# Patient Record
Sex: FEMALE | Race: BLACK OR AFRICAN AMERICAN | NOT HISPANIC OR LATINO | URBAN - METROPOLITAN AREA
[De-identification: names, ages, dates, MRNs, and addresses within clinical notes are randomized per-mention and may not be internally consistent; named-entity substitution may affect disease eponyms.]

---

## 2017-01-03 ENCOUNTER — INPATIENT (INPATIENT)
Facility: HOSPITAL | Age: 62
LOS: 1 days | Discharge: HOME | DRG: 872 | End: 2017-01-05
Attending: INTERNAL MEDICINE | Admitting: INTERNAL MEDICINE
Payer: MEDICARE

## 2017-01-03 VITALS
DIASTOLIC BLOOD PRESSURE: 74 MMHG | RESPIRATION RATE: 20 BRPM | OXYGEN SATURATION: 98 % | TEMPERATURE: 99 F | SYSTOLIC BLOOD PRESSURE: 174 MMHG | HEART RATE: 78 BPM

## 2017-01-03 LAB
ALBUMIN SERPL ELPH-MCNC: 3.8 G/DL — SIGNIFICANT CHANGE UP (ref 3.4–5)
ALP SERPL-CCNC: 126 U/L — HIGH (ref 40–120)
ALT FLD-CCNC: 13 U/L — SIGNIFICANT CHANGE UP (ref 12–42)
ANION GAP SERPL CALC-SCNC: 12 MMOL/L — SIGNIFICANT CHANGE UP (ref 9–16)
APPEARANCE UR: CLEAR — SIGNIFICANT CHANGE UP
AST SERPL-CCNC: 16 U/L — SIGNIFICANT CHANGE UP (ref 15–37)
BILIRUB SERPL-MCNC: 0.2 MG/DL — SIGNIFICANT CHANGE UP (ref 0.2–1.2)
BILIRUB UR-MCNC: NEGATIVE — SIGNIFICANT CHANGE UP
BUN SERPL-MCNC: 21 MG/DL — SIGNIFICANT CHANGE UP (ref 7–23)
CALCIUM SERPL-MCNC: 9.7 MG/DL — SIGNIFICANT CHANGE UP (ref 8.5–10.5)
CHLORIDE SERPL-SCNC: 104 MMOL/L — SIGNIFICANT CHANGE UP (ref 96–108)
CO2 SERPL-SCNC: 22 MMOL/L — SIGNIFICANT CHANGE UP (ref 22–31)
COLOR SPEC: YELLOW — SIGNIFICANT CHANGE UP
CREAT SERPL-MCNC: 1.5 MG/DL — HIGH (ref 0.5–1.3)
DIFF PNL FLD: (no result)
GLUCOSE SERPL-MCNC: 159 MG/DL — HIGH (ref 70–99)
GLUCOSE UR QL: 100
HCT VFR BLD CALC: 28 % — LOW (ref 34.5–45)
HGB BLD-MCNC: 8.7 G/DL — LOW (ref 11.5–15.5)
KETONES UR-MCNC: NEGATIVE — SIGNIFICANT CHANGE UP
LACTATE SERPL-SCNC: 1.6 MMOL/L — SIGNIFICANT CHANGE UP (ref 0.4–2)
LACTATE SERPL-SCNC: 2.2 MMOL/L — HIGH (ref 0.4–2)
LEUKOCYTE ESTERASE UR-ACNC: NEGATIVE — SIGNIFICANT CHANGE UP
MCHC RBC-ENTMCNC: 27.4 PG — SIGNIFICANT CHANGE UP (ref 27–34)
MCHC RBC-ENTMCNC: 31.1 G/DL — LOW (ref 32–36)
MCV RBC AUTO: 88.3 FL — SIGNIFICANT CHANGE UP (ref 80–100)
NITRITE UR-MCNC: NEGATIVE — SIGNIFICANT CHANGE UP
PH UR: 5.5 — SIGNIFICANT CHANGE UP (ref 4–8.1)
PLATELET # BLD AUTO: 269 K/UL — SIGNIFICANT CHANGE UP (ref 150–400)
POTASSIUM SERPL-MCNC: 4.4 MMOL/L — SIGNIFICANT CHANGE UP (ref 3.5–5.3)
POTASSIUM SERPL-SCNC: 4.4 MMOL/L — SIGNIFICANT CHANGE UP (ref 3.5–5.3)
PROT SERPL-MCNC: 8.7 G/DL — HIGH (ref 6.4–8.2)
PROT UR-MCNC: 30 MG/DL
RBC # BLD: 3.17 M/UL — LOW (ref 3.8–5.2)
RBC # FLD: 16.4 % — SIGNIFICANT CHANGE UP (ref 10.3–16.9)
SODIUM SERPL-SCNC: 138 MMOL/L — SIGNIFICANT CHANGE UP (ref 132–145)
SP GR SPEC: >=1.03 — SIGNIFICANT CHANGE UP (ref 1–1.03)
TROPONIN I SERPL-MCNC: 0.1 NG/ML — HIGH (ref 0.02–0.06)
UROBILINOGEN FLD QL: 0.2 E.U./DL — SIGNIFICANT CHANGE UP
WBC # BLD: 13 K/UL — HIGH (ref 3.8–10.5)
WBC # FLD AUTO: 13 K/UL — HIGH (ref 3.8–10.5)

## 2017-01-03 PROCEDURE — 70450 CT HEAD/BRAIN W/O DYE: CPT | Mod: 26

## 2017-01-03 PROCEDURE — 71010: CPT | Mod: 26

## 2017-01-03 PROCEDURE — 74177 CT ABD & PELVIS W/CONTRAST: CPT | Mod: 26

## 2017-01-03 PROCEDURE — 99285 EMERGENCY DEPT VISIT HI MDM: CPT | Mod: 25

## 2017-01-03 PROCEDURE — 93010 ELECTROCARDIOGRAM REPORT: CPT

## 2017-01-03 RX ORDER — SODIUM CHLORIDE 9 MG/ML
1000 INJECTION INTRAMUSCULAR; INTRAVENOUS; SUBCUTANEOUS ONCE
Qty: 0 | Refills: 0 | Status: COMPLETED | OUTPATIENT
Start: 2017-01-03 | End: 2017-01-03

## 2017-01-03 RX ORDER — ONDANSETRON 8 MG/1
4 TABLET, FILM COATED ORAL ONCE
Qty: 0 | Refills: 0 | Status: COMPLETED | OUTPATIENT
Start: 2017-01-03 | End: 2017-01-03

## 2017-01-03 RX ORDER — PIPERACILLIN AND TAZOBACTAM 4; .5 G/20ML; G/20ML
3.38 INJECTION, POWDER, LYOPHILIZED, FOR SOLUTION INTRAVENOUS ONCE
Qty: 0 | Refills: 0 | Status: COMPLETED | OUTPATIENT
Start: 2017-01-03 | End: 2017-01-03

## 2017-01-03 RX ORDER — HEPARIN SODIUM 5000 [USP'U]/ML
5000 INJECTION INTRAVENOUS; SUBCUTANEOUS EVERY 8 HOURS
Qty: 0 | Refills: 0 | Status: DISCONTINUED | OUTPATIENT
Start: 2017-01-03 | End: 2017-01-05

## 2017-01-03 RX ADMIN — PIPERACILLIN AND TAZOBACTAM 200 GRAM(S): 4; .5 INJECTION, POWDER, LYOPHILIZED, FOR SOLUTION INTRAVENOUS at 18:48

## 2017-01-03 RX ADMIN — ONDANSETRON 4 MILLIGRAM(S): 8 TABLET, FILM COATED ORAL at 16:37

## 2017-01-03 RX ADMIN — SODIUM CHLORIDE 1000 MILLILITER(S): 9 INJECTION INTRAMUSCULAR; INTRAVENOUS; SUBCUTANEOUS at 18:06

## 2017-01-03 RX ADMIN — SODIUM CHLORIDE 1000 MILLILITER(S): 9 INJECTION INTRAMUSCULAR; INTRAVENOUS; SUBCUTANEOUS at 16:38

## 2017-01-03 NOTE — ED ADULT TRIAGE NOTE - CHIEF COMPLAINT QUOTE
As per EMS patient complaining of abdominal pain starting this morning with nausea. patient denies diarrhea, fever, travel.

## 2017-01-03 NOTE — ED PROVIDER NOTE - MEDICAL DECISION MAKING DETAILS
Will admit patient for sepsis, possible seizure, fever.  Patient appears well.  Case discussed with admitting doctor who has accepted patient to Dr. Sanchez's team.

## 2017-01-03 NOTE — ED PROVIDER NOTE - CARE PLAN
Principal Discharge DX:	Seizure  Secondary Diagnosis:	Non-intractable vomiting with nausea, unspecified vomiting type  Secondary Diagnosis:	Sepsis, due to unspecified organism

## 2017-01-03 NOTE — ED PROVIDER NOTE - NS ED MD SCRIBE ATTENDING SCRIBE SECTIONS
HISTORY OF PRESENT ILLNESS/REVIEW OF SYSTEMS/PHYSICAL EXAM/VITAL SIGNS( Pullset)/DISPOSITION/HIV/PAST MEDICAL/SURGICAL/SOCIAL HISTORY

## 2017-01-03 NOTE — ED PROVIDER NOTE - PSH
Status post aorto-coronary artery bypass graft  2012  Status post percutaneous transluminal coronary angioplasty  X 2

## 2017-01-03 NOTE — ED PROVIDER NOTE - CONSTITUTIONAL, MLM
normal... Well appearing, well nourished, awake, alert, oriented to person, place, time/situation and in no apparent distress. patient actively vomiting.

## 2017-01-03 NOTE — ED ADULT NURSE NOTE - OBJECTIVE STATEMENT
Pt received c/o abd pain w/ n/v since this AM. At this time axox3, states has abd pain, cp when coughing, sob, but speaking in full complete sentences. Will cont to monitor.

## 2017-01-03 NOTE — ED PROVIDER NOTE - PMH
Anemia  Anemia  Atherosclerosis of coronary artery  CAD (coronary artery disease)  Essential hypertension  HTN (hypertension)  Gastroesophageal reflux disease  GERD (gastroesophageal reflux disease)  Hyperlipidemia  Hyperlipidemia  Hypothyroidism  Hypothyroidism  Peripheral vascular disease  PVD (peripheral vascular disease)

## 2017-01-04 DIAGNOSIS — R41.3 OTHER AMNESIA: ICD-10-CM

## 2017-01-04 DIAGNOSIS — I71.9 AORTIC ANEURYSM OF UNSPECIFIED SITE, WITHOUT RUPTURE: ICD-10-CM

## 2017-01-04 DIAGNOSIS — I25.10 ATHEROSCLEROTIC HEART DISEASE OF NATIVE CORONARY ARTERY WITHOUT ANGINA PECTORIS: ICD-10-CM

## 2017-01-04 DIAGNOSIS — R63.8 OTHER SYMPTOMS AND SIGNS CONCERNING FOOD AND FLUID INTAKE: ICD-10-CM

## 2017-01-04 DIAGNOSIS — Z29.9 ENCOUNTER FOR PROPHYLACTIC MEASURES, UNSPECIFIED: ICD-10-CM

## 2017-01-04 DIAGNOSIS — D64.9 ANEMIA, UNSPECIFIED: ICD-10-CM
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
## 2017-01-04 DIAGNOSIS — E03.9 HYPOTHYROIDISM, UNSPECIFIED: ICD-10-CM

## 2017-01-04 DIAGNOSIS — I10 ESSENTIAL (PRIMARY) HYPERTENSION: ICD-10-CM

## 2017-01-04 DIAGNOSIS — A41.9 SEPSIS, UNSPECIFIED ORGANISM: ICD-10-CM

## 2017-01-04 LAB
ALBUMIN SERPL ELPH-MCNC: 3.3 G/DL — LOW (ref 3.4–5)
ALP SERPL-CCNC: 125 U/L — HIGH (ref 40–120)
ALT FLD-CCNC: 13 U/L — SIGNIFICANT CHANGE UP (ref 12–42)
ANION GAP SERPL CALC-SCNC: 11 MMOL/L — SIGNIFICANT CHANGE UP (ref 9–16)
ANION GAP SERPL CALC-SCNC: 12 MMOL/L — SIGNIFICANT CHANGE UP (ref 9–16)
ANISOCYTOSIS BLD QL: SLIGHT — SIGNIFICANT CHANGE UP
APTT BLD: 32.4 SEC — SIGNIFICANT CHANGE UP (ref 27.5–37.4)
APTT BLD: 33.6 SEC — SIGNIFICANT CHANGE UP (ref 27.5–37.4)
AST SERPL-CCNC: 15 U/L — SIGNIFICANT CHANGE UP (ref 15–37)
BASOPHILS NFR BLD AUTO: 0.1 % — SIGNIFICANT CHANGE UP (ref 0–2)
BASOPHILS NFR BLD AUTO: 0.1 % — SIGNIFICANT CHANGE UP (ref 0–2)
BILIRUB SERPL-MCNC: 0.2 MG/DL — SIGNIFICANT CHANGE UP (ref 0.2–1.2)
BLD GP AB SCN SERPL QL: NEGATIVE — SIGNIFICANT CHANGE UP
BUN SERPL-MCNC: 18 MG/DL — SIGNIFICANT CHANGE UP (ref 7–23)
BUN SERPL-MCNC: 19 MG/DL — SIGNIFICANT CHANGE UP (ref 7–23)
CALCIUM SERPL-MCNC: 8.8 MG/DL — SIGNIFICANT CHANGE UP (ref 8.5–10.5)
CALCIUM SERPL-MCNC: 9 MG/DL — SIGNIFICANT CHANGE UP (ref 8.5–10.5)
CHLORIDE SERPL-SCNC: 107 MMOL/L — SIGNIFICANT CHANGE UP (ref 96–108)
CHLORIDE SERPL-SCNC: 108 MMOL/L — SIGNIFICANT CHANGE UP (ref 96–108)
CK MB CFR SERPL CALC: 1.2 NG/ML — SIGNIFICANT CHANGE UP (ref 0.5–3.6)
CK SERPL-CCNC: 99 U/L — SIGNIFICANT CHANGE UP (ref 26–192)
CO2 SERPL-SCNC: 19 MMOL/L — LOW (ref 22–31)
CO2 SERPL-SCNC: 20 MMOL/L — LOW (ref 22–31)
CREAT SERPL-MCNC: 1.42 MG/DL — HIGH (ref 0.5–1.3)
CREAT SERPL-MCNC: 1.44 MG/DL — HIGH (ref 0.5–1.3)
ELLIPTOCYTES BLD QL SMEAR: SLIGHT — SIGNIFICANT CHANGE UP
EOSINOPHIL NFR BLD AUTO: 0 % — SIGNIFICANT CHANGE UP (ref 0–6)
FERRITIN SERPL-MCNC: 48.4 NG/ML — SIGNIFICANT CHANGE UP (ref 8–252)
FOLATE SERPL-MCNC: >20 NG/ML — SIGNIFICANT CHANGE UP (ref 4.8–24.2)
GLUCOSE SERPL-MCNC: 108 MG/DL — HIGH (ref 70–99)
GLUCOSE SERPL-MCNC: 97 MG/DL — SIGNIFICANT CHANGE UP (ref 70–99)
HCT VFR BLD CALC: 23.4 % — LOW (ref 34.5–45)
HCT VFR BLD CALC: 23.8 % — LOW (ref 34.5–45)
HCT VFR BLD CALC: 25.7 % — LOW (ref 34.5–45)
HCV AB S/CO SERPL IA: 0.09 S/CO — SIGNIFICANT CHANGE UP
HCV AB SERPL-IMP: SIGNIFICANT CHANGE UP
HGB BLD-MCNC: 7.3 G/DL — LOW (ref 11.5–15.5)
HGB BLD-MCNC: 7.4 G/DL — LOW (ref 11.5–15.5)
HGB BLD-MCNC: 7.8 G/DL — LOW (ref 11.5–15.5)
HIV 1+2 AB+HIV1 P24 AG SERPL QL IA: SIGNIFICANT CHANGE UP
INR BLD: 1.05 — SIGNIFICANT CHANGE UP (ref 0.88–1.16)
INR BLD: 1.1 — SIGNIFICANT CHANGE UP (ref 0.88–1.16)
IRON SATN MFR SERPL: 10 % — LOW (ref 20–38)
IRON SATN MFR SERPL: 32 UG/DL — LOW (ref 50–170)
LYMPHOCYTES # BLD AUTO: 11.4 % — LOW (ref 13–44)
LYMPHOCYTES # BLD AUTO: 15.4 % — SIGNIFICANT CHANGE UP (ref 13–44)
LYMPHOCYTES # BLD AUTO: 5 % — LOW (ref 13–44)
MACROCYTES BLD QL: SLIGHT — SIGNIFICANT CHANGE UP
MAGNESIUM SERPL-MCNC: 2 MG/DL — SIGNIFICANT CHANGE UP (ref 1.6–2.4)
MAGNESIUM SERPL-MCNC: 2 MG/DL — SIGNIFICANT CHANGE UP (ref 1.6–2.4)
MCHC RBC-ENTMCNC: 26.5 PG — LOW (ref 27–34)
MCHC RBC-ENTMCNC: 27 PG — SIGNIFICANT CHANGE UP (ref 27–34)
MCHC RBC-ENTMCNC: 27.2 PG — SIGNIFICANT CHANGE UP (ref 27–34)
MCHC RBC-ENTMCNC: 30.4 G/DL — LOW (ref 32–36)
MCHC RBC-ENTMCNC: 31.1 G/DL — LOW (ref 32–36)
MCHC RBC-ENTMCNC: 31.2 G/DL — LOW (ref 32–36)
MCV RBC AUTO: 86.7 FL — SIGNIFICANT CHANGE UP (ref 80–100)
MCV RBC AUTO: 87.4 FL — SIGNIFICANT CHANGE UP (ref 80–100)
MCV RBC AUTO: 87.5 FL — SIGNIFICANT CHANGE UP (ref 80–100)
MONOCYTES NFR BLD AUTO: 5.1 % — SIGNIFICANT CHANGE UP (ref 2–14)
MONOCYTES NFR BLD AUTO: 7 % — SIGNIFICANT CHANGE UP (ref 2–14)
MONOCYTES NFR BLD AUTO: 9 % — SIGNIFICANT CHANGE UP (ref 2–14)
NEUTROPHILS NFR BLD AUTO: 75.5 % — SIGNIFICANT CHANGE UP (ref 43–77)
NEUTROPHILS NFR BLD AUTO: 83.4 % — HIGH (ref 43–77)
NEUTROPHILS NFR BLD AUTO: 87 % — HIGH (ref 43–77)
NEUTS BAND # BLD: 1 % — SIGNIFICANT CHANGE UP
NRBC # BLD: 1 /100 WBCS — HIGH
OVALOCYTES BLD QL SMEAR: SLIGHT — SIGNIFICANT CHANGE UP
PHOSPHATE SERPL-MCNC: 3.4 MG/DL — SIGNIFICANT CHANGE UP (ref 2.5–4.5)
PLAT MORPH BLD: (no result)
PLATELET # BLD AUTO: 235 K/UL — SIGNIFICANT CHANGE UP (ref 150–400)
PLATELET # BLD AUTO: 241 K/UL — SIGNIFICANT CHANGE UP (ref 150–400)
PLATELET # BLD AUTO: 251 K/UL — SIGNIFICANT CHANGE UP (ref 150–400)
POIKILOCYTOSIS BLD QL AUTO: SLIGHT — SIGNIFICANT CHANGE UP
POLYCHROMASIA BLD QL SMEAR: SLIGHT — SIGNIFICANT CHANGE UP
POTASSIUM SERPL-MCNC: 4 MMOL/L — SIGNIFICANT CHANGE UP (ref 3.5–5.3)
POTASSIUM SERPL-MCNC: 4.1 MMOL/L — SIGNIFICANT CHANGE UP (ref 3.5–5.3)
POTASSIUM SERPL-SCNC: 4 MMOL/L — SIGNIFICANT CHANGE UP (ref 3.5–5.3)
POTASSIUM SERPL-SCNC: 4.1 MMOL/L — SIGNIFICANT CHANGE UP (ref 3.5–5.3)
PROT SERPL-MCNC: 7.6 G/DL — SIGNIFICANT CHANGE UP (ref 6.4–8.2)
PROTHROM AB SERPL-ACNC: 11.7 SEC — SIGNIFICANT CHANGE UP (ref 10–13.1)
PROTHROM AB SERPL-ACNC: 12.2 SEC — SIGNIFICANT CHANGE UP (ref 10–13.1)
RAPID RVP RESULT: SIGNIFICANT CHANGE UP
RBC # BLD: 2.7 M/UL — LOW (ref 3.8–5.2)
RBC # BLD: 2.72 M/UL — LOW (ref 3.8–5.2)
RBC # BLD: 2.94 M/UL — LOW (ref 3.8–5.2)
RBC # FLD: 16.1 % — SIGNIFICANT CHANGE UP (ref 10.3–16.9)
RBC # FLD: 16.8 % — SIGNIFICANT CHANGE UP (ref 10.3–16.9)
RBC # FLD: 17.1 % — HIGH (ref 10.3–16.9)
RBC BLD AUTO: (no result)
RH IG SCN BLD-IMP: POSITIVE — SIGNIFICANT CHANGE UP
SODIUM SERPL-SCNC: 138 MMOL/L — SIGNIFICANT CHANGE UP (ref 135–145)
SODIUM SERPL-SCNC: 139 MMOL/L — SIGNIFICANT CHANGE UP (ref 135–145)
T PALLIDUM AB TITR SER: NEGATIVE — SIGNIFICANT CHANGE UP
TIBC SERPL-MCNC: 324 UG/DL — SIGNIFICANT CHANGE UP (ref 250–450)
TROPONIN I SERPL-MCNC: 0.12 NG/ML — HIGH (ref 0.01–0.04)
TROPONIN I SERPL-MCNC: 0.18 NG/ML — HIGH (ref 0.01–0.04)
TSH SERPL-MCNC: 1.39 UIU/ML — SIGNIFICANT CHANGE UP (ref 0.35–4.94)
TSH SERPL-MCNC: 1.45 UIU/ML — SIGNIFICANT CHANGE UP (ref 0.35–4.94)
VIT B12 SERPL-MCNC: >2000 PG/ML — HIGH (ref 243–894)
WBC # BLD: 10.1 K/UL — SIGNIFICANT CHANGE UP (ref 3.8–10.5)
WBC # BLD: 10.3 K/UL — SIGNIFICANT CHANGE UP (ref 3.8–10.5)
WBC # BLD: 11.2 K/UL — HIGH (ref 3.8–10.5)
WBC # FLD AUTO: 10.1 K/UL — SIGNIFICANT CHANGE UP (ref 3.8–10.5)
WBC # FLD AUTO: 10.3 K/UL — SIGNIFICANT CHANGE UP (ref 3.8–10.5)
WBC # FLD AUTO: 11.2 K/UL — HIGH (ref 3.8–10.5)

## 2017-01-04 RX ORDER — PIPERACILLIN AND TAZOBACTAM 4; .5 G/20ML; G/20ML
2.25 INJECTION, POWDER, LYOPHILIZED, FOR SOLUTION INTRAVENOUS EVERY 6 HOURS
Qty: 0 | Refills: 0 | Status: DISCONTINUED | OUTPATIENT
Start: 2017-01-04 | End: 2017-01-05

## 2017-01-04 RX ORDER — SODIUM CHLORIDE 9 MG/ML
1000 INJECTION INTRAMUSCULAR; INTRAVENOUS; SUBCUTANEOUS
Qty: 0 | Refills: 0 | Status: DISCONTINUED | OUTPATIENT
Start: 2017-01-04 | End: 2017-01-04

## 2017-01-04 RX ORDER — SODIUM FERRIC GLUCONAT/SUCROSE 62.5MG/5ML
125 AMPUL (ML) INTRAVENOUS DAILY
Qty: 0 | Refills: 0 | Status: DISCONTINUED | OUTPATIENT
Start: 2017-01-04 | End: 2017-01-05

## 2017-01-04 RX ORDER — FOLIC ACID 0.8 MG
1 TABLET ORAL DAILY
Qty: 0 | Refills: 0 | Status: DISCONTINUED | OUTPATIENT
Start: 2017-01-04 | End: 2017-01-05

## 2017-01-04 RX ORDER — PREGABALIN 225 MG/1
1000 CAPSULE ORAL DAILY
Qty: 0 | Refills: 0 | Status: DISCONTINUED | OUTPATIENT
Start: 2017-01-04 | End: 2017-01-05

## 2017-01-04 RX ORDER — SERTRALINE 25 MG/1
50 TABLET, FILM COATED ORAL DAILY
Qty: 0 | Refills: 0 | Status: DISCONTINUED | OUTPATIENT
Start: 2017-01-04 | End: 2017-01-05

## 2017-01-04 RX ORDER — VANCOMYCIN HCL 1 G
1000 VIAL (EA) INTRAVENOUS EVERY 24 HOURS
Qty: 0 | Refills: 0 | Status: DISCONTINUED | OUTPATIENT
Start: 2017-01-04 | End: 2017-01-05

## 2017-01-04 RX ORDER — SIMVASTATIN 20 MG/1
40 TABLET, FILM COATED ORAL AT BEDTIME
Qty: 0 | Refills: 0 | Status: DISCONTINUED | OUTPATIENT
Start: 2017-01-04 | End: 2017-01-05

## 2017-01-04 RX ORDER — PANTOPRAZOLE SODIUM 20 MG/1
40 TABLET, DELAYED RELEASE ORAL
Qty: 0 | Refills: 0 | Status: DISCONTINUED | OUTPATIENT
Start: 2017-01-04 | End: 2017-01-05

## 2017-01-04 RX ORDER — LEVETIRACETAM 250 MG/1
500 TABLET, FILM COATED ORAL
Qty: 0 | Refills: 0 | Status: DISCONTINUED | OUTPATIENT
Start: 2017-01-04 | End: 2017-01-05

## 2017-01-04 RX ORDER — LEVOTHYROXINE SODIUM 125 MCG
75 TABLET ORAL DAILY
Qty: 0 | Refills: 0 | Status: DISCONTINUED | OUTPATIENT
Start: 2017-01-04 | End: 2017-01-05

## 2017-01-04 RX ORDER — CILOSTAZOL 100 MG/1
50 TABLET ORAL
Qty: 0 | Refills: 0 | Status: DISCONTINUED | OUTPATIENT
Start: 2017-01-04 | End: 2017-01-05

## 2017-01-04 RX ADMIN — Medication 55 MILLIGRAM(S): at 22:37

## 2017-01-04 RX ADMIN — PREGABALIN 1000 MICROGRAM(S): 225 CAPSULE ORAL at 15:00

## 2017-01-04 RX ADMIN — SODIUM CHLORIDE 75 MILLILITER(S): 9 INJECTION INTRAMUSCULAR; INTRAVENOUS; SUBCUTANEOUS at 05:12

## 2017-01-04 RX ADMIN — Medication 1 MILLIGRAM(S): at 15:01

## 2017-01-04 RX ADMIN — SIMVASTATIN 40 MILLIGRAM(S): 20 TABLET, FILM COATED ORAL at 22:37

## 2017-01-04 RX ADMIN — PANTOPRAZOLE SODIUM 40 MILLIGRAM(S): 20 TABLET, DELAYED RELEASE ORAL at 07:12

## 2017-01-04 RX ADMIN — LEVETIRACETAM 500 MILLIGRAM(S): 250 TABLET, FILM COATED ORAL at 07:17

## 2017-01-04 RX ADMIN — CILOSTAZOL 50 MILLIGRAM(S): 100 TABLET ORAL at 18:47

## 2017-01-04 RX ADMIN — HEPARIN SODIUM 5000 UNIT(S): 5000 INJECTION INTRAVENOUS; SUBCUTANEOUS at 22:37

## 2017-01-04 RX ADMIN — HEPARIN SODIUM 5000 UNIT(S): 5000 INJECTION INTRAVENOUS; SUBCUTANEOUS at 05:11

## 2017-01-04 RX ADMIN — SERTRALINE 50 MILLIGRAM(S): 25 TABLET, FILM COATED ORAL at 15:03

## 2017-01-04 RX ADMIN — PIPERACILLIN AND TAZOBACTAM 200 GRAM(S): 4; .5 INJECTION, POWDER, LYOPHILIZED, FOR SOLUTION INTRAVENOUS at 15:00

## 2017-01-04 RX ADMIN — Medication 250 MILLIGRAM(S): at 07:40

## 2017-01-04 RX ADMIN — LEVETIRACETAM 500 MILLIGRAM(S): 250 TABLET, FILM COATED ORAL at 18:47

## 2017-01-04 RX ADMIN — Medication 30 MILLIGRAM(S): at 15:03

## 2017-01-04 RX ADMIN — PIPERACILLIN AND TAZOBACTAM 200 GRAM(S): 4; .5 INJECTION, POWDER, LYOPHILIZED, FOR SOLUTION INTRAVENOUS at 07:12

## 2017-01-04 RX ADMIN — Medication 75 MICROGRAM(S): at 07:12

## 2017-01-04 RX ADMIN — HEPARIN SODIUM 5000 UNIT(S): 5000 INJECTION INTRAVENOUS; SUBCUTANEOUS at 15:00

## 2017-01-04 RX ADMIN — CILOSTAZOL 50 MILLIGRAM(S): 100 TABLET ORAL at 07:16

## 2017-01-04 NOTE — PROGRESS NOTE ADULT - SUBJECTIVE AND OBJECTIVE BOX
HPI:  Patient is a 61 year old female with past medical history of Coronary Artery Disease (S/P CABG), Aortic Valve Replacement (Bovine Valve) Hypertension, Hypothyroidism, Hyperlipidemia, PVD (S/P Stent of LSFA), Anemia, Seizures who presented to [facility] after being sent in by Dr. Izquierdo from her Office complaining of "Abdominal Pain" and "Seizure" as per ED Provider Note, as well as "Nausea" "Vomiting". Unable to Reach Attending Dr. Izquierdo for Collateral Information, Called Office and Cell Phone. Left Messages, with No Response. Patient not able to give Good History.     Patient reports on 1/2, she was getting Tea, and reports she Passed Out, and Woke Up on her Kitchen Floor. Patient denies any preceding Dizziness/Lightheadedness, Chest Pain, Palpitations, Diaphoresis, Convulsions, Tongue Biting, or Urinary/Fecal Incontinence. Patient reports she Remembers Waking Up on Kitchen Floor. Patient reports she went to see Dr. Izquierdo Today, who sent her into the ED.   Patient denies Fevers, however, reports Chills, Cough, Productive of Green Sputum, Sore Throat, Congestion, Myalgias x 4 Days. Patient also does note Headache, without Photophobia, or Neck Stiffness. Patient denies Chest Pain, Palpitations, SOB. Patient does report mild Abdominal Pain, but denies Nausea, Vomiting, Diarrhea, Constipation, Black/Bloody Bowel Movements, Dysuria, Hematuria, Pain/Swelling of Lower Extremities.     Patient reports since she Fell her Memory has been "Off", having a difficult time remember Past Medical History Information.     Patient denies Sick Contacts, did not get Influenza Vaccine this year.     In the ED, TMax 100.7 HR 78-94 -174/74-86, RR 18-20, SpO2 97-98% on RA. Labs Notable for WBC Count 13, Hemoglobin 8.7, Lactic Acid 2.2-->1.6, Creatinine 1.5, Troponin 0.101. UA Not Concerning for UTI, CT Head without Acute Findings. CT Abdomen Pelvis without Acute Abnormality seen. Patient does "suprarenal abdominal aortic aneurysm measuring 3.4 cm, slightly increased from prior study", "status post infrarenal abdominal aortic graft repair with persistent perigraft streakiness which may represent fibrosis". Patient given Zosyn, 2L NS, and Zofran. (04 Jan 2017 03:54)    FAMILY HISTORY:  No pertinent family history in first degree relatives    MEDICATIONS  (STANDING):  heparin  Injectable 5000Unit(s) SubCutaneous every 8 hours  levETIRAcetam 500milliGRAM(s) Oral two times a day  sertraline 50milliGRAM(s) Oral daily  simvastatin 40milliGRAM(s) Oral at bedtime  cilostazol 50milliGRAM(s) Oral two times a day  pantoprazole    Tablet 40milliGRAM(s) Oral before breakfast  levothyroxine 75MICROGram(s) Oral daily  folic acid 1milliGRAM(s) Oral daily  vancomycin  IVPB 1000milliGRAM(s) IV Intermittent every 24 hours  piperacillin/tazobactam IVPB. 2.25Gram(s) IV Intermittent every 6 hours  sodium ferric gluconate complex IVPB 125milliGRAM(s) IV Intermittent daily  cyanocobalamin Injectable 1000MICROGram(s) SubCutaneous daily  oseltamivir 30milliGRAM(s) Oral every 24 hours    MEDICATIONS  (PRN):    Vital Signs Last 24 Hrs  T(C): 37.4, Max: 38 (01-04 @ 05:43)  T(F): 99.3, Max: 100.4 (01-04 @ 05:43)  HR: 77 (77 - 90)  BP: 161/82 (138/79 - 161/82)  BP(mean): 89 (89 - 89)  RR: 17 (16 - 18)  SpO2: 100% (93% - 100%)    Physical exam:    Overall impression  Lymphadenopathy  Liver  spleen    Labs:  CBC Full  -  ( 04 Jan 2017 12:27 )  WBC Count : 10.1 K/uL  Hemoglobin : 7.4 g/dL  Hematocrit : 23.8 %  Platelet Count - Automated : 235 K/uL  Mean Cell Volume : 87.5 fL  Mean Cell Hemoglobin : 27.2 pg  Mean Cell Hemoglobin Concentration : 31.1 g/dL  Auto Neutrophil # : x  Auto Lymphocyte # : x  Auto Monocyte # : x  Auto Eosinophil # : x  Auto Basophil # : x  Auto Neutrophil % : x  Auto Lymphocyte % : x  Auto Monocyte % : x  Auto Eosinophil % : x  Auto Basophil % : x    04 Jan 2017 06:23    139    |  108    |  19     ----------------------------<  97     4.0     |  19     |  1.42     Ca    8.8        04 Jan 2017 06:23  Phos  3.4       04 Jan 2017 02:58  Mg     2.0       04 Jan 2017 06:23    TPro  7.6    /  Alb  3.3    /  TBili  0.2    /  DBili  x      /  AST  15     /  ALT  13     /  AlkPhos  125    04 Jan 2017 02:58      Radiology:  HEALTH ISSUES - R/O PROBLEM Dx:      Assessmant / Problems  1)iron deficiency anemia  2)Seizure disorder on Kepra brake through seizure  3)post  thyroidectomy hypothyroidism    Plan:  1)transfuse 2 U PRBC  2)iv iron  3)Vit b12  4) will discuss with Dr Huynh Neurology consult    Thank you  Farheen Izquierdo MD

## 2017-01-04 NOTE — H&P ADULT. - NECK DETAILS
No Cervical or Supraclavicular Lymphadenopathy. No Nuchal Rigidity./supple/no JVD/normal thyroid gland

## 2017-01-04 NOTE — CONSULT NOTE ADULT - SUBJECTIVE AND OBJECTIVE BOX
HPI: 74 year old male was admitted here on 12/07/16 for unhealed left foot ulcer,abscess left foot & gangrene of left foot & he had Left B/K/Amputation on 12/13/ 16.He denies having any diabetes.However his HgbA1C was 6.6% 0n admission     Age at Dx:  How dx:  Hx and duration of insulin:  Current Therapy:  Hx of hypoglycemia  Hx of DKA/HHS?none    Home FSG:  Fasting  Lunch  Dinner  Bed    Hx of other regimens  Complications:  Outpatient Endo:    PMH & Surgical Hx:SEIZURE  No h/o HF  Unknown h/o HF  No pertinent family history in first degree relatives  Handoff  MEWS Score  Seizure  Essential hypertension  Hyperlipidemia  Gastroesophageal reflux disease  Hypothyroidism  Anemia  Atherosclerosis of coronary artery  Peripheral vascular disease  Seizure  Preventive measure  Nutrition, metabolism, and development symptoms  Aortic aneurysm  Hypothyroidism  Essential hypertension  CAD (coronary artery disease)  Anemia  Memory impairment  Fall  Severe sepsis  Status post percutaneous transluminal coronary angioplasty  Status post aorto-coronary artery bypass graft  SEIZURE  90+  Sepsis, due to unspecified organism  Non-intractable vomiting with nausea, unspecified vomiting type   H.A.S.H.D,paroxysmal atrial fibrillation,S/P endocarditis bacterialx2 & aortic valve replacement  & systolic C.H.F.with ejection fraction of 20-30%     FH:  DM:  Thyroid:  Autoimmune:  Other:    SH:  Smoking used to smoke 2packs /day [id number],Discontinued 5years ago   Etoh: h/o ETOH Abuse,  Recreational Drugs:  Social Life:    Home Meds:coumadin as per INR,aspirin 81 mgPOQD,Lasix 40 mg POBID,Coreg 6.25mg.POBID,Lanoxin 0.25mgPOQD,Lipitor 40mgPOQD,Cymbalta 20mg.POQD, & zstril 10mg.POQD    Current Meds:  heparin  Injectable 5000Unit(s) SubCutaneous every 8 hours  levETIRAcetam 500milliGRAM(s) Oral two times a day  sertraline 50milliGRAM(s) Oral daily  simvastatin 40milliGRAM(s) Oral at bedtime  cilostazol 50milliGRAM(s) Oral two times a day  pantoprazole    Tablet 40milliGRAM(s) Oral before breakfast  levothyroxine 75MICROGram(s) Oral daily  folic acid 1milliGRAM(s) Oral daily  vancomycin  IVPB 1000milliGRAM(s) IV Intermittent every 24 hours  piperacillin/tazobactam IVPB. 2.25Gram(s) IV Intermittent every 6 hours  sodium ferric gluconate complex IVPB 125milliGRAM(s) IV Intermittent daily  cyanocobalamin Injectable 1000MICROGram(s) SubCutaneous daily  oseltamivir 30milliGRAM(s) Oral every 24 hours      Allergies:  No Known Allergies      ROS:  Denies the following except as indicated.    General: weight loss/weight gain, decreased appetite, fatigue  Eyes: Blurry vision, double vision, visual changes  ENT: Throat pain, changes in voice,   CV: palpitations, SOB, CP, cough  GI: NVD, difficulty swallowing, abdominal pain  : polyuria, dysuria  Endo: abnormal menses, temperature intolerance, decreased libido  MSK: weakness, joint pain  Skin: rash, dryness, diaphoresis  Heme: Easy bruising,bleeding  Neuro: HA, dizziness, lightheadedness, numbness tingling  Psych: Anxiety, Depression    CAPILLARY BLOOD GLUCOSE:  198 (03 Jan 2017 15:20)        Height (cm): 170.2 (01-03 @ 22:04)  Weight (kg): 64 (01-03 @ 22:04)  BMI (kg/m2): 22.1 (01-03 @ 22:04)    Vital Signs Last 24 Hrs  T(C): 37.4, Max: 38 (01-04 @ 05:43)  T(F): 99.3, Max: 100.4 (01-04 @ 05:43)  HR: 77 (77 - 90)  BP: 161/82 (138/79 - 161/82)  BP(mean): 89 (89 - 89)  RR: 17 (16 - 18)  SpO2: 100% (93% - 100%)  Constitutional: wn/wd in NAD.   HEENT: NCAT, MMM, OP clear, EOMI, , no proptosis or lid retraction  Neck: no thyromegaly or palpable thyroid nodules   Respiratory: lungs CTAB.  Cardiovascular: regular rhythm, normal S1 and S2, no audible murmurs, no peripheral edema  GI: soft, NT/ND, no masses/HSM appreciated.  Neurology: no tremors, DTR 2+  Skin: no visible rashes/lesions  Psychiatric: AAO x 3, normal affect/mood.  Ext: radial pulses intact, DP pulses intact, extremities warm, no cyanosis, clubbing or edema.       LABS:                        7.4    10.1  )-----------( 235      ( 04 Jan 2017 12:27 )             23.8     04 Jan 2017 06:23    139    |  108    |  19     ----------------------------<  97     4.0     |  19     |  1.42     Ca    8.8        04 Jan 2017 06:23  Phos  3.4       04 Jan 2017 02:58  Mg     2.0       04 Jan 2017 06:23    TPro  7.6    /  Alb  3.3    /  TBili  0.2    /  DBili  x      /  AST  15     /  ALT  13     /  AlkPhos  125    04 Jan 2017 02:58    PT/INR - ( 04 Jan 2017 06:25 )   PT: 12.2 sec;   INR: 1.10          PTT - ( 04 Jan 2017 06:25 )  PTT:33.6 sec  Urinalysis Basic - ( 03 Jan 2017 18:10 )    Color: Yellow / Appearance: Clear / SG: >=1.030 / pH: x  Gluc: x / Ketone: NEGATIVE  / Bili: NEGATIVE / Urobili: 0.2 E.U./dL   Blood: x / Protein: 30 mg/dL / Nitrite: NEGATIVE   Leuk Esterase: NEGATIVE / RBC: < 5 /HPF / WBC < 5 /HPF   Sq Epi: x / Non Sq Epi: Moderate /HPF / Bacteria: Present /HPF        Thyroid Stimulating Hormone, Serum: 1.393 (01-04 @ 10:47)  Thyroid Stimulating Hormone, Serum: 1.452 (01-04 @ 06:23)      RADIOLOGY & ADDITIONAL STUDIES:    A/P:61yFemale    1.  DM  Please continue lantus       units at night / morning.  Please continue lispro      units before each meal.  Please continue lispro moderate / low dose sliding scale four times daily with meals and at bedtime    Pt's fasting glucose and pre-meal goal is     Will continue to monitor     For discharge, pt can continue    Pt can follow up at discharge with [facility] by calling  to make an appointment.   Will discuss case with     and update primary team

## 2017-01-04 NOTE — H&P ADULT. - ASSESSMENT
Patient is a 61 year old female with past medical history of Coronary Artery Disease (S/P CABG), Aortic Valve Replacement (Bovine Valve) Hypertension, Hypothyroidism, Hyperlipidemia, PVD (S/P Stent of LSFA), Anemia, Seizures who presented to [facility] after being sent in by Dr. Izquierdo from her Office complaining of "Abdominal Pain" and "Seizure" as per ED Provider Note, as well as "Nausea" "Vomiting". Unable to Reach Attending Dr. Izquierdo for Collateral Information, Called Office and Cell Phone. Left Messages, with No Response. Patient not able to give Good History.

## 2017-01-04 NOTE — H&P ADULT. - PROBLEM SELECTOR PLAN 4
Hemoglobin 8.7-->7.8. After 2L NS. Patient with History of Anemia, followed by Dr. Izquierdo. Etiology Unclear. Denies Black/Bloody Stools, however, patient with Guaiac Positive Brown Stool.   -Check Iron Studies, B12, Folic Acid.   -Protonix 40mg PO Q daily.   -Consider GI Consult in the AM.   -Follow-Up AM CBC, if Hemoglobin consistently Lower Than 8. Will likely need Transfusion if <8 given CAD.

## 2017-01-04 NOTE — H&P ADULT. - PROBLEM SELECTOR PLAN 5
Patient without Signs or Symptoms of ACS. Patient with Elevated Troponin 0.101-->0.184. No Ischemic Changes on EKG.   -Patient without Aspirin on Home Medication List. Will need to contact patient's Cardiologist (Dr. Preston).   -Toprol XL 25mg PO Q daily HELD in Setting of Sepsis.   -Continue Atorvastatin 40mg PO Q daily.   -Losartan HELD in the Setting of Sepsis.

## 2017-01-04 NOTE — H&P ADULT. - HISTORY OF PRESENT ILLNESS
Patient is a 61 year old female with past medical history of Coronary Artery Disease (S/P CABG), Hypertension, Hyperlipidemia, PVD (S/P Stents), Anemia Patient is a 61 year old female with past medical history of Coronary Artery Disease (S/P CABG), Aortic Valve Replacement (Bovine Valve) Hypertension, Hyperlipidemia, PVD (S/P Stent of LSFA), Anemia, Seizures Patient is a 61 year old female with past medical history of Coronary Artery Disease (S/P CABG), Aortic Valve Replacement (Bovine Valve) Hypertension, Hyperlipidemia, PVD (S/P Stent of LSFA), Anemia, Seizures who presented to [facility] after being sent in by Dr. Izquierdo from her Office complaining of "Abdominal Pain" and "Seizure" as per ED Provider Note, as well as "Nausea" "Vomiting". Unable to Reach Attending Dr. Izquierdo for Collateral Information, Called Office and Cell Phone. Left Messages, with No Response. Patient not able to give Good History.     Patient reports on 1/2, she was getting Tea, and reports she Passed Out, and Woke Up on her Kitchen Floor. Patient denies any preceding Dizziness/Lightheadedness, Chest Pain, Palpitations, Diaphoresis, Convulsions, Tongue Biting, or Urinary/Fecal Incontinence. Patient reports she Remembers Waking Up on Kitchen Floor. Patient reports she went to see Dr. Izquierdo Today, who sent her into the ED.   Patient denies Fevers, however, reports Chills, Cough, Productive of Green Sputum, Sore Throat, Congestion x 4 Days. Patient also does note Headache, without Photophobia, or Neck Stiffness. Patient denies Chest Pain, Palpitations, SOB, Abdominal Pain, Nausea, Vomiting, Diarrhea, Constipation, Black/Bloody Bowel Movements, Dysuria, Hematuria, Pain/Swelling of Lower Extremities.     Patient denies Sick Contacts.     In the ED, TMax 100.7 HR 78-94 -174/74-86, RR 18-20, SpO2 97-98% on RA. Labs Notable for WBC Count 13, Hemoglobin 8.7, Lactic Acid 2.2-->1.6, Creatinine 1.5, Troponin 0.101. UA Not Concerning for UTI, CT Head without Acute Findings. CT Abdomen Pelvis without Acute Abnormality seen. Patient does "suprarenal abdominal aortic aneurysm measuring 3.4 cm, slightly increased from prior study", "status post infrarenal abdominal aortic graft repair with persistent perigraft streakiness which may represent fibrosis". Patient given Zosyn, 2L NS, and Zofran. Patient is a 61 year old female with past medical history of Coronary Artery Disease (S/P CABG), Aortic Valve Replacement (Bovine Valve) Hypertension, Hypothyroidism, Hyperlipidemia, PVD (S/P Stent of LSFA), Anemia, Seizures who presented to [facility] after being sent in by Dr. Izquierdo from her Office complaining of "Abdominal Pain" and "Seizure" as per ED Provider Note, as well as "Nausea" "Vomiting". Unable to Reach Attending Dr. Izquierdo for Collateral Information, Called Office and Cell Phone. Left Messages, with No Response. Patient not able to give Good History.     Patient reports on 1/2, she was getting Tea, and reports she Passed Out, and Woke Up on her Kitchen Floor. Patient denies any preceding Dizziness/Lightheadedness, Chest Pain, Palpitations, Diaphoresis, Convulsions, Tongue Biting, or Urinary/Fecal Incontinence. Patient reports she Remembers Waking Up on Kitchen Floor. Patient reports she went to see Dr. Izquierdo Today, who sent her into the ED.   Patient denies Fevers, however, reports Chills, Cough, Productive of Green Sputum, Sore Throat, Congestion, Myalgias x 4 Days. Patient also does note Headache, without Photophobia, or Neck Stiffness. Patient denies Chest Pain, Palpitations, SOB. Patient does report mild Abdominal Pain, but denies Nausea, Vomiting, Diarrhea, Constipation, Black/Bloody Bowel Movements, Dysuria, Hematuria, Pain/Swelling of Lower Extremities.     Patient reports since she Fell her Memory has been "Off", having a difficult time remember Past Medical History Information.     Patient denies Sick Contacts, did not get Influenza Vaccine this year.     In the ED, TMax 100.7 HR 78-94 -174/74-86, RR 18-20, SpO2 97-98% on RA. Labs Notable for WBC Count 13, Hemoglobin 8.7, Lactic Acid 2.2-->1.6, Creatinine 1.5, Troponin 0.101. UA Not Concerning for UTI, CT Head without Acute Findings. CT Abdomen Pelvis without Acute Abnormality seen. Patient does "suprarenal abdominal aortic aneurysm measuring 3.4 cm, slightly increased from prior study", "status post infrarenal abdominal aortic graft repair with persistent perigraft streakiness which may represent fibrosis". Patient given Zosyn, 2L NS, and Zofran.

## 2017-01-04 NOTE — H&P ADULT. - PROBLEM SELECTOR PLAN 2
Patient reports Fall at Home. Etiology Unclear. Possibly due to Infection, however, patient with History of Seizures, and Cardiac Disease.   -CT Head without Acute Findings.   -Continue Keppra 500mg PO Q12 hours as per Outpatient Medications List. Attempt to get Collateral Information from PCP regarding Neurology as Outpatient. Consider EEG.   -Patient with AS Murmur on Examination. Repeat Echocardiogram. Possible that in the setting of Sepsis, patient with Decrease CO in setting of AS and caused Syncope.

## 2017-01-04 NOTE — H&P ADULT. - PROBLEM SELECTOR PLAN 9
Cardiac Diet, NS at 75mL/hour. Reassess need for Fluids as patient with AS and can become Fluid Overloaded.

## 2017-01-04 NOTE — H&P ADULT. - PROBLEM SELECTOR PLAN 3
Patient with Memory Difficulty x 1 Day. Unclear if patient had Seizure and is Post-Ictal.   -Check Vitamin B12, Folic Acid, TSH, RPR.

## 2017-01-04 NOTE — PROGRESS NOTE ADULT - SUBJECTIVE AND OBJECTIVE BOX
OVERNIGHT EVENTS: see H&P, patient was admitted o/n.    SUBJECTIVE / INTERVAL HPI: Patient seen and examined at bedside. Feeling better now than she did earlier in AM hours/at time of admission. She endorses SOB on exertion (when she ambulates to the bathroom feels like she ran a marathon) but not at rest. She had some dizziness at that time of ambulation but did not feel like she was unsteady or will fall. She denies CP or palpitations, she denies dysuria or frequency, or abdominal pain.    VITAL SIGNS:  T(F): 98.6  HR: 81  BP: 138/79  RR: 18  SpO2: 98%  Wt(kg): --    PHYSICAL EXAM:    General: WDWN female resting comfortably in bed and in NAD  HEENT: NC/AT; PERRL, EOMI; clear conjunctiva; MMM, oropharynx clear  Neck: supple, no JVD or LAD  Cardiovascular: +S1/S2; RRR with 3/6 holosystolic murmur heard throughout, and best at apex  Respiratory: +cough but otherwise CTA b/l; no W/R/R  Gastrointestinal: soft, NT/ND; +BSx4  : no CVAT or SPT  Extremities: WWP; 2+ peripheral pulses; no edema; NROMx4  Neurological: AAOx3; no focal deficits, CNII-XII grossly intact; no pronator drift or other cerebellar or meningeal signs    MEDICATIONS:  MEDICATIONS  (STANDING):  heparin  Injectable 5000Unit(s) SubCutaneous every 8 hours  sodium chloride 0.9%. 1000milliLiter(s) IV Continuous <Continuous>  levETIRAcetam 500milliGRAM(s) Oral two times a day  sertraline 50milliGRAM(s) Oral daily  simvastatin 40milliGRAM(s) Oral at bedtime  cilostazol 50milliGRAM(s) Oral two times a day  pantoprazole    Tablet 40milliGRAM(s) Oral before breakfast  levothyroxine 75MICROGram(s) Oral daily  folic acid 1milliGRAM(s) Oral daily  vancomycin  IVPB 1000milliGRAM(s) IV Intermittent every 24 hours  piperacillin/tazobactam IVPB. 2.25Gram(s) IV Intermittent every 6 hours  sodium ferric gluconate complex IVPB 125milliGRAM(s) IV Intermittent daily  cyanocobalamin Injectable 1000MICROGram(s) SubCutaneous daily  oseltamivir 30milliGRAM(s) Oral every 24 hours    MEDICATIONS  (PRN):      ALLERGIES:  Allergies    No Known Allergies    Intolerances        LABS:                        7.3    10.3  )-----------( 241      ( 04 Jan 2017 06:24 )             23.4     04 Jan 2017 06:23    139    |  108    |  19     ----------------------------<  97     4.0     |  19     |  1.42     Ca    8.8        04 Jan 2017 06:23  Phos  3.4       04 Jan 2017 02:58  Mg     2.0       04 Jan 2017 06:23    TPro  7.6    /  Alb  3.3    /  TBili  0.2    /  DBili  x      /  AST  15     /  ALT  13     /  AlkPhos  125    04 Jan 2017 02:58    PT/INR - ( 04 Jan 2017 06:25 )   PT: 12.2 sec;   INR: 1.10          PTT - ( 04 Jan 2017 06:25 )  PTT:33.6 sec  Urinalysis Basic - ( 03 Jan 2017 18:10 )    Color: Yellow / Appearance: Clear / SG: >=1.030 / pH: x  Gluc: x / Ketone: NEGATIVE  / Bili: NEGATIVE / Urobili: 0.2 E.U./dL   Blood: x / Protein: 30 mg/dL / Nitrite: NEGATIVE   Leuk Esterase: NEGATIVE / RBC: < 5 /HPF / WBC < 5 /HPF   Sq Epi: x / Non Sq Epi: Moderate /HPF / Bacteria: Present /HPF      CAPILLARY BLOOD GLUCOSE  198 (03 Jan 2017 15:20)      RADIOLOGY & ADDITIONAL TESTS: Reviewed.    ASSESSMENT: 62yo F w/ PMH CAD (s/p CABG), AVR, HTN, hypothyroidism, hyperlipidemia, PVD (s/p stent of LSFA), anemia of chronic disease with element of angiodysplasia, seizures presenting with witnessed seizure and broken fall in the setting of non-specific URI sx and N/V.    PLAN:     #Severe sepsis - initially with leukocytosis, fever, lactic acidemia suspicious for viral etiologies given pt hx and presentation; UA not c/w sepsis, CXR clear, CT C/A/P no acute findings outside of perigraft fibrotic changes; low suspicion for meningitis given exam and improvement thus far; VSS  - continue vanc/zosyn empirically pending cultures  - f/u Bcx, Ucx, RVP  - start tamiflu 30mg daily renally dosed for influenza prophylaxis while awaiting RVP results  - f/u HIV assay (pt consented on admission)  - monitor vitals    #Fall - reports fall at home and again at Dr. Izquierdo's office (witnessed, pt caught and did not impact floor); etiology unclear but more likely related to underlying infection in setting of severe sepsis vs. underlying h/o seizure disorder; less likely but cannot rule out worsening cardiac dz; CT head negative on admission  - per collateral from PCP has known hx and has been worked up in past, no need to re-perform w/u or VEEG  - keppra 500mg BID home med  - f/u echo results to r/o cardiac etiology given pt's cardiac hx     #Anemia - per hematologist pt has known hx of anemia and receives IV iron as outpatient, likely component of angiodysplasia as well as underlying anemia of chronic dz; no suspicion for acute bleed and has undergone multiple GI workups in past that were unrevealing; H/H drop since admission likely dilutional after receiving 2L as all blood counts dropped  - Will transfuse 2 units PRBC now per Dr. Izquierdo as pt is symptomatic and has significant cardiac HX to goal Hgb 9.0  - start IV ferrlecit 125mg in 100ml NS over 2hrs to be given daily per Dr. Izquierdo recs  - b12 1000mcg SubQ daily per Felecia  - folate 1mg daily    #Memory impairment - duration ~1 day, likely related to recent seizure/post-ictal state  - f/u B12, folate, RPR, TSH to r/o organic etiologies    #CAD - s/p CABG; no suspicion for acute ACS, admission EKG ok, initial troponins likely demand ischemia in setting of sepsis and have peaked  - f/u echo today  - not on ASA, will clarify with pt or cardiologist  - continue lipitor    #HTN - holding antihypertensives toprol XL, losartan in setting of sepsis, will consider resuming tomorrow    #HLD - lipitor 40mg qHS    #Hypothyroidism - f/u TSH; continue home synthroid    FEN - no IVF; replete lytes prn to keep K>4, Mg>2; DASH-TLC diet    PPx - HSQ, PPI    CODE - FULL    DISPO - admitted overnight with sepsis and s/p seizure continue inpatient level of care    Case discussed with attending Dr. Izquierdo OVERNIGHT EVENTS: see H&P, patient was admitted o/n.    SUBJECTIVE / INTERVAL HPI: Patient seen and examined at bedside. Feeling better now than she did earlier in AM hours/at time of admission. She endorses SOB on exertion (when she ambulates to the bathroom feels like she ran a marathon) but not at rest. She had some dizziness at that time of ambulation but did not feel like she was unsteady or will fall. She denies CP or palpitations, she denies dysuria or frequency, or abdominal pain.    VITAL SIGNS:  T(F): 98.6  HR: 81  BP: 138/79  RR: 18  SpO2: 98%  Wt(kg): --    PHYSICAL EXAM:    General: WDWN female resting comfortably in bed and in NAD  HEENT: NC/AT; PERRL, EOMI; clear conjunctiva; MMM, oropharynx clear  Neck: supple, no JVD or LAD  Cardiovascular: +S1/S2; RRR with 3/6 holosystolic murmur heard throughout, and best at apex  Respiratory: +cough but otherwise CTA b/l; no W/R/R  Gastrointestinal: soft, NT/ND; +BSx4  : no CVAT or SPT  Extremities: WWP; 2+ peripheral pulses; no edema; NROMx4  Neurological: AAOx3; no focal deficits, CNII-XII grossly intact; no pronator drift or other cerebellar or meningeal signs    MEDICATIONS:  MEDICATIONS  (STANDING):  heparin  Injectable 5000Unit(s) SubCutaneous every 8 hours  sodium chloride 0.9%. 1000milliLiter(s) IV Continuous <Continuous>  levETIRAcetam 500milliGRAM(s) Oral two times a day  sertraline 50milliGRAM(s) Oral daily  simvastatin 40milliGRAM(s) Oral at bedtime  cilostazol 50milliGRAM(s) Oral two times a day  pantoprazole    Tablet 40milliGRAM(s) Oral before breakfast  levothyroxine 75MICROGram(s) Oral daily  folic acid 1milliGRAM(s) Oral daily  vancomycin  IVPB 1000milliGRAM(s) IV Intermittent every 24 hours  piperacillin/tazobactam IVPB. 2.25Gram(s) IV Intermittent every 6 hours  sodium ferric gluconate complex IVPB 125milliGRAM(s) IV Intermittent daily  cyanocobalamin Injectable 1000MICROGram(s) SubCutaneous daily  oseltamivir 30milliGRAM(s) Oral every 24 hours    MEDICATIONS  (PRN):      ALLERGIES:  Allergies    No Known Allergies    Intolerances        LABS:                        7.3    10.3  )-----------( 241      ( 04 Jan 2017 06:24 )             23.4     04 Jan 2017 06:23    139    |  108    |  19     ----------------------------<  97     4.0     |  19     |  1.42     Ca    8.8        04 Jan 2017 06:23  Phos  3.4       04 Jan 2017 02:58  Mg     2.0       04 Jan 2017 06:23    TPro  7.6    /  Alb  3.3    /  TBili  0.2    /  DBili  x      /  AST  15     /  ALT  13     /  AlkPhos  125    04 Jan 2017 02:58    PT/INR - ( 04 Jan 2017 06:25 )   PT: 12.2 sec;   INR: 1.10          PTT - ( 04 Jan 2017 06:25 )  PTT:33.6 sec  Urinalysis Basic - ( 03 Jan 2017 18:10 )    Color: Yellow / Appearance: Clear / SG: >=1.030 / pH: x  Gluc: x / Ketone: NEGATIVE  / Bili: NEGATIVE / Urobili: 0.2 E.U./dL   Blood: x / Protein: 30 mg/dL / Nitrite: NEGATIVE   Leuk Esterase: NEGATIVE / RBC: < 5 /HPF / WBC < 5 /HPF   Sq Epi: x / Non Sq Epi: Moderate /HPF / Bacteria: Present /HPF      CAPILLARY BLOOD GLUCOSE  198 (03 Jan 2017 15:20)      RADIOLOGY & ADDITIONAL TESTS: Reviewed.    ASSESSMENT: 62yo F w/ PMH CAD (s/p CABG), AVR, HTN, hypothyroidism, hyperlipidemia, PVD (s/p stent of LSFA), anemia of chronic disease with element of angiodysplasia, seizures presenting with witnessed seizure and broken fall in the setting of non-specific URI sx and N/V.    PLAN:     #Severe sepsis - initially with leukocytosis, fever, lactic acidemia suspicious for viral etiologies given pt hx and presentation; UA not c/w sepsis, CXR clear, CT C/A/P no acute findings outside of perigraft fibrotic changes; low suspicion for meningitis given exam and improvement thus far; VSS  - continue vanc/zosyn empirically pending cultures  - f/u Bcx, Ucx, RVP  - start tamiflu 30mg daily renally dosed for influenza prophylaxis while awaiting RVP results  - f/u HIV assay (pt consented on admission)  - monitor vitals    #Fall - reports fall at home and again at Dr. Izquierdo's office (witnessed, pt caught and did not impact floor); etiology unclear but more likely related to underlying infection in setting of severe sepsis vs. underlying h/o seizure disorder; less likely but cannot rule out worsening cardiac dz; CT head negative on admission  - per collateral from PCP has known hx and has been worked up in past, no need to re-perform w/u or VEEG  - keppra 500mg BID home med  - f/u echo results to r/o cardiac etiology given pt's cardiac hx     #Anemia - per hematologist pt has known hx of anemia and receives IV iron as outpatient, likely component of angiodysplasia as well as underlying anemia of chronic dz; no suspicion for acute bleed and has undergone multiple GI workups in past that were unrevealing; H/H drop since admission likely dilutional after receiving 2L as all blood counts dropped  - Will transfuse 2 units PRBC now per Dr. Izquierdo as pt is symptomatic and has significant cardiac HX to goal Hgb 9.0  - start IV ferrlecit 125mg in 100ml NS over 2hrs to be given daily per Dr. Izquierdo recs  - b12 1000mcg SubQ daily per Felecia  - folate 1mg daily    #Memory impairment - duration ~1 day, likely related to recent seizure/post-ictal state  - f/u B12, folate, RPR, TSH to r/o organic etiologies    #CAD - s/p CABG; no suspicion for acute ACS, admission EKG ok, initial troponins likely demand ischemia in setting of sepsis and have peaked  - f/u echo today  - not on ASA, will clarify with pt or cardiologist  - continue lipitor    #HTN - holding antihypertensives toprol XL, losartan in setting of sepsis, will consider resuming tomorrow    #HLD - lipitor 40mg qHS    #PVD - s/p LSFA stent; fibrotic changes seen near graft on CT A/P  - continue cilostazol  - monitor    #Hypothyroidism - f/u TSH; continue home synthroid    FEN - no IVF; replete lytes prn to keep K>4, Mg>2; DASH-TLC diet    PPx - HSQ, PPI    CODE - FULL    DISPO - admitted overnight with sepsis and s/p seizure continue inpatient level of care    Case discussed with attending Dr. Izquierdo

## 2017-01-04 NOTE — H&P ADULT. - NEGATIVE OPHTHALMOLOGIC SYMPTOMS
no loss of vision R/no blurred vision L/no blurred vision R/no diplopia/no photophobia/no loss of vision L

## 2017-01-04 NOTE — H&P ADULT. - RS GEN PE MLT RESP DETAILS PC
airway patent/no wheezes/breath sounds equal/no rhonchi/clear to auscultation bilaterally/no rales/normal/good air movement

## 2017-01-04 NOTE — CONSULT NOTE ADULT - ATTENDING COMMENTS
She has multiple comorbid conditions ,including post-ablative hypothyroidism,severe Anemia,H.A.S.H.D.,C.H.F.,S/P open Repair of Abdominal aortic Aneurysm,S/P AVR& S/P stents inher legs for P.V.D.,S/P Prosthetic ring placement for MVR.Chronic gastritis,seizure Disorder on Keppra Therapy,H/o multiple red blood cell transfusions as out patient as well as in patient.

## 2017-01-04 NOTE — H&P ADULT. - PROBLEM SELECTOR PLAN 8
Patient with Slight Enlargement in Aortic Aneurysm. Graft Site with some "streaking" on CT Abdomen/Pelvis, consistent with possibly fibrosis. Patient followed by Dr. Sandra Outpatient.   -Consider Vascular Surgery Consult.

## 2017-01-04 NOTE — H&P ADULT. - PROBLEM SELECTOR PLAN 1
Patient with Severe Sepsis (WBC 13, Fever 100.7, Lactic Acid 2.2). Etiology Unclear. Electronic Medical Record reporting Nausea, Vomiting, Abdominal Pain, while patient does not complain of those symptoms on Arrival, but does reports Sinus Congestion, Cough Productive of Sputum, Chills, Myalgias. Suspected Viral Etiology.   -Urinalysis Does Not Suggest UTI.   -CXR without Acute Infiltrate.   -CT Abdomen/Pelvis without Acute Findings, did show some persistent perigraft streakiness at abdominal aortic graft repair, however, indicating may represent fibrosis.  -Patient does complain of Bitemporal Headache, which has been Intermittent since Arrival. Denies Photophobia, Neck Stiffness, lower suspicion for Meningitis.   -Blood Cultures, Urine Culture, RVP Collected, and Pending Result.   -As History is Unclear, as unable to get Collateral from Outpatient Physician who saw in Office, will start on Broad Spectrum Antibiotics, pending Blood Cultures, Urine Culture, and RVP returning, and will deescalate as such.   -Vancomycin and Zosyn. Patient with Severe Sepsis (WBC 13, Fever 100.7, Lactic Acid 2.2). Etiology Unclear. Electronic Medical Record reporting Nausea, Vomiting, Abdominal Pain, while patient does not complain of those symptoms on Arrival, but does reports Sinus Congestion, Cough Productive of Sputum, Chills, Myalgias. Suspected Viral Etiology.   -Urinalysis Does Not Suggest UTI.   -CXR without Acute Infiltrate.   -CT Abdomen/Pelvis without Acute Findings, did show some persistent perigraft streakiness at abdominal aortic graft repair, however, indicating may represent fibrosis.  -Patient does complain of Bitemporal Headache, which has been Intermittent since Arrival. Denies Photophobia, Neck Stiffness, lower suspicion for Meningitis.   -Blood Cultures, Urine Culture, RVP Collected, and Pending Result.   -As History is Unclear, as unable to get Collateral from Outpatient Physician who saw in Office, will start on Broad Spectrum Antibiotics, pending Blood Cultures, Urine Culture, and RVP returning, and will deescalate as such.   -Consented to HIV Testing.   -Vancomycin and Zosyn.

## 2017-01-05 ENCOUNTER — TRANSCRIPTION ENCOUNTER (OUTPATIENT)
Age: 62
End: 2017-01-05

## 2017-01-05 VITALS — SYSTOLIC BLOOD PRESSURE: 175 MMHG | DIASTOLIC BLOOD PRESSURE: 94 MMHG

## 2017-01-05 LAB
ANION GAP SERPL CALC-SCNC: 13 MMOL/L — SIGNIFICANT CHANGE UP (ref 9–16)
BASOPHILS NFR BLD AUTO: 0.2 % — SIGNIFICANT CHANGE UP (ref 0–2)
BUN SERPL-MCNC: 17 MG/DL — SIGNIFICANT CHANGE UP (ref 7–23)
CALCIUM SERPL-MCNC: 8.6 MG/DL — SIGNIFICANT CHANGE UP (ref 8.5–10.5)
CHLORIDE SERPL-SCNC: 106 MMOL/L — SIGNIFICANT CHANGE UP (ref 96–108)
CO2 SERPL-SCNC: 19 MMOL/L — LOW (ref 22–31)
CREAT SERPL-MCNC: 1.56 MG/DL — HIGH (ref 0.5–1.3)
CULTURE RESULTS: SIGNIFICANT CHANGE UP
EOSINOPHIL NFR BLD AUTO: 0.1 % — SIGNIFICANT CHANGE UP (ref 0–6)
GLUCOSE SERPL-MCNC: 119 MG/DL — HIGH (ref 70–99)
HCT VFR BLD CALC: 31.3 % — LOW (ref 34.5–45)
HGB BLD-MCNC: 10.1 G/DL — LOW (ref 11.5–15.5)
LYMPHOCYTES # BLD AUTO: 11.8 % — LOW (ref 13–44)
MAGNESIUM SERPL-MCNC: 2 MG/DL — SIGNIFICANT CHANGE UP (ref 1.6–2.4)
MCHC RBC-ENTMCNC: 27.7 PG — SIGNIFICANT CHANGE UP (ref 27–34)
MCHC RBC-ENTMCNC: 32.3 G/DL — SIGNIFICANT CHANGE UP (ref 32–36)
MCV RBC AUTO: 85.8 FL — SIGNIFICANT CHANGE UP (ref 80–100)
MONOCYTES NFR BLD AUTO: 6.9 % — SIGNIFICANT CHANGE UP (ref 2–14)
NEUTROPHILS NFR BLD AUTO: 81 % — HIGH (ref 43–77)
PHOSPHATE SERPL-MCNC: 2.6 MG/DL — SIGNIFICANT CHANGE UP (ref 2.5–4.5)
PLATELET # BLD AUTO: 221 K/UL — SIGNIFICANT CHANGE UP (ref 150–400)
POTASSIUM SERPL-MCNC: 3.5 MMOL/L — SIGNIFICANT CHANGE UP (ref 3.5–5.3)
POTASSIUM SERPL-SCNC: 3.5 MMOL/L — SIGNIFICANT CHANGE UP (ref 3.5–5.3)
RBC # BLD: 3.65 M/UL — LOW (ref 3.8–5.2)
RBC # FLD: 16.9 % — SIGNIFICANT CHANGE UP (ref 10.3–16.9)
SODIUM SERPL-SCNC: 138 MMOL/L — SIGNIFICANT CHANGE UP (ref 135–145)
SPECIMEN SOURCE: SIGNIFICANT CHANGE UP
WBC # BLD: 12.4 K/UL — HIGH (ref 3.8–10.5)
WBC # FLD AUTO: 12.4 K/UL — HIGH (ref 3.8–10.5)

## 2017-01-05 PROCEDURE — 87389 HIV-1 AG W/HIV-1&-2 AB AG IA: CPT

## 2017-01-05 PROCEDURE — 81001 URINALYSIS AUTO W/SCOPE: CPT

## 2017-01-05 PROCEDURE — 82550 ASSAY OF CK (CPK): CPT

## 2017-01-05 PROCEDURE — 84484 ASSAY OF TROPONIN QUANT: CPT

## 2017-01-05 PROCEDURE — 80053 COMPREHEN METABOLIC PANEL: CPT

## 2017-01-05 PROCEDURE — 70450 CT HEAD/BRAIN W/O DYE: CPT

## 2017-01-05 PROCEDURE — 85730 THROMBOPLASTIN TIME PARTIAL: CPT

## 2017-01-05 PROCEDURE — 85027 COMPLETE CBC AUTOMATED: CPT

## 2017-01-05 PROCEDURE — 83605 ASSAY OF LACTIC ACID: CPT

## 2017-01-05 PROCEDURE — 83735 ASSAY OF MAGNESIUM: CPT

## 2017-01-05 PROCEDURE — 36430 TRANSFUSION BLD/BLD COMPNT: CPT

## 2017-01-05 PROCEDURE — 74177 CT ABD & PELVIS W/CONTRAST: CPT

## 2017-01-05 PROCEDURE — 83550 IRON BINDING TEST: CPT

## 2017-01-05 PROCEDURE — 81003 URINALYSIS AUTO W/O SCOPE: CPT

## 2017-01-05 PROCEDURE — 84443 ASSAY THYROID STIM HORMONE: CPT

## 2017-01-05 PROCEDURE — 87798 DETECT AGENT NOS DNA AMP: CPT

## 2017-01-05 PROCEDURE — 87086 URINE CULTURE/COLONY COUNT: CPT

## 2017-01-05 PROCEDURE — 87040 BLOOD CULTURE FOR BACTERIA: CPT

## 2017-01-05 PROCEDURE — 87633 RESP VIRUS 12-25 TARGETS: CPT

## 2017-01-05 PROCEDURE — 86900 BLOOD TYPING SEROLOGIC ABO: CPT

## 2017-01-05 PROCEDURE — 86803 HEPATITIS C AB TEST: CPT

## 2017-01-05 PROCEDURE — 87486 CHLMYD PNEUM DNA AMP PROBE: CPT

## 2017-01-05 PROCEDURE — 85025 COMPLETE CBC W/AUTO DIFF WBC: CPT

## 2017-01-05 PROCEDURE — 99285 EMERGENCY DEPT VISIT HI MDM: CPT | Mod: 25

## 2017-01-05 PROCEDURE — 82746 ASSAY OF FOLIC ACID SERUM: CPT

## 2017-01-05 PROCEDURE — 36415 COLL VENOUS BLD VENIPUNCTURE: CPT

## 2017-01-05 PROCEDURE — 85610 PROTHROMBIN TIME: CPT

## 2017-01-05 PROCEDURE — 86850 RBC ANTIBODY SCREEN: CPT

## 2017-01-05 PROCEDURE — 84100 ASSAY OF PHOSPHORUS: CPT

## 2017-01-05 PROCEDURE — 96374 THER/PROPH/DIAG INJ IV PUSH: CPT | Mod: XU

## 2017-01-05 PROCEDURE — 86780 TREPONEMA PALLIDUM: CPT

## 2017-01-05 PROCEDURE — 82728 ASSAY OF FERRITIN: CPT

## 2017-01-05 PROCEDURE — 87581 M.PNEUMON DNA AMP PROBE: CPT

## 2017-01-05 PROCEDURE — 86923 COMPATIBILITY TEST ELECTRIC: CPT

## 2017-01-05 PROCEDURE — 80048 BASIC METABOLIC PNL TOTAL CA: CPT

## 2017-01-05 PROCEDURE — 86901 BLOOD TYPING SEROLOGIC RH(D): CPT

## 2017-01-05 PROCEDURE — 93005 ELECTROCARDIOGRAM TRACING: CPT

## 2017-01-05 PROCEDURE — 82553 CREATINE MB FRACTION: CPT

## 2017-01-05 PROCEDURE — 93306 TTE W/DOPPLER COMPLETE: CPT

## 2017-01-05 PROCEDURE — 96375 TX/PRO/DX INJ NEW DRUG ADDON: CPT | Mod: XU

## 2017-01-05 PROCEDURE — 82607 VITAMIN B-12: CPT

## 2017-01-05 PROCEDURE — 71045 X-RAY EXAM CHEST 1 VIEW: CPT

## 2017-01-05 PROCEDURE — P9016: CPT

## 2017-01-05 RX ORDER — LOSARTAN POTASSIUM 100 MG/1
1 TABLET, FILM COATED ORAL
Qty: 0 | Refills: 0 | COMMUNITY

## 2017-01-05 RX ORDER — LOSARTAN POTASSIUM 100 MG/1
1 TABLET, FILM COATED ORAL
Qty: 30 | Refills: 0
Start: 2017-01-05 | End: 2017-02-04

## 2017-01-05 RX ORDER — METOPROLOL TARTRATE 50 MG
1 TABLET ORAL
Qty: 30 | Refills: 0 | OUTPATIENT
Start: 2017-01-05 | End: 2017-02-04

## 2017-01-05 RX ORDER — LEVETIRACETAM 250 MG/1
1 TABLET, FILM COATED ORAL
Qty: 0 | Refills: 0 | COMMUNITY

## 2017-01-05 RX ORDER — METOPROLOL TARTRATE 50 MG
1 TABLET ORAL
Qty: 0 | Refills: 0 | COMMUNITY

## 2017-01-05 RX ORDER — METOPROLOL TARTRATE 50 MG
25 TABLET ORAL ONCE
Qty: 0 | Refills: 0 | Status: COMPLETED | OUTPATIENT
Start: 2017-01-05 | End: 2017-01-05

## 2017-01-05 RX ORDER — LOSARTAN POTASSIUM 100 MG/1
50 TABLET, FILM COATED ORAL ONCE
Qty: 0 | Refills: 0 | Status: COMPLETED | OUTPATIENT
Start: 2017-01-05 | End: 2017-01-05

## 2017-01-05 RX ORDER — LEVETIRACETAM 250 MG/1
1 TABLET, FILM COATED ORAL
Qty: 60 | Refills: 0
Start: 2017-01-05 | End: 2017-02-04

## 2017-01-05 RX ADMIN — Medication 55 MILLIGRAM(S): at 13:54

## 2017-01-05 RX ADMIN — CILOSTAZOL 50 MILLIGRAM(S): 100 TABLET ORAL at 06:08

## 2017-01-05 RX ADMIN — Medication 250 MILLIGRAM(S): at 06:45

## 2017-01-05 RX ADMIN — LEVETIRACETAM 500 MILLIGRAM(S): 250 TABLET, FILM COATED ORAL at 06:08

## 2017-01-05 RX ADMIN — HEPARIN SODIUM 5000 UNIT(S): 5000 INJECTION INTRAVENOUS; SUBCUTANEOUS at 06:09

## 2017-01-05 RX ADMIN — Medication 1 MILLIGRAM(S): at 12:53

## 2017-01-05 RX ADMIN — PIPERACILLIN AND TAZOBACTAM 200 GRAM(S): 4; .5 INJECTION, POWDER, LYOPHILIZED, FOR SOLUTION INTRAVENOUS at 12:54

## 2017-01-05 RX ADMIN — Medication 25 MILLIGRAM(S): at 15:30

## 2017-01-05 RX ADMIN — LEVETIRACETAM 500 MILLIGRAM(S): 250 TABLET, FILM COATED ORAL at 17:10

## 2017-01-05 RX ADMIN — PANTOPRAZOLE SODIUM 40 MILLIGRAM(S): 20 TABLET, DELAYED RELEASE ORAL at 06:08

## 2017-01-05 RX ADMIN — PREGABALIN 1000 MICROGRAM(S): 225 CAPSULE ORAL at 12:53

## 2017-01-05 RX ADMIN — PIPERACILLIN AND TAZOBACTAM 200 GRAM(S): 4; .5 INJECTION, POWDER, LYOPHILIZED, FOR SOLUTION INTRAVENOUS at 00:41

## 2017-01-05 RX ADMIN — Medication 75 MICROGRAM(S): at 06:08

## 2017-01-05 RX ADMIN — HEPARIN SODIUM 5000 UNIT(S): 5000 INJECTION INTRAVENOUS; SUBCUTANEOUS at 14:01

## 2017-01-05 RX ADMIN — SERTRALINE 50 MILLIGRAM(S): 25 TABLET, FILM COATED ORAL at 12:54

## 2017-01-05 RX ADMIN — Medication 30 MILLIGRAM(S): at 17:10

## 2017-01-05 RX ADMIN — LOSARTAN POTASSIUM 50 MILLIGRAM(S): 100 TABLET, FILM COATED ORAL at 15:30

## 2017-01-05 RX ADMIN — CILOSTAZOL 50 MILLIGRAM(S): 100 TABLET ORAL at 17:10

## 2017-01-05 RX ADMIN — PIPERACILLIN AND TAZOBACTAM 200 GRAM(S): 4; .5 INJECTION, POWDER, LYOPHILIZED, FOR SOLUTION INTRAVENOUS at 06:08

## 2017-01-05 NOTE — DISCHARGE NOTE ADULT - PATIENT PORTAL LINK FT
“You can access the Follow[facility] Patient Portal, offered by [facility], by registering with the following website: [email or web address]”

## 2017-01-05 NOTE — DISCHARGE NOTE ADULT - OTHER SIGNIFICANT FINDINGS
CT Head (1/3/17): IMPRESSION:    1. No CT evidence of acute intracranial hemorrhage and no apparent acute   abnormality. If symptoms persist, consideration could be given to brain   MR imaging on an outpatient basis as this is a more sensitive study for   the detection of seizure foci.  2. Chronic microangiopathic disease and age-appropriate volume loss.

## 2017-01-05 NOTE — DISCHARGE NOTE ADULT - HOSPITAL COURSE
60yo F w/ PMH CAD (s/p CABG), AVR, HTN, hypothyroidism, hyperlipidemia, PVD (s/p stent of LSFA), anemia of chronic disease with element of angiodysplasia, seizures presented with witnessed seizure and broken fall in the setting of non-specific URI sx and N/V. She was admitted to regional medical floors and started empirically on vanc/zosyn and tamiflu given her symptoms and laboratory tests. She was found to have anemia with Hgb 7.3 and was symptomatic particularly on exertion. She was transfused 2 units of pRBC and her symptoms markedly improved following transfusion. She had no episodes of seizure while in the hospital. She also received IV iron as well as subcutaneous B12 while in the hospital. Her blood cultures and rapid viral panel were negative at time of writing and UCx <10k CFU gm- rods not warranting ABx therapy as she denied  symptoms. HIV and other infectious studies were also negative. Dr. Izquierdo wished for pt to be seen by neurologist Dr. Castellanos for evaluation of breakthrough seizure but instead patient will follow up with Dr. Castellanos tomorrow 1/6/17 in the afternoon as well as following up with Dr. Izquierdo tomorrow at 1pm. She will be discharged with an increased dose of keppra (from her home 500mg BID to 750mg BID) and be evaluated by Dr. Castellanos for further management. She is hemodynamically stable and feels well enough to go home so she is stable to be discharged home at this time with follow up tomorrow.

## 2017-01-05 NOTE — PROGRESS NOTE ADULT - SUBJECTIVE AND OBJECTIVE BOX
HPI:  Patient is a 61 year old female with past medical history of Coronary Artery Disease (S/P CABG), Aortic Valve Replacement (Bovine Valve) Hypertension, Hypothyroidism, Hyperlipidemia, PVD (S/P Stent of LSFA), Anemia, Seizures who presented to [facility] after being sent in by Dr. Izquierdo from her Office complaining of "Abdominal Pain" and "Seizure" as per ED Provider Note, as well as "Nausea" "Vomiting". Unable to Reach Attending Dr. Izquierdo for Collateral Information, Called Office and Cell Phone. Left Messages, with No Response. Patient not able to give Good History.     Patient reports on 1/2, she was getting Tea, and reports she Passed Out, and Woke Up on her Kitchen Floor. Patient denies any preceding Dizziness/Lightheadedness, Chest Pain, Palpitations, Diaphoresis, Convulsions, Tongue Biting, or Urinary/Fecal Incontinence. Patient reports she Remembers Waking Up on Kitchen Floor. Patient reports she went to see Dr. Izquierdo Today, who sent her into the ED.   Patient denies Fevers, however, reports Chills, Cough, Productive of Green Sputum, Sore Throat, Congestion, Myalgias x 4 Days. Patient also does note Headache, without Photophobia, or Neck Stiffness. Patient denies Chest Pain, Palpitations, SOB. Patient does report mild Abdominal Pain, but denies Nausea, Vomiting, Diarrhea, Constipation, Black/Bloody Bowel Movements, Dysuria, Hematuria, Pain/Swelling of Lower Extremities.     Patient reports since she Fell her Memory has been "Off", having a difficult time remember Past Medical History Information.     Patient denies Sick Contacts, did not get Influenza Vaccine this year.     In the ED, TMax 100.7 HR 78-94 -174/74-86, RR 18-20, SpO2 97-98% on RA. Labs Notable for WBC Count 13, Hemoglobin 8.7, Lactic Acid 2.2-->1.6, Creatinine 1.5, Troponin 0.101. UA Not Concerning for UTI, CT Head without Acute Findings. CT Abdomen Pelvis without Acute Abnormality seen. Patient does "suprarenal abdominal aortic aneurysm measuring 3.4 cm, slightly increased from prior study", "status post infrarenal abdominal aortic graft repair with persistent perigraft streakiness which may represent fibrosis". Patient given Zosyn, 2L NS, and Zofran. (04 Jan 2017 03:54)    FAMILY HISTORY:  No pertinent family history in first degree relatives    MEDICATIONS  (STANDING):  heparin  Injectable 5000Unit(s) SubCutaneous every 8 hours  levETIRAcetam 500milliGRAM(s) Oral two times a day  sertraline 50milliGRAM(s) Oral daily  simvastatin 40milliGRAM(s) Oral at bedtime  cilostazol 50milliGRAM(s) Oral two times a day  pantoprazole    Tablet 40milliGRAM(s) Oral before breakfast  levothyroxine 75MICROGram(s) Oral daily  folic acid 1milliGRAM(s) Oral daily  vancomycin  IVPB 1000milliGRAM(s) IV Intermittent every 24 hours  piperacillin/tazobactam IVPB. 2.25Gram(s) IV Intermittent every 6 hours  sodium ferric gluconate complex IVPB 125milliGRAM(s) IV Intermittent daily  cyanocobalamin Injectable 1000MICROGram(s) SubCutaneous daily  oseltamivir 30milliGRAM(s) Oral every 24 hours    MEDICATIONS  (PRN):    Vital Signs Last 24 Hrs  T(C): 36.6, Max: 37.8 (01-05 @ 05:28)  T(F): 97.9, Max: 100 (01-05 @ 05:28)  HR: 88 (77 - 88)  BP: 173/97 (145/82 - 173/97)  BP(mean): --  RR: 15 (15 - 17)  SpO2: 96% (96% - 100%)    Physical exam:    Overall impression stable  Lymphadenopathy nonpalpable  Liver normal sise  spleen nonpalpable    Labs:  CBC Full  -  ( 05 Jan 2017 07:02 )  WBC Count : 12.4 K/uL  Hemoglobin : 10.1 g/dL  Hematocrit : 31.3 %  Platelet Count - Automated : 221 K/uL  Mean Cell Volume : 85.8 fL  Mean Cell Hemoglobin : 27.7 pg  Mean Cell Hemoglobin Concentration : 32.3 g/dL  Auto Neutrophil # : x  Auto Lymphocyte # : x  Auto Monocyte # : x  Auto Eosinophil # : x  Auto Basophil # : x  Auto Neutrophil % : 81.0 %  Auto Lymphocyte % : 11.8 %  Auto Monocyte % : 6.9 %  Auto Eosinophil % : 0.1 %  Auto Basophil % : 0.2 %    05 Jan 2017 07:02    138    |  106    |  17     ----------------------------<  119    3.5     |  19     |  1.56     Ca    8.6        05 Jan 2017 07:02  Phos  2.6       05 Jan 2017 07:02  Mg     2.0       05 Jan 2017 07:02    TPro  7.6    /  Alb  3.3    /  TBili  0.2    /  DBili  x      /  AST  15     /  ALT  13     /  AlkPhos  125    04 Jan 2017 02:58      Radiology:  HEALTH ISSUES - R/O PROBLEM Dx:      Assessmant / Problems  1)Iron deficiency anemia on iv iron, posttransfusion Hg 10.1  2) Break through seizure through Keppra      Plan:  continue iv iron  Neurology consult    Thank you  Farheen Izquierdo MD

## 2017-01-05 NOTE — DISCHARGE NOTE ADULT - PLAN OF CARE
Follow up with Dr. Izquierdo for continued IV iron transfusions and management You came into the hospital with symptomatic anemia and were tranfused two units of blood while you were here. Your blood counts responded very well to the transfusions and your symptoms improved. Please follow up with Dr. Izquierdo tomorrow at her office at 1pm. Please also continue your home folic acid on discharge. You had a witnessed seizure at Dr. Izquierdo's office while you were on your antiseizure meds. You will be discharged on a higher dose of your home keppra (leviteracetam). Please take keppra 750mg twice per day upon discharge. Please call and follow up with Dr. Castellanos (Neurology) tomorrow afternoon. His contact information is included below. Please resume your home blood pressure medications, losartan 50mg and metoprolol succinate 25mg daily as directed. Please continue your home synthroid medication as directed upon discharge from the hospital. You were admitted to the hospital with concern for severe sepsis and received a day of antibiotics and tamiflu. Bacterial causes and viral causes for infection were ruled out and most of your sx improved with blood transfusion. You do not need to take antibiotics on discharge from the hospital. Please follow up with Dr. Izquierdo's office tomorrow. Please resume taking your home simvastatin as directed. Please resume taking your home pantoprazole as directed.

## 2017-01-05 NOTE — DISCHARGE NOTE ADULT - MEDICATION SUMMARY - MEDICATIONS TO CHANGE
I will SWITCH the dose or number of times a day I take the medications listed below when I get home from the hospital:    levETIRAcetam 500 mg oral tablet  -- 1 tab(s) by mouth 2 times a day

## 2017-01-05 NOTE — DISCHARGE NOTE ADULT - SECONDARY DIAGNOSIS.
Seizure Essential hypertension Hypothyroidism Severe sepsis Hyperlipidemia Gastroesophageal reflux disease

## 2017-01-05 NOTE — DISCHARGE NOTE ADULT - MEDICATION SUMMARY - MEDICATIONS TO TAKE
I will START or STAY ON the medications listed below when I get home from the hospital:    losartan 50 mg oral tablet  -- 1 tab(s) by mouth once a day  -- Indication: For Hypertension    levETIRAcetam 750 mg oral tablet  -- 1 tab(s) by mouth 2 times a day  -- Indication: For Seizure    sertraline 50 mg oral tablet  -- 1 tab(s) by mouth once a day  -- Indication: For Depression    simvastatin 40 mg oral tablet  -- 1 tab(s) by mouth once a day (at bedtime)  -- Indication: For Hyperlipidemia    metoprolol succinate 25 mg oral tablet, extended release  -- 1 tab(s) by mouth once a day  -- Indication: For Hypertension    cilostazol 50 mg oral tablet  -- 1 tab(s) by mouth 2 times a day  -- Indication: For Peripheral vascular disease    pantoprazole 40 mg oral delayed release tablet  -- 1 tab(s) by mouth once a day  -- Indication: For GERD    Synthroid 75 mcg (0.075 mg) oral tablet  -- 1 tab(s) by mouth once a day  -- Indication: For Hypothyroidism    folic acid 1 mg oral tablet  -- 1 tab(s) by mouth once a day  -- Indication: For Anemia    Vitamin D2 50,000 intl units (1.25 mg) oral capsule  -- 1 cap(s) by mouth once a week  -- Indication: For Vitamin D deficiency

## 2017-01-05 NOTE — DISCHARGE NOTE ADULT - ADDITIONAL INSTRUCTIONS
Follow up with Dr. Izquierdo (Hematology/Oncology) tomorrow 1/6/17 at 1pm.  Please call to make an appointment for tomorrow afternoo with Dr. Castellanos's office (neurology) for evaluation of your seizure.

## 2017-01-05 NOTE — DISCHARGE NOTE ADULT - CARE PLAN
Principal Discharge DX:	Anemia  Goal:	Follow up with Dr. Izquierdo for continued IV iron transfusions and management  Instructions for follow-up, activity and diet:	You came into the hospital with symptomatic anemia and were tranfused two units of blood while you were here. Your blood counts responded very well to the transfusions and your symptoms improved. Please follow up with Dr. Izquierdo tomorrow at her office at 1pm. Please also continue your home folic acid on discharge.  Secondary Diagnosis:	Seizure  Instructions for follow-up, activity and diet:	You had a witnessed seizure at Dr. Izquierdo's office while you were on your antiseizure meds. You will be discharged on a higher dose of your home keppra (leviteracetam). Please take keppra 750mg twice per day upon discharge. Please call and follow up with Dr. Castellanos (Neurology) tomorrow afternoon. His contact information is included below.  Secondary Diagnosis:	Essential hypertension  Instructions for follow-up, activity and diet:	Please resume your home blood pressure medications, losartan 50mg and metoprolol succinate 25mg daily as directed.  Secondary Diagnosis:	Hypothyroidism  Instructions for follow-up, activity and diet:	Please continue your home synthroid medication as directed upon discharge from the hospital.  Secondary Diagnosis:	Severe sepsis  Instructions for follow-up, activity and diet:	You were admitted to the hospital with concern for severe sepsis and received a day of antibiotics and tamiflu. Bacterial causes and viral causes for infection were ruled out and most of your sx improved with blood transfusion. You do not need to take antibiotics on discharge from the hospital. Please follow up with Dr. Izquierdo's office tomorrow.  Secondary Diagnosis:	Hyperlipidemia  Instructions for follow-up, activity and diet:	Please resume taking your home simvastatin as directed.  Secondary Diagnosis:	Gastroesophageal reflux disease  Instructions for follow-up, activity and diet:	Please resume taking your home pantoprazole as directed. Principal Discharge DX:	Anemia  Goal:	Follow up with Dr. Izquierdo for continued IV iron transfusions and management  Instructions for follow-up, activity and diet:	You came into the hospital with symptomatic anemia and were tranfused two units of blood while you were here. Your blood counts responded very well to the transfusions and your symptoms improved. Please follow up with Dr. Izquierdo tomorrow at her office at 1pm. Please also continue your home folic acid on discharge.  Secondary Diagnosis:	Seizure  Instructions for follow-up, activity and diet:	You had a witnessed seizure at Dr. Izquierdo's office while you were on your antiseizure meds. You will be discharged on a higher dose of your home keppra (leviteracetam). Please take keppra 750mg twice per day upon discharge. Please call and follow up with Dr. Catsellanos (Neurology) tomorrow afternoon. His contact information is included below.  Secondary Diagnosis:	Essential hypertension  Instructions for follow-up, activity and diet:	Please resume your home blood pressure medications, losartan 50mg and metoprolol succinate 25mg daily as directed.  Secondary Diagnosis:	Hypothyroidism  Instructions for follow-up, activity and diet:	Please continue your home synthroid medication as directed upon discharge from the hospital.  Secondary Diagnosis:	Severe sepsis  Instructions for follow-up, activity and diet:	You were admitted to the hospital with concern for severe sepsis and received a day of antibiotics and tamiflu. Bacterial causes and viral causes for infection were ruled out and most of your sx improved with blood transfusion. You do not need to take antibiotics on discharge from the hospital. Please follow up with Dr. Izquierdo's office tomorrow.  Secondary Diagnosis:	Hyperlipidemia  Instructions for follow-up, activity and diet:	Please resume taking your home simvastatin as directed.  Secondary Diagnosis:	Gastroesophageal reflux disease  Instructions for follow-up, activity and diet:	Please resume taking your home pantoprazole as directed.

## 2017-01-09 DIAGNOSIS — E03.9 HYPOTHYROIDISM, UNSPECIFIED: ICD-10-CM

## 2017-01-09 DIAGNOSIS — I10 ESSENTIAL (PRIMARY) HYPERTENSION: ICD-10-CM

## 2017-01-09 DIAGNOSIS — R56.9 UNSPECIFIED CONVULSIONS: ICD-10-CM

## 2017-01-09 DIAGNOSIS — E61.1 IRON DEFICIENCY: ICD-10-CM

## 2017-01-09 DIAGNOSIS — Z91.81 HISTORY OF FALLING: ICD-10-CM

## 2017-01-09 DIAGNOSIS — F32.9 MAJOR DEPRESSIVE DISORDER, SINGLE EPISODE, UNSPECIFIED: ICD-10-CM

## 2017-01-09 DIAGNOSIS — I24.8 OTHER FORMS OF ACUTE ISCHEMIC HEART DISEASE: ICD-10-CM

## 2017-01-09 DIAGNOSIS — E78.5 HYPERLIPIDEMIA, UNSPECIFIED: ICD-10-CM

## 2017-01-09 DIAGNOSIS — R65.20 SEVERE SEPSIS WITHOUT SEPTIC SHOCK: ICD-10-CM

## 2017-01-09 DIAGNOSIS — I73.9 PERIPHERAL VASCULAR DISEASE, UNSPECIFIED: ICD-10-CM

## 2017-01-09 DIAGNOSIS — G40.909 EPILEPSY, UNSPECIFIED, NOT INTRACTABLE, WITHOUT STATUS EPILEPTICUS: ICD-10-CM

## 2017-01-09 DIAGNOSIS — Z95.1 PRESENCE OF AORTOCORONARY BYPASS GRAFT: ICD-10-CM

## 2017-01-09 DIAGNOSIS — K29.50 UNSPECIFIED CHRONIC GASTRITIS WITHOUT BLEEDING: ICD-10-CM

## 2017-01-09 DIAGNOSIS — R11.2 NAUSEA WITH VOMITING, UNSPECIFIED: ICD-10-CM

## 2017-01-09 DIAGNOSIS — F41.9 ANXIETY DISORDER, UNSPECIFIED: ICD-10-CM

## 2017-01-09 DIAGNOSIS — Z95.2 PRESENCE OF PROSTHETIC HEART VALVE: ICD-10-CM

## 2017-01-09 DIAGNOSIS — K55.20 ANGIODYSPLASIA OF COLON WITHOUT HEMORRHAGE: ICD-10-CM

## 2017-01-09 DIAGNOSIS — L97.529 NON-PRESSURE CHRONIC ULCER OF OTHER PART OF LEFT FOOT WITH UNSPECIFIED SEVERITY: ICD-10-CM

## 2017-01-09 DIAGNOSIS — Z87.891 PERSONAL HISTORY OF NICOTINE DEPENDENCE: ICD-10-CM

## 2017-01-09 DIAGNOSIS — K21.9 GASTRO-ESOPHAGEAL REFLUX DISEASE WITHOUT ESOPHAGITIS: ICD-10-CM

## 2017-01-09 DIAGNOSIS — I48.0 PAROXYSMAL ATRIAL FIBRILLATION: ICD-10-CM

## 2017-01-09 DIAGNOSIS — I50.20 UNSPECIFIED SYSTOLIC (CONGESTIVE) HEART FAILURE: ICD-10-CM

## 2017-01-09 DIAGNOSIS — Z89.512 ACQUIRED ABSENCE OF LEFT LEG BELOW KNEE: ICD-10-CM

## 2017-01-09 DIAGNOSIS — I71.9 AORTIC ANEURYSM OF UNSPECIFIED SITE, WITHOUT RUPTURE: ICD-10-CM

## 2017-01-09 DIAGNOSIS — A41.9 SEPSIS, UNSPECIFIED ORGANISM: ICD-10-CM

## 2017-01-09 DIAGNOSIS — D63.8 ANEMIA IN OTHER CHRONIC DISEASES CLASSIFIED ELSEWHERE: ICD-10-CM

## 2017-01-09 DIAGNOSIS — D64.9 ANEMIA, UNSPECIFIED: ICD-10-CM

## 2017-01-09 DIAGNOSIS — I25.10 ATHEROSCLEROTIC HEART DISEASE OF NATIVE CORONARY ARTERY WITHOUT ANGINA PECTORIS: ICD-10-CM

## 2017-01-09 LAB
CULTURE RESULTS: SIGNIFICANT CHANGE UP
CULTURE RESULTS: SIGNIFICANT CHANGE UP
SPECIMEN SOURCE: SIGNIFICANT CHANGE UP
SPECIMEN SOURCE: SIGNIFICANT CHANGE UP

## 2017-02-01 ENCOUNTER — OTHER (OUTPATIENT)
Age: 62
End: 2017-02-01

## 2017-02-09 ENCOUNTER — OUTPATIENT (OUTPATIENT)
Dept: OUTPATIENT SERVICES | Facility: HOSPITAL | Age: 62
LOS: 1 days | End: 2017-02-09
Payer: COMMERCIAL

## 2017-02-09 DIAGNOSIS — I25.9 CHRONIC ISCHEMIC HEART DISEASE, UNSPECIFIED: ICD-10-CM

## 2017-02-09 PROBLEM — R56.9 UNSPECIFIED CONVULSIONS: Chronic | Status: ACTIVE | Noted: 2017-01-04

## 2017-02-09 PROCEDURE — 93306 TTE W/DOPPLER COMPLETE: CPT | Mod: 26

## 2017-02-09 PROCEDURE — 93306 TTE W/DOPPLER COMPLETE: CPT

## 2017-02-09 PROCEDURE — 93005 ELECTROCARDIOGRAM TRACING: CPT

## 2017-02-09 PROCEDURE — 93010 ELECTROCARDIOGRAM REPORT: CPT

## 2017-03-27 ENCOUNTER — OTHER (OUTPATIENT)
Age: 62
End: 2017-03-27

## 2017-03-28 ENCOUNTER — OUTPATIENT (OUTPATIENT)
Dept: OUTPATIENT SERVICES | Facility: HOSPITAL | Age: 62
LOS: 1 days | End: 2017-03-28

## 2018-02-07 ENCOUNTER — OUTPATIENT (OUTPATIENT)
Dept: OUTPATIENT SERVICES | Facility: HOSPITAL | Age: 63
LOS: 1 days | End: 2018-02-07
Payer: COMMERCIAL

## 2018-02-07 DIAGNOSIS — I25.9 CHRONIC ISCHEMIC HEART DISEASE, UNSPECIFIED: ICD-10-CM

## 2018-02-07 PROCEDURE — 93306 TTE W/DOPPLER COMPLETE: CPT | Mod: 26

## 2018-02-07 PROCEDURE — 93010 ELECTROCARDIOGRAM REPORT: CPT

## 2018-02-07 PROCEDURE — 93005 ELECTROCARDIOGRAM TRACING: CPT

## 2018-02-07 PROCEDURE — 93306 TTE W/DOPPLER COMPLETE: CPT

## 2018-03-13 ENCOUNTER — APPOINTMENT (OUTPATIENT)
Dept: VASCULAR SURGERY | Facility: CLINIC | Age: 63
End: 2018-03-13
Payer: COMMERCIAL

## 2018-03-13 PROCEDURE — 93978 VASCULAR STUDY: CPT

## 2018-03-13 PROCEDURE — 93923 UPR/LXTR ART STDY 3+ LVLS: CPT

## 2018-03-13 PROCEDURE — 93925 LOWER EXTREMITY STUDY: CPT

## 2018-04-24 ENCOUNTER — APPOINTMENT (OUTPATIENT)
Dept: HEART AND VASCULAR | Facility: CLINIC | Age: 63
End: 2018-04-24
Payer: COMMERCIAL

## 2018-04-24 VITALS
DIASTOLIC BLOOD PRESSURE: 60 MMHG | HEART RATE: 63 BPM | SYSTOLIC BLOOD PRESSURE: 132 MMHG | HEIGHT: 67 IN | WEIGHT: 148 LBS | BODY MASS INDEX: 23.23 KG/M2

## 2018-04-24 PROCEDURE — 93285 PRGRMG DEV EVAL SCRMS IP: CPT

## 2018-04-27 ENCOUNTER — APPOINTMENT (OUTPATIENT)
Dept: VASCULAR SURGERY | Facility: CLINIC | Age: 63
End: 2018-04-27
Payer: COMMERCIAL

## 2018-04-27 PROCEDURE — 99213 OFFICE O/P EST LOW 20 MIN: CPT | Mod: 25

## 2018-04-27 PROCEDURE — 93880 EXTRACRANIAL BILAT STUDY: CPT

## 2018-07-21 ENCOUNTER — INPATIENT (INPATIENT)
Facility: HOSPITAL | Age: 63
LOS: 0 days | Discharge: HOME | DRG: 812 | End: 2018-07-21
Attending: INTERNAL MEDICINE | Admitting: INTERNAL MEDICINE
Payer: COMMERCIAL

## 2018-07-21 ENCOUNTER — TRANSCRIPTION ENCOUNTER (OUTPATIENT)
Age: 63
End: 2018-07-21

## 2018-07-21 VITALS
DIASTOLIC BLOOD PRESSURE: 79 MMHG | OXYGEN SATURATION: 100 % | TEMPERATURE: 98 F | RESPIRATION RATE: 18 BRPM | HEART RATE: 76 BPM | SYSTOLIC BLOOD PRESSURE: 174 MMHG

## 2018-07-21 VITALS
TEMPERATURE: 99 F | RESPIRATION RATE: 18 BRPM | WEIGHT: 139.99 LBS | SYSTOLIC BLOOD PRESSURE: 101 MMHG | DIASTOLIC BLOOD PRESSURE: 56 MMHG | OXYGEN SATURATION: 98 % | HEART RATE: 66 BPM

## 2018-07-21 DIAGNOSIS — K21.9 GASTRO-ESOPHAGEAL REFLUX DISEASE WITHOUT ESOPHAGITIS: ICD-10-CM

## 2018-07-21 DIAGNOSIS — R56.9 UNSPECIFIED CONVULSIONS: ICD-10-CM

## 2018-07-21 DIAGNOSIS — D64.9 ANEMIA, UNSPECIFIED: ICD-10-CM

## 2018-07-21 DIAGNOSIS — R63.8 OTHER SYMPTOMS AND SIGNS CONCERNING FOOD AND FLUID INTAKE: ICD-10-CM

## 2018-07-21 DIAGNOSIS — N17.9 ACUTE KIDNEY FAILURE, UNSPECIFIED: ICD-10-CM

## 2018-07-21 DIAGNOSIS — Z29.9 ENCOUNTER FOR PROPHYLACTIC MEASURES, UNSPECIFIED: ICD-10-CM

## 2018-07-21 DIAGNOSIS — I25.10 ATHEROSCLEROTIC HEART DISEASE OF NATIVE CORONARY ARTERY WITHOUT ANGINA PECTORIS: ICD-10-CM

## 2018-07-21 DIAGNOSIS — I10 ESSENTIAL (PRIMARY) HYPERTENSION: ICD-10-CM

## 2018-07-21 DIAGNOSIS — I73.9 PERIPHERAL VASCULAR DISEASE, UNSPECIFIED: ICD-10-CM

## 2018-07-21 DIAGNOSIS — E03.9 HYPOTHYROIDISM, UNSPECIFIED: ICD-10-CM

## 2018-07-21 DIAGNOSIS — E78.5 HYPERLIPIDEMIA, UNSPECIFIED: ICD-10-CM

## 2018-07-21 LAB
ALBUMIN SERPL ELPH-MCNC: 4.4 G/DL — SIGNIFICANT CHANGE UP (ref 3.3–5)
ALP SERPL-CCNC: 104 U/L — SIGNIFICANT CHANGE UP (ref 40–120)
ALT FLD-CCNC: 5 U/L — LOW (ref 10–45)
ANION GAP SERPL CALC-SCNC: 15 MMOL/L — SIGNIFICANT CHANGE UP (ref 5–17)
APTT BLD: 36.2 SEC — SIGNIFICANT CHANGE UP (ref 27.5–37.4)
AST SERPL-CCNC: 13 U/L — SIGNIFICANT CHANGE UP (ref 10–40)
BASOPHILS NFR BLD AUTO: 0.5 % — SIGNIFICANT CHANGE UP (ref 0–2)
BILIRUB SERPL-MCNC: <0.2 MG/DL — SIGNIFICANT CHANGE UP (ref 0.2–1.2)
BLD GP AB SCN SERPL QL: NEGATIVE — SIGNIFICANT CHANGE UP
BUN SERPL-MCNC: 30 MG/DL — HIGH (ref 7–23)
CALCIUM SERPL-MCNC: 10.2 MG/DL — SIGNIFICANT CHANGE UP (ref 8.4–10.5)
CHLORIDE SERPL-SCNC: 100 MMOL/L — SIGNIFICANT CHANGE UP (ref 96–108)
CO2 SERPL-SCNC: 25 MMOL/L — SIGNIFICANT CHANGE UP (ref 22–31)
CREAT SERPL-MCNC: 2.82 MG/DL — HIGH (ref 0.5–1.3)
EOSINOPHIL NFR BLD AUTO: 2.2 % — SIGNIFICANT CHANGE UP (ref 0–6)
FERRITIN SERPL-MCNC: 71 NG/ML — SIGNIFICANT CHANGE UP (ref 15–150)
GLUCOSE SERPL-MCNC: 97 MG/DL — SIGNIFICANT CHANGE UP (ref 70–99)
HCT VFR BLD CALC: 23.3 % — LOW (ref 34.5–45)
HGB BLD-MCNC: 7.3 G/DL — LOW (ref 11.5–15.5)
INR BLD: 1.06 — SIGNIFICANT CHANGE UP (ref 0.88–1.16)
IRON SATN MFR SERPL: 15 % — SIGNIFICANT CHANGE UP (ref 14–50)
IRON SATN MFR SERPL: 42 UG/DL — SIGNIFICANT CHANGE UP (ref 30–160)
LYMPHOCYTES # BLD AUTO: 33.2 % — SIGNIFICANT CHANGE UP (ref 13–44)
MAGNESIUM SERPL-MCNC: 2.3 MG/DL — SIGNIFICANT CHANGE UP (ref 1.6–2.6)
MCHC RBC-ENTMCNC: 29.1 PG — SIGNIFICANT CHANGE UP (ref 27–34)
MCHC RBC-ENTMCNC: 31.3 G/DL — LOW (ref 32–36)
MCV RBC AUTO: 92.8 FL — SIGNIFICANT CHANGE UP (ref 80–100)
MONOCYTES NFR BLD AUTO: 6.4 % — SIGNIFICANT CHANGE UP (ref 2–14)
NEUTROPHILS NFR BLD AUTO: 57.7 % — SIGNIFICANT CHANGE UP (ref 43–77)
PLATELET # BLD AUTO: 312 K/UL — SIGNIFICANT CHANGE UP (ref 150–400)
POTASSIUM SERPL-MCNC: 3.2 MMOL/L — LOW (ref 3.5–5.3)
POTASSIUM SERPL-SCNC: 3.2 MMOL/L — LOW (ref 3.5–5.3)
PROT SERPL-MCNC: 8.2 G/DL — SIGNIFICANT CHANGE UP (ref 6–8.3)
PROTHROM AB SERPL-ACNC: 11.8 SEC — SIGNIFICANT CHANGE UP (ref 9.8–12.7)
RBC # BLD: 2.51 M/UL — LOW (ref 3.8–5.2)
RBC # FLD: 14.5 % — SIGNIFICANT CHANGE UP (ref 10.3–16.9)
RH IG SCN BLD-IMP: POSITIVE — SIGNIFICANT CHANGE UP
SODIUM SERPL-SCNC: 140 MMOL/L — SIGNIFICANT CHANGE UP (ref 135–145)
TIBC SERPL-MCNC: 274 UG/DL — SIGNIFICANT CHANGE UP (ref 220–430)
UIBC SERPL-MCNC: 232 UG/DL — SIGNIFICANT CHANGE UP (ref 110–370)
WBC # BLD: 8.5 K/UL — SIGNIFICANT CHANGE UP (ref 3.8–10.5)
WBC # FLD AUTO: 8.5 K/UL — SIGNIFICANT CHANGE UP (ref 3.8–10.5)

## 2018-07-21 PROCEDURE — 86901 BLOOD TYPING SEROLOGIC RH(D): CPT

## 2018-07-21 PROCEDURE — 93005 ELECTROCARDIOGRAM TRACING: CPT

## 2018-07-21 PROCEDURE — 99285 EMERGENCY DEPT VISIT HI MDM: CPT | Mod: 25

## 2018-07-21 PROCEDURE — 85730 THROMBOPLASTIN TIME PARTIAL: CPT

## 2018-07-21 PROCEDURE — 85025 COMPLETE CBC W/AUTO DIFF WBC: CPT

## 2018-07-21 PROCEDURE — 86923 COMPATIBILITY TEST ELECTRIC: CPT

## 2018-07-21 PROCEDURE — 83550 IRON BINDING TEST: CPT

## 2018-07-21 PROCEDURE — 86900 BLOOD TYPING SEROLOGIC ABO: CPT

## 2018-07-21 PROCEDURE — 36415 COLL VENOUS BLD VENIPUNCTURE: CPT

## 2018-07-21 PROCEDURE — 71045 X-RAY EXAM CHEST 1 VIEW: CPT

## 2018-07-21 PROCEDURE — 80053 COMPREHEN METABOLIC PANEL: CPT

## 2018-07-21 PROCEDURE — 36430 TRANSFUSION BLD/BLD COMPNT: CPT

## 2018-07-21 PROCEDURE — 85610 PROTHROMBIN TIME: CPT

## 2018-07-21 PROCEDURE — P9016: CPT

## 2018-07-21 PROCEDURE — 83735 ASSAY OF MAGNESIUM: CPT

## 2018-07-21 PROCEDURE — 93010 ELECTROCARDIOGRAM REPORT: CPT

## 2018-07-21 PROCEDURE — 86850 RBC ANTIBODY SCREEN: CPT

## 2018-07-21 PROCEDURE — 71045 X-RAY EXAM CHEST 1 VIEW: CPT | Mod: 26

## 2018-07-21 PROCEDURE — 82728 ASSAY OF FERRITIN: CPT

## 2018-07-21 RX ORDER — SIMVASTATIN 20 MG/1
40 TABLET, FILM COATED ORAL AT BEDTIME
Qty: 0 | Refills: 0 | Status: DISCONTINUED | OUTPATIENT
Start: 2018-07-21 | End: 2018-07-21

## 2018-07-21 RX ORDER — LEVETIRACETAM 250 MG/1
750 TABLET, FILM COATED ORAL
Qty: 0 | Refills: 0 | Status: DISCONTINUED | OUTPATIENT
Start: 2018-07-21 | End: 2018-07-21

## 2018-07-21 RX ORDER — PANTOPRAZOLE SODIUM 20 MG/1
40 TABLET, DELAYED RELEASE ORAL
Qty: 0 | Refills: 0 | Status: DISCONTINUED | OUTPATIENT
Start: 2018-07-21 | End: 2018-07-21

## 2018-07-21 RX ORDER — FOLIC ACID 0.8 MG
1 TABLET ORAL DAILY
Qty: 0 | Refills: 0 | Status: DISCONTINUED | OUTPATIENT
Start: 2018-07-21 | End: 2018-07-21

## 2018-07-21 RX ORDER — ACETAMINOPHEN 500 MG
650 TABLET ORAL ONCE
Qty: 0 | Refills: 0 | Status: COMPLETED | OUTPATIENT
Start: 2018-07-21 | End: 2018-07-21

## 2018-07-21 RX ORDER — LEVOTHYROXINE SODIUM 125 MCG
75 TABLET ORAL DAILY
Qty: 0 | Refills: 0 | Status: DISCONTINUED | OUTPATIENT
Start: 2018-07-21 | End: 2018-07-21

## 2018-07-21 RX ORDER — POTASSIUM CHLORIDE 20 MEQ
40 PACKET (EA) ORAL ONCE
Qty: 0 | Refills: 0 | Status: COMPLETED | OUTPATIENT
Start: 2018-07-21 | End: 2018-07-21

## 2018-07-21 RX ORDER — CILOSTAZOL 100 MG/1
50 TABLET ORAL
Qty: 0 | Refills: 0 | Status: DISCONTINUED | OUTPATIENT
Start: 2018-07-21 | End: 2018-07-21

## 2018-07-21 RX ORDER — HEPARIN SODIUM 5000 [USP'U]/ML
5000 INJECTION INTRAVENOUS; SUBCUTANEOUS EVERY 12 HOURS
Qty: 0 | Refills: 0 | Status: DISCONTINUED | OUTPATIENT
Start: 2018-07-21 | End: 2018-07-21

## 2018-07-21 RX ORDER — SERTRALINE 25 MG/1
50 TABLET, FILM COATED ORAL DAILY
Qty: 0 | Refills: 0 | Status: DISCONTINUED | OUTPATIENT
Start: 2018-07-21 | End: 2018-07-21

## 2018-07-21 RX ADMIN — Medication 1 MILLIGRAM(S): at 12:53

## 2018-07-21 RX ADMIN — Medication 20 MILLIEQUIVALENT(S): at 11:48

## 2018-07-21 RX ADMIN — Medication 75 MICROGRAM(S): at 12:53

## 2018-07-21 RX ADMIN — SERTRALINE 50 MILLIGRAM(S): 25 TABLET, FILM COATED ORAL at 11:47

## 2018-07-21 RX ADMIN — Medication 40 MILLIEQUIVALENT(S): at 11:48

## 2018-07-21 RX ADMIN — Medication 650 MILLIGRAM(S): at 17:21

## 2018-07-21 NOTE — DISCHARGE NOTE ADULT - PATIENT PORTAL LINK FT
You can access the [facility] Patient Portal, offered by [facility], by registering with the following website: [email or web address]

## 2018-07-21 NOTE — DISCHARGE NOTE ADULT - PLAN OF CARE
to continue follow up with Dr. Izquierdo and weekly iron infusions You were admitted to the hospital for low hemoglobin requiring blood transfusion. You were transfused 2 units of blood with improvement in your hemoglobin. Your vital signs remained stable and you did not develop new or worsening symptoms. You remained hemodynamically stable and deemed stable for discharge.    If you experience sudden severe shortness of breath, chest pain, abdominal pain, or bleeding, please go to the hospital. continue home medications as directed to drink adequate amounts of fluids and continue following with Dr. Izquierdo for anemia management

## 2018-07-21 NOTE — H&P ADULT - PROBLEM SELECTOR PLAN 10
DVT ppx: no SCDs given PAD s/p PCI, HSQ given no acute S&S bleeding and normal platelets    Code: FULL  Dispo: [facility] to outpatient f/u with Dr. Izquierdo

## 2018-07-21 NOTE — H&P ADULT - PROBLEM SELECTOR PLAN 3
On home losartan, MTP, lasix.  -holding anti-HTN given symptomatic anemia and currently normotensive  -resume as tolerated On CKD. Cr 1.4-1.5 as of 01/2018. Likely prerenal given symptomatic anemia and decreased perfusion. MM somewhat dry on exam. BPs low-normotensive.  -c/w current blood transfusion  -encourage PO intake  -trend BMP  -renally dose medications

## 2018-07-21 NOTE — H&P ADULT - PROBLEM SELECTOR PLAN 4
s/p LLE PCI.   -c/w statin  -c/w Cilostazol On home losartan, MTP, lasix.  -holding anti-HTN given symptomatic anemia and currently normotensive  -resume as tolerated

## 2018-07-21 NOTE — H&P ADULT - NSHPLABSRESULTS_GEN_ALL_CORE
.  LABS:                         7.3    8.5   )-----------( 312      ( 21 Jul 2018 05:24 )             23.3     07-21    140  |  100  |  30<H>  ----------------------------<  97  3.2<L>   |  25  |  2.82<H>    Ca    10.2      21 Jul 2018 05:24  Mg     2.3     07-21    TPro  8.2  /  Alb  4.4  /  TBili  <0.2  /  DBili  x   /  AST  13  /  ALT  5<L>  /  AlkPhos  104  07-21    PT/INR - ( 21 Jul 2018 05:24 )   PT: 11.8 sec;   INR: 1.06          PTT - ( 21 Jul 2018 05:24 )  PTT:36.2 sec              RADIOLOGY, EKG & ADDITIONAL TESTS: Reviewed.

## 2018-07-21 NOTE — H&P ADULT - ATTENDING COMMENTS
Patient was seen and examined by me at bedside. I agree with resident's note, subjective, objective physical exam, assessment and plan with following modifications/additions.     Spoke with Dr. Izquierdo. Plan is to transfuse 2u PRBC and discharge home.   Patient will follow up with Dr. Izquierdo.

## 2018-07-21 NOTE — ED PROVIDER NOTE - PROGRESS NOTE DETAILS
pt consented for blood transfusion. message left for Dr. Izquierdo and Dr. Huynh will admit to hospitalist while waiting for call back from Dr. Izquierdo

## 2018-07-21 NOTE — H&P ADULT - ASSESSMENT
62F with PMHx TASNEEM (requiring once weekly iron transfusions), CAD s/p CABG PCI (9 yrs ago), PAD s/p PCI on LLE (9 years ago), HTN, HLD, hypothyroidism, seizure, AVR (bioprosthetic), AAA (s/p repair in 2012) presents from outpatient office for symptomatic anemia.

## 2018-07-21 NOTE — DISCHARGE NOTE ADULT - HOSPITAL COURSE
62F with PMHx TASNEEM (requiring once weekly iron transfusions), CAD s/p CABG PCI (9 yrs ago), PAD s/p PCI on LLE (9 years ago), HTN, HLD, hypothyroidism, seizure, AVR (bioprosthetic), AAA (s/p repair in 2012) presents from outpatient office for symptomatic anemia. Dr. Izquierdo contacted for futher collateral. Pt transfused 2 units PRBC with improvement in symptoms. Vitals remained stable. Pt not developing additional or worsening symptoms. During her course, pt provided with supportive care and monitored closely during transfusion. No acute S&S of active bleeding. Pt tolerated transfusion well. Deemed hemodynamically stable and medically optimized for d/c after transfusion to follow up with Dr. Izquierdo outpatient.

## 2018-07-21 NOTE — DISCHARGE NOTE ADULT - MEDICATION SUMMARY - MEDICATIONS TO TAKE
I will START or STAY ON the medications listed below when I get home from the hospital:    losartan 50 mg oral tablet  -- 1 tab(s) by mouth once a day  -- Indication: For Essential hypertension    levETIRAcetam 750 mg oral tablet  -- 1 tab(s) by mouth 2 times a day  -- Indication: For Seizure    sertraline 50 mg oral tablet  -- 1 tab(s) by mouth once a day  -- Indication: For Depression    simvastatin 40 mg oral tablet  -- 1 tab(s) by mouth once a day (at bedtime)  -- Indication: For Hyperlipidemia    metoprolol succinate 25 mg oral tablet, extended release  -- 1 tab(s) by mouth once a day  -- Indication: For Essential hypertension    cilostazol 50 mg oral tablet  -- 1 tab(s) by mouth 2 times a day  -- Indication: For Peripheral vascular disease    pantoprazole 40 mg oral delayed release tablet  -- 1 tab(s) by mouth once a day  -- Indication: For Gastroesophageal reflux disease    Synthroid 75 mcg (0.075 mg) oral tablet  -- 1 tab(s) by mouth once a day  -- Indication: For Hypothyroidism    folic acid 1 mg oral tablet  -- 1 tab(s) by mouth once a day  -- Indication: For Nutrition, metabolism, and development symptoms    Vitamin D2 50,000 intl units (1.25 mg) oral capsule  -- 1 cap(s) by mouth once a week  -- Indication: For Nutrition, metabolism, and development symptoms

## 2018-07-21 NOTE — DISCHARGE NOTE ADULT - SECONDARY DIAGNOSIS.
Essential hypertension CHERRI (acute kidney injury) Hypothyroidism Peripheral vascular disease Hyperlipidemia Seizure

## 2018-07-21 NOTE — H&P ADULT - PROBLEM SELECTOR PLAN 1
Pt w/ h/o TASNEEM requiring weekly iron transfusions. Presenting from outpatient office of Dr. Izquierdo for Hb 6.7 with symptoms of KRAFT, fatigue, malaise. POA Hb 7.3. Pt w/o acute S&S acute blood loss. BPs stable   -transfuse PRBC Hb>8   -currently running 1 unit PRBC Likely 2/2 h/o TASNEEM requiring weekly iron transfusions. Presenting from outpatient office of Dr. Izquierdo for Hb 6.7 with symptoms of KRAFT, fatigue, malaise. POA Hb 7.3. Pt w/o acute S&S acute blood loss. BPs stable   -transfuse PRBC Hb>8   -currently running 1 unit PRBC  -monitor vitals  -maintain active T&S  -f/u Dr. Izquierdo for collateral   -monitor for signs acute bleeding Likely 2/2 h/o TASNEEM requiring weekly iron transfusions. Presenting from outpatient office of Dr. Izquierdo for Hb 6.7 with symptoms of KRAFT, fatigue, malaise. POA Hb 7.3. Pt w/o acute S&S acute blood loss. BPs stable.   -transfuse PRBC Hb>8   -currently running 1 unit PRBC  -monitor vitals  -maintain active T&S  -f/u Dr. Izquierdo for collateral   -monitor for signs acute bleeding Likely 2/2 h/o TASNEEM requiring weekly iron transfusions. Presenting from outpatient office of Dr. Izquierdo for Hb 6.7 with symptoms of KRAFT, fatigue, malaise. POA Hb 7.3. Pt w/o acute S&S acute blood loss. BPs stable.   -transfuse PRBC Hb>9 (per Dr. Izquierdo as pt prone to seizures with lower Hb)  -currently running 1 unit PRBC  -monitor vitals  -maintain active T&S  -f/u Dr. Izquierdo for collateral   -monitor for signs acute bleeding

## 2018-07-21 NOTE — ED ADULT NURSE REASSESSMENT NOTE - NS ED NURSE REASSESS COMMENT FT1
PRBC's infusing without difficulty, no s/s of Rxn noted, utd on poc, with understanding verbalized, assessment on-going, pending further orders
assessment and intervention as noted, sharyn. well, updated on current poc, with understanding verbalized, assessment on-going
connection lost following extended hold
preliminary admit orders have been received, awaiting sign out
pt. still aware that urine specimen is needed, with understanding verbalized
pt. without change, denies complaint, states she feels fine, assessment on-going
report was called as noted, t/p has been entered, awaiting arrival of t/p for t/p to floor
sign out has been received, called [facility] to give report to Floor, placed on hold
PRBC's were initiated as noted, consent on chart, vss as noted, explanation to pt., with understanding verbalized, no s/s of RXN noted, assessment on-going

## 2018-07-21 NOTE — ED ADULT NURSE NOTE - OBJECTIVE STATEMENT
pt. reports that she has Iron Deficiency Anemia and was sent in per provider for transfusion, after they called her Friday, labs were drawn earlier in the week

## 2018-07-21 NOTE — H&P ADULT - NSHPSOCIALHISTORY_GEN_ALL_CORE
Tobacco use: former smoker  EtOH use: former drinker  Illicit drug use: denies    Living situation: lives home alone

## 2018-07-21 NOTE — H&P ADULT - NSHPPHYSICALEXAM_GEN_ALL_CORE
VITAL SIGNS:  T(C): 36.6 (07-21-18 @ 09:01), Max: 37.1 (07-21-18 @ 05:05)  T(F): 97.9 (07-21-18 @ 09:01), Max: 98.8 (07-21-18 @ 05:05)  HR: 58 (07-21-18 @ 09:01) (58 - 74)  BP: 123/67 (07-21-18 @ 09:01) (101/56 - 123/67)  BP(mean): --  RR: 18 (07-21-18 @ 09:01) (14 - 18)  SpO2: 99% (07-21-18 @ 09:01) (98% - 99%)  Wt(kg): --    PHYSICAL EXAM:    Constitutional: WDWN resting comfortably in bed; NAD, pleasant, no respiratory distress  Head: NC/AT  Eyes: PERRL, EOMI, anicteric sclera, pale conjunctivae   ENT: no nasal discharge; uvula midline, no oropharyngeal erythema or exudates; MM somewhat dry  Neck: supple; no JVD or thyromegaly  Respiratory: CTA B/L; no W/R/R, no retractions  Cardiac: +S1/S2; RRR; systolic ejection murmur loudest at RUSB  Gastrointestinal: soft, NT/ND; no rebound or guarding; +BSx4  Back: spine midline, no bony tenderness or step-offs; no CVAT B/L  Extremities: WWP, no clubbing or cyanosis; no peripheral edema  Musculoskeletal: NROM x4; no joint swelling, tenderness or erythema  Vascular: 2+ radial, L DP with faint pulse;  R DP 1+  Dermatologic: skin warm, dry and intact; no rashes, wounds, or scars  Lymphatic: no submandibular or cervical LAD  Neurologic: AAOx3; CNII-XII grossly intact; no focal deficits  Psychiatric: affect and characteristics of appearance, verbalizations, behaviors are appropriate

## 2018-07-21 NOTE — ED ADULT NURSE NOTE - CHIEF COMPLAINT QUOTE
c/o on and off SOB, dizziness, here for BT, 6.7 hg level drawn Tuesday, result received Fri, and was advised by Dr ANJUM Izquierdo to come here for BT

## 2018-07-21 NOTE — ED PROVIDER NOTE - MEDICAL DECISION MAKING DETAILS
anemia, called back for worsening hemoglobin, fatigue, low energy. no overt bleeding  -check labs, ekg, cxr

## 2018-07-21 NOTE — DISCHARGE NOTE ADULT - CARE PLAN
Principal Discharge DX:	Symptomatic anemia  Goal:	to continue follow up with Dr. Izquierdo and weekly iron infusions  Assessment and plan of treatment:	You were admitted to the hospital for low hemoglobin requiring blood transfusion. You were transfused 2 units of blood with improvement in your hemoglobin. Your vital signs remained stable and you did not develop new or worsening symptoms. You remained hemodynamically stable and deemed stable for discharge.    If you experience sudden severe shortness of breath, chest pain, abdominal pain, or bleeding, please go to the hospital.  Secondary Diagnosis:	Essential hypertension  Goal:	continue home medications as directed  Secondary Diagnosis:	CHERRI (acute kidney injury)  Goal:	to drink adequate amounts of fluids and continue following with Dr. Izquierdo for anemia management  Secondary Diagnosis:	Hypothyroidism  Goal:	continue home medications as directed  Secondary Diagnosis:	Peripheral vascular disease  Goal:	continue home medications as directed  Secondary Diagnosis:	Hyperlipidemia  Goal:	continue home medications as directed  Secondary Diagnosis:	Seizure  Goal:	continue home medications as directed

## 2018-07-21 NOTE — H&P ADULT - HISTORY OF PRESENT ILLNESS
62F with PMHx TASNEEM (requiring once weekly iron transfusions), CAD s/p CABG PCI (9 yrs ago), PAD s/p PCI on LLE (9 years ago), HTN, HLD, hypothyroidism, seizure, AVR (bioprosthetic), AAA (s/p repair in 2012) presents from outpatient office for symptomatic anemia. Pt Reports this past Tuesday, she received iron transfusion at which point bloodwork was also performed. Throughout the week, she continued to experience fatigue and generalized malaise. She also endorses KRAFT but no SOB at rest. Pt was contacted by outpatient office of Dr. Izquierdo to notify that her Hb on Tuesday was 6.7 and advised to go to [facility] for blood transfusion. Pt with adequate UOP. Denies acute S&S of bleeding. No hematuria, melena, BRBPR, hematemesis. Oherwise mentating well and denies current HA, fevers/chills, SOB, cough, CP, palpitations, abdominal pain, n/v/d/c.

## 2018-07-21 NOTE — DISCHARGE NOTE ADULT - CARE PROVIDER_API CALL
Farheen Izquierdo), Medicine  [address] [place] [address]  [address] [place] [address]  Phone: [phone]  Fax: [phone]

## 2018-07-21 NOTE — ED ADULT TRIAGE NOTE - CHIEF COMPLAINT QUOTE
c/o on and off SOB, dizziness, here for BT, 6.7 hg level drawn Tuesday, and received result Fri, and was advised by Dr ANJUM Izquierdo to come here for BT c/o on and off SOB, dizziness, here for BT, 6.7 hg level drawn Tuesday, result received Fri, and was advised by Dr ANJUM Izquierdo to come here for BT

## 2018-07-21 NOTE — ED ADULT NURSE NOTE - PMH
Anemia  Anemia  Atherosclerosis of coronary artery  CAD (coronary artery disease)  Essential hypertension  HTN (hypertension)  Gastroesophageal reflux disease  GERD (gastroesophageal reflux disease)  Hyperlipidemia  Hyperlipidemia  Hypothyroidism  Hypothyroidism  Peripheral vascular disease  PVD (peripheral vascular disease)  Seizure

## 2018-07-21 NOTE — ED ADULT NURSE NOTE - ASSOCIATED SYMPTOMS
here for transfusion after her  told her to come in, states she usually gets transfused a couple times a year, in addition to receiving supplemental iron infusions

## 2018-07-21 NOTE — ED PROVIDER NOTE - OBJECTIVE STATEMENT
62F hx anemia, htn, high chol, hypothyroid, cad, pvd, seizure, advised to come in to ED for blood transfusion.  pt states had iron infusion, procrit and B12 this week.  was called with results of labs and was told her hemoglobin was 6.7.  pt has received multiple blood transfusions in past, states last was 6-7 months ago.  +fatigue, low energy, some SOB with ambulation.  no vomiting. no chest pain. pt denies any bleeding noted.  states has had scopes and multiple tests, however unable to find any bleeding source in past.

## 2018-08-01 ENCOUNTER — EMERGENCY (EMERGENCY)
Facility: HOSPITAL | Age: 63
LOS: 1 days | Discharge: HOME | End: 2018-08-01
Attending: EMERGENCY MEDICINE | Admitting: INTERNAL MEDICINE
Payer: COMMERCIAL

## 2018-08-01 VITALS
OXYGEN SATURATION: 98 % | HEART RATE: 64 BPM | WEIGHT: 138.89 LBS | RESPIRATION RATE: 18 BRPM | SYSTOLIC BLOOD PRESSURE: 107 MMHG | DIASTOLIC BLOOD PRESSURE: 67 MMHG | HEIGHT: 67 IN | TEMPERATURE: 98 F

## 2018-08-01 DIAGNOSIS — I12.9 HYPERTENSIVE CHRONIC KIDNEY DISEASE WITH STAGE 1 THROUGH STAGE 4 CHRONIC KIDNEY DISEASE, OR UNSPECIFIED CHRONIC KIDNEY DISEASE: ICD-10-CM

## 2018-08-01 DIAGNOSIS — R53.1 WEAKNESS: ICD-10-CM

## 2018-08-01 DIAGNOSIS — N17.9 ACUTE KIDNEY FAILURE, UNSPECIFIED: ICD-10-CM

## 2018-08-01 DIAGNOSIS — R06.02 SHORTNESS OF BREATH: ICD-10-CM

## 2018-08-01 DIAGNOSIS — E03.9 HYPOTHYROIDISM, UNSPECIFIED: ICD-10-CM

## 2018-08-01 DIAGNOSIS — D50.9 IRON DEFICIENCY ANEMIA, UNSPECIFIED: ICD-10-CM

## 2018-08-01 DIAGNOSIS — D64.9 ANEMIA, UNSPECIFIED: ICD-10-CM

## 2018-08-01 DIAGNOSIS — Z87.891 PERSONAL HISTORY OF NICOTINE DEPENDENCE: ICD-10-CM

## 2018-08-01 DIAGNOSIS — K21.9 GASTRO-ESOPHAGEAL REFLUX DISEASE WITHOUT ESOPHAGITIS: ICD-10-CM

## 2018-08-01 DIAGNOSIS — G40.909 EPILEPSY, UNSPECIFIED, NOT INTRACTABLE, WITHOUT STATUS EPILEPTICUS: ICD-10-CM

## 2018-08-01 DIAGNOSIS — N18.9 CHRONIC KIDNEY DISEASE, UNSPECIFIED: ICD-10-CM

## 2018-08-01 DIAGNOSIS — Z79.899 OTHER LONG TERM (CURRENT) DRUG THERAPY: ICD-10-CM

## 2018-08-01 DIAGNOSIS — I73.9 PERIPHERAL VASCULAR DISEASE, UNSPECIFIED: ICD-10-CM

## 2018-08-01 DIAGNOSIS — Z95.5 PRESENCE OF CORONARY ANGIOPLASTY IMPLANT AND GRAFT: ICD-10-CM

## 2018-08-01 DIAGNOSIS — I25.10 ATHEROSCLEROTIC HEART DISEASE OF NATIVE CORONARY ARTERY WITHOUT ANGINA PECTORIS: ICD-10-CM

## 2018-08-01 DIAGNOSIS — E78.5 HYPERLIPIDEMIA, UNSPECIFIED: ICD-10-CM

## 2018-08-01 LAB
ALBUMIN SERPL ELPH-MCNC: 4.4 G/DL — SIGNIFICANT CHANGE UP (ref 3.3–5)
ALP SERPL-CCNC: 127 U/L — HIGH (ref 40–120)
ALT FLD-CCNC: 6 U/L — LOW (ref 10–45)
ANION GAP SERPL CALC-SCNC: 16 MMOL/L — SIGNIFICANT CHANGE UP (ref 5–17)
APTT BLD: 36 SEC — SIGNIFICANT CHANGE UP (ref 27.5–37.4)
AST SERPL-CCNC: 13 U/L — SIGNIFICANT CHANGE UP (ref 10–40)
BASOPHILS NFR BLD AUTO: 0.7 % — SIGNIFICANT CHANGE UP (ref 0–2)
BILIRUB DIRECT SERPL-MCNC: <0.2 MG/DL — SIGNIFICANT CHANGE UP (ref 0–0.2)
BILIRUB INDIRECT FLD-MCNC: >0 MG/DL — LOW (ref 0.2–1)
BILIRUB SERPL-MCNC: 0.2 MG/DL — SIGNIFICANT CHANGE UP (ref 0.2–1.2)
BILIRUB SERPL-MCNC: 0.2 MG/DL — SIGNIFICANT CHANGE UP (ref 0.2–1.2)
BUN SERPL-MCNC: 20 MG/DL — SIGNIFICANT CHANGE UP (ref 7–23)
CALCIUM SERPL-MCNC: 10.3 MG/DL — SIGNIFICANT CHANGE UP (ref 8.4–10.5)
CHLORIDE SERPL-SCNC: 101 MMOL/L — SIGNIFICANT CHANGE UP (ref 96–108)
CK MB CFR SERPL CALC: 1.2 NG/ML — SIGNIFICANT CHANGE UP (ref 0–6.7)
CK SERPL-CCNC: 69 U/L — SIGNIFICANT CHANGE UP (ref 25–170)
CO2 SERPL-SCNC: 26 MMOL/L — SIGNIFICANT CHANGE UP (ref 22–31)
CREAT SERPL-MCNC: 2.12 MG/DL — HIGH (ref 0.5–1.3)
D DIMER BLD IA.RAPID-MCNC: 2150 NG/ML DDU — HIGH
EOSINOPHIL NFR BLD AUTO: 3.3 % — SIGNIFICANT CHANGE UP (ref 0–6)
GLUCOSE SERPL-MCNC: 90 MG/DL — SIGNIFICANT CHANGE UP (ref 70–99)
HCT VFR BLD CALC: 36.5 % — SIGNIFICANT CHANGE UP (ref 34.5–45)
HGB BLD-MCNC: 11.6 G/DL — SIGNIFICANT CHANGE UP (ref 11.5–15.5)
INR BLD: 0.99 — SIGNIFICANT CHANGE UP (ref 0.88–1.16)
LYMPHOCYTES # BLD AUTO: 34.5 % — SIGNIFICANT CHANGE UP (ref 13–44)
MAGNESIUM SERPL-MCNC: 2.2 MG/DL — SIGNIFICANT CHANGE UP (ref 1.6–2.6)
MCHC RBC-ENTMCNC: 29.8 PG — SIGNIFICANT CHANGE UP (ref 27–34)
MCHC RBC-ENTMCNC: 31.8 G/DL — LOW (ref 32–36)
MCV RBC AUTO: 93.8 FL — SIGNIFICANT CHANGE UP (ref 80–100)
MONOCYTES NFR BLD AUTO: 9.3 % — SIGNIFICANT CHANGE UP (ref 2–14)
NEUTROPHILS NFR BLD AUTO: 52.2 % — SIGNIFICANT CHANGE UP (ref 43–77)
NT-PROBNP SERPL-SCNC: 662 PG/ML — HIGH (ref 0–300)
PLATELET # BLD AUTO: 226 K/UL — SIGNIFICANT CHANGE UP (ref 150–400)
POTASSIUM SERPL-MCNC: 3.4 MMOL/L — LOW (ref 3.5–5.3)
POTASSIUM SERPL-SCNC: 3.4 MMOL/L — LOW (ref 3.5–5.3)
PROT SERPL-MCNC: 8.6 G/DL — HIGH (ref 6–8.3)
PROTHROM AB SERPL-ACNC: 11 SEC — SIGNIFICANT CHANGE UP (ref 9.8–12.7)
RBC # BLD: 3.89 M/UL — SIGNIFICANT CHANGE UP (ref 3.8–5.2)
RBC # FLD: 15.5 % — SIGNIFICANT CHANGE UP (ref 10.3–16.9)
SODIUM SERPL-SCNC: 143 MMOL/L — SIGNIFICANT CHANGE UP (ref 135–145)
TROPONIN T SERPL-MCNC: <0.01 NG/ML — SIGNIFICANT CHANGE UP (ref 0–0.01)
WBC # BLD: 5.8 K/UL — SIGNIFICANT CHANGE UP (ref 3.8–10.5)
WBC # FLD AUTO: 5.8 K/UL — SIGNIFICANT CHANGE UP (ref 3.8–10.5)

## 2018-08-01 PROCEDURE — 99285 EMERGENCY DEPT VISIT HI MDM: CPT | Mod: 25

## 2018-08-01 PROCEDURE — 71046 X-RAY EXAM CHEST 2 VIEWS: CPT | Mod: 26

## 2018-08-01 PROCEDURE — 93010 ELECTROCARDIOGRAM REPORT: CPT

## 2018-08-01 RX ORDER — FOLIC ACID 0.8 MG
1 TABLET ORAL DAILY
Qty: 0 | Refills: 0 | Status: DISCONTINUED | OUTPATIENT
Start: 2018-08-01 | End: 2018-08-02

## 2018-08-01 RX ORDER — SIMVASTATIN 20 MG/1
40 TABLET, FILM COATED ORAL AT BEDTIME
Qty: 0 | Refills: 0 | Status: DISCONTINUED | OUTPATIENT
Start: 2018-08-01 | End: 2018-08-01

## 2018-08-01 RX ORDER — ATORVASTATIN CALCIUM 80 MG/1
80 TABLET, FILM COATED ORAL AT BEDTIME
Qty: 0 | Refills: 0 | Status: DISCONTINUED | OUTPATIENT
Start: 2018-08-01 | End: 2018-08-02

## 2018-08-01 RX ORDER — LEVOTHYROXINE SODIUM 125 MCG
75 TABLET ORAL DAILY
Qty: 0 | Refills: 0 | Status: DISCONTINUED | OUTPATIENT
Start: 2018-08-01 | End: 2018-08-02

## 2018-08-01 RX ORDER — SERTRALINE 25 MG/1
50 TABLET, FILM COATED ORAL DAILY
Qty: 0 | Refills: 0 | Status: DISCONTINUED | OUTPATIENT
Start: 2018-08-01 | End: 2018-08-02

## 2018-08-01 RX ORDER — LEVETIRACETAM 250 MG/1
750 TABLET, FILM COATED ORAL EVERY 12 HOURS
Qty: 0 | Refills: 0 | Status: DISCONTINUED | OUTPATIENT
Start: 2018-08-01 | End: 2018-08-02

## 2018-08-01 RX ORDER — CILOSTAZOL 100 MG/1
50 TABLET ORAL ONCE
Qty: 0 | Refills: 0 | Status: COMPLETED | OUTPATIENT
Start: 2018-08-01 | End: 2018-08-02

## 2018-08-01 NOTE — ED ADULT TRIAGE NOTE - CHIEF COMPLAINT QUOTE
Patient c/o low heart rate , low blood pressure , fatigue , sob , chest pain and dizziness for 4 days . Was sent by PMD for evaluation .

## 2018-08-01 NOTE — ED ADULT NURSE REASSESSMENT NOTE - NS ED NURSE REASSESS COMMENT FT1
pt instructed to provide urine sample for lab testing. given cup. verbalized understanding. will reassess

## 2018-08-01 NOTE — ED ADULT NURSE NOTE - NSIMPLEMENTINTERV_GEN_ALL_ED
Implemented All Universal Safety Interventions:  [place] to call system. Call bell, personal items and telephone within reach. Instruct patient to call for assistance. Room bathroom lighting operational. Non-slip footwear when patient is off stretcher. Physically safe environment: no spills, clutter or unnecessary equipment. Stretcher in lowest position, wheels locked, appropriate side rails in place.

## 2018-08-01 NOTE — ED PROVIDER NOTE - MEDICAL DECISION MAKING DETAILS
labs, ekg , completed in ED and plan per collaborative effort with referring MD labs, ekg , completed in ED and plan per collaborative effort with referring MD who seeks further care upon admission including VQ scan tomorrow ( D Dimer > 2000 with CKD ) dopplers ordered labs, ekg , completed in ED and plan per collaborative effort with referring MD who seeks admission for her patient and further care upon admission including VQ scan tomorrow ( D Dimer > 2000 with CKD ) dopplers ordered

## 2018-08-01 NOTE — ED PROVIDER NOTE - OBJECTIVE STATEMENT
referred from hematologist as patient with change in values with increased bradycardia and decrease in BP values from baseline with ongoing albeit not acutely changed weakness sob in context of unknown source recurrent anemia ( 2 units this very week ) followed by GI ,, cards + known stents -> Denies CP no fever

## 2018-08-01 NOTE — ED ADULT NURSE NOTE - OBJECTIVE STATEMENT
Patient presents to the ED after she was sent while having an iron infusion for hypotension.  reports she has been feeling dizzy for a few days.  also reports blood transfusion Saturday.  AA&OX3, respirations unlabored, non-diaphoretic, no pallor.

## 2018-08-01 NOTE — ED PROVIDER NOTE - ATTENDING CONTRIBUTION TO CARE
62 yof sent in by pmd for weakness, bradycardia, hypotension.  also ongoing anemia.  denies pain.    agree w/ PA, bradycardia, o/w well appearing, ao x 4, ?polypharmacy vs other etiology, d/w pmd, admit for further workup

## 2018-08-02 ENCOUNTER — TRANSCRIPTION ENCOUNTER (OUTPATIENT)
Age: 63
End: 2018-08-02

## 2018-08-02 VITALS
HEART RATE: 60 BPM | SYSTOLIC BLOOD PRESSURE: 135 MMHG | DIASTOLIC BLOOD PRESSURE: 70 MMHG | TEMPERATURE: 98 F | OXYGEN SATURATION: 97 % | RESPIRATION RATE: 18 BRPM

## 2018-08-02 DIAGNOSIS — R53.1 WEAKNESS: ICD-10-CM

## 2018-08-02 DIAGNOSIS — I10 ESSENTIAL (PRIMARY) HYPERTENSION: ICD-10-CM

## 2018-08-02 DIAGNOSIS — D64.9 ANEMIA, UNSPECIFIED: ICD-10-CM

## 2018-08-02 DIAGNOSIS — Z91.89 OTHER SPECIFIED PERSONAL RISK FACTORS, NOT ELSEWHERE CLASSIFIED: ICD-10-CM

## 2018-08-02 DIAGNOSIS — I73.9 PERIPHERAL VASCULAR DISEASE, UNSPECIFIED: ICD-10-CM

## 2018-08-02 DIAGNOSIS — I25.701 ATHEROSCLEROSIS OF CORONARY ARTERY BYPASS GRAFT(S), UNSPECIFIED, WITH ANGINA PECTORIS WITH DOCUMENTED SPASM: Chronic | ICD-10-CM

## 2018-08-02 DIAGNOSIS — I25.10 ATHEROSCLEROTIC HEART DISEASE OF NATIVE CORONARY ARTERY WITHOUT ANGINA PECTORIS: ICD-10-CM

## 2018-08-02 DIAGNOSIS — R56.9 UNSPECIFIED CONVULSIONS: ICD-10-CM

## 2018-08-02 DIAGNOSIS — Z95.2 PRESENCE OF PROSTHETIC HEART VALVE: Chronic | ICD-10-CM

## 2018-08-02 LAB
ALBUMIN SERPL ELPH-MCNC: 3.9 G/DL — SIGNIFICANT CHANGE UP (ref 3.3–5)
ALP SERPL-CCNC: 118 U/L — SIGNIFICANT CHANGE UP (ref 40–120)
ALT FLD-CCNC: 6 U/L — LOW (ref 10–45)
ANION GAP SERPL CALC-SCNC: 16 MMOL/L — SIGNIFICANT CHANGE UP (ref 5–17)
AST SERPL-CCNC: 12 U/L — SIGNIFICANT CHANGE UP (ref 10–40)
BASOPHILS NFR BLD AUTO: 0.7 % — SIGNIFICANT CHANGE UP (ref 0–2)
BILIRUB SERPL-MCNC: <0.2 MG/DL — SIGNIFICANT CHANGE UP (ref 0.2–1.2)
BUN SERPL-MCNC: 23 MG/DL — SIGNIFICANT CHANGE UP (ref 7–23)
CALCIUM SERPL-MCNC: 9.8 MG/DL — SIGNIFICANT CHANGE UP (ref 8.4–10.5)
CHLORIDE SERPL-SCNC: 104 MMOL/L — SIGNIFICANT CHANGE UP (ref 96–108)
CK MB CFR SERPL CALC: 1.2 NG/ML — SIGNIFICANT CHANGE UP (ref 0–6.7)
CK SERPL-CCNC: 61 U/L — SIGNIFICANT CHANGE UP (ref 25–170)
CO2 SERPL-SCNC: 22 MMOL/L — SIGNIFICANT CHANGE UP (ref 22–31)
CREAT SERPL-MCNC: 1.98 MG/DL — HIGH (ref 0.5–1.3)
EOSINOPHIL NFR BLD AUTO: 2.8 % — SIGNIFICANT CHANGE UP (ref 0–6)
GLUCOSE SERPL-MCNC: 106 MG/DL — HIGH (ref 70–99)
HCT VFR BLD CALC: 35.6 % — SIGNIFICANT CHANGE UP (ref 34.5–45)
HGB BLD-MCNC: 11.1 G/DL — LOW (ref 11.5–15.5)
LYMPHOCYTES # BLD AUTO: 28.1 % — SIGNIFICANT CHANGE UP (ref 13–44)
MAGNESIUM SERPL-MCNC: 2.2 MG/DL — SIGNIFICANT CHANGE UP (ref 1.6–2.6)
MCHC RBC-ENTMCNC: 29.9 PG — SIGNIFICANT CHANGE UP (ref 27–34)
MCHC RBC-ENTMCNC: 31.2 G/DL — LOW (ref 32–36)
MCV RBC AUTO: 96 FL — SIGNIFICANT CHANGE UP (ref 80–100)
MONOCYTES NFR BLD AUTO: 9.9 % — SIGNIFICANT CHANGE UP (ref 2–14)
NEUTROPHILS NFR BLD AUTO: 58.5 % — SIGNIFICANT CHANGE UP (ref 43–77)
PLATELET # BLD AUTO: 203 K/UL — SIGNIFICANT CHANGE UP (ref 150–400)
POTASSIUM SERPL-MCNC: 3.8 MMOL/L — SIGNIFICANT CHANGE UP (ref 3.5–5.3)
POTASSIUM SERPL-SCNC: 3.8 MMOL/L — SIGNIFICANT CHANGE UP (ref 3.5–5.3)
PROT SERPL-MCNC: 7.5 G/DL — SIGNIFICANT CHANGE UP (ref 6–8.3)
RBC # BLD: 3.71 M/UL — LOW (ref 3.8–5.2)
RBC # FLD: 15.6 % — SIGNIFICANT CHANGE UP (ref 10.3–16.9)
SODIUM SERPL-SCNC: 142 MMOL/L — SIGNIFICANT CHANGE UP (ref 135–145)
TROPONIN T SERPL-MCNC: <0.01 NG/ML — SIGNIFICANT CHANGE UP (ref 0–0.01)
WBC # BLD: 5.7 K/UL — SIGNIFICANT CHANGE UP (ref 3.8–10.5)
WBC # FLD AUTO: 5.7 K/UL — SIGNIFICANT CHANGE UP (ref 3.8–10.5)

## 2018-08-02 PROCEDURE — 76536 US EXAM OF HEAD AND NECK: CPT

## 2018-08-02 PROCEDURE — A9567: CPT

## 2018-08-02 PROCEDURE — 82550 ASSAY OF CK (CPK): CPT

## 2018-08-02 PROCEDURE — A9540: CPT

## 2018-08-02 PROCEDURE — 85025 COMPLETE CBC W/AUTO DIFF WBC: CPT

## 2018-08-02 PROCEDURE — 71046 X-RAY EXAM CHEST 2 VIEWS: CPT

## 2018-08-02 PROCEDURE — 93005 ELECTROCARDIOGRAM TRACING: CPT

## 2018-08-02 PROCEDURE — 78582 LUNG VENTILAT&PERFUS IMAGING: CPT

## 2018-08-02 PROCEDURE — 83880 ASSAY OF NATRIURETIC PEPTIDE: CPT

## 2018-08-02 PROCEDURE — 93970 EXTREMITY STUDY: CPT

## 2018-08-02 PROCEDURE — 78582 LUNG VENTILAT&PERFUS IMAGING: CPT | Mod: 26

## 2018-08-02 PROCEDURE — 84484 ASSAY OF TROPONIN QUANT: CPT

## 2018-08-02 PROCEDURE — 80177 DRUG SCRN QUAN LEVETIRACETAM: CPT

## 2018-08-02 PROCEDURE — 82248 BILIRUBIN DIRECT: CPT

## 2018-08-02 PROCEDURE — 85379 FIBRIN DEGRADATION QUANT: CPT

## 2018-08-02 PROCEDURE — 99285 EMERGENCY DEPT VISIT HI MDM: CPT | Mod: 25

## 2018-08-02 PROCEDURE — 82247 BILIRUBIN TOTAL: CPT

## 2018-08-02 PROCEDURE — 85610 PROTHROMBIN TIME: CPT

## 2018-08-02 PROCEDURE — 93970 EXTREMITY STUDY: CPT | Mod: 26

## 2018-08-02 PROCEDURE — 76536 US EXAM OF HEAD AND NECK: CPT | Mod: 26

## 2018-08-02 PROCEDURE — 82553 CREATINE MB FRACTION: CPT

## 2018-08-02 PROCEDURE — 83735 ASSAY OF MAGNESIUM: CPT

## 2018-08-02 PROCEDURE — 36415 COLL VENOUS BLD VENIPUNCTURE: CPT

## 2018-08-02 PROCEDURE — 80053 COMPREHEN METABOLIC PANEL: CPT

## 2018-08-02 PROCEDURE — 85730 THROMBOPLASTIN TIME PARTIAL: CPT

## 2018-08-02 PROCEDURE — 96374 THER/PROPH/DIAG INJ IV PUSH: CPT

## 2018-08-02 RX ORDER — CILOSTAZOL 100 MG/1
50 TABLET ORAL
Qty: 0 | Refills: 0 | Status: DISCONTINUED | OUTPATIENT
Start: 2018-08-02 | End: 2018-08-02

## 2018-08-02 RX ORDER — POTASSIUM CHLORIDE 20 MEQ
40 PACKET (EA) ORAL ONCE
Qty: 0 | Refills: 0 | Status: COMPLETED | OUTPATIENT
Start: 2018-08-02 | End: 2018-08-02

## 2018-08-02 RX ORDER — METOPROLOL TARTRATE 50 MG
25 TABLET ORAL DAILY
Qty: 0 | Refills: 0 | Status: DISCONTINUED | OUTPATIENT
Start: 2018-08-02 | End: 2018-08-02

## 2018-08-02 RX ORDER — PANTOPRAZOLE SODIUM 20 MG/1
40 TABLET, DELAYED RELEASE ORAL
Qty: 0 | Refills: 0 | Status: DISCONTINUED | OUTPATIENT
Start: 2018-08-02 | End: 2018-08-02

## 2018-08-02 RX ORDER — ERGOCALCIFEROL 1.25 MG/1
50000 CAPSULE ORAL
Qty: 0 | Refills: 0 | Status: DISCONTINUED | OUTPATIENT
Start: 2018-08-02 | End: 2018-08-02

## 2018-08-02 RX ORDER — LOSARTAN POTASSIUM 100 MG/1
50 TABLET, FILM COATED ORAL DAILY
Qty: 0 | Refills: 0 | Status: DISCONTINUED | OUTPATIENT
Start: 2018-08-02 | End: 2018-08-02

## 2018-08-02 RX ADMIN — SERTRALINE 50 MILLIGRAM(S): 25 TABLET, FILM COATED ORAL at 11:14

## 2018-08-02 RX ADMIN — LEVETIRACETAM 400 MILLIGRAM(S): 250 TABLET, FILM COATED ORAL at 06:35

## 2018-08-02 RX ADMIN — Medication 1 MILLIGRAM(S): at 11:14

## 2018-08-02 RX ADMIN — Medication 75 MICROGRAM(S): at 06:34

## 2018-08-02 RX ADMIN — Medication 40 MILLIEQUIVALENT(S): at 03:01

## 2018-08-02 RX ADMIN — CILOSTAZOL 50 MILLIGRAM(S): 100 TABLET ORAL at 00:21

## 2018-08-02 NOTE — H&P ADULT - PROBLEM SELECTOR PLAN 8
1) PCP Contacted on Admission: (Y ) --> Name & Phone #:  Dr. Izquierdo [phone]  2) Date of Contact with PCP:  3) PCP Contacted at Discharge: (Y/N)  4) Summary of Handoff Given to PCP:   5) Post-Discharge Appointment Date and Location:

## 2018-08-02 NOTE — DISCHARGE NOTE ADULT - CARE PLAN
Principal Discharge DX:	Weakness  Goal:	rule out pulmonary embolism  Assessment and plan of treatment:	you presented to the hospital after experiencing generalized weakness and low blood pressure at your doctors. You underwent a test to rule out a pulmonary embolism called a V/Q scan, which was not suspicious for a pulmonary embolism. Weakness can also be due to anemia, which you have a history of. Weakness can also be due to low blood pressure. Please follow up with your cardiologist to make sure the dose of your medications are appropriate.  Secondary Diagnosis:	Hyperlipidemia, unspecified hyperlipidemia type  Goal:	healthy cholesterol levels  Assessment and plan of treatment:	please follow a heart healthy diet like DASH and continue to take your anticholesterol medications. Please follow up with your cardiologist  Secondary Diagnosis:	Peripheral vascular disease  Goal:	reduce progression of disease  Assessment and plan of treatment:	continue taking your medications and please follow up with your cardiologist.  Secondary Diagnosis:	H/O aortic valve replacement  Goal:	reduce progression of disease  Assessment and plan of treatment:	continue taking your medications and please follow up with your cardiologist.  Secondary Diagnosis:	Atherosclerosis of coronary artery with angina pectoris, unspecified vessel or lesion type, unspecified whether native or transplanted heart  Goal:	reduce progression of disease  Assessment and plan of treatment:	continue taking your medications and please follow up with your cardiologist.  Secondary Diagnosis:	Anemia  Goal:	maintain healthy blood level of hemoglobin  Assessment and plan of treatment:	continue taking your medications and please follow up with your doctor

## 2018-08-02 NOTE — DISCHARGE NOTE ADULT - PATIENT PORTAL LINK FT
You can access the [facility] Patient Portal, offered by [facility], by registering with the following website: [email or web address]

## 2018-08-02 NOTE — DISCHARGE NOTE ADULT - CARE PROVIDER_API CALL
Farheen Izquierdo), Medicine  [address] [place] [address]  3rd [place] [address]  Phone: [phone]  Fax: [phone]

## 2018-08-02 NOTE — PATIENT PROFILE ADULT. - PROVIDER NOTIFICATION
HPI:  48 yo M with HTN, HLD, Crohn’s disease admitted 3/1 for excision of right mandibular ameloblastoma.  ID consult for antibiotic selection.  Found to have right mandibular body mass on XRay after discomfort for several months.  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  He was admitted for right segmental mandibulectomy, free fibula flap reconstruction and neck exploration, which he underwent on 3/1.  He was given clinda and is on steroids.  On 3/5, mild neck discomfort was noted.  NGT was removed.  Today, MARCO A neck was noted to have 220 cc dark cloudy fluid right neck mod swelling.  High output was attributed to intraoral communication.  Replaced the feeding tube to see how neck drain output changes.  No fevers, WBC max 17.8 on 3/2, now 12.3 (lowest since surg).  Output has decreased since NGT replaced and made npo.      PAST MEDICAL & SURGICAL HISTORY:  Kidney stones  JORGITO on CPAP  Crohns disease: mild  Arthritis  Hypertension  Surgery, elective: Mole removed from back 2/2017  H/O breast surgery: left  H/O arthroscopy of right knee: x2  H/O lithotripsy  History of appendectomy      MEDICATIONS  (STANDING):  losartan 50milliGRAM(s) Oral daily  enoxaparin Injectable 40milliGRAM(s) SubCutaneous daily  clindamycin IVPB 600milliGRAM(s) IV Intermittent every 8 hours  sodium bicarbonate Mouth Rinse 20milliLiter(s) Swish and Spit every 4 hours  magnesium hydroxide Suspension 30milliLiter(s) Oral daily  senna 2Tablet(s) Oral at bedtime  dexamethasone  Injectable 2milliGRAM(s) IV Push two times a day  vancomycin  IVPB 1000milliGRAM(s) IV Intermittent every 12 hours  cefTRIAXone   IVPB  IV Intermittent     MEDICATIONS  (PRN):  ondansetron Injectable 4milliGRAM(s) IV Push every 6 hours PRN Nausea  labetalol Injectable 10milliGRAM(s) IV Push every 6 hours PRN Systolic blood pressure > 170  ALPRAZolam 0.25milliGRAM(s) Oral at bedtime PRN sleep  acetaminophen   Tablet. 650milliGRAM(s) Oral every 6 hours PRN Mild Pain (1 - 3)  oxyCODONE  5 mG/acetaminophen 325 mG 1Tablet(s) Oral every 4 hours PRN Moderate Pain (4 - 6)  oxyCODONE  5 mG/acetaminophen 325 mG 2Tablet(s) Oral every 6 hours PRN Severe Pain (7 - 10)      Allergies    penicillins (Hives)    Intolerances        SOCIAL HISTORY:      FAMILY HISTORY:  no pertinent family history      Vital Signs Last 24 Hrs  T(C): 37.2, Max: 37.2 (03-06 @ 16:50)  T(F): 98.9, Max: 98.9 (03-06 @ 16:50)  HR: 61 (49 - 61)  BP: 131/82 (116/66 - 164/85)  BP(mean): 103 (90 - 118)  RR: 16 (16 - 17)  SpO2: 99% (97% - 100%)    PE:  WDWN in no distress  HEENT:  NC, PERRL, sclerae anicteric, conjunctivae clear.  Sinuses nontender, no nasal exudate. Feeding tube left nares.  Right posterior mandibular drain with red/brown cloudy liquid, incision intact.    Neck:  Supple, flap incision intact  Lungs:  Clear to auscultation  Cor:  RRR, S1, S2, no murmur appreciated  Abd:  Symmetric, normoactive BS.  Soft, nontender, no masses, guarding or rebound.  Liver and spleen not enlarged  Extrem:  LLE wrapped in boot, fibula drain with serosanguinous liquid in bulb  Skin:  No rashes.    LABS:                        12.4   12.3  )-----------( 307      ( 06 Mar 2017 07:14 )             36.4     06 Mar 2017 07:14    137    |  100    |  20     ----------------------------<  93     4.4     |  27     |  0.64     Ca    9.0        06 Mar 2017 07:14  Phos  3.6       06 Mar 2017 07:14  Mg     2.4       06 Mar 2017 07:14      RADIOLOGY & ADDITIONAL STUDIES:  CXR 3/6:  Nasogastric tube tip in region of stomach. Repositioned compared to prior   exam 3/2/17. Lung bases clear. Declines

## 2018-08-02 NOTE — H&P ADULT - PROBLEM SELECTOR PLAN 1
pt with multiple medical issues and comorbidities including chronic TASNEEM and CAD/ PAD/ CKD presenting with worsening of weakness also found to have new hypotension and bradycardia.  Impression: hypotension and bradycardia most likely in the setting of new uptitration of bp meds  repeat HD in the ED with resolution of hypotension and symptoms.  - holding of bp meds for now, pt needs close monitoring of bp meds dose adjustment also should avoid HTN as pt s/p AAA repair.

## 2018-08-02 NOTE — DISCHARGE NOTE ADULT - SECONDARY DIAGNOSIS.
Hyperlipidemia, unspecified hyperlipidemia type Peripheral vascular disease H/O aortic valve replacement Atherosclerosis of coronary artery with angina pectoris, unspecified vessel or lesion type, unspecified whether native or transplanted heart Anemia

## 2018-08-02 NOTE — DISCHARGE NOTE ADULT - MEDICATION SUMMARY - MEDICATIONS TO TAKE
I will START or STAY ON the medications listed below when I get home from the hospital:    losartan 50 mg oral tablet  -- 1 tab(s) by mouth once a day  -- Indication: For Transition of care performed with sharing of clinical summary    levETIRAcetam 750 mg oral tablet  -- 1 tab(s) by mouth 2 times a day  -- Indication: For Seizure    sertraline 50 mg oral tablet  -- 1 tab(s) by mouth once a day  -- Indication: For Transition of care performed with sharing of clinical summary    simvastatin 40 mg oral tablet  -- 1 tab(s) by mouth once a day (at bedtime)  -- Indication: For Transition of care performed with sharing of clinical summary    cilostazol 50 mg oral tablet  -- 1 tab(s) by mouth 2 times a day  -- Indication: For Atherosclerosis of coronary artery    pantoprazole 40 mg oral delayed release tablet  -- 1 tab(s) by mouth once a day  -- Indication: For Transition of care performed with sharing of clinical summary    Synthroid 75 mcg (0.075 mg) oral tablet  -- 1 tab(s) by mouth once a day  -- Indication: For Transition of care performed with sharing of clinical summary    folic acid 1 mg oral tablet  -- 1 tab(s) by mouth once a day  -- Indication: For SOB (shortness of breath)    Vitamin D2 50,000 intl units (1.25 mg) oral capsule  -- 1 cap(s) by mouth once a week  -- Indication: For Transition of care performed with sharing of clinical summary

## 2018-08-02 NOTE — H&P ADULT - PSH
Atherosclerosis of coronary artery bypass graft(s), unspecified, with angina pectoris with documented spasm    H/O aortic valve replacement  Bioprosthetic  Status post aorto-coronary artery bypass graft  2012

## 2018-08-02 NOTE — H&P ADULT - PROBLEM SELECTOR PLAN 3
TASNEEM, per pt had complete cancer work up last year with unremarkable results also had a capsule study which was negative for bleeding.  currently follow roberts[p with Dr. Izquierdo and is receiving IV iron infusion weekly.  good response post Iv iron, hg currently at 11.6  - follow up as previously scheduled

## 2018-08-02 NOTE — H&P ADULT - HISTORY OF PRESENT ILLNESS
HPI:  62F with presenting from Dr. Izquierdo's office for weakness associated with hypotension and bradycardia.  her PMHx is notable for  TASNEEM (requiring once weekly iron transfusions), CAD s/p CABG x 2v & PCI and Bio AVR, PAD s/p PCI on LLE, HTN, HLD, hypothyroidism, seizure and AAA s/p repair in 2012.  she had a recent admission in July 21 for symptomatic anemia when she received blood transfusion and discharged with plan to initiate IV iron infusion weekly, she is presenting to ED today from Dr. Izquierdo's office as she was complaining of generalized weakness, also found to be hypotensive at 100 and bradycardic at mid 50s, her Hr is usually higher and bp runs at 120-130s, also was complaining of SOB, so she was asked to come to ED for further evaluation.  in the Ed she was found to have elevated D-dimer, otherwise Hg of 11.6/ cr of 2.1 downtrending from July 21st/ pt denies any new/ acute complaints, SOB resolved upon interview.  no s&s of acute infection, denies decreased UOP.  seen and examined in the ED, repeat bp was at 135-140 mmhg with Hr of 61 bpm, no orthostatic changes noticed.  reports recent up titration of her bp meds in late March, losartan increased to 100 from 50 and toprol from 25 to 100mg.

## 2018-08-02 NOTE — H&P ADULT - PROBLEM SELECTOR PLAN 4
extensive Hx of CAD, s/p CABG and PCI, recent episode of hypotension and bradycardia could be concerning for cardiac etiologies, however no new ECG changes or trop abnormalities, will consider further cardiac work up if pt remains hypotensive and bradycardic off bp meds.  - continue cilostazol   - continue rosuvastatin 40 mg  - repeat ecg in am  - trend trops x 2 sets

## 2018-08-02 NOTE — DISCHARGE NOTE ADULT - PLAN OF CARE
rule out pulmonary embolism you presented to the hospital after experiencing generalized weakness and low blood pressure at your doctors. You underwent a test to rule out a pulmonary embolism called a V/Q scan, which was not suspicious for a pulmonary embolism. Weakness can also be due to anemia, which you have a history of. Weakness can also be due to low blood pressure. Please follow up with your cardiologist to make sure the dose of your medications are appropriate. healthy cholesterol levels please follow a heart healthy diet like DASH and continue to take your anticholesterol medications. Please follow up with your cardiologist reduce progression of disease continue taking your medications and please follow up with your cardiologist. maintain healthy blood level of hemoglobin continue taking your medications and please follow up with your doctor

## 2018-08-02 NOTE — H&P ADULT - PROBLEM SELECTOR PLAN 2
pt with elevated d-dimer in the setting of SOB concerning for PE, however PE well's score is low risk.  elevated d-dimer could be in the setting of recent blood transfusion and IV iron infusion.  currently asymptomatic, with good saturation on RA  - DVT US lower extremity  - V/Q scan in am

## 2018-08-02 NOTE — DISCHARGE NOTE ADULT - HOSPITAL COURSE
62 female with presented from Dr. Izquierdo's office for weakness associated with hypotension and bradycardia. Past medical history notable for Iron deficiency anemia (requiring once weekly iron transfusions), coronary artery disease, aortic valve replacement with biovalve, peripheral vascular disease, hypertension, hyperlipidemia, hypothyroidism, seizure and abdominal aortic aneurysm. In the Ed she was found to have elevated D-dimer, otherwise Hg of 11.6/ cr of 2.1 downtrending from July 21st/ pt denies any new/ acute complaints, SOB resolved upon interview. No signs and symptoms of acute infection, denies decreased urine output. V/W scan performed, which showed no evidence of pulmonary embolism. Patient is medically cleared for discharge.

## 2018-08-02 NOTE — H&P ADULT - NSHPPHYSICALEXAM_GEN_ALL_CORE
VITAL SIGNS:  T(C): 36.2 (08-02-18 @ 00:33), Max: 36.7 (08-01-18 @ 19:40)  T(F): 97.2 (08-02-18 @ 00:33), Max: 98 (08-01-18 @ 19:40)  HR: 56 (08-02-18 @ 00:33) (55 - 64)  BP: 117/69 (08-02-18 @ 00:33) (107/56 - 117/77)  RR: 18 (08-02-18 @ 00:33) (16 - 18)  SpO2: 97% (08-02-18 @ 00:33) (97% - 99%)    PHYSICAL EXAM:    Constitutional: WDWN resting comfortably in bed; NAD  Head: NC/AT  Eyes: PERRL, EOMI, slightly jaundiced conjunctiva  ENT: DMM, no thrush or oral lesions   Neck: supple; no JVD or thyromegaly, a small 2x1 cm LN? appreciated along upper end of right SCM, non mobile and non tender on exam, no changes on overlying skin   Respiratory: CTA B/L  Cardiac: +S1/S2; RRR, systolic murmur present across the pericardium   Gastrointestinal: soft, NT/ND; no rebound or guarding; +BSx4  Back: no CVAT B/L  Extremities: WWP, no peripheral edema, palpable right Dp/PT, doppler able Left DP/ radial pulses intact and symmetric   Neurologic: AAOx3; CNII-XII grossly intact; no focal deficits/ SILT throughout   Psychiatric: pleasant and appropriate    T/L/D: PIV c/d/i

## 2018-08-02 NOTE — H&P ADULT - NSHPLABSRESULTS_GEN_ALL_CORE
.  LABS:                         11.6   5.8   )-----------( 226      ( 01 Aug 2018 20:33 )             36.5     08-01    143  |  101  |  20  ----------------------------<  90  3.4<L>   |  26  |  2.12<H>    Ca    10.3      01 Aug 2018 20:33  Mg     2.2     08-01    TPro  8.6<H>  /  Alb  4.4  /  TBili  0.2  /  DBili  <0.2  /  AST  13  /  ALT  6<L>  /  AlkPhos  127<H>  08-01    PT/INR - ( 01 Aug 2018 20:33 )   PT: 11.0 sec;   INR: 0.99          PTT - ( 01 Aug 2018 20:33 )  PTT:36.0 sec    CARDIAC MARKERS ( 01 Aug 2018 20:33 )  x     / <0.01 ng/mL / 69 U/L / x     / 1.2 ng/mL      Serum Pro-Brain Natriuretic Peptide: 662 pg/mL (08-01 @ 20:33)    RADIOLOGY, EKG & ADDITIONAL TESTS: Reviewed.   CXR 8/1: unremarkable for acute parenchymal pathologies  ECG 8/1: Sinus bradycardia, Nonspecific ST and T wave abnormality

## 2018-08-03 LAB — LEVETIRACETAM SERPL-MCNC: 57.9 MCG/ML — HIGH (ref 12–46)

## 2018-08-09 ENCOUNTER — OUTPATIENT (OUTPATIENT)
Dept: OUTPATIENT SERVICES | Facility: HOSPITAL | Age: 63
LOS: 1 days | End: 2018-08-09
Payer: COMMERCIAL

## 2018-08-09 DIAGNOSIS — Z95.2 PRESENCE OF PROSTHETIC HEART VALVE: Chronic | ICD-10-CM

## 2018-08-09 DIAGNOSIS — I25.9 CHRONIC ISCHEMIC HEART DISEASE, UNSPECIFIED: ICD-10-CM

## 2018-08-09 DIAGNOSIS — I25.701 ATHEROSCLEROSIS OF CORONARY ARTERY BYPASS GRAFT(S), UNSPECIFIED, WITH ANGINA PECTORIS WITH DOCUMENTED SPASM: Chronic | ICD-10-CM

## 2018-08-09 PROCEDURE — 93306 TTE W/DOPPLER COMPLETE: CPT

## 2018-08-09 PROCEDURE — 93306 TTE W/DOPPLER COMPLETE: CPT | Mod: 26

## 2018-11-20 ENCOUNTER — APPOINTMENT (OUTPATIENT)
Dept: HEART AND VASCULAR | Facility: CLINIC | Age: 63
End: 2018-11-20
Payer: COMMERCIAL

## 2018-11-20 VITALS
HEART RATE: 60 BPM | DIASTOLIC BLOOD PRESSURE: 55 MMHG | WEIGHT: 139 LBS | SYSTOLIC BLOOD PRESSURE: 109 MMHG | HEIGHT: 67 IN | BODY MASS INDEX: 21.82 KG/M2

## 2018-11-20 PROCEDURE — 99213 OFFICE O/P EST LOW 20 MIN: CPT | Mod: 25

## 2018-11-20 PROCEDURE — 93285 PRGRMG DEV EVAL SCRMS IP: CPT

## 2018-11-20 RX ORDER — FUROSEMIDE 40 MG/1
40 TABLET ORAL
Refills: 0 | Status: ACTIVE | COMMUNITY

## 2018-11-20 RX ORDER — ROSUVASTATIN CALCIUM 40 MG/1
40 TABLET, FILM COATED ORAL
Refills: 0 | Status: ACTIVE | COMMUNITY

## 2018-11-20 RX ORDER — ATORVASTATIN CALCIUM 40 MG/1
40 TABLET, FILM COATED ORAL
Refills: 0 | Status: DISCONTINUED | COMMUNITY
End: 2018-11-20

## 2018-11-20 RX ORDER — FUROSEMIDE 40 MG/1
40 TABLET ORAL
Refills: 0 | Status: DISCONTINUED | COMMUNITY
End: 2018-11-20

## 2018-11-20 NOTE — REASON FOR VISIT
[Follow-up Device Check] : follow-up device check visit [Initial Evaluation] : an initial evaluation of [Palpitations] : palpitations

## 2018-11-24 NOTE — HISTORY OF PRESENT ILLNESS
[FreeTextEntry1] : 61 yo female with HTN, CAD S/P CABG 2010, abdominal aortic aneurysm S/P repair, seizure 7/2016.  Sees neurology; EEG normal.  During recent EEG (10/2/16) noted heart beat abnormality.  Cardiology care per Dr. Preston.  S/P ILR implant for long term heart rhythm monitoring. \par  \par Notes long standing ("years") heart beat irregularity with no diagnosis given.  Brief episodes of palpitations continue; lasts seconds and then resolves.  No syncope, near syncope, chest discomfort, or difficulty lying flat.  No recent diminution in her exertional tolerance. \par  \par Echo 8/2014 EF 55-60%, severely dilated LA, moderate TR, mild-moderate pulmonary HTN\par \par She is upset in regards to bills she has received for remote monitoring; resolved with .

## 2018-11-24 NOTE — PROCEDURE
[No] : not [None] : none [de-identified] : MDT REVEAL LINQ\par Implanted 11/2016\par Battery status Good\par Adequate sensing today\par 1 Pause event c/w sinus arrest- pause ~ 4.4 seconds 10/9/18\par No AT/AF/Tachy/Symptom events

## 2018-11-24 NOTE — PHYSICAL EXAM
[General Appearance - Well Developed] : well developed [Normal Appearance] : normal appearance [Well Groomed] : well groomed [General Appearance - Well Nourished] : well nourished [No Deformities] : no deformities [General Appearance - In No Acute Distress] : no acute distress [] : no respiratory distress [Respiration, Rhythm And Depth] : normal respiratory rhythm and effort [Exaggerated Use Of Accessory Muscles For Inspiration] : no accessory muscle use [Auscultation Breath Sounds / Voice Sounds] : lungs were clear to auscultation bilaterally [Bowel Sounds] : normal bowel sounds [Abdomen Soft] : soft [Abdomen Tenderness] : non-tender [Cyanosis, Localized] : no localized cyanosis [Normal Conjunctiva] : the conjunctiva exhibited no abnormalities [No Oral Pallor] : no oral pallor [5th Left ICS - MCL] : palpated at the 5th LICS in the midclavicular line [Normal] : normal [No Precordial Heave] : no precordial heave was noted [Normal Rate] : normal [Rhythm Regular] : regular [Normal S1] : normal S1 [Normal S2] : normal S2 [No Gallop] : no gallop heard [No Murmur] : no murmurs heard [No Pitting Edema] : no pitting edema present [Skin Color & Pigmentation] : normal skin color and pigmentation [Skin Turgor] : normal skin turgor [Oriented To Time, Place, And Person] : oriented to person, place, and time [Impaired Insight] : insight and judgment were intact [Affect] : the affect was normal [Mood] : the mood was normal [No Anxiety] : not feeling anxious [S3] : no S3 [S4] : no S4 [Click] : no click [Pericardial Rub] : no pericardial rub

## 2019-01-22 ENCOUNTER — APPOINTMENT (OUTPATIENT)
Dept: HEART AND VASCULAR | Facility: CLINIC | Age: 64
End: 2019-01-22
Payer: COMMERCIAL

## 2019-01-22 VITALS
HEART RATE: 63 BPM | DIASTOLIC BLOOD PRESSURE: 58 MMHG | WEIGHT: 139 LBS | BODY MASS INDEX: 21.82 KG/M2 | HEIGHT: 67 IN | SYSTOLIC BLOOD PRESSURE: 133 MMHG

## 2019-01-22 DIAGNOSIS — Z95.818 PRESENCE OF OTHER CARDIAC IMPLANTS AND GRAFTS: ICD-10-CM

## 2019-01-22 PROCEDURE — 93285 PRGRMG DEV EVAL SCRMS IP: CPT

## 2019-01-22 NOTE — PROCEDURE
[No] : not [None] : none [de-identified] : MDT REVEAL LINQ\par Implanted 11/2016\par Battery status Good\par Adequate sensing today\par 1 pause event, 1 selvin event. EGMs c/w SR with undersensing\par No AT/AF/Tachy/Symptom events

## 2019-01-22 NOTE — HISTORY OF PRESENT ILLNESS
[FreeTextEntry1] : 62 yo female with HTN, CAD S/P CABG 2010, abdominal aortic aneurysm S/P repair, seizure 7/2016.  Sees neurology; EEG normal.  During EEG (10/2/16) noted heart beat abnormality.  Cardiology care per Dr. Preston.  S/P ILR implant for long term heart rhythm monitoring. \par  \par Notes long standing ("years") heart beat irregularity with no diagnosis given.   Her main complaint today is that she has noted an intermittent slow, pinching sensation in her left breast that started four days ago.  She has this feeling while sitting in the office today.  No associated SOB, dizziness, presyncope/syncope.  \par \par Of note, prior ILR interrogation significant for one pause event c/w sinus arrest- pause ~ 4.4 seconds 10/9/18.  She was unaware.\par \par Echo 8/2014 EF 55-60%, severely dilated LA, moderate TR, mild-moderate pulmonary HTN\par

## 2019-03-18 ENCOUNTER — APPOINTMENT (OUTPATIENT)
Dept: INFUSION THERAPY | Facility: HOSPITAL | Age: 64
End: 2019-03-18

## 2019-03-18 ENCOUNTER — OUTPATIENT (OUTPATIENT)
Dept: OUTPATIENT SERVICES | Facility: HOSPITAL | Age: 64
LOS: 1 days | End: 2019-03-18
Payer: COMMERCIAL

## 2019-03-18 VITALS
TEMPERATURE: 98 F | OXYGEN SATURATION: 98 % | SYSTOLIC BLOOD PRESSURE: 120 MMHG | RESPIRATION RATE: 18 BRPM | DIASTOLIC BLOOD PRESSURE: 53 MMHG | HEART RATE: 81 BPM

## 2019-03-18 DIAGNOSIS — Z51.89 ENCOUNTER FOR OTHER SPECIFIED AFTERCARE: ICD-10-CM

## 2019-03-18 DIAGNOSIS — D64.9 ANEMIA, UNSPECIFIED: ICD-10-CM

## 2019-03-18 DIAGNOSIS — Z95.2 PRESENCE OF PROSTHETIC HEART VALVE: Chronic | ICD-10-CM

## 2019-03-18 DIAGNOSIS — I25.701 ATHEROSCLEROSIS OF CORONARY ARTERY BYPASS GRAFT(S), UNSPECIFIED, WITH ANGINA PECTORIS WITH DOCUMENTED SPASM: Chronic | ICD-10-CM

## 2019-03-18 PROCEDURE — 36415 COLL VENOUS BLD VENIPUNCTURE: CPT

## 2019-03-18 PROCEDURE — 96374 THER/PROPH/DIAG INJ IV PUSH: CPT

## 2019-03-18 PROCEDURE — 86850 RBC ANTIBODY SCREEN: CPT

## 2019-03-18 PROCEDURE — 86923 COMPATIBILITY TEST ELECTRIC: CPT

## 2019-03-18 PROCEDURE — P9016: CPT

## 2019-03-18 PROCEDURE — 36430 TRANSFUSION BLD/BLD COMPNT: CPT

## 2019-03-18 PROCEDURE — 86900 BLOOD TYPING SEROLOGIC ABO: CPT

## 2019-03-18 PROCEDURE — 86901 BLOOD TYPING SEROLOGIC RH(D): CPT

## 2019-03-18 RX ORDER — FUROSEMIDE 40 MG
40 TABLET ORAL ONCE
Qty: 0 | Refills: 0 | Status: COMPLETED | OUTPATIENT
Start: 2019-03-18 | End: 2019-03-18

## 2019-03-18 RX ORDER — ACETAMINOPHEN 500 MG
650 TABLET ORAL ONCE
Qty: 0 | Refills: 0 | Status: COMPLETED | OUTPATIENT
Start: 2019-03-18 | End: 2019-03-18

## 2019-03-18 RX ORDER — DIPHENHYDRAMINE HCL 50 MG
25 CAPSULE ORAL ONCE
Qty: 0 | Refills: 0 | Status: COMPLETED | OUTPATIENT
Start: 2019-03-18 | End: 2019-03-18

## 2019-03-18 RX ORDER — FUROSEMIDE 40 MG
20 TABLET ORAL ONCE
Qty: 0 | Refills: 0 | Status: COMPLETED | OUTPATIENT
Start: 2019-03-18 | End: 2019-03-18

## 2019-03-18 RX ADMIN — Medication 40 MILLIGRAM(S): at 10:18

## 2019-03-18 RX ADMIN — Medication 20 MILLIGRAM(S): at 13:11

## 2019-03-18 RX ADMIN — Medication 650 MILLIGRAM(S): at 10:18

## 2019-03-18 RX ADMIN — Medication 25 MILLIGRAM(S): at 10:18

## 2019-07-23 ENCOUNTER — APPOINTMENT (OUTPATIENT)
Dept: HEART AND VASCULAR | Facility: CLINIC | Age: 64
End: 2019-07-23

## 2019-08-05 ENCOUNTER — OUTPATIENT (OUTPATIENT)
Dept: OUTPATIENT SERVICES | Facility: HOSPITAL | Age: 64
LOS: 1 days | End: 2019-08-05
Payer: COMMERCIAL

## 2019-08-05 ENCOUNTER — APPOINTMENT (OUTPATIENT)
Dept: VASCULAR SURGERY | Facility: CLINIC | Age: 64
End: 2019-08-05
Payer: COMMERCIAL

## 2019-08-05 DIAGNOSIS — I25.701 ATHEROSCLEROSIS OF CORONARY ARTERY BYPASS GRAFT(S), UNSPECIFIED, WITH ANGINA PECTORIS WITH DOCUMENTED SPASM: Chronic | ICD-10-CM

## 2019-08-05 DIAGNOSIS — I25.9 CHRONIC ISCHEMIC HEART DISEASE, UNSPECIFIED: ICD-10-CM

## 2019-08-05 DIAGNOSIS — Z95.2 PRESENCE OF PROSTHETIC HEART VALVE: Chronic | ICD-10-CM

## 2019-08-05 PROCEDURE — 93978 VASCULAR STUDY: CPT

## 2019-08-05 PROCEDURE — 93010 ELECTROCARDIOGRAM REPORT: CPT

## 2019-08-05 PROCEDURE — 93923 UPR/LXTR ART STDY 3+ LVLS: CPT

## 2019-08-05 PROCEDURE — 93880 EXTRACRANIAL BILAT STUDY: CPT

## 2019-08-05 PROCEDURE — 93925 LOWER EXTREMITY STUDY: CPT

## 2019-08-05 PROCEDURE — 93306 TTE W/DOPPLER COMPLETE: CPT

## 2019-08-05 PROCEDURE — 93005 ELECTROCARDIOGRAM TRACING: CPT

## 2019-08-05 PROCEDURE — 93306 TTE W/DOPPLER COMPLETE: CPT | Mod: 26

## 2019-11-21 ENCOUNTER — OUTPATIENT (OUTPATIENT)
Dept: OUTPATIENT SERVICES | Facility: HOSPITAL | Age: 64
LOS: 1 days | End: 2019-11-21
Payer: MEDICARE

## 2019-11-21 ENCOUNTER — APPOINTMENT (OUTPATIENT)
Dept: ULTRASOUND IMAGING | Facility: HOSPITAL | Age: 64
End: 2019-11-21
Payer: COMMERCIAL

## 2019-11-21 DIAGNOSIS — I25.701 ATHEROSCLEROSIS OF CORONARY ARTERY BYPASS GRAFT(S), UNSPECIFIED, WITH ANGINA PECTORIS WITH DOCUMENTED SPASM: Chronic | ICD-10-CM

## 2019-11-21 DIAGNOSIS — Z95.2 PRESENCE OF PROSTHETIC HEART VALVE: Chronic | ICD-10-CM

## 2019-11-21 PROCEDURE — 76770 US EXAM ABDO BACK WALL COMP: CPT

## 2019-11-21 PROCEDURE — 72100 X-RAY EXAM L-S SPINE 2/3 VWS: CPT

## 2019-11-21 PROCEDURE — 72100 X-RAY EXAM L-S SPINE 2/3 VWS: CPT | Mod: 26

## 2019-11-21 PROCEDURE — 76770 US EXAM ABDO BACK WALL COMP: CPT | Mod: 26

## 2020-08-20 ENCOUNTER — INPATIENT (INPATIENT)
Facility: HOSPITAL | Age: 65
LOS: 14 days | Discharge: HOME | DRG: 219 | End: 2020-09-04
Attending: SURGERY | Admitting: SURGERY
Payer: MEDICARE

## 2020-08-20 VITALS
TEMPERATURE: 99 F | OXYGEN SATURATION: 100 % | SYSTOLIC BLOOD PRESSURE: 146 MMHG | RESPIRATION RATE: 18 BRPM | HEART RATE: 92 BPM | DIASTOLIC BLOOD PRESSURE: 87 MMHG

## 2020-08-20 DIAGNOSIS — I71.4 ABDOMINAL AORTIC ANEURYSM, WITHOUT RUPTURE: ICD-10-CM

## 2020-08-20 DIAGNOSIS — Z95.2 PRESENCE OF PROSTHETIC HEART VALVE: Chronic | ICD-10-CM

## 2020-08-20 DIAGNOSIS — I16.0 HYPERTENSIVE URGENCY: ICD-10-CM

## 2020-08-20 DIAGNOSIS — R00.2 PALPITATIONS: ICD-10-CM

## 2020-08-20 DIAGNOSIS — I25.701 ATHEROSCLEROSIS OF CORONARY ARTERY BYPASS GRAFT(S), UNSPECIFIED, WITH ANGINA PECTORIS WITH DOCUMENTED SPASM: Chronic | ICD-10-CM

## 2020-08-20 DIAGNOSIS — N18.3 CHRONIC KIDNEY DISEASE, STAGE 3 (MODERATE): ICD-10-CM

## 2020-08-20 DIAGNOSIS — R79.89 OTHER SPECIFIED ABNORMAL FINDINGS OF BLOOD CHEMISTRY: ICD-10-CM

## 2020-08-20 DIAGNOSIS — D64.9 ANEMIA, UNSPECIFIED: ICD-10-CM

## 2020-08-20 LAB
A1C WITH ESTIMATED AVERAGE GLUCOSE RESULT: 5.6 % — SIGNIFICANT CHANGE UP (ref 4–5.6)
ALBUMIN SERPL ELPH-MCNC: 3.7 G/DL — SIGNIFICANT CHANGE UP (ref 3.3–5)
ALP SERPL-CCNC: 111 U/L — SIGNIFICANT CHANGE UP (ref 40–120)
ALT FLD-CCNC: 8 U/L — LOW (ref 10–45)
ANION GAP SERPL CALC-SCNC: 15 MMOL/L — SIGNIFICANT CHANGE UP (ref 5–17)
APTT BLD: 29.4 SEC — SIGNIFICANT CHANGE UP (ref 27.5–35.5)
AST SERPL-CCNC: 15 U/L — SIGNIFICANT CHANGE UP (ref 10–40)
BASOPHILS # BLD AUTO: 0.03 K/UL — SIGNIFICANT CHANGE UP (ref 0–0.2)
BASOPHILS NFR BLD AUTO: 0.4 % — SIGNIFICANT CHANGE UP (ref 0–2)
BILIRUB SERPL-MCNC: 0.6 MG/DL — SIGNIFICANT CHANGE UP (ref 0.2–1.2)
BUN SERPL-MCNC: 12 MG/DL — SIGNIFICANT CHANGE UP (ref 7–23)
CALCIUM SERPL-MCNC: 9.4 MG/DL — SIGNIFICANT CHANGE UP (ref 8.4–10.5)
CHLORIDE SERPL-SCNC: 104 MMOL/L — SIGNIFICANT CHANGE UP (ref 96–108)
CHOLEST SERPL-MCNC: 226 MG/DL — HIGH (ref 10–199)
CK MB CFR SERPL CALC: 1.7 NG/ML — SIGNIFICANT CHANGE UP (ref 0–6.7)
CK SERPL-CCNC: 42 U/L — SIGNIFICANT CHANGE UP (ref 25–170)
CO2 SERPL-SCNC: 19 MMOL/L — LOW (ref 22–31)
CREAT SERPL-MCNC: 1.19 MG/DL — SIGNIFICANT CHANGE UP (ref 0.5–1.3)
EOSINOPHIL # BLD AUTO: 0.05 K/UL — SIGNIFICANT CHANGE UP (ref 0–0.5)
EOSINOPHIL NFR BLD AUTO: 0.6 % — SIGNIFICANT CHANGE UP (ref 0–6)
ESTIMATED AVERAGE GLUCOSE: 114 MG/DL — SIGNIFICANT CHANGE UP (ref 68–114)
GLUCOSE SERPL-MCNC: 130 MG/DL — HIGH (ref 70–99)
HCT VFR BLD CALC: 26.4 % — LOW (ref 34.5–45)
HDLC SERPL-MCNC: 53 MG/DL — SIGNIFICANT CHANGE UP
HGB BLD-MCNC: 8 G/DL — LOW (ref 11.5–15.5)
IMM GRANULOCYTES NFR BLD AUTO: 0.6 % — SIGNIFICANT CHANGE UP (ref 0–1.5)
INR BLD: 1.17 — HIGH (ref 0.88–1.16)
LIPID PNL WITH DIRECT LDL SERPL: 149 MG/DL — HIGH
LYMPHOCYTES # BLD AUTO: 0.55 K/UL — LOW (ref 1–3.3)
LYMPHOCYTES # BLD AUTO: 6.6 % — LOW (ref 13–44)
MAGNESIUM SERPL-MCNC: 1.9 MG/DL — SIGNIFICANT CHANGE UP (ref 1.6–2.6)
MCHC RBC-ENTMCNC: 25.2 PG — LOW (ref 27–34)
MCHC RBC-ENTMCNC: 30.3 GM/DL — LOW (ref 32–36)
MCV RBC AUTO: 83.3 FL — SIGNIFICANT CHANGE UP (ref 80–100)
MONOCYTES # BLD AUTO: 0.56 K/UL — SIGNIFICANT CHANGE UP (ref 0–0.9)
MONOCYTES NFR BLD AUTO: 6.7 % — SIGNIFICANT CHANGE UP (ref 2–14)
NEUTROPHILS # BLD AUTO: 7.13 K/UL — SIGNIFICANT CHANGE UP (ref 1.8–7.4)
NEUTROPHILS NFR BLD AUTO: 85.1 % — HIGH (ref 43–77)
NRBC # BLD: 0 /100 WBCS — SIGNIFICANT CHANGE UP (ref 0–0)
PLATELET # BLD AUTO: 220 K/UL — SIGNIFICANT CHANGE UP (ref 150–400)
POTASSIUM SERPL-MCNC: 3.4 MMOL/L — LOW (ref 3.5–5.3)
POTASSIUM SERPL-SCNC: 3.4 MMOL/L — LOW (ref 3.5–5.3)
PROT SERPL-MCNC: 7.3 G/DL — SIGNIFICANT CHANGE UP (ref 6–8.3)
PROTHROM AB SERPL-ACNC: 13.9 SEC — HIGH (ref 10.6–13.6)
RBC # BLD: 3.17 M/UL — LOW (ref 3.8–5.2)
RBC # FLD: 18.7 % — HIGH (ref 10.3–14.5)
SARS-COV-2 RNA SPEC QL NAA+PROBE: SIGNIFICANT CHANGE UP
SODIUM SERPL-SCNC: 138 MMOL/L — SIGNIFICANT CHANGE UP (ref 135–145)
TOTAL CHOLESTEROL/HDL RATIO MEASUREMENT: 4.3 RATIO — SIGNIFICANT CHANGE UP (ref 3.3–7.1)
TRIGL SERPL-MCNC: 122 MG/DL — SIGNIFICANT CHANGE UP (ref 10–149)
TROPONIN T SERPL-MCNC: 0.01 NG/ML — SIGNIFICANT CHANGE UP (ref 0–0.01)
TROPONIN T SERPL-MCNC: 0.02 NG/ML — HIGH (ref 0–0.01)
TROPONIN T SERPL-MCNC: 0.02 NG/ML — HIGH (ref 0–0.01)
WBC # BLD: 8.37 K/UL — SIGNIFICANT CHANGE UP (ref 3.8–10.5)
WBC # FLD AUTO: 8.37 K/UL — SIGNIFICANT CHANGE UP (ref 3.8–10.5)

## 2020-08-20 PROCEDURE — 99285 EMERGENCY DEPT VISIT HI MDM: CPT | Mod: CS,25

## 2020-08-20 PROCEDURE — 93010 ELECTROCARDIOGRAM REPORT: CPT

## 2020-08-20 PROCEDURE — 71046 X-RAY EXAM CHEST 2 VIEWS: CPT | Mod: 26

## 2020-08-20 PROCEDURE — 71275 CT ANGIOGRAPHY CHEST: CPT | Mod: 26

## 2020-08-20 PROCEDURE — 99235 HOSP IP/OBS SAME DATE MOD 70: CPT | Mod: GC

## 2020-08-20 PROCEDURE — 75635 CT ANGIO ABDOMINAL ARTERIES: CPT | Mod: 26

## 2020-08-20 PROCEDURE — 71250 CT THORAX DX C-: CPT | Mod: 26,59

## 2020-08-20 RX ORDER — AMLODIPINE BESYLATE 2.5 MG/1
2.5 TABLET ORAL ONCE
Refills: 0 | Status: COMPLETED | OUTPATIENT
Start: 2020-08-20 | End: 2020-08-20

## 2020-08-20 RX ORDER — ERGOCALCIFEROL 1.25 MG/1
1 CAPSULE ORAL
Qty: 0 | Refills: 0 | DISCHARGE

## 2020-08-20 RX ORDER — PANTOPRAZOLE SODIUM 20 MG/1
1 TABLET, DELAYED RELEASE ORAL
Qty: 0 | Refills: 0 | DISCHARGE

## 2020-08-20 RX ORDER — AMLODIPINE BESYLATE 2.5 MG/1
5 TABLET ORAL ONCE
Refills: 0 | Status: COMPLETED | OUTPATIENT
Start: 2020-08-20 | End: 2020-08-20

## 2020-08-20 RX ORDER — SODIUM CHLORIDE 9 MG/ML
1000 INJECTION INTRAMUSCULAR; INTRAVENOUS; SUBCUTANEOUS ONCE
Refills: 0 | Status: COMPLETED | OUTPATIENT
Start: 2020-08-20 | End: 2020-08-20

## 2020-08-20 RX ORDER — AMLODIPINE BESYLATE 2.5 MG/1
2.5 TABLET ORAL DAILY
Refills: 0 | Status: DISCONTINUED | OUTPATIENT
Start: 2020-08-20 | End: 2020-08-20

## 2020-08-20 RX ORDER — POTASSIUM CHLORIDE 20 MEQ
40 PACKET (EA) ORAL EVERY 4 HOURS
Refills: 0 | Status: COMPLETED | OUTPATIENT
Start: 2020-08-20 | End: 2020-08-20

## 2020-08-20 RX ORDER — LABETALOL HCL 100 MG
10 TABLET ORAL ONCE
Refills: 0 | Status: COMPLETED | OUTPATIENT
Start: 2020-08-20 | End: 2020-08-20

## 2020-08-20 RX ORDER — ATENOLOL 25 MG/1
25 TABLET ORAL DAILY
Refills: 0 | Status: DISCONTINUED | OUTPATIENT
Start: 2020-08-20 | End: 2020-08-21

## 2020-08-20 RX ORDER — MAGNESIUM OXIDE 400 MG ORAL TABLET 241.3 MG
800 TABLET ORAL ONCE
Refills: 0 | Status: COMPLETED | OUTPATIENT
Start: 2020-08-20 | End: 2020-08-20

## 2020-08-20 RX ORDER — FUROSEMIDE 40 MG
1 TABLET ORAL
Qty: 0 | Refills: 0 | DISCHARGE

## 2020-08-20 RX ORDER — AMLODIPINE BESYLATE 2.5 MG/1
5 TABLET ORAL DAILY
Refills: 0 | Status: DISCONTINUED | OUTPATIENT
Start: 2020-08-21 | End: 2020-08-21

## 2020-08-20 RX ORDER — SERTRALINE 25 MG/1
1 TABLET, FILM COATED ORAL
Qty: 0 | Refills: 0 | DISCHARGE

## 2020-08-20 RX ORDER — SIMVASTATIN 20 MG/1
1 TABLET, FILM COATED ORAL
Qty: 0 | Refills: 0 | DISCHARGE

## 2020-08-20 RX ORDER — LEVOTHYROXINE SODIUM 125 MCG
1 TABLET ORAL
Qty: 0 | Refills: 0 | DISCHARGE

## 2020-08-20 RX ADMIN — Medication 10 MILLIGRAM(S): at 19:40

## 2020-08-20 RX ADMIN — AMLODIPINE BESYLATE 2.5 MILLIGRAM(S): 2.5 TABLET ORAL at 15:53

## 2020-08-20 RX ADMIN — Medication 40 MILLIEQUIVALENT(S): at 17:04

## 2020-08-20 RX ADMIN — MAGNESIUM OXIDE 400 MG ORAL TABLET 800 MILLIGRAM(S): 241.3 TABLET ORAL at 15:53

## 2020-08-20 RX ADMIN — AMLODIPINE BESYLATE 2.5 MILLIGRAM(S): 2.5 TABLET ORAL at 13:38

## 2020-08-20 RX ADMIN — AMLODIPINE BESYLATE 5 MILLIGRAM(S): 2.5 TABLET ORAL at 22:07

## 2020-08-20 RX ADMIN — Medication 40 MILLIEQUIVALENT(S): at 21:55

## 2020-08-20 RX ADMIN — ATENOLOL 25 MILLIGRAM(S): 25 TABLET ORAL at 13:38

## 2020-08-20 NOTE — H&P ADULT - PROBLEM SELECTOR PLAN 6
Baseline Cr 1.5- Cr 1.99. Cr 1.19 on admission.   -Avoid Nephrotoxic Agents  -Monitor urine output, BP, volume status, electrolytes.  -Daily Phosphorous

## 2020-08-20 NOTE — H&P ADULT - NSICDXPASTSURGICALHX_GEN_ALL_CORE_FT
PAST SURGICAL HISTORY:  Atherosclerosis of coronary artery bypass graft(s), unspecified, with angina pectoris with documented spasm     H/O aortic valve replacement Bioprosthetic    Status post aorto-coronary artery bypass graft 2012

## 2020-08-20 NOTE — H&P ADULT - NSICDXPASTMEDICALHX_GEN_ALL_CORE_FT
PAST MEDICAL HISTORY:  Anemia Anemia    Atherosclerosis of coronary artery CAD (coronary artery disease)    Essential hypertension HTN (hypertension)    Gastroesophageal reflux disease GERD (gastroesophageal reflux disease)    Hyperlipidemia Hyperlipidemia    Hypothyroidism Hypothyroidism    Peripheral vascular disease PVD (peripheral vascular disease)    Seizure

## 2020-08-20 NOTE — H&P ADULT - PROBLEM SELECTOR PLAN 3
Vascular following-Dr. Sandra   CT Chest Non-contrast-New crescentic soft tissue density surrounding the aorta at the level of the renal arteries and suprarenal region, 4.0 x 2.0 x 4.0 cm, possibly increased saccular aortic aneurysm, 5.6 cm maximally, previously up to 5.0 cm on June 27, 2014.   -CTA C/A/P ordered for further evaluation. Patient received 1L IVF in ER. Monitor renal function  -Tight BP control

## 2020-08-20 NOTE — H&P ADULT - ASSESSMENT
64 F former smoker with Crestor intolerance (Myalgias) and PMHx HTN, HLD , CAD s/p PCI dLCX and CABG LIMA-Ramus, ANTONIO-PDA (patent stent and patent grafts x 2 by CTA 8/2014), Aortic Valve Disease s/p Bioprosthetic AVR 2002, Mitral Valve Disease s/p Mitral valve annuloplasty 2002, Paroxysmal ATach and Wide Complex Tachycardia (seen on Holter 4/2016 –had ILR placed now disconnected-previously seen by Dr. Jasso), PAD s/p pLSFA stent LCIA stent, with occluded Bilateral SFA (proximal to mid portion per U/S 2019), Chronic Anemia (baseline Hgb 8-8.5 -history of blood transfusions none recently-currently receiving IV Iron, Procrit, Vitamin B12 injections, takes Folic Acid PO-prior colonoscopy no evidence of bleeding ~ 5 years ago), AAA s/p Open repair by Dr. 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 ED 8/20/2020 c/o worsening palpitations, C/P and SOB found to have AAA increasing in size- Vascular consulted awaiting CTA C/A/P for further evaluation now admitted for further management as well as R/O ACS with serial enzymes, telemetry, echocardiogram.

## 2020-08-20 NOTE — H&P ADULT - PROBLEM SELECTOR PLAN 2
Patient C/P free and HD stable.  -Troponin 0.02 to 0.01. EKG non ischemic.   -Continue telemetry.  -Echocardiogram ordered. Follow-up results  -Worsening C/P may be secondary to fluctuations in BP. Off Losartan and Furosemide and -180s.

## 2020-08-20 NOTE — ED ADULT NURSE NOTE - NSIMPLEMENTINTERV_GEN_ALL_ED
Implemented All Universal Safety Interventions:  [place] to call system. Call bell, personal items and telephone within reach. Instruct patient to call for assistance. Room bathroom lighting operational. Non-slip footwear when patient is off stretcher. Physically safe environment: no spills, clutter or unnecessary equipment. Stretcher in lowest position, wheels locked, appropriate side rails in place.

## 2020-08-20 NOTE — H&P ADULT - NSHPOUTPATIENTPROVIDERS_GEN_ALL_CORE
Cardiology-Dr. Preston  Vascular- Dr. Sandra  Renal-Dr. Davis  Heme-Dr. Izquierdo  Neuro-Dr. Gillette  GI-Dr. Haq

## 2020-08-20 NOTE — ED PROVIDER NOTE - PROGRESS NOTE DETAILS
slightly elevated troponin. message left for Dr. Preston.  will get CT chest Ree: pt received from Dr. Caldwell at s/o; pt with h/o htn, high chol, anemia, hypothyroid, cabg, AVR, PVD, who p/w palpitations and fever. EKG neg for ischemia. CXR neg for focal consolidation. + anemia, slight trop at 0.02, elev bnp. Echo from '18 showed ef 65%. CT chest pending to r/o acute pathology. Will contact Dr. Preston to discuss case. Will continue to monitor. CT showed enlarging aneurysm. Vascular consulted and no acute intervention. troponin and bnp elevated. case d/w Dr Preston [phone] and recommends admission to cardiology. case d/w Dr Ramirez and plan for admission

## 2020-08-20 NOTE — ED PROVIDER NOTE - CLINICAL SUMMARY MEDICAL DECISION MAKING FREE TEXT BOX
palpitations, fever yesterday, +cough, no hypoxia, speaking in full sentences, no chest pain currently, no tachycardia, no tachypnea  -check labs, ekg  -ivf  -CXR

## 2020-08-20 NOTE — H&P ADULT - PROBLEM SELECTOR PLAN 7
Hgb currently at baseline -8.0  -Continue to monitor for signs of bleeding  -Continue Folic Acid    FULL CODE  DVT PROPHYLAXIS: SCDS  Dispo: Pending clinical progression

## 2020-08-20 NOTE — ED PROVIDER NOTE - CARE PLAN
Principal Discharge DX:	Palpitations Principal Discharge DX:	Palpitations  Secondary Diagnosis:	Fever, unspecified fever cause

## 2020-08-20 NOTE — H&P ADULT - ATTENDING COMMENTS
See PA note written above, for details. I reviewed the PA documentation.  I have personally seen and examined this patient. I reviewed vitals, labs, medications, cardiac studies and additional imaging.  I agree with the PA's findings and plans as written above with the following additions/amendments:  64AAF with Essential HTN, CAD s/p CABG & PCI, s/p AVR, PAD, hypothyroidism, seizure, chronic anemia, AAA s/p repair 2015 (Dr. Sandra) who presented with hypertensive emergency, chest pain, palpitations, low level troponin leak, and CT chest w/o contrast with a new crescentic soft tissue density surrounding the aorta at the level of the renal arteries and suprarenal region. CTA confirmed the presence of a new aortic aneurysm above the graft. Patient to transfer to Vascular Surgery service in anticipation for AAA repair with Dr. Sandra next week.   Plan for:  [id number] daily  Amlodipine 10 daily for BP control  Transition from Atenolol to Coreg 12.5 BID for BP control  Imdur 30 daily for BP control and antianginal  fenofibrate 145 daily (pt with myalgias on atorva and rosuvastatin)  -->will add Zetia 10 daily upon discharge as is not on inpatient formulary  Obtain TTE for structural assessment  NPO for CCTA as part of pre op eval  EP consult given report of palpitations with ILR in place   ENID Heredia.  Cardiology Attending Initial attending contact date 8/21/20. See PA note written above, for details. I reviewed the PA documentation.  I have personally seen and examined this patient 8/21/20 on morning rounds. I reviewed vitals, labs, medications, cardiac studies and additional imaging.  I agree with the PA's findings and plans as written above with the following additions/amendments:  64AAF with Essential HTN, CAD s/p CABG & PCI, s/p AVR, PAD, hypothyroidism, seizure, chronic anemia, AAA s/p repair 2015 (Dr. Sandra) who presented with hypertensive emergency, chest pain, palpitations, low level troponin leak, and CT chest w/o contrast with a new crescentic soft tissue density surrounding the aorta at the level of the renal arteries and suprarenal region. CTA confirmed the presence of a new aortic aneurysm above the graft. Patient to transfer to Vascular Surgery service in anticipation for AAA repair with Dr. Sandra next week.   Plan for:  [id number] daily  Amlodipine 10 daily for BP control  Transition from Atenolol to Coreg 12.5 BID for BP control  Imdur 30 daily for BP control and antianginal  fenofibrate 145 daily (pt with myalgias on atorva and rosuvastatin)  -->will add Zetia 10 daily upon discharge as is not on inpatient formulary  Obtain TTE for structural assessment  NPO for CCTA as part of pre op eval  EP consult given report of palpitations with ILR in place   ENID Heredia.  Cardiology Attending

## 2020-08-20 NOTE — CONSULT NOTE ADULT - SUBJECTIVE AND OBJECTIVE BOX
Attending: Dr. Sandra    HPI:  Patient is a 65 y/o F with a PMH of HTN, CAD s/p CABG & PCI, AVR, PAD, hypothyroidism, seizure, chronic anemia, and AAA repair in 2015 (Dr. Sandra) that presented to the ED with chest pain and palpitations. She also reports shortness of breath and phlegm, low back pain, pain on ambulation, some mild dizziness that she attributes to medications, constipation (she is on iron supplements), and mild upper abdominal pain, but denies nausea, diarrhea, headaches, joint pain. On presentation to the ED she had: T 37.2, /92, HR 92, Sat 100%. Patient had a CT chest w/o contrast that showed a possible saccular aneurysm at the level of the renal arteries and suprarenal region, above the previous graft. Vascular Surgery was consulted for evaluation.      PAST MEDICAL & SURGICAL HISTORY:  Seizure  Essential hypertension: HTN (hypertension)  Gastroesophageal reflux disease: GERD (gastroesophageal reflux disease)  Hyperlipidemia: Hyperlipidemia  Anemia: Anemia  Hypothyroidism: Hypothyroidism  Atherosclerosis of coronary artery: CAD (coronary artery disease)  Peripheral vascular disease: PVD (peripheral vascular disease)  H/O aortic valve replacement: Bioprosthetic  Atherosclerosis of coronary artery bypass graft(s), unspecified, with angina pectoris with documented spasm  Status post aorto-coronary artery bypass graft: 2012      MEDICATIONS  (STANDING):    MEDICATIONS  (PRN):      Allergies    No Known Allergies    Intolerances        SOCIAL HISTORY:  Former tobacco use  Former EtOH use    FAMILY HISTORY:  No pertinent family history in first degree relatives      REVIEW OF SYSTEMS  Negative except as noted above      Vital Signs Last 24 Hrs  T(C): 37.1 (20 Aug 2020 08:21), Max: 37.2 (20 Aug 2020 05:21)  T(F): 98.8 (20 Aug 2020 08:21), Max: 99 (20 Aug 2020 05:21)  HR: 77 (20 Aug 2020 09:02) (76 - 92)  BP: 172/91 (20 Aug 2020 09:02) (146/87 - 172/91)  BP(mean): --  RR: 18 (20 Aug 2020 09:02) (17 - 18)  SpO2: 99% (20 Aug 2020 09:02) (96% - 100%)    I&O's Summary      Physical Exam:  General: NAD, resting comfortably  HEENT: NC/AT, EOMI, normal hearing  Pulmonary: non-labored breathing on room air  Cardiovascular: arrhythmic  Abdominal: soft, non-distended, mildly tender on deep palpation of epigastric area, no organomegaly, well healed incision  Extremities: warm, normal strength, no clubbing/cyanosis/edema, no ulcers seen  Neuro: A/O x 3, CNs II-XII grossly intact, normal sensation, no focal deficits, no motor deficits  Pulses:   Right:  FEM [+]2+ [ ]1+ [ ]doppler    POP [ ]2+ [ ]1+ [+]doppler monophasic  DP [ ]2+ [+]1+ [+]doppler monophasic  PT[ ]2+ [ ]1+ [+]doppler monophasic    Left:  FEM [+]2+ [ ]1+ [ ]doppler  POP [ ]2+ [ ]1+ [+]doppler monophasic  DP [ ]2+ [ ]1+ [+]doppler monophasic  PT [ ]2+ [ ]1+ [+]doppler monophasic    Lines/drains/tubes: None    LABS:                        8.0    8.37  )-----------( 220      ( 20 Aug 2020 05:45 )             26.4     08-20    138  |  104  |  12  ----------------------------<  130<H>  3.4<L>   |  19<L>  |  1.19    Ca    9.4      20 Aug 2020 05:45  Mg     1.9     08-20    TPro  7.3  /  Alb  3.7  /  TBili  0.6  /  DBili  x   /  AST  15  /  ALT  8<L>  /  AlkPhos  111  08-20    PT/INR - ( 20 Aug 2020 05:45 )   PT: 13.9 sec;   INR: 1.17          PTT - ( 20 Aug 2020 05:45 )  PTT:29.4 sec    CAPILLARY BLOOD GLUCOSE        LIVER FUNCTIONS - ( 20 Aug 2020 05:45 )  Alb: 3.7 g/dL / Pro: 7.3 g/dL / ALK PHOS: 111 U/L / ALT: 8 U/L / AST: 15 U/L / GGT: x                 RADIOLOGY & ADDITIONAL STUDIES:  EXAM:  CT CHEST                          PROCEDURE DATE:  08/20/2020          INTERPRETATION:  CT Chest without IV contrast    History: Cough. Fever.    Technique: CT scan of chest performed from lung apices through lung bases.  1 mm thick axial images, axial MIPS, and sagittal and coronal reformatted images were produced. Intravenous contrast was not administered, as ordered.    Comparison: CT the abdomen and pelvis dated 1/30/2017 and CT of the chest, abdomen, and pelvis dated 12/26/2009.    Findings:    Lungs and large airways: Mild centrilobular emphysema. No focal consolidation. Mild mild linear atelectasis right middle lobe and bilateral lower lobes.    Pleura:  Trace pleural effusions.    Adenopathy: No thoracic lymphadenopathy.    Heart and pericardium:  Cardiomegaly. Status post mitral and aortic valve replacements. Status post CABG. No pericardial effusion.    Vessels:  Severe calcified plaque aorta. Main pulmonary artery dilation measuring 3.4 cm. Evaluation is limited without intravenous contrast. Patient is again status post infrarenal abdominal aorta repair. New crescentic soft tissue density surrounding the aorta at the level of the renal arteries and suprarenal region, 4.0 x 2.0 x 4.0 cm, possibly increased saccular aortic aneurysm, 5.6 cm maximally, previously up to 5.0 cm on June 27, 2014.    Chest wall and lower neck:  Median sternotomy. Implanted device in the left anterior chest wall.    Upper abdomen: Abdominal aortic aneurysm as above.    Bones:  Mild degenerative changes.    Impression:  1.  Since June 27, 2014, new operative changes of abdominal aortic aneurysm. Earliest available prior for comparison is June 27, 2014 for which aneurysm is increased measuring 5.6 cm, previously 5.0 cm. Unclear chronicity and limited assessment without IV contrast. Recommend consultation with vascular surgery. CTA might be considered for further assessment.  2.  Marked calcific atherosclerosis aorta and coronary arteries.  3.  Emphysema.  4.  Prominent main pulmonary artery, possibly pulmonary hypertension.    Dr. Breaux [facility] August 20, 2020 8:48 AM.      Assessment: 64y Female with a PMH of HTN, CAD s/p CABG & PCI, AVR, PAD, hypothyroidism, seizure, chronic anemia, and AAA repair in 2015 (Dr. Sandra) that presented to the ED with chest pain and palpitations. She has elevated troponins, Pro-BNP, but no ST elevation or depression on EKG. A CT chest w/o contrast showed a new crescentic soft tissue density surrounding the aorta at the level of the renal arteries and suprarenal region. However, no contrast was used, and the abdominal aorta can't be properly assessed.      Recommendations:  - Order CTA of chest, abdomen, pelvis with BLE runoff  - We will review CTA results and provide further recommendations  - Rest of care per primary team  - discussed with Chief on call and attending (Dr. Sandra)  - call x [phone] with questions

## 2020-08-20 NOTE — H&P ADULT - PROBLEM SELECTOR PLAN 4
BNP 00248.  -Patient euvolemic on exam-No JVD, Lungs CTA, no LE edema. Patient not hypoxic on exam.  -CT Chest revealed trace pleural effusions

## 2020-08-20 NOTE — H&P ADULT - NSHPLABSRESULTS_GEN_ALL_CORE
8.0    8.37  )-----------( 220      ( 20 Aug 2020 05:45 )             26.4       08-20    138  |  104  |  12  ----------------------------<  130<H>  3.4<L>   |  19<L>  |  1.19    Ca    9.4      20 Aug 2020 05:45  Mg     1.9     08-20    TPro  7.3  /  Alb  3.7  /  TBili  0.6  /  DBili  x   /  AST  15  /  ALT  8<L>  /  AlkPhos  111  08-20      PT/INR - ( 20 Aug 2020 05:45 )   PT: 13.9 sec;   INR: 1.17          PTT - ( 20 Aug 2020 05:45 )  PTT:29.4 sec    CARDIAC MARKERS ( 20 Aug 2020 13:46 )  x     / 0.01 ng/mL / 42 U/L / x     / 1.7 ng/mL  CARDIAC MARKERS ( 20 Aug 2020 09:37 )  x     / 0.02 ng/mL / x     / x     / x      CARDIAC MARKERS ( 20 Aug 2020 05:45 )  x     / 0.02 ng/mL / x     / x     / x                EKG:

## 2020-08-20 NOTE — ED PROVIDER NOTE - OBJECTIVE STATEMENT
64F hx htn, high chol, anemia, hypothyroid, cad (cabg), AVR, PVD, c/o palpitations. pt states feeling unwell yesterday.  states felt like heart was beating fast. had cough with some phlegm.  pt states took her temperature and it was 102. pt took tylenol with some relief.  pt states today again felt like her heat was beating faster.  states her cardiologist advised her to come to ED for evaluation.  states feels sob with coughing. was negative for covid earlier in the month. no recent travel. no sick contacts.

## 2020-08-20 NOTE — H&P ADULT - HISTORY OF PRESENT ILLNESS
History obtained from patient (reliable) and Dr. Preston’s office note  64 F former smoker with Crestor intolerance (Myalgias) and PMHx HTN, HLD , CAD s/p PCI dLCX and CABG LIMA-Ramus, ANTONIO-PDA (patent stent and patent grafts x 2 by CTA 8/2014), Aortic Valve Disease s/p Bioprosthetic AVR 2002, Mitral Valve Disease s/p Mitral valve annuloplasty 2002, Paroxysmal ATach, Wide Complex Tachycardia (seen on Holter 4/2016 –had ILR placed now disconnected-previously seen by Dr. Jasso), PAD s/p pLSFA stent LCIA stent, with occluded Bilateral SFA (proximal to mid portion per U/S 2019), Chronic Anemia (baseline Hgb 8-8.5 -history of blood transfusions none recently-currently receiving IV Iron, Procrit, Vitamin B12 injections, takes Folic Acid PO-prior colonoscopy no evidence of bleeding ~ 5 years ago), AAA s/p Open repair by Dr. 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 ED 8/20/2020 c/o worsening palpitations x 2 days. She reports long standing history of palpitations however over the past few days symptoms have worsened and palpitations occur intermittently with rest or activity. Patient also admits to 5-8/10 left sided non-radiating chest pain lateral to left breast described as tightness and pressure occurring both at rest and with exertion as well as KRAFT worsening x 2 days. Exercise tolerance is limited by claudication as well as C/P and SOB and is typically <1/2 block. Upon further questioning she reports dizziness (at rest and with changing positions) as well fever of 102.2 F (on 8/19/2020), generalized malaise, and productive cough (clear thick sputum) as well as chronic 4 pillow orthopnea. She denies diaphoresis, N/V, syncope, melena, hematuria, BRBPR, hematemesis, LE edema, PND, loss of taste or smell, abdominal pain, contact with known Covid + individuals or sick contacts, recent travel. Patient reports she self discontinued Furosemide ~ 1 month ago due to normal BP (SBP 120s) and was taken off Losartan (?due to kidney function).  In the ER VSS /87 HR 92 RR 18 Temp 99 F O2 sat 100% RA. 1st set CE minimally elevated Troponin 0.2 and EKG showed NSR at 82 bpm with PACs, no ST or T changes. Labs significant for Hgb/HCT 8.0/26.4, Neutrophils 85% K +3.4, Bicarb 19, Glucose 130, and BNP 10,541. CT Chest Non Contrast revealed no focal consolidation, Mild mild linear atelectasis right middle lobe and bilateral lower lobes, trace pleural effusions, cardiomegaly, no pericardial effusions, New crescentic soft tissue density surrounding the aorta at the level of the renal arteries and suprarenal region, 4.0 x 2.0 x 4.0 cm, possibly increased saccular aortic aneurysm, 5.6 cm maximally, previously up to 5.0 cm on June 27, 2014. Covid PCR negative. Patient now admitted for R/O ACS including serial enzymes, telemetry, as well as further management of AAA (Vascular following).

## 2020-08-20 NOTE — H&P ADULT - PROBLEM SELECTOR PLAN 5
-180s.  Patient did not take medications this AM. Atenolol 25 mg PO x 1 and Amlodipine 2.5 mg PO x 1 ordered STAT. Amlodipine increased to 5 mg daily. Continue Atenolol 25 mg PO Daily.

## 2020-08-20 NOTE — H&P ADULT - NSHPSOCIALHISTORY_GEN_ALL_CORE
Denies ETOH. Used to drink on weekends -quit 2005.  TOB: Former smoker up to 2 ppd Quit 1998.   Denies Illicit Drug Use

## 2020-08-20 NOTE — H&P ADULT - PROBLEM SELECTOR PLAN 1
-Continue Telemetry.   Holter Monitor 4/2016 revealed Paroxysmal ATach and Wide Complex Tachycardia. No Afib. ? Underlying Afib as patient with severe dilatation of left atrium on echo-ILR in place however disconnected.   -History of Hxjmnfucpoqoln-Fjrrvm-md TFTs.

## 2020-08-20 NOTE — ED ADULT NURSE NOTE - OBJECTIVE STATEMENT
pt. presents with c/o palpitations, intermittent chest pain, shortness of breath for 2 days. pt. reports fever 2 days ago. pt. is A&Ox3, communicates w/o difficulty, skin warm, dry, pt. denies radiation, diaphoresis, dizziness, n/v/d, weakness, syncope. ekg nsr.

## 2020-08-21 DIAGNOSIS — I71.4 ABDOMINAL AORTIC ANEURYSM, WITHOUT RUPTURE: ICD-10-CM

## 2020-08-21 DIAGNOSIS — I73.9 PERIPHERAL VASCULAR DISEASE, UNSPECIFIED: ICD-10-CM

## 2020-08-21 DIAGNOSIS — I25.10 ATHEROSCLEROTIC HEART DISEASE OF NATIVE CORONARY ARTERY WITHOUT ANGINA PECTORIS: ICD-10-CM

## 2020-08-21 LAB
ANION GAP SERPL CALC-SCNC: 11 MMOL/L — SIGNIFICANT CHANGE UP (ref 5–17)
BASOPHILS # BLD AUTO: 0.02 K/UL — SIGNIFICANT CHANGE UP (ref 0–0.2)
BASOPHILS NFR BLD AUTO: 0.3 % — SIGNIFICANT CHANGE UP (ref 0–2)
BLD GP AB SCN SERPL QL: NEGATIVE — SIGNIFICANT CHANGE UP
BUN SERPL-MCNC: 10 MG/DL — SIGNIFICANT CHANGE UP (ref 7–23)
CALCIUM SERPL-MCNC: 9.8 MG/DL — SIGNIFICANT CHANGE UP (ref 8.4–10.5)
CHLORIDE SERPL-SCNC: 109 MMOL/L — HIGH (ref 96–108)
CO2 SERPL-SCNC: 20 MMOL/L — LOW (ref 22–31)
CREAT SERPL-MCNC: 1.16 MG/DL — SIGNIFICANT CHANGE UP (ref 0.5–1.3)
EOSINOPHIL # BLD AUTO: 0.09 K/UL — SIGNIFICANT CHANGE UP (ref 0–0.5)
EOSINOPHIL NFR BLD AUTO: 1.3 % — SIGNIFICANT CHANGE UP (ref 0–6)
GLUCOSE SERPL-MCNC: 97 MG/DL — SIGNIFICANT CHANGE UP (ref 70–99)
HCT VFR BLD CALC: 27.8 % — LOW (ref 34.5–45)
HGB BLD-MCNC: 8.1 G/DL — LOW (ref 11.5–15.5)
IMM GRANULOCYTES NFR BLD AUTO: 0.4 % — SIGNIFICANT CHANGE UP (ref 0–1.5)
LYMPHOCYTES # BLD AUTO: 0.79 K/UL — LOW (ref 1–3.3)
LYMPHOCYTES # BLD AUTO: 11.5 % — LOW (ref 13–44)
MAGNESIUM SERPL-MCNC: 1.9 MG/DL — SIGNIFICANT CHANGE UP (ref 1.6–2.6)
MCHC RBC-ENTMCNC: 25.2 PG — LOW (ref 27–34)
MCHC RBC-ENTMCNC: 29.1 GM/DL — LOW (ref 32–36)
MCV RBC AUTO: 86.3 FL — SIGNIFICANT CHANGE UP (ref 80–100)
MONOCYTES # BLD AUTO: 0.61 K/UL — SIGNIFICANT CHANGE UP (ref 0–0.9)
MONOCYTES NFR BLD AUTO: 8.9 % — SIGNIFICANT CHANGE UP (ref 2–14)
NEUTROPHILS # BLD AUTO: 5.31 K/UL — SIGNIFICANT CHANGE UP (ref 1.8–7.4)
NEUTROPHILS NFR BLD AUTO: 77.6 % — HIGH (ref 43–77)
NRBC # BLD: 0 /100 WBCS — SIGNIFICANT CHANGE UP (ref 0–0)
PHOSPHATE SERPL-MCNC: 1.4 MG/DL — LOW (ref 2.5–4.5)
PLATELET # BLD AUTO: 237 K/UL — SIGNIFICANT CHANGE UP (ref 150–400)
POTASSIUM SERPL-MCNC: 5.3 MMOL/L — SIGNIFICANT CHANGE UP (ref 3.5–5.3)
POTASSIUM SERPL-SCNC: 5.3 MMOL/L — SIGNIFICANT CHANGE UP (ref 3.5–5.3)
RBC # BLD: 3.22 M/UL — LOW (ref 3.8–5.2)
RBC # FLD: 18.7 % — HIGH (ref 10.3–14.5)
RH IG SCN BLD-IMP: POSITIVE — SIGNIFICANT CHANGE UP
SODIUM SERPL-SCNC: 140 MMOL/L — SIGNIFICANT CHANGE UP (ref 135–145)
T4 FREE SERPL-MCNC: 1 NG/DL — SIGNIFICANT CHANGE UP (ref 0.7–1.48)
TSH SERPL-MCNC: 0.43 UIU/ML — SIGNIFICANT CHANGE UP (ref 0.35–4.94)
WBC # BLD: 6.85 K/UL — SIGNIFICANT CHANGE UP (ref 3.8–10.5)
WBC # FLD AUTO: 6.85 K/UL — SIGNIFICANT CHANGE UP (ref 3.8–10.5)

## 2020-08-21 PROCEDURE — 99235 HOSP IP/OBS SAME DATE MOD 70: CPT | Mod: GC

## 2020-08-21 PROCEDURE — 75574 CT ANGIO HRT W/3D IMAGE: CPT | Mod: 26

## 2020-08-21 PROCEDURE — 93285 PRGRMG DEV EVAL SCRMS IP: CPT | Mod: 26

## 2020-08-21 PROCEDURE — 99223 1ST HOSP IP/OBS HIGH 75: CPT

## 2020-08-21 PROCEDURE — 99233 SBSQ HOSP IP/OBS HIGH 50: CPT

## 2020-08-21 PROCEDURE — 93306 TTE W/DOPPLER COMPLETE: CPT | Mod: 26

## 2020-08-21 PROCEDURE — 93880 EXTRACRANIAL BILAT STUDY: CPT | Mod: 26

## 2020-08-21 RX ORDER — LEVETIRACETAM 250 MG/1
500 TABLET, FILM COATED ORAL EVERY 12 HOURS
Refills: 0 | Status: DISCONTINUED | OUTPATIENT
Start: 2020-08-21 | End: 2020-08-25

## 2020-08-21 RX ORDER — FOLIC ACID 0.8 MG
1 TABLET ORAL DAILY
Refills: 0 | Status: DISCONTINUED | OUTPATIENT
Start: 2020-08-21 | End: 2020-08-25

## 2020-08-21 RX ORDER — ASPIRIN/CALCIUM CARB/MAGNESIUM 324 MG
81 TABLET ORAL DAILY
Refills: 0 | Status: DISCONTINUED | OUTPATIENT
Start: 2020-08-21 | End: 2020-08-25

## 2020-08-21 RX ORDER — HEPARIN SODIUM 5000 [USP'U]/ML
5000 INJECTION INTRAVENOUS; SUBCUTANEOUS EVERY 8 HOURS
Refills: 0 | Status: DISCONTINUED | OUTPATIENT
Start: 2020-08-21 | End: 2020-08-25

## 2020-08-21 RX ORDER — CARVEDILOL PHOSPHATE 80 MG/1
12.5 CAPSULE, EXTENDED RELEASE ORAL EVERY 12 HOURS
Refills: 0 | Status: DISCONTINUED | OUTPATIENT
Start: 2020-08-21 | End: 2020-08-25

## 2020-08-21 RX ORDER — CILOSTAZOL 100 MG/1
50 TABLET ORAL
Refills: 0 | Status: DISCONTINUED | OUTPATIENT
Start: 2020-08-21 | End: 2020-08-21

## 2020-08-21 RX ORDER — AMLODIPINE BESYLATE 2.5 MG/1
5 TABLET ORAL ONCE
Refills: 0 | Status: COMPLETED | OUTPATIENT
Start: 2020-08-21 | End: 2020-08-21

## 2020-08-21 RX ORDER — FENOFIBRATE,MICRONIZED 130 MG
145 CAPSULE ORAL DAILY
Refills: 0 | Status: DISCONTINUED | OUTPATIENT
Start: 2020-08-21 | End: 2020-08-25

## 2020-08-21 RX ORDER — ISOSORBIDE MONONITRATE 60 MG/1
30 TABLET, EXTENDED RELEASE ORAL EVERY 24 HOURS
Refills: 0 | Status: DISCONTINUED | OUTPATIENT
Start: 2020-08-21 | End: 2020-08-25

## 2020-08-21 RX ORDER — SODIUM CHLORIDE 9 MG/ML
500 INJECTION, SOLUTION INTRAVENOUS
Refills: 0 | Status: DISCONTINUED | OUTPATIENT
Start: 2020-08-21 | End: 2020-08-21

## 2020-08-21 RX ORDER — LEVOTHYROXINE SODIUM 125 MCG
88 TABLET ORAL DAILY
Refills: 0 | Status: DISCONTINUED | OUTPATIENT
Start: 2020-08-21 | End: 2020-08-25

## 2020-08-21 RX ORDER — SERTRALINE 25 MG/1
50 TABLET, FILM COATED ORAL AT BEDTIME
Refills: 0 | Status: DISCONTINUED | OUTPATIENT
Start: 2020-08-21 | End: 2020-08-25

## 2020-08-21 RX ORDER — ACETAMINOPHEN 500 MG
650 TABLET ORAL EVERY 6 HOURS
Refills: 0 | Status: DISCONTINUED | OUTPATIENT
Start: 2020-08-21 | End: 2020-08-25

## 2020-08-21 RX ORDER — ATORVASTATIN CALCIUM 80 MG/1
80 TABLET, FILM COATED ORAL AT BEDTIME
Refills: 0 | Status: DISCONTINUED | OUTPATIENT
Start: 2020-08-21 | End: 2020-08-21

## 2020-08-21 RX ORDER — AMLODIPINE BESYLATE 2.5 MG/1
10 TABLET ORAL EVERY 24 HOURS
Refills: 0 | Status: DISCONTINUED | OUTPATIENT
Start: 2020-08-22 | End: 2020-08-25

## 2020-08-21 RX ADMIN — HEPARIN SODIUM 5000 UNIT(S): 5000 INJECTION INTRAVENOUS; SUBCUTANEOUS at 21:52

## 2020-08-21 RX ADMIN — LEVETIRACETAM 500 MILLIGRAM(S): 250 TABLET, FILM COATED ORAL at 10:51

## 2020-08-21 RX ADMIN — SERTRALINE 50 MILLIGRAM(S): 25 TABLET, FILM COATED ORAL at 21:52

## 2020-08-21 RX ADMIN — ATENOLOL 25 MILLIGRAM(S): 25 TABLET ORAL at 05:18

## 2020-08-21 RX ADMIN — CARVEDILOL PHOSPHATE 12.5 MILLIGRAM(S): 80 CAPSULE, EXTENDED RELEASE ORAL at 18:23

## 2020-08-21 RX ADMIN — Medication 62.5 MILLIMOLE(S): at 14:13

## 2020-08-21 RX ADMIN — AMLODIPINE BESYLATE 5 MILLIGRAM(S): 2.5 TABLET ORAL at 10:26

## 2020-08-21 RX ADMIN — Medication 81 MILLIGRAM(S): at 14:16

## 2020-08-21 RX ADMIN — Medication 650 MILLIGRAM(S): at 15:40

## 2020-08-21 RX ADMIN — Medication 88 MICROGRAM(S): at 10:26

## 2020-08-21 RX ADMIN — ISOSORBIDE MONONITRATE 30 MILLIGRAM(S): 60 TABLET, EXTENDED RELEASE ORAL at 10:27

## 2020-08-21 RX ADMIN — SODIUM CHLORIDE 50 MILLILITER(S): 9 INJECTION, SOLUTION INTRAVENOUS at 10:50

## 2020-08-21 RX ADMIN — Medication 1 MILLIGRAM(S): at 14:15

## 2020-08-21 RX ADMIN — LEVETIRACETAM 500 MILLIGRAM(S): 250 TABLET, FILM COATED ORAL at 21:52

## 2020-08-21 RX ADMIN — Medication 650 MILLIGRAM(S): at 16:20

## 2020-08-21 RX ADMIN — AMLODIPINE BESYLATE 5 MILLIGRAM(S): 2.5 TABLET ORAL at 05:19

## 2020-08-21 RX ADMIN — Medication 145 MILLIGRAM(S): at 14:14

## 2020-08-21 NOTE — CONSULT NOTE ADULT - SUBJECTIVE AND OBJECTIVE BOX
CC: palpitations    HPI:  64YOF with history of essential HTN, hyperlipidemia, CAD s/p PCI and CABG (LIMA-ramus, ANTONIO-PDA), s/p AVR, anemia, PAD, hypothyroidism, AAA s/p repair, CKD Stage 3, seizure disorder admitted initially to cardiology service for palpitations and chest pain.    Per admission H&P:  "...presented to [facility] ED 8/20/2020 c/o worsening palpitations x 2 days. She reports long standing history of palpitations however over the past few days symptoms have worsened and palpitations occur intermittently with rest or activity. Patient also admits to 5-8/10 left sided non-radiating chest pain lateral to left breast described as tightness and pressure occurring both at rest and with exertion as well as KRAFT worsening x 2 days. Exercise tolerance is limited by claudication as well as C/P and SOB and is typically <1/2 block. Upon further questioning she reports dizziness (at rest and with changing positions) as well fever of 102.2 F (on 8/19/2020), generalized malaise, and productive cough (clear thick sputum) as well as chronic 4 pillow orthopnea. She denies diaphoresis, N/V, syncope, melena, hematuria, BRBPR, hematemesis, LE edema, PND, loss of taste or smell, abdominal pain, contact with known Covid + individuals or sick contacts, recent travel. Patient reports she self discontinued Furosemide ~ 1 month ago due to normal BP (SBP 120s) and was taken off Losartan (?due to kidney function).  In the ER VSS /87 HR 92 RR 18 Temp 99 F O2 sat 100% RA. 1st set CE minimally elevated Troponin 0.2 and EKG showed NSR at 82 bpm with PACs, no ST or T changes. Labs significant for Hgb/HCT 8.0/26.4, Neutrophils 85% K +3.4, Bicarb 19, Glucose 130, and BNP 10,541. CT Chest Non Contrast revealed no focal consolidation, Mild mild linear atelectasis right middle lobe and bilateral lower lobes, trace pleural effusions, cardiomegaly, no pericardial effusions, New crescentic soft tissue density surrounding the aorta at the level of the renal arteries and suprarenal region, 4.0 x 2.0 x 4.0 cm, possibly increased saccular aortic aneurysm, 5.6 cm maximally, previously up to 5.0 cm on June 27, 2014. Covid PCR negative. Patient now admitted for R/O ACS including serial enzymes, telemetry, as well as further management of AAA (Vascular following). (20 Aug 2020 14:46)"    Patient admitted to cardiology service, troponins negative x3, though found on imaging with concern for subacute contained rupture, and transferred to vascular service with plan for repair. Seen by me today, confirms above history. Occasional palpitations and sharp L chest pain occasionally as well. No shortness of breath. No dizziness/lightheadedness.    12 point ROS reviewed and negative except as otherwise stated in HPI and below    PAST MEDICAL & SURGICAL HISTORY:  Seizure  Essential hypertension: HTN (hypertension)  Gastroesophageal reflux disease: GERD (gastroesophageal reflux disease)  Hyperlipidemia: Hyperlipidemia  Anemia: Anemia  Hypothyroidism: Hypothyroidism  Atherosclerosis of coronary artery: CAD (coronary artery disease)  Peripheral vascular disease: PVD (peripheral vascular disease)  H/O aortic valve replacement: Bioprosthetic  Atherosclerosis of coronary artery bypass graft(s), unspecified, with angina pectoris with documented spasm  Status post aorto-coronary artery bypass graft: 2012    SOCIAL HISTORY:  Tobacco: quit 1998  Alcohol: quit 2005  Illicit Drugs: denies  Living situation: alone  ADLs: independent, no walker or cane  Retired [employer]     FAMILY HISTORY:  Mother with HTN, MI  Father with HTN, MI  2 sisters with DM    ALLERGIES:  Crestor (Other (Mod to Severe))  No Known Allergies      HOME MEDICATIONS:  amLODIPine 2.5 mg oral tablet: 1 tab(s) orally once a day  atenolol 25 mg oral tablet: 1 tab(s) orally once a day  cilostazol 50 mg oral tablet: 1 tab(s) orally 2 times a day  folic acid 1 mg oral tablet: 1 tab(s) orally once a day  Keppra 500 mg oral tablet: 1 tab(s) orally 2 times a day  levothyroxine 88 mcg (0.088 mg) oral tablet: 1 tab(s) orally once a day  sertraline 50 mg oral tablet: 1 tab(s) orally once a day (at bedtime)      Vital Signs Last 24 Hrs  T(F): 98 (21 Aug 2020 09:09), Max: 99 (21 Aug 2020 05:02)  HR: 64 (21 Aug 2020 14:02) (64 - 83)  BP: 132/65 (21 Aug 2020 14:02) (132/65 - 198/87)  RR: 18 (21 Aug 2020 14:02) (18 - 20)  SpO2: 98% (21 Aug 2020 14:02) (94% - 98%)    PHYSICAL EXAM:  GENERAL: pleasant, appropriate, no acute distress, well-groomed, thin appearing  HEAD:  Atraumatic, Normocephalic  EYES: EOMI, PERRLA, conjunctiva and sclera clear  ENMT: No tonsillar erythema, exudates, or enlargement; Moist mucous membranes, Good dentition  NECK: Supple, No JVD  CHEST/LUNG: Clear to auscultation bilaterally; No rales, rhonchi, wheezing, or rubs  HEART: Regular rate and rhythm; S1/S2, No murmurs, rubs, or gallops  ABDOMEN: Soft, Nontender, Nondistended; Bowel sounds present  VASCULAR: Normal pulses, Normal capillary refill  EXTREMITIES:  2+ Peripheral Pulses, No cyanosis, No edema  LYMPH: No lymphadenopathy noted  SKIN: Warm, Intact. median sternotomy scar well healed  PSYCH: Normal mood and affect  NERVOUS SYSTEM:  A/O x3, CN 2-12 intact, No focal deficits    LABS:                        8.1    6.85  )-----------( 237      ( 21 Aug 2020 05:39 )             27.8     08-21    140  |  109  |  10  ----------------------------<  97  5.3   |  20  |  1.16    Ca    9.8      21 Aug 2020 05:39  Phos  1.4     08-21  Mg     1.9     08-21    TPro  7.3  /  Alb  3.7  /  TBili  0.6  /  DBili  x   /  AST  15  /  ALT  8   /  AlkPhos  111  08-20    PT/INR - ( 20 Aug 2020 05:45 )   PT: 13.9 sec;   INR: 1.17          PTT - ( 20 Aug 2020 05:45 )  PTT:29.4 sec    Troponin T, Serum (08.20.20 @ 13:46)    Troponin T, Serum: 0.01: Reference interval for troponin T is </= 0.01 ng/mL which includes the  99th percentile of a healthy population. Troponin T results are not  interchangeable with troponin I results. ng/mL    RADIOLOGY & ADDITIONAL TESTS:  < from: CT Angio Abd Aorta w/run-off w/ IV Cont (08.20.20 @ 19:29) >  IMPRESSION:  1. Prior repair of infrarenal abdominal aortic aneurysm measuring 3.3 x 3.4 cm. Aneurysm terminates  at the bifurcation. No aortic dissection. Crescentic collection measuring 3.0 x 2.0 cm adjacent to the  proximal graft with no evidence of active extravasation. Finding is concerning for subacute contained  rupture. Recommend direct comparison to prior study for available.  2. Focal occlusion of the right mid to distal femoral artery with distal reconstitution. Moderate stenosis  of the right common femoral artery.  3. Non visualization of the distal right PTA. No occlusion or significant stenosis of the remaining  arteries.  4. Focal high-grade stenosis of the left femoral artery. No occlusion. Moderate stenosis of the left  common femoral artery.  5. Non visualization of the distal left PTA . No occlusion or significant stenosis of the remaining  arteries.  6. Tiny gallstones.  7. Hepatic steatosis.  8. Uterine fibroids.  9. Colonic diverticulosis without evidence of diverticulitis. Normal appendix.    < end of copied text >    < from: TTE Echo Complete w/o Contrast w/ Doppler (08.21.20 @ 11:48) >   1. There is mild concentric left ventricular hypertrophy. The left ventricle is normal in size, wall thickness, and systolic function with a calculated ejection fraction of 65%. Abnormal septal motion noted.   2. Severely dilated left atrium.   3. Bioprosthetic valve is seen in the aortic position. Peak transvalvular velocity is 3.85 m/s, the mean transvalvular gradient is 31.00 mmHg, and the LVOT/AV velocity ratio is 0.36.   4. There is mild transvalvular aortic regurgitation. There is trace paravalvular aortic regurgitation.   5. An annuloplasty ring is noted in the mitral position. The mean transvalvular gradient is 13.00 mmHg at a heart rate of 81 bpm. Mild mitral regurgitation.   6. There is moderate-to-severe tricuspid regurgitation.   7. Pulmonary hypertension present, pulmonary artery systolic pressure is 48 mmHg.   8. There is trace pulmonic regurgitation.   9. No pericardial effusion.  10. The aortic root is normal in size.  11. Compared to the previous TTE performed on 8/5/2019, higher mean gradient across mitral valve at a higher heart rate and gradients across bioprosthetic aortic valve is unchanged.    < end of copied text >  < from: CT Angio Cardiac w/ IV Cont (08.21.20 @ 13:11) >  Impression:    1. Patent LIMA to First Diagonal branch.  2. Patent ANTONIO to RPDA.  2. Severe stenosis of the large first diagonal branch. Distal vessel fills via LIMA graft.  3. Severe stenosis of proximal RCA. Distal vessel fills via ANTONIO graft.  4. Nonobstructive disease throughout the LAD.  5. Patent stent in mid LCX extending to the OM2  6. MV annuloplasty ring and Bioprosthetic AVR noted.  7. Calcification throughout the aorta noted.    < end of copied text >

## 2020-08-21 NOTE — PROGRESS NOTE ADULT - PROBLEM SELECTOR PLAN 4
BNP 02686.  -Patient euvolemic on exam-No JVD, Lungs CTA, no LE edema. Patient not hypoxic on exam.  -CT Chest revealed trace pleural effusions Baseline Cr 1.5-1.99; 1.19 on admission  -monitor I/Os, renally dose meds  -will give LR 50cc/hr due to multiple contrast studies -PAD s/p pLSFA stent LCIA stent, with occluded Bilateral SFA (proximal to mid portion per U/S 2019)  -no claudication symptoms, WWP  -continue aspirin 81mg daily  -restart cilostazol pending TTE (contraindicated with reduced EF)

## 2020-08-21 NOTE — PROGRESS NOTE ADULT - PROBLEM SELECTOR PLAN 2
Patient C/P free and HD stable.  -Troponin 0.02 to 0.01. EKG non ischemic.   -Continue telemetry.  -Echocardiogram ordered. Follow-up results  -Worsening C/P may be secondary to fluctuations in BP. Off Losartan and Furosemide and -180s. -SBP 150s-190s  -home regimen: amlodipine 2.5mg daily, atenolol 25mg daily  -increased amlodipine to 10mg daily, switch atenolol to coreg 12.5mg BID, and added imdur 30mg daily- uptitrate as needed

## 2020-08-21 NOTE — PROGRESS NOTE ADULT - PROBLEM SELECTOR PLAN 5
-180s.  Patient did not take medications this AM. Atenolol 25 mg PO x 1 and Amlodipine 2.5 mg PO x 1 ordered STAT. Amlodipine increased to 5 mg daily. Continue Atenolol 25 mg PO Daily. Hgb currently at baseline, around 8  -Continue to monitor for signs of bleeding  -Continue Folic Acid    FULL CODE  DVT PROPHYLAXIS: SCDs  Dispo: transfer to vascular surgery service    Case d/w Dr. Ramirez -Hx of Paroxysmal ATach and Wide Complex Tachycardia (seen on Holter 4/2016 –had ILR placed 2016, followed by Dr. Jasso)  -last interrogation 1/22/19- no evidence of tachyarrhythmia  -EP consulted- device battery dead  -plan for ILR explant in the future

## 2020-08-21 NOTE — PROGRESS NOTE ADULT - ASSESSMENT
64 F former smoker with Crestor intolerance (Myalgias) and PMHx HTN, HLD , CAD s/p PCI dLCX and CABG LIMA-Ramus, ANTONIO-PDA (patent stent and patent grafts x 2 by CTA 8/2014), Aortic Valve Disease s/p Bioprosthetic AVR 2002, Mitral Valve Disease s/p Mitral valve annuloplasty 2002, Paroxysmal ATach and Wide Complex Tachycardia (seen on Holter 4/2016 –had ILR placed now disconnected-previously seen by Dr. Jasso), PAD s/p pLSFA stent LCIA stent, with occluded Bilateral SFA (proximal to mid portion per U/S 2019), Chronic Anemia (baseline Hgb 8-8.5 -history of blood transfusions none recently-currently receiving IV Iron, Procrit, Vitamin B12 injections, takes Folic Acid PO-prior colonoscopy no evidence of bleeding ~ 5 years ago), AAA s/p Open repair by Dr. 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 64 F former smoker with Statin intolerance (Myalgias) and PMHx HTN, HLD , CAD s/p PCI dLCX and CABG LIMA-Ramus, ANTONIO-PDA (patent stent and patent grafts x 2 by CTA 8/2014), Aortic Valve Disease s/p Bioprosthetic AVR 2002, Mitral Valve Disease s/p Mitral valve annuloplasty 2002, AAA s/p Open repair by Dr. Sandra 4/2/2015, Paroxysmal ATach and Wide Complex Tachycardia (seen on Holter 4/2016 –had ILR placed now disconnected-previously seen by Dr. Jasso), PAD s/p pLSFA stent LCIA stent, with occluded Bilateral SFA (proximal to mid portion per U/S 2019), Chronic Anemia (baseline Hgb 8-8.5 -history of blood transfusions none recently-currently receiving IV Iron, Procrit, Vitamin B12 injections, takes Folic Acid PO-prior colonoscopy no evidence of bleeding ~ 5 years ago), Hypothyroidism, CKD stage III-IV (Baseline Cr 1.58-1.99 per labs 6/2019-11/2019), Severe Renal Artery Stenosis (scarring and atrophy of left Kidney 11/21/19), Grand Mal Seizures (on Keppra), Depression/Anxiety who presented to [facility] ED 8/20/2020 c/o worsening palpitations, CP and KRAFT, initially admitted to [facility] for r/o ACS but was found to have new aneurysm proximal to prior AAA graft, subsequently being transferred to vascular surgery service under Dr. Sandra with plan for OR repair on Tuesday 8/25. 64 F former smoker with Statin intolerance (Myalgias) and PMHx HTN, HLD , CAD s/p PCI dLCX and CABG LIMA-Ramus, ANTONIO-PDA (patent stent and patent grafts x 2 by CTA 8/2014), Aortic Valve Disease s/p Bioprosthetic AVR 2002, Mitral Valve Disease s/p Mitral valve annuloplasty 2002, AAA s/p Open repair by Dr. Sandra 4/2/2015, Paroxysmal ATach and Wide Complex Tachycardia (seen on Holter 4/2016 –had ILR placed 2016, followed by Dr. Jasso), PAD s/p pLSFA stent LCIA stent, with occluded Bilateral SFA (proximal to mid portion per U/S 2019), Chronic Anemia (baseline Hgb 8-8.5 -history of blood transfusions none recently-currently receiving IV Iron, Procrit, Vitamin B12 injections, takes Folic Acid PO-prior colonoscopy no evidence of bleeding ~ 5 years ago), Hypothyroidism, CKD stage III-IV (Baseline Cr 1.58-1.99 per labs 6/2019-11/2019), Severe Renal Artery Stenosis (scarring and atrophy of left Kidney 11/21/19), Grand Mal Seizures (on Keppra), Depression/Anxiety who presented to [facility] ED 8/20/2020 c/o worsening palpitations, CP and KRAFT, initially admitted to [facility] for r/o ACS but was found to have new aneurysm proximal to prior AAA graft, subsequently being transferred to vascular surgery service under Dr. Sandra with plan for OR repair on Tuesday 8/25.

## 2020-08-21 NOTE — PROGRESS NOTE ADULT - PROBLEM SELECTOR PROBLEM 4
Elevated brain natriuretic peptide (BNP) level CKD (chronic kidney disease) stage 3, GFR 30-59 ml/min Peripheral artery disease

## 2020-08-21 NOTE — CONSULT NOTE ADULT - ASSESSMENT
64YOF with history of essential HTN, AAA s/p repair in 2015, CAD s/p PCI and CABG, PAD, hypothyroidism, seizure disorder admitted initially to cardiology for ACS rule out, now on vascular service due to finding of aneurysm adjacent to proximal graft.    AAA – s/p open repair 2015, now with concern for aneurysm (with subacute contained rupture) adjacent to proximal graft  Palpitations - Has ILR but battery dead, needs to be removed post discharge  Peripheral arterial disease - on cilostazol  Elevated BNP - TTE with preserved EF and clinically not volume overloaded  Coronary artery disease - s/p multiple PCIs and CABG. On ASA, on fenofibrate here and will take Zetia on discharge per cardiology  Aortic stenosis s/p AVR 2002 - stable  CKD Stage 3 - baseline Cr felt to be ~1.8-1.9, though on admission creatinine 1.1  Anemia – stable  Essential hypertension - previously on atenolol 25mg once daily (now on carvedilol 12.5mg bid), amlodipine 2.5mg once daily (now on 10mg). Also started on imdur 30mg once daily this admit  Hypothyroidism - on 88mcg levothyroxine  Seizure disorder - on keppra 500mg bid  Anxiety/depression - on sertraline 50mg once daily    -cardiology following for preop eval  -monitor blood pressure, uptitrate meds as needed  -surgical planning per vascular  -continue home meds

## 2020-08-21 NOTE — PROGRESS NOTE ADULT - SUBJECTIVE AND OBJECTIVE BOX
SUBJECTIVE: Patient seen and examined bedside. Her CTA yesterday showed crescentic collection noted adjacent to the proximal graft with no evidence of active extravasation.      amLODIPine   Tablet 5 milliGRAM(s) Oral daily  ATENolol  Tablet 25 milliGRAM(s) Oral daily      Vital Signs Last 24 Hrs  T(C): 36.7 (21 Aug 2020 09:09), Max: 37.2 (21 Aug 2020 05:02)  T(F): 98 (21 Aug 2020 09:09), Max: 99 (21 Aug 2020 05:02)  HR: 64 (21 Aug 2020 05:11) (59 - 83)  BP: 153/72 (21 Aug 2020 05:11) (146/84 - 198/87)  BP(mean): 103 (21 Aug 2020 05:11) (97 - 117)  RR: 18 (21 Aug 2020 05:11) (17 - 18)  SpO2: 96% (21 Aug 2020 05:11) (94% - 98%)  I&O's Detail      Physical Exam:  General: NAD, resting comfortably  HEENT: NC/AT, EOMI, normal hearing  Pulmonary: non-labored breathing on room air  Cardiovascular: arrhythmic  Abdominal: soft, non-distended, mildly tender on deep palpation of epigastric area, no organomegaly, well healed incision  Extremities: warm, normal strength, no clubbing/cyanosis/edema, no ulcers seen  Neuro: A/O x 3, CNs II-XII grossly intact, normal sensation, no focal deficits, no motor deficits  Pulses:   Right:  FEM [+]2+ [ ]1+ [ ]doppler    POP [ ]2+ [ ]1+ [+]doppler monophasic  DP [ ]2+ [+]1+ [+]doppler monophasic  PT[ ]2+ [ ]1+ [+]doppler monophasic    Left:  FEM [+]2+ [ ]1+ [ ]doppler  POP [ ]2+ [ ]1+ [+]doppler monophasic  DP [ ]2+ [ ]1+ [+]doppler monophasic  PT [ ]2+ [ ]1+ [+]doppler monophasic      LABS:                        8.1    6.85  )-----------( 237      ( 21 Aug 2020 05:39 )             27.8     08-21    140  |  109<H>  |  10  ----------------------------<  97  5.3   |  20<L>  |  1.16    Ca    9.8      21 Aug 2020 05:39  Phos  1.4     08-21  Mg     1.9     08-21    TPro  7.3  /  Alb  3.7  /  TBili  0.6  /  DBili  x   /  AST  15  /  ALT  8<L>  /  AlkPhos  111  08-20    PT/INR - ( 20 Aug 2020 05:45 )   PT: 13.9 sec;   INR: 1.17          PTT - ( 20 Aug 2020 05:45 )  PTT:29.4 sec      RADIOLOGY & ADDITIONAL STUDIES:  EXAM: CT ANGIO CHEST (W)AW IC     EXAM: CT ANGIO ABD AOR W RUN(W)AW IC     PROCEDURE DATE: 08/20/2020         INTERPRETATION: PROCEDURE INFORMATION:   Exam: CT Angiography Chest With Contrast   Exam date and time: 8/20/2020 6:23 PM   Age: 64 years old   Clinical indication: Other: Aaa; Prior surgery   TECHNIQUE:   Imaging protocol: Computed tomographic angiography of the chest with intravenous contrast.   COMPARISON:   No relevant prior studies available.     FINDINGS:   Pulmonary arteries: Normal. No pulmonary emboli.   Aorta: Aortic atherosclerotic disease. No evidence of aortic aneurysm or dissection. AVR.   Lungs: Bibasilar atelectasis. No acute consolidation.   Pleural space: Trace bilateral pleural effusions. No pneumothorax.   Heart: Cardiomegaly.   Lymph nodes: Multiple enlarged mediastinal lymph nodes.   Bones/joints: Median sternotomy.   Soft tissues: Unremarkable.     IMPRESSION:   1. No evidence of aortic aneurysm or dissection.   2. No evidence of acute pulmonary embolism.   3. Trace bilateral pleural effusions.   4. Bibasilar atelectasis. No acute consolidation.     =========================     PROCEDURE INFORMATION:   Exam: CTA Angiogram of the Abdominal Aorta and Bilateral Lower Extremities (Run-off) Without and   With IV Contrast   Exam date and time: 8/20/2020 6:23 PM   Age: 64 years old   Clinical indication: Other: Aaa; Prior surgery   TECHNIQUE:   Imaging protocol: CT angiogram of the abdominal aorta, pelvis and bilateral lower extremities   without and with IV iodinated contrast.   3D rendering (Not supervised by radiologist): MIP and/or 3D reconstructed images were created   by the technologist.   COMPARISON:   No relevant prior studies available.     FINDINGS:   Aorta: Prior repair of infrarenal abdominal aortic aneurysm measuring 3.3 x 3.4 cm. Aneurysm   terminates at the bifurcation. No aortic dissection. Crescentic collection noted adjacent to the   proximal graft with no evidence of active extravasation.   Celiac trunk and mesenteric arteries: No occlusion or significant stenosis.   Renal arteries: Mild right renal artery ostial stenosis. Severe left renal artery ostial stenosis No   occlusion.   Right iliac arteries: No occlusion. Significant atherosclerotic plaque involving the right common and   external iliac arteries.   Right femoral/popliteal arteries: Focal occlusion of the right mid to distal femoral artery with distal   reconstitution. Moderate stenosis of the right common femoral artery.   Right infrapopliteal arteries: Non visualization of the distal right PTA. No occlusion or significant   stenosis of the remaining arteries. .   Left iliac arteries: Moderate stenosis of the left common iliac artery secondary to atherosclerotic   plaque. No occlusion. Significant atherosclerotic calcification involving the left external iliac artery. No   occlusion.   Left femoral/popliteal arteries: Focal high-grade stenosis of the left femoral artery. No occlusion   Moderate stenosis of the left common femoral artery.   Left infrapopliteal arteries: Non visualization of the distal left PTA . No occlusion or significant   stenosis of the remaining arteries.   Liver: Hepatic steatosis.   Gallbladder and bile ducts: Tiny gallstones . No ductal dilation.   Pancreas: Unremarkable. No mass. No ductal dilation.   Spleen: Normal. No splenomegaly.   Adrenals: Indeterminate left adrenal lesion. Normal right adrenal gland.   Kidneys and ureters: Severe left renal atrophy. No hydronephrosis. Right renal cortical scarring.   Stomach and bowel: Colonic diverticulosis without evidence of diverticulitis. No evidence of bowel   obstruction or mucosal thickening.   Appendix: No evidence of appendicitis.   Bladder: The bladder is grossly normal.   Reproductive: Uterine fibroids.   Intraperitoneal space: Unremarkable. No free air. No significant fluid collection.   Lymph nodes: No lymphadenopathy.   Bones/joints: No acute fracture. No dislocation.   Soft tissues: Unremarkable.     IMPRESSION:   1. Prior repair of infrarenal abdominal aortic aneurysm measuring 3.3 x 3.4 cm. Aneurysm terminates   at the bifurcation. No aortic dissection. Crescentic collection measuring 3.0 x 2.0 cm adjacent to the   proximal graft with no evidence of active extravasation. Finding is concerning for subacute contained   rupture. Recommend direct comparison to prior study for available.   2. Focal occlusion of the right mid to distal femoral artery with distal reconstitution. Moderate stenosis   of the right common femoral artery.   3. Non visualization of the distal right PTA. No occlusion or significant stenosis of the remaining   arteries.   4. Focal high-grade stenosis of the left femoral artery. No occlusion. Moderate stenosis of the left   common femoral artery.   5. Non visualization of the distal left PTA . No occlusion or significant stenosis of the remaining   arteries.   6. Tiny gallstones.   7. Hepatic steatosis.   8. Uterine fibroids.   9. Colonic diverticulosis without evidence of diverticulitis. Normal appendix.

## 2020-08-21 NOTE — PROGRESS NOTE ADULT - PROBLEM SELECTOR PLAN 1
-Continue Telemetry.   Holter Monitor 4/2016 revealed Paroxysmal ATach and Wide Complex Tachycardia. No Afib. ? Underlying Afib as patient with severe dilatation of left atrium on echo-ILR in place however disconnected.   -History of Blijqrxzpqvqsr-Vpezkj-ge TFTs. -Hx of AAA s/p Open repair by Dr. Sandra 4/2/2015  -CTA abd aorta w/runoff confirming crescentic collection measuring 3.0 x 2.0 cm adjacent to the proximal graft with no evidence of active extravasation, concerning for subacute contained rupture  -plan for OR repair on 8/25  -pre-op b/l carotid duplex ordered  -ischemic evaluation for cardiac risk assessment with CTA coronaries, as well as TTE  -BP control (see below)  -transfer to vascular service on 8/22

## 2020-08-21 NOTE — PROGRESS NOTE ADULT - SUBJECTIVE AND OBJECTIVE BOX
EPS Device interrogation    Indication: palpitations     Device model: 	ARASH Reveal Linq 			Implanting Physician:      Functioning Mode: 		n/a	    Underlying Rhythm: VS    Pacemaker dependency:  No    Battery status: EOS   Interrogating parameters:   				RA			RV			LV    Sense:                                                                            0.87   mV    Threshold:                                                                 n/a     Pacing Impedance:                                                       n/a     Shock Impedance:                                                     n/a     Events/Alert:  none, At/AF burden 0.1 %     Parameter change: 	none    ILR is at EOS since 8/19/20, patient should schedule ILR removal after discharge.     [ ]EPS attending: Interrogation reviewed. Agree with above.

## 2020-08-21 NOTE — PROGRESS NOTE ADULT - PROBLEM SELECTOR PLAN 6
Baseline Cr 1.5- Cr 1.99. Cr 1.19 on admission.   -Avoid Nephrotoxic Agents  -Monitor urine output, BP, volume status, electrolytes.  -Daily Phosphorous Baseline Cr 1.5-1.99; 1.19 on admission  -monitor I/Os, renally dose meds  -will give LR 50cc/hr due to multiple contrast studies

## 2020-08-21 NOTE — PROGRESS NOTE ADULT - SUBJECTIVE AND OBJECTIVE BOX
INCOMPLETE    CARDIAC TELE TO VASCULAR SURGERY TRANSFER NOTE    OVERNIGHT EVENTS:  No acute events overnight.    SUBJECTIVE / INTERVAL HPI: Patient seen and examined at bedside.    Denies CP, SOB, dizziness/lightheadedness, palpitations, orthopnea/PND, leg swelling  VITAL SIGNS:  Vital Signs Last 24 Hrs  T(C): 37.2 (21 Aug 2020 05:02), Max: 37.2 (21 Aug 2020 05:02)  T(F): 99 (21 Aug 2020 05:02), Max: 99 (21 Aug 2020 05:02)  HR: 64 (21 Aug 2020 05:11) (59 - 83)  BP: 153/72 (21 Aug 2020 05:11) (146/84 - 198/87)  BP(mean): 103 (21 Aug 2020 05:11) (97 - 117)  RR: 18 (21 Aug 2020 05:11) (17 - 18)  SpO2: 96% (21 Aug 2020 05:11) (94% - 99%)  Denies fever/chills, fatigue, myalgias, cough, headache, sore throat, nasal congestion, nausea/vomiting/diarrhea, abd pain, poor appetite      I&O's Summary    Height (cm): 170.2 (08-20 @ 12:34)  Weight (kg): 56 (08-20 @ 12:34)  BMI (kg/m2): 19.3 (08-20 @ 12:34)  BSA (m2): 1.65 (08-20 @ 12:34)    PHYSICAL EXAM:    General: Lying in bed, NAD  HEENT:  PERRL, conjunctiva clear; MMM  Neck: supple  Cardiovascular: RRR, no murmurs  Respiratory: CTA B/L  Gastrointestinal: soft, NT/ND, +BS  Extremities: WWP, no edema or cyanosis  Vascular: Peripheral pulses palpable 2+ bilaterally/ carotid 2+ b/l, no bruits; radial 2+ b/l; femoral 2+ b/l no bruits; DP/PT 2+ b/l  Neurological: AAOx3, no focal deficits    MEDICATIONS:  MEDICATIONS  (STANDING):  amLODIPine   Tablet 5 milliGRAM(s) Oral daily  ATENolol  Tablet 25 milliGRAM(s) Oral daily  folic acid 1 milliGRAM(s) Oral daily  levETIRAcetam 500 milliGRAM(s) Oral every 12 hours  levothyroxine 88 MICROGram(s) Oral daily  sertraline 50 milliGRAM(s) Oral at bedtime  sodium phosphate IVPB 15 milliMole(s) IV Intermittent once    MEDICATIONS  (PRN):      LABS:                        8.1    6.85  )-----------( 237      ( 21 Aug 2020 05:39 )             27.8       08-21    140  |  109<H>  |  10  ----------------------------<  97  5.3   |  20<L>  |  1.16    Ca    9.8      21 Aug 2020 05:39  Phos  1.4     08-21  Mg     1.9     08-21    TPro  7.3  /  Alb  3.7  /  TBili  0.6  /  DBili  x   /  AST  15  /  ALT  8<L>  /  AlkPhos  111  08-20      PT/INR - ( 20 Aug 2020 05:45 )   PT: 13.9 sec;   INR: 1.17          PTT - ( 20 Aug 2020 05:45 )  PTT:29.4 sec    CARDIAC MARKERS ( 20 Aug 2020 13:46 )  x     / 0.01 ng/mL / 42 U/L / x     / 1.7 ng/mL  CARDIAC MARKERS ( 20 Aug 2020 09:37 )  x     / 0.02 ng/mL / x     / x     / x      CARDIAC MARKERS ( 20 Aug 2020 05:45 )  x     / 0.02 ng/mL / x     / x     / x            TELEMETRY:    RADIOLOGY & ADDITIONAL TESTS: Reviewed. INCOMPLETE    CARDIAC TELE TO VASCULAR SURGERY TRANSFER NOTE    64 F former smoker with Statin intolerance (Myalgias) and PMHx HTN, HLD , CAD s/p PCI dLCX and CABG LIMA-Ramus, ANTONIO-PDA (patent stent and patent grafts x 2 by CTA 8/2014), Aortic Valve Disease s/p Bioprosthetic AVR 2002, Mitral Valve Disease s/p Mitral valve annuloplasty 2002, AAA s/p Open repair by Dr. Sandra 4/2/2015, Paroxysmal ATach and Wide Complex Tachycardia (seen on Holter 4/2016 –had ILR placed now disconnected-previously seen by Dr. 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  She underwent non-con CT chest negative for consolidation or pulmonary edema but revealed new crescentic soft tissue density surrounding the aorta at the level of the renal arteries and suprarenal region, 4.0 x 2.0 x 4.0 cm, possibly increased saccular aortic aneurysm, 5.6 cm maximally, previously up to 5.0 cm on June 27, 2014, followed by CTA abd aorta w/runoff confirming crescentic collection measuring 3.0 x 2.0 cm adjacent to the proximal graft with no evidence of active extravasation, concerning for subacute contained rupture. Patient is undergoing cardiac evaluation prior to OR, including coronary CTA and TTE (pending). BP not well-controlled, systolic up to 180-190, and increased amlodipine to 10mg daily, added imdur 30mg daily, transitioned home atenolol to coreg 12.5mg BID. Patient transferred to vascular surgery service, with plan for cardiology consult team to continue following.     OVERNIGHT EVENTS:  No acute events overnight.    SUBJECTIVE / INTERVAL HPI: Patient seen and examined at bedside.    Denies CP, SOB, dizziness/lightheadedness, palpitations, orthopnea/PND, leg swelling  VITAL SIGNS:  Vital Signs Last 24 Hrs  T(C): 37.2 (21 Aug 2020 05:02), Max: 37.2 (21 Aug 2020 05:02)  T(F): 99 (21 Aug 2020 05:02), Max: 99 (21 Aug 2020 05:02)  HR: 64 (21 Aug 2020 05:11) (59 - 83)  BP: 153/72 (21 Aug 2020 05:11) (146/84 - 198/87)  BP(mean): 103 (21 Aug 2020 05:11) (97 - 117)  RR: 18 (21 Aug 2020 05:11) (17 - 18)  SpO2: 96% (21 Aug 2020 05:11) (94% - 99%)  Denies fever/chills, fatigue, myalgias, cough, headache, sore throat, nasal congestion, nausea/vomiting/diarrhea, abd pain, poor appetite      I&O's Summary    Height (cm): 170.2 (08-20 @ 12:34)  Weight (kg): 56 (08-20 @ 12:34)  BMI (kg/m2): 19.3 (08-20 @ 12:34)  BSA (m2): 1.65 (08-20 @ 12:34)    PHYSICAL EXAM:    General: Lying in bed, NAD  HEENT:  PERRL, conjunctiva clear; MMM  Neck: supple  Cardiovascular: RRR, no murmurs  Respiratory: CTA B/L  Gastrointestinal: soft, NT/ND, +BS  Extremities: WWP, no edema or cyanosis  Vascular: Peripheral pulses palpable 2+ bilaterally/ carotid 2+ b/l, no bruits; radial 2+ b/l; femoral 2+ b/l no bruits; DP/PT 2+ b/l  Neurological: AAOx3, no focal deficits    MEDICATIONS:  MEDICATIONS  (STANDING):  amLODIPine   Tablet 5 milliGRAM(s) Oral daily  ATENolol  Tablet 25 milliGRAM(s) Oral daily  folic acid 1 milliGRAM(s) Oral daily  levETIRAcetam 500 milliGRAM(s) Oral every 12 hours  levothyroxine 88 MICROGram(s) Oral daily  sertraline 50 milliGRAM(s) Oral at bedtime  sodium phosphate IVPB 15 milliMole(s) IV Intermittent once    MEDICATIONS  (PRN):      LABS:                        8.1    6.85  )-----------( 237      ( 21 Aug 2020 05:39 )             27.8       08-21    140  |  109<H>  |  10  ----------------------------<  97  5.3   |  20<L>  |  1.16    Ca    9.8      21 Aug 2020 05:39  Phos  1.4     08-21  Mg     1.9     08-21    TPro  7.3  /  Alb  3.7  /  TBili  0.6  /  DBili  x   /  AST  15  /  ALT  8<L>  /  AlkPhos  111  08-20      PT/INR - ( 20 Aug 2020 05:45 )   PT: 13.9 sec;   INR: 1.17          PTT - ( 20 Aug 2020 05:45 )  PTT:29.4 sec    CARDIAC MARKERS ( 20 Aug 2020 13:46 )  x     / 0.01 ng/mL / 42 U/L / x     / 1.7 ng/mL  CARDIAC MARKERS ( 20 Aug 2020 09:37 )  x     / 0.02 ng/mL / x     / x     / x      CARDIAC MARKERS ( 20 Aug 2020 05:45 )  x     / 0.02 ng/mL / x     / x     / x            TELEMETRY:    RADIOLOGY & ADDITIONAL TESTS: Reviewed. INCOMPLETE    CARDIAC TELE TO VASCULAR SURGERY TRANSFER NOTE    64 F former smoker with Statin intolerance (Myalgias) and PMHx HTN, HLD , CAD s/p PCI dLCX and CABG LIMA-Ramus, ANTONIO-PDA (patent stent and patent grafts x 2 by CTA 8/2014), Aortic Valve Disease s/p Bioprosthetic AVR 2002, Mitral Valve Disease s/p Mitral valve annuloplasty 2002, AAA s/p Open repair by Dr. Sandra 4/2/2015, Paroxysmal ATach and Wide Complex Tachycardia (seen on Holter 4/2016 –had ILR placed now disconnected-previously seen by Dr. 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  She underwent non-con CT chest negative for consolidation or pulmonary edema but revealed new crescentic soft tissue density surrounding the aorta at the level of the renal arteries and suprarenal region, 4.0 x 2.0 x 4.0 cm, possibly increased saccular aortic aneurysm, 5.6 cm maximally, previously up to 5.0 cm on June 27, 2014, followed by CTA abd aorta w/runoff confirming crescentic collection measuring 3.0 x 2.0 cm adjacent to the proximal graft with no evidence of active extravasation, concerning for subacute contained rupture. Patient is undergoing cardiac evaluation prior to OR, including coronary CTA and TTE (pending). BP not well-controlled, systolic up to 180-190, and increased amlodipine to 10mg daily, added imdur 30mg daily, transitioned home atenolol to coreg 12.5mg BID. Patient transferred to vascular surgery service, with plan for cardiology consult team to continue following.     OVERNIGHT EVENTS:  No acute events overnight.    SUBJECTIVE / INTERVAL HPI: Patient seen and examined at bedside. Denies CP, SOB, dizziness/lightheadedness, palpitations, orthopnea/PND, leg swelling    VITAL SIGNS:  Vital Signs Last 24 Hrs  T(C): 37.2 (21 Aug 2020 05:02), Max: 37.2 (21 Aug 2020 05:02)  T(F): 99 (21 Aug 2020 05:02), Max: 99 (21 Aug 2020 05:02)  HR: 64 (21 Aug 2020 05:11) (59 - 83)  BP: 153/72 (21 Aug 2020 05:11) (146/84 - 198/87)  BP(mean): 103 (21 Aug 2020 05:11) (97 - 117)  RR: 18 (21 Aug 2020 05:11) (17 - 18)  SpO2: 96% (21 Aug 2020 05:11) (94% - 99%)    I&O's Summary    Height (cm): 170.2 (08-20 @ 12:34)  Weight (kg): 56 (08-20 @ 12:34)  BMI (kg/m2): 19.3 (08-20 @ 12:34)  BSA (m2): 1.65 (08-20 @ 12:34)    PHYSICAL EXAM:    General: sitting up in bed, NAD  HEENT: PERRL, conjunctiva clear; MMM  Neck: supple, no JVD  Cardiovascular: RRR, no murmurs  Respiratory: CTA B/L  Gastrointestinal: soft, NT/ND, +BS  Extremities: WWP, no edema or cyanosis  Neurological: AAOx3, no focal deficits    MEDICATIONS:  MEDICATIONS  (STANDING):  amLODIPine   Tablet 5 milliGRAM(s) Oral daily  ATENolol  Tablet 25 milliGRAM(s) Oral daily  folic acid 1 milliGRAM(s) Oral daily  levETIRAcetam 500 milliGRAM(s) Oral every 12 hours  levothyroxine 88 MICROGram(s) Oral daily  sertraline 50 milliGRAM(s) Oral at bedtime  sodium phosphate IVPB 15 milliMole(s) IV Intermittent once    MEDICATIONS  (PRN):      LABS:                        8.1    6.85  )-----------( 237      ( 21 Aug 2020 05:39 )             27.8       08-21    140  |  109<H>  |  10  ----------------------------<  97  5.3   |  20<L>  |  1.16    Ca    9.8      21 Aug 2020 05:39  Phos  1.4     08-21  Mg     1.9     08-21    TPro  7.3  /  Alb  3.7  /  TBili  0.6  /  DBili  x   /  AST  15  /  ALT  8<L>  /  AlkPhos  111  08-20      PT/INR - ( 20 Aug 2020 05:45 )   PT: 13.9 sec;   INR: 1.17          PTT - ( 20 Aug 2020 05:45 )  PTT:29.4 sec    CARDIAC MARKERS ( 20 Aug 2020 13:46 )  x     / 0.01 ng/mL / 42 U/L / x     / 1.7 ng/mL  CARDIAC MARKERS ( 20 Aug 2020 09:37 )  x     / 0.02 ng/mL / x     / x     / x      CARDIAC MARKERS ( 20 Aug 2020 05:45 )  x     / 0.02 ng/mL / x     / x     / x          RADIOLOGY & ADDITIONAL TESTS: Reviewed. CARDIAC TELE TO VASCULAR SURGERY TRANSFER NOTE    Hospital Course:  64 F former smoker with Statin intolerance (Myalgias) and PMHx HTN, HLD , CAD s/p PCI dLCX and CABG LIMA-Ramus, ANTONIO-PDA (patent stent and patent grafts x 2 by CTA 8/2014), Aortic Valve Disease s/p Bioprosthetic AVR 2002, Mitral Valve Disease s/p Mitral valve annuloplasty 2002, AAA s/p Open repair by Dr. Sandra 4/2/2015, Paroxysmal ATach and Wide Complex Tachycardia (seen on Holter 4/2016 –had ILR placed now disconnected-previously seen by Dr. 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  She underwent non-con CT chest negative for consolidation or pulmonary edema but revealed new crescentic soft tissue density surrounding the aorta at the level of the renal arteries and suprarenal region, 4.0 x 2.0 x 4.0 cm, possibly increased saccular aortic aneurysm, 5.6 cm maximally, previously up to 5.0 cm on June 27, 2014, followed by CTA abd aorta w/runoff confirming crescentic collection measuring 3.0 x 2.0 cm adjacent to the proximal graft with no evidence of active extravasation, concerning for subacute contained rupture. Patient is undergoing cardiac evaluation prior to OR, including coronary CTA and TTE (pending). BP not well-controlled, systolic up to 180-190, and increased amlodipine to 10mg daily, added imdur 30mg daily, transitioned home atenolol to coreg 12.5mg BID. Patient transferred to vascular surgery service, with plan for cardiology consult team to continue following.     OVERNIGHT EVENTS:  No acute events overnight.    SUBJECTIVE / INTERVAL HPI: Patient seen and examined at bedside. Denies CP, SOB, dizziness/lightheadedness, palpitations, orthopnea/PND, leg swelling    VITAL SIGNS:  Vital Signs Last 24 Hrs  T(C): 37.2 (21 Aug 2020 05:02), Max: 37.2 (21 Aug 2020 05:02)  T(F): 99 (21 Aug 2020 05:02), Max: 99 (21 Aug 2020 05:02)  HR: 64 (21 Aug 2020 05:11) (59 - 83)  BP: 153/72 (21 Aug 2020 05:11) (146/84 - 198/87)  BP(mean): 103 (21 Aug 2020 05:11) (97 - 117)  RR: 18 (21 Aug 2020 05:11) (17 - 18)  SpO2: 96% (21 Aug 2020 05:11) (94% - 99%)    I&O's Summary    Height (cm): 170.2 (08-20 @ 12:34)  Weight (kg): 56 (08-20 @ 12:34)  BMI (kg/m2): 19.3 (08-20 @ 12:34)  BSA (m2): 1.65 (08-20 @ 12:34)    PHYSICAL EXAM:    General: sitting up in bed, NAD  HEENT: PERRL, conjunctiva clear; MMM  Neck: supple, no JVD  Cardiovascular: RRR, no murmurs  Respiratory: CTA B/L  Gastrointestinal: soft, NT/ND, +BS  Extremities: WWP, no edema or cyanosis  Neurological: AAOx3, no focal deficits    MEDICATIONS:  MEDICATIONS  (STANDING):  amLODIPine   Tablet 5 milliGRAM(s) Oral daily  ATENolol  Tablet 25 milliGRAM(s) Oral daily  folic acid 1 milliGRAM(s) Oral daily  levETIRAcetam 500 milliGRAM(s) Oral every 12 hours  levothyroxine 88 MICROGram(s) Oral daily  sertraline 50 milliGRAM(s) Oral at bedtime  sodium phosphate IVPB 15 milliMole(s) IV Intermittent once    MEDICATIONS  (PRN):      LABS:                        8.1    6.85  )-----------( 237      ( 21 Aug 2020 05:39 )             27.8       08-21    140  |  109<H>  |  10  ----------------------------<  97  5.3   |  20<L>  |  1.16    Ca    9.8      21 Aug 2020 05:39  Phos  1.4     08-21  Mg     1.9     08-21    TPro  7.3  /  Alb  3.7  /  TBili  0.6  /  DBili  x   /  AST  15  /  ALT  8<L>  /  AlkPhos  111  08-20      PT/INR - ( 20 Aug 2020 05:45 )   PT: 13.9 sec;   INR: 1.17          PTT - ( 20 Aug 2020 05:45 )  PTT:29.4 sec    CARDIAC MARKERS ( 20 Aug 2020 13:46 )  x     / 0.01 ng/mL / 42 U/L / x     / 1.7 ng/mL  CARDIAC MARKERS ( 20 Aug 2020 09:37 )  x     / 0.02 ng/mL / x     / x     / x      CARDIAC MARKERS ( 20 Aug 2020 05:45 )  x     / 0.02 ng/mL / x     / x     / x          RADIOLOGY & ADDITIONAL TESTS: Reviewed.

## 2020-08-21 NOTE — PROGRESS NOTE ADULT - ASSESSMENT
Assessment: 64y Female with a PMH of HTN, CAD s/p CABG & PCI, AVR, PAD, hypothyroidism, seizure, chronic anemia, and AAA repair in 2015 (Dr. Sandra) that presented to the ED with chest pain and palpitations. She had elevated troponins, Pro-BNP, but no ST elevation or depression on EKG. A CT chest w/o contrast showed a new crescentic soft tissue density surrounding the aorta at the level of the renal arteries and suprarenal region. She was admitted under Cardiology. CTA was done yesterday that confirmed the presence of a new aneurysm above the graft.     Recommendations:  - We will transfer patient to our service (Cardiology aware)  - Cardiac clearance  - Obtain Carotid Duplex  - Plan for OR on Tuesday (with Dr. Sandra)

## 2020-08-21 NOTE — PROGRESS NOTE ADULT - PROBLEM SELECTOR PLAN 3
Vascular following-Dr. Sandra   CT Chest Non-contrast-New crescentic soft tissue density surrounding the aorta at the level of the renal arteries and suprarenal region, 4.0 x 2.0 x 4.0 cm, possibly increased saccular aortic aneurysm, 5.6 cm maximally, previously up to 5.0 cm on June 27, 2014.   -CTA C/A/P ordered for further evaluation. Patient received 1L IVF in ER. Monitor renal function  -Tight BP control -CAD s/p PCI dLCX and CABG LIMA-Ramus, ANTONIO-PDA (patent stent and patent grafts x 2 by CTA 8/2014)  -patient currently CP-free, trops negative, EKG non-ischemic  -Coronary CTA for ischemic eval prior to upcoming high risk surgery  -f/u TTE  -start aspirin 81mg daily  -patient reports hx of myalgias with atorva/rosuva- will start fenofibrate inpatient and would add zetia 10mg upon discharge  -started coreg 12.5mg BID -CAD s/p PCI dLCX and CABG LIMA-Ramus, ANTONIO-PDA (patent stent and patent grafts x 2 by CTA 8/2014). Outpatient Cardiologist Dr. Preston  -patient currently CP-free, trops negative, EKG non-ischemic  -Coronary CTA for ischemic eval prior to upcoming high risk surgery  -f/u TTE  -start aspirin 81mg daily  -patient reports hx of myalgias with atorva/rosuva- will start fenofibrate inpatient and would add zetia 10mg upon discharge  -started coreg 12.5mg BID  -Cardiology Consult service will continue to follow

## 2020-08-21 NOTE — PROGRESS NOTE ADULT - PROBLEM SELECTOR PLAN 7
Hgb currently at baseline -8.0  -Continue to monitor for signs of bleeding  -Continue Folic Acid    FULL CODE  DVT PROPHYLAXIS: SCDS  Dispo: Pending clinical progression Hgb currently at baseline, around 8  -Continue to monitor for signs of bleeding  -Continue Folic Acid    FULL CODE  DVT PROPHYLAXIS: SCDs  Dispo: transfer to vascular surgery service    Case d/w Dr. Ramirez

## 2020-08-22 LAB
ANION GAP SERPL CALC-SCNC: 11 MMOL/L — SIGNIFICANT CHANGE UP (ref 5–17)
BUN SERPL-MCNC: 10 MG/DL — SIGNIFICANT CHANGE UP (ref 7–23)
CALCIUM SERPL-MCNC: 9.5 MG/DL — SIGNIFICANT CHANGE UP (ref 8.4–10.5)
CHLORIDE SERPL-SCNC: 109 MMOL/L — HIGH (ref 96–108)
CO2 SERPL-SCNC: 19 MMOL/L — LOW (ref 22–31)
CREAT SERPL-MCNC: 1.18 MG/DL — SIGNIFICANT CHANGE UP (ref 0.5–1.3)
GLUCOSE SERPL-MCNC: 84 MG/DL — SIGNIFICANT CHANGE UP (ref 70–99)
HCT VFR BLD CALC: 24.6 % — LOW (ref 34.5–45)
HCT VFR BLD CALC: 28.3 % — LOW (ref 34.5–45)
HGB BLD-MCNC: 7.3 G/DL — LOW (ref 11.5–15.5)
HGB BLD-MCNC: 8.5 G/DL — LOW (ref 11.5–15.5)
MAGNESIUM SERPL-MCNC: 2 MG/DL — SIGNIFICANT CHANGE UP (ref 1.6–2.6)
MCHC RBC-ENTMCNC: 25.2 PG — LOW (ref 27–34)
MCHC RBC-ENTMCNC: 25.5 PG — LOW (ref 27–34)
MCHC RBC-ENTMCNC: 29.7 GM/DL — LOW (ref 32–36)
MCHC RBC-ENTMCNC: 30 GM/DL — LOW (ref 32–36)
MCV RBC AUTO: 84 FL — SIGNIFICANT CHANGE UP (ref 80–100)
MCV RBC AUTO: 86 FL — SIGNIFICANT CHANGE UP (ref 80–100)
NRBC # BLD: 0 /100 WBCS — SIGNIFICANT CHANGE UP (ref 0–0)
NRBC # BLD: 0 /100 WBCS — SIGNIFICANT CHANGE UP (ref 0–0)
PHOSPHATE SERPL-MCNC: 2.2 MG/DL — LOW (ref 2.5–4.5)
PLATELET # BLD AUTO: 222 K/UL — SIGNIFICANT CHANGE UP (ref 150–400)
PLATELET # BLD AUTO: 239 K/UL — SIGNIFICANT CHANGE UP (ref 150–400)
POTASSIUM SERPL-MCNC: 4.3 MMOL/L — SIGNIFICANT CHANGE UP (ref 3.5–5.3)
POTASSIUM SERPL-SCNC: 4.3 MMOL/L — SIGNIFICANT CHANGE UP (ref 3.5–5.3)
RBC # BLD: 2.86 M/UL — LOW (ref 3.8–5.2)
RBC # BLD: 3.37 M/UL — LOW (ref 3.8–5.2)
RBC # FLD: 18.7 % — HIGH (ref 10.3–14.5)
RBC # FLD: 19 % — HIGH (ref 10.3–14.5)
SARS-COV-2 IGG SERPL QL IA: NEGATIVE — SIGNIFICANT CHANGE UP
SARS-COV-2 IGM SERPL IA-ACNC: 0.08 INDEX — SIGNIFICANT CHANGE UP
SODIUM SERPL-SCNC: 139 MMOL/L — SIGNIFICANT CHANGE UP (ref 135–145)
WBC # BLD: 4.34 K/UL — SIGNIFICANT CHANGE UP (ref 3.8–10.5)
WBC # BLD: 4.86 K/UL — SIGNIFICANT CHANGE UP (ref 3.8–10.5)
WBC # FLD AUTO: 4.34 K/UL — SIGNIFICANT CHANGE UP (ref 3.8–10.5)
WBC # FLD AUTO: 4.86 K/UL — SIGNIFICANT CHANGE UP (ref 3.8–10.5)

## 2020-08-22 PROCEDURE — 99233 SBSQ HOSP IP/OBS HIGH 50: CPT

## 2020-08-22 PROCEDURE — 93010 ELECTROCARDIOGRAM REPORT: CPT

## 2020-08-22 RX ADMIN — LEVETIRACETAM 500 MILLIGRAM(S): 250 TABLET, FILM COATED ORAL at 10:32

## 2020-08-22 RX ADMIN — AMLODIPINE BESYLATE 10 MILLIGRAM(S): 2.5 TABLET ORAL at 05:59

## 2020-08-22 RX ADMIN — Medication 88 MICROGRAM(S): at 05:59

## 2020-08-22 RX ADMIN — Medication 81 MILLIGRAM(S): at 10:32

## 2020-08-22 RX ADMIN — SERTRALINE 50 MILLIGRAM(S): 25 TABLET, FILM COATED ORAL at 21:18

## 2020-08-22 RX ADMIN — HEPARIN SODIUM 5000 UNIT(S): 5000 INJECTION INTRAVENOUS; SUBCUTANEOUS at 13:23

## 2020-08-22 RX ADMIN — LEVETIRACETAM 500 MILLIGRAM(S): 250 TABLET, FILM COATED ORAL at 21:18

## 2020-08-22 RX ADMIN — Medication 1 MILLIGRAM(S): at 10:32

## 2020-08-22 RX ADMIN — CARVEDILOL PHOSPHATE 12.5 MILLIGRAM(S): 80 CAPSULE, EXTENDED RELEASE ORAL at 17:20

## 2020-08-22 RX ADMIN — CARVEDILOL PHOSPHATE 12.5 MILLIGRAM(S): 80 CAPSULE, EXTENDED RELEASE ORAL at 05:59

## 2020-08-22 RX ADMIN — HEPARIN SODIUM 5000 UNIT(S): 5000 INJECTION INTRAVENOUS; SUBCUTANEOUS at 05:59

## 2020-08-22 RX ADMIN — Medication 145 MILLIGRAM(S): at 10:32

## 2020-08-22 RX ADMIN — HEPARIN SODIUM 5000 UNIT(S): 5000 INJECTION INTRAVENOUS; SUBCUTANEOUS at 21:18

## 2020-08-22 RX ADMIN — ISOSORBIDE MONONITRATE 30 MILLIGRAM(S): 60 TABLET, EXTENDED RELEASE ORAL at 10:32

## 2020-08-22 NOTE — CONSULT NOTE ADULT - ASSESSMENT
Assessment/Plan: 64 F former smoker with PMHx HTN, HLD (crestor intolerance) , CAD s/p PCI dLCX and CABG LIMA-Ramus, ANTONIO-PDA (patent stent and patent grafts x 2 by CTA 8/2014), Aortic Valve Disease s/p Bioprosthetic AVR 2002 (mPG 31), Mitral Valve Disease s/p Mitral valve annuloplasty 2002 (now severe MS, mild MR), Paroxysmal ATach,), PAD s/p pLSFA stent LCIA stent, with occluded Bilateral SFA (proximal to mid portion per U/S 2019), Chronic Anemia (baseline Hgb 8-8.5), AAA s/p Open repair by Dr. 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 ED 8/20/2020 c/o worsening palpitations, initially r/o ACS and now on vascular service with plan to perform AAA repair.     #aron-operative cardiac optimization: she is currently asymptomatic and euvolemic on exam. her CCTA shows extensive coronary and vascular disease. her TTE shows moderately stenotic bio-AV, severely stenotic MV.  - c/w Coreg 12.5mg BID, titrate up as tolerated  - avoid excessive afterload reduction   - c/w IMDUR and amlodipine for BP control  - c/w fibrate for now, statin would be ideal but has not tolerated in the past  - consider structural heart disease contult  - cardiology will continue to follow     Yariel Villafuerte MD   Cardiology Fellow    d/w Dr. Altamirano

## 2020-08-22 NOTE — CONSULT NOTE ADULT - SUBJECTIVE AND OBJECTIVE BOX
HPI:  History obtained from patient (reliable) and Dr. Preston’s office note  64 F former smoker with Crestor intolerance (Myalgias) and PMHx HTN, HLD , CAD s/p PCI dLCX and CABG LIMA-Ramus, ANTONIO-PDA (patent stent and patent grafts x 2 by CTA 8/2014), Aortic Valve Disease s/p Bioprosthetic AVR 2002, Mitral Valve Disease s/p Mitral valve annuloplasty 2002, Paroxysmal ATach, Wide Complex Tachycardia (seen on Holter 4/2016 –had ILR placed now disconnected-previously seen by Dr. Jasso), PAD s/p pLSFA stent LCIA stent, with occluded Bilateral SFA (proximal to mid portion per U/S 2019), Chronic Anemia (baseline Hgb 8-8.5 -history of blood transfusions none recently-currently receiving IV Iron, Procrit, Vitamin B12 injections, takes Folic Acid PO-prior colonoscopy no evidence of bleeding ~ 5 years ago), AAA s/p Open repair by Dr. 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 ED 8/20/2020 c/o worsening palpitations x 2 days. She reports long standing history of palpitations however over the past few days symptoms have worsened and palpitations occur intermittently with rest or activity. Patient also admits to 5-8/10 left sided non-radiating chest pain lateral to left breast described as tightness and pressure occurring both at rest and with exertion as well as KRAFT worsening x 2 days. Exercise tolerance is limited by claudication as well as C/P and SOB and is typically <1/2 block. Upon further questioning she reports dizziness (at rest and with changing positions) as well fever of 102.2 F (on 8/19/2020), generalized malaise, and productive cough (clear thick sputum) as well as chronic 4 pillow orthopnea. She denies diaphoresis, N/V, syncope, melena, hematuria, BRBPR, hematemesis, LE edema, PND, loss of taste or smell, abdominal pain, contact with known Covid + individuals or sick contacts, recent travel. Patient reports she self discontinued Furosemide ~ 1 month ago due to normal BP (SBP 120s) and was taken off Losartan (?due to kidney function).  In the ER VSS /87 HR 92 RR 18 Temp 99 F O2 sat 100% RA. 1st set CE minimally elevated Troponin 0.2 and EKG showed NSR at 82 bpm with PACs, no ST or T changes. CT Chest Non Contrast revealed no focal consolidation, Mild linear atelectasis right middle lobe and bilateral lower lobes, trace pleural effusions, cardiomegaly, no pericardial effusions, New crescentic soft tissue density surrounding the aorta at the level of the renal arteries and suprarenal region, 4.0 x 2.0 x 4.0 cm, possibly increased saccular aortic aneurysm, 5.6 cm maximally, previously up to 5.0 cm on June 27, 2014. Covid PCR negative.     she was intially admitted to cardiac telemetry and transferred to vascular service yesterday with plan to repair her AAA. Today she reports feeling well, no dyspnea, chest pain, no orthopnea. She underwent a CCTA and TTE (see results below).       ROS: A 10-point review of systems was otherwise negative.    PAST MEDICAL & SURGICAL HISTORY:  Seizure  Essential hypertension: HTN (hypertension)  Gastroesophageal reflux disease: GERD (gastroesophageal reflux disease)  Hyperlipidemia: Hyperlipidemia  Anemia: Anemia  Hypothyroidism: Hypothyroidism  Atherosclerosis of coronary artery: CAD (coronary artery disease)  Peripheral vascular disease: PVD (peripheral vascular disease)  H/O aortic valve replacement: Bioprosthetic  Atherosclerosis of coronary artery bypass graft(s), unspecified, with angina pectoris with documented spasm  Status post aorto-coronary artery bypass graft: 2012    SOCIAL HISTORY:  FAMILY HISTORY:  No pertinent family history in first degree relatives    ALLERGIES: 	  Crestor (Other (Mod to Severe))  No Known Allergies          MEDICATIONS:  acetaminophen   Tablet .. 650 milliGRAM(s) Oral every 6 hours PRN  amLODIPine   Tablet 10 milliGRAM(s) Oral every 24 hours  aspirin enteric coated 81 milliGRAM(s) Oral daily  carvedilol 12.5 milliGRAM(s) Oral every 12 hours  fenofibrate Tablet 145 milliGRAM(s) Oral daily  folic acid 1 milliGRAM(s) Oral daily  heparin   Injectable 5000 Unit(s) SubCutaneous every 8 hours  isosorbide   mononitrate ER Tablet (IMDUR) 30 milliGRAM(s) Oral every 24 hours  levETIRAcetam 500 milliGRAM(s) Oral every 12 hours  levothyroxine 88 MICROGram(s) Oral daily  sertraline 50 milliGRAM(s) Oral at bedtime      PHYSICAL EXAM:  T(C): 36.2 (08-22-20 @ 14:02), Max: 37.1 (08-21-20 @ 22:11)  HR: 67 (08-22-20 @ 13:24) (61 - 74)  BP: 133/70 (08-22-20 @ 13:24) (116/56 - 138/64)  RR: 17 (08-22-20 @ 13:24) (16 - 18)  SpO2: 97% (08-22-20 @ 13:24) (95% - 98%)  Wt(kg): --    GEN: Awake, comfortable. NAD.   HEENT: NCAT, PERRL, EOMI. Mucosa moist.   RESP: CTA b/l  CV: RRR, 3/6 harsh sysolic murmur radiating to carotids, S2 reduced, no JVD  ABD: Soft, NTND. BS+  EXT: Warm. No edema, clubbing, or cyanosis.   NEURO: AAOx3. No focal deficits.    I&O's Summary    21 Aug 2020 07:01  -  22 Aug 2020 07:00  --------------------------------------------------------  IN: 180 mL / OUT: 0 mL / NET: 180 mL    22 Aug 2020 07:01  -  22 Aug 2020 15:10  --------------------------------------------------------  IN: 360 mL / OUT: 0 mL / NET: 360 mL      LABS:	 	                        8.5    4.86  )-----------( 239      ( 22 Aug 2020 12:06 )             28.3     08-22    139  |  109<H>  |  10  ----------------------------<  84  4.3   |  19<L>  |  1.18    Ca    9.5      22 Aug 2020 06:33  Phos  2.2     08-22  Mg     2.0     08-22      TELEMETRY: no events	    ECG:  	  RADIOLOGY:   ECHO:  STRESS:  CATH: HPI:  History obtained from patient (reliable) and Dr. Preston’s office note  64 F former smoker with Crestor intolerance (Myalgias) and PMHx HTN, HLD , CAD s/p PCI dLCX and CABG LIMA-Ramus, ANTONIO-PDA (patent stent and patent grafts x 2 by CTA 8/2014), Aortic Valve Disease s/p Bioprosthetic AVR 2002, Mitral Valve Disease s/p Mitral valve annuloplasty 2002, Paroxysmal ATach, Wide Complex Tachycardia (seen on Holter 4/2016 –had ILR placed now disconnected-previously seen by Dr. Jasso), PAD s/p pLSFA stent LCIA stent, with occluded Bilateral SFA (proximal to mid portion per U/S 2019), Chronic Anemia (baseline Hgb 8-8.5 -history of blood transfusions none recently-currently receiving IV Iron, Procrit, Vitamin B12 injections, takes Folic Acid PO-prior colonoscopy no evidence of bleeding ~ 5 years ago), AAA s/p Open repair by Dr. 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 ED 8/20/2020 c/o worsening palpitations x 2 days. She reports long standing history of palpitations however over the past few days symptoms have worsened and palpitations occur intermittently with rest or activity. Patient also admits to 5-8/10 left sided non-radiating chest pain lateral to left breast described as tightness and pressure occurring both at rest and with exertion as well as KRAFT worsening x 2 days. Exercise tolerance is limited by claudication as well as C/P and SOB and is typically <1/2 block. Upon further questioning she reports dizziness (at rest and with changing positions) as well fever of 102.2 F (on 8/19/2020), generalized malaise, and productive cough (clear thick sputum) as well as chronic 4 pillow orthopnea. She denies diaphoresis, N/V, syncope, melena, hematuria, BRBPR, hematemesis, LE edema, PND, loss of taste or smell, abdominal pain, contact with known Covid + individuals or sick contacts, recent travel. Patient reports she self discontinued Furosemide ~ 1 month ago due to normal BP (SBP 120s) and was taken off Losartan (?due to kidney function).  In the ER VSS /87 HR 92 RR 18 Temp 99 F O2 sat 100% RA. 1st set CE minimally elevated Troponin 0.2 and EKG showed NSR at 82 bpm with PACs, no ST or T changes. CT Chest Non Contrast revealed no focal consolidation, Mild linear atelectasis right middle lobe and bilateral lower lobes, trace pleural effusions, cardiomegaly, no pericardial effusions, New crescentic soft tissue density surrounding the aorta at the level of the renal arteries and suprarenal region, 4.0 x 2.0 x 4.0 cm, possibly increased saccular aortic aneurysm, 5.6 cm maximally, previously up to 5.0 cm on June 27, 2014. Covid PCR negative.     she was intially admitted to cardiac telemetry for r/o ACS and transferred to vascular service yesterday with plan to repair her AAA. Today she reports feeling well, no dyspnea, chest pain, no orthopnea. she still gets the occasional palpitations, but not terrible bothersome to her. She underwent a CCTA and TTE pre op (see results below) .     ROS: A 10-point review of systems was otherwise negative.    PAST MEDICAL & SURGICAL HISTORY:  Seizure  Essential hypertension: HTN (hypertension)  Gastroesophageal reflux disease: GERD (gastroesophageal reflux disease)  Hyperlipidemia: Hyperlipidemia  Anemia: Anemia  Hypothyroidism: Hypothyroidism  Atherosclerosis of coronary artery: CAD (coronary artery disease)  Peripheral vascular disease: PVD (peripheral vascular disease)  H/O aortic valve replacement: Bioprosthetic  Atherosclerosis of coronary artery bypass graft(s), unspecified, with angina pectoris with documented spasm  Status post aorto-coronary artery bypass graft: 2012    SOCIAL HISTORY:  FAMILY HISTORY:  No pertinent family history in first degree relatives    ALLERGIES: 	  Crestor (Other (Mod to Severe))  No Known Allergies      MEDICATIONS:  acetaminophen   Tablet .. 650 milliGRAM(s) Oral every 6 hours PRN  amLODIPine   Tablet 10 milliGRAM(s) Oral every 24 hours  aspirin enteric coated 81 milliGRAM(s) Oral daily  carvedilol 12.5 milliGRAM(s) Oral every 12 hours  fenofibrate Tablet 145 milliGRAM(s) Oral daily  folic acid 1 milliGRAM(s) Oral daily  heparin   Injectable 5000 Unit(s) SubCutaneous every 8 hours  isosorbide   mononitrate ER Tablet (IMDUR) 30 milliGRAM(s) Oral every 24 hours  levETIRAcetam 500 milliGRAM(s) Oral every 12 hours  levothyroxine 88 MICROGram(s) Oral daily  sertraline 50 milliGRAM(s) Oral at bedtime      PHYSICAL EXAM:  T(C): 36.2 (08-22-20 @ 14:02), Max: 37.1 (08-21-20 @ 22:11)  HR: 67 (08-22-20 @ 13:24) (61 - 74)  BP: 133/70 (08-22-20 @ 13:24) (116/56 - 138/64)  RR: 17 (08-22-20 @ 13:24) (16 - 18)  SpO2: 97% (08-22-20 @ 13:24) (95% - 98%)  Wt(kg): --    GEN: Awake, comfortable. NAD.   HEENT: NCAT, PERRL, EOMI. Mucosa moist.   RESP: CTA b/l  CV: RRR, 3/6 harsh sysolic murmur radiating to carotids, S2 reduced, no JVD  ABD: Soft, NTND. BS+  EXT: Warm. No edema, clubbing, or cyanosis.   NEURO: AAOx3. No focal deficits.    I&O's Summary    21 Aug 2020 07:01  -  22 Aug 2020 07:00  --------------------------------------------------------  IN: 180 mL / OUT: 0 mL / NET: 180 mL    22 Aug 2020 07:01  -  22 Aug 2020 15:10  --------------------------------------------------------  IN: 360 mL / OUT: 0 mL / NET: 360 mL      LABS:	 	                        8.5    4.86  )-----------( 239      ( 22 Aug 2020 12:06 )             28.3     08-22    139  |  109<H>  |  10  ----------------------------<  84  4.3   |  19<L>  |  1.18    Ca    9.5      22 Aug 2020 06:33  Phos  2.2     08-22  Mg     2.0     08-22      TELEMETRY: no events	    ECG:  	NSR, APC, qW aVL, non specific STTW changes  < from: CT Angio Cardiac w/ IV Cont (08.21.20 @ 13:11) >  1. Patent LIMA to First Diagonal branch.  2. Patent ANTONIO to RPDA.  2. Severe stenosis of the large first diagonal branch. Distal vessel fills via LIMA graft.  3. Severe stenosis of proximal RCA. Distal vessel fills via ANTONIO graft.  4. Nonobstructive disease throughout the LAD.  5. Patent stent in mid LCX extending to the OM2  6. MV annuloplasty ring and Bioprosthetic AVR noted.  7. Calcification throughout the aorta noted.    < from: CT Angio Cardiac w/ IV Cont (08.21.20 @ 13:11) >  1. Severe atherosclerotic disease, with large amount of ulcerated soft plaque in the descending aorta and probable penetrating atherosclerotic ulcers as well.  2. Saccular aneurysms of the descending aorta, largest measuring 3.7 cm distally.  3. Small bilateral pleural effusions.  4. Mild emphysema.  5. Mild thoracic lymphadenopathy, which could be seen in patients with heart failure.    < from: TTE Echo Complete w/o Contrast w/ Doppler (08.21.20 @ 11:48) >   1. There is mild concentric left ventricular hypertrophy. The left ventricle is normal in size, wall thickness, and systolic function with a calculated ejection fraction of 65%. Abnormal septal motion noted.   2. Severely dilated left atrium.   3. Bioprosthetic valve is seen in the aortic position. Peak transvalvular velocity is 3.85 m/s, the mean transvalvular gradient is 31.00 mmHg, and the LVOT/AV velocity ratio is 0.36.   4. There is mild transvalvular aortic regurgitation. There is trace paravalvular aortic regurgitation.   5. An annuloplasty ring is noted in the mitral position. The mean transvalvular gradient is 13.00 mmHg at a heart rate of 81 bpm. Mild mitral regurgitation.   6. There is moderate-to-severe tricuspid regurgitation.   7. Pulmonary hypertension present, pulmonary artery systolic pressure is 48 mmHg.   8. There is trace pulmonic regurgitation.   9. No pericardial effusion.  10. The aortic root is normal in size.  11. Compared to the previous TTE performed on 8/5/2019, higher mean gradient across mitral valve at a higher heart rate and gradients across bioprosthetic aortic valve is unchanged.

## 2020-08-22 NOTE — PROGRESS NOTE ADULT - SUBJECTIVE AND OBJECTIVE BOX
INTERVAL EVENTS:    Pt seen/examined at bedside, no BLACKWELL o/n. Reports pain much improved.    MEDICATIONS  (STANDING):  amLODIPine   Tablet 10 milliGRAM(s) Oral every 24 hours  aspirin enteric coated 81 milliGRAM(s) Oral daily  carvedilol 12.5 milliGRAM(s) Oral every 12 hours  fenofibrate Tablet 145 milliGRAM(s) Oral daily  folic acid 1 milliGRAM(s) Oral daily  heparin   Injectable 5000 Unit(s) SubCutaneous every 8 hours  isosorbide   mononitrate ER Tablet (IMDUR) 30 milliGRAM(s) Oral every 24 hours  levETIRAcetam 500 milliGRAM(s) Oral every 12 hours  levothyroxine 88 MICROGram(s) Oral daily  sertraline 50 milliGRAM(s) Oral at bedtime    MEDICATIONS  (PRN):  acetaminophen   Tablet .. 650 milliGRAM(s) Oral every 6 hours PRN Moderate Pain (4 - 6), Severe Pain (7 - 10)      Vital Signs Last 24 Hrs  T(C): 36.2 (22 Aug 2020 14:02), Max: 37.1 (21 Aug 2020 22:11)  T(F): 97.2 (22 Aug 2020 14:02), Max: 98.7 (21 Aug 2020 22:11)  HR: 67 (22 Aug 2020 13:24) (61 - 74)  BP: 133/70 (22 Aug 2020 13:24) (116/56 - 138/64)  BP(mean): --  RR: 17 (22 Aug 2020 13:24) (16 - 18)  SpO2: 97% (22 Aug 2020 13:24) (95% - 98%)     PHYSICAL EXAM:  GEN: Awake, alert. NAD. Thin  HEENT: NCAT, PERRL, EOMI. Mucosa moist. No JVD.  RESP: CTA b/l  CV: RRR. Normal S1/S2. No m/r/g.  ABD: Soft. NT/ND. BS+  EXT: Warm. No edema, clubbing, or cyanosis.   NEURO: AAOx3. No focal deficits.     LABS:                        8.5    4.86  )-----------( 239      ( 22 Aug 2020 12:06 )             28.3     08-22    139  |  109<H>  |  10  ----------------------------<  84  4.3   |  19<L>  |  1.18    Ca    9.5      22 Aug 2020 06:33  Phos  2.2     08-22  Mg     2.0     08-22              I&O's Summary    21 Aug 2020 07:01  -  22 Aug 2020 07:00  --------------------------------------------------------  IN: 180 mL / OUT: 0 mL / NET: 180 mL    22 Aug 2020 07:01  -  22 Aug 2020 14:48  --------------------------------------------------------  IN: 360 mL / OUT: 0 mL / NET: 360 mL      BNP  RADIOLOGY & ADDITIONAL STUDIES:    TELEMETRY: NSR 50s w/occ PACs. Alexis to high 40s occ (pt denies sx)    EKG:

## 2020-08-22 NOTE — PROGRESS NOTE ADULT - ASSESSMENT
64YOF with history of essential HTN, AAA s/p repair in 2015, CAD s/p PCI and CABG, s/p AVR and MV repair, PAD s/p L common iliac PTA/stent, emphysema (seen on CT, asx), hypothyroidism, seizure disorder admitted initially to cardiology for HTN urgency and ACS rule out, now on vascular service due to finding of aneurysm proximal to prior graft. Plan for OR Tuesday for open repair.    AAA – s/p open repair 2015, now with concern for aneurysm (with subacute contained rupture) proximal to prior graft  Coronary artery disease - stable. s/p multiple PCIs and CABG. On ASA and fenofibrate currently (had myalgias w/statins in past) and will take Zetia on discharge per cardiology  Aortic stenosis s/p AVR 2002 - normal gradients on TTE  Peripheral arterial disease - prior L common iliac stent patent on CTA. Mod stenosis of R CFA w/focal occlusion of R mid-distal CFA w/distal reconstitution and mod stenosis of L CFA w/area of focal high-grade occlusion. On ASA and cilostazol  Elevated BNP - TTE with preserved EF and clinically not volume overloaded  Essential hypertension - previously on atenolol 25mg once daily (now on carvedilol 12.5mg bid), amlodipine 2.5mg once daily (now on 10mg). Also started on imdur 30mg once daily this admit. BP much better controlled  Palpitations - Sinus selvin on tele but asx. If pt becomes sx will need to consider dec BB dosage. No other events. Has ILR but battery dead, needs to be removed post discharge  CKD Stage 3 - baseline Cr felt to be ~1.8-1.9, though on admission creatinine 1.1  Centrilobular Emphysema - seen on CT, consistent w/prior smoking hx. Currently asx, satting well on RA.  Anemia – stable  Hypothyroidism - on 88mcg levothyroxine  Seizure disorder - on keppra 500mg bid  Anxiety/depression - on sertraline 50mg once daily    -cardiology following for preop eval. Given significant cardiac history pt will be high-risk for high-risk procedure.  -monitor blood pressure, uptitrate meds as needed  -surgical planning per vascular

## 2020-08-22 NOTE — PROGRESS NOTE ADULT - SUBJECTIVE AND OBJECTIVE BOX
O/N: YESENIA, VSS                                        Assessment: 64y Female with a PMH of HTN, CAD s/p CABG & PCI, AVR, PAD, hypothyroidism, seizure, chronic anemia, and AAA repair in 2015 (Dr. Sandra) that presented to the ED with chest pain and palpitations. She had elevated troponins, Pro-BNP, but no ST elevation or depression on EKG. A CT chest w/o contrast showed a new crescentic soft tissue density surrounding the aorta at the level of the renal arteries and suprarenal region. She was admitted under Cardiology. CTA was done yesterday that confirmed the presence of a new aneurysm above the graft.     Recommendations:  - We will transfer patient to our service (Cardiology aware)  - Cardiac clearance  - Obtain Carotid Duplex  - Plan for OR on Tuesday (with Dr. Sandra) O/N: YESENIA, YANDY  This AM: Patient doing well without complaints. No chest pain or shortness of breath.      MEDICATIONS  (STANDING):  amLODIPine   Tablet 10 milliGRAM(s) Oral every 24 hours  aspirin enteric coated 81 milliGRAM(s) Oral daily  carvedilol 12.5 milliGRAM(s) Oral every 12 hours  fenofibrate Tablet 145 milliGRAM(s) Oral daily  folic acid 1 milliGRAM(s) Oral daily  heparin   Injectable 5000 Unit(s) SubCutaneous every 8 hours  isosorbide   mononitrate ER Tablet (IMDUR) 30 milliGRAM(s) Oral every 24 hours  levETIRAcetam 500 milliGRAM(s) Oral every 12 hours  levothyroxine 88 MICROGram(s) Oral daily  sertraline 50 milliGRAM(s) Oral at bedtime    MEDICATIONS  (PRN):  acetaminophen   Tablet .. 650 milliGRAM(s) Oral every 6 hours PRN Moderate Pain (4 - 6), Severe Pain (7 - 10)      Allergies    No Known Allergies    Intolerances    Crestor (Other (Mod to Severe))        Vital Signs Last 24 Hrs  T(C): 36.8 (22 Aug 2020 09:19), Max: 37.1 (21 Aug 2020 22:11)  T(F): 98.2 (22 Aug 2020 09:19), Max: 98.7 (21 Aug 2020 22:11)  HR: 62 (22 Aug 2020 08:47) (61 - 74)  BP: 127/62 (22 Aug 2020 08:47) (121/59 - 138/64)  BP(mean): --  RR: 16 (22 Aug 2020 08:47) (16 - 18)  SpO2: 96% (22 Aug 2020 08:47) (95% - 98%)  CAPILLARY BLOOD GLUCOSE          08-21 @ 07:01  -  08-22 @ 07:00  --------------------------------------------------------  IN: 180 mL / OUT: 0 mL / NET: 180 mL    08-22 @ 07:01  -  08-22 @ 09:57  --------------------------------------------------------  IN: 180 mL / OUT: 0 mL / NET: 180 mL        Physical Exam:    General:  Well appearing, NAD  CV:  RRR  Lungs:  nonlabored breathing  Abdomen:  Soft, non-tender, no distended  Extremities:  warm, normal strength, no clubbing/cyanosis/edema, no ulcers seen  Vascular:  Right:  FEM [+]2+ [ ]1+ [ ]doppler    POP [ ]2+ [ ]1+ [+]doppler monophasic  DP [ ]2+ [+]1+ [+]doppler monophasic  PT[ ]2+ [ ]1+ [+]doppler monophasic  Left:  FEM [+]2+ [ ]1+ [ ]doppler  POP [ ]2+ [ ]1+ [+]doppler monophasic  DP [ ]2+ [ ]1+ [+]doppler monophasic  PT [ ]2+ [ ]1+ [+]doppler monophasic      Neuro:  AAOx3    LABS:                        7.3    4.34  )-----------( 222      ( 22 Aug 2020 06:33 )             24.6     08-22    139  |  109<H>  |  10  ----------------------------<  84  4.3   |  19<L>  |  1.18    Ca    9.5      22 Aug 2020 06:33  Phos  2.2     08-22  Mg     2.0     08-22              ---------------------------------------------------------------------------  PLEASE CHECK WHEN PRESENT:     [  ] Heart Failure     [  ] Acute     [  ] Acute on Chronic     [  ] Chronic  -------------------------------------------------------------------     [  ]Diastolic [HFpEF]     [  ]Systolic [HFrEF]     [  ]Combined [HFpEF & HFrEF]     [  ] afib     [  ] hypertensive heart disease     [  ]Other:  -------------------------------------------------------------------  [ ] Respiratory failure  [ ] Acute cor pulmonale  [ ] Asthma/COPD Exacerbation  [ ] Pleural effusion  [ ] Aspiration pneumonia  -------------------------------------------------------------------  [  ]CHERRI     [  ]ATN     [  ]Reneal Medullary Necrosis     [  ]Renal Cortical Necrosis     [  ]Other Pathological Lesions:    [  ]CKD 1  [  ]CKD 2  [  ]CKD 3  [  ]CKD 4  [  ]CKD 5  [  ]Other  -------------------------------------------------------------------  [  ]Diabetes  [  ] Diabetic PVD Ulcer  [  ] Neuropathic ulcer to DM  [  ] Diabetes with Nephropathy  [  ] Osteomyelitis due to diabetes  --------------------------------------------------------------------  [  ]Malnutrition: See Nutrition Note  [  ]Cachexia  [  ]Other:   [  ]Supplement Ordered:  [  ]Morbid Obesity (BMI >=40]  ---------------------------------------------------------------------  [ ] Sepsis/severe sepsis/septic shock  [ ] UTI  [ ] Pneumonia  -----------------------------------------------------------------------  [ ] Acidosis/alkalosis  [ ] Fluid overload  [ ] Hypokalemia  [ ] Hyperkalemia  [ ] Hypomagnesemia  [ ] Hypophosphatemia  [ ] Hyperphosphatemia  ------------------------------------------------------------------------  [ ] Acute blood loss anemia  [ ] Post op blood loss anemia  [ ] Iron deficiency anemia  [ ] Anemia due to chronic disease  [ ] Hypercoagulable state  ----------------------------------------------------------------------  [ ] Cerebral infarction  [ ] Transient ischemia attack  [ ] Encephalopathy      Assessment/Plan: 64y Female with a PMH of HTN, CAD s/p CABG & PCI, AVR, PAD, hypothyroidism, seizure, chronic anemia, and AAA repair in 2015 (Dr. Sandra) that presented to the ED with chest pain and palpitations. She had elevated troponins, Pro-BNP, but no ST elevation or depression on EKG. A CT chest w/o contrast showed a new crescentic soft tissue density surrounding the aorta at the level of the renal arteries and suprarenal region. She was admitted under Cardiology. CTA was done yesterday that confirmed the presence of a new aneurysm above the graft.     -Open thoracoabdominal aneurysm repair planned for Tuesday with Dr. Snadra  -Must have active T&S at all times  -Keep HgB >8 - 7.3 this AM, will transfuse 1U and measure post-transfusion CBC  -Monitor BP - keep <150  -F/u Cardio recs  -F/u Renal recs  -F/u GI recs

## 2020-08-23 LAB
ANION GAP SERPL CALC-SCNC: 8 MMOL/L — SIGNIFICANT CHANGE UP (ref 5–17)
BUN SERPL-MCNC: 9 MG/DL — SIGNIFICANT CHANGE UP (ref 7–23)
CALCIUM SERPL-MCNC: 10 MG/DL — SIGNIFICANT CHANGE UP (ref 8.4–10.5)
CHLORIDE SERPL-SCNC: 111 MMOL/L — HIGH (ref 96–108)
CO2 SERPL-SCNC: 23 MMOL/L — SIGNIFICANT CHANGE UP (ref 22–31)
CREAT SERPL-MCNC: 1.29 MG/DL — SIGNIFICANT CHANGE UP (ref 0.5–1.3)
GLUCOSE SERPL-MCNC: 97 MG/DL — SIGNIFICANT CHANGE UP (ref 70–99)
HCT VFR BLD CALC: 28.7 % — LOW (ref 34.5–45)
HGB BLD-MCNC: 8.6 G/DL — LOW (ref 11.5–15.5)
MAGNESIUM SERPL-MCNC: 2.2 MG/DL — SIGNIFICANT CHANGE UP (ref 1.6–2.6)
MCHC RBC-ENTMCNC: 25 PG — LOW (ref 27–34)
MCHC RBC-ENTMCNC: 30 GM/DL — LOW (ref 32–36)
MCV RBC AUTO: 83.4 FL — SIGNIFICANT CHANGE UP (ref 80–100)
NRBC # BLD: 0 /100 WBCS — SIGNIFICANT CHANGE UP (ref 0–0)
PHOSPHATE SERPL-MCNC: 2.4 MG/DL — LOW (ref 2.5–4.5)
PLATELET # BLD AUTO: 267 K/UL — SIGNIFICANT CHANGE UP (ref 150–400)
POTASSIUM SERPL-MCNC: 4.5 MMOL/L — SIGNIFICANT CHANGE UP (ref 3.5–5.3)
POTASSIUM SERPL-SCNC: 4.5 MMOL/L — SIGNIFICANT CHANGE UP (ref 3.5–5.3)
RBC # BLD: 3.44 M/UL — LOW (ref 3.8–5.2)
RBC # FLD: 19.5 % — HIGH (ref 10.3–14.5)
SODIUM SERPL-SCNC: 142 MMOL/L — SIGNIFICANT CHANGE UP (ref 135–145)
WBC # BLD: 4.31 K/UL — SIGNIFICANT CHANGE UP (ref 3.8–10.5)
WBC # FLD AUTO: 4.31 K/UL — SIGNIFICANT CHANGE UP (ref 3.8–10.5)

## 2020-08-23 PROCEDURE — 99233 SBSQ HOSP IP/OBS HIGH 50: CPT

## 2020-08-23 RX ORDER — POTASSIUM PHOSPHATE, MONOBASIC POTASSIUM PHOSPHATE, DIBASIC 236; 224 MG/ML; MG/ML
15 INJECTION, SOLUTION INTRAVENOUS ONCE
Refills: 0 | Status: COMPLETED | OUTPATIENT
Start: 2020-08-23 | End: 2020-08-23

## 2020-08-23 RX ADMIN — Medication 1 MILLIGRAM(S): at 09:48

## 2020-08-23 RX ADMIN — CARVEDILOL PHOSPHATE 12.5 MILLIGRAM(S): 80 CAPSULE, EXTENDED RELEASE ORAL at 06:00

## 2020-08-23 RX ADMIN — ISOSORBIDE MONONITRATE 30 MILLIGRAM(S): 60 TABLET, EXTENDED RELEASE ORAL at 09:48

## 2020-08-23 RX ADMIN — LEVETIRACETAM 500 MILLIGRAM(S): 250 TABLET, FILM COATED ORAL at 21:15

## 2020-08-23 RX ADMIN — Medication 81 MILLIGRAM(S): at 09:48

## 2020-08-23 RX ADMIN — SERTRALINE 50 MILLIGRAM(S): 25 TABLET, FILM COATED ORAL at 21:15

## 2020-08-23 RX ADMIN — Medication 145 MILLIGRAM(S): at 09:48

## 2020-08-23 RX ADMIN — POTASSIUM PHOSPHATE, MONOBASIC POTASSIUM PHOSPHATE, DIBASIC 63.75 MILLIMOLE(S): 236; 224 INJECTION, SOLUTION INTRAVENOUS at 09:30

## 2020-08-23 RX ADMIN — HEPARIN SODIUM 5000 UNIT(S): 5000 INJECTION INTRAVENOUS; SUBCUTANEOUS at 21:15

## 2020-08-23 RX ADMIN — LEVETIRACETAM 500 MILLIGRAM(S): 250 TABLET, FILM COATED ORAL at 09:48

## 2020-08-23 RX ADMIN — CARVEDILOL PHOSPHATE 12.5 MILLIGRAM(S): 80 CAPSULE, EXTENDED RELEASE ORAL at 17:44

## 2020-08-23 RX ADMIN — Medication 88 MICROGRAM(S): at 06:01

## 2020-08-23 RX ADMIN — HEPARIN SODIUM 5000 UNIT(S): 5000 INJECTION INTRAVENOUS; SUBCUTANEOUS at 14:28

## 2020-08-23 RX ADMIN — AMLODIPINE BESYLATE 10 MILLIGRAM(S): 2.5 TABLET ORAL at 06:01

## 2020-08-23 RX ADMIN — HEPARIN SODIUM 5000 UNIT(S): 5000 INJECTION INTRAVENOUS; SUBCUTANEOUS at 06:01

## 2020-08-23 NOTE — PROGRESS NOTE ADULT - SUBJECTIVE AND OBJECTIVE BOX
INTERVAL EVENTS:    Pt seen/examined at bedside, no BLACKWELL o/n. Endorses occasional episodes of CP, overall improved, otherwise asx.    MEDICATIONS  (STANDING):  amLODIPine   Tablet 10 milliGRAM(s) Oral every 24 hours  aspirin enteric coated 81 milliGRAM(s) Oral daily  carvedilol 12.5 milliGRAM(s) Oral every 12 hours  fenofibrate Tablet 145 milliGRAM(s) Oral daily  folic acid 1 milliGRAM(s) Oral daily  heparin   Injectable 5000 Unit(s) SubCutaneous every 8 hours  isosorbide   mononitrate ER Tablet (IMDUR) 30 milliGRAM(s) Oral every 24 hours  levETIRAcetam 500 milliGRAM(s) Oral every 12 hours  levothyroxine 88 MICROGram(s) Oral daily  sertraline 50 milliGRAM(s) Oral at bedtime    MEDICATIONS  (PRN):  acetaminophen   Tablet .. 650 milliGRAM(s) Oral every 6 hours PRN Moderate Pain (4 - 6), Severe Pain (7 - 10)      Vital Signs Last 24 Hrs  T(C): 36.8 (23 Aug 2020 13:35), Max: 36.8 (23 Aug 2020 09:31)  T(F): 98.3 (23 Aug 2020 13:35), Max: 98.3 (23 Aug 2020 09:31)  HR: 63 (23 Aug 2020 13:11) (57 - 64)  BP: 132/63 (23 Aug 2020 13:11) (125/58 - 173/75)  BP(mean): --  RR: 16 (23 Aug 2020 13:11) (16 - 17)  SpO2: 95% (23 Aug 2020 13:11) (95% - 97%)     PHYSICAL EXAM:  GEN: Awake, alert. NAD. Thin  HEENT: NCAT, PERRL, EOMI. Mucosa moist. No JVD.  RESP: CTA b/l  CV: RRR. Normal S1/S2. 3/6 lj-decres murmur at LUSB.  ABD: Soft. NT/ND. BS+  EXT: Warm. No edema, clubbing, or cyanosis.   NEURO: AAOx3. No focal deficits.     LABS:                        8.6    4.31  )-----------( 267      ( 23 Aug 2020 07:26 )             28.7     08-23    142  |  111<H>  |  9   ----------------------------<  97  4.5   |  23  |  1.29    Ca    10.0      23 Aug 2020 07:26  Phos  2.4     08-23  Mg     2.2     08-23        I&O's Summary    22 Aug 2020 07:01  -  23 Aug 2020 07:00  --------------------------------------------------------  IN: 360 mL / OUT: 0 mL / NET: 360 mL    23 Aug 2020 07:01  -  23 Aug 2020 15:15  --------------------------------------------------------  IN: 240 mL / OUT: 0 mL / NET: 240 mL      BNP  RADIOLOGY & ADDITIONAL STUDIES:    TELEMETRY: NSR in 60s, no events    EKG:

## 2020-08-23 NOTE — PROGRESS NOTE ADULT - ASSESSMENT
A/P:  64 F former smoker with PMHx HTN, HLD (crestor intolerance) , CAD s/p PCI dLCX and CABG LIMA-Ramus, ANTONIO-PDA (patent stent and patent grafts x 2 by CTA 8/2014), Aortic Valve Disease s/p Bioprosthetic AVR 2002 (mPG 31), Mitral Valve Disease s/p Mitral valve annuloplasty 2002 (now severe MS, mild MR), Paroxysmal ATach,), PAD s/p pLSFA stent LCIA stent, with occluded Bilateral SFA (proximal to mid portion per U/S 2019), Chronic Anemia (baseline Hgb 8-8.5), AAA s/p Open repair by Dr. Sandra 4/2/2015 , Hypothyroidism, CKD stage III-IV (Baseline Cr 1.58-1.99 per labs 6/2019-11/2019),  Severe Renal Artery Stenosis (scarring and atrophy of left Kidney 11/21/19), Grand Mal Seizures (on Keppra), Depression/Anxiety who presented to [facility] ED 8/20/2020 c/o worsening palpitations, initially r/o ACS and now on vascular service with plan to perform AAA repair.     #aron-operative cardiac optimization: she is currently asymptomatic and euvolemic on exam. her CCTA shows extensive coronary and vascular disease. her TTE shows moderately stenotic bio-AV, severely stenotic MV.  - c/w Coreg 12.5mg BID, titrate up as tolerated  - avoid excessive afterload reduction   - c/w IMDUR and amlodipine for BP control  - c/w fibrate for now, statin would be ideal but has not tolerated in the past  - consider structural heart disease contult  - cardiology will continue to follow     Yariel Villafuerte MD   Cardiology Fellow    d/w Dr. Altamirano A/P: 64F w/CAD s/p CABG (LIMA-Ramus/free ANTONIO-RPDA) w/bioAVR and MV repair (2002) and PCI (FORD x1 mLCx-OM2, 2007), PAD s/p PTA/FORD L common iliac and pSFA, infrarenal AAA s/p repair (2015), HTN, CKD3, emphysema (seen on CT, asx), hypothyroidism, seizure disorder, anxiety/depression, who presented to [facility] ED 8/20/2020 c/o worsening palpitations, initially r/o ACS and now on vascular service with plan to perform AAA repair.     #aron-operative cardiac optimization: she is currently asymptomatic and euvolemic on exam. her CCTA shows extensive coronary and vascular disease. her TTE shows moderately stenotic bio-AV, severely stenotic MV.  - c/w Coreg 12.5mg BID, titrate up as tolerated  - avoid excessive afterload reduction   - c/w IMDUR and amlodipine for BP control  - c/w fibrate for now, statin would be ideal but has not tolerated in the past  - consider structural heart disease consult given severe MS seen on echo  - cardiology will continue to follow     Will d/w attending  --  Bar Crisostomo MD  Cardiology PGY5

## 2020-08-23 NOTE — PROGRESS NOTE ADULT - SUBJECTIVE AND OBJECTIVE BOX
INTERVAL HPI/OVERNIGHT EVENTS: Patient seen and examined at bedside. No acute events overnight.    REVIEW OF SYSTEMS      General:	    Skin/Breast:  	  Ophthalmologic:  	  ENMT:	    Respiratory and Thorax:  	  Cardiovascular:	    Gastrointestinal:	    Genitourinary:	    Musculoskeletal:	    Neurological:	    Psychiatric:	    Hematology/Lymphatics:	    Endocrine:	    Allergic/Immunologic:	    Marital Status:  (   )    (   ) Single    (   )    (  )   Lives with: (  ) alone  (  ) children   (  ) spouse   (  ) parents  (  ) other  Recent Travel: No recent travel  Occupation:  Mobility:   Substance Use (street drugs): ( x ) never used  (  ) other:  Tobacco Usage:  ( x  ) never smoked   (   ) former smoker   (   ) current smoker  (     ) pack year  Alcohol Usage: None       VITAL SIGNS:  T(F): 98.3 (08-23-20 @ 09:31)  HR: 57 (08-23-20 @ 08:35)  BP: 132/59 (08-23-20 @ 08:35)  RR: 16 (08-23-20 @ 08:35)  SpO2: 95% (08-23-20 @ 08:35)  Wt(kg): --    08-22-20 @ 07:01  -  08-23-20 @ 07:00  --------------------------------------------------------  IN: 360 mL / OUT: 0 mL / NET: 360 mL    08-23-20 @ 07:01  -  08-23-20 @ 12:08  --------------------------------------------------------  IN: 240 mL / OUT: 0 mL / NET: 240 mL        PHYSICAL EXAM:    Constitutional: WDWN resting comfortably in bed; NAD  Head: NC/AT  Eyes: PERRL, EOMI, anicteric sclera  ENT: no oropharyngeal erythema or exudates; MMM  Neck: supple; no JVD  Respiratory: CTA B/L; no W/R/R, no retractions  Cardiac: +S1/S2; RRR; no M/R/G; PMI non-displaced  Gastrointestinal: soft, NT/ND; no rebound or guarding; +BSx4  Genitourinary: normal external genitalia  Back: spine midline, no bony tenderness or step-offs; no CVAT B/L  Extremities: WWP, no clubbing or cyanosis; no peripheral edema  Musculoskeletal: NROM x4; no joint swelling, tenderness or erythema  Vascular: 2+ radial, DP/PT pulses B/L  Lymphatic: no submandibular or cervical LAD  Neurologic: AAOx3; CNII-XII grossly intact; no focal deficits  Psychiatric: affect and characteristics of appearance, verbalizations, behaviors are appropriate    MEDICATIONS  (STANDING):  amLODIPine   Tablet 10 milliGRAM(s) Oral every 24 hours  aspirin enteric coated 81 milliGRAM(s) Oral daily  carvedilol 12.5 milliGRAM(s) Oral every 12 hours  fenofibrate Tablet 145 milliGRAM(s) Oral daily  folic acid 1 milliGRAM(s) Oral daily  heparin   Injectable 5000 Unit(s) SubCutaneous every 8 hours  isosorbide   mononitrate ER Tablet (IMDUR) 30 milliGRAM(s) Oral every 24 hours  levETIRAcetam 500 milliGRAM(s) Oral every 12 hours  levothyroxine 88 MICROGram(s) Oral daily  sertraline 50 milliGRAM(s) Oral at bedtime    MEDICATIONS  (PRN):  acetaminophen   Tablet .. 650 milliGRAM(s) Oral every 6 hours PRN Moderate Pain (4 - 6), Severe Pain (7 - 10)      Allergies    No Known Allergies    Intolerances    Crestor (Other (Mod to Severe))      LABS:                        8.6    4.31  )-----------( 267      ( 23 Aug 2020 07:26 )             28.7     08-23    142  |  111<H>  |  9   ----------------------------<  97  4.5   |  23  |  1.29    Ca    10.0      23 Aug 2020 07:26  Phos  2.4     08-23  Mg     2.2     08-23                EKG:    Echo:    Cath:    NST:    RADIOLOGY & ADDITIONAL TESTS: INTERVAL HPI/OVERNIGHT EVENTS: Patient seen and examined at bedside. No acute events overnight. States she has occasional CP and SOB, but denies any current symptoms.  Denies headaches, chills, nausea, vomiting, palpitations, chest tightness, abdominal pain.    VITAL SIGNS:  T(F): 98.3 (08-23-20 @ 09:31)  HR: 57 (08-23-20 @ 08:35)  BP: 132/59 (08-23-20 @ 08:35)  RR: 16 (08-23-20 @ 08:35)  SpO2: 95% (08-23-20 @ 08:35)  Wt(kg): --    08-22-20 @ 07:01  -  08-23-20 @ 07:00  --------------------------------------------------------  IN: 360 mL / OUT: 0 mL / NET: 360 mL    08-23-20 @ 07:01  -  08-23-20 @ 12:08  --------------------------------------------------------  IN: 240 mL / OUT: 0 mL / NET: 240 mL        PHYSICAL EXAM:    Constitutional: WDWN resting comfortably in bed; NAD  Head: NC/AT  Eyes: PERRL, EOMI, anicteric sclera  ENT: no oropharyngeal erythema or exudates; MMM  Neck: supple; no JVD  Respiratory: CTA B/L; no W/R/R, no retractions  Cardiac: +S1/S2; RRR; no M/R/G; PMI non-displaced  Gastrointestinal: soft, NT/ND; no rebound or guarding; +BSx4  Genitourinary: normal external genitalia  Back: spine midline, no bony tenderness or step-offs; no CVAT B/L  Extremities: WWP, no clubbing or cyanosis; no peripheral edema  Vascular: 2+ radial, DP/PT pulses B/L  Lymphatic: no submandibular or cervical LAD  Neurologic: AAOx3; CNII-XII grossly intact; no focal deficits  Psychiatric: affect and characteristics of appearance, verbalizations, behaviors are appropriate    MEDICATIONS  (STANDING):  amLODIPine   Tablet 10 milliGRAM(s) Oral every 24 hours  aspirin enteric coated 81 milliGRAM(s) Oral daily  carvedilol 12.5 milliGRAM(s) Oral every 12 hours  fenofibrate Tablet 145 milliGRAM(s) Oral daily  folic acid 1 milliGRAM(s) Oral daily  heparin   Injectable 5000 Unit(s) SubCutaneous every 8 hours  isosorbide   mononitrate ER Tablet (IMDUR) 30 milliGRAM(s) Oral every 24 hours  levETIRAcetam 500 milliGRAM(s) Oral every 12 hours  levothyroxine 88 MICROGram(s) Oral daily  sertraline 50 milliGRAM(s) Oral at bedtime    MEDICATIONS  (PRN):  acetaminophen   Tablet .. 650 milliGRAM(s) Oral every 6 hours PRN Moderate Pain (4 - 6), Severe Pain (7 - 10)      Allergies    No Known Allergies    Intolerances    Crestor (Other (Mod to Severe))      LABS:                        8.6    4.31  )-----------( 267      ( 23 Aug 2020 07:26 )             28.7     08-23    142  |  111<H>  |  9   ----------------------------<  97  4.5   |  23  |  1.29    Ca    10.0      23 Aug 2020 07:26  Phos  2.4     08-23  Mg     2.2     08-23                EKG:    Echo:  < from: TTE Echo Complete w/o Contrast w/ Doppler (08.21.20 @ 11:48) >  CONCLUSIONS:     1. There is mild concentric left ventricular hypertrophy. The left ventricle is normal in size, wall thickness, and systolic function with a calculated ejection fraction of 65%. Abnormal septal motion noted.   2. Severely dilated left atrium.   3. Bioprosthetic valve is seen in the aortic position. Peak transvalvular velocity is 3.85 m/s, the mean transvalvular gradient is 31.00 mmHg, and the LVOT/AV velocity ratio is 0.36.   4. There is mild transvalvular aortic regurgitation. There is trace paravalvular aortic regurgitation.   5. An annuloplasty ring is noted in the mitral position. The mean transvalvular gradient is 13.00 mmHg at a heart rate of 81 bpm. Mild mitral regurgitation.   6. There is moderate-to-severe tricuspid regurgitation.   7. Pulmonary hypertension present, pulmonary artery systolic pressure is 48 mmHg.   8. There is trace pulmonic regurgitation.   9. No pericardial effusion.  10. The aortic root is normal in size.  11. Compared to the previous TTE performed on 8/5/2019, higher mean gradient across mitral valve at a higher heart rate and gradients across bioprosthetic aortic valve is unchanged.    < end of copied text >      Cath:    NST:    RADIOLOGY & ADDITIONAL TESTS:

## 2020-08-23 NOTE — PROGRESS NOTE ADULT - ASSESSMENT
64F former smoker w/PMHx CAD s/p CABG (LIMA-Ramus/free ANTONIO-RPDA) w/bioAVR and MV repair (2002) and PCI (FORD x1 mLCx-OM2, 2007), PAD s/p PTA/FORD L common iliac and pSFA, infrarenal AAA s/p repair (2015), HTN, CKD3, emphysema (seen on CT, asx), hypothyroidism, seizure disorder, anx/dep admitted initially to cardiology for HTN urgency and ACS rule out, now on vascular service due to finding of aneurysm proximal to prior graft. Plan for OR Tuesday for open repair.    AAA – s/p open repair 2015, now with concern for aneurysm (with subacute contained rupture) proximal to prior graft  - Plan for open repair 8/25  - Pt currently w/o anginal sx, euvolemic on exam. Endorses limitation w/ambulation primarily due to LE claudication. CCTA shows patent grafts and prior stent, no other concerning obstructive disease. Given significant cardiac hx (CAD s/p CABG/PCI, AVR, MV repair) and other comorbidities (mild emphysema, CKD 3) pt is high risk for high risk procedure. Barrios 3.5% risk of MACE. TTE shows normal EF but bioAVR w/possible mod stenosis (unchanged from prior). Would benefit from cardiac anesthesia for procedure. Cardiology following for continued pre-op assessment, recommend structural heart eval    CAD - h/o CABG w/LIMA-Ramus/free ANTONIO-RPDA in 2002, PCI w/FORD to LCx  - stable, no active ischemic sx, EKG nonischemic. CCTA 8/21 shows patent grafts and prev stent  - C/w ASA81 and fibrate (should ideally be on statin, but reportedly had myalgias in past). C/w Coreg 12.5 BID    Valvular Heart Disease - h/o porcine bioAVR and mitral ring annuloplasty in 2002  - TTE 8/21: Mild LVH, septal WMA (likely 2/2 prior surgery), LVEF 65%. S/p bio AVR (Vpeak 3.85, Gmean 31, DVI 0.36, COY via CE 1.1) w/mild transvalvular AI and trace PVL. S/p MV annuloplasty (Gmean 13 @HR 81, .11) w/mild MR and severe LAE (CAMMY 49.2). PAP 48/12. Norm RV fxn. Mod-sev TR.  - Compared to prior TTE, LVEF remains normal and bioAVR gradients unchanged (but suggestive of mod AS). MV gradients elev compared to prior (7->13), however may partially be related to HR (54->71). MVA via PHT 2.11  - Pt clinically euvolemic on exam, no e/o decompensated CHF  - Cards recommending structural heart eval    Peripheral arterial disease - prior PTA/FORD to L common iliac and pSFA  - CTA shows patent L iliac stent, mod stenosis of R CFA w/focal occlusion of R mid-distal CFA w/distal reconstitution and mod stenosis of L CFA w/area of focal high-grade occlusion  - C/w ASA81. Pletal on hold prior to OR    Essential hypertension   - BP well controlled on current regimen  - C/w Coreg 12.5 BID (goal HR 50s-60s), Imdur 30, Norvasc 10    Palpitations -   - NSR w/some sinus selvin on tele but asx. No other events.  - Has ILR but battery dead, needs to be removed post discharge    CKD Stage 3 - baseline Cr felt to be ~1.8-1.9, though on admission creatinine 1.1. Stable  Centrilobular Emphysema - seen on CT, consistent w/prior smoking hx. Currently asx, satting well on RA.  Anemia – stable  Hypothyroidism - c/w home 88mcg levothyroxine  Seizure disorder - c/w home keppra 500mg bid  Anxiety/depression - c/w home sertraline 50mg once daily

## 2020-08-23 NOTE — PROGRESS NOTE ADULT - SUBJECTIVE AND OBJECTIVE BOX
O/N: YESENIA, VSS  8/22: Received 1u pRBC; Hgb 7.3->8.5. Had 3 beats of Vtach, VSS and otherwise asymptomatic.    ---------------------------------------------------------------------------  PLEASE CHECK WHEN PRESENT:     [  ] Heart Failure     [  ] Acute     [  ] Acute on Chronic     [  ] Chronic  -------------------------------------------------------------------     [  ]Diastolic [HFpEF]     [  ]Systolic [HFrEF]     [  ]Combined [HFpEF & HFrEF]     [  ] afib     [  ] hypertensive heart disease     [  ]Other:  -------------------------------------------------------------------  [ ] Respiratory failure  [ ] Acute cor pulmonale  [ ] Asthma/COPD Exacerbation  [ ] Pleural effusion  [ ] Aspiration pneumonia  -------------------------------------------------------------------  [  ]CHERRI     [  ]ATN     [  ]Reneal Medullary Necrosis     [  ]Renal Cortical Necrosis     [  ]Other Pathological Lesions:    [  ]CKD 1  [  ]CKD 2  [  ]CKD 3  [  ]CKD 4  [  ]CKD 5  [  ]Other  -------------------------------------------------------------------  [  ]Diabetes  [  ] Diabetic PVD Ulcer  [  ] Neuropathic ulcer to DM  [  ] Diabetes with Nephropathy  [  ] Osteomyelitis due to diabetes  --------------------------------------------------------------------  [  ]Malnutrition: See Nutrition Note  [  ]Cachexia  [  ]Other:   [  ]Supplement Ordered:  [  ]Morbid Obesity (BMI >=40]  ---------------------------------------------------------------------  [ ] Sepsis/severe sepsis/septic shock  [ ] UTI  [ ] Pneumonia  -----------------------------------------------------------------------  [ ] Acidosis/alkalosis  [ ] Fluid overload  [ ] Hypokalemia  [ ] Hyperkalemia  [ ] Hypomagnesemia  [ ] Hypophosphatemia  [ ] Hyperphosphatemia  ------------------------------------------------------------------------  [ ] Acute blood loss anemia  [ ] Post op blood loss anemia  [ ] Iron deficiency anemia  [ ] Anemia due to chronic disease  [ ] Hypercoagulable state  ----------------------------------------------------------------------  [ ] Cerebral infarction  [ ] Transient ischemia attack  [ ] Encephalopathy      Assessment/Plan: 64y Female with a PMH of HTN, CAD s/p CABG & PCI, AVR, PAD, hypothyroidism, seizure, chronic anemia, and AAA repair in 2015 (Dr. Sandra) that presented to the ED with chest pain and palpitations. She had elevated troponins, Pro-BNP, but no ST elevation or depression on EKG. A CT chest w/o contrast showed a new crescentic soft tissue density surrounding the aorta at the level of the renal arteries and suprarenal region. She was admitted under Cardiology. CTA was done yesterday that confirmed the presence of a new aneurysm above the graft.     -Open thoracoabdominal aneurysm repair planned for Tuesday with Dr. Sandra  -Must have active T&S at all times  -Keep HgB >8   -Monitor BP - keep <150  -F/u Cardio recs - c/w Coreg 12.5mg BID, titrate up as tolerated, c/w fibrate  -F/u Renal recs  -F/u GI recs O/N: YESENIA, VSS  8/22: Received 1u pRBC; Hgb 7.3->8.5. Had 3 beats of Vtach, VSS and otherwise asymptomatic.    Patient was seen and examined at bedside. No acute event overnight. No complaints.      Vital Signs Last 24 Hrs  T(C): 36.8 (23 Aug 2020 09:31), Max: 36.8 (23 Aug 2020 09:31)  T(F): 98.3 (23 Aug 2020 09:31), Max: 98.3 (23 Aug 2020 09:31)  HR: 57 (23 Aug 2020 08:35) (57 - 67)  BP: 132/59 (23 Aug 2020 08:35) (125/58 - 173/75)  BP(mean): --  RR: 16 (23 Aug 2020 08:35) (16 - 17)  SpO2: 95% (23 Aug 2020 08:35) (95% - 97%)    Physical Exam:  General: NAD  Pulmonary: Nonlabored breathing, no respiratory distress  Cardiovascular: NSR  Abdominal: soft, NT/ND  Extremities: WWP, normal strength, no clubbing/cyanosis/edema  Neuro: A/O x3, no focal deficits, normal sensation  Pulses: palpable distal pulses    Lines/drains/tubes:    I&O's Summary    22 Aug 2020 07:01  -  23 Aug 2020 07:00  --------------------------------------------------------  IN: 360 mL / OUT: 0 mL / NET: 360 mL    23 Aug 2020 07:01  -  23 Aug 2020 11:07  --------------------------------------------------------  IN: 240 mL / OUT: 0 mL / NET: 240 mL        LABS:                        8.6    4.31  )-----------( 267      ( 23 Aug 2020 07:26 )             28.7     08-23    142  |  111<H>  |  9   ----------------------------<  97  4.5   |  23  |  1.29    Ca    10.0      23 Aug 2020 07:26  Phos  2.4     08-23  Mg     2.2     08-23          CAPILLARY BLOOD GLUCOSE            RADIOLOGY & ADDITIONAL STUDIES:        ---------------------------------------------------------------------------  PLEASE CHECK WHEN PRESENT:     [  ] Heart Failure     [  ] Acute     [  ] Acute on Chronic     [  ] Chronic  -------------------------------------------------------------------     [  ]Diastolic [HFpEF]     [  ]Systolic [HFrEF]     [  ]Combined [HFpEF & HFrEF]     [  ] afib     [  ] hypertensive heart disease     [  ]Other:  -------------------------------------------------------------------  [ ] Respiratory failure  [ ] Acute cor pulmonale  [ ] Asthma/COPD Exacerbation  [ ] Pleural effusion  [ ] Aspiration pneumonia  -------------------------------------------------------------------  [  ]CHERRI     [  ]ATN     [  ]Reneal Medullary Necrosis     [  ]Renal Cortical Necrosis     [  ]Other Pathological Lesions:    [  ]CKD 1  [  ]CKD 2  [  ]CKD 3  [  ]CKD 4  [  ]CKD 5  [  ]Other  -------------------------------------------------------------------  [  ]Diabetes  [  ] Diabetic PVD Ulcer  [  ] Neuropathic ulcer to DM  [  ] Diabetes with Nephropathy  [  ] Osteomyelitis due to diabetes  --------------------------------------------------------------------  [  ]Malnutrition: See Nutrition Note  [  ]Cachexia  [  ]Other:   [  ]Supplement Ordered:  [  ]Morbid Obesity (BMI >=40]  ---------------------------------------------------------------------  [ ] Sepsis/severe sepsis/septic shock  [ ] UTI  [ ] Pneumonia  -----------------------------------------------------------------------  [ ] Acidosis/alkalosis  [ ] Fluid overload  [ ] Hypokalemia  [ ] Hyperkalemia  [ ] Hypomagnesemia  [ ] Hypophosphatemia  [ ] Hyperphosphatemia  ------------------------------------------------------------------------  [ ] Acute blood loss anemia  [ ] Post op blood loss anemia  [ ] Iron deficiency anemia  [ ] Anemia due to chronic disease  [ ] Hypercoagulable state  ----------------------------------------------------------------------  [ ] Cerebral infarction  [ ] Transient ischemia attack  [ ] Encephalopathy      Assessment/Plan: 64y Female with a PMH of HTN, CAD s/p CABG & PCI, AVR, PAD, hypothyroidism, seizure, chronic anemia, and AAA repair in 2015 (Dr. Sandra) that presented to the ED with chest pain and palpitations. She had elevated troponins, Pro-BNP, but no ST elevation or depression on EKG. A CT chest w/o contrast showed a new crescentic soft tissue density surrounding the aorta at the level of the renal arteries and suprarenal region. She was admitted under Cardiology. CTA was done yesterday that confirmed the presence of a new aneurysm above the graft.     -Open thoracoabdominal aneurysm repair planned for Tuesday with Dr. Sandra  -Must have active T&S at all times  -Keep HgB >8   -Monitor BP - keep <150  -F/u Cardio recs - c/w Coreg 12.5mg BID, titrate up as tolerated, c/w fibrate  -F/u Renal recs  -F/u GI recs

## 2020-08-24 DIAGNOSIS — J41.0 SIMPLE CHRONIC BRONCHITIS: ICD-10-CM

## 2020-08-24 DIAGNOSIS — N18.9 CHRONIC KIDNEY DISEASE, UNSPECIFIED: ICD-10-CM

## 2020-08-24 DIAGNOSIS — Z01.811 ENCOUNTER FOR PREPROCEDURAL RESPIRATORY EXAMINATION: ICD-10-CM

## 2020-08-24 DIAGNOSIS — I27.20 PULMONARY HYPERTENSION, UNSPECIFIED: ICD-10-CM

## 2020-08-24 LAB
ANION GAP SERPL CALC-SCNC: 12 MMOL/L — SIGNIFICANT CHANGE UP (ref 5–17)
BUN SERPL-MCNC: 9 MG/DL — SIGNIFICANT CHANGE UP (ref 7–23)
CALCIUM SERPL-MCNC: 9.8 MG/DL — SIGNIFICANT CHANGE UP (ref 8.4–10.5)
CHLORIDE SERPL-SCNC: 109 MMOL/L — HIGH (ref 96–108)
CO2 SERPL-SCNC: 22 MMOL/L — SIGNIFICANT CHANGE UP (ref 22–31)
CREAT SERPL-MCNC: 1.31 MG/DL — HIGH (ref 0.5–1.3)
GLUCOSE SERPL-MCNC: 88 MG/DL — SIGNIFICANT CHANGE UP (ref 70–99)
HCT VFR BLD CALC: 29.2 % — LOW (ref 34.5–45)
HGB BLD-MCNC: 8.6 G/DL — LOW (ref 11.5–15.5)
MAGNESIUM SERPL-MCNC: 2 MG/DL — SIGNIFICANT CHANGE UP (ref 1.6–2.6)
MCHC RBC-ENTMCNC: 25.1 PG — LOW (ref 27–34)
MCHC RBC-ENTMCNC: 29.5 GM/DL — LOW (ref 32–36)
MCV RBC AUTO: 85.1 FL — SIGNIFICANT CHANGE UP (ref 80–100)
NRBC # BLD: 0 /100 WBCS — SIGNIFICANT CHANGE UP (ref 0–0)
PHOSPHATE SERPL-MCNC: 3.1 MG/DL — SIGNIFICANT CHANGE UP (ref 2.5–4.5)
PLATELET # BLD AUTO: 276 K/UL — SIGNIFICANT CHANGE UP (ref 150–400)
POTASSIUM SERPL-MCNC: 5 MMOL/L — SIGNIFICANT CHANGE UP (ref 3.5–5.3)
POTASSIUM SERPL-SCNC: 5 MMOL/L — SIGNIFICANT CHANGE UP (ref 3.5–5.3)
PREALB SERPL-MCNC: 14 MG/DL — LOW (ref 20–40)
RBC # BLD: 3.43 M/UL — LOW (ref 3.8–5.2)
RBC # FLD: 19.3 % — HIGH (ref 10.3–14.5)
SODIUM SERPL-SCNC: 143 MMOL/L — SIGNIFICANT CHANGE UP (ref 135–145)
WBC # BLD: 5.14 K/UL — SIGNIFICANT CHANGE UP (ref 3.8–10.5)
WBC # FLD AUTO: 5.14 K/UL — SIGNIFICANT CHANGE UP (ref 3.8–10.5)

## 2020-08-24 PROCEDURE — 99222 1ST HOSP IP/OBS MODERATE 55: CPT

## 2020-08-24 PROCEDURE — 99233 SBSQ HOSP IP/OBS HIGH 50: CPT

## 2020-08-24 PROCEDURE — 99223 1ST HOSP IP/OBS HIGH 75: CPT | Mod: GC

## 2020-08-24 RX ORDER — SODIUM CHLORIDE 9 MG/ML
1000 INJECTION, SOLUTION INTRAVENOUS
Refills: 0 | Status: DISCONTINUED | OUTPATIENT
Start: 2020-08-24 | End: 2020-08-25

## 2020-08-24 RX ADMIN — Medication 88 MICROGRAM(S): at 06:13

## 2020-08-24 RX ADMIN — CARVEDILOL PHOSPHATE 12.5 MILLIGRAM(S): 80 CAPSULE, EXTENDED RELEASE ORAL at 06:13

## 2020-08-24 RX ADMIN — Medication 1 MILLIGRAM(S): at 12:26

## 2020-08-24 RX ADMIN — HEPARIN SODIUM 5000 UNIT(S): 5000 INJECTION INTRAVENOUS; SUBCUTANEOUS at 21:28

## 2020-08-24 RX ADMIN — Medication 145 MILLIGRAM(S): at 12:26

## 2020-08-24 RX ADMIN — AMLODIPINE BESYLATE 10 MILLIGRAM(S): 2.5 TABLET ORAL at 06:13

## 2020-08-24 RX ADMIN — LEVETIRACETAM 500 MILLIGRAM(S): 250 TABLET, FILM COATED ORAL at 08:51

## 2020-08-24 RX ADMIN — Medication 650 MILLIGRAM(S): at 09:51

## 2020-08-24 RX ADMIN — ISOSORBIDE MONONITRATE 30 MILLIGRAM(S): 60 TABLET, EXTENDED RELEASE ORAL at 08:51

## 2020-08-24 RX ADMIN — SERTRALINE 50 MILLIGRAM(S): 25 TABLET, FILM COATED ORAL at 21:28

## 2020-08-24 RX ADMIN — Medication 1 TABLET(S): at 17:56

## 2020-08-24 RX ADMIN — HEPARIN SODIUM 5000 UNIT(S): 5000 INJECTION INTRAVENOUS; SUBCUTANEOUS at 15:10

## 2020-08-24 RX ADMIN — LEVETIRACETAM 500 MILLIGRAM(S): 250 TABLET, FILM COATED ORAL at 21:28

## 2020-08-24 RX ADMIN — CARVEDILOL PHOSPHATE 12.5 MILLIGRAM(S): 80 CAPSULE, EXTENDED RELEASE ORAL at 17:56

## 2020-08-24 RX ADMIN — Medication 650 MILLIGRAM(S): at 08:51

## 2020-08-24 RX ADMIN — Medication 81 MILLIGRAM(S): at 12:26

## 2020-08-24 RX ADMIN — HEPARIN SODIUM 5000 UNIT(S): 5000 INJECTION INTRAVENOUS; SUBCUTANEOUS at 06:13

## 2020-08-24 NOTE — CONSULT NOTE ADULT - PROBLEM SELECTOR RECOMMENDATION 2
In my opinion is most likely related to the type II DUE to her valvular disease. I cannot rule out underlying pulmonic component of pulmonary hypertension. There is no clinical evidence old obstructive lung disease. Patient is compensated and no evidence of cor pulmonale. Patient was seen by cardiology and was cleared

## 2020-08-24 NOTE — CONSULT NOTE ADULT - SUBJECTIVE AND OBJECTIVE BOX
Patient is a 64y old  Female who presents with a chief complaint of palpitations (21 Aug 2020 15:22)      HPI:  History obtained from patient (reliable) and Dr. Preston’s office note  64 F former smoker with Crestor intolerance (Myalgias) and PMHx HTN, HLD , CAD s/p PCI dLCX and CABG LIMA-Ramus, ANTONIO-PDA (patent stent and patent grafts x 2 by CTA 8/2014), Aortic Valve Disease s/p Bioprosthetic AVR 2002, Mitral Valve Disease s/p Mitral valve annuloplasty 2002, Paroxysmal ATach, Wide Complex Tachycardia (seen on Holter 4/2016 –had ILR placed now disconnected-previously seen by Dr. Jasso), PAD s/p pLSFA stent LCIA stent, with occluded Bilateral SFA (proximal to mid portion per U/S 2019), Chronic Anemia (baseline Hgb 8-8.5 -history of blood transfusions none recently-currently receiving IV Iron, Procrit, Vitamin B12 injections, takes Folic Acid PO-prior colonoscopy no evidence of bleeding ~ 5 years ago), AAA s/p Open repair by Dr. Sandra 4/2/2015 , Hypothyroidism, CKD stage III-IV (Baseline Cr 1.58-1.99 per labs 6/2019-11/2019),  Severe Renal Artery Stenosis (scarring and atrophy of left Kidney 11/21/19), Grand Mal Seizures (on Keppra), Depression/Anxiety who presented to [facility] ED 8/20/2020 c/o worsening palpitations x 2 days. She reports long standing history of palpitations however over the past few days symptoms have worsened and palpitations occur intermittently with rest or activity. Patient also admits to 5-8/10 left sided non-radiating chest pain lateral to left breast described as tightness and pressure occurring both at rest and with exertion as well as KRAFT worsening x 2 days. Exercise tolerance is limited by claudication as well as C/P and SOB and is typically <1/2 block. Upon further questioning she reports dizziness (at rest and with changing positions) as well fever of 102.2 F (on 8/19/2020), generalized malaise, and productive cough (clear thick sputum) as well as chronic 4 pillow orthopnea. She denies diaphoresis, N/V, syncope, melena, hematuria, BRBPR, hematemesis, LE edema, PND, loss of taste or smell, abdominal pain, contact with known Covid + individuals or sick contacts, recent travel. Patient reports she self discontinued Furosemide ~ 1 month ago due to normal BP (SBP 120s) and was taken off Losartan (?due to kidney function).  In the ER VSS /87 HR 92 RR 18 Temp 99 F O2 sat 100% RA. 1st set CE minimally elevated Troponin 0.2 and EKG showed NSR at 82 bpm with PACs, no ST or T changes. Labs significant for Hgb/HCT 8.0/26.4, Neutrophils 85% K +3.4, Bicarb 19, Glucose 130, and BNP 10,541. CT Chest Non Contrast revealed no focal consolidation, Mild mild linear atelectasis right middle lobe and bilateral lower lobes, trace pleural effusions, cardiomegaly, no pericardial effusions, New crescentic soft tissue density surrounding the aorta at the level of the renal arteries and suprarenal region, 4.0 x 2.0 x 4.0 cm, possibly increased saccular aortic aneurysm, 5.6 cm maximally, previously up to 5.0 cm on June 27, 2014. Covid PCR negative. Patient now admitted for R/O ACS including serial enzymes, telemetry, as well as further management of AAA (Vascular following). (20 Aug 2020 14:46)      PAST MEDICAL & SURGICAL HISTORY:  Seizure  Essential hypertension: HTN (hypertension)  Gastroesophageal reflux disease: GERD (gastroesophageal reflux disease)  Hyperlipidemia: Hyperlipidemia  Anemia: Anemia  Hypothyroidism: Hypothyroidism  Atherosclerosis of coronary artery: CAD (coronary artery disease)  Peripheral vascular disease: PVD (peripheral vascular disease)  H/O aortic valve replacement: Bioprosthetic  Atherosclerosis of coronary artery bypass graft(s), unspecified, with angina pectoris with documented spasm  Status post aorto-coronary artery bypass graft: 2012      FAMILY HISTORY:  No pertinent family history in first degree relatives      SOCIAL HISTORY:  Smoking Status: [ ] Current, [x ] Former, [ ] Never  Pack Years:    MEDICATIONS:  Pulmonary:    Antimicrobials:    Anticoagulants:  aspirin enteric coated 81 milliGRAM(s) Oral daily  heparin   Injectable 5000 Unit(s) SubCutaneous every 8 hours    Onc:    GI/:    Endocrine:  fenofibrate Tablet 145 milliGRAM(s) Oral daily  levothyroxine 88 MICROGram(s) Oral daily    Cardiac:  amLODIPine   Tablet 10 milliGRAM(s) Oral every 24 hours  carvedilol 12.5 milliGRAM(s) Oral every 12 hours  isosorbide   mononitrate ER Tablet (IMDUR) 30 milliGRAM(s) Oral every 24 hours    Other Medications:  acetaminophen   Tablet .. 650 milliGRAM(s) Oral every 6 hours PRN  folic acid 1 milliGRAM(s) Oral daily  levETIRAcetam 500 milliGRAM(s) Oral every 12 hours  multivitamin 1 Tablet(s) Oral daily  sertraline 50 milliGRAM(s) Oral at bedtime      Allergies    No Known Allergies    Intolerances    Crestor (Other (Mod to Severe))      Review of Systems:   •	General: negative  •	Skin/Breast: negative  •	Ophthalmologic: negative  •	ENMT: negative  •	Respiratory and Thorax: negative  •	Cardiovascular: negative  •	Gastrointestinal: negative  •	Genitourinary: negative  •	Musculoskeletal: negative  •	Neurological: negative  •	Psychiatric: negative  •	Hematology/Lymphatics: negative  •	Endocrine: negative  •	Allergic/Immunologic: negative    Physical Exam:   • Constitutional:	Well-developed, well nourished  • Eyes:	EOMI; PERRL; no drainage or redness  • ENMT:	No oral lesions; no gross abnormalities  • Neck	No bruits; no thyromegaly or nodules  • Breasts:	not examined  • Back:	No deformity or limitation of movement  • Respiratory:	Breath Sounds equal & clear to percussion & auscultation, no accessory muscle use  • Cardiovascular:	Regular rate & rhythm, normal S1, S2; no murmurs, gallops or rubs; no S3, S4  • Gastrointestinal:	Soft, non-tender, no hepatosplenomegaly, normal bowel sounds  • Genitourinary:	not examined  • Rectal: not examined  • Extremities:	No cyanosis, clubbing or edema  • Vascular:	Equal and normal pulses (carotid, femoral, dorsalis pedis)  • Neurologica:l	not examined  • Skin:	No lesions; no rash  • Lymph Nodes:	No lymphadedenopathy  • Musculoskeletal:	No joint pain, swelling or deformity; no limitation of movement      Vital Signs Last 24 Hrs  T(C): 36.8 (24 Aug 2020 13:40), Max: 36.8 (24 Aug 2020 09:32)  T(F): 98.3 (24 Aug 2020 13:40), Max: 98.3 (24 Aug 2020 09:32)  HR: 64 (24 Aug 2020 12:03) (61 - 65)  BP: 117/67 (24 Aug 2020 12:03) (117/67 - 167/72)  BP(mean): --  RR: 16 (24 Aug 2020 12:03) (15 - 18)  SpO2: 98% (24 Aug 2020 12:03) (95% - 100%)    08-23 @ 07:01  -  08-24 @ 07:00  --------------------------------------------------------  IN: 240 mL / OUT: 0 mL / NET: 240 mL    08-24 @ 07:01  -  08-24 @ 15:28  --------------------------------------------------------  IN: 360 mL / OUT: 0 mL / NET: 360 mL          LABS:      CBC Full  -  ( 24 Aug 2020 07:10 )  WBC Count : 5.14 K/uL  RBC Count : 3.43 M/uL  Hemoglobin : 8.6 g/dL  Hematocrit : 29.2 %  Platelet Count - Automated : 276 K/uL  Mean Cell Volume : 85.1 fl  Mean Cell Hemoglobin : 25.1 pg  Mean Cell Hemoglobin Concentration : 29.5 gm/dL  Auto Neutrophil # : x  Auto Lymphocyte # : x  Auto Monocyte # : x  Auto Eosinophil # : x  Auto Basophil # : x  Auto Neutrophil % : x  Auto Lymphocyte % : x  Auto Monocyte % : x  Auto Eosinophil % : x  Auto Basophil % : x    08-24    143  |  109<H>  |  9   ----------------------------<  88  5.0   |  22  |  1.31<H>    Ca    9.8      24 Aug 2020 07:10  Phos  3.1     08-24  Mg     2.0     08-24      < from: CT Angio Chest w/ IV Cont (08.20.20 @ 19:27) >  EXAM:  CT ANGIO CHEST (W)AW IC                          EXAM:  CT ANGIO ABD AOR W RUN(W)AW IC                          PROCEDURE DATE:  08/20/2020          INTERPRETATION:  PROCEDURE INFORMATION:  Exam: CT Angiography Chest With Contrast  Exam dateand time: 8/20/2020 6:23 PM  Age: 64 years old  Clinical indication: Other: Aaa; Prior surgery  TECHNIQUE:  Imaging protocol: Computed tomographic angiography of the chest with intravenous contrast.  COMPARISON:  No relevant prior studies available.    FINDINGS:  Pulmonary arteries: Normal. No pulmonary emboli.  Aorta: Aortic atherosclerotic disease. No evidence of aortic aneurysm or dissection. AVR.  Lungs: Bibasilar atelectasis. No acute consolidation.  Pleural space: Trace bilateral pleural effusions. No pneumothorax.  Heart: Cardiomegaly.  Lymph nodes: Multiple enlarged mediastinal lymph nodes.  Bones/joints: Median sternotomy.  Soft tissues: Unremarkable.    IMPRESSION:  1. No evidence of aortic aneurysm or dissection.  2. No evidence ofacute pulmonary embolism.  3. Trace bilateral pleural effusions.  4. Bibasilar atelectasis. No acute consolidation.    =========================    PROCEDURE INFORMATION:  Exam: CTA Angiogram of the Abdominal Aorta and Bilateral Lower Extremities (Run-off) Without and  With IV Contrast  Exam date and time: 8/20/2020 6:23 PM  Age: 64 years old  Clinical indication: Other: Aaa; Prior surgery  TECHNIQUE:  Imaging protocol: CT angiogram of the abdominal aorta, pelvis and bilateral lower extremities  without and with IV iodinated contrast.  3D rendering (Not supervised by radiologist): MIP and/or 3D reconstructed images were created  by the technologist.  COMPARISON:  No relevant prior studies available.    FINDINGS:  Aorta: Prior repair of infrarenal abdominal aortic aneurysm measuring 3.3 x 3.4 cm. Aneurysm  terminates at the bifurcation. No aortic dissection. Crescentic collection noted adjacent to the  proximal graft with no evidence of active extravasation.  Celiac trunk and mesenteric arteries: No occlusion or significant stenosis.  Renal arteries: Mild right renal artery ostial stenosis. Severe left renal artery ostial stenosis No  occlusion.  Right iliac arteries: No occlusion. Significant atherosclerotic plaque involving the rightcommon and  external iliac arteries.  Right femoral/popliteal arteries: Focal occlusion of the right mid to distal femoral artery with distal  reconstitution. Moderate stenosis of the right common femoral artery.  Right infrapopliteal arteries: Non visualization of the distal right PTA. No occlusion or significant  stenosis of the remaining arteries. .  Left iliac arteries: Moderate stenosis of the left common iliac artery secondary to atherosclerotic  plaque. No occlusion. Significant atherosclerotic calcification involving the left external iliac artery. No  occlusion.  Left femoral/popliteal arteries: Focal high-grade stenosis of the left femoral artery. No occlusion  Moderate stenosis of the left common femoral artery.  Left infrapopliteal arteries: Non visualization of the distal left PTA . No occlusion or significant  stenosis of the remaining arteries.  Liver: Hepatic steatosis.  Gallbladder and bile ducts: Tiny gallstones . No ductal dilation.  Pancreas: Unremarkable. No mass. No ductal dilation.  Spleen: Normal. No splenomegaly.  Adrenals: Indeterminate left adrenal lesion. Normal right adrenal gland.  Kidneys and ureters: Severe left renal atrophy. No hydronephrosis. Right renal cortical scarring.  Stomach and bowel: Colonicdiverticulosis without evidence of diverticulitis. No evidence of bowel  obstruction or mucosal thickening.  Appendix: No evidence of appendicitis.  Bladder: The bladder is grossly normal.  Reproductive: Uterine fibroids.  Intraperitoneal space: Unremarkable. No free air. No significant fluid collection.  Lymph nodes: No lymphadenopathy.  Bones/joints: No acute fracture. No dislocation.  Soft tissues: Unremarkable.    IMPRESSION:  1. Prior repair of infrarenal abdominal aortic aneurysm measuring 3.3 x 3.4 cm. Aneurysm terminates  at the bifurcation. No aortic dissection. Crescentic collection measuring 3.0 x 2.0 cm adjacent to the  proximal graft with no evidence of active extravasation. Finding is concerning for subacute contained  rupture. Recommend direct comparison to prior study for available.  2. Focal occlusion of the right mid to distal femoral artery with distal reconstitution. Moderate stenosis  of the right common femoral artery.  3. Non visualization of the distal right PTA. No occlusion or significant stenosis of the remaining  arteries.  4. Focal high-grade stenosis of the left femoral artery. No occlusion. Moderate stenosis of the left  common femoral artery.  5. Non visualization of the distal left PTA . No occlusion or significant stenosis of the remaining  arteries.  6. Tiny gallstones.  7. Hepatic steatosis.  8. Uterine fibroids.  9. Colonic diverticulosis without evidence of diverticulitis. Normal appendix.    < end of copied text >  < from: TTE Echo Complete w/o Contrast w/ Doppler (08.21.20 @ 11:48) >  ------------------------------------------------------------------------------  CONCLUSIONS:     1. There is mild concentric left ventricular hypertrophy. The left ventricle is normal in size, wall thickness, and systolic function with a calculated ejection fraction of 65%. Abnormal septal motion noted.   2. Severely dilated left atrium.   3. Bioprosthetic valve is seen in the aortic position. Peak transvalvular velocity is 3.85 m/s, the mean transvalvular gradient is 31.00 mmHg, and the LVOT/AV velocity ratio is 0.36.   4. There is mild transvalvular aortic regurgitation. There is trace paravalvular aortic regurgitation.   5. An annuloplasty ring is noted in the mitral position. The mean transvalvular gradient is 13.00 mmHg at a heart rate of 81 bpm. Mild mitral regurgitation.   6. There is moderate-to-severe tricuspid regurgitation.   7. Pulmonary hypertension present, pulmonary artery systolic pressure is 48 mmHg.   8. There is trace pulmonic regurgitation.   9. No pericardial effusion.  10. The aortic root is normal in size.  11. Compared to the previous TTE performed on 8/5/2019, higher mean gradient across mitral valve at a higher heart rate and gradients across bioprosthetic aortic valve is unchanged.    < end of copied text >                  RADIOLOGY & ADDITIONAL STUDIES (The following images were personally reviewed):

## 2020-08-24 NOTE — CONSULT NOTE ADULT - ATTENDING COMMENTS
65yo female with pmhx of prior smoking, HTN, HLD, CAD with s/p PCI to dL Cx and CABG of LIMA to ramus, ANTONIO to RDA (patent stent and grafts identified by VTA on 8/2014), valvular disease with s/p bioAVR and mitral valve annuloplasty 2002, paroxysmal a-tach, wide complex tachycardiac which is seen in holter monitor in 4/2016, PAD with s/p pLSFA stent LCIA stent, with occluded bilateral SFA (proximal to mid portion per u/s 2019), chronic anemia with baseline Hgb 8-8.5 (history of blood transfuion none recently currently receiving IV Iron, Procrit, Vitamin B12 injection, oral folic acid, and benign colonoscopy with no evidence of bleeding), AAA with s/p open repair by Dr. Sandra (4/2/2015), hypothyroidism, CKD stage III-IV (baseline Cr 1.59-1/99), severe renal artery stenosis (scarring and atrophy of the left kidney 11/21/19), grand mal seizure (on Keppra), and depression/anxiety who presents with complaints of abd pain, fever 102.2, generalized malaise, and productive cough (clear thick sputum). at baseline pt reports SOB with exertion to 1/2 block and chest tightness, chronic orthopnea.  CT Chest Non Contrast revealed no focal consolidation, Mild mild linear atelectasis right middle lobe and bilateral lower lobes, trace pleural effusions, cardiomegaly, no pericardial effusions, new crescentic soft tissue density surrounding the aorta at the level of the renal arteries and suprarenal region, 4.0 x 2.0 x 4.0 cm, possibly increased saccular aortic aneurysm, 5.6 cm maximally, previously up to 5.0 cm on June 27, 2014. Covid PCR negative. TTE showed ef 65%, bioAVR with mean gradient 31mmHg, mitral annuloplasty ring with thickened calcified/thickened leaflets, mean graident 13mmHg, mod-severe TR. CTA showed patent grafts/PCI. exam remarkable for 3/6 CRAO, 2/6 HDM LSB, dim s2, no rg, cta bl no wrr, no cce. imp: fever unclear etiology; contained AAA rupture; mod-severe bioAVR dysfunction; MS in setting of annuloplasty ring, anemia; chest pain with patient LIMA/ANTONIO, PCI by CTA; PAD; chronic diastolic CHF NYHA III. pt with extensive cardiac history now with contained rupture of AAA. given euvolemic state and not in acute CHF with emergent need for repair, conservative management at this time for valuvlar heart disease.  -maintain hgb >9, euvolemic status periop  -cardiac anesthesia, consider SG intraop for fluid management  -will need ALBER in the future to better assess valvular heart disease  -fever w/u, consider ID consultation 65yo female with pmhx of prior smoking, HTN, HLD, CAD with s/p PCI to dL Cx and CABG of LIMA to ramus, ANTONIO to RDA (patent stent and grafts identified by VTA on 8/2014), valvular disease with s/p bioAVR and mitral valve annuloplasty 2002, paroxysmal a-tach, wide complex tachycardiac which is seen in holter monitor in 4/2016, PAD with s/p pLSFA stent LCIA stent, with occluded bilateral SFA (proximal to mid portion per u/s 2019), chronic anemia with baseline Hgb 8-8.5 (history of blood transfuion none recently currently receiving IV Iron, Procrit, Vitamin B12 injection, oral folic acid, and benign colonoscopy with no evidence of bleeding), AAA with s/p open repair by Dr. Sandra (4/2/2015), hypothyroidism, CKD stage III-IV (baseline Cr 1.59-1/99), severe renal artery stenosis (scarring and atrophy of the left kidney 11/21/19), grand mal seizure (on Keppra), and depression/anxiety who presents with complaints of abd pain, fever 102.2, generalized malaise, and productive cough (clear thick sputum). at baseline pt reports SOB with exertion to 1/2 block and chest tightness, chronic orthopnea.  CT Chest Non Contrast revealed no focal consolidation, Mild mild linear atelectasis right middle lobe and bilateral lower lobes, trace pleural effusions, cardiomegaly, no pericardial effusions, new crescentic soft tissue density surrounding the aorta at the level of the renal arteries and suprarenal region, 4.0 x 2.0 x 4.0 cm, possibly increased saccular aortic aneurysm, 5.6 cm maximally, previously up to 5.0 cm on June 27, 2014. Covid PCR negative. TTE showed ef 65%, bioAVR with mean gradient 31mmHg, mitral annuloplasty ring with thickened calcified/thickened leaflets, mean graident 13mmHg, mod-severe TR. CTA showed patent grafts/PCI. exam remarkable for 3/6 CARO, 2/6 HDM LSB, dim s2, no rg, cta bl no wrr, no cce. imp: fever unclear etiology; contained AAA rupture; mod-severe bioAVR dysfunction; MS in setting of annuloplasty ring, anemia; chest pain with patient LIMA/ANTONIO, PCI by CTA; PAD; chronic diastolic CHF NYHA III. pt with extensive cardiac history now with contained rupture of AAA. given euvolemic state and not in acute CHF with emergent need for repair, conservative management at this time for valuvlar heart disease.  -maintain hgb >9, euvolemic status periop  -cardiac anesthesia, consider SG intraop for fluid management  -d/c nitrates, maintain HR 60s-70s  -will need ALBER in the future to better assess valvular heart disease  -fever w/u, consider ID consultation

## 2020-08-24 NOTE — PROGRESS NOTE ADULT - ASSESSMENT
64F former smoker w/PMHx CAD s/p CABG (LIMA-Ramus/free ANTONIO-RPDA) w/bioAVR and MV repair (2002) and PCI (FORD x1 mLCx-OM2, 2007), PAD s/p PTA/FORD L common iliac and pSFA, infrarenal AAA s/p repair (2015), HTN, CKD3, emphysema (seen on CT, asx), hypothyroidism, seizure disorder, anx/dep admitted initially to cardiology for HTN urgency and ACS rule out, now on vascular service due to finding of aneurysm proximal to prior graft. Plan for OR Tuesday for open repair.    AAA   s/p open repair 2015, now with concern for aneurysm (with subacute contained rupture) proximal to prior graft  -Plan for open repair 8/25  -preop eval per cardiology - high risk for high risk procedure (significant cardiac history with CAD s/p CABG, AVR, MV repair), Barrios 3.5% risk of MACE. Would benefit from cardiac anesthesia for procedure.   -Cardiology following for continued pre-op assessment, recommend structural heart eval    CAD   h/o CABG w/LIMA-Ramus/free ANTONIO-RPDA in 2002, PCI w/FORD to LCx  - stable, no active ischemic sx, EKG nonischemic. CCTA 8/21 shows patent grafts and prev stent  - C/w ASA81 and fibrate (should ideally be on statin, but reportedly had myalgias in past). C/w Coreg 12.5 BID    Valvular Heart Disease  History of porcine bioAVR and mitral ring annuloplasty in 2002  - TTE 8/21: Mild LVH, septal WMA (likely 2/2 prior surgery), LVEF 65%. S/p bio AVR with mild transvalvular AI and trace PVL. S/p MV annuloplasty with mild MR and severe LAE. Normal RV fxn. Mod-sev TR.  - Pt clinically euvolemic on exam, no e/o decompensated CHF  - Cards recommending structural heart eval    Peripheral arterial disease  prior PTA/FORD to L common iliac and pSFA  - CTA shows patent L iliac stent, mod stenosis of R CFA w/focal occlusion of R mid-distal CFA w/distal reconstitution and mod stenosis of L CFA w/area of focal high-grade occlusion  - C/w ASA81.     Essential hypertension   - BP well controlled on current regimen  - C/w Coreg 12.5 BID (goal HR 50s-60s), Imdur 30, Norvasc 10    Palpitations  - NSR w/some sinus selvin on tele but asx. No other events.  - Has ILR but battery dead, needs to be removed post discharge    CKD Stage 3  Baseline Cr felt to be ~1.8-1.9, though on admission creatinine 1.1. Stable    Centrilobular Emphysema -  seen on CT, consistent w/prior smoking hx. Currently asx, satting well on RA.  Anemia – stable  Hypothyroidism - c/w home 88mcg levothyroxine  Seizure disorder - c/w home keppra 500mg bid  Anxiety/depression - c/w home sertraline 50mg once daily

## 2020-08-24 NOTE — CHART NOTE - NSCHARTNOTEFT_GEN_A_CORE
Upon Nutritional Assessment by the Registered Dietitian your patient was determined to meet criteria / has evidence of the following diagnosis/diagnoses:          [ ]  Mild Protein Calorie Malnutrition        [x ]  Moderate Protein Calorie Malnutrition        [ ] Severe Protein Calorie Malnutrition        [ ] Unspecified Protein Calorie Malnutrition        [ ] Underweight / BMI <19        [ ] Morbid Obesity / BMI > 40      Findings as based on:  •  Comprehensive nutrition assessment and consultation    >> PO intake/Weight Loss:  Pt reports  pounds, reports now weighing 123 pounds. Wt loss of 24 pounds / 16% body wt loss based on UBW/Admit wt. Pt reports wt loss began in March (x4 Months) during Covid pandemic. Pt reports not wanting to leave house to go food shopping - decreased PO intake as result. Pt denies eating canned food during this time, however does report consuming ensure which she would buy in bulk. Would consume ~2/day. Aside from ensure also consuming things such as seafood and burgers. Of note prior to pandemic pt also with c/o  PO intake / appetite 2/2 AAA and not wanting to eat.  Assume meeting <75% EER.  >> NFPE: Mild wasting to temple region noted.     1. DASH TLC diet + Ensure Enlive x1/day (350kcal/20gm prot).  >> Consider adding soft/low fiber PRN Pending thoracoabdominal aneurysm repair.  2. Monitor %PO intake, Diet tolerance,.  3. MVI daily.  4. Monitor Skin, Wts, GOC.  5. Pain/GI management per team.  6. Labs: monitor BMP, CBC, glucose, lytes, trend renal indices, LFts, POCT.  7. RD to remain available for additional nutrition interventions as needed.     Education: Discussed soft protein options, calorically dense Heart Healthy foods. Discussed oral nutrition supplements. Understanding stated, provide additional motivation as needed.      PROVIDER Section:     By signing this assessment you are acknowledging and agree with the diagnosis/diagnoses assigned by the Registered Dietitian    Comments:

## 2020-08-24 NOTE — PROGRESS NOTE ADULT - SUBJECTIVE AND OBJECTIVE BOX
ID: [id number] former smoker w/PMHx CAD s/p CABG (LIMA-Ramus/free ANTONIO-RPDA) w/bioAVR and MV repair (2002) and PCI (FORD x1 mLCx-OM2, 2007), PAD s/p PTA/FORD L common iliac and pSFA, infrarenal AAA s/p repair (2015), HTN, CKD3, emphysema (seen on CT, asx), hypothyroidism, seizure disorder, anx/dep admitted initially to cardiology for HTN urgency and ACS rule out, now on vascular service due to finding of aneurysm proximal to prior graft. Plan for OR Tuesday for open repair.    Subjective/Interval events:  No acute overnight events. Chest discomfort/palpitations from earlier are improved. No shortness of breath, no leg edema. Tolerating diet.    MEDICATIONS  (STANDING):  amLODIPine   Tablet 10 milliGRAM(s) Oral every 24 hours  aspirin enteric coated 81 milliGRAM(s) Oral daily  carvedilol 12.5 milliGRAM(s) Oral every 12 hours  fenofibrate Tablet 145 milliGRAM(s) Oral daily  folic acid 1 milliGRAM(s) Oral daily  heparin   Injectable 5000 Unit(s) SubCutaneous every 8 hours  isosorbide   mononitrate ER Tablet (IMDUR) 30 milliGRAM(s) Oral every 24 hours  levETIRAcetam 500 milliGRAM(s) Oral every 12 hours  levothyroxine 88 MICROGram(s) Oral daily  sertraline 50 milliGRAM(s) Oral at bedtime    MEDICATIONS  (PRN):  acetaminophen   Tablet .. 650 milliGRAM(s) Oral every 6 hours PRN Moderate Pain (4 - 6), Severe Pain (7 - 10)    Vital Signs Last 24 Hrs  T(C): 36.8 (24 Aug 2020 09:32), Max: 36.8 (23 Aug 2020 13:35)  T(F): 98.3 (24 Aug 2020 09:32), Max: 98.3 (23 Aug 2020 13:35)  HR: 62 (24 Aug 2020 08:43) (61 - 65)  BP: 132/63 (24 Aug 2020 08:43) (132/63 - 167/72)  BP(mean): --  RR: 16 (24 Aug 2020 08:43) (15 - 18)  SpO2: 98% (24 Aug 2020 08:43) (95% - 100%)    PHYSICAL EXAM:  GENERAL: NAD, well-groomed, well-developed  HEENT - NC/AT, pupils equal and reactive to light,  ; Moist mucous membranes, Good dentition, No lesions  NECK: Supple, No JVD  CHEST/LUNG: Clear to auscultation bilaterally; No rales, rhonchi, wheezing  HEART: Regular rate and rhythm; 2/6 early systolic murmur, No rubs or gallops  ABDOMEN: Soft, Nontender, Nondistended; Bowel sounds present  EXTREMITIES:  2+ Peripheral Pulses, No clubbing, cyanosis, or edema  NEURO:  No Focal deficits, sensory and motor intact  SKIN: No rashes or lesions    LABS:  08-24    143  |  109<H>  |  9   ----------------------------<  88  5.0   |  22  |  1.31<H>    Ca    9.8      24 Aug 2020 07:10  Phos  3.1     08-24  Mg     2.0     08-24               8.6    5.14  )-----------( 276      ( 24 Aug 2020 07:10 )             29.2         RADIOLOGY & ADDITIONAL TESTS:           reviewed, none new

## 2020-08-24 NOTE — CHART NOTE - NSCHARTNOTEFT_GEN_A_CORE
Admitting Diagnosis:   Patient is a 64y old  Female who presents with a chief complaint of palpitations (21 Aug 2020 15:22)      Consult: Yes [   ]  No [   ]    Reason for Initial Nutrition Assessment:      PAST MEDICAL & SURGICAL HISTORY:  Seizure  Essential hypertension: HTN (hypertension)  Gastroesophageal reflux disease: GERD (gastroesophageal reflux disease)  Hyperlipidemia: Hyperlipidemia  Anemia: Anemia  Hypothyroidism: Hypothyroidism  Atherosclerosis of coronary artery: CAD (coronary artery disease)  Peripheral vascular disease: PVD (peripheral vascular disease)  H/O aortic valve replacement: Bioprosthetic  Atherosclerosis of coronary artery bypass graft(s), unspecified, with angina pectoris with documented spasm  Status post aorto-coronary artery bypass graft: 2012      Current Nutrition Order:    PO Intake: Good (%) [   ]  Fair (50-75%) [   ] Poor (<25%) [   ]    GI Issues:     Pain:    Skin Integrity:    Labs:   08-24    143  |  109<H>  |  9   ----------------------------<  88  5.0   |  22  |  1.31<H>    Ca    9.8      24 Aug 2020 07:10  Phos  3.1     08-24  Mg     2.0     08-24      CAPILLARY BLOOD GLUCOSE        Nutritionally Pertinent Lab Values:    Medications:  MEDICATIONS  (STANDING):  amLODIPine   Tablet 10 milliGRAM(s) Oral every 24 hours  aspirin enteric coated 81 milliGRAM(s) Oral daily  carvedilol 12.5 milliGRAM(s) Oral every 12 hours  fenofibrate Tablet 145 milliGRAM(s) Oral daily  folic acid 1 milliGRAM(s) Oral daily  heparin   Injectable 5000 Unit(s) SubCutaneous every 8 hours  isosorbide   mononitrate ER Tablet (IMDUR) 30 milliGRAM(s) Oral every 24 hours  levETIRAcetam 500 milliGRAM(s) Oral every 12 hours  levothyroxine 88 MICROGram(s) Oral daily  sertraline 50 milliGRAM(s) Oral at bedtime    MEDICATIONS  (PRN):  acetaminophen   Tablet .. 650 milliGRAM(s) Oral every 6 hours PRN Moderate Pain (4 - 6), Severe Pain (7 - 10)      Admitted Anthropometrics:    Weight:  Daily     Daily     Weight Change:     Nutrition Focused Physical Exam: Completed [   ]  Unable to complete [   ]  Muscle Wasting:  Subcutaneous Fat Wasting:    Estimated energy needs:     Subjective:   64 yoF former smoker PMHx HTN, HLD, CAD s/p PCI dLCX and CABG LIMA-Ramus, ANTONIO-PDA (patent stent and patent grafts x 2 by CTA 8/2014), Aortic Valve Disease s/p Bioprosthetic AVR 2002, Mitral Valve Disease s/p Mitral valve annuloplasty 2002, Paroxysmal ATach, Wide Complex Tachycardia (seen on Holter 4/2016 –had ILR placed now disconnected), PAD s/p pLSFA stent LCIA stent, with occluded Bilateral SFA (proximal to mid portion per U/S 2019), Chronic Anemia (baseline Hgb 8-8.5 -history of blood transfusions none recently-currently receiving IV Iron, Procrit, Vitamin B12 injections, takes Folic Acid PO), AAA s/p Open [id number], Hypothyroidism, CKD stage III-IV (Baseline Cr 1.58-1.99 6/2019-11/2019),  Severe Renal Artery Stenosis (scarring and atrophy of left Kidney 11/21/19), Grand Mal Seizures, Depression/Anxiety who presented to [facility] ED 8/20 c/o worsening palpitations x 2 days. Pt admitted for R/O ACS + HTN urgency, as well as further management of AAA. TTE shows normal EF but bioAVR + possible mod stenosis (unchanged from prior). CTA confirmed the presence of a new aortic aneurysm above the graft - Open thoracoabdominal aneurysm repair planned for 8/25.    Nutrition Diagnosis:    [  ] No active nutrition diagnosis at this time  [  ] Current medical condition precludes nutrition intervention    Goal:    Recommendations:    Education:     Risk Level: High [   ] Moderate [   ] Low [   ] Admitting Diagnosis:   Patient is a 64y old  Female who presents with a chief complaint of palpitations (21 Aug 2020 15:22)      Consult: Yes [   ]  No [  x ]    Reason for Initial Nutrition Assessment: LOS       PAST MEDICAL & SURGICAL HISTORY:  Seizure  Essential hypertension: HTN (hypertension)  Gastroesophageal reflux disease: GERD (gastroesophageal reflux disease)  Hyperlipidemia: Hyperlipidemia  Anemia: Anemia  Hypothyroidism: Hypothyroidism  Atherosclerosis of coronary artery: CAD (coronary artery disease)  Peripheral vascular disease: PVD (peripheral vascular disease)  H/O aortic valve replacement: Bioprosthetic  Atherosclerosis of coronary artery bypass graft(s), unspecified, with angina pectoris with documented spasm  Status post aorto-coronary artery bypass graft:       Current Nutrition Order:  [place] TLC diet    PO Intake: Good (%) [   ]  Fair (50-75%) [   ] Poor (<25%) [   ]  Please see Below     GI Issues:        Pain:  none per flow sheets    Skin Integrity:  No edema  No pressure ulcers     Labs:   08-    143  |  109<H>  |  9   ----------------------------<  88  5.0   |  22  |  1.31<H>    Ca    9.8      24 Aug 2020 07:10  Phos  3.1     08-24  Mg     2.0     08-24      CAPILLARY BLOOD GLUCOSE        Nutritionally Pertinent Lab Values:  BUN WDL, Cr 1.31   / CHOL 226  A1c 5.6%  Phos WDL - low Prior     Medications:  MEDICATIONS  (STANDING):  amLODIPine   Tablet 10 milliGRAM(s) Oral every 24 hours  aspirin enteric coated 81 milliGRAM(s) Oral daily  carvedilol 12.5 milliGRAM(s) Oral every 12 hours  fenofibrate Tablet 145 milliGRAM(s) Oral daily  folic acid 1 milliGRAM(s) Oral daily  heparin   Injectable 5000 Unit(s) SubCutaneous every 8 hours  isosorbide   mononitrate ER Tablet (IMDUR) 30 milliGRAM(s) Oral every 24 hours  levETIRAcetam 500 milliGRAM(s) Oral every 12 hours  levothyroxine 88 MICROGram(s) Oral daily  sertraline 50 milliGRAM(s) Oral at bedtime    MEDICATIONS  (PRN):  acetaminophen   Tablet .. 650 milliGRAM(s) Oral every 6 hours PRN Moderate Pain (4 - 6), Severe Pain (7 - 10)      Admitted Anthropometrics:  5'7''  pounds +-10%    123 pounds  BMI 19.3 %IBW=91    Weight Change:  pounds, reports now weighing 123 pounds. Wt loss of 24 pounds / 16% body wt loss based on UBW/Admit wt.     Nutrition Focused Physical Exam: Mild wasting to temple region noted.     Estimated energy needs:   current body wt used for energy calculations as pt falls within % IBW  adjusted for age based on suspected malnutrition   1400-1680kcal/day 25-30kcal/kg  62-73gm/day 1.1-1.3gm/kg     Subjective:   64 yoF former smoker PMHx HTN, HLD, CAD s/p PCI dLCX and CABG LIMA-Ramus, ANTONIO-PDA (patent stent and patent grafts x 2 by CTA 2014), Aortic Valve Disease s/p Bioprosthetic AVR , Mitral Valve Disease s/p Mitral valve annuloplasty , Paroxysmal ATach, Wide Complex Tachycardia (seen on Holter 2016 –had ILR placed now disconnected), PAD s/p pLSFA stent LCIA stent, with occluded Bilateral SFA (proximal to mid portion per U/S ), Chronic Anemia (baseline Hgb 8-8.5 -history of blood transfusions none recently-currently receiving IV Iron, Procrit, Vitamin B12 injections, takes Folic Acid PO), AAA s/p Open [id number], Hypothyroidism, CKD stage III-IV (Baseline Cr 1.58-1.99 2019-2019),  Severe Renal Artery Stenosis (scarring and atrophy of left Kidney 19), Grand Mal Seizures, Depression/Anxiety who presented to [facility] ED  c/o worsening palpitations x 2 days. Pt admitted for R/O ACS + HTN urgency, as well as further management of AAA. TTE shows normal EF but bioAVR + possible mod stenosis (unchanged from prior). CTA confirmed the presence of a new aortic aneurysm above the graft - Open thoracoabdominal aneurysm repair planned for .  Pt visited, alert in room. Pt reports  pounds, reports now weighing 123 pounds. Wt loss of 24 pounds / 16% body wt loss based on UBW/Admit wt. Pt reports wt loss began in March (x4 Months) during Covid pandemic. Pt reports not wanting to leave house to go food shopping - decreased PO intake as result. Assume meeting <75% EER. Pt denies eating canned food during this time, however does report consuming ensure which she would buy in bulk. Would consume ~2/day. Aside from ensure also consuming things such as seafood and burgers. Of note prior to pandemic pt also with c/o  PO intake / appetite 2/2 AAA and not wanting to eat. Pt now on DASH TLC diet, reports PO intake is better however has been "picking" on the food. NKFA. No issues chewing/swallowing at this time.   Please see below for nutritions recommendations.  Recs made with team.     Nutrition Diagnosis: Suspected Moderate malnutrition RT presumed intake<EER AEB wt loss PO intake NFPE   Goal: Pt will meet at least 75% of nutrient needs     Recommendations:  1. DASH TLC diet + Ensure Enlive x1/day (350kcal/20gm prot).  >> Consider adding soft/low fiber PRN Pending thoracoabdominal aneurysm repair.  2. Monitor %PO intake, Diet tolerance,.  3. MVI daily.  4. Monitor Skin, Wts, GOC.  5. Pain/GI management per team.  6. Labs: monitor BMP, CBC, glucose, lytes, trend renal indices, LFts, POCT.  7. RD to remain available for additional nutrition interventions as needed.     Education: Discussed soft protein options, calorically dense Heart Healthy foods. Discussed oral nutrition supplements. Understanding stated, provide additional motivation as needed.     Risk Level: High [  X ] Moderate [   ] Low [   ]

## 2020-08-24 NOTE — PROGRESS NOTE ADULT - SUBJECTIVE AND OBJECTIVE BOX
24hr Events:  O/N: YANDY PATTERSON  8/23: Stable. Afebrile. Guilherme Dozier was consulted for COPD preop evaluation and recs O/N: YANDY PATTERSON            ---------------------------------------------------------------------------  PLEASE CHECK WHEN PRESENT:     [  ] Heart Failure     [  ] Acute     [  ] Acute on Chronic     [  ] Chronic  -------------------------------------------------------------------     [  ]Diastolic [HFpEF]     [  ]Systolic [HFrEF]     [  ]Combined [HFpEF & HFrEF]     [  ] afib     [  ] hypertensive heart disease     [  ]Other:  -------------------------------------------------------------------  [ ] Respiratory failure  [ ] Acute cor pulmonale  [ ] Asthma/COPD Exacerbation  [ ] Pleural effusion  [ ] Aspiration pneumonia  -------------------------------------------------------------------  [  ]CHERRI     [  ]ATN     [  ]Reneal Medullary Necrosis     [  ]Renal Cortical Necrosis     [  ]Other Pathological Lesions:    [  ]CKD 1  [  ]CKD 2  [  ]CKD 3  [  ]CKD 4  [  ]CKD 5  [  ]Other  -------------------------------------------------------------------  [  ]Diabetes  [  ] Diabetic PVD Ulcer  [  ] Neuropathic ulcer to DM  [  ] Diabetes with Nephropathy  [  ] Osteomyelitis due to diabetes  --------------------------------------------------------------------  [  ]Malnutrition: See Nutrition Note  [  ]Cachexia  [  ]Other:   [  ]Supplement Ordered:  [  ]Morbid Obesity (BMI >=40]  ---------------------------------------------------------------------  [ ] Sepsis/severe sepsis/septic shock  [ ] UTI  [ ] Pneumonia  -----------------------------------------------------------------------  [ ] Acidosis/alkalosis  [ ] Fluid overload  [ ] Hypokalemia  [ ] Hyperkalemia  [ ] Hypomagnesemia  [ ] Hypophosphatemia  [ ] Hyperphosphatemia  ------------------------------------------------------------------------  [ ] Acute blood loss anemia  [ ] Post op blood loss anemia  [ ] Iron deficiency anemia  [ ] Anemia due to chronic disease  [ ] Hypercoagulable state  ----------------------------------------------------------------------  [ ] Cerebral infarction  [ ] Transient ischemia attack  [ ] Encephalopathy      Assessment/Plan: 64y Female with a PMH of HTN, CAD s/p CABG & PCI, AVR, PAD, hypothyroidism, seizure, chronic anemia, and AAA repair in 2015 (Dr. Sandra) that presented to the ED with chest pain and palpitations. She had elevated troponins, Pro-BNP, but no ST elevation or depression on EKG. A CT chest w/o contrast showed a new crescentic soft tissue density surrounding the aorta at the level of the renal arteries and suprarenal region. She was admitted under Cardiology. CTA was done yesterday that confirmed the presence of a new aneurysm above the graft.     -Open thoracoabdominal aneurysm repair planned for Tuesday with Dr. Sandra  -Must have active T&S at all times  -Keep HgB >8   -Monitor BP - keep <150  -F/u Cardio recs - c/w Coreg 12.5mg BID, titrate up as tolerated, c/w fibrate  -F/u Renal recs  -F/u GI recs  -F/u AM labs 24hr Events:  O/N: YANDY PATTERSON  8/23: Stable. Afebrile. / Jacoby was consulted for COPD preop evaluation and recs O/N: YESENIA, VSS      amLODIPine   Tablet 10  aspirin enteric coated 81  carvedilol 12.5  heparin   Injectable 5000  isosorbide   mononitrate ER Tablet (IMDUR) 30        Vital Signs Last 24 Hrs  T(C): 36.3 (24 Aug 2020 04:57), Max: 36.8 (23 Aug 2020 09:31)  T(F): 97.4 (24 Aug 2020 04:57), Max: 98.3 (23 Aug 2020 09:31)  HR: 65 (24 Aug 2020 06:00) (57 - 65)  BP: 167/72 (24 Aug 2020 06:00) (132/59 - 167/72)  BP(mean): --  RR: 18 (24 Aug 2020 06:00) (15 - 18)  SpO2: 97% (24 Aug 2020 06:00) (95% - 100%)  I&O's Summary    23 Aug 2020 07:01  -  24 Aug 2020 07:00  --------------------------------------------------------  IN: 240 mL / OUT: 0 mL / NET: 240 mL        Physical Exam:  General: NAD, resting comfortably in bed  Pulmonary: Nonlabored breathing, no respiratory distress  Abdominal: soft, NT/ND  Extremities: WWP, normal strength, no clubbing/cyanosis/edema  Pulses: palpable distal pulses    LABS:                        8.6    5.14  )-----------( 276      ( 24 Aug 2020 07:10 )             29.2     08-24    143  |  109<H>  |  9   ----------------------------<  88  5.0   |  22  |  1.31<H>    Ca    9.8      24 Aug 2020 07:10  Phos  3.1     08-24  Mg     2.0     08-24      ---------------------------------------------------------------------------  PLEASE CHECK WHEN PRESENT:     [  ] Heart Failure     [  ] Acute     [  ] Acute on Chronic     [  ] Chronic  -------------------------------------------------------------------     [  ]Diastolic [HFpEF]     [  ]Systolic [HFrEF]     [  ]Combined [HFpEF & HFrEF]     [  ] afib     [ X ] hypertensive heart disease     [  ]Other:  -------------------------------------------------------------------  [ ] Respiratory failure  [ ] Acute cor pulmonale  [ ] Asthma/COPD Exacerbation  [ ] Pleural effusion  [ ] Aspiration pneumonia  -------------------------------------------------------------------  [  ]CHERRI     [  ]ATN     [  ]Reneal Medullary Necrosis     [  ]Renal Cortical Necrosis     [  ]Other Pathological Lesions:    [  ]CKD 1  [  ]CKD 2  [ X ]CKD 3  [  ]CKD 4  [  ]CKD 5  [  ]Other  -------------------------------------------------------------------  [  ]Diabetes  [  ] Diabetic PVD Ulcer  [  ] Neuropathic ulcer to DM  [  ] Diabetes with Nephropathy  [  ] Osteomyelitis due to diabetes  --------------------------------------------------------------------  [  ]Malnutrition: See Nutrition Note  [  ]Cachexia  [  ]Other:   [  ]Supplement Ordered:  [  ]Morbid Obesity (BMI >=40]  ---------------------------------------------------------------------  [ ] Sepsis/severe sepsis/septic shock  [ ] UTI  [ ] Pneumonia  -----------------------------------------------------------------------  [ ] Acidosis/alkalosis  [ ] Fluid overload  [ ] Hypokalemia  [ ] Hyperkalemia  [ ] Hypomagnesemia  [ ] Hypophosphatemia  [ ] Hyperphosphatemia  ------------------------------------------------------------------------  [ ] Acute blood loss anemia  [ ] Post op blood loss anemia  [ ] Iron deficiency anemia  [X ] Anemia due to chronic disease  [ ] Hypercoagulable state  ----------------------------------------------------------------------  [ ] Cerebral infarction  [ ] Transient ischemia attack  [ ] Encephalopathy      Assessment/Plan: 64y Female with a PMH of HTN, CAD s/p CABG & PCI, AVR, PAD, hypothyroidism, seizure, chronic anemia, and AAA repair in 2015 (Dr. Sandra) that presented to the ED with chest pain and palpitations. She had elevated troponins, Pro-BNP, but no ST elevation or depression on EKG. A CT chest w/o contrast showed a new crescentic soft tissue density surrounding the aorta at the level of the renal arteries and suprarenal region. She was admitted under Cardiology. CTA was done yesterday that confirmed the presence of a new aneurysm above the graft.     -Open thoracoabdominal aneurysm repair planned for Tuesday with Dr. Sandra  -Must have active T&S at all times  -Keep HgB >8   -Monitor BP - keep <150  -F/u Cardio recs - c/w Coreg 12.5mg BID, titrate up as tolerated, c/w fibrate  -F/u Renal recs  -F/u GI recs  -F/u AM labs  -DVT ppx 24hr Events:  O/N: YANDY PATTERSON  8/23: Stable. Afebrile. / Jacoby was consulted for COPD preop evaluation and recs O/N: YESENIA, VSS      amLODIPine   Tablet 10  aspirin enteric coated 81  carvedilol 12.5  heparin   Injectable 5000  isosorbide   mononitrate ER Tablet (IMDUR) 30        Vital Signs Last 24 Hrs  T(C): 36.3 (24 Aug 2020 04:57), Max: 36.8 (23 Aug 2020 09:31)  T(F): 97.4 (24 Aug 2020 04:57), Max: 98.3 (23 Aug 2020 09:31)  HR: 65 (24 Aug 2020 06:00) (57 - 65)  BP: 167/72 (24 Aug 2020 06:00) (132/59 - 167/72)  BP(mean): --  RR: 18 (24 Aug 2020 06:00) (15 - 18)  SpO2: 97% (24 Aug 2020 06:00) (95% - 100%)  I&O's Summary    23 Aug 2020 07:01  -  24 Aug 2020 07:00  --------------------------------------------------------  IN: 240 mL / OUT: 0 mL / NET: 240 mL        Physical Exam:  General: NAD, resting comfortably in bed  Pulmonary: Nonlabored breathing, no respiratory distress  Abdominal: soft, NT/ND  Extremities: WWP, normal strength, no clubbing/cyanosis/edema  Pulses: palpable distal pulses    LABS:                        8.6    5.14  )-----------( 276      ( 24 Aug 2020 07:10 )             29.2     08-24    143  |  109<H>  |  9   ----------------------------<  88  5.0   |  22  |  1.31<H>    Ca    9.8      24 Aug 2020 07:10  Phos  3.1     08-24  Mg     2.0     08-24      ---------------------------------------------------------------------------  PLEASE CHECK WHEN PRESENT:     [  ] Heart Failure     [  ] Acute     [  ] Acute on Chronic     [  ] Chronic  -------------------------------------------------------------------     [  ]Diastolic [HFpEF]     [  ]Systolic [HFrEF]     [  ]Combined [HFpEF & HFrEF]     [  ] afib     [ X ] hypertensive heart disease     [  ]Other:  -------------------------------------------------------------------  [ ] Respiratory failure  [ ] Acute cor pulmonale  [ ] Asthma/COPD Exacerbation  [ ] Pleural effusion  [ ] Aspiration pneumonia  -------------------------------------------------------------------  [  ]CHERRI     [  ]ATN     [  ]Reneal Medullary Necrosis     [  ]Renal Cortical Necrosis     [  ]Other Pathological Lesions:    [  ]CKD 1  [  ]CKD 2  [ X ]CKD 3  [  ]CKD 4  [  ]CKD 5  [  ]Other  -------------------------------------------------------------------  [  ]Diabetes  [  ] Diabetic PVD Ulcer  [  ] Neuropathic ulcer to DM  [  ] Diabetes with Nephropathy  [  ] Osteomyelitis due to diabetes  --------------------------------------------------------------------  [  ]Malnutrition: See Nutrition Note  [  ]Cachexia  [ X ]Other: [x ]  Moderate Protein Calorie Malnutrition  [  ]Supplement Ordered:  [  ]Morbid Obesity (BMI >=40]  ---------------------------------------------------------------------  [ ] Sepsis/severe sepsis/septic shock  [ ] UTI  [ ] Pneumonia  -----------------------------------------------------------------------  [ ] Acidosis/alkalosis  [ ] Fluid overload  [ ] Hypokalemia  [ ] Hyperkalemia  [ ] Hypomagnesemia  [ ] Hypophosphatemia  [ ] Hyperphosphatemia  ------------------------------------------------------------------------  [ ] Acute blood loss anemia  [ ] Post op blood loss anemia  [ ] Iron deficiency anemia  [X ] Anemia due to chronic disease  [ ] Hypercoagulable state  ----------------------------------------------------------------------  [ ] Cerebral infarction  [ ] Transient ischemia attack  [ ] Encephalopathy      Assessment/Plan: 64y Female with a PMH of HTN, CAD s/p CABG & PCI, AVR, PAD, hypothyroidism, seizure, chronic anemia, and AAA repair in 2015 (Dr. Sandra) that presented to the ED with chest pain and palpitations. She had elevated troponins, Pro-BNP, but no ST elevation or depression on EKG. A CT chest w/o contrast showed a new crescentic soft tissue density surrounding the aorta at the level of the renal arteries and suprarenal region. She was admitted under Cardiology. CTA was done yesterday that confirmed the presence of a new aneurysm above the graft.     -Open thoracoabdominal aneurysm repair planned for Tuesday with Dr. Sandra  -Must have active T&S at all times  -Keep HgB >8   -Monitor BP - keep <150  -F/u Cardio recs - c/w Coreg 12.5mg BID, titrate up as tolerated, c/w fibrate  -F/u Renal recs  -F/u GI recs  -F/u AM labs  -DVT ppx

## 2020-08-24 NOTE — PROGRESS NOTE ADULT - SUBJECTIVE AND OBJECTIVE BOX
Cardiology Consult    O/N: no events  Interval History: Ms. low reports feeling well, this morning she was receiving pRBC. she is planned for her AAA repair (second look) tomorrow.   Telemetry: sinus, VPCs    OBJECTIVE  Vitals:  T(C): 37.1 (08-24-20 @ 16:45), Max: 37.1 (08-24-20 @ 16:45)  HR: 64 (08-24-20 @ 12:03) (61 - 65)  BP: 117/67 (08-24-20 @ 12:03) (117/67 - 167/72)  RR: 16 (08-24-20 @ 12:03) (15 - 18)  SpO2: 98% (08-24-20 @ 12:03) (95% - 100%)  Wt(kg): --    I/O:  I&O's Summary    23 Aug 2020 07:01  -  24 Aug 2020 07:00  --------------------------------------------------------  IN: 240 mL / OUT: 0 mL / NET: 240 mL    24 Aug 2020 07:01  -  24 Aug 2020 17:05  --------------------------------------------------------  IN: 360 mL / OUT: 0 mL / NET: 360 mL        PHYSICAL EXAM:  Appearance: NAD. Speaking in full sentences.   HEENT: No pallor noted.  Conjunctiva clear b/l. Moist oral mucosa.  Cardiovascular: RRR with no murmurs.  Respiratory: Lungs CTAB.   Gastrointestinal:  Soft, nontender. Non-distended. Non-rigid.	  Extremities: No edema b/l. No erythema b/l. LE WWP b/l.  Vascular: DP intact  Neurologic:  Alert and awake. Moving all extremities. Following commands.   	  LABS:                        8.6    5.14  )-----------( 276      ( 24 Aug 2020 07:10 )             29.2     08-24    143  |  109<H>  |  9   ----------------------------<  88  5.0   |  22  |  1.31<H>    Ca    9.8      24 Aug 2020 07:10  Phos  3.1     08-24  Mg     2.0     08-24            RADIOLOGY & ADDITIONAL TESTS:  < from: TTE Echo Complete w/o Contrast w/ Doppler (08.21.20 @ 11:48) >   1. There is mild concentric left ventricular hypertrophy. The left ventricle is normal in size, wall thickness, and systolic function with a calculated ejection fraction of 65%. Abnormal septal motion noted.   2. Severely dilated left atrium.   3. Bioprosthetic valve is seen in the aortic position. Peak transvalvular velocity is 3.85 m/s, the mean transvalvular gradient is 31.00 mmHg, and the LVOT/AV velocity ratio is 0.36.   4. There is mild transvalvular aortic regurgitation. There is trace paravalvular aortic regurgitation.   5. An annuloplasty ring is noted in the mitral position. The mean transvalvular gradient is 13.00 mmHg at a heart rate of 81 bpm. Mild mitral regurgitation.   6. There is moderate-to-severe tricuspid regurgitation.   7. Pulmonary hypertension present, pulmonary artery systolic pressure is 48 mmHg.   8. There is trace pulmonic regurgitation.   9. No pericardial effusion.  10. The aortic root is normal in size.  11. Compared to the previous TTE performed on 8/5/2019, higher mean gradient across mitral valve at a higher heart rate and gradients across bioprosthetic aortic valve is unchanged.    < end of copied text >  Reviewed .    MEDICATIONS  (STANDING):  amLODIPine   Tablet 10 milliGRAM(s) Oral every 24 hours  aspirin enteric coated 81 milliGRAM(s) Oral daily  carvedilol 12.5 milliGRAM(s) Oral every 12 hours  fenofibrate Tablet 145 milliGRAM(s) Oral daily  folic acid 1 milliGRAM(s) Oral daily  heparin   Injectable 5000 Unit(s) SubCutaneous every 8 hours  isosorbide   mononitrate ER Tablet (IMDUR) 30 milliGRAM(s) Oral every 24 hours  levETIRAcetam 500 milliGRAM(s) Oral every 12 hours  levothyroxine 88 MICROGram(s) Oral daily  multivitamin 1 Tablet(s) Oral daily  sertraline 50 milliGRAM(s) Oral at bedtime    MEDICATIONS  (PRN):  acetaminophen   Tablet .. 650 milliGRAM(s) Oral every 6 hours PRN Moderate Pain (4 - 6), Severe Pain (7 - 10)

## 2020-08-24 NOTE — PROGRESS NOTE ADULT - ASSESSMENT
A/P: 64F w/CAD s/p CABG (LIMA-Ramus/free ANTONIO-RPDA) w/bioAVR and MV repair (2002) and PCI (FORD x1 mLCx-OM2, 2007), PAD s/p PTA/FORD L common iliac and pSFA, infrarenal AAA s/p repair (2015), HTN, CKD3, emphysema (seen on CT, asx), hypothyroidism, seizure disorder, anxiety/depression, who presented to [facility] ED 8/20/2020 c/o worsening palpitations, initially r/o ACS and now on vascular service with plan to perform AAA repair.     #aron-operative cardiac optimization: she is currently asymptomatic and euvolemic on exam. her CCTA shows extensive coronary and vascular disease. her TTE shows moderately stenotic bio-AV, severely stenotic MV.  - c/w Coreg 12.5mg BID, titrate up as tolerated  - avoid excessive afterload reduction   - c/w IMDUR and amlodipine for BP control  - c/w fibrate for now, statin would be ideal but has not tolerated in the past  - please make structural heart disease team aware, no need to delay surgery  - ideally procedure should be done with cardiac anaethesia on board given recent echo findings.   - cardiology will continue to follow post operatively    Yariel Villafuerte MD  Cardiology Fellow    d/w Dr. Ramirez

## 2020-08-24 NOTE — CONSULT NOTE ADULT - SUBJECTIVE AND OBJECTIVE BOX
HPI:  64 F w/PMHx HTN, CAD s/p PCI/CABG/s/p Bio AVR 2002 and mitral valve annuloplasty, PAD s/p peripheral stenting with occluded Bilateral SFA/ Chronic Anemia, AAA s/p Open repair by Dr. Sandra 4/2/2015, Seizure disorders (on Keppra) and CKD III, initially presented for palpitation and hypertensive emergency, found to have aneurysm proximal to prior graft with concern for subacute contained rupture, nephrology on board for CKD monitoring as pt high risk for ischemic renal injury given OR plan  for thoracoabdominal aneurysm repair and prolonged clamp time. HPI:   64 F w/PMHx HTN, CAD s/p PCI/CABG/s/p Bio AVR 2002 and mitral valve annuloplasty, PAD s/p peripheral stenting with occluded Bilateral SFA/ Chronic Anemia, AAA s/p Open repair by Dr. Sandra 4/2/2015, Seizure disorders (on Keppra) and CKD III, initially presented for palpitation and hypertensive emergency, found to have aneurysm proximal to prior graft with concern for subacute contained rupture, nephrology on board for CKD monitoring as pt high risk for ischemic renal injury given OR plan  for thoracoabdominal aneurysm repair and prolonged clamp time    ROS: as per HPI      Allergies and Intolerances:        Allergies:  	No Known Allergies:        Intolerances:  	Crestor: Drug, Other (Mod to Severe), Myalgias    Home Medications:   * Patient Currently Takes Medications as of 20-Aug-2020 14:53 documented in Structured Notes  · 	levothyroxine 88 mcg (0.088 mg) oral tablet: Last Dose Taken:  , 1 tab(s) orally once a day  · 	atenolol 25 mg oral tablet: Last Dose Taken:  , 1 tab(s) orally once a day  · 	Keppra 500 mg oral tablet: Last Dose Taken:  , 1 tab(s) orally 2 times a day  · 	sertraline 50 mg oral tablet: Last Dose Taken:  , 1 tab(s) orally once a day (at bedtime)  · 	amLODIPine 2.5 mg oral tablet: Last Dose Taken:  , 1 tab(s) orally once a day  · 	folic acid 1 mg oral tablet: Last Dose Taken:  , 1 tab(s) orally once a day  · 	cilostazol 50 mg oral tablet: Last Dose Taken:  , 1 tab(s) orally 2 times a day    Patient History:    Past Medical, Past Surgical, and Family History:  PAST MEDICAL HISTORY:  Anemia   Atherosclerosis of coronary artery CAD (coronary artery disease)  Essential hypertension HTN (hypertension)  Gastroesophageal reflux disease GERD (gastroesophageal reflux disease)  Hyperlipidemia Hyperlipidemia  Hypothyroidism Hypothyroidism  Peripheral vascular disease PVD (peripheral vascular disease)  Seizure    PAST SURGICAL HISTORY:  Atherosclerosis of coronary artery bypass graft(s), unspecified, with angina pectoris with documented spasm   H/O aortic valve replacement Bioprosthetic  Status post aorto-coronary artery bypass graft 2012.     FAMILY HISTORY:  No pertinent family history in first degree relatives.      Social History:  Social History (marital status, living situation, occupation, tobacco use, alcohol and drug use, and sexual history): Denies ETOH, Former smoker up to 2 ppd Quit in the 90s, no illicit drug use 	    VITAL SIGNS:  T(C): 36.8 (08-24-20 @ 13:40), Max: 36.8 (08-24-20 @ 09:32)  T(F): 98.3 (08-24-20 @ 13:40), Max: 98.3 (08-24-20 @ 09:32)  HR: 64 (08-24-20 @ 12:03) (61 - 65)  BP: 117/67 (08-24-20 @ 12:03) (117/67 - 167/72)  RR: 16 (08-24-20 @ 12:03) (15 - 18)  SpO2: 98% (08-24-20 @ 12:03) (95% - 100%)    PHYSICAL EXAM:  Constitutional: resting comfortably in bed; NAD  Neck: supple; JVD present   Respiratory: CTA B/L  Cardiac: +S1/S2; RRR; systolic murmur present   Gastrointestinal: soft, NT/ND  Extremities: Warm, trace peripheral edema present   Neurologic: AAOx3; CNII-XII grossly intact; no focal deficits    LABS:                         8.6    5.14  )-----------( 276      ( 24 Aug 2020 07:10 )             29.2     08-24    143  |  109<H>  |  9   ----------------------------<  88  5.0   |  22  |  1.31<H>    Ca    9.8      24 Aug 2020 07:10  Phos  3.1     08-24  Mg     2.0     08-24  RADIOLOGY, EKG & ADDITIONAL TESTS: Reviewed. HPI:   64 F w/PMHx HTN, CAD s/p PCI/CABG/s/p Bio AVR 2002 and mitral valve annuloplasty, PAD s/p peripheral stenting with occluded Bilateral SFA/ Chronic Anemia, AAA s/p Open repair by Dr. Sandra 4/2/2015, Seizure disorders (on Keppra) and CKD III, initially presented for palpitation and hypertensive emergency, found to have aneurysm proximal to prior graft with concern for subacute contained rupture, nephrology on board for CKD monitoring as pt high risk for ischemic renal injury given OR plan for thoracoabdominal aneurysm repair and prolonged clamp time  seen and examined, feels well, no acute complaints, receiving blood transfusion NAD on RA and euvolemic, normotensive       ROS: as per HPI      Allergies and Intolerances:        Allergies:  	No Known Allergies:        Intolerances:  	Crestor: Drug, Other (Mod to Severe), Myalgias    Home Medications:   * Patient Currently Takes Medications as of 20-Aug-2020 14:53 documented in Structured Notes  · 	levothyroxine 88 mcg (0.088 mg) oral tablet: Last Dose Taken:  , 1 tab(s) orally once a day  · 	atenolol 25 mg oral tablet: Last Dose Taken:  , 1 tab(s) orally once a day  · 	Keppra 500 mg oral tablet: Last Dose Taken:  , 1 tab(s) orally 2 times a day  · 	sertraline 50 mg oral tablet: Last Dose Taken:  , 1 tab(s) orally once a day (at bedtime)  · 	amLODIPine 2.5 mg oral tablet: Last Dose Taken:  , 1 tab(s) orally once a day  · 	folic acid 1 mg oral tablet: Last Dose Taken:  , 1 tab(s) orally once a day  · 	cilostazol 50 mg oral tablet: Last Dose Taken:  , 1 tab(s) orally 2 times a day    Patient History:    Past Medical, Past Surgical, and Family History:  PAST MEDICAL HISTORY:  Anemia   Atherosclerosis of coronary artery CAD (coronary artery disease)  Essential hypertension HTN (hypertension)  Gastroesophageal reflux disease GERD (gastroesophageal reflux disease)  Hyperlipidemia Hyperlipidemia  Hypothyroidism Hypothyroidism  Peripheral vascular disease PVD (peripheral vascular disease)  Seizure    PAST SURGICAL HISTORY:  Atherosclerosis of coronary artery bypass graft(s), unspecified, with angina pectoris with documented spasm   H/O aortic valve replacement Bioprosthetic  Status post aorto-coronary artery bypass graft 2012.     FAMILY HISTORY:  No pertinent family history in first degree relatives.      Social History:  Social History (marital status, living situation, occupation, tobacco use, alcohol and drug use, and sexual history): Denies ETOH, Former smoker up to 2 ppd Quit in the 90s, no illicit drug use 	    VITAL SIGNS:  T(C): 36.8 (08-24-20 @ 13:40), Max: 36.8 (08-24-20 @ 09:32)  T(F): 98.3 (08-24-20 @ 13:40), Max: 98.3 (08-24-20 @ 09:32)  HR: 64 (08-24-20 @ 12:03) (61 - 65)  BP: 117/67 (08-24-20 @ 12:03) (117/67 - 167/72)  RR: 16 (08-24-20 @ 12:03) (15 - 18)  SpO2: 98% (08-24-20 @ 12:03) (95% - 100%)    PHYSICAL EXAM:  Constitutional: resting comfortably in bed; NAD  Neck: supple; no JVD   Respiratory: CTA B/L  Cardiac: +S1/S2; RRR; holosystolic murmur present   Gastrointestinal: soft, NT/ND  Extremities: Warm, no edema   Neurologic: AAOx3; CNII-XII grossly intact; no focal deficits    LABS:                         8.6    5.14  )-----------( 276      ( 24 Aug 2020 07:10 )             29.2     08-24    143  |  109<H>  |  9   ----------------------------<  88  5.0   |  22  |  1.31<H>    Ca    9.8      24 Aug 2020 07:10  Phos  3.1     08-24  Mg     2.0     08-24  RADIOLOGY, EKG & ADDITIONAL TESTS: Reviewed. HPI:   64 F w/PMHx HTN, CAD s/p PCI/CABG/s/p Bio AVR 2002 and mitral valve annuloplasty, PAD s/p peripheral stenting with occluded Bilateral SFA/ Chronic Anemia, AAA s/p Open repair by Dr. Sandra 4/2/2015, Seizure disorders (on Keppra) and CKD III, initially presented for palpitation and hypertensive emergency, found to have aneurysm proximal to prior graft with concern for subacute contained rupture, nephrology on board for CKD monitoring as pt high risk for ischemic renal injury given OR plan for thoracoabdominal aneurysm repair and prolonged clamp time  seen and examined, feels well, no acute complaints, receiving blood transfusion NAD on RA and euvolemic, normotensive.   The patient has been followed by Dr. Davis for over 5 years and reatinine has been table at 1.4-1.5 mg/dl.  BP has been normal.       ROS: as per HPI      Allergies and Intolerances:        Allergies:  	No Known Allergies:        Intolerances:  	Crestor: Drug, Other (Mod to Severe), Myalgias    Home Medications:   * Patient Currently Takes Medications as of 20-Aug-2020 14:53 documented in Structured Notes  · 	levothyroxine 88 mcg (0.088 mg) oral tablet: Last Dose Taken:  , 1 tab(s) orally once a day  · 	atenolol 25 mg oral tablet: Last Dose Taken:  , 1 tab(s) orally once a day  · 	Keppra 500 mg oral tablet: Last Dose Taken:  , 1 tab(s) orally 2 times a day  · 	sertraline 50 mg oral tablet: Last Dose Taken:  , 1 tab(s) orally once a day (at bedtime)  · 	amLODIPine 2.5 mg oral tablet: Last Dose Taken:  , 1 tab(s) orally once a day  · 	folic acid 1 mg oral tablet: Last Dose Taken:  , 1 tab(s) orally once a day  · 	cilostazol 50 mg oral tablet: Last Dose Taken:  , 1 tab(s) orally 2 times a day    Patient History:    Past Medical, Past Surgical, and Family History:  PAST MEDICAL HISTORY:  Anemia   Atherosclerosis of coronary artery CAD (coronary artery disease)  Essential hypertension HTN (hypertension)  Gastroesophageal reflux disease GERD (gastroesophageal reflux disease)  Hyperlipidemia Hyperlipidemia  Hypothyroidism Hypothyroidism  Peripheral vascular disease PVD (peripheral vascular disease)  Seizure    PAST SURGICAL HISTORY:  Atherosclerosis of coronary artery bypass graft(s), unspecified, with angina pectoris with documented spasm   H/O aortic valve replacement Bioprosthetic  Status post aorto-coronary artery bypass graft 2012.     FAMILY HISTORY:  No pertinent family history in first degree relatives.      Social History:  Social History (marital status, living situation, occupation, tobacco use, alcohol and drug use, and sexual history): Denies ETOH, Former smoker up to 2 ppd Quit in the 90s, no illicit drug use 	    VITAL SIGNS:  T(C): 36.8 (08-24-20 @ 13:40), Max: 36.8 (08-24-20 @ 09:32)  T(F): 98.3 (08-24-20 @ 13:40), Max: 98.3 (08-24-20 @ 09:32)  HR: 64 (08-24-20 @ 12:03) (61 - 65)  BP: 117/67 (08-24-20 @ 12:03) (117/67 - 167/72)  RR: 16 (08-24-20 @ 12:03) (15 - 18)  SpO2: 98% (08-24-20 @ 12:03) (95% - 100%)    PHYSICAL EXAM:  Constitutional: resting comfortably in bed; NAD  Neck: supple; no JVD   Respiratory: CTA B/L  Cardiac: +S1/S2; RRR; holosystolic murmur present   Gastrointestinal: soft, NT/ND  Extremities: Warm, no edema   Neurologic: AAOx3; CNII-XII grossly intact; no focal deficits    LABS:                         8.6    5.14  )-----------( 276      ( 24 Aug 2020 07:10 )             29.2     08-24    143  |  109<H>  |  9   ----------------------------<  88  5.0   |  22  |  1.31<H>    Ca    9.8      24 Aug 2020 07:10  Phos  3.1     08-24  Mg     2.0     08-24  RADIOLOGY, EKG & ADDITIONAL TESTS: Reviewed.

## 2020-08-24 NOTE — CONSULT NOTE ADULT - ASSESSMENT
This 64 year-olf female, a former smoker, with PMH significant for HTN, HLD, CAD with s/p PCI to dL Cx and CABG of LIMA to ramus, ANTONIO to RDA (patent stent and grafts identified by VTA on 8/2014), valvular disease with s/p AVR (bioprosthetic valve) and mitral valve annuloplasty (2002), paroxysmal a-tach, wide complex tachycardiac which is seen in holter monitor in 4/2016.  Dr. Jasso), PAD with s/p pLSFA stent LCIA stent, with occluded bilateral SFA (proximal to mid portion per u/s 2019), chronic anemia with baseline Hgb 8-8.5 (history of blood transfuion none recently currently receiving IV Iron, Procrit, Vitamin B12 injection, oral folic acid, and benign colonoscopy with no evidence of bleeding), AAA with s/p open repair by Dr. Sandra (4/2/2015), hypothyroidism, CKD stage III-IV (baseline Cr 1.59-1/99), severe renal artery stenosis (scarring and atrophy of the left kidney 11/21/19), grand mal seizure (on Keppra), and depression/anxiety presented to the structural heart program with worsening aortic stenosis and mitral stenosis with associated symptoms of 2-day history of worsening intermittent palpitation.  Patient reports long standing history of palpitation.  She also admitted 5-8/10 left sided non-radiating chest pain lateral to left breast describe as tightness and pressure occurring both at reat and with exertion as well as worsening KRAFT.  She reports exercise tolerance is limited to less 1/2 block by calcification as well as chest pain, shortness of breath.  In addition, she reports dizziness (at rest and with changing positions) as well fever at 102.2 (8//9/20), generalized malaise, and productive cough (clear thick sputum) as well as chronic 4 pillow orthopnea.  She denies diaphoresis, N/V, syncope, melena, hematuria, BRBPR, hematemesis, LE edema, PND, loss of taste or smell, abdominal pain, contact with known Covid + individuals or sick contacts, recent travel.  In ED, patient's vital signs was /87 HR 92 RR 18 Temp 99 F O2 sat 100% RA. His initial lab has revealed elevated Troponin 0.2 and EKG showed NSR at 82 bpm with PACs, no ST or T changes. Labs significant for Hgb/HCT 8.0/26.4, Neutrophils 85% K +3.4, Bicarb 19, Glucose 130, and BNP 10,541. CT Chest Non Contrast revealed no focal consolidation, Mild mild linear atelectasis right middle lobe and bilateral lower lobes, trace pleural effusions, cardiomegaly, no pericardial effusions, New crescentic soft tissue density surrounding the aorta at the level of the renal arteries and suprarenal region, 4.0 x 2.0 x 4.0 cm, possibly increased saccular aortic aneurysm, 5.6 cm maximally, previously up to 5.0 cm on June 27, 2014. Covid PCR negative. Patient now admitted for R/O ACS including serial enzymes, telemetry, as well as further management of AAA (Vascular following).   At visit, patient is not in acute distress.  She report significant decrease of walking along with worsening shortness of breath and chest pain.  Her work up hae revealed severe valvular disease and along with baseline CHF symptoms.  Patient also presented with contained rupture AAA which is required for urgent repair.  Patient currently is hemodynamically stable and prepped for surgical intervention.     Problem #1 severe valvular disease with active symptoms of chest pain and shortness of breath  - the case was discussed with dr. Gregory; patient has severe valvular disease, baseline CHF and CAD with patent anastomosis.  - recommended for possible intervention in the future  - prior to the intervention, patient will need fever work up to rule out underlying infection    Problem #2 contained rupture AAA  - management per vascular team and prep for urgent surgical intervention tomorrow   - with patient current severe valvular disease and CHF symptoms of chest pain, shortness of breath and decrease daily activities, cardiac anesthesiologist is recommended to monitor closely throughout the case due to potential risk of complications from her existing cardiac disease.         Problem #3 CHF with elevated BNP  - gentle diuresis   - repeat lab   - consider medical management with heart failure team  - repeat echo in near future     Problem #4 fever with unknown etiology  - recommended for fever work up and consider ID consult     Problem #5 CKD  - management and plan per nephrology service. This 64 year-olf female, a former smoker, with PMH significant for HTN, HLD, CAD with s/p PCI to dL Cx and CABG of LIMA to ramus, ANTONIO to RDA (patent stent and grafts identified by VTA on 8/2014), valvular disease with s/p AVR (bioprosthetic valve) and mitral valve annuloplasty (2002), paroxysmal a-tach, wide complex tachycardiac which is seen in holter monitor in 4/2016.  Dr. Jasso), PAD with s/p pLSFA stent LCIA stent, with occluded bilateral SFA (proximal to mid portion per u/s 2019), chronic anemia with baseline Hgb 8-8.5 (history of blood transfuion none recently currently receiving IV Iron, Procrit, Vitamin B12 injection, oral folic acid, and benign colonoscopy with no evidence of bleeding), AAA with s/p open repair by Dr. Sandra (4/2/2015), hypothyroidism, CKD stage III-IV (baseline Cr 1.59-1/99), severe renal artery stenosis (scarring and atrophy of the left kidney 11/21/19), grand mal seizure (on Keppra), and depression/anxiety presented to the structural heart program with worsening aortic stenosis and mitral stenosis with associated symptoms of 2-day history of worsening intermittent palpitation.  Patient reports long standing history of palpitation.  She also admitted 5-8/10 left sided non-radiating chest pain lateral to left breast describe as tightness and pressure occurring both at reat and with exertion as well as worsening KRAFT.  She reports exercise tolerance is limited to less 1/2 block by calcification as well as chest pain, shortness of breath.  In addition, she reports dizziness (at rest and with changing positions) as well fever at 102.2 (8//9/20), generalized malaise, and productive cough (clear thick sputum) as well as chronic 4 pillow orthopnea.  She denies diaphoresis, N/V, syncope, melena, hematuria, BRBPR, hematemesis, LE edema, PND, loss of taste or smell, abdominal pain, contact with known Covid + individuals or sick contacts, recent travel.  In ED, patient's vital signs was /87 HR 92 RR 18 Temp 99 F O2 sat 100% RA. His initial lab has revealed elevated Troponin 0.2 and EKG showed NSR at 82 bpm with PACs, no ST or T changes. Labs significant for Hgb/HCT 8.0/26.4, Neutrophils 85% K +3.4, Bicarb 19, Glucose 130, and BNP 10,541. CT Chest Non Contrast revealed no focal consolidation, Mild mild linear atelectasis right middle lobe and bilateral lower lobes, trace pleural effusions, cardiomegaly, no pericardial effusions, New crescentic soft tissue density surrounding the aorta at the level of the renal arteries and suprarenal region, 4.0 x 2.0 x 4.0 cm, possibly increased saccular aortic aneurysm, 5.6 cm maximally, previously up to 5.0 cm on June 27, 2014. Covid PCR negative. Patient now admitted for R/O ACS including serial enzymes, telemetry, as well as further management of AAA (Vascular following).   At visit, patient is not in acute distress.  She reports significant decrease of walking along with worsening shortness of breath and chest pain.  Her work up have revealed severe valvular disease and along with baseline CHF symptoms.  Patient also presented with contained rupture AAA which is required for urgent repair.  Patient currently is hemodynamically stable and prepped for surgical intervention.     Problem #1 severe valvular disease with active symptoms of chest pain and shortness of breath  - the case was discussed with dr. Gregory; patient has severe valvular disease, baseline CHF and CAD with patent anastomosis.  - recommended for possible intervention in the future  - prior to the intervention, patient will need fever work up to rule out underlying infection    Problem #2 contained rupture AAA  - management per vascular team and prep for urgent surgical intervention tomorrow   - with patient current severe valvular disease and CHF symptoms of chest pain, shortness of breath and decrease daily activities, cardiac anesthesiologist is recommended to monitor closely throughout the case due to potential risk of complications from her existing cardiac disease.         Problem #3 CHF with elevated BNP  - gentle diuresis   - repeat lab   - consider medical management with heart failure team  - repeat echo in near future     Problem #4 fever with unknown etiology  - recommended for fever work up and consider ID consult     Problem #5 CKD  - management and plan per nephrology service. This 64 year-olf female, a former smoker, with PMH significant for HTN, HLD, CAD with s/p PCI to dL Cx and CABG of LIMA to ramus, ANTONIO to RDA (patent stent and grafts identified by VTA on 8/2014), valvular disease with s/p AVR (bioprosthetic valve) and mitral valve annuloplasty (2002), paroxysmal a-tach, wide complex tachycardiac which is seen in holter monitor in 4/2016.  Dr. Jasso), PAD with s/p pLSFA stent LCIA stent, with occluded bilateral SFA (proximal to mid portion per u/s 2019), chronic anemia with baseline Hgb 8-8.5 (history of blood transfuion none recently currently receiving IV Iron, Procrit, Vitamin B12 injection, oral folic acid, and benign colonoscopy with no evidence of bleeding), AAA with s/p open repair by Dr. Sandra (4/2/2015), hypothyroidism, CKD stage III-IV (baseline Cr 1.59-1/99), severe renal artery stenosis (scarring and atrophy of the left kidney 11/21/19), grand mal seizure (on Keppra), and depression/anxiety presented to the structural heart program with worsening aortic stenosis and mitral stenosis with associated symptoms of 2-day history of worsening intermittent palpitation.  Patient reports long standing history of palpitation.  She also admitted 5-8/10 left sided non-radiating chest pain lateral to left breast describe as tightness and pressure occurring both at reat and with exertion as well as worsening KRAFT.  She reports exercise tolerance is limited to less 1/2 block by calcification as well as chest pain, shortness of breath.  In addition, she reports dizziness (at rest and with changing positions) as well fever at 102.2 (8//9/20), generalized malaise, and productive cough (clear thick sputum) as well as chronic 4 pillow orthopnea.  She denies diaphoresis, N/V, syncope, melena, hematuria, BRBPR, hematemesis, LE edema, PND, loss of taste or smell, abdominal pain, contact with known Covid + individuals or sick contacts, recent travel.  In ED, patient's vital signs was /87 HR 92 RR 18 Temp 99 F O2 sat 100% RA. His initial lab has revealed elevated Troponin 0.2 and EKG showed NSR at 82 bpm with PACs, no ST or T changes. Labs significant for Hgb/HCT 8.0/26.4, Neutrophils 85% K +3.4, Bicarb 19, Glucose 130, and BNP 10,541. CT Chest Non Contrast revealed no focal consolidation, Mild mild linear atelectasis right middle lobe and bilateral lower lobes, trace pleural effusions, cardiomegaly, no pericardial effusions, New crescentic soft tissue density surrounding the aorta at the level of the renal arteries and suprarenal region, 4.0 x 2.0 x 4.0 cm, possibly increased saccular aortic aneurysm, 5.6 cm maximally, previously up to 5.0 cm on June 27, 2014. Covid PCR negative. Patient now admitted for R/O ACS including serial enzymes, telemetry, as well as further management of AAA (Vascular following).   At visit, patient is not in acute distress.  She reports significant decrease of walking along with worsening shortness of breath and chest pain.  Her work up have revealed severe valvular disease and along with baseline CHF symptoms.  Patient also presented with contained rupture AAA which is required for urgent repair.  Patient currently is hemodynamically stable and prepped for surgical intervention.     Problem #1 severe valvular disease with active symptoms of chest pain and shortness of breath  - the case was discussed with dr. Gregory; patient has severe valvular disease, baseline CHF and CAD with patent anastomosis.  - recommended for possible intervention in the future  - prior to the intervention, patient will need fever work up to rule out underlying infection    Problem #2 contained rupture AAA  - management per vascular team and prep for urgent surgical intervention tomorrow   - with patient's current severe valvular disease and CHF symptoms of chest pain, shortness of breath and decrease daily activities, benefit for urgent intervention for her rupture AAA is outweighing potential risk of her cardiac disease.  Cardiac anesthesiologist is recommended to monitor closely throughout the case due to potential risk of complications from her existing cardiac disease.         Problem #3 CHF with elevated BNP  - gentle diuresis   - repeat lab   - consider medical management with heart failure team  - repeat echo in near future     Problem #4 fever with unknown etiology  - recommended for fever work up and consider ID consult     Problem #5 CKD  - management and plan per nephrology service.

## 2020-08-24 NOTE — CONSULT NOTE ADULT - SUBJECTIVE AND OBJECTIVE BOX
64 F former smoker with Crestor intolerance (Myalgias) and PMHx HTN, HLD , CAD s/p PCI dLCX and CABG LIMA-Ramus, ANTONIO-PDA (patent stent and patent grafts x 2 by CTA 8/2014), Aortic Valve Disease s/p Bioprosthetic AVR 2002, Mitral Valve Disease s/p Mitral valve annuloplasty 2002, Paroxysmal ATach, Wide Complex Tachycardia (seen on Holter 4/2016 –had ILR placed now disconnected-previously seen by Dr. Jasso), PAD s/p pLSFA stent LCIA stent, with occluded Bilateral SFA (proximal to mid portion per U/S 2019), Chronic Anemia (baseline Hgb 8-8.5 -history of blood transfusions none recently-currently receiving IV Iron, Procrit, Vitamin B12 injections, takes Folic Acid PO-prior colonoscopy no evidence of bleeding ~ 5 years ago), AAA s/p Open repair by Dr. Sandra 4/2/2015 , Hypothyroidism, CKD stage III-IV (Baseline Cr 1.58-1.99 per labs 6/2019-11/2019),  Severe Renal Artery Stenosis (scarring and atrophy of left Kidney 11/21/19), Grand Mal Seizures (on Keppra), Depression/Anxiety who presented to [facility] ED 8/20/2020 c/o worsening palpitations x 2 days. She reports long standing history of palpitations however over the past few days symptoms have worsened and palpitations occur intermittently with rest or activity. Patient also admits to 5-8/10 left sided non-radiating chest pain lateral to left breast described as tightness and pressure occurring both at rest and with exertion as well as KRAFT worsening x 2 days. Exercise tolerance is limited by claudication as well as C/P and SOB and is typically <1/2 block. Upon further questioning she reports dizziness (at rest and with changing positions) as well fever of 102.2 F (on 8/19/2020), generalized malaise, and productive cough (clear thick sputum) as well as chronic 4 pillow orthopnea. She denies diaphoresis, N/V, syncope, melena, hematuria, BRBPR, hematemesis, LE edema, PND, loss of taste or smell, abdominal pain, contact with known Covid + individuals or sick contacts, recent travel. Patient reports she self discontinued Furosemide ~ 1 month ago due to normal BP (SBP 120s) and was taken off Losartan (?due to kidney function).  In the ER VSS /87 HR 92 RR 18 Temp 99 F O2 sat 100% RA. 1st set CE minimally elevated Troponin 0.2 and EKG showed NSR at 82 bpm with PACs, no ST or T changes. Labs significant for Hgb/HCT 8.0/26.4, Neutrophils 85% K +3.4, Bicarb 19, Glucose 130, and BNP 10,541. CT Chest Non Contrast revealed no focal consolidation, Mild mild linear atelectasis right middle lobe and bilateral lower lobes, trace pleural effusions, cardiomegaly, no pericardial effusions, New crescentic soft tissue density surrounding the aorta at the level of the renal arteries and suprarenal region, 4.0 x 2.0 x 4.0 cm, possibly increased saccular aortic aneurysm, 5.6 cm maximally, previously up to 5.0 cm on June 27, 2014. Covid PCR negative. Patient now admitted for R/O ACS including serial enzymes, telemetry, as well as further management of AAA (Vascular following).   Surgeon:    Requesting Physician:    HISTORY OF PRESENT ILLNESS:  64y Female    PAST MEDICAL & SURGICAL HISTORY:  Seizure  Essential hypertension: HTN (hypertension)  Gastroesophageal reflux disease: GERD (gastroesophageal reflux disease)  Hyperlipidemia: Hyperlipidemia  Anemia: Anemia  Hypothyroidism: Hypothyroidism  Atherosclerosis of coronary artery: CAD (coronary artery disease)  Peripheral vascular disease: PVD (peripheral vascular disease)  H/O aortic valve replacement: Bioprosthetic  Atherosclerosis of coronary artery bypass graft(s), unspecified, with angina pectoris with documented spasm  Status post aorto-coronary artery bypass graft: 2012      MEDICATIONS  (STANDING):  amLODIPine   Tablet 10 milliGRAM(s) Oral every 24 hours  aspirin enteric coated 81 milliGRAM(s) Oral daily  carvedilol 12.5 milliGRAM(s) Oral every 12 hours  fenofibrate Tablet 145 milliGRAM(s) Oral daily  folic acid 1 milliGRAM(s) Oral daily  heparin   Injectable 5000 Unit(s) SubCutaneous every 8 hours  isosorbide   mononitrate ER Tablet (IMDUR) 30 milliGRAM(s) Oral every 24 hours  levETIRAcetam 500 milliGRAM(s) Oral every 12 hours  levothyroxine 88 MICROGram(s) Oral daily  multivitamin 1 Tablet(s) Oral daily  sertraline 50 milliGRAM(s) Oral at bedtime    MEDICATIONS  (PRN):  acetaminophen   Tablet .. 650 milliGRAM(s) Oral every 6 hours PRN Moderate Pain (4 - 6), Severe Pain (7 - 10)      Allergies    No Known Allergies    Intolerances    Crestor (Other (Mod to Severe))      SOCIAL HISTORY:  Smoker:  YES / NO        PACK YEARS:                         WHEN QUIT?  ETOH use:  YES / NO               FREQUENCY / QUANTITY:  Ilicit Drug use:  YES / NO  Occupation:  Assisted device use (Cane / Walker):  Live with:    FAMILY HISTORY:  No pertinent family history in first degree relatives      Review of Systems  CONSTITUTIONAL:  Fevers / chills, sweats, fatigue, weight loss, weight gain                                    NEGATIVE  NEURO:  parathesias, seizures, syncope, confusion                                                                               NEGATIVE  EYES:  Blurry vision, discharge, pain, loss of vision                                                                                  NEGATIVE  ENMT:  Difficulty hearing, vertigo, dysphagia, epistaxis, recent dental work                                     NEGATIVE  CV:  Chest pain, palpitations, KRAFT, orthopnea                                                                                         NEGATIVE  RESPIRATORY:  Wheezing, SOB, cough / sputum, hemoptysis                                                              NEGATIVE  GI:  Nausea, vommiting, diarrhea, constipation, melena                                                                        NEGATIVE  : Hematuria, dysuria, urgency, incontinence                                                                                       NEGATIVE  MUSKULOSKELETAL:  arthritis, joint swelling, muscle weakness                                                           NEGATIVE  SKIN/BREAST:  rash, itching, karl loss, masses                                                                                            NEGATIVE  PSYCH:  depresion, anxiety, suicidal ideation                                                                                             NEGATIVE  HEME/LYMPH:  bruises easily, enlarged lymph nodes, tender lymph nodes                                        NEGATIVE  ENDOCRINE:  cold intolerance, heat intolerance, polydipsia                                                                   NEGATIVE    PHYSICAL EXAM  Vital Signs Last 24 Hrs  T(C): 36.8 (24 Aug 2020 13:40), Max: 36.8 (24 Aug 2020 09:32)  T(F): 98.3 (24 Aug 2020 13:40), Max: 98.3 (24 Aug 2020 09:32)  HR: 64 (24 Aug 2020 12:03) (61 - 65)  BP: 117/67 (24 Aug 2020 12:03) (117/67 - 167/72)  BP(mean): --  RR: 16 (24 Aug 2020 12:03) (15 - 18)  SpO2: 98% (24 Aug 2020 12:03) (95% - 100%)    CONSTITUTIONAL:                                                                          WNL  NEURO:                                                                                             WNL                      EYES:                                                                                                  WNL  ENMT:                                                                                                WNL  CV:                                                                                                      WNL  RESPIRATORY:                                                                                  WNL  GI:                                                                                                       WNL  : FRENCH + / -                                                                                 WNL  MUSKULOSKELETAL:                                                                       WNL  SKIN / BREAST:                                                                                 WNL                                                          LABS:                        8.6    5.14  )-----------( 276      ( 24 Aug 2020 07:10 )             29.2     08-24    143  |  109<H>  |  9   ----------------------------<  88  5.0   |  22  |  1.31<H>    Ca    9.8      24 Aug 2020 07:10  Phos  3.1     08-24  Mg     2.0     08-24                  RADIOLOGY & ADDITIONAL STUDIES:  CAROTID U/S:    CXR:    CT Scan:    EKG:    TTE / ALBER:    Cardiac Cath: Surgeon: MD Colt    Requesting Physician: MD Jocy  This 64 year-olf female, a former smoker, with PMH significant for HTN, HLD, CAD with s/p PCI to dL Cx and CABG of LIMA to ramus, ANTONIO to RDA (patent stent and grafts identified by VTA on 8/2014), valvular disease with s/p AVR (bioprosthetic valve) and mitral valve annuloplasty (2002), paroxysmal a-tach, wide complex tachycardiac which is seen in holter monitor in 4/2016.  Dr. Jasso), PAD with s/p pLSFA stent LCIA stent, with occluded bilateral SFA (proximal to mid portion per u/s 2019), chronic anemia with baseline Hgb 8-8.5 (history of blood transfuion none recently currently receiving IV Iron, Procrit, Vitamin B12 injection, oral folic acid, and benign colonoscopy with no evidence of bleeding), AAA with s/p open repair by Dr. Sandra (4/2/2015), hypothyroidism, CKD stage III-IV (baseline Cr 1.59-1/99), severe renal artery stenosis (scarring and atrophy of the left kidney 11/21/19), grand mal seizure (on Keppra), and depression/anxiety presented to the structural heart program with worsening aortic stenosis and mitral stenosis with associated symptoms of 2-day history of worsening intermittent palpitation.  Patient reports long standing history of palpitation.  She also admitted 5-8/10 left sided non-radiating chest pain lateral to left breast describe as tightness and pressure occurring both at reat and with exertion as well as worsening KRAFT.  She reports exercise tolerance is limited to less 1/2 block by calcification as well as chest pain, shortness of breath.  In addition, she reports dizziness (at rest and with changing positions) as well fever at 102.2 (8//9/20), generalized malaise, and productive cough (clear thick sputum) as well as chronic 4 pillow orthopnea.  She denies diaphoresis, N/V, syncope, melena, hematuria, BRBPR, hematemesis, LE edema, PND, loss of taste or smell, abdominal pain, contact with known Covid + individuals or sick contacts, recent travel.  In ED, patient's vital signs was /87 HR 92 RR 18 Temp 99 F O2 sat 100% RA. His initial lab has revealed elevated Troponin 0.2 and EKG showed NSR at 82 bpm with PACs, no ST or T changes. Labs significant for Hgb/HCT 8.0/26.4, Neutrophils 85% K +3.4, Bicarb 19, Glucose 130, and BNP 10,541. CT Chest Non Contrast revealed no focal consolidation, Mild mild linear atelectasis right middle lobe and bilateral lower lobes, trace pleural effusions, cardiomegaly, no pericardial effusions, New crescentic soft tissue density surrounding the aorta at the level of the renal arteries and suprarenal region, 4.0 x 2.0 x 4.0 cm, possibly increased saccular aortic aneurysm, 5.6 cm maximally, previously up to 5.0 cm on June 27, 2014. Covid PCR negative. Patient now admitted for R/O ACS including serial enzymes, telemetry, as well as further management of AAA (Vascular following).   At visit, patient is not in acute distress.      PAST MEDICAL & SURGICAL HISTORY:  Seizure  Essential hypertension: HTN (hypertension)  Gastroesophageal reflux disease: GERD (gastroesophageal reflux disease)  Hyperlipidemia: Hyperlipidemia  Anemia: Anemia  Hypothyroidism: Hypothyroidism  Atherosclerosis of coronary artery: CAD (coronary artery disease)  Peripheral vascular disease: PVD (peripheral vascular disease)  H/O aortic valve replacement: Bioprosthetic  Atherosclerosis of coronary artery bypass graft(s), unspecified, with angina pectoris with documented spasm  Status post aorto-coronary artery bypass graft: 2012      MEDICATIONS  (STANDING):  amLODIPine   Tablet 10 milliGRAM(s) Oral every 24 hours  aspirin enteric coated 81 milliGRAM(s) Oral daily  carvedilol 12.5 milliGRAM(s) Oral every 12 hours  fenofibrate Tablet 145 milliGRAM(s) Oral daily  folic acid 1 milliGRAM(s) Oral daily  heparin   Injectable 5000 Unit(s) SubCutaneous every 8 hours  isosorbide   mononitrate ER Tablet (IMDUR) 30 milliGRAM(s) Oral every 24 hours  levETIRAcetam 500 milliGRAM(s) Oral every 12 hours  levothyroxine 88 MICROGram(s) Oral daily  multivitamin 1 Tablet(s) Oral daily  sertraline 50 milliGRAM(s) Oral at bedtime    MEDICATIONS  (PRN):  acetaminophen   Tablet .. 650 milliGRAM(s) Oral every 6 hours PRN Moderate Pain (4 - 6), Severe Pain (7 - 10)      Allergies    No Known Allergies    Intolerances    Crestor (Other (Mod to Severe))      SOCIAL HISTORY:  Smoker:  former smoker, 2PPD and quit 1998  ETOH use: no   Ilicit Drug use: no   Occupation: non contributory   Assisted device use (Cane / Walker): n/a  Live with: family     FAMILY HISTORY:  No pertinent family history in first degree relatives      Review of Systems  CONSTITUTIONAL:  Fevers / chills, sweats, fatigue, weight loss, weight gain                                     NEURO:  parathesias, seizures, syncope, confusion                                                                                 EYES:  Blurry vision, discharge, pain, loss of vision                                                                              ENMT:  Difficulty hearing, vertigo, dysphagia, epistaxis, recent dental work                                CV:  Chest pain, palpitations, KRAFT, orthopnea                                                                                          RESPIRATORY:  Wheezing, SOB, cough / sputum, hemoptysis                                                                GI:  Nausea, vommiting, diarrhea, constipation, melena                                                                         : Hematuria, dysuria, urgency, incontinence                                                                                       MUSKULOSKELETAL:  arthritis, joint swelling, muscle weakness                                                           SKIN/BREAST:  rash, itching, karl loss, masses                                                                                            PSYCH:  depresion, anxiety, suicidal ideation                                                                                              HEME/LYMPH:  bruises easily, enlarged lymph nodes, tender lymph nodes                                          ENDOCRINE:  cold intolerance, heat intolerance, polydipsia                                                                      PHYSICAL EXAM  Vital Signs Last 24 Hrs  T(C): 36.8 (24 Aug 2020 13:40), Max: 36.8 (24 Aug 2020 09:32)  T(F): 98.3 (24 Aug 2020 13:40), Max: 98.3 (24 Aug 2020 09:32)  HR: 64 (24 Aug 2020 12:03) (61 - 65)  BP: 117/67 (24 Aug 2020 12:03) (117/67 - 167/72)  BP(mean): --  RR: 16 (24 Aug 2020 12:03) (15 - 18)  SpO2: 98% (24 Aug 2020 12:03) (95% - 100%)    CONSTITUTIONAL:                                                                            NEURO:                                                                                                                 EYES:                                                                                                    ENMT:                                                                                                 CV:                                                                                                        RESPIRATORY:                                                                                    GI:                                                                                                        : FRENCH + / -                                                                                   MUSKULOSKELETAL:                                                                       SKIN / BREAST:                                                                                                                                         LABS:                        8.6    5.14  )-----------( 276      ( 24 Aug 2020 07:10 )             29.2     08-24    143  |  109<H>  |  9   ----------------------------<  88  5.0   |  22  |  1.31<H>    Ca    9.8      24 Aug 2020 07:10  Phos  3.1     08-24  Mg     2.0     08-24      CAROTID U/S:  < from: US Duplex Carotid Arteries Complete, Bilateral (08.21.20 @ 13:44) >  Intimal thickening and mild to moderate heterogeneous plaque at the proximal ICA and bulb bilaterally.    Antegrade flow vertebral arteries bilaterally.    Blood flow velocities are normal.    IMPRESSION:    Intimalthickening and mild to moderate heterogeneous plaque at the proximal ICA and bulb bilaterally.    No elevated velocities to suggest hemodynamically significant stenosis.    Measurement of carotid stenosis is based on velocity parameters that correlate the residual internal carotid diameter with that of the more distal vessel in accordance with a method such as the [employer] American Symptomatic Carotid Endarterectomy Trial (NASCET).    CXR:  < from: Xray Chest 2 Views PA/Lat (08.20.20 @ 06:31) >  Frontal and lateral examination of the chest demonstrates mild cardiomegaly. Patient status post sternotomy and valvular replacement. Hardware noted overlying left chest. Prominent bronchovascular markings. Mild chronic interstitial changes. Mild degenerative changes thoracic spine. Calcification aortic knob. No interval change noted in comparison to prior examination of the chest 8/1/2018.    IMPRESSION: No acute infiltrates    CT Scan:  < from: CT Angio Cardiac w/ IV Cont (08.21.20 @ 13:11) >  IMPRESSION: 1. Severe atherosclerotic disease, with large amount of ulcerated soft plaque in the descending aorta and probable penetrating atherosclerotic ulcers as well.    2. Saccular aneurysms of the descending aorta, largest measuring 3.7 cm distally.    3. Small bilateral pleural effusions.    4. Mild emphysema.    5. Mild thoracic lymphadenopathy, which could be seen in patients with heart failure.    EKG:  < from: 12 Lead ECG (08.22.20 @ 15:53) >  Ventricular Rate 62 BPM    Atrial Rate 62 BPM    P-R Interval 144 ms    QRS Duration 88 ms    Q-T Interval 434 ms    QTC Calculation(Bezet) 440 ms    P Axis 77 degrees    R Axis 85 degrees    T Axis 41 degrees    Diagnosis Line Normal sinus rhythm  Nonspecific ST - T abnormalities    TTE / ALBER:  < from: TTE Echo Complete w/o Contrast w/ Doppler (08.21.20 @ 11:48) >   1. There is mild concentric left ventricular hypertrophy. The left ventricle is normal in size, wall thickness, and systolic function with a calculated ejection fraction of 65%. Abnormal septal motion noted.   2. Severely dilated left atrium.   3. Bioprosthetic valve is seen in the aortic position. Peak transvalvular velocity is 3.85 m/s, the mean transvalvular gradient is 31.00 mmHg, and the LVOT/AV velocity ratio is 0.36.   4. There is mild transvalvular aortic regurgitation. There is trace paravalvular aortic regurgitation.   5. An annuloplasty ring is noted in the mitral position. The mean transvalvular gradient is 13.00 mmHg at a heart rate of 81 bpm. Mild mitral regurgitation.   6. There is moderate-to-severe tricuspid regurgitation.   7. Pulmonary hypertension present, pulmonary artery systolic pressure is 48 mmHg.   8. There is trace pulmonic regurgitation.   9. No pericardial effusion.  10. The aortic root is normal in size.  11. Compared to the previous TTE performed on 8/5/2019, higher mean gradient across mitral valve at a higher heart rate and gradients across bioprosthetic aortic valve is unchanged.    Cardiac Cath:  stable CAD Surgeon: MD Colt    Requesting Physician: MD Jocy  This 64 year-olf female, a former smoker, with PMH significant for HTN, HLD, CAD with s/p PCI to dL Cx and CABG of LIMA to ramus, ANTONIO to RDA (patent stent and grafts identified by VTA on 8/2014), valvular disease with s/p AVR (bioprosthetic valve) and mitral valve annuloplasty (2002), paroxysmal a-tach, wide complex tachycardiac which is seen in holter monitor in 4/2016.  Dr. Jasso), PAD with s/p pLSFA stent LCIA stent, with occluded bilateral SFA (proximal to mid portion per u/s 2019), chronic anemia with baseline Hgb 8-8.5 (history of blood transfuion none recently currently receiving IV Iron, Procrit, Vitamin B12 injection, oral folic acid, and benign colonoscopy with no evidence of bleeding), AAA with s/p open repair by Dr. Sandra (4/2/2015), hypothyroidism, CKD stage III-IV (baseline Cr 1.59-1/99), severe renal artery stenosis (scarring and atrophy of the left kidney 11/21/19), grand mal seizure (on Keppra), and depression/anxiety presented to the structural heart program with worsening aortic stenosis and mitral stenosis with associated symptoms of 2-day history of worsening intermittent palpitation.  Patient reports long standing history of palpitation.  She also admitted 5-8/10 left sided non-radiating chest pain lateral to left breast describe as tightness and pressure occurring both at reat and with exertion as well as worsening KRAFT.  She reports exercise tolerance is limited to less 1/2 block by calcification as well as chest pain, shortness of breath.  In addition, she reports dizziness (at rest and with changing positions) as well fever at 102.2 (8//9/20), generalized malaise, and productive cough (clear thick sputum) as well as chronic 4 pillow orthopnea.  She denies diaphoresis, N/V, syncope, melena, hematuria, BRBPR, hematemesis, LE edema, PND, loss of taste or smell, abdominal pain, contact with known Covid + individuals or sick contacts, recent travel.  In ED, patient's vital signs was /87 HR 92 RR 18 Temp 99 F O2 sat 100% RA. His initial lab has revealed elevated Troponin 0.2 and EKG showed NSR at 82 bpm with PACs, no ST or T changes. Labs significant for Hgb/HCT 8.0/26.4, Neutrophils 85% K +3.4, Bicarb 19, Glucose 130, and BNP 10,541. CT Chest Non Contrast revealed no focal consolidation, Mild mild linear atelectasis right middle lobe and bilateral lower lobes, trace pleural effusions, cardiomegaly, no pericardial effusions, New crescentic soft tissue density surrounding the aorta at the level of the renal arteries and suprarenal region, 4.0 x 2.0 x 4.0 cm, possibly increased saccular aortic aneurysm, 5.6 cm maximally, previously up to 5.0 cm on June 27, 2014. Covid PCR negative. Patient now admitted for R/O ACS including serial enzymes, telemetry, as well as further management of AAA (Vascular following).   At visit, patient is not in acute distress.  She report significant decrease of walking along with worsening shortness of breath and chest pain.  Her work up hae revealed severe valvular disease and along with baseline CHF symptoms.  Patient also presented with contained rupture AAA which is required for urgent repair.  Patient currently is hemodynamically stable and prepped for surgical intervention.      PAST MEDICAL & SURGICAL HISTORY:  Seizure  Essential hypertension: HTN (hypertension)  Gastroesophageal reflux disease: GERD (gastroesophageal reflux disease)  Hyperlipidemia: Hyperlipidemia  Anemia: Anemia  Hypothyroidism: Hypothyroidism  Atherosclerosis of coronary artery: CAD (coronary artery disease)  Peripheral vascular disease: PVD (peripheral vascular disease)  H/O aortic valve replacement: Bioprosthetic  Atherosclerosis of coronary artery bypass graft(s), unspecified, with angina pectoris with documented spasm  Status post aorto-coronary artery bypass graft: 2012      MEDICATIONS  (STANDING):  amLODIPine   Tablet 10 milliGRAM(s) Oral every 24 hours  aspirin enteric coated 81 milliGRAM(s) Oral daily  carvedilol 12.5 milliGRAM(s) Oral every 12 hours  fenofibrate Tablet 145 milliGRAM(s) Oral daily  folic acid 1 milliGRAM(s) Oral daily  heparin   Injectable 5000 Unit(s) SubCutaneous every 8 hours  isosorbide   mononitrate ER Tablet (IMDUR) 30 milliGRAM(s) Oral every 24 hours  levETIRAcetam 500 milliGRAM(s) Oral every 12 hours  levothyroxine 88 MICROGram(s) Oral daily  multivitamin 1 Tablet(s) Oral daily  sertraline 50 milliGRAM(s) Oral at bedtime    MEDICATIONS  (PRN):  acetaminophen   Tablet .. 650 milliGRAM(s) Oral every 6 hours PRN Moderate Pain (4 - 6), Severe Pain (7 - 10)      Allergies    No Known Allergies    Intolerances    Crestor (Other (Mod to Severe))      SOCIAL HISTORY:  Smoker:  former smoker, 2PPD and quit 1998  ETOH use: no   Ilicit Drug use: no   Occupation: non contributory   Assisted device use (Cane / Walker): n/a  Live with: family     FAMILY HISTORY:  No pertinent family history in first degree relatives      Review of Systems  CONSTITUTIONAL: denies Fevers / chills, sweats, fatigue, weight loss, weight gain                                     NEURO: denies parathesias, seizures, syncope, confusion                                                                                 EYES: denies Blurry vision, discharge, pain, loss of vision                                                                              ENMT: denies Difficulty hearing, vertigo, dysphagia, epistaxis, recent dental work                                CV: positive for chest pain, palpitations, KRAFT, orthopnea                                                                                          RESPIRATORY: positive for SOB, denies wheezing cough / sputum, hemoptysis                                                                GI:  denies Nausea, vomiting, diarrhea, constipation, melena                                                                         : denies Hematuria, dysuria, urgency, incontinence                                                                                       MUSKULOSKELETAL: denies arthritis, joint swelling, muscle weakness                                                           SKIN/BREAST: denies rash, itching, karl loss, masses                                                                                            PSYCH: denies depression, anxiety, suicidal ideation                                                                                              HEME/LYMPH: denies bruises easily, enlarged lymph nodes, tender lymph nodes                                          ENDOCRINE: denies cold intolerance, heat intolerance, polydipsia                                                                      PHYSICAL EXAM  Vital Signs Last 24 Hrs  T(C): 36.8 (24 Aug 2020 13:40), Max: 36.8 (24 Aug 2020 09:32)  T(F): 98.3 (24 Aug 2020 13:40), Max: 98.3 (24 Aug 2020 09:32)  HR: 64 (24 Aug 2020 12:03) (61 - 65)  BP: 117/67 (24 Aug 2020 12:03) (117/67 - 167/72)  BP(mean): --  RR: 16 (24 Aug 2020 12:03) (15 - 18)  SpO2: 98% (24 Aug 2020 12:03) (95% - 100%)    CONSTITUTIONAL:  thin and malnourished                                                                           NEURO:  grossly intact                                                                                                                EYES:    PERRL                                                                                                 ENMT:   supple and no lymphadenopathy                                                                                               CV:  positive for murmur                                                                                                       RESPIRATORY:     no wheezing and no rhonchi                                                                                GI:      BM and BS are intact.  there is scar from previous surgery                                                                                                   : deferred                                                                               MUSKULOSKELETAL:  no deformity                                                                       SKIN / BREAST:  warm                                                                                                                                        LABS:                        8.6    5.14  )-----------( 276      ( 24 Aug 2020 07:10 )             29.2     08-24    143  |  109<H>  |  9   ----------------------------<  88  5.0   |  22  |  1.31<H>    Ca    9.8      24 Aug 2020 07:10  Phos  3.1     08-24  Mg     2.0     08-24      CAROTID U/S:  < from: US Duplex Carotid Arteries Complete, Bilateral (08.21.20 @ 13:44) >  Intimal thickening and mild to moderate heterogeneous plaque at the proximal ICA and bulb bilaterally.    Antegrade flow vertebral arteries bilaterally.    Blood flow velocities are normal.    IMPRESSION:    Intimal  thickening and mild to moderate heterogeneous plaque at the proximal ICA and bulb bilaterally.    No elevated velocities to suggest hemodynamically significant stenosis.    Measurement of carotid stenosis is based on velocity parameters that correlate the residual internal carotid diameter with that of the more distal vessel in accordance with a method such as the [place] American Symptomatic Carotid Endarterectomy Trial (NASCET).    CXR:  < from: Xray Chest 2 Views PA/Lat (08.20.20 @ 06:31) >  Frontal and lateral examination of the chest demonstrates mild cardiomegaly. Patient status post sternotomy and valvular replacement. Hardware noted overlying left chest. Prominent bronchovascular markings. Mild chronic interstitial changes. Mild degenerative changes thoracic spine. Calcification aortic knob. No interval change noted in comparison to prior examination of the chest 8/1/2018.    IMPRESSION: No acute infiltrates    CT Scan:  < from: CT Angio Cardiac w/ IV Cont (08.21.20 @ 13:11) >  IMPRESSION: 1. Severe atherosclerotic disease, with large amount of ulcerated soft plaque in the descending aorta and probable penetrating atherosclerotic ulcers as well.    2. Saccular aneurysms of the descending aorta, largest measuring 3.7 cm distally.    3. Small bilateral pleural effusions.    4. Mild emphysema.    5. Mild thoracic lymphadenopathy, which could be seen in patients with heart failure.    EKG:  < from: 12 Lead ECG (08.22.20 @ 15:53) >  Ventricular Rate 62 BPM    Atrial Rate 62 BPM    P-R Interval 144 ms    QRS Duration 88 ms    Q-T Interval 434 ms    QTC Calculation(Bezet) 440 ms    P Axis 77 degrees    R Axis 85 degrees    T Axis 41 degrees    Diagnosis Line Normal sinus rhythm  Nonspecific ST - T abnormalities    TTE / ALBER:  < from: TTE Echo Complete w/o Contrast w/ Doppler (08.21.20 @ 11:48) >   1. There is mild concentric left ventricular hypertrophy. The left ventricle is normal in size, wall thickness, and systolic function with a calculated ejection fraction of 65%. Abnormal septal motion noted.   2. Severely dilated left atrium.   3. Bioprosthetic valve is seen in the aortic position. Peak transvalvular velocity is 3.85 m/s, the mean transvalvular gradient is 31.00 mmHg, and the LVOT/AV velocity ratio is 0.36.   4. There is mild transvalvular aortic regurgitation. There is trace paravalvular aortic regurgitation.   5. An annuloplasty ring is noted in the mitral position. The mean transvalvular gradient is 13.00 mmHg at a heart rate of 81 bpm. Mild mitral regurgitation.   6. There is moderate-to-severe tricuspid regurgitation.   7. Pulmonary hypertension present, pulmonary artery systolic pressure is 48 mmHg.   8. There is trace pulmonic regurgitation.   9. No pericardial effusion.  10. The aortic root is normal in size.  11. Compared to the previous TTE performed on 8/5/2019, higher mean gradient across mitral valve at a higher heart rate and gradients across bioprosthetic aortic valve is unchanged.    Cardiac Cath:  stable CAD Surgeon: MD Colt    Requesting Physician: MD Jocy  This 64 year-olf female, a former smoker, with PMH significant for HTN, HLD, CAD with s/p PCI to dL Cx and CABG of LIMA to ramus, ANTONIO to RDA (patent stent and grafts identified by VTA on 8/2014), valvular disease with s/p AVR (bioprosthetic valve) and mitral valve annuloplasty (2002), paroxysmal a-tach, wide complex tachycardiac which is seen in holter monitor in 4/2016.  Dr. Jasso), PAD with s/p pLSFA stent LCIA stent, with occluded bilateral SFA (proximal to mid portion per u/s 2019), chronic anemia with baseline Hgb 8-8.5 (history of blood transfuion none recently currently receiving IV Iron, Procrit, Vitamin B12 injection, oral folic acid, and benign colonoscopy with no evidence of bleeding), AAA with s/p open repair by Dr. Sandra (4/2/2015), hypothyroidism, CKD stage III-IV (baseline Cr 1.59-1/99), severe renal artery stenosis (scarring and atrophy of the left kidney 11/21/19), grand mal seizure (on Keppra), and depression/anxiety presented to the structural heart program with worsening aortic stenosis and mitral stenosis with associated symptoms of 2-day history of worsening intermittent palpitation.  Patient reports long standing history of palpitation.  She also admitted 5-8/10 left sided non-radiating chest pain lateral to left breast describe as tightness and pressure occurring both at reat and with exertion as well as worsening KRAFT.  She reports exercise tolerance is limited to less 1/2 block by calcification as well as chest pain, shortness of breath.  In addition, she reports dizziness (at rest and with changing positions) as well fever at 102.2 (8//9/20), generalized malaise, and productive cough (clear thick sputum) as well as chronic 4 pillow orthopnea.  She denies diaphoresis, N/V, syncope, melena, hematuria, BRBPR, hematemesis, LE edema, PND, loss of taste or smell, abdominal pain, contact with known Covid + individuals or sick contacts, recent travel.  In ED, patient's vital signs was /87 HR 92 RR 18 Temp 99 F O2 sat 100% RA. His initial lab has revealed elevated Troponin 0.2 and EKG showed NSR at 82 bpm with PACs, no ST or T changes. Labs significant for Hgb/HCT 8.0/26.4, Neutrophils 85% K +3.4, Bicarb 19, Glucose 130, and BNP 10,541. CT Chest Non Contrast revealed no focal consolidation, Mild mild linear atelectasis right middle lobe and bilateral lower lobes, trace pleural effusions, cardiomegaly, no pericardial effusions, New crescentic soft tissue density surrounding the aorta at the level of the renal arteries and suprarenal region, 4.0 x 2.0 x 4.0 cm, possibly increased saccular aortic aneurysm, 5.6 cm maximally, previously up to 5.0 cm on June 27, 2014. Covid PCR negative. Patient now admitted for R/O ACS including serial enzymes, telemetry, as well as further management of AAA (Vascular following).   At visit, patient is not in acute distress.  She reports significant decrease of walking along with worsening shortness of breath and chest pain.  Her work up have revealed severe valvular disease and along with baseline CHF symptoms.  Patient also presented with contained rupture AAA which is required for urgent repair.  Patient currently is hemodynamically stable and prepped for surgical intervention.      PAST MEDICAL & SURGICAL HISTORY:  Seizure  Essential hypertension: HTN (hypertension)  Gastroesophageal reflux disease: GERD (gastroesophageal reflux disease)  Hyperlipidemia: Hyperlipidemia  Anemia: Anemia  Hypothyroidism: Hypothyroidism  Atherosclerosis of coronary artery: CAD (coronary artery disease)  Peripheral vascular disease: PVD (peripheral vascular disease)  H/O aortic valve replacement: Bioprosthetic  Atherosclerosis of coronary artery bypass graft(s), unspecified, with angina pectoris with documented spasm  Status post aorto-coronary artery bypass graft: 2012      MEDICATIONS  (STANDING):  amLODIPine   Tablet 10 milliGRAM(s) Oral every 24 hours  aspirin enteric coated 81 milliGRAM(s) Oral daily  carvedilol 12.5 milliGRAM(s) Oral every 12 hours  fenofibrate Tablet 145 milliGRAM(s) Oral daily  folic acid 1 milliGRAM(s) Oral daily  heparin   Injectable 5000 Unit(s) SubCutaneous every 8 hours  isosorbide   mononitrate ER Tablet (IMDUR) 30 milliGRAM(s) Oral every 24 hours  levETIRAcetam 500 milliGRAM(s) Oral every 12 hours  levothyroxine 88 MICROGram(s) Oral daily  multivitamin 1 Tablet(s) Oral daily  sertraline 50 milliGRAM(s) Oral at bedtime    MEDICATIONS  (PRN):  acetaminophen   Tablet .. 650 milliGRAM(s) Oral every 6 hours PRN Moderate Pain (4 - 6), Severe Pain (7 - 10)      Allergies    No Known Allergies    Intolerances    Crestor (Other (Mod to Severe))      SOCIAL HISTORY:  Smoker:  former smoker, 2PPD and quit 1998  ETOH use: no   Ilicit Drug use: no   Occupation: non contributory   Assisted device use (Cane / Walker): n/a  Live with: family     FAMILY HISTORY:  No pertinent family history in first degree relatives      Review of Systems  CONSTITUTIONAL: denies Fevers / chills, sweats, fatigue, weight loss, weight gain                                     NEURO: denies parathesias, seizures, syncope, confusion                                                                                 EYES: denies Blurry vision, discharge, pain, loss of vision                                                                              ENMT: denies Difficulty hearing, vertigo, dysphagia, epistaxis, recent dental work                                CV: positive for chest pain, palpitations, KRAFT, orthopnea                                                                                          RESPIRATORY: positive for SOB, denies wheezing cough / sputum, hemoptysis                                                                GI:  denies Nausea, vomiting, diarrhea, constipation, melena                                                                         : denies Hematuria, dysuria, urgency, incontinence                                                                                       MUSKULOSKELETAL: denies arthritis, joint swelling, muscle weakness                                                           SKIN/BREAST: denies rash, itching, karl loss, masses                                                                                            PSYCH: denies depression, anxiety, suicidal ideation                                                                                              HEME/LYMPH: denies bruises easily, enlarged lymph nodes, tender lymph nodes                                          ENDOCRINE: denies cold intolerance, heat intolerance, polydipsia                                                                      PHYSICAL EXAM  Vital Signs Last 24 Hrs  T(C): 36.8 (24 Aug 2020 13:40), Max: 36.8 (24 Aug 2020 09:32)  T(F): 98.3 (24 Aug 2020 13:40), Max: 98.3 (24 Aug 2020 09:32)  HR: 64 (24 Aug 2020 12:03) (61 - 65)  BP: 117/67 (24 Aug 2020 12:03) (117/67 - 167/72)  BP(mean): --  RR: 16 (24 Aug 2020 12:03) (15 - 18)  SpO2: 98% (24 Aug 2020 12:03) (95% - 100%)    CONSTITUTIONAL:  thin and malnourished                                                                           NEURO:  grossly intact                                                                                                                EYES:    PERRL                                                                                                 ENMT:   supple and no lymphadenopathy                                                                                               CV:  positive for murmur                                                                                                       RESPIRATORY:     no wheezing and no rhonchi                                                                                GI:      BM and BS are intact.  there is scar from previous surgery                                                                                                   : deferred                                                                               MUSKULOSKELETAL:  no deformity                                                                       SKIN / BREAST:  warm                                                                                                                                        LABS:                        8.6    5.14  )-----------( 276      ( 24 Aug 2020 07:10 )             29.2     08-24    143  |  109<H>  |  9   ----------------------------<  88  5.0   |  22  |  1.31<H>    Ca    9.8      24 Aug 2020 07:10  Phos  3.1     08-24  Mg     2.0     08-24      CAROTID U/S:  < from: US Duplex Carotid Arteries Complete, Bilateral (08.21.20 @ 13:44) >  Intimal thickening and mild to moderate heterogeneous plaque at the proximal ICA and bulb bilaterally.    Antegrade flow vertebral arteries bilaterally.    Blood flow velocities are normal.    IMPRESSION:    Intimal  thickening and mild to moderate heterogeneous plaque at the proximal ICA and bulb bilaterally.    No elevated velocities to suggest hemodynamically significant stenosis.    Measurement of carotid stenosis is based on velocity parameters that correlate the residual internal carotid diameter with that of the more distal vessel in accordance with a method such as the North American Symptomatic Carotid Endarterectomy Trial (NASCET).    CXR:  < from: Xray Chest 2 Views PA/Lat (08.20.20 @ 06:31) >  Frontal and lateral examination of the chest demonstrates mild cardiomegaly. Patient status post sternotomy and valvular replacement. Hardware noted overlying left chest. Prominent bronchovascular markings. Mild chronic interstitial changes. Mild degenerative changes thoracic spine. Calcification aortic knob. No interval change noted in comparison to prior examination of the chest 8/1/2018.    IMPRESSION: No acute infiltrates    CT Scan:  < from: CT Angio Cardiac w/ IV Cont (08.21.20 @ 13:11) >  IMPRESSION: 1. Severe atherosclerotic disease, with large amount of ulcerated soft plaque in the descending aorta and probable penetrating atherosclerotic ulcers as well.    2. Saccular aneurysms of the descending aorta, largest measuring 3.7 cm distally.    3. Small bilateral pleural effusions.    4. Mild emphysema.    5. Mild thoracic lymphadenopathy, which could be seen in patients with heart failure.    EKG:  < from: 12 Lead ECG (08.22.20 @ 15:53) >  Ventricular Rate 62 BPM    Atrial Rate 62 BPM    P-R Interval 144 ms    QRS Duration 88 ms    Q-T Interval 434 ms    QTC Calculation(Bezet) 440 ms    P Axis 77 degrees    R Axis 85 degrees    T Axis 41 degrees    Diagnosis Line Normal sinus rhythm  Nonspecific ST - T abnormalities    TTE / ALBER:  < from: TTE Echo Complete w/o Contrast w/ Doppler (08.21.20 @ 11:48) >   1. There is mild concentric left ventricular hypertrophy. The left ventricle is normal in size, wall thickness, and systolic function with a calculated ejection fraction of 65%. Abnormal septal motion noted.   2. Severely dilated left atrium.   3. Bioprosthetic valve is seen in the aortic position. Peak transvalvular velocity is 3.85 m/s, the mean transvalvular gradient is 31.00 mmHg, and the LVOT/AV velocity ratio is 0.36.   4. There is mild transvalvular aortic regurgitation. There is trace paravalvular aortic regurgitation.   5. An annuloplasty ring is noted in the mitral position. The mean transvalvular gradient is 13.00 mmHg at a heart rate of 81 bpm. Mild mitral regurgitation.   6. There is moderate-to-severe tricuspid regurgitation.   7. Pulmonary hypertension present, pulmonary artery systolic pressure is 48 mmHg.   8. There is trace pulmonic regurgitation.   9. No pericardial effusion.  10. The aortic root is normal in size.  11. Compared to the previous TTE performed on 8/5/2019, higher mean gradient across mitral valve at a higher heart rate and gradients across bioprosthetic aortic valve is unchanged.    Cardiac Cath:  stable CAD

## 2020-08-24 NOTE — PROGRESS NOTE ADULT - SUBJECTIVE AND OBJECTIVE BOX
PRE OPERATIVE NOTE    Pre-op Diagnosis: Aortic Aneurysm  Procedure: Open repair of thoracoabdominal repair, completion angiogram, possible angioplasty, stent, arthrectomy  Surgeon: Dr. Sandra    Consent in chart                          8.6    5.14  )-----------( 276      ( 24 Aug 2020 07:10 )             29.2     08-24    143  |  109<H>  |  9   ----------------------------<  88  5.0   |  22  |  1.31<H>    Ca    9.8      24 Aug 2020 07:10  Phos  3.1     08-24  Mg     2.0     08-24            Type & Screen:  CXR: < from: Xray Chest 2 Views PA/Lat (08.20.20 @ 06:31) >  IMPRESSION: No acute infiltrates    < end of copied text >    EKG: < from: 12 Lead ECG (08.22.20 @ 15:53) >  Diagnosis Line Normal sinus rhythm  Nonspecific ST - T abnormalities    < end of copied text >          A/P: 63 yo Female planned for Open repair of thoracoabdominal aneurysm, completion angiogram, possible angioplasty, stent, arthrectomy  1. NPO past midnight, except medications  2. IVF  3. [4] Blood on hold, Units:  4. labs

## 2020-08-24 NOTE — CONSULT NOTE ADULT - ASSESSMENT
Severe Renal Artery Stenosis (scarring and atrophy of left Kidney A/p  64 F w/PMHx HTN, CAD s/p PCI/CABG/ s/p Bio AVR and mitral valve annuloplasty, PAD s/p peripheral stenting with occluded Bilateral SFA/ Chronic Anemia, AAA s/p Open repair, Seizure disorders and CKD III, planned for OR for aneurysm found proximal to prior graft, nephrology consulted for aron-op CKD monitoring

## 2020-08-24 NOTE — CONSULT NOTE ADULT - PROBLEM SELECTOR RECOMMENDATION 3
The pulmonary status is optimized. Patient is to be started on bronchodilator as needed basis. Early extubation. Aggressive pulmonary toilet. Early mobilization. DVT prophylaxis. Patient require PFT postop

## 2020-08-24 NOTE — CONSULT NOTE ADULT - PROBLEM SELECTOR RECOMMENDATION 9
The patient is a former smoker. The patient is complaining of productive cough in the morning. The symptoms  are consistent with chronic bronchitis. A CT scan of the chest reveals emphysematous changes. Patient is asymptomatic for COPD point of view but she does risk of postoperative complication related to the new sputum production. The baseline oxygen saturation was normal. Her exercise capacity is limited due to her progressive disease.
Patient with CKD stage III from HTN/ Chronic vasculopathy/known severe left CARLI and atrophic left kidney, now with new saccular aneurysm prox to prior graft requiring Repair, Cr sl increased which could be related to multiple contrast studies on 8/21 still somewhat in the window for contrast nephropathy but overall renal function is stable, she is high risk for post-op ischemic injury and ATN given long clamp time in view of almost just one functional kidney   Nephrologically stable at present will follow closely

## 2020-08-25 ENCOUNTER — RESULT REVIEW (OUTPATIENT)
Age: 65
End: 2020-08-25

## 2020-08-25 DIAGNOSIS — I71.6 THORACOABDOMINAL AORTIC ANEURYSM, WITHOUT RUPTURE: ICD-10-CM

## 2020-08-25 DIAGNOSIS — I71.03 DISSECTION OF THORACOABDOMINAL AORTA: ICD-10-CM

## 2020-08-25 LAB
ALBUMIN SERPL ELPH-MCNC: 2.2 G/DL — LOW (ref 3.3–5)
ALP SERPL-CCNC: 29 U/L — LOW (ref 40–120)
ALT FLD-CCNC: 5 U/L — LOW (ref 10–45)
AMYLASE P1 CFR SERPL: 14 U/L — LOW (ref 25–125)
ANION GAP SERPL CALC-SCNC: 10 MMOL/L — SIGNIFICANT CHANGE UP (ref 5–17)
ANION GAP SERPL CALC-SCNC: 11 MMOL/L — SIGNIFICANT CHANGE UP (ref 5–17)
ANION GAP SERPL CALC-SCNC: 12 MMOL/L — SIGNIFICANT CHANGE UP (ref 5–17)
ANION GAP SERPL CALC-SCNC: 5 MMOL/L — SIGNIFICANT CHANGE UP (ref 5–17)
APTT BLD: 36 SEC — HIGH (ref 27.5–35.5)
APTT BLD: 38.2 SEC — HIGH (ref 27.5–35.5)
AST SERPL-CCNC: 24 U/L — SIGNIFICANT CHANGE UP (ref 10–40)
BILIRUB SERPL-MCNC: 0.5 MG/DL — SIGNIFICANT CHANGE UP (ref 0.2–1.2)
BLD GP AB SCN SERPL QL: NEGATIVE — SIGNIFICANT CHANGE UP
BUN SERPL-MCNC: 10 MG/DL — SIGNIFICANT CHANGE UP (ref 7–23)
BUN SERPL-MCNC: 10 MG/DL — SIGNIFICANT CHANGE UP (ref 7–23)
BUN SERPL-MCNC: 11 MG/DL — SIGNIFICANT CHANGE UP (ref 7–23)
BUN SERPL-MCNC: 13 MG/DL — SIGNIFICANT CHANGE UP (ref 7–23)
CALCIUM SERPL-MCNC: 10.4 MG/DL — SIGNIFICANT CHANGE UP (ref 8.4–10.5)
CALCIUM SERPL-MCNC: 7.2 MG/DL — LOW (ref 8.4–10.5)
CALCIUM SERPL-MCNC: 7.2 MG/DL — LOW (ref 8.4–10.5)
CALCIUM SERPL-MCNC: 8.2 MG/DL — LOW (ref 8.4–10.5)
CHLORIDE SERPL-SCNC: 108 MMOL/L — SIGNIFICANT CHANGE UP (ref 96–108)
CHLORIDE SERPL-SCNC: 112 MMOL/L — HIGH (ref 96–108)
CO2 SERPL-SCNC: 19 MMOL/L — LOW (ref 22–31)
CO2 SERPL-SCNC: 20 MMOL/L — LOW (ref 22–31)
CO2 SERPL-SCNC: 20 MMOL/L — LOW (ref 22–31)
CO2 SERPL-SCNC: 23 MMOL/L — SIGNIFICANT CHANGE UP (ref 22–31)
CREAT SERPL-MCNC: 1.31 MG/DL — HIGH (ref 0.5–1.3)
CREAT SERPL-MCNC: 1.34 MG/DL — HIGH (ref 0.5–1.3)
CREAT SERPL-MCNC: 1.34 MG/DL — HIGH (ref 0.5–1.3)
CREAT SERPL-MCNC: 1.46 MG/DL — HIGH (ref 0.5–1.3)
GAS PNL BLDA: SIGNIFICANT CHANGE UP
GAS PNL BLDV: SIGNIFICANT CHANGE UP
GLUCOSE BLDC GLUCOMTR-MCNC: 115 MG/DL — HIGH (ref 70–99)
GLUCOSE BLDC GLUCOMTR-MCNC: 159 MG/DL — HIGH (ref 70–99)
GLUCOSE SERPL-MCNC: 126 MG/DL — HIGH (ref 70–99)
GLUCOSE SERPL-MCNC: 131 MG/DL — HIGH (ref 70–99)
GLUCOSE SERPL-MCNC: 169 MG/DL — HIGH (ref 70–99)
GLUCOSE SERPL-MCNC: 82 MG/DL — SIGNIFICANT CHANGE UP (ref 70–99)
HCT VFR BLD CALC: 28.9 % — LOW (ref 34.5–45)
HCT VFR BLD CALC: 30.4 % — LOW (ref 34.5–45)
HCT VFR BLD CALC: 34.2 % — LOW (ref 34.5–45)
HCT VFR BLD CALC: 37.9 % — SIGNIFICANT CHANGE UP (ref 34.5–45)
HGB BLD-MCNC: 10.6 G/DL — LOW (ref 11.5–15.5)
HGB BLD-MCNC: 11.6 G/DL — SIGNIFICANT CHANGE UP (ref 11.5–15.5)
HGB BLD-MCNC: 9.2 G/DL — LOW (ref 11.5–15.5)
HGB BLD-MCNC: 9.4 G/DL — LOW (ref 11.5–15.5)
INR BLD: 1.41 — HIGH (ref 0.88–1.16)
INR BLD: 1.47 — HIGH (ref 0.88–1.16)
LACTATE SERPL-SCNC: 1 MMOL/L — SIGNIFICANT CHANGE UP (ref 0.5–2)
LIDOCAIN IGE QN: 7 U/L — SIGNIFICANT CHANGE UP (ref 7–60)
MAGNESIUM SERPL-MCNC: 1.4 MG/DL — LOW (ref 1.6–2.6)
MAGNESIUM SERPL-MCNC: 2.2 MG/DL — SIGNIFICANT CHANGE UP (ref 1.6–2.6)
MAGNESIUM SERPL-MCNC: 2.6 MG/DL — SIGNIFICANT CHANGE UP (ref 1.6–2.6)
MAGNESIUM SERPL-MCNC: 2.9 MG/DL — HIGH (ref 1.6–2.6)
MCHC RBC-ENTMCNC: 26 PG — LOW (ref 27–34)
MCHC RBC-ENTMCNC: 26.3 PG — LOW (ref 27–34)
MCHC RBC-ENTMCNC: 26.9 PG — LOW (ref 27–34)
MCHC RBC-ENTMCNC: 26.9 PG — LOW (ref 27–34)
MCHC RBC-ENTMCNC: 30.6 GM/DL — LOW (ref 32–36)
MCHC RBC-ENTMCNC: 30.9 GM/DL — LOW (ref 32–36)
MCHC RBC-ENTMCNC: 31 GM/DL — LOW (ref 32–36)
MCHC RBC-ENTMCNC: 31.8 GM/DL — LOW (ref 32–36)
MCV RBC AUTO: 84 FL — SIGNIFICANT CHANGE UP (ref 80–100)
MCV RBC AUTO: 84.5 FL — SIGNIFICANT CHANGE UP (ref 80–100)
MCV RBC AUTO: 84.9 FL — SIGNIFICANT CHANGE UP (ref 80–100)
MCV RBC AUTO: 87.7 FL — SIGNIFICANT CHANGE UP (ref 80–100)
NRBC # BLD: 0 /100 WBCS — SIGNIFICANT CHANGE UP (ref 0–0)
PHOSPHATE SERPL-MCNC: 3.1 MG/DL — SIGNIFICANT CHANGE UP (ref 2.5–4.5)
PHOSPHATE SERPL-MCNC: 3.4 MG/DL — SIGNIFICANT CHANGE UP (ref 2.5–4.5)
PHOSPHATE SERPL-MCNC: 3.5 MG/DL — SIGNIFICANT CHANGE UP (ref 2.5–4.5)
PHOSPHATE SERPL-MCNC: 3.5 MG/DL — SIGNIFICANT CHANGE UP (ref 2.5–4.5)
PLATELET # BLD AUTO: 261 K/UL — SIGNIFICANT CHANGE UP (ref 150–400)
PLATELET # BLD AUTO: 81 K/UL — LOW (ref 150–400)
PLATELET # BLD AUTO: 82 K/UL — LOW (ref 150–400)
PLATELET # BLD AUTO: 97 K/UL — LOW (ref 150–400)
POTASSIUM SERPL-MCNC: 4.3 MMOL/L — SIGNIFICANT CHANGE UP (ref 3.5–5.3)
POTASSIUM SERPL-MCNC: 4.6 MMOL/L — SIGNIFICANT CHANGE UP (ref 3.5–5.3)
POTASSIUM SERPL-MCNC: 4.8 MMOL/L — SIGNIFICANT CHANGE UP (ref 3.5–5.3)
POTASSIUM SERPL-MCNC: 4.9 MMOL/L — SIGNIFICANT CHANGE UP (ref 3.5–5.3)
POTASSIUM SERPL-SCNC: 4.3 MMOL/L — SIGNIFICANT CHANGE UP (ref 3.5–5.3)
POTASSIUM SERPL-SCNC: 4.6 MMOL/L — SIGNIFICANT CHANGE UP (ref 3.5–5.3)
POTASSIUM SERPL-SCNC: 4.8 MMOL/L — SIGNIFICANT CHANGE UP (ref 3.5–5.3)
POTASSIUM SERPL-SCNC: 4.9 MMOL/L — SIGNIFICANT CHANGE UP (ref 3.5–5.3)
PREALB SERPL-MCNC: 8 MG/DL — LOW (ref 20–40)
PROT SERPL-MCNC: 3.4 G/DL — LOW (ref 6–8.3)
PROTHROM AB SERPL-ACNC: 16.6 SEC — HIGH (ref 10.6–13.6)
PROTHROM AB SERPL-ACNC: 17.3 SEC — HIGH (ref 10.6–13.6)
RBC # BLD: 3.42 M/UL — LOW (ref 3.8–5.2)
RBC # BLD: 3.58 M/UL — LOW (ref 3.8–5.2)
RBC # BLD: 4.07 M/UL — SIGNIFICANT CHANGE UP (ref 3.8–5.2)
RBC # BLD: 4.32 M/UL — SIGNIFICANT CHANGE UP (ref 3.8–5.2)
RBC # FLD: 17.9 % — HIGH (ref 10.3–14.5)
RBC # FLD: 18 % — HIGH (ref 10.3–14.5)
RBC # FLD: 18.1 % — HIGH (ref 10.3–14.5)
RBC # FLD: 18.1 % — HIGH (ref 10.3–14.5)
RH IG SCN BLD-IMP: POSITIVE — SIGNIFICANT CHANGE UP
SODIUM SERPL-SCNC: 137 MMOL/L — SIGNIFICANT CHANGE UP (ref 135–145)
SODIUM SERPL-SCNC: 139 MMOL/L — SIGNIFICANT CHANGE UP (ref 135–145)
SODIUM SERPL-SCNC: 139 MMOL/L — SIGNIFICANT CHANGE UP (ref 135–145)
SODIUM SERPL-SCNC: 141 MMOL/L — SIGNIFICANT CHANGE UP (ref 135–145)
WBC # BLD: 14.57 K/UL — HIGH (ref 3.8–10.5)
WBC # BLD: 15.17 K/UL — HIGH (ref 3.8–10.5)
WBC # BLD: 16.73 K/UL — HIGH (ref 3.8–10.5)
WBC # BLD: 5.1 K/UL — SIGNIFICANT CHANGE UP (ref 3.8–10.5)
WBC # FLD AUTO: 14.57 K/UL — HIGH (ref 3.8–10.5)
WBC # FLD AUTO: 15.17 K/UL — HIGH (ref 3.8–10.5)
WBC # FLD AUTO: 16.73 K/UL — HIGH (ref 3.8–10.5)
WBC # FLD AUTO: 5.1 K/UL — SIGNIFICANT CHANGE UP (ref 3.8–10.5)

## 2020-08-25 PROCEDURE — 88304 TISSUE EXAM BY PATHOLOGIST: CPT | Mod: 26

## 2020-08-25 PROCEDURE — 88305 TISSUE EXAM BY PATHOLOGIST: CPT | Mod: 26

## 2020-08-25 PROCEDURE — 71045 X-RAY EXAM CHEST 1 VIEW: CPT | Mod: 26

## 2020-08-25 PROCEDURE — 33877 THORACOABDOMINAL GRAFT: CPT | Mod: GC,22

## 2020-08-25 PROCEDURE — 99291 CRITICAL CARE FIRST HOUR: CPT

## 2020-08-25 PROCEDURE — 35631 BPG AOR-CELIAC-MSN-RENAL: CPT | Mod: GC

## 2020-08-25 PROCEDURE — 88331 PATH CONSLTJ SURG 1 BLK 1SPC: CPT | Mod: 26

## 2020-08-25 PROCEDURE — 33877 THORACOABDOMINAL GRAFT: CPT | Mod: 82

## 2020-08-25 PROCEDURE — 99222 1ST HOSP IP/OBS MODERATE 55: CPT | Mod: GC

## 2020-08-25 PROCEDURE — 35631 BPG AOR-CELIAC-MSN-RENAL: CPT | Mod: 82

## 2020-08-25 RX ORDER — CHLORHEXIDINE GLUCONATE 213 G/1000ML
1 SOLUTION TOPICAL
Refills: 0 | Status: DISCONTINUED | OUTPATIENT
Start: 2020-08-25 | End: 2020-09-04

## 2020-08-25 RX ORDER — MAGNESIUM SULFATE 500 MG/ML
2 VIAL (ML) INJECTION
Refills: 0 | Status: COMPLETED | OUTPATIENT
Start: 2020-08-25 | End: 2020-08-25

## 2020-08-25 RX ORDER — NOREPINEPHRINE BITARTRATE/D5W 8 MG/250ML
0.05 PLASTIC BAG, INJECTION (ML) INTRAVENOUS
Qty: 8 | Refills: 0 | Status: DISCONTINUED | OUTPATIENT
Start: 2020-08-25 | End: 2020-08-26

## 2020-08-25 RX ORDER — LEVETIRACETAM 250 MG/1
500 TABLET, FILM COATED ORAL EVERY 12 HOURS
Refills: 0 | Status: DISCONTINUED | OUTPATIENT
Start: 2020-08-25 | End: 2020-08-27

## 2020-08-25 RX ORDER — INSULIN LISPRO 100/ML
VIAL (ML) SUBCUTANEOUS EVERY 6 HOURS
Refills: 0 | Status: DISCONTINUED | OUTPATIENT
Start: 2020-08-25 | End: 2020-08-31

## 2020-08-25 RX ORDER — PROPOFOL 10 MG/ML
5 INJECTION, EMULSION INTRAVENOUS
Qty: 1000 | Refills: 0 | Status: DISCONTINUED | OUTPATIENT
Start: 2020-08-25 | End: 2020-08-26

## 2020-08-25 RX ORDER — CHLORHEXIDINE GLUCONATE 213 G/1000ML
15 SOLUTION TOPICAL ONCE
Refills: 0 | Status: COMPLETED | OUTPATIENT
Start: 2020-08-25 | End: 2020-08-25

## 2020-08-25 RX ORDER — CHLORHEXIDINE GLUCONATE 213 G/1000ML
15 SOLUTION TOPICAL EVERY 12 HOURS
Refills: 0 | Status: DISCONTINUED | OUTPATIENT
Start: 2020-08-25 | End: 2020-08-29

## 2020-08-25 RX ORDER — SODIUM CHLORIDE 9 MG/ML
1000 INJECTION, SOLUTION INTRAVENOUS
Refills: 0 | Status: DISCONTINUED | OUTPATIENT
Start: 2020-08-25 | End: 2020-08-26

## 2020-08-25 RX ORDER — ALBUMIN HUMAN 25 %
250 VIAL (ML) INTRAVENOUS ONCE
Refills: 0 | Status: COMPLETED | OUTPATIENT
Start: 2020-08-25 | End: 2020-08-25

## 2020-08-25 RX ORDER — FENTANYL CITRATE 50 UG/ML
0.5 INJECTION INTRAVENOUS
Qty: 2500 | Refills: 0 | Status: DISCONTINUED | OUTPATIENT
Start: 2020-08-25 | End: 2020-08-26

## 2020-08-25 RX ORDER — HYDROMORPHONE HYDROCHLORIDE 2 MG/ML
0.5 INJECTION INTRAMUSCULAR; INTRAVENOUS; SUBCUTANEOUS ONCE
Refills: 0 | Status: DISCONTINUED | OUTPATIENT
Start: 2020-08-25 | End: 2020-08-25

## 2020-08-25 RX ORDER — PHENYLEPHRINE HYDROCHLORIDE 10 MG/ML
0.5 INJECTION INTRAVENOUS
Qty: 40 | Refills: 0 | Status: DISCONTINUED | OUTPATIENT
Start: 2020-08-25 | End: 2020-08-26

## 2020-08-25 RX ORDER — ALBUMIN HUMAN 25 %
250 VIAL (ML) INTRAVENOUS
Refills: 0 | Status: DISCONTINUED | OUTPATIENT
Start: 2020-08-25 | End: 2020-08-26

## 2020-08-25 RX ORDER — PHENYLEPHRINE HYDROCHLORIDE 10 MG/ML
0.5 INJECTION INTRAVENOUS
Qty: 40 | Refills: 0 | Status: DISCONTINUED | OUTPATIENT
Start: 2020-08-25 | End: 2020-08-25

## 2020-08-25 RX ORDER — PHENYLEPHRINE HYDROCHLORIDE 10 MG/ML
0.5 INJECTION INTRAVENOUS
Qty: 160 | Refills: 0 | Status: DISCONTINUED | OUTPATIENT
Start: 2020-08-25 | End: 2020-08-25

## 2020-08-25 RX ORDER — CEFAZOLIN SODIUM 1 G
2000 VIAL (EA) INJECTION EVERY 8 HOURS
Refills: 0 | Status: COMPLETED | OUTPATIENT
Start: 2020-08-25 | End: 2020-08-26

## 2020-08-25 RX ORDER — LEVOTHYROXINE SODIUM 125 MCG
70 TABLET ORAL
Refills: 0 | Status: DISCONTINUED | OUTPATIENT
Start: 2020-08-25 | End: 2020-08-27

## 2020-08-25 RX ORDER — PANTOPRAZOLE SODIUM 20 MG/1
40 TABLET, DELAYED RELEASE ORAL DAILY
Refills: 0 | Status: DISCONTINUED | OUTPATIENT
Start: 2020-08-25 | End: 2020-08-27

## 2020-08-25 RX ADMIN — HYDROMORPHONE HYDROCHLORIDE 0.5 MILLIGRAM(S): 2 INJECTION INTRAMUSCULAR; INTRAVENOUS; SUBCUTANEOUS at 22:00

## 2020-08-25 RX ADMIN — PROPOFOL 1.68 MICROGRAM(S)/KG/MIN: 10 INJECTION, EMULSION INTRAVENOUS at 18:11

## 2020-08-25 RX ADMIN — Medication 5.25 MICROGRAM(S)/KG/MIN: at 18:11

## 2020-08-25 RX ADMIN — FENTANYL CITRATE 2.8 MICROGRAM(S)/KG/HR: 50 INJECTION INTRAVENOUS at 18:11

## 2020-08-25 RX ADMIN — Medication 125 MILLILITER(S): at 20:52

## 2020-08-25 RX ADMIN — HEPARIN SODIUM 5000 UNIT(S): 5000 INJECTION INTRAVENOUS; SUBCUTANEOUS at 05:59

## 2020-08-25 RX ADMIN — Medication 125 MILLILITER(S): at 20:51

## 2020-08-25 RX ADMIN — HYDROMORPHONE HYDROCHLORIDE 0.5 MILLIGRAM(S): 2 INJECTION INTRAMUSCULAR; INTRAVENOUS; SUBCUTANEOUS at 23:00

## 2020-08-25 RX ADMIN — PHENYLEPHRINE HYDROCHLORIDE 10.5 MICROGRAM(S)/KG/MIN: 10 INJECTION INTRAVENOUS at 20:50

## 2020-08-25 RX ADMIN — CARVEDILOL PHOSPHATE 12.5 MILLIGRAM(S): 80 CAPSULE, EXTENDED RELEASE ORAL at 05:59

## 2020-08-25 RX ADMIN — CHLORHEXIDINE GLUCONATE 15 MILLILITER(S): 213 SOLUTION TOPICAL at 18:13

## 2020-08-25 RX ADMIN — Medication 50 GRAM(S): at 18:14

## 2020-08-25 RX ADMIN — Medication 100 MILLIGRAM(S): at 22:15

## 2020-08-25 RX ADMIN — CHLORHEXIDINE GLUCONATE 15 MILLILITER(S): 213 SOLUTION TOPICAL at 06:28

## 2020-08-25 RX ADMIN — Medication 2: at 18:45

## 2020-08-25 RX ADMIN — Medication 125 MILLILITER(S): at 20:49

## 2020-08-25 RX ADMIN — AMLODIPINE BESYLATE 10 MILLIGRAM(S): 2.5 TABLET ORAL at 05:59

## 2020-08-25 RX ADMIN — PANTOPRAZOLE SODIUM 40 MILLIGRAM(S): 20 TABLET, DELAYED RELEASE ORAL at 18:13

## 2020-08-25 RX ADMIN — Medication 50 GRAM(S): at 19:47

## 2020-08-25 RX ADMIN — Medication 88 MICROGRAM(S): at 06:00

## 2020-08-25 RX ADMIN — SODIUM CHLORIDE 150 MILLILITER(S): 9 INJECTION, SOLUTION INTRAVENOUS at 18:12

## 2020-08-25 RX ADMIN — LEVETIRACETAM 400 MILLIGRAM(S): 250 TABLET, FILM COATED ORAL at 18:13

## 2020-08-25 RX ADMIN — SODIUM CHLORIDE 80 MILLILITER(S): 9 INJECTION, SOLUTION INTRAVENOUS at 00:03

## 2020-08-25 NOTE — PROGRESS NOTE ADULT - SUBJECTIVE AND OBJECTIVE BOX
O/N Events:  seen and examined post-op, sedated and intubated on low dose Levo  s/p open thoracoabdominal aneurysm repair with end to end bypasses to the celiac, SMA,  left renal, right renal   received 4 L crystalloids and 2 U pRBC intra-op, no sig hemodynamic issues reported during the operation     VITALS  Vital Signs Last 24 Hrs  T(C): 37.1 (25 Aug 2020 05:16), Max: 37.1 (25 Aug 2020 05:16)  T(F): 98.8 (25 Aug 2020 05:16), Max: 98.8 (25 Aug 2020 05:16)  HR: 54 (25 Aug 2020 16:45) (54 - 67)  BP: 149/72 (25 Aug 2020 05:16) (137/73 - 171/74)  RR: 10 (25 Aug 2020 16:30) (10 - 20)  SpO2: 100% (25 Aug 2020 16:45) (97% - 100%)    PHYSICAL EXAM  General: sedated and intubated 50% FiO2   Respiratory: CTA B/L anteriorly   Cardiovascular: s1s2, RRR  Gastrointestinal: Soft; ND, surgical incision under dressing c/d/i/, 2 Left sided CTs present with serosang drainage   Extremities: Warm, no edema   Neurological: deferred, sedated  : tony draining light color urine     MEDICATIONS  (STANDING):  ceFAZolin   IVPB 2000 milliGRAM(s) IV Intermittent every 8 hours  chlorhexidine 2% Cloths 1 Application(s) Topical <User Schedule>  fentaNYL   Infusion 0.5 MICROgram(s)/kG/Hr (2.8 mL/Hr) IV Continuous <Continuous>  lactated ringers. 1000 milliLiter(s) (150 mL/Hr) IV Continuous <Continuous>  levETIRAcetam  IVPB 500 milliGRAM(s) IV Intermittent every 12 hours  levothyroxine Injectable 70 MICROGram(s) IV Push <User Schedule>  norepinephrine Infusion 0.05 MICROgram(s)/kG/Min (5.25 mL/Hr) IV Continuous <Continuous>  pantoprazole  Injectable 40 milliGRAM(s) IV Push daily  propofol Infusion 5 MICROgram(s)/kG/Min (1.68 mL/Hr) IV Continuous <Continuous>    MEDICATIONS  (PRN):      LABS                        11.6   5.10  )-----------( 261      ( 25 Aug 2020 07:58 )             37.9     08-25    141  |  108  |  10  ----------------------------<  82  4.8   |  23  |  1.46<H>    Ca    10.4      25 Aug 2020 07:58  Phos  3.5     08-25  Mg     2.2     08-25 O/N Events:  seen and examined post-op, sedated and intubated on low dose Levo  s/p open thoracoabdominal aneurysm repair  No sig hemodynamic issues reported during the operation     VITALS  Vital Signs Last 24 Hrs  T(C): 37.1 (25 Aug 2020 05:16), Max: 37.1 (25 Aug 2020 05:16)  T(F): 98.8 (25 Aug 2020 05:16), Max: 98.8 (25 Aug 2020 05:16)  HR: 54 (25 Aug 2020 16:45) (54 - 67)  BP: 149/72 (25 Aug 2020 05:16) (137/73 - 171/74)  RR: 10 (25 Aug 2020 16:30) (10 - 20)  SpO2: 100% (25 Aug 2020 16:45) (97% - 100%)    PHYSICAL EXAM  General: sedated and intubated 50% FiO2   Respiratory: CTA B/L anteriorly   Cardiovascular: s1s2, RRR  Gastrointestinal: Soft; ND, surgical incision under dressing c/d/i/, 2 Left sided CTs present with serosang drainage   Extremities: Warm, no edema   Neurological: deferred, sedated  : tony draining light color urine     MEDICATIONS  (STANDING):  ceFAZolin   IVPB 2000 milliGRAM(s) IV Intermittent every 8 hours  chlorhexidine 2% Cloths 1 Application(s) Topical <User Schedule>  fentaNYL   Infusion 0.5 MICROgram(s)/kG/Hr (2.8 mL/Hr) IV Continuous <Continuous>  lactated ringers. 1000 milliLiter(s) (150 mL/Hr) IV Continuous <Continuous>  levETIRAcetam  IVPB 500 milliGRAM(s) IV Intermittent every 12 hours  levothyroxine Injectable 70 MICROGram(s) IV Push <User Schedule>  norepinephrine Infusion 0.05 MICROgram(s)/kG/Min (5.25 mL/Hr) IV Continuous <Continuous>  pantoprazole  Injectable 40 milliGRAM(s) IV Push daily  propofol Infusion 5 MICROgram(s)/kG/Min (1.68 mL/Hr) IV Continuous <Continuous>    MEDICATIONS  (PRN):      LABS                        11.6   5.10  )-----------( 261      ( 25 Aug 2020 07:58 )             37.9     08-25    141  |  108  |  10  ----------------------------<  82  4.8   |  23  |  1.46<H>    Ca    10.4      25 Aug 2020 07:58  Phos  3.5     08-25  Mg     2.2     08-25

## 2020-08-25 NOTE — CONSULT NOTE ADULT - ASSESSMENT
A/P: 64F with extensive cardiac hx as above, CKD, PAD, previous AAA repair, now with subacute rupture contained saccular aortic aneurysm, s/p repair with tube graft today. in SICU post-op for close monitoring.     NEURO: keep sedated with propofol, pain control with fentanyl gtts while intubated.   CV: started on levophed intraop, keep MAP >65. holding anti-hypertensives for now. start ASA tomorrow. cards following.   PULM: intubated on AC 40/490/12/5, chest tubes x 2 on left side bloody - vascular aware. will repeat another CBC in a few hours. .   GI/FEN: NPO, NGT LIWS, protonix ppx. LR@150.   : tony for strict I&O. has baseline CKD, renal following.   ENDO: ISS  ID: ppx ancef x 24hrs (8/25)   PPX: hx of LE stents, NO SCDs, hold off on SQH for now given bleeding risk,   LINES: PIVs, joon (8/25--). RIJ (8/25--)   WOUNDS/DRAINS: left side to mid abd incision. left chest tubes x 2, A/P: 64F with extensive cardiac hx as above, CKD, PAD, previous AAA repair, now with subacute rupture contained saccular aortic aneurysm, s/p repair with tube graft today. in SICU post-op for close monitoring.     NEURO: keep sedated with propofol, pain control with fentanyl gtts while intubated.   CV: started on levophed intraop, keep MAP >65. holding anti-hypertensives for now. start ASA tomorrow. cards following.   PULM: intubated on AC 40/490/12/5, chest tubes x 2 on left side bloody - vascular aware. will repeat another CBC in a few hours. .   GI/FEN: NPO, NGT LIWS, protonix ppx. LR@150.   : tony for strict I&O. has baseline CKD, renal following.   ENDO: ISS  ID: ppx ancef x 24hrs (8/25)   PPX: hx of LE stents, NO SCDs, hold off on SQH for now given bleeding risk,   LINES: PIVs, joon (8/25--). RIJOSETTE (8/25--)   WOUNDS/DRAINS: left side to mid abd incision. left chest tubes x 2,   Transfer to CT surgery service.

## 2020-08-25 NOTE — PROGRESS NOTE ADULT - ASSESSMENT
A/p  64 F w/PMHx HTN, CAD s/p PCI/CABG/ s/p Bio AVR and mitral valve annuloplasty, PAD s/p peripheral stenting with occluded Bilateral SFA/ Chronic Anemia, AAA s/p Open repair, Seizure disorders and CKD III, planned for OR for aneurysm found proximal to prior graft, nephrology consulted for aron-op CKD monitoring

## 2020-08-25 NOTE — CHART NOTE - NSCHARTNOTEFT_GEN_A_CORE
63yo female with pmhx of prior smoking, HTN, HLD, CAD with s/p PCI to dL Cx and CABG of LIMA to ramus, ANTONIO to RDA (patent stent and grafts identified by VTA on 8/2014), valvular disease with s/p bioAVR and mitral valve annuloplasty 2002, paroxysmal a-tach, wide complex tachycardiac which is seen in holter monitor in 4/2016, PAD with s/p pLSFA stent LCIA stent, with occluded bilateral SFA (proximal to mid portion per u/s 2019), chronic anemia with baseline Hgb 8-8.5 (history of blood transfusion none recently currently receiving IV Iron, Procrit, Vitamin B12 injection, oral folic acid, and benign colonoscopy with no evidence of bleeding), AAA with s/p open repair by Dr. Sandra (4/2/2015), hypothyroidism, CKD stage III-IV (baseline Cr 1.59-1/99), severe renal artery stenosis (scarring and atrophy of the left kidney 11/21/19), grand mal seizure (on Keppra), and depression/anxiety who presents with complaints of abd pain, fever 102.2, generalized malaise, and productive cough (clear thick sputum). at baseline pt reports SOB with exertion to 1/2 block and chest tightness, chronic orthopnea.  CT Chest Non Contrast revealed no focal consolidation, mild linear atelectasis right middle lobe and bilateral lower lobes, trace pleural effusions, cardiomegaly, no pericardial effusions, new crescentic soft tissue density surrounding the aorta at the level of the renal arteries and suprarenal region, 4.0 x 2.0 x 4.0 cm, possibly increased saccular aortic aneurysm, 5.6 cm maximally, previously up to 5.0 cm on June 27, 2014. Covid PCR negative. TTE showed EF 65%, bioAVR with mean gradient 31mmHg, mitral annuloplasty ring with thickened calcified/thickened leaflets, mean graident 13mmHg, mod-severe TR. CTA showed patent grafts/PCI.      imp: fever unclear etiology; contained AAA rupture; mod-severe bioAVR dysfunction; MS in setting of annuloplasty ring, anemia; chest pain with patient LIMA/ANTONIO, PCI by CTA; PAD; . pt with extensive cardiac history now with contained rupture of AAA. .    Problem #1 severe valvular disease with active symptoms of chest pain and shortness of breath  - the case was discussed with Dr. Gregory; given euvolemic state and not in acute CHF with emergent need for repair, conservative management at this time for valuvlar heart disease  - chronic diastolic CHF NYHA III  - recommended for possible intervention in the future  - Will sign off at this time. Please reconsult with any further questions.    Problem #2 contained rupture AAA  - management per vascular team and prep for urgent surgical intervention today  - with patient's current severe valvular disease and CHF symptoms of chest pain, shortness of breath and decrease daily activities, benefit for urgent intervention for her rupture AAA is outweighing potential risk of her cardiac disease.     Problem #3 CHF with elevated BNP  - consider medical management with heart failure team  - repeat echo in near future     Problem #4 fever with unknown etiology  - recommended for fever work up and consider ID consult     Problem #5 CKD  - management and plan per nephrology service.      We will sign off at this time. Please reconsult with any further questions.

## 2020-08-25 NOTE — PROGRESS NOTE ADULT - SUBJECTIVE AND OBJECTIVE BOX
24hr Events:  O/N:NPO/IVF, VSS  8/24: 5 beats of VT asymptomatic. 2u PRBC to be given today per Dr. Izquierdo hematologist. Dr. Dozier cleared for OR from a pulm perspective.  Preopped and consented. Nutrition started MVI and ensure. Preopped and consented.              ---------------------------------------------------------------------------  PLEASE CHECK WHEN PRESENT:     [  ] Heart Failure     [  ] Acute     [  ] Acute on Chronic     [  ] Chronic  -------------------------------------------------------------------     [  ]Diastolic [HFpEF]     [  ]Systolic [HFrEF]     [  ]Combined [HFpEF & HFrEF]     [  ] afib     [ X ] hypertensive heart disease     [  ]Other:  -------------------------------------------------------------------  [ ] Respiratory failure  [ ] Acute cor pulmonale  [ ] Asthma/COPD Exacerbation  [ ] Pleural effusion  [ ] Aspiration pneumonia  -------------------------------------------------------------------  [  ]CHERRI     [  ]ATN     [  ]Reneal Medullary Necrosis     [  ]Renal Cortical Necrosis     [  ]Other Pathological Lesions:    [  ]CKD 1  [  ]CKD 2  [ X ]CKD 3  [  ]CKD 4  [  ]CKD 5  [  ]Other  -------------------------------------------------------------------  [  ]Diabetes  [  ] Diabetic PVD Ulcer  [  ] Neuropathic ulcer to DM  [  ] Diabetes with Nephropathy  [  ] Osteomyelitis due to diabetes  --------------------------------------------------------------------  [  ]Malnutrition: See Nutrition Note  [  ]Cachexia  [ X ]Other: [x ]  Moderate Protein Calorie Malnutrition  [  ]Supplement Ordered:  [  ]Morbid Obesity (BMI >=40]  ---------------------------------------------------------------------  [ ] Sepsis/severe sepsis/septic shock  [ ] UTI  [ ] Pneumonia  -----------------------------------------------------------------------  [ ] Acidosis/alkalosis  [ ] Fluid overload  [ ] Hypokalemia  [ ] Hyperkalemia  [ ] Hypomagnesemia  [ ] Hypophosphatemia  [ ] Hyperphosphatemia  ------------------------------------------------------------------------  [ ] Acute blood loss anemia  [ ] Post op blood loss anemia  [ ] Iron deficiency anemia  [X ] Anemia due to chronic disease  [ ] Hypercoagulable state  ----------------------------------------------------------------------  [ ] Cerebral infarction  [ ] Transient ischemia attack  [ ] Encephalopathy      Assessment/Plan: 64y Female with a PMH of HTN, CAD s/p CABG & PCI, AVR, PAD, hypothyroidism, seizure, chronic anemia, and AAA repair in 2015 (Dr. Sandra) that presented to the ED with chest pain and palpitations. She had elevated troponins, Pro-BNP, but no ST elevation or depression on EKG. A CT chest w/o contrast showed a new crescentic soft tissue density surrounding the aorta at the level of the renal arteries and suprarenal region. She was admitted under Cardiology. CTA was done yesterday that confirmed the presence of a new aneurysm above the graft.     -NPO/IVF for Open thoracoabdominal aneurysm repair   -Must have active T&S at all times  -Keep HgB >8   -Monitor BP - keep <150  -F/u Cardio recs - c/w Coreg 12.5mg BID, titrate up as tolerated, c/w fibrate  -F/u Renal recs  -F/u GI recs  -F/u AM labs  -DVT ppx 24 hr events: Overnight there were no significant events, this morning the patient is feeling well and is ready to go to the OR. The patient received 1U of pRBCs yesterday as per Hematology recommendations prior to the procedure. She was kept NPO and on IV fluids overnight. The patient was examined at the bedside by the chief resident. She denies having chest pain, SOB, nausea, vomiting, dizziness, chills.      Vital Signs Last 24 Hrs  T(C): 37.1 (25 Aug 2020 05:16), Max: 37.1 (24 Aug 2020 16:45)  T(F): 98.8 (25 Aug 2020 05:16), Max: 98.8 (25 Aug 2020 05:16)  HR: 66 (25 Aug 2020 05:16) (60 - 67)  BP: 149/72 (25 Aug 2020 05:16) (117/67 - 171/74)  BP(mean): --  RR: 16 (25 Aug 2020 05:16) (16 - 20)  SpO2: 97% (25 Aug 2020 05:16) (97% - 99%)    I&O's Summary    24 Aug 2020 07:01  -  25 Aug 2020 07:00  --------------------------------------------------------  IN: 1570 mL / OUT: 1600 mL / NET: -30 mL        Physical Exam:  General: NAD, resting comfortably  Pulmonary: normal respiratory effort  Cardiovascular: NSR, S1, S2 present  Abdominal: soft, NT/ND, no guarding, rigidity, rebound tenderness  Extremities: WWP, normal strength, no clubbing/cyanosis/edema  Neuro: AAOx3, no focal deficits, sensation normal  Pulses:   Right:                                                                  FEM [X]2+ [ ]1+ [ ]doppler  POP [X]2+ [ ]1+ [ ]doppler  DP [X]2+ [ ]1+ [ ]doppler  PT[X]2+ [ ]1+ [ ]doppler    Left:  FEM [X]2+ [ ]1+ [ ]doppler    POP [X]2+ [ ]1+ [ ]doppler   DP [X]2+ [ ]1+ [ ]doppler  PT [X]2+ [ ]1+ [ ]doppler    Lines/drains/tubes:    LABS:                        8.6    5.14  )-----------( 276      ( 24 Aug 2020 07:10 )             29.2     08-24    143  |  109<H>  |  9   ----------------------------<  88  5.0   |  22  |  1.31<H>    Ca    9.8      24 Aug 2020 07:10  Phos  3.1     08-24  Mg     2.0     08-24            CAPILLARY BLOOD GLUCOSE          RADIOLOGY & ADDITIONAL TESTS:            ---------------------------------------------------------------------------  PLEASE CHECK WHEN PRESENT:     [  ] Heart Failure     [  ] Acute     [  ] Acute on Chronic     [  ] Chronic  -------------------------------------------------------------------     [  ]Diastolic [HFpEF]     [  ]Systolic [HFrEF]     [  ]Combined [HFpEF & HFrEF]     [  ] afib     [X] hypertensive heart disease     [  ]Other:  -------------------------------------------------------------------  [ ] Respiratory failure  [ ] Acute cor pulmonale  [ ] Asthma/COPD Exacerbation  [ ] Pleural effusion  [ ] Aspiration pneumonia  -------------------------------------------------------------------  [  ]CHERRI     [  ]ATN     [  ]Reneal Medullary Necrosis     [  ]Renal Cortical Necrosis     [  ]Other Pathological Lesions:    [  ]CKD 1  [  ]CKD 2  [X]CKD 3  [  ]CKD 4  [  ]CKD 5  [  ]Other  -------------------------------------------------------------------  [  ]Diabetes  [  ] Diabetic PVD Ulcer  [  ] Neuropathic ulcer to DM  [  ] Diabetes with Nephropathy  [  ] Osteomyelitis due to diabetes  --------------------------------------------------------------------  [  ]Malnutrition: See Nutrition Note  [  ]Cachexia  [X]Other: [x ]  Moderate Protein Calorie Malnutrition  [  ]Supplement Ordered:  [  ]Morbid Obesity (BMI >=40]  ---------------------------------------------------------------------  [ ] Sepsis/severe sepsis/septic shock  [ ] UTI  [ ] Pneumonia  -----------------------------------------------------------------------  [ ] Acidosis/alkalosis  [ ] Fluid overload  [ ] Hypokalemia  [ ] Hyperkalemia  [ ] Hypomagnesemia  [ ] Hypophosphatemia  [ ] Hyperphosphatemia  ------------------------------------------------------------------------  [ ] Acute blood loss anemia  [ ] Post op blood loss anemia  [ ] Iron deficiency anemia  [X] Anemia due to chronic disease  [ ] Hypercoagulable state  ----------------------------------------------------------------------  [ ] Cerebral infarction  [ ] Transient ischemia attack  [ ] Encephalopathy      Assessment/Plan: 65 yo F w/  PMH of HTN, CAD s/p CABG & PCI, AVR, PAD, hypothyroidism, seizure, chronic anemia, and AAA repair in 2015 (Dr. Sandra) that presented to the ED with chest pain and palpitations. She had elevated troponins, Pro-BNP, but no ST elevation or depression on EKG. A CT chest w/o contrast showed a new crescentic soft tissue density surrounding the aorta at the level of the renal arteries and suprarenal region. She was admitted under Cardiology. CTA was done that confirmed the presence of a new aneurysm above the graft. The patient is ready to go to the OR today for open repair of thoracoabdominal aortic aneurysm.    -NPO/IVF for Open thoracoabdominal aneurysm repair   -Must have active T&S at all times  -Keep HgB >8   -Monitor BP - keep <150  -F/u Cardio recs - c/w Coreg 12.5mg BID, titrate up as tolerated, c/w fibrate  -F/u Renal recs  -F/u GI recs  -F/u AM labs  -DVT ppx 24 hr events: Overnight there were no significant events, this morning the patient is feeling well and is ready to go to the OR. The patient received 2U of pRBCs yesterday as per Hematology recommendations prior to the procedure. She was kept NPO and on IV fluids overnight. The patient was examined at the bedside by the chief resident. She denies having chest pain, SOB, nausea, vomiting, dizziness, chills.      Vital Signs Last 24 Hrs  T(C): 37.1 (25 Aug 2020 05:16), Max: 37.1 (24 Aug 2020 16:45)  T(F): 98.8 (25 Aug 2020 05:16), Max: 98.8 (25 Aug 2020 05:16)  HR: 66 (25 Aug 2020 05:16) (60 - 67)  BP: 149/72 (25 Aug 2020 05:16) (117/67 - 171/74)  BP(mean): --  RR: 16 (25 Aug 2020 05:16) (16 - 20)  SpO2: 97% (25 Aug 2020 05:16) (97% - 99%)    I&O's Summary    24 Aug 2020 07:01  -  25 Aug 2020 07:00  --------------------------------------------------------  IN: 1570 mL / OUT: 1600 mL / NET: -30 mL        Physical Exam:  General: NAD, resting comfortably  Pulmonary: normal respiratory effort  Cardiovascular: NSR, S1, S2 present  Abdominal: soft, NT/ND, no guarding, rigidity, rebound tenderness  Extremities: WWP, normal strength, no clubbing/cyanosis/edema  Neuro: AAOx3, no focal deficits, sensation normal  Pulses:   Right:                                                                  FEM [X]2+ [ ]1+ [ ]doppler  POP [X]2+ [ ]1+ [ ]doppler  DP [X]2+ [ ]1+ [ ]doppler  PT[X]2+ [ ]1+ [ ]doppler    Left:  FEM [X]2+ [ ]1+ [ ]doppler    POP [X]2+ [ ]1+ [ ]doppler   DP [X]2+ [ ]1+ [ ]doppler  PT [X]2+ [ ]1+ [ ]doppler    Lines/drains/tubes:    LABS:                        8.6    5.14  )-----------( 276      ( 24 Aug 2020 07:10 )             29.2     08-24    143  |  109<H>  |  9   ----------------------------<  88  5.0   |  22  |  1.31<H>    Ca    9.8      24 Aug 2020 07:10  Phos  3.1     08-24  Mg     2.0     08-24            CAPILLARY BLOOD GLUCOSE          RADIOLOGY & ADDITIONAL TESTS:            ---------------------------------------------------------------------------  PLEASE CHECK WHEN PRESENT:     [  ] Heart Failure     [  ] Acute     [  ] Acute on Chronic     [  ] Chronic  -------------------------------------------------------------------     [  ]Diastolic [HFpEF]     [  ]Systolic [HFrEF]     [  ]Combined [HFpEF & HFrEF]     [  ] afib     [X] hypertensive heart disease     [  ]Other:  -------------------------------------------------------------------  [ ] Respiratory failure  [ ] Acute cor pulmonale  [ ] Asthma/COPD Exacerbation  [ ] Pleural effusion  [ ] Aspiration pneumonia  -------------------------------------------------------------------  [  ]CHERRI     [  ]ATN     [  ]Reneal Medullary Necrosis     [  ]Renal Cortical Necrosis     [  ]Other Pathological Lesions:    [  ]CKD 1  [  ]CKD 2  [X]CKD 3  [  ]CKD 4  [  ]CKD 5  [  ]Other  -------------------------------------------------------------------  [  ]Diabetes  [  ] Diabetic PVD Ulcer  [  ] Neuropathic ulcer to DM  [  ] Diabetes with Nephropathy  [  ] Osteomyelitis due to diabetes  --------------------------------------------------------------------  [  ]Malnutrition: See Nutrition Note  [  ]Cachexia  [X]Other: [x ]  Moderate Protein Calorie Malnutrition  [  ]Supplement Ordered:  [  ]Morbid Obesity (BMI >=40]  ---------------------------------------------------------------------  [ ] Sepsis/severe sepsis/septic shock  [ ] UTI  [ ] Pneumonia  -----------------------------------------------------------------------  [ ] Acidosis/alkalosis  [ ] Fluid overload  [ ] Hypokalemia  [ ] Hyperkalemia  [ ] Hypomagnesemia  [ ] Hypophosphatemia  [ ] Hyperphosphatemia  ------------------------------------------------------------------------  [ ] Acute blood loss anemia  [ ] Post op blood loss anemia  [ ] Iron deficiency anemia  [X] Anemia due to chronic disease  [ ] Hypercoagulable state  ----------------------------------------------------------------------  [ ] Cerebral infarction  [ ] Transient ischemia attack  [ ] Encephalopathy      Assessment/Plan: 65 yo F w/  PMH of HTN, CAD s/p CABG & PCI, AVR, PAD, hypothyroidism, seizure, chronic anemia, and AAA repair in 2015 (Dr. Sandra) that presented to the ED with chest pain and palpitations. She had elevated troponins, Pro-BNP, but no ST elevation or depression on EKG. A CT chest w/o contrast showed a new crescentic soft tissue density surrounding the aorta at the level of the renal arteries and suprarenal region. She was admitted under Cardiology. CTA was done that confirmed the presence of a new aneurysm above the graft. The patient is ready to go to the OR today for open repair of thoracoabdominal aortic aneurysm.    -NPO/IVF @ 80 mL/hr  -OR today  -Must have active T&S at all times  -Keep HgB >8   -Monitor BP - keep <150  -F/u Cardio recs - c/w Coreg 12.5mg BID, titrate up as tolerated, c/w Fibrate  -F/u Renal recs  -F/u GI recs  -F/u a.m. labs  -DVT ppx 24 hr events: Overnight there were no significant events, this morning the patient is feeling well and is ready to go to the OR. The patient received 2U of pRBCs yesterday as per Hematology recommendations prior to the procedure. She was kept NPO and on IV fluids overnight. The patient was examined at the bedside by the chief resident. She denies having chest pain, SOB, nausea, vomiting, dizziness, chills.      Vital Signs Last 24 Hrs  T(C): 37.1 (25 Aug 2020 05:16), Max: 37.1 (24 Aug 2020 16:45)  T(F): 98.8 (25 Aug 2020 05:16), Max: 98.8 (25 Aug 2020 05:16)  HR: 66 (25 Aug 2020 05:16) (60 - 67)  BP: 149/72 (25 Aug 2020 05:16) (117/67 - 171/74)  BP(mean): --  RR: 16 (25 Aug 2020 05:16) (16 - 20)  SpO2: 97% (25 Aug 2020 05:16) (97% - 99%)    I&O's Summary    24 Aug 2020 07:01  -  25 Aug 2020 07:00  --------------------------------------------------------  IN: 1570 mL / OUT: 1600 mL / NET: -30 mL        Physical Exam:  General: NAD, resting comfortably  Pulmonary: normal respiratory effort  Cardiovascular: NSR, S1, S2 present  Abdominal: soft, NT/ND, no guarding, rigidity, rebound tenderness  Extremities: WWP, normal strength, no clubbing/cyanosis/edema  Neuro: AAOx3, no focal deficits, sensation normal      Lines/drains/tubes:    LABS:                        8.6    5.14  )-----------( 276      ( 24 Aug 2020 07:10 )             29.2     08-24    143  |  109<H>  |  9   ----------------------------<  88  5.0   |  22  |  1.31<H>    Ca    9.8      24 Aug 2020 07:10  Phos  3.1     08-24  Mg     2.0     08-24            CAPILLARY BLOOD GLUCOSE          RADIOLOGY & ADDITIONAL TESTS:            ---------------------------------------------------------------------------  PLEASE CHECK WHEN PRESENT:     [  ] Heart Failure     [  ] Acute     [  ] Acute on Chronic     [  ] Chronic  -------------------------------------------------------------------     [  ]Diastolic [HFpEF]     [  ]Systolic [HFrEF]     [  ]Combined [HFpEF & HFrEF]     [  ] afib     [X] hypertensive heart disease     [  ]Other:  -------------------------------------------------------------------  [ ] Respiratory failure  [ ] Acute cor pulmonale  [ ] Asthma/COPD Exacerbation  [ ] Pleural effusion  [ ] Aspiration pneumonia  -------------------------------------------------------------------  [  ]CHERRI     [  ]ATN     [  ]Reneal Medullary Necrosis     [  ]Renal Cortical Necrosis     [  ]Other Pathological Lesions:    [  ]CKD 1  [  ]CKD 2  [X]CKD 3  [  ]CKD 4  [  ]CKD 5  [  ]Other  -------------------------------------------------------------------  [  ]Diabetes  [  ] Diabetic PVD Ulcer  [  ] Neuropathic ulcer to DM  [  ] Diabetes with Nephropathy  [  ] Osteomyelitis due to diabetes  --------------------------------------------------------------------  [  ]Malnutrition: See Nutrition Note  [  ]Cachexia  [X]Other: [x ]  Moderate Protein Calorie Malnutrition  [  ]Supplement Ordered:  [  ]Morbid Obesity (BMI >=40]  ---------------------------------------------------------------------  [ ] Sepsis/severe sepsis/septic shock  [ ] UTI  [ ] Pneumonia  -----------------------------------------------------------------------  [ ] Acidosis/alkalosis  [ ] Fluid overload  [ ] Hypokalemia  [ ] Hyperkalemia  [ ] Hypomagnesemia  [ ] Hypophosphatemia  [ ] Hyperphosphatemia  ------------------------------------------------------------------------  [ ] Acute blood loss anemia  [ ] Post op blood loss anemia  [ ] Iron deficiency anemia  [X] Anemia due to chronic disease  [ ] Hypercoagulable state  ----------------------------------------------------------------------  [ ] Cerebral infarction  [ ] Transient ischemia attack  [ ] Encephalopathy      Assessment/Plan: 63 yo F w/  PMH of HTN, CAD s/p CABG & PCI, AVR, PAD, hypothyroidism, seizure, chronic anemia, and AAA repair in 2015 (Dr. Sandra) that presented to the ED with chest pain and palpitations. She had elevated troponins, Pro-BNP, but no ST elevation or depression on EKG. A CT chest w/o contrast showed a new crescentic soft tissue density surrounding the aorta at the level of the renal arteries and suprarenal region. She was admitted under Cardiology. CTA was done that confirmed the presence of a new aneurysm above the graft. The patient is ready to go to the OR today for open repair of thoracoabdominal aortic aneurysm.    -NPO/IVF @ 80 mL/hr  -OR today  -Must have active T&S at all times  -Keep HgB >8   -Monitor BP - keep <150  -F/u Cardio recs - c/w Coreg 12.5mg BID, titrate up as tolerated, c/w Fibrate  -F/u Renal recs  -F/u GI recs  -F/u a.m. labs  -DVT ppx

## 2020-08-25 NOTE — CONSULT NOTE ADULT - SUBJECTIVE AND OBJECTIVE BOX
HPI: 64yFemale    PMH:     MEDS:  Allergies    No Known Allergies    Intolerances    Crestor (Other (Mod to Severe))      ICU Vital Signs Last 24 Hrs  T(F): 98.8 (08-25-20 @ 05:16), Max: 98.8 (08-25-20 @ 05:16)  HR: 54 (08-25-20 @ 16:45) (54 - 67)  BP: 149/72 (08-25-20 @ 05:16) (137/73 - 171/74)  BP(mean): --  ABP: 106/51 (08-25-20 @ 16:45)  RR: 10 (08-25-20 @ 16:30) (10 - 20)  SpO2: 100% (08-25-20 @ 16:45) (97% - 100%)    PHYSICAL EXAM:   Neurological: AAOx3, CNII-XII intact,  strength 5/5 b/l  Cardiovascular: RRR  Respiratory: CTA  Gastrointestinal: soft, NT, ND, BS+  Extremities: warm, no dependent edema  Vascular: no cyanosis/erythema  LABS:    08-25    137  |  112<H>  |  10  ----------------------------<  131<H>  4.6   |  20<L>  |  1.34<H>    Ca    7.2<L>      25 Aug 2020 16:10  Phos  3.4     08-25  Mg     1.4     08-25    TPro  3.4<L>  /  Alb  2.2<L>  /  TBili  0.5  /  DBili  x   /  AST  24  /  ALT  5<L>  /  AlkPhos  29<L>  08-25  LIVER FUNCTIONS - ( 25 Aug 2020 16:10 )  Alb: 2.2 g/dL / Pro: 3.4 g/dL / ALK PHOS: 29 U/L / ALT: 5 U/L / AST: 24 U/L / GGT: x                               10.6   15.17 )-----------( 82       ( 25 Aug 2020 16:10 )             34.2   PT/INR - ( 25 Aug 2020 16:10 )   PT: 17.3 sec;   INR: 1.47          PTT - ( 25 Aug 2020 16:10 )  PTT:36.0 sec  ABG - ( 25 Aug 2020 16:16 )  pH, Arterial: 7.37  pH, Blood: x     /  pCO2: 36    /  pO2: 108   / HCO3: 21    / Base Excess: -4.0  /  SaO2: 98              CAPILLARY BLOOD GLUCOSE        Gomez:	  [ ] None	[ ] Daily Gomez Order Placed	   Indication:	  [ ] Strict I and O's    [ ] Obstruction     [ ] Incontinence + Stage 3 or 4 Decubitus  Central Line:  [ ] None	   [ ]  Medication / TPN Administration     [ ] No Peripheral IV     A/P:    NEURO:  CV:  PULM:   GI/FEN:  :  ENDO: ISS  ID:  PPX: SCDs   LINES: PIVs,   WOUNDS/DRAINS: HPI: 64F extensive medical history as below, presented to ED on 8/20 palpitations, Pt had CT significant for 5.6cm saccular aortic aneurysm (subacute contained ruptured), adjacent to previous graft. Pt taken to OR for open thoracoabdominal aneurysm repair, with 20mm tube graft and 6mm bypasses to the celiac, SMA,  left renal, right renal (end to end). left lung was deflated, diaphragm was reapproximated around aortic graft. two chest tubes were left in the left chest, 1 apical and 1 medial, set to wall suction. EBL 1 liter, given cryst 4 liters, albumin 5% 500cc, 2U PRBC, 600cc cell saver. pt kept intubated and transferred to SICU for close monitoring.         PMH: former smoker with Crestor intolerance (Myalgias) HTN, HLD , CAD s/p PCI dLCX and CABG LIMA-Ramus, ANTONIO-PDA (patent stent and patent grafts x 2 by CTA 8/2014), Aortic Valve Disease s/p Bioprosthetic AVR 2002, Mitral Valve Disease s/p Mitral valve annuloplasty 2002, Paroxysmal ATach, Wide Complex Tachycardia (seen on Holter 4/2016 –had ILR placed now disconnected-previously seen by Dr. Jasso), PAD s/p pLSFA stent LCIA stent, with occluded Bilateral SFA (proximal to mid portion per U/S 2019), Chronic Anemia (baseline Hgb 8-8.5 -history of blood transfusions none recently-currently receiving IV Iron, Procrit, Vitamin B12 injections, takes Folic Acid PO-prior colonoscopy no evidence of bleeding ~ 5 years ago), AAA s/p Open repair by Dr. 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 asa 81qd, SQH.     PMH:     MEDS:  Allergies    No Known Allergies    Intolerances    Crestor (Other (Mod to Severe))      ICU Vital Signs Last 24 Hrs  T(F): 98.8 (08-25-20 @ 05:16), Max: 98.8 (08-25-20 @ 05:16)  HR: 54 (08-25-20 @ 16:45) (54 - 67)  BP: 149/72 (08-25-20 @ 05:16) (137/73 - 171/74)  BP(mean): --  ABP: 106/51 (08-25-20 @ 16:45)  RR: 10 (08-25-20 @ 16:30) (10 - 20)  SpO2: 100% (08-25-20 @ 16:45) (97% - 100%)    PHYSICAL EXAM:   Neurological: AAOx3, CNII-XII intact,  strength 5/5 b/l  Cardiovascular: RRR  Respiratory: CTA  Gastrointestinal: soft, NT, ND, BS+  Extremities: warm, no dependent edema  Vascular: no cyanosis/erythema  LABS:    08-25    137  |  112<H>  |  10  ----------------------------<  131<H>  4.6   |  20<L>  |  1.34<H>    Ca    7.2<L>      25 Aug 2020 16:10  Phos  3.4     08-25  Mg     1.4     08-25    TPro  3.4<L>  /  Alb  2.2<L>  /  TBili  0.5  /  DBili  x   /  AST  24  /  ALT  5<L>  /  AlkPhos  29<L>  08-25  LIVER FUNCTIONS - ( 25 Aug 2020 16:10 )  Alb: 2.2 g/dL / Pro: 3.4 g/dL / ALK PHOS: 29 U/L / ALT: 5 U/L / AST: 24 U/L / GGT: x                               10.6   15.17 )-----------( 82       ( 25 Aug 2020 16:10 )             34.2   PT/INR - ( 25 Aug 2020 16:10 )   PT: 17.3 sec;   INR: 1.47          PTT - ( 25 Aug 2020 16:10 )  PTT:36.0 sec  ABG - ( 25 Aug 2020 16:16 )  pH, Arterial: 7.37  pH, Blood: x     /  pCO2: 36    /  pO2: 108   / HCO3: 21    / Base Excess: -4.0  /  SaO2: 98              CAPILLARY BLOOD GLUCOSE        Gomez:	  [ ] None	[ ] Daily Gomez Order Placed	   Indication:	  [ ] Strict I and O's    [ ] Obstruction     [ ] Incontinence + Stage 3 or 4 Decubitus  Central Line:  [ ] None	   [ ]  Medication / TPN Administration     [ ] No Peripheral IV     A/P:    NEURO:  CV:  PULM:   GI/FEN:  :  ENDO: ISS  ID:  PPX: SCDs   LINES: PIVs,   WOUNDS/DRAINS: HPI: 64F extensive medical history as below, presented to ED on 8/20 palpitations, Pt had CT significant for 5.6cm saccular aortic aneurysm (subacute contained ruptured), adjacent to previous graft. Pt taken to OR for open thoracoabdominal aneurysm repair, with 20mm tube graft and 6mm bypasses to the celiac, SMA,  left renal, right renal (end to end). left lung was deflated, diaphragm was reapproximated around aortic graft. two chest tubes were left in the left chest, 1 apical and 1 medial, set to wall suction. EBL 1 liter, given cryst 4 liters, albumin 5% 500cc, 2U PRBC, 600cc cell saver. pt kept intubated and transferred to SICU for close monitoring.        PMH: former smoker with Crestor intolerance (Myalgias) HTN, HLD , CAD s/p PCI dLCX and CABG LIMA-Ramus, ANTONIO-PDA (patent stent and patent grafts x 2 by CTA 8/2014), Aortic Valve Disease s/p Bioprosthetic AVR 2002, Mitral Valve Disease s/p Mitral valve annuloplasty 2002, Paroxysmal ATach, Wide Complex Tachycardia (seen on Holter 4/2016 –had ILR placed now disconnected-previously seen by Dr. Jasso), PAD s/p pLSFA stent LCIA stent, with occluded Bilateral SFA (proximal to mid portion per U/S 2019), Chronic Anemia (baseline Hgb 8-8.5 -history of blood transfusions none recently-currently receiving IV Iron, Procrit, Vitamin B12 injections, takes Folic Acid PO-prior colonoscopy no evidence of bleeding ~ 5 years ago), AAA s/p Open repair by Dr. Sandra 4/2/2015 , Hypothyroidism, CKD stage III-IV (Baseline Cr 1.58-1.99 per labs 6/2019-11/2019),  Severe Renal Artery Stenosis (scarring and atrophy of left Kidney 11/21/19), Grand Mal Seizures (on Keppra), Depression/Anxiety,     HOME MEDS: levothyroxine 88 mcg qd, atenolol 25 mg qd, Keppra 500bid, sertraline 50 qhs, amLODIPine 2.5 qd, folic acid 1 mg qd, cilostazol 50 mg bid     HOSPITAL MEDS since admission: norvasc 10qd, coreg 12.5bid, tricor 145qd, imdur 30qd, keppra 500bid, synthroid 88qd, zoloft 50qd. asa 81qd, SQH.     NKDA, but has Crestor intolerances.     ICU Vital Signs Last 24 Hrs  T(F): 98.8 (08-25-20 @ 05:16), Max: 98.8 (08-25-20 @ 05:16)  HR: 54 (08-25-20 @ 16:45) (54 - 67)  BP: 149/72 (08-25-20 @ 05:16) (137/73 - 171/74)  BP(mean): --  ABP: 106/51 (08-25-20 @ 16:45)  RR: 10 (08-25-20 @ 16:30) (10 - 20)  SpO2: 100% (08-25-20 @ 16:45) (97% - 100%)    PHYSICAL EXAM:   Neurological: sedated,   Cardiovascular: RRR  Respiratory: CTA, left sided chest tube x 2 with bloody drainage.   Gastrointestinal: soft, ND, inicision left side extended to mid abdomen.   Extremities: warm, no dependent edema  Vascular: no cyanosis/erythema, left DP biphasic, left PT monophasic, right DP biphasic, no left PT signal.     LABS:    08-25    137  |  112<H>  |  10  ----------------------------<  131<H>  4.6   |  20<L>  |  1.34<H>    Ca    7.2<L>      25 Aug 2020 16:10  Phos  3.4     08-25  Mg     1.4     08-25    TPro  3.4<L>  /  Alb  2.2<L>  /  TBili  0.5  /  DBili  x   /  AST  24  /  ALT  5<L>  /  AlkPhos  29<L>  08-25  LIVER FUNCTIONS - ( 25 Aug 2020 16:10 )  Alb: 2.2 g/dL / Pro: 3.4 g/dL / ALK PHOS: 29 U/L / ALT: 5 U/L / AST: 24 U/L / GGT: x                               10.6   15.17 )-----------( 82       ( 25 Aug 2020 16:10 )             34.2   PT/INR - ( 25 Aug 2020 16:10 )   PT: 17.3 sec;   INR: 1.47          PTT - ( 25 Aug 2020 16:10 )  PTT:36.0 sec  ABG - ( 25 Aug 2020 16:16 )  pH, Arterial: 7.37  pH, Blood: x     /  pCO2: 36    /  pO2: 108   / HCO3: 21    / Base Excess: -4.0  /  SaO2: 98              CAPILLARY BLOOD GLUCOSE        Gomez:	  [ ] None	[x ] Daily Gomez Order Placed	   Indication:	  [x ] Strict I and O's    [ ] Obstruction     [ ] Incontinence + Stage 3 or 4 Decubitus  Central Line:  [ ] None	   [ x]  Medication / TPN Administration     [ ] No Peripheral IV

## 2020-08-25 NOTE — BRIEF OPERATIVE NOTE - NSICDXBRIEFPROCEDURE_GEN_ALL_CORE_FT
PROCEDURES:  Bypass graft, aortorenal, with non-vein 25-Aug-2020 15:22:04  Vaishali Butler  Bypass graft, aortomesenteric, with non-vein 25-Aug-2020 15:21:56  Vaishali Butler  Bypass graft, aortoceliac, with non-vein 25-Aug-2020 15:21:24  Vaishali Butler  Repair, aneurysm, aorta, thoracoabdominal, open 25-Aug-2020 15:21:13  Vaishali Butler

## 2020-08-25 NOTE — PROGRESS NOTE ADULT - PROBLEM SELECTOR PLAN 1
Patient with CKD stage III   nephrologically stable and at baseline, however high risk for renal ischemic injuries intra-op given long clamp time now s/p open thoracoabdominal aortic aneurysm repair with end to end bypasses to the celiac, SMA, left renal, right renal  arteries, left renal artery was crossing over the aorta which was transected and bypassed, received 4 L of crystalloids and 2 U pRBC intra-op, making adequate urine, intra-op UOP ~ 700 , and 70 cc/hr since in the ICU   Post-op labs pending, will follow Patient with CKD stage III   nephrologically stable and at baseline, however high risk for renal ischemic injuries given long clamp time for surgery now s/p open thoracoabdominal aortic aneurysm repair with end to end bypasses to the celiac, SMA, left renal and right renal arteries, left renal artery was crossing over the aorta which was transected and bypassed, received 4 L of crystalloids and 2 U pRBC intra-op, making adequate urine, intra-op UOP ~ 700 , and 70 cc/hr since in the ICU   Post-op labs pending, will follow

## 2020-08-25 NOTE — BRIEF OPERATIVE NOTE - OPERATION/FINDINGS
Procedure: open thoracoabdominal aneurysm repair, with 20mm tube graft and 6mm bypasses to the celiac, SMA,  left renal, right renal (end to end).  Indication: thoracoabdominal aortic aneurysm s/o open AAA repair.   - thoracoabdominal incision, diaphragm divided, left lung deflated.  - proximal and distal control on the aorta, control of the celiac, SMA.   - proximal aortic anastomosis end to side with running 3-0 prolene. with aortic graft clamped and native aorta open, control on the celiac artery was obtained.   - celiac artery was transected from aorta and ostium was oversewn. Celiac bypass performed end to end with running 6-0 prolene.  - SMA was transected from aorta and ostium was oversewn, SMA bypass performed end to end with running 6-0 prolene  - distal aortic anastomosis sewn with running 3-0 prolene, proximal to the aortic bifurcation.   - native aorta was clamped, bypassed portion was opened - right renal artery ostium was identified and end to end bypass performed with running 6-0 prolene.   - proximal aortic stump was oversewn with running 3-0 prolene.  - diaphragm was reapproximated around aortic graft. rib cage reapproximated with interrupted 1-0 interrupted vicryl.   - incision was closed with running 0 PDS, 2-0 vicryl and staples.     - two chest tubes were left in the left chest, 1 apical and 1 medial, set to wall suction.     IN:  OUT:      - 2 left sided chest tubes (apical, medial) were placed  - left renal artery was identified crossing over the native aorta, transected and bypass was sewn end to end with running 6-0 prolene.

## 2020-08-25 NOTE — CONSULT NOTE ADULT - CONSULT REQUESTED DATE/TIME
20-Aug-2020 10:30
21-Aug-2020 15:22
22-Aug-2020 15:09
24-Aug-2020 15:28
24-Aug-2020 16:09
25-Aug-2020 17:10
17-Aug-2020

## 2020-08-25 NOTE — PROGRESS NOTE ADULT - SUBJECTIVE AND OBJECTIVE BOX
CTICU  CRITICAL  CARE  attending     Hand off received 					   Pertinent clinical, laboratory, radiographic, hemodynamic, echocardiographic, respiratory data, microbiologic data and chart were reviewed and analyzed frequently throughout the course of the day and night    History obtained from patient (reliable) and Dr. Preston’s office note  64 F former smoker with Crestor intolerance (Myalgias) and PMHx HTN, HLD , CAD s/p PCI dLCX and CABG LIMA-Ramus, ANTONIO-PDA (patent stent and patent grafts x 2 by CTA 8/2014), Aortic Valve Disease s/p Bioprosthetic AVR 2002, Mitral Valve Disease s/p Mitral valve annuloplasty 2002, Paroxysmal ATach, Wide Complex Tachycardia (seen on Holter 4/2016 –had ILR placed now disconnected-previously seen by Dr. Jasso), PAD s/p pLSFA stent LCIA stent, with occluded Bilateral SFA (proximal to mid portion per U/S 2019), Chronic Anemia (baseline Hgb 8-8.5 -history of blood transfusions none recently-currently receiving IV Iron, Procrit, Vitamin B12 injections, takes Folic Acid PO-prior colonoscopy no evidence of bleeding ~ 5 years ago), AAA s/p Open repair by Dr. 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 ED 8/20/2020 c/o worsening palpitations x 2 days. She reports long standing history of palpitations however over the past few days symptoms have worsened and palpitations occur intermittently with rest or activity. Patient also admits to 5-8/10 left sided non-radiating chest pain lateral to left breast described as tightness and pressure occurring both at rest and with exertion as well as KRAFT worsening x 2 days. Exercise tolerance is limited by claudication as well as C/P and SOB and is typically <1/2 block. Upon further questioning she reports dizziness (at rest and with changing positions) as well fever of 102.2 F (on 8/19/2020), generalized malaise, and productive cough (clear thick sputum) as well as chronic 4 pillow orthopnea. She denies diaphoresis, N/V, syncope, melena, hematuria, BRBPR, hematemesis, LE edema, PND, loss of taste or smell, abdominal pain, contact with known Covid + individuals or sick contacts, recent travel. Patient reports she self discontinued Furosemide ~ 1 month ago due to normal BP (SBP 120s) and was taken off Losartan (?due to kidney function).  In the ER VSS /87 HR 92 RR 18 Temp 99 F O2 sat 100% RA. 1st set CE minimally elevated Troponin 0.2 and EKG showed NSR at 82 bpm with PACs, no ST or T changes. Labs significant for Hgb/HCT 8.0/26.4, Neutrophils 85% K +3.4, Bicarb 19, Glucose 130, and BNP 10,541. CT Chest Non Contrast revealed no focal consolidation, Mild mild linear atelectasis right middle lobe and bilateral lower lobes, trace pleural effusions, cardiomegaly, no pericardial effusions, New crescentic soft tissue density surrounding the aorta at the level of the renal arteries and suprarenal region, 4.0 x 2.0 x 4.0 cm, possibly increased saccular aortic aneurysm, 5.6 cm maximally, previously up to 5.0 cm on June 27, 2014. Covid PCR negative. Patient now admitted for R/O ACS including serial enzymes, telemetry, as well as further management of AAA (Vascular following).   S/P thoraco abdominal repair of aortic aneurysm.  S/P aorto celiac bypass.  S/P aorto mesenteric bypass.  S/P aorto renal bypass.      HEALTH ISSUES - PROBLEM Dx:  Aneurysm, thoracoabdominal  CKD (chronic kidney disease): CKD (chronic kidney disease)  Preop respiratory exam: Preop respiratory exam  Pulmonary hypertension: Pulmonary hypertension  Simple chronic bronchitis: Simple chronic bronchitis  Peripheral artery disease: Peripheral artery disease  Coronary artery disease: Coronary artery disease  Abdominal aortic aneurysm (AAA): Abdominal aortic aneurysm (AAA)  Anemia: Anemia  CKD (chronic kidney disease) stage 3, GFR 30-59 ml/min: CKD (chronic kidney disease) stage 3, GFR 30-59 ml/min  Hypertensive urgency: Hypertensive urgency  Elevated brain natriuretic peptide (BNP) level: Elevated brain natriuretic peptide (BNP) level  Abdominal aortic aneurysm (AAA) without rupture: Abdominal aortic aneurysm (AAA) without rupture  Palpitations: Palpitations      FAMILY HISTORY:  No pertinent family history in first degree relatives  PAST MEDICAL & SURGICAL HISTORY:  Seizure  Essential hypertension: HTN (hypertension)  Gastroesophageal reflux disease: GERD (gastroesophageal reflux disease)  Hyperlipidemia: Hyperlipidemia  Anemia: Anemia  Hypothyroidism: Hypothyroidism  Atherosclerosis of coronary artery: CAD (coronary artery disease)  Peripheral vascular disease: PVD (peripheral vascular disease)  H/O aortic valve replacement: Bioprosthetic  Atherosclerosis of coronary artery bypass graft(s), unspecified, with angina pectoris with documented spasm  Status post aorto-coronary artery bypass graft: 2012        14 system review was unremarkable    Vital signs, hemodynamic and respiratory parameters were reviewed from the bedside nursing flow sheet.  ICU Vital Signs Last 24 Hrs  T(C): 35.8 (25 Aug 2020 21:12), Max: 37.1 (25 Aug 2020 05:16)  T(F): 96.4 (25 Aug 2020 21:12), Max: 98.8 (25 Aug 2020 05:16)  HR: 56 (25 Aug 2020 23:00) (54 - 66)  BP: 149/72 (25 Aug 2020 05:16) (144/65 - 149/72)  BP(mean): --  ABP: 149/64 (25 Aug 2020 23:00) (94/49 - 164/70)  ABP(mean): 96 (25 Aug 2020 23:00) (66 - 114)  RR: 16 (25 Aug 2020 23:00) (10 - 16)  SpO2: 100% (25 Aug 2020 23:00) (97% - 100%)    Adult Advanced Hemodynamics Last 24 Hrs  CVP(mm Hg): 13 (25 Aug 2020 23:00) (-8 - 19)  CVP(cm H2O): --  CO: --  CI: --  PA: --  PA(mean): --  PCWP: --  SVR: --  SVRI: --  PVR: --  PVRI: --, ABG - ( 25 Aug 2020 22:08 )  pH, Arterial: 7.46  pH, Blood: x     /  pCO2: 30    /  pO2: 117   / HCO3: 21    / Base Excess: -2.5  /  SaO2: 99                Mode: AC/ CMV (Assist Control/ Continuous Mandatory Ventilation)  RR (machine): 12  TV (machine): 490  FiO2: 40  PEEP: 5  ITime: 1  MAP: 9  PIP: 21    Intake and output was reviewed and the fluid balance was calculated  Daily Height in cm: 170.2 (25 Aug 2020 05:16)    Daily   I&O's Summary    24 Aug 2020 07:01  -  25 Aug 2020 07:00  --------------------------------------------------------  IN: 1570 mL / OUT: 1600 mL / NET: -30 mL    25 Aug 2020 07:01  -  25 Aug 2020 23:19  --------------------------------------------------------  IN: 2620.8 mL / OUT: 1995 mL / NET: 625.8 mL        All lines and drain sites were assessed      Neuro: Follows commands. Moves all 4 extremities.  Neck: No JVD.  CVS: S1, S2, No S3.  Lungs: Good air entry bilaterally.  Abd: Soft. No tenderness. Hypoactive Bowel sounds.  Vascular: WEAK DP/PT.  Extremities: No edema.  Lymphatic: Normal.  Skin: No abnormalities.      labs  CBC Full  -  ( 25 Aug 2020 22:10 )  WBC Count : 14.57 K/uL  RBC Count : 3.42 M/uL  Hemoglobin : 9.2 g/dL  Hematocrit : 28.9 %  Platelet Count - Automated : 97 K/uL  Mean Cell Volume : 84.5 fl  Mean Cell Hemoglobin : 26.9 pg  Mean Cell Hemoglobin Concentration : 31.8 gm/dL  Auto Neutrophil # : x  Auto Lymphocyte # : x  Auto Monocyte # : x  Auto Eosinophil # : x  Auto Basophil # : x  Auto Neutrophil % : x  Auto Lymphocyte % : x  Auto Monocyte % : x  Auto Eosinophil % : x  Auto Basophil % : x    08-25    139  |  108  |  13  ----------------------------<  126<H>  4.3   |  20<L>  |  1.34<H>    Ca    8.2<L>      25 Aug 2020 22:10  Phos  3.1     08-25  Mg     2.9     08-25    TPro  3.4<L>  /  Alb  2.2<L>  /  TBili  0.5  /  DBili  x   /  AST  24  /  ALT  5<L>  /  AlkPhos  29<L>  08-25    PT/INR - ( 25 Aug 2020 19:00 )   PT: 16.6 sec;   INR: 1.41          PTT - ( 25 Aug 2020 19:00 )  PTT:38.2 sec  The current medications were reviewed   MEDICATIONS  (STANDING):  albumin human  5% IVPB 250 milliLiter(s) IV Intermittent every 10 minutes  ceFAZolin   IVPB 2000 milliGRAM(s) IV Intermittent every 8 hours  chlorhexidine 0.12% Liquid 15 milliLiter(s) Oral Mucosa every 12 hours  chlorhexidine 2% Cloths 1 Application(s) Topical <User Schedule>  fentaNYL   Infusion 0.5 MICROgram(s)/kG/Hr (2.8 mL/Hr) IV Continuous <Continuous>  HYDROmorphone  Injectable 0.5 milliGRAM(s) IV Push once  insulin lispro (HumaLOG) corrective regimen sliding scale   SubCutaneous every 6 hours  lactated ringers. 1000 milliLiter(s) (150 mL/Hr) IV Continuous <Continuous>  levETIRAcetam  IVPB 500 milliGRAM(s) IV Intermittent every 12 hours  levothyroxine Injectable 70 MICROGram(s) IV Push <User Schedule>  norepinephrine Infusion 0.05 MICROgram(s)/kG/Min (5.25 mL/Hr) IV Continuous <Continuous>  pantoprazole  Injectable 40 milliGRAM(s) IV Push daily  phenylephrine    Infusion 0.5 MICROgram(s)/kG/Min (10.5 mL/Hr) IV Continuous <Continuous>  propofol Infusion 5 MICROgram(s)/kG/Min (1.68 mL/Hr) IV Continuous <Continuous>        PROBLEM LIST/ ASSESSMENT:  HEALTH ISSUES - PROBLEM Dx:  Aneurysm, thoracoabdominal  CKD (chronic kidney disease): CKD (chronic kidney disease)  Preop respiratory exam: Preop respiratory exam  Pulmonary hypertension: Pulmonary hypertension  Simple chronic bronchitis: Simple chronic bronchitis  Peripheral artery disease: Peripheral artery disease  Coronary artery disease: Coronary artery disease  Abdominal aortic aneurysm (AAA): Abdominal aortic aneurysm (AAA)  Anemia: Anemia  CKD (chronic kidney disease) stage 3, GFR 30-59 ml/min: CKD (chronic kidney disease) stage 3, GFR 30-59 ml/min  Hypertensive urgency: Hypertensive urgency  Elevated brain natriuretic peptide (BNP) level: Elevated brain natriuretic peptide (BNP) level  Abdominal aortic aneurysm (AAA) without rupture: Abdominal aortic aneurysm (AAA) without rupture  Palpitations: Palpitations        Patient is a 64y old  Female who presents with aortic aneurysm  S/P Thoracoabdominal repair of aortic aneurysm.  S/P aorto celiac bypass.  S/P aorto mesenteric bypass.  S/P aorto renal bypass.  Metabolic acidosis.  CKD.  Hemodynamically stable.  Good oxygenation.  Fair urine out put.  Overall doing well.      My plan includes :  WEAN to EXTUBATE.  Keep MAP .  Statin and Betablocker.  Close hemodynamic, ventilatory and drain monitoring and management  Monitor for arrhythmias and monitor parameters for organ perfusion  Monitor neurologic status  Monitor renal function.  Head of the bed should remain elevated to 45 deg .   Chest PT and IS will be encouraged  Monitor adequacy of oxygenation and ventilation and attempt to wean oxygen  Nutritional goals will be met using po eventually , ensure adequate caloric intake and monitor the same  Stress ulcer and VTE prophylaxis will be achieved    Glycemic control is satisfactory  Electrolytes have been repleted as necessary and wound care has been carried out. Pain control has been achieved.   Aggressive physical therapy and early mobility and ambulation goals will be met   The family was updated about the course and plan  CRITICAL CARE TIME SPENT in evaluation and management, reassessments, review and interpretation of labs and x-rays, ventilator and hemodynamic management, formulating a plan and coordinating care: ___60____ MIN.  Time does not include procedural time.  CTICU ATTENDING     					    Allan Thompson MD

## 2020-08-25 NOTE — PROGRESS NOTE ADULT - SUBJECTIVE AND OBJECTIVE BOX
POST-OPERATIVE NOTE    Subjective:  Patient is s/p open thoracoabdominal aneurysm repair, with 20mm tube graft and 6mm bypasses to the celiac, SMA,  left renal, right renal (end to end). Recovering in CT ICU. She is intubated, off pressors, off propofol. She open her eyes but doesn't nod to questions or follows commands. She is maintaining BP to the 140s/60s, HR to higher 50s. She has a Gomez with clear urine seen on bag. She has 2 chest tubes; #1 ([place]) put out 50 in 3h, and #2 ([place]) put out 60 in 3h, serosanguineous. Her incision is covered in dressing, non-saturated.    Vital Signs Last 24 Hrs  T(C): 35.8 (25 Aug 2020 17:21), Max: 37.1 (25 Aug 2020 05:16)  T(F): 96.5 (25 Aug 2020 17:21), Max: 98.8 (25 Aug 2020 05:16)  HR: 59 (25 Aug 2020 18:30) (54 - 66)  BP: 149/72 (25 Aug 2020 05:16) (144/65 - 155/71)  BP(mean): --  RR: 10 (25 Aug 2020 16:30) (10 - 16)  SpO2: 99% (25 Aug 2020 18:30) (97% - 100%)  I&O's Detail    24 Aug 2020 07:01  -  25 Aug 2020 07:00  --------------------------------------------------------  IN:    lactated ringers.: 640 mL    Oral Fluid: 630 mL    Packed Red Blood Cells: 300 mL  Total IN: 1570 mL    OUT:    Voided: 1600 mL  Total OUT: 1600 mL    Total NET: -30 mL      25 Aug 2020 07:01  -  25 Aug 2020 18:57  --------------------------------------------------------  IN:    IV PiggyBack: 100 mL    lactated ringers.: 300 mL    norepinephrine Infusion: 15.8 mL  Total IN: 415.8 mL    OUT:    Chest Tube: 280 mL    Chest Tube: 170 mL    Voided: 290 mL  Total OUT: 740 mL    Total NET: -324.2 mL        ceFAZolin   IVPB 2000  ceFAZolin   IVPB 2000  norepinephrine Infusion 0.05    PAST MEDICAL & SURGICAL HISTORY:  Seizure  Essential hypertension: HTN (hypertension)  Gastroesophageal reflux disease: GERD (gastroesophageal reflux disease)  Hyperlipidemia: Hyperlipidemia  Anemia: Anemia  Hypothyroidism: Hypothyroidism  Atherosclerosis of coronary artery: CAD (coronary artery disease)  Peripheral vascular disease: PVD (peripheral vascular disease)  H/O aortic valve replacement: Bioprosthetic  Atherosclerosis of coronary artery bypass graft(s), unspecified, with angina pectoris with documented spasm  Status post aorto-coronary artery bypass graft: 2012        Physical Exam:  General: NAD, resting comfortably in bed  Pulmonary: Intubated  Cardiovascular: NSR, 2 CT in place with serosanguineous output   Abdominal: soft, unable to assess pain, dressing in place, non-saturated.  Extremities: WWP, no acute ischemia noted      LABS:                        10.6   15.17 )-----------( 82       ( 25 Aug 2020 16:10 )             34.2     08-25    137  |  112<H>  |  10  ----------------------------<  131<H>  4.6   |  20<L>  |  1.34<H>    Ca    7.2<L>      25 Aug 2020 16:10  Phos  3.4     08-25  Mg     1.4     08-25    TPro  3.4<L>  /  Alb  2.2<L>  /  TBili  0.5  /  DBili  x   /  AST  24  /  ALT  5<L>  /  AlkPhos  29<L>  08-25    PT/INR - ( 25 Aug 2020 16:10 )   PT: 17.3 sec;   INR: 1.47          PTT - ( 25 Aug 2020 16:10 )  PTT:36.0 sec  CAPILLARY BLOOD GLUCOSE      POCT Blood Glucose.: 159 mg/dL (25 Aug 2020 18:36)      Radiology and Additional Studies:    Assessment:  The patient is a 64y Female who is now several hours post-op from an open thoracoabdominal aneurysm repair, with 20mm tube graft and 6mm bypasses to the celiac, SMA,  left renal, right renal (end to end). She is recovering in CT ICU appropriately.    Plan:  - Pain control as needed  - close hemodynamic monitoring per ICU care

## 2020-08-25 NOTE — CONSULT NOTE ADULT - CONSULT REASON
CKD monitoring
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
aron operative optimization
medical comanagement
preop
s/p AAA repair
Aortic valve stenosis   Mitral valve stenosis

## 2020-08-26 LAB
ALBUMIN SERPL ELPH-MCNC: 3.5 G/DL — SIGNIFICANT CHANGE UP (ref 3.3–5)
ALP SERPL-CCNC: 28 U/L — LOW (ref 40–120)
ALT FLD-CCNC: 6 U/L — LOW (ref 10–45)
ANION GAP SERPL CALC-SCNC: 12 MMOL/L — SIGNIFICANT CHANGE UP (ref 5–17)
ANION GAP SERPL CALC-SCNC: 12 MMOL/L — SIGNIFICANT CHANGE UP (ref 5–17)
APTT BLD: 34.8 SEC — SIGNIFICANT CHANGE UP (ref 27.5–35.5)
AST SERPL-CCNC: 31 U/L — SIGNIFICANT CHANGE UP (ref 10–40)
BILIRUB SERPL-MCNC: 0.5 MG/DL — SIGNIFICANT CHANGE UP (ref 0.2–1.2)
BUN SERPL-MCNC: 13 MG/DL — SIGNIFICANT CHANGE UP (ref 7–23)
BUN SERPL-MCNC: 13 MG/DL — SIGNIFICANT CHANGE UP (ref 7–23)
CALCIUM SERPL-MCNC: 8.2 MG/DL — LOW (ref 8.4–10.5)
CALCIUM SERPL-MCNC: 8.3 MG/DL — LOW (ref 8.4–10.5)
CHLORIDE SERPL-SCNC: 104 MMOL/L — SIGNIFICANT CHANGE UP (ref 96–108)
CHLORIDE SERPL-SCNC: 107 MMOL/L — SIGNIFICANT CHANGE UP (ref 96–108)
CK MB CFR SERPL CALC: 15.1 NG/ML — HIGH (ref 0–6.7)
CK SERPL-CCNC: 650 U/L — HIGH (ref 25–170)
CO2 SERPL-SCNC: 19 MMOL/L — LOW (ref 22–31)
CO2 SERPL-SCNC: 19 MMOL/L — LOW (ref 22–31)
CREAT SERPL-MCNC: 1.39 MG/DL — HIGH (ref 0.5–1.3)
CREAT SERPL-MCNC: 1.52 MG/DL — HIGH (ref 0.5–1.3)
GAS PNL BLDA: SIGNIFICANT CHANGE UP
GLUCOSE BLDC GLUCOMTR-MCNC: 112 MG/DL — HIGH (ref 70–99)
GLUCOSE BLDC GLUCOMTR-MCNC: 84 MG/DL — SIGNIFICANT CHANGE UP (ref 70–99)
GLUCOSE BLDC GLUCOMTR-MCNC: 94 MG/DL — SIGNIFICANT CHANGE UP (ref 70–99)
GLUCOSE BLDC GLUCOMTR-MCNC: 99 MG/DL — SIGNIFICANT CHANGE UP (ref 70–99)
GLUCOSE SERPL-MCNC: 112 MG/DL — HIGH (ref 70–99)
GLUCOSE SERPL-MCNC: 87 MG/DL — SIGNIFICANT CHANGE UP (ref 70–99)
HCT VFR BLD CALC: 28.1 % — LOW (ref 34.5–45)
HGB BLD-MCNC: 8.8 G/DL — LOW (ref 11.5–15.5)
INR BLD: 1.25 — HIGH (ref 0.88–1.16)
MAGNESIUM SERPL-MCNC: 2.2 MG/DL — SIGNIFICANT CHANGE UP (ref 1.6–2.6)
MAGNESIUM SERPL-MCNC: 2.5 MG/DL — SIGNIFICANT CHANGE UP (ref 1.6–2.6)
MCHC RBC-ENTMCNC: 26.2 PG — LOW (ref 27–34)
MCHC RBC-ENTMCNC: 31.3 GM/DL — LOW (ref 32–36)
MCV RBC AUTO: 83.6 FL — SIGNIFICANT CHANGE UP (ref 80–100)
NRBC # BLD: 0 /100 WBCS — SIGNIFICANT CHANGE UP (ref 0–0)
PHOSPHATE SERPL-MCNC: 3.6 MG/DL — SIGNIFICANT CHANGE UP (ref 2.5–4.5)
PLATELET # BLD AUTO: 98 K/UL — LOW (ref 150–400)
POTASSIUM SERPL-MCNC: 4 MMOL/L — SIGNIFICANT CHANGE UP (ref 3.5–5.3)
POTASSIUM SERPL-MCNC: 4.1 MMOL/L — SIGNIFICANT CHANGE UP (ref 3.5–5.3)
POTASSIUM SERPL-SCNC: 4 MMOL/L — SIGNIFICANT CHANGE UP (ref 3.5–5.3)
POTASSIUM SERPL-SCNC: 4.1 MMOL/L — SIGNIFICANT CHANGE UP (ref 3.5–5.3)
PROT SERPL-MCNC: 4.7 G/DL — LOW (ref 6–8.3)
PROTHROM AB SERPL-ACNC: 14.8 SEC — HIGH (ref 10.6–13.6)
RBC # BLD: 3.36 M/UL — LOW (ref 3.8–5.2)
RBC # FLD: 18.5 % — HIGH (ref 10.3–14.5)
SODIUM SERPL-SCNC: 135 MMOL/L — SIGNIFICANT CHANGE UP (ref 135–145)
SODIUM SERPL-SCNC: 138 MMOL/L — SIGNIFICANT CHANGE UP (ref 135–145)
TROPONIN T SERPL-MCNC: 0.01 NG/ML — SIGNIFICANT CHANGE UP (ref 0–0.01)
WBC # BLD: 12.48 K/UL — HIGH (ref 3.8–10.5)
WBC # FLD AUTO: 12.48 K/UL — HIGH (ref 3.8–10.5)

## 2020-08-26 PROCEDURE — 99233 SBSQ HOSP IP/OBS HIGH 50: CPT

## 2020-08-26 PROCEDURE — 71045 X-RAY EXAM CHEST 1 VIEW: CPT | Mod: 26

## 2020-08-26 PROCEDURE — 99233 SBSQ HOSP IP/OBS HIGH 50: CPT | Mod: GC

## 2020-08-26 PROCEDURE — 99291 CRITICAL CARE FIRST HOUR: CPT

## 2020-08-26 RX ORDER — HYDROMORPHONE HYDROCHLORIDE 2 MG/ML
30 INJECTION INTRAMUSCULAR; INTRAVENOUS; SUBCUTANEOUS
Refills: 0 | Status: DISCONTINUED | OUTPATIENT
Start: 2020-08-26 | End: 2020-08-28

## 2020-08-26 RX ORDER — FENTANYL CITRATE 50 UG/ML
25 INJECTION INTRAVENOUS ONCE
Refills: 0 | Status: DISCONTINUED | OUTPATIENT
Start: 2020-08-26 | End: 2020-08-26

## 2020-08-26 RX ORDER — ACETAMINOPHEN 500 MG
1000 TABLET ORAL ONCE
Refills: 0 | Status: COMPLETED | OUTPATIENT
Start: 2020-08-26 | End: 2020-08-26

## 2020-08-26 RX ORDER — LABETALOL HCL 100 MG
5 TABLET ORAL ONCE
Refills: 0 | Status: COMPLETED | OUTPATIENT
Start: 2020-08-26 | End: 2020-08-26

## 2020-08-26 RX ORDER — SODIUM CHLORIDE 9 MG/ML
500 INJECTION, SOLUTION INTRAVENOUS ONCE
Refills: 0 | Status: COMPLETED | OUTPATIENT
Start: 2020-08-26 | End: 2020-08-26

## 2020-08-26 RX ORDER — ASPIRIN/CALCIUM CARB/MAGNESIUM 324 MG
300 TABLET ORAL DAILY
Refills: 0 | Status: DISCONTINUED | OUTPATIENT
Start: 2020-08-26 | End: 2020-08-28

## 2020-08-26 RX ORDER — SODIUM CHLORIDE 9 MG/ML
1000 INJECTION, SOLUTION INTRAVENOUS
Refills: 0 | Status: DISCONTINUED | OUTPATIENT
Start: 2020-08-26 | End: 2020-08-27

## 2020-08-26 RX ORDER — HEPARIN SODIUM 5000 [USP'U]/ML
5000 INJECTION INTRAVENOUS; SUBCUTANEOUS EVERY 8 HOURS
Refills: 0 | Status: DISCONTINUED | OUTPATIENT
Start: 2020-08-26 | End: 2020-09-04

## 2020-08-26 RX ADMIN — CHLORHEXIDINE GLUCONATE 15 MILLILITER(S): 213 SOLUTION TOPICAL at 05:23

## 2020-08-26 RX ADMIN — FENTANYL CITRATE 25 MICROGRAM(S): 50 INJECTION INTRAVENOUS at 08:30

## 2020-08-26 RX ADMIN — LEVETIRACETAM 400 MILLIGRAM(S): 250 TABLET, FILM COATED ORAL at 05:24

## 2020-08-26 RX ADMIN — FENTANYL CITRATE 25 MICROGRAM(S): 50 INJECTION INTRAVENOUS at 09:32

## 2020-08-26 RX ADMIN — FENTANYL CITRATE 25 MICROGRAM(S): 50 INJECTION INTRAVENOUS at 08:10

## 2020-08-26 RX ADMIN — SODIUM CHLORIDE 100 MILLILITER(S): 9 INJECTION, SOLUTION INTRAVENOUS at 18:17

## 2020-08-26 RX ADMIN — Medication 5 MILLIGRAM(S): at 12:23

## 2020-08-26 RX ADMIN — HEPARIN SODIUM 5000 UNIT(S): 5000 INJECTION INTRAVENOUS; SUBCUTANEOUS at 14:26

## 2020-08-26 RX ADMIN — Medication 100 MILLIGRAM(S): at 05:23

## 2020-08-26 RX ADMIN — Medication 1000 MILLIGRAM(S): at 06:05

## 2020-08-26 RX ADMIN — HEPARIN SODIUM 5000 UNIT(S): 5000 INJECTION INTRAVENOUS; SUBCUTANEOUS at 23:29

## 2020-08-26 RX ADMIN — Medication 100 MILLIGRAM(S): at 14:27

## 2020-08-26 RX ADMIN — SODIUM CHLORIDE 500 MILLILITER(S): 9 INJECTION, SOLUTION INTRAVENOUS at 23:29

## 2020-08-26 RX ADMIN — LEVETIRACETAM 400 MILLIGRAM(S): 250 TABLET, FILM COATED ORAL at 17:39

## 2020-08-26 RX ADMIN — Medication 70 MICROGRAM(S): at 07:11

## 2020-08-26 RX ADMIN — Medication 400 MILLIGRAM(S): at 05:01

## 2020-08-26 RX ADMIN — Medication 300 MILLIGRAM(S): at 16:21

## 2020-08-26 RX ADMIN — CHLORHEXIDINE GLUCONATE 1 APPLICATION(S): 213 SOLUTION TOPICAL at 05:26

## 2020-08-26 RX ADMIN — FENTANYL CITRATE 25 MICROGRAM(S): 50 INJECTION INTRAVENOUS at 11:05

## 2020-08-26 RX ADMIN — HYDROMORPHONE HYDROCHLORIDE 30 MILLILITER(S): 2 INJECTION INTRAMUSCULAR; INTRAVENOUS; SUBCUTANEOUS at 10:32

## 2020-08-26 RX ADMIN — PANTOPRAZOLE SODIUM 40 MILLIGRAM(S): 20 TABLET, DELAYED RELEASE ORAL at 11:07

## 2020-08-26 RX ADMIN — SODIUM CHLORIDE 150 MILLILITER(S): 9 INJECTION, SOLUTION INTRAVENOUS at 09:40

## 2020-08-26 NOTE — PROGRESS NOTE ADULT - SUBJECTIVE AND OBJECTIVE BOX
STATUS POST:  open thoracoabdominal aneurysm repair, with 20mm tube graft and 6mm bypasses to the celiac, SMA,  left renal, right renal (end to end)     LAST 24 HOURS: Patient seen and examined bedside by team. No overnight events. She was extubated earlier this morning and PCA was started for pain control. Her chest tubes put out 620 ([place]) and 460 ([facility]) since the OR.    ceFAZolin   IVPB 2000 milliGRAM(s) IV Intermittent every 8 hours  heparin   Injectable 5000 Unit(s) SubCutaneous every 8 hours      Vital Signs Last 24 Hrs  T(C): 36 (26 Aug 2020 10:26), Max: 36 (26 Aug 2020 10:26)  T(F): 96.8 (26 Aug 2020 10:26), Max: 96.8 (26 Aug 2020 10:26)  HR: 75 (26 Aug 2020 11:00) (54 - 83)  BP: 173/81 (26 Aug 2020 09:15) (173/81 - 173/81)  BP(mean): 116 (26 Aug 2020 09:15) (116 - 116)  RR: 18 (26 Aug 2020 11:00) (10 - 20)  SpO2: 100% (26 Aug 2020 11:00) (97% - 100%)  I&O's Detail    25 Aug 2020 07:01  -  26 Aug 2020 07:00  --------------------------------------------------------  IN:    Albumin 5%  - 250 mL: 1250 mL    fentaNYL  Infusion: 2.8 mL    IV PiggyBack: 400 mL    lactated ringers.: 2250 mL    norepinephrine Infusion: 15.8 mL    phenylephrine   Infusion: 92.4 mL    propofol Infusion: 18.8 mL  Total IN: 4029.8 mL    OUT:    Chest Tube: 460 mL    Chest Tube: 620 mL    Voided: 2045 mL  Total OUT: 3125 mL    Total NET: 904.8 mL      26 Aug 2020 07:01  -  26 Aug 2020 11:33  --------------------------------------------------------  IN:    lactated ringers.: 300 mL    lactated ringers.: 200 mL  Total IN: 500 mL    OUT:    Chest Tube: 60 mL    Chest Tube: 50 mL    Voided: 160 mL  Total OUT: 270 mL    Total NET: 230 mL        Physical Exam:  General: NAD, resting comfortably in bed  Pulmonary: extubated, on NC@6L with humidified air  Cardiovascular: NSR, 2 CT in place with serosanguineous output   Abdominal: soft, left sided tenderness, dressing in place, non-saturated.  Extremities: WWP, no acute ischemia noted        LABS:                        8.8    12.48 )-----------( 98       ( 26 Aug 2020 03:42 )             28.1     08-26    138  |  107  |  13  ----------------------------<  112<H>  4.1   |  19<L>  |  1.39<H>    Ca    8.2<L>      26 Aug 2020 03:42  Phos  3.6     08-26  Mg     2.5     08-26    TPro  4.7<L>  /  Alb  3.5  /  TBili  0.5  /  DBili  x   /  AST  31  /  ALT  6<L>  /  AlkPhos  28<L>  08-26    PT/INR - ( 26 Aug 2020 03:42 )   PT: 14.8 sec;   INR: 1.25          PTT - ( 26 Aug 2020 03:42 )  PTT:34.8 sec

## 2020-08-26 NOTE — PROGRESS NOTE ADULT - PROBLEM SELECTOR PLAN 1
Patient with CKD stage III   nephrologically stable and at baseline, with acceptable lytes and UOP, pt volume resuscitated in am with albumin, sbp ranging 130-140s, appears euvolemic  No need for diuresis at present, please replete Ca  Will follow

## 2020-08-26 NOTE — PROGRESS NOTE ADULT - ASSESSMENT
The patient is a 64y Female who is now several hours post-op from an open thoracoabdominal aneurysm repair, with 20mm tube graft and 6mm bypasses to the celiac, SMA,  left renal, right renal (end to end). She is recovering in CT ICU appropriately.    Plan:  - Pain control as needed  - close hemodynamic monitoring per ICU care The patient is a 64y Female who is now several hours post-op from an open thoracoabdominal aneurysm repair, with 20mm tube graft and 6mm bypasses to the celiac, SMA,  left renal, right renal (end to end). She is recovering in CT ICU appropriately.    Plan:  - Pain control as needed  - close hemodynamic monitoring per ICU care  - Continue NPO

## 2020-08-26 NOTE — PROGRESS NOTE ADULT - SUBJECTIVE AND OBJECTIVE BOX
Cardiology Consult    Interval History: Ms. Ramirez is POD 1, extubated this morning. she has hoarseness and severe pain in her ribs after the procedure.   Telemetry: NSR, sinus tachy, occasional VPC    OBJECTIVE  Vitals:  T(C): 36.3 (08-26-20 @ 14:49), Max: 36.3 (08-26-20 @ 14:49)  HR: 82 (08-26-20 @ 17:00) (55 - 83)  BP: 173/81 (08-26-20 @ 09:15) (173/81 - 173/81)  RR: 16 (08-26-20 @ 13:44) (12 - 20)  SpO2: 95% (08-26-20 @ 17:00) (78% - 100%)  Wt(kg): --  Mode: CPAP with PS  FiO2: 0  PEEP: 5  PS: 8  MAP: 7  PIP: 13    I/O:  I&O's Summary    25 Aug 2020 07:01  -  26 Aug 2020 07:00  --------------------------------------------------------  IN: 4029.8 mL / OUT: 3125 mL / NET: 904.8 mL    26 Aug 2020 07:01  -  26 Aug 2020 18:34  --------------------------------------------------------  IN: 1100 mL / OUT: 505 mL / NET: 595 mL        PHYSICAL EXAM:  uncomfortable appearing, no apparent distress  neck supple, RIJ present  heart regular, 3/6 harsh systolic murmur s1, attenuated S2  Lungs clear anteriorly but limited inspiration d/t pain  abd multiple scars, lef pleuravac drain.   ext warm, no edema, pulses difficult to palpate  AAOx3  	  LABS:                        8.8    12.48 )-----------( 98       ( 26 Aug 2020 03:42 )             28.1     08-26    135  |  104  |  13  ----------------------------<  87  4.0   |  19<L>  |  1.52<H>    Ca    8.3<L>      26 Aug 2020 16:23  Phos  3.6     08-26  Mg     2.2     08-26    TPro  4.7<L>  /  Alb  3.5  /  TBili  0.5  /  DBili  x   /  AST  31  /  ALT  6<L>  /  AlkPhos  28<L>  08-26    PT/INR - ( 26 Aug 2020 03:42 )   PT: 14.8 sec;   INR: 1.25          PTT - ( 26 Aug 2020 03:42 )  PTT:34.8 sec      RADIOLOGY & ADDITIONAL TESTS:  Reviewed .    MEDICATIONS  (STANDING):  aspirin Suppository 300 milliGRAM(s) Rectal daily  chlorhexidine 0.12% Liquid 15 milliLiter(s) Oral Mucosa every 12 hours  chlorhexidine 2% Cloths 1 Application(s) Topical <User Schedule>  heparin   Injectable 5000 Unit(s) SubCutaneous every 8 hours  HYDROmorphone PCA (1 mG/mL) 30 milliLiter(s) PCA Continuous PCA Continuous  insulin lispro (HumaLOG) corrective regimen sliding scale   SubCutaneous every 6 hours  lactated ringers. 1000 milliLiter(s) (100 mL/Hr) IV Continuous <Continuous>  levETIRAcetam  IVPB 500 milliGRAM(s) IV Intermittent every 12 hours  levothyroxine Injectable 70 MICROGram(s) IV Push <User Schedule>  pantoprazole  Injectable 40 milliGRAM(s) IV Push daily    MEDICATIONS  (PRN):

## 2020-08-26 NOTE — PROGRESS NOTE ADULT - SUBJECTIVE AND OBJECTIVE BOX
CTICU  CRITICAL  CARE  attending     Hand off received 					   Pertinent clinical, laboratory, radiographic, hemodynamic, echocardiographic, respiratory data, microbiologic data and chart were reviewed and analyzed frequently throughout the course of the day and night  Patient seen and examined with CTS/ SH attending at bedside    Pt is a 64y , Female, s/p Open thoraco abdominal aortic aneurysm repair;    overnight:    motor deficit bilat lower extremities; improved with increasing MAP >100  Weaned and extubated in the am  Pt being transferred to SICU for further care.      Aneurysm, thoracoabdominal  CKD (chronic kidney disease): CKD (chronic kidney disease)  Preop respiratory exam: Preop respiratory exam  Pulmonary hypertension: Pulmonary hypertension  Simple chronic bronchitis: Simple chronic bronchitis  Peripheral artery disease: Peripheral artery disease  Coronary artery disease: Coronary artery disease  Abdominal aortic aneurysm (AAA): Abdominal aortic aneurysm (AAA)  Anemia: Anemia  CKD (chronic kidney disease) stage 3, GFR 30-59 ml/min: CKD (chronic kidney disease) stage 3, GFR 30-59 ml/min  Hypertensive urgency: Hypertensive urgency  Elevated brain natriuretic peptide (BNP) level: Elevated brain natriuretic peptide (BNP) level  Abdominal aortic aneurysm (AAA) without rupture: Abdominal aortic aneurysm (AAA) without rupture  Palpitations: Palpitations      , FAMILY HISTORY:  No pertinent family history in first degree relatives  PAST MEDICAL & SURGICAL HISTORY:  Seizure  Essential hypertension: HTN (hypertension)  Gastroesophageal reflux disease: GERD (gastroesophageal reflux disease)  Hyperlipidemia: Hyperlipidemia  Anemia: Anemia  Hypothyroidism: Hypothyroidism  Atherosclerosis of coronary artery: CAD (coronary artery disease)  Peripheral vascular disease: PVD (peripheral vascular disease)  H/O aortic valve replacement: Bioprosthetic  Atherosclerosis of coronary artery bypass graft(s), unspecified, with angina pectoris with documented spasm  Status post aorto-coronary artery bypass graft: 2012    Patient is a 64y old  Female who presents with a chief complaint of palpitations (21 Aug 2020 15:22)      14 system review was unremarkable    Vital signs, hemodynamic and respiratory parameters were reviewed from the bedside nursing flowsheet.  ICU Vital Signs Last 24 Hrs  T(C): 36.3 (26 Aug 2020 14:49), Max: 36.3 (26 Aug 2020 14:49)  T(F): 97.4 (26 Aug 2020 14:49), Max: 97.4 (26 Aug 2020 14:49)  HR: 81 (26 Aug 2020 18:00) (55 - 83)  BP: 173/81 (26 Aug 2020 09:15) (173/81 - 173/81)  BP(mean): 116 (26 Aug 2020 09:15) (116 - 116)  ABP: 152/57 (26 Aug 2020 18:00) (130/51 - 166/64)  ABP(mean): 91 (26 Aug 2020 18:00) (81 - 106)  RR: 16 (26 Aug 2020 18:00) (12 - 20)  SpO2: 95% (26 Aug 2020 18:00) (78% - 100%)    Adult Advanced Hemodynamics Last 24 Hrs  CVP(mm Hg): 3 (26 Aug 2020 13:00) (2 - 18)  CVP(cm H2O): --  CO: --  CI: --  PA: --  PA(mean): --  PCWP: --  SVR: --  SVRI: --  PVR: --  PVRI: --, ABG - ( 26 Aug 2020 03:32 )  pH, Arterial: 7.41  pH, Blood: x     /  pCO2: 33    /  pO2: 94    / HCO3: 20    / Base Excess: -3.5  /  SaO2: 97                Mode: CPAP with PS  FiO2: 0  PEEP: 5  PS: 8  MAP: 7  PIP: 13    Intake and output was reviewed and the fluid balance was calculated  Daily     Daily   I&O's Summary    25 Aug 2020 07:01  -  26 Aug 2020 07:00  --------------------------------------------------------  IN: 4029.8 mL / OUT: 3125 mL / NET: 904.8 mL    26 Aug 2020 07:01  -  26 Aug 2020 19:15  --------------------------------------------------------  IN: 1100 mL / OUT: 545 mL / NET: 555 mL        All lines and drain sites were assessed  Glycemic trend was reviewedCAPILLARY BLOOD GLUCOSE      POCT Blood Glucose.: 94 mg/dL (26 Aug 2020 17:30)    No acute change in mental status  Auscultation of the chest reveals equal bs  Abdomen is soft  Extremities are warm and well perfused  Wounds appear clean and unremarkable  Antibiotics are periop    labs  CBC Full  -  ( 26 Aug 2020 03:42 )  WBC Count : 12.48 K/uL  RBC Count : 3.36 M/uL  Hemoglobin : 8.8 g/dL  Hematocrit : 28.1 %  Platelet Count - Automated : 98 K/uL  Mean Cell Volume : 83.6 fl  Mean Cell Hemoglobin : 26.2 pg  Mean Cell Hemoglobin Concentration : 31.3 gm/dL  Auto Neutrophil # : x  Auto Lymphocyte # : x  Auto Monocyte # : x  Auto Eosinophil # : x  Auto Basophil # : x  Auto Neutrophil % : x  Auto Lymphocyte % : x  Auto Monocyte % : x  Auto Eosinophil % : x  Auto Basophil % : x    08-26    135  |  104  |  13  ----------------------------<  87  4.0   |  19<L>  |  1.52<H>    Ca    8.3<L>      26 Aug 2020 16:23  Phos  3.6     08-26  Mg     2.2     08-26    TPro  4.7<L>  /  Alb  3.5  /  TBili  0.5  /  DBili  x   /  AST  31  /  ALT  6<L>  /  AlkPhos  28<L>  08-26    PT/INR - ( 26 Aug 2020 03:42 )   PT: 14.8 sec;   INR: 1.25          PTT - ( 26 Aug 2020 03:42 )  PTT:34.8 sec  The current medications were reviewed   MEDICATIONS  (STANDING):  aspirin Suppository 300 milliGRAM(s) Rectal daily  chlorhexidine 0.12% Liquid 15 milliLiter(s) Oral Mucosa every 12 hours  chlorhexidine 2% Cloths 1 Application(s) Topical <User Schedule>  heparin   Injectable 5000 Unit(s) SubCutaneous every 8 hours  HYDROmorphone PCA (1 mG/mL) 30 milliLiter(s) PCA Continuous PCA Continuous  insulin lispro (HumaLOG) corrective regimen sliding scale   SubCutaneous every 6 hours  lactated ringers. 1000 milliLiter(s) (100 mL/Hr) IV Continuous <Continuous>  levETIRAcetam  IVPB 500 milliGRAM(s) IV Intermittent every 12 hours  levothyroxine Injectable 70 MICROGram(s) IV Push <User Schedule>  pantoprazole  Injectable 40 milliGRAM(s) IV Push daily    MEDICATIONS  (PRN):       PROBLEM LIST/ ASSESSMENT:  HEALTH ISSUES - PROBLEM Dx:  Aneurysm, thoracoabdominal  CKD (chronic kidney disease): CKD (chronic kidney disease)  Preop respiratory exam: Preop respiratory exam  Pulmonary hypertension: Pulmonary hypertension  Simple chronic bronchitis: Simple chronic bronchitis  Peripheral artery disease: Peripheral artery disease  Coronary artery disease: Coronary artery disease  Abdominal aortic aneurysm (AAA): Abdominal aortic aneurysm (AAA)  Anemia: Anemia  CKD (chronic kidney disease) stage 3, GFR 30-59 ml/min: CKD (chronic kidney disease) stage 3, GFR 30-59 ml/min  Hypertensive urgency: Hypertensive urgency  Elevated brain natriuretic peptide (BNP) level: Elevated brain natriuretic peptide (BNP) level  Abdominal aortic aneurysm (AAA) without rupture: Abdominal aortic aneurysm (AAA) without rupture  Palpitations: Palpitations      ,   Patient is a 64y old  Female who presents with a chief complaint of palpitations (21 Aug 2020 15:22)     s/p open TAAA repair involving bypasses to the celiac, SMA, b/l renal arteries (8/26)      My plan includes :  close hemodynamic, ventilatory and drain monitoring and management per post op routine    Monitor for arrhythmias and monitor parameters for organ perfusion  monitor neurologic status  Head of the bed should remain elevated to 45 deg .   chest PT and IS will be encouraged  monitor adequacy of oxygenation and ventilation and attempt to wean oxygen  Nutritional goals will be met using po eventually , ensure adequate caloric intake and montior the same  Stress ulcer and VTE prophylaxis will be achieved    Glycemic control is satisfactory  Electrolytes have been repleted as necessary and wound care has been carried out. Pain control has been achieved.   agressive physical therapy and early mobility and ambulation goals will be met   The family was updated about the course and plan  CRITICAL CARE TIME SPENT in evaluation and management, reassessments, review and interpretation of labs and x-rays, ventilator and hemodynamic management, formulating a plan and coordinating care: _55___ MIN.  Time does not include procedural time.  CTICU ATTENDING     					    Vasyl Islas MD

## 2020-08-26 NOTE — PROGRESS NOTE ADULT - SUBJECTIVE AND OBJECTIVE BOX
O/N Events:  came off pressors, received total 1 L of [place], extubated to NC, MS intact  Subjective:  pain reasonable controlled, NAD, renal function remained stable with 2L UOP   lytes wnl except for mild stable acidosis and corrected calcium borderline-low      VITALS  Vital Signs Last 24 Hrs  T(C): 36 (26 Aug 2020 10:26), Max: 36 (26 Aug 2020 10:26)  T(F): 96.8 (26 Aug 2020 10:26), Max: 96.8 (26 Aug 2020 10:26)  HR: 75 (26 Aug 2020 13:44) (54 - 83)  BP: 173/81 (26 Aug 2020 09:15) (173/81 - 173/81)  BP(mean): 116 (26 Aug 2020 09:15) (116 - 116)  RR: 16 (26 Aug 2020 13:44) (10 - 20)  SpO2: 100% (26 Aug 2020 13:44) (78% - 100%)    PHYSICAL EXAM  General: NAD on 4 L NC   Respiratory: CTA B/L anteriorly   Cardiovascular: s1s2, RRR  Gastrointestinal: Soft; ND, surgical incision under dressing c/d/i/, 2 Left sided CTs present with serosang drainage   Extremities: Warm, no edema   Neurological: AOx3, non focal, communicative and conversant   : Gomez draining urine     MEDICATIONS  (STANDING):  ceFAZolin   IVPB 2000 milliGRAM(s) IV Intermittent every 8 hours  chlorhexidine 0.12% Liquid 15 milliLiter(s) Oral Mucosa every 12 hours  chlorhexidine 2% Cloths 1 Application(s) Topical <User Schedule>  heparin   Injectable 5000 Unit(s) SubCutaneous every 8 hours  HYDROmorphone PCA (1 mG/mL) 30 milliLiter(s) PCA Continuous PCA Continuous  insulin lispro (HumaLOG) corrective regimen sliding scale   SubCutaneous every 6 hours  lactated ringers. 1000 milliLiter(s) (100 mL/Hr) IV Continuous <Continuous>  levETIRAcetam  IVPB 500 milliGRAM(s) IV Intermittent every 12 hours  levothyroxine Injectable 70 MICROGram(s) IV Push <User Schedule>  pantoprazole  Injectable 40 milliGRAM(s) IV Push daily    MEDICATIONS  (PRN):      LABS                        8.8    12.48 )-----------( 98       ( 26 Aug 2020 03:42 )             28.1     08-26    138  |  107  |  13  ----------------------------<  112<H>  4.1   |  19<L>  |  1.39<H>    Ca    8.2<L>      26 Aug 2020 03:42  Phos  3.6     08-26  Mg     2.5     08-26    TPro  4.7<L>  /  Alb  3.5  /  TBili  0.5  /  DBili  x   /  AST  31  /  ALT  6<L>  /  AlkPhos  28<L>  08-26    LIVER FUNCTIONS - ( 26 Aug 2020 03:42 )  Alb: 3.5 g/dL / Pro: 4.7 g/dL / ALK PHOS: 28 U/L / ALT: 6 U/L / AST: 31 U/L / GGT: x           PT/INR - ( 26 Aug 2020 03:42 )   PT: 14.8 sec;   INR: 1.25       PTT - ( 26 Aug 2020 03:42 )  PTT:34.8 sec O/N Events:  came off pressors, received total 1 L of Alb, extubated to NC, MS intact  Subjective:  pain reasonably controlled, NAD, renal function remained stable with 2L UOP   lytes wnl except for mild stable acidosis and corrected calcium borderline-low      VITALS  Vital Signs Last 24 Hrs  T(C): 36 (26 Aug 2020 10:26), Max: 36 (26 Aug 2020 10:26)  T(F): 96.8 (26 Aug 2020 10:26), Max: 96.8 (26 Aug 2020 10:26)  HR: 75 (26 Aug 2020 13:44) (54 - 83)  BP: 173/81 (26 Aug 2020 09:15) (173/81 - 173/81)  BP(mean): 116 (26 Aug 2020 09:15) (116 - 116)  RR: 16 (26 Aug 2020 13:44) (10 - 20)  SpO2: 100% (26 Aug 2020 13:44) (78% - 100%)    PHYSICAL EXAM  General: NAD on 4 L NC   Respiratory: CTA B/L anteriorly   Cardiovascular: s1s2, RRR  Gastrointestinal: Soft; ND, surgical incision under dressing c/d/i/, 2 Left sided CTs present with serosang drainage   Extremities: Warm, no edema   Neurological: AOx3, non focal, communicative and conversant   : Gomez draining urine     MEDICATIONS  (STANDING):  ceFAZolin   IVPB 2000 milliGRAM(s) IV Intermittent every 8 hours  chlorhexidine 0.12% Liquid 15 milliLiter(s) Oral Mucosa every 12 hours  chlorhexidine 2% Cloths 1 Application(s) Topical <User Schedule>  heparin   Injectable 5000 Unit(s) SubCutaneous every 8 hours  HYDROmorphone PCA (1 mG/mL) 30 milliLiter(s) PCA Continuous PCA Continuous  insulin lispro (HumaLOG) corrective regimen sliding scale   SubCutaneous every 6 hours  lactated ringers. 1000 milliLiter(s) (100 mL/Hr) IV Continuous <Continuous>  levETIRAcetam  IVPB 500 milliGRAM(s) IV Intermittent every 12 hours  levothyroxine Injectable 70 MICROGram(s) IV Push <User Schedule>  pantoprazole  Injectable 40 milliGRAM(s) IV Push daily    MEDICATIONS  (PRN):      LABS                        8.8    12.48 )-----------( 98       ( 26 Aug 2020 03:42 )             28.1     08-26    138  |  107  |  13  ----------------------------<  112<H>  4.1   |  19<L>  |  1.39<H>    Ca    8.2<L>      26 Aug 2020 03:42  Phos  3.6     08-26  Mg     2.5     08-26    TPro  4.7<L>  /  Alb  3.5  /  TBili  0.5  /  DBili  x   /  AST  31  /  ALT  6<L>  /  AlkPhos  28<L>  08-26    LIVER FUNCTIONS - ( 26 Aug 2020 03:42 )  Alb: 3.5 g/dL / Pro: 4.7 g/dL / ALK PHOS: 28 U/L / ALT: 6 U/L / AST: 31 U/L / GGT: x           PT/INR - ( 26 Aug 2020 03:42 )   PT: 14.8 sec;   INR: 1.25       PTT - ( 26 Aug 2020 03:42 )  PTT:34.8 sec

## 2020-08-26 NOTE — CHART NOTE - NSCHARTNOTEFT_GEN_A_CORE
Admitting Diagnosis:   Patient is a 64y old  Female who presents with a chief complaint of palpitations (21 Aug 2020 15:22)      PAST MEDICAL & SURGICAL HISTORY:  Seizure  Essential hypertension: HTN (hypertension)  Gastroesophageal reflux disease: GERD (gastroesophageal reflux disease)  Hyperlipidemia: Hyperlipidemia  Anemia: Anemia  Hypothyroidism: Hypothyroidism  Atherosclerosis of coronary artery: CAD (coronary artery disease)  Peripheral vascular disease: PVD (peripheral vascular disease)  H/O aortic valve replacement: Bioprosthetic  Atherosclerosis of coronary artery bypass graft(s), unspecified, with angina pectoris with documented spasm  Status post aorto-coronary artery bypass graft: 2012      Current Nutrition Order: NPO     PO Intake: Good (%) [   ]  Fair (50-75%) [   ] Poor (<25%) [   ]- remains NPO, will follow for PO intake     GI Issues: no BM post-op, no n/v/d    Pain: appears comfortable resting in bed     Skin Integrity: surgical incision, marcin score 15     Labs:   08-26    138  |  107  |  13  ----------------------------<  112<H>  4.1   |  19<L>  |  1.39<H>    Ca    8.2<L>      26 Aug 2020 03:42  Phos  3.6     08-26  Mg     2.5     08-26    TPro  4.7<L>  /  Alb  3.5  /  TBili  0.5  /  DBili  x   /  AST  31  /  ALT  6<L>  /  AlkPhos  28<L>  08-26    CAPILLARY BLOOD GLUCOSE      POCT Blood Glucose.: 99 mg/dL (26 Aug 2020 11:09)  POCT Blood Glucose.: 112 mg/dL (26 Aug 2020 06:48)  POCT Blood Glucose.: 115 mg/dL (25 Aug 2020 23:47)  POCT Blood Glucose.: 159 mg/dL (25 Aug 2020 18:36)      Medications:  MEDICATIONS  (STANDING):  ceFAZolin   IVPB 2000 milliGRAM(s) IV Intermittent every 8 hours  chlorhexidine 0.12% Liquid 15 milliLiter(s) Oral Mucosa every 12 hours  chlorhexidine 2% Cloths 1 Application(s) Topical <User Schedule>  heparin   Injectable 5000 Unit(s) SubCutaneous every 8 hours  HYDROmorphone PCA (1 mG/mL) 30 milliLiter(s) PCA Continuous PCA Continuous  insulin lispro (HumaLOG) corrective regimen sliding scale   SubCutaneous every 6 hours  lactated ringers. 1000 milliLiter(s) (100 mL/Hr) IV Continuous <Continuous>  levETIRAcetam  IVPB 500 milliGRAM(s) IV Intermittent every 12 hours  levothyroxine Injectable 70 MICROGram(s) IV Push <User Schedule>  pantoprazole  Injectable 40 milliGRAM(s) IV Push daily    MEDICATIONS  (PRN):      Weight:   56kg (8/20)  56kg (8/25)     Weight Change: wt remains stable through admission, please continue to trend     Nutrition Focused Physical Exam: Completed [x-8/24 ]  Not Pertinent [   ]    Estimated energy needs:   ABW 56kg, IBW 67kg, 83% IBW, ht 67", BMI 19.3  Current body wt used for energy calculations as pt falls within % IBW  adjusted for age, suspected malnutrition, and post-op demand    1400-1680kcal/day 25-30kcal/kg  67-78gm/day 1.2-1.4gm/kg   Fluid per team     Subjective:   64 yoF former smoker PMHx HTN, HLD, CAD s/p PCI dLCX and CABG LIMA-Ramus, ANTONIO-PDA (patent stent and patent grafts x 2 by CTA 8/2014), Aortic Valve Disease s/p Bioprosthetic AVR 2002, Mitral Valve Disease s/p Mitral valve annuloplasty 2002, Paroxysmal ATach, Wide Complex Tachycardia (seen on Holter 4/2016 –had ILR placed now disconnected), PAD s/p pLSFA stent LCIA stent, with occluded Bilateral SFA (proximal to mid portion per U/S 2019), Chronic Anemia (baseline Hgb 8-8.5 -history of blood transfusions none recently-currently receiving IV Iron, Procrit, Vitamin B12 injections, takes Folic Acid PO), AAA s/p Open [id number], Hypothyroidism, CKD stage III-IV (Baseline Cr 1.58-1.99 6/2019-11/2019),  Severe Renal Artery Stenosis (scarring and atrophy of left Kidney 11/21/19), 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 Pt admitted for R/O ACS + HTN urgency, as well as further management of AAA. TTE shows normal EF but bioAVR + possible mod stenosis (unchanged from prior). CTA confirmed the presence of a new aortic aneurysm above the graft - Open thoracoabdominal aneurysm repair performed 8/25 bt CTS. Extubated to NC this AM 8/26, moved to  later in morning for further management. Remains NPO at this time, prior to OR noted to have poor intake, picking at food, noted with PCM, see chart note.       Previous Nutrition Diagnosis:    Active [   ]  Resolved [   ]    If resolved, new PES:     Goal:    Recommendations:    Education:     Risk Level: High [   ] Moderate [   ] Low [   ] Admitting Diagnosis:   Patient is a 64y old  Female who presents with a chief complaint of palpitations (21 Aug 2020 15:22)      PAST MEDICAL & SURGICAL HISTORY:  Seizure  Essential hypertension: HTN (hypertension)  Gastroesophageal reflux disease: GERD (gastroesophageal reflux disease)  Hyperlipidemia: Hyperlipidemia  Anemia: Anemia  Hypothyroidism: Hypothyroidism  Atherosclerosis of coronary artery: CAD (coronary artery disease)  Peripheral vascular disease: PVD (peripheral vascular disease)  H/O aortic valve replacement: Bioprosthetic  Atherosclerosis of coronary artery bypass graft(s), unspecified, with angina pectoris with documented spasm  Status post aorto-coronary artery bypass graft: 2012      Current Nutrition Order: NPO     PO Intake: Good (%) [   ]  Fair (50-75%) [   ] Poor (<25%) [   ]- remains NPO, will follow for PO intake     GI Issues: no BM post-op, no n/v/d    Pain: appears comfortable resting in bed     Skin Integrity: surgical incision, marcin score 15     Labs:   08-26    138  |  107  |  13  ----------------------------<  112<H>  4.1   |  19<L>  |  1.39<H>    Ca    8.2<L>      26 Aug 2020 03:42  Phos  3.6     08-26  Mg     2.5     08-26    TPro  4.7<L>  /  Alb  3.5  /  TBili  0.5  /  DBili  x   /  AST  31  /  ALT  6<L>  /  AlkPhos  28<L>  08-26    CAPILLARY BLOOD GLUCOSE      POCT Blood Glucose.: 99 mg/dL (26 Aug 2020 11:09)  POCT Blood Glucose.: 112 mg/dL (26 Aug 2020 06:48)  POCT Blood Glucose.: 115 mg/dL (25 Aug 2020 23:47)  POCT Blood Glucose.: 159 mg/dL (25 Aug 2020 18:36)      Medications:  MEDICATIONS  (STANDING):  ceFAZolin   IVPB 2000 milliGRAM(s) IV Intermittent every 8 hours  chlorhexidine 0.12% Liquid 15 milliLiter(s) Oral Mucosa every 12 hours  chlorhexidine 2% Cloths 1 Application(s) Topical <User Schedule>  heparin   Injectable 5000 Unit(s) SubCutaneous every 8 hours  HYDROmorphone PCA (1 mG/mL) 30 milliLiter(s) PCA Continuous PCA Continuous  insulin lispro (HumaLOG) corrective regimen sliding scale   SubCutaneous every 6 hours  lactated ringers. 1000 milliLiter(s) (100 mL/Hr) IV Continuous <Continuous>  levETIRAcetam  IVPB 500 milliGRAM(s) IV Intermittent every 12 hours  levothyroxine Injectable 70 MICROGram(s) IV Push <User Schedule>  pantoprazole  Injectable 40 milliGRAM(s) IV Push daily    MEDICATIONS  (PRN):      Weight:   56kg (8/20)  56kg (8/25)     Weight Change: wt remains stable through admission, please continue to trend     Nutrition Focused Physical Exam: Completed [x-8/24 ]  Not Pertinent [   ]    Estimated energy needs:   ABW 56kg, IBW 67kg, 83% IBW, ht 67", BMI 19.3  Current body wt used for energy calculations as pt falls within % IBW  adjusted for age, suspected malnutrition, and post-op demand    1400-1680kcal/day 25-30kcal/kg  67-78gm/day 1.2-1.4gm/kg   Fluid per team     Subjective:   64 yoF former smoker PMHx HTN, HLD, CAD s/p PCI dLCX and CABG LIMA-Ramus, ANTONIO-PDA (patent stent and patent grafts x 2 by CTA 8/2014), Aortic Valve Disease s/p Bioprosthetic AVR 2002, Mitral Valve Disease s/p Mitral valve annuloplasty 2002, Paroxysmal ATach, Wide Complex Tachycardia (seen on Holter 4/2016 –had ILR placed now disconnected), PAD s/p pLSFA stent LCIA stent, with occluded Bilateral SFA (proximal to mid portion per U/S 2019), Chronic Anemia (baseline Hgb 8-8.5 -history of blood transfusions none recently-currently receiving IV Iron, Procrit, Vitamin B12 injections, takes Folic Acid PO), AAA s/p Open [id number], Hypothyroidism, CKD stage III-IV (Baseline Cr 1.58-1.99 6/2019-11/2019),  Severe Renal Artery Stenosis (scarring and atrophy of left Kidney 11/21/19), 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 Pt admitted for R/O ACS + HTN urgency, as well as further management of AAA. TTE shows normal EF but bioAVR + possible mod stenosis (unchanged from prior). CTA confirmed the presence of a new aortic aneurysm above the graft - Open thoracoabdominal aneurysm repair performed 8/25 bt CTS. Extubated to NC this AM 8/26, moved to  later in morning for further management. Remains NPO at this time, prior to OR noted to have poor intake, picking at food, noted with PCM, see chart note. Since pandemic has had wt loss d/t not wanting to shop d/t risk of COVID. Recommend DASH/TLC diet upon advancement + ONS to best meet needs. Pt lethargic at time of assessment this AM as just extubated. Will follow for further diet reinforcement and encouragement as feasible.       Previous Nutrition Diagnosis: Suspected Moderate malnutrition RT presumed intake<EER AEB wt loss PO intake NFPE     Active [  x ]  Resolved [   ]    If resolved, new PES:     Goal: Pt will meet at least 75% of nutrient needs     Recommendations:  1. DASH TLC diet + Ensure Enlive BID (700 kcal, 40g protein, 360 mL free H2O)   2. Monitor %PO intake, Diet tolerance,.  3. MVI daily.  4. Monitor Skin, Wts, GOC.  5. Pain/GI management per team.  6. Labs: monitor BMP, CBC, glucose, lytes, trend renal indices, LFts, POCT.  7. RD to remain available for additional nutrition interventions as needed.     Education: will follow for reinforcement as lethargic post-extubation     Risk Level: High [ x  ] Moderate [   ] Low [   ]

## 2020-08-26 NOTE — PROGRESS NOTE ADULT - ASSESSMENT
A/P: 64F w/CAD s/p CABG (LIMA-Ramus/free ANTONIO-RPDA) w/bioAVR and MV repair (2002) and PCI (FORD x1 mLCx-OM2, 2007), PAD s/p PTA/FORD L common iliac and pSFA, infrarenal AAA s/p repair (2015), HTN, CKD3, emphysema (seen on CT, asx), hypothyroidism, seizure disorder, anxiety/depression, who presented to [facility] ED 8/20/2020 c/o worsening palpitations, initially r/o ACS and now on vascular service with plan to perform AAA repair. she is now s/p Ao-celiac, Ao-mesenteric, Ao-renal bypass, extubated.     REcommend  - restart Coreg 12.5mg BID for BP control and extopy suppression  - if needed additional BP  control can add amdlodipine 5mg or, if short acting agents favored can consider hydralazine 25mg PO TID   - given moderate AS- avoid excessive afterload reduction  - okay to hold off other cardiac medications in the acute setting  - would reintroduce fibrate, antiplatelets when she is more stable    Yariel Villafuerte MD  Cardiology Fellow    d/w Dr. Ramirez

## 2020-08-26 NOTE — PROGRESS NOTE ADULT - SUBJECTIVE AND OBJECTIVE BOX
INTERVAL/OVERNIGHT EVENTS:   Overnight had difficulty moving lower limbs momentarily however this spontaneously improved. CT output 320 and 280 overnight. Extubated in AM.     SUBJECTIVE: No specific complaints. Denies chest pain, difficulty breathing, or nausea/vomiting. Can move all extremities freely without weakness.     Neurologic Medications  HYDROmorphone PCA (1 mG/mL) 30 milliLiter(s) PCA Continuous PCA Continuous  levETIRAcetam  IVPB 500 milliGRAM(s) IV Intermittent every 12 hours    Gastrointestinal Medications  lactated ringers. 1000 milliLiter(s) IV Continuous <Continuous>  pantoprazole  Injectable 40 milliGRAM(s) IV Push daily    Hematologic/Oncologic Medications  heparin   Injectable 5000 Unit(s) SubCutaneous every 8 hours    Antimicrobial/Immunologic Medications  ceFAZolin   IVPB 2000 milliGRAM(s) IV Intermittent every 8 hours    Endocrine/Metabolic Medications  insulin lispro (HumaLOG) corrective regimen sliding scale   SubCutaneous every 6 hours  levothyroxine Injectable 70 MICROGram(s) IV Push <User Schedule>    Topical/Other Medications  chlorhexidine 0.12% Liquid 15 milliLiter(s) Oral Mucosa every 12 hours  chlorhexidine 2% Cloths 1 Application(s) Topical <User Schedule>      MEDICATIONS  (PRN):      I&O's Detail    25 Aug 2020 07:01  -  26 Aug 2020 07:00  --------------------------------------------------------  IN:    Albumin 5%  - 250 mL: 1250 mL    fentaNYL  Infusion: 2.8 mL    IV PiggyBack: 400 mL    lactated ringers.: 2250 mL    norepinephrine Infusion: 15.8 mL    phenylephrine   Infusion: 92.4 mL    propofol Infusion: 18.8 mL  Total IN: 4029.8 mL    OUT:    Chest Tube: 460 mL    Chest Tube: 620 mL    Voided: 2045 mL  Total OUT: 3125 mL    Total NET: 904.8 mL      26 Aug 2020 07:01  -  26 Aug 2020 12:02  --------------------------------------------------------  IN:    lactated ringers.: 300 mL    lactated ringers.: 300 mL  Total IN: 600 mL    OUT:    Chest Tube: 80 mL    Chest Tube: 70 mL    Voided: 195 mL  Total OUT: 345 mL    Total NET: 255 mL          T(C): 36 (08-26-20 @ 10:26), Max: 36 (08-26-20 @ 10:26)  HR: 75 (08-26-20 @ 12:00) (54 - 83)  BP: 173/81 (08-26-20 @ 09:15) (173/81 - 173/81)  RR: 18 (08-26-20 @ 12:00) (10 - 20)  SpO2: 100% (08-26-20 @ 12:00) (97% - 100%)    GENERAL: NAD, Resting comfortably in bed, awake, opens eyes spontaneously  HEENT: NCAT, MMM, Trachea midline, Normal conjuntiva, PERRL  RESP: Nonlabored breathing, No respiratory distress, on 6L NC, left CT x 2 to suction  CARD: Normal rate, Normal peripheral perfusion  GI: Soft, mildly distended, ATTP near incision, incision c/d/i, No guarding, No rebound tenderness  EXTREM: WWP, No edema, No gross deformity of extremities  SKIN: No rashes, no lesions  NEURO: AAOx3, No focal motor or sensory deficits  VASC: DP signals biphasic b/l  PSYCH: Affect and characteristics of appearance, verbalizations, behaviors are appropriate    LABS:                        8.8    12.48 )-----------( 98       ( 26 Aug 2020 03:42 )             28.1     08-26    138  |  107  |  13  ----------------------------<  112<H>  4.1   |  19<L>  |  1.39<H>    Ca    8.2<L>      26 Aug 2020 03:42  Phos  3.6     08-26  Mg     2.5     08-26    TPro  4.7<L>  /  Alb  3.5  /  TBili  0.5  /  DBili  x   /  AST  31  /  ALT  6<L>  /  AlkPhos  28<L>  08-26    PT/INR - ( 26 Aug 2020 03:42 )   PT: 14.8 sec;   INR: 1.25          PTT - ( 26 Aug 2020 03:42 )  PTT:34.8 sec      RADIOLOGY & ADDITIONAL STUDIES:    65 yo F w/  PMH of HTN, CAD s/p CABG & PCI, AVR, PAD, hypothyroidism, seizure, chronic anemia, and AAA repair in 2015 (Dr. Sandra), CKD, now with subacute rupture contained saccular aortic aneurysm, s/p open TAAA repair involving bypasses to the celiac, SMA, b/l renal arteries (8/26). Now extubated and recovering well.    NEURO: dPCA, home keppra  CV: started on levophed intraop, goal -160. holding anti-hypertensives for now. start ASA today. cards following.   PULM: 6L NC, chest tubes x 2 to wall suction, monitor output, more SS than sanguinous now, incentive spirometry  GI/FEN: NPO, NGT LIWS, protonix ppx. LR@100.   : cecily for strict I&O. has baseline CKD, renal following.   ENDO: ISS, synthroid  ID: ppx ancef x 24hrs (8/25)   PPX: hx of LE stents, NO SCDs, restart HSQ   LINES: PIVs, joon (8/25--). RIJ (8/25--)   WOUNDS/DRAINS: left side to mid abd incision. left chest tubes x 2  PT: ordered, OOB

## 2020-08-27 LAB
ANION GAP SERPL CALC-SCNC: 12 MMOL/L — SIGNIFICANT CHANGE UP (ref 5–17)
BUN SERPL-MCNC: 15 MG/DL — SIGNIFICANT CHANGE UP (ref 7–23)
CALCIUM SERPL-MCNC: 8.6 MG/DL — SIGNIFICANT CHANGE UP (ref 8.4–10.5)
CHLORIDE SERPL-SCNC: 104 MMOL/L — SIGNIFICANT CHANGE UP (ref 96–108)
CO2 SERPL-SCNC: 18 MMOL/L — LOW (ref 22–31)
CREAT SERPL-MCNC: 1.54 MG/DL — HIGH (ref 0.5–1.3)
GLUCOSE BLDC GLUCOMTR-MCNC: 263 MG/DL — HIGH (ref 70–99)
GLUCOSE BLDC GLUCOMTR-MCNC: 71 MG/DL — SIGNIFICANT CHANGE UP (ref 70–99)
GLUCOSE BLDC GLUCOMTR-MCNC: 75 MG/DL — SIGNIFICANT CHANGE UP (ref 70–99)
GLUCOSE BLDC GLUCOMTR-MCNC: 81 MG/DL — SIGNIFICANT CHANGE UP (ref 70–99)
GLUCOSE BLDC GLUCOMTR-MCNC: 86 MG/DL — SIGNIFICANT CHANGE UP (ref 70–99)
GLUCOSE SERPL-MCNC: 79 MG/DL — SIGNIFICANT CHANGE UP (ref 70–99)
HCT VFR BLD CALC: 24.2 % — LOW (ref 34.5–45)
HGB BLD-MCNC: 7.7 G/DL — LOW (ref 11.5–15.5)
MAGNESIUM SERPL-MCNC: 2.1 MG/DL — SIGNIFICANT CHANGE UP (ref 1.6–2.6)
MCHC RBC-ENTMCNC: 26.6 PG — LOW (ref 27–34)
MCHC RBC-ENTMCNC: 31.8 GM/DL — LOW (ref 32–36)
MCV RBC AUTO: 83.7 FL — SIGNIFICANT CHANGE UP (ref 80–100)
NRBC # BLD: 0 /100 WBCS — SIGNIFICANT CHANGE UP (ref 0–0)
PHOSPHATE SERPL-MCNC: 4.5 MG/DL — SIGNIFICANT CHANGE UP (ref 2.5–4.5)
PLATELET # BLD AUTO: 139 K/UL — LOW (ref 150–400)
POTASSIUM SERPL-MCNC: 3.8 MMOL/L — SIGNIFICANT CHANGE UP (ref 3.5–5.3)
POTASSIUM SERPL-SCNC: 3.8 MMOL/L — SIGNIFICANT CHANGE UP (ref 3.5–5.3)
RBC # BLD: 2.89 M/UL — LOW (ref 3.8–5.2)
RBC # FLD: 19.6 % — HIGH (ref 10.3–14.5)
SODIUM SERPL-SCNC: 134 MMOL/L — LOW (ref 135–145)
WBC # BLD: 13.56 K/UL — HIGH (ref 3.8–10.5)
WBC # FLD AUTO: 13.56 K/UL — HIGH (ref 3.8–10.5)

## 2020-08-27 PROCEDURE — 99232 SBSQ HOSP IP/OBS MODERATE 35: CPT | Mod: GC

## 2020-08-27 PROCEDURE — 99233 SBSQ HOSP IP/OBS HIGH 50: CPT

## 2020-08-27 PROCEDURE — 99233 SBSQ HOSP IP/OBS HIGH 50: CPT | Mod: GC

## 2020-08-27 PROCEDURE — 71045 X-RAY EXAM CHEST 1 VIEW: CPT | Mod: 26

## 2020-08-27 RX ORDER — METOPROLOL TARTRATE 50 MG
5 TABLET ORAL EVERY 6 HOURS
Refills: 0 | Status: DISCONTINUED | OUTPATIENT
Start: 2020-08-27 | End: 2020-08-27

## 2020-08-27 RX ORDER — LEVOTHYROXINE SODIUM 125 MCG
88 TABLET ORAL DAILY
Refills: 0 | Status: DISCONTINUED | OUTPATIENT
Start: 2020-08-28 | End: 2020-09-04

## 2020-08-27 RX ORDER — PANTOPRAZOLE SODIUM 20 MG/1
40 TABLET, DELAYED RELEASE ORAL
Refills: 0 | Status: DISCONTINUED | OUTPATIENT
Start: 2020-08-27 | End: 2020-09-04

## 2020-08-27 RX ORDER — CARVEDILOL PHOSPHATE 80 MG/1
12.5 CAPSULE, EXTENDED RELEASE ORAL EVERY 12 HOURS
Refills: 0 | Status: DISCONTINUED | OUTPATIENT
Start: 2020-08-27 | End: 2020-09-04

## 2020-08-27 RX ORDER — POTASSIUM CHLORIDE 20 MEQ
10 PACKET (EA) ORAL ONCE
Refills: 0 | Status: COMPLETED | OUTPATIENT
Start: 2020-08-27 | End: 2020-08-27

## 2020-08-27 RX ORDER — DEXTROSE 50 % IN WATER 50 %
25 SYRINGE (ML) INTRAVENOUS ONCE
Refills: 0 | Status: COMPLETED | OUTPATIENT
Start: 2020-08-27 | End: 2020-08-27

## 2020-08-27 RX ORDER — LEVETIRACETAM 250 MG/1
500 TABLET, FILM COATED ORAL EVERY 12 HOURS
Refills: 0 | Status: DISCONTINUED | OUTPATIENT
Start: 2020-08-27 | End: 2020-09-04

## 2020-08-27 RX ORDER — FUROSEMIDE 40 MG
20 TABLET ORAL ONCE
Refills: 0 | Status: COMPLETED | OUTPATIENT
Start: 2020-08-27 | End: 2020-08-27

## 2020-08-27 RX ADMIN — Medication 50 MILLIEQUIVALENT(S): at 10:50

## 2020-08-27 RX ADMIN — Medication 5 MILLIGRAM(S): at 12:21

## 2020-08-27 RX ADMIN — CHLORHEXIDINE GLUCONATE 1 APPLICATION(S): 213 SOLUTION TOPICAL at 06:38

## 2020-08-27 RX ADMIN — Medication 300 MILLIGRAM(S): at 12:22

## 2020-08-27 RX ADMIN — LEVETIRACETAM 400 MILLIGRAM(S): 250 TABLET, FILM COATED ORAL at 06:38

## 2020-08-27 RX ADMIN — Medication 5 MILLIGRAM(S): at 08:48

## 2020-08-27 RX ADMIN — CARVEDILOL PHOSPHATE 12.5 MILLIGRAM(S): 80 CAPSULE, EXTENDED RELEASE ORAL at 17:13

## 2020-08-27 RX ADMIN — HEPARIN SODIUM 5000 UNIT(S): 5000 INJECTION INTRAVENOUS; SUBCUTANEOUS at 06:38

## 2020-08-27 RX ADMIN — Medication 25 GRAM(S): at 16:59

## 2020-08-27 RX ADMIN — LEVETIRACETAM 500 MILLIGRAM(S): 250 TABLET, FILM COATED ORAL at 17:13

## 2020-08-27 RX ADMIN — CHLORHEXIDINE GLUCONATE 15 MILLILITER(S): 213 SOLUTION TOPICAL at 06:38

## 2020-08-27 RX ADMIN — Medication 20 MILLIGRAM(S): at 09:47

## 2020-08-27 RX ADMIN — HEPARIN SODIUM 5000 UNIT(S): 5000 INJECTION INTRAVENOUS; SUBCUTANEOUS at 13:39

## 2020-08-27 RX ADMIN — Medication 70 MICROGRAM(S): at 07:29

## 2020-08-27 RX ADMIN — HEPARIN SODIUM 5000 UNIT(S): 5000 INJECTION INTRAVENOUS; SUBCUTANEOUS at 23:33

## 2020-08-27 RX ADMIN — PANTOPRAZOLE SODIUM 40 MILLIGRAM(S): 20 TABLET, DELAYED RELEASE ORAL at 12:21

## 2020-08-27 RX ADMIN — CHLORHEXIDINE GLUCONATE 15 MILLILITER(S): 213 SOLUTION TOPICAL at 17:13

## 2020-08-27 NOTE — PROGRESS NOTE ADULT - SUBJECTIVE AND OBJECTIVE BOX
Interval Events: Reviewed  Patient seen and examined at bedside.    Patient is a 64y old  Female who presents with a chief complaint of palpitations (21 Aug 2020 15:22)  she is having discomfort at the site of the CT    PAST MEDICAL & SURGICAL HISTORY:  Seizure  Essential hypertension: HTN (hypertension)  Gastroesophageal reflux disease: GERD (gastroesophageal reflux disease)  Hyperlipidemia: Hyperlipidemia  Anemia: Anemia  Hypothyroidism: Hypothyroidism  Atherosclerosis of coronary artery: CAD (coronary artery disease)  Peripheral vascular disease: PVD (peripheral vascular disease)  H/O aortic valve replacement: Bioprosthetic  Atherosclerosis of coronary artery bypass graft(s), unspecified, with angina pectoris with documented spasm  Status post aorto-coronary artery bypass graft: 2012      MEDICATIONS:  Pulmonary:    Antimicrobials:    Anticoagulants:  heparin   Injectable 5000 Unit(s) SubCutaneous every 8 hours    Cardiac:  carvedilol 12.5 milliGRAM(s) Oral every 12 hours      Allergies    No Known Allergies    Intolerances    Crestor (Other (Mod to Severe))      Vital Signs Last 24 Hrs  T(C): 37.1 (27 Aug 2020 18:36), Max: 37.1 (26 Aug 2020 22:39)  T(F): 98.8 (27 Aug 2020 18:36), Max: 98.8 (27 Aug 2020 18:36)  HR: 77 (27 Aug 2020 21:00) (70 - 92)  BP: 115/57 (27 Aug 2020 20:00) (115/57 - 159/79)  BP(mean): 82 (27 Aug 2020 20:00) (82 - 113)  RR: 15 (27 Aug 2020 20:00) (12 - 24)  SpO2: 98% (27 Aug 2020 21:00) (90% - 98%)    08-26 @ 07:01 - 08-27 @ 07:00  --------------------------------------------------------  IN: 2500 mL / OUT: 2100 mL / NET: 400 mL    08-27 @ 07:01 - 08-27 @ 21:28  --------------------------------------------------------  IN: 550 mL / OUT: 2440 mL / NET: -1890 mL          Review of Systems:   •	General: negative  •	Skin/Breast: negative  •	Ophthalmologic: negative  •	ENMT: negative  •	Respiratory and Thorax: negative  •	Cardiovascular: negative  •	Gastrointestinal: negative  •	Genitourinary: negative  •	Musculoskeletal: negative  •	Neurological: negative  •	Psychiatric: negative  •	Hematology/Lymphatics: negative  •	Endocrine: negative  •	Allergic/Immunologic: negative    Physical Exam:   • Constitutional:	Well-developed, well nourished  • Eyes:	EOMI; PERRL; no drainage or redness  • ENMT:	No oral lesions; no gross abnormalities  • Neck	No bruits; no thyromegaly or nodules  • Breasts:	not examined  • Back:	No deformity or limitation of movement  • Respiratory:	Breath Sounds equal & clear to percussion & auscultation, no accessory muscle use  • Cardiovascular:	Regular rate & rhythm, normal S1, S2; no murmurs, gallops or rubs; no S3, S4  • Gastrointestinal:	Soft, non-tender, no hepatosplenomegaly, normal bowel sounds  • Genitourinary:	not examined  • Rectal: not examined  • Extremities:	No cyanosis, clubbing or edema  • Vascular:	Equal and normal pulses (carotid, femoral, dorsalis pedis)  • Neurologica:l	not examined  • Skin:	No lesions; no rash  • Lymph Nodes:	No lymphadedenopathy  • Musculoskeletal:	No joint pain, swelling or deformity; no limitation of movement        LABS:  ABG - ( 26 Aug 2020 03:32 )  pH, Arterial: 7.41  pH, Blood: x     /  pCO2: 33    /  pO2: 94    / HCO3: 20    / Base Excess: -3.5  /  SaO2: 97                  CBC Full  -  ( 27 Aug 2020 06:18 )  WBC Count : 13.56 K/uL  RBC Count : 2.89 M/uL  Hemoglobin : 7.7 g/dL  Hematocrit : 24.2 %  Platelet Count - Automated : 139 K/uL  Mean Cell Volume : 83.7 fl  Mean Cell Hemoglobin : 26.6 pg  Mean Cell Hemoglobin Concentration : 31.8 gm/dL  Auto Neutrophil # : x  Auto Lymphocyte # : x  Auto Monocyte # : x  Auto Eosinophil # : x  Auto Basophil # : x  Auto Neutrophil % : x  Auto Lymphocyte % : x  Auto Monocyte % : x  Auto Eosinophil % : x  Auto Basophil % : x    08-27    134<L>  |  104  |  15  ----------------------------<  79  3.8   |  18<L>  |  1.54<H>    Ca    8.6      27 Aug 2020 06:18  Phos  4.5     08-27  Mg     2.1     08-27    TPro  4.7<L>  /  Alb  3.5  /  TBili  0.5  /  DBili  x   /  AST  31  /  ALT  6<L>  /  AlkPhos  28<L>  08-26    PT/INR - ( 26 Aug 2020 03:42 )   PT: 14.8 sec;   INR: 1.25          PTT - ( 26 Aug 2020 03:42 )  PTT:34.8 sec          < from: Xray Chest 1 View-PORTABLE IMMEDIATE (08.26.20 @ 04:18) >  EXAM:  XR CHEST PORTABLE IMMED 1V                          PROCEDURE DATE:  08/26/2020          INTERPRETATION:  XR CHEST IMMEDIATE dated 8/26/2020 4:18 AM    CLINICAL INFORMATION: Female, 64 years old.  AAA.    PRIOR STUDIES: 8/25/2020    FINDINGS: Poststernotomy, CABG, aortic and mitral valve replacements. Persistent cardiomegaly. Central lines and support catheters are redemonstrated. There has been advancement of the tip of the endotracheal tube which impart may be due to differences in patient chin positioning, it now lies 1 cm proximal to the shikha. Proximal repositioning 1 cm is suggested. Increased opacities again noted in the left lower lung zone and left lower lobe pneumonia cannot be excluded. Heart size remains prominent.    IMPRESSION:  The tracheal tube 1.6 cm proximal to the shikha. Proximal repositioning 1 cm suggested.  Probable left lower lobe pneumonia.    < end of copied text >            RADIOLOGY & ADDITIONAL STUDIES (The following images were personally reviewed):  Gomez:                                     No  Urine output:                       adequate  DVT prophylaxis:                 Yes  Flattus:                                  Yes  Bowel movement:              No

## 2020-08-27 NOTE — PHYSICAL THERAPY INITIAL EVALUATION ADULT - GENERAL OBSERVATIONS, REHAB EVAL
Spoke to RN Jasmyn, pt cleared for PT by SICU team. Pt A&Ox4, agreeable to PT, reports 8/10 pain on PCA. POD#1 TAAA repair. Rcvd semi supine, +tele, +2LNC, +TLC, +L Dustin, +tony, +CT x2, +SCDs, +PCA pump. Tolerated session fairly well, demo minAx2 bed mob, Kierra transfers, 4ft bed> chair Kierra with RW.

## 2020-08-27 NOTE — PHYSICAL THERAPY INITIAL EVALUATION ADULT - CRITERIA FOR SKILLED THERAPEUTIC INTERVENTIONS
anticipated discharge recommendation/rehab potential/predicted duration of therapy intervention/functional limitations in following categories/anticipated equipment needs at discharge/risk reduction/prevention/therapy frequency/impairments found

## 2020-08-27 NOTE — PROGRESS NOTE ADULT - PROBLEM SELECTOR PLAN 1
Patient with CKD stage III   nephrologically stable and almost baseline, with acceptable lytes and UOP however   CXR with congestion   agree with gentle diuresis, received 20 IV lasix in am with good response   Will follow

## 2020-08-27 NOTE — PROGRESS NOTE ADULT - SUBJECTIVE AND OBJECTIVE BOX
S: No new issues/events overnight, no new med c/o  passed flatus this morning, sore around incision.     O: ICU Vital Signs Last 24 Hrs  T(F): 98.1 (08-27-20 @ 05:33), Max: 98.7 (08-26-20 @ 22:39)  HR: 82 (08-27-20 @ 06:00) (65 - 93)  BP: 173/81 (08-26-20 @ 09:15) (173/81 - 173/81)  BP(mean): 116 (08-26-20 @ 09:15) (116 - 116)  ABP: 153/57 (08-27-20 @ 06:00)  RR: 16 (08-27-20 @ 06:00) (14 - 24)  SpO2: 92% (08-27-20 @ 06:00) (78% - 100%)    PHYSICAL EXAM:   Neurological: aaox3, nad, CN II-XII intact, strength 5/5 b/l upper and lower extremety(ies) , moving all extremety(ies)   Cardiovascular: RRR  Respiratory: CTA, left sided chest tube x 2 with serosanguinous drainage.   Gastrointestinal: soft, ND, inicision left side extended to mid abdomen.   Extremities: warm, no dependent edema  Vascular: no cyanosis/erythema, left DP biphasic, left PT monophasic, right DP biphasic, no left PT signal.     LABS:    08-27    134<L>  |  104  |  15  ----------------------------<  79  3.8   |  18<L>  |  1.54<H>    Ca    8.6      27 Aug 2020 06:18  Phos  4.5     08-27  Mg     2.1     08-27    TPro  4.7<L>  /  Alb  3.5  /  TBili  0.5  /  DBili  x   /  AST  31  /  ALT  6<L>  /  AlkPhos  28<L>  08-26  LIVER FUNCTIONS - ( 26 Aug 2020 03:42 )  Alb: 3.5 g/dL / Pro: 4.7 g/dL / ALK PHOS: 28 U/L / ALT: 6 U/L / AST: 31 U/L / GGT: x                               7.7    13.56 )-----------( 139      ( 27 Aug 2020 06:18 )             24.2   PT/INR - ( 26 Aug 2020 03:42 )   PT: 14.8 sec;   INR: 1.25          PTT - ( 26 Aug 2020 03:42 )  PTT:34.8 secCARDIAC MARKERS ( 26 Aug 2020 16:23 )  x     / 0.01 ng/mL / 650 U/L / x     / 15.1 ng/mL    ABG - ( 26 Aug 2020 03:32 )  pH, Arterial: 7.41  pH, Blood: x     /  pCO2: 33    /  pO2: 94    / HCO3: 20    / Base Excess: -3.5  /  SaO2: 97              CAPILLARY BLOOD GLUCOSE      POCT Blood Glucose.: 86 mg/dL (27 Aug 2020 06:12)  POCT Blood Glucose.: 84 mg/dL (26 Aug 2020 22:00)  POCT Blood Glucose.: 94 mg/dL (26 Aug 2020 17:30)  POCT Blood Glucose.: 99 mg/dL (26 Aug 2020 11:09)    MEDICATIONS  (STANDING):  aspirin Suppository 300 milliGRAM(s) Rectal daily  chlorhexidine 0.12% Liquid 15 milliLiter(s) Oral Mucosa every 12 hours  chlorhexidine 2% Cloths 1 Application(s) Topical <User Schedule>  heparin   Injectable 5000 Unit(s) SubCutaneous every 8 hours  HYDROmorphone PCA (1 mG/mL) 30 milliLiter(s) PCA Continuous PCA Continuous  insulin lispro (HumaLOG) corrective regimen sliding scale   SubCutaneous every 6 hours  lactated ringers. 1000 milliLiter(s) (100 mL/Hr) IV Continuous <Continuous>  levETIRAcetam  IVPB 500 milliGRAM(s) IV Intermittent every 12 hours  levothyroxine Injectable 70 MICROGram(s) IV Push <User Schedule>  pantoprazole  Injectable 40 milliGRAM(s) IV Push daily  potassium chloride  10 mEq/50 mL IVPB 10 milliEquivalent(s) IV Intermittent once    MEDICATIONS  (PRN):      Gomez:	  [ ] None	[ x] Daily Gomez Order Placed	   Indication:	  [ x] Strict I and O's    [ ] Obstruction     [ ] Incontinence + Stage 3 or 4 Decubitus  Central Line:  [ ] None	   [ x]  Medication / TPN Administration     [ ] No Peripheral IV

## 2020-08-27 NOTE — PROGRESS NOTE ADULT - SUBJECTIVE AND OBJECTIVE BOX
O/N Events:  YESENIA On, HDS and afeb  Subjective:  sitting OOB in the chair, pain reasonably controlled but unable to take deep breaths, Renal function with mild rise in Cr and sNa 134, Bicarb 18  UOP 1.2 L with 400 cc Positive FB for the past 24 hours   CXR with pulm vasc congestion and small b/l effusions     VITALS  Vital Signs Last 24 Hrs  T(C): 37.1 (27 Aug 2020 09:49), Max: 37.1 (26 Aug 2020 22:39)  T(F): 98.7 (27 Aug 2020 09:49), Max: 98.7 (26 Aug 2020 22:39)  HR: 76 (27 Aug 2020 14:00) (73 - 93)  BP: 134/59 (27 Aug 2020 11:00) (134/59 - 159/79)  BP(mean): 85 (27 Aug 2020 11:00) (85 - 113)  RR: 18 (27 Aug 2020 14:00) (12 - 24)  SpO2: 96% (27 Aug 2020 14:00) (90% - 98%)    PHYSICAL EXAM  General: A&Ox 3; NAD  Neck: Supple; no JVD  Respiratory: diminished BS at bases   Cardiovascular: Regular rhythm/rate; S1/S2  Gastrointestinal: Soft; ND, surgical incision under dressing c/d/i  Extremities: WWP; no edema  Neurological:  CNII-XII grossly intact; no obvious focal deficits  : Gomez draining urine     MEDICATIONS  (STANDING):  aspirin Suppository 300 milliGRAM(s) Rectal daily  chlorhexidine 0.12% Liquid 15 milliLiter(s) Oral Mucosa every 12 hours  chlorhexidine 2% Cloths 1 Application(s) Topical <User Schedule>  heparin   Injectable 5000 Unit(s) SubCutaneous every 8 hours  HYDROmorphone PCA (1 mG/mL) 30 milliLiter(s) PCA Continuous PCA Continuous  insulin lispro (HumaLOG) corrective regimen sliding scale   SubCutaneous every 6 hours  levETIRAcetam  IVPB 500 milliGRAM(s) IV Intermittent every 12 hours  levothyroxine Injectable 70 MICROGram(s) IV Push <User Schedule>  metoprolol tartrate Injectable 5 milliGRAM(s) IV Push every 6 hours  pantoprazole  Injectable 40 milliGRAM(s) IV Push daily    MEDICATIONS  (PRN):      LABS                        7.7    13.56 )-----------( 139      ( 27 Aug 2020 06:18 )             24.2     08-27    134<L>  |  104  |  15  ----------------------------<  79  3.8   |  18<L>  |  1.54<H>    Ca    8.6      27 Aug 2020 06:18  Phos  4.5     08-27  Mg     2.1     08-27    TPro  4.7<L>  /  Alb  3.5  /  TBili  0.5  /  DBili  x   /  AST  31  /  ALT  6<L>  /  AlkPhos  28<L>  08-26    LIVER FUNCTIONS - ( 26 Aug 2020 03:42 )  Alb: 3.5 g/dL / Pro: 4.7 g/dL / ALK PHOS: 28 U/L / ALT: 6 U/L / AST: 31 U/L / GGT: x           PT/INR - ( 26 Aug 2020 03:42 )   PT: 14.8 sec;   INR: 1.25          PTT - ( 26 Aug 2020 03:42 )  PTT:34.8 sec    CARDIAC MARKERS ( 26 Aug 2020 16:23 )  x     / 0.01 ng/mL / 650 U/L / x     / 15.1 ng/mL

## 2020-08-27 NOTE — PROGRESS NOTE ADULT - SUBJECTIVE AND OBJECTIVE BOX
Cardiology Consult    O/N: no overnight events  Interval History: received 1U pRBC, primary team saw B-lines on POCUS and gave lasix 20mg IV x1. she continues to be NPO but has passed gas. she was seen and examined while sitting in a chair this AM. her voice is better, she continues to have pain in her ribs at the site of surgery.     Telemetry: sinus rhythm and occasional VPCs    OBJECTIVE  Vitals:  T(C): 37.1 (08-27-20 @ 09:49), Max: 37.1 (08-26-20 @ 22:39)  HR: 88 (08-27-20 @ 16:00) (73 - 93)  BP: 134/59 (08-27-20 @ 11:00) (134/59 - 159/79)  RR: 18 (08-27-20 @ 16:00) (12 - 24)  SpO2: 98% (08-27-20 @ 16:00) (90% - 98%)  Wt(kg): --    I/O:  I&O's Summary    26 Aug 2020 07:01  -  27 Aug 2020 07:00  --------------------------------------------------------  IN: 2500 mL / OUT: 2100 mL / NET: 400 mL    27 Aug 2020 07:01  -  27 Aug 2020 17:29  --------------------------------------------------------  IN: 550 mL / OUT: 1930 mL / NET: -1380 mL        PHYSICAL EXAM:  Appearance: NAD. Speaking in full sentences.   HEENT: No pallor noted.  Conjunctiva clear b/l. Moist oral mucosa.  Cardiovascular: RRR with no murmurs.  Respiratory: Lungs CTAB.   Gastrointestinal:  Soft, nontender. Non-distended. Non-rigid.	  Extremities: No edema b/l. No erythema b/l. LE WWP b/l.  Vascular: DP intact  Neurologic:  Alert and awake. Moving all extremities. Following commands.   	  LABS:                        7.7    13.56 )-----------( 139      ( 27 Aug 2020 06:18 )             24.2     08-27    134<L>  |  104  |  15  ----------------------------<  79  3.8   |  18<L>  |  1.54<H>    Ca    8.6      27 Aug 2020 06:18  Phos  4.5     08-27  Mg     2.1     08-27    TPro  4.7<L>  /  Alb  3.5  /  TBili  0.5  /  DBili  x   /  AST  31  /  ALT  6<L>  /  AlkPhos  28<L>  08-26    PT/INR - ( 26 Aug 2020 03:42 )   PT: 14.8 sec;   INR: 1.25          PTT - ( 26 Aug 2020 03:42 )  PTT:34.8 sec      RADIOLOGY & ADDITIONAL TESTS:  Reviewed .    MEDICATIONS  (STANDING):  aspirin Suppository 300 milliGRAM(s) Rectal daily  carvedilol 12.5 milliGRAM(s) Oral every 12 hours  chlorhexidine 0.12% Liquid 15 milliLiter(s) Oral Mucosa every 12 hours  chlorhexidine 2% Cloths 1 Application(s) Topical <User Schedule>  heparin   Injectable 5000 Unit(s) SubCutaneous every 8 hours  HYDROmorphone PCA (1 mG/mL) 30 milliLiter(s) PCA Continuous PCA Continuous  insulin lispro (HumaLOG) corrective regimen sliding scale   SubCutaneous every 6 hours  levETIRAcetam 500 milliGRAM(s) Oral every 12 hours  pantoprazole    Tablet 40 milliGRAM(s) Oral before breakfast    MEDICATIONS  (PRN):

## 2020-08-27 NOTE — PHYSICAL THERAPY INITIAL EVALUATION ADULT - PERTINENT HX OF CURRENT PROBLEM, REHAB EVAL
63 yo F w/  PMH of HTN, CAD s/p CABG & PCI, AVR, PAD, hypothyroidism, seizure, chronic anemia, and AAA repair in 2015 (Dr. Sandra), CKD, now with subacute rupture contained saccular aortic aneurysm, s/p open TAAA repair

## 2020-08-27 NOTE — PROGRESS NOTE ADULT - ASSESSMENT
The patient is a 64y Female who is now several hours post-op from an open thoracoabdominal aneurysm repair, with 20mm tube graft and 6mm bypasses to the celiac, SMA,  left renal, right renal (end to end). She is recovering in SICU appropriately.    Plan:  - Pain control as needed  - close hemodynamic monitoring per ICU care  - OOB to chair today  - CT to suction  - transfuse 1u of pRBCs The patient is a 64y Female who is now post-op day 2 from an open thoracoabdominal aneurysm repair, with 20mm tube graft and 6mm bypasses to the celiac, SMA,  left renal, right renal (end to end). She is recovering in SICU appropriately.    Plan:  - Pain control as needed  - close hemodynamic monitoring per ICU care  - OOB to chair today  - CT to suction  - transfuse 1u of pRBCs

## 2020-08-27 NOTE — PHYSICAL THERAPY INITIAL EVALUATION ADULT - ADDITIONAL COMMENTS
Pt lives in apartment with 33 steps, alone. Indpt at baseline. Owns showerchair, does not use. Ambulation tolerance 1/2-1 block 2/2 chronic LLE pain. Wears biofocals.

## 2020-08-27 NOTE — PHYSICAL THERAPY INITIAL EVALUATION ADULT - IMPAIRMENTS FOUND, PT EVAL
ergonomics and body mechanics/gait, locomotion, and balance/muscle strength/ROM/posture/aerobic capacity/endurance

## 2020-08-27 NOTE — PROGRESS NOTE ADULT - ASSESSMENT
A/P: 64F w/CAD s/p CABG (LIMA-Ramus/free ANTONIO-RPDA) w/bioAVR and MV repair (2002) and PCI (FORD x1 mLCx-OM2, 2007), PAD s/p PTA/FORD L common iliac and pSFA, infrarenal AAA s/p repair (2015), HTN, CKD3, emphysema (seen on CT, asx), hypothyroidism, seizure disorder, anxiety/depression, who presented to [facility] ED 8/20/2020 c/o worsening palpitations, initially r/o ACS and now on vascular service with plan to perform AAA repair. she is now s/p Ao-celiac, Ao-mesenteric, Ao-renal bypass, extubated and recovering.    REcommend  - start Coreg 12.5mg BID for BP control and ectopy suppression  - if needed additional BP control can add amdlodipine 5mg  - then can add imdur 30mg if goal BP not achieved  - given moderate AS- avoid excessive afterload reduction  - would reintroduce fibrate at this time for HLD  - resume ASA 81mg daily when appropriate from a bleeding standpoint    Yariel Villafuerte MD  Cardiology Fellow    d/w Dr. Ramirez

## 2020-08-27 NOTE — PROGRESS NOTE ADULT - SUBJECTIVE AND OBJECTIVE BOX
STATUS POST:  open thoracoabdominal aneurysm repair, with 20mm tube graft and 6mm bypasses to the celiac, SMA,  left renal, right renal (end to end)     SUBJECTIVE: POD#2 Patient seen and examined bedside by team. She had no events overnight. She has good pain tolerance. She passed gas this morning. Patient mentioned she coughed some blood yesterday but she hasn't kept it.    carvedilol 12.5 milliGRAM(s) Oral every 12 hours  heparin   Injectable 5000 Unit(s) SubCutaneous every 8 hours      Vital Signs Last 24 Hrs  T(C): 37.1 (27 Aug 2020 09:49), Max: 37.1 (26 Aug 2020 22:39)  T(F): 98.7 (27 Aug 2020 09:49), Max: 98.7 (26 Aug 2020 22:39)  HR: 88 (27 Aug 2020 16:00) (73 - 93)  BP: 134/59 (27 Aug 2020 11:00) (134/59 - 159/79)  BP(mean): 85 (27 Aug 2020 11:00) (85 - 113)  RR: 18 (27 Aug 2020 16:00) (12 - 24)  SpO2: 98% (27 Aug 2020 16:00) (90% - 98%)  I&O's Detail    26 Aug 2020 07:01  -  27 Aug 2020 07:00  --------------------------------------------------------  IN:    lactated ringers.: 300 mL    lactated ringers.: 2200 mL  Total IN: 2500 mL    OUT:    Chest Tube: 280 mL    Chest Tube: 450 mL    Indwelling Catheter - Urethral: 1130 mL    Voided: 240 mL  Total OUT: 2100 mL    Total NET: 400 mL      27 Aug 2020 07:01  -  27 Aug 2020 16:55  --------------------------------------------------------  IN:    lactated ringers.: 200 mL    Packed Red Blood Cells: 350 mL  Total IN: 550 mL    OUT:    Indwelling Catheter - Urethral: 1930 mL  Total OUT: 1930 mL    Total NET: -1380 mL      Physical Exam:  General: NAD, resting comfortably in bed  Pulmonary: extubated, on NC@6L with humidified air  Cardiovascular: NSR, 2 CT in place with serosanguineous output   Abdominal: soft, left sided tenderness, dressing in place, non-saturated.  Extremities: WWP, no acute ischemia noted  Vasc: palpable femorals, biphasic DPs, no signal on PTs      LABS:                        7.7    13.56 )-----------( 139      ( 27 Aug 2020 06:18 )             24.2     08-27    134<L>  |  104  |  15  ----------------------------<  79  3.8   |  18<L>  |  1.54<H>    Ca    8.6      27 Aug 2020 06:18  Phos  4.5     08-27  Mg     2.1     08-27    TPro  4.7<L>  /  Alb  3.5  /  TBili  0.5  /  DBili  x   /  AST  31  /  ALT  6<L>  /  AlkPhos  28<L>  08-26    PT/INR - ( 26 Aug 2020 03:42 )   PT: 14.8 sec;   INR: 1.25          PTT - ( 26 Aug 2020 03:42 )  PTT:34.8 sec      RADIOLOGY & ADDITIONAL STUDIES: STATUS POST:  open thoracoabdominal aneurysm repair, with 20mm tube graft and 6mm bypasses to the celiac, SMA,  left renal, right renal (end to end)     SUBJECTIVE: POD#2 Patient seen and examined bedside by team. She had no events overnight. She has good pain tolerance. She passed gas this morning. Patient mentioned she coughed some blood yesterday but she hasn't kept it or shown it to her nurse.    carvedilol 12.5 milliGRAM(s) Oral every 12 hours  heparin   Injectable 5000 Unit(s) SubCutaneous every 8 hours      Vital Signs Last 24 Hrs  T(C): 37.1 (27 Aug 2020 09:49), Max: 37.1 (26 Aug 2020 22:39)  T(F): 98.7 (27 Aug 2020 09:49), Max: 98.7 (26 Aug 2020 22:39)  HR: 88 (27 Aug 2020 16:00) (73 - 93)  BP: 134/59 (27 Aug 2020 11:00) (134/59 - 159/79)  BP(mean): 85 (27 Aug 2020 11:00) (85 - 113)  RR: 18 (27 Aug 2020 16:00) (12 - 24)  SpO2: 98% (27 Aug 2020 16:00) (90% - 98%)  I&O's Detail    26 Aug 2020 07:01  -  27 Aug 2020 07:00  --------------------------------------------------------  IN:    lactated ringers.: 300 mL    lactated ringers.: 2200 mL  Total IN: 2500 mL    OUT:    Chest Tube: 280 mL    Chest Tube: 450 mL    Indwelling Catheter - Urethral: 1130 mL    Voided: 240 mL  Total OUT: 2100 mL    Total NET: 400 mL      27 Aug 2020 07:01  -  27 Aug 2020 16:55  --------------------------------------------------------  IN:    lactated ringers.: 200 mL    Packed Red Blood Cells: 350 mL  Total IN: 550 mL    OUT:    Indwelling Catheter - Urethral: 1930 mL  Total OUT: 1930 mL    Total NET: -1380 mL      Physical Exam:  General: NAD, resting comfortably in bed  Pulmonary: extubated, on NC@6L with humidified air  Cardiovascular: NSR, 2 CT in place with serosanguineous output   Abdominal: soft, left sided tenderness, dressing in place, non-saturated.  Extremities: WWP, no acute ischemia noted  Vasc: palpable femorals, biphasic DPs, no signal on PTs      LABS:                        7.7    13.56 )-----------( 139      ( 27 Aug 2020 06:18 )             24.2     08-27    134<L>  |  104  |  15  ----------------------------<  79  3.8   |  18<L>  |  1.54<H>    Ca    8.6      27 Aug 2020 06:18  Phos  4.5     08-27  Mg     2.1     08-27    TPro  4.7<L>  /  Alb  3.5  /  TBili  0.5  /  DBili  x   /  AST  31  /  ALT  6<L>  /  AlkPhos  28<L>  08-26    PT/INR - ( 26 Aug 2020 03:42 )   PT: 14.8 sec;   INR: 1.25          PTT - ( 26 Aug 2020 03:42 )  PTT:34.8 sec      RADIOLOGY & ADDITIONAL STUDIES:

## 2020-08-27 NOTE — PROGRESS NOTE ADULT - PROBLEM SELECTOR PLAN 1
pulmonary status is stable.  She is extubated and cough is minimal.  I instructed patient to use IS.  She is off inhalers

## 2020-08-27 NOTE — PHYSICAL THERAPY INITIAL EVALUATION ADULT - PLANNED THERAPY INTERVENTIONS, PT EVAL
gait training/postural re-education/strengthening/transfer training/bed mobility training/balance training/ROM

## 2020-08-27 NOTE — PROGRESS NOTE ADULT - ASSESSMENT
65 yo F w/  PMH of HTN, CAD s/p CABG & PCI, AVR, PAD, hypothyroidism, seizure, chronic anemia, and AAA repair in 2015 (Dr. Sandra), CKD, now with subacute rupture contained saccular aortic aneurysm, s/p open TAAA repair involving bypasses to the celiac, SMA, b/l renal arteries (8/26)    NEURO: dPCA, home keppra  CV: BP stable. pre-op anti-hypertensives had been held, will restart as tolerated by BP. cont ASA today. cards following.   PULM:  NC, no resp distress, chest tubes x 2 to wall suction,   GI/FEN: NPO, NGT LIWS, protonix ppx. IVF.   : tony for strict I&O. has baseline CKD, renal following.   ENDO: ISS, synthroid  ID: ppx ancef x 24hrs (8/25)   PPX: hx of LE stents, NO SCDs; on HSQ   LINES: PIVs, joon (8/25--). RIJ (8/25--)   WOUNDS/DRAINS: left side to mid abd incision. left chest tubes x 2  PT: ordered, OOB 65 yo F w/  PMH of HTN, CAD s/p CABG & PCI, AVR, PAD, hypothyroidism, seizure, chronic anemia, and AAA repair in 2015 (Dr. Sandra), CKD, now with subacute rupture contained saccular aortic aneurysm, s/p open TAAA repair involving bypasses to the celiac, SMA, b/l renal arteries (8/26)    NEURO: dPCA, home keppra  CV: BP stable. pre-op anti-hypertensives had been held, will restart as tolerated by BP. cont ASA today. cards following.   PULM:  NC, no resp distress, chest tubes x 2 to wall suction, POCUS with few B lines and effusion on right side. will heplock and give lasix 20.   GI/FEN: NPO, NGT LIWS, protonix ppx.   : cecily for strict I&O. has baseline CKD, renal following.   ENDO: ISS, synthroid  ID: ppx ancef x 24hrs (8/25)   PPX: hx of LE stents, NO SCDs; on HSQ   LINES: PIVs, joon (8/25--). RIJ (8/25--)   WOUNDS/DRAINS: left side to mid abd incision. left chest tubes x 2  PT: ordered, OOB 63 yo F w/  PMH of HTN, CAD s/p CABG & PCI, AVR, PAD, hypothyroidism, seizure, chronic anemia, and AAA repair in 2015 (Dr. Sandra), CKD, now with subacute rupture contained saccular aortic aneurysm, s/p open TAAA repair involving bypasses to the celiac, SMA, b/l renal arteries (8/26)    NEURO: dPCA, home keppra  CV: BP stable. pre-op anti-hypertensives had been held, will restart as tolerated by BP. cont ASA today. cards following.   PULM:  NC, no resp distress, chest tubes x 2 to wall suction, POCUS with some B lines and effusion on right side. will heplock and give lasix 20.   GI/FEN: NPO, NGT LIWS, protonix ppx.   : cecily for strict I&O. has baseline CKD, renal following.   ENDO: ISS, synthroid  ID: ppx ancef x 24hrs (8/25)   PPX: hx of LE stents, NO SCDs; on HSQ   LINES: PIVs, joon (8/25--). RIJ (8/25--)   WOUNDS/DRAINS: left side to mid abd incision. left chest tubes x 2  PT: ordered, OOB

## 2020-08-28 LAB
ANION GAP SERPL CALC-SCNC: 11 MMOL/L — SIGNIFICANT CHANGE UP (ref 5–17)
BASOPHILS # BLD AUTO: 0 K/UL — SIGNIFICANT CHANGE UP (ref 0–0.2)
BASOPHILS NFR BLD AUTO: 0 % — SIGNIFICANT CHANGE UP (ref 0–2)
BUN SERPL-MCNC: 21 MG/DL — SIGNIFICANT CHANGE UP (ref 7–23)
CALCIUM SERPL-MCNC: 8.4 MG/DL — SIGNIFICANT CHANGE UP (ref 8.4–10.5)
CHLORIDE SERPL-SCNC: 105 MMOL/L — SIGNIFICANT CHANGE UP (ref 96–108)
CO2 SERPL-SCNC: 20 MMOL/L — LOW (ref 22–31)
CREAT SERPL-MCNC: 1.55 MG/DL — HIGH (ref 0.5–1.3)
EOSINOPHIL # BLD AUTO: 0 K/UL — SIGNIFICANT CHANGE UP (ref 0–0.5)
EOSINOPHIL NFR BLD AUTO: 0 % — SIGNIFICANT CHANGE UP (ref 0–6)
GLUCOSE BLDC GLUCOMTR-MCNC: 100 MG/DL — HIGH (ref 70–99)
GLUCOSE BLDC GLUCOMTR-MCNC: 79 MG/DL — SIGNIFICANT CHANGE UP (ref 70–99)
GLUCOSE BLDC GLUCOMTR-MCNC: 80 MG/DL — SIGNIFICANT CHANGE UP (ref 70–99)
GLUCOSE SERPL-MCNC: 70 MG/DL — SIGNIFICANT CHANGE UP (ref 70–99)
HCT VFR BLD CALC: 25.3 % — LOW (ref 34.5–45)
HGB BLD-MCNC: 8.3 G/DL — LOW (ref 11.5–15.5)
LYMPHOCYTES # BLD AUTO: 0.59 K/UL — LOW (ref 1–3.3)
LYMPHOCYTES # BLD AUTO: 4.3 % — LOW (ref 13–44)
MAGNESIUM SERPL-MCNC: 2 MG/DL — SIGNIFICANT CHANGE UP (ref 1.6–2.6)
MCHC RBC-ENTMCNC: 27.1 PG — SIGNIFICANT CHANGE UP (ref 27–34)
MCHC RBC-ENTMCNC: 32.8 GM/DL — SIGNIFICANT CHANGE UP (ref 32–36)
MCV RBC AUTO: 82.7 FL — SIGNIFICANT CHANGE UP (ref 80–100)
MONOCYTES # BLD AUTO: 1.17 K/UL — HIGH (ref 0–0.9)
MONOCYTES NFR BLD AUTO: 8.6 % — SIGNIFICANT CHANGE UP (ref 2–14)
NEUTROPHILS # BLD AUTO: 11.89 K/UL — HIGH (ref 1.8–7.4)
NEUTROPHILS NFR BLD AUTO: 87.1 % — HIGH (ref 43–77)
NRBC # BLD: 0 /100 WBCS — SIGNIFICANT CHANGE UP (ref 0–0)
PHOSPHATE SERPL-MCNC: 3.3 MG/DL — SIGNIFICANT CHANGE UP (ref 2.5–4.5)
PLATELET # BLD AUTO: 153 K/UL — SIGNIFICANT CHANGE UP (ref 150–400)
POTASSIUM SERPL-MCNC: 3.9 MMOL/L — SIGNIFICANT CHANGE UP (ref 3.5–5.3)
POTASSIUM SERPL-SCNC: 3.9 MMOL/L — SIGNIFICANT CHANGE UP (ref 3.5–5.3)
RBC # BLD: 3.06 M/UL — LOW (ref 3.8–5.2)
RBC # FLD: 18.4 % — HIGH (ref 10.3–14.5)
SODIUM SERPL-SCNC: 136 MMOL/L — SIGNIFICANT CHANGE UP (ref 135–145)
WBC # BLD: 13.65 K/UL — HIGH (ref 3.8–10.5)
WBC # FLD AUTO: 13.65 K/UL — HIGH (ref 3.8–10.5)

## 2020-08-28 PROCEDURE — 99233 SBSQ HOSP IP/OBS HIGH 50: CPT | Mod: GC

## 2020-08-28 PROCEDURE — 71045 X-RAY EXAM CHEST 1 VIEW: CPT | Mod: 26,76

## 2020-08-28 PROCEDURE — 71045 X-RAY EXAM CHEST 1 VIEW: CPT | Mod: 26,77

## 2020-08-28 PROCEDURE — 99232 SBSQ HOSP IP/OBS MODERATE 35: CPT | Mod: GC

## 2020-08-28 PROCEDURE — 99233 SBSQ HOSP IP/OBS HIGH 50: CPT

## 2020-08-28 RX ORDER — ASPIRIN/CALCIUM CARB/MAGNESIUM 324 MG
81 TABLET ORAL DAILY
Refills: 0 | Status: DISCONTINUED | OUTPATIENT
Start: 2020-08-28 | End: 2020-09-04

## 2020-08-28 RX ORDER — SODIUM CHLORIDE 9 MG/ML
250 INJECTION INTRAMUSCULAR; INTRAVENOUS; SUBCUTANEOUS ONCE
Refills: 0 | Status: COMPLETED | OUTPATIENT
Start: 2020-08-28 | End: 2020-08-28

## 2020-08-28 RX ORDER — HYDROMORPHONE HYDROCHLORIDE 2 MG/ML
0.25 INJECTION INTRAMUSCULAR; INTRAVENOUS; SUBCUTANEOUS EVERY 4 HOURS
Refills: 0 | Status: DISCONTINUED | OUTPATIENT
Start: 2020-08-28 | End: 2020-08-29

## 2020-08-28 RX ORDER — HYDROMORPHONE HYDROCHLORIDE 2 MG/ML
0.5 INJECTION INTRAMUSCULAR; INTRAVENOUS; SUBCUTANEOUS EVERY 4 HOURS
Refills: 0 | Status: DISCONTINUED | OUTPATIENT
Start: 2020-08-28 | End: 2020-08-29

## 2020-08-28 RX ORDER — POTASSIUM CHLORIDE 20 MEQ
10 PACKET (EA) ORAL ONCE
Refills: 0 | Status: COMPLETED | OUTPATIENT
Start: 2020-08-28 | End: 2020-08-28

## 2020-08-28 RX ORDER — ASPIRIN/CALCIUM CARB/MAGNESIUM 324 MG
81 TABLET ORAL DAILY
Refills: 0 | Status: DISCONTINUED | OUTPATIENT
Start: 2020-08-28 | End: 2020-08-28

## 2020-08-28 RX ADMIN — CHLORHEXIDINE GLUCONATE 15 MILLILITER(S): 213 SOLUTION TOPICAL at 06:00

## 2020-08-28 RX ADMIN — CHLORHEXIDINE GLUCONATE 1 APPLICATION(S): 213 SOLUTION TOPICAL at 05:59

## 2020-08-28 RX ADMIN — CARVEDILOL PHOSPHATE 12.5 MILLIGRAM(S): 80 CAPSULE, EXTENDED RELEASE ORAL at 06:00

## 2020-08-28 RX ADMIN — HYDROMORPHONE HYDROCHLORIDE 0.5 MILLIGRAM(S): 2 INJECTION INTRAMUSCULAR; INTRAVENOUS; SUBCUTANEOUS at 21:45

## 2020-08-28 RX ADMIN — LEVETIRACETAM 500 MILLIGRAM(S): 250 TABLET, FILM COATED ORAL at 17:26

## 2020-08-28 RX ADMIN — HEPARIN SODIUM 5000 UNIT(S): 5000 INJECTION INTRAVENOUS; SUBCUTANEOUS at 22:00

## 2020-08-28 RX ADMIN — SODIUM CHLORIDE 500 MILLILITER(S): 9 INJECTION INTRAMUSCULAR; INTRAVENOUS; SUBCUTANEOUS at 23:29

## 2020-08-28 RX ADMIN — HEPARIN SODIUM 5000 UNIT(S): 5000 INJECTION INTRAVENOUS; SUBCUTANEOUS at 13:40

## 2020-08-28 RX ADMIN — HEPARIN SODIUM 5000 UNIT(S): 5000 INJECTION INTRAVENOUS; SUBCUTANEOUS at 06:00

## 2020-08-28 RX ADMIN — Medication 100 MILLIEQUIVALENT(S): at 07:03

## 2020-08-28 RX ADMIN — Medication 81 MILLIGRAM(S): at 13:40

## 2020-08-28 RX ADMIN — Medication 88 MICROGRAM(S): at 06:00

## 2020-08-28 RX ADMIN — LEVETIRACETAM 500 MILLIGRAM(S): 250 TABLET, FILM COATED ORAL at 06:00

## 2020-08-28 RX ADMIN — CHLORHEXIDINE GLUCONATE 15 MILLILITER(S): 213 SOLUTION TOPICAL at 17:42

## 2020-08-28 RX ADMIN — PANTOPRAZOLE SODIUM 40 MILLIGRAM(S): 20 TABLET, DELAYED RELEASE ORAL at 06:00

## 2020-08-28 RX ADMIN — HYDROMORPHONE HYDROCHLORIDE 0.5 MILLIGRAM(S): 2 INJECTION INTRAMUSCULAR; INTRAVENOUS; SUBCUTANEOUS at 21:30

## 2020-08-28 RX ADMIN — CARVEDILOL PHOSPHATE 12.5 MILLIGRAM(S): 80 CAPSULE, EXTENDED RELEASE ORAL at 17:26

## 2020-08-28 NOTE — PROGRESS NOTE ADULT - ASSESSMENT
Patient is a 65 y/o F with a PMH of HTN, HLD, GERD, hypothyroidism, chronic anemia, seizures, depression, CKD, severe renal artery stenosis, CAD s/p PCIx2 and CABG, AVR with bioprosthetic valve, MV stenosis s/p annuloplasty, PAD s/p L SFA + L TIFFANY stent and subsequent b/l SFA occlusion, AAA repair in 2015 with Dr. Sandra, that presented with a new saccular aneurysm (crescentic collection 3x2) above the proximal end of the previous graft. She underwent open thoracoabdominal aneurysm repair, with 20mm tube graft and 6mm bypasses to the celiac, SMA,  left renal, right renal (end-to-side on the proximal aorta, and end-to-end on the distal site) on 8/25 with Dr. Sandra and is now in SICU. She was started on CLD this morning, working with PT. She has persistently elevated WBCs.    - Start IV abx for probable pneumonia given CXR and leucocytosis  - Aggressive IS  - PT therapy  - Rest of care per ICU Patient is a 65 y/o F with a PMH of HTN, HLD, GERD, hypothyroidism, chronic anemia, seizures, depression, CKD, severe renal artery stenosis, CAD s/p PCIx2 and CABG, AVR with bioprosthetic valve, MV stenosis s/p annuloplasty, PAD s/p L SFA + L TIFFANY stent and subsequent b/l SFA occlusion, AAA repair in 2015 with Dr. Sandra, that presented with a new saccular aneurysm (crescentic collection 3x2) above the proximal end of the previous graft. She underwent open thoracoabdominal aneurysm repair, with 20mm tube graft and 6mm bypasses to the celiac, SMA,  left renal, right renal (end-to-side on the proximal aorta, and end-to-end on the distal site) on 8/25 with Dr. Sandra and is now in SICU. She was started on CLD this morning, working with PT. She has persistently elevated WBCs.    - Start IV abx for probable pneumonia given CXR and leucocytosis  - Aggressive IS  - PT therapy  - Remove apical chest tube  - Rest of care per ICU

## 2020-08-28 NOTE — PROGRESS NOTE ADULT - PROBLEM SELECTOR PLAN 1
pulmonary status is stable.  She is extubated and cough is minimal.  I instructed patient to use IS.  She is off inhalers.  CXR is consistent with apical pneumothorax and is more congested.  NO air leak form CT and follow with vascular

## 2020-08-28 NOTE — PROGRESS NOTE ADULT - SUBJECTIVE AND OBJECTIVE BOX
O/N Events:  YESENIA, HDS and afeb  Subjective:  pain not well controlled, can't take deep breaths but overall feels slowly improving, renal function remained unchanged and at baseline, acidosis improving   CT outputs slowing, diuresed well with laasix uop 2.8 L  appears euvolemic    VITALS  Vital Signs Last 24 Hrs  T(C): 36.7 (28 Aug 2020 13:29), Max: 37.1 (27 Aug 2020 18:36)  T(F): 98.1 (28 Aug 2020 13:29), Max: 98.8 (27 Aug 2020 18:36)  HR: 72 (28 Aug 2020 14:10) (66 - 88)  BP: 115/57 (28 Aug 2020 14:10) (115/57 - 115/57)  BP(mean): 80 (28 Aug 2020 14:10) (80 - 82)  RR: 22 (28 Aug 2020 14:10) (15 - 22)  SpO2: 99% (28 Aug 2020 14:10) (97% - 100%)    PHYSICAL EXAM  General: A&Ox 3; NAD  Neck: Supple; no JVD  Respiratory: diminished BS at bases   Cardiovascular: Regular rhythm/rate; S1/S2  Gastrointestinal: Soft; ND, surgical incision under dressing c/d/i  Extremities: WWP; no edema  Neurological:  CNII-XII grossly intact; no obvious focal deficits  : Gomez draining urine     MEDICATIONS  (STANDING):  aspirin enteric coated 81 milliGRAM(s) Oral daily  carvedilol 12.5 milliGRAM(s) Oral every 12 hours  chlorhexidine 0.12% Liquid 15 milliLiter(s) Oral Mucosa every 12 hours  chlorhexidine 2% Cloths 1 Application(s) Topical <User Schedule>  heparin   Injectable 5000 Unit(s) SubCutaneous every 8 hours  insulin lispro (HumaLOG) corrective regimen sliding scale   SubCutaneous every 6 hours  levETIRAcetam 500 milliGRAM(s) Oral every 12 hours  levothyroxine 88 MICROGram(s) Oral daily  pantoprazole    Tablet 40 milliGRAM(s) Oral before breakfast    MEDICATIONS  (PRN):  HYDROmorphone  Injectable 0.25 milliGRAM(s) IV Push every 4 hours PRN Moderate Pain (4 - 6)  HYDROmorphone  Injectable 0.5 milliGRAM(s) IV Push every 4 hours PRN Severe Pain (7 - 10)      LABS                        8.3    13.65 )-----------( 153      ( 28 Aug 2020 05:04 )             25.3     08-28    136  |  105  |  21  ----------------------------<  70  3.9   |  20<L>  |  1.55<H>    Ca    8.4      28 Aug 2020 05:04  Phos  3.3     08-28  Mg     2.0     08-28  CARDIAC MARKERS ( 26 Aug 2020 16:23 )  x     / 0.01 ng/mL / 650 U/L / x     / 15.1 ng/mL O/N Events:  YESENIA, HDS and afeb  Subjective:  pain not well controlled, can't take deep breaths but overall feels slowly improving, renal function remained unchanged and at baseline, acidosis improving   CT outputs slowing, diuresed well with lasix uop 2.8 L  appears euvolemic    VITALS  Vital Signs Last 24 Hrs  T(C): 36.7 (28 Aug 2020 13:29), Max: 37.1 (27 Aug 2020 18:36)  T(F): 98.1 (28 Aug 2020 13:29), Max: 98.8 (27 Aug 2020 18:36)  HR: 72 (28 Aug 2020 14:10) (66 - 88)  BP: 115/57 (28 Aug 2020 14:10) (115/57 - 115/57)  BP(mean): 80 (28 Aug 2020 14:10) (80 - 82)  RR: 22 (28 Aug 2020 14:10) (15 - 22)  SpO2: 99% (28 Aug 2020 14:10) (97% - 100%)    PHYSICAL EXAM  General: A&Ox 3; NAD  Neck: Supple; no JVD  Respiratory: diminished BS at bases   Cardiovascular: Regular rhythm/rate; S1/S2  Gastrointestinal: Soft; ND, surgical incision under dressing c/d/i  Extremities: WWP; no edema  Neurological:  CNII-XII grossly intact; no obvious focal deficits  : Gomez draining urine     MEDICATIONS  (STANDING):  aspirin enteric coated 81 milliGRAM(s) Oral daily  carvedilol 12.5 milliGRAM(s) Oral every 12 hours  chlorhexidine 0.12% Liquid 15 milliLiter(s) Oral Mucosa every 12 hours  chlorhexidine 2% Cloths 1 Application(s) Topical <User Schedule>  heparin   Injectable 5000 Unit(s) SubCutaneous every 8 hours  insulin lispro (HumaLOG) corrective regimen sliding scale   SubCutaneous every 6 hours  levETIRAcetam 500 milliGRAM(s) Oral every 12 hours  levothyroxine 88 MICROGram(s) Oral daily  pantoprazole    Tablet 40 milliGRAM(s) Oral before breakfast    MEDICATIONS  (PRN):  HYDROmorphone  Injectable 0.25 milliGRAM(s) IV Push every 4 hours PRN Moderate Pain (4 - 6)  HYDROmorphone  Injectable 0.5 milliGRAM(s) IV Push every 4 hours PRN Severe Pain (7 - 10)      LABS                        8.3    13.65 )-----------( 153      ( 28 Aug 2020 05:04 )             25.3     08-28    136  |  105  |  21  ----------------------------<  70  3.9   |  20<L>  |  1.55<H>    Ca    8.4      28 Aug 2020 05:04  Phos  3.3     08-28  Mg     2.0     08-28  CARDIAC MARKERS ( 26 Aug 2020 16:23 )  x     / 0.01 ng/mL / 650 U/L / x     / 15.1 ng/mL

## 2020-08-28 NOTE — PROGRESS NOTE ADULT - SUBJECTIVE AND OBJECTIVE BOX
STATUS POST:  open thoracoabdominal aneurysm repair, with 20mm tube graft and 6mm bypasses to the celiac, SMA,  left renal, right renal (end to end)     SUBJECTIVE: POD#3. Patient seen and examined bedside by team. She had one episode of diarrhea that was non-bloody. CLD was started today. We changed the wound dressing. Incisions look c/d/i. Patient worked with PT yesterday and sat on the chair for 1h. She reports pain but is not using her PCA much. She still has elevated WBC.    aspirin enteric coated 81 milliGRAM(s) Oral daily  carvedilol 12.5 milliGRAM(s) Oral every 12 hours  heparin   Injectable 5000 Unit(s) SubCutaneous every 8 hours      Vital Signs Last 24 Hrs  T(C): 36.9 (28 Aug 2020 06:02), Max: 37.1 (27 Aug 2020 09:49)  T(F): 98.5 (28 Aug 2020 06:02), Max: 98.8 (27 Aug 2020 18:36)  HR: 66 (28 Aug 2020 08:00) (66 - 88)  BP: 115/57 (27 Aug 2020 20:00) (115/57 - 159/79)  BP(mean): 82 (27 Aug 2020 20:00) (82 - 113)  RR: 15 (28 Aug 2020 08:00) (15 - 24)  SpO2: 100% (28 Aug 2020 08:00) (91% - 100%)  I&O's Detail    27 Aug 2020 07:01  -  28 Aug 2020 07:00  --------------------------------------------------------  IN:    IV PiggyBack: 100 mL    lactated ringers.: 200 mL    Packed Red Blood Cells: 350 mL  Total IN: 650 mL    OUT:    Chest Tube: 100 mL    Chest Tube: 120 mL    Indwelling Catheter - Urethral: 2845 mL  Total OUT: 3065 mL    Total NET: -2415 mL      Physical Exam:  General: NAD, resting comfortably in bed  Pulmonary: extubated, on NC@2L with humidified air  Cardiovascular: NSR, 2 CT in place with serosanguineous output   Abdominal: soft, left sided tenderness, incision c/d/i, stapled  Extremities: WWP, no acute ischemia noted, no wounds  Vasc: palpable femorals, monophasic popliteals, DPs, no signals for PTs      LABS:                        8.3    13.65 )-----------( 153      ( 28 Aug 2020 05:04 )             25.3     08-28    136  |  105  |  21  ----------------------------<  70  3.9   |  20<L>  |  1.55<H>    Ca    8.4      28 Aug 2020 05:04  Phos  3.3     08-28  Mg     2.0     08-28

## 2020-08-28 NOTE — PROGRESS NOTE ADULT - SUBJECTIVE AND OBJECTIVE BOX
INTERVAL HPI/OVERNIGHT EVENTS:  Patient reports feeling better than yesterday, does complain of chest and abdominal pain around incision site. Denies nausea, vomit, calves tenderness, fatigue, fever, dizziness, disuria or headache. Good acceptance of liquid diet without nausea, vomit or postpandrial discomfort, getting out of the bed to chair. Flatus and bowel movement presents - one episode of bowel movement yesterday with loose stool.       MEDICATIONS  (STANDING):  aspirin Suppository 300 milliGRAM(s) Rectal daily  carvedilol 12.5 milliGRAM(s) Oral every 12 hours  chlorhexidine 0.12% Liquid 15 milliLiter(s) Oral Mucosa every 12 hours  chlorhexidine 2% Cloths 1 Application(s) Topical <User Schedule>  heparin   Injectable 5000 Unit(s) SubCutaneous every 8 hours  HYDROmorphone PCA (1 mG/mL) 30 milliLiter(s) PCA Continuous PCA Continuous  insulin lispro (HumaLOG) corrective regimen sliding scale   SubCutaneous every 6 hours  levETIRAcetam 500 milliGRAM(s) Oral every 12 hours  levothyroxine 88 MICROGram(s) Oral daily  pantoprazole    Tablet 40 milliGRAM(s) Oral before breakfast    MEDICATIONS  (PRN):      Drug Dosing Weight  Height (cm): 170.2 (25 Aug 2020 05:16)  Weight (kg): 56 (25 Aug 2020 05:16)  BMI (kg/m2): 19.3 (25 Aug 2020 05:16)  BSA (m2): 1.65 (25 Aug 2020 05:16)    PAST MEDICAL & SURGICAL HISTORY:  Seizure  Essential hypertension: HTN (hypertension)  Gastroesophageal reflux disease: GERD (gastroesophageal reflux disease)  Hyperlipidemia: Hyperlipidemia  Anemia: Anemia  Hypothyroidism: Hypothyroidism  Atherosclerosis of coronary artery: CAD (coronary artery disease)  Peripheral vascular disease: PVD (peripheral vascular disease)  H/O aortic valve replacement: Bioprosthetic  Atherosclerosis of coronary artery bypass graft(s), unspecified, with angina pectoris with documented spasm  Status post aorto-coronary artery bypass graft: 2012      ICU Vital Signs Last 24 Hrs  T(C): 36.9 (28 Aug 2020 06:02), Max: 37.1 (27 Aug 2020 09:49)  T(F): 98.5 (28 Aug 2020 06:02), Max: 98.8 (27 Aug 2020 18:36)  HR: 76 (28 Aug 2020 07:00) (70 - 90)  BP: 115/57 (27 Aug 2020 20:00) (115/57 - 159/79)  BP(mean): 82 (27 Aug 2020 20:00) (82 - 113)  ABP: 130/50 (28 Aug 2020 07:00) (109/44 - 161/61)  ABP(mean): 79 (28 Aug 2020 07:00) (66 - 98)  RR: 16 (28 Aug 2020 06:00) (12 - 24)  SpO2: 97% (28 Aug 2020 07:00) (91% - 99%)            27 Aug 2020 07:01  -  28 Aug 2020 07:00  --------------------------------------------------------  IN:    IV PiggyBack: 100 mL    lactated ringers.: 200 mL    Packed Red Blood Cells: 350 mL  Total IN: 650 mL    OUT:    Chest Tube: 100 mL    Chest Tube: 120 mL    Indwelling Catheter - Urethral: 2845 mL  Total OUT: 3065 mL    Total NET: -2415 mL    General: NAD, resting comfortably in bed.   NEURO: AAOx3, follows commands.   HEENT: MMM, non icteric,   CV: RRR, no MRG,   PULM: scant crackles b/l, nonlabored breathing, no respiratory distress,   ABD: soft, nondistended, tenderness around incisions, no rebound, no guarding, . Incisions CDI.  :  tony   EXTREM: WWP, no edema, no calf tenderness  VASC: No cyanosis,  no pallor, palpable radial and pulses pedis 2+ bilaterally  SKIN: No rashes noted  PSYCH: Appropriate affect, answers questions appropriately    LABS:  CBC Full  -  ( 28 Aug 2020 05:04 )  WBC Count : 13.65 K/uL  RBC Count : 3.06 M/uL  Hemoglobin : 8.3 g/dL  Hematocrit : 25.3 %  Platelet Count - Automated : 153 K/uL  Mean Cell Volume : 82.7 fl  Mean Cell Hemoglobin : 27.1 pg  Mean Cell Hemoglobin Concentration : 32.8 gm/dL  Auto Neutrophil # : x  Auto Lymphocyte # : x  Auto Monocyte # : x  Auto Eosinophil # : x  Auto Basophil # : x  Auto Neutrophil % : x  Auto Lymphocyte % : x  Auto Monocyte % : x  Auto Eosinophil % : x  Auto Basophil % : x    08-28    136  |  105  |  21  ----------------------------<  70  3.9   |  20<L>  |  1.55<H>    Ca    8.4      28 Aug 2020 05:04  Phos  3.3     08-28  Mg     2.0     08-28            RADIOLOGY & ADDITIONAL STUDIES:

## 2020-08-28 NOTE — PROGRESS NOTE ADULT - SUBJECTIVE AND OBJECTIVE BOX
Cardiology Consult    Interval History: no acute overnight events. Ms. Ramirez continues to complain of severe post operative pain. her pain is localized to the left side of her chest on the ribs. she denies dyspnea, but pain limits deep inspiration.   Telemetry: sinus, occasional VPC    OBJECTIVE  Vitals:  T(C): 36.7 (08-28-20 @ 13:29), Max: 37.1 (08-27-20 @ 18:36)  HR: 74 (08-28-20 @ 16:00) (66 - 88)  BP: 114/58 (08-28-20 @ 14:57) (114/58 - 120/61)  RR: 20 (08-28-20 @ 15:00) (15 - 22)  SpO2: 100% (08-28-20 @ 16:00) (97% - 100%)  Wt(kg): --    I/O:  I&O's Summary    27 Aug 2020 07:01  -  28 Aug 2020 07:00  --------------------------------------------------------  IN: 650 mL / OUT: 3065 mL / NET: -2415 mL    28 Aug 2020 07:01  -  28 Aug 2020 16:38  --------------------------------------------------------  IN: 0 mL / OUT: 350 mL / NET: -350 mL        PHYSICAL EXAM:  uncomfortable appearing, no apparent distress  neck supple, RIJ present  heart regular, 3/6 harsh systolic murmur s1, attenuated S2  Lungs clear anteriorly but limited inspiration d/t pain  abd multiple scars, left pleuravac drain.   ext warm, no edema, pulses difficult to palpate  AAOx3    LABS:                        8.3    13.65 )-----------( 153      ( 28 Aug 2020 05:04 )             25.3     08-28    136  |  105  |  21  ----------------------------<  70  3.9   |  20<L>  |  1.55<H>    Ca    8.4      28 Aug 2020 05:04  Phos  3.3     08-28  Mg     2.0     08-28            RADIOLOGY & ADDITIONAL TESTS:  Reviewed .    MEDICATIONS  (STANDING):  aspirin enteric coated 81 milliGRAM(s) Oral daily  carvedilol 12.5 milliGRAM(s) Oral every 12 hours  chlorhexidine 0.12% Liquid 15 milliLiter(s) Oral Mucosa every 12 hours  chlorhexidine 2% Cloths 1 Application(s) Topical <User Schedule>  heparin   Injectable 5000 Unit(s) SubCutaneous every 8 hours  insulin lispro (HumaLOG) corrective regimen sliding scale   SubCutaneous every 6 hours  levETIRAcetam 500 milliGRAM(s) Oral every 12 hours  levothyroxine 88 MICROGram(s) Oral daily  pantoprazole    Tablet 40 milliGRAM(s) Oral before breakfast    MEDICATIONS  (PRN):  HYDROmorphone  Injectable 0.25 milliGRAM(s) IV Push every 4 hours PRN Moderate Pain (4 - 6)  HYDROmorphone  Injectable 0.5 milliGRAM(s) IV Push every 4 hours PRN Severe Pain (7 - 10)

## 2020-08-28 NOTE — PROGRESS NOTE ADULT - ASSESSMENT
64F w/CAD s/p CABG (LIMA-Ramus/free ANTONIO-RPDA) w/bioAVR and MV repair (2002) and PCI (FORD x1 mLCx-OM2, 2007), PAD s/p PTA/FORD L common iliac and pSFA, infrarenal AAA s/p repair (2015), HTN, CKD3, emphysema (seen on CT, asx), hypothyroidism, seizure disorder, anxiety/depression, who presented to [facility] ED 8/20/2020 c/o worsening palpitations, initially r/o ACS and now on vascular service with plan to perform AAA repair. she is now s/p Ao-celiac, Ao-mesenteric, Ao-renal bypass and recovering.    Recommend  - c/w Coreg 12.5mg BID for BP control and ectopy suppression, currently near normotensive  - if needed additional BP control can add amdlodipine 5mg  - then can add imdur 30mg if goal BP not achieved  - given moderate AS- avoid excessive afterload reduction  - start fenofibrate 145mg daily (statin-intolerance) with plan to add Ezetemibe as outpatient  - resume ASA 81mg daily when appropriate from a bleeding standpoint    Yariel Villafuerte MD  Cardiology Fellow    d/w Dr. Ramirez

## 2020-08-28 NOTE — PROGRESS NOTE ADULT - PROBLEM SELECTOR PLAN 1
Patient with CKD stage III   nephrologically stable and almost baseline, with acceptable lytes, well diuresed   CXR with mild congestion but overall appears close to euvolemia, satting 99% on RA, low threshold to gently diurese if O2 requirement increases or UOP drops   Will follow

## 2020-08-28 NOTE — PROGRESS NOTE ADULT - ASSESSMENT
63 yo F w/  PMH of HTN, CAD s/p CABG & PCI, bioAVR, PAD, hypothyroidism, seizure, chronic anemia, and AAA repair in 2015 (Dr. Sandra), CKD, now with subacute rupture contained saccular aortic aneurysm, s/p open TAAA repair involving bypasses to the celiac, SMA, b/l renal arteries (8/26). Now extubated and recovering well.    NEURO: dPCA, home keppra  CV: coreg 12.5bid, ASA.   PULM: nasal canula, chest tubes x 2 to wall suction, incentive spirometry  GI/FEN: CLD. NGT LIWS, protonix ppx.   : tony for strict I&O. has baseline CKD,   ENDO: ISS, synthroid  ID: ppx ancef x 24hrs (8/25)   PPX: hx of LE stents, NO SCDs, on  HSQ   LINES: PIVs, joon (8/25--). RIJ (8/25--)   WOUNDS/DRAINS: left side to mid abd incision. left chest tubes x 2  PT: ordered, OOB      Plans and managements discussed with SICU team and primary team.

## 2020-08-28 NOTE — PROGRESS NOTE ADULT - SUBJECTIVE AND OBJECTIVE BOX
Interval Events: Reviewed  Patient seen and examined at bedside.    Patient is a 64y old  Female who presents with a chief complaint of palpitations (21 Aug 2020 15:22)    she is uncomfortable with the chest tubes  PAST MEDICAL & SURGICAL HISTORY:  Seizure  Essential hypertension: HTN (hypertension)  Gastroesophageal reflux disease: GERD (gastroesophageal reflux disease)  Hyperlipidemia: Hyperlipidemia  Anemia: Anemia  Hypothyroidism: Hypothyroidism  Atherosclerosis of coronary artery: CAD (coronary artery disease)  Peripheral vascular disease: PVD (peripheral vascular disease)  H/O aortic valve replacement: Bioprosthetic  Atherosclerosis of coronary artery bypass graft(s), unspecified, with angina pectoris with documented spasm  Status post aorto-coronary artery bypass graft: 2012      MEDICATIONS:  Pulmonary:    Antimicrobials:    Anticoagulants:  aspirin enteric coated 81 milliGRAM(s) Oral daily  heparin   Injectable 5000 Unit(s) SubCutaneous every 8 hours    Cardiac:  carvedilol 12.5 milliGRAM(s) Oral every 12 hours      Allergies    No Known Allergies    Intolerances    Crestor (Other (Mod to Severe))      Vital Signs Last 24 Hrs  T(C): 36.5 (29 Aug 2020 09:07), Max: 36.9 (28 Aug 2020 22:33)  T(F): 97.7 (29 Aug 2020 09:07), Max: 98.5 (28 Aug 2020 22:33)  HR: 59 (29 Aug 2020 09:30) (59 - 84)  BP: 99/51 (29 Aug 2020 08:30) (94/55 - 120/61)  BP(mean): 72 (29 Aug 2020 08:30) (71 - 84)  RR: 19 (29 Aug 2020 09:30) (16 - 36)  SpO2: 95% (29 Aug 2020 09:30) (88% - 100%)    08-28 @ 07:01  -  08-29 @ 07:00  --------------------------------------------------------  IN: 1040 mL / OUT: 715 mL / NET: 325 mL    08-29 @ 07:01  -  08-29 @ 09:54  --------------------------------------------------------  IN: 250 mL / OUT: 10 mL / NET: 240 mL          Review of Systems:   •	General: negative  •	Skin/Breast: negative  •	Ophthalmologic: negative  •	ENMT: negative  •	Respiratory and Thorax: negative  •	Cardiovascular: negative  •	Gastrointestinal: negative  •	Genitourinary: negative  •	Musculoskeletal: negative  •	Neurological: negative  •	Psychiatric: negative  •	Hematology/Lymphatics: negative  •	Endocrine: negative  •	Allergic/Immunologic: negative    Physical Exam:   • Constitutional:	Well-developed, well nourished  • Eyes:	EOMI; PERRL; no drainage or redness  • ENMT:	No oral lesions; no gross abnormalities  • Neck	No bruits; no thyromegaly or nodules  • Breasts:	not examined  • Back:	No deformity or limitation of movement  • Respiratory:	Breath Sounds equal & clear to percussion & auscultation, no accessory muscle use  • Cardiovascular:	Regular rate & rhythm, normal S1, S2; no murmurs, gallops or rubs; no S3, S4  • Gastrointestinal:	Soft, non-tender, no hepatosplenomegaly, normal bowel sounds  • Genitourinary:	not examined  • Rectal: not examined  • Extremities:	No cyanosis, clubbing or edema  • Vascular:	Equal and normal pulses (carotid, femoral, dorsalis pedis)  • Neurologica:l	not examined  • Skin:	No lesions; no rash  • Lymph Nodes:	No lymphadedenopathy  • Musculoskeletal:	No joint pain, swelling or deformity; no limitation of movement        LABS:      CBC Full  -  ( 29 Aug 2020 05:47 )  WBC Count : 11.55 K/uL  RBC Count : 2.74 M/uL  Hemoglobin : 7.6 g/dL  Hematocrit : 23.0 %  Platelet Count - Automated : 155 K/uL  Mean Cell Volume : 83.9 fl  Mean Cell Hemoglobin : 27.7 pg  Mean Cell Hemoglobin Concentration : 33.0 gm/dL  Auto Neutrophil # : x  Auto Lymphocyte # : x  Auto Monocyte # : x  Auto Eosinophil # : x  Auto Basophil # : x  Auto Neutrophil % : x  Auto Lymphocyte % : x  Auto Monocyte % : x  Auto Eosinophil % : x  Auto Basophil % : x    08-29    134<L>  |  104  |  20  ----------------------------<  84  3.8   |  21<L>  |  1.50<H>    Ca    8.7      29 Aug 2020 05:47  Phos  2.4     08-29  Mg     2.2     08-29    TPro  5.2<L>  /  Alb  2.5<L>  /  TBili  0.5  /  DBili  <0.2  /  AST  17  /  ALT  <5<L>  /  AlkPhos  67  08-29    PT/INR - ( 29 Aug 2020 05:47 )   PT: 34.5 sec;   INR: 3.03          PTT - ( 29 Aug 2020 05:47 )  PTT:48.0 sec              < from: Xray Chest 1 View- PORTABLE-Urgent (08.28.20 @ 18:04) >  EXAM:  XR CHEST PORTABLE URGENT 1V                          PROCEDURE DATE:  08/28/2020          INTERPRETATION:  XR CHEST URGENT dated 8/28/2020 6:04 PM    CLINICAL INFORMATION: Female, 64 years old.  s/p chest tube removal.    PRIOR STUDIES: 8/28/2020    FINDINGS: Cardiomegaly. Post sternotomy, CABG, and aortic and mitral valve replacements. There are increasing bibasilar pleural effusions. Underlying pneumonia and/or atelectasis cannot be excluded. Postsurgical changes involving the inferiorand lateral aspect of the left hemithorax. There is a suggestion of a new small left apical pneumothorax. No change in left-sided chest tube placement.    IMPRESSION:  Question new small left apical pneumothorax.  Increasing bibasilar pleural effusions.  Cardiomegaly.  Postsurgical changes as above.        < end of copied text >        RADIOLOGY & ADDITIONAL STUDIES (The following images were personally reviewed):  Gomez:                                     No  Urine output:                       adequate  DVT prophylaxis:                 Yes  Flattus:                                  Yes  Bowel movement:              No

## 2020-08-29 LAB
ALBUMIN SERPL ELPH-MCNC: 2.5 G/DL — LOW (ref 3.3–5)
ALP SERPL-CCNC: 67 U/L — SIGNIFICANT CHANGE UP (ref 40–120)
ALT FLD-CCNC: <5 U/L — LOW (ref 10–45)
ANION GAP SERPL CALC-SCNC: 9 MMOL/L — SIGNIFICANT CHANGE UP (ref 5–17)
APTT BLD: 41.9 SEC — HIGH (ref 27.5–35.5)
APTT BLD: 48 SEC — HIGH (ref 27.5–35.5)
AST SERPL-CCNC: 17 U/L — SIGNIFICANT CHANGE UP (ref 10–40)
BILIRUB DIRECT SERPL-MCNC: <0.2 MG/DL — SIGNIFICANT CHANGE UP (ref 0–0.2)
BILIRUB INDIRECT FLD-MCNC: SIGNIFICANT CHANGE UP MG/DL (ref 0.2–1)
BILIRUB SERPL-MCNC: 0.5 MG/DL — SIGNIFICANT CHANGE UP (ref 0.2–1.2)
BLD GP AB SCN SERPL QL: NEGATIVE — SIGNIFICANT CHANGE UP
BUN SERPL-MCNC: 20 MG/DL — SIGNIFICANT CHANGE UP (ref 7–23)
CALCIUM SERPL-MCNC: 8.7 MG/DL — SIGNIFICANT CHANGE UP (ref 8.4–10.5)
CHLORIDE SERPL-SCNC: 104 MMOL/L — SIGNIFICANT CHANGE UP (ref 96–108)
CO2 SERPL-SCNC: 21 MMOL/L — LOW (ref 22–31)
CREAT SERPL-MCNC: 1.5 MG/DL — HIGH (ref 0.5–1.3)
FIBRINOGEN PPP-MCNC: 639 MG/DL — HIGH (ref 258–438)
GLUCOSE BLDC GLUCOMTR-MCNC: 100 MG/DL — HIGH (ref 70–99)
GLUCOSE BLDC GLUCOMTR-MCNC: 127 MG/DL — HIGH (ref 70–99)
GLUCOSE BLDC GLUCOMTR-MCNC: 90 MG/DL — SIGNIFICANT CHANGE UP (ref 70–99)
GLUCOSE BLDC GLUCOMTR-MCNC: 91 MG/DL — SIGNIFICANT CHANGE UP (ref 70–99)
GLUCOSE SERPL-MCNC: 84 MG/DL — SIGNIFICANT CHANGE UP (ref 70–99)
HCT VFR BLD CALC: 23 % — LOW (ref 34.5–45)
HCT VFR BLD CALC: 26 % — LOW (ref 34.5–45)
HGB BLD-MCNC: 7.6 G/DL — LOW (ref 11.5–15.5)
HGB BLD-MCNC: 8.5 G/DL — LOW (ref 11.5–15.5)
INR BLD: 1.78 — HIGH (ref 0.88–1.16)
INR BLD: 3.03 — HIGH (ref 0.88–1.16)
INR BLD: 3.22 — HIGH (ref 0.88–1.16)
MAGNESIUM SERPL-MCNC: 2.2 MG/DL — SIGNIFICANT CHANGE UP (ref 1.6–2.6)
MCHC RBC-ENTMCNC: 27.4 PG — SIGNIFICANT CHANGE UP (ref 27–34)
MCHC RBC-ENTMCNC: 27.7 PG — SIGNIFICANT CHANGE UP (ref 27–34)
MCHC RBC-ENTMCNC: 32.7 GM/DL — SIGNIFICANT CHANGE UP (ref 32–36)
MCHC RBC-ENTMCNC: 33 GM/DL — SIGNIFICANT CHANGE UP (ref 32–36)
MCV RBC AUTO: 83.9 FL — SIGNIFICANT CHANGE UP (ref 80–100)
MCV RBC AUTO: 83.9 FL — SIGNIFICANT CHANGE UP (ref 80–100)
NRBC # BLD: 0 /100 WBCS — SIGNIFICANT CHANGE UP (ref 0–0)
NRBC # BLD: 0 /100 WBCS — SIGNIFICANT CHANGE UP (ref 0–0)
PHOSPHATE SERPL-MCNC: 2.4 MG/DL — LOW (ref 2.5–4.5)
PLATELET # BLD AUTO: 155 K/UL — SIGNIFICANT CHANGE UP (ref 150–400)
PLATELET # BLD AUTO: 174 K/UL — SIGNIFICANT CHANGE UP (ref 150–400)
POTASSIUM SERPL-MCNC: 3.8 MMOL/L — SIGNIFICANT CHANGE UP (ref 3.5–5.3)
POTASSIUM SERPL-SCNC: 3.8 MMOL/L — SIGNIFICANT CHANGE UP (ref 3.5–5.3)
PROT SERPL-MCNC: 5.2 G/DL — LOW (ref 6–8.3)
PROTHROM AB SERPL-ACNC: 20.8 SEC — HIGH (ref 10.6–13.6)
PROTHROM AB SERPL-ACNC: 34.5 SEC — HIGH (ref 10.6–13.6)
PROTHROM AB SERPL-ACNC: 36.6 SEC — HIGH (ref 10.6–13.6)
RBC # BLD: 2.74 M/UL — LOW (ref 3.8–5.2)
RBC # BLD: 3.1 M/UL — LOW (ref 3.8–5.2)
RBC # FLD: 17.9 % — HIGH (ref 10.3–14.5)
RBC # FLD: 18.6 % — HIGH (ref 10.3–14.5)
RH IG SCN BLD-IMP: POSITIVE — SIGNIFICANT CHANGE UP
SODIUM SERPL-SCNC: 134 MMOL/L — LOW (ref 135–145)
WBC # BLD: 11.22 K/UL — HIGH (ref 3.8–10.5)
WBC # BLD: 11.55 K/UL — HIGH (ref 3.8–10.5)
WBC # FLD AUTO: 11.22 K/UL — HIGH (ref 3.8–10.5)
WBC # FLD AUTO: 11.55 K/UL — HIGH (ref 3.8–10.5)

## 2020-08-29 PROCEDURE — 99233 SBSQ HOSP IP/OBS HIGH 50: CPT | Mod: GC

## 2020-08-29 PROCEDURE — 71045 X-RAY EXAM CHEST 1 VIEW: CPT | Mod: 26,76

## 2020-08-29 RX ORDER — FUROSEMIDE 40 MG
20 TABLET ORAL ONCE
Refills: 0 | Status: DISCONTINUED | OUTPATIENT
Start: 2020-08-29 | End: 2020-08-29

## 2020-08-29 RX ORDER — FUROSEMIDE 40 MG
40 TABLET ORAL ONCE
Refills: 0 | Status: COMPLETED | OUTPATIENT
Start: 2020-08-29 | End: 2020-08-29

## 2020-08-29 RX ORDER — HYDROMORPHONE HYDROCHLORIDE 2 MG/ML
1 INJECTION INTRAMUSCULAR; INTRAVENOUS; SUBCUTANEOUS EVERY 4 HOURS
Refills: 0 | Status: DISCONTINUED | OUTPATIENT
Start: 2020-08-29 | End: 2020-08-31

## 2020-08-29 RX ORDER — HYDROMORPHONE HYDROCHLORIDE 2 MG/ML
0.5 INJECTION INTRAMUSCULAR; INTRAVENOUS; SUBCUTANEOUS ONCE
Refills: 0 | Status: DISCONTINUED | OUTPATIENT
Start: 2020-08-29 | End: 2020-08-29

## 2020-08-29 RX ORDER — PHYTONADIONE (VIT K1) 5 MG
5 TABLET ORAL ONCE
Refills: 0 | Status: COMPLETED | OUTPATIENT
Start: 2020-08-29 | End: 2020-08-29

## 2020-08-29 RX ORDER — FENOFIBRATE,MICRONIZED 130 MG
145 CAPSULE ORAL DAILY
Refills: 0 | Status: DISCONTINUED | OUTPATIENT
Start: 2020-08-29 | End: 2020-08-31

## 2020-08-29 RX ORDER — HYDROMORPHONE HYDROCHLORIDE 2 MG/ML
0.5 INJECTION INTRAMUSCULAR; INTRAVENOUS; SUBCUTANEOUS EVERY 4 HOURS
Refills: 0 | Status: DISCONTINUED | OUTPATIENT
Start: 2020-08-29 | End: 2020-08-31

## 2020-08-29 RX ADMIN — CARVEDILOL PHOSPHATE 12.5 MILLIGRAM(S): 80 CAPSULE, EXTENDED RELEASE ORAL at 06:16

## 2020-08-29 RX ADMIN — LEVETIRACETAM 500 MILLIGRAM(S): 250 TABLET, FILM COATED ORAL at 06:16

## 2020-08-29 RX ADMIN — Medication 145 MILLIGRAM(S): at 19:07

## 2020-08-29 RX ADMIN — HYDROMORPHONE HYDROCHLORIDE 0.5 MILLIGRAM(S): 2 INJECTION INTRAMUSCULAR; INTRAVENOUS; SUBCUTANEOUS at 21:08

## 2020-08-29 RX ADMIN — HEPARIN SODIUM 5000 UNIT(S): 5000 INJECTION INTRAVENOUS; SUBCUTANEOUS at 22:29

## 2020-08-29 RX ADMIN — HYDROMORPHONE HYDROCHLORIDE 0.5 MILLIGRAM(S): 2 INJECTION INTRAMUSCULAR; INTRAVENOUS; SUBCUTANEOUS at 13:43

## 2020-08-29 RX ADMIN — HYDROMORPHONE HYDROCHLORIDE 0.5 MILLIGRAM(S): 2 INJECTION INTRAMUSCULAR; INTRAVENOUS; SUBCUTANEOUS at 15:28

## 2020-08-29 RX ADMIN — HEPARIN SODIUM 5000 UNIT(S): 5000 INJECTION INTRAVENOUS; SUBCUTANEOUS at 06:16

## 2020-08-29 RX ADMIN — HYDROMORPHONE HYDROCHLORIDE 0.5 MILLIGRAM(S): 2 INJECTION INTRAMUSCULAR; INTRAVENOUS; SUBCUTANEOUS at 12:57

## 2020-08-29 RX ADMIN — Medication 88 MICROGRAM(S): at 06:15

## 2020-08-29 RX ADMIN — HYDROMORPHONE HYDROCHLORIDE 0.5 MILLIGRAM(S): 2 INJECTION INTRAMUSCULAR; INTRAVENOUS; SUBCUTANEOUS at 19:36

## 2020-08-29 RX ADMIN — CHLORHEXIDINE GLUCONATE 15 MILLILITER(S): 213 SOLUTION TOPICAL at 06:15

## 2020-08-29 RX ADMIN — HYDROMORPHONE HYDROCHLORIDE 0.5 MILLIGRAM(S): 2 INJECTION INTRAMUSCULAR; INTRAVENOUS; SUBCUTANEOUS at 16:15

## 2020-08-29 RX ADMIN — Medication 81 MILLIGRAM(S): at 12:16

## 2020-08-29 RX ADMIN — Medication 101 MILLIGRAM(S): at 17:08

## 2020-08-29 RX ADMIN — CARVEDILOL PHOSPHATE 12.5 MILLIGRAM(S): 80 CAPSULE, EXTENDED RELEASE ORAL at 17:58

## 2020-08-29 RX ADMIN — HYDROMORPHONE HYDROCHLORIDE 0.5 MILLIGRAM(S): 2 INJECTION INTRAMUSCULAR; INTRAVENOUS; SUBCUTANEOUS at 07:15

## 2020-08-29 RX ADMIN — HYDROMORPHONE HYDROCHLORIDE 0.5 MILLIGRAM(S): 2 INJECTION INTRAMUSCULAR; INTRAVENOUS; SUBCUTANEOUS at 07:00

## 2020-08-29 RX ADMIN — HEPARIN SODIUM 5000 UNIT(S): 5000 INJECTION INTRAVENOUS; SUBCUTANEOUS at 14:48

## 2020-08-29 RX ADMIN — PANTOPRAZOLE SODIUM 40 MILLIGRAM(S): 20 TABLET, DELAYED RELEASE ORAL at 07:00

## 2020-08-29 RX ADMIN — Medication 40 MILLIGRAM(S): at 17:58

## 2020-08-29 RX ADMIN — LEVETIRACETAM 500 MILLIGRAM(S): 250 TABLET, FILM COATED ORAL at 17:58

## 2020-08-29 RX ADMIN — CHLORHEXIDINE GLUCONATE 1 APPLICATION(S): 213 SOLUTION TOPICAL at 06:38

## 2020-08-29 NOTE — PROGRESS NOTE ADULT - SUBJECTIVE AND OBJECTIVE BOX
24 hr events: Overnight there were no significant events, this morning the patient is feeling well. Yesterday the Gomez catheter was removed, and she passed trial of voids. The patient was switched from PCA pain medications to IV, and tolerated them well. Apical chest tube was removed due to the minimal discharge. The patient was examined at the bedside by the chief resident, she denies having chest pain, nausea, vomiting, SOB, dizziness, chills.    Vital Signs Last 24 Hrs  T(C): 36.5 (29 Aug 2020 09:07), Max: 36.9 (28 Aug 2020 22:33)  T(F): 97.7 (29 Aug 2020 09:07), Max: 98.5 (28 Aug 2020 22:33)  HR: 62 (29 Aug 2020 08:30) (59 - 84)  BP: 99/51 (29 Aug 2020 08:30) (94/55 - 120/61)  BP(mean): 72 (29 Aug 2020 08:30) (71 - 84)  RR: 23 (29 Aug 2020 08:30) (16 - 36)  SpO2: 94% (29 Aug 2020 08:30) (88% - 100%)    I&O's Summary    28 Aug 2020 07:01  -  29 Aug 2020 07:00  --------------------------------------------------------  IN: 1040 mL / OUT: 715 mL / NET: 325 mL    29 Aug 2020 07:01  -  29 Aug 2020 09:14  --------------------------------------------------------  IN: 0 mL / OUT: 10 mL / NET: -10 mL        Physical Exam:  General: NAD, resting comfortably in bed  Pulmonary: extubated, on NC@6L w/ humidified air  Cardiovascular: NSR, S1, S2 present, 1 CT in place w/ serosanguineous output   Abdominal: soft, left-sided tenderness, dressing in place C/D/I, no rebound tenderness, rigidity, guarding  Extremities: WWP, no acute ischemia noted, edema, erythema      Lines/drains/tubes:    LABS:                        7.6    11.55 )-----------( 155      ( 29 Aug 2020 05:47 )             23.0     08-29    134<L>  |  104  |  20  ----------------------------<  84  3.8   |  21<L>  |  1.50<H>    Ca    8.7      29 Aug 2020 05:47  Phos  2.4     08-29  Mg     2.2     08-29    TPro  5.2<L>  /  Alb  2.5<L>  /  TBili  0.5  /  DBili  <0.2  /  AST  17  /  ALT  <5<L>  /  AlkPhos  67  08-29    PT/INR - ( 29 Aug 2020 05:47 )   PT: 34.5 sec;   INR: 3.03          PTT - ( 29 Aug 2020 05:47 )  PTT:48.0 sec    LIVER FUNCTIONS - ( 29 Aug 2020 05:47 )  Alb: 2.5 g/dL / Pro: 5.2 g/dL / ALK PHOS: 67 U/L / ALT: <5 U/L / AST: 17 U/L / GGT: x           CAPILLARY BLOOD GLUCOSE      POCT Blood Glucose.: 90 mg/dL (29 Aug 2020 06:43)  POCT Blood Glucose.: 80 mg/dL (28 Aug 2020 23:39)  POCT Blood Glucose.: 100 mg/dL (28 Aug 2020 17:27)  POCT Blood Glucose.: 79 mg/dL (28 Aug 2020 11:46)      RADIOLOGY & ADDITIONAL TESTS:      65 yo F who is now post-op day 4 from an open thoracoabdominal aneurysm repair, with 20mm tube graft and 6mm bypasses to the celiac, SMA, left renal, right renal (end to end). She is recovering in SICU appropriately.    Plan:  - Pain control  - Nausea control  - Clear liquid diet  - Close hemodynamic monitoring per ICU care  - OOB to chair today

## 2020-08-29 NOTE — PROGRESS NOTE ADULT - SUBJECTIVE AND OBJECTIVE BOX
STATUS POST: POD # 4 s/p Open thoracoabdominal aneurysm repair     INTERVAL HPI/OVERNIGHT EVENTS: On did not pass TOV at 10 PM, Bladder scan 375 CC, Straight cath, Repeat CXR is good, HG 7.6 will transfuse 1 unit INR 3  8/28: d/c'd tony TOV@8pm  PCA d/c'd started IV pain meds, ASA 81. Ctx makin minimal - apical chest tube removed. Chest xray     SUBJECTIVE: Examined at the bedside resting comfortably. States not in much pain and breathing comfortably. Denies chest and abdominal pain. No acute complaints.     aspirin enteric coated 81 milliGRAM(s) Oral daily  carvedilol 12.5 milliGRAM(s) Oral every 12 hours  heparin   Injectable 5000 Unit(s) SubCutaneous every 8 hours      Vital Signs Last 24 Hrs  T(C): 36.8 (29 Aug 2020 06:01), Max: 36.9 (28 Aug 2020 22:33)  T(F): 98.2 (29 Aug 2020 06:01), Max: 98.5 (28 Aug 2020 22:33)  HR: 62 (29 Aug 2020 08:30) (59 - 84)  BP: 99/51 (29 Aug 2020 08:30) (94/55 - 120/61)  BP(mean): 72 (29 Aug 2020 08:30) (71 - 84)  RR: 23 (29 Aug 2020 08:30) (16 - 36)  SpO2: 94% (29 Aug 2020 08:30) (88% - 100%)  I&O's Detail    28 Aug 2020 07:01  -  29 Aug 2020 07:00  --------------------------------------------------------  IN:    IV PiggyBack: 70 mL    Oral Fluid: 720 mL    Sodium Chloride 0.9% IV Bolus: 250 mL  Total IN: 1040 mL    OUT:    Chest Tube: 40 mL    Chest Tube: 70 mL    Indwelling Catheter - Urethral: 280 mL    Intermittent Catheterization - Urethral: 325 mL  Total OUT: 715 mL    Total NET: 325 mL      29 Aug 2020 07:01  -  29 Aug 2020 09:06  --------------------------------------------------------  IN:  Total IN: 0 mL    OUT:    Chest Tube: 10 mL  Total OUT: 10 mL    Total NET: -10 mL          Physical Exam  General: NAD, resting comfortably in bed  C/V: NSR  Pulm: Nonlabored breathing, no respiratory distress, left sided chest tube in place w/ sanguinous output  Abd: soft, non-tender, non-distended, incision w/ dressing clean and dry  Extrem: WWP, no edema,  Neuro: no focal deficits, normal sensation  Pulses: palpable distal pulses    LABS:                        7.6    11.55 )-----------( 155      ( 29 Aug 2020 05:47 )             23.0     08-29    134<L>  |  104  |  20  ----------------------------<  84  3.8   |  21<L>  |  1.50<H>    Ca    8.7      29 Aug 2020 05:47  Phos  2.4     08-29  Mg     2.2     08-29    TPro  5.2<L>  /  Alb  2.5<L>  /  TBili  0.5  /  DBili  <0.2  /  AST  17  /  ALT  <5<L>  /  AlkPhos  67  08-29    PT/INR - ( 29 Aug 2020 05:47 )   PT: 34.5 sec;   INR: 3.03          PTT - ( 29 Aug 2020 05:47 )  PTT:48.0 sec      RADIOLOGY & ADDITIONAL STUDIES:  < from: Xray Chest 1 View- PORTABLE-Urgent (08.28.20 @ 18:04) >  FINDINGS: Cardiomegaly. Post sternotomy, CABG, and aortic and mitral valve replacements. There are increasing bibasilar pleural effusions. Underlying pneumonia and/or atelectasis cannot be excluded. Postsurgical changes involving the inferiorand lateral aspect of the left hemithorax. There is a suggestion of a new small left apical pneumothorax. No change in left-sided chest tube placement.    IMPRESSION:  Question new small left apical pneumothorax.  Increasing bibasilar pleural effusions.  Cardiomegaly.  Postsurgical changes as above.    < end of copied text > STATUS POST: POD # 4 s/p Open thoracoabdominal aneurysm repair     INTERVAL HPI/OVERNIGHT EVENTS: On did not pass TOV at 10 PM, Bladder scan 375 CC, Straight cath, Repeat CXR is good, HG 7.6 will transfuse 1 unit INR 3  8/28: d/c'd tony TOV@8pm  PCA d/c'd started IV pain meds, ASA 81. Ctx makin minimal - apical chest tube removed. Chest xray     SUBJECTIVE: Examined at the bedside resting comfortably. States not in much pain and breathing comfortably. Denies chest and abdominal pain. No acute complaints.     aspirin enteric coated 81 milliGRAM(s) Oral daily  carvedilol 12.5 milliGRAM(s) Oral every 12 hours  heparin   Injectable 5000 Unit(s) SubCutaneous every 8 hours      Vital Signs Last 24 Hrs  T(C): 36.8 (29 Aug 2020 06:01), Max: 36.9 (28 Aug 2020 22:33)  T(F): 98.2 (29 Aug 2020 06:01), Max: 98.5 (28 Aug 2020 22:33)  HR: 62 (29 Aug 2020 08:30) (59 - 84)  BP: 99/51 (29 Aug 2020 08:30) (94/55 - 120/61)  BP(mean): 72 (29 Aug 2020 08:30) (71 - 84)  RR: 23 (29 Aug 2020 08:30) (16 - 36)  SpO2: 94% (29 Aug 2020 08:30) (88% - 100%)  I&O's Detail    28 Aug 2020 07:01  -  29 Aug 2020 07:00  --------------------------------------------------------  IN:    IV PiggyBack: 70 mL    Oral Fluid: 720 mL    Sodium Chloride 0.9% IV Bolus: 250 mL  Total IN: 1040 mL    OUT:    Chest Tube: 40 mL    Chest Tube: 70 mL    Indwelling Catheter - Urethral: 280 mL    Intermittent Catheterization - Urethral: 325 mL  Total OUT: 715 mL    Total NET: 325 mL      29 Aug 2020 07:01  -  29 Aug 2020 09:06  --------------------------------------------------------  IN:  Total IN: 0 mL    OUT:    Chest Tube: 10 mL  Total OUT: 10 mL    Total NET: -10 mL          Physical Exam  General: NAD, resting comfortably in bed  C/V: NSR  Pulm: Nonlabored breathing, no respiratory distress, left sided chest tube in place w/ sanguinous output  Abd: soft, non-tender, non-distended, incision w/ dressing clean and dry  Extrem: WWP, no edema,  Neuro: no focal deficits, normal sensation      LABS:                        7.6    11.55 )-----------( 155      ( 29 Aug 2020 05:47 )             23.0     08-29    134<L>  |  104  |  20  ----------------------------<  84  3.8   |  21<L>  |  1.50<H>    Ca    8.7      29 Aug 2020 05:47  Phos  2.4     08-29  Mg     2.2     08-29    TPro  5.2<L>  /  Alb  2.5<L>  /  TBili  0.5  /  DBili  <0.2  /  AST  17  /  ALT  <5<L>  /  AlkPhos  67  08-29    PT/INR - ( 29 Aug 2020 05:47 )   PT: 34.5 sec;   INR: 3.03          PTT - ( 29 Aug 2020 05:47 )  PTT:48.0 sec      RADIOLOGY & ADDITIONAL STUDIES:  < from: Xray Chest 1 View- PORTABLE-Urgent (08.28.20 @ 18:04) >  FINDINGS: Cardiomegaly. Post sternotomy, CABG, and aortic and mitral valve replacements. There are increasing bibasilar pleural effusions. Underlying pneumonia and/or atelectasis cannot be excluded. Postsurgical changes involving the inferiorand lateral aspect of the left hemithorax. There is a suggestion of a new small left apical pneumothorax. No change in left-sided chest tube placement.    IMPRESSION:  Question new small left apical pneumothorax.  Increasing bibasilar pleural effusions.  Cardiomegaly.  Postsurgical changes as above.    < end of copied text >

## 2020-08-29 NOTE — PROGRESS NOTE ADULT - ASSESSMENT
63 yo F w/PMHx HTN, CAD s/p PCI/CABG/ s/p Bio AVR and mitral valve annuloplasty, PAD s/p peripheral stenting with occluded Bilateral SFA/ Chronic Anemia, AAA s/p open repair, Seizure disorders and CKD III, planned for OR for aneurysm found proximal to prior graft, nephrology consulted for aron-op CKD monitoring.    # CKD stage 3, GFR 30-59 ml/min  nephrologically stable and almost baseline, with acceptable lytes, well diuresed   CXR with mild congestion but overall appears close to euvolemia  gently diurese if O2 requirement increases or UOP drops   monitor in/outs

## 2020-08-29 NOTE — PROGRESS NOTE ADULT - ASSESSMENT
63 yo F w/  PMH of HTN, CAD s/p CABG & PCI, bioAVR, PAD, hypothyroidism, seizure, chronic anemia, and AAA repair in 2015 (Dr. Sandra), CKD, now with subacute rupture contained saccular aortic aneurysm, s/p open TAAA repair involving bypasses to the celiac, SMA, b/l renal arteries (8/26). Now extubated and recovering well. 1 chest tube removed w/ stable respiratory status. Elevated INR of 3 w/ unclear source, will consider Vit K vs trend. Clinically table.     NEURO: Dilaudid PRN,  home keppra  CV: coreg 12.5bid, ASA.   PULM: nasal canula, chest tubes x 1 to wall suction. S/p apical chest tube removed on 8/28. incentive spirometry  GI/FEN: Low residue diet protonix ppx.   : Voids. has baseline CKD,   ENDO: ISS, synthroid  ID: ppx ancef x 24hrs (8/25)   PPX: hx of LE stents, NO SCDs, on  HSQ   LINES: PIVs, joon (8/25--). RIJ (8/25--)   WOUNDS/DRAINS: left side to mid abd incision. left chest tubes x 2  PT: ordered, OOB  Dispo:Poss sdu

## 2020-08-29 NOTE — PROGRESS NOTE ADULT - SUBJECTIVE AND OBJECTIVE BOX
Patient is a 64y Female seen and evaluated at bedside.   Hb 7.6 g/dl from 8.3 g/dl  plan for 1u pRBC   renally stable       Meds:  aspirin enteric coated 81 daily  carvedilol 12.5 every 12 hours  chlorhexidine 2% Cloths 1 <User Schedule>  heparin   Injectable 5000 every 8 hours  HYDROmorphone  Injectable 0.25 every 4 hours PRN  HYDROmorphone  Injectable 0.5 every 4 hours PRN  insulin lispro (HumaLOG) corrective regimen sliding scale  every 6 hours  levETIRAcetam 500 every 12 hours  levothyroxine 88 daily  pantoprazole    Tablet 40 before breakfast      T(C): , Max: 36.9 (08-28-20 @ 22:33)  T(F): , Max: 98.5 (08-28-20 @ 22:33)  HR: 65 (08-29-20 @ 12:00)  BP: 99/51 (08-29-20 @ 08:30)  BP(mean): 72 (08-29-20 @ 08:30)  RR: 28 (08-29-20 @ 12:00)  SpO2: 98% (08-29-20 @ 12:00)  Wt(kg): --    08-28 @ 07:01  -  08-29 @ 07:00  --------------------------------------------------------  IN: 1040 mL / OUT: 715 mL / NET: 325 mL    08-29 @ 07:01  -  08-29 @ 12:32  --------------------------------------------------------  IN: 490 mL / OUT: 360 mL / NET: 130 mL      PHYSICAL EXAM  General: A&Ox 3; NAD  Neck: Supple; no JVD  Respiratory: diminished BS at bases   Cardiovascular: Regular rhythm/rate; S1/S2  Gastrointestinal: Soft; ND, surgical incision under dressing c/d/i  Extremities: WWP; no edema  Neurological:  CNII-XII grossly intact; no obvious focal deficits  : Gomez draining urine       LABS:                        7.6    11.55 )-----------( 155      ( 29 Aug 2020 05:47 )             23.0     08-29    134<L>  |  104  |  20  ----------------------------<  84  3.8   |  21<L>  |  1.50<H>    Ca    8.7      29 Aug 2020 05:47  Phos  2.4     08-29  Mg     2.2     08-29    TPro  5.2<L>  /  Alb  2.5<L>  /  TBili  0.5  /  DBili  <0.2  /  AST  17  /  ALT  <5<L>  /  AlkPhos  67  08-29      PT/INR - ( 29 Aug 2020 05:47 )   PT: 34.5 sec;   INR: 3.03          PTT - ( 29 Aug 2020 05:47 )  PTT:48.0 sec          RADIOLOGY & ADDITIONAL STUDIES:    < from: Xray Chest 1 View- PORTABLE-Routine (08.29.20 @ 04:46) >    EXAM:  XR CHEST PORTABLE ROUTINE 1V                          PROCEDURE DATE:  08/29/2020          INTERPRETATION:  Clinical History: Postop    Portable examination the of chest demonstrates be status post sternotomy. Cardiomegaly. Congestion and/orinfiltrates. Subphase emphysema overlying left lower lateral chest wall. No interval change position remaining support devices in comparison to prior examination of the chest 8/28/2020.    IMPRESSION: Congestive change and/or infiltrates. Left effusion        < end of copied text >

## 2020-08-30 LAB
ALBUMIN SERPL ELPH-MCNC: 2.4 G/DL — LOW (ref 3.3–5)
ALP SERPL-CCNC: 63 U/L — SIGNIFICANT CHANGE UP (ref 40–120)
ALT FLD-CCNC: <5 U/L — LOW (ref 10–45)
ANION GAP SERPL CALC-SCNC: 12 MMOL/L — SIGNIFICANT CHANGE UP (ref 5–17)
APTT BLD: 41.7 SEC — HIGH (ref 27.5–35.5)
AST SERPL-CCNC: 18 U/L — SIGNIFICANT CHANGE UP (ref 10–40)
BILIRUB DIRECT SERPL-MCNC: 0.3 MG/DL — HIGH (ref 0–0.2)
BILIRUB INDIRECT FLD-MCNC: 0.5 MG/DL — SIGNIFICANT CHANGE UP (ref 0.2–1)
BILIRUB SERPL-MCNC: 0.8 MG/DL — SIGNIFICANT CHANGE UP (ref 0.2–1.2)
BUN SERPL-MCNC: 17 MG/DL — SIGNIFICANT CHANGE UP (ref 7–23)
CALCIUM SERPL-MCNC: 9 MG/DL — SIGNIFICANT CHANGE UP (ref 8.4–10.5)
CHLORIDE SERPL-SCNC: 102 MMOL/L — SIGNIFICANT CHANGE UP (ref 96–108)
CO2 SERPL-SCNC: 22 MMOL/L — SIGNIFICANT CHANGE UP (ref 22–31)
CREAT SERPL-MCNC: 1.44 MG/DL — HIGH (ref 0.5–1.3)
GLUCOSE BLDC GLUCOMTR-MCNC: 103 MG/DL — HIGH (ref 70–99)
GLUCOSE BLDC GLUCOMTR-MCNC: 136 MG/DL — HIGH (ref 70–99)
GLUCOSE BLDC GLUCOMTR-MCNC: 91 MG/DL — SIGNIFICANT CHANGE UP (ref 70–99)
GLUCOSE BLDC GLUCOMTR-MCNC: 95 MG/DL — SIGNIFICANT CHANGE UP (ref 70–99)
GLUCOSE SERPL-MCNC: 90 MG/DL — SIGNIFICANT CHANGE UP (ref 70–99)
HCT VFR BLD CALC: 26.5 % — LOW (ref 34.5–45)
HGB BLD-MCNC: 8.7 G/DL — LOW (ref 11.5–15.5)
INR BLD: 1.11 — SIGNIFICANT CHANGE UP (ref 0.88–1.16)
MAGNESIUM SERPL-MCNC: 1.9 MG/DL — SIGNIFICANT CHANGE UP (ref 1.6–2.6)
MCHC RBC-ENTMCNC: 27.4 PG — SIGNIFICANT CHANGE UP (ref 27–34)
MCHC RBC-ENTMCNC: 32.8 GM/DL — SIGNIFICANT CHANGE UP (ref 32–36)
MCV RBC AUTO: 83.6 FL — SIGNIFICANT CHANGE UP (ref 80–100)
NRBC # BLD: 0 /100 WBCS — SIGNIFICANT CHANGE UP (ref 0–0)
PHOSPHATE SERPL-MCNC: 2.3 MG/DL — LOW (ref 2.5–4.5)
PLATELET # BLD AUTO: 198 K/UL — SIGNIFICANT CHANGE UP (ref 150–400)
POTASSIUM SERPL-MCNC: 3.4 MMOL/L — LOW (ref 3.5–5.3)
POTASSIUM SERPL-SCNC: 3.4 MMOL/L — LOW (ref 3.5–5.3)
PROT SERPL-MCNC: 5.6 G/DL — LOW (ref 6–8.3)
PROTHROM AB SERPL-ACNC: 13.2 SEC — SIGNIFICANT CHANGE UP (ref 10.6–13.6)
RBC # BLD: 3.17 M/UL — LOW (ref 3.8–5.2)
RBC # FLD: 17.9 % — HIGH (ref 10.3–14.5)
SODIUM SERPL-SCNC: 136 MMOL/L — SIGNIFICANT CHANGE UP (ref 135–145)
WBC # BLD: 9.65 K/UL — SIGNIFICANT CHANGE UP (ref 3.8–10.5)
WBC # FLD AUTO: 9.65 K/UL — SIGNIFICANT CHANGE UP (ref 3.8–10.5)

## 2020-08-30 PROCEDURE — 99231 SBSQ HOSP IP/OBS SF/LOW 25: CPT

## 2020-08-30 PROCEDURE — 71045 X-RAY EXAM CHEST 1 VIEW: CPT | Mod: 26

## 2020-08-30 PROCEDURE — 99232 SBSQ HOSP IP/OBS MODERATE 35: CPT | Mod: GC

## 2020-08-30 RX ORDER — POTASSIUM CHLORIDE 20 MEQ
40 PACKET (EA) ORAL ONCE
Refills: 0 | Status: COMPLETED | OUTPATIENT
Start: 2020-08-30 | End: 2020-08-30

## 2020-08-30 RX ORDER — POTASSIUM CHLORIDE 20 MEQ
40 PACKET (EA) ORAL ONCE
Refills: 0 | Status: DISCONTINUED | OUTPATIENT
Start: 2020-08-30 | End: 2020-08-30

## 2020-08-30 RX ORDER — SODIUM,POTASSIUM PHOSPHATES 278-250MG
1 POWDER IN PACKET (EA) ORAL ONCE
Refills: 0 | Status: COMPLETED | OUTPATIENT
Start: 2020-08-30 | End: 2020-08-30

## 2020-08-30 RX ADMIN — LEVETIRACETAM 500 MILLIGRAM(S): 250 TABLET, FILM COATED ORAL at 05:37

## 2020-08-30 RX ADMIN — HYDROMORPHONE HYDROCHLORIDE 0.5 MILLIGRAM(S): 2 INJECTION INTRAMUSCULAR; INTRAVENOUS; SUBCUTANEOUS at 09:50

## 2020-08-30 RX ADMIN — HYDROMORPHONE HYDROCHLORIDE 0.5 MILLIGRAM(S): 2 INJECTION INTRAMUSCULAR; INTRAVENOUS; SUBCUTANEOUS at 18:03

## 2020-08-30 RX ADMIN — LEVETIRACETAM 500 MILLIGRAM(S): 250 TABLET, FILM COATED ORAL at 18:02

## 2020-08-30 RX ADMIN — HYDROMORPHONE HYDROCHLORIDE 0.5 MILLIGRAM(S): 2 INJECTION INTRAMUSCULAR; INTRAVENOUS; SUBCUTANEOUS at 05:36

## 2020-08-30 RX ADMIN — HEPARIN SODIUM 5000 UNIT(S): 5000 INJECTION INTRAVENOUS; SUBCUTANEOUS at 21:08

## 2020-08-30 RX ADMIN — HEPARIN SODIUM 5000 UNIT(S): 5000 INJECTION INTRAVENOUS; SUBCUTANEOUS at 13:30

## 2020-08-30 RX ADMIN — CARVEDILOL PHOSPHATE 12.5 MILLIGRAM(S): 80 CAPSULE, EXTENDED RELEASE ORAL at 05:37

## 2020-08-30 RX ADMIN — PANTOPRAZOLE SODIUM 40 MILLIGRAM(S): 20 TABLET, DELAYED RELEASE ORAL at 06:01

## 2020-08-30 RX ADMIN — HYDROMORPHONE HYDROCHLORIDE 0.5 MILLIGRAM(S): 2 INJECTION INTRAMUSCULAR; INTRAVENOUS; SUBCUTANEOUS at 13:30

## 2020-08-30 RX ADMIN — HYDROMORPHONE HYDROCHLORIDE 0.5 MILLIGRAM(S): 2 INJECTION INTRAMUSCULAR; INTRAVENOUS; SUBCUTANEOUS at 06:01

## 2020-08-30 RX ADMIN — Medication 40 MILLIEQUIVALENT(S): at 07:15

## 2020-08-30 RX ADMIN — HYDROMORPHONE HYDROCHLORIDE 0.5 MILLIGRAM(S): 2 INJECTION INTRAMUSCULAR; INTRAVENOUS; SUBCUTANEOUS at 00:57

## 2020-08-30 RX ADMIN — HYDROMORPHONE HYDROCHLORIDE 0.5 MILLIGRAM(S): 2 INJECTION INTRAMUSCULAR; INTRAVENOUS; SUBCUTANEOUS at 17:45

## 2020-08-30 RX ADMIN — HEPARIN SODIUM 5000 UNIT(S): 5000 INJECTION INTRAVENOUS; SUBCUTANEOUS at 05:37

## 2020-08-30 RX ADMIN — HYDROMORPHONE HYDROCHLORIDE 0.5 MILLIGRAM(S): 2 INJECTION INTRAMUSCULAR; INTRAVENOUS; SUBCUTANEOUS at 13:15

## 2020-08-30 RX ADMIN — HYDROMORPHONE HYDROCHLORIDE 1 MILLIGRAM(S): 2 INJECTION INTRAMUSCULAR; INTRAVENOUS; SUBCUTANEOUS at 21:07

## 2020-08-30 RX ADMIN — Medication 145 MILLIGRAM(S): at 18:02

## 2020-08-30 RX ADMIN — HYDROMORPHONE HYDROCHLORIDE 0.5 MILLIGRAM(S): 2 INJECTION INTRAMUSCULAR; INTRAVENOUS; SUBCUTANEOUS at 09:25

## 2020-08-30 RX ADMIN — Medication 88 MICROGRAM(S): at 05:37

## 2020-08-30 RX ADMIN — CARVEDILOL PHOSPHATE 12.5 MILLIGRAM(S): 80 CAPSULE, EXTENDED RELEASE ORAL at 18:02

## 2020-08-30 RX ADMIN — HYDROMORPHONE HYDROCHLORIDE 0.5 MILLIGRAM(S): 2 INJECTION INTRAMUSCULAR; INTRAVENOUS; SUBCUTANEOUS at 01:15

## 2020-08-30 RX ADMIN — Medication 81 MILLIGRAM(S): at 11:29

## 2020-08-30 RX ADMIN — HYDROMORPHONE HYDROCHLORIDE 1 MILLIGRAM(S): 2 INJECTION INTRAMUSCULAR; INTRAVENOUS; SUBCUTANEOUS at 21:30

## 2020-08-30 RX ADMIN — CHLORHEXIDINE GLUCONATE 1 APPLICATION(S): 213 SOLUTION TOPICAL at 05:37

## 2020-08-30 RX ADMIN — Medication 1 PACKET(S): at 07:15

## 2020-08-30 NOTE — PROGRESS NOTE ADULT - ASSESSMENT
65 yo F w/PMHx HTN, CAD s/p PCI/CABG/ s/p Bio AVR and mitral valve annuloplasty, PAD s/p peripheral stenting with occluded Bilateral SFA/ Chronic Anemia, AAA s/p open repair, Seizure disorders and CKD III, planned for OR for aneurysm found proximal to prior graft, nephrology consulted for aron-op CKD monitoring.    # CKD stage 3, GFR 30-59 ml/min  nephrologically stable and almost baseline w adequate urine output   CXR with mild congestion but overall appears close to euvolemia  gently diurese if O2 requirement increases or UOP drops   potassium at 3.4, supplemented   monitor in/outs  Will follow

## 2020-08-30 NOTE — PROGRESS NOTE ADULT - SUBJECTIVE AND OBJECTIVE BOX
Subjective: Patient was seen and evaluated by the vascular surgery team in the ICU this AM. Reports that she is feeling fine and has been more comfortable today. No concerns of pain. Minimal abdominal pain.       MEDICATIONS  (STANDING):  aspirin enteric coated 81 milliGRAM(s) Oral daily  carvedilol 12.5 milliGRAM(s) Oral every 12 hours  chlorhexidine 2% Cloths 1 Application(s) Topical <User Schedule>  fenofibrate Tablet 145 milliGRAM(s) Oral daily  heparin   Injectable 5000 Unit(s) SubCutaneous every 8 hours  insulin lispro (HumaLOG) corrective regimen sliding scale   SubCutaneous every 6 hours  levETIRAcetam 500 milliGRAM(s) Oral every 12 hours  levothyroxine 88 MICROGram(s) Oral daily  pantoprazole    Tablet 40 milliGRAM(s) Oral before breakfast    MEDICATIONS  (PRN):  HYDROmorphone  Injectable 0.5 milliGRAM(s) IV Push every 4 hours PRN Moderate Pain (4 - 6)  HYDROmorphone  Injectable 1 milliGRAM(s) IV Push every 4 hours PRN Severe Pain (7 - 10)      Allergies    No Known Allergies    Intolerances    Crestor (Other (Mod to Severe))        Vital Signs Last 24 Hrs  T(C): 36.7 (30 Aug 2020 14:02), Max: 36.9 (30 Aug 2020 01:16)  T(F): 98.1 (30 Aug 2020 14:02), Max: 98.5 (30 Aug 2020 01:16)  HR: 71 (30 Aug 2020 15:00) (57 - 71)  BP: 124/60 (30 Aug 2020 14:00) (111/54 - 145/67)  BP(mean): 86 (30 Aug 2020 14:00) (78 - 97)  RR: 20 (30 Aug 2020 15:00) (14 - 39)  SpO2: 99% (30 Aug 2020 15:00) (94% - 100%)  CAPILLARY BLOOD GLUCOSE      POCT Blood Glucose.: 103 mg/dL (30 Aug 2020 11:21)  POCT Blood Glucose.: 95 mg/dL (30 Aug 2020 05:25)  POCT Blood Glucose.: 100 mg/dL (29 Aug 2020 22:38)  POCT Blood Glucose.: 127 mg/dL (29 Aug 2020 16:47)      08-29 @ 07:01  -  08-30 @ 07:00  --------------------------------------------------------  IN: 1230 mL / OUT: 2950 mL / NET: -1720 mL    08-30 @ 07:01  -  08-30 @ 15:09  --------------------------------------------------------  IN: 240 mL / OUT: 600 mL / NET: -360 mL        Physical Exam:  General: NAD, resting comfortably in bed  Pulmonary: Equal and bilateral chest rise  Cardiovascular: NSR  Abdominal: soft, left-sided tenderness, dressing in place C/D/I, no rebound tenderness, rigidity, guarding  Extremities: WWP, bilateral legs appear warm and well perfused without edema, erythema      LABS:                        8.7    9.65  )-----------( 198      ( 30 Aug 2020 05:34 )             26.5     08-30    136  |  102  |  17  ----------------------------<  90  3.4<L>   |  22  |  1.44<H>    Ca    9.0      30 Aug 2020 05:34  Phos  2.3     08-30  Mg     1.9     08-30    TPro  5.6<L>  /  Alb  2.4<L>  /  TBili  0.8  /  DBili  0.3<H>  /  AST  18  /  ALT  <5<L>  /  AlkPhos  63  08-30    PT/INR - ( 30 Aug 2020 05:34 )   PT: 13.2 sec;   INR: 1.11          PTT - ( 30 Aug 2020 05:34 )  PTT:41.7 sec    Assessment:  65 yo F who is now post-op day 4 from an open thoracoabdominal aneurysm repair, with 20mm tube graft and 6mm bypasses to the celiac, SMA, left renal, right renal (end to end). She is recovering in SICU appropriately.    Plan/Recommendations:  -Discontinue A-line  -Discontinue TLC  -Monitor intake and output  -Stepdown to 8

## 2020-08-30 NOTE — PROGRESS NOTE ADULT - SUBJECTIVE AND OBJECTIVE BOX
PAST MEDICAL & SURGICAL HISTORY:  Seizure  Essential hypertension: HTN (hypertension)  Gastroesophageal reflux disease: GERD (gastroesophageal reflux disease)  Hyperlipidemia: Hyperlipidemia  Anemia: Anemia  Hypothyroidism: Hypothyroidism  Atherosclerosis of coronary artery: CAD (coronary artery disease)  Peripheral vascular disease: PVD (peripheral vascular disease)  H/O aortic valve replacement: Bioprosthetic  Atherosclerosis of coronary artery bypass graft(s), unspecified, with angina pectoris with documented spasm  Status post aorto-coronary artery bypass graft: 2012      Home Medications:  amLODIPine 2.5 mg oral tablet: 1 tab(s) orally once a day (20 Aug 2020 14:53)  atenolol 25 mg oral tablet: 1 tab(s) orally once a day (20 Aug 2020 14:53)  cilostazol 50 mg oral tablet: 1 tab(s) orally 2 times a day (20 Aug 2020 14:53)  folic acid 1 mg oral tablet: 1 tab(s) orally once a day (20 Aug 2020 14:53)  Keppra 500 mg oral tablet: 1 tab(s) orally 2 times a day (20 Aug 2020 14:53)  levothyroxine 88 mcg (0.088 mg) oral tablet: 1 tab(s) orally once a day (20 Aug 2020 14:53)  sertraline 50 mg oral tablet: 1 tab(s) orally once a day (at bedtime) (20 Aug 2020 14:53)      MEDICATIONS  (STANDING):  aspirin enteric coated 81 milliGRAM(s) Oral daily  carvedilol 12.5 milliGRAM(s) Oral every 12 hours  chlorhexidine 2% Cloths 1 Application(s) Topical <User Schedule>  fenofibrate Tablet 145 milliGRAM(s) Oral daily  heparin   Injectable 5000 Unit(s) SubCutaneous every 8 hours  insulin lispro (HumaLOG) corrective regimen sliding scale   SubCutaneous every 6 hours  levETIRAcetam 500 milliGRAM(s) Oral every 12 hours  levothyroxine 88 MICROGram(s) Oral daily  pantoprazole    Tablet 40 milliGRAM(s) Oral before breakfast    MEDICATIONS  (PRN):  HYDROmorphone  Injectable 0.5 milliGRAM(s) IV Push every 4 hours PRN Moderate Pain (4 - 6)  HYDROmorphone  Injectable 1 milliGRAM(s) IV Push every 4 hours PRN Severe Pain (7 - 10)    .  VITAL SIGNS:  T(C): 36.7 (08-30-20 @ 10:30), Max: 36.9 (08-30-20 @ 01:16)  T(F): 98 (08-30-20 @ 10:30), Max: 98.5 (08-30-20 @ 01:16)  HR: 61 (08-30-20 @ 12:00) (57 - 71)  BP: 131/60 (08-30-20 @ 12:00) (111/54 - 145/67)  BP(mean): 86 (08-30-20 @ 12:00) (78 - 97)  RR: 20 (08-30-20 @ 12:00) (14 - 39)  SpO2: 99% (08-30-20 @ 12:00) (94% - 100%)  Wt(kg): --    PHYSICAL EXAM: INCOMPLETE     Constitutional: WDWN resting comfortably in bed; NAD  Head: NC/AT  Eyes: PERRL, EOMI, anicteric sclera  ENT: no nasal discharge; uvula midline, no oropharyngeal erythema or exudates; MMM  Neck: supple; no JVD or thyromegaly  Respiratory: CTA B/L; no W/R/R, no retractions  Cardiac: +S1/S2; RRR; no M/R/G; PMI non-displaced  Gastrointestinal: soft, NT/ND; no rebound or guarding; +BSx4  Genitourinary: normal external genitalia  Back: spine midline, no bony tenderness or step-offs; no CVAT B/L  Extremities: WWP, no clubbing or cyanosis; no peripheral edema  Musculoskeletal: NROM x4; no joint swelling, tenderness or erythema  Vascular: 2+ radial, femoral, DP/PT pulses B/L  Dermatologic: skin warm, dry and intact; no rashes, wounds, or scars  Lymphatic: no submandibular or cervical LAD  Neurologic: AAOx3; CNII-XII grossly intact; no focal deficits  Psychiatric: affect and characteristics of appearance, verbalizations, behaviors are appropriate      .  LABS:                         8.7    9.65  )-----------( 198      ( 30 Aug 2020 05:34 )             26.5     08-30    136  |  102  |  17  ----------------------------<  90  3.4<L>   |  22  |  1.44<H>    Ca    9.0      30 Aug 2020 05:34  Phos  2.3     08-30  Mg     1.9     08-30    TPro  5.6<L>  /  Alb  2.4<L>  /  TBili  0.8  /  DBili  0.3<H>  /  AST  18  /  ALT  <5<L>  /  AlkPhos  63  08-30    PT/INR - ( 30 Aug 2020 05:34 )   PT: 13.2 sec;   INR: 1.11          PTT - ( 30 Aug 2020 05:34 )  PTT:41.7 sec              RADIOLOGY, EKG & ADDITIONAL TESTS: Reviewed.       < from: TTE Echo Complete w/o Contrast w/ Doppler (08.21.20 @ 11:48) >  CONCLUSIONS:     1. There is mild concentric left ventricular hypertrophy. The left ventricle is normal in size, wall thickness, and systolic function with a calculated ejection fraction of 65%. Abnormal septal motion noted.   2. Severely dilated left atrium.   3. Bioprosthetic valve is seen in the aortic position. Peak transvalvular velocity is 3.85 m/s, the mean transvalvular gradient is 31.00 mmHg, and the LVOT/AV velocity ratio is 0.36.   4. There is mild transvalvular aortic regurgitation. There is trace paravalvular aortic regurgitation.   5. An annuloplasty ring is noted in the mitral position. The mean transvalvular gradient is 13.00 mmHg at a heart rate of 81 bpm. Mild mitral regurgitation.   6. There is moderate-to-severe tricuspid regurgitation.   7. Pulmonary hypertension present, pulmonary artery systolic pressure is 48 mmHg.   8. There is trace pulmonic regurgitation.   9. No pericardial effusion.  10. The aortic root is normal in size.  11. Compared to the previous TTE performed on 8/5/2019, higher mean gradient across mitral valve at a higher heart rate and gradients across bioprosthetic aortic valve is unchanged.    < end of copied text >    < from: CT Angio Cardiac w/ IV Cont (08.21.20 @ 13:11) >  IMPRESSION: 1. Severe atherosclerotic disease, with large amount of ulcerated soft plaque in the descending aorta and probable penetrating atherosclerotic ulcers as well.    2. Saccular aneurysms of the descending aorta, largest measuring 3.7 cm distally.    3. Small bilateral pleural effusions.    4. Mild emphysema.    5. Mild thoracic lymphadenopathy, which could be seen in patients with heart failure.    < end of copied text > HPI: Patient seen and examined at bedside. She is doing well, denies chest pain, palpitation, dizziness. Reports milld back discomfort but states she fell overall better. Tele reviewed. Pt in NSR with frequent PACs    PAST MEDICAL & SURGICAL HISTORY:  Seizure  Essential hypertension: HTN (hypertension)  Gastroesophageal reflux disease: GERD (gastroesophageal reflux disease)  Hyperlipidemia: Hyperlipidemia  Anemia: Anemia  Hypothyroidism: Hypothyroidism  Atherosclerosis of coronary artery: CAD (coronary artery disease)  Peripheral vascular disease: PVD (peripheral vascular disease)  H/O aortic valve replacement: Bioprosthetic  Atherosclerosis of coronary artery bypass graft(s), unspecified, with angina pectoris with documented spasm  Status post aorto-coronary artery bypass graft: 2012      Home Medications:  amLODIPine 2.5 mg oral tablet: 1 tab(s) orally once a day (20 Aug 2020 14:53)  atenolol 25 mg oral tablet: 1 tab(s) orally once a day (20 Aug 2020 14:53)  cilostazol 50 mg oral tablet: 1 tab(s) orally 2 times a day (20 Aug 2020 14:53)  folic acid 1 mg oral tablet: 1 tab(s) orally once a day (20 Aug 2020 14:53)  Keppra 500 mg oral tablet: 1 tab(s) orally 2 times a day (20 Aug 2020 14:53)  levothyroxine 88 mcg (0.088 mg) oral tablet: 1 tab(s) orally once a day (20 Aug 2020 14:53)  sertraline 50 mg oral tablet: 1 tab(s) orally once a day (at bedtime) (20 Aug 2020 14:53)      MEDICATIONS  (STANDING):  aspirin enteric coated 81 milliGRAM(s) Oral daily  carvedilol 12.5 milliGRAM(s) Oral every 12 hours  chlorhexidine 2% Cloths 1 Application(s) Topical <User Schedule>  fenofibrate Tablet 145 milliGRAM(s) Oral daily  heparin   Injectable 5000 Unit(s) SubCutaneous every 8 hours  insulin lispro (HumaLOG) corrective regimen sliding scale   SubCutaneous every 6 hours  levETIRAcetam 500 milliGRAM(s) Oral every 12 hours  levothyroxine 88 MICROGram(s) Oral daily  pantoprazole    Tablet 40 milliGRAM(s) Oral before breakfast    MEDICATIONS  (PRN):  HYDROmorphone  Injectable 0.5 milliGRAM(s) IV Push every 4 hours PRN Moderate Pain (4 - 6)  HYDROmorphone  Injectable 1 milliGRAM(s) IV Push every 4 hours PRN Severe Pain (7 - 10)    .  VITAL SIGNS:  T(C): 36.7 (08-30-20 @ 10:30), Max: 36.9 (08-30-20 @ 01:16)  T(F): 98 (08-30-20 @ 10:30), Max: 98.5 (08-30-20 @ 01:16)  HR: 61 (08-30-20 @ 12:00) (57 - 71)  BP: 131/60 (08-30-20 @ 12:00) (111/54 - 145/67)  BP(mean): 86 (08-30-20 @ 12:00) (78 - 97)  RR: 20 (08-30-20 @ 12:00) (14 - 39)  SpO2: 99% (08-30-20 @ 12:00) (94% - 100%)  Wt(kg): --    PHYSICAL EXAM:     Constitutional: WDWN resting comfortably in bed; NAD  Head: NC/AT  ENT: MMM; RIJ removed with pressure dressing in place  Neck: supple; no JVD   Respiratory: CTA B/L; poor inspiratory effort  Cardiac: +S1/S2; Irregular RR; no M/R/G;   Gastrointestinal: soft, NT/ND; no rebound or guarding; +BS  Extremities: WWP, no clubbing or cyanosis; no peripheral edema  Vascular: 2+ radial,  DP/PT pulses B/L  Neurologic: AAOx3;  no focal deficits        .  LABS:                         8.7    9.65  )-----------( 198      ( 30 Aug 2020 05:34 )             26.5     08-30    136  |  102  |  17  ----------------------------<  90  3.4<L>   |  22  |  1.44<H>    Ca    9.0      30 Aug 2020 05:34  Phos  2.3     08-30  Mg     1.9     08-30    TPro  5.6<L>  /  Alb  2.4<L>  /  TBili  0.8  /  DBili  0.3<H>  /  AST  18  /  ALT  <5<L>  /  AlkPhos  63  08-30    PT/INR - ( 30 Aug 2020 05:34 )   PT: 13.2 sec;   INR: 1.11          PTT - ( 30 Aug 2020 05:34 )  PTT:41.7 sec              RADIOLOGY, EKG & ADDITIONAL TESTS: Reviewed.       < from: TTE Echo Complete w/o Contrast w/ Doppler (08.21.20 @ 11:48) >  CONCLUSIONS:     1. There is mild concentric left ventricular hypertrophy. The left ventricle is normal in size, wall thickness, and systolic function with a calculated ejection fraction of 65%. Abnormal septal motion noted.   2. Severely dilated left atrium.   3. Bioprosthetic valve is seen in the aortic position. Peak transvalvular velocity is 3.85 m/s, the mean transvalvular gradient is 31.00 mmHg, and the LVOT/AV velocity ratio is 0.36.   4. There is mild transvalvular aortic regurgitation. There is trace paravalvular aortic regurgitation.   5. An annuloplasty ring is noted in the mitral position. The mean transvalvular gradient is 13.00 mmHg at a heart rate of 81 bpm. Mild mitral regurgitation.   6. There is moderate-to-severe tricuspid regurgitation.   7. Pulmonary hypertension present, pulmonary artery systolic pressure is 48 mmHg.   8. There is trace pulmonic regurgitation.   9. No pericardial effusion.  10. The aortic root is normal in size.  11. Compared to the previous TTE performed on 8/5/2019, higher mean gradient across mitral valve at a higher heart rate and gradients across bioprosthetic aortic valve is unchanged.    < end of copied text >    < from: CT Angio Cardiac w/ IV Cont (08.21.20 @ 13:11) >  IMPRESSION: 1. Severe atherosclerotic disease, with large amount of ulcerated soft plaque in the descending aorta and probable penetrating atherosclerotic ulcers as well.    2. Saccular aneurysms of the descending aorta, largest measuring 3.7 cm distally.    3. Small bilateral pleural effusions.    4. Mild emphysema.    5. Mild thoracic lymphadenopathy, which could be seen in patients with heart failure.    < end of copied text > HPI: Patient seen and examined at bedside. She is doing well, denies chest pain, palpitation, dizziness. Reports milld back discomfort but states she fell overall better. Tele reviewed. Pt in NSR with frequent PACs    PAST MEDICAL & SURGICAL HISTORY:  Seizure  Essential hypertension: HTN (hypertension)  Gastroesophageal reflux disease: GERD (gastroesophageal reflux disease)  Hyperlipidemia: Hyperlipidemia  Anemia: Anemia  Hypothyroidism: Hypothyroidism  Atherosclerosis of coronary artery: CAD (coronary artery disease)  Peripheral vascular disease: PVD (peripheral vascular disease)  H/O aortic valve replacement: Bioprosthetic  Atherosclerosis of coronary artery bypass graft(s), unspecified, with angina pectoris with documented spasm  Status post aorto-coronary artery bypass graft: 2012      Home Medications:  amLODIPine 2.5 mg oral tablet: 1 tab(s) orally once a day (20 Aug 2020 14:53)  atenolol 25 mg oral tablet: 1 tab(s) orally once a day (20 Aug 2020 14:53)  cilostazol 50 mg oral tablet: 1 tab(s) orally 2 times a day (20 Aug 2020 14:53)  folic acid 1 mg oral tablet: 1 tab(s) orally once a day (20 Aug 2020 14:53)  Keppra 500 mg oral tablet: 1 tab(s) orally 2 times a day (20 Aug 2020 14:53)  levothyroxine 88 mcg (0.088 mg) oral tablet: 1 tab(s) orally once a day (20 Aug 2020 14:53)  sertraline 50 mg oral tablet: 1 tab(s) orally once a day (at bedtime) (20 Aug 2020 14:53)      MEDICATIONS  (STANDING):  aspirin enteric coated 81 milliGRAM(s) Oral daily  carvedilol 12.5 milliGRAM(s) Oral every 12 hours  chlorhexidine 2% Cloths 1 Application(s) Topical <User Schedule>  fenofibrate Tablet 145 milliGRAM(s) Oral daily  heparin   Injectable 5000 Unit(s) SubCutaneous every 8 hours  insulin lispro (HumaLOG) corrective regimen sliding scale   SubCutaneous every 6 hours  levETIRAcetam 500 milliGRAM(s) Oral every 12 hours  levothyroxine 88 MICROGram(s) Oral daily  pantoprazole    Tablet 40 milliGRAM(s) Oral before breakfast    MEDICATIONS  (PRN):  HYDROmorphone  Injectable 0.5 milliGRAM(s) IV Push every 4 hours PRN Moderate Pain (4 - 6)  HYDROmorphone  Injectable 1 milliGRAM(s) IV Push every 4 hours PRN Severe Pain (7 - 10)    .  VITAL SIGNS:  T(C): 36.7 (08-30-20 @ 10:30), Max: 36.9 (08-30-20 @ 01:16)  T(F): 98 (08-30-20 @ 10:30), Max: 98.5 (08-30-20 @ 01:16)  HR: 61 (08-30-20 @ 12:00) (57 - 71)  BP: 131/60 (08-30-20 @ 12:00) (111/54 - 145/67)  BP(mean): 86 (08-30-20 @ 12:00) (78 - 97)  RR: 20 (08-30-20 @ 12:00) (14 - 39)  SpO2: 99% (08-30-20 @ 12:00) (94% - 100%)  Wt(kg): --    PHYSICAL EXAM:     Constitutional: WDWN resting comfortably in bed; NAD  Head: NC/AT  ENT: MMM; RIJ removed with pressure dressing in place  Neck: supple; no JVD   Respiratory: CTA B/L; poor inspiratory effort  Cardiac: +S1/S2; Irregular RR; III/VI systolic murmur heard troughout pericardium   Gastrointestinal: soft, NT/ND; no rebound or guarding; +BS  Extremities: WWP, no clubbing or cyanosis; no peripheral edema  Vascular: 2+ radial,  DP/PT pulses B/L  Neurologic: AAOx3;  no focal deficits        .  LABS:                         8.7    9.65  )-----------( 198      ( 30 Aug 2020 05:34 )             26.5     08-30    136  |  102  |  17  ----------------------------<  90  3.4<L>   |  22  |  1.44<H>    Ca    9.0      30 Aug 2020 05:34  Phos  2.3     08-30  Mg     1.9     08-30    TPro  5.6<L>  /  Alb  2.4<L>  /  TBili  0.8  /  DBili  0.3<H>  /  AST  18  /  ALT  <5<L>  /  AlkPhos  63  08-30    PT/INR - ( 30 Aug 2020 05:34 )   PT: 13.2 sec;   INR: 1.11          PTT - ( 30 Aug 2020 05:34 )  PTT:41.7 sec              RADIOLOGY, EKG & ADDITIONAL TESTS: Reviewed.       < from: TTE Echo Complete w/o Contrast w/ Doppler (08.21.20 @ 11:48) >  CONCLUSIONS:     1. There is mild concentric left ventricular hypertrophy. The left ventricle is normal in size, wall thickness, and systolic function with a calculated ejection fraction of 65%. Abnormal septal motion noted.   2. Severely dilated left atrium.   3. Bioprosthetic valve is seen in the aortic position. Peak transvalvular velocity is 3.85 m/s, the mean transvalvular gradient is 31.00 mmHg, and the LVOT/AV velocity ratio is 0.36.   4. There is mild transvalvular aortic regurgitation. There is trace paravalvular aortic regurgitation.   5. An annuloplasty ring is noted in the mitral position. The mean transvalvular gradient is 13.00 mmHg at a heart rate of 81 bpm. Mild mitral regurgitation.   6. There is moderate-to-severe tricuspid regurgitation.   7. Pulmonary hypertension present, pulmonary artery systolic pressure is 48 mmHg.   8. There is trace pulmonic regurgitation.   9. No pericardial effusion.  10. The aortic root is normal in size.  11. Compared to the previous TTE performed on 8/5/2019, higher mean gradient across mitral valve at a higher heart rate and gradients across bioprosthetic aortic valve is unchanged.    < end of copied text >    < from: CT Angio Cardiac w/ IV Cont (08.21.20 @ 13:11) >  IMPRESSION: 1. Severe atherosclerotic disease, with large amount of ulcerated soft plaque in the descending aorta and probable penetrating atherosclerotic ulcers as well.    2. Saccular aneurysms of the descending aorta, largest measuring 3.7 cm distally.    3. Small bilateral pleural effusions.    4. Mild emphysema.    5. Mild thoracic lymphadenopathy, which could be seen in patients with heart failure.    < end of copied text >

## 2020-08-30 NOTE — PROGRESS NOTE ADULT - SUBJECTIVE AND OBJECTIVE BOX
tony removed, passed TOV overnight now voiding well on her own  nonoliguric w stable renal fx       Meds:  aspirin enteric coated 81 daily  carvedilol 12.5 every 12 hours  chlorhexidine 2% Cloths 1 <User Schedule>  fenofibrate Tablet 145 daily  heparin   Injectable 5000 every 8 hours  HYDROmorphone  Injectable 0.5 every 4 hours PRN  HYDROmorphone  Injectable 1 every 4 hours PRN  insulin lispro (HumaLOG) corrective regimen sliding scale  every 6 hours  levETIRAcetam 500 every 12 hours  levothyroxine 88 daily  pantoprazole    Tablet 40 before breakfast      T(C): , Max: 36.9 (08-30-20 @ 01:16)  T(F): , Max: 98.5 (08-30-20 @ 01:16)  HR: 62 (08-30-20 @ 13:00)  BP: 145/67 (08-30-20 @ 13:00)  BP(mean): 97 (08-30-20 @ 13:00)  RR: 19 (08-30-20 @ 13:00)  SpO2: 97% (08-30-20 @ 13:00)  Wt(kg): --    08-29 @ 07:01  -  08-30 @ 07:00  --------------------------------------------------------  IN: 1230 mL / OUT: 2950 mL / NET: -1720 mL    08-30 @ 07:01  -  08-30 @ 13:26  --------------------------------------------------------  IN: 240 mL / OUT: 600 mL / NET: -360 mL    General: A&Ox 3; NAD  Neck: Supple; no JVD  Respiratory: diminished BS at bases   Cardiovascular: Regular rhythm/rate; S1/S2  Gastrointestinal: Soft; ND, surgical incision under dressing c/d/i  Extremities: WWP; no edema  Neurological: no obvious focal deficits      LABS:                        8.7    9.65  )-----------( 198      ( 30 Aug 2020 05:34 )             26.5     08-30    136  |  102  |  17  ----------------------------<  90  3.4<L>   |  22  |  1.44<H>    Ca    9.0      30 Aug 2020 05:34  Phos  2.3     08-30  Mg     1.9     08-30    TPro  5.6<L>  /  Alb  2.4<L>  /  TBili  0.8  /  DBili  0.3<H>  /  AST  18  /  ALT  <5<L>  /  AlkPhos  63  08-30      PT/INR - ( 30 Aug 2020 05:34 )   PT: 13.2 sec;   INR: 1.11          PTT - ( 30 Aug 2020 05:34 )  PTT:41.7 sec          RADIOLOGY & ADDITIONAL STUDIES:    < from: Xray Chest 1 View- PORTABLE-Routine (08.30.20 @ 04:05) >    EXAM:  XR CHEST PORTABLE ROUTINE 1V                          PROCEDURE DATE:  08/30/2020          INTERPRETATION:  Clinical History: Palpitations    Portable examination of the chest demonstrates congestion and/or infiltrates. Left effusion. No interval change this remaining support devices in comparison to prior examination of the chest 8/29/2020. Status post sternotomy and valvular replacement    IMPRESSION: Congestion and/or infiltrates. Left effusion    < end of copied text >

## 2020-08-30 NOTE — PROGRESS NOTE ADULT - SUBJECTIVE AND OBJECTIVE BOX
Interval Events:  Passed TOV overnight now voiding well on her own.   Patient seen and examined at bedside.      Allergies    No Known Allergies    Intolerances    Crestor (Other (Mod to Severe))      Vital Signs Last 24 Hrs  T(C): 36.7 (30 Aug 2020 05:28), Max: 36.9 (30 Aug 2020 01:16)  T(F): 98 (30 Aug 2020 05:28), Max: 98.5 (30 Aug 2020 01:16)  HR: 62 (30 Aug 2020 08:00) (57 - 71)  BP: 128/59 (30 Aug 2020 07:00) (111/57 - 145/67)  BP(mean): 85 (30 Aug 2020 07:00) (80 - 97)  RR: 24 (30 Aug 2020 08:00) (14 - 39)  SpO2: 99% (30 Aug 2020 08:00) (94% - 100%)    08-29 @ 07:01  -  08-30 @ 07:00  --------------------------------------------------------  IN: 1230 mL / OUT: 2950 mL / NET: -1720 mL      08-29 @ 07:01  -  08-30 @ 07:00  --------------------------------------------------------  IN: 1230 mL / OUT: 2950 mL / NET: -1720 mL        Physical Exam:     Gen: NAD   Neuro: A&OX3 No deficits  CV:RRR Reg s1s2 noM  Pulm: CTA b/l No w/r/r +rales and decreased BS in bases b/l    Abd: Soft NT ND + BS midline to left flank incision c/d/i with dressing in place. + Ctx dressing x2 in place no d/c.   Ext: No C/C/E   Vasc: + DP b/l   Skin: no rashes noted  MSK: No joint swelling  Psych: No signs of anxiety or depression      LABS:      CBC Full  -  ( 30 Aug 2020 05:34 )  WBC Count : 9.65 K/uL  RBC Count : 3.17 M/uL  Hemoglobin : 8.7 g/dL  Hematocrit : 26.5 %  Platelet Count - Automated : 198 K/uL  Mean Cell Volume : 83.6 fl  Mean Cell Hemoglobin : 27.4 pg  Mean Cell Hemoglobin Concentration : 32.8 gm/dL  Auto Neutrophil # : x  Auto Lymphocyte # : x  Auto Monocyte # : x  Auto Eosinophil # : x  Auto Basophil # : x  Auto Neutrophil % : x  Auto Lymphocyte % : x  Auto Monocyte % : x  Auto Eosinophil % : x  Auto Basophil % : x    08-30    136  |  102  |  17  ----------------------------<  90  3.4<L>   |  22  |  1.44<H>    Ca    9.0      30 Aug 2020 05:34  Phos  2.3     08-30  Mg     1.9     08-30    TPro  5.6<L>  /  Alb  2.4<L>  /  TBili  0.8  /  DBili  0.3<H>  /  AST  18  /  ALT  <5<L>  /  AlkPhos  63  08-30    PT/INR - ( 30 Aug 2020 05:34 )   PT: 13.2 sec;   INR: 1.11          PTT - ( 30 Aug 2020 05:34 )  PTT:41.7 sec                RADIOLOGY & ADDITIONAL STUDIES (The following images were personally reviewed):          A/p: 65 yo F w/  PMH of HTN, CAD s/p CABG & PCI, bioAVR, PAD, hypothyroidism, seizure, chronic anemia, and AAA repair in 2015 (Dr. Sandra), CKD, now with subacute rupture contained saccular aortic aneurysm, s/p open TAAA repair involving bypasses to the celiac, SMA, b/l renal arteries (8/26). Now extubated and recovering well.    NEURO: Dilaudid PRN,  Hx of Seizure home keppra  CV: coreg 12.5bid, ASA. Fenofibrate  PULM: nasal canula, S/p ctx x2  apical removed on 8/28, Meidal removed on 8/29. incentive spirometry  GI/FEN: DASH diet protonix ppx.   : Voids. has baseline CKD,   ENDO: ISS, synthroid  ID: ppx ancef x 24hrs (8/25)   PPX: hx of LE stents, NO SCDs, on  HSQ   LINES: PIVs, joon (8/25-8/30). Poss dc RIJ today (8/25--)   WOUNDS/DRAINS: left side to mid abd incision. left chest tubes x 2  PT: ordered, OOB  Dispo:Poss sdu today Interval Events:  Passed TOV overnight now voiding well on her own.   Patient seen and examined at bedside.      Allergies    No Known Allergies    Intolerances    Crestor (Other (Mod to Severe))      Vital Signs Last 24 Hrs  T(C): 36.7 (30 Aug 2020 05:28), Max: 36.9 (30 Aug 2020 01:16)  T(F): 98 (30 Aug 2020 05:28), Max: 98.5 (30 Aug 2020 01:16)  HR: 62 (30 Aug 2020 08:00) (57 - 71)  BP: 128/59 (30 Aug 2020 07:00) (111/57 - 145/67)  BP(mean): 85 (30 Aug 2020 07:00) (80 - 97)  RR: 24 (30 Aug 2020 08:00) (14 - 39)  SpO2: 99% (30 Aug 2020 08:00) (94% - 100%)    08-29 @ 07:01  -  08-30 @ 07:00  --------------------------------------------------------  IN: 1230 mL / OUT: 2950 mL / NET: -1720 mL      08-29 @ 07:01  -  08-30 @ 07:00  --------------------------------------------------------  IN: 1230 mL / OUT: 2950 mL / NET: -1720 mL        Physical Exam:     Gen: NAD   Neuro: A&OX3 No deficits  CV:RRR Reg s1s2 noM  Pulm: CTA b/l No w/r/r +rales and decreased BS in bases b/l    Abd: Soft NT ND + BS midline to left flank incision c/d/i with dressing in place. + Ctx dressing x2 in place no d/c.   Ext: No C/C/E   Vasc: + DP b/l   Skin: no rashes noted  MSK: No joint swelling  Psych: No signs of anxiety or depression      LABS:      CBC Full  -  ( 30 Aug 2020 05:34 )  WBC Count : 9.65 K/uL  RBC Count : 3.17 M/uL  Hemoglobin : 8.7 g/dL  Hematocrit : 26.5 %  Platelet Count - Automated : 198 K/uL  Mean Cell Volume : 83.6 fl  Mean Cell Hemoglobin : 27.4 pg  Mean Cell Hemoglobin Concentration : 32.8 gm/dL  Auto Neutrophil # : x  Auto Lymphocyte # : x  Auto Monocyte # : x  Auto Eosinophil # : x  Auto Basophil # : x  Auto Neutrophil % : x  Auto Lymphocyte % : x  Auto Monocyte % : x  Auto Eosinophil % : x  Auto Basophil % : x    08-30    136  |  102  |  17  ----------------------------<  90  3.4<L>   |  22  |  1.44<H>    Ca    9.0      30 Aug 2020 05:34  Phos  2.3     08-30  Mg     1.9     08-30    TPro  5.6<L>  /  Alb  2.4<L>  /  TBili  0.8  /  DBili  0.3<H>  /  AST  18  /  ALT  <5<L>  /  AlkPhos  63  08-30    PT/INR - ( 30 Aug 2020 05:34 )   PT: 13.2 sec;   INR: 1.11          PTT - ( 30 Aug 2020 05:34 )  PTT:41.7 sec                RADIOLOGY & ADDITIONAL STUDIES (The following images were personally reviewed):          A/p: 63 yo F w/  PMH of HTN, CAD s/p CABG & PCI, bioAVR, PAD, hypothyroidism, seizure, chronic anemia, and AAA repair in 2015 (Dr. Sandra), CKD, now with subacute rupture contained saccular aortic aneurysm, s/p open TAAA repair involving bypasses to the celiac, SMA, b/l renal arteries (8/26). Now extubated and recovering well.    NEURO: Dilaudid PRN,  Hx of Seizure home keppra  CV: coreg 12.5bid, ASA. Fenofibrate  PULM: nasal canula, S/p ctx x2 Apical removed on 8/28, Medial removed on 8/29. incentive spirometry  GI/FEN: DASH diet protonix ppx.   : Voids. has baseline CKD,   ENDO: ISS, synthroid  ID: ppx ancef x 24hrs (8/25)   PPX: hx of LE stents, NO SCDs, on  HSQ   LINES: PIVs, joon (8/25-8/30). Poss dc RIJ today (8/25--)   WOUNDS/DRAINS: left side to mid abd incision. left chest tubes x 2  PT: ordered, OOB  Dispo:Poss sdu today

## 2020-08-31 LAB
ANION GAP SERPL CALC-SCNC: 9 MMOL/L — SIGNIFICANT CHANGE UP (ref 5–17)
BUN SERPL-MCNC: 14 MG/DL — SIGNIFICANT CHANGE UP (ref 7–23)
CALCIUM SERPL-MCNC: 9.3 MG/DL — SIGNIFICANT CHANGE UP (ref 8.4–10.5)
CHLORIDE SERPL-SCNC: 101 MMOL/L — SIGNIFICANT CHANGE UP (ref 96–108)
CO2 SERPL-SCNC: 24 MMOL/L — SIGNIFICANT CHANGE UP (ref 22–31)
CREAT SERPL-MCNC: 1.4 MG/DL — HIGH (ref 0.5–1.3)
GLUCOSE BLDC GLUCOMTR-MCNC: 89 MG/DL — SIGNIFICANT CHANGE UP (ref 70–99)
GLUCOSE SERPL-MCNC: 83 MG/DL — SIGNIFICANT CHANGE UP (ref 70–99)
HCT VFR BLD CALC: 28.3 % — LOW (ref 34.5–45)
HGB BLD-MCNC: 9 G/DL — LOW (ref 11.5–15.5)
MAGNESIUM SERPL-MCNC: 2.1 MG/DL — SIGNIFICANT CHANGE UP (ref 1.6–2.6)
MCHC RBC-ENTMCNC: 27.3 PG — SIGNIFICANT CHANGE UP (ref 27–34)
MCHC RBC-ENTMCNC: 31.8 GM/DL — LOW (ref 32–36)
MCV RBC AUTO: 85.8 FL — SIGNIFICANT CHANGE UP (ref 80–100)
NRBC # BLD: 0 /100 WBCS — SIGNIFICANT CHANGE UP (ref 0–0)
PHOSPHATE SERPL-MCNC: 2 MG/DL — LOW (ref 2.5–4.5)
PLATELET # BLD AUTO: 243 K/UL — SIGNIFICANT CHANGE UP (ref 150–400)
POTASSIUM SERPL-MCNC: 3.9 MMOL/L — SIGNIFICANT CHANGE UP (ref 3.5–5.3)
POTASSIUM SERPL-SCNC: 3.9 MMOL/L — SIGNIFICANT CHANGE UP (ref 3.5–5.3)
PROCALCITONIN SERPL-MCNC: 0.57 NG/ML — HIGH (ref 0.02–0.1)
RBC # BLD: 3.3 M/UL — LOW (ref 3.8–5.2)
RBC # FLD: 18 % — HIGH (ref 10.3–14.5)
SODIUM SERPL-SCNC: 134 MMOL/L — LOW (ref 135–145)
SODIUM UR-SCNC: 91 MMOL/L — SIGNIFICANT CHANGE UP
WBC # BLD: 9.35 K/UL — SIGNIFICANT CHANGE UP (ref 3.8–10.5)
WBC # FLD AUTO: 9.35 K/UL — SIGNIFICANT CHANGE UP (ref 3.8–10.5)

## 2020-08-31 PROCEDURE — 71045 X-RAY EXAM CHEST 1 VIEW: CPT | Mod: 26

## 2020-08-31 PROCEDURE — 99233 SBSQ HOSP IP/OBS HIGH 50: CPT

## 2020-08-31 PROCEDURE — 99232 SBSQ HOSP IP/OBS MODERATE 35: CPT | Mod: GC

## 2020-08-31 RX ORDER — OXYCODONE AND ACETAMINOPHEN 5; 325 MG/1; MG/1
1 TABLET ORAL EVERY 4 HOURS
Refills: 0 | Status: DISCONTINUED | OUTPATIENT
Start: 2020-08-31 | End: 2020-09-04

## 2020-08-31 RX ORDER — OXYCODONE AND ACETAMINOPHEN 5; 325 MG/1; MG/1
2 TABLET ORAL EVERY 4 HOURS
Refills: 0 | Status: DISCONTINUED | OUTPATIENT
Start: 2020-08-31 | End: 2020-09-04

## 2020-08-31 RX ORDER — FUROSEMIDE 40 MG
20 TABLET ORAL ONCE
Refills: 0 | Status: DISCONTINUED | OUTPATIENT
Start: 2020-08-31 | End: 2020-08-31

## 2020-08-31 RX ORDER — ALBUTEROL 90 UG/1
2.5 AEROSOL, METERED ORAL ONCE
Refills: 0 | Status: COMPLETED | OUTPATIENT
Start: 2020-08-31 | End: 2020-08-31

## 2020-08-31 RX ORDER — FUROSEMIDE 40 MG
40 TABLET ORAL ONCE
Refills: 0 | Status: COMPLETED | OUTPATIENT
Start: 2020-08-31 | End: 2020-08-31

## 2020-08-31 RX ORDER — OMEGA-3 ACID ETHYL ESTERS 1 G
4 CAPSULE ORAL DAILY
Refills: 0 | Status: DISCONTINUED | OUTPATIENT
Start: 2020-08-31 | End: 2020-09-04

## 2020-08-31 RX ORDER — SODIUM,POTASSIUM PHOSPHATES 278-250MG
1 POWDER IN PACKET (EA) ORAL ONCE
Refills: 0 | Status: COMPLETED | OUTPATIENT
Start: 2020-08-31 | End: 2020-08-31

## 2020-08-31 RX ORDER — SODIUM,POTASSIUM PHOSPHATES 278-250MG
2 POWDER IN PACKET (EA) ORAL ONCE
Refills: 0 | Status: COMPLETED | OUTPATIENT
Start: 2020-08-31 | End: 2020-08-31

## 2020-08-31 RX ADMIN — OXYCODONE AND ACETAMINOPHEN 2 TABLET(S): 5; 325 TABLET ORAL at 19:26

## 2020-08-31 RX ADMIN — Medication 2 PACKET(S): at 10:22

## 2020-08-31 RX ADMIN — LEVETIRACETAM 500 MILLIGRAM(S): 250 TABLET, FILM COATED ORAL at 19:22

## 2020-08-31 RX ADMIN — Medication 62.5 MILLIMOLE(S): at 16:05

## 2020-08-31 RX ADMIN — CARVEDILOL PHOSPHATE 12.5 MILLIGRAM(S): 80 CAPSULE, EXTENDED RELEASE ORAL at 06:43

## 2020-08-31 RX ADMIN — HYDROMORPHONE HYDROCHLORIDE 0.5 MILLIGRAM(S): 2 INJECTION INTRAMUSCULAR; INTRAVENOUS; SUBCUTANEOUS at 03:37

## 2020-08-31 RX ADMIN — HEPARIN SODIUM 5000 UNIT(S): 5000 INJECTION INTRAVENOUS; SUBCUTANEOUS at 22:45

## 2020-08-31 RX ADMIN — Medication 40 MILLIGRAM(S): at 13:02

## 2020-08-31 RX ADMIN — Medication 88 MICROGRAM(S): at 07:55

## 2020-08-31 RX ADMIN — OXYCODONE AND ACETAMINOPHEN 2 TABLET(S): 5; 325 TABLET ORAL at 10:22

## 2020-08-31 RX ADMIN — HYDROMORPHONE HYDROCHLORIDE 0.5 MILLIGRAM(S): 2 INJECTION INTRAMUSCULAR; INTRAVENOUS; SUBCUTANEOUS at 02:17

## 2020-08-31 RX ADMIN — Medication 1 PACKET(S): at 19:29

## 2020-08-31 RX ADMIN — PANTOPRAZOLE SODIUM 40 MILLIGRAM(S): 20 TABLET, DELAYED RELEASE ORAL at 06:43

## 2020-08-31 RX ADMIN — HEPARIN SODIUM 5000 UNIT(S): 5000 INJECTION INTRAVENOUS; SUBCUTANEOUS at 06:43

## 2020-08-31 RX ADMIN — ALBUTEROL 2.5 MILLIGRAM(S): 90 AEROSOL, METERED ORAL at 19:23

## 2020-08-31 RX ADMIN — LEVETIRACETAM 500 MILLIGRAM(S): 250 TABLET, FILM COATED ORAL at 06:43

## 2020-08-31 RX ADMIN — Medication 1 TABLET(S): at 13:02

## 2020-08-31 RX ADMIN — CHLORHEXIDINE GLUCONATE 1 APPLICATION(S): 213 SOLUTION TOPICAL at 06:42

## 2020-08-31 RX ADMIN — Medication 81 MILLIGRAM(S): at 13:02

## 2020-08-31 RX ADMIN — HEPARIN SODIUM 5000 UNIT(S): 5000 INJECTION INTRAVENOUS; SUBCUTANEOUS at 13:13

## 2020-08-31 RX ADMIN — CARVEDILOL PHOSPHATE 12.5 MILLIGRAM(S): 80 CAPSULE, EXTENDED RELEASE ORAL at 19:22

## 2020-08-31 NOTE — PROGRESS NOTE ADULT - ASSESSMENT
64 Female with Pmhx of Essential HTN, CAD s/p CABG & PCI, s/p AVR, PAD, hypothyroidism, seizure, chronic anemia, AAA s/p repair 2015 (Dr. Sandra) who presented with hypertensive emergency, chest pain, palpitations, low level troponin leak, found to have new aortic aneurysm above previous graft, now s/p aortorenal, aortomesenteric, aortoceliac bypass grafting, with open repair of thoracoabdominal aorta aneurysm, clinically improved now on step down unit. Cardiology following for continued cardiac optimization    1) Atherosclerotic Cardiovascular Disease  - Pt with known Hx of CAD, S/p CABG, HTN, AAA with Severe atherosclerotic disease, with large amount of ulcerated soft plaque in the descending aorta and probable penetrating atherosclerotic ulcers now s/p AAA repair  - Pt doing well clinically post operatively with SBP ranging from the 110's-150's while on Coreg 12.5 PO BID. Can continue with holding parameters, hold for HR<60 SBP<100  - Recommend Stepwise reintroduction of home anti hypertensive as needed. Can initiate Amlodipine 2.5 if SBP>140 persistently  - Can resume Imdur 30 daily if any chest discomfort  - Continue [id number],  - Pt with reported history of myalgias and leg cramps on atorvastatin and rosuvastatin. Would recommend rechallenging with Rosuvastatin low dose every other day when able to see if tolerates given extensive plaque burden.   - Appreciate team ensuring Pre-Auth can be obtained for outpatient used of PCSK9 inhibitors (Repatha) for HLD control given extensive plaque burden.  - If possible recommend starting Omega 3 while in house. If not please ensure patient is Discharged on it  - Given moderate AS, would avoid excessive afterload reduction, pure vasodilators (Hydralazine etc). Primary team gave lasix 40 mg IV x1 today. Patient does not have JVD. DEc BS appear to be from shallow breathing, atelectasis. Woudl not continue.   - Keep K> 4, Mg> 2 for cardiac myocyte stability and ectopy suppression  - Please obtain daily EKGS    Cardiology will cont to follow, please call with any questions

## 2020-08-31 NOTE — PROGRESS NOTE ADULT - SUBJECTIVE AND OBJECTIVE BOX
ID: 64F former smoker w/PMHx CAD s/p CABG (LIMA-Ramus/free ANTONIO-RPDA) w/bioAVR and MV repair (2002) and PCI (FORD x1 mLCx-OM2, 2007), PAD s/p PTA/FORD L common iliac and pSFA, infrarenal AAA s/p repair (2015), HTN, CKD3, emphysema (seen on CT, asx), hypothyroidism, seizure disorder, anx/dep admitted initially to cardiology for HTN urgency and ACS rule out, now on vascular service due to finding of aneurysm proximal to prior graft. s/p open thoracoabdominal aneurysm repair 8/25. Stepped down from SICU overnight.    Subjective/Interval events:  This morning she reports intermittent subjective shortness of breath. No chest pain, though persistent pain at surgical sites. No fever/chills. No LE edema.    MEDICATIONS  (STANDING):  aspirin enteric coated 81 milliGRAM(s) Oral daily  bisacodyl Suppository 10 milliGRAM(s) Rectal once  carvedilol 12.5 milliGRAM(s) Oral every 12 hours  chlorhexidine 2% Cloths 1 Application(s) Topical <User Schedule>  fenofibrate Tablet 145 milliGRAM(s) Oral daily  heparin   Injectable 5000 Unit(s) SubCutaneous every 8 hours  levETIRAcetam 500 milliGRAM(s) Oral every 12 hours  levothyroxine 88 MICROGram(s) Oral daily  multivitamin 1 Tablet(s) Oral daily  pantoprazole    Tablet 40 milliGRAM(s) Oral before breakfast    MEDICATIONS  (PRN):  oxycodone    5 mG/acetaminophen 325 mG 1 Tablet(s) Oral every 4 hours PRN Moderate Pain (4 - 6)  oxycodone    5 mG/acetaminophen 325 mG 2 Tablet(s) Oral every 4 hours PRN Severe Pain (7 - 10)    Vital Signs Last 24 Hrs  T(C): 36.9 (31 Aug 2020 09:00), Max: 37.1 (30 Aug 2020 17:19)  T(F): 98.5 (31 Aug 2020 09:00), Max: 98.7 (30 Aug 2020 17:19)  HR: 62 (31 Aug 2020 08:36) (60 - 72)  BP: 134/62 (31 Aug 2020 08:36) (121/58 - 145/67)  BP(mean): 89 (31 Aug 2020 08:36) (82 - 97)  RR: 18 (31 Aug 2020 08:36) (16 - 24)  SpO2: 97% (31 Aug 2020 08:36) (96% - 100%)    PHYSICAL EXAM:  GENERAL: NAD, thin appearing  HEENT - NC/AT, pupils equal and reactive to light,  ; Moist mucous membranes, Good dentition, No lesions  NECK: Supple, No JVD  CHEST/LUNG: breath sounds diminished at bases, tachypneic 20s. POCUS with numerous B lines bilaterally in all fields.  HEART: Regular rate and rhythm; 2/6 early systolic murmur, No rubs or gallops  ABDOMEN: Soft, Nontender, Nondistended; Bowel sounds present  EXTREMITIES:  2+ Peripheral Pulses, No clubbing, cyanosis, or edema  NEURO:  No Focal deficits, sensory and motor intact  SKIN: No rashes or lesions. Abdominal incision site with staples, c/d/i    LABS:  08-31    134<L>  |  101  |  14  ----------------------------<  83  3.9   |  24  |  1.40<H>    Ca    9.3      31 Aug 2020 06:14  Phos  2.0     08-31  Mg     2.1     08-31    TPro  5.6<L>  /  Alb  2.4<L>  /  TBili  0.8  /  DBili  0.3<H>  /  AST  18  /  ALT  <5<L>  /  AlkPhos  63  08-30                          9.0    9.35  )-----------( 243      ( 31 Aug 2020 06:14 )             28.3     RADIOLOGY & ADDITIONAL TESTS:           reviewed, none new

## 2020-08-31 NOTE — PROGRESS NOTE ADULT - SUBJECTIVE AND OBJECTIVE BOX
Interval Events: Reviewed  Patient seen and examined at bedside.    Patient is a 64y old  Female who presents with a chief complaint of Aortic Aneurysm (30 Aug 2020 09:19)    she is doing OK and she is not sob  PAST MEDICAL & SURGICAL HISTORY:  Seizure  Essential hypertension: HTN (hypertension)  Gastroesophageal reflux disease: GERD (gastroesophageal reflux disease)  Hyperlipidemia: Hyperlipidemia  Anemia: Anemia  Hypothyroidism: Hypothyroidism  Atherosclerosis of coronary artery: CAD (coronary artery disease)  Peripheral vascular disease: PVD (peripheral vascular disease)  H/O aortic valve replacement: Bioprosthetic  Atherosclerosis of coronary artery bypass graft(s), unspecified, with angina pectoris with documented spasm  Status post aorto-coronary artery bypass graft: 2012      MEDICATIONS:  Pulmonary:    Antimicrobials:    Anticoagulants:  aspirin enteric coated 81 milliGRAM(s) Oral daily  heparin   Injectable 5000 Unit(s) SubCutaneous every 8 hours    Cardiac:  carvedilol 12.5 milliGRAM(s) Oral every 12 hours      Allergies    No Known Allergies    Intolerances    Crestor (Other (Mod to Severe))      Vital Signs Last 24 Hrs  T(C): 36.7 (31 Aug 2020 22:00), Max: 36.9 (31 Aug 2020 09:00)  T(F): 98.1 (31 Aug 2020 22:00), Max: 98.5 (31 Aug 2020 09:00)  HR: 70 (31 Aug 2020 20:40) (62 - 76)  BP: 141/63 (31 Aug 2020 20:40) (116/58 - 142/66)  BP(mean): 91 (31 Aug 2020 20:40) (80 - 95)  RR: 18 (31 Aug 2020 20:40) (16 - 18)  SpO2: 96% (31 Aug 2020 20:40) (92% - 98%)    08-30 @ 07:01  -  08-31 @ 07:00  --------------------------------------------------------  IN: 240 mL / OUT: 925 mL / NET: -685 mL    08-31 @ 07:01  -  08-31 @ 22:26  --------------------------------------------------------  IN: 490 mL / OUT: 2800 mL / NET: -2310 mL          Review of Systems:   •	General: negative  •	Skin/Breast: negative  •	Ophthalmologic: negative  •	ENMT: negative  •	Respiratory and Thorax: negative  •	Cardiovascular: negative  •	Gastrointestinal: negative  •	Genitourinary: negative  •	Musculoskeletal: negative  •	Neurological: negative  •	Psychiatric: negative  •	Hematology/Lymphatics: negative  •	Endocrine: negative  •	Allergic/Immunologic: negative    Physical Exam:   • Constitutional:	Well-developed, well nourished  • Eyes:	EOMI; PERRL; no drainage or redness  • ENMT:	No oral lesions; no gross abnormalities  • Neck	No bruits; no thyromegaly or nodules  • Breasts:	not examined  • Back:	No deformity or limitation of movement  • Respiratory:	Breath Sounds equal & clear to percussion & auscultation, no accessory muscle use  • Cardiovascular:	Regular rate & rhythm, normal S1, S2; no murmurs, gallops or rubs; no S3, S4  • Gastrointestinal:	Soft, non-tender, no hepatosplenomegaly, normal bowel sounds  • Genitourinary:	not examined  • Rectal: not examined  • Extremities:	No cyanosis, clubbing or edema  • Vascular:	Equal and normal pulses (carotid, femoral, dorsalis pedis)  • Neurologica:l	not examined  • Skin:	No lesions; no rash  • Lymph Nodes:	No lymphadedenopathy  • Musculoskeletal:	No joint pain, swelling or deformity; no limitation of movement        LABS:      CBC Full  -  ( 31 Aug 2020 06:14 )  WBC Count : 9.35 K/uL  RBC Count : 3.30 M/uL  Hemoglobin : 9.0 g/dL  Hematocrit : 28.3 %  Platelet Count - Automated : 243 K/uL  Mean Cell Volume : 85.8 fl  Mean Cell Hemoglobin : 27.3 pg  Mean Cell Hemoglobin Concentration : 31.8 gm/dL  Auto Neutrophil # : x  Auto Lymphocyte # : x  Auto Monocyte # : x  Auto Eosinophil # : x  Auto Basophil # : x  Auto Neutrophil % : x  Auto Lymphocyte % : x  Auto Monocyte % : x  Auto Eosinophil % : x  Auto Basophil % : x    08-31    134<L>  |  101  |  14  ----------------------------<  83  3.9   |  24  |  1.40<H>    Ca    9.3      31 Aug 2020 06:14  Phos  2.0     08-31  Mg     2.1     08-31    TPro  5.6<L>  /  Alb  2.4<L>  /  TBili  0.8  /  DBili  0.3<H>  /  AST  18  /  ALT  <5<L>  /  AlkPhos  63  08-30    PT/INR - ( 30 Aug 2020 05:34 )   PT: 13.2 sec;   INR: 1.11          PTT - ( 30 Aug 2020 05:34 )  PTT:41.7 sec      < from: Xray Chest 1 View- PORTABLE-Routine (08.31.20 @ 06:15) >    EXAM:  XR CHEST PORTABLE ROUTINE 1V                          PROCEDURE DATE:  08/31/2020          INTERPRETATION:  Clinical history/reason for exam: Postop.    Single frontal view.    COMPARISON: August 30, 2020.    Findings/  impression: Left looprecorder. Stable cardiomegaly, status post median sternotomy, mitral annuloplasty and CABG. Bilateral opacities/left pleural effusion, stable. Stable bony structures. Left skin staples and subcutaneous emphysema. Stable bony structures.    < end of copied text >                RADIOLOGY & ADDITIONAL STUDIES (The following images were personally reviewed):  Gomez:                                     No  Urine output:                       adequate  DVT prophylaxis:                 Yes  Flattus:                                  Yes  Bowel movement:              No

## 2020-08-31 NOTE — PROGRESS NOTE ADULT - ASSESSMENT
64F former smoker w/PMHx CAD s/p CABG (LIMA-Ramus/free ANTONIO-RPDA) w/bioAVR and MV repair (2002) and PCI (FORD x1 mLCx-OM2, 2007), PAD s/p PTA/FORD L common iliac and pSFA, infrarenal AAA s/p repair (2015), HTN, CKD3, emphysema (seen on CT, asx), hypothyroidism, seizure disorder, anx/dep admitted initially to cardiology for HTN urgency and ACS rule out, now on vascular service due to finding of aneurysm proximal to prior graft. s/p open thoracoabdominal aneurysm repair 8/25.    AAA   s/p open repair 2015, with concern for aneurysm (with subacute contained rupture) proximal to prior graft. Went for open repair with vascular on 8/25.   -postop management (drains, tubes, pain, etc) per vascular team    CAD   h/o CABG w/LIMA-Ramus/free ANTONIO-RPDA in 2002, PCI w/FORD to LCx  - stable, no active ischemic sx, EKG nonischemic. CCTA 8/21 shows patent grafts and prev stent  - C/w ASA81 and fibrate (should ideally be on statin, but reportedly had myalgias in past). C/w Coreg 12.5 BID  -today patient with subjective shortness of breath, POCUS with numerous B lines bilaterally and CXR with bilateral opacities, would recommend 20mg IV lasix x1 today    Valvular Heart Disease  History of porcine bioAVR and mitral ring annuloplasty in 2002  - TTE 8/21: Mild LVH, septal WMA (likely 2/2 prior surgery), LVEF 65%. S/p bio AVR with mild transvalvular AI and trace PVL. S/p MV annuloplasty with mild MR and severe LAE. Normal RV fxn. Mod-sev TR.  -cardiology following    Peripheral arterial disease  prior PTA/FORD to L common iliac and pSFA  - CTA shows patent L iliac stent, mod stenosis of R CFA w/focal occlusion of R mid-distal CFA w/distal reconstitution and mod stenosis of L CFA w/area of focal high-grade occlusion  - C/w ASA81.     Essential hypertension   - BP well controlled on current regimen  - C/w Coreg 12.5 BID (goal HR 50s-60s), Imdur 30, Norvasc 10    Palpitations  - NSR w/some sinus selvin on tele but asx. No other events.  - Has ILR but battery dead, needs to be removed post discharge    CKD Stage 3  Baseline Cr felt to be ~1.8-1.9, though has been below this throughout admission. Stable    Centrilobular Emphysema -  seen on CT, consistent w/prior smoking hx. Currently asx, satting well on RA.  Anemia – stable  Hypothyroidism - c/w home 88mcg levothyroxine  Seizure disorder - c/w home keppra 500mg bid  Anxiety/depression - c/w home sertraline 50mg once daily

## 2020-08-31 NOTE — CHART NOTE - NSCHARTNOTEFT_GEN_A_CORE
Admitting Diagnosis:   Patient is a 64y old  Female who presents with a chief complaint of Aortic Aneurysm (30 Aug 2020 09:19)      PAST MEDICAL & SURGICAL HISTORY:  Seizure  Essential hypertension: HTN (hypertension)  Gastroesophageal reflux disease: GERD (gastroesophageal reflux disease)  Hyperlipidemia: Hyperlipidemia  Anemia: Anemia  Hypothyroidism: Hypothyroidism  Atherosclerosis of coronary artery: CAD (coronary artery disease)  Peripheral vascular disease: PVD (peripheral vascular disease)  H/O aortic valve replacement: Bioprosthetic  Atherosclerosis of coronary artery bypass graft(s), unspecified, with angina pectoris with documented spasm  Status post aorto-coronary artery bypass graft: 2012      Current Nutrition Order:  Regular w/ EnsureEnlive BID (700kcal, 40g pro)    PO Intake: Good (%) [   ]  Fair (50-75%) [   ] Poor (<25%) [ x  ]  Consumed a few bites of oatmeal. Drank tea and cranberry juice.  Did not drink Ensures    GI Issues:   Denies N/V  Last BM was yesterday 8/30  Ordered for protonix    Pain:  C/o chest and back pain- MD aware  Ordered for dilaudid    Skin Integrity:  Surgical incision  Enzo score 17    Labs:   08-31    134<L>  |  101  |  14  ----------------------------<  83  3.9   |  24  |  1.40<H>    Ca    9.3      31 Aug 2020 06:14  Phos  2.0     08-31  Mg     2.1     08-31    TPro  5.6<L>  /  Alb  2.4<L>  /  TBili  0.8  /  DBili  0.3<H>  /  AST  18  /  ALT  <5<L>  /  AlkPhos  63  08-30    CAPILLARY BLOOD GLUCOSE      POCT Blood Glucose.: 89 mg/dL (31 Aug 2020 06:20)  POCT Blood Glucose.: 91 mg/dL (30 Aug 2020 23:34)  POCT Blood Glucose.: 136 mg/dL (30 Aug 2020 17:41)  POCT Blood Glucose.: 103 mg/dL (30 Aug 2020 11:21)      Medications:  MEDICATIONS  (STANDING):  aspirin enteric coated 81 milliGRAM(s) Oral daily  bisacodyl Suppository 10 milliGRAM(s) Rectal once  carvedilol 12.5 milliGRAM(s) Oral every 12 hours  chlorhexidine 2% Cloths 1 Application(s) Topical <User Schedule>  fenofibrate Tablet 145 milliGRAM(s) Oral daily  heparin   Injectable 5000 Unit(s) SubCutaneous every 8 hours  levETIRAcetam 500 milliGRAM(s) Oral every 12 hours  levothyroxine 88 MICROGram(s) Oral daily  multivitamin 1 Tablet(s) Oral daily  pantoprazole    Tablet 40 milliGRAM(s) Oral before breakfast    MEDICATIONS  (PRN):  oxycodone    5 mG/acetaminophen 325 mG 1 Tablet(s) Oral every 4 hours PRN Moderate Pain (4 - 6)  oxycodone    5 mG/acetaminophen 325 mG 2 Tablet(s) Oral every 4 hours PRN Severe Pain (7 - 10)    Admitted Anthropometrics:  Height: 5'7", IBW 135lbs+/-10%, %IBW 91%, BMI 19.3     Weight: 123lbs (8/20)  Daily   Weight appears stable this admission    Weight Change:   Of note, pt has had a >20lb unintentional wt loss since Febuary 2020 d/t not wanting to be in public 2/2 COVID. UBW is 143lbs, current BW is 123lbs indicating a 13.9% wt change x6mo. Noted to have moderate muscle wasting in upper extremities with reports of meeting <75% EER for >3mo. Suspected to have severe PCM.    Estimated energy needs:   ABW (56kg) used for calculations as pt between % of IBW.   Nutrient needs based on [facility] standards of care for maintenance in older adults.   Adjusted for age, suspected malnutrition, and post-op demand    1400-1680kcal/day (25-30kcal/kg)  78-90g pro/day (1.2-1.4gm/kg)   Fluid per team     Subjective:   63 yo F w/  PMH of HTN, CAD s/p CABG & PCI, bioAVR, PAD, hypothyroidism, seizure, chronic anemia, and AAA repair in 2015 (Dr. Sandra), CKD, now with subacute rupture contained saccular aortic aneurysm, s/p open TAAA repair involving bypasses to the celiac, SMA, b/l renal arteries (8/26). Stepped down to 8LA 8/31.    Pt seen in room, resting in bed. Currently on a regular diet and tolerating PO. Minimal intake; had a few bites of oatmeal, tea and cranberry juice. Denies getting Ensures with meals but had purchased a 6 pack prior to getting admitted. Of note, pt has had a >20lb unintentional wt loss since Febuary 2020 d/t not wanting to be in public 2/2 COVID. UBW is 143lbs, current BW is 123lbs indicating a 13.9% wt change x6mo. Noted to have moderate muscle wasting in upper extremities with reports of meeting <75% EER for >3mo. Suspected to have severe PCM. No apparent GI distress at present, last BM was yesterday 8/30. Biggest complaint at this time is chest and back pain 8/10 w/ some SOB- MD aware. Continue w/ current diet order, honor pts food preferences, and encourage intake at meals. RD to follow.     Previous Nutrition Diagnosis:  Suspected Moderate malnutrition RT presumed intake<EER AEB wt loss PO intake NFPE   Active [   ]  Resolved [   ]    If resolved, new PES:   Suspected severe PCM RT inadequate intake 2/2 limited access to food during pandemic AEB diet recall indicating meeting <75% EER for >1mo and 13.9% wt change x6mo    Goal:  Pt to consistently meet % of estimated needs PO and avoid further s/s malnutrition    Recommendations:  1. Recommend EnsureEnlive BID (700kcal, 40g pro)  2. Appreciate support and encouragement provided at meal times  3. Please obtain updated weights for accuracy and trending  4. Consider SW consult for resources of food delivery services  5. Pain and bowel regimen per team  6. BMP, BG Q6hrs, CBC per MD discretion   *d/w team     Education:   Purpose of ONS. Protein foods.     Risk Level: High [x   ] Moderate [   ] Low [   ]

## 2020-08-31 NOTE — PROGRESS NOTE ADULT - PROBLEM SELECTOR PLAN 1
pulmoanry status is stable and she is not on inhalers and will follow .  Incraese activity and wean off oxygen

## 2020-08-31 NOTE — PROGRESS NOTE ADULT - SUBJECTIVE AND OBJECTIVE BOX
PAST MEDICAL & SURGICAL HISTORY:  Seizure  Essential hypertension: HTN (hypertension)  Gastroesophageal reflux disease: GERD (gastroesophageal reflux disease)  Hyperlipidemia: Hyperlipidemia  Anemia: Anemia  Hypothyroidism: Hypothyroidism  Atherosclerosis of coronary artery: CAD (coronary artery disease)  Peripheral vascular disease: PVD (peripheral vascular disease)  H/O aortic valve replacement: Bioprosthetic  Atherosclerosis of coronary artery bypass graft(s), unspecified, with angina pectoris with documented spasm  Status post aorto-coronary artery bypass graft: 2012      Home Medications:  amLODIPine 2.5 mg oral tablet: 1 tab(s) orally once a day (20 Aug 2020 14:53)  atenolol 25 mg oral tablet: 1 tab(s) orally once a day (20 Aug 2020 14:53)  cilostazol 50 mg oral tablet: 1 tab(s) orally 2 times a day (20 Aug 2020 14:53)  folic acid 1 mg oral tablet: 1 tab(s) orally once a day (20 Aug 2020 14:53)  Keppra 500 mg oral tablet: 1 tab(s) orally 2 times a day (20 Aug 2020 14:53)  levothyroxine 88 mcg (0.088 mg) oral tablet: 1 tab(s) orally once a day (20 Aug 2020 14:53)  sertraline 50 mg oral tablet: 1 tab(s) orally once a day (at bedtime) (20 Aug 2020 14:53)      MEDICATIONS  (STANDING):  aspirin enteric coated 81 milliGRAM(s) Oral daily  carvedilol 12.5 milliGRAM(s) Oral every 12 hours  chlorhexidine 2% Cloths 1 Application(s) Topical <User Schedule>  fenofibrate Tablet 145 milliGRAM(s) Oral daily  heparin   Injectable 5000 Unit(s) SubCutaneous every 8 hours  levETIRAcetam 500 milliGRAM(s) Oral every 12 hours  levothyroxine 88 MICROGram(s) Oral daily  multivitamin 1 Tablet(s) Oral daily  pantoprazole    Tablet 40 milliGRAM(s) Oral before breakfast  sodium phosphate IVPB 15 milliMole(s) IV Intermittent once    MEDICATIONS  (PRN):  oxycodone    5 mG/acetaminophen 325 mG 1 Tablet(s) Oral every 4 hours PRN Moderate Pain (4 - 6)  oxycodone    5 mG/acetaminophen 325 mG 2 Tablet(s) Oral every 4 hours PRN Severe Pain (7 - 10)      .  VITAL SIGNS:  T(C): 36.7 (08-31-20 @ 14:05), Max: 37.1 (08-30-20 @ 17:19)  T(F): 98.1 (08-31-20 @ 14:05), Max: 98.7 (08-30-20 @ 17:19)  HR: 64 (08-31-20 @ 11:50) (60 - 72)  BP: 116/58 (08-31-20 @ 11:50) (116/58 - 135/66)  BP(mean): 80 (08-31-20 @ 11:50) (80 - 94)  RR: 17 (08-31-20 @ 11:50) (16 - 19)  SpO2: 97% (08-31-20 @ 11:50) (96% - 97%)  Wt(kg): --    PHYSICAL EXAM:    Constitutional: WDWN resting comfortably in bed; NAD  Head: NC/AT  Eyes: PERRL, EOMI, anicteric sclera  ENT: no nasal discharge; uvula midline, no oropharyngeal erythema or exudates; MMM  Neck: supple; no JVD or thyromegaly  Respiratory: CTA B/L; no W/R/R, no retractions  Cardiac: +S1/S2; RRR; no M/R/G; PMI non-displaced  Gastrointestinal: soft, NT/ND; no rebound or guarding; +BSx4  Genitourinary: normal external genitalia  Back: spine midline, no bony tenderness or step-offs; no CVAT B/L  Extremities: WWP, no clubbing or cyanosis; no peripheral edema  Musculoskeletal: NROM x4; no joint swelling, tenderness or erythema  Vascular: 2+ radial, femoral, DP/PT pulses B/L  Dermatologic: skin warm, dry and intact; no rashes, wounds, or scars  Lymphatic: no submandibular or cervical LAD  Neurologic: AAOx3; CNII-XII grossly intact; no focal deficits  Psychiatric: affect and characteristics of appearance, verbalizations, behaviors are appropriate    .  LABS:                         9.0    9.35  )-----------( 243      ( 31 Aug 2020 06:14 )             28.3     08-31    134<L>  |  101  |  14  ----------------------------<  83  3.9   |  24  |  1.40<H>    Ca    9.3      31 Aug 2020 06:14  Phos  2.0     08-31  Mg     2.1     08-31    TPro  5.6<L>  /  Alb  2.4<L>  /  TBili  0.8  /  DBili  0.3<H>  /  AST  18  /  ALT  <5<L>  /  AlkPhos  63  08-30    PT/INR - ( 30 Aug 2020 05:34 )   PT: 13.2 sec;   INR: 1.11          PTT - ( 30 Aug 2020 05:34 )  PTT:41.7 sec      RADIOLOGY, EKG & ADDITIONAL TESTS: Reviewed.     < from: TTE Echo Complete w/o Contrast w/ Doppler (08.21.20 @ 11:48) >  CONCLUSIONS:     1. There is mild concentric left ventricular hypertrophy. The left ventricle is normal in size, wall thickness, and systolic function with a calculated ejection fraction of 65%. Abnormal septal motion noted.   2. Severely dilated left atrium.   3. Bioprosthetic valve is seen in the aortic position. Peak transvalvular velocity is 3.85 m/s, the mean transvalvular gradient is 31.00 mmHg, and the LVOT/AV velocity ratio is 0.36.   4. There is mild transvalvular aortic regurgitation. There is trace paravalvular aortic regurgitation.   5. An annuloplasty ring is noted in the mitral position. The mean transvalvular gradient is 13.00 mmHg at a heart rate of 81 bpm. Mild mitral regurgitation.   6. There is moderate-to-severe tricuspid regurgitation.   7. Pulmonary hypertension present, pulmonary artery systolic pressure is 48 mmHg.   8. There is trace pulmonic regurgitation.   9. No pericardial effusion.  10. The aortic root is normal in size.  11. Compared to the previous TTE performed on 8/5/2019, higher mean gradient across mitral valve at a higher heart rate and gradients across bioprosthetic aortic valve is unchanged.    < end of copied text > interval HPI: Patient states she feels ok. Is anxious to work with Physical Therapy to regain strenght and feel better. Has been taking shallow breaths but feels ''her breathing is ok''. Still reports lower back pain, however reports it is improved. No chest pain, or palpitations    PAST MEDICAL & SURGICAL HISTORY:  Seizure  Essential hypertension: HTN (hypertension)  Gastroesophageal reflux disease: GERD (gastroesophageal reflux disease)  Hyperlipidemia: Hyperlipidemia  Anemia: Anemia  Hypothyroidism: Hypothyroidism  Atherosclerosis of coronary artery: CAD (coronary artery disease)  Peripheral vascular disease: PVD (peripheral vascular disease)  H/O aortic valve replacement: Bioprosthetic  Atherosclerosis of coronary artery bypass graft(s), unspecified, with angina pectoris with documented spasm  Status post aorto-coronary artery bypass graft: 2012      Home Medications:  amLODIPine 2.5 mg oral tablet: 1 tab(s) orally once a day (20 Aug 2020 14:53)  atenolol 25 mg oral tablet: 1 tab(s) orally once a day (20 Aug 2020 14:53)  cilostazol 50 mg oral tablet: 1 tab(s) orally 2 times a day (20 Aug 2020 14:53)  folic acid 1 mg oral tablet: 1 tab(s) orally once a day (20 Aug 2020 14:53)  Keppra 500 mg oral tablet: 1 tab(s) orally 2 times a day (20 Aug 2020 14:53)  levothyroxine 88 mcg (0.088 mg) oral tablet: 1 tab(s) orally once a day (20 Aug 2020 14:53)  sertraline 50 mg oral tablet: 1 tab(s) orally once a day (at bedtime) (20 Aug 2020 14:53)      MEDICATIONS  (STANDING):  aspirin enteric coated 81 milliGRAM(s) Oral daily  carvedilol 12.5 milliGRAM(s) Oral every 12 hours  chlorhexidine 2% Cloths 1 Application(s) Topical <User Schedule>  fenofibrate Tablet 145 milliGRAM(s) Oral daily  heparin   Injectable 5000 Unit(s) SubCutaneous every 8 hours  levETIRAcetam 500 milliGRAM(s) Oral every 12 hours  levothyroxine 88 MICROGram(s) Oral daily  multivitamin 1 Tablet(s) Oral daily  pantoprazole    Tablet 40 milliGRAM(s) Oral before breakfast  sodium phosphate IVPB 15 milliMole(s) IV Intermittent once    MEDICATIONS  (PRN):  oxycodone    5 mG/acetaminophen 325 mG 1 Tablet(s) Oral every 4 hours PRN Moderate Pain (4 - 6)  oxycodone    5 mG/acetaminophen 325 mG 2 Tablet(s) Oral every 4 hours PRN Severe Pain (7 - 10)      .  VITAL SIGNS:  T(C): 36.7 (08-31-20 @ 14:05), Max: 37.1 (08-30-20 @ 17:19)  T(F): 98.1 (08-31-20 @ 14:05), Max: 98.7 (08-30-20 @ 17:19)  HR: 64 (08-31-20 @ 11:50) (60 - 72)  BP: 116/58 (08-31-20 @ 11:50) (116/58 - 135/66)  BP(mean): 80 (08-31-20 @ 11:50) (80 - 94)  RR: 17 (08-31-20 @ 11:50) (16 - 19)  SpO2: 97% (08-31-20 @ 11:50) (96% - 97%)  Wt(kg): --    PHYSICAL EXAM:    Constitutional: WDWN resting comfortably in bed; NAD  Head: NC/AT  ENT: MMM; RIJ removed with pressure dressing in place  Neck: supple; no JVD   Respiratory: CTA B/L; poor inspiratory effort  Cardiac: +S1/S2; Irregular RR; III/VI systolic murmur heard troughout pericardium   Gastrointestinal: soft, NT/ND; no rebound or guarding; +BS  Extremities: WWP, no clubbing or cyanosis; no peripheral edema  Vascular: 2+ radial,  DP/PT pulses B/L  Neurologic: AAOx3;  no focal deficits    .  LABS:                         9.0    9.35  )-----------( 243      ( 31 Aug 2020 06:14 )             28.3     08-31    134<L>  |  101  |  14  ----------------------------<  83  3.9   |  24  |  1.40<H>    Ca    9.3      31 Aug 2020 06:14  Phos  2.0     08-31  Mg     2.1     08-31    TPro  5.6<L>  /  Alb  2.4<L>  /  TBili  0.8  /  DBili  0.3<H>  /  AST  18  /  ALT  <5<L>  /  AlkPhos  63  08-30    PT/INR - ( 30 Aug 2020 05:34 )   PT: 13.2 sec;   INR: 1.11          PTT - ( 30 Aug 2020 05:34 )  PTT:41.7 sec      RADIOLOGY, EKG & ADDITIONAL TESTS: Reviewed.     < from: TTE Echo Complete w/o Contrast w/ Doppler (08.21.20 @ 11:48) >  CONCLUSIONS:     1. There is mild concentric left ventricular hypertrophy. The left ventricle is normal in size, wall thickness, and systolic function with a calculated ejection fraction of 65%. Abnormal septal motion noted.   2. Severely dilated left atrium.   3. Bioprosthetic valve is seen in the aortic position. Peak transvalvular velocity is 3.85 m/s, the mean transvalvular gradient is 31.00 mmHg, and the LVOT/AV velocity ratio is 0.36.   4. There is mild transvalvular aortic regurgitation. There is trace paravalvular aortic regurgitation.   5. An annuloplasty ring is noted in the mitral position. The mean transvalvular gradient is 13.00 mmHg at a heart rate of 81 bpm. Mild mitral regurgitation.   6. There is moderate-to-severe tricuspid regurgitation.   7. Pulmonary hypertension present, pulmonary artery systolic pressure is 48 mmHg.   8. There is trace pulmonic regurgitation.   9. No pericardial effusion.  10. The aortic root is normal in size.  11. Compared to the previous TTE performed on 8/5/2019, higher mean gradient across mitral valve at a higher heart rate and gradients across bioprosthetic aortic valve is unchanged.    < end of copied text >

## 2020-08-31 NOTE — CHART NOTE - NSCHARTNOTEFT_GEN_A_CORE
Upon Nutritional Assessment by the Registered Dietitian your patient was determined to meet criteria / has evidence of the following diagnosis/diagnoses:          [ ]  Mild Protein Calorie Malnutrition        [ ]  Moderate Protein Calorie Malnutrition        [ x] Severe Protein Calorie Malnutrition        [ ] Unspecified Protein Calorie Malnutrition        [ ] Underweight / BMI <19        [ ] Morbid Obesity / BMI > 40      Findings as based on:  •  Comprehensive nutrition assessment and consultation  •  Calorie counts (nutrient intake analysis)  •  Food acceptance and intake status from observations by staff  •  Follow up  •  Patient education  •  Intervention secondary to interdisciplinary rounds  •   concerns    Pt has had a >20lb unintentional wt loss since Febuary 2020 d/t not wanting to be in public 2/2 COVID. UBW is 143lbs, current BW is 123lbs indicating a 13.9% wt change x6mo. Noted to have moderate muscle wasting in upper extremities with reports of meeting <75% EER for >3mo. Suspected to have severe PCM.    Treatment:    The following diet has been recommended:  1. Recommend EnsureEnlive BID (700kcal, 40g pro)  2. Appreciate support and encouragement provided at meal times  3. Please obtain updated weights for accuracy and trending  4. Consider SW consult for resources of food delivery services  5. Pain and bowel regimen per team  6. BMP, BG Q6hrs, CBC per MD discretion   *d/w team     PROVIDER Section:     By signing this assessment you are acknowledging and agree with the diagnosis/diagnoses assigned by the Registered Dietitian    Comments: Upon Nutritional Assessment by the Registered Dietitian your patient was determined to meet criteria / has evidence of the following diagnosis/diagnoses:          [ ]  Mild Protein Calorie Malnutrition        [ ]  Moderate Protein Calorie Malnutrition        [ x] Severe Protein Calorie Malnutrition        [ ] Unspecified Protein Calorie Malnutrition        [ ] Underweight / BMI <19        [ ] Morbid Obesity / BMI > 40      Findings as based on:  •  Comprehensive nutrition assessment and consultation  •  Calorie counts (nutrient intake analysis)  •  Food acceptance and intake status from observations by staff  •  Follow up  •  Patient education  •  Intervention secondary to interdisciplinary rounds  •   concerns    Pt has had a >20lb unintentional wt loss since February 2020 d/t not wanting to be in public 2/2 COVID. UBW is 143lbs, current BW is 123lbs indicating a 13.9% wt change x6mo. Noted to have moderate muscle wasting in upper extremities with reports of meeting <75% EER for >3mo. Suspected to have severe PCM.    Treatment:    The following diet has been recommended:  1. Recommend EnsureEnlive BID (700kcal, 40g pro)  2. Appreciate support and encouragement provided at meal times  3. Please obtain updated weights for accuracy and trending  4. Consider SW consult for resources of food delivery services  5. Pain and bowel regimen per team  6. BMP, BG Q6hrs, CBC per MD discretion   *d/w team     PROVIDER Section:     By signing this assessment you are acknowledging and agree with the diagnosis/diagnoses assigned by the Registered Dietitian    Comments:

## 2020-08-31 NOTE — PROGRESS NOTE ADULT - SUBJECTIVE AND OBJECTIVE BOX
24hr Events:  O/N: YESENIA, VSS  8/30: SDU. TLC removed. [place] removed. Regular diet now. On Dilaudid for pain control. Not on IVF (baseline CKD). Not on any ABx. ASA/SQH/Coreg for CV.             Assessment/Plan;  A/p: 63 yo F w/  PMH of HTN, CAD s/p CABG & PCI, bioAVR, PAD, hypothyroidism, seizure, chronic anemia, and AAA repair in 2015 (Dr. Sandra), CKD, now with subacute rupture contained saccular aortic aneurysm, s/p open TAAA repair involving bypasses to the celiac, SMA, b/l renal arteries (8/26). Now extubated and recovering well.    NEURO: Dilaudid PRN,  Hx of Seizure home keppra  CV: coreg 12.5bid, ASA. Fenofibrate  PULM: nasal canula, S/p ctx x2 Apical removed on 8/28, Medial removed on 8/29. incentive spirometry  GI/FEN: DASH diet protonix ppx.   : Voids. has baseline CKD,   ENDO: ISS, synthroid  ID: ppx ancef x 24hrs (8/25)   PPX: hx of LE stents, NO SCDs, on  HSQ   LINES: PIVs, joon (8/25-8/30). Poss dc RIJ today (8/25--)   WOUNDS/DRAINS: left side to mid abd incision. left chest tubes x 2  PT: ordered, OOB  Dispo:Poss sdu today 24hr Events:  O/N: YESENIA, VSS  8/30: SDU. TLC removed. [place] removed. Regular diet now. On Dilaudid for pain control. Not on IVF (baseline CKD). Not on any ABx. ASA/SQH/Coreg for CV.       aspirin enteric coated 81  carvedilol 12.5  heparin   Injectable 5000        Vital Signs Last 24 Hrs  T(C): 36.4 (31 Aug 2020 05:06), Max: 37.1 (30 Aug 2020 17:19)  T(F): 97.6 (31 Aug 2020 05:06), Max: 98.7 (30 Aug 2020 17:19)  HR: 66 (31 Aug 2020 05:23) (60 - 72)  BP: 126/57 (31 Aug 2020 05:23) (111/54 - 145/67)  BP(mean): 82 (31 Aug 2020 05:23) (78 - 97)  RR: 17 (31 Aug 2020 05:23) (14 - 24)  SpO2: 97% (31 Aug 2020 05:23) (96% - 100%)  I&O's Summary    30 Aug 2020 07:01  -  31 Aug 2020 07:00  --------------------------------------------------------  IN: 240 mL / OUT: 925 mL / NET: -685 mL        Physical Exam:  General: NAD, resting comfortably in bed  Pulmonary: Nonlabored breathing, no respiratory distress  Cardiovascular:  Abdominal:  Extremities:  Vascular:      LABS:                        9.0    9.35  )-----------( 243      ( 31 Aug 2020 06:14 )             28.3     08-31    134<L>  |  101  |  14  ----------------------------<  83  3.9   |  24  |  1.40<H>    Ca    9.3      31 Aug 2020 06:14  Phos  2.0     08-31  Mg     2.1     08-31    TPro  5.6<L>  /  Alb  2.4<L>  /  TBili  0.8  /  DBili  0.3<H>  /  AST  18  /  ALT  <5<L>  /  AlkPhos  63  08-30    PT/INR - ( 30 Aug 2020 05:34 )   PT: 13.2 sec;   INR: 1.11          PTT - ( 30 Aug 2020 05:34 )  PTT:41.7 sec    PLEASE CHECK WHEN PRESENT:     [  ] Heart Failure     [  ] Acute     [  ] Acute on Chronic     [  ] Chronic  -------------------------------------------------------------------     [  ]Diastolic [HFpEF]     [  ]Systolic [HFrEF]     [  ]Combined [HFpEF & HFrEF]     [  ] afib     [ X ] hypertensive heart disease     [  ]Other:  -------------------------------------------------------------------  [ ] Respiratory failure  [ ] Acute cor pulmonale  [ ] Asthma/COPD Exacerbation  [ ] Pleural effusion  [ ] Aspiration pneumonia  -------------------------------------------------------------------  [  ]CHERRI     [  ]ATN     [  ]Reneal Medullary Necrosis     [  ]Renal Cortical Necrosis     [  ]Other Pathological Lesions:    [  ]CKD 1  [  ]CKD 2  [ X ]CKD 3  [  ]CKD 4  [  ]CKD 5  [  ]Other  -------------------------------------------------------------------  [  ]Diabetes  [  ] Diabetic PVD Ulcer  [  ] Neuropathic ulcer to DM  [  ] Diabetes with Nephropathy  [  ] Osteomyelitis due to diabetes  --------------------------------------------------------------------  [  ]Malnutrition: See Nutrition Note  [  ]Cachexia  [ X ]Other: [x ]  Moderate Protein Calorie Malnutrition  [  ]Supplement Ordered:  [  ]Morbid Obesity (BMI >=40]  ---------------------------------------------------------------------  [ ] Sepsis/severe sepsis/septic shock  [ ] UTI  [ ] Pneumonia  -----------------------------------------------------------------------  [ ] Acidosis/alkalosis  [ ] Fluid overload  [ ] Hypokalemia  [ ] Hyperkalemia  [ ] Hypomagnesemia  [X ] Hypophosphatemia  [ ] Hyperphosphatemia  [X] Hyponatremia- observation  ------------------------------------------------------------------------  [ ] Acute blood loss anemia  [ ] Post op blood loss anemia  [ ] Iron deficiency anemia  [X ] Anemia due to chronic disease  [ ] Hypercoagulable state  ----------------------------------------------------------------------  [ ] Cerebral infarction  [ ] Transient ischemia attack  [ ] Encephalopathy      Assessment/Plan;  A/p: 63 yo F w/  PMH of HTN, CAD s/p CABG & PCI, bioAVR, PAD, hypothyroidism, seizure, chronic anemia, and AAA repair in 2015 (Dr. Sandra), CKD, now with subacute rupture contained saccular aortic aneurysm, s/p open TAAA repair involving bypasses to the celiac, SMA, b/l renal arteries (8/26). Now extubated and recovering well.      -DVT ppx  -pain control  -cont home meds as appropriate  -regular diet with ensure  -OOB to chair  -f/u labs  -daily cxr 24hr Events:  O/N: YESENIA, VSS  8/30: SDU. TLC removed. [place] removed. Regular diet now. On Dilaudid for pain control. Not on IVF (baseline CKD). Not on any ABx. ASA/SQH/Coreg for CV.       aspirin enteric coated 81  carvedilol 12.5  heparin   Injectable 5000        Vital Signs Last 24 Hrs  T(C): 36.4 (31 Aug 2020 05:06), Max: 37.1 (30 Aug 2020 17:19)  T(F): 97.6 (31 Aug 2020 05:06), Max: 98.7 (30 Aug 2020 17:19)  HR: 66 (31 Aug 2020 05:23) (60 - 72)  BP: 126/57 (31 Aug 2020 05:23) (111/54 - 145/67)  BP(mean): 82 (31 Aug 2020 05:23) (78 - 97)  RR: 17 (31 Aug 2020 05:23) (14 - 24)  SpO2: 97% (31 Aug 2020 05:23) (96% - 100%)  I&O's Summary    30 Aug 2020 07:01  -  31 Aug 2020 07:00  --------------------------------------------------------  IN: 240 mL / OUT: 925 mL / NET: -685 mL        Physical Exam:  General: NAD, resting comfortably in bed  Pulmonary: Nonlabored breathing, no respiratory distress  Cardiovascular:  Abdominal:  Extremities:  Vascular:      LABS:                        9.0    9.35  )-----------( 243      ( 31 Aug 2020 06:14 )             28.3     08-31    134<L>  |  101  |  14  ----------------------------<  83  3.9   |  24  |  1.40<H>    Ca    9.3      31 Aug 2020 06:14  Phos  2.0     08-31  Mg     2.1     08-31    TPro  5.6<L>  /  Alb  2.4<L>  /  TBili  0.8  /  DBili  0.3<H>  /  AST  18  /  ALT  <5<L>  /  AlkPhos  63  08-30    PT/INR - ( 30 Aug 2020 05:34 )   PT: 13.2 sec;   INR: 1.11          PTT - ( 30 Aug 2020 05:34 )  PTT:41.7 sec    PLEASE CHECK WHEN PRESENT:     [  ] Heart Failure     [  ] Acute     [  ] Acute on Chronic     [  ] Chronic  -------------------------------------------------------------------     [  ]Diastolic [HFpEF]     [  ]Systolic [HFrEF]     [  ]Combined [HFpEF & HFrEF]     [  ] afib     [ X ] hypertensive heart disease     [  ]Other:  -------------------------------------------------------------------  [ ] Respiratory failure  [ ] Acute cor pulmonale  [ ] Asthma/COPD Exacerbation  [ ] Pleural effusion  [ ] Aspiration pneumonia  -------------------------------------------------------------------  [  ]CHERRI     [  ]ATN     [  ]Reneal Medullary Necrosis     [  ]Renal Cortical Necrosis     [  ]Other Pathological Lesions:    [  ]CKD 1  [  ]CKD 2  [ X ]CKD 3  [  ]CKD 4  [  ]CKD 5  [  ]Other  -------------------------------------------------------------------  [  ]Diabetes  [  ] Diabetic PVD Ulcer  [  ] Neuropathic ulcer to DM  [  ] Diabetes with Nephropathy  [  ] Osteomyelitis due to diabetes  --------------------------------------------------------------------  [  ]Malnutrition: See Nutrition Note  [  ]Cachexia  [ X ]Other: [ x] Severe Protein Calorie Malnutrition  [  ]Supplement Ordered:  [  ]Morbid Obesity (BMI >=40]  ---------------------------------------------------------------------  [ ] Sepsis/severe sepsis/septic shock  [ ] UTI  [ ] Pneumonia  -----------------------------------------------------------------------  [ ] Acidosis/alkalosis  [ ] Fluid overload  [ ] Hypokalemia  [ ] Hyperkalemia  [ ] Hypomagnesemia  [X ] Hypophosphatemia  [ ] Hyperphosphatemia  [X] Hyponatremia- observation  ------------------------------------------------------------------------  [ ] Acute blood loss anemia  [ ] Post op blood loss anemia  [ ] Iron deficiency anemia  [X ] Anemia due to chronic disease  [ ] Hypercoagulable state  ----------------------------------------------------------------------  [ ] Cerebral infarction  [ ] Transient ischemia attack  [ ] Encephalopathy      Assessment/Plan;  A/p: 65 yo F w/  PMH of HTN, CAD s/p CABG & PCI, bioAVR, PAD, hypothyroidism, seizure, chronic anemia, and AAA repair in 2015 (Dr. Sandra), CKD, now with subacute rupture contained saccular aortic aneurysm, s/p open TAAA repair involving bypasses to the celiac, SMA, b/l renal arteries (8/26). Now extubated and recovering well.      -DVT ppx  -pain control  -cont home meds as appropriate  -regular diet with ensure  -OOB to chair  -f/u labs  -daily cxr

## 2020-08-31 NOTE — PROGRESS NOTE ADULT - SUBJECTIVE AND OBJECTIVE BOX
O/N Events:  YESENIA, HDS   Subjective:  complaints of incisional pain, otherwise no acute issues, renal function stable, normotensive  sNa 134 and phos low 2    VITALS  Vital Signs Last 24 Hrs  T(C): 36.9 (31 Aug 2020 09:00), Max: 37.1 (30 Aug 2020 17:19)  T(F): 98.5 (31 Aug 2020 09:00), Max: 98.7 (30 Aug 2020 17:19)  HR: 62 (31 Aug 2020 08:36) (60 - 72)  BP: 134/62 (31 Aug 2020 08:36) (121/58 - 145/67)  BP(mean): 89 (31 Aug 2020 08:36) (82 - 97)  RR: 18 (31 Aug 2020 08:36) (16 - 24)  SpO2: 97% (31 Aug 2020 08:36) (96% - 100%)    PHYSICAL EXAM  General: A&Ox 3; NAD  Respiratory: diminished BS at bases with mild fin crackles   Cardiovascular: Regular rhythm/rate; S1/S2  Gastrointestinal: Soft; ND, surgical incision with staples c/s/i healing well   Extremities: WWP; no edema  Neurological:  CNII-XII grossly intact; no obvious focal deficits  : Primafit draining urine      MEDICATIONS  (STANDING):  aspirin enteric coated 81 milliGRAM(s) Oral daily  carvedilol 12.5 milliGRAM(s) Oral every 12 hours  chlorhexidine 2% Cloths 1 Application(s) Topical <User Schedule>  fenofibrate Tablet 145 milliGRAM(s) Oral daily  heparin   Injectable 5000 Unit(s) SubCutaneous every 8 hours  levETIRAcetam 500 milliGRAM(s) Oral every 12 hours  levothyroxine 88 MICROGram(s) Oral daily  multivitamin 1 Tablet(s) Oral daily  pantoprazole    Tablet 40 milliGRAM(s) Oral before breakfast  potassium phosphate / sodium phosphate Powder (PHOS-NaK) 2 Packet(s) Oral once    MEDICATIONS  (PRN):  oxycodone    5 mG/acetaminophen 325 mG 1 Tablet(s) Oral every 4 hours PRN Moderate Pain (4 - 6)  oxycodone    5 mG/acetaminophen 325 mG 2 Tablet(s) Oral every 4 hours PRN Severe Pain (7 - 10)      LABS                        9.0    9.35  )-----------( 243      ( 31 Aug 2020 06:14 )             28.3     08-31    134<L>  |  101  |  14  ----------------------------<  83  3.9   |  24  |  1.40<H>    Ca    9.3      31 Aug 2020 06:14  Phos  2.0     08-31  Mg     2.1     08-31    TPro  5.6<L>  /  Alb  2.4<L>  /  TBili  0.8  /  DBili  0.3<H>  /  AST  18  /  ALT  <5<L>  /  AlkPhos  63  08-30    LIVER FUNCTIONS - ( 30 Aug 2020 05:34 )  Alb: 2.4 g/dL / Pro: 5.6 g/dL / ALK PHOS: 63 U/L / ALT: <5 U/L / AST: 18 U/L / GGT: x           PT/INR - ( 30 Aug 2020 05:34 )   PT: 13.2 sec;   INR: 1.11     PTT - ( 30 Aug 2020 05:34 )  PTT:41.7 sec O/N Events:  YESENIA, HDS   Subjective:  complaints of incisional pain, otherwise no acute issues, renal function stable, normotensive  sNa 134 and phos low 2    VITALS  Vital Signs Last 24 Hrs  T(C): 36.9 (31 Aug 2020 09:00), Max: 37.1 (30 Aug 2020 17:19)  T(F): 98.5 (31 Aug 2020 09:00), Max: 98.7 (30 Aug 2020 17:19)  HR: 62 (31 Aug 2020 08:36) (60 - 72)  BP: 134/62 (31 Aug 2020 08:36) (121/58 - 145/67)  BP(mean): 89 (31 Aug 2020 08:36) (82 - 97)  RR: 18 (31 Aug 2020 08:36) (16 - 24)  SpO2: 97% (31 Aug 2020 08:36) (96% - 100%)    PHYSICAL EXAM  General: A&Ox 3; NAD  Respiratory: diminished BS at bases with mild fin crackles   Cardiovascular: Regular rhythm/rate; S1/S2, systolic murmur present   Gastrointestinal: Soft; ND, surgical incision with staples c/s/i healing well   Extremities: WWP; no edema  Neurological:  CNII-XII grossly intact; no obvious focal deficits  : Primafit draining urine      MEDICATIONS  (STANDING):  aspirin enteric coated 81 milliGRAM(s) Oral daily  carvedilol 12.5 milliGRAM(s) Oral every 12 hours  chlorhexidine 2% Cloths 1 Application(s) Topical <User Schedule>  fenofibrate Tablet 145 milliGRAM(s) Oral daily  heparin   Injectable 5000 Unit(s) SubCutaneous every 8 hours  levETIRAcetam 500 milliGRAM(s) Oral every 12 hours  levothyroxine 88 MICROGram(s) Oral daily  multivitamin 1 Tablet(s) Oral daily  pantoprazole    Tablet 40 milliGRAM(s) Oral before breakfast  potassium phosphate / sodium phosphate Powder (PHOS-NaK) 2 Packet(s) Oral once    MEDICATIONS  (PRN):  oxycodone    5 mG/acetaminophen 325 mG 1 Tablet(s) Oral every 4 hours PRN Moderate Pain (4 - 6)  oxycodone    5 mG/acetaminophen 325 mG 2 Tablet(s) Oral every 4 hours PRN Severe Pain (7 - 10)      LABS                        9.0    9.35  )-----------( 243      ( 31 Aug 2020 06:14 )             28.3     08-31    134<L>  |  101  |  14  ----------------------------<  83  3.9   |  24  |  1.40<H>    Ca    9.3      31 Aug 2020 06:14  Phos  2.0     08-31  Mg     2.1     08-31    TPro  5.6<L>  /  Alb  2.4<L>  /  TBili  0.8  /  DBili  0.3<H>  /  AST  18  /  ALT  <5<L>  /  AlkPhos  63  08-30    LIVER FUNCTIONS - ( 30 Aug 2020 05:34 )  Alb: 2.4 g/dL / Pro: 5.6 g/dL / ALK PHOS: 63 U/L / ALT: <5 U/L / AST: 18 U/L / GGT: x           PT/INR - ( 30 Aug 2020 05:34 )   PT: 13.2 sec;   INR: 1.11     PTT - ( 30 Aug 2020 05:34 )  PTT:41.7 sec

## 2020-08-31 NOTE — PROGRESS NOTE ADULT - PROBLEM SELECTOR PLAN 1
Patient with CKD stage III   nephrologically stable, at baseline, with acceptable lytes, autodiuresing   CXR with stable left pleural effusion  - please obtain Urine Na and replete phos   Will follow Patient with CKD stage III   nephrologically stable, at baseline, with acceptable lytes, autodiuresing   CXR with stable left pleural effusion  - please obtain Urine Na and replete phos   - Ok to naomiee for SOB would consider 40 mg IV lasix   Will follow

## 2020-09-01 ENCOUNTER — TRANSCRIPTION ENCOUNTER (OUTPATIENT)
Age: 65
End: 2020-09-01

## 2020-09-01 LAB
ANION GAP SERPL CALC-SCNC: 12 MMOL/L — SIGNIFICANT CHANGE UP (ref 5–17)
BUN SERPL-MCNC: 12 MG/DL — SIGNIFICANT CHANGE UP (ref 7–23)
CALCIUM SERPL-MCNC: 9.5 MG/DL — SIGNIFICANT CHANGE UP (ref 8.4–10.5)
CHLORIDE SERPL-SCNC: 100 MMOL/L — SIGNIFICANT CHANGE UP (ref 96–108)
CO2 SERPL-SCNC: 26 MMOL/L — SIGNIFICANT CHANGE UP (ref 22–31)
CREAT SERPL-MCNC: 1.39 MG/DL — HIGH (ref 0.5–1.3)
FSP PPP-MCNC: >=20 UG/ML
GLUCOSE BLDC GLUCOMTR-MCNC: 113 MG/DL — HIGH (ref 70–99)
GLUCOSE BLDC GLUCOMTR-MCNC: 93 MG/DL — SIGNIFICANT CHANGE UP (ref 70–99)
GLUCOSE SERPL-MCNC: 84 MG/DL — SIGNIFICANT CHANGE UP (ref 70–99)
HCT VFR BLD CALC: 30.6 % — LOW (ref 34.5–45)
HGB BLD-MCNC: 9.7 G/DL — LOW (ref 11.5–15.5)
MAGNESIUM SERPL-MCNC: 1.9 MG/DL — SIGNIFICANT CHANGE UP (ref 1.6–2.6)
MCHC RBC-ENTMCNC: 27.5 PG — SIGNIFICANT CHANGE UP (ref 27–34)
MCHC RBC-ENTMCNC: 31.7 GM/DL — LOW (ref 32–36)
MCV RBC AUTO: 86.7 FL — SIGNIFICANT CHANGE UP (ref 80–100)
NRBC # BLD: 0 /100 WBCS — SIGNIFICANT CHANGE UP (ref 0–0)
PHOSPHATE SERPL-MCNC: 3.7 MG/DL — SIGNIFICANT CHANGE UP (ref 2.5–4.5)
PLATELET # BLD AUTO: 317 K/UL — SIGNIFICANT CHANGE UP (ref 150–400)
POTASSIUM SERPL-MCNC: 3.7 MMOL/L — SIGNIFICANT CHANGE UP (ref 3.5–5.3)
POTASSIUM SERPL-SCNC: 3.7 MMOL/L — SIGNIFICANT CHANGE UP (ref 3.5–5.3)
PREALB SERPL-MCNC: 6 MG/DL — LOW (ref 20–40)
RBC # BLD: 3.53 M/UL — LOW (ref 3.8–5.2)
RBC # FLD: 17.7 % — HIGH (ref 10.3–14.5)
SODIUM SERPL-SCNC: 138 MMOL/L — SIGNIFICANT CHANGE UP (ref 135–145)
WBC # BLD: 9.99 K/UL — SIGNIFICANT CHANGE UP (ref 3.8–10.5)
WBC # FLD AUTO: 9.99 K/UL — SIGNIFICANT CHANGE UP (ref 3.8–10.5)

## 2020-09-01 PROCEDURE — 99233 SBSQ HOSP IP/OBS HIGH 50: CPT

## 2020-09-01 PROCEDURE — 71045 X-RAY EXAM CHEST 1 VIEW: CPT | Mod: 26

## 2020-09-01 RX ORDER — LACTOBACILLUS ACIDOPHILUS 100MM CELL
1 CAPSULE ORAL DAILY
Refills: 0 | Status: DISCONTINUED | OUTPATIENT
Start: 2020-09-01 | End: 2020-09-04

## 2020-09-01 RX ORDER — POTASSIUM CHLORIDE 20 MEQ
40 PACKET (EA) ORAL ONCE
Refills: 0 | Status: COMPLETED | OUTPATIENT
Start: 2020-09-01 | End: 2020-09-01

## 2020-09-01 RX ADMIN — OXYCODONE AND ACETAMINOPHEN 1 TABLET(S): 5; 325 TABLET ORAL at 13:30

## 2020-09-01 RX ADMIN — Medication 1 TABLET(S): at 12:54

## 2020-09-01 RX ADMIN — CARVEDILOL PHOSPHATE 12.5 MILLIGRAM(S): 80 CAPSULE, EXTENDED RELEASE ORAL at 05:52

## 2020-09-01 RX ADMIN — OXYCODONE AND ACETAMINOPHEN 1 TABLET(S): 5; 325 TABLET ORAL at 20:06

## 2020-09-01 RX ADMIN — Medication 88 MICROGRAM(S): at 05:52

## 2020-09-01 RX ADMIN — HEPARIN SODIUM 5000 UNIT(S): 5000 INJECTION INTRAVENOUS; SUBCUTANEOUS at 05:52

## 2020-09-01 RX ADMIN — LEVETIRACETAM 500 MILLIGRAM(S): 250 TABLET, FILM COATED ORAL at 05:52

## 2020-09-01 RX ADMIN — CHLORHEXIDINE GLUCONATE 1 APPLICATION(S): 213 SOLUTION TOPICAL at 05:57

## 2020-09-01 RX ADMIN — Medication 81 MILLIGRAM(S): at 12:54

## 2020-09-01 RX ADMIN — LEVETIRACETAM 500 MILLIGRAM(S): 250 TABLET, FILM COATED ORAL at 18:20

## 2020-09-01 RX ADMIN — Medication 4 GRAM(S): at 12:48

## 2020-09-01 RX ADMIN — OXYCODONE AND ACETAMINOPHEN 1 TABLET(S): 5; 325 TABLET ORAL at 19:34

## 2020-09-01 RX ADMIN — OXYCODONE AND ACETAMINOPHEN 1 TABLET(S): 5; 325 TABLET ORAL at 14:05

## 2020-09-01 RX ADMIN — Medication 1 TABLET(S): at 12:49

## 2020-09-01 RX ADMIN — HEPARIN SODIUM 5000 UNIT(S): 5000 INJECTION INTRAVENOUS; SUBCUTANEOUS at 21:14

## 2020-09-01 RX ADMIN — Medication 40 MILLIEQUIVALENT(S): at 09:56

## 2020-09-01 RX ADMIN — OXYCODONE AND ACETAMINOPHEN 1 TABLET(S): 5; 325 TABLET ORAL at 10:30

## 2020-09-01 RX ADMIN — OXYCODONE AND ACETAMINOPHEN 1 TABLET(S): 5; 325 TABLET ORAL at 09:28

## 2020-09-01 RX ADMIN — CARVEDILOL PHOSPHATE 12.5 MILLIGRAM(S): 80 CAPSULE, EXTENDED RELEASE ORAL at 18:20

## 2020-09-01 RX ADMIN — PANTOPRAZOLE SODIUM 40 MILLIGRAM(S): 20 TABLET, DELAYED RELEASE ORAL at 05:52

## 2020-09-01 NOTE — PROGRESS NOTE ADULT - SUBJECTIVE AND OBJECTIVE BOX
ID: 64F former smoker w/PMHx CAD s/p CABG (LIMA-Ramus/free ANTONIO-RPDA) w/bioAVR and MV repair (2002) and PCI (FORD x1 mLCx-OM2, 2007), PAD s/p PTA/FORD L common iliac and pSFA, infrarenal AAA s/p repair (2015), HTN, CKD3, emphysema (seen on CT, asx), hypothyroidism, seizure disorder, anx/dep admitted initially to cardiology for HTN urgency and ACS rule out, now on vascular service due to finding of aneurysm proximal to prior graft. s/p open thoracoabdominal aneurysm repair 8/25. Stepped down from SICU overnight.    Subjective/Interval events:  No acute overnight events. This morning she says her shortness of breath is improved. Main issue is pain today, which she feels is a bit worse than yesterday. Yesterday she was able to ambulate with PT down the [place]. No fever/chills.     MEDICATIONS  (STANDING):  aspirin enteric coated 81 milliGRAM(s) Oral daily  carvedilol 12.5 milliGRAM(s) Oral every 12 hours  chlorhexidine 2% Cloths 1 Application(s) Topical <User Schedule>  heparin   Injectable 5000 Unit(s) SubCutaneous every 8 hours  lactobacillus acidophilus 1 Tablet(s) Oral daily  levETIRAcetam 500 milliGRAM(s) Oral every 12 hours  levothyroxine 88 MICROGram(s) Oral daily  multivitamin 1 Tablet(s) Oral daily  omega-3-Acid Ethyl Esters 4 Gram(s) Oral daily  pantoprazole    Tablet 40 milliGRAM(s) Oral before breakfast  potassium chloride   Powder 40 milliEquivalent(s) Oral once    MEDICATIONS  (PRN):  oxycodone    5 mG/acetaminophen 325 mG 1 Tablet(s) Oral every 4 hours PRN Moderate Pain (4 - 6)  oxycodone    5 mG/acetaminophen 325 mG 2 Tablet(s) Oral every 4 hours PRN Severe Pain (7 - 10)    Vital Signs Last 24 Hrs  T(C): 36.9 (01 Sep 2020 09:07), Max: 36.9 (01 Sep 2020 09:07)  T(F): 98.4 (01 Sep 2020 09:07), Max: 98.4 (01 Sep 2020 09:07)  HR: 68 (01 Sep 2020 08:30) (62 - 76)  BP: 141/65 (01 Sep 2020 08:30) (116/58 - 142/66)  BP(mean): 93 (01 Sep 2020 08:30) (80 - 95)  RR: 18 (01 Sep 2020 08:30) (17 - 18)  SpO2: 96% (01 Sep 2020 08:30) (92% - 100%)    PHYSICAL EXAM:  GENERAL: NAD, thin appearing  HEENT - NC/AT, pupils equal and reactive to light,  ; Moist mucous membranes, Good dentition, No lesions  NECK: Supple, No JVD  CHEST/LUNG: breath sounds diminished at bases, normal respiratory rate, no signs of respiratyro distress  HEART: Regular rate and rhythm; 2/6 early systolic murmur, No rubs or gallops  ABDOMEN: Soft, Nontender, Nondistended; Bowel sounds present  EXTREMITIES:  2+ Peripheral Pulses, No clubbing, cyanosis, or edema  NEURO:  No Focal deficits, sensory and motor intact  SKIN: No rashes or lesions. Abdominal incision site with staples, c/d/i    LABS:  09-01    138  |  100  |  12  ----------------------------<  84  3.7   |  26  |  1.39<H>    Ca    9.5      01 Sep 2020 06:21  Phos  3.7     09-01  Mg     1.9     09-01                          9.7    9.99  )-----------( 317      ( 01 Sep 2020 06:21 )             30.6     RADIOLOGY & ADDITIONAL TESTS:           reviewed, none new

## 2020-09-01 NOTE — PROGRESS NOTE ADULT - ASSESSMENT
64 F w/PMHx HTN, CAD s/p PCI/CABG/ s/p Bio AVR and mitral valve annuloplasty, PAD s/p peripheral stenting with occluded Bilateral SFA/ Chronic Anemia, AAA s/p open repair, Seizure disorders and CKD III, planned for OR for aneurysm found proximal to prior graft, nephrology consulted for aron-op CKD monitoring    #CKD  nephrologically stable, at baseline, with acceptable lytes, autodiuresing   CXR with stable left pleural effusion  sCr ~stable   Bong not helpful while patient receiving Lasix   recommend to brett for SOB PRN 40 mg IV lasix     Thank you for the opportunity to participate in the care of your patient. The nephrology service remains available to assist with any questions or concerns. Please feel free to reach us by paging the on-call nephrology fellow for urgent issues or as below.     Sukhjinder Syed M.D.   PGY-4, Nephrology Fellow   C: [phone]   P: [phone]

## 2020-09-01 NOTE — DISCHARGE NOTE PROVIDER - CARE PROVIDER_API CALL
Sarai Sandra  SURGERY  [address], [address] [place] [address]  Phone: [phone]  Fax: [phone]  Established Patient  Follow Up Time: 1 week    Joaan Dozier)  Critical Care Medicine; Pulmonary Disease  [address], [address] [place] [address]  Phone: [phone]  Fax: [phone]  Follow Up Time: 1 week    Farheen Izquierdo  MEDICINE  [address], [address]rd [place] [address]  Phone: [phone]  Fax: [phone]  Follow Up Time: 2 weeks

## 2020-09-01 NOTE — DISCHARGE NOTE PROVIDER - NSDCCPCAREPLAN_GEN_ALL_CORE_FT
PRINCIPAL DISCHARGE DIAGNOSIS  Diagnosis: Ruptured abdominal aortic aneurysm  Assessment and Plan of Treatment: contained reputure      SECONDARY DISCHARGE DIAGNOSES  Diagnosis: H/O hyperglycemia  Assessment and Plan of Treatment:     Diagnosis: Hypermagnesemia  Assessment and Plan of Treatment:     Diagnosis: Severe protein-calorie malnutrition  Assessment and Plan of Treatment:     Diagnosis: Hyponatremia  Assessment and Plan of Treatment:     Diagnosis: Hypophosphatemia  Assessment and Plan of Treatment:     Diagnosis: Thrombocytopenia  Assessment and Plan of Treatment:     Diagnosis: Hypokalemia  Assessment and Plan of Treatment:     Diagnosis: Chronic GERD  Assessment and Plan of Treatment:     Diagnosis: Anxiety  Assessment and Plan of Treatment:     Diagnosis: Depression  Assessment and Plan of Treatment:     Diagnosis: H/O renal artery stenosis  Assessment and Plan of Treatment:     Diagnosis: Seizure disorder  Assessment and Plan of Treatment:     Diagnosis: Hypothyroidism  Assessment and Plan of Treatment:     Diagnosis: H/O paroxysmal atrial tachycardia  Assessment and Plan of Treatment:     Diagnosis: Wide-complex tachycardia  Assessment and Plan of Treatment:     Diagnosis: Mitral stenosis  Assessment and Plan of Treatment:     Diagnosis: Aortic stenosis  Assessment and Plan of Treatment:     Diagnosis: History of coronary artery bypass graft  Assessment and Plan of Treatment:     Diagnosis: Hyperlipidemia  Assessment and Plan of Treatment:     Diagnosis: Hypertension  Assessment and Plan of Treatment:     Diagnosis: Former smoker  Assessment and Plan of Treatment:     Diagnosis: Palpitations  Assessment and Plan of Treatment: Palpitations    Diagnosis: Anemia  Assessment and Plan of Treatment: Anemia    Diagnosis: Coronary artery disease  Assessment and Plan of Treatment: Coronary artery disease    Diagnosis: Peripheral artery disease  Assessment and Plan of Treatment: Peripheral artery disease    Diagnosis: Emphysema of lung  Assessment and Plan of Treatment:     Diagnosis: Simple chronic bronchitis  Assessment and Plan of Treatment: Simple chronic bronchitis    Diagnosis: Pulmonary hypertension  Assessment and Plan of Treatment: Pulmonary hypertension    Diagnosis: CKD (chronic kidney disease)  Assessment and Plan of Treatment: CKD (chronic kidney disease)    Diagnosis: Aneurysm, thoracoabdominal  Assessment and Plan of Treatment: PRINCIPAL DISCHARGE DIAGNOSIS  Diagnosis: Ruptured abdominal aortic aneurysm  Assessment and Plan of Treatment: contained reputure      SECONDARY DISCHARGE DIAGNOSES  Diagnosis: Peripheral artery disease  Assessment and Plan of Treatment:     Diagnosis: H/O hyperglycemia  Assessment and Plan of Treatment:     Diagnosis: Hypermagnesemia  Assessment and Plan of Treatment:     Diagnosis: Severe protein-calorie malnutrition  Assessment and Plan of Treatment:     Diagnosis: Hyponatremia  Assessment and Plan of Treatment:     Diagnosis: Hypophosphatemia  Assessment and Plan of Treatment:     Diagnosis: Thrombocytopenia  Assessment and Plan of Treatment:     Diagnosis: Hypokalemia  Assessment and Plan of Treatment:     Diagnosis: Chronic GERD  Assessment and Plan of Treatment:     Diagnosis: Anxiety  Assessment and Plan of Treatment:     Diagnosis: Depression  Assessment and Plan of Treatment:     Diagnosis: H/O renal artery stenosis  Assessment and Plan of Treatment:     Diagnosis: Seizure disorder  Assessment and Plan of Treatment:     Diagnosis: Hypothyroidism  Assessment and Plan of Treatment:     Diagnosis: H/O paroxysmal atrial tachycardia  Assessment and Plan of Treatment:     Diagnosis: Wide-complex tachycardia  Assessment and Plan of Treatment:     Diagnosis: Mitral stenosis  Assessment and Plan of Treatment:     Diagnosis: Aortic stenosis  Assessment and Plan of Treatment:     Diagnosis: History of coronary artery bypass graft  Assessment and Plan of Treatment:     Diagnosis: Hyperlipidemia  Assessment and Plan of Treatment:     Diagnosis: Hypertension  Assessment and Plan of Treatment:     Diagnosis: Former smoker  Assessment and Plan of Treatment:     Diagnosis: Palpitations  Assessment and Plan of Treatment: Palpitations    Diagnosis: Anemia  Assessment and Plan of Treatment: Anemia    Diagnosis: Coronary artery disease  Assessment and Plan of Treatment: Coronary artery disease    Diagnosis: Peripheral artery disease  Assessment and Plan of Treatment: Peripheral artery disease    Diagnosis: Emphysema of lung  Assessment and Plan of Treatment:     Diagnosis: Simple chronic bronchitis  Assessment and Plan of Treatment: Simple chronic bronchitis    Diagnosis: Pulmonary hypertension  Assessment and Plan of Treatment: Pulmonary hypertension    Diagnosis: CKD (chronic kidney disease)  Assessment and Plan of Treatment: CKD (chronic kidney disease)    Diagnosis: Aneurysm, thoracoabdominal  Assessment and Plan of Treatment:

## 2020-09-01 NOTE — PROGRESS NOTE ADULT - SUBJECTIVE AND OBJECTIVE BOX
24hr Events:  O/N: YESENIA, UOP 500ml, VSS  8/31: phos- 2 repleted, strict I&Os, switched to PO pain meds. AM CXR unchanged. Procalcitonin 0.57. SOB given lasix 40 IV- still feels tight chest so given neb tx. Per cardio stop fenofibrate and start omega 3.         ] Heart Failure     [  ] Acute     [  ] Acute on Chronic     [  ] Chronic  -------------------------------------------------------------------     [  ]Diastolic [HFpEF]     [  ]Systolic [HFrEF]     [  ]Combined [HFpEF & HFrEF]     [  ] afib     [ X ] hypertensive heart disease     [  ]Other:  -------------------------------------------------------------------  [ ] Respiratory failure  [ ] Acute cor pulmonale  [ ] Asthma/COPD Exacerbation  [ ] Pleural effusion  [ ] Aspiration pneumonia  -------------------------------------------------------------------  [  ]CHERRI     [  ]ATN     [  ]Reneal Medullary Necrosis     [  ]Renal Cortical Necrosis     [  ]Other Pathological Lesions:    [  ]CKD 1  [  ]CKD 2  [ X ]CKD 3  [  ]CKD 4  [  ]CKD 5  [  ]Other  -------------------------------------------------------------------  [  ]Diabetes  [  ] Diabetic PVD Ulcer  [  ] Neuropathic ulcer to DM  [  ] Diabetes with Nephropathy  [  ] Osteomyelitis due to diabetes  --------------------------------------------------------------------  [  ]Malnutrition: See Nutrition Note  [  ]Cachexia  [ X ]Other: [ x] Severe Protein Calorie Malnutrition  [  ]Supplement Ordered:  [  ]Morbid Obesity (BMI >=40]  ---------------------------------------------------------------------  [ ] Sepsis/severe sepsis/septic shock  [ ] UTI  [ ] Pneumonia  -----------------------------------------------------------------------  [ ] Acidosis/alkalosis  [ ] Fluid overload  [ ] Hypokalemia  [ ] Hyperkalemia  [ ] Hypomagnesemia  [X ] Hypophosphatemia  [ ] Hyperphosphatemia  [X] Hyponatremia- observation  ------------------------------------------------------------------------  [ ] Acute blood loss anemia  [ ] Post op blood loss anemia  [ ] Iron deficiency anemia  [X ] Anemia due to chronic disease  [ ] Hypercoagulable state  ----------------------------------------------------------------------  [ ] Cerebral infarction  [ ] Transient ischemia attack  [ ] Encephalopathy      Assessment/Plan;  A/p: 65 yo F w/  PMH of HTN, CAD s/p CABG & PCI, bioAVR, PAD, hypothyroidism, seizure, chronic anemia, and AAA repair in 2015 (Dr. Sandra), CKD, now with subacute rupture contained saccular aortic aneurysm, s/p open TAAA repair involving bypasses to the celiac, SMA, b/l renal arteries (8/26). Now extubated and recovering well.      -DVT ppx  -pain control  -cont home meds as appropriate  -regular diet with ensure  -OOB to chair  -f/u labs  -Daily chest Xray 24hr Events:  O/N: YESENIA, UOP 500ml, VSS  8/31: phos- 2 repleted, strict I&Os, switched to PO pain meds. AM CXR unchanged. Procalcitonin 0.57. SOB given lasix 40 IV- still feels tight chest so given neb tx. Per cardio stop fenofibrate and start omega 3.      ICU Vital Signs Last 24 Hrs  T(C): 36.8 (01 Sep 2020 04:54), Max: 36.9 (31 Aug 2020 09:00)  T(F): 98.2 (01 Sep 2020 04:54), Max: 98.5 (31 Aug 2020 09:00)  HR: 64 (01 Sep 2020 04:37) (62 - 76)  BP: 126/60 (01 Sep 2020 04:37) (116/58 - 142/66)  BP(mean): 87 (01 Sep 2020 04:37) (80 - 95)  ABP: --  ABP(mean): --  RR: 18 (01 Sep 2020 04:37) (17 - 18)  SpO2: 99% (01 Sep 2020 04:37) (92% - 100%)        Physical Exam:  General: NAD, resting comfortably in bed  Neuro: cranial nerves grossly intact  Resp: non-labored breathing, on RA, no respiratory distress  Cardiovascular: NSR  Abdomen: thoracoabdominal incision c/d/i, wound cleaned with chloroprep and dressing changed.                            9.7    9.99  )-----------( 317      ( 01 Sep 2020 06:21 )             30.6       09-01    138  |  100  |  12  ----------------------------<  84  3.7   |  26  |  1.39<H>    Ca    9.5      01 Sep 2020 06:21  Phos  3.7     09-01  Mg     1.9     09-01                    CAPILLARY BLOOD GLUCOSE      POCT Blood Glucose.: 93 mg/dL (01 Sep 2020 06:22)               ] Heart Failure     [  ] Acute     [  ] Acute on Chronic     [  ] Chronic  -------------------------------------------------------------------     [  ]Diastolic [HFpEF]     [  ]Systolic [HFrEF]     [  ]Combined [HFpEF & HFrEF]     [  ] afib     [ X ] hypertensive heart disease     [  ]Other:  -------------------------------------------------------------------  [ ] Respiratory failure  [ ] Acute cor pulmonale  [ ] Asthma/COPD Exacerbation  [ ] Pleural effusion  [ ] Aspiration pneumonia  -------------------------------------------------------------------  [  ]CHERRI     [  ]ATN     [  ]Reneal Medullary Necrosis     [  ]Renal Cortical Necrosis     [  ]Other Pathological Lesions:    [  ]CKD 1  [  ]CKD 2  [ X ]CKD 3  [  ]CKD 4  [  ]CKD 5  [  ]Other  -------------------------------------------------------------------  [  ]Diabetes  [  ] Diabetic PVD Ulcer  [  ] Neuropathic ulcer to DM  [  ] Diabetes with Nephropathy  [  ] Osteomyelitis due to diabetes  --------------------------------------------------------------------  [  ]Malnutrition: See Nutrition Note  [  ]Cachexia  [ X ]Other: [ x] Severe Protein Calorie Malnutrition  [  ]Supplement Ordered:  [  ]Morbid Obesity (BMI >=40]  ---------------------------------------------------------------------  [ ] Sepsis/severe sepsis/septic shock  [ ] UTI  [ ] Pneumonia  -----------------------------------------------------------------------  [ ] Acidosis/alkalosis  [ ] Fluid overload  [ ] Hypokalemia  [ ] Hyperkalemia  [ ] Hypomagnesemia  [X ] Hypophosphatemia  [ ] Hyperphosphatemia  [X] Hyponatremia- observation  ------------------------------------------------------------------------  [ ] Acute blood loss anemia  [ ] Post op blood loss anemia  [ ] Iron deficiency anemia  [X ] Anemia due to chronic disease  [ ] Hypercoagulable state  ----------------------------------------------------------------------  [ ] Cerebral infarction  [ ] Transient ischemia attack  [ ] Encephalopathy

## 2020-09-01 NOTE — DISCHARGE NOTE PROVIDER - HOSPITAL COURSE
64 F former smoker with Crestor intolerance (Myalgias) and PMHx HTN, HLD , CAD s/p PCI dLCX and CABG LIMA-Ramus, ANTONIO-PDA (patent stent and patent grafts x 2 by CTA 8/2014), Aortic Valve Disease s/p Bioprosthetic AVR 2002, Mitral Valve Disease s/p Mitral valve annuloplasty 2002, Paroxysmal ATach, Wide Complex Tachycardia (seen on Holter 4/2016 –had ILR placed now disconnected-previously seen by Dr. Jasso), PAD s/p pLSFA stent LCIA stent, with occluded Bilateral SFA (proximal to mid portion per U/S 2019), Chronic Anemia (baseline Hgb 8-8.5 -history of blood transfusions none recently-currently receiving IV Iron, Procrit, Vitamin B12 injections, takes Folic Acid PO-prior colonoscopy no evidence of bleeding ~ 5 years ago), AAA s/p Open repair by Dr. 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 Patient was admitted to the cardiology service for ACS rule out. during her workup she was noted to have saccular AAA and contained rupture adjacent to previous stent graft.         She underwent echocardiogram which showed Moderate AS and severe MS. Patient was evaluated by Pulmonary for her hx of Emphysema and was recommended for bronchodilators as needed and early extubation post-op         On 8/25 she underwent open thoracoabdominal aneurysm repair, with 20mm tube graft and 6mm bypasses to the celiac, SMA,  left renal, right renal (end to end). she tolerated the procedure well and was transferred to the SICU intubated and with 2 chest tubes in place. on POD#1 she was extubated and OG tube was removed. She was started on ASA and SQH. Her Chest tubes remained in place.         on POD#2 she was started on home B-blockers and initiated Diuresis. She was started on a clear diet and given 1 unit PRBC transfusion.         on 8/28 her tony catheter was removed. She was unable to void after tony removal and as such underwent a straight cath.         on 8/29 her chest tubes were removed. She was noted to have an elevated INR to 3. Hematology was consulted and LFTs were checked and noted to be normal. Vitamin K was given along with another unit PRBC. Her diet was advanced and she was noted to tolerate a regular diet.         She was transferred to the telemetry unit on 8/30 where she remained stable on regular diet. She was seen by PT and recommended for home PT.         on 8/31 she was restarted on Omega 3 and given 40 of IV lasix for chest congestion.         Patient was noted to have diarrhea post-op *** 64 F former smoker with Crestor intolerance (Myalgias) and PMHx HTN, HLD , CAD s/p PCI dLCX and CABG LIMA-Ramus, ANTONIO-PDA (patent stent and patent grafts x 2 by CTA 8/2014), Aortic Valve Disease s/p Bioprosthetic AVR 2002, Mitral Valve Disease s/p Mitral valve annuloplasty 2002, Paroxysmal ATach, Wide Complex Tachycardia (seen on Holter 4/2016 –had ILR placed now disconnected-previously seen by Dr. Jasso), PAD s/p pLSFA stent LCIA stent, with occluded Bilateral SFA (proximal to mid portion per U/S 2019), Chronic Anemia (baseline Hgb 8-8.5 -history of blood transfusions none recently-currently receiving IV Iron, Procrit, Vitamin B12 injections, takes Folic Acid PO-prior colonoscopy no evidence of bleeding ~ 5 years ago), AAA s/p Open repair by Dr. 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 Patient was admitted to the cardiology service for ACS rule out. during her workup she was noted to have saccular AAA and contained rupture adjacent to previous stent graft.         She underwent echocardiogram which showed Moderate AS and severe MS. Patient was evaluated by Pulmonary for her hx of Emphysema and was recommended for bronchodilators as needed and early extubation post-op         On 8/25 she underwent open thoracoabdominal aneurysm repair, with 20mm tube graft and 6mm bypasses to the celiac, SMA,  left renal, right renal (end to end). she tolerated the procedure well and was transferred to the SICU intubated and with 2 chest tubes in place. on POD#1 she was extubated and OG tube was removed. She was started on ASA and SQH. Her Chest tubes remained in place.         on POD#2 she was started on home B-blockers and initiated Diuresis. She was started on a clear diet and given 1 unit PRBC transfusion.         on 8/28 her tony catheter was removed. She was unable to void after tony removal and as such underwent a straight cath.         on 8/29 her chest tubes were removed. She was noted to have an elevated INR to 3. Hematology was consulted and LFTs were checked and noted to be normal. Vitamin K was given along with another unit PRBC. Her diet was advanced and she was noted to tolerate a regular diet.         She was transferred to the telemetry unit on 8/30 where she remained stable on regular diet. She was seen by PT and recommended for home PT.         on 8/31 she was restarted on Omega 3 and given 40 of IV lasix for chest congestion. Repeat CXR showed improvement of congestion. On day of discharge patient was d/c'ed home in     stable condition.

## 2020-09-01 NOTE — DISCHARGE NOTE PROVIDER - NSDCMRMEDTOKEN_GEN_ALL_CORE_FT
amLODIPine 2.5 mg oral tablet: 1 tab(s) orally once a day  atenolol 25 mg oral tablet: 1 tab(s) orally once a day  cilostazol 50 mg oral tablet: 1 tab(s) orally 2 times a day  folic acid 1 mg oral tablet: 1 tab(s) orally once a day  Keppra 500 mg oral tablet: 1 tab(s) orally 2 times a day  levothyroxine 88 mcg (0.088 mg) oral tablet: 1 tab(s) orally once a day  sertraline 50 mg oral tablet: 1 tab(s) orally once a day (at bedtime) aspirin 81 mg oral delayed release tablet: 1 tab(s) orally once a day  carvedilol 12.5 mg oral tablet: 1 tab(s) orally every 12 hours  cilostazol 50 mg oral tablet: 1 tab(s) orally 2 times a day  folic acid 1 mg oral tablet: 1 tab(s) orally once a day  Keppra 500 mg oral tablet: 1 tab(s) orally 2 times a day  lactobacillus acidophilus oral capsule:  orally   levothyroxine 88 mcg (0.088 mg) oral tablet: 1 tab(s) orally once a day  omega-3 polyunsaturated fatty acids ethyl esters 1000 mg oral capsule: 4 cap(s) orally once a day  sertraline 50 mg oral tablet: 1 tab(s) orally once a day (at bedtime) cilostazol 50 mg oral tablet: 1 tab(s) orally 2 times a day  folic acid 1 mg oral tablet: 1 tab(s) orally once a day  Keppra 500 mg oral tablet: 1 tab(s) orally 2 times a day  levothyroxine 88 mcg (0.088 mg) oral tablet: 1 tab(s) orally once a day  sertraline 50 mg oral tablet: 1 tab(s) orally once a day (at bedtime) aspirin 81 mg oral delayed release tablet: 1 tab(s) orally once a day MDD:1  carvedilol 12.5 mg oral tablet: 1 tab(s) orally every 12 hours MDD:2  cilostazol 50 mg oral tablet: 1 tab(s) orally 2 times a day  folic acid 1 mg oral tablet: 1 tab(s) orally once a day  Keppra 500 mg oral tablet: 1 tab(s) orally 2 times a day  levothyroxine 88 mcg (0.088 mg) oral tablet: 1 tab(s) orally once a day  omega-3 polyunsaturated fatty acids ethyl esters 1000 mg oral capsule: 4 cap(s) orally once a day MDD:4 tabs  oxycodone-acetaminophen 5 mg-325 mg oral tablet: 1 tab(s) orally every 4 hours, As needed, Moderate Pain (4 - 6) MDD:6 tabs  sertraline 50 mg oral tablet: 1 tab(s) orally once a day (at bedtime)

## 2020-09-01 NOTE — DISCHARGE NOTE PROVIDER - NSDCFUADDINST_GEN_ALL_CORE_FT
FOLLOW UP:  with Dr. Sandra in ***. Call the office at  to schedule your appointment. You may shower; soap and water over incision sites. Do not scrub. Pat dry when done.  Ambulate as tolerated, but no heavy lifting (>10lbs) or strenuous exercise. You may resume regular diet.   Call the office if you experience increasing pain, redness, swelling or drainage from incision sites/wounds, or temperature >101.4F. FOLLOW UP:  with Dr. Sandra in 1 week. Call the office at  to schedule your appointment. You may shower; soap and water over incision sites. Do not scrub. Pat dry when done.  Ambulate as tolerated, but no heavy lifting (>10lbs) or strenuous exercise. You may resume regular diet.   Call the office if you experience increasing pain, redness, swelling or drainage from incision sites/wounds, or temperature >101.4F. FOLLOW UP:  with Dr. Sandra in 1 week. Call the office at  to schedule your appointment. Your incision staples will be removed during your appointment. You may shower; soap and water over incision sites. Do not scrub. Pat dry when done.  Ambulate as tolerated, but no heavy lifting (>10lbs) or strenuous exercise. You may resume regular diet.     Please keep track of how much Tylenol (acetaminophen) you are taking. Do not take more than 4,000 mg per day. Be aware that Percocet has tylenol in it.    You have been prescribed new medications during this visit.    Please call Dr. Dozier's office (Pulmonology) to schedule an appointment within one week.    Please call Dr. Izquierdo's (Medicine) office to schedule an outpatient appointment in 2 weeks.  Call the office if you experience increasing pain, redness, swelling or drainage from incision sites/wounds, or temperature >101.4F.    Warning Signs:  Please call your doctor or nurse practitioner if you experience the following:  *You experience new chest pain, pressure, squeezing or tightness.  *New or worsening cough, shortness of breath, or wheeze.  *If you are vomiting and cannot keep down fluids or your medications.  *You are getting dehydrated due to continued vomiting, diarrhea, or other reasons. Signs of dehydration include dry mouth, rapid heartbeat, or feeling dizzy or faint when standing.  *You see blood or dark/black material when you vomit or have a bowel movement.  *You have shaking chills, or fever greater than 101.5 degrees Fahrenheit or 38 degrees Celsius.  *Any change in your symptoms, or any new symptoms that concern you.

## 2020-09-01 NOTE — PROGRESS NOTE ADULT - ASSESSMENT
64F former smoker w/PMHx CAD s/p CABG (LIMA-Ramus/free ANTONIO-RPDA) w/bioAVR and MV repair (2002) and PCI (FORD x1 mLCx-OM2, 2007), PAD s/p PTA/FORD L common iliac and pSFA, infrarenal AAA s/p repair (2015), HTN, CKD3, emphysema (seen on CT, asx), hypothyroidism, seizure disorder, anx/dep admitted initially to cardiology for HTN urgency and ACS rule out, now on vascular service due to finding of aneurysm proximal to prior graft. s/p open thoracoabdominal aneurysm repair 8/25.    AAA   s/p open repair 2015, with concern for aneurysm (with subacute contained rupture) proximal to prior graft. Went for open repair with vascular on 8/25.   -postop management (drains, tubes, pain, etc) per vascular team    CAD   h/o CABG w/LIMA-Ramus/free ANTONIO-RPDA in 2002, PCI w/FORD to LCx  - stable, no active ischemic sx, EKG nonischemic. CCTA 8/21 shows patent grafts and prev stent  - C/w ASA81 (should ideally be on statin, but reportedly had myalgias in past). C/w Coreg 12.5 BID  -starting omega 3 per cardiology, also working on pre-auth for PCSK9 inhibitor. stopped fibrate.    Valvular Heart Disease  History of porcine bioAVR and mitral ring annuloplasty in 2002  - TTE 8/21: Mild LVH, septal WMA (likely 2/2 prior surgery), LVEF 65%. S/p bio AVR with mild transvalvular AI and trace PVL. S/p MV annuloplasty with mild MR and severe LAE. Normal RV fxn. Mod-sev TR.  -cardiology following    Peripheral arterial disease  prior PTA/FORD to L common iliac and pSFA  - CTA shows patent L iliac stent, mod stenosis of R CFA w/focal occlusion of R mid-distal CFA w/distal reconstitution and mod stenosis of L CFA w/area of focal high-grade occlusion  - C/w ASA81.     Essential hypertension   - BP well controlled on current regimen  - C/w Coreg 12.5 BID (goal HR 50s-60s), Imdur 30, Norvasc 10    Palpitations  - NSR w/some sinus selvin on tele but asx. No other events.  - Has ILR but battery dead, needs to be removed post discharge    CKD Stage 3  Baseline Cr felt to be ~1.8-1.9, though has been below this throughout admission. Stable    Centrilobular Emphysema -  seen on CT, consistent w/prior smoking hx. Currently asx, satting well on RA.  Anemia – stable  Hypothyroidism - c/w home 88mcg levothyroxine  Seizure disorder - c/w home keppra 500mg bid  Anxiety/depression - c/w home sertraline 50mg once daily

## 2020-09-01 NOTE — PROGRESS NOTE ADULT - SUBJECTIVE AND OBJECTIVE BOX
Patient is a 64y Female seen and evaluated at bedside. Feels well. No complaints. BP acceptable       Meds:    aspirin enteric coated 81 daily  carvedilol 12.5 every 12 hours  chlorhexidine 2% Cloths 1 <User Schedule>  heparin   Injectable 5000 every 8 hours  lactobacillus acidophilus 1 daily  levETIRAcetam 500 every 12 hours  levothyroxine 88 daily  multivitamin 1 daily  omega-3-Acid Ethyl Esters 4 daily  oxycodone    5 mG/acetaminophen 325 mG 1 every 4 hours PRN  oxycodone    5 mG/acetaminophen 325 mG 2 every 4 hours PRN  pantoprazole    Tablet 40 before breakfast      T(C): , Max: 36.9 (09-01-20 @ 09:07)  T(F): , Max: 98.4 (09-01-20 @ 09:07)  HR: 68 (09-01-20 @ 13:20)  BP: 154/70 (09-01-20 @ 13:20)  BP(mean): 100 (09-01-20 @ 13:20)  RR: 20 (09-01-20 @ 13:20)  SpO2: 97% (09-01-20 @ 13:20)  Wt(kg): --    08-31 @ 07:01  -  09-01 @ 07:00  --------------------------------------------------------  IN: 730 mL / OUT: 3300 mL / NET: -2570 mL    09-01 @ 07:01  -  09-01 @ 14:45  --------------------------------------------------------  IN: 720 mL / OUT: 450 mL / NET: 270 mL          Review of Systems:  CONSTITUTIONAL: No fever   RESPIRATORY: No shortness of breath, cough  CARDIOVASCULAR: +chest pain, no shortness of breath  GASTROINTESTINAL: No abdominal pain, No nausea or vomiting, No diarrhea    PHYSICAL EXAM  General: A&Ox 3; NAD  Respiratory: diminished BS at bases   Cardiovascular: Regular rhythm/rate; S1/S2, systolic murmur present   Gastrointestinal: Soft; ND, non tender   Extremities: WWP; no edema  Neurological:  no obvious focal deficits      LABS:                        9.7    9.99  )-----------( 317      ( 01 Sep 2020 06:21 )             30.6     09-01    138  |  100  |  12  ----------------------------<  84  3.7   |  26  |  1.39<H>    Ca    9.5      01 Sep 2020 06:21  Phos  3.7     09-01  Mg     1.9     09-01            Sodium, Random Urine: 91 mmol/L (08-31 @ 16:08)        RADIOLOGY & ADDITIONAL STUDIES:

## 2020-09-01 NOTE — PROGRESS NOTE ADULT - ASSESSMENT
Assessment/Plan;  A/p: 65 yo F w/  PMH of HTN, CAD s/p CABG & PCI, bioAVR, PAD, hypothyroidism, seizure, chronic anemia, and AAA repair in 2015 (Dr. Sandra), CKD, now with subacute rupture contained saccular aortic aneurysm, s/p open TAAA repair involving bypasses to the celiac, SMA, b/l renal arteries (8/26). CXR this AM improved with Lasix 40mg IV yesterday.      -DVT ppx  -oral pain control  -cont home meds as appropriate  -regular diet with ensure  -OOB to chair  - PT  -Renal following: appreciate recs  -Pulm following  -Cardiology following  -Daily chest Xray

## 2020-09-01 NOTE — DISCHARGE NOTE PROVIDER - NSDCCPTREATMENT_GEN_ALL_CORE_FT
PRINCIPAL PROCEDURE  Procedure: Repair, aneurysm, aorta, thoracoabdominal, open  Findings and Treatment:

## 2020-09-01 NOTE — DISCHARGE NOTE PROVIDER - PROVIDER TOKENS
PROVIDER:[TOKEN:[[id number]],FOLLOWUP:[1 week],ESTABLISHEDPATIENT:[T]],PROVIDER:[TOKEN:[[id number]],FOLLOWUP:[1 week]],PROVIDER:[TOKEN:[[id number]],FOLLOWUP:[2 weeks]]

## 2020-09-02 LAB — MENADIONE SERPL-MCNC: <.13 NG/ML — LOW (ref 0.13–1.88)

## 2020-09-02 PROCEDURE — 93010 ELECTROCARDIOGRAM REPORT: CPT

## 2020-09-02 PROCEDURE — 99233 SBSQ HOSP IP/OBS HIGH 50: CPT

## 2020-09-02 PROCEDURE — 71045 X-RAY EXAM CHEST 1 VIEW: CPT | Mod: 26

## 2020-09-02 RX ADMIN — LEVETIRACETAM 500 MILLIGRAM(S): 250 TABLET, FILM COATED ORAL at 17:35

## 2020-09-02 RX ADMIN — CARVEDILOL PHOSPHATE 12.5 MILLIGRAM(S): 80 CAPSULE, EXTENDED RELEASE ORAL at 17:35

## 2020-09-02 RX ADMIN — OXYCODONE AND ACETAMINOPHEN 1 TABLET(S): 5; 325 TABLET ORAL at 05:48

## 2020-09-02 RX ADMIN — LEVETIRACETAM 500 MILLIGRAM(S): 250 TABLET, FILM COATED ORAL at 05:45

## 2020-09-02 RX ADMIN — PANTOPRAZOLE SODIUM 40 MILLIGRAM(S): 20 TABLET, DELAYED RELEASE ORAL at 06:36

## 2020-09-02 RX ADMIN — Medication 81 MILLIGRAM(S): at 10:28

## 2020-09-02 RX ADMIN — CARVEDILOL PHOSPHATE 12.5 MILLIGRAM(S): 80 CAPSULE, EXTENDED RELEASE ORAL at 05:42

## 2020-09-02 RX ADMIN — OXYCODONE AND ACETAMINOPHEN 2 TABLET(S): 5; 325 TABLET ORAL at 10:40

## 2020-09-02 RX ADMIN — OXYCODONE AND ACETAMINOPHEN 2 TABLET(S): 5; 325 TABLET ORAL at 15:20

## 2020-09-02 RX ADMIN — Medication 1 TABLET(S): at 10:28

## 2020-09-02 RX ADMIN — CHLORHEXIDINE GLUCONATE 1 APPLICATION(S): 213 SOLUTION TOPICAL at 05:59

## 2020-09-02 RX ADMIN — OXYCODONE AND ACETAMINOPHEN 2 TABLET(S): 5; 325 TABLET ORAL at 21:43

## 2020-09-02 RX ADMIN — OXYCODONE AND ACETAMINOPHEN 1 TABLET(S): 5; 325 TABLET ORAL at 06:00

## 2020-09-02 RX ADMIN — Medication 4 GRAM(S): at 10:28

## 2020-09-02 RX ADMIN — OXYCODONE AND ACETAMINOPHEN 2 TABLET(S): 5; 325 TABLET ORAL at 22:30

## 2020-09-02 RX ADMIN — HEPARIN SODIUM 5000 UNIT(S): 5000 INJECTION INTRAVENOUS; SUBCUTANEOUS at 21:41

## 2020-09-02 RX ADMIN — HEPARIN SODIUM 5000 UNIT(S): 5000 INJECTION INTRAVENOUS; SUBCUTANEOUS at 05:45

## 2020-09-02 RX ADMIN — Medication 88 MICROGRAM(S): at 05:45

## 2020-09-02 RX ADMIN — HEPARIN SODIUM 5000 UNIT(S): 5000 INJECTION INTRAVENOUS; SUBCUTANEOUS at 13:58

## 2020-09-02 RX ADMIN — OXYCODONE AND ACETAMINOPHEN 2 TABLET(S): 5; 325 TABLET ORAL at 10:45

## 2020-09-02 NOTE — PROGRESS NOTE ADULT - SUBJECTIVE AND OBJECTIVE BOX
Patient is a 64y Female seen and evaluated at bedside. Feels well. No complaints. BP acceptable         Meds:    aspirin enteric coated 81 daily  carvedilol 12.5 every 12 hours  chlorhexidine 2% Cloths 1 <User Schedule>  heparin   Injectable 5000 every 8 hours  lactobacillus acidophilus 1 daily  levETIRAcetam 500 every 12 hours  levothyroxine 88 daily  multivitamin 1 daily  omega-3-Acid Ethyl Esters 4 daily  oxycodone    5 mG/acetaminophen 325 mG 1 every 4 hours PRN  oxycodone    5 mG/acetaminophen 325 mG 2 every 4 hours PRN  pantoprazole    Tablet 40 before breakfast      T(C): , Max: 36.9 (09-01-20 @ 17:34)  T(F): , Max: 98.4 (09-01-20 @ 17:34)  HR: 70 (09-02-20 @ 12:30)  BP: 110/54 (09-02-20 @ 12:30)  BP(mean): 84 (09-02-20 @ 08:41)  RR: 18 (09-02-20 @ 12:30)  SpO2: 97% (09-02-20 @ 12:30)  Wt(kg): --    09-01 @ 07:01  -  09-02 @ 07:00  --------------------------------------------------------  IN: 1920 mL / OUT: 1550 mL / NET: 370 mL    09-02 @ 07:01  -  09-02 @ 13:43  --------------------------------------------------------  IN: 200 mL / OUT: 300 mL / NET: -100 mL          Review of Systems:  CONSTITUTIONAL: No fever   RESPIRATORY: No shortness of breath, cough  CARDIOVASCULAR: +chest pain, no shortness of breath  GASTROINTESTINAL: No abdominal pain, No nausea or vomiting, No diarrhea    PHYSICAL EXAM  General: A&Ox 3; NAD  Respiratory: diminished BS at bases   Cardiovascular: Regular rhythm/rate; S1/S2, systolic murmur present   Gastrointestinal: Soft; ND, non tender   Extremities: WWP; no edema  Neurological:  no focal deficits      LABS:                        9.7    9.99  )-----------( 317      ( 01 Sep 2020 06:21 )             30.6     09-01    138  |  100  |  12  ----------------------------<  84  3.7   |  26  |  1.39<H>    Ca    9.5      01 Sep 2020 06:21  Phos  3.7     09-01  Mg     1.9     09-01            Sodium, Random Urine: 91 mmol/L (08-31 @ 16:08)        RADIOLOGY & ADDITIONAL STUDIES: Patient is a 64y Female seen and evaluated at bedside. Feels well. No complaints. BP acceptable. Patient is s/p aortic anerysm repair.  She has CKD with stable creatinine during this hospitalilzation.                        Meds:    aspirin enteric coated 81 daily  carvedilol 12.5 every 12 hours  chlorhexidine 2% Cloths 1 <User Schedule>  heparin   Injectable 5000 every 8 hours  lactobacillus acidophilus 1 daily  levETIRAcetam 500 every 12 hours  levothyroxine 88 daily  multivitamin 1 daily  omega-3-Acid Ethyl Esters 4 daily  oxycodone    5 mG/acetaminophen 325 mG 1 every 4 hours PRN  oxycodone    5 mG/acetaminophen 325 mG 2 every 4 hours PRN  pantoprazole    Tablet 40 before breakfast      T(C): , Max: 36.9 (09-01-20 @ 17:34)  T(F): , Max: 98.4 (09-01-20 @ 17:34)  HR: 70 (09-02-20 @ 12:30)  BP: 110/54 (09-02-20 @ 12:30)  BP(mean): 84 (09-02-20 @ 08:41)  RR: 18 (09-02-20 @ 12:30)  SpO2: 97% (09-02-20 @ 12:30)  Wt(kg): --    09-01 @ 07:01  -  09-02 @ 07:00  --------------------------------------------------------  IN: 1920 mL / OUT: 1550 mL / NET: 370 mL    09-02 @ 07:01  -  09-02 @ 13:43  --------------------------------------------------------  IN: 200 mL / OUT: 300 mL / NET: -100 mL          Review of Systems:  CONSTITUTIONAL: No fever   RESPIRATORY: No shortness of breath, cough  CARDIOVASCULAR: +chest pain, no shortness of breath  GASTROINTESTINAL: No abdominal pain, No nausea or vomiting, No diarrhea    PHYSICAL EXAM  General: A&Ox 3; NAD  Respiratory: diminished BS at bases   Cardiovascular: Regular rhythm/rate; S1/S2, systolic murmur present   Gastrointestinal: Soft; ND, non tender   Extremities: WWP; no edema  Neurological:  no focal deficits      LABS:                        9.7    9.99  )-----------( 317      ( 01 Sep 2020 06:21 )             30.6     09-01    138  |  100  |  12  ----------------------------<  84  3.7   |  26  |  1.39<H>    Ca    9.5      01 Sep 2020 06:21  Phos  3.7     09-01  Mg     1.9     09-01            Sodium, Random Urine: 91 mmol/L (08-31 @ 16:08)        RADIOLOGY & ADDITIONAL STUDIES:

## 2020-09-02 NOTE — PROGRESS NOTE ADULT - ASSESSMENT
64 F w/PMHx HTN, CAD s/p PCI/CABG/ s/p Bio AVR and mitral valve annuloplasty, PAD s/p peripheral stenting with occluded Bilateral SFA/ Chronic Anemia, AAA s/p open repair, Seizure disorders and CKD III, planned for OR for aneurysm found proximal to prior graft, nephrology consulted for aron-op CKD monitoring    #CKD  nephrologically stable, at baseline, with acceptable lytes, autodiuresing   CXR with stable left pleural effusion  sCr ~stable   Bong not helpful while patient receiving Lasix   recommend to brett for SOB PRN 40 mg IV lasix     Thank you for the opportunity to participate in the care of your patient. The nephrology service remains available to assist with any questions or concerns. Please feel free to reach us by paging the on-call nephrology fellow for urgent issues or as below.     Sukhjinder Syed M.D.   PGY-4, Nephrology Fellow   C: [phone]   P: [phone]

## 2020-09-02 NOTE — PROGRESS NOTE ADULT - ASSESSMENT
64 Female with Pmhx of Essential HTN, CAD s/p CABG & PCI, s/p AVR, PAD, hypothyroidism, seizure, chronic anemia, AAA s/p repair 2015 (Dr. Sandra) who presented with hypertensive emergency, chest pain, palpitations, low level troponin leak, found to have new aortic aneurysm above previous graft, now s/p aortorenal, aortomesenteric, aortoceliac bypass grafting, with open repair of thoracoabdominal aorta aneurysm, clinically improved now on step down unit. Cardiology following for continued cardiac optimization    1) Atherosclerotic Cardiovascular Disease:  Hx of CAD, S/p CABG, HTN, AAA with Severe atherosclerotic disease, with large amount of ulcerated soft plaque in the descending aorta and probable penetrating atherosclerotic ulcers now s/p AAA repair  Pt doing well clinically post operatively with SBP ranging from the 120-160's    - c/w Coreg 12.5 PO BID with holding parameters, hold for HR<60 SBP<100  - would initiate Amlodipine 2.5mg daily and increae to 5mg daily if needed for BP control  - Can resume Imdur 30 daily if any chest discomfort  - Continue ASA 81mg,  - Pt reported intolerance to statins, may consider starting PCSK9 inhibitor, pre-auth has been obtained by primary team  - If possible recommend starting Omega 3 while in house. If not please ensure patient is Discharged on it  - Given moderate AS, would avoid excessive afterload reduction, pure vasodilators (Hydralazine etc).   - she appears euvolemic  - Keep K> 4, Mg> 2 for cardiac myocyte stability and ectopy suppression  - Please obtain daily EKGS    Yariel Villafuerte MD  Cardiology Fellow 64 Female with Pmhx of Essential HTN, CAD s/p CABG & PCI, s/p AVR, PAD, hypothyroidism, seizure, chronic anemia, AAA s/p repair 2015 (Dr. Sandra) who presented with hypertensive emergency, chest pain, palpitations, low level troponin leak, found to have new aortic aneurysm above previous graft, now s/p aortorenal, aortomesenteric, aortoceliac bypass grafting, with open repair of thoracoabdominal aorta aneurysm, clinically improved now on step down unit. Cardiology following for continued cardiac optimization    1) Atherosclerotic Cardiovascular Disease:  Hx of CAD, S/p CABG, HTN, AAA with Severe atherosclerotic disease, with large amount of ulcerated soft plaque in the descending aorta and probable penetrating atherosclerotic ulcers now s/p AAA repair  Pt doing well clinically post operatively with SBP ranging from the 120-160's    - c/w Coreg 12.5 PO BID with holding parameters, hold for HR<60 SBP<100  - consider ARB for BP control if her kidney function can tolerate  - Can resume Imdur 30 daily if any chest discomfort  - Continue ASA 81mg,  - Pt reported intolerance to statins, may consider starting PCSK9 inhibitor, pre-auth has been obtained by primary team  - If possible recommend starting Omega 3 while in house. If not please ensure patient is Discharged on it  - - she appears euvolemic  - Keep K> 4, Mg> 2 for cardiac myocyte stability and ectopy suppression  - Please obtain daily EKGS    Yareil Villafuerte MD  Cardiology Fellow

## 2020-09-02 NOTE — PROGRESS NOTE ADULT - SUBJECTIVE AND OBJECTIVE BOX
24hr Events:  O/N: 375ml UOP, VSS  9/1: am cxr improved.             ] Heart Failure     [  ] Acute     [  ] Acute on Chronic     [  ] Chronic  -------------------------------------------------------------------     [  ]Diastolic [HFpEF]     [  ]Systolic [HFrEF]     [  ]Combined [HFpEF & HFrEF]     [  ] afib     [ X ] hypertensive heart disease     [  ]Other:  -------------------------------------------------------------------  [ ] Respiratory failure  [ ] Acute cor pulmonale  [ ] Asthma/COPD Exacerbation  [ ] Pleural effusion  [ ] Aspiration pneumonia  -------------------------------------------------------------------  [  ]CHERRI     [  ]ATN     [  ]Reneal Medullary Necrosis     [  ]Renal Cortical Necrosis     [  ]Other Pathological Lesions:    [  ]CKD 1  [  ]CKD 2  [ X ]CKD 3  [  ]CKD 4  [  ]CKD 5  [  ]Other  -------------------------------------------------------------------  [  ]Diabetes  [  ] Diabetic PVD Ulcer  [  ] Neuropathic ulcer to DM  [  ] Diabetes with Nephropathy  [  ] Osteomyelitis due to diabetes  --------------------------------------------------------------------  [  ]Malnutrition: See Nutrition Note  [  ]Cachexia  [ X ]Other: [ x] Severe Protein Calorie Malnutrition  [  ]Supplement Ordered:  [  ]Morbid Obesity (BMI >=40]  ---------------------------------------------------------------------  [ ] Sepsis/severe sepsis/septic shock  [ ] UTI  [ ] Pneumonia  -----------------------------------------------------------------------  [ ] Acidosis/alkalosis  [ ] Fluid overload  [ ] Hypokalemia  [ ] Hyperkalemia  [ ] Hypomagnesemia  [X ] Hypophosphatemia  [ ] Hyperphosphatemia  [X] Hyponatremia- observation  ------------------------------------------------------------------------  [ ] Acute blood loss anemia  [ ] Post op blood loss anemia  [ ] Iron deficiency anemia  [X ] Anemia due to chronic disease  [ ] Hypercoagulable state  ----------------------------------------------------------------------  [ ] Cerebral infarction  [ ] Transient ischemia attack  [ ] Encephalopathy      Assessment/Plan;  A/p: 65 yo F w/  PMH of HTN, CAD s/p CABG & PCI, bioAVR, PAD, hypothyroidism, seizure, chronic anemia, and AAA repair in 2015 (Dr. Sandra), CKD, now with subacute rupture contained saccular aortic aneurysm, s/p open TAAA repair involving bypasses to the celiac, SMA, b/l renal arteries (8/26). CXR this AM improved with Lasix 40mg IV yesterday.      -DVT ppx  -oral pain control  -cont home meds as appropriate  -regular diet with ensure  -OOB to chair  - PT  -Renal following: appreciate recs  -Pulm following  -Cardiology following  -Daily chest Xray 24hr Events:  O/N: 375ml UOP, VSS  9/1: am cxr improved.    aspirin enteric coated 81  carvedilol 12.5  heparin   Injectable 5000        Vital Signs Last 24 Hrs  T(C): 36.6 (02 Sep 2020 04:40), Max: 36.9 (01 Sep 2020 09:07)  T(F): 97.9 (02 Sep 2020 04:40), Max: 98.4 (01 Sep 2020 09:07)  HR: 70 (02 Sep 2020 05:55) (62 - 88)  BP: 160/70 (02 Sep 2020 05:55) (107/54 - 160/70)  BP(mean): 101 (02 Sep 2020 05:55) (76 - 101)  RR: 18 (02 Sep 2020 05:55) (16 - 20)  SpO2: 100% (02 Sep 2020 05:55) (96% - 100%)  I&O's Summary    01 Sep 2020 07:01  -  02 Sep 2020 07:00  --------------------------------------------------------  IN: 1920 mL / OUT: 1550 mL / NET: 370 mL        Physical Exam:  General: NAD, resting comfortably in bed  Pulmonary: Nonlabored breathing, no respiratory distress  Abdomen: thoracoabdominal incision c/d/i, wound cleaned with chloroprep and dressing changed.      LABS:                        9.7    9.99  )-----------( 317      ( 01 Sep 2020 06:21 )             30.6     09-01    138  |  100  |  12  ----------------------------<  84  3.7   |  26  |  1.39<H>    Ca    9.5      01 Sep 2020 06:21  Phos  3.7     09-01  Mg     1.9     09-01      [ ] Heart Failure     [  ] Acute     [  ] Acute on Chronic     [  ] Chronic  -------------------------------------------------------------------     [  ]Diastolic [HFpEF]     [  ]Systolic [HFrEF]     [  ]Combined [HFpEF & HFrEF]     [  ] afib     [ X ] hypertensive heart disease     [  ]Other:  -------------------------------------------------------------------  [ ] Respiratory failure  [ ] Acute cor pulmonale  [ ] Asthma/COPD Exacerbation  [ ] Pleural effusion  [ ] Aspiration pneumonia  -------------------------------------------------------------------  [  ]CHERRI     [  ]ATN     [  ]Reneal Medullary Necrosis     [  ]Renal Cortical Necrosis     [  ]Other Pathological Lesions:    [  ]CKD 1  [  ]CKD 2  [ X ]CKD 3  [  ]CKD 4  [  ]CKD 5  [  ]Other  -------------------------------------------------------------------  [  ]Diabetes  [  ] Diabetic PVD Ulcer  [  ] Neuropathic ulcer to DM  [  ] Diabetes with Nephropathy  [  ] Osteomyelitis due to diabetes  --------------------------------------------------------------------  [  ]Malnutrition: See Nutrition Note  [  ]Cachexia  [ X ]Other: [ x] Severe Protein Calorie Malnutrition  [  ]Supplement Ordered:  [  ]Morbid Obesity (BMI >=40]  ---------------------------------------------------------------------  [ ] Sepsis/severe sepsis/septic shock  [ ] UTI  [ ] Pneumonia  -----------------------------------------------------------------------  [ ] Acidosis/alkalosis  [ ] Fluid overload  [ ] Hypokalemia  [ ] Hyperkalemia  [ ] Hypomagnesemia  [X ] Hypophosphatemia  [ ] Hyperphosphatemia  [X] Hyponatremia- observation  ------------------------------------------------------------------------  [ ] Acute blood loss anemia  [ ] Post op blood loss anemia  [ ] Iron deficiency anemia  [X ] Anemia due to chronic disease  [ ] Hypercoagulable state  ----------------------------------------------------------------------  [ ] Cerebral infarction  [ ] Transient ischemia attack  [ ] Encephalopathy      Assessment/Plan;  A/p: 63 yo F w/  PMH of HTN, CAD s/p CABG & PCI, bioAVR, PAD, hypothyroidism, seizure, chronic anemia, and AAA repair in 2015 (Dr. Sandra), CKD, now with subacute rupture contained saccular aortic aneurysm, s/p open TAAA repair involving bypasses to the celiac, SMA, b/l renal arteries (8/26). CXR this AM improved with Lasix 40mg IV yesterday.      -DVT ppx  -oral pain control  -cont home meds as appropriate  -regular diet with ensure  -OOB to chair  - PT  -Renal following: appreciate recs  -Pulm following  -Cardiology following  -Daily chest Xray and ekg

## 2020-09-02 NOTE — PROGRESS NOTE ADULT - SUBJECTIVE AND OBJECTIVE BOX
Cardiology Consult    O/N: no overnight events  Interval History: Ms. Ramirez is continuing to recover from her extensive open aaa bypass surgery with dipharmatic reconstruction. she has post operative rib pain which remains and is worse with deep inspiration. she has been using her incentive spirometer religiously. she luis dyspnea or palpitations. her chest pain is localized to the left surgical site and is constant but improves with opiates.   Telemetry: short runs of SVT noted, 3 beats of NSVT    OBJECTIVE  Vitals:  T(C): 36.9 (09-02-20 @ 09:36), Max: 36.9 (09-01-20 @ 13:16)  HR: 70 (09-02-20 @ 12:30) (62 - 88)  BP: 110/54 (09-02-20 @ 12:30) (107/54 - 160/70)  RR: 18 (09-02-20 @ 12:30) (16 - 20)  SpO2: 97% (09-02-20 @ 12:30) (96% - 100%)  Wt(kg): --    I/O:  I&O's Summary    01 Sep 2020 07:01  -  02 Sep 2020 07:00  --------------------------------------------------------  IN: 1920 mL / OUT: 1550 mL / NET: 370 mL    02 Sep 2020 07:01  -  02 Sep 2020 13:15  --------------------------------------------------------  IN: 200 mL / OUT: 300 mL / NET: -100 mL        PHYSICAL EXAM:  appears comfortable NAD  MMM, neck supple no JVD  Heart regular with ectopic beat heard, S1, reduced S2, harsh 3/6 crescendo systolic murmur heard throughout  Lungs CTAB, unable to perform max effort d/t pain  Abd sofr NTND , clean dressing on surgical site  ext wwp no edema, palpable DP pulses but weak  AAOx3, moving all extremities   	  LABS:                        9.7    9.99  )-----------( 317      ( 01 Sep 2020 06:21 )             30.6     09-01    138  |  100  |  12  ----------------------------<  84  3.7   |  26  |  1.39<H>    Ca    9.5      01 Sep 2020 06:21  Phos  3.7     09-01  Mg     1.9     09-01            RADIOLOGY & ADDITIONAL TESTS:  < from: TTE Echo Complete w/o Contrast w/ Doppler (08.21.20 @ 11:48) >     1. There is mild concentric left ventricular hypertrophy. The left ventricle is normal in size, wall thickness, and systolic function with a calculated ejection fraction of 65%. Abnormal septal motion noted.   2. Severely dilated left atrium.   3. Bioprosthetic valve is seen in the aortic position. Peak transvalvular velocity is 3.85 m/s, the mean transvalvular gradient is 31.00 mmHg, and the LVOT/AV velocity ratio is 0.36.   4. There is mild transvalvular aortic regurgitation. There is trace paravalvular aortic regurgitation.   5. An annuloplasty ring is noted in the mitral position. The mean transvalvular gradient is 13.00 mmHg at a heart rate of 81 bpm. Mild mitral regurgitation.   6. There is moderate-to-severe tricuspid regurgitation.   7. Pulmonary hypertension present, pulmonary artery systolic pressure is 48 mmHg.   8. There is trace pulmonic regurgitation.   9. No pericardial effusion.  10. The aortic root is normal in size.  11. Compared to the previous TTE performed on 8/5/2019, higher mean gradient across mitral valve at a higher heart rate and gradients across bioprosthetic aortic valve is unchanged.      < from: Xray Chest 1 View- PORTABLE-Routine (09.01.20 @ 07:11) >  FINDINGS: Cardiomegaly. Poststernotomy and aortic and mitral valve replacements. Post CABG. Increased opacities again noted in the left lower lung zone unchanged. Surgical staples are noted over the soft tissues over the lateral and inferior aspect of the left hemithorax. Again noted is a left-sided loop recorder. No pneumothorax.    < end of copied text >    Reviewed .    MEDICATIONS  (STANDING):  aspirin enteric coated 81 milliGRAM(s) Oral daily  carvedilol 12.5 milliGRAM(s) Oral every 12 hours  chlorhexidine 2% Cloths 1 Application(s) Topical <User Schedule>  heparin   Injectable 5000 Unit(s) SubCutaneous every 8 hours  lactobacillus acidophilus 1 Tablet(s) Oral daily  levETIRAcetam 500 milliGRAM(s) Oral every 12 hours  levothyroxine 88 MICROGram(s) Oral daily  multivitamin 1 Tablet(s) Oral daily  omega-3-Acid Ethyl Esters 4 Gram(s) Oral daily  pantoprazole    Tablet 40 milliGRAM(s) Oral before breakfast    MEDICATIONS  (PRN):  oxycodone    5 mG/acetaminophen 325 mG 1 Tablet(s) Oral every 4 hours PRN Moderate Pain (4 - 6)  oxycodone    5 mG/acetaminophen 325 mG 2 Tablet(s) Oral every 4 hours PRN Severe Pain (7 - 10)

## 2020-09-02 NOTE — CHART NOTE - NSCHARTNOTEFT_GEN_A_CORE
Admitting Diagnosis:   Patient is a 64y old  Female who presents with a chief complaint of Contained Rupture of AAA (01 Sep 2020 08:32)      PAST MEDICAL & SURGICAL HISTORY:  Seizure  Essential hypertension: HTN (hypertension)  Gastroesophageal reflux disease: GERD (gastroesophageal reflux disease)  Hyperlipidemia: Hyperlipidemia  Anemia: Anemia  Hypothyroidism: Hypothyroidism  Atherosclerosis of coronary artery: CAD (coronary artery disease)  Peripheral vascular disease: PVD (peripheral vascular disease)  H/O aortic valve replacement: Bioprosthetic  Atherosclerosis of coronary artery bypass graft(s), unspecified, with angina pectoris with documented spasm  Status post aorto-coronary artery bypass graft: 2012      Current Nutrition Order: Regular + Ensure Enlive BID (700 kcal, 40g protein, 360 mL free H2O)        PO Intake: Good (%) [   ]  Fair (50-75%) [  x ] Poor (<25%) [ x  ]- mainly consuming Ensure shakes/ liquid items such as soups    GI Issues: tender abd, non distended, denies n/v/d/c    Pain: to abdomen being managed     Skin Integrity: surgical incision to abd, marcin score 17     Labs:   09-01    138  |  100  |  12  ----------------------------<  84  3.7   |  26  |  1.39<H>    Ca    9.5      01 Sep 2020 06:21  Phos  3.7     09-01  Mg     1.9     09-01      CAPILLARY BLOOD GLUCOSE          Medications:  MEDICATIONS  (STANDING):  aspirin enteric coated 81 milliGRAM(s) Oral daily  carvedilol 12.5 milliGRAM(s) Oral every 12 hours  chlorhexidine 2% Cloths 1 Application(s) Topical <User Schedule>  heparin   Injectable 5000 Unit(s) SubCutaneous every 8 hours  lactobacillus acidophilus 1 Tablet(s) Oral daily  levETIRAcetam 500 milliGRAM(s) Oral every 12 hours  levothyroxine 88 MICROGram(s) Oral daily  multivitamin 1 Tablet(s) Oral daily  omega-3-Acid Ethyl Esters 4 Gram(s) Oral daily  pantoprazole    Tablet 40 milliGRAM(s) Oral before breakfast    MEDICATIONS  (PRN):  oxycodone    5 mG/acetaminophen 325 mG 1 Tablet(s) Oral every 4 hours PRN Moderate Pain (4 - 6)  oxycodone    5 mG/acetaminophen 325 mG 2 Tablet(s) Oral every 4 hours PRN Severe Pain (7 - 10)      Weight: 56kg     Weight Change:  no new wts recorded since admission     Nutrition Focused Physical Exam: Completed [ x- 8/31]  Not Pertinent [   ]    Estimated energy needs:   Height: 5'7", IBW 135lbs+/-10%, %IBW 91%, BMI 19.3   ABW (56kg) used for calculations as pt between % of IBW.   Nutrient needs based on [facility] standards of care for maintenance in older adults.   Adjusted for age, suspected malnutrition, and post-op demand    1400-1680kcal/day (25-30kcal/kg)  78-90g pro/day (1.2-1.4gm/kg)   Fluid per team     Subjective:   65 yo F w/  PMH of HTN, CAD s/p CABG & PCI, bioAVR, PAD, hypothyroidism, seizure, chronic anemia, and AAA repair in 2015 (Dr. Sandra), CKD, now with subacute rupture contained saccular aortic aneurysm, s/p open TAAA repair involving bypasses to the celiac, SMA, b/l renal arteries (8/26). Stepped down to 8LA 8/31. Of note, pt has had a >20lb unintentional wt loss since Febuary 2020 d/t not wanting to be in public 2/2 COVID. UBW is 143lbs, current BW is 123lbs indicating a 13.9% wt change x6mo. Noted to have moderate muscle wasting in upper extremities with reports of meeting <75% EER for >3mo. Suspected to have severe PCM.   Pt seen in room, resting in bed. Currently on a regular diet and tolerating PO. Notes mainly consuming ensure, oatmeal, soft or liquid items. Encouraged intake/ ONS intake through day to best meet needs. Per report pt planned to DC today vs tomorrow, pt requiring home PT however is refusing. Continue w/ current diet order, honor pts food preferences, and encourage intake at meals. RD to follow.     Previous Nutrition Diagnosis: Suspected severe PCM RT inadequate intake 2/2 limited access to food during pandemic AEB diet recall indicating meeting <75% EER for >1mo and 13.9% wt change x6mo    Active [ x  ]  Resolved [   ]    If resolved, new PES:     Goal: Pt to consistently meet % of estimated needs PO and avoid further s/s malnutrition     Recommendations:  1. Recommend c/w EnsureEnlive BID (700kcal, 40g pro)  2. Appreciate support and encouragement provided at meal times  3. Please obtain updated weights for accuracy and trending  4. Consider SW consult for resources of food delivery services  5. Pain and bowel regimen per team  6. BMP, BG Q6hrs, CBC per MD discretion     Education: encouraged intake through day     Risk Level: High [   ] Moderate [ x  ] Low [   ]

## 2020-09-02 NOTE — PROGRESS NOTE ADULT - SUBJECTIVE AND OBJECTIVE BOX
ID: 64F former smoker w/PMHx CAD s/p CABG (LIMA-Ramus/free ANTONIO-RPDA) w/bioAVR and MV repair (2002) and PCI (FORD x1 mLCx-OM2, 2007), PAD s/p PTA/FORD L common iliac and pSFA, infrarenal AAA s/p repair (2015), HTN, CKD3, emphysema (seen on CT, asx), hypothyroidism, seizure disorder, anx/dep admitted initially to cardiology for HTN urgency and ACS rule out, now on vascular service due to finding of aneurysm proximal to prior graft. s/p open thoracoabdominal aneurysm repair 8/25.    Subjective/Interval events:  No acute overnight events. Pain better controlled this morning. Denies shortness of breath. Adamant about not wanting home PT (doesn't think she needs it). She says she has family that lives two blocks away from her that can help her if she needs as well.    MEDICATIONS  (STANDING):  aspirin enteric coated 81 milliGRAM(s) Oral daily  carvedilol 12.5 milliGRAM(s) Oral every 12 hours  chlorhexidine 2% Cloths 1 Application(s) Topical <User Schedule>  heparin   Injectable 5000 Unit(s) SubCutaneous every 8 hours  lactobacillus acidophilus 1 Tablet(s) Oral daily  levETIRAcetam 500 milliGRAM(s) Oral every 12 hours  levothyroxine 88 MICROGram(s) Oral daily  multivitamin 1 Tablet(s) Oral daily  omega-3-Acid Ethyl Esters 4 Gram(s) Oral daily  pantoprazole    Tablet 40 milliGRAM(s) Oral before breakfast    MEDICATIONS  (PRN):  oxycodone    5 mG/acetaminophen 325 mG 1 Tablet(s) Oral every 4 hours PRN Moderate Pain (4 - 6)  oxycodone    5 mG/acetaminophen 325 mG 2 Tablet(s) Oral every 4 hours PRN Severe Pain (7 - 10)    Vital Signs Last 24 Hrs  T(C): 36.9 (02 Sep 2020 09:36), Max: 36.9 (01 Sep 2020 13:16)  T(F): 98.4 (02 Sep 2020 09:36), Max: 98.4 (01 Sep 2020 13:16)  HR: 62 (02 Sep 2020 08:41) (62 - 88)  BP: 125/58 (02 Sep 2020 08:41) (107/54 - 160/70)  BP(mean): 84 (02 Sep 2020 08:41) (76 - 101)  RR: 18 (02 Sep 2020 08:41) (16 - 20)  SpO2: 96% (02 Sep 2020 08:41) (96% - 100%)    PHYSICAL EXAM:  GENERAL: NAD, thin appearing  HEENT - NC/AT, pupils equal and reactive to light,  ; Moist mucous membranes, Good dentition, No lesions  NECK: Supple, No JVD  CHEST/LUNG: clear to auscultation, normal respiratory rate, no signs of respiratory distress  HEART: Regular rate and rhythm; 2/6 early systolic murmur, No rubs or gallops  ABDOMEN: Soft, Nontender, Nondistended; Bowel sounds present  EXTREMITIES:  2+ Peripheral Pulses, No clubbing, cyanosis, or edema  NEURO:  No Focal deficits, sensory and motor intact  SKIN: No rashes or lesions. Abdominal incision site with staples, c/d/i    LABS:  09-01    138  |  100  |  12  ----------------------------<  84  3.7   |  26  |  1.39<H>    Ca    9.5      01 Sep 2020 06:21  Phos  3.7     09-01  Mg     1.9     09-01                          9.7    9.99  )-----------( 317      ( 01 Sep 2020 06:21 )             30.6     RADIOLOGY & ADDITIONAL TESTS:           reviewed, none new

## 2020-09-03 PROCEDURE — 71045 X-RAY EXAM CHEST 1 VIEW: CPT | Mod: 26

## 2020-09-03 PROCEDURE — 99233 SBSQ HOSP IP/OBS HIGH 50: CPT

## 2020-09-03 PROCEDURE — 99232 SBSQ HOSP IP/OBS MODERATE 35: CPT | Mod: GC

## 2020-09-03 RX ORDER — DEXTROSE 50 % IN WATER 50 %
12.5 SYRINGE (ML) INTRAVENOUS ONCE
Refills: 0 | Status: DISCONTINUED | OUTPATIENT
Start: 2020-09-03 | End: 2020-09-03

## 2020-09-03 RX ORDER — DEXTROSE 50 % IN WATER 50 %
15 SYRINGE (ML) INTRAVENOUS ONCE
Refills: 0 | Status: DISCONTINUED | OUTPATIENT
Start: 2020-09-03 | End: 2020-09-03

## 2020-09-03 RX ORDER — DEXTROSE 50 % IN WATER 50 %
25 SYRINGE (ML) INTRAVENOUS ONCE
Refills: 0 | Status: DISCONTINUED | OUTPATIENT
Start: 2020-09-03 | End: 2020-09-03

## 2020-09-03 RX ORDER — INSULIN LISPRO 100/ML
VIAL (ML) SUBCUTANEOUS
Refills: 0 | Status: DISCONTINUED | OUTPATIENT
Start: 2020-09-03 | End: 2020-09-03

## 2020-09-03 RX ORDER — GLUCAGON INJECTION, SOLUTION 0.5 MG/.1ML
1 INJECTION, SOLUTION SUBCUTANEOUS ONCE
Refills: 0 | Status: DISCONTINUED | OUTPATIENT
Start: 2020-09-03 | End: 2020-09-03

## 2020-09-03 RX ORDER — SODIUM CHLORIDE 9 MG/ML
1000 INJECTION, SOLUTION INTRAVENOUS
Refills: 0 | Status: DISCONTINUED | OUTPATIENT
Start: 2020-09-03 | End: 2020-09-03

## 2020-09-03 RX ADMIN — OXYCODONE AND ACETAMINOPHEN 1 TABLET(S): 5; 325 TABLET ORAL at 09:42

## 2020-09-03 RX ADMIN — OXYCODONE AND ACETAMINOPHEN 1 TABLET(S): 5; 325 TABLET ORAL at 10:40

## 2020-09-03 RX ADMIN — OXYCODONE AND ACETAMINOPHEN 1 TABLET(S): 5; 325 TABLET ORAL at 23:02

## 2020-09-03 RX ADMIN — PANTOPRAZOLE SODIUM 40 MILLIGRAM(S): 20 TABLET, DELAYED RELEASE ORAL at 06:13

## 2020-09-03 RX ADMIN — Medication 1 TABLET(S): at 11:13

## 2020-09-03 RX ADMIN — OXYCODONE AND ACETAMINOPHEN 1 TABLET(S): 5; 325 TABLET ORAL at 21:47

## 2020-09-03 RX ADMIN — Medication 4 GRAM(S): at 11:13

## 2020-09-03 RX ADMIN — LEVETIRACETAM 500 MILLIGRAM(S): 250 TABLET, FILM COATED ORAL at 06:13

## 2020-09-03 RX ADMIN — OXYCODONE AND ACETAMINOPHEN 2 TABLET(S): 5; 325 TABLET ORAL at 06:53

## 2020-09-03 RX ADMIN — CARVEDILOL PHOSPHATE 12.5 MILLIGRAM(S): 80 CAPSULE, EXTENDED RELEASE ORAL at 06:13

## 2020-09-03 RX ADMIN — OXYCODONE AND ACETAMINOPHEN 1 TABLET(S): 5; 325 TABLET ORAL at 16:10

## 2020-09-03 RX ADMIN — HEPARIN SODIUM 5000 UNIT(S): 5000 INJECTION INTRAVENOUS; SUBCUTANEOUS at 13:38

## 2020-09-03 RX ADMIN — OXYCODONE AND ACETAMINOPHEN 1 TABLET(S): 5; 325 TABLET ORAL at 17:10

## 2020-09-03 RX ADMIN — Medication 81 MILLIGRAM(S): at 11:12

## 2020-09-03 RX ADMIN — Medication 88 MICROGRAM(S): at 06:13

## 2020-09-03 RX ADMIN — Medication 1 TABLET(S): at 11:12

## 2020-09-03 RX ADMIN — HEPARIN SODIUM 5000 UNIT(S): 5000 INJECTION INTRAVENOUS; SUBCUTANEOUS at 21:15

## 2020-09-03 RX ADMIN — HEPARIN SODIUM 5000 UNIT(S): 5000 INJECTION INTRAVENOUS; SUBCUTANEOUS at 06:13

## 2020-09-03 RX ADMIN — OXYCODONE AND ACETAMINOPHEN 2 TABLET(S): 5; 325 TABLET ORAL at 06:13

## 2020-09-03 RX ADMIN — LEVETIRACETAM 500 MILLIGRAM(S): 250 TABLET, FILM COATED ORAL at 18:37

## 2020-09-03 RX ADMIN — CARVEDILOL PHOSPHATE 12.5 MILLIGRAM(S): 80 CAPSULE, EXTENDED RELEASE ORAL at 18:37

## 2020-09-03 NOTE — PROGRESS NOTE ADULT - ASSESSMENT
64 F w/PMHx HTN, CAD s/p PCI/CABG/ s/p Bio AVR and mitral valve annuloplasty, PAD s/p peripheral stenting with occluded Bilateral SFA/ Chronic Anemia, AAA s/p open repair, Seizure disorders and CKD III, planned for OR for aneurysm found proximal to prior graft, nephrology consulted for aron-op CKD monitoring    #CKD  nephrologically stable   CXR with stable left pleural effusion  sCr ~stable, consider repeating q48h   Bong not helpful while patient receiving Lasix   recommend to brett for SOB PRN 40 mg IV lasix     Thank you for the opportunity to participate in the care of your patient. The nephrology service remains available to assist with any questions or concerns. Please feel free to reach us by paging the on-call nephrology fellow for urgent issues or as below.     Sukhjinder Syed M.D.   PGY-4, Nephrology Fellow   C: [phone]   P: [phone]

## 2020-09-03 NOTE — PROGRESS NOTE ADULT - SUBJECTIVE AND OBJECTIVE BOX
Interval Events: Reviewed  Patient seen and examined at bedside.    Patient is a 64y old  Female who presents with a chief complaint of Contained Rupture of AAA (01 Sep 2020 08:32)    she is doing OK and she is not sob  PAST MEDICAL & SURGICAL HISTORY:  Seizure  Essential hypertension: HTN (hypertension)  Gastroesophageal reflux disease: GERD (gastroesophageal reflux disease)  Hyperlipidemia: Hyperlipidemia  Anemia: Anemia  Hypothyroidism: Hypothyroidism  Atherosclerosis of coronary artery: CAD (coronary artery disease)  Peripheral vascular disease: PVD (peripheral vascular disease)  H/O aortic valve replacement: Bioprosthetic  Atherosclerosis of coronary artery bypass graft(s), unspecified, with angina pectoris with documented spasm  Status post aorto-coronary artery bypass graft: 2012      MEDICATIONS:  Pulmonary:    Antimicrobials:    Anticoagulants:  aspirin enteric coated 81 milliGRAM(s) Oral daily  heparin   Injectable 5000 Unit(s) SubCutaneous every 8 hours    Cardiac:  carvedilol 12.5 milliGRAM(s) Oral every 12 hours      Allergies    No Known Allergies    Intolerances    Crestor (Other (Mod to Severe))      Vital Signs Last 24 Hrs  T(C): 37.2 (03 Sep 2020 18:11), Max: 37.2 (03 Sep 2020 18:11)  T(F): 98.9 (03 Sep 2020 18:11), Max: 98.9 (03 Sep 2020 18:11)  HR: 62 (03 Sep 2020 16:00) (60 - 68)  BP: 155/73 (03 Sep 2020 16:00) (112/58 - 155/73)  BP(mean): 105 (03 Sep 2020 16:00) (77 - 105)  RR: 20 (03 Sep 2020 16:00) (18 - 20)  SpO2: 98% (03 Sep 2020 16:00) (96% - 100%)    09-02 @ 07:01 - 09-03 @ 07:00  --------------------------------------------------------  IN: 437 mL / OUT: 600 mL / NET: -163 mL    09-03 @ 07:01 - 09-03 @ 18:56  --------------------------------------------------------  IN: 240 mL / OUT: 0 mL / NET: 240 mL          Review of Systems:   •	General: negative  •	Skin/Breast: negative  •	Ophthalmologic: negative  •	ENMT: negative  •	Respiratory and Thorax: negative  •	Cardiovascular: negative  •	Gastrointestinal: negative  •	Genitourinary: negative  •	Musculoskeletal: negative  •	Neurological: negative  •	Psychiatric: negative  •	Hematology/Lymphatics: negative  •	Endocrine: negative  •	Allergic/Immunologic: negative    Physical Exam:   • Constitutional:	Well-developed, well nourished  • Eyes:	EOMI; PERRL; no drainage or redness  • ENMT:	No oral lesions; no gross abnormalities  • Neck	No bruits; no thyromegaly or nodules  • Breasts:	not examined  • Back:	No deformity or limitation of movement  • Respiratory:	Breath Sounds equal & clear to percussion & auscultation, no accessory muscle use  • Cardiovascular:	Regular rate & rhythm, normal S1, S2; no murmurs, gallops or rubs; no S3, S4  • Gastrointestinal:	Soft, non-tender, no hepatosplenomegaly, normal bowel sounds  • Genitourinary:	not examined  • Rectal: not examined  • Extremities:	No cyanosis, clubbing or edema  • Vascular:	Equal and normal pulses (carotid, femoral, dorsalis pedis)  • Neurologica:l	not examined  • Skin:	No lesions; no rash  • Lymph Nodes:	No lymphadedenopathy  • Musculoskeletal:	No joint pain, swelling or deformity; no limitation of movement        LABS:            < from: Xray Chest 1 View- PORTABLE-Routine (09.03.20 @ 04:44) >  EXAM:  XR CHEST PORTABLE ROUTINE 1V                          PROCEDURE DATE:  09/03/2020          INTERPRETATION:  Clinical History: Effusion    Portable examination of chest demonstrates cardiomegaly. Patient status post sternotomy and valvular replacement. Improvement left effusion in comparison to prior examination of the chest 9/2/2020. Subcutaneous emphysema overlying left lateral chest wall. Surgical clips noted overlying left lower chest.    IMPRESSION: Improvement left effusion    < end of copied text >                        RADIOLOGY & ADDITIONAL STUDIES (The following images were personally reviewed):  Gomez:                                     No  Urine output:                       adequate  DVT prophylaxis:                 Yes  Flattus:                                  Yes  Bowel movement:              No

## 2020-09-03 NOTE — PROGRESS NOTE ADULT - SUBJECTIVE AND OBJECTIVE BOX
ID: 64F former smoker w/PMHx CAD s/p CABG (LIMA-Ramus/free ANTONIO-RPDA) w/bioAVR and MV repair (2002) and PCI (FORD x1 mLCx-OM2, 2007), PAD s/p PTA/FORD L common iliac and pSFA, infrarenal AAA s/p repair (2015), HTN, CKD3, emphysema (seen on CT, asx), hypothyroidism, seizure disorder, anx/dep admitted initially to cardiology for HTN urgency and ACS rule out, now on vascular service due to finding of aneurysm proximal to prior graft. s/p open thoracoabdominal aneurysm repair 8/25.    Subjective/Interval events:  No acute overnight events. Pain well controlled this morning, no shortness of breath, chest pain, dizziness, lightheadedness.     MEDICATIONS  (STANDING):  aspirin enteric coated 81 milliGRAM(s) Oral daily  carvedilol 12.5 milliGRAM(s) Oral every 12 hours  chlorhexidine 2% Cloths 1 Application(s) Topical <User Schedule>  heparin   Injectable 5000 Unit(s) SubCutaneous every 8 hours  lactobacillus acidophilus 1 Tablet(s) Oral daily  levETIRAcetam 500 milliGRAM(s) Oral every 12 hours  levothyroxine 88 MICROGram(s) Oral daily  multivitamin 1 Tablet(s) Oral daily  omega-3-Acid Ethyl Esters 4 Gram(s) Oral daily  pantoprazole    Tablet 40 milliGRAM(s) Oral before breakfast    MEDICATIONS  (PRN):  oxycodone    5 mG/acetaminophen 325 mG 1 Tablet(s) Oral every 4 hours PRN Moderate Pain (4 - 6)  oxycodone    5 mG/acetaminophen 325 mG 2 Tablet(s) Oral every 4 hours PRN Severe Pain (7 - 10)    Vital Signs Last 24 Hrs  T(C): 37 (03 Sep 2020 09:00), Max: 37 (03 Sep 2020 09:00)  T(F): 98.6 (03 Sep 2020 09:00), Max: 98.6 (03 Sep 2020 09:00)  HR: 64 (03 Sep 2020 09:00) (60 - 77)  BP: 124/58 (03 Sep 2020 09:00) (110/54 - 146/65)  BP(mean): 83 (03 Sep 2020 09:00) (77 - 95)  RR: 18 (03 Sep 2020 05:10) (18 - 18)  SpO2: 97% (03 Sep 2020 09:00) (96% - 97%)    PHYSICAL EXAM:  GENERAL: NAD, thin appearing  HEENT - NC/AT, pupils equal and reactive to light,  ; Moist mucous membranes, Good dentition, No lesions  NECK: Supple, No JVD  CHEST/LUNG: clear to auscultation, normal respiratory rate, no signs of respiratory distress  HEART: Regular rate and rhythm; 2/6 early systolic murmur, No rubs or gallops  ABDOMEN: Soft, Nontender, Nondistended; Bowel sounds present  EXTREMITIES:  2+ Peripheral Pulses, No clubbing, cyanosis, or edema  NEURO:  No Focal deficits, sensory and motor intact  SKIN: No rashes or lesions. Abdominal incision site with staples, c/d/i    LABS:  reviewed, none new    RADIOLOGY & ADDITIONAL TESTS:           reviewed, none new

## 2020-09-03 NOTE — PROGRESS NOTE ADULT - SUBJECTIVE AND OBJECTIVE BOX
Patient is a 64y Female seen and evaluated at bedside. Feels well. CP @stable. No other complaints. BP acceptable. Patient is s/p aortic anerysm repair.  She has CKD with stable creatinine during this hospitalization, though no blood work since 9/1       Meds:    aspirin enteric coated 81 daily  carvedilol 12.5 every 12 hours  chlorhexidine 2% Cloths 1 <User Schedule>  heparin   Injectable 5000 every 8 hours  lactobacillus acidophilus 1 daily  levETIRAcetam 500 every 12 hours  levothyroxine 88 daily  multivitamin 1 daily  omega-3-Acid Ethyl Esters 4 daily  oxycodone    5 mG/acetaminophen 325 mG 1 every 4 hours PRN  oxycodone    5 mG/acetaminophen 325 mG 2 every 4 hours PRN  pantoprazole    Tablet 40 before breakfast      T(C): , Max: 37 (09-03-20 @ 09:00)  T(F): , Max: 98.6 (09-03-20 @ 09:00)  HR: 68 (09-03-20 @ 12:45)  BP: 112/58 (09-03-20 @ 12:45)  BP(mean): 79 (09-03-20 @ 12:45)  RR: 19 (09-03-20 @ 12:45)  SpO2: 100% (09-03-20 @ 12:45)  Wt(kg): --    09-02 @ 07:01  -  09-03 @ 07:00  --------------------------------------------------------  IN: 437 mL / OUT: 600 mL / NET: -163 mL    09-03 @ 07:01  -  09-03 @ 13:28  --------------------------------------------------------  IN: 240 mL / OUT: 0 mL / NET: 240 mL          Review of Systems:  CONSTITUTIONAL: No fever   RESPIRATORY: No shortness of breath, cough  CARDIOVASCULAR: +chest pain, no shortness of breath  GASTROINTESTINAL: No abdominal pain, No nausea or vomiting, No diarrhea    PHYSICAL EXAM  General: A&Ox 3; NAD  Respiratory: diminished BS at bases   Cardiovascular: Regular rhythm/rate; S1/S2, systolic murmur present   Gastrointestinal: Soft; ND, non tender   Extremities: WWP; no edema      LABS:                      RADIOLOGY & ADDITIONAL STUDIES: Patient is a 64y Female seen and evaluated at bedside. Feels well. CP @stable. No other complaints. BP acceptable. Patient is s/p aortic anerysm repair.  She has CKD with stable creatinine during this hospitalization, though no blood work since 9/1  Patient believes she will be discharged in 1-2 days with home PT.  Her sister will be staying with her.      Meds:    aspirin enteric coated 81 daily  carvedilol 12.5 every 12 hours  chlorhexidine 2% Cloths 1 <User Schedule>  heparin   Injectable 5000 every 8 hours  lactobacillus acidophilus 1 daily  levETIRAcetam 500 every 12 hours  levothyroxine 88 daily  multivitamin 1 daily  omega-3-Acid Ethyl Esters 4 daily  oxycodone    5 mG/acetaminophen 325 mG 1 every 4 hours PRN  oxycodone    5 mG/acetaminophen 325 mG 2 every 4 hours PRN  pantoprazole    Tablet 40 before breakfast      T(C): , Max: 37 (09-03-20 @ 09:00)  T(F): , Max: 98.6 (09-03-20 @ 09:00)  HR: 68 (09-03-20 @ 12:45)  BP: 112/58 (09-03-20 @ 12:45)  BP(mean): 79 (09-03-20 @ 12:45)  RR: 19 (09-03-20 @ 12:45)  SpO2: 100% (09-03-20 @ 12:45)  Wt(kg): --    09-02 @ 07:01  -  09-03 @ 07:00  --------------------------------------------------------  IN: 437 mL / OUT: 600 mL / NET: -163 mL    09-03 @ 07:01  -  09-03 @ 13:28  --------------------------------------------------------  IN: 240 mL / OUT: 0 mL / NET: 240 mL          Review of Systems:  CONSTITUTIONAL: No fever   RESPIRATORY: No shortness of breath, cough  CARDIOVASCULAR: +chest pain, no shortness of breath  GASTROINTESTINAL: No abdominal pain, No nausea or vomiting, No diarrhea    PHYSICAL EXAM  General: A&Ox 3; NAD  Respiratory: diminished BS at bases   Cardiovascular: Regular rhythm/rate; S1/S2, systolic murmur present   Gastrointestinal: Soft; ND, non tender   Extremities: WWP; no edema      LABS:                      RADIOLOGY & ADDITIONAL STUDIES:

## 2020-09-03 NOTE — PROGRESS NOTE ADULT - PROBLEM SELECTOR PLAN 1
pulmonary status is stable.  She is stable pulmoanry wise.  The base line sat is normal .   I instructed patient to use IS.  She is off inhalers

## 2020-09-03 NOTE — PROGRESS NOTE ADULT - SUBJECTIVE AND OBJECTIVE BOX
O/N: YANDY PATTERSON,                         [ ] Heart Failure     [  ] Acute     [  ] Acute on Chronic     [  ] Chronic  -------------------------------------------------------------------     [  ]Diastolic [HFpEF]     [  ]Systolic [HFrEF]     [  ]Combined [HFpEF & HFrEF]     [  ] afib     [ X ] hypertensive heart disease     [  ]Other:  -------------------------------------------------------------------  [ ] Respiratory failure  [ ] Acute cor pulmonale  [ ] Asthma/COPD Exacerbation  [ ] Pleural effusion  [ ] Aspiration pneumonia  -------------------------------------------------------------------  [  ]CHERRI     [  ]ATN     [  ]Reneal Medullary Necrosis     [  ]Renal Cortical Necrosis     [  ]Other Pathological Lesions:    [  ]CKD 1  [  ]CKD 2  [ X ]CKD 3  [  ]CKD 4  [  ]CKD 5  [  ]Other  -------------------------------------------------------------------  [  ]Diabetes  [  ] Diabetic PVD Ulcer  [  ] Neuropathic ulcer to DM  [  ] Diabetes with Nephropathy  [  ] Osteomyelitis due to diabetes  --------------------------------------------------------------------  [  ]Malnutrition: See Nutrition Note  [ ] Heart Failure     [  ] Acute     [  ] Acute on Chronic     [  ] Chronic  -------------------------------------------------------------------     [  ]Diastolic [HFpEF]     [  ]Systolic [HFrEF]     [  ]Combined [HFpEF & HFrEF]     [  ] afib     [ X ] hypertensive heart disease     [  ]Other:  -------------------------------------------------------------------  [ ] Respiratory failure  [ ] Acute cor pulmonale  [ ] Asthma/COPD Exacerbation  [ ] Pleural effusion  [ ] Aspiration pneumonia  -------------------------------------------------------------------  [  ]CHERRI     [  ]ATN     [  ]Reneal Medullary Necrosis     [  ]Renal Cortical Necrosis     [  ]Other Pathological Lesions:    [  ]CKD 1  [  ]CKD 2  [ X ]CKD 3  [  ]CKD 4  [  ]CKD 5  [  ]Other  -------------------------------------------------------------------  [  ]Diabetes  [  ] Diabetic PVD Ulcer  [  ] Neuropathic ulcer to DM  [  ] Diabetes with Nephropathy  [  ] Osteomyelitis due to diabetes  --------------------------------------------------------------------  [  ]Malnutrition: See Nutrition Note  [ ] Cerebral infarction  [ ] Transient ischemia attack  [ ] Encephalopathy      Assessment/Plan;  A/p: 65 yo F w/  PMH of HTN, CAD s/p CABG & PCI, bioAVR, PAD, hypothyroidism, seizure, chronic anemia, and AAA repair in 2015 (Dr. Sandra), CKD, now with subacute rupture contained saccular aortic aneurysm, s/p open TAAA repair involving bypasses to the celiac, SMA, b/l renal arteries (8/26). CXR this AM improved with Lasix 40mg IV yesterday.      -DVT ppx  -oral pain control  -cont home meds as appropriate  -regular diet with ensure  -OOB to chair  - PT  -Renal following: appreciate recs  -Pulm following  -Cardiology following  -Daily chest Xray and ekg O/N: YESENIA, VSS,       Vital Signs Last 24 Hrs  T(C): 36.7 (03 Sep 2020 05:00), Max: 36.9 (02 Sep 2020 09:36)  T(F): 98.1 (03 Sep 2020 05:00), Max: 98.4 (02 Sep 2020 09:36)  HR: 62 (03 Sep 2020 05:10) (60 - 77)  BP: 146/65 (03 Sep 2020 05:10) (110/54 - 146/65)  BP(mean): 94 (03 Sep 2020 05:10) (77 - 95)  RR: 18 (03 Sep 2020 05:10) (18 - 18)  SpO2: 96% (03 Sep 2020 05:10) (96% - 97%)  I&O's Summary    02 Sep 2020 07:01  -  03 Sep 2020 07:00  --------------------------------------------------------  IN: 437 mL / OUT: 600 mL / NET: -163 mL        Physical Exam:  General: NAD, resting comfortably in bed  Pulmonary: Nonlabored breathing, no respiratory distress  Abdomen: thoracoabdominal incision c/d/i, abdomen soft, non tender, non distended, wound cleaned with chloroprep and dressing changed  Extremities: warm, well perfused, no edema        [ ] Heart Failure     [  ] Acute     [  ] Acute on Chronic     [  ] Chronic  -------------------------------------------------------------------     [  ]Diastolic [HFpEF]     [  ]Systolic [HFrEF]     [  ]Combined [HFpEF & HFrEF]     [  ] afib     [ X ] hypertensive heart disease     [  ]Other:  -------------------------------------------------------------------  [ ] Respiratory failure  [ ] Acute cor pulmonale  [ ] Asthma/COPD Exacerbation  [ ] Pleural effusion  [ ] Aspiration pneumonia  -------------------------------------------------------------------  [  ]CHERRI     [  ]ATN     [  ]Reneal Medullary Necrosis     [  ]Renal Cortical Necrosis     [  ]Other Pathological Lesions:    [  ]CKD 1  [  ]CKD 2  [ X ]CKD 3  [  ]CKD 4  [  ]CKD 5  [  ]Other  -------------------------------------------------------------------  [  ]Diabetes  [  ] Diabetic PVD Ulcer  [  ] Neuropathic ulcer to DM  [  ] Diabetes with Nephropathy  [  ] Osteomyelitis due to diabetes  --------------------------------------------------------------------  [  ]Malnutrition: See Nutrition Note  [ ] Heart Failure     [  ] Acute     [  ] Acute on Chronic     [  ] Chronic  -------------------------------------------------------------------     [  ]Diastolic [HFpEF]     [  ]Systolic [HFrEF]     [  ]Combined [HFpEF & HFrEF]     [  ] afib     [ X ] hypertensive heart disease     [  ]Other:  -------------------------------------------------------------------  [ ] Respiratory failure  [ ] Acute cor pulmonale  [ ] Asthma/COPD Exacerbation  [ ] Pleural effusion  [ ] Aspiration pneumonia  -------------------------------------------------------------------  [  ]CHERRI     [  ]ATN     [  ]Reneal Medullary Necrosis     [  ]Renal Cortical Necrosis     [  ]Other Pathological Lesions:    [  ]CKD 1  [  ]CKD 2  [ X ]CKD 3  [  ]CKD 4  [  ]CKD 5  [  ]Other  -------------------------------------------------------------------  [  ]Diabetes  [  ] Diabetic PVD Ulcer  [  ] Neuropathic ulcer to DM  [  ] Diabetes with Nephropathy  [  ] Osteomyelitis due to diabetes  --------------------------------------------------------------------  [  ]Malnutrition: See Nutrition Note  [ ] Cerebral infarction  [ ] Transient ischemia attack  [ ] Encephalopathy      Assessment/Plan;  A/p: 65 yo F w/  PMH of HTN, CAD s/p CABG & PCI, bioAVR, PAD, hypothyroidism, seizure, chronic anemia, and AAA repair in 2015 (Dr. Sandra), CKD, now with subacute rupture contained saccular aortic aneurysm, s/p open TAAA repair involving bypasses to the celiac, SMA, b/l renal arteries (8/26). CXR this AM improved.      -DVT ppx  -oral pain control  -cont home meds as appropriate  -regular diet with ensure  -OOB to chair  - PT  -Renal following: appreciate recs  -Pulm following  -Cardiology following  -Dispo: Plan to d/c today

## 2020-09-04 ENCOUNTER — TRANSCRIPTION ENCOUNTER (OUTPATIENT)
Age: 65
End: 2020-09-04

## 2020-09-04 VITALS — TEMPERATURE: 98 F

## 2020-09-04 PROCEDURE — 82803 BLOOD GASES ANY COMBINATION: CPT

## 2020-09-04 PROCEDURE — 84132 ASSAY OF SERUM POTASSIUM: CPT

## 2020-09-04 PROCEDURE — 85384 FIBRINOGEN ACTIVITY: CPT

## 2020-09-04 PROCEDURE — 82330 ASSAY OF CALCIUM: CPT

## 2020-09-04 PROCEDURE — P9045: CPT

## 2020-09-04 PROCEDURE — 88331 PATH CONSLTJ SURG 1 BLK 1SPC: CPT

## 2020-09-04 PROCEDURE — 84300 ASSAY OF URINE SODIUM: CPT

## 2020-09-04 PROCEDURE — 86901 BLOOD TYPING SEROLOGIC RH(D): CPT

## 2020-09-04 PROCEDURE — 85027 COMPLETE CBC AUTOMATED: CPT

## 2020-09-04 PROCEDURE — 88305 TISSUE EXAM BY PATHOLOGIST: CPT

## 2020-09-04 PROCEDURE — 80048 BASIC METABOLIC PNL TOTAL CA: CPT

## 2020-09-04 PROCEDURE — 83880 ASSAY OF NATRIURETIC PEPTIDE: CPT

## 2020-09-04 PROCEDURE — 75635 CT ANGIO ABDOMINAL ARTERIES: CPT

## 2020-09-04 PROCEDURE — 93306 TTE W/DOPPLER COMPLETE: CPT

## 2020-09-04 PROCEDURE — 84439 ASSAY OF FREE THYROXINE: CPT

## 2020-09-04 PROCEDURE — 71275 CT ANGIOGRAPHY CHEST: CPT

## 2020-09-04 PROCEDURE — 85362 FIBRIN DEGRADATION PRODUCTS: CPT

## 2020-09-04 PROCEDURE — 84443 ASSAY THYROID STIM HORMONE: CPT

## 2020-09-04 PROCEDURE — 86850 RBC ANTIBODY SCREEN: CPT

## 2020-09-04 PROCEDURE — 94640 AIRWAY INHALATION TREATMENT: CPT

## 2020-09-04 PROCEDURE — 82550 ASSAY OF CK (CPK): CPT

## 2020-09-04 PROCEDURE — 80061 LIPID PANEL: CPT

## 2020-09-04 PROCEDURE — C1768: CPT

## 2020-09-04 PROCEDURE — 82962 GLUCOSE BLOOD TEST: CPT

## 2020-09-04 PROCEDURE — 83690 ASSAY OF LIPASE: CPT

## 2020-09-04 PROCEDURE — 97161 PT EVAL LOW COMPLEX 20 MIN: CPT

## 2020-09-04 PROCEDURE — 83605 ASSAY OF LACTIC ACID: CPT

## 2020-09-04 PROCEDURE — P9016: CPT

## 2020-09-04 PROCEDURE — 85025 COMPLETE CBC W/AUTO DIFF WBC: CPT

## 2020-09-04 PROCEDURE — 87635 SARS-COV-2 COVID-19 AMP PRB: CPT

## 2020-09-04 PROCEDURE — 71045 X-RAY EXAM CHEST 1 VIEW: CPT

## 2020-09-04 PROCEDURE — 86769 SARS-COV-2 COVID-19 ANTIBODY: CPT

## 2020-09-04 PROCEDURE — 86923 COMPATIBILITY TEST ELECTRIC: CPT

## 2020-09-04 PROCEDURE — 84597 ASSAY OF VITAMIN K: CPT

## 2020-09-04 PROCEDURE — 93880 EXTRACRANIAL BILAT STUDY: CPT

## 2020-09-04 PROCEDURE — 36430 TRANSFUSION BLD/BLD COMPNT: CPT

## 2020-09-04 PROCEDURE — 85610 PROTHROMBIN TIME: CPT

## 2020-09-04 PROCEDURE — 84484 ASSAY OF TROPONIN QUANT: CPT

## 2020-09-04 PROCEDURE — 88304 TISSUE EXAM BY PATHOLOGIST: CPT

## 2020-09-04 PROCEDURE — 80053 COMPREHEN METABOLIC PANEL: CPT

## 2020-09-04 PROCEDURE — 97116 GAIT TRAINING THERAPY: CPT

## 2020-09-04 PROCEDURE — 84145 PROCALCITONIN (PCT): CPT

## 2020-09-04 PROCEDURE — 83735 ASSAY OF MAGNESIUM: CPT

## 2020-09-04 PROCEDURE — 84100 ASSAY OF PHOSPHORUS: CPT

## 2020-09-04 PROCEDURE — 82553 CREATINE MB FRACTION: CPT

## 2020-09-04 PROCEDURE — 85730 THROMBOPLASTIN TIME PARTIAL: CPT

## 2020-09-04 PROCEDURE — 82150 ASSAY OF AMYLASE: CPT

## 2020-09-04 PROCEDURE — 71046 X-RAY EXAM CHEST 2 VIEWS: CPT

## 2020-09-04 PROCEDURE — 80076 HEPATIC FUNCTION PANEL: CPT

## 2020-09-04 PROCEDURE — 84134 ASSAY OF PREALBUMIN: CPT

## 2020-09-04 PROCEDURE — 94003 VENT MGMT INPAT SUBQ DAY: CPT

## 2020-09-04 PROCEDURE — 99233 SBSQ HOSP IP/OBS HIGH 50: CPT

## 2020-09-04 PROCEDURE — 83036 HEMOGLOBIN GLYCOSYLATED A1C: CPT

## 2020-09-04 PROCEDURE — 36415 COLL VENOUS BLD VENIPUNCTURE: CPT

## 2020-09-04 PROCEDURE — 84295 ASSAY OF SERUM SODIUM: CPT

## 2020-09-04 PROCEDURE — 93005 ELECTROCARDIOGRAM TRACING: CPT

## 2020-09-04 PROCEDURE — 99285 EMERGENCY DEPT VISIT HI MDM: CPT

## 2020-09-04 PROCEDURE — 71250 CT THORAX DX C-: CPT

## 2020-09-04 PROCEDURE — 75574 CT ANGIO HRT W/3D IMAGE: CPT

## 2020-09-04 RX ORDER — ASPIRIN/CALCIUM CARB/MAGNESIUM 324 MG
1 TABLET ORAL
Qty: 0 | Refills: 0 | DISCHARGE
Start: 2020-09-04

## 2020-09-04 RX ORDER — CARVEDILOL PHOSPHATE 80 MG/1
1 CAPSULE, EXTENDED RELEASE ORAL
Qty: 120 | Refills: 0
Start: 2020-09-04 | End: 2020-11-02

## 2020-09-04 RX ORDER — LACTOBACILLUS ACIDOPHILUS 100MM CELL
0 CAPSULE ORAL
Qty: 0 | Refills: 0 | DISCHARGE
Start: 2020-09-04

## 2020-09-04 RX ORDER — CARVEDILOL PHOSPHATE 80 MG/1
1 CAPSULE, EXTENDED RELEASE ORAL
Qty: 0 | Refills: 0 | DISCHARGE
Start: 2020-09-04

## 2020-09-04 RX ORDER — OMEGA-3 ACID ETHYL ESTERS 1 G
4 CAPSULE ORAL
Qty: 360 | Refills: 0
Start: 2020-09-04 | End: 2020-12-02

## 2020-09-04 RX ORDER — AMLODIPINE BESYLATE 2.5 MG/1
1 TABLET ORAL
Qty: 0 | Refills: 0 | DISCHARGE

## 2020-09-04 RX ORDER — ASPIRIN/CALCIUM CARB/MAGNESIUM 324 MG
1 TABLET ORAL
Qty: 90 | Refills: 0
Start: 2020-09-04 | End: 2020-12-02

## 2020-09-04 RX ORDER — ATENOLOL 25 MG/1
1 TABLET ORAL
Qty: 0 | Refills: 0 | DISCHARGE

## 2020-09-04 RX ORDER — OMEGA-3 ACID ETHYL ESTERS 1 G
4 CAPSULE ORAL
Qty: 0 | Refills: 0 | DISCHARGE
Start: 2020-09-04

## 2020-09-04 RX ADMIN — HEPARIN SODIUM 5000 UNIT(S): 5000 INJECTION INTRAVENOUS; SUBCUTANEOUS at 06:46

## 2020-09-04 RX ADMIN — CARVEDILOL PHOSPHATE 12.5 MILLIGRAM(S): 80 CAPSULE, EXTENDED RELEASE ORAL at 06:47

## 2020-09-04 RX ADMIN — Medication 81 MILLIGRAM(S): at 10:54

## 2020-09-04 RX ADMIN — CHLORHEXIDINE GLUCONATE 1 APPLICATION(S): 213 SOLUTION TOPICAL at 06:47

## 2020-09-04 RX ADMIN — Medication 1 TABLET(S): at 10:52

## 2020-09-04 RX ADMIN — OXYCODONE AND ACETAMINOPHEN 1 TABLET(S): 5; 325 TABLET ORAL at 09:37

## 2020-09-04 RX ADMIN — Medication 1 TABLET(S): at 10:54

## 2020-09-04 RX ADMIN — LEVETIRACETAM 500 MILLIGRAM(S): 250 TABLET, FILM COATED ORAL at 06:47

## 2020-09-04 RX ADMIN — OXYCODONE AND ACETAMINOPHEN 1 TABLET(S): 5; 325 TABLET ORAL at 11:39

## 2020-09-04 RX ADMIN — Medication 88 MICROGRAM(S): at 06:47

## 2020-09-04 RX ADMIN — Medication 4 GRAM(S): at 10:52

## 2020-09-04 RX ADMIN — PANTOPRAZOLE SODIUM 40 MILLIGRAM(S): 20 TABLET, DELAYED RELEASE ORAL at 06:46

## 2020-09-04 RX ADMIN — OXYCODONE AND ACETAMINOPHEN 1 TABLET(S): 5; 325 TABLET ORAL at 08:57

## 2020-09-04 NOTE — DISCHARGE NOTE NURSING/CASE MANAGEMENT/SOCIAL WORK - PATIENT PORTAL LINK FT
You can access the FollowMyHealth Patient Portal offered by [facility] by registering at the following website: [email or web address]. By joining [employer]’s FollowMyHealth portal, you will also be able to view your health information using other applications (apps) compatible with our system.

## 2020-09-04 NOTE — PROGRESS NOTE ADULT - PROVIDER SPECIALTY LIST ADULT
Cardiology
Critical Care
Electrophysiology
Hospitalist
Nephrology
Pulmonology
SICU
Vascular Surgery
Critical Care
Vascular Surgery
Cardiology
SICU
Nephrology
Pulmonology

## 2020-09-04 NOTE — PROGRESS NOTE ADULT - SUBJECTIVE AND OBJECTIVE BOX
Feels well. CP stable. No other complaints. BP acceptable. Patient is s/p aortic anerysm repair.  She has CKD with stable creatinine during this hospitalization, though no blood work since 9/1. For discharge today     Meds:    aspirin enteric coated 81 daily  carvedilol 12.5 every 12 hours  chlorhexidine 2% Cloths 1 <User Schedule>  heparin   Injectable 5000 every 8 hours  lactobacillus acidophilus 1 daily  levETIRAcetam 500 every 12 hours  levothyroxine 88 daily  multivitamin 1 daily  omega-3-Acid Ethyl Esters 4 daily  oxycodone    5 mG/acetaminophen 325 mG 1 every 4 hours PRN  oxycodone    5 mG/acetaminophen 325 mG 2 every 4 hours PRN  pantoprazole    Tablet 40 before breakfast      T(C): , Max: 37.3 (09-03-20 @ 22:12)  T(F): , Max: 99.2 (09-03-20 @ 22:12)  HR: 64 (09-04-20 @ 08:49)  BP: 151/67 (09-04-20 @ 08:49)  BP(mean): 97 (09-04-20 @ 08:49)  RR: 15 (09-04-20 @ 08:49)  SpO2: 97% (09-04-20 @ 08:49)  Wt(kg): --    09-03 @ 07:01  -  09-04 @ 07:00  --------------------------------------------------------  IN: 1040 mL / OUT: 1000 mL / NET: 40 mL    09-04 @ 07:01  -  09-04 @ 11:05  --------------------------------------------------------  IN: 480 mL / OUT: 0 mL / NET: 480 mL          Review of Systems:  CONSTITUTIONAL: No fever   RESPIRATORY: No shortness of breath, cough  CARDIOVASCULAR: +chest pain, no shortness of breath  GASTROINTESTINAL: No abdominal pain, No nausea or vomiting, No diarrhea    PHYSICAL EXAM  General: A&Ox 3; NAD  Respiratory: diminished BS at bases   Cardiovascular: Regular rhythm/rate; S1/S2, systolic murmur present   Gastrointestinal: Soft; ND, non tender   Extremities: WWP; no edema    LABS:                      RADIOLOGY & ADDITIONAL STUDIES:

## 2020-09-04 NOTE — PROGRESS NOTE ADULT - SUBJECTIVE AND OBJECTIVE BOX
O/N; YESENIA, YANDY                                      [ ] Heart Failure     [  ] Acute     [  ] Acute on Chronic     [  ] Chronic  -------------------------------------------------------------------     [  ]Diastolic [HFpEF]     [  ]Systolic [HFrEF]     [  ]Combined [HFpEF & HFrEF]     [  ] afib     [ X ] hypertensive heart disease     [  ]Other:  -------------------------------------------------------------------  [ ] Respiratory failure  [ ] Acute cor pulmonale  [ ] Asthma/COPD Exacerbation  [ ] Pleural effusion  [ ] Aspiration pneumonia  -------------------------------------------------------------------  [  ]CHERRI     [  ]ATN     [  ]Reneal Medullary Necrosis     [  ]Renal Cortical Necrosis     [  ]Other Pathological Lesions:    [  ]CKD 1  [  ]CKD 2  [ X ]CKD 3  [  ]CKD 4  [  ]CKD 5  [  ]Other  -------------------------------------------------------------------  [  ]Diabetes  [  ] Diabetic PVD Ulcer  [  ] Neuropathic ulcer to DM  [  ] Diabetes with Nephropathy  [  ] Osteomyelitis due to diabetes  --------------------------------------------------------------------  [  ]Malnutrition: See Nutrition Note  [ ] Heart Failure     [  ] Acute     [  ] Acute on Chronic     [  ] Chronic  -------------------------------------------------------------------     [  ]Diastolic [HFpEF]     [  ]Systolic [HFrEF]     [  ]Combined [HFpEF & HFrEF]     [  ] afib     [ X ] hypertensive heart disease     [  ]Other:  -------------------------------------------------------------------  [ ] Respiratory failure  [ ] Acute cor pulmonale  [ ] Asthma/COPD Exacerbation  [ ] Pleural effusion  [ ] Aspiration pneumonia  -------------------------------------------------------------------  [  ]CHERRI     [  ]ATN     [  ]Reneal Medullary Necrosis     [  ]Renal Cortical Necrosis     [  ]Other Pathological Lesions:    [  ]CKD 1  [  ]CKD 2  [ X ]CKD 3  [  ]CKD 4  [  ]CKD 5  [  ]Other  -------------------------------------------------------------------  [  ]Diabetes  [  ] Diabetic PVD Ulcer  [  ] Neuropathic ulcer to DM  [  ] Diabetes with Nephropathy  [  ] Osteomyelitis due to diabetes  --------------------------------------------------------------------  [  ]Malnutrition: See Nutrition Note  [ ] Cerebral infarction  [ ] Transient ischemia attack  [ ] Encephalopathy      Assessment/Plan;  A/p: 63 yo F w/  PMH of HTN, CAD s/p CABG & PCI, bioAVR, PAD, hypothyroidism, seizure, chronic anemia, and AAA repair in 2015 (Dr. Sandra), CKD, now with subacute rupture contained saccular aortic aneurysm, s/p open TAAA repair involving bypasses to the celiac, SMA, b/l renal arteries (8/26). CXR this AM improved.      -DVT ppx  -oral pain control  -cont home meds as appropriate  -regular diet with ensure  -OOB to chair  - PT  -Renal following: appreciate recs  -Pulm following  -Cardiology following  -Dispo: Plan to d/c today O/N; YESENIA, VSS    Vital Signs Last 24 Hrs  T(C): 36.7 (04 Sep 2020 05:45), Max: 37.3 (03 Sep 2020 22:12)  T(F): 98.1 (04 Sep 2020 05:45), Max: 99.2 (03 Sep 2020 22:12)  HR: 58 (04 Sep 2020 04:15) (58 - 68)  BP: 132/60 (04 Sep 2020 04:15) (112/58 - 155/73)  BP(mean): 86 (04 Sep 2020 04:15) (78 - 105)  RR: 17 (04 Sep 2020 04:15) (17 - 20)  SpO2: 97% (04 Sep 2020 04:15) (97% - 100%)  I&O's Summary    03 Sep 2020 07:01  -  04 Sep 2020 07:00  --------------------------------------------------------  IN: 1040 mL / OUT: 400 mL / NET: 640 mL      Physical Exam:  General: NAD, resting comfortably in bed  Pulmonary: Nonlabored breathing, no respiratory distress  Abdomen: thoracoabdominal incision c/d/i, abdomen soft, non tender, non distended, wound cleaned with chloroprep and dressing changed  Extremities: warm, well perfused, no edema  LABS:        [ ] Heart Failure     [  ] Acute     [  ] Acute on Chronic     [  ] Chronic  -------------------------------------------------------------------     [  ]Diastolic [HFpEF]     [  ]Systolic [HFrEF]     [  ]Combined [HFpEF & HFrEF]     [  ] afib     [ X ] hypertensive heart disease     [  ]Other:  -------------------------------------------------------------------  [ ] Respiratory failure  [ ] Acute cor pulmonale  [ ] Asthma/COPD Exacerbation  [ ] Pleural effusion  [ ] Aspiration pneumonia  -------------------------------------------------------------------  [  ]CHERRI     [  ]ATN     [  ]Reneal Medullary Necrosis     [  ]Renal Cortical Necrosis     [  ]Other Pathological Lesions:    [  ]CKD 1  [  ]CKD 2  [ X ]CKD 3  [  ]CKD 4  [  ]CKD 5  [  ]Other  -------------------------------------------------------------------  [  ]Diabetes  [  ] Diabetic PVD Ulcer  [  ] Neuropathic ulcer to DM  [  ] Diabetes with Nephropathy  [  ] Osteomyelitis due to diabetes  --------------------------------------------------------------------  [  ]Malnutrition: See Nutrition Note  [ ] Heart Failure     [  ] Acute     [  ] Acute on Chronic     [  ] Chronic  -------------------------------------------------------------------     [  ]Diastolic [HFpEF]     [  ]Systolic [HFrEF]     [  ]Combined [HFpEF & HFrEF]     [  ] afib     [ X ] hypertensive heart disease     [  ]Other:  -------------------------------------------------------------------  [ ] Respiratory failure  [ ] Acute cor pulmonale  [ ] Asthma/COPD Exacerbation  [ ] Pleural effusion  [ ] Aspiration pneumonia  -------------------------------------------------------------------  [  ]CHERRI     [  ]ATN     [  ]Reneal Medullary Necrosis     [  ]Renal Cortical Necrosis     [  ]Other Pathological Lesions:    [  ]CKD 1  [  ]CKD 2  [ X ]CKD 3  [  ]CKD 4  [  ]CKD 5  [  ]Other  -------------------------------------------------------------------  [  ]Diabetes  [  ] Diabetic PVD Ulcer  [  ] Neuropathic ulcer to DM  [  ] Diabetes with Nephropathy  [  ] Osteomyelitis due to diabetes  --------------------------------------------------------------------  [  ]Malnutrition: See Nutrition Note  [ ] Cerebral infarction  [ ] Transient ischemia attack  [ ] Encephalopathy      Assessment/Plan;  A/p: 63 yo F w/  PMH of HTN, CAD s/p CABG & PCI, bio AVR, PAD, hypothyroidism, seizure, chronic anemia, and AAA repair in 2015 (Dr. Sandra), CKD, now with subacute rupture contained saccular aortic aneurysm, s/p open TAAA repair involving bypasses to the celiac, SMA, b/l renal arteries (8/26). CXR this AM improved.      -DVT ppx  -oral pain control  -cont home meds as appropriate  -regular diet with ensure  -OOB to chair  - PT recs home w/homePT  -Renal following: appreciate recs  -Pulm following  -Cardiology following  -Dispo: Plan to d/c today

## 2020-09-04 NOTE — PROGRESS NOTE ADULT - ATTENDING COMMENTS
Patient seen and examined with house-staff during bedside rounds  Resident note read, including vitals, physical findings, laboratory data, and radiological reports.   Revisions included below.  Case discussed with House staff  Direct personal management at bedside  and extensive interpretation of data. Decision making of high complexity.
has a statin intolerance  would not treat with fenofibrate would stop  start either repatha or praluent for LDL greater than 100  should be on Omega 3 for elevated TG   no CHF no need for diuretics   would add ARB/ACE when cr is stable for CAD and HTN control
63 yo F w/PMHx HTN, CAD s/p PCI/CABG/ s/p Bio AVR and mitral valve annuloplasty, PAD s/p peripheral stenting with occluded Bilateral SFA/ Chronic Anemia, AAA s/p open repair, Seizure disorders and CKD III, planned for OR for aneurysm found proximal to prior graft, nephrology consulted for aron-op CKD monitoring.  CKD3.  Patient has done well with surgery with renal function remaining stable and good urine output.   Will continue to follow until patient is discharged. She is being treated for CKD3 in my office as an outpatient.      I agree wth Dr Wills's note above.
64AAF with Essential HTN, CAD s/p CABG & PCI, s/p AVR, PAD, hypothyroidism, seizure, chronic anemia, AAA s/p repair 2015 (Dr. Sandra) who presented with hypertensive emergency, chest pain, palpitations, low level troponin leak, and CT chest w/o contrast with a new crescentic soft tissue density surrounding the aorta at the level of the renal arteries and suprarenal region. CTA confirmed the presence of a new aortic aneurysm above the graft. Patient POD#3 from aortorenal bypass grafting, aortomesenteric bypass grafting, aortoceliac bypass grafting, with open repair of thoracoabdominal aorta aneurysm.     ROS: Patient reports left sided rib pain at surgical site and chest tube sites  Physical Exam notable for: elderly female laying flat in bed, appears fatigued and in pain but in no acute distress, CVC in place, 2 chest tubes in place with serosanguinous drainage, surgical dressings in place, RRR, III/VI systolic murmur, diminished breath sounds in bases, no pretibial pitting edema, no ankle edema, skin WWP  Telemetry reviewed 8/28: NSR, PVC couplet, PVC triplet    Recommendations as follows:  Coreg 12.5 BID (home dose) for blood pressure control, hold for HR<60 SBP<100  Stepwise reintroduction of home Amlodipine 2.5 and Imdur 30 daily. Recommend do not resume today as BPs 110s  controlled on monotherapy.   [id number] daily when able (currently on 300mg suppository)  Resume fenofibrate 145 daily (pt with myalgias and leg cramps on atorva and rosuvastatin)   -->recommend add Zetia 10 daily upon discharge as is not on inpatient formulary  Maintain goal K 4.5, Mg 2.5 for cardiac myocyte stability and ectopy suppression  ENID Heredia.  Cardiology Attending
64year old AAF s/p aneurysm repair doing well post-operatively.  CKD3 with stable renal function.  Patient is progressing nicely.   Just passed TOV and is voiding without catheter.  I agree with renal fellow note above.
PLEASE TRY TO GET THE PATIENT COVERAGE FOR EITHER REPATHA OR PRALUENT  she follows up with Dr Preston
Patient is feeling better. Planned discharge today.  She is somewhat weak and will need to have PT at home for strngthening.  Her sister will be coming to her home to stay with her until she is stronger.  No new issues.  s/p aortic aneurysm repair.
Patient seen and examined with house-staff during bedside rounds  Resident note read, including vitals, physical findings, laboratory data, and radiological reports.   Revisions included below.  Case discussed with House staff  Direct personal management at bedside  and extensive interpretation of data. Decision making of high complexity.
Pt is a 63 y/o woman c HTN, CAD s/p CABG/PCI s/p AVR, PVD who p/w aortic aneurysm s/p open repair.    Pt doing well from cardiac standpoint  Tele reviewed with minor coupled PACs  [facility] mgmt
See CVD Fellow note written above, for details. I reviewed the fellow's documentation. I have personally seen and examined this patient 8/26/20 upon transfer to . I have reviewed vitals, labs, medications, cardiac studies and additional imaging.  I agree with the findings and plans as written above with the following additions/amendments:  64AAF with Essential HTN, CAD s/p CABG & PCI, s/p AVR, PAD, hypothyroidism, seizure, chronic anemia, AAA s/p repair 2015 (Dr. Sandra) who presented with hypertensive emergency, chest pain, palpitations, low level troponin leak, and CT chest w/o contrast with a new crescentic soft tissue density surrounding the aorta at the level of the renal arteries and suprarenal region. CTA confirmed the presence of a new aortic aneurysm above the graft. Patient POD#1 from aortorenal bypass grafting, aortomesenteric bypass grafting, aortoceliac bypass grafting, with open repair of thoracoabdominal aorta aneurysm. Patient currently in 8E SICU extubated    Physical Exam notable for: elderly patient appears older than stated age, appears fatigued and in pain but in NAD, swollen face, extubated, CVC in place, chest tubes in place, thorax surgical dressings in place, RRR, III/VI systolic murmur, shallow inspiratory effort, no wheezing or rhonchi appreciated, no pretibial pitting edema, no ankle edema, skin WWP  Telemetry reviewed 8/26: NSR, 5bt NSVT    Recommendations as follows:  Recommend holding Amlodipine 10, Coreg 12.5 BID and Imdur 30 daily given recent use of levophed and neosynephrine  If patient hypertensive with SBP>140, off pressures >24hr, then would recommend resumption of Coreg 12.5 BID then Amlodipine 10 in stepwise fashion if have reliable enteral access  Resume [id number] daily when safe from post op bleeding perspective and when have reliable enteral access  Resume fenofibrate 145 daily (pt with myalgias on atorva and rosuvastatin) when have reliable enteral access  -->recommend add Zetia 10 daily upon discharge as is not on inpatient formulary  Will continue to follow  ENID Heredia.  Cardiology Attending
See CVD Fellow note written above, for details. I reviewed the fellow's documentation. I have personally seen and examined this patient 8/27/20. I have reviewed vitals, labs, medications, cardiac studies and additional imaging.  I agree with the findings and plans as written above with the following additions/amendments:  64AAF with Essential HTN, CAD s/p CABG & PCI, s/p AVR, PAD, hypothyroidism, seizure, chronic anemia, AAA s/p repair 2015 (Dr. Sandra) who presented with hypertensive emergency, chest pain, palpitations, low level troponin leak, and CT chest w/o contrast with a new crescentic soft tissue density surrounding the aorta at the level of the renal arteries and suprarenal region. CTA confirmed the presence of a new aortic aneurysm above the graft. Patient POD#2 from aortorenal bypass grafting, aortomesenteric bypass grafting, aortoceliac bypass grafting, with open repair of thoracoabdominal aorta aneurysm.     Physical Exam notable for: elderly female laying flat in bed, appears fatigued but in no distress, CVC in place, multiple chest tubes in place, surgical dressings in place, RRR, III/VI systolic murmur, shallow inspiratory effort, diminished breath sounds in bases, no wheezing or rhonchi appreciated, no pretibial pitting edema, no ankle edema, skin WWP, A&ox4  Telemetry reviewed 8/27: NSR, 17bt NSVT, PVC couplet, PAT    Recommendations as follows:  Recommend reinstate Coreg 12.5 BID for blood pressure control, hold for HR<60 SBP<100  Hold home Amlodipine 10 and Imdur 30 daily pending blood pressure response to Coreg  Transition to [id number] daily when able (currently on 300mg suppository)  Resume fenofibrate 145 daily (pt with myalgias on atorva and rosuvastatin)   -->recommend add Zetia 10 daily upon discharge as is not on inpatient formulary  Maintain K goal 4.5, and Mg goal 2.5 given recent lasix use, hypokalemia and ventricular ectopy noted on telemetry  ENID Heredia.  Cardiology Attending
See CVD Fellow note written above, for details. I reviewed the fellow's documentation. I have personally seen and examined this patient. I have reviewed vitals, labs, medications, cardiac studies and additional imaging.  I agree with the findings and plans as written above with the following additions/amendments:  64AAF with Essential HTN, CAD s/p CABG & PCI, s/p AVR, PAD, hypothyroidism, seizure, chronic anemia, AAA s/p repair 2015 (Dr. Sandra) who presented with hypertensive emergency, chest pain, palpitations, low level troponin leak, and CT chest w/o contrast with a new crescentic soft tissue density surrounding the aorta at the level of the renal arteries and suprarenal region. CTA confirmed the presence of a new aortic aneurysm above the graft. Patient to transfer to Vascular Surgery service in anticipation for AAA repair with Dr. Sandra Tuesday 8/25.   Physical Exam notable for: elderly patient sitting up on edge of bed starting PRBC transfusion in NAD, flat neck veins, RRR, III/VI systolic murmur, clear lungs, no pretibial pitting edema, no ankle edema, A&Ox3  Telemetry reviewed 8/24: PVC couplet, PAT  Plan for:  [id number] daily  Amlodipine 10 daily for BP control  Coreg 12.5 BID for BP control  Imdur 30 daily for BP control and antianginal  fenofibrate 145 daily (pt with myalgias on atorva and rosuvastatin)  -->recommend add Zetia 10 daily upon discharge as is not on inpatient formulary  Consider non urgent CTSx Structural assessment for mixed valvular disease.   Patient at elevated cardiac risk for planned vascular surgery. Medically optimized. No cardiac contraindications to proceeding with vascular surgery. Caution with afterload reduction and avoid tachycardia given AV and MV disease. No additional cardiac testing required prior to vascular surgery.   ENID Heredia.  Cardiology Attending
Doing well, being mobilized,INR down
Renal function stable following surgery for aortic aneurysm repair.  Patient remains weak in need of additional recovery and stengthening.
unclear as to cause of rapid rise in INR, to repeat, consider vit k.  at this point no evidence of bleed no FFP to discuss with vascular
Patient seen and examined with house-staff during bedside rounds  Resident note read, including vitals, physical findings, laboratory data, and radiological reports.   Revisions included below.  Case discussed with House staff  Direct personal management at bedside  and extensive interpretation of data. Decision making of high complexity.
Patient seen and examined with house-staff during bedside rounds.  Resident note read, including vitals, physical findings, laboratory data, and radiological reports.   Revisions included below.  Direct personal management at bed side and extensive interpretation of the data.  Plan was outlined and discussed in details with the housestaff.  Decision making of high complexity  Action taken for acute disease activity to reflect the level of care provided:  - medication reconciliation  - review laboratory data
64 year old AAF s/p aortic aneurysm repair feels weak and ired today. Also complains of sharp pains in abdomen and side as she moves around.  Renal function remains stable with creatinine stable.  No new events today.  I agree with renal fellow's note above.
Patient continues to improve following aneurysm surgery.  Alert, was sitting up in a chair today in the ICU.  Urine output adequate.  I agree with the renal fellows note above.
Patient continues to improve slowly.  She is hoping to be discharged in 1-2 days with her sister visiting her to help care for her during the post operative period.  The patient has had stable CKD since her surgery.  Renal follow-up in my office in 4-6 weeks.
James Martinez is doing well after aneurysm repair surgery.  Renal function stable and urine output good.  I agree with the note of the Renal Fellow.  See above.
Patient recovering from aortic aneurysm surgery.  Doing well.  Mild hyponatremia.  Creatinine stable at 1.4 mg/dl.   Patient still does not have much of an appetite and is in need of physical therapy to help improve overall strength.  I agree with the renal fellow's note above.
Patient seen and examined with house-staff during bedside rounds.  Resident note read, including vitals, physical findings, laboratory data, and radiological reports.   Revisions included below.  Direct personal management at bed side and extensive interpretation of the data.  Plan was outlined and discussed in details with the housestaff.  Decision making of high complexity  Action taken for acute disease activity to reflect the level of care provided:  - medication reconciliation  - review laboratory data
Ramona is day 1 post abdominal aneurysm repair.  She is alert, extubated, verbal, pain reasonably controlled.  Urine flowing via tony catheter into drainage bag -clear yellow.  Creatinine stable. I agree with the renal fellow's assessment above.
Patient seen and examined in ICU post-op thoracoabdominal aortic neurysm repair.  Patient is on low dose levophed and remains intubated.  Clear pale yellow urine draining from tony.  No epiodes of renal hypopoerfusion documented.  I agree with renal fellow's note above.  Patient remians t high catherine for developing CHERRI.
Patient seen and examined with house-staff during bedside rounds.  Resident note read, including vitals, physical findings, laboratory data, and radiological reports.   Revisions included below.  Direct personal management at bed side and extensive interpretation of the data.  Plan was outlined and discussed in details with the housestaff.  Decision making of high complexity  Action taken for acute disease activity to reflect the level of care provided:  - medication reconciliation  - review laboratory data
Patient seen and examined with house-staff during bedside rounds.  Resident note read, including vitals, physical findings, laboratory data, and radiological reports.   Revisions included below.  Direct personal management at bed side and extensive interpretation of the data.  Plan was outlined and discussed in details with the housestaff.  Decision making of high complexity  Action taken for acute disease activity to reflect the level of care provided:  - medication reconciliation  - review laboratory data
See attending addendum to HPI for initial attending contact documentation.  See PA note written above, for details. I reviewed the PA documentation.  I have personally seen and examined this patient 8/21/20. I reviewed vitals, labs, medications, cardiac studies and additional imaging.  I agree with the PA's findings and plans as written above with the following additions/amendments:  64AAF with Essential HTN, CAD s/p CABG & PCI, s/p AVR, PAD, hypothyroidism, seizure, chronic anemia, AAA s/p repair 2015 (Dr. Sandra) who presented with hypertensive emergency, chest pain, palpitations, low level troponin leak, and CT chest w/o contrast with a new crescentic soft tissue density surrounding the aorta at the level of the renal arteries and suprarenal region. CTA confirmed the presence of a new aortic aneurysm above the graft. Patient to transfer to Vascular Surgery service in anticipation for AAA repair with Dr. Sandra next week. Patient at elevated cardiac risk for her upcoming vascular surgery, undergoing pre operative cardiac testing with TTE and CCTA - results pending.  Plan for:  [id number] daily  Amlodipine 10 daily for BP control  Transition from Atenolol to Coreg 12.5 BID for BP control  Imdur 30 daily for BP control and antianginal  fenofibrate 145 daily (pt with myalgias on atorva and rosuvastatin)  -->will add Zetia 10 daily upon discharge as is not on inpatient formulary  Obtain TTE for structural assessment  NPO for CCTA as part of pre op eval  EP consult given report of palpitations with ILR in place   Patient transferring to vascular surgery service under . Cardiology consult service to follow.  ENID Heredia.  Cardiology Attending

## 2020-09-04 NOTE — PROGRESS NOTE ADULT - NSHPATTENDINGPLANDISCUSS_GEN_ALL_CORE
Dr. Sukhjinder Syed
vascular team, patient
Renal Fellow
Ayesha Syed
Dr. Barrera
hs and team
renal fellow
team, patient, nurse
the patient and primary ICU resident team
the patient and vascular surgery resident team
the patient, CTSx Dr. Gregory, and recs given to primary vascular surgery resident team
the patient, recs given to SICU team
renal fellow, Dr. Syed
renal fellow
the renal fellow.
Renal Fellow
vascular
the patient, Dr. Sandra and cardiac team

## 2020-09-04 NOTE — PROGRESS NOTE ADULT - ASSESSMENT
64 F w/PMHx HTN, CAD s/p PCI/CABG/ s/p Bio AVR and mitral valve annuloplasty, PAD s/p peripheral stenting with occluded Bilateral SFA/ Chronic Anemia, AAA s/p open repair, Seizure disorders and CKD III, planned for OR for aneurysm found proximal to prior graft, nephrology consulted for aron-op CKD monitoring    #CKD  nephrologically stable   CXR with stable left pleural effusion  sCr ~stable, consider repeating q48h   Bong not helpful while patient receiving Lasix   recommend to brett for SOB PRN 40 mg IV lasix     Thank you for the opportunity to participate in the care of your patient. The nephrology service remains available to assist with any questions or concerns. Please feel free to reach us by paging the on-call nephrology fellow for urgent issues or as below.     Sukhjinder Syed M.D.   PGY-4, Nephrology Fellow   C: [phone]   P: [phone]

## 2020-09-04 NOTE — PROGRESS NOTE ADULT - SUBJECTIVE AND OBJECTIVE BOX
ID: 64F former smoker w/PMHx CAD s/p CABG (LIMA-Ramus/free ANTONIO-RPDA) w/bioAVR and MV repair (2002) and PCI (FORD x1 mLCx-OM2, 2007), PAD s/p PTA/FORD L common iliac and pSFA, infrarenal AAA s/p repair (2015), HTN, CKD3, emphysema (seen on CT, asx), hypothyroidism, seizure disorder, anx/dep admitted initially to cardiology for HTN urgency and ACS rule out, now on vascular service due to finding of aneurysm proximal to prior graft. s/p open thoracoabdominal aneurysm repair 8/25.    Subjective/Interval events:  No acute overnight events. Having pain this morning at surgical site, had just received pain meds. No shortness of breath, chest pain. Looking forward to going home.    MEDICATIONS  (STANDING):  aspirin enteric coated 81 milliGRAM(s) Oral daily  carvedilol 12.5 milliGRAM(s) Oral every 12 hours  chlorhexidine 2% Cloths 1 Application(s) Topical <User Schedule>  heparin   Injectable 5000 Unit(s) SubCutaneous every 8 hours  lactobacillus acidophilus 1 Tablet(s) Oral daily  levETIRAcetam 500 milliGRAM(s) Oral every 12 hours  levothyroxine 88 MICROGram(s) Oral daily  multivitamin 1 Tablet(s) Oral daily  omega-3-Acid Ethyl Esters 4 Gram(s) Oral daily  pantoprazole    Tablet 40 milliGRAM(s) Oral before breakfast    MEDICATIONS  (PRN):  oxycodone    5 mG/acetaminophen 325 mG 1 Tablet(s) Oral every 4 hours PRN Moderate Pain (4 - 6)  oxycodone    5 mG/acetaminophen 325 mG 2 Tablet(s) Oral every 4 hours PRN Severe Pain (7 - 10)    Vital Signs Last 24 Hrs  T(C): 36.9 (04 Sep 2020 09:22), Max: 37.3 (03 Sep 2020 22:12)  T(F): 98.5 (04 Sep 2020 09:22), Max: 99.2 (03 Sep 2020 22:12)  HR: 64 (04 Sep 2020 08:49) (58 - 68)  BP: 151/67 (04 Sep 2020 08:49) (112/58 - 155/73)  BP(mean): 97 (04 Sep 2020 08:49) (78 - 105)  RR: 15 (04 Sep 2020 08:49) (15 - 20)  SpO2: 97% (04 Sep 2020 08:49) (97% - 100%)    PHYSICAL EXAM:  GENERAL: NAD, thin appearing  HEENT - NC/AT, pupils equal and reactive to light,  ; Moist mucous membranes, Good dentition, No lesions  NECK: Supple, No JVD  CHEST/LUNG: clear to auscultation, normal respiratory rate, no signs of respiratory distress  HEART: Regular rate and rhythm; 2/6 early systolic murmur, No rubs or gallops  ABDOMEN: Soft, Nontender, Nondistended; Bowel sounds present  EXTREMITIES:  2+ Peripheral Pulses, No clubbing, cyanosis, or edema  NEURO:  No Focal deficits, sensory and motor intact  SKIN: No rashes or lesions. Abdominal incision site with staples, c/d/i    LABS:  reviewed, none new    RADIOLOGY & ADDITIONAL TESTS:           reviewed, none new

## 2020-09-04 NOTE — PROGRESS NOTE ADULT - ASSESSMENT
64F former smoker w/PMHx CAD s/p CABG (LIMA-Ramus/free ANTONIO-RPDA) w/bioAVR and MV repair (2002) and PCI (FORD x1 mLCx-OM2, 2007), PAD s/p PTA/FORD L common iliac and pSFA, infrarenal AAA s/p repair (2015), HTN, CKD3, emphysema (seen on CT, asx), hypothyroidism, seizure disorder, anx/dep admitted initially to cardiology for HTN urgency and ACS rule out, now on vascular service due to finding of aneurysm proximal to prior graft. s/p open thoracoabdominal aneurysm repair 8/25.    AAA  s/p open repair 2015, with concern for aneurysm (with subacute contained rupture) proximal to prior graft. Went for open repair with vascular on 8/25.   -postop management per vascular team    CAD   h/o CABG w/LIMA-Ramus/free ANTONIO-RPDA in 2002, PCI w/FORD to LCx  - stable, no active ischemic sx, EKG nonischemic. CCTA 8/21 shows patent grafts and prev stent  - C/w ASA81 (should ideally be on statin, but reportedly had myalgias in past). C/w Coreg 12.5 BID  -starting omega 3 per cardiology, also working on pre-auth for PCSK9 inhibitor. stopped fibrate.    Valvular Heart Disease  History of porcine bioAVR and mitral ring annuloplasty in 2002  - TTE 8/21: Mild LVH, septal WMA (likely 2/2 prior surgery), LVEF 65%. S/p bio AVR with mild transvalvular AI and trace PVL. S/p MV annuloplasty with mild MR and severe LAE. Normal RV fxn. Mod-sev TR.  -cardiology following    Peripheral arterial disease  prior PTA/FORD to L common iliac and pSFA  - CTA shows patent L iliac stent, mod stenosis of R CFA w/focal occlusion of R mid-distal CFA w/distal reconstitution and mod stenosis of L CFA w/area of focal high-grade occlusion  - C/w ASA81.     Essential hypertension   - BP well controlled on current regimen  - C/w Coreg 12.5 BID (goal HR 50s-60s)    Palpitations  - NSR w/some sinus selvin on tele but asx. No other events.    CKD Stage 3  Baseline Cr felt to be ~1.8-1.9, though has been below this throughout admission. Stable    Centrilobular Emphysema -  seen on CT, consistent w/prior smoking hx. Currently asx, satting well on RA.  Anemia – stable  Hypothyroidism - c/w home 88mcg levothyroxine  Seizure disorder - c/w home keppra 500mg bid  Anxiety/depression - c/w home sertraline 50mg once daily    Discharge medication reconciliation reviewed with primary team. To home today per vascular.

## 2020-09-10 LAB — SURGICAL PATHOLOGY STUDY: SIGNIFICANT CHANGE UP

## 2020-09-16 ENCOUNTER — APPOINTMENT (OUTPATIENT)
Dept: VASCULAR SURGERY | Facility: CLINIC | Age: 65
End: 2020-09-16
Payer: MEDICARE

## 2020-09-16 DIAGNOSIS — I71.4 ABDOMINAL AORTIC ANEURYSM, W/OUT RUPTURE: ICD-10-CM

## 2020-09-16 PROCEDURE — 99214 OFFICE O/P EST MOD 30 MIN: CPT

## 2020-09-16 NOTE — PHYSICAL EXAM
[Normal Breath Sounds] : Normal breath sounds [Normal Heart Sounds] : normal heart sounds [JVD] : no jugular venous distention  [Normal Rate and Rhythm] : normal rate and rhythm [2+] : left 2+ [Ankle Swelling (On Exam)] : not present [Varicose Veins Of Lower Extremities] : not present [] : not present [Abdomen Tenderness] : ~T ~M No abdominal tenderness [Calm] : calm [Alert] : alert [de-identified] : cachectic looking, NAD, ambulates with a walker [de-identified] : grossly intact [de-identified] : + well healed abdominal/Left subcostal incision, stapleas in place [de-identified] : +FROM

## 2020-09-16 NOTE — REVIEW OF SYSTEMS
[Fever] : no fever [Chills] : no chills [Feeling Poorly] : not feeling poorly [Recent Weight Loss (___ Lbs)] : recent [unfilled] ~Ulb weight loss [Lower Ext Edema] : no lower extremity edema [Leg Claudication] : no intermittent leg claudication [Abdominal Pain] : no abdominal pain [Constipation] : no constipation [Vomiting] : no vomiting [Muscle Weakness] : muscle weakness [As Noted in HPI] : as noted in HPI [Negative] : Heme/Lymph

## 2020-09-16 NOTE — ASSESSMENT
[FreeTextEntry1] : 65 yo F with multiple medical issues, including AAA, s/p open repair in 2015 who was recently admitted with CP and was found to have  saccular AAA w/contained rupture adjacent to previous stent requiring open thoracoabdominal aneurysm repair, with 20mm tube graft and 6mm bypasses to the celiac, SMA,  left renal, right renal, returns for a post-op visit.\par Abdominal incision is well healed, staples removed. \par Patient with low energy and poor appetite, but no abdominal discomfort.\par She was encouraged to drink Ensure and try to stay active.\par She will f/u in the office in 6 weeks. [Aneurysm Surgery] : aneurysm surgery

## 2020-09-16 NOTE — HISTORY OF PRESENT ILLNESS
[FreeTextEntry1] : 64 F former smoker with PMHx HTN, HLD , CAD, s/p PCI  and CABG, s/p Bioprosthetic AVR 2002, Mitral Valve, s/p Mitral valve annuloplasty 2002, PAD s/p pLSFA stent LCIA stent, with occluded \par Bilateral SFA (proximal to mid portion per U/S 2019), Chronic Anemia, AAA s/p Open repair 4/2/2015, CKD , Severe Renal Artery Stenosis who presented to [facility] ED 8/20/2020 c/o worsening palpitations x 2 days. Patient was admitted to the cardiology service for ACS rule out. During her workup she was \par noted to have saccular AAA and contained rupture adjacent to previous stent graft. \par On 8/25 she underwent open thoracoabdominal aneurysm repair, with 20mm tube graft and 6mm bypasses to the celiac, SMA,  left renal, right renal (end to end).\par Today she returns for a f/u visit. She states that she is very week, she doesn't have appetite and staples over her incision cause her discomfort. Today is the 1st day that she went outside since her surgery. She denies abdominal pain, post-prandial discomfort, constipation. She denies fever, chills.\par \par

## 2020-09-18 DIAGNOSIS — E83.39 OTHER DISORDERS OF PHOSPHORUS METABOLISM: ICD-10-CM

## 2020-09-18 DIAGNOSIS — E87.1 HYPO-OSMOLALITY AND HYPONATREMIA: ICD-10-CM

## 2020-09-18 DIAGNOSIS — E78.5 HYPERLIPIDEMIA, UNSPECIFIED: ICD-10-CM

## 2020-09-18 DIAGNOSIS — E83.41 HYPERMAGNESEMIA: ICD-10-CM

## 2020-09-18 DIAGNOSIS — I08.0 RHEUMATIC DISORDERS OF BOTH MITRAL AND AORTIC VALVES: ICD-10-CM

## 2020-09-18 DIAGNOSIS — E87.2 ACIDOSIS: ICD-10-CM

## 2020-09-18 DIAGNOSIS — R63.0 ANOREXIA: ICD-10-CM

## 2020-09-18 DIAGNOSIS — I48.91 UNSPECIFIED ATRIAL FIBRILLATION: ICD-10-CM

## 2020-09-18 DIAGNOSIS — R00.2 PALPITATIONS: ICD-10-CM

## 2020-09-18 DIAGNOSIS — E87.6 HYPOKALEMIA: ICD-10-CM

## 2020-09-18 DIAGNOSIS — E43 UNSPECIFIED SEVERE PROTEIN-CALORIE MALNUTRITION: ICD-10-CM

## 2020-09-18 DIAGNOSIS — I12.9 HYPERTENSIVE CHRONIC KIDNEY DISEASE WITH STAGE 1 THROUGH STAGE 4 CHRONIC KIDNEY DISEASE, OR UNSPECIFIED CHRONIC KIDNEY DISEASE: ICD-10-CM

## 2020-09-18 DIAGNOSIS — K21.9 GASTRO-ESOPHAGEAL REFLUX DISEASE WITHOUT ESOPHAGITIS: ICD-10-CM

## 2020-09-18 DIAGNOSIS — I47.1 SUPRAVENTRICULAR TACHYCARDIA: ICD-10-CM

## 2020-09-18 DIAGNOSIS — E03.9 HYPOTHYROIDISM, UNSPECIFIED: ICD-10-CM

## 2020-09-18 DIAGNOSIS — I27.20 PULMONARY HYPERTENSION, UNSPECIFIED: ICD-10-CM

## 2020-09-18 DIAGNOSIS — I16.0 HYPERTENSIVE URGENCY: ICD-10-CM

## 2020-09-18 DIAGNOSIS — I73.9 PERIPHERAL VASCULAR DISEASE, UNSPECIFIED: ICD-10-CM

## 2020-09-18 DIAGNOSIS — F41.8 OTHER SPECIFIED ANXIETY DISORDERS: ICD-10-CM

## 2020-09-18 DIAGNOSIS — J43.2 CENTRILOBULAR EMPHYSEMA: ICD-10-CM

## 2020-09-18 DIAGNOSIS — J41.0 SIMPLE CHRONIC BRONCHITIS: ICD-10-CM

## 2020-09-18 DIAGNOSIS — Z95.820 PERIPHERAL VASCULAR ANGIOPLASTY STATUS WITH IMPLANTS AND GRAFTS: ICD-10-CM

## 2020-09-18 DIAGNOSIS — N18.3 CHRONIC KIDNEY DISEASE, STAGE 3 (MODERATE): ICD-10-CM

## 2020-09-18 DIAGNOSIS — I71.5 THORACOABDOMINAL AORTIC ANEURYSM, RUPTURED: ICD-10-CM

## 2020-09-18 DIAGNOSIS — H53.8 OTHER VISUAL DISTURBANCES: ICD-10-CM

## 2020-09-18 DIAGNOSIS — R50.9 FEVER, UNSPECIFIED: ICD-10-CM

## 2020-09-18 DIAGNOSIS — Z87.891 PERSONAL HISTORY OF NICOTINE DEPENDENCE: ICD-10-CM

## 2020-09-18 DIAGNOSIS — R73.9 HYPERGLYCEMIA, UNSPECIFIED: ICD-10-CM

## 2020-09-18 DIAGNOSIS — I70.1 ATHEROSCLEROSIS OF RENAL ARTERY: ICD-10-CM

## 2020-09-18 DIAGNOSIS — D69.6 THROMBOCYTOPENIA, UNSPECIFIED: ICD-10-CM

## 2020-09-18 DIAGNOSIS — D63.8 ANEMIA IN OTHER CHRONIC DISEASES CLASSIFIED ELSEWHERE: ICD-10-CM

## 2020-09-18 DIAGNOSIS — G40.409 OTHER GENERALIZED EPILEPSY AND EPILEPTIC SYNDROMES, NOT INTRACTABLE, WITHOUT STATUS EPILEPTICUS: ICD-10-CM

## 2020-09-18 DIAGNOSIS — Z95.3 PRESENCE OF XENOGENIC HEART VALVE: ICD-10-CM

## 2020-09-18 DIAGNOSIS — I25.709 ATHEROSCLEROSIS OF CORONARY ARTERY BYPASS GRAFT(S), UNSPECIFIED, WITH UNSPECIFIED ANGINA PECTORIS: ICD-10-CM

## 2020-11-06 ENCOUNTER — APPOINTMENT (OUTPATIENT)
Dept: VASCULAR SURGERY | Facility: CLINIC | Age: 65
End: 2020-11-06
Payer: MEDICARE

## 2020-11-06 ENCOUNTER — OUTPATIENT (OUTPATIENT)
Dept: OUTPATIENT SERVICES | Facility: HOSPITAL | Age: 65
LOS: 1 days | End: 2020-11-06
Payer: MEDICARE

## 2020-11-06 DIAGNOSIS — Z01.818 ENCOUNTER FOR OTHER PREPROCEDURAL EXAMINATION: ICD-10-CM

## 2020-11-06 DIAGNOSIS — Z95.2 PRESENCE OF PROSTHETIC HEART VALVE: Chronic | ICD-10-CM

## 2020-11-06 DIAGNOSIS — I25.701 ATHEROSCLEROSIS OF CORONARY ARTERY BYPASS GRAFT(S), UNSPECIFIED, WITH ANGINA PECTORIS WITH DOCUMENTED SPASM: Chronic | ICD-10-CM

## 2020-11-06 PROCEDURE — 93010 ELECTROCARDIOGRAM REPORT: CPT

## 2020-11-06 PROCEDURE — 99215 OFFICE O/P EST HI 40 MIN: CPT | Mod: 24

## 2020-11-06 PROCEDURE — 93005 ELECTROCARDIOGRAM TRACING: CPT

## 2020-11-09 NOTE — REVIEW OF SYSTEMS
[Recent Weight Loss (___ Lbs)] : recent [unfilled] ~Ulb weight loss [As Noted in HPI] : as noted in HPI [Muscle Weakness] : muscle weakness [Negative] : Heme/Lymph [Fever] : no fever [Chills] : no chills [Feeling Poorly] : not feeling poorly [Leg Claudication] : no intermittent leg claudication [Lower Ext Edema] : no lower extremity edema [Abdominal Pain] : no abdominal pain [Vomiting] : no vomiting [Constipation] : no constipation

## 2020-11-09 NOTE — ADDENDUM
[FreeTextEntry1] : This note was written by Ana Oakley on 11/06/2020 acting as scribe for Sarai Hollingsworth M.D.\par \par I, Sarai Doshi have read and attest that all the information, medical decision making and discharge instructions within are true and accurate.

## 2020-11-09 NOTE — HISTORY OF PRESENT ILLNESS
[FreeTextEntry1] : 63 y/o  F former smoker with PMHx HTN, HLD , CAD, s/p PCI  and CABG, s/p Bioprosthetic AVR 2002, Mitral Valve, s/p Mitral valve annuloplasty 2002, PAD s/p pLSFA stent LCIA stent, with occluded \par Bilateral SFA (proximal to mid portion per U/S 2019), Chronic Anemia, AAA s/p Open repair 4/2/2015, CKD , Severe Renal Artery Stenosis who presented to [facility] ED 8/20/2020 c/o worsening palpitations x 2 days. Patient was admitted to the cardiology service for ACS rule out. During her workup she was \par noted to have saccular AAA and contained rupture adjacent to previous stent graft. \par On 8/25 she underwent open thoracoabdominal aneurysm repair, with 20mm tube graft and 6mm bypasses to the celiac, SMA,  left renal, right renal (end to end).\par Today she returns for a f/u visit. She states that she is very week and has been feeling overall myalgias, stating her feet feel heavy and are painful. She has decrease PO intake and has lost a lot of weight, currently weighs ~ 90 pounds. She denies abdominal pain, post-prandial discomfort, constipation. She denies fever, chills.\par \par

## 2020-11-09 NOTE — ASSESSMENT
[FreeTextEntry1] : 65 yo F with multiple medical issues, including AAA, s/p open repair in 2015 who was recently admitted with CP and was found to have  saccular AAA w/contained rupture adjacent to previous stent requiring open thoracoabdominal aneurysm repair, with 20mm tube graft and 6mm bypasses to the celiac, SMA,  left renal, right renal, presents for evaluation follow up.  \par Patient with low energy and poor appetite, but no abdominal discomfort. She was encouraged to drink Ensure, increase PO intake and stay active through daily walking ~2 miles/day. Pt to f/u with us here 6 weeks. Highly encourage weight gain and has been sent to get EKG. \par

## 2020-11-09 NOTE — PHYSICAL EXAM
[Respiratory Effort] : normal respiratory effort [Normal Rate and Rhythm] : normal rate and rhythm [2+] : left 2+ [No Rash or Lesion] : No rash or lesion [Alert] : alert [Oriented to Person] : oriented to person [Oriented to Place] : oriented to place [Oriented to Time] : oriented to time [Calm] : calm [JVD] : no jugular venous distention  [Ankle Swelling (On Exam)] : not present [Varicose Veins Of Lower Extremities] : not present [] : not present [Abdomen Tenderness] : ~T ~M No abdominal tenderness [de-identified] : cachectic looking, NAD, ambulates with a walker [de-identified] : NC/AT [de-identified] : Supple [de-identified] : +FROM [de-identified] : grossly intact

## 2020-12-01 ENCOUNTER — OUTPATIENT (OUTPATIENT)
Dept: OUTPATIENT SERVICES | Facility: HOSPITAL | Age: 65
LOS: 1 days | End: 2020-12-01
Payer: MEDICARE

## 2020-12-01 DIAGNOSIS — Z95.2 PRESENCE OF PROSTHETIC HEART VALVE: Chronic | ICD-10-CM

## 2020-12-01 DIAGNOSIS — R00.2 PALPITATIONS: ICD-10-CM

## 2020-12-01 DIAGNOSIS — I25.701 ATHEROSCLEROSIS OF CORONARY ARTERY BYPASS GRAFT(S), UNSPECIFIED, WITH ANGINA PECTORIS WITH DOCUMENTED SPASM: Chronic | ICD-10-CM

## 2020-12-01 PROCEDURE — 93227 XTRNL ECG REC<48 HR R&I: CPT

## 2020-12-02 VITALS
OXYGEN SATURATION: 100 % | WEIGHT: 95.02 LBS | RESPIRATION RATE: 18 BRPM | HEART RATE: 88 BPM | SYSTOLIC BLOOD PRESSURE: 110 MMHG | TEMPERATURE: 97 F | DIASTOLIC BLOOD PRESSURE: 61 MMHG | HEIGHT: 67 IN

## 2020-12-02 LAB
ALBUMIN SERPL ELPH-MCNC: 4.1 G/DL — SIGNIFICANT CHANGE UP (ref 3.3–5)
ALP SERPL-CCNC: 93 U/L — SIGNIFICANT CHANGE UP (ref 40–120)
ALT FLD-CCNC: <5 U/L — LOW (ref 10–45)
ANION GAP SERPL CALC-SCNC: 18 MMOL/L — HIGH (ref 5–17)
AST SERPL-CCNC: 13 U/L — SIGNIFICANT CHANGE UP (ref 10–40)
BASOPHILS # BLD AUTO: 0.03 K/UL — SIGNIFICANT CHANGE UP (ref 0–0.2)
BASOPHILS NFR BLD AUTO: 0.4 % — SIGNIFICANT CHANGE UP (ref 0–2)
BILIRUB SERPL-MCNC: <0.2 MG/DL — SIGNIFICANT CHANGE UP (ref 0.2–1.2)
BLD GP AB SCN SERPL QL: NEGATIVE — SIGNIFICANT CHANGE UP
BLD GP AB SCN SERPL QL: NEGATIVE — SIGNIFICANT CHANGE UP
BUN SERPL-MCNC: 25 MG/DL — HIGH (ref 7–23)
CALCIUM SERPL-MCNC: 10.2 MG/DL — SIGNIFICANT CHANGE UP (ref 8.4–10.5)
CHLORIDE SERPL-SCNC: 94 MMOL/L — LOW (ref 96–108)
CO2 SERPL-SCNC: 25 MMOL/L — SIGNIFICANT CHANGE UP (ref 22–31)
CREAT SERPL-MCNC: 2.23 MG/DL — HIGH (ref 0.5–1.3)
EOSINOPHIL # BLD AUTO: 0.05 K/UL — SIGNIFICANT CHANGE UP (ref 0–0.5)
EOSINOPHIL NFR BLD AUTO: 0.7 % — SIGNIFICANT CHANGE UP (ref 0–6)
GLUCOSE SERPL-MCNC: 131 MG/DL — HIGH (ref 70–99)
HCT VFR BLD CALC: 21.1 % — LOW (ref 34.5–45)
HGB BLD-MCNC: 6.3 G/DL — CRITICAL LOW (ref 11.5–15.5)
IMM GRANULOCYTES NFR BLD AUTO: 1 % — SIGNIFICANT CHANGE UP (ref 0–1.5)
LYMPHOCYTES # BLD AUTO: 0.63 K/UL — LOW (ref 1–3.3)
LYMPHOCYTES # BLD AUTO: 9.3 % — LOW (ref 13–44)
MAGNESIUM SERPL-MCNC: 2.4 MG/DL — SIGNIFICANT CHANGE UP (ref 1.6–2.6)
MCHC RBC-ENTMCNC: 27.3 PG — SIGNIFICANT CHANGE UP (ref 27–34)
MCHC RBC-ENTMCNC: 29.9 GM/DL — LOW (ref 32–36)
MCV RBC AUTO: 91.3 FL — SIGNIFICANT CHANGE UP (ref 80–100)
MONOCYTES # BLD AUTO: 0.42 K/UL — SIGNIFICANT CHANGE UP (ref 0–0.9)
MONOCYTES NFR BLD AUTO: 6.2 % — SIGNIFICANT CHANGE UP (ref 2–14)
NEUTROPHILS # BLD AUTO: 5.54 K/UL — SIGNIFICANT CHANGE UP (ref 1.8–7.4)
NEUTROPHILS NFR BLD AUTO: 82.4 % — HIGH (ref 43–77)
NRBC # BLD: 0 /100 WBCS — SIGNIFICANT CHANGE UP (ref 0–0)
NT-PROBNP SERPL-SCNC: 893 PG/ML — HIGH (ref 0–300)
OB PNL STL: POSITIVE
PLATELET # BLD AUTO: 304 K/UL — SIGNIFICANT CHANGE UP (ref 150–400)
POTASSIUM SERPL-MCNC: 3 MMOL/L — LOW (ref 3.5–5.3)
POTASSIUM SERPL-SCNC: 3 MMOL/L — LOW (ref 3.5–5.3)
PROT SERPL-MCNC: 7.7 G/DL — SIGNIFICANT CHANGE UP (ref 6–8.3)
RBC # BLD: 2.31 M/UL — LOW (ref 3.8–5.2)
RBC # FLD: 17.2 % — HIGH (ref 10.3–14.5)
RH IG SCN BLD-IMP: POSITIVE — SIGNIFICANT CHANGE UP
RH IG SCN BLD-IMP: POSITIVE — SIGNIFICANT CHANGE UP
SARS-COV-2 RNA SPEC QL NAA+PROBE: SIGNIFICANT CHANGE UP
SODIUM SERPL-SCNC: 137 MMOL/L — SIGNIFICANT CHANGE UP (ref 135–145)
TROPONIN T SERPL-MCNC: 0.02 NG/ML — HIGH (ref 0–0.01)
WBC # BLD: 6.74 K/UL — SIGNIFICANT CHANGE UP (ref 3.8–10.5)
WBC # FLD AUTO: 6.74 K/UL — SIGNIFICANT CHANGE UP (ref 3.8–10.5)

## 2020-12-02 PROCEDURE — 72148 MRI LUMBAR SPINE W/O DYE: CPT | Mod: 26

## 2020-12-02 PROCEDURE — 72131 CT LUMBAR SPINE W/O DYE: CPT | Mod: 26

## 2020-12-02 PROCEDURE — 74176 CT ABD & PELVIS W/O CONTRAST: CPT | Mod: 26

## 2020-12-02 PROCEDURE — 71045 X-RAY EXAM CHEST 1 VIEW: CPT | Mod: 26

## 2020-12-02 RX ORDER — SODIUM CHLORIDE 9 MG/ML
500 INJECTION INTRAMUSCULAR; INTRAVENOUS; SUBCUTANEOUS ONCE
Refills: 0 | Status: COMPLETED | OUTPATIENT
Start: 2020-12-02 | End: 2020-12-02

## 2020-12-02 RX ORDER — POTASSIUM CHLORIDE 20 MEQ
40 PACKET (EA) ORAL ONCE
Refills: 0 | Status: COMPLETED | OUTPATIENT
Start: 2020-12-02 | End: 2020-12-02

## 2020-12-02 RX ORDER — MORPHINE SULFATE 50 MG/1
2 CAPSULE, EXTENDED RELEASE ORAL ONCE
Refills: 0 | Status: DISCONTINUED | OUTPATIENT
Start: 2020-12-02 | End: 2020-12-02

## 2020-12-02 RX ADMIN — MORPHINE SULFATE 2 MILLIGRAM(S): 50 CAPSULE, EXTENDED RELEASE ORAL at 19:21

## 2020-12-02 RX ADMIN — SODIUM CHLORIDE 500 MILLILITER(S): 9 INJECTION INTRAMUSCULAR; INTRAVENOUS; SUBCUTANEOUS at 19:22

## 2020-12-02 RX ADMIN — SODIUM CHLORIDE 500 MILLILITER(S): 9 INJECTION INTRAMUSCULAR; INTRAVENOUS; SUBCUTANEOUS at 20:00

## 2020-12-02 RX ADMIN — MORPHINE SULFATE 2 MILLIGRAM(S): 50 CAPSULE, EXTENDED RELEASE ORAL at 20:18

## 2020-12-02 RX ADMIN — SODIUM CHLORIDE 500 MILLILITER(S): 9 INJECTION INTRAMUSCULAR; INTRAVENOUS; SUBCUTANEOUS at 15:07

## 2020-12-02 RX ADMIN — Medication 40 MILLIEQUIVALENT(S): at 19:21

## 2020-12-02 NOTE — ED PROVIDER NOTE - OBJECTIVE STATEMENT
Patient is a 65 yo woman w PMH of HTN, CAD s/p CABG (2015) and PCI, biologic aortic valve replacement, PAD, hypothyroidism, seizure, chronic anemia, AAA repair (2015), CKD, and open thoracoabdominal aortic aneurysm repair w/bypass of celiac, SMA, and b/l renal arteries (08/26/2020), presenting with lower back pain, tingling, and weakness in her legs.  Patient reports daily tingling in her legs since her surgery in August; she describes it as "pins and needles" sensation on the front of both legs that extends to the ankles.  Patient was in [facility] delivering Holter monitor results (she has been experiencing palpitations at baseline), when she felt her legs tingling and "excruciating" pain in her back.  She sat down to rest for a few minutes, and then she walked to the elevator.  While waiting for the elevator, she experienced a recurrence of the leg tingling and back pain, and she leaned against the wall.  Her legs gave out, and she slipped to the floor.  She was brought to the ED by an NP.  Upon arrival, she experienced some nausea, and she spat up some saliva.  She denies LOC, head injury, lightheadedness, dizziness, CP, palpitations, or SOB. Patient is a 63 yo woman w PMH of HTN, CAD s/p CABG (2015) and PCI, biologic aortic valve replacement, PAD, hypothyroidism, seizure, chronic anemia, AAA repair (2015), CKD, and open thoracoabdominal aortic aneurysm repair w/bypass of celiac, SMA, and b/l renal arteries (08/26/2020), presenting with lower back pain, tingling, and weakness in her legs.  Patient reports daily tingling in her legs since her surgery in August; she describes it as "pins and needles" sensation on the front of both legs that extends to the ankles.  Patient was in [facility] delivering Holter monitor results (she has been experiencing palpitations at baseline), when she felt her legs tingling and "excruciating" pain in her back.  She sat down to rest for a few minutes, and then she walked to the elevator.  While waiting for the elevator, she experienced a recurrence of the leg tingling and back pain, and she leaned against the wall.  Her legs gave out, and she slipped to the floor.  She was brought to the ED by an NP.  Upon arrival, she experienced some nausea, and she spat up some saliva.  Of note, patient receives iron infusions for her chronic anemia; her last infusion was yesterday, and her HgB was 7.1 on 11/26.  Patient endorses that the rate of urine stream has changed since her open TAAA in August; she says that urine stream is extremely slow ("drips"), but denies hematuria, dysuria, or difficulty initiating her urine stream.  She denies LOC, head injury, lightheadedness, dizziness, CP, palpitations, diaphoresis, SOB, saddle anesthesia, or changes in her bowel habits.

## 2020-12-02 NOTE — ED PROVIDER NOTE - PSH
Abdominal aortic aneurysm (AAA) without rupture  2015  Atherosclerosis of coronary artery bypass graft(s), unspecified, with angina pectoris with documented spasm    H/O aortic valve replacement  Bioprosthetic  Ruptured aortic aneurysm  surgery august 2020  Status post aorto-coronary artery bypass graft  2012

## 2020-12-02 NOTE — ED PROVIDER NOTE - PMH
Anemia  Anemia  Anxiety    Atherosclerosis of coronary artery  CAD (coronary artery disease)  Chronic kidney disease, unspecified CKD stage    Depression, unspecified depression type    Essential hypertension  HTN (hypertension)  Gastroesophageal reflux disease  GERD (gastroesophageal reflux disease)  Hyperlipidemia  Hyperlipidemia  Hypothyroidism  Hypothyroidism  Peripheral vascular disease  PVD (peripheral vascular disease)  Seizure

## 2020-12-02 NOTE — CONSULT NOTE ADULT - SUBJECTIVE AND OBJECTIVE BOX
Vascular Attending:  Jocy      HPI: 64 F former smoker with Crestor intolerance (Myalgias) and PMHx HTN, HLD , CAD s/p PCI dLCX and CABG LIMA-Ramus, ANTONIO-PDA (patent stent and patent grafts x 2 by CTA 8/2014), Aortic Valve Disease s/p Bioprosthetic AVR 2002, Mitral Valve Disease s/p Mitral valve annuloplasty 2002, Paroxysmal ATach, Wide Complex Tachycardia (seen on Holter 4/2016 –had ILR placed now disconnected-previously seen by Dr. Jasso), PAD s/p pLSFA stent LCIA stent, with occluded Bilateral SFA (proximal to mid portion per U/S 2019), Chronic Anemia (baseline Hgb 8-8.5 -history of blood transfusions none recently-currently receiving IV Iron, Procrit, Vitamin B12 injections, takes Folic Acid PO-prior colonoscopy no evidence of bleeding ~ 5 years ago), AAA s/p Open repair by Dr. Sandra 4/2/2015 , Hypothyroidism, CKD stage III-IV (Baseline Cr 1.58-1.99 per labs 6/2019-11/2019),  Severe Renal Artery Stenosis (scarring and atrophy of left Kidney 11/21/19), [place] Seizures (on Keppra), Depression/Anxiety. On recent admission 8/25 she underwent open thoracoabdominal aneurysm repair, with 20mm tube graft and 6mm bypasses to the celiac, SMA,  left renal, right renal (end to end) presents to ED from the 3rd floor after her "legs gave out" from under her and fell to the ground.  She was in the hospital dropping off a holter monitor after being evaluated for palpitations the day before.  She states this has happened a few time since the surgery but there was always a chair around for her to sit before it would get this bad.  During her office visit she was told to do excerises at home to work on strengthening her legs.  Patient denies CP/SOB/N/V.      PAST MEDICAL & SURGICAL HISTORY:  Chronic kidney disease, unspecified CKD stage    Depression, unspecified depression type    Anxiety    Seizure    Essential hypertension  HTN (hypertension)    Gastroesophageal reflux disease  GERD (gastroesophageal reflux disease)    Hyperlipidemia  Hyperlipidemia    Anemia  Anemia    Hypothyroidism  Hypothyroidism    Atherosclerosis of coronary artery  CAD (coronary artery disease)    Peripheral vascular disease  PVD (peripheral vascular disease)    Abdominal aortic aneurysm (AAA) without rupture  2015    Ruptured aortic aneurysm  surgery august 2020    H/O aortic valve replacement  Bioprosthetic    Atherosclerosis of coronary artery bypass graft(s), unspecified, with angina pectoris with documented spasm    Status post aorto-coronary artery bypass graft  2012    Allergies    No Known Allergies    Intolerances    Crestor (Other (Mod to Severe))      SOCIAL HISTORY:    FAMILY HISTORY:      Vital Signs Last 24 Hrs  T(C): 36.3 (02 Dec 2020 13:54), Max: 36.3 (02 Dec 2020 13:54)  T(F): 97.4 (02 Dec 2020 13:54), Max: 97.4 (02 Dec 2020 13:54)  HR: 88 (02 Dec 2020 13:54) (88 - 88)  BP: 110/61 (02 Dec 2020 13:54) (110/61 - 110/61)  BP(mean): --  RR: 18 (02 Dec 2020 13:54) (18 - 18)  SpO2: 100% (02 Dec 2020 13:54) (100% - 100%)    PHYSICAL EXAM:      Constitutional: NAD    Respiratory: nonlabored breathing    Cardiovascular: s1s2 rrr    Gastrointestinal: soft nt/nd    Extremities: WWP no edema    Vascular: b/l LE mono dp/pt pal fem    Neurological: b/l LE strength 5/5 sensation intact    LABS:                        6.3    6.74  )-----------( 304      ( 02 Dec 2020 14:58 )             21.1     12-02    137  |  94<L>  |  25<H>  ----------------------------<  131<H>  3.0<L>   |  25  |  2.23<H>    Ca    10.2      02 Dec 2020 14:58  Mg     2.4     12-02    TPro  7.7  /  Alb  4.1  /  TBili  <0.2  /  DBili  x   /  AST  13  /  ALT  <5<L>  /  AlkPhos  93  12-02

## 2020-12-02 NOTE — ED ADULT NURSE REASSESSMENT NOTE - NS ED NURSE REASSESS COMMENT FT1
Pt has been assessed. Pt states, "I don't want any blood taken from me." Pt is waiting to be seen by doctor.

## 2020-12-02 NOTE — ED ADULT NURSE NOTE - NSIMPLEMENTINTERV_GEN_ALL_ED
Implemented All Fall with Harm Risk Interventions:  [place] to call system. Call bell, personal items and telephone within reach. Instruct patient to call for assistance. Room bathroom lighting operational. Non-slip footwear when patient is off stretcher. Physically safe environment: no spills, clutter or unnecessary equipment. Stretcher in lowest position, wheels locked, appropriate side rails in place. Provide visual cue, wrist band, yellow gown, etc. Monitor gait and stability. Monitor for mental status changes and reorient to person, place, and time. Review medications for side effects contributing to fall risk. Reinforce activity limits and safety measures with patient and family. Provide visual clues: red socks.

## 2020-12-02 NOTE — ED PROVIDER NOTE - ATTENDING CONTRIBUTION TO CARE
here with episode of generalized weakness/legs gave out while returning holter monitor in hospital. rapid response called. no syncope. denies chest pain. notes back pain and leg tingling, intermittent weakness since aorta repair this summer. says seems to have increased recently also with increased generalized weakness/fatigue. exam non focal with symmetric strength. appears frail. vitals stable. labs done with drop in hgb from baseline. transfusion ordered for symptomatic anemia. rectal with heme positive brown stool.  discussed with Dr. Izquierdo, uses hospitalist for admissions. also with david, suspect related to decreased po intake recently. given ivf hydration. vascular consulted due to recent surgery/ back pain. ct done without contrast given david of a/p and spine. vascular requested mri of lumbar spine to r/o cauda equina. signed out to Dr. Yanez/ ERIKA Wade pending MRI result and admit to vascular or medicine for further management. I have personally seen and examined this patient.  I have fully participated in the care of this patient.  I have reviewed all pertinent clinical information, including history, physical exam, plan and the medical student's note and agree except as noted.    here with episode of generalized weakness/legs gave out while returning holter monitor in hospital. rapid response called. no syncope. denies chest pain. notes back pain and leg tingling, intermittent weakness since aorta repair this summer. says seems to have increased recently also with increased generalized weakness/fatigue. exam non focal with symmetric strength. appears frail. vitals stable. labs done with drop in hgb from baseline. transfusion ordered for symptomatic anemia. rectal with heme positive brown stool.  discussed with Dr. Izqueirdo, uses hospitalist for admissions. also with david, suspect related to decreased po intake recently. given ivf hydration. vascular consulted due to recent surgery/ back pain. ct done without contrast given david of a/p and spine. vascular requested mri of lumbar spine to r/o cauda equina. signed out to Dr. Yanez/ ERIKA Wade pending MRI result and admit to vascular or medicine for further management.

## 2020-12-02 NOTE — ED ADULT NURSE NOTE - OBJECTIVE STATEMENT
Patient is a 64y female complaining of weakness. Pt states, " I was upstairs for a cardiologist appointment and my back started to hurt then my legs gave out because there was nowhere to sit." Pt rates back pain as a 10/10. Pt reports numbness and tingling bilaterally in her legs. Hx of ruptured aortic aneurysm in August. Pt denies LOC, head trauma, dizziness, chest pain, N/V, blurred vision.

## 2020-12-02 NOTE — ED PROVIDER NOTE - WET READ LAUNCH FT
Bladder non-tender and non-distended. Urine clear yellow There are no Wet Read(s) to document. There is 1 Wet Read(s) to document.

## 2020-12-02 NOTE — ED PROVIDER NOTE - PROGRESS NOTE DETAILS
Discussed with vascular surgery.  They'll come see patient. Vascular surgery at bedside. Patient at CT. Vascular surgery called to request MR spine to assess for compression. Patient requested pain relief; morphine ordered. Transfusion running; patient endorses that her pain level has decreased from 12 to a 4.

## 2020-12-02 NOTE — ED PROVIDER NOTE - CLINICAL SUMMARY MEDICAL DECISION MAKING FREE TEXT BOX
Patient is a 63 yo woman w PMH of HTN, CAD s/p CABG (2015) and PCI, biologic aortic valve replacement, PAD, hypothyroidism, seizure, chronic anemia, AAA repair (2015), CKD, and open thoracoabdominal aortic aneurysm repair w/bypass of celiac, SMA, and b/l renal arteries (08/26/2020), presenting with lower back pain, tingling, and weakness in her legs.  Vital signs are non-actionable.  PE significant for systolic murmur (patient says it's congenital) and palpable abdominal pulsation.  Labs significant for HgB 6.3, HCT 21.1, K+ 3.0, Cl 94, BUN 25, Cr 2.23, pro-, and troponin 0.02.  EKG is NSR.  The differential includes gastric bleed vs. aortoenteric fistula vs. cauda equina syndrome.  Although patient has elevated pro-BNP and trops, I have a low index of suspicion for cardiac etiology because of patient's symptoms and her unremarkable EKG.  Cauda equina syndrome is less likely because patient does not have saddle anesthesia, anuria, radicular pain, or motor impairments in her legs.  Plan is to give patient a blood transfusion, replete K+, do a rectal exam to assess for occult blood, consult vascular surgery, get a CT abdomen, and depending on results, admit for management. Patient is a 63 yo woman w PMH of HTN, CAD s/p CABG (2015) and PCI, biologic aortic valve replacement, PAD, hypothyroidism, seizure, chronic anemia, AAA repair (2015), CKD, and open thoracoabdominal aortic aneurysm repair w/bypass of celiac, SMA, and b/l renal arteries (08/26/2020), presenting with lower back pain, tingling, and weakness in her legs.  Vital signs are non-actionable.  PE significant for systolic murmur (patient says it's congenital) and palpable abdominal pulsation.  Labs significant for HgB 6.3, HCT 21.1, K+ 3.0, Cl 94, BUN 25, Cr 2.23, pro-, and troponin 0.02.  EKG is NSR.  The differential includes gastric bleed vs. aortoenteric fistula vs. cauda equina syndrome.  Although patient has elevated pro-BNP and trops, I have a low index of suspicion for cardiac etiology because of patient's symptoms and her unremarkable EKG.  Cauda equina syndrome is less likely because patient does not have saddle anesthesia, anuria, radicular pain, or motor impairments in her legs.  Plan is to give patient a blood transfusion, replete K+, do a rectal exam to assess for occult blood, consult vascular surgery, and get a CT abdomen. Patient is a 65 yo woman w PMH of HTN, CAD s/p CABG (2015) and PCI, biologic aortic valve replacement, PAD, hypothyroidism, seizure, chronic anemia, AAA repair (2015), CKD, and open thoracoabdominal aortic aneurysm repair w/bypass of celiac, SMA, and b/l renal arteries (08/26/2020), presenting with lower back pain, tingling, and weakness in her legs.  Vital signs are non-actionable.  PE significant for systolic murmur (patient says it's congenital) and palpable abdominal pulsation.  Labs significant for positive occult blood, HgB 6.3, HCT 21.1, K+ 3.0, Cl 94, BUN 25, Cr 2.23, pro-, and troponin 0.02.  EKG is NSR.  The differential includes gastric bleed vs. aortoenteric fistula vs. cauda equina syndrome.  Although patient has elevated pro-BNP and trops, I have a low index of suspicion for cardiac etiology because of patient's symptoms and her unremarkable EKG.  Cauda equina syndrome is less likely because patient does not have saddle anesthesia, anuria, radicular pain, or motor impairments in her legs.  Plan is to give patient a blood transfusion, replete K+, do a rectal exam to assess for occult blood, consult vascular surgery, and get a CT abdomen; admission likely.

## 2020-12-02 NOTE — ED ADULT NURSE REASSESSMENT NOTE - NS ED NURSE REASSESS COMMENT FT1
Pt states, "I do not want to get into the gown or be on the monitor. I feel because my back hurts from surgery."

## 2020-12-02 NOTE — CHART NOTE - NSCHARTNOTEFT_GEN_A_CORE
***Rapid Response Clinical Impact Nurse Practitioner Note***    Patient is a 64y old  Female with a PMHx of Seizure, Essential hypertension, HTN (hypertension), GERD, Hyperlipidemia, Anemia, CABG, Aortic Valve Replacement, CAD, PVD who presents status post RRT for acute onset of weakness to lower extremities. Patient was visiting hospital to return her halter monitor. Patient notes that felt well throughout the day and her normal self. States acutely began with bilateral weakness and then nausea. No LOC. Patient assisted to chair by security. BLACKWELL X 4. +CMS X 4. Alert and oriented X 4. Denies any CP or SOB.     Rest of exam deferred due to patient emergent transport to ED for evaluation. Rest of plan per ED Team.    -STAT FS  -STAT Labs  -STAT EKG  -Continous telemetry monitoring    Report given to NOLBERTO Herman ED Triage RN.

## 2020-12-02 NOTE — ED PROVIDER NOTE - PSYCHIATRIC [-], MLM
not exhibiting opposition/not suicidal/no insomnia/patient was initially oppositional but was amenable to persuasion

## 2020-12-02 NOTE — ED ADULT TRIAGE NOTE - OTHER COMPLAINTS
pt brought down by RANDAL Costello for near syncopal episode, pt was on 3rd floor and reports that her legs felt weak and gave out. denies cp, dizziness.

## 2020-12-02 NOTE — ED PROVIDER NOTE - CRITICAL CARE INDICATION, MLM
patient was critically ill... Patient was critically ill with a high probability of imminent or life threatening deterioration. symptomatic anemia requiring transfusion in ED and david

## 2020-12-02 NOTE — ED PROVIDER NOTE - NONTENDER LOCATION
right upper quadrant/right lower quadrant/left upper quadrant/right costovertebral angle/left lower quadrant/umbilical/left costovertebral angle

## 2020-12-02 NOTE — ED ADULT NURSE NOTE - PSH
Atherosclerosis of coronary artery bypass graft(s), unspecified, with angina pectoris with documented spasm    H/O aortic valve replacement  Bioprosthetic  Ruptured aortic aneurysm  surgery august 2020  Status post aorto-coronary artery bypass graft  2012

## 2020-12-02 NOTE — CONSULT NOTE ADULT - ASSESSMENT
64 F former smoker with Crestor intolerance (Myalgias) and PMHx HTN, HLD , CAD s/p PCI dLCX and CABG LIMA-Ramus, ANTONIO-PDA (patent stent and patent grafts x 2 by CTA 8/2014), Aortic Valve Disease s/p Bioprosthetic AVR 2002, Mitral Valve Disease s/p Mitral valve annuloplasty 2002, Paroxysmal ATach, Wide Complex Tachycardia (seen on Holter 4/2016 –had ILR placed now disconnected-previously seen by Dr. Jasso), PAD s/p pLSFA stent LCIA stent, with occluded Bilateral SFA (proximal to mid portion per U/S 2019), Chronic Anemia (baseline Hgb 8-8.5 -history of blood transfusions none recently-currently receiving IV Iron, Procrit, Vitamin B12 injections, takes Folic Acid PO-prior colonoscopy no evidence of bleeding ~ 5 years ago), AAA s/p Open repair by Dr. Sandra 4/2/2015 , Hypothyroidism, CKD stage III-IV (Baseline Cr 1.58-1.99 per labs 6/2019-11/2019),  Severe Renal Artery Stenosis (scarring and atrophy of left Kidney 11/21/19), Grand Mal Seizures (on Keppra), Depression/Anxiety. On recent admission 8/25 she underwent open thoracoabdominal aneurysm repair, with 20mm tube graft and 6mm bypasses to the celiac, SMA,  left renal, right renal (end to end) presents to ED from the 3rd floor after her "legs gave out" from under her and fell to the ground.    -obtain MRI of back 64 F former smoker s/p AAA w/Open repair by Dr. Sandra 4/2/2015 , Hypothyroidism, CKD stage III-IV (Baseline Cr 1.58-1.99 per labs 6/2019-11/2019),  Severe Renal Artery Stenosis. On recent admission 8/25 she underwent open thoracoabdominal aneurysm repair, with 20mm tube graft and 6mm bypasses to the celiac, SMA,  left renal, right renal (end to end) presents to ED from the 3rd floor after her "legs gave out" from under her and fell to the ground.    -No Acute Vascular intervention  -Recommend Neuro workup  -f/u w/ Dr Sandra x1wk

## 2020-12-03 ENCOUNTER — INPATIENT (INPATIENT)
Facility: HOSPITAL | Age: 65
LOS: 3 days | Discharge: HOME | DRG: 811 | End: 2020-12-07
Attending: HOSPITALIST | Admitting: STUDENT IN AN ORGANIZED HEALTH CARE EDUCATION/TRAINING PROGRAM
Payer: MEDICARE

## 2020-12-03 DIAGNOSIS — Z95.2 PRESENCE OF PROSTHETIC HEART VALVE: Chronic | ICD-10-CM

## 2020-12-03 DIAGNOSIS — I71.4 ABDOMINAL AORTIC ANEURYSM, WITHOUT RUPTURE: Chronic | ICD-10-CM

## 2020-12-03 DIAGNOSIS — D64.9 ANEMIA, UNSPECIFIED: ICD-10-CM

## 2020-12-03 DIAGNOSIS — I71.8 AORTIC ANEURYSM OF UNSPECIFIED SITE, RUPTURED: Chronic | ICD-10-CM

## 2020-12-03 DIAGNOSIS — I25.701 ATHEROSCLEROSIS OF CORONARY ARTERY BYPASS GRAFT(S), UNSPECIFIED, WITH ANGINA PECTORIS WITH DOCUMENTED SPASM: Chronic | ICD-10-CM

## 2020-12-03 DIAGNOSIS — N17.9 ACUTE KIDNEY FAILURE, UNSPECIFIED: ICD-10-CM

## 2020-12-03 LAB
ALBUMIN SERPL ELPH-MCNC: 3.3 G/DL — SIGNIFICANT CHANGE UP (ref 3.3–5)
ALP SERPL-CCNC: 81 U/L — SIGNIFICANT CHANGE UP (ref 40–120)
ALT FLD-CCNC: <5 U/L — LOW (ref 10–45)
ANION GAP SERPL CALC-SCNC: 11 MMOL/L — SIGNIFICANT CHANGE UP (ref 5–17)
ANION GAP SERPL CALC-SCNC: 13 MMOL/L — SIGNIFICANT CHANGE UP (ref 5–17)
ANION GAP SERPL CALC-SCNC: 18 MMOL/L — HIGH (ref 5–17)
APPEARANCE UR: CLEAR — SIGNIFICANT CHANGE UP
APTT BLD: 31 SEC — SIGNIFICANT CHANGE UP (ref 27.5–35.5)
AST SERPL-CCNC: 13 U/L — SIGNIFICANT CHANGE UP (ref 10–40)
BACTERIA # UR AUTO: PRESENT /HPF
BILIRUB SERPL-MCNC: 0.2 MG/DL — SIGNIFICANT CHANGE UP (ref 0.2–1.2)
BILIRUB UR-MCNC: NEGATIVE — SIGNIFICANT CHANGE UP
BUN SERPL-MCNC: 20 MG/DL — SIGNIFICANT CHANGE UP (ref 7–23)
BUN SERPL-MCNC: 21 MG/DL — SIGNIFICANT CHANGE UP (ref 7–23)
BUN SERPL-MCNC: 23 MG/DL — SIGNIFICANT CHANGE UP (ref 7–23)
CALCIUM SERPL-MCNC: 9.2 MG/DL — SIGNIFICANT CHANGE UP (ref 8.4–10.5)
CALCIUM SERPL-MCNC: 9.3 MG/DL — SIGNIFICANT CHANGE UP (ref 8.4–10.5)
CALCIUM SERPL-MCNC: 9.7 MG/DL — SIGNIFICANT CHANGE UP (ref 8.4–10.5)
CHLORIDE SERPL-SCNC: 100 MMOL/L — SIGNIFICANT CHANGE UP (ref 96–108)
CHLORIDE SERPL-SCNC: 98 MMOL/L — SIGNIFICANT CHANGE UP (ref 96–108)
CHLORIDE SERPL-SCNC: 99 MMOL/L — SIGNIFICANT CHANGE UP (ref 96–108)
CO2 SERPL-SCNC: 22 MMOL/L — SIGNIFICANT CHANGE UP (ref 22–31)
CO2 SERPL-SCNC: 23 MMOL/L — SIGNIFICANT CHANGE UP (ref 22–31)
CO2 SERPL-SCNC: 24 MMOL/L — SIGNIFICANT CHANGE UP (ref 22–31)
COLOR SPEC: YELLOW — SIGNIFICANT CHANGE UP
CREAT ?TM UR-MCNC: 39 MG/DL — SIGNIFICANT CHANGE UP
CREAT ?TM UR-MCNC: 55 MG/DL — SIGNIFICANT CHANGE UP
CREAT SERPL-MCNC: 1.91 MG/DL — HIGH (ref 0.5–1.3)
CREAT SERPL-MCNC: 2.03 MG/DL — HIGH (ref 0.5–1.3)
CREAT SERPL-MCNC: 2.05 MG/DL — HIGH (ref 0.5–1.3)
DIFF PNL FLD: ABNORMAL
EPI CELLS # UR: ABNORMAL /HPF (ref 0–5)
FOLATE SERPL-MCNC: >20 NG/ML — SIGNIFICANT CHANGE UP
GLUCOSE SERPL-MCNC: 168 MG/DL — HIGH (ref 70–99)
GLUCOSE SERPL-MCNC: 62 MG/DL — LOW (ref 70–99)
GLUCOSE SERPL-MCNC: 66 MG/DL — LOW (ref 70–99)
GLUCOSE UR QL: NEGATIVE — SIGNIFICANT CHANGE UP
HCT VFR BLD CALC: 22.4 % — LOW (ref 34.5–45)
HCT VFR BLD CALC: 26.9 % — LOW (ref 34.5–45)
HCT VFR BLD CALC: 27.7 % — LOW (ref 34.5–45)
HGB BLD-MCNC: 6.7 G/DL — CRITICAL LOW (ref 11.5–15.5)
HGB BLD-MCNC: 8.1 G/DL — LOW (ref 11.5–15.5)
HGB BLD-MCNC: 8.3 G/DL — LOW (ref 11.5–15.5)
HYALINE CASTS # UR AUTO: ABNORMAL /LPF (ref 0–2)
INR BLD: 1 — SIGNIFICANT CHANGE UP (ref 0.88–1.16)
KETONES UR-MCNC: NEGATIVE — SIGNIFICANT CHANGE UP
LEUKOCYTE ESTERASE UR-ACNC: NEGATIVE — SIGNIFICANT CHANGE UP
MAGNESIUM SERPL-MCNC: 2.3 MG/DL — SIGNIFICANT CHANGE UP (ref 1.6–2.6)
MCHC RBC-ENTMCNC: 27.2 PG — SIGNIFICANT CHANGE UP (ref 27–34)
MCHC RBC-ENTMCNC: 27.6 PG — SIGNIFICANT CHANGE UP (ref 27–34)
MCHC RBC-ENTMCNC: 27.8 PG — SIGNIFICANT CHANGE UP (ref 27–34)
MCHC RBC-ENTMCNC: 29.9 GM/DL — LOW (ref 32–36)
MCHC RBC-ENTMCNC: 30 GM/DL — LOW (ref 32–36)
MCHC RBC-ENTMCNC: 30.1 GM/DL — LOW (ref 32–36)
MCV RBC AUTO: 90.3 FL — SIGNIFICANT CHANGE UP (ref 80–100)
MCV RBC AUTO: 92 FL — SIGNIFICANT CHANGE UP (ref 80–100)
MCV RBC AUTO: 92.9 FL — SIGNIFICANT CHANGE UP (ref 80–100)
NITRITE UR-MCNC: NEGATIVE — SIGNIFICANT CHANGE UP
NRBC # BLD: 0 /100 WBCS — SIGNIFICANT CHANGE UP (ref 0–0)
PH UR: 6.5 — SIGNIFICANT CHANGE UP (ref 5–8)
PHOSPHATE SERPL-MCNC: 3.1 MG/DL — SIGNIFICANT CHANGE UP (ref 2.5–4.5)
PLATELET # BLD AUTO: 228 K/UL — SIGNIFICANT CHANGE UP (ref 150–400)
PLATELET # BLD AUTO: 248 K/UL — SIGNIFICANT CHANGE UP (ref 150–400)
PLATELET # BLD AUTO: 253 K/UL — SIGNIFICANT CHANGE UP (ref 150–400)
POTASSIUM SERPL-MCNC: 2.6 MMOL/L — CRITICAL LOW (ref 3.5–5.3)
POTASSIUM SERPL-MCNC: 3.1 MMOL/L — LOW (ref 3.5–5.3)
POTASSIUM SERPL-MCNC: 3.2 MMOL/L — LOW (ref 3.5–5.3)
POTASSIUM SERPL-SCNC: 2.6 MMOL/L — CRITICAL LOW (ref 3.5–5.3)
POTASSIUM SERPL-SCNC: 3.1 MMOL/L — LOW (ref 3.5–5.3)
POTASSIUM SERPL-SCNC: 3.2 MMOL/L — LOW (ref 3.5–5.3)
PROT SERPL-MCNC: 6.5 G/DL — SIGNIFICANT CHANGE UP (ref 6–8.3)
PROT UR-MCNC: ABNORMAL MG/DL
PROTHROM AB SERPL-ACNC: 12 SEC — SIGNIFICANT CHANGE UP (ref 10.6–13.6)
RBC # BLD: 2.41 M/UL — LOW (ref 3.8–5.2)
RBC # BLD: 2.98 M/UL — LOW (ref 3.8–5.2)
RBC # BLD: 3.01 M/UL — LOW (ref 3.8–5.2)
RBC # FLD: 16 % — HIGH (ref 10.3–14.5)
RBC # FLD: 16 % — HIGH (ref 10.3–14.5)
RBC # FLD: 16.4 % — HIGH (ref 10.3–14.5)
RBC CASTS # UR COMP ASSIST: < 5 /HPF — SIGNIFICANT CHANGE UP
SODIUM SERPL-SCNC: 134 MMOL/L — LOW (ref 135–145)
SODIUM SERPL-SCNC: 136 MMOL/L — SIGNIFICANT CHANGE UP (ref 135–145)
SODIUM SERPL-SCNC: 138 MMOL/L — SIGNIFICANT CHANGE UP (ref 135–145)
SODIUM UR-SCNC: 106 MMOL/L — SIGNIFICANT CHANGE UP
SODIUM UR-SCNC: 51 MMOL/L — SIGNIFICANT CHANGE UP
SP GR SPEC: 1.01 — SIGNIFICANT CHANGE UP (ref 1–1.03)
TROPONIN T SERPL-MCNC: 0.02 NG/ML — HIGH (ref 0–0.01)
TROPONIN T SERPL-MCNC: 0.02 NG/ML — HIGH (ref 0–0.01)
TSH SERPL-MCNC: 3.25 UIU/ML — SIGNIFICANT CHANGE UP (ref 0.35–4.94)
UROBILINOGEN FLD QL: 0.2 E.U./DL — SIGNIFICANT CHANGE UP
UUN UR-MCNC: 185 MG/DL — SIGNIFICANT CHANGE UP
UUN UR-MCNC: 290 MG/DL — SIGNIFICANT CHANGE UP
VIT B12 SERPL-MCNC: >2000 PG/ML — HIGH (ref 232–1245)
WBC # BLD: 5.38 K/UL — SIGNIFICANT CHANGE UP (ref 3.8–10.5)
WBC # BLD: 5.84 K/UL — SIGNIFICANT CHANGE UP (ref 3.8–10.5)
WBC # BLD: 6.46 K/UL — SIGNIFICANT CHANGE UP (ref 3.8–10.5)
WBC # FLD AUTO: 5.38 K/UL — SIGNIFICANT CHANGE UP (ref 3.8–10.5)
WBC # FLD AUTO: 5.84 K/UL — SIGNIFICANT CHANGE UP (ref 3.8–10.5)
WBC # FLD AUTO: 6.46 K/UL — SIGNIFICANT CHANGE UP (ref 3.8–10.5)
WBC UR QL: ABNORMAL /HPF

## 2020-12-03 PROCEDURE — 99223 1ST HOSP IP/OBS HIGH 75: CPT

## 2020-12-03 PROCEDURE — 99233 SBSQ HOSP IP/OBS HIGH 50: CPT | Mod: GC

## 2020-12-03 PROCEDURE — 99291 CRITICAL CARE FIRST HOUR: CPT | Mod: CS

## 2020-12-03 PROCEDURE — 93976 VASCULAR STUDY: CPT | Mod: 26

## 2020-12-03 PROCEDURE — 93010 ELECTROCARDIOGRAM REPORT: CPT

## 2020-12-03 PROCEDURE — 99223 1ST HOSP IP/OBS HIGH 75: CPT | Mod: GC

## 2020-12-03 PROCEDURE — 70450 CT HEAD/BRAIN W/O DYE: CPT | Mod: 26

## 2020-12-03 RX ORDER — ATENOLOL 25 MG/1
1 TABLET ORAL
Qty: 0 | Refills: 0 | DISCHARGE

## 2020-12-03 RX ORDER — GABAPENTIN 400 MG/1
100 CAPSULE ORAL DAILY
Refills: 0 | Status: DISCONTINUED | OUTPATIENT
Start: 2020-12-03 | End: 2020-12-04

## 2020-12-03 RX ORDER — LEVOTHYROXINE SODIUM 125 MCG
88 TABLET ORAL DAILY
Refills: 0 | Status: DISCONTINUED | OUTPATIENT
Start: 2020-12-03 | End: 2020-12-03

## 2020-12-03 RX ORDER — SERTRALINE 25 MG/1
50 TABLET, FILM COATED ORAL DAILY
Refills: 0 | Status: DISCONTINUED | OUTPATIENT
Start: 2020-12-03 | End: 2020-12-07

## 2020-12-03 RX ORDER — LEVETIRACETAM 250 MG/1
1 TABLET, FILM COATED ORAL
Qty: 0 | Refills: 0 | DISCHARGE

## 2020-12-03 RX ORDER — LEVOTHYROXINE SODIUM 125 MCG
50 TABLET ORAL DAILY
Refills: 0 | Status: DISCONTINUED | OUTPATIENT
Start: 2020-12-04 | End: 2020-12-07

## 2020-12-03 RX ORDER — POLYETHYLENE GLYCOL 3350 17 G/17G
17 POWDER, FOR SOLUTION ORAL EVERY 24 HOURS
Refills: 0 | Status: DISCONTINUED | OUTPATIENT
Start: 2020-12-03 | End: 2020-12-07

## 2020-12-03 RX ORDER — ACETAMINOPHEN 500 MG
650 TABLET ORAL EVERY 6 HOURS
Refills: 0 | Status: DISCONTINUED | OUTPATIENT
Start: 2020-12-03 | End: 2020-12-07

## 2020-12-03 RX ORDER — INFLUENZA VIRUS VACCINE 15; 15; 15; 15 UG/.5ML; UG/.5ML; UG/.5ML; UG/.5ML
0.5 SUSPENSION INTRAMUSCULAR ONCE
Refills: 0 | Status: DISCONTINUED | OUTPATIENT
Start: 2020-12-03 | End: 2020-12-03

## 2020-12-03 RX ORDER — INFLUENZA VIRUS VACCINE 15; 15; 15; 15 UG/.5ML; UG/.5ML; UG/.5ML; UG/.5ML
0.7 SUSPENSION INTRAMUSCULAR ONCE
Refills: 0 | Status: COMPLETED | OUTPATIENT
Start: 2020-12-03 | End: 2020-12-03

## 2020-12-03 RX ORDER — POTASSIUM CHLORIDE 20 MEQ
40 PACKET (EA) ORAL ONCE
Refills: 0 | Status: COMPLETED | OUTPATIENT
Start: 2020-12-03 | End: 2020-12-03

## 2020-12-03 RX ORDER — OXYCODONE AND ACETAMINOPHEN 5; 325 MG/1; MG/1
1 TABLET ORAL EVERY 4 HOURS
Refills: 0 | Status: DISCONTINUED | OUTPATIENT
Start: 2020-12-03 | End: 2020-12-07

## 2020-12-03 RX ORDER — LEVETIRACETAM 250 MG/1
500 TABLET, FILM COATED ORAL
Refills: 0 | Status: DISCONTINUED | OUTPATIENT
Start: 2020-12-03 | End: 2020-12-03

## 2020-12-03 RX ORDER — LIDOCAINE 4 G/100G
1 CREAM TOPICAL EVERY 24 HOURS
Refills: 0 | Status: DISCONTINUED | OUTPATIENT
Start: 2020-12-03 | End: 2020-12-07

## 2020-12-03 RX ORDER — PANTOPRAZOLE SODIUM 20 MG/1
40 TABLET, DELAYED RELEASE ORAL EVERY 12 HOURS
Refills: 0 | Status: DISCONTINUED | OUTPATIENT
Start: 2020-12-03 | End: 2020-12-07

## 2020-12-03 RX ORDER — LEVOTHYROXINE SODIUM 125 MCG
1 TABLET ORAL
Qty: 0 | Refills: 0 | DISCHARGE

## 2020-12-03 RX ORDER — SENNA PLUS 8.6 MG/1
2 TABLET ORAL AT BEDTIME
Refills: 0 | Status: DISCONTINUED | OUTPATIENT
Start: 2020-12-03 | End: 2020-12-07

## 2020-12-03 RX ORDER — POTASSIUM CHLORIDE 20 MEQ
40 PACKET (EA) ORAL EVERY 4 HOURS
Refills: 0 | Status: COMPLETED | OUTPATIENT
Start: 2020-12-03 | End: 2020-12-03

## 2020-12-03 RX ADMIN — Medication 1 TABLET(S): at 18:38

## 2020-12-03 RX ADMIN — SENNA PLUS 2 TABLET(S): 8.6 TABLET ORAL at 21:04

## 2020-12-03 RX ADMIN — OXYCODONE AND ACETAMINOPHEN 1 TABLET(S): 5; 325 TABLET ORAL at 11:49

## 2020-12-03 RX ADMIN — SERTRALINE 50 MILLIGRAM(S): 25 TABLET, FILM COATED ORAL at 11:26

## 2020-12-03 RX ADMIN — OXYCODONE AND ACETAMINOPHEN 1 TABLET(S): 5; 325 TABLET ORAL at 23:00

## 2020-12-03 RX ADMIN — OXYCODONE AND ACETAMINOPHEN 1 TABLET(S): 5; 325 TABLET ORAL at 21:55

## 2020-12-03 RX ADMIN — LIDOCAINE 1 PATCH: 4 CREAM TOPICAL at 18:37

## 2020-12-03 RX ADMIN — POLYETHYLENE GLYCOL 3350 17 GRAM(S): 17 POWDER, FOR SOLUTION ORAL at 11:26

## 2020-12-03 RX ADMIN — LIDOCAINE 1 PATCH: 4 CREAM TOPICAL at 11:55

## 2020-12-03 RX ADMIN — OXYCODONE AND ACETAMINOPHEN 1 TABLET(S): 5; 325 TABLET ORAL at 02:49

## 2020-12-03 RX ADMIN — OXYCODONE AND ACETAMINOPHEN 1 TABLET(S): 5; 325 TABLET ORAL at 07:45

## 2020-12-03 RX ADMIN — Medication 40 MILLIEQUIVALENT(S): at 21:06

## 2020-12-03 RX ADMIN — OXYCODONE AND ACETAMINOPHEN 1 TABLET(S): 5; 325 TABLET ORAL at 03:28

## 2020-12-03 RX ADMIN — PANTOPRAZOLE SODIUM 40 MILLIGRAM(S): 20 TABLET, DELAYED RELEASE ORAL at 18:39

## 2020-12-03 RX ADMIN — Medication 40 MILLIEQUIVALENT(S): at 11:26

## 2020-12-03 RX ADMIN — Medication 40 MILLIEQUIVALENT(S): at 16:42

## 2020-12-03 RX ADMIN — Medication 88 MICROGRAM(S): at 06:16

## 2020-12-03 RX ADMIN — PANTOPRAZOLE SODIUM 40 MILLIGRAM(S): 20 TABLET, DELAYED RELEASE ORAL at 06:15

## 2020-12-03 RX ADMIN — GABAPENTIN 100 MILLIGRAM(S): 400 CAPSULE ORAL at 11:54

## 2020-12-03 RX ADMIN — OXYCODONE AND ACETAMINOPHEN 1 TABLET(S): 5; 325 TABLET ORAL at 08:15

## 2020-12-03 RX ADMIN — LIDOCAINE 1 PATCH: 4 CREAM TOPICAL at 23:21

## 2020-12-03 RX ADMIN — Medication 40 MILLIEQUIVALENT(S): at 18:38

## 2020-12-03 RX ADMIN — OXYCODONE AND ACETAMINOPHEN 1 TABLET(S): 5; 325 TABLET ORAL at 18:52

## 2020-12-03 NOTE — CHART NOTE - TREATMENT: THE FOLLOWING DIET HAS BEEN RECOMMENDED
Diet, DASH/TLC:   Sodium & Cholesterol Restricted  Supplement Feeding Modality:  Oral  Ensure Enlive Cans or Servings Per Day:  1       Frequency:  Three Times a day (12-03-20 @ 14:06) [Active]    1) Recommend continue with DASH/TLC diet, add Ensure Enlive TID (1050 kcal, 60g protein, 540mL free H2O)   2) Recommend add MVI daily   3) Monitor lytes and replete prn   4) Appreciate team assistance and encouragement at meal times   5) Monitor weights, labs, skin integrity, GI distress, nutrient consumption   6) Pain and bowel regimen per team

## 2020-12-03 NOTE — H&P ADULT - ASSESSMENT
#Fall    #r/o GIB    #CHERRI on CKD  - has urinary hesitancy    #Chest pain  About 2 weeks ago, had holter monitor    #HTN    #CAD  holding BB    Former smoker (2ppd x 25 yrs, quit >20 yrs ago)    #Anemia  Hgb was 7.1 on 11/26.    -prior colonoscopy no evidence of bleeding ~ 5 years ago    ), AAA s/p Open repair by Dr. Sandra 4/2/2015 , Hypothyroidism, CKD stage III-IV (Baseline Cr 1.58-1.99 per labs 6/2019-11/2019),  Severe Renal Artery Stenosis (scarring and atrophy of left Kidney 11/21/19), Grand Mal Seizures (on Keppra), Depression/Anxiety.    65 yo F, former smoker (quit >20 yrs ago), with a PMH of HTN, CAD s/p CABG (2015) and PCI to dLCx, bioprosthetic AVR (2002), s/p mitral valve annuloplasty (2002), Paroxysmal ATach, Wide Complex Tachycardia (seen on Holter 4/2016 –had ILR placed now disconnected-previously seen by Dr. Jasso), PAD s/p pLSFA stent LCIA stent, with occluded Bilateral SFA (proximal to mid portion per U/S 2019), hypothyroidism, seizure, chronic anemia (received Iron infusions by Dr. Izquierdo, last infusion on 12/1/20), AAA repair (2015), CKD, s/p open thoracoabdominal aortic aneurysm repair w/bypass of celiac, SMA, and b/l renal arteries (08/26/2020), who presents s/p a fall. Patient found to be anemic (Hgb 6.3), with CHERRI and CTAP findings of possible small fluid along the new bypass aortic graft along the left upper retroperitoneum, as well as in the region of left renal hilum    #Fall    #r/o GIB    #CHERRI on CKD  - has urinary hesitancy    #Chest pain  About 2 weeks ago, had holter monitor    #back pain    #HTN    #CAD  holding BB    Former smoker (2ppd x 25 yrs, quit >20 yrs ago)    #Anemia  Hgb was 7.1 on 11/26.    -prior colonoscopy no evidence of bleeding ~ 5 years ago    ), AAA s/p Open repair by Dr. Sandra 4/2/2015 , Hypothyroidism, CKD stage III-IV (Baseline Cr 1.58-1.99 per labs 6/2019-11/2019),  Severe Renal Artery Stenosis (scarring and atrophy of left Kidney 11/21/19), Grand Mal Seizures (on Keppra), Depression/Anxiety.    65 yo F, former smoker (quit >20 yrs ago), with a PMH of HTN, CAD s/p CABG (2015) and PCI to dLCx, bioprosthetic AVR (2002), s/p mitral valve annuloplasty (2002), Paroxysmal ATach, Wide Complex Tachycardia (seen on Holter 4/2016 –had ILR placed now disconnected-previously seen by Dr. Jasso), PAD s/p pLSFA stent LCIA stent, with occluded Bilateral SFA (proximal to mid portion per U/S 2019), hypothyroidism, seizure, chronic anemia (receives Iron infusions by Dr. Izquierdo, last infusion on 12/1/20), AAA repair (2015), CKD, severe Renal Artery Stenosis (scarring and atrophy of left Kidney 11/21/19), s/p open thoracoabdominal aortic aneurysm repair w/bypass of celiac, SMA, and b/l renal arteries (08/26/2020), who presents s/p a fall. Patient found to be anemic (Hgb 6.3), with CHERRI and CTAP findings of possible small fluid along the new bypass aortic graft along the left upper retroperitoneum, as well as in the region of left renal hilum, admitted to medicine for further management

## 2020-12-03 NOTE — PROGRESS NOTE ADULT - PROBLEM SELECTOR PLAN 2
Patient with chronic anemia, receives Iron infusions by Dr. Izquierdo, last infusion on 12/1/20. Presenting s/p fall, found to have Hgb 6.3, with FOBT +ve. Denies melena or hematochezia.  - Hgb was 7.1 on 11/26.  - reports prior colonoscopy normal ~ 5 years ago  - monitor CBC  - maintain active T&S  - transfuse for Hgb <7    #r/o GIB  Patient with Hgb 6.3 on presentation, FOBT positive. No overt s/s of bleeding, denies melena or hematochezia  - GI consult in the AM  - c-scope normal ~5yrs ago  - Protonix 40mg BID Patient with chronic anemia, receives Iron infusions by Dr. Izquierdo, last infusion on 12/1/20. Presenting s/p fall, found to have Hgb 6.3, with FOBT +ve. Denies melena or hematochezia. Baseline Hgb was 7.1 on 11/26.  - reports prior colonoscopy normal ~ 5 years ago  - monitor CBC  - maintain active T&S  - transfuse for Hgb <7    #r/o GIB  Patient with Hgb 6.3 on presentation, FOBT positive. No overt s/s of bleeding, denies melena or hematochezia  - GI consulted for possible EGD and/or capsule  - c-scope normal ~5yrs ago  - Home Protonix 40mg BID Patient with chronic anemia, receives Iron infusions by Dr. Izquierdo, last infusion on 12/1/20. Presenting s/p fall, found to have Hgb 6.3, with FOBT +ve. Denies melena or hematochezia. Baseline Hgb was 7.1 on 11/26.  - reports prior colonoscopy normal ~ 5 years ago  - monitor CBC  - maintain active T&S  - transfuse for Hgb <7    #r/o GIB  Patient with Hgb 6.3 on presentation, FOBT positive. No overt s/s of bleeding, denies melena or hematochezia. 40 pound weight loss since March.  - GI consulted for possible EGD and/or capsule  - c-scope normal ~5yrs ago  - Home Protonix 40mg BID

## 2020-12-03 NOTE — H&P ADULT - NSICDXPASTMEDICALHX_GEN_ALL_CORE_FT
PAST MEDICAL HISTORY:  Anemia Anemia    Anxiety     Atherosclerosis of coronary artery CAD (coronary artery disease)    Chronic kidney disease, unspecified CKD stage     Depression, unspecified depression type     Essential hypertension HTN (hypertension)    Gastroesophageal reflux disease GERD (gastroesophageal reflux disease)    Hyperlipidemia Hyperlipidemia    Hypothyroidism Hypothyroidism    Peripheral vascular disease PVD (peripheral vascular disease)    Seizure

## 2020-12-03 NOTE — PROGRESS NOTE ADULT - PROBLEM SELECTOR PLAN 3
Cr 2.23 on presentation. Pt with CKD III-IV as per chart review (baseline Cr 1.3-1.5 in Aug 2020). Pt also with a hx of severe Renal Artery Stenosis (scarring and atrophy of left Kidney 11/21/19), s/p open thoracoabdominal aortic aneurysm repair w/bypass of celiac, SMA, and b/l renal arteries (08/26/2020). DDx includes post-obstructive CHERRI due to reports of urinary hesitancy vs prerenal 2/2 graft malfunction causing decreased perfusion to the kidneys  - f/u Vascular recs  - f/u bladder scan to r/o retention  - f/u urine lytes  - non-oliguric  - monitor UOP  - monitor BMP  - avoid nephrotoxic agents Cr 2.23 on presentation. Pt with CKD III-IV as per chart review (baseline Cr 1.3-1.5 in Aug 2020). Pt also with a hx of severe Renal Artery Stenosis (scarring and atrophy of left Kidney 11/21/19), s/p open thoracoabdominal aortic aneurysm repair w/bypass of celiac, SMA, and b/l renal arteries (08/26/2020). DDx includes post-obstructive CHERRI due to reports of urinary hesitancy vs prerenal 2/2 graft malfunction causing decreased perfusion to the kidneys  - f/u Vascular recs  - f/u bladder scan to r/o retention  - f/u urine lytes  - avoid nephrotoxic agents

## 2020-12-03 NOTE — DIETITIAN INITIAL EVALUATION ADULT. - PHYSCIAL ASSESSMENT
underweight/emaciated NFPE conducted with findings remarkable for severe temporal, clavicle, scapular, interosseous, patellar muscle wasting, severe triceps fat loss

## 2020-12-03 NOTE — CONSULT NOTE ADULT - ASSESSMENT
- no evidence of overt GI bleeding  - Monitor CBC and transfuse to goal H/H  - plan for EGD and colonoscopy on Monday  - cardiology evaluation prior to the procedures    **incomplete** 65 yo F, former smoker, HTN, CAD s/p CABG (2015) and PCI to dLCx, bioprosthetic AVR (2002), s/p mitral valve annuloplasty (2002), Paroxysmal Tachycardia (seen on Holter 4/2016, PAD s/p stents, chronic anemia (receives Iron infusions by Dr. Izquierdo, last infusion on 12/1/20), AAA repair (2015), CKD, severe Renal Artery Stenosis, s/p recent open TAAA w/bypass of celiac, SMA, and b/l renal arteries (08/26/2020), who presents s/p a fall while he was coming to deliver her Holter monitor (after rapid response was called). GI was consulted for Hb 6.7 (baseline ~9), with FOBT positive however no signs of over GI bleeding    # Normocytic anemia  likely multifactorial   No signs of overt GIB  Hb improved appropriately after 2 PRBC to 6.7-->8.4.  - check iron studies and retic count  - Monitor CBC and transfuse to goal H/H  - plan for EGD and colonoscopy on Monday  - cardiology evaluation prior to the procedures    Recommendations discussed with primary team  Plan discussed with GI service attending    Saran Howell MD  PGY-4 GI fellow  Pager: [phone]

## 2020-12-03 NOTE — CONSULT NOTE ADULT - SUBJECTIVE AND OBJECTIVE BOX
Patient is a 64y old  Female who presents with a chief complaint of      HPI:  Patient is a 65 yo F, former smoker (quit >20 yrs ago), with a PMH of HTN, CAD s/p CABG () and PCI to dLCx, bioprosthetic AVR (), s/p mitral valve annuloplasty (), Paroxysmal ATach, Wide Complex Tachycardia (seen on Holter 2016 –had ILR placed now disconnected-previously seen by Dr. Jasso), PAD s/p pLSFA stent LCIA stent, with occluded Bilateral SFA (proximal to mid portion per U/S ), hypothyroidism, seizure, chronic anemia (receives Iron infusions by Dr. Izquierdo, last infusion on 20), AAA repair (), CKD, severe Renal Artery Stenosis (scarring and atrophy of left Kidney 19), s/p open thoracoabdominal aortic aneurysm repair w/bypass of celiac, SMA, and b/l renal arteries (2020), who presents s/p a fall. Patient was at [facility] delivering her Holter monitor when she had a fall while waiting for the elevator, a rapid response was called and patient was taken to the ED. She reports that she had numbness and tingling in her b/l lower extremities, her legs gave out, and she fell, no head trauma or LOC.      Patient reports she has lower back pain and tingling in her legs, described as pins and needles on ventral surface of legs to the ankles, and lower back pain since her surgery in August. Pain is 4/10 at it's best and 12/10 in severity at times. She has followed up with vascular for this as outpatient. She also reports she has been having palpitations and midsternal chest pain for a couple of months and was placed on a Holter monitor.    She endorses urinary hesitancy since her TAAA repair in August, reports her urine stream is extremely slow ("drips"), and has had to sit on the toilet for 20+ minutes. Denies dysuria, frequency, urinary/fecal incontinence, perineal numbness or tingling, fever chills, chest pain, SOB, cough, abdominal pain, nausea/vomiting, diarrhea, constipation.     Vitals in the ED: T 97.4, HR 88, /61, RR 18, SpO2 100% on RA. Repeat vitals T 97.8-98, HR 65-73, -112/51-73, RR 18-19, SpO2 % on RA  Labs: WBC 6.74, Hgb 6.3, Hct 21.1, PLTs 304, Na 137, K 3.0, CL 94, CO2 25, BUN/Cr 25/2.23, Glu 131, Trop T 0.02, . FOBT positive  EKG: NSR (HR 80)  CXR: no acute infiltrates  ED course: upon arrival, she experienced some nausea, and she spat up some saliva, no vomiting. CTAP showed possible small fluid along the new bypass aortic graft along the left upper retroperitoneum, as well as in the region of left renal hilum. MRI L-spine showed no cord compression or acute abnormality. Vascular surgery was consulted in the ED,  (03 Dec 2020 00:01)      PAST MEDICAL & SURGICAL HISTORY:  Chronic kidney disease, unspecified CKD stage    Depression, unspecified depression type    Anxiety    Seizure    Essential hypertension  HTN (hypertension)    Gastroesophageal reflux disease  GERD (gastroesophageal reflux disease)    Hyperlipidemia  Hyperlipidemia    Anemia  Anemia    Hypothyroidism  Hypothyroidism    Atherosclerosis of coronary artery  CAD (coronary artery disease)    Peripheral vascular disease  PVD (peripheral vascular disease)    Abdominal aortic aneurysm (AAA) without rupture      Ruptured aortic aneurysm  surgery 2020    H/O aortic valve replacement  Bioprosthetic    Atherosclerosis of coronary artery bypass graft(s), unspecified, with angina pectoris with documented spasm    Status post aorto-coronary artery bypass graft          MEDICATIONS  (STANDING):  influenza  Vaccine (HIGH DOSE) 0.7 milliLiter(s) IntraMuscular once  levETIRAcetam 500 milliGRAM(s) Oral two times a day  levothyroxine 88 MICROGram(s) Oral daily  pantoprazole    Tablet 40 milliGRAM(s) Oral every 12 hours  potassium chloride    Tablet ER 40 milliEquivalent(s) Oral every 4 hours  sertraline 50 milliGRAM(s) Oral daily    MEDICATIONS  (PRN):  acetaminophen   Tablet .. 650 milliGRAM(s) Oral every 6 hours PRN Mild Pain (1 - 3), Moderate Pain (4 - 6)  oxycodone    5 mG/acetaminophen 325 mG 1 Tablet(s) Oral every 4 hours PRN Severe Pain (7 - 10)      FAMILY HISTORY:  FH: myocardial infarction  in Dad (at age 50s)        CBC Full  -  ( 03 Dec 2020 06:36 )  WBC Count : 5.84 K/uL  RBC Count : 2.41 M/uL  Hemoglobin : 6.7 g/dL  Hematocrit : 22.4 %  Platelet Count - Automated : 253 K/uL  Mean Cell Volume : 92.9 fl  Mean Cell Hemoglobin : 27.8 pg  Mean Cell Hemoglobin Concentration : 29.9 gm/dL  Auto Neutrophil # : x  Auto Lymphocyte # : x  Auto Monocyte # : x  Auto Eosinophil # : x  Auto Basophil # : x  Auto Neutrophil % : x  Auto Lymphocyte % : x  Auto Monocyte % : x  Auto Eosinophil % : x  Auto Basophil % : x      12    138  |  98  |  23  ----------------------------<  66<L>  2.6<LL>   |  22  |  2.03<H>    Ca    9.2      03 Dec 2020 06:36  Phos  3.1     12  Mg     2.3         TPro  7.7  /  Alb  4.1  /  TBili  <0.2  /  DBili  x   /  AST  13  /  ALT  <5<L>  /  AlkPhos  93  12      Urinalysis Basic - ( 03 Dec 2020 01:09 )    Color: Yellow / Appearance: Clear / S.015 / pH: x  Gluc: x / Ketone: NEGATIVE  / Bili: Negative / Urobili: 0.2 E.U./dL   Blood: x / Protein: Trace mg/dL / Nitrite: NEGATIVE   Leuk Esterase: NEGATIVE / RBC: < 5 /HPF / WBC 5-10 /HPF   Sq Epi: x / Non Sq Epi: 5-10 /HPF / Bacteria: Present /HPF          Radiology:    < from: CT Abdomen and Pelvis No Cont (20 @ 17:38) >  EXAM:  CT ABDOMEN AND PELVIS                          PROCEDURE DATE:  2020          INTERPRETATION:  CLINICAL INFORMATION: Abdominal aortic aneurysm, postop, leg pain and weakness, creased creatinine    COMPARISON: CT 2020    PROCEDURE:  CT of the Abdomen and Pelvis was performed without intravenous contrast.  Intravenous contrast: None.  Oral contrast: None.  Sagittal and coronal reformats were performed.    FINDINGS:    Lower chest: Bibasilar linear atelectases.    Liver: Normal in size and morphology. No focal abnormality. The main portal vein is patent.  Gallbladder and biliary ducts: Cholelithiasis. No intra/extrahepatic biliary ductal dilatation.  Spleen: Within normal limits.  Pancreas: Within normal limits.  Adrenals: Within normal limits.  Kidneys/ureters: No hydronephrosis. No urinary calculi. Right renal cysts.    Bladder: Within normal limits.  Reproductive organs: Multiple calcified uterine fibroids.    Bowel: The bowel is normal in caliber. Moderate colonic stool burden.  Peritoneum/retroperitoneum: No ascites.  Vasculature:  Limited evaluation on noncontrast CT. Interval suprarenal aortic bypass graft repair. Possible small fluid along the new bypass graft along the left retroperitoneum (3:29), however evaluation is significantly limited. Possible fluid collection in the region of left renal hilum (3:34). Unchanged appearance of extensive aortic calcification in the bilateral iliac arteries. Stent in place in the proximal left common iliac artery and left femoral artery.  Lymph nodes: Lymph nodes are not enlarged.  Bones and soft tissue: Degenerative changes in the spine.      IMPRESSION:  Possible small fluid along the new bypass aortic graft along the left upper retroperitoneum, as well as in theregion of left renal hilum, however evaluation is significantly limited. Correlation with MRI may be of benefit if patient's renal function is significantly diminished.      < from: MR Lumbar Spine No Cont (20 @ 23:01) >  EXAM:  MR SPINE LUMBAR                          PROCEDURE DATE:  2020    ******PRELIMINARY REPORT******    ******PRELIMINARY REPORT******              INTERPRETATION: PROCEDURE: MRI of the lumbar spine without contrast    INDICATION: Leg numbness/weakness. Rule out cord compression.    TECHNIQUE: Sagittal T1, T2, STIR, and axial T1 and T2 weighted images of the lumbar spine were obtained.    COMPARISON: CT of thelumbar spine dated 2020 and MRI of the lumbar spine dated 2015.    FINDINGS:    Alignment of the lumbar spine is normal. The vertebral body heights and intervertebral disc spaces are maintained. The vertebral body marrow signal is appropriate for the patient's stated age.    The conus medullaris ends at T12/L1.    At the L1/2 level, there is no disc herniation. There is no spinal canal stenosis or neural foramen narrowing.    At the L2/3 level, there is mild disc bulge without central canal stenosis. There is mild bilateral neural foraminal canal narrowing secondary to disc bulge and facet hypertrophy.    At the L3/4 level, there is diffuse disc bulge without central canal stenosis. There is superimposed left foraminal herniation which results in severe left neural foraminal canal narrowing. There is moderate right neural foraminal canal narrowing secondary to disc bulge and facet hypertrophy.    At the L4/5 level, there is minimal disc bulge without central canal stenosis. There is mild bilateral neural foraminal canal narrowing secondary to disc bulge and facet hypertrophy.    At the L5/S1 level, there is mild disc bulge without central canal stenosis. There is moderate right neural foraminal canal narrowing secondary to disc bulge and facet hypertrophy. No left neural foraminal canal narrowing.    Aortic bypass and vascular stents are again noted, which are suboptimally on MRI. A few small right renal cysts are noted.    IMPRESSION:  No cord compression or acute abnormality. Mild multilevel disc bulging without significant spinal stenosis. Multilevel neural foraminal canal narrowing greatest on the left at L3-4 and on the right at L5-S1.                Vital Signs Last 24 Hrs  T(C): 36.6 (03 Dec 2020 05:50), Max: 37.1 (02 Dec 2020 22:22)  T(F): 97.8 (03 Dec 2020 05:50), Max: 98.7 (02 Dec 2020 22:22)  HR: 77 (03 Dec 2020 05:50) (65 - 91)  BP: 108/66 (03 Dec 2020 05:50) (102/58 - 112/73)  BP(mean): --  RR: 16 (03 Dec 2020 05:50) (16 - 19)  SpO2: 99% (03 Dec 2020 05:50) (97% - 100%)    REVIEW OF SYSTEMS: as per HPI, s/p fall          Physical Exam: thin/ cachectic 65 yo AA woman lying in semi Reeder's position, no acute complaints    Head: normocephalic, atraumatic    Eyes: PERRLA, EOMI, no nystagmus, sclera anicteric    ENT: nasal discharge, uvula midline, no oropharyngeal erythema/exudate    Neck: supple, negative JVD, negative carotid bruits, no thyromegaly    Chest: CTA bilaterally, neg wheeze/ rhonchi/ rales/ crackles/ egophany    Cardiovascular: regular rate and rhythm, III/VI murmur    Abdomen: soft, non distended, non tender to palpation in all 4 quadrants, negative rebound/guarding, normal bowel sounds    Extremities: WWP, neg cyanosis/clubbing/edema, negative calf tenderness to palpation, negative Santos's sign    Musculoskeletal:      :     Neurologic Exam:    Alert and oriented to person, place, date/year, speech fluent w/o dysarthria, recent and remote memory intact, repetition intact, comprehension intact,  attention/concentration intact, fund of knowledge appropriate    Cranial Nerves:     II:                       pupils equal, round and reactive to light, visual fields intact   III/ IV/VI:            extraocular movements intact, neg nystagmus, neg ptosis  V:                       facial sensation intact, V1-3 normal  VII:                     face symmetric, no droop, normal eye closure and smile  VIII:                    hearing intact to finger rub bilaterally  IX/ X:                 soft palate rise symmetrical  XI:                      head turning, shoulder shrug normal  XII:                     tongue midline    Motor Exam:    Right UE:  no focal weakness > 3+/5                     pronator drift: neg        Left UE:    no focal weakness > 3+/5                    pronator drift: neg        Right LE:  no focal weakness > 3+/5        Left UE:   no focal weakness > 3+/5               Sensation: Intact to light touch x 4 extremities                      DTR:                biceps/brachioradialis      R:  1     L: 1  patella/ankle      R:  0     L: 0    Gait:  not tested        PM&R Impression:    1) s/p fall  2) deconditioned  3) no focal weakness    Plan:    1) Physical therapy focusing on therapeutic exercises, bed mobility/transfer out of bed evaluation, progressive ambulation with assistive devices prn.    2) Anticipated Disposition Plan/Recs:    pending functional progress

## 2020-12-03 NOTE — H&P ADULT - PROBLEM SELECTOR PLAN 1
Patient presenting s/p a fall while waiting for the elevator, reports her legs gave out, no LOC or head trauma. Denies any prodromal symptoms. likely 2/2 symptomatic anemia vs paresthesias vs syncope unlikely as no LOC, chest pain, palpitations, lightheadedness or dizziness prior to the fall. ACS unlikely with no ischemic changes on EKG though elevated trops (likely 2/2 CHERRI on CKD)  - f/u orthostatic vitals  - EKG: NSR  - Troponin 0.02  - PT recs

## 2020-12-03 NOTE — H&P ADULT - PROBLEM SELECTOR PLAN 4
Pt is s/p AAA repair (2015), and s/p open thoracoabdominal aortic aneurysm repair w/bypass of celiac, SMA, and b/l renal arteries (08/26/2020)  - CTAP showing possible small fluid along the new bypass aortic graft along the left upper retroperitoneum, as well as in the region of left renal hilum  - Vascular surgery aware of CT findings, spoke to [facility]; f/u recs Pt is s/p AAA repair (2015), and s/p open thoracoabdominal aortic aneurysm repair w/bypass of celiac, SMA, and b/l renal arteries (08/26/2020)  - CTAP showing possible small fluid along the new bypass aortic graft along the left upper retroperitoneum, as well as in the region of left renal hilum  - Vascular surgery aware of CT findings, spoke to [facility]; f/u recs  - f/u renal U/S with dopplers

## 2020-12-03 NOTE — DIETITIAN INITIAL EVALUATION ADULT. - PERSON TAUGHT/METHOD
Encouraged adequate protein/calorie intake to meet needs, focusing on protein first at meal times, identifying protein sources, identifying nutrient-dense snack options, role of ONS/verbal instruction/patient instructed

## 2020-12-03 NOTE — CHART NOTE - NSREFPHYEXREFERTOOTHER_GEN_ALL_CORE
NFPE conducted with findings remarkable for severe temporal, clavicle, scapular, interosseous, patellar muscle wasting, severe triceps fat loss

## 2020-12-03 NOTE — DIETITIAN INITIAL EVALUATION ADULT. - PROBLEM SELECTOR PLAN 6
s/p CABG (2015) and PCI to dLCx. On ASA 81mg qd, Coreg 12.5mg BID. Chart review shows pt on Lopressor 25mg BID. Pt also reports intermittently taking lasix at home  - holding off on ASA 81mg for now given possible GIB  - med rec in the AM  - ECHO in Aug 2020 (EF 65%), unable to evaluate diastolic function    #Chest pain  Patient reports chest pain x 1 month and palpitations, for which she was evaluated on a holter monitor. Patient returned holter to [facility] on 12/2. Will r/o NSTEMI given hx of CAD  - trend Trops to peak  - f/u AM EKG  - obtain report from Holter monitor    #elevated Troponin  likely 2/2 CHERRI on CKD, however will r/o ACS  - management as above    #PAD  s/p pLSFA stent LCIA stent, with occluded Bilateral SFA (proximal to mid portion per U/S 2019). On Cilostazol at home. On exam, non-palpable LE pulses, but reports they are only doppler palpable  - holding cilostazol (antiplatelet and vasodilator) in the setting of possible GIB and soft BP

## 2020-12-03 NOTE — H&P ADULT - PROBLEM SELECTOR PLAN 7
Hx of hypothyroidism, on Synthroid 88mcg qd  - f/u TSH  - c/w Synthroid 88mcg qd    #HTN  On Atenolol 25mg qd, Coreg 12.5mg BID and Lopressor 25mg BID as per chart review. Patient gave medication list to the ED, but couldn't be located  - obtain collateral from patient's pharmacy  - holding off on BP meds, BP normotensive, also in the setting of possible GIB    #Anxiety/Depression  -c/w Zoloft 50mg qd    #Seizures  - c/w Keppra 500mg BID

## 2020-12-03 NOTE — PHYSICAL THERAPY INITIAL EVALUATION ADULT - PERTINENT HX OF CURRENT PROBLEM, REHAB EVAL
64 year old female who presents s/p a fall. She reports that she had numbness and tingling in her b/l lower extremities, her legs gave out, and she fell, no head trauma or LOC. Patient found to be anemic (Hgb 6.3), with CHERRI and CTAP findings of possible small fluid along the new bypass aortic graft along the left upper retroperitoneum, as well as in the region of left renal hilum, admitted to medicine for further management.

## 2020-12-03 NOTE — H&P ADULT - PROBLEM SELECTOR PLAN 3
Patient reports she has lower back pain and tingling in her legs, described as pins and needles on ventral surface of legs to the ankles, and lower back pain since her surgery in August. Pain is 4/10 at it's best and 12/10 in severity at times, on Percocet 5mg q4h PRN at home  - CT L-spine showed mild multilevel disc bulging without significant spinal stenosis. There is left asymmetric neural foraminal narrowing at L3-4 and L4-5, and more significant right neural foraminal narrowing at L5-S1.   - MRI L-spine showed no cord compression or acute abnormality. Mild multilevel disc bulging without significant spinal stenosis. Multilevel neural foraminal canal narrowing greatest on the left at L3-4 and on the right at L5-S1.  - ortho/spine consult in the AM  - c/w Percocet 5mg q4h PRN for severe pain  - Tylenol 650mg PO q6h PRN for mild-mod pain    #Paresthesias  likely 2/2 multilevel neural foraminal narrowing as seen on imaging, vs anemia vs nutritional deficiencies vs hypothyroidism  - f/u B12/Folate in the AM  - f/u TSH Cr 2.23 on presentation. Pt with CKD III-IV as per chart review (baseline Cr 1.3-1.5 in Aug 2020). Pt also with a hx of severe Renal Artery Stenosis (scarring and atrophy of left Kidney 11/21/19), s/p open thoracoabdominal aortic aneurysm repair w/bypass of celiac, SMA, and b/l renal arteries (08/26/2020). DDx includes post-obstructive CHERRI due to reports of urinary hesitancy vs prerenal 2/2 graft malfunction causing decreased perfusion to the kidneys  - f/u Vascular recs  - f/u bladder scan to r/o retention  - f/u urine lytes  - non-oliguric  - monitor UOP  - monitor BMP  - avoid nephrotoxic agents

## 2020-12-03 NOTE — H&P ADULT - HISTORY OF PRESENT ILLNESS
Vitals in the ED: T 97.4, HR 88, /61, RR 18, SpO2 100% on RA. Repeat vitals T 97.8-98, HR 65-73, -112/51-73, RR 18-19, SpO2 % on RA  Labs:  EKG:  CXR:  ED course:     Vitals in the ED: T 97.4, HR 88, /61, RR 18, SpO2 100% on RA. Repeat vitals T 97.8-98, HR 65-73, -112/51-73, RR 18-19, SpO2 % on RA  Labs: WBC 6.74, Hgb 6.3, Hct 21.1, PLTs 304, Na 137, K 3.0, CL 94, CO2 25, BUN/Cr 25/2.23, Glu 131, Trop T 0.02, . FOBT positive  EKG:  CXR:  ED course: Patient is a 65 yo F, former smoker (quit >20 yrs ago), with a PMH of HTN, CAD s/p CABG (2015) and PCI to dLCx, bioprosthetic AVR (2002), s/p mitral valve annuloplasty (2002), Paroxysmal ATach, Wide Complex Tachycardia (seen on Holter 4/2016 –had ILR placed now disconnected-previously seen by Dr. Jasso), PAD s/p pLSFA stent LCIA stent, with occluded Bilateral SFA (proximal to mid portion per U/S 2019), hypothyroidism, seizure, chronic anemia (received Iron infusions by Dr. Izquierdo, last infusion on 12/1/20), AAA repair (2015), CKD, s/p open thoracoabdominal aortic aneurysm repair w/bypass of celiac, SMA, and b/l renal arteries (08/26/2020), who presents s/p a fall. Patient was at [facility] delivering her Holter monitor when she had a fall while waiting for the elevator, a rapid response was called and patient was taken to the ED. She reports that she had numbness and tingling in her b/l lower extremities, her legs gave out, and she fell, no head trauma or LOC.      Patient reports she has had tingling in her legs, described as pins and needles on ventral surface of legs to the ankles, and lower back pain since her surgery in August. She has followed up with vascular for this as outpatient. She also reports she has been having palpitations and midsternal chest pain for a couple of months and was placed on a Holter monitor.    She endorses urinary hesitancy since her TAAA repair in August, reports her urine stream is extremely slow ("drips"), and has had to sit on the toilet for 20+ minutes. Denies dysuria, frequency, urinary/fecal incontinence, perineal numbness or tingling, fever chills, chest pain, SOB, cough, abdominal pain, nausea/vomiting, diarrhea, constipation.     Vitals in the ED: T 97.4, HR 88, /61, RR 18, SpO2 100% on RA. Repeat vitals T 97.8-98, HR 65-73, -112/51-73, RR 18-19, SpO2 % on RA  Labs: WBC 6.74, Hgb 6.3, Hct 21.1, PLTs 304, Na 137, K 3.0, CL 94, CO2 25, BUN/Cr 25/2.23, Glu 131, Trop T 0.02, . FOBT positive  EKG: NSR (HR 80)  CXR: no acute infiltrates  ED course: upon arrival, she experienced some nausea, and she spat up some saliva, no vomiting. CTAP showed possible small fluid along the new bypass aortic graft along the left upper retroperitoneum, as well as in the region of left renal hilum. MRI L-spine showed no cord compression or acute abnormality. Vascular surgery was consulted in the ED, Patient is a 65 yo F, former smoker (quit >20 yrs ago), with a PMH of HTN, CAD s/p CABG (2015) and PCI to dLCx, bioprosthetic AVR (2002), s/p mitral valve annuloplasty (2002), Paroxysmal ATach, Wide Complex Tachycardia (seen on Holter 4/2016 –had ILR placed now disconnected-previously seen by Dr. Jasso), PAD s/p pLSFA stent LCIA stent, with occluded Bilateral SFA (proximal to mid portion per U/S 2019), hypothyroidism, seizure, chronic anemia (receives Iron infusions by Dr. Izquierdo, last infusion on 12/1/20), AAA repair (2015), CKD, severe Renal Artery Stenosis (scarring and atrophy of left Kidney 11/21/19), s/p open thoracoabdominal aortic aneurysm repair w/bypass of celiac, SMA, and b/l renal arteries (08/26/2020), who presents s/p a fall. Patient was at [facility] delivering her Holter monitor when she had a fall while waiting for the elevator, a rapid response was called and patient was taken to the ED. She reports that she had numbness and tingling in her b/l lower extremities, her legs gave out, and she fell, no head trauma or LOC.      Patient reports she has lower back pain and tingling in her legs, described as pins and needles on ventral surface of legs to the ankles, and lower back pain since her surgery in August. Pain is 4/10 at it's best and 12/10 in severity at times. She has followed up with vascular for this as outpatient. She also reports she has been having palpitations and midsternal chest pain for a couple of months and was placed on a Holter monitor.    She endorses urinary hesitancy since her TAAA repair in August, reports her urine stream is extremely slow ("drips"), and has had to sit on the toilet for 20+ minutes. Denies dysuria, frequency, urinary/fecal incontinence, perineal numbness or tingling, fever chills, chest pain, SOB, cough, abdominal pain, nausea/vomiting, diarrhea, constipation.     Vitals in the ED: T 97.4, HR 88, /61, RR 18, SpO2 100% on RA. Repeat vitals T 97.8-98, HR 65-73, -112/51-73, RR 18-19, SpO2 % on RA  Labs: WBC 6.74, Hgb 6.3, Hct 21.1, PLTs 304, Na 137, K 3.0, CL 94, CO2 25, BUN/Cr 25/2.23, Glu 131, Trop T 0.02, . FOBT positive  EKG: NSR (HR 80)  CXR: no acute infiltrates  ED course: upon arrival, she experienced some nausea, and she spat up some saliva, no vomiting. CTAP showed possible small fluid along the new bypass aortic graft along the left upper retroperitoneum, as well as in the region of left renal hilum. MRI L-spine showed no cord compression or acute abnormality. Vascular surgery was consulted in the ED,

## 2020-12-03 NOTE — H&P ADULT - NSHPLABSRESULTS_GEN_ALL_CORE
Detail Level: Simple Detail Level: Zone LABS:                        6.3    6.74  )-----------( 304      ( 02 Dec 2020 14:58 )             21.1     12-02    137  |  94<L>  |  25<H>  ----------------------------<  131<H>  3.0<L>   |  25  |  2.23<H>    Ca    10.2      02 Dec 2020 14:58  Mg     2.4     12-02    TPro  7.7  /  Alb  4.1  /  TBili  <0.2  /  DBili  x   /  AST  13  /  ALT  <5<L>  /  AlkPhos  93  12-02    CAPILLARY BLOOD GLUCOSE    RADIOLOGY & ADDITIONAL TESTS:   MR Lumbar Spine No Cont (12.02.20 @ 23:01)  ******PRELIMINARY REPORT******          IMPRESSION:  No cord compression or acute abnormality. Mild multilevel disc bulging without significant spinal stenosis. Multilevel neural foraminal canal narrowing greatest on the left at L3-4 and on the right at L5-S1.    CT Abdomen and Pelvis No Cont (12.02.20 @ 17:38)  IMPRESSION:  Possible small fluid along the new bypass aortic graft along the left upper retroperitoneum, as well as in theregion of left renal hilum, however evaluation is significantly limited. Correlation with MRI may be of benefit if patient's renal function is significantly diminished.    CT Lumbar Spine No Cont (12.02.20 @ 17:38)   IMPRESSION:  No acute osseous abnormality or malalignment of the lumbar spine. There is mild multilevel disc bulging without significant spinal stenosis. There is left asymmetric neural foraminal narrowing at L3-4 and L4-5, and more significant right neural foraminal narrowing at L5-S1. See further detail above.  Complex abdominal aortic and branch bypass graft is newly seen compared to CTA from 8/20/2020. Consider CTA abdomen/pelvis as clinically desired at the discretion of vascular surgery.

## 2020-12-03 NOTE — PROGRESS NOTE ADULT - SUBJECTIVE AND OBJECTIVE BOX
Patient was seen and examined by me at bedside. I agree with resident's note, subjective, objective physical exam, assessment and plan with following modifications/additions.    Greater than 35 minutes spent on total encounter; more than 50% of the visit was spent counseling and/or coordinating care by the attending physician.    65 yo F former smoker PMH of CAD s/p CABG (2015) and PCI to dLCx, bioprosthetic AVR (2002), s/p mitral valve annuloplasty (2002), hx of Atach and wide complex arrythmia in past, sig PAD hx s/p stent, seizure, chronic anemia (receives Iron infusions by Dr. Izquierdo- last hg 7s in 11/2020), Stage III CKD,  Renal Artery Stenosis, AAA s/p open thoracoabdominal aortic aneurysm repair 8/2020, who presents s/p a fall.     Meliton Dennis MD [phone] Patient was seen and examined by me at bedside. I agree with resident's note, subjective, objective physical exam, assessment and plan with following modifications/additions.    Greater than 35 minutes spent on total encounter; more than 50% of the visit was spent counseling and/or coordinating care by the attending physician.    63 yo F former smoker PMH of CAD s/p CABG (2015) and PCI to dLCx, bioprosthetic AVR (2002), s/p mitral valve annuloplasty (2002), hx of Atach and wide complex arrythmia in past, sig PAD hx s/p stent, seizure, chronic anemia (receives Iron infusions by Dr. Izquierdo- last hg 7s in 11/2020), Stage III CKD,  Renal Artery Stenosis, AAA s/p open thoracoabdominal aortic aneurysm repair 8/2020, who presents s/p a fall. No true LOC, likely mechanical fall from gait instability, with head trauma per patient.   -Fall likely mechanical- CT head, PT eval   -Chronic progressive presumed iron deficiency anemia, FOBT positive, lack of adequate response to 1 unit RBC- additional 1 unit RBC and recheck CBC. will speak with PCP to obtain collateral re workup- likely needs expedited GI w/u inpt vs outpt (will consult GI here), also with sig recent weightloss. Will also speak with vascular to ensure free fluid on CT not c/f bleeding post procedure.   -CHERRI on CKD- will discuss with vascular if related to findings on CT from AAA, renal US today, monitor s/p blood (fluid) downtrending   -CAD hx, PAD hx- continue asa, cilostazol  -Depression- continue home pysch meds  -Hypothyroidism-continue synthroid   -Seizure disorder- continue with keppra   -DVT px- SCDs with c/f bleeding   -Dispo- PT eval, pending Hg stability and recs from GI/vascular, possible dc tomorrow       Meliton Dennis MD [phone]

## 2020-12-03 NOTE — PHYSICAL THERAPY INITIAL EVALUATION ADULT - CRITERIA FOR SKILLED THERAPEUTIC INTERVENTIONS
rehab potential/impairments found/anticipated discharge recommendation/functional limitations in following categories/therapy frequency/anticipated equipment needs at discharge

## 2020-12-03 NOTE — H&P ADULT - ATTENDING COMMENTS
- After her procedure, patient reports back pain and new LE tingling/numbness. Vascular saw patient in ED – their recommendation was MRI to r/o cord compression, which was performed. Unclear if these are byproducts of her surgery, temporally this could be a concern.     - On CT – e/o fluid around graft w/ rec for CTA to evaluate. With present CHERRI – there is concern re potential graft complications leading to renal hypoperfusion. Have posed this question to Vascular. Holding off on CTA at present given elevated Cr.     - Urine Lytes, Renal US w/ doppler     - F/u post-transfusion CBC – can alert Dr Izquierdo of admission.     - FOBT (+) – not on oral, only IV Iron – GI Consult.    - Patient’s med list lost in ED – current inpatient list has 3 BBs and other medications that are redundant. Needs clarification.     - PT

## 2020-12-03 NOTE — DIETITIAN INITIAL EVALUATION ADULT. - PROBLEM SELECTOR PLAN 3
Cr 2.23 on presentation. Pt with CKD III-IV as per chart review (baseline Cr 1.3-1.5 in Aug 2020). Pt also with a hx of severe Renal Artery Stenosis (scarring and atrophy of left Kidney 11/21/19), s/p open thoracoabdominal aortic aneurysm repair w/bypass of celiac, SMA, and b/l renal arteries (08/26/2020). DDx includes post-obstructive CHERRI due to reports of urinary hesitancy vs prerenal 2/2 graft malfunction causing decreased perfusion to the kidneys  - f/u Vascular recs  - f/u bladder scan to r/o retention  - f/u urine lytes  - non-oliguric  - monitor UOP  - monitor BMP  - avoid nephrotoxic agents

## 2020-12-03 NOTE — DIETITIAN INITIAL EVALUATION ADULT. - OTHER INFO
65 yo F, former smoker (quit >20 yrs ago), with a PMH of HTN, CAD s/p CABG (2015) and PCI to dLCx, bioprosthetic AVR (2002), s/p mitral valve annuloplasty (2002), Paroxysmal ATach, Wide Complex Tachycardia (seen on Holter 4/2016 –had ILR placed now disconnected-previously seen by Dr. Jasso), PAD s/p pLSFA stent LCIA stent, with occluded Bilateral SFA (proximal to mid portion per U/S 2019), hypothyroidism, seizure, chronic anemia (receives Iron infusions by Dr. Izquierdo, last infusion on 12/1/20), AAA repair (2015), CKD, severe Renal Artery Stenosis (scarring and atrophy of left Kidney 11/21/19), s/p open thoracoabdominal aortic aneurysm repair w/bypass of celiac, SMA, and b/l renal arteries (08/26/2020), who presents s/p a fall. Patient found to be anemic (Hgb 6.3), with CHERRI and CTAP findings of possible small fluid along the new bypass. Possible d/c tomorrow pending PT eval, Hg stability and recs from GI/vascular.   Upon assessment, pt resting up in bed, reports varied but mostly poor appetite and PO intake PTA "for a while". Pt states she consumed broth, 1 egg and fruit for breakfast this AM. Endorses weight loss of 40lbs since April, attributed to lack of appetite and poor taste. UBW ~141lbs, current admission weight 95 lbs, indicative of a 46lb/32.6% weight loss x8 months significant. Endorses NKFA, no chewing or swallowing difficulties. Denies n/v/d, endorses constipation (baseline), last BM 4 days ago per pt- will speak with team to optimize bowel regimen. Encouraged adequate protein/calorie intake to meet needs, focusing on protein first at meal times, identifying protein sources, identifying nutrient-dense snack options, adequate fiber and hydration for relief of constipation sx, role of ONS- pt appeared receptive and amenable to receiving ONS in house, consumes Ensure x3 daily at home. Skin: no edema, no pressure injuries, Enzo score 18.

## 2020-12-03 NOTE — DIETITIAN INITIAL EVALUATION ADULT. - PROBLEM SELECTOR PLAN 5
Patient reports she has lower back pain and tingling in her legs, described as pins and needles on ventral surface of legs to the ankles, and lower back pain since her surgery in August. Pain is 4/10 at it's best and 12/10 in severity at times, on Percocet 5mg q4h PRN at home  - CT L-spine showed mild multilevel disc bulging without significant spinal stenosis. There is left asymmetric neural foraminal narrowing at L3-4 and L4-5, and more significant right neural foraminal narrowing at L5-S1.   - MRI L-spine showed no cord compression or acute abnormality. Mild multilevel disc bulging without significant spinal stenosis. Multilevel neural foraminal canal narrowing greatest on the left at L3-4 and on the right at L5-S1.  - ortho/spine consult in the AM  - c/w Percocet 5mg q4h PRN for severe pain  - Tylenol 650mg PO q6h PRN for mild-mod pain    #Paresthesias  likely 2/2 multilevel neural foraminal narrowing as seen on imaging, vs anemia vs nutritional deficiencies vs hypothyroidism  - f/u B12/Folate in the AM  - f/u TSH

## 2020-12-03 NOTE — H&P ADULT - PROBLEM SELECTOR PLAN 2
Patient with chronic anemia, receives Iron infusions by Dr. Izquierdo, last infusion on 12/1/20. Presenting s/p fall, found to have Hgb 6.3, with FOBT +ve. Denies melena or hematochezia  - Hgb was 7.1 on 11/26.  - reports prior colonoscopy normal ~ 5 years ago  - monitor CBC  - maintain active T&S  - transfuse for Hgb <7    #r/o GIB  Patient with Hgb 6.3 on presentation, FOBT positive. No overt s/s of bleeding, denies melena or hematochezia  - GI consult in the AM  - c-scope normal ~5yrs ago  - Protonix 40mg BID

## 2020-12-03 NOTE — PROGRESS NOTE ADULT - PROBLEM SELECTOR PLAN 6
s/p CABG (2015) and PCI to dLCx. On ASA 81mg qd, Coreg 12.5mg BID. Chart review shows pt on Lopressor 25mg BID. Pt also reports intermittently taking lasix at home  - holding off on ASA 81mg for now given possible GIB  - med rec in the AM  - ECHO in Aug 2020 (EF 65%), unable to evaluate diastolic function    #Chest pain  Patient reports chest pain x 1 month and palpitations, for which she was evaluated on a holter monitor. Patient returned holter to [facility] on 12/2. Will r/o NSTEMI given hx of CAD  - trend Trops to peak  - f/u AM EKG  - obtain report from Holter monitor    #elevated Troponin  likely 2/2 CHERRI on CKD, however will r/o ACS  - management as above    #PAD  s/p pLSFA stent LCIA stent, with occluded Bilateral SFA (proximal to mid portion per U/S 2019). On Cilostazol at home. On exam, non-palpable LE pulses, but reports they are only doppler palpable  - holding cilostazol (antiplatelet and vasodilator) in the setting of possible GIB and soft BP

## 2020-12-03 NOTE — H&P ADULT - PROBLEM SELECTOR PLAN 5
Cr 2.23 on presentation. Pt with CKD III-IV as per chart review (baseline Cr 1.3-1.5 in Aug 2020). Pt also with a hx of severe Renal Artery Stenosis (scarring and atrophy of left Kidney 11/21/19), s/p open thoracoabdominal aortic aneurysm repair w/bypass of celiac, SMA, and b/l renal arteries (08/26/2020). DDx includes post-obstructive CHERRI due to reports of urinary hesitancy vs prerenal 2/2 graft malfunction causing decreased perfusion to the kidneys  - f/u Vascular recs  - f/u urine lytes  - non-oliguric  - monitor UOP  - monitor BMP  - avoid nephrotoxic agents Patient reports she has lower back pain and tingling in her legs, described as pins and needles on ventral surface of legs to the ankles, and lower back pain since her surgery in August. Pain is 4/10 at it's best and 12/10 in severity at times, on Percocet 5mg q4h PRN at home  - CT L-spine showed mild multilevel disc bulging without significant spinal stenosis. There is left asymmetric neural foraminal narrowing at L3-4 and L4-5, and more significant right neural foraminal narrowing at L5-S1.   - MRI L-spine showed no cord compression or acute abnormality. Mild multilevel disc bulging without significant spinal stenosis. Multilevel neural foraminal canal narrowing greatest on the left at L3-4 and on the right at L5-S1.  - ortho/spine consult in the AM  - c/w Percocet 5mg q4h PRN for severe pain  - Tylenol 650mg PO q6h PRN for mild-mod pain    #Paresthesias  likely 2/2 multilevel neural foraminal narrowing as seen on imaging, vs anemia vs nutritional deficiencies vs hypothyroidism  - f/u B12/Folate in the AM  - f/u TSH

## 2020-12-03 NOTE — H&P ADULT - NSICDXPASTSURGICALHX_GEN_ALL_CORE_FT
PAST SURGICAL HISTORY:  Abdominal aortic aneurysm (AAA) without rupture 2015    Atherosclerosis of coronary artery bypass graft(s), unspecified, with angina pectoris with documented spasm     H/O aortic valve replacement Bioprosthetic    Ruptured aortic aneurysm surgery august 2020    Status post aorto-coronary artery bypass graft 2012

## 2020-12-03 NOTE — PROGRESS NOTE ADULT - PROBLEM SELECTOR PLAN 5
Patient reports she has lower back pain and tingling in her legs, described as pins and needles on ventral surface of legs to the ankles, and lower back pain since her surgery in August. Pain is 4/10 at it's best and 12/10 in severity at times, on Percocet 5mg q4h PRN at home  - CT L-spine showed mild multilevel disc bulging without significant spinal stenosis. There is left asymmetric neural foraminal narrowing at L3-4 and L4-5, and more significant right neural foraminal narrowing at L5-S1.   - MRI L-spine showed no cord compression or acute abnormality. Mild multilevel disc bulging without significant spinal stenosis. Multilevel neural foraminal canal narrowing greatest on the left at L3-4 and on the right at L5-S1.  - ortho/spine consult in the AM  - c/w Percocet 5mg q4h PRN for severe pain  - Tylenol 650mg PO q6h PRN for mild-mod pain    #Paresthesias  likely 2/2 multilevel neural foraminal narrowing as seen on imaging, vs anemia vs nutritional deficiencies vs hypothyroidism  - f/u B12/Folate in the AM  - f/u TSH Patient reports she has lower back pain and tingling in her legs, described as pins and needles on ventral surface of legs to the ankles, and lower back pain since her surgery in August. Pain is 4/10 at it's best and 12/10 in severity at times, on Percocet 5mg q4h PRN at home  - CT L-spine showed mild multilevel disc bulging without significant spinal stenosis. There is left asymmetric neural foraminal narrowing at L3-4 and L4-5, and more significant right neural foraminal narrowing at L5-S1.   - MRI L-spine showed no cord compression or acute abnormality. Mild multilevel disc bulging without significant spinal stenosis. Multilevel neural foraminal canal narrowing greatest on the left at L3-4 and on the right at L5-S1.  - ortho/spine consult in the AM  - c/w Percocet 5mg q4h PRN for severe pain  - Tylenol 650mg PO q6h PRN for mild-mod pain    #Paresthesias  likely 2/2 multilevel neural foraminal narrowing as seen on imaging, vs anemia vs nutritional deficiencies. Normal B12, folate, TSH

## 2020-12-03 NOTE — CONSULT NOTE ADULT - SUBJECTIVE AND OBJECTIVE BOX
GASTROENTEROLOGY CONSULT NOTE  HPI:  Patient is a 65 yo F, former smoker (quit >20 yrs ago), with a PMH of HTN, CAD s/p CABG (2015) and PCI to dLCx, bioprosthetic AVR (2002), s/p mitral valve annuloplasty (2002), Paroxysmal ATach, Wide Complex Tachycardia (seen on Holter 4/2016 –had ILR placed now disconnected-previously seen by Dr. Jasso), PAD s/p pLSFA stent LCIA stent, with occluded Bilateral SFA (proximal to mid portion per U/S 2019), hypothyroidism, seizure, chronic anemia (receives Iron infusions by Dr. Izquierdo, last infusion on 12/1/20), AAA repair (2015), CKD, severe Renal Artery Stenosis (scarring and atrophy of left Kidney 11/21/19), s/p open thoracoabdominal aortic aneurysm repair w/bypass of celiac, SMA, and b/l renal arteries (08/26/2020), who presents s/p a fall. Patient was at [facility] delivering her Holter monitor when she had a fall while waiting for the elevator, a rapid response was called and patient was taken to the ED. She reports that she had numbness and tingling in her b/l lower extremities, her legs gave out, and she fell, no head trauma or LOC.      Patient reports she has lower back pain and tingling in her legs, described as pins and needles on ventral surface of legs to the ankles, and lower back pain since her surgery in August. Pain is 4/10 at it's best and 12/10 in severity at times. She has followed up with vascular for this as outpatient. She also reports she has been having palpitations and midsternal chest pain for a couple of months and was placed on a Holter monitor.    She endorses urinary hesitancy since her TAAA repair in August, reports her urine stream is extremely slow ("drips"), and has had to sit on the toilet for 20+ minutes. Denies dysuria, frequency, urinary/fecal incontinence, perineal numbness or tingling, fever chills, chest pain, SOB, cough, abdominal pain, nausea/vomiting, diarrhea, constipation.     Vitals in the ED: T 97.4, HR 88, /61, RR 18, SpO2 100% on RA. Repeat vitals T 97.8-98, HR 65-73, -112/51-73, RR 18-19, SpO2 % on RA  Labs: WBC 6.74, Hgb 6.3, Hct 21.1, PLTs 304, Na 137, K 3.0, CL 94, CO2 25, BUN/Cr 25/2.23, Glu 131, Trop T 0.02, . FOBT positive  EKG: NSR (HR 80)  CXR: no acute infiltrates  ED course: upon arrival, she experienced some nausea, and she spat up some saliva, no vomiting. CTAP showed possible small fluid along the new bypass aortic graft along the left upper retroperitoneum, as well as in the region of left renal hilum. MRI L-spine showed no cord compression or acute abnormality. Vascular surgery was consulted in the ED,  (03 Dec 2020 00:01)    Allergies    No Known Allergies    Intolerances    Crestor (Other (Mod to Severe))    Home Medications:  cilostazol 50 mg oral tablet: 1 tab(s) orally 2 times a day (03 Dec 2020 13:46)  folic acid 1 mg oral tablet: 1 tab(s) orally once a day (03 Dec 2020 13:46)  Lasix 40 mg oral tablet: 1 tab(s) orally once a day (03 Dec 2020 13:46)  levothyroxine 50 mcg (0.05 mg) oral tablet: 1 tab(s) orally once a day (03 Dec 2020 13:46)  metoprolol tartrate 25 mg oral tablet: 1 tab(s) orally 2 times a day (03 Dec 2020 13:46)  sertraline 50 mg oral tablet: 1 tab(s) orally once a day (at bedtime) (03 Dec 2020 13:46)  Vitamin D2 50,000 intl units (1.25 mg) oral capsule: 1 cap(s) orally once a week (03 Dec 2020 13:46)    MEDICATIONS:  MEDICATIONS  (STANDING):  gabapentin 100 milliGRAM(s) Oral daily  influenza  Vaccine (HIGH DOSE) 0.7 milliLiter(s) IntraMuscular once  lidocaine   Patch 1 Patch Transdermal every 24 hours  multivitamin 1 Tablet(s) Oral daily  pantoprazole    Tablet 40 milliGRAM(s) Oral every 12 hours  polyethylene glycol 3350 17 Gram(s) Oral every 24 hours  potassium chloride    Tablet ER 40 milliEquivalent(s) Oral every 4 hours  senna 2 Tablet(s) Oral at bedtime  sertraline 50 milliGRAM(s) Oral daily    MEDICATIONS  (PRN):  acetaminophen   Tablet .. 650 milliGRAM(s) Oral every 6 hours PRN Mild Pain (1 - 3), Moderate Pain (4 - 6)  oxycodone    5 mG/acetaminophen 325 mG 1 Tablet(s) Oral every 4 hours PRN Severe Pain (7 - 10)    PAST MEDICAL & SURGICAL HISTORY:  Chronic kidney disease, unspecified CKD stage    Depression, unspecified depression type    Anxiety    Seizure    Essential hypertension  HTN (hypertension)    Gastroesophageal reflux disease  GERD (gastroesophageal reflux disease)    Hyperlipidemia  Hyperlipidemia    Anemia  Anemia    Hypothyroidism  Hypothyroidism    Atherosclerosis of coronary artery  CAD (coronary artery disease)    Peripheral vascular disease  PVD (peripheral vascular disease)    Abdominal aortic aneurysm (AAA) without rupture  2015    Ruptured aortic aneurysm  surgery august 2020    H/O aortic valve replacement  Bioprosthetic    Atherosclerosis of coronary artery bypass graft(s), unspecified, with angina pectoris with documented spasm    Status post aorto-coronary artery bypass graft  2012      FAMILY HISTORY:  FH: myocardial infarction  in Dad (at age 50s)      SOCIAL HISTORY:  [place]ac[place]: [ ] Current, [ ] Former, [ ] Never; Pack Years:  Alcohol:  Illicit Drugs:    REVIEW OF SYSTEMS:  CONSTITUTIONAL: No weakness, fevers or chills  HEENT: No visual changes; No vertigo or throat pain   NECK: No pain or stiffness  RESPIRATORY: No cough, wheezing, hemoptysis; No shortness of breath  CARDIOVASCULAR: No chest pain or palpitations  GASTROINTESTINAL: No abdominal or epigastric pain. No nausea, vomiting, or hematemesis; No diarrhea or constipation. No melena or hematochezia.  GENITOURINARY: No dysuria, frequency or hematuria  NEUROLOGICAL: No numbness or weakness  SKIN: No itching, burning, rashes, or lesions   All other 10 review of systems is negative unless indicated above.    Vital Signs Last 24 Hrs  T(C): 36.1 (03 Dec 2020 13:00), Max: 37.1 (02 Dec 2020 22:22)  T(F): 97 (03 Dec 2020 13:00), Max: 98.7 (02 Dec 2020 22:22)  HR: 71 (03 Dec 2020 13:00) (65 - 91)  BP: 119/66 (03 Dec 2020 13:00) (102/58 - 119/66)  BP(mean): --  RR: 17 (03 Dec 2020 13:00) (16 - 19)  SpO2: 100% (03 Dec 2020 13:00) (97% - 100%)      PHYSICAL EXAM:    General: Well developed; well nourished; in no acute distress  Eyes: Anicteric sclerae, moist conjunctivae  HENT: Moist mucous membranes  Neck: Trachea midline, supple  Lungs: Normal respiratory effort, no intercostal retractions  Cardiovascular: RRR  Abdomen: Soft, non-tender non-distended; Normal bowel sounds; No rebound or guarding  Extremities: Normal range of motion, No clubbing, cyanosis or edema  Neurological: Alert and oriented x3  Skin: Warm and dry. No obvious rash    LABS:                        8.1    5.38  )-----------( 228      ( 03 Dec 2020 16:44 )             26.9     12-03    136  |  100  |  20  ----------------------------<  62<L>  3.2<L>   |  23  |  1.91<H>    Ca    9.3      03 Dec 2020 16:44  Phos  3.1     12-03  Mg     2.3     12-03    TPro  7.7  /  Alb  4.1  /  TBili  <0.2  /  DBili  x   /  AST  13  /  ALT  <5<L>  /  AlkPhos  93  12-02        PT/INR - ( 03 Dec 2020 06:36 )   PT: 12.0 sec;   INR: 1.00          PTT - ( 03 Dec 2020 06:36 )  PTT:31.0 sec    RADIOLOGY & ADDITIONAL STUDIES:     Reviewed GASTROENTEROLOGY CONSULT NOTE  HPI:  Patient is a 65 yo F, former smoker (quit >20 yrs ago), with a PMH of HTN, CAD s/p CABG (2015) and PCI to dLCx, bioprosthetic AVR (2002), s/p mitral valve annuloplasty (2002), Paroxysmal ATach, Wide Complex Tachycardia (seen on Holter 4/2016 –had ILR placed now disconnected-previously seen by Dr. Jasso), PAD s/p pLSFA stent LCIA stent, with occluded Bilateral SFA (proximal to mid portion per U/S 2019), hypothyroidism, seizure, chronic anemia (receives Iron infusions by Dr. Izquierdo, last infusion on 12/1/20), AAA repair (2015), CKD, severe Renal Artery Stenosis (scarring and atrophy of left Kidney 11/21/19), s/p open thoracoabdominal aortic aneurysm repair w/bypass of celiac, SMA, and b/l renal arteries (08/26/2020), who presents s/p a fall. Patient was at [facility] delivering her Holter monitor when she had a fall while waiting for the elevator, a rapid response was called and patient was taken to the ED. She reports that she had numbness and tingling in her b/l lower extremities, her legs gave out, and she fell, no head trauma or LOC.      Patient reports she has lower back pain and tingling in her legs, described as pins and needles on ventral surface of legs to the ankles, and lower back pain since her surgery in August. Pain is 4/10 at it's best and 12/10 in severity at times. She has followed up with vascular for this as outpatient. She also reports she has been having palpitations and midsternal chest pain for a couple of months and was placed on a Holter monitor.    She endorses urinary hesitancy since her TAAA repair in August, reports her urine stream is extremely slow ("drips"), and has had to sit on the toilet for 20+ minutes. Denies dysuria, frequency, urinary/fecal incontinence, perineal numbness or tingling, fever chills, chest pain, SOB, cough, abdominal pain, nausea/vomiting, diarrhea, constipation.     Vitals in the ED: T 97.4, HR 88, /61, RR 18, SpO2 100% on RA. Repeat vitals T 97.8-98, HR 65-73, -112/51-73, RR 18-19, SpO2 % on RA  Labs: WBC 6.74, Hgb 6.3, Hct 21.1, PLTs 304, Na 137, K 3.0, CL 94, CO2 25, BUN/Cr 25/2.23, Glu 131, Trop T 0.02, . FOBT positive  EKG: NSR (HR 80)  CXR: no acute infiltrates  ED course: upon arrival, she experienced some nausea, and she spat up some saliva, no vomiting. CTAP showed possible small fluid along the new bypass aortic graft along the left upper retroperitoneum, as well as in the region of left renal hilum. MRI L-spine showed no cord compression or acute abnormality. Vascular surgery was consulted in the ED,  (03 Dec 2020 00:01)    Allergies    No Known Allergies    Intolerances    Crestor (Other (Mod to Severe))    Home Medications:  cilostazol 50 mg oral tablet: 1 tab(s) orally 2 times a day (03 Dec 2020 13:46)  folic acid 1 mg oral tablet: 1 tab(s) orally once a day (03 Dec 2020 13:46)  Lasix 40 mg oral tablet: 1 tab(s) orally once a day (03 Dec 2020 13:46)  levothyroxine 50 mcg (0.05 mg) oral tablet: 1 tab(s) orally once a day (03 Dec 2020 13:46)  metoprolol tartrate 25 mg oral tablet: 1 tab(s) orally 2 times a day (03 Dec 2020 13:46)  sertraline 50 mg oral tablet: 1 tab(s) orally once a day (at bedtime) (03 Dec 2020 13:46)  Vitamin D2 50,000 intl units (1.25 mg) oral capsule: 1 cap(s) orally once a week (03 Dec 2020 13:46)    MEDICATIONS:  MEDICATIONS  (STANDING):  gabapentin 100 milliGRAM(s) Oral daily  influenza  Vaccine (HIGH DOSE) 0.7 milliLiter(s) IntraMuscular once  lidocaine   Patch 1 Patch Transdermal every 24 hours  multivitamin 1 Tablet(s) Oral daily  pantoprazole    Tablet 40 milliGRAM(s) Oral every 12 hours  polyethylene glycol 3350 17 Gram(s) Oral every 24 hours  potassium chloride    Tablet ER 40 milliEquivalent(s) Oral every 4 hours  senna 2 Tablet(s) Oral at bedtime  sertraline 50 milliGRAM(s) Oral daily    MEDICATIONS  (PRN):  acetaminophen   Tablet .. 650 milliGRAM(s) Oral every 6 hours PRN Mild Pain (1 - 3), Moderate Pain (4 - 6)  oxycodone    5 mG/acetaminophen 325 mG 1 Tablet(s) Oral every 4 hours PRN Severe Pain (7 - 10)    PAST MEDICAL & SURGICAL HISTORY:  Chronic kidney disease, unspecified CKD stage    Depression, unspecified depression type    Anxiety    Seizure    Essential hypertension  HTN (hypertension)    Gastroesophageal reflux disease  GERD (gastroesophageal reflux disease)    Hyperlipidemia  Hyperlipidemia    Anemia  Anemia    Hypothyroidism  Hypothyroidism    Atherosclerosis of coronary artery  CAD (coronary artery disease)    Peripheral vascular disease  PVD (peripheral vascular disease)    Abdominal aortic aneurysm (AAA) without rupture  2015    Ruptured aortic aneurysm  surgery august 2020    H/O aortic valve replacement  Bioprosthetic    Atherosclerosis of coronary artery bypass graft(s), unspecified, with angina pectoris with documented spasm    Status post aorto-coronary artery bypass graft  2012      FAMILY HISTORY:  FH: myocardial infarction  in Dad (at age 50s)      SOCIAL HISTORY:  [place]: [ ] Current, [ ] Former, [ ] Never; Pack Years:  Alcohol:  Illicit Drugs:    REVIEW OF SYSTEMS:  CONSTITUTIONAL: No fevers or chills  HEENT: No visual changes; No vertigo or throat pain   NECK: No pain or stiffness  RESPIRATORY: No cough, wheezing, hemoptysis; No shortness of breath  CARDIOVASCULAR: No chest pain or palpitations  GASTROINTESTINAL: No nausea, vomiting, or hematemesis; No diarrhea or constipation. No melena or hematochezia.  GENITOURINARY: No dysuria, frequency or hematuria  NEUROLOGICAL: No numbness or weakness  SKIN: No itching, burning, rashes, or lesions   All other 10 review of systems is negative unless indicated above.    Vital Signs Last 24 Hrs  T(C): 36.1 (03 Dec 2020 13:00), Max: 37.1 (02 Dec 2020 22:22)  T(F): 97 (03 Dec 2020 13:00), Max: 98.7 (02 Dec 2020 22:22)  HR: 71 (03 Dec 2020 13:00) (65 - 91)  BP: 119/66 (03 Dec 2020 13:00) (102/58 - 119/66)  BP(mean): --  RR: 17 (03 Dec 2020 13:00) (16 - 19)  SpO2: 100% (03 Dec 2020 13:00) (97% - 100%)      PHYSICAL EXAM:    General: Well developed; well nourished; in no acute distress  Eyes: Anicteric sclerae, moist conjunctivae  HENT: Moist mucous membranes  Neck: Trachea midline, supple  Lungs: Normal respiratory effort, no intercostal retractions  Cardiovascular: RRR  Abdomen: Soft, non-tender non-distended; No rebound or guarding  Extremities: Normal range of motion, No clubbing, cyanosis or edema  Neurological: Alert and oriented x3  Skin: Warm and dry. No obvious rash    LABS:                        8.1    5.38  )-----------( 228      ( 03 Dec 2020 16:44 )             26.9     12-03    136  |  100  |  20  ----------------------------<  62<L>  3.2<L>   |  23  |  1.91<H>    Ca    9.3      03 Dec 2020 16:44  Phos  3.1     12-03  Mg     2.3     12-03    TPro  7.7  /  Alb  4.1  /  TBili  <0.2  /  DBili  x   /  AST  13  /  ALT  <5<L>  /  AlkPhos  93  12-02        PT/INR - ( 03 Dec 2020 06:36 )   PT: 12.0 sec;   INR: 1.00          PTT - ( 03 Dec 2020 06:36 )  PTT:31.0 sec    RADIOLOGY & ADDITIONAL STUDIES:     Reviewed

## 2020-12-03 NOTE — H&P ADULT - PROBLEM SELECTOR PLAN 6
s/p CABG (2015) and PCI to dLx. On ASA 81mg qd,    #Chest pain  Patient reports chest pain x 1 month and palpitations, for which she was evaluated on a holter monitor. Patient returned holter to [facility] on 12/2. Will r/o NSTEMI given hx of CAD  - trend Trops to peak  - f/u AM EKG  - obtain report from Holter monitor    #elevated Troponin  likely 2/2 CHERRI on CKD, however will r/o ACS  - management as above s/p CABG (2015) and PCI to dLCx. On ASA 81mg qd, Coreg 12.5mg BID. Chart review shows pt on Lopressor 25mg BID. Pt also reports intermittently taking lasix at home  - holding off on ASA 81mg for now given possible GIB  - med rec in the AM  - ECHO in Aug 2020 (EF 65%), unable to evaluate diastolic function    #Chest pain  Patient reports chest pain x 1 month and palpitations, for which she was evaluated on a holter monitor. Patient returned holter to [facility] on 12/2. Will r/o NSTEMI given hx of CAD  - trend Trops to peak  - f/u AM EKG  - obtain report from Holter monitor    #elevated Troponin  likely 2/2 CHERRI on CKD, however will r/o ACS  - management as above    #PAD  s/p pLSFA stent LCIA stent, with occluded Bilateral SFA (proximal to mid portion per U/S 2019). On Cilostazol at home. On exam, non-palpable LE pulses, but reports they are only doppler palpable  - holding cilostazol (antiplatelet and vasodilator) in the setting of possible GIB and soft BP

## 2020-12-03 NOTE — H&P ADULT - PROBLEM SELECTOR PLAN 8
Hx of hypothyroidism, on Synthroid 88mcg qd  - f/u TSH  - c/w Synthroid 88mcg qd    #HTN    #Anxiety/Depression  -c/w Zoloft 50mg qd    #Seizures  - c/w Keppra 500mg BID F: none  E: replete PRN  N: DASH/TLC

## 2020-12-03 NOTE — DIETITIAN INITIAL EVALUATION ADULT. - ADD RECOMMEND
1) Recommend continue with DASH/TLC diet, add Ensure Enlive TID (1050 kcal, 60g protein, 540mL free H2O) 2) Recommend add MVI daily 3) Monitor lytes and replete prn 4) Appreciate team assistance and encouragement at meal times 5) Monitor weights, labs, skin integrity, GI distress, nutrient consumption 6) Pain and bowel regimen per team

## 2020-12-03 NOTE — DIETITIAN INITIAL EVALUATION ADULT. - OTHER CALCULATIONS
ABW (43.1 kg) used for calculations as pt between <80% of IBW (70%). Nutrient needs based on [facility] standards of care for maintenance in older adults. Needs adjusted for malnutrition. Pt would benefit of higher protein range with improvement in renal indices

## 2020-12-03 NOTE — PROGRESS NOTE ADULT - PROBLEM SELECTOR PLAN 1
Patient presenting s/p a fall while waiting for the elevator, reports her legs gave out, no LOC or head trauma. Denies any prodromal symptoms. likely 2/2 symptomatic anemia vs paresthesias vs syncope unlikely as no LOC, chest pain, palpitations, lightheadedness or dizziness prior to the fall. ACS unlikely with no ischemic changes on EKG though elevated trops (likely 2/2 CHERRI on CKD)  - f/u orthostatic vitals  - EKG: NSR  - Troponin 0.02  - PT recs Patient presenting s/p a fall while waiting for the elevator, reports her legs gave out, no LOC, or head trauma. Denies any prodromal symptoms. likely 2/2 symptomatic anemia vs leg numbness from surgery and foraminal narrowing. Syncope unlikely as no LOC, chest pain, palpitations, lightheadedness or dizziness prior to the fall. ACS unlikely with no ischemic changes on EKG though elevated trops .02 (likely 2/2 CHERRI on CKD).  - f/u PT recs  - f/u repeat CBC s/p 2u pRBCs  - Trial of gabapentin for neuropathy Patient presenting s/p a fall while waiting for the elevator, reports her legs gave out, no LOC, or head trauma. Denies any prodromal symptoms. likely 2/2 symptomatic anemia vs leg numbness from surgery and foraminal narrowing. Syncope unlikely as no LOC, chest pain, palpitations, lightheadedness or dizziness prior to the fall. ACS unlikely with no ischemic changes on EKG though elevated trops .02 (likely 2/2 CHERRI on CKD). CTH negative for acute bleed.  - f/u PT recs  - f/u repeat CBC s/p 2u pRBCs  - Trial of gabapentin for neuropathy

## 2020-12-03 NOTE — CONSULT NOTE ADULT - ASSESSMENT
per Internal Medicine    65 yo F, former smoker (quit >20 yrs ago), with a PMH of HTN, CAD s/p CABG (2015) and PCI to dLCx, bioprosthetic AVR (2002), s/p mitral valve annuloplasty (2002), Paroxysmal ATach, Wide Complex Tachycardia (seen on Holter 4/2016 –had ILR placed now disconnected-previously seen by Dr. Jasso), PAD s/p pLSFA stent LCIA stent, with occluded Bilateral SFA (proximal to mid portion per U/S 2019), hypothyroidism, seizure, chronic anemia (receives Iron infusions by Dr. Izquierdo, last infusion on 12/1/20), AAA repair (2015), CKD, severe Renal Artery Stenosis (scarring and atrophy of left Kidney 11/21/19), s/p open thoracoabdominal aortic aneurysm repair w/bypass of celiac, SMA, and b/l renal arteries (08/26/2020), who presents s/p a fall. Patient found to be anemic (Hgb 6.3), with CHERRI and CTAP findings of possible small fluid along the new bypass aortic graft along the left upper retroperitoneum, as well as in the region of left renal hilum, admitted to medicine for further management    Problem/Plan - 1:  ·  Problem: Fall.  Plan: Patient presenting s/p a fall while waiting for the elevator, reports her legs gave out, no LOC or head trauma. Denies any prodromal symptoms. likely 2/2 symptomatic anemia vs paresthesias vs syncope unlikely as no LOC, chest pain, palpitations, lightheadedness or dizziness prior to the fall. ACS unlikely with no ischemic changes on EKG though elevated trops (likely 2/2 CHERRI on CKD)  - f/u orthostatic vitals  - EKG: NSR  - Troponin 0.02  - PT recs.     Problem/Plan - 2:  ·  Problem: Anemia.  Plan: Patient with chronic anemia, receives Iron infusions by Dr. Izquierdo, last infusion on 12/1/20. Presenting s/p fall, found to have Hgb 6.3, with FOBT +ve. Denies melena or hematochezia  - Hgb was 7.1 on 11/26.  - reports prior colonoscopy normal ~ 5 years ago  - monitor CBC  - maintain active T&S  - transfuse for Hgb <7    #r/o GIB  Patient with Hgb 6.3 on presentation, FOBT positive. No overt s/s of bleeding, denies melena or hematochezia  - GI consult in the AM  - c-scope normal ~5yrs ago  - Protonix 40mg BID.     Problem/Plan - 3:  ·  Problem: Acute on chronic kidney failure.  Plan: Cr 2.23 on presentation. Pt with CKD III-IV as per chart review (baseline Cr 1.3-1.5 in Aug 2020). Pt also with a hx of severe Renal Artery Stenosis (scarring and atrophy of left Kidney 11/21/19), s/p open thoracoabdominal aortic aneurysm repair w/bypass of celiac, SMA, and b/l renal arteries (08/26/2020). DDx includes post-obstructive CHERRI due to reports of urinary hesitancy vs prerenal 2/2 graft malfunction causing decreased perfusion to the kidneys  - f/u Vascular recs  - f/u bladder scan to r/o retention  - f/u urine lytes  - non-oliguric  - monitor UOP  - monitor BMP  - avoid nephrotoxic agents.     Problem/Plan - 4:  ·  Problem: Aneurysm of aorta.  Plan: Pt is s/p AAA repair (2015), and s/p open thoracoabdominal aortic aneurysm repair w/bypass of celiac, SMA, and b/l renal arteries (08/26/2020)  - CTAP showing possible small fluid along the new bypass aortic graft along the left upper retroperitoneum, as well as in the region of left renal hilum  - Vascular surgery aware of CT findings, spoke to [facility]; f/u recs  - f/u renal U/S with dopplers.     Problem/Plan - 5:  ·  Problem: Back pain.  Plan: Patient reports she has lower back pain and tingling in her legs, described as pins and needles on ventral surface of legs to the ankles, and lower back pain since her surgery in August. Pain is 4/10 at it's best and 12/10 in severity at times, on Percocet 5mg q4h PRN at home  - CT L-spine showed mild multilevel disc bulging without significant spinal stenosis. There is left asymmetric neural foraminal narrowing at L3-4 and L4-5, and more significant right neural foraminal narrowing at L5-S1.   - MRI L-spine showed no cord compression or acute abnormality. Mild multilevel disc bulging without significant spinal stenosis. Multilevel neural foraminal canal narrowing greatest on the left at L3-4 and on the right at L5-S1.  - ortho/spine consult in the AM  - c/w Percocet 5mg q4h PRN for severe pain  - Tylenol 650mg PO q6h PRN for mild-mod pain    #Paresthesias  likely 2/2 multilevel neural foraminal narrowing as seen on imaging, vs anemia vs nutritional deficiencies vs hypothyroidism  - f/u B12/Folate in the AM  - f/u TSH.     Problem/Plan - 6:  Problem: CAD (coronary artery disease). Plan: s/p CABG (2015) and PCI to dLCx. On ASA 81mg qd, Coreg 12.5mg BID. Chart review shows pt on Lopressor 25mg BID. Pt also reports intermittently taking lasix at home  - holding off on ASA 81mg for now given possible GIB  - med rec in the AM  - ECHO in Aug 2020 (EF 65%), unable to evaluate diastolic function    #Chest pain  Patient reports chest pain x 1 month and palpitations, for which she was evaluated on a holter monitor. Patient returned holter to [facility] on 12/2. Will r/o NSTEMI given hx of CAD  - trend Trops to peak  - f/u AM EKG  - obtain report from Holter monitor    #elevated Troponin  likely 2/2 CHERRI on CKD, however will r/o ACS  - management as above    #PAD  s/p pLSFA stent LCIA stent, with occluded Bilateral SFA (proximal to mid portion per U/S 2019). On Cilostazol at home. On exam, non-palpable LE pulses, but reports they are only doppler palpable  - holding cilostazol (antiplatelet and vasodilator) in the setting of possible GIB and soft BP.    Problem/Plan - 7:  ·  Problem: Hypothyroidism.  Plan: Hx of hypothyroidism, on Synthroid 88mcg qd  - f/u TSH  - c/w Synthroid 88mcg qd    #HTN  On Atenolol 25mg qd, Coreg 12.5mg BID and Lopressor 25mg BID as per chart review. Patient gave medication list to the ED, but couldn't be located  - obtain collateral from patient's pharmacy  - holding off on BP meds, BP normotensive, also in the setting of possible GIB    #Anxiety/Depression  -c/w Zoloft 50mg qd    #Seizures  - c/w Keppra 500mg BID.     Problem/Plan - 8:  ·  Problem: Nutrition, metabolism, and development symptoms.  Plan: F: none  E: replete PRN  N: DASH/TLC.     Problem/Plan - 9:  ·  Problem: Prophylactic measure.  Plan: DVT ppx: None 2/2 anemia  GI ppx: protonix 40mg BID    Dispo: [facility]  Code: FULL.

## 2020-12-03 NOTE — DIETITIAN INITIAL EVALUATION ADULT. - PROBLEM SELECTOR PLAN 4
Pt is s/p AAA repair (2015), and s/p open thoracoabdominal aortic aneurysm repair w/bypass of celiac, SMA, and b/l renal arteries (08/26/2020)  - CTAP showing possible small fluid along the new bypass aortic graft along the left upper retroperitoneum, as well as in the region of left renal hilum  - Vascular surgery aware of CT findings, spoke to [facility]; f/u recs  - f/u renal U/S with dopplers

## 2020-12-03 NOTE — H&P ADULT - PROBLEM SELECTOR PLAN 9
F: none  E: replete PRN  N: DASH/TLC DVT ppx: None 2/2 anemia  GI ppx: protonix 40mg BID    Dispo: [facility]  Code: FULL

## 2020-12-03 NOTE — H&P ADULT - NSHPPHYSICALEXAM_GEN_ALL_CORE
VITAL SIGNS:  Vital Signs Last 24 Hrs  T(C): 37.1 (02 Dec 2020 22:22), Max: 37.1 (02 Dec 2020 22:22)  T(F): 98.7 (02 Dec 2020 22:22), Max: 98.7 (02 Dec 2020 22:22)  HR: 72 (02 Dec 2020 22:22) (65 - 88)  BP: 102/58 (02 Dec 2020 22:22) (102/58 - 112/73)  BP(mean): --  RR: 18 (02 Dec 2020 22:22) (18 - 19)  SpO2: 100% (02 Dec 2020 22:22) (97% - 100%)    PHYSICAL EXAM:    General: in NAD, lying comfortably in bed  HEENT: normocephalic, atraumatic; PERRL, anicteric sclera; MMM  Neck: supple, no JVD, no thyromegaly, no lymphadenopathy  Cardiovascular: +S1/S2, RRR, no M/G/R  Respiratory: clear to auscultation B/L; no wheezing, no rales, no rhonchi  Gastrointestinal: soft, NT/ND; +BSx4, no organomegaly  Extremities: WWP; no edema, clubbing or cyanosis  Vascular: 2+ radial, DP/PT pulses B/L  Neurological: AAOx3; no focal deficits VITAL SIGNS:  Vital Signs Last 24 Hrs  T(C): 37.1 (02 Dec 2020 22:22), Max: 37.1 (02 Dec 2020 22:22)  T(F): 98.7 (02 Dec 2020 22:22), Max: 98.7 (02 Dec 2020 22:22)  HR: 72 (02 Dec 2020 22:22) (65 - 88)  BP: 102/58 (02 Dec 2020 22:22) (102/58 - 112/73)  BP(mean): --  RR: 18 (02 Dec 2020 22:22) (18 - 19)  SpO2: 100% (02 Dec 2020 22:22) (97% - 100%)    PHYSICAL EXAM:  General: thin, frail female, in NAD, lying comfortably in bed  HEENT: normocephalic, atraumatic; PERRL, anicteric sclera; MMM  Neck: supple, no JVD, no thyromegaly, no lymphadenopathy  Cardiovascular: +S1/S2, RRR, +grade II/VI CARO (loudest at the aortic area)  Respiratory: clear to auscultation B/L; no wheezing, no rales, no rhonchi  Gastrointestinal: soft, NT/ND; +BSx4, no organomegaly  Extremities: WWP; no edema, clubbing or cyanosis  Vascular: non-palpable pulses B/L LE, reports her pulses are usually doppler palpable  Back: +tenderness to palpation on lumbar spine area; +L flank tenderness  Neurological: AAOx3; strength 5/5 on b/l LE and UEs, sensation to touch grossly intact

## 2020-12-03 NOTE — PROGRESS NOTE ADULT - ASSESSMENT
65 yo F, former smoker (quit >20 yrs ago), with a PMH of HTN, CAD s/p CABG (2015) and PCI to dLCx, bioprosthetic AVR (2002), s/p mitral valve annuloplasty (2002), Paroxysmal ATach, Wide Complex Tachycardia (seen on Holter 4/2016 –had ILR placed now disconnected-previously seen by Dr. Jasso), PAD s/p pLSFA stent LCIA stent, with occluded Bilateral SFA (proximal to mid portion per U/S 2019), hypothyroidism, seizure, chronic anemia (receives Iron infusions by Dr. Izquierdo, last infusion on 12/1/20), AAA repair (2015), CKD, severe Renal Artery Stenosis (scarring and atrophy of left Kidney 11/21/19), s/p open thoracoabdominal aortic aneurysm repair w/bypass of celiac, SMA, and b/l renal arteries (08/26/2020), who presents s/p a fall. Patient found to be anemic (Hgb 6.3), with CHERRI and CTAP findings of possible small fluid along the new bypass aortic graft along the left upper retroperitoneum, as well as in the region of left renal hilum, admitted to medicine for further management.

## 2020-12-03 NOTE — PROGRESS NOTE ADULT - SUBJECTIVE AND OBJECTIVE BOX
OVERNIGHT EVENTS: none.    SUBJECTIVE / INTERVAL HPI: Patient seen and examined at bedside.     VITAL SIGNS:  Vital Signs Last 24 Hrs  T(C): 36.6 (03 Dec 2020 05:50), Max: 37.1 (02 Dec 2020 22:22)  T(F): 97.8 (03 Dec 2020 05:50), Max: 98.7 (02 Dec 2020 22:22)  HR: 77 (03 Dec 2020 05:50) (65 - 91)  BP: 108/66 (03 Dec 2020 05:50) (102/58 - 112/73)  BP(mean): --  RR: 16 (03 Dec 2020 05:50) (16 - 19)  SpO2: 99% (03 Dec 2020 05:50) (97% - 100%)  I&O's Summary      PHYSICAL EXAM:    General: WDWN  HEENT: NC/AT; PERRL, anicteric sclera; MMM  Neck: supple  Cardiovascular: +S1/S2; RRR  Respiratory: CTA B/L; no W/R/R  Gastrointestinal: soft, NT/ND; +BSx4  Extremities: WWP; no edema, clubbing or cyanosis  Vascular: 2+ radial, DP/PT pulses B/L  Neurological: AAOx3; no focal deficits    MEDICATIONS:  MEDICATIONS  (STANDING):  gabapentin 100 milliGRAM(s) Oral daily  influenza  Vaccine (HIGH DOSE) 0.7 milliLiter(s) IntraMuscular once  levothyroxine 88 MICROGram(s) Oral daily  lidocaine   Patch 1 Patch Transdermal every 24 hours  pantoprazole    Tablet 40 milliGRAM(s) Oral every 12 hours  polyethylene glycol 3350 17 Gram(s) Oral every 24 hours  potassium chloride    Tablet ER 40 milliEquivalent(s) Oral every 4 hours  senna 2 Tablet(s) Oral at bedtime  sertraline 50 milliGRAM(s) Oral daily    MEDICATIONS  (PRN):  acetaminophen   Tablet .. 650 milliGRAM(s) Oral every 6 hours PRN Mild Pain (1 - 3), Moderate Pain (4 - 6)  oxycodone    5 mG/acetaminophen 325 mG 1 Tablet(s) Oral every 4 hours PRN Severe Pain (7 - 10)      ALLERGIES:  Allergies    No Known Allergies    Intolerances    Crestor (Other (Mod to Severe))      LABS:                        6.7    5.84  )-----------( 253      ( 03 Dec 2020 06:36 )             22.4     12-03    138  |  98  |  23  ----------------------------<  66<L>  2.6<LL>   |  22  |  2.03<H>    Ca    9.2      03 Dec 2020 06:36  Phos  3.1       Mg     2.3         TPro  7.7  /  Alb  4.1  /  TBili  <0.2  /  DBili  x   /  AST  13  /  ALT  <5<L>  /  AlkPhos  93  12    PT/INR - ( 03 Dec 2020 06:36 )   PT: 12.0 sec;   INR: 1.00          PTT - ( 03 Dec 2020 06:36 )  PTT:31.0 sec  Urinalysis Basic - ( 03 Dec 2020 01:09 )    Color: Yellow / Appearance: Clear / S.015 / pH: x  Gluc: x / Ketone: NEGATIVE  / Bili: Negative / Urobili: 0.2 E.U./dL   Blood: x / Protein: Trace mg/dL / Nitrite: NEGATIVE   Leuk Esterase: NEGATIVE / RBC: < 5 /HPF / WBC 5-10 /HPF   Sq Epi: x / Non Sq Epi: 5-10 /HPF / Bacteria: Present /HPF      CAPILLARY BLOOD GLUCOSE          RADIOLOGY & ADDITIONAL TESTS: Reviewed.   OVERNIGHT EVENTS: none.    SUBJECTIVE / INTERVAL HPI: Patient seen and examined at bedside. Complains of some ongoing back pain since her surgery in August. She is anxious about her 40 pound weight loss since March.    VITAL SIGNS:  Vital Signs Last 24 Hrs  T(C): 36.6 (03 Dec 2020 05:50), Max: 37.1 (02 Dec 2020 22:22)  T(F): 97.8 (03 Dec 2020 05:50), Max: 98.7 (02 Dec 2020 22:22)  HR: 77 (03 Dec 2020 05:50) (65 - 91)  BP: 108/66 (03 Dec 2020 05:50) (102/58 - 112/73)  BP(mean): --  RR: 16 (03 Dec 2020 05:50) (16 - 19)  SpO2: 99% (03 Dec 2020 05:50) (97% - 100%)  I&O's Summary      PHYSICAL EXAM:    General: WDWN  HEENT: NC/AT; PERRL, anicteric sclera; MMM  Neck: supple  Cardiovascular: +S1/S2; RRR; holosystolic ejection murmur in the aortic region  Respiratory: CTA B/L; no W/R/R  Gastrointestinal: soft, NT/ND; +BSx4; several surgical scars from TAAA  Extremities: WWP; no edema, clubbing or cyanosis  Back: tenderness to palpation in l. spine and l. flank tenderness  Vascular: DP/PT pulses not palpable, only able to be appreciated by dopplers  Neurological: AAOx3; no focal deficits    MEDICATIONS:  MEDICATIONS  (STANDING):  gabapentin 100 milliGRAM(s) Oral daily  influenza  Vaccine (HIGH DOSE) 0.7 milliLiter(s) IntraMuscular once  levothyroxine 88 MICROGram(s) Oral daily  lidocaine   Patch 1 Patch Transdermal every 24 hours  pantoprazole    Tablet 40 milliGRAM(s) Oral every 12 hours  polyethylene glycol 3350 17 Gram(s) Oral every 24 hours  potassium chloride    Tablet ER 40 milliEquivalent(s) Oral every 4 hours  senna 2 Tablet(s) Oral at bedtime  sertraline 50 milliGRAM(s) Oral daily    MEDICATIONS  (PRN):  acetaminophen   Tablet .. 650 milliGRAM(s) Oral every 6 hours PRN Mild Pain (1 - 3), Moderate Pain (4 - 6)  oxycodone    5 mG/acetaminophen 325 mG 1 Tablet(s) Oral every 4 hours PRN Severe Pain (7 - 10)    ALLERGIES:  Allergies    No Known Allergies    Intolerances    Crestor (Other (Mod to Severe))      LABS:                        6.7    5.84  )-----------( 253      ( 03 Dec 2020 06:36 )             22.4     12-    138  |  98  |  23  ----------------------------<  66<L>  2.6<LL>   |  22  |  2.03<H>    Ca    9.2      03 Dec 2020 06:36  Phos  3.1     12  Mg     2.3         TPro  7.7  /  Alb  4.1  /  TBili  <0.2  /  DBili  x   /  AST  13  /  ALT  <5<L>  /  AlkPhos  93  12-02    PT/INR - ( 03 Dec 2020 06:36 )   PT: 12.0 sec;   INR: 1.00          PTT - ( 03 Dec 2020 06:36 )  PTT:31.0 sec  Urinalysis Basic - ( 03 Dec 2020 01:09 )    Color: Yellow / Appearance: Clear / S.015 / pH: x  Gluc: x / Ketone: NEGATIVE  / Bili: Negative / Urobili: 0.2 E.U./dL   Blood: x / Protein: Trace mg/dL / Nitrite: NEGATIVE   Leuk Esterase: NEGATIVE / RBC: < 5 /HPF / WBC 5-10 /HPF   Sq Epi: x / Non Sq Epi: 5-10 /HPF / Bacteria: Present /HPF      CAPILLARY BLOOD GLUCOSE          RADIOLOGY & ADDITIONAL TESTS: Reviewed.

## 2020-12-04 LAB
ANION GAP SERPL CALC-SCNC: 10 MMOL/L — SIGNIFICANT CHANGE UP (ref 5–17)
BUN SERPL-MCNC: 17 MG/DL — SIGNIFICANT CHANGE UP (ref 7–23)
CALCIUM SERPL-MCNC: 10 MG/DL — SIGNIFICANT CHANGE UP (ref 8.4–10.5)
CHLORIDE SERPL-SCNC: 107 MMOL/L — SIGNIFICANT CHANGE UP (ref 96–108)
CO2 SERPL-SCNC: 22 MMOL/L — SIGNIFICANT CHANGE UP (ref 22–31)
CREAT SERPL-MCNC: 2.07 MG/DL — HIGH (ref 0.5–1.3)
GLUCOSE SERPL-MCNC: 111 MG/DL — HIGH (ref 70–99)
HCT VFR BLD CALC: 26.5 % — LOW (ref 34.5–45)
HGB BLD-MCNC: 7.9 G/DL — LOW (ref 11.5–15.5)
MAGNESIUM SERPL-MCNC: 2.2 MG/DL — SIGNIFICANT CHANGE UP (ref 1.6–2.6)
MCHC RBC-ENTMCNC: 27.5 PG — SIGNIFICANT CHANGE UP (ref 27–34)
MCHC RBC-ENTMCNC: 29.8 GM/DL — LOW (ref 32–36)
MCV RBC AUTO: 92.3 FL — SIGNIFICANT CHANGE UP (ref 80–100)
NRBC # BLD: 0 /100 WBCS — SIGNIFICANT CHANGE UP (ref 0–0)
PHOSPHATE SERPL-MCNC: 2.3 MG/DL — LOW (ref 2.5–4.5)
PLATELET # BLD AUTO: 219 K/UL — SIGNIFICANT CHANGE UP (ref 150–400)
POTASSIUM SERPL-MCNC: 4.1 MMOL/L — SIGNIFICANT CHANGE UP (ref 3.5–5.3)
POTASSIUM SERPL-SCNC: 4.1 MMOL/L — SIGNIFICANT CHANGE UP (ref 3.5–5.3)
RBC # BLD: 2.87 M/UL — LOW (ref 3.8–5.2)
RBC # FLD: 16.3 % — HIGH (ref 10.3–14.5)
SODIUM SERPL-SCNC: 139 MMOL/L — SIGNIFICANT CHANGE UP (ref 135–145)
WBC # BLD: 5.85 K/UL — SIGNIFICANT CHANGE UP (ref 3.8–10.5)
WBC # FLD AUTO: 5.85 K/UL — SIGNIFICANT CHANGE UP (ref 3.8–10.5)

## 2020-12-04 PROCEDURE — 99232 SBSQ HOSP IP/OBS MODERATE 35: CPT | Mod: GC

## 2020-12-04 PROCEDURE — 99233 SBSQ HOSP IP/OBS HIGH 50: CPT | Mod: GC

## 2020-12-04 PROCEDURE — 99222 1ST HOSP IP/OBS MODERATE 55: CPT

## 2020-12-04 RX ORDER — POTASSIUM PHOSPHATE, MONOBASIC POTASSIUM PHOSPHATE, DIBASIC 236; 224 MG/ML; MG/ML
15 INJECTION, SOLUTION INTRAVENOUS ONCE
Refills: 0 | Status: COMPLETED | OUTPATIENT
Start: 2020-12-04 | End: 2020-12-04

## 2020-12-04 RX ORDER — GABAPENTIN 400 MG/1
100 CAPSULE ORAL
Refills: 0 | Status: DISCONTINUED | OUTPATIENT
Start: 2020-12-05 | End: 2020-12-07

## 2020-12-04 RX ORDER — SOD SULF/SODIUM/NAHCO3/KCL/PEG
2000 SOLUTION, RECONSTITUTED, ORAL ORAL ONCE
Refills: 0 | Status: COMPLETED | OUTPATIENT
Start: 2020-12-05 | End: 2020-12-05

## 2020-12-04 RX ORDER — SOD SULF/SODIUM/NAHCO3/KCL/PEG
2000 SOLUTION, RECONSTITUTED, ORAL ORAL ONCE
Refills: 0 | Status: COMPLETED | OUTPATIENT
Start: 2020-12-06 | End: 2020-12-06

## 2020-12-04 RX ORDER — ATORVASTATIN CALCIUM 80 MG/1
80 TABLET, FILM COATED ORAL AT BEDTIME
Refills: 0 | Status: DISCONTINUED | OUTPATIENT
Start: 2020-12-04 | End: 2020-12-07

## 2020-12-04 RX ADMIN — Medication 62.5 MILLIMOLE(S): at 19:42

## 2020-12-04 RX ADMIN — LIDOCAINE 1 PATCH: 4 CREAM TOPICAL at 19:03

## 2020-12-04 RX ADMIN — LIDOCAINE 1 PATCH: 4 CREAM TOPICAL at 13:36

## 2020-12-04 RX ADMIN — GABAPENTIN 100 MILLIGRAM(S): 400 CAPSULE ORAL at 13:36

## 2020-12-04 RX ADMIN — OXYCODONE AND ACETAMINOPHEN 1 TABLET(S): 5; 325 TABLET ORAL at 03:37

## 2020-12-04 RX ADMIN — PANTOPRAZOLE SODIUM 40 MILLIGRAM(S): 20 TABLET, DELAYED RELEASE ORAL at 17:52

## 2020-12-04 RX ADMIN — Medication 1 TABLET(S): at 13:36

## 2020-12-04 RX ADMIN — PANTOPRAZOLE SODIUM 40 MILLIGRAM(S): 20 TABLET, DELAYED RELEASE ORAL at 06:01

## 2020-12-04 RX ADMIN — OXYCODONE AND ACETAMINOPHEN 1 TABLET(S): 5; 325 TABLET ORAL at 04:37

## 2020-12-04 RX ADMIN — Medication 650 MILLIGRAM(S): at 08:36

## 2020-12-04 RX ADMIN — Medication 50 MICROGRAM(S): at 05:45

## 2020-12-04 RX ADMIN — POLYETHYLENE GLYCOL 3350 17 GRAM(S): 17 POWDER, FOR SOLUTION ORAL at 13:36

## 2020-12-04 RX ADMIN — Medication 650 MILLIGRAM(S): at 09:36

## 2020-12-04 RX ADMIN — SERTRALINE 50 MILLIGRAM(S): 25 TABLET, FILM COATED ORAL at 13:36

## 2020-12-04 RX ADMIN — POTASSIUM PHOSPHATE, MONOBASIC POTASSIUM PHOSPHATE, DIBASIC 62.5 MILLIMOLE(S): 236; 224 INJECTION, SOLUTION INTRAVENOUS at 08:34

## 2020-12-04 RX ADMIN — ATORVASTATIN CALCIUM 80 MILLIGRAM(S): 80 TABLET, FILM COATED ORAL at 21:35

## 2020-12-04 RX ADMIN — SENNA PLUS 2 TABLET(S): 8.6 TABLET ORAL at 21:35

## 2020-12-04 NOTE — PROGRESS NOTE ADULT - ASSESSMENT
63 yo F, former smoker (quit >20 yrs ago), with a PMH of HTN, CAD s/p CABG (2015) and PCI to dLCx, bioprosthetic AVR (2002), s/p mitral valve annuloplasty (2002), Paroxysmal ATach, Wide Complex Tachycardia (seen on Holter 4/2016 –had ILR placed now disconnected-previously seen by Dr. Jasso), PAD s/p pLSFA stent LCIA stent, with occluded Bilateral SFA (proximal to mid portion per U/S 2019), hypothyroidism, seizure, chronic anemia (receives Iron infusions by Dr. Izquierdo, last infusion on 12/1/20), AAA repair (2015), CKD, severe Renal Artery Stenosis (scarring and atrophy of left Kidney 11/21/19), s/p open thoracoabdominal aortic aneurysm repair w/bypass of celiac, SMA, and b/l renal arteries (08/26/2020), who presents s/p a fall. Patient found to be anemic (Hgb 6.3), with CHERRI and CTAP findings of possible small fluid along the new bypass aortic graft along the left upper retroperitoneum, as well as in the region of left renal hilum, admitted to medicine for further management.

## 2020-12-04 NOTE — CONSULT NOTE ADULT - SUBJECTIVE AND OBJECTIVE BOX
HPI:  Patient is a 65 yo F, former smoker (quit >20 yrs ago), with a PMH of HTN, CAD s/p CABG (2015) and PCI to dLCx, bioprosthetic AVR (2002), s/p mitral valve annuloplasty (2002), Paroxysmal ATach, Wide Complex Tachycardia (seen on Holter 4/2016 –had ILR placed now disconnected-previously seen by Dr. Jasso), PAD s/p pLSFA stent LCIA stent, with occluded Bilateral SFA (proximal to mid portion per U/S 2019), hypothyroidism, seizure, chronic anemia (receives Iron infusions by Dr. Izquierdo, last infusion on 12/1/20), AAA repair (2015), CKD, severe Renal Artery Stenosis (scarring and atrophy of left Kidney 11/21/19), s/p open thoracoabdominal aortic aneurysm repair w/bypass of celiac, SMA, and b/l renal arteries (08/26/2020), who presents s/p a fall. Patient was at [facility] delivering her Holter monitor when she had a fall while waiting for the elevator, a rapid response was called and patient was taken to the ED. She reports that she had numbness and tingling in her b/l lower extremities, her legs gave out, and she fell, no head trauma or LOC.      Patient reports she has lower back pain and tingling in her legs, described as pins and needles on ventral surface of legs to the ankles, and lower back pain since her surgery in August. Pain is 4/10 at it's best and 12/10 in severity at times. She has followed up with vascular for this as outpatient. She also reports she has been having palpitations and midsternal chest pain for a couple of months and was placed on a Holter monitor.    She endorses urinary hesitancy since her TAAA repair in August, reports her urine stream is extremely slow ("drips"), and has had to sit on the toilet for 20+ minutes. Denies dysuria, frequency, urinary/fecal incontinence, perineal numbness or tingling, fever chills, chest pain, SOB, cough, abdominal pain, nausea/vomiting, diarrhea, constipation.     Vitals in the ED: T 97.4, HR 88, /61, RR 18, SpO2 100% on RA. Repeat vitals T 97.8-98, HR 65-73, -112/51-73, RR 18-19, SpO2 % on RA  Labs: WBC 6.74, Hgb 6.3, Hct 21.1, PLTs 304, Na 137, K 3.0, CL 94, CO2 25, BUN/Cr 25/2.23, Glu 131, Trop T 0.02, . FOBT positive  EKG: NSR (HR 80)  CXR: no acute infiltrates  ED course: upon arrival, she experienced some nausea, and she spat up some saliva, no vomiting. CTAP showed possible small fluid along the new bypass aortic graft along the left upper retroperitoneum, as well as in the region of left renal hilum. MRI L-spine showed no cord compression or acute abnormality. Vascular surgery was consulted in the ED,  (03 Dec 2020 00:01)      ROS: A 10-point review of systems was otherwise negative.    PAST MEDICAL & SURGICAL HISTORY:  Chronic kidney disease, unspecified CKD stage    Depression, unspecified depression type    Anxiety    Seizure    Essential hypertension  HTN (hypertension)    Gastroesophageal reflux disease  GERD (gastroesophageal reflux disease)    Hyperlipidemia  Hyperlipidemia    Anemia  Anemia    Hypothyroidism  Hypothyroidism    Atherosclerosis of coronary artery  CAD (coronary artery disease)    Peripheral vascular disease  PVD (peripheral vascular disease)    Abdominal aortic aneurysm (AAA) without rupture  2015    Ruptured aortic aneurysm  surgery august 2020    H/O aortic valve replacement  Bioprosthetic    Atherosclerosis of coronary artery bypass graft(s), unspecified, with angina pectoris with documented spasm    Status post aorto-coronary artery bypass graft  2012        SOCIAL HISTORY:  FAMILY HISTORY:  FH: myocardial infarction  in Dad (at age 50s)        ALLERGIES: 	  Crestor (Other (Mod to Severe))  No Known Allergies        HOME MEDS:  ASA 81  Lasix 40 QD  Lopressor 25 bid    MEDICATIONS:  acetaminophen   Tablet .. 650 milliGRAM(s) Oral every 6 hours PRN  gabapentin 100 milliGRAM(s) Oral daily  influenza  Vaccine (HIGH DOSE) 0.7 milliLiter(s) IntraMuscular once  levothyroxine 50 MICROGram(s) Oral daily  lidocaine   Patch 1 Patch Transdermal every 24 hours  multivitamin 1 Tablet(s) Oral daily  oxycodone    5 mG/acetaminophen 325 mG 1 Tablet(s) Oral every 4 hours PRN  pantoprazole    Tablet 40 milliGRAM(s) Oral every 12 hours  polyethylene glycol 3350 17 Gram(s) Oral every 24 hours  senna 2 Tablet(s) Oral at bedtime  sertraline 50 milliGRAM(s) Oral daily  sodium phosphate IVPB 15 milliMole(s) IV Intermittent once      PHYSICAL EXAM:  T(C): 36.4 (12-04-20 @ 14:00), Max: 37.1 (12-04-20 @ 05:32)  HR: 86 (12-04-20 @ 14:00) (74 - 86)  BP: 136/89 (12-04-20 @ 14:00) (114/67 - 136/89)  RR: 17 (12-04-20 @ 14:00) (17 - 18)  SpO2: 100% (12-04-20 @ 14:00) (99% - 100%)  Wt(kg): --    12-03-20 @ 07:01  -  12-04-20 @ 07:00  --------------------------------------------------------  IN: 0 mL / OUT: 700 mL / NET: -700 mL        GEN: Awake, comfortable. NAD.   HEENT: NCAT, PERRL, EOMI. Mucosa moist.   NECK: Supple, no JVD.   RESP: CTA b/l  CV: RRR, normal s1/s2. No m/r/g.  ABD: Soft, NTND. BS+  EXT: Warm. No edema, clubbing, or cyanosis.   NEURO: AAOx3. No focal deficits.    I&O's Summary    03 Dec 2020 07:01  -  04 Dec 2020 07:00  --------------------------------------------------------  IN: 0 mL / OUT: 700 mL / NET: -700 mL        	  LABS:	 	    CARDIAC MARKERS:                                  7.9    5.85  )-----------( 219      ( 04 Dec 2020 06:44 )             26.5     12-04    139  |  107  |  17  ----------------------------<  111<H>  4.1   |  22  |  2.07<H>    Ca    10.0      04 Dec 2020 06:44  Phos  2.3     12-04  Mg     2.2     12-04    TPro  6.5  /  Alb  3.3  /  TBili  0.2  /  DBili  x   /  AST  13  /  ALT  <5<L>  /  AlkPhos  81  12-03    proBNP:   Lipid Profile:   HgA1c:   TSH:     TELEMETRY: 	    ECG:  	  RADIOLOGY:   ECHO: < from: TTE Echo Complete w/o Contrast w/ Doppler (08.21.20 @ 11:48) >    --------------------------------------------------------------------------------  CONCLUSIONS:     1. There is mild concentric left ventricular hypertrophy. The left ventricle is normal in size, wall thickness, and systolic function with a calculated ejection fraction of 65%. Abnormal septal motion noted.   2. Severely dilated left atrium.   3. Bioprosthetic valve is seen in the aortic position. Peak transvalvular velocity is 3.85 m/s, the mean transvalvular gradient is 31.00 mmHg, and the LVOT/AV velocity ratio is 0.36.   4. There is mild transvalvular aortic regurgitation. There is trace paravalvular aortic regurgitation.   5. An annuloplasty ring is noted in the mitral position. The mean transvalvular gradient is 13.00 mmHg at a heart rate of 81 bpm. Mild mitral regurgitation.   6. There is moderate-to-severe tricuspid regurgitation.   7. Pulmonary hypertension present, pulmonary artery systolic pressure is 48 mmHg.   8. There is trace pulmonic regurgitation.   9. No pericardial effusion.  10. The aortic root is normal in size.  11. Compared to the previous TTE performed on 8/5/2019, higher mean gradient across mitral valve at a higher heart rate and gradients across bioprosthetic aortic valve is unchanged.    --------------------------------------------------------------------------------    < end of copied text >    STRESS:  CATH:   HPI:  Patient is a 63 yo F, former smoker (quit >20 yrs ago), with a PMH of HTN, CAD s/p CABG () and PCI to dLCx, bioprosthetic AVR (), s/p mitral valve annuloplasty (), Paroxysmal ATach, Wide Complex Tachycardia (seen on Holter 2016 –had ILR placed now disconnected-previously seen by Dr. Jasso), PAD s/p pLSFA stent LCIA stent, with occluded Bilateral SFA (proximal to mid portion per U/S ), hypothyroidism, seizure, chronic anemia (receives Iron infusions by Dr. Izquierdo, last infusion on 20), AAA repair (), CKD, severe Renal Artery Stenosis (scarring and atrophy of left Kidney 19), s/p open thoracoabdominal aortic aneurysm repair w/bypass of celiac, SMA, and b/l renal arteries (2020), who presents s/p a fall. Patient was at [facility] delivering her Holter monitor when she had a fall while waiting for the elevator, a rapid response was called and patient was taken to the ED. She reports that she had numbness and tingling in her b/l lower extremities, her legs gave out, and she fell, no head trauma or LOC.      Patient reports she has lower back pain and tingling in her legs, described as pins and needles on ventral surface of legs to the ankles, and lower back pain since her surgery in August. Pain is 4/10 at it's best and 12/10 in severity at times. She has followed up with vascular for this as outpatient. She also reports she has been having palpitations and midsternal chest pain for a couple of months and was placed on a Holter monitor.    She endorses urinary hesitancy since her TAAA repair in August, reports her urine stream is extremely slow ("drips"), and has had to sit on the toilet for 20+ minutes. Denies dysuria, frequency, urinary/fecal incontinence, perineal numbness or tingling, fever chills, chest pain, SOB, cough, abdominal pain, nausea/vomiting, diarrhea, constipation.     Vitals in the ED: T 97.4, HR 88, /61, RR 18, SpO2 100% on RA. Repeat vitals T 97.8-98, HR 65-73, -112/51-73, RR 18-19, SpO2 % on RA  Labs: WBC 6.74, Hgb 6.3, Hct 21.1, PLTs 304, Na 137, K 3.0, CL 94, CO2 25, BUN/Cr 25/2.23, Glu 131, Trop T 0.02, . FOBT positive  EKG: NSR (HR 80)  CXR: no acute infiltrates  ED course: upon arrival, she experienced some nausea, and she spat up some saliva, no vomiting. CTAP showed possible small fluid along the new bypass aortic graft along the left upper retroperitoneum, as well as in the region of left renal hilum. MRI L-spine showed no cord compression or acute abnormality. Vascular surgery was consulted in the ED,  (03 Dec 2020 00:01)    Cardiology consulted  for preop evaluation prior to EGD/colonoscopy on Monday. Patient seen and examined at bedside. Currently denies chest pain, palpitations, SOB. States her back pain has been improved after the application of a patch. She states that prior to this hospitalization, she experienced chest pain after 1/2 block ambulation or 1/2 a flight of stairs requiring her to stop to catch her breath. These symptoms are similar to what she endorsed in her prior admission in August. She states her activity level was impacted by pain in her back post surgery after arriving home from the hospital in September. Also endorses chest discomfort with palpitations; she was given a Holter monitor which she was returning to the hospital on the day of presentation. She does not know what the Holter monitor showed.     ROS: A 10-point review of systems was otherwise negative.    PAST MEDICAL & SURGICAL HISTORY:  Chronic kidney disease, unspecified CKD stage    Depression, unspecified depression type    Anxiety    Seizure    Essential hypertension  HTN (hypertension)    Gastroesophageal reflux disease  GERD (gastroesophageal reflux disease)    Hyperlipidemia  Hyperlipidemia    Anemia  Anemia    Hypothyroidism  Hypothyroidism    Atherosclerosis of coronary artery  CAD (coronary artery disease)    Peripheral vascular disease  PVD (peripheral vascular disease)    Abdominal aortic aneurysm (AAA) without rupture      Ruptured aortic aneurysm  surgery 2020    H/O aortic valve replacement  Bioprosthetic    Atherosclerosis of coronary artery bypass graft(s), unspecified, with angina pectoris with documented spasm    Status post aorto-coronary artery bypass graft          SOCIAL HISTORY: Former smoker, 2ppd x 25 yrs, quit >20 yrs ago  Denies EtOH use  Denies illicit drug use  FAMILY HISTORY:  FH: myocardial infarction  in Dad (at age 50s)        ALLERGIES: 	  Crestor (Other (Mod to Severe))  No Known Allergies        HOME MEDS:  ASA 81  Lasix 40 QD  Coreg 12.5 q12h  Crestor 40    MEDICATIONS:  acetaminophen   Tablet .. 650 milliGRAM(s) Oral every 6 hours PRN  gabapentin 100 milliGRAM(s) Oral daily  influenza  Vaccine (HIGH DOSE) 0.7 milliLiter(s) IntraMuscular once  levothyroxine 50 MICROGram(s) Oral daily  lidocaine   Patch 1 Patch Transdermal every 24 hours  multivitamin 1 Tablet(s) Oral daily  oxycodone    5 mG/acetaminophen 325 mG 1 Tablet(s) Oral every 4 hours PRN  pantoprazole    Tablet 40 milliGRAM(s) Oral every 12 hours  polyethylene glycol 3350 17 Gram(s) Oral every 24 hours  senna 2 Tablet(s) Oral at bedtime  sertraline 50 milliGRAM(s) Oral daily  sodium phosphate IVPB 15 milliMole(s) IV Intermittent once      PHYSICAL EXAM:  T(C): 36.4 (20 @ 14:00), Max: 37.1 (20 @ 05:32)  HR: 86 (20 @ 14:00) (74 - 86)  BP: 136/89 (20 @ 14:00) (114/67 - 136/89)  RR: 17 (20 @ 14:00) (17 - 18)  SpO2: 100% (20 @ 14:00) (99% - 100%)  Wt(kg): --    20 @ 07:01  -  20 @ 07:00  --------------------------------------------------------  IN: 0 mL / OUT: 700 mL / NET: -700 mL        GEN: Awake, comfortable. NAD.   HEENT: NCAT, PERRL, EOMI. Mucosa moist.   NECK: Supple, no JVD.   RESP: CTA b/l  CV: RRR, normal s1/s2. No m/r/g.  ABD: Soft, NTND. BS+  EXT: Warm. No edema, clubbing, or cyanosis.   NEURO: AAOx3. No focal deficits.    I&O's Summary    03 Dec 2020 07:01  -  04 Dec 2020 07:00  --------------------------------------------------------  IN: 0 mL / OUT: 700 mL / NET: -700 mL        	  LABS:	 	    CARDIAC MARKERS:                                  7.9    5.85  )-----------( 219      ( 04 Dec 2020 06:44 )             26.5     12-04    139  |  107  |  17  ----------------------------<  111<H>  4.1   |  22  |  2.07<H>    Ca    10.0      04 Dec 2020 06:44  Phos  2.3     12-04  Mg     2.2     12-04    TPro  6.5  /  Alb  3.3  /  TBili  0.2  /  DBili  x   /  AST  13  /  ALT  <5<L>  /  AlkPhos  81  12-03    proBNP:   Lipid Profile:   HgA1c:   TSH:     TELEMETRY: 	    EC/3 11:01 - NSR	  RADIOLOGY:   < from: CT Angio Cardiac w/ IV Cont (20 @ 13:11) >  Impression:    1. Patent LIMA to First Diagonal branch.  2. Patent ANTONIO to RPDA.  2. Severe stenosis of the large first diagonal branch. Distal vessel fills via LIMA graft.  3. Severe stenosis of proximal RCA. Distal vessel fills via ANTONIO graft.  4. Nonobstructive disease throughout the LAD.  5. Patent stent in mid LCX extending to the OM2  6. MV annuloplasty ring and Bioprosthetic AVR noted.  7. Calcification throughout the aorta noted.    Please see separate radiology report for non-coronary findings.    ***Please see the addendum at the top of this report. It may contain additional important information or changes.****    < end of copied text >    ECHO: < from: TTE Echo Complete w/o Contrast w/ Doppler (08.21.20 @ 11:48) >    --------------------------------------------------------------------------------  CONCLUSIONS:     1. There is mild concentric left ventricular hypertrophy. The left ventricle is normal in size, wall thickness, and systolic function with a calculated ejection fraction of 65%. Abnormal septal motion noted.   2. Severely dilated left atrium.   3. Bioprosthetic valve is seen in the aortic position. Peak transvalvular velocity is 3.85 m/s, the mean transvalvular gradient is 31.00 mmHg, and the LVOT/AV velocity ratio is 0.36.   4. There is mild transvalvular aortic regurgitation. There is trace paravalvular aortic regurgitation.   5. An annuloplasty ring is noted in the mitral position. The mean transvalvular gradient is 13.00 mmHg at a heart rate of 81 bpm. Mild mitral regurgitation.   6. There is moderate-to-severe tricuspid regurgitation.   7. Pulmonary hypertension present, pulmonary artery systolic pressure is 48 mmHg.   8. There is trace pulmonic regurgitation.   9. No pericardial effusion.  10. The aortic root is normal in size.  11. Compared to the previous TTE performed on 2019, higher mean gradient across mitral valve at a higher heart rate and gradients across bioprosthetic aortic valve is unchanged.    --------------------------------------------------------------------------------    < end of copied text >    STRESS:  CATH:

## 2020-12-04 NOTE — CONSULT NOTE ADULT - ASSESSMENT
A/P: 64F w/CAD s/p CABG & PCI, s/p bioprosthetic AVR, s/p mitral valve annuloplasty, AAA s/p repair (2015) & open repair 2020, PAD, hypothyroidism, A/P: 64F w/CAD s/p CABG (LIMA-ramus, ANTONIO-RDA) & PCI (dLCx), s/p bioprosthetic AVR, s/p mitral valve annuloplasty, AAA s/p repair (2015) & s/p open thoracoabdominal aortic aneurysm repair w/bypass of celiac, SMA, and b/l renal arteries (08/26/2020), pAtach, PAD, hypothyroidism, A/P: 64F w/CAD s/p CABG (LIMA-ramus, ANTONIO-RDA) & PCI (dLCx), s/p bioprosthetic AVR w/mod stenosis, s/p mitral valve annuloplasty w/severe stenosis, AAA s/p repair (2015) & s/p open thoracoabdominal aortic aneurysm repair w/bypass of celiac, SMA, and b/l renal arteries (08/26/2020), pAtach, PAD, hypothyroidism adm to [facility] on 12/3 for symptomatic anemia. Cardiology consulted for preop evaluation prior to EGD/colonoscopy.    #Preop - RCRI - 2 w/METS<4. Patient's exertional chest pain similar to symptoms reported in August. A coronary CTA during that admission revealed patent grafts and nonobstructive LAD disease. She was also found to have severe mitral stenosis and mod aortic stenosis on echocardiogram during that admission.  - the patient is moderate-to-high risk for a low risk procedure.  - no further cardiac workup needed    #Valvular disease - patient seen by structural heart during last admission. Intervention and workup at that time was deferred due to acute issue of AAA repair  - recommend touching base with structural to ensure patient has f/u prior to discharge for further workup of her valvular disease.    #CAD s/p CABG & PCI  - hold home ASA in setting of possible GIB  - hold home coreg in setting of possible bleed; BP currently acceptable  - resume home crestor 40    Cardiology to follow     Recommendations are final when signed by attending.    --  Bar Crisostomo MD  Cardiology PGY5

## 2020-12-04 NOTE — PROGRESS NOTE ADULT - ASSESSMENT
63 yo F, former smoker, HTN, CAD s/p CABG (2015) and PCI to dLCx, bioprosthetic AVR (2002), s/p mitral valve annuloplasty (2002), Paroxysmal Tachycardia (seen on Holter 4/2016, PAD s/p stents, chronic anemia (receives Iron infusions by Dr. Izquierdo, last infusion on 12/1/20), AAA repair (2015), CKD, severe Renal Artery Stenosis, s/p recent open TAAA w/bypass of celiac, SMA, and b/l renal arteries (08/26/2020), who presents s/p a fall while he was coming to deliver her Holter monitor (after rapid response was called). GI was consulted for Hb 6.7 (baseline ~9), with FOBT positive however no signs of over GI bleeding    # Normocytic anemia  likely multifactorial   No signs of overt GIB  Hb improved appropriately after 2 PRBC to 6.7-->8.4 the equilibrated to 7.9  - check iron studies and retic count  - Monitor CBC and transfuse to goal H/H  - plan for EGD and colonoscopy on Monday  - ensure patient is deemed safe from cardiology and vascular POV given her significant cardiac history and recent TAAA repair with CT findings    Recommendations discussed with primary team  Plan discussed with GI service attending    Saran Howell MD  PGY-4 GI fellow  Pager: [phone]   63 yo F, former smoker, HTN, CAD s/p CABG (2015) and PCI to dLCx, bioprosthetic AVR (2002), s/p mitral valve annuloplasty (2002), Paroxysmal Tachycardia (seen on Holter 4/2016, PAD s/p stents, chronic anemia (receives Iron infusions by Dr. Izquierdo, last infusion on 12/1/20), AAA repair (2015), CKD, severe Renal Artery Stenosis, s/p recent open TAAA w/bypass of celiac, SMA, and b/l renal arteries (08/26/2020), who presents s/p a fall while he was coming to deliver her Holter monitor (after rapid response was called). GI was consulted for Hb 6.7 (baseline ~9), with FOBT positive however no signs of over GI bleeding    # Normocytic anemia  likely multifactorial   No signs of overt GIB  Hb improved appropriately after 2 PRBC to 6.7-->8.4 the equilibrated to 7.9  - check iron studies and retic count  - Monitor CBC and transfuse to goal H/H  - plan for EGD and colonoscopy on Monday  - ensure patient is deemed safe from cardiology and vascular POV given her significant cardiac history and recent TAAA repair with CT findings  - CLD for now. NPO past Sunday midnight  - Start 2 day prep with Oxxy tomorrow (2L on Saturday, 2L Sunday evening 5pm and 2L Monday 5 am)  - PO dulcolax 10 mg Sunday 5 pm.    Recommendations discussed with primary team  Plan discussed with GI service attending    Saran Howell MD  PGY-4 GI fellow  Pager: [phone]

## 2020-12-04 NOTE — PROGRESS NOTE ADULT - PROBLEM SELECTOR PLAN 5
Patient reports she has lower back pain and tingling in her legs, described as pins and needles on ventral surface of legs to the ankles, and lower back pain since her surgery in August. Pain is 4/10 at it's best and 12/10 in severity at times, on Percocet 5mg q4h PRN at home  - CT L-spine showed mild multilevel disc bulging without significant spinal stenosis. There is left asymmetric neural foraminal narrowing at L3-4 and L4-5, and more significant right neural foraminal narrowing at L5-S1.   - MRI L-spine showed no cord compression or acute abnormality. Mild multilevel disc bulging without significant spinal stenosis. Multilevel neural foraminal canal narrowing greatest on the left at L3-4 and on the right at L5-S1.  - ortho/spine consult in the AM  - c/w Percocet 5mg q4h PRN for severe pain  - Tylenol 650mg PO q6h PRN for mild-mod pain    #Paresthesias  likely 2/2 multilevel neural foraminal narrowing as seen on imaging, vs anemia vs nutritional deficiencies. Normal B12, folate, TSH  - Tingling improved with gabapentin BID

## 2020-12-04 NOTE — PROGRESS NOTE ADULT - PROBLEM SELECTOR PLAN 1
Patient presenting s/p a fall while waiting for the elevator, reports her legs gave out, no LOC, or head trauma. Denies any prodromal symptoms. likely 2/2 symptomatic anemia vs leg numbness from surgery and foraminal narrowing. Syncope unlikely as no LOC, chest pain, palpitations, lightheadedness or dizziness prior to the fall. ACS unlikely with no ischemic changes on EKG though elevated trops .02 (likely 2/2 CHERRI on CKD). CTH negative for acute bleed.  - PT rec home PT  - anemia improved

## 2020-12-04 NOTE — PROGRESS NOTE ADULT - SUBJECTIVE AND OBJECTIVE BOX
GASTROENTEROLOGY PROGRESS NOTE  Patient seen and examined at bedside. No new complaints. Abd discomfort improved. Reported brown BM today    PERTINENT REVIEW OF SYSTEMS:  As noted above    Allergies    No Known Allergies    Intolerances    Crestor (Other (Mod to Severe))    MEDICATIONS:  MEDICATIONS  (STANDING):  gabapentin 100 milliGRAM(s) Oral daily  influenza  Vaccine (HIGH DOSE) 0.7 milliLiter(s) IntraMuscular once  levothyroxine 50 MICROGram(s) Oral daily  lidocaine   Patch 1 Patch Transdermal every 24 hours  multivitamin 1 Tablet(s) Oral daily  pantoprazole    Tablet 40 milliGRAM(s) Oral every 12 hours  polyethylene glycol 3350 17 Gram(s) Oral every 24 hours  senna 2 Tablet(s) Oral at bedtime  sertraline 50 milliGRAM(s) Oral daily  sodium phosphate IVPB 15 milliMole(s) IV Intermittent once    MEDICATIONS  (PRN):  acetaminophen   Tablet .. 650 milliGRAM(s) Oral every 6 hours PRN Mild Pain (1 - 3), Moderate Pain (4 - 6)  oxycodone    5 mG/acetaminophen 325 mG 1 Tablet(s) Oral every 4 hours PRN Severe Pain (7 - 10)    Vital Signs Last 24 Hrs  T(C): 37.1 (04 Dec 2020 05:32), Max: 37.1 (04 Dec 2020 05:32)  T(F): 98.7 (04 Dec 2020 05:32), Max: 98.7 (04 Dec 2020 05:32)  HR: 74 (04 Dec 2020 05:32) (71 - 85)  BP: 114/67 (04 Dec 2020 05:32) (114/67 - 119/66)  BP(mean): --  RR: 18 (04 Dec 2020 05:32) (17 - 18)  SpO2: 99% (04 Dec 2020 05:32) (99% - 100%)     @ 07:01  -   @ 07:00  --------------------------------------------------------  IN: 0 mL / OUT: 700 mL / NET: -700 mL      PHYSICAL EXAM:    General: Well developed; thin built; in no acute distress  HEENT: MMM, conjunctiva and sclera clear  Gastrointestinal: Soft non-tender non-distended; No rebound or guarding  Skin: Warm and dry. No obvious rash    LABS:                        7.9    5.85  )-----------( 219      ( 04 Dec 2020 06:44 )             26.5     12-    139  |  107  |  17  ----------------------------<  111<H>  4.1   |  22  |  2.07<H>    Ca    10.0      04 Dec 2020 06:44  Phos  2.3     12-  Mg     2.2         TPro  6.5  /  Alb  3.3  /  TBili  0.2  /  DBili  x   /  AST  13  /  ALT  <5<L>  /  AlkPhos  81  12-03    PT/INR - ( 03 Dec 2020 06:36 )   PT: 12.0 sec;   INR: 1.00          PTT - ( 03 Dec 2020 06:36 )  PTT:31.0 sec      Urinalysis Basic - ( 03 Dec 2020 01:09 )    Color: Yellow / Appearance: Clear / S.015 / pH: x  Gluc: x / Ketone: NEGATIVE  / Bili: Negative / Urobili: 0.2 E.U./dL   Blood: x / Protein: Trace mg/dL / Nitrite: NEGATIVE   Leuk Esterase: NEGATIVE / RBC: < 5 /HPF / WBC 5-10 /HPF   Sq Epi: x / Non Sq Epi: 5-10 /HPF / Bacteria: Present /HPF                RADIOLOGY & ADDITIONAL STUDIES:  Reviewed

## 2020-12-04 NOTE — PROGRESS NOTE ADULT - SUBJECTIVE AND OBJECTIVE BOX
Patient was seen and examined by me at bedside. I agree with resident's note, subjective, objective physical exam, assessment and plan with following modifications/additions.    Greater than 35 minutes spent on total encounter; more than 50% of the visit was spent counseling and/or coordinating care by the attending physician.    63 yo F former smoker PMH of CAD s/p CABG (2015) and PCI to dLCx, bioprosthetic AVR (2002), s/p mitral valve annuloplasty (2002), hx of Atach and wide complex arrythmia in past, sig PAD hx s/p stent, seizure, chronic anemia (receives Iron infusions by Dr. Izquierdo- last hg 7s in 11/2020), Stage III CKD,  Renal Artery Stenosis, AAA s/p open thoracoabdominal aortic aneurysm repair 8/2020, who presents s/p a fall. No true LOC, likely mechanical fall from gait instability, with head trauma per patient.   -Chronic progressive presumed iron deficiency anemia, FOBT positive, also recent weightloss, low c/f bleeding s/p recent surg (free fluid postsurgical not bleeding per surg)- s/p 2 units RBC adequate response, needing workup but high risk for outpt so will have cards clearance and EGD/[facility] monday   -Fall likely mechanical- CT head neg, PT eval ok for home  -CHERRI on CKD- stabilized now likely new baseline, stable repeat renal US  -CAD hx, PAD hx- continue asa, cilostazol  -Depression- continue home pysch meds  -Hypothyroidism-continue synthroid   -Seizure disorder- continue with keppra   -DVT px- can do SQH with no active bleed likely and with CKD  -Dispo- pending EGD/[facility]o monday and then dc home Patient was seen and examined by me at bedside. I agree with resident's note, subjective, objective physical exam, assessment and plan with following modifications/additions.    Greater than 35 minutes spent on total encounter; more than 50% of the visit was spent counseling and/or coordinating care by the attending physician.    65 yo F former smoker PMH of CAD s/p CABG (2015) and PCI to dLCx, bioprosthetic AVR (2002), s/p mitral valve annuloplasty (2002), hx of Atach and wide complex arrythmia in past, sig PAD hx s/p stent, seizure, chronic anemia (receives Iron infusions by Dr. Izquierdo- last hg 7s in 11/2020), Stage III CKD,  Renal Artery Stenosis, AAA s/p open thoracoabdominal aortic aneurysm repair 8/2020, who presents s/p a fall. No true LOC, likely mechanical fall from gait instability, with head trauma per patient.   -Chronic progressive presumed iron deficiency anemia, FOBT positive, also recent weightloss, low c/f bleeding s/p recent surg (free fluid postsurgical not bleeding per surg)- s/p 2 units RBC adequate response, needing workup but high risk for outpt so will have cards and CT surg clearance for EGD/colo monday   -Fall likely mechanical- CT head neg, PT eval ok for home  -CHERRI on CKD- stabilized now likely new baseline, stable repeat renal US  -CAD hx, PAD hx- continue asa, cilostazol  -Depression- continue home pysch meds  -Hypothyroidism-continue synthroid   -Seizure disorder- continue with keppra   -DVT px- can do SQH with no active bleed likely and with CKD  -Dispo- pending EGD/colo monday and then dc home Patient was seen and examined by me at bedside. I agree with resident's note, subjective, objective physical exam, assessment and plan with following modifications/additions.    Greater than 35 minutes spent on total encounter; more than 50% of the visit was spent counseling and/or coordinating care by the attending physician.    63 yo F former smoker PMH of CAD s/p CABG (2015) and PCI to dLCx, bioprosthetic AVR (2002), s/p mitral valve annuloplasty (2002), hx of Atach and wide complex arrythmia in past, sig PAD hx s/p stent, seizure, chronic anemia (receives Iron infusions by Dr. Izquierdo- last hg 7s in 11/2020), Stage III CKD,  Renal Artery Stenosis, AAA s/p open thoracoabdominal aortic aneurysm repair 8/2020, who presents s/p a fall. No true LOC, likely mechanical fall from gait instability, found on labs to have acute on chronic anemia, CT imaging with free fluid at surgical site  -Chronic progressive presumed iron deficiency anemia, FOBT positive, also recent weightloss, low c/f bleeding s/p recent surg (free fluid postsurgical not bleeding per surg)- s/p 2 units RBC adequate response, needing workup but high risk for outpt so will have cards and CT surg clearance for EGD/colo monday   -Fall likely mechanical- CT head neg, PT eval ok for home  -CHERRI on CKD- stabilized now likely new baseline, stable repeat renal US  -CAD hx, PAD hx- continue asa, cilostazol  -Depression- continue home pysch meds  -Hypothyroidism-continue synthroid   -Seizure disorder- continue with keppra   -DVT px- can do SQH with no active bleed likely and with CKD  -Dispo- pending EGD/colo monday and then dc home

## 2020-12-04 NOTE — CONSULT NOTE ADULT - ATTENDING COMMENTS
Pt seen with Dr. Howell on 12/3.  Anemia, will plan on EGD and colonoscopy next week when optimized.
See fellow note written above for details. I reviewed the fellow documentation.  I reviewed vitals, labs, medications, cardiac studies, and additional imaging. I agree with the above fellow's findings and plans as written above with the following additions/statements.    -Pt with CAD s/p CABG/PCI, PAD, bioAVR, s/p MV annuloplasty, recent AAA repair with likely vasovagal syncope on admission  -EKG NSR with recent cardiac work up 8/20 including CCTA which showed patent grafts and ECHO with normal EF but with elevated mean gradient across AV and MV  -Pt mainly sedentary last few months  -However given recent cardiac work up as noted, no need for any cardiac testing as this time. Given extensive CAD/PAD/AAA hx, pt considered moderate risk for EGD/[place], however may proceed given need to eval for symptomatic anemia  -Will need CTS re-eval given elevated mean gradients noted across AV/MV from ECHO 8/20

## 2020-12-04 NOTE — PROGRESS NOTE ADULT - PROBLEM SELECTOR PLAN 2
Patient with chronic anemia, receives Iron infusions by Dr. Izquierdo, last infusion on 12/1/20. Presenting s/p fall, found to have Hgb 6.3, with FOBT +ve. Denies melena or hematochezia. Baseline Hgb was 7.1 on 11/26.  - reports prior colonoscopy normal ~ 5 years ago  - monitor CBC  - maintain active T&S  - transfuse for Hgb <7  - GI colonoscopy and EGD Monday; need cardiac clearance- patient moderate-high risk    #r/o GIB  Patient with Hgb 6.3 on presentation, FOBT positive. No overt s/s of bleeding, denies melena or hematochezia. 40 pound weight loss since March.  - c-scope normal ~5yrs ago  - Home Protonix 40mg BID

## 2020-12-04 NOTE — PROGRESS NOTE ADULT - PROBLEM SELECTOR PLAN 3
Cr 2.23 on presentation. Pt with CKD III-IV as per chart review (baseline Cr 1.3-1.5 in Aug 2020). Pt also with a hx of severe Renal Artery Stenosis (scarring and atrophy of left Kidney 11/21/19), s/p open thoracoabdominal aortic aneurysm repair w/bypass of celiac, SMA, and b/l renal arteries (08/26/2020). DDx includes post-obstructive CHERRI due to reports of urinary hesitancy vs prerenal 2/2 graft malfunction causing decreased perfusion to the kidneys  - no retention  - FeNa intrinsic- c/w h/o renal atrophy and scarring  - avoid nephrotoxic agents

## 2020-12-04 NOTE — PROGRESS NOTE ADULT - SUBJECTIVE AND OBJECTIVE BOX
OVERNIGHT EVENTS: none    SUBJECTIVE / INTERVAL HPI: Patient seen and examined at bedside. Complains of constipation for 5 days. Reports that tingling in legs was relieved by gabapentin. Back pain relieved by lidocaine patch.      VITAL SIGNS:  Vital Signs Last 24 Hrs  T(C): 36.4 (04 Dec 2020 14:00), Max: 37.1 (04 Dec 2020 05:32)  T(F): 97.6 (04 Dec 2020 14:00), Max: 98.7 (04 Dec 2020 05:32)  HR: 86 (04 Dec 2020 14:00) (74 - 86)  BP: 136/89 (04 Dec 2020 14:00) (114/67 - 136/89)  BP(mean): --  RR: 17 (04 Dec 2020 14:00) (17 - 18)  SpO2: 100% (04 Dec 2020 14:00) (99% - 100%)  I&O's Summary    03 Dec 2020 07:01  -  04 Dec 2020 07:00  --------------------------------------------------------  IN: 0 mL / OUT: 700 mL / NET: -700 mL        PHYSICAL EXAM:    General: thin elderly woman  HEENT: NC/AT; PERRL, anicteric sclera; MMM  Neck: supple  Cardiovascular: +S1/S2; AS murmur  Respiratory: CTA B/L; no W/R/R  Gastrointestinal: soft, NT/ND; +BSx4; very thin; surgical scars  Extremities: WWP; no edema, clubbing or cyanosis  Vascular: 2+ radial, DP/PT pulses palpable by doppler  Neurological: AAOx3; no focal deficits    MEDICATIONS:  MEDICATIONS  (STANDING):  gabapentin 100 milliGRAM(s) Oral daily  influenza  Vaccine (HIGH DOSE) 0.7 milliLiter(s) IntraMuscular once  levothyroxine 50 MICROGram(s) Oral daily  lidocaine   Patch 1 Patch Transdermal every 24 hours  multivitamin 1 Tablet(s) Oral daily  pantoprazole    Tablet 40 milliGRAM(s) Oral every 12 hours  polyethylene glycol 3350 17 Gram(s) Oral every 24 hours  senna 2 Tablet(s) Oral at bedtime  sertraline 50 milliGRAM(s) Oral daily  sodium phosphate IVPB 15 milliMole(s) IV Intermittent once    MEDICATIONS  (PRN):  acetaminophen   Tablet .. 650 milliGRAM(s) Oral every 6 hours PRN Mild Pain (1 - 3), Moderate Pain (4 - 6)  oxycodone    5 mG/acetaminophen 325 mG 1 Tablet(s) Oral every 4 hours PRN Severe Pain (7 - 10)      ALLERGIES:  Allergies    No Known Allergies    Intolerances    Crestor (Other (Mod to Severe))      LABS:                        7.9    5.85  )-----------( 219      ( 04 Dec 2020 06:44 )             26.5     12-04    139  |  107  |  17  ----------------------------<  111<H>  4.1   |  22  |  2.07<H>    Ca    10.0      04 Dec 2020 06:44  Phos  2.3     12-  Mg     2.2     12-    TPro  6.5  /  Alb  3.3  /  TBili  0.2  /  DBili  x   /  AST  13  /  ALT  <5<L>  /  AlkPhos  81  12-03    PT/INR - ( 03 Dec 2020 06:36 )   PT: 12.0 sec;   INR: 1.00          PTT - ( 03 Dec 2020 06:36 )  PTT:31.0 sec  Urinalysis Basic - ( 03 Dec 2020 01:09 )    Color: Yellow / Appearance: Clear / S.015 / pH: x  Gluc: x / Ketone: NEGATIVE  / Bili: Negative / Urobili: 0.2 E.U./dL   Blood: x / Protein: Trace mg/dL / Nitrite: NEGATIVE   Leuk Esterase: NEGATIVE / RBC: < 5 /HPF / WBC 5-10 /HPF   Sq Epi: x / Non Sq Epi: 5-10 /HPF / Bacteria: Present /HPF      CAPILLARY BLOOD GLUCOSE          RADIOLOGY & ADDITIONAL TESTS: Reviewed.

## 2020-12-05 LAB
ANION GAP SERPL CALC-SCNC: 13 MMOL/L — SIGNIFICANT CHANGE UP (ref 5–17)
BUN SERPL-MCNC: 16 MG/DL — SIGNIFICANT CHANGE UP (ref 7–23)
CALCIUM SERPL-MCNC: 9.8 MG/DL — SIGNIFICANT CHANGE UP (ref 8.4–10.5)
CHLORIDE SERPL-SCNC: 105 MMOL/L — SIGNIFICANT CHANGE UP (ref 96–108)
CO2 SERPL-SCNC: 20 MMOL/L — LOW (ref 22–31)
CREAT SERPL-MCNC: 1.77 MG/DL — HIGH (ref 0.5–1.3)
GLUCOSE SERPL-MCNC: 93 MG/DL — SIGNIFICANT CHANGE UP (ref 70–99)
HCT VFR BLD CALC: 26.7 % — LOW (ref 34.5–45)
HGB BLD-MCNC: 8.2 G/DL — LOW (ref 11.5–15.5)
MAGNESIUM SERPL-MCNC: 2 MG/DL — SIGNIFICANT CHANGE UP (ref 1.6–2.6)
MCHC RBC-ENTMCNC: 28.6 PG — SIGNIFICANT CHANGE UP (ref 27–34)
MCHC RBC-ENTMCNC: 30.7 GM/DL — LOW (ref 32–36)
MCV RBC AUTO: 93 FL — SIGNIFICANT CHANGE UP (ref 80–100)
NRBC # BLD: 0 /100 WBCS — SIGNIFICANT CHANGE UP (ref 0–0)
PHOSPHATE SERPL-MCNC: 4.4 MG/DL — SIGNIFICANT CHANGE UP (ref 2.5–4.5)
PLATELET # BLD AUTO: 189 K/UL — SIGNIFICANT CHANGE UP (ref 150–400)
POTASSIUM SERPL-MCNC: 4.4 MMOL/L — SIGNIFICANT CHANGE UP (ref 3.5–5.3)
POTASSIUM SERPL-SCNC: 4.4 MMOL/L — SIGNIFICANT CHANGE UP (ref 3.5–5.3)
RBC # BLD: 2.87 M/UL — LOW (ref 3.8–5.2)
RBC # FLD: 16.7 % — HIGH (ref 10.3–14.5)
RETICS #: 112 K/UL — SIGNIFICANT CHANGE UP (ref 25–125)
RETICS/RBC NFR: 3.9 % — HIGH (ref 0.5–2.5)
SODIUM SERPL-SCNC: 138 MMOL/L — SIGNIFICANT CHANGE UP (ref 135–145)
WBC # BLD: 8.09 K/UL — SIGNIFICANT CHANGE UP (ref 3.8–10.5)
WBC # FLD AUTO: 8.09 K/UL — SIGNIFICANT CHANGE UP (ref 3.8–10.5)

## 2020-12-05 PROCEDURE — 99233 SBSQ HOSP IP/OBS HIGH 50: CPT | Mod: GC

## 2020-12-05 PROCEDURE — 99232 SBSQ HOSP IP/OBS MODERATE 35: CPT | Mod: GC

## 2020-12-05 RX ADMIN — SERTRALINE 50 MILLIGRAM(S): 25 TABLET, FILM COATED ORAL at 11:28

## 2020-12-05 RX ADMIN — PANTOPRAZOLE SODIUM 40 MILLIGRAM(S): 20 TABLET, DELAYED RELEASE ORAL at 17:07

## 2020-12-05 RX ADMIN — LIDOCAINE 1 PATCH: 4 CREAM TOPICAL at 00:43

## 2020-12-05 RX ADMIN — LIDOCAINE 1 PATCH: 4 CREAM TOPICAL at 23:11

## 2020-12-05 RX ADMIN — Medication 1 TABLET(S): at 11:28

## 2020-12-05 RX ADMIN — Medication 650 MILLIGRAM(S): at 21:26

## 2020-12-05 RX ADMIN — Medication 650 MILLIGRAM(S): at 22:26

## 2020-12-05 RX ADMIN — GABAPENTIN 100 MILLIGRAM(S): 400 CAPSULE ORAL at 17:07

## 2020-12-05 RX ADMIN — Medication 50 MICROGRAM(S): at 05:47

## 2020-12-05 RX ADMIN — Medication 2000 MILLILITER(S): at 17:07

## 2020-12-05 RX ADMIN — LIDOCAINE 1 PATCH: 4 CREAM TOPICAL at 18:19

## 2020-12-05 RX ADMIN — POLYETHYLENE GLYCOL 3350 17 GRAM(S): 17 POWDER, FOR SOLUTION ORAL at 11:28

## 2020-12-05 RX ADMIN — LIDOCAINE 1 PATCH: 4 CREAM TOPICAL at 11:28

## 2020-12-05 RX ADMIN — PANTOPRAZOLE SODIUM 40 MILLIGRAM(S): 20 TABLET, DELAYED RELEASE ORAL at 05:47

## 2020-12-05 RX ADMIN — ATORVASTATIN CALCIUM 80 MILLIGRAM(S): 80 TABLET, FILM COATED ORAL at 21:26

## 2020-12-05 NOTE — PROGRESS NOTE ADULT - SUBJECTIVE AND OBJECTIVE BOX
GASTROENTEROLOGY PROGRESS NOTE  Patient seen and examined at bedside.    PERTINENT REVIEW OF SYSTEMS:  As noted above    Allergies    No Known Allergies    Intolerances    Crestor (Other (Mod to Severe))    MEDICATIONS:  MEDICATIONS  (STANDING):  atorvastatin 80 milliGRAM(s) Oral at bedtime  gabapentin 100 milliGRAM(s) Oral two times a day  influenza  Vaccine (HIGH DOSE) 0.7 milliLiter(s) IntraMuscular once  levothyroxine 50 MICROGram(s) Oral daily  lidocaine   Patch 1 Patch Transdermal every 24 hours  multivitamin 1 Tablet(s) Oral daily  pantoprazole    Tablet 40 milliGRAM(s) Oral every 12 hours  polyethylene glycol 3350 17 Gram(s) Oral every 24 hours  polyethylene glycol/electrolyte Solution 2000 milliLiter(s) Oral once  senna 2 Tablet(s) Oral at bedtime  sertraline 50 milliGRAM(s) Oral daily    MEDICATIONS  (PRN):  acetaminophen   Tablet .. 650 milliGRAM(s) Oral every 6 hours PRN Mild Pain (1 - 3), Moderate Pain (4 - 6)  oxycodone    5 mG/acetaminophen 325 mG 1 Tablet(s) Oral every 4 hours PRN Severe Pain (7 - 10)    Vital Signs Last 24 Hrs  T(C): 37.8 (05 Dec 2020 12:20), Max: 37.8 (05 Dec 2020 12:20)  T(F): 100 (05 Dec 2020 12:20), Max: 100 (05 Dec 2020 12:20)  HR: 84 (05 Dec 2020 12:20) (64 - 84)  BP: 113/62 (05 Dec 2020 12:20) (113/62 - 119/66)  BP(mean): --  RR: 18 (05 Dec 2020 12:20) (17 - 18)  SpO2: 96% (05 Dec 2020 12:20) (96% - 100%)    12-04 @ 07:01  -  12-05 @ 07:00  --------------------------------------------------------  IN: 0 mL / OUT: 200 mL / NET: -200 mL      PHYSICAL EXAM:    General: Well developed; well nourished; in no acute distress  HEENT: MMM, conjunctiva and sclera clear  Gastrointestinal: Soft non-tender non-distended; Normal bowel sounds; No hepatosplenomegaly. No rebound or guarding  Skin: Warm and dry. No obvious rash    LABS:                        8.2    8.09  )-----------( 189      ( 05 Dec 2020 06:35 )             26.7     12-05    138  |  105  |  16  ----------------------------<  93  4.4   |  20<L>  |  1.77<H>    Ca    9.8      05 Dec 2020 06:35  Phos  4.4     12-05  Mg     2.0     12-05    TPro  6.5  /  Alb  3.3  /  TBili  0.2  /  DBili  x   /  AST  13  /  ALT  <5<L>  /  AlkPhos  81  12-03                      RADIOLOGY & ADDITIONAL STUDIES:  Reviewed

## 2020-12-05 NOTE — PROGRESS NOTE ADULT - ASSESSMENT
65 yo F, former smoker, HTN, CAD s/p CABG (2015) and PCI to dLCx, bioprosthetic AVR (2002), s/p mitral valve annuloplasty (2002), Paroxysmal Tachycardia (seen on Holter 4/2016, PAD s/p stents, chronic anemia (receives Iron infusions by Dr. Izquierdo, last infusion on 12/1/20), AAA repair (2015), CKD, severe Renal Artery Stenosis, s/p recent open TAAA w/bypass of celiac, SMA, and b/l renal arteries (08/26/2020), who presents s/p a fall while he was coming to deliver her Holter monitor (after rapid response was called). GI was consulted for Hb 6.7 (baseline ~9), with FOBT positive however no signs of over GI bleeding    # Normocytic anemia  likely multifactorial   No signs of overt GIB  Hb improved appropriately after 2 PRBC to 6.7-->8.4 the equilibrated to 8.2  - check iron studies and retic count  - Monitor CBC and transfuse to goal H/H  - plan for EGD and colonoscopy on Monday  - cardiology eval appreciated  - CLD for now. NPO past Sunday midnight  - Start 2 day prep with [place] tomorrow (2L on Saturday, 2L Sunday evening 5pm and 2L Monday 5 am)  - PO dulcolax 10 mg Sunday 5 pm.    Recommendations discussed with primary team  Plan discussed with GI service attending    Saran Howell MD  PGY-4 GI fellow  Pager: [phone]

## 2020-12-05 NOTE — PROGRESS NOTE ADULT - PROBLEM SELECTOR PLAN 9
DVT ppx: None 2/2 anemia  GI ppx: protonix 40mg BID    Dispo: [facility]  Code: FULL
DVT ppx: None 2/2 anemia  GI ppx: protonix 40mg BID    Dispo: [facility]  Code: FULL
DVT ppx: None 2/2 anemia  GI ppx: protonix 40mg BID    Dispo: [facility]  Code: FULL

## 2020-12-05 NOTE — PROGRESS NOTE ADULT - SUBJECTIVE AND OBJECTIVE BOX
***INCOMPLETE***  INTERVAL HPI/OVERNIGHT EVENTS:      On further ROS, patient did not have complaint of: Headache, Blurred Vision, Cough, Dyspnea, Palpitations, Abdominal Pain, N/V, Weakness, Change in Sensation.     VITAL SIGNS:  T(F): 99.3 (12-05-20 @ 05:34)  HR: 64 (12-05-20 @ 05:34)  BP: 119/66 (12-05-20 @ 05:34)  RR: 18 (12-05-20 @ 05:34)  SpO2: 99% (12-05-20 @ 05:34)  Wt(kg): --    Input & Output:    12-04-20 @ 07:01  -  12-05-20 @ 07:00  --------------------------------------------------------  IN: 0 mL / OUT: 200 mL / NET: -200 mL        PHYSICAL EXAM:  General: Comfortable, pleasant/anxious/agitated, Ill-appearing, well-nourished/frail/cachectic, comfortable / in distress  Neurological: AAOx3, no focal deficits  HEENT: NC/AT; EOMI, PERRL, clear conjunctiva, no nasal or oropharyngeal discharge or exudates, MMM  Neck: supple, no cervical or post-auricular lymphadenopathy  Cardiovascular: +S1/S2, no murmurs/rubs/gallops, RRR  Respiratory: CTA B/L, no diminished breath sounds, no wheezes/rales/rhonchi, no increased work of breathing or accessory muscle use  Gastrointestinal: soft, NT/ND; active BSx4 quadrants  Genitourinary: no suprapubic tenderness, no CVA tenderness  Extremities: WWP; no edema, clubbing or cyanosis  Vascular: 2+ radial, DP/PT pulses B/L  Skin: no rashes  Lines/Drains:     MEDICATIONS  (STANDING):  atorvastatin 80 milliGRAM(s) Oral at bedtime  gabapentin 100 milliGRAM(s) Oral two times a day  influenza  Vaccine (HIGH DOSE) 0.7 milliLiter(s) IntraMuscular once  levothyroxine 50 MICROGram(s) Oral daily  lidocaine   Patch 1 Patch Transdermal every 24 hours  multivitamin 1 Tablet(s) Oral daily  pantoprazole    Tablet 40 milliGRAM(s) Oral every 12 hours  polyethylene glycol 3350 17 Gram(s) Oral every 24 hours  polyethylene glycol/electrolyte Solution 2000 milliLiter(s) Oral once  senna 2 Tablet(s) Oral at bedtime  sertraline 50 milliGRAM(s) Oral daily    MEDICATIONS  (PRN):  acetaminophen   Tablet .. 650 milliGRAM(s) Oral every 6 hours PRN Mild Pain (1 - 3), Moderate Pain (4 - 6)  oxycodone    5 mG/acetaminophen 325 mG 1 Tablet(s) Oral every 4 hours PRN Severe Pain (7 - 10)      Allergies    No Known Allergies    Intolerances    Crestor (Other (Mod to Severe))      LABS:                        8.2    8.09  )-----------( 189      ( 05 Dec 2020 06:35 )             26.7     12-05    138  |  105  |  16  ----------------------------<  93  4.4   |  20<L>  |  1.77<H>    Ca    9.8      05 Dec 2020 06:35  Phos  4.4     12-05  Mg     2.0     12-05    TPro  6.5  /  Alb  3.3  /  TBili  0.2  /  DBili  x   /  AST  13  /  ALT  <5<L>  /  AlkPhos  81  12-03          RADIOLOGY & ADDITIONAL TESTS:   INTERVAL HPI/OVERNIGHT EVENTS:  Patient with brief episode of dizziness last night, resolved without intervention. Asymptomatic this AM, physical exam unchanged. Patient in good spirits, will start moviprep this afternoon for egd/colo Monday.    On further ROS, patient did not have complaint of: Headache, Blurred Vision, Cough, Dyspnea, Palpitations, Abdominal Pain, N/V, Weakness, Change in Sensation.     VITAL SIGNS:  T(F): 99.3 (12-05-20 @ 05:34)  HR: 64 (12-05-20 @ 05:34)  BP: 119/66 (12-05-20 @ 05:34)  RR: 18 (12-05-20 @ 05:34)  SpO2: 99% (12-05-20 @ 05:34)  Wt(kg): --    Input & Output:    12-04-20 @ 07:01  -  12-05-20 @ 07:00  --------------------------------------------------------  IN: 0 mL / OUT: 200 mL / NET: -200 mL      PHYSICAL EXAM:  General: thin elderly woman, NAD, pleasantly conversing  HEENT: NC/AT; PERRL, anicteric sclera; MMM  Neck: supple, no LAD  Cardiovascular: +S1/S2; severe holosystolic murmur consistent with AS murmur  Respiratory: CTA B/L; no W/R/R  Gastrointestinal: soft, scaphoid, tender to palpation on the L side, L flank, and left lower chest wall; +BSx4; very thin; surgical scars  Extremities: WWP; no edema, clubbing or cyanosis  Vascular: 2+ radial, DP/PT pulses palpable by doppler  Neurological: AAOx3; no focal deficits    MEDICATIONS  (STANDING):  atorvastatin 80 milliGRAM(s) Oral at bedtime  gabapentin 100 milliGRAM(s) Oral two times a day  influenza  Vaccine (HIGH DOSE) 0.7 milliLiter(s) IntraMuscular once  levothyroxine 50 MICROGram(s) Oral daily  lidocaine   Patch 1 Patch Transdermal every 24 hours  multivitamin 1 Tablet(s) Oral daily  pantoprazole    Tablet 40 milliGRAM(s) Oral every 12 hours  polyethylene glycol 3350 17 Gram(s) Oral every 24 hours  polyethylene glycol/electrolyte Solution 2000 milliLiter(s) Oral once  senna 2 Tablet(s) Oral at bedtime  sertraline 50 milliGRAM(s) Oral daily    MEDICATIONS  (PRN):  acetaminophen   Tablet .. 650 milliGRAM(s) Oral every 6 hours PRN Mild Pain (1 - 3), Moderate Pain (4 - 6)  oxycodone    5 mG/acetaminophen 325 mG 1 Tablet(s) Oral every 4 hours PRN Severe Pain (7 - 10)      Allergies    No Known Allergies    Intolerances    Crestor (Other (Mod to Severe))      LABS:                        8.2    8.09  )-----------( 189      ( 05 Dec 2020 06:35 )             26.7     12-05    138  |  105  |  16  ----------------------------<  93  4.4   |  20<L>  |  1.77<H>    Ca    9.8      05 Dec 2020 06:35  Phos  4.4     12-05  Mg     2.0     12-05    TPro  6.5  /  Alb  3.3  /  TBili  0.2  /  DBili  x   /  AST  13  /  ALT  <5<L>  /  AlkPhos  81  12-03          RADIOLOGY & ADDITIONAL TESTS:

## 2020-12-05 NOTE — PROGRESS NOTE ADULT - PROBLEM SELECTOR PLAN 6
s/p CABG (2015) and PCI to dLCx. On ASA 81mg qd, Coreg 12.5mg BID. Chart review shows pt on Lopressor 25mg BID. Pt also reports intermittently taking lasix at home  - holding off on ASA 81mg for now given possible GIB  - med rec in the AM  - ECHO in Aug 2020 (EF 65%), unable to evaluate diastolic function    #Chest pain  Patient reports chest pain x 1 month and palpitations, for which she was evaluated on a holter monitor. Patient returned holter to [facility] on 12/2. Will r/o NSTEMI given hx of CAD  - trend Trops to peak  - f/u AM EKG  - obtain report from Holter monitor    #elevated Troponin  likely 2/2 CHERRI on CKD, however will r/o ACS  - management as above    #PAD  s/p pLSFA stent LCIA stent, with occluded Bilateral SFA (proximal to mid portion per U/S 2019). On Cilostazol at home. On exam, non-palpable LE pulses, but reports they are only doppler palpable.  - holding cilostazol (antiplatelet and vasodilator) in the setting of possible GIB and soft BP s/p CABG (2015) and PCI to dLx. On ASA 81mg qd, Coreg 12.5mg BID. Chart review shows pt on Lopressor 25mg BID. Pt also reports intermittently taking lasix at home  - holding off on ASA 81mg for now given possible GIB  - med rec in the AM  - ECHO in Aug 2020 (EF 65%), unable to evaluate diastolic function    #Chest pain  Patient reports chest pain x 1 month and palpitations, for which she was evaluated on a holter monitor. Patient returned holter to [facility] on 12/2. Will r/o NSTEMI given hx of CAD  - trend Trops to peak  - f/u AM EKG  - obtain report from Holter monitor    #elevated Troponin  likely 2/2 CHERRI on CKD, however will r/o ACS  - management as above  #Valvular disease - patient seen by structural heart during last admission. Intervention and workup at that time was deferred due to acute issue of AAA repair  - recommend touching base with structural to ensure patient has f/u prior to discharge for further workup of her valvular disease.    #PAD  s/p pLSFA stent LCIA stent, with occluded Bilateral SFA (proximal to mid portion per U/S 2019). On Cilostazol at home. On exam, non-palpable LE pulses, but reports they are only doppler palpable.  - holding cilostazol (antiplatelet and vasodilator) in the setting of possible GIB and soft BP

## 2020-12-05 NOTE — PROGRESS NOTE ADULT - PROBLEM SELECTOR PLAN 7
Hx of hypothyroidism, on Synthroid 88mcg qd  - f/u TSH  - c/w Synthroid 88mcg qd    #HTN  On Atenolol 25mg qd, Coreg 12.5mg BID and Lopressor 25mg BID as per chart review. Patient gave medication list to the ED, but couldn't be located  - obtain collateral from patient's pharmacy  - holding off on BP meds, BP normotensive, also in the setting of possible GIB    #Anxiety/Depression  -c/w Zoloft 50mg qd    #Seizures  - c/w Keppra 500mg BID
- - -

## 2020-12-06 LAB
ANION GAP SERPL CALC-SCNC: 13 MMOL/L — SIGNIFICANT CHANGE UP (ref 5–17)
BUN SERPL-MCNC: 12 MG/DL — SIGNIFICANT CHANGE UP (ref 7–23)
CALCIUM SERPL-MCNC: 9 MG/DL — SIGNIFICANT CHANGE UP (ref 8.4–10.5)
CHLORIDE SERPL-SCNC: 106 MMOL/L — SIGNIFICANT CHANGE UP (ref 96–108)
CO2 SERPL-SCNC: 21 MMOL/L — LOW (ref 22–31)
CREAT SERPL-MCNC: 1.57 MG/DL — HIGH (ref 0.5–1.3)
GLUCOSE SERPL-MCNC: 65 MG/DL — LOW (ref 70–99)
HCT VFR BLD CALC: 26.4 % — LOW (ref 34.5–45)
HGB BLD-MCNC: 8 G/DL — LOW (ref 11.5–15.5)
MAGNESIUM SERPL-MCNC: 1.9 MG/DL — SIGNIFICANT CHANGE UP (ref 1.6–2.6)
MCHC RBC-ENTMCNC: 28.2 PG — SIGNIFICANT CHANGE UP (ref 27–34)
MCHC RBC-ENTMCNC: 30.3 GM/DL — LOW (ref 32–36)
MCV RBC AUTO: 93 FL — SIGNIFICANT CHANGE UP (ref 80–100)
NRBC # BLD: 0 /100 WBCS — SIGNIFICANT CHANGE UP (ref 0–0)
PHOSPHATE SERPL-MCNC: 3.2 MG/DL — SIGNIFICANT CHANGE UP (ref 2.5–4.5)
PLATELET # BLD AUTO: 175 K/UL — SIGNIFICANT CHANGE UP (ref 150–400)
POTASSIUM SERPL-MCNC: 3.5 MMOL/L — SIGNIFICANT CHANGE UP (ref 3.5–5.3)
POTASSIUM SERPL-SCNC: 3.5 MMOL/L — SIGNIFICANT CHANGE UP (ref 3.5–5.3)
RBC # BLD: 2.84 M/UL — LOW (ref 3.8–5.2)
RBC # FLD: 16.9 % — HIGH (ref 10.3–14.5)
SODIUM SERPL-SCNC: 140 MMOL/L — SIGNIFICANT CHANGE UP (ref 135–145)
WBC # BLD: 5.41 K/UL — SIGNIFICANT CHANGE UP (ref 3.8–10.5)
WBC # FLD AUTO: 5.41 K/UL — SIGNIFICANT CHANGE UP (ref 3.8–10.5)

## 2020-12-06 PROCEDURE — 99233 SBSQ HOSP IP/OBS HIGH 50: CPT | Mod: GC

## 2020-12-06 PROCEDURE — 99232 SBSQ HOSP IP/OBS MODERATE 35: CPT | Mod: GC

## 2020-12-06 RX ORDER — POTASSIUM CHLORIDE 20 MEQ
40 PACKET (EA) ORAL ONCE
Refills: 0 | Status: COMPLETED | OUTPATIENT
Start: 2020-12-06 | End: 2020-12-06

## 2020-12-06 RX ORDER — MAGNESIUM SULFATE 500 MG/ML
1 VIAL (ML) INJECTION ONCE
Refills: 0 | Status: COMPLETED | OUTPATIENT
Start: 2020-12-06 | End: 2020-12-06

## 2020-12-06 RX ADMIN — Medication 10 MILLIGRAM(S): at 18:16

## 2020-12-06 RX ADMIN — GABAPENTIN 100 MILLIGRAM(S): 400 CAPSULE ORAL at 18:16

## 2020-12-06 RX ADMIN — Medication 1 TABLET(S): at 11:39

## 2020-12-06 RX ADMIN — PANTOPRAZOLE SODIUM 40 MILLIGRAM(S): 20 TABLET, DELAYED RELEASE ORAL at 05:23

## 2020-12-06 RX ADMIN — GABAPENTIN 100 MILLIGRAM(S): 400 CAPSULE ORAL at 05:23

## 2020-12-06 RX ADMIN — LIDOCAINE 1 PATCH: 4 CREAM TOPICAL at 18:17

## 2020-12-06 RX ADMIN — Medication 50 MICROGRAM(S): at 05:24

## 2020-12-06 RX ADMIN — LIDOCAINE 1 PATCH: 4 CREAM TOPICAL at 23:52

## 2020-12-06 RX ADMIN — Medication 2000 MILLILITER(S): at 18:15

## 2020-12-06 RX ADMIN — Medication 100 GRAM(S): at 10:31

## 2020-12-06 RX ADMIN — Medication 40 MILLIEQUIVALENT(S): at 10:31

## 2020-12-06 RX ADMIN — ATORVASTATIN CALCIUM 80 MILLIGRAM(S): 80 TABLET, FILM COATED ORAL at 21:03

## 2020-12-06 RX ADMIN — Medication 650 MILLIGRAM(S): at 06:29

## 2020-12-06 RX ADMIN — PANTOPRAZOLE SODIUM 40 MILLIGRAM(S): 20 TABLET, DELAYED RELEASE ORAL at 18:16

## 2020-12-06 RX ADMIN — SENNA PLUS 2 TABLET(S): 8.6 TABLET ORAL at 21:03

## 2020-12-06 RX ADMIN — Medication 650 MILLIGRAM(S): at 22:09

## 2020-12-06 RX ADMIN — SERTRALINE 50 MILLIGRAM(S): 25 TABLET, FILM COATED ORAL at 11:39

## 2020-12-06 RX ADMIN — LIDOCAINE 1 PATCH: 4 CREAM TOPICAL at 11:39

## 2020-12-06 RX ADMIN — Medication 650 MILLIGRAM(S): at 21:09

## 2020-12-06 RX ADMIN — Medication 650 MILLIGRAM(S): at 05:29

## 2020-12-06 NOTE — PROGRESS NOTE ADULT - ASSESSMENT
63 yo F, former smoker, HTN, CAD s/p CABG (2015) and PCI to dLCx, bioprosthetic AVR (2002), s/p mitral valve annuloplasty (2002), Paroxysmal Tachycardia (seen on Holter 4/2016, PAD s/p stents, chronic anemia (receives Iron infusions by Dr. Izquierdo, last infusion on 12/1/20), AAA repair (2015), CKD, severe Renal Artery Stenosis, s/p recent open TAAA w/bypass of celiac, SMA, and b/l renal arteries (08/26/2020), who presents s/p a fall while he was coming to deliver her Holter monitor (after rapid response was called). GI was consulted for Hb 6.7 (baseline ~9), with FOBT positive however no signs of over GI bleeding    # Normocytic anemia  likely multifactorial   No signs of overt GIB  Hb improved appropriately after 2 PRBC to 6.7-->8.4 the equilibrated to 8.2  - check iron studies and retic count  - Monitor CBC and transfuse to goal H/H  - cardiology eval appreciated  - plan for EGD and colonoscopy on Monday  - CLD for now. NPO past midnight  - complete 2 day Golytely prep (remaining 2L at 5pm and 2L Monday 5 am)    Recommendations discussed with primary team  Plan discussed with GI service attending    Saran Howell MD  PGY-4 GI fellow  Pager: [phone]

## 2020-12-06 NOTE — PROGRESS NOTE ADULT - SUBJECTIVE AND OBJECTIVE BOX
INTERVAL HPI/OVERNIGHT EVENTS: YESENIA O/N    SUBJECTIVE: Patient seen and examined at bedside.   Pt w several BMs overnight after starting 2-day prep. Otherwise no complaints - eating wo N/V/Abd pain. No chest pain, palpitations, dyspnea. Took shower today    OBJECTIVE:    VITAL SIGNS:  ICU Vital Signs Last 24 Hrs  T(C): 37.4 (06 Dec 2020 20:30), Max: 37.4 (06 Dec 2020 20:30)  T(F): 99.4 (06 Dec 2020 20:30), Max: 99.4 (06 Dec 2020 20:30)  HR: 85 (06 Dec 2020 20:30) (75 - 89)  BP: 145/75 (06 Dec 2020 20:30) (126/71 - 145/75)  BP(mean): --  ABP: --  ABP(mean): --  RR: 18 (06 Dec 2020 20:30) (17 - 18)  SpO2: 100% (06 Dec 2020 20:30) (99% - 100%)        CAPILLARY BLOOD GLUCOSE          PHYSICAL EXAM:    GEN: female in NAD on RA  HEENT: NC/AT, MMM  CV: RRR, 2/6 systolic murmur, trace BLE edema  PULM: Nml effort, CTAB  ABD: Soft, NABS, non-tender  NEURO: A/O x3, moving all ext  PSYCH: APpropriate and conversant    MEDICATIONS:  MEDICATIONS  (STANDING):  atorvastatin 80 milliGRAM(s) Oral at bedtime  gabapentin 100 milliGRAM(s) Oral two times a day  influenza  Vaccine (HIGH DOSE) 0.7 milliLiter(s) IntraMuscular once  levothyroxine 50 MICROGram(s) Oral daily  lidocaine   Patch 1 Patch Transdermal every 24 hours  multivitamin 1 Tablet(s) Oral daily  pantoprazole    Tablet 40 milliGRAM(s) Oral every 12 hours  polyethylene glycol 3350 17 Gram(s) Oral every 24 hours  senna 2 Tablet(s) Oral at bedtime  sertraline 50 milliGRAM(s) Oral daily    MEDICATIONS  (PRN):  acetaminophen   Tablet .. 650 milliGRAM(s) Oral every 6 hours PRN Mild Pain (1 - 3), Moderate Pain (4 - 6)  oxycodone    5 mG/acetaminophen 325 mG 1 Tablet(s) Oral every 4 hours PRN Severe Pain (7 - 10)      ALLERGIES:  Allergies    No Known Allergies    Intolerances    Crestor (Other (Mod to Severe))      LABS:                        8.0    5.41  )-----------( 175      ( 06 Dec 2020 06:58 )             26.4     12-06    140  |  106  |  12  ----------------------------<  65<L>  3.5   |  21<L>  |  1.57<H>    Ca    9.0      06 Dec 2020 06:57  Phos  3.2     12-06  Mg     1.9     12-06            RADIOLOGY & ADDITIONAL TESTS: Reviewed.

## 2020-12-06 NOTE — PROGRESS NOTE ADULT - SUBJECTIVE AND OBJECTIVE BOX
GASTROENTEROLOGY PROGRESS NOTE  Patient seen and examined at bedside.    PERTINENT REVIEW OF SYSTEMS:  As noted above    Allergies    No Known Allergies    Intolerances    Crestor (Other (Mod to Severe))    MEDICATIONS:  MEDICATIONS  (STANDING):  atorvastatin 80 milliGRAM(s) Oral at bedtime  bisacodyl 10 milliGRAM(s) Oral once  gabapentin 100 milliGRAM(s) Oral two times a day  influenza  Vaccine (HIGH DOSE) 0.7 milliLiter(s) IntraMuscular once  levothyroxine 50 MICROGram(s) Oral daily  lidocaine   Patch 1 Patch Transdermal every 24 hours  multivitamin 1 Tablet(s) Oral daily  pantoprazole    Tablet 40 milliGRAM(s) Oral every 12 hours  polyethylene glycol 3350 17 Gram(s) Oral every 24 hours  polyethylene glycol/electrolyte Solution 2000 milliLiter(s) Oral once  senna 2 Tablet(s) Oral at bedtime  sertraline 50 milliGRAM(s) Oral daily    MEDICATIONS  (PRN):  acetaminophen   Tablet .. 650 milliGRAM(s) Oral every 6 hours PRN Mild Pain (1 - 3), Moderate Pain (4 - 6)  oxycodone    5 mG/acetaminophen 325 mG 1 Tablet(s) Oral every 4 hours PRN Severe Pain (7 - 10)    Vital Signs Last 24 Hrs  T(C): 36.4 (06 Dec 2020 12:55), Max: 37.2 (05 Dec 2020 21:05)  T(F): 97.6 (06 Dec 2020 12:55), Max: 99 (05 Dec 2020 21:05)  HR: 89 (06 Dec 2020 12:55) (75 - 89)  BP: 129/72 (06 Dec 2020 12:55) (115/70 - 129/72)  BP(mean): --  RR: 17 (06 Dec 2020 12:55) (17 - 18)  SpO2: 100% (06 Dec 2020 12:55) (99% - 100%)    PHYSICAL EXAM:    General: Well developed; well nourished; in no acute distress  HEENT: MMM, conjunctiva and sclera clear  Gastrointestinal: Soft non-tender non-distended; Normal bowel sounds; No hepatosplenomegaly. No rebound or guarding  Skin: Warm and dry. No obvious rash    LABS:                        8.0    5.41  )-----------( 175      ( 06 Dec 2020 06:58 )             26.4     12-06    140  |  106  |  12  ----------------------------<  65<L>  3.5   |  21<L>  |  1.57<H>    Ca    9.0      06 Dec 2020 06:57  Phos  3.2     12-06  Mg     1.9     12-06                        RADIOLOGY & ADDITIONAL STUDIES:  Reviewed

## 2020-12-06 NOTE — PROGRESS NOTE ADULT - ATTENDING COMMENTS
Anemia, for an EGD and colonoscopy on Monday.
FOBT + with anemia.  Cardiac clearance.  Will plan on an EGD and a colonoscopy early next week.
Anemia, will plan for an EGD and a colonoscopy tomorrow.

## 2020-12-06 NOTE — PROGRESS NOTE ADULT - ASSESSMENT
64F w h/o CAD s/p CABG (2015) PCI to dLCx, AS s/p bioprostetic valve and MV annulplasty (2002), h/o ATach, wide complex arrythmia is past, PAD s/p stent, sz, TASNEEM (receiving IV FeSO4), KCKDIII, CARLI, AAA s/p open repair 08/2020 presents post mechanical fall found to have acute on chronic anemia w +FOBT c/f GIB, s/p 2u, now awaiting EGD+Colonoscopy 12/7    #GIB - FoBT positive - hgb has been stable - plan for EGD/[place] Mon. Optimized per cardiology  #TASNEEM - 2/2 blood loss suspected from GIB above- hgb stable in 8s  #CAD - does  #h/o AS - bioprosthetic valve   - Echo found to have incrased mean gradients over AS/MS in 08/2020  #CKD III - Cr continues improving 1.5 from 1.7  #Hypothyroidism - on synthroid  #Depression - c/w home regimen  #Renal cysts - on RP U/S - f/u outpatient  #Rosalie - keppra  #Back pain - lidocaine patch,     --NPO after MN for EGD+[place]  --Structural heart evaluation for elevated mean gradients across AV/MV on TTE Coronary CTA in 08/2020 that was deferred in setting of AAA - will likely need outpatient f/u    DISPO: Anticipate Home post EGD/[place] - 24-48h

## 2020-12-07 ENCOUNTER — TRANSCRIPTION ENCOUNTER (OUTPATIENT)
Age: 65
End: 2020-12-07

## 2020-12-07 ENCOUNTER — RESULT REVIEW (OUTPATIENT)
Age: 65
End: 2020-12-07

## 2020-12-07 VITALS
TEMPERATURE: 98 F | HEART RATE: 82 BPM | OXYGEN SATURATION: 100 % | DIASTOLIC BLOOD PRESSURE: 70 MMHG | SYSTOLIC BLOOD PRESSURE: 142 MMHG | RESPIRATION RATE: 16 BRPM

## 2020-12-07 LAB
ANION GAP SERPL CALC-SCNC: 12 MMOL/L — SIGNIFICANT CHANGE UP (ref 5–17)
BUN SERPL-MCNC: 9 MG/DL — SIGNIFICANT CHANGE UP (ref 7–23)
CALCIUM SERPL-MCNC: 9 MG/DL — SIGNIFICANT CHANGE UP (ref 8.4–10.5)
CHLORIDE SERPL-SCNC: 110 MMOL/L — HIGH (ref 96–108)
CO2 SERPL-SCNC: 18 MMOL/L — LOW (ref 22–31)
CREAT SERPL-MCNC: 1.45 MG/DL — HIGH (ref 0.5–1.3)
GLUCOSE SERPL-MCNC: 71 MG/DL — SIGNIFICANT CHANGE UP (ref 70–99)
HCT VFR BLD CALC: 27.6 % — LOW (ref 34.5–45)
HGB BLD-MCNC: 8.1 G/DL — LOW (ref 11.5–15.5)
MAGNESIUM SERPL-MCNC: 2.3 MG/DL — SIGNIFICANT CHANGE UP (ref 1.6–2.6)
MCHC RBC-ENTMCNC: 27.8 PG — SIGNIFICANT CHANGE UP (ref 27–34)
MCHC RBC-ENTMCNC: 29.3 GM/DL — LOW (ref 32–36)
MCV RBC AUTO: 94.8 FL — SIGNIFICANT CHANGE UP (ref 80–100)
NRBC # BLD: 0 /100 WBCS — SIGNIFICANT CHANGE UP (ref 0–0)
PLATELET # BLD AUTO: 177 K/UL — SIGNIFICANT CHANGE UP (ref 150–400)
POTASSIUM SERPL-MCNC: 4.1 MMOL/L — SIGNIFICANT CHANGE UP (ref 3.5–5.3)
POTASSIUM SERPL-SCNC: 4.1 MMOL/L — SIGNIFICANT CHANGE UP (ref 3.5–5.3)
RBC # BLD: 2.91 M/UL — LOW (ref 3.8–5.2)
RBC # FLD: 17 % — HIGH (ref 10.3–14.5)
SODIUM SERPL-SCNC: 140 MMOL/L — SIGNIFICANT CHANGE UP (ref 135–145)
WBC # BLD: 5.62 K/UL — SIGNIFICANT CHANGE UP (ref 3.8–10.5)
WBC # FLD AUTO: 5.62 K/UL — SIGNIFICANT CHANGE UP (ref 3.8–10.5)

## 2020-12-07 PROCEDURE — 99233 SBSQ HOSP IP/OBS HIGH 50: CPT | Mod: GC

## 2020-12-07 PROCEDURE — 88305 TISSUE EXAM BY PATHOLOGIST: CPT | Mod: 26

## 2020-12-07 PROCEDURE — 43235 EGD DIAGNOSTIC BRUSH WASH: CPT

## 2020-12-07 PROCEDURE — 45385 COLONOSCOPY W/LESION REMOVAL: CPT

## 2020-12-07 RX ORDER — ATORVASTATIN CALCIUM 80 MG/1
1 TABLET, FILM COATED ORAL
Qty: 30 | Refills: 0
Start: 2020-12-07 | End: 2021-01-05

## 2020-12-07 RX ORDER — POLYETHYLENE GLYCOL 3350 17 G/17G
17 POWDER, FOR SOLUTION ORAL
Qty: 510 | Refills: 0
Start: 2020-12-07 | End: 2021-01-05

## 2020-12-07 RX ORDER — LIDOCAINE 4 G/100G
1 CREAM TOPICAL
Qty: 30 | Refills: 0
Start: 2020-12-07 | End: 2021-01-05

## 2020-12-07 RX ORDER — PANTOPRAZOLE SODIUM 20 MG/1
1 TABLET, DELAYED RELEASE ORAL
Qty: 30 | Refills: 0
Start: 2020-12-07 | End: 2021-01-05

## 2020-12-07 RX ORDER — GABAPENTIN 400 MG/1
1 CAPSULE ORAL
Qty: 60 | Refills: 0
Start: 2020-12-07 | End: 2021-01-05

## 2020-12-07 RX ORDER — FUROSEMIDE 40 MG
1 TABLET ORAL
Qty: 0 | Refills: 0 | DISCHARGE

## 2020-12-07 RX ADMIN — Medication 1 TABLET(S): at 15:34

## 2020-12-07 RX ADMIN — Medication 650 MILLIGRAM(S): at 17:37

## 2020-12-07 RX ADMIN — PANTOPRAZOLE SODIUM 40 MILLIGRAM(S): 20 TABLET, DELAYED RELEASE ORAL at 17:34

## 2020-12-07 RX ADMIN — GABAPENTIN 100 MILLIGRAM(S): 400 CAPSULE ORAL at 17:34

## 2020-12-07 RX ADMIN — LIDOCAINE 1 PATCH: 4 CREAM TOPICAL at 15:33

## 2020-12-07 RX ADMIN — SERTRALINE 50 MILLIGRAM(S): 25 TABLET, FILM COATED ORAL at 15:34

## 2020-12-07 RX ADMIN — PANTOPRAZOLE SODIUM 40 MILLIGRAM(S): 20 TABLET, DELAYED RELEASE ORAL at 06:56

## 2020-12-07 RX ADMIN — Medication 50 MICROGRAM(S): at 06:56

## 2020-12-07 RX ADMIN — GABAPENTIN 100 MILLIGRAM(S): 400 CAPSULE ORAL at 06:56

## 2020-12-07 NOTE — DISCHARGE NOTE PROVIDER - NSDCCPCAREPLAN_GEN_ALL_CORE_FT
PRINCIPAL DISCHARGE DIAGNOSIS  Diagnosis: Anemia  Assessment and Plan of Treatment: You presented to the ED after having a fall caused by weakness from your low blood levels. You were given some blood and several exams were done in the ED and after your admission which confirmed you did not have any damage from this fall and that it was likely from a self healing bleed in your gut. A scope was performed which showed inflammation of your stomach, small intestine, and large intestine, but no active bleeding. You will continue to take a medication that reduces the amount of acid your stomach produces in order to decrease the inflammation in your gut. This should decrease the likelihood of you having another bleed, but if you feel lightheaded or dizzy, if you notice black tarry stools or bright red blood on the toilet paper when you wipe, or in the toilet bowl after you defecate, please ask for help to the nearest emergency room.  Please follow up with the GI doctor by calling the number provided and making an appointment to discuss the results of the scopes and if the medication is helping. We also recommend you follow up with your primary care physician in the next 2 weeks.      SECONDARY DISCHARGE DIAGNOSES  Diagnosis: CHERRI (acute kidney injury)  Assessment and Plan of Treatment: You were found to have an acute kidney injurty on top of your pre-existing kidney disease. This was likely caused by your low blood levels but has resolved and you are now safe for discharge. If you experience an inability to urinate or do not feel the need to urinate for a day or more, please go to the nearest emergency room for evaluation as this could indicate another acute worsening of your kidney disease.     PRINCIPAL DISCHARGE DIAGNOSIS  Diagnosis: Anemia  Assessment and Plan of Treatment: You presented to the ED after having a fall caused by weakness from your low blood levels. You were given some blood and several exams were done in the ED and after your admission which confirmed you did not have any damage from this fall and that it was likely from a self healing bleed in your gut. A scope was performed which showed inflammation of your stomach, small intestine, and large intestine, but no active bleeding. You will continue to take a medication that reduces the amount of acid your stomach produces in order to decrease the inflammation in your gut and it is recommended that you follow a high fiber diet and use stool softeners. This should decrease the likelihood of you having another bleed, but if you feel lightheaded or dizzy, if you notice black tarry stools or bright red blood on the toilet paper when you wipe, or in the toilet bowl after you defecate, please ask for help to the nearest emergency room.  Please follow up with the GI doctor by calling the number provided and making an appointment to discuss the results of the scopes and if the medication is helping. We also recommend you follow up with your primary care physician in the next 2 weeks.      SECONDARY DISCHARGE DIAGNOSES  Diagnosis: CHERRI (acute kidney injury)  Assessment and Plan of Treatment: You were found to have an acute kidney injurty on top of your pre-existing kidney disease. This was likely caused by your low blood levels but has resolved and you are now safe for discharge. If you experience an inability to urinate or do not feel the need to urinate for a day or more, please go to the nearest emergency room for evaluation as this could indicate another acute worsening of your kidney disease.

## 2020-12-07 NOTE — DISCHARGE NOTE PROVIDER - NSDCMRMEDTOKEN_GEN_ALL_CORE_FT
aspirin 81 mg oral delayed release tablet: 1 tab(s) orally once a day MDD:1  cilostazol 50 mg oral tablet: 1 tab(s) orally 2 times a day  folic acid 1 mg oral tablet: 1 tab(s) orally once a day  Lasix 40 mg oral tablet: 1 tab(s) orally once a day  levothyroxine 50 mcg (0.05 mg) oral tablet: 1 tab(s) orally once a day  metoprolol tartrate 25 mg oral tablet: 1 tab(s) orally 2 times a day  omega-3 polyunsaturated fatty acids ethyl esters 1000 mg oral capsule: 4 cap(s) orally once a day MDD:4 tabs  sertraline 50 mg oral tablet: 1 tab(s) orally once a day (at bedtime)  Vitamin D2 50,000 intl units (1.25 mg) oral capsule: 1 cap(s) orally once a week   aspirin 81 mg oral delayed release tablet: 1 tab(s) orally once a day MDD:1  atorvastatin 80 mg oral tablet: 1 tab(s) orally once a day (at bedtime)  cilostazol 50 mg oral tablet: 1 tab(s) orally 2 times a day  folic acid 1 mg oral tablet: 1 tab(s) orally once a day  gabapentin 100 mg oral capsule: 1 cap(s) orally 2 times a day  levothyroxine 50 mcg (0.05 mg) oral tablet: 1 tab(s) orally once a day  lidocaine 5% topical film: Apply topically to affected area once a day   metoprolol tartrate 25 mg oral tablet: 1 tab(s) orally 2 times a day  omega-3 polyunsaturated fatty acids ethyl esters 1000 mg oral capsule: 4 cap(s) orally once a day MDD:4 tabs  pantoprazole 40 mg oral delayed release tablet: 1 tab(s) orally once a day   polyethylene glycol 3350 oral powder for reconstitution: 17 gram(s) orally every 24 hours  sertraline 50 mg oral tablet: 1 tab(s) orally once a day (at bedtime)  Vitamin D2 50,000 intl units (1.25 mg) oral capsule: 1 cap(s) orally once a week

## 2020-12-07 NOTE — DISCHARGE NOTE PROVIDER - PROVIDER TOKENS
PROVIDER:[TOKEN:[[id number]],FOLLOWUP:[2 weeks]],PROVIDER:[TOKEN:[[id number]],FOLLOWUP:[1 week],ESTABLISHEDPATIENT:[T]]

## 2020-12-07 NOTE — DISCHARGE NOTE NURSING/CASE MANAGEMENT/SOCIAL WORK - PATIENT PORTAL LINK FT
You can access the FollowMyHealth Patient Portal offered by [facility] by registering at the following website: [email or web address]. By joining [facility]’s FollowMyHealth portal, you will also be able to view your health information using other applications (apps) compatible with our system.

## 2020-12-07 NOTE — PROGRESS NOTE ADULT - SUBJECTIVE AND OBJECTIVE BOX
Patient was seen and examined by me at bedside. I agree with resident's note, subjective, objective physical exam, assessment and plan with following modifications/additions.    Greater than 35 minutes spent on total encounter; more than 50% of the visit was spent counseling and/or coordinating care by the attending physician.    65 yo F former smoker PMH of CAD s/p CABG (2015) and PCI to dLCx, bioprosthetic AVR (2002), s/p mitral valve annuloplasty (2002), hx of Atach and wide complex arrythmia in past, sig PAD hx s/p stent, seizure, chronic anemia (IV iron by Dr. Izquierdo), Stage III CKD,  Renal Artery Stenosis, AAA s/p open thoracoabdominal aortic aneurysm repair 8/2020, who presents s/p a fall. No true LOC, likely mechanical fall from gait instability, found on labs to have acute on chronic anemia, CT imaging with free fluid at surgical site not bleed per vascular. EGD/colo pending 12/7  -EGD colo today 12/7, if neg likely dc with GI f/u and PCP f/u  -Fall likely mechanical- CT head neg, PT eval ok for home  -CHERRI on CKD- stabilized now   -CAD hx, PAD hx- continue asa, cilostazol  -Depression- continue home pysch meds  -Hypothyroidism-continue synthroid   -Seizure disorder- continue with keppra   -Chronic back pain s/p spinal disease and AAA surgery- continue gabapentin on dc, c/w tylenol and lidocaine patch, will review with primary re further opioid prescription on dc, f/u with PCP  -DVT px- can do SQH with no active bleed likely and with CKD  -Dispo- dc later today with PCP and GI f/u

## 2020-12-07 NOTE — DISCHARGE NOTE PROVIDER - CARE PROVIDER_API CALL
Chi Wheeler)  Gastroenterology  [address], [address] [place] [address]  Phone: [phone]  Fax: [phone]  Follow Up Time: 2 weeks    Farheen Izquierdo  MEDICINE  [address], [address] [place] [address]  Phone: [phone]  Fax: [phone]  Established Patient  Follow Up Time: 1 week

## 2020-12-07 NOTE — PROGRESS NOTE ADULT - NUTRITIONAL ASSESSMENT
This patient has been assessed with a concern for Malnutrition and has been determined to have a diagnosis/diagnoses of Severe protein-calorie malnutrition and Underweight/BMI < 19.    This patient is being managed with:   Diet DASH/TLC-  Sodium & Cholesterol Restricted  Supplement Feeding Modality:  Oral  Ensure Enlive Cans or Servings Per Day:  1       Frequency:  Three Times a day  Entered: Dec  3 2020  2:06PM    
This patient has been assessed with a concern for Malnutrition and has been determined to have a diagnosis/diagnoses of Severe protein-calorie malnutrition and Underweight/BMI < 19.    This patient is being managed with:   Diet DASH/TLC-  Sodium & Cholesterol Restricted  Supplement Feeding Modality:  Oral  Ensure Enlive Cans or Servings Per Day:  1       Frequency:  Three Times a day  Entered: Dec  3 2020  2:06PM    
This patient has been assessed with a concern for Malnutrition and has been determined to have a diagnosis/diagnoses of Severe protein-calorie malnutrition and Underweight/BMI < 19.    This patient is being managed with:   Diet NPO after Midnight-     NPO Start Date: 06-Dec-2020   NPO Start Time: 23:59  Entered: Dec  6 2020  9:48AM    Diet Clear Liquid-  DASH/TLC {Sodium & Cholesterol Restricted} (DASH)  Supplement Feeding Modality:  Oral  Ensure Clear Cans or Servings Per Day:  1       Frequency:  Three Times a day  Entered: Dec  4 2020  4:45PM    
This patient has been assessed with a concern for Malnutrition and has been determined to have a diagnosis/diagnoses of Severe protein-calorie malnutrition and Underweight/BMI < 19.    This patient is being managed with:   Diet Clear Liquid-  DASH/TLC {Sodium & Cholesterol Restricted} (DASH)  Supplement Feeding Modality:  Oral  Ensure Clear Cans or Servings Per Day:  1       Frequency:  Three Times a day  Entered: Dec  4 2020  4:45PM

## 2020-12-07 NOTE — CHART NOTE - NSCHARTNOTEFT_GEN_A_CORE
EGD:    impressions:	1. Tortuous esophagus. Schatzki ring at GEJ.   2. Moderate diffuse gastritis. Duodenitis in D1.  3. Hill grade IV hiatal hernia on retroflexion                recommendations:  1. PPI daily x 4 weeks  2. Proceed with colonoscopy                 Colonoscopy:    impressions:	  1. Pandiverticulosis  2. Diminutive polyp in the descending colon s/p biopsy. Small polyp in the sigmoid colon s/p cold snare polypectomy.   3. Grade II internal hemorrhoids.           recommendation:	1. High fiber diet  2. Stool softners for constipation  3. Follow up pathology

## 2020-12-07 NOTE — DISCHARGE NOTE PROVIDER - NSDCFUADDAPPT_GEN_ALL_CORE_FT
Please make an appointment with the Gastroenterology doctor to discuss your EGD and Colonoscopy, and to determine if the medication is helping you.    Please make an appointment with your primary care provider, Dr Izquierdo, to discuss your pain management regimen for your back

## 2020-12-07 NOTE — DISCHARGE NOTE PROVIDER - HOSPITAL COURSE
63 yo F former smoker PMH of CAD s/p CABG (2015) and PCI to dLCx, bioprosthetic AVR (2002), s/p mitral valve annuloplasty (2002), hx of Atach and wide complex arrythmia in past, sig PAD hx s/p stent, seizure, chronic anemia (IV iron by Dr. Izquierdo), Stage III CKD,  Renal Artery Stenosis, AAA s/p open thoracoabdominal aortic aneurysm repair 8/2020, who presents s/p mechanical fall, found to have and admitted for symptomatic anemia.    Problem List/Main Diagnoses:     1. Mechanical Fall: Presented after a fall likely 2/2 symptomatic anemia vs leg numbness from surgery and foraminal narrowing. Syncope unlikely as no LOC, chest pain, palpitations, lightheadedness or dizziness prior to the fall. ACS unlikely with no ischemic changes on EKG though elevated trops .02 (likely 2/2 CHERRI on CKD). CTH negative for acute bleed. PT recs home with home PT.    2.  Anemia: Patient with chronic anemia, receives Iron infusions by Dr. Izquierdo, last infusion on 12/1/20. Presenting s/p fall, found to have Hgb 6.3, with FOBT +ve. Denies melena or hematochezia. Baseline Hgb was 7.1 on 11/26. Transfused 1 unit pRBC and Hgb 8.1 at discharge. Continued on Protonix inpatient. EGD/Colonoscopy shows no active bleed but does confirm diverticulosis with 2 polyps present, gastritis, duodenitis. Patient to continue with PPI on discharge.     3. CHERRI on CKD:  Cr 2.23 on presentation. Pt with CKD III-IV and baseline Cr 1.3-1.5 in Aug 2020 and hx of severe Renal Artery Stenosis (scarring and atrophy of left Kidney 11/21/19), s/p open thoracoabdominal aortic aneurysm repair w/bypass of celiac, SMA, and b/l renal arteries (08/26/2020). Presentation consistent with renal atrophy and scarring, resolving on discharge with Cr 1.45.    4. Aneurysm of aorta: Pt is s/p AAA repair (2015), and s/p open thoracoabdominal aortic aneurysm repair w/bypass of celiac, SMA, and b/l renal arteries (08/26/2020) and CTAP on admission shows possible small fluid along the new bypass aortic graft along the left upper retroperitoneum, as well as in the region of left renal hilum. No concern for surgical intervention    5. Back pain w/ parasthesias: Persistent lower back pain and tingling in her legs, present since her surgery in August, on Percocet 5mg q4h PRN at home. CT L-spine showed mild multilevel disc bulging without significant spinal stenosis. There is left asymmetric neural foraminal narrowing at L3-4 and L4-5, and more significant right neural foraminal narrowing at L5-S1. MRI L-spine showed no cord compression or acute abnormality. Mild multilevel disc bulging without significant spinal stenosis. Multilevel neural foraminal canal narrowing greatest on the left at L3-4 and on the right at L5-S1. Percocet continued during admission and will be continued on discharge. Parasthesias likely 2/2 multilevel neural foraminal narrowing as seen on imaging, vs anemia vs nutritional deficiencies. Normal B12, folate, TSH, tingling improved with gabapentin BID which will be continued on discharge.    6. CAD s/p CABG (LIMA-ramus, ANTONIO-RDA) & PCI (dLCx), s/p bioprosthetic AVR w/mod stenosis, s/p mitral valve annuloplasty w/severe stenosis, AAA s/p repair (2015) & s/p open thoracoabdominal aortic aneurysm repair w/bypass of celiac, SMA, and b/l renal arteries (08/26/2020), pAtach, PAD: Patient on carvedilol, rosuvastatin, cilostazol, and asa at home, held until GI bleed r/o, then continued inpatient. Patient also reports she occasionally takes lasix at home, not continued inpatient. Patient follows cardiology and structural heart outpatient, will resume home medications on discharge.    7. Chest Pain: Patient has 1 month history of chest pain and was wearing a holter monitor for evaluation which she was returning when she fell. Trops trended to peak, EKG r/o NSTEMI, troponins likely elevated 2/2 CHERRI on CKD. Patient will f/u with structural heart and cardiology outpatient.     8. Hypothyroidism: Hx of hypothyroidism, on Synthroid 88mcg qd at home, continued inpatient and on discharge.    9. HTN: Hx of HTN on Atenolol 25mg qd, Coreg 12.5mg BID and Lopressor 25mg BID as per chart review, BP meds held i/s/o possible GI Bleed. BP stable but ok for patient to resume on discharge now that GI bleed r/o.    10. Anxiety/Depression: Patient taking Zoloft 50 mg qd, c/w Zoloft 50mg qd this admission and at discharge.    11. Seizures: Patient with history of seizures taking Keppra 500 mg BID, continued on admission and at discharge.    Inpatient treatment course: In the ED patient FOBT positive, EKG: NSR (HR 80), CXR: no acute infiltrates. Pt w/ nausea on admission. CTAP showed possible small fluid along the new bypass aortic graft along the left upper retroperitoneum, as well as in the region of left renal hilum. MRI L-spine showed no cord compression or acute abnormality. Vascular surgery was consulted in the ED, and determined no need for surgical intervention. Patient was deemed stable for EGD/colonoscopy by cardiology and it was performed 12/7, showing no acute GI Bleed but evidence of diverticulitis with 2 polyps, gastritis, and dudenitis was seen. Patient recommended to continue on PPI and f/u with GI outpatient, now stable for discharge.       New medications: No new medications     Labs to be followed outpatient: None    Exam to be followed outpatient: EGD/Colonoscopy results with GI in 1-2 weeks 65 yo F former smoker PMH of CAD s/p CABG (2015) and PCI to dLCx, bioprosthetic AVR (2002), s/p mitral valve annuloplasty (2002), hx of Atach and wide complex arrythmia in past, sig PAD hx s/p stent, seizure, chronic anemia (IV iron by Dr. Izquierdo), Stage III CKD,  Renal Artery Stenosis, AAA s/p open thoracoabdominal aortic aneurysm repair 8/2020, who presents s/p mechanical fall, found to have and admitted for symptomatic anemia.    Problem List/Main Diagnoses:     1. Mechanical Fall: Presented after a fall likely 2/2 symptomatic anemia vs leg numbness from surgery and foraminal narrowing. Syncope unlikely as no LOC, chest pain, palpitations, lightheadedness or dizziness prior to the fall. ACS unlikely with no ischemic changes on EKG though elevated trops .02 (likely 2/2 CHERRI on CKD). CTH negative for acute bleed. PT recs home with home PT.    2.  Anemia: Patient with chronic anemia, receives Iron infusions by Dr. Izquierdo, last infusion on 12/1/20. Presenting s/p fall, found to have Hgb 6.3, with FOBT +ve. Denies melena or hematochezia. Baseline Hgb was 7.1 on 11/26. Transfused 1 unit pRBC and Hgb 8.1 at discharge. Placed on Protonix inpatient. EGD/Colonoscopy shows no active bleed but does confirm diverticulosis with 2 polyps present, gastritis, duodenitis. Patient to continue with PPI on discharge.     3. CHERRI on CKD:  Cr 2.23 on presentation. Pt with CKD III-IV and baseline Cr 1.3-1.5 in Aug 2020 and hx of severe Renal Artery Stenosis (scarring and atrophy of left Kidney 11/21/19), s/p open thoracoabdominal aortic aneurysm repair w/bypass of celiac, SMA, and b/l renal arteries (08/26/2020). Presentation consistent with renal atrophy and scarring, resolving on discharge with Cr 1.45.    4. Aneurysm of aorta: Pt is s/p AAA repair (2015), and s/p open thoracoabdominal aortic aneurysm repair w/bypass of celiac, SMA, and b/l renal arteries (08/26/2020) and CTAP on admission shows possible small fluid along the new bypass aortic graft along the left upper retroperitoneum, as well as in the region of left renal hilum. No concern for surgical intervention    5. Back pain w/ parasthesias: Persistent lower back pain and tingling in her legs, present since her surgery in August, on Percocet 5mg q4h PRN at home. CT L-spine showed mild multilevel disc bulging without significant spinal stenosis. There is left asymmetric neural foraminal narrowing at L3-4 and L4-5, and more significant right neural foraminal narrowing at L5-S1. MRI L-spine showed no cord compression or acute abnormality. Mild multilevel disc bulging without significant spinal stenosis. Multilevel neural foraminal canal narrowing greatest on the left at L3-4 and on the right at L5-S1. Percocet continued during admission and will be continued on discharge. Parasthesias likely 2/2 multilevel neural foraminal narrowing as seen on imaging, vs anemia vs nutritional deficiencies. Normal B12, folate, TSH, tingling improved with gabapentin BID which will be continued on discharge.    6. CAD s/p CABG (LIMA-ramus, ANTONIO-RDA) & PCI (dLCx), s/p bioprosthetic AVR w/mod stenosis, s/p mitral valve annuloplasty w/severe stenosis, AAA s/p repair (2015) & s/p open thoracoabdominal aortic aneurysm repair w/bypass of celiac, SMA, and b/l renal arteries (08/26/2020), pAtach, PAD: Patient on carvedilol, rosuvastatin, cilostazol, and asa at home, held until GI bleed r/o, then continued inpatient. Patient also reports she occasionally takes lasix at home, not continued inpatient. Patient follows cardiology and structural heart outpatient, will resume home medications on discharge.    7. Chest Pain: Patient has 1 month history of chest pain and was wearing a holter monitor for evaluation which she was returning when she fell. Trops trended to peak, EKG r/o NSTEMI, troponins likely elevated 2/2 CHERRI on CKD. Patient will f/u with structural heart and cardiology outpatient.     8. Hypothyroidism: Hx of hypothyroidism, on Synthroid 88mcg qd at home, continued inpatient and on discharge.    9. HTN: Hx of HTN on Atenolol 25mg qd, Coreg 12.5mg BID and Lopressor 25mg BID as per chart review, BP meds held i/s/o possible GI Bleed. BP stable but ok for patient to resume on discharge now that GI bleed r/o.    10. Anxiety/Depression: Patient taking Zoloft 50 mg qd, c/w Zoloft 50mg qd this admission and at discharge.    11. Seizures: Patient with history of seizures taking Keppra 500 mg BID, continued on admission and at discharge.    Inpatient treatment course: In the ED patient FOBT positive, EKG: NSR (HR 80), CXR: no acute infiltrates. Pt w/ nausea on admission. CTAP showed possible small fluid along the new bypass aortic graft along the left upper retroperitoneum, as well as in the region of left renal hilum. MRI L-spine showed no cord compression or acute abnormality. Vascular surgery was consulted in the ED, and determined no need for surgical intervention. Patient was deemed stable for EGD/colonoscopy by cardiology and it was performed 12/7, showing no acute GI Bleed but evidence of diverticulitis with 2 polyps, gastritis, and dudenitis was seen. Patient recommended to continue on PPI and f/u with GI outpatient, now stable for discharge.       New medications: Protonix 40 mg daily x 4wks     Labs to be followed outpatient: None    Exam to be followed outpatient: EGD/Colonoscopy results with GI in 1-2 weeks

## 2020-12-08 LAB — SURGICAL PATHOLOGY STUDY: SIGNIFICANT CHANGE UP

## 2020-12-10 ENCOUNTER — NON-APPOINTMENT (OUTPATIENT)
Age: 65
End: 2020-12-10

## 2020-12-10 DIAGNOSIS — E03.9 HYPOTHYROIDISM, UNSPECIFIED: ICD-10-CM
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
## 2020-12-10 DIAGNOSIS — Z29.9 ENCOUNTER FOR PROPHYLACTIC MEASURES, UNSPECIFIED: ICD-10-CM

## 2020-12-10 DIAGNOSIS — M54.9 DORSALGIA, UNSPECIFIED: ICD-10-CM

## 2020-12-10 DIAGNOSIS — I25.10 ATHEROSCLEROTIC HEART DISEASE OF NATIVE CORONARY ARTERY WITHOUT ANGINA PECTORIS: ICD-10-CM

## 2020-12-10 DIAGNOSIS — I71.9 AORTIC ANEURYSM OF UNSPECIFIED SITE, WITHOUT RUPTURE: ICD-10-CM

## 2020-12-10 DIAGNOSIS — R63.8 OTHER SYMPTOMS AND SIGNS CONCERNING FOOD AND FLUID INTAKE: ICD-10-CM

## 2020-12-17 PROBLEM — F41.9 ANXIETY DISORDER, UNSPECIFIED: Chronic | Status: ACTIVE | Noted: 2020-12-02

## 2020-12-17 PROBLEM — N18.9 CHRONIC KIDNEY DISEASE, UNSPECIFIED: Chronic | Status: ACTIVE | Noted: 2020-12-02

## 2020-12-17 PROBLEM — F32.9 MAJOR DEPRESSIVE DISORDER, SINGLE EPISODE, UNSPECIFIED: Chronic | Status: ACTIVE | Noted: 2020-12-02

## 2020-12-18 ENCOUNTER — OUTPATIENT (OUTPATIENT)
Dept: OUTPATIENT SERVICES | Facility: HOSPITAL | Age: 65
LOS: 1 days | End: 2020-12-18
Payer: MEDICARE

## 2020-12-18 ENCOUNTER — APPOINTMENT (OUTPATIENT)
Age: 65
End: 2020-12-18

## 2020-12-18 VITALS
RESPIRATION RATE: 18 BRPM | TEMPERATURE: 98 F | DIASTOLIC BLOOD PRESSURE: 778 MMHG | SYSTOLIC BLOOD PRESSURE: 130 MMHG | OXYGEN SATURATION: 100 % | WEIGHT: 104.94 LBS | HEIGHT: 67 IN | HEART RATE: 80 BPM

## 2020-12-18 DIAGNOSIS — I71.4 ABDOMINAL AORTIC ANEURYSM, WITHOUT RUPTURE: Chronic | ICD-10-CM

## 2020-12-18 DIAGNOSIS — I71.8 AORTIC ANEURYSM OF UNSPECIFIED SITE, RUPTURED: Chronic | ICD-10-CM

## 2020-12-18 DIAGNOSIS — E61.1 IRON DEFICIENCY: ICD-10-CM

## 2020-12-18 DIAGNOSIS — Z95.2 PRESENCE OF PROSTHETIC HEART VALVE: Chronic | ICD-10-CM

## 2020-12-18 DIAGNOSIS — I25.701 ATHEROSCLEROSIS OF CORONARY ARTERY BYPASS GRAFT(S), UNSPECIFIED, WITH ANGINA PECTORIS WITH DOCUMENTED SPASM: Chronic | ICD-10-CM

## 2020-12-18 PROCEDURE — 86900 BLOOD TYPING SEROLOGIC ABO: CPT

## 2020-12-18 PROCEDURE — 36415 COLL VENOUS BLD VENIPUNCTURE: CPT

## 2020-12-18 PROCEDURE — 86901 BLOOD TYPING SEROLOGIC RH(D): CPT

## 2020-12-18 PROCEDURE — 36430 TRANSFUSION BLD/BLD COMPNT: CPT

## 2020-12-18 PROCEDURE — 86850 RBC ANTIBODY SCREEN: CPT

## 2020-12-18 PROCEDURE — 86923 COMPATIBILITY TEST ELECTRIC: CPT

## 2020-12-18 PROCEDURE — P9040: CPT

## 2020-12-18 RX ORDER — ACETAMINOPHEN 500 MG
650 TABLET ORAL ONCE
Refills: 0 | Status: COMPLETED | OUTPATIENT
Start: 2020-12-18 | End: 2020-12-18

## 2020-12-18 RX ORDER — DIPHENHYDRAMINE HCL 50 MG
25 CAPSULE ORAL ONCE
Refills: 0 | Status: COMPLETED | OUTPATIENT
Start: 2020-12-18 | End: 2020-12-18

## 2020-12-18 RX ADMIN — Medication 650 MILLIGRAM(S): at 13:47

## 2020-12-18 RX ADMIN — Medication 650 MILLIGRAM(S): at 12:17

## 2020-12-18 RX ADMIN — Medication 25 MILLIGRAM(S): at 12:17

## 2020-12-22 DIAGNOSIS — F41.8 OTHER SPECIFIED ANXIETY DISORDERS: ICD-10-CM

## 2020-12-22 DIAGNOSIS — R53.1 WEAKNESS: ICD-10-CM

## 2020-12-22 DIAGNOSIS — I70.1 ATHEROSCLEROSIS OF RENAL ARTERY: ICD-10-CM

## 2020-12-22 DIAGNOSIS — R77.8 OTHER SPECIFIED ABNORMALITIES OF PLASMA PROTEINS: ICD-10-CM

## 2020-12-22 DIAGNOSIS — K44.9 DIAPHRAGMATIC HERNIA WITHOUT OBSTRUCTION OR GANGRENE: ICD-10-CM

## 2020-12-22 DIAGNOSIS — D50.0 IRON DEFICIENCY ANEMIA SECONDARY TO BLOOD LOSS (CHRONIC): ICD-10-CM

## 2020-12-22 DIAGNOSIS — R20.2 PARESTHESIA OF SKIN: ICD-10-CM

## 2020-12-22 DIAGNOSIS — E03.9 HYPOTHYROIDISM, UNSPECIFIED: ICD-10-CM

## 2020-12-22 DIAGNOSIS — K22.2 ESOPHAGEAL OBSTRUCTION: ICD-10-CM

## 2020-12-22 DIAGNOSIS — Z91.81 HISTORY OF FALLING: ICD-10-CM

## 2020-12-22 DIAGNOSIS — K29.80 DUODENITIS WITHOUT BLEEDING: ICD-10-CM

## 2020-12-22 DIAGNOSIS — F32.9 MAJOR DEPRESSIVE DISORDER, SINGLE EPISODE, UNSPECIFIED: ICD-10-CM

## 2020-12-22 DIAGNOSIS — I12.9 HYPERTENSIVE CHRONIC KIDNEY DISEASE WITH STAGE 1 THROUGH STAGE 4 CHRONIC KIDNEY DISEASE, OR UNSPECIFIED CHRONIC KIDNEY DISEASE: ICD-10-CM

## 2020-12-22 DIAGNOSIS — I73.9 PERIPHERAL VASCULAR DISEASE, UNSPECIFIED: ICD-10-CM

## 2020-12-22 DIAGNOSIS — E43 UNSPECIFIED SEVERE PROTEIN-CALORIE MALNUTRITION: ICD-10-CM

## 2020-12-22 DIAGNOSIS — N18.4 CHRONIC KIDNEY DISEASE, STAGE 4 (SEVERE): ICD-10-CM

## 2020-12-22 DIAGNOSIS — I25.10 ATHEROSCLEROTIC HEART DISEASE OF NATIVE CORONARY ARTERY WITHOUT ANGINA PECTORIS: ICD-10-CM

## 2020-12-22 DIAGNOSIS — K64.8 OTHER HEMORRHOIDS: ICD-10-CM

## 2020-12-22 DIAGNOSIS — K63.5 POLYP OF COLON: ICD-10-CM

## 2020-12-22 DIAGNOSIS — K57.30 DIVERTICULOSIS OF LARGE INTESTINE WITHOUT PERFORATION OR ABSCESS WITHOUT BLEEDING: ICD-10-CM

## 2020-12-22 DIAGNOSIS — N17.9 ACUTE KIDNEY FAILURE, UNSPECIFIED: ICD-10-CM

## 2020-12-22 DIAGNOSIS — Z87.891 PERSONAL HISTORY OF NICOTINE DEPENDENCE: ICD-10-CM

## 2020-12-22 DIAGNOSIS — Z98.61 CORONARY ANGIOPLASTY STATUS: ICD-10-CM

## 2020-12-22 DIAGNOSIS — Z95.2 PRESENCE OF PROSTHETIC HEART VALVE: ICD-10-CM

## 2020-12-22 DIAGNOSIS — G40.909 EPILEPSY, UNSPECIFIED, NOT INTRACTABLE, WITHOUT STATUS EPILEPTICUS: ICD-10-CM

## 2020-12-22 DIAGNOSIS — Z95.1 PRESENCE OF AORTOCORONARY BYPASS GRAFT: ICD-10-CM

## 2021-01-05 PROCEDURE — 84484 ASSAY OF TROPONIN QUANT: CPT

## 2021-01-05 PROCEDURE — 74176 CT ABD & PELVIS W/O CONTRAST: CPT

## 2021-01-05 PROCEDURE — 85730 THROMBOPLASTIN TIME PARTIAL: CPT

## 2021-01-05 PROCEDURE — 72148 MRI LUMBAR SPINE W/O DYE: CPT

## 2021-01-05 PROCEDURE — 86850 RBC ANTIBODY SCREEN: CPT

## 2021-01-05 PROCEDURE — 99291 CRITICAL CARE FIRST HOUR: CPT | Mod: 25

## 2021-01-05 PROCEDURE — 80053 COMPREHEN METABOLIC PANEL: CPT

## 2021-01-05 PROCEDURE — 83735 ASSAY OF MAGNESIUM: CPT

## 2021-01-05 PROCEDURE — 86900 BLOOD TYPING SEROLOGIC ABO: CPT

## 2021-01-05 PROCEDURE — 83880 ASSAY OF NATRIURETIC PEPTIDE: CPT

## 2021-01-05 PROCEDURE — 81001 URINALYSIS AUTO W/SCOPE: CPT

## 2021-01-05 PROCEDURE — 85025 COMPLETE CBC W/AUTO DIFF WBC: CPT

## 2021-01-05 PROCEDURE — 84100 ASSAY OF PHOSPHORUS: CPT

## 2021-01-05 PROCEDURE — 93005 ELECTROCARDIOGRAM TRACING: CPT

## 2021-01-05 PROCEDURE — 82272 OCCULT BLD FECES 1-3 TESTS: CPT

## 2021-01-05 PROCEDURE — 84443 ASSAY THYROID STIM HORMONE: CPT

## 2021-01-05 PROCEDURE — 88305 TISSUE EXAM BY PATHOLOGIST: CPT

## 2021-01-05 PROCEDURE — 70450 CT HEAD/BRAIN W/O DYE: CPT

## 2021-01-05 PROCEDURE — 71045 X-RAY EXAM CHEST 1 VIEW: CPT

## 2021-01-05 PROCEDURE — 84300 ASSAY OF URINE SODIUM: CPT

## 2021-01-05 PROCEDURE — 36415 COLL VENOUS BLD VENIPUNCTURE: CPT

## 2021-01-05 PROCEDURE — 97116 GAIT TRAINING THERAPY: CPT

## 2021-01-05 PROCEDURE — 85027 COMPLETE CBC AUTOMATED: CPT

## 2021-01-05 PROCEDURE — 97161 PT EVAL LOW COMPLEX 20 MIN: CPT

## 2021-01-05 PROCEDURE — 82746 ASSAY OF FOLIC ACID SERUM: CPT

## 2021-01-05 PROCEDURE — 85610 PROTHROMBIN TIME: CPT

## 2021-01-05 PROCEDURE — 80048 BASIC METABOLIC PNL TOTAL CA: CPT

## 2021-01-05 PROCEDURE — 96374 THER/PROPH/DIAG INJ IV PUSH: CPT | Mod: XU

## 2021-01-05 PROCEDURE — P9040: CPT

## 2021-01-05 PROCEDURE — 85045 AUTOMATED RETICULOCYTE COUNT: CPT

## 2021-01-05 PROCEDURE — 82570 ASSAY OF URINE CREATININE: CPT

## 2021-01-05 PROCEDURE — 86901 BLOOD TYPING SEROLOGIC RH(D): CPT

## 2021-01-05 PROCEDURE — 72131 CT LUMBAR SPINE W/O DYE: CPT

## 2021-01-05 PROCEDURE — 96361 HYDRATE IV INFUSION ADD-ON: CPT | Mod: XU

## 2021-01-05 PROCEDURE — 86923 COMPATIBILITY TEST ELECTRIC: CPT

## 2021-01-05 PROCEDURE — 36430 TRANSFUSION BLD/BLD COMPNT: CPT

## 2021-01-05 PROCEDURE — 82607 VITAMIN B-12: CPT

## 2021-01-05 PROCEDURE — 84540 ASSAY OF URINE/UREA-N: CPT

## 2021-01-05 PROCEDURE — 93976 VASCULAR STUDY: CPT

## 2021-01-05 PROCEDURE — U0003: CPT

## 2021-01-08 ENCOUNTER — APPOINTMENT (OUTPATIENT)
Age: 66
End: 2021-01-08

## 2021-01-08 ENCOUNTER — OUTPATIENT (OUTPATIENT)
Dept: OUTPATIENT SERVICES | Facility: HOSPITAL | Age: 66
LOS: 1 days | End: 2021-01-08
Payer: MEDICARE

## 2021-01-08 VITALS
RESPIRATION RATE: 18 BRPM | SYSTOLIC BLOOD PRESSURE: 150 MMHG | OXYGEN SATURATION: 100 % | HEIGHT: 67 IN | DIASTOLIC BLOOD PRESSURE: 84 MMHG | TEMPERATURE: 97 F | HEART RATE: 78 BPM | WEIGHT: 104.94 LBS

## 2021-01-08 DIAGNOSIS — Z95.2 PRESENCE OF PROSTHETIC HEART VALVE: Chronic | ICD-10-CM

## 2021-01-08 DIAGNOSIS — I71.4 ABDOMINAL AORTIC ANEURYSM, WITHOUT RUPTURE: Chronic | ICD-10-CM

## 2021-01-08 DIAGNOSIS — D64.9 ANEMIA, UNSPECIFIED: ICD-10-CM

## 2021-01-08 DIAGNOSIS — I25.701 ATHEROSCLEROSIS OF CORONARY ARTERY BYPASS GRAFT(S), UNSPECIFIED, WITH ANGINA PECTORIS WITH DOCUMENTED SPASM: Chronic | ICD-10-CM

## 2021-01-08 DIAGNOSIS — I71.8 AORTIC ANEURYSM OF UNSPECIFIED SITE, RUPTURED: Chronic | ICD-10-CM

## 2021-01-08 LAB — SARS-COV-2 RNA SPEC QL NAA+PROBE: NEGATIVE — SIGNIFICANT CHANGE UP

## 2021-01-08 RX ORDER — ACETAMINOPHEN 500 MG
650 TABLET ORAL ONCE
Refills: 0 | Status: COMPLETED | OUTPATIENT
Start: 2021-01-08 | End: 2021-01-08

## 2021-01-08 RX ORDER — DIPHENHYDRAMINE HCL 50 MG
25 CAPSULE ORAL ONCE
Refills: 0 | Status: COMPLETED | OUTPATIENT
Start: 2021-01-08 | End: 2021-01-08

## 2021-01-08 RX ORDER — FUROSEMIDE 40 MG
40 TABLET ORAL ONCE
Refills: 0 | Status: DISCONTINUED | OUTPATIENT
Start: 2021-01-08 | End: 2021-01-22

## 2021-01-08 RX ADMIN — Medication 650 MILLIGRAM(S): at 10:50

## 2021-01-08 RX ADMIN — Medication 25 MILLIGRAM(S): at 10:50

## 2021-02-19 ENCOUNTER — OUTPATIENT (OUTPATIENT)
Dept: OUTPATIENT SERVICES | Facility: HOSPITAL | Age: 66
LOS: 1 days | End: 2021-02-19
Payer: MEDICARE

## 2021-02-19 ENCOUNTER — APPOINTMENT (OUTPATIENT)
Age: 66
End: 2021-02-19

## 2021-02-19 VITALS
OXYGEN SATURATION: 97 % | TEMPERATURE: 97 F | DIASTOLIC BLOOD PRESSURE: 68 MMHG | SYSTOLIC BLOOD PRESSURE: 134 MMHG | HEART RATE: 67 BPM | RESPIRATION RATE: 16 BRPM

## 2021-02-19 VITALS
DIASTOLIC BLOOD PRESSURE: 65 MMHG | HEART RATE: 67 BPM | SYSTOLIC BLOOD PRESSURE: 120 MMHG | OXYGEN SATURATION: 98 % | TEMPERATURE: 98 F | RESPIRATION RATE: 16 BRPM

## 2021-02-19 DIAGNOSIS — E61.1 IRON DEFICIENCY: ICD-10-CM

## 2021-02-19 DIAGNOSIS — Z95.2 PRESENCE OF PROSTHETIC HEART VALVE: Chronic | ICD-10-CM

## 2021-02-19 DIAGNOSIS — I25.701 ATHEROSCLEROSIS OF CORONARY ARTERY BYPASS GRAFT(S), UNSPECIFIED, WITH ANGINA PECTORIS WITH DOCUMENTED SPASM: Chronic | ICD-10-CM

## 2021-02-19 DIAGNOSIS — I71.8 AORTIC ANEURYSM OF UNSPECIFIED SITE, RUPTURED: Chronic | ICD-10-CM

## 2021-02-19 DIAGNOSIS — I71.4 ABDOMINAL AORTIC ANEURYSM, WITHOUT RUPTURE: Chronic | ICD-10-CM

## 2021-02-19 PROCEDURE — U0005: CPT

## 2021-02-19 PROCEDURE — U0003: CPT

## 2021-02-19 PROCEDURE — 86850 RBC ANTIBODY SCREEN: CPT

## 2021-02-19 PROCEDURE — 36430 TRANSFUSION BLD/BLD COMPNT: CPT

## 2021-02-19 PROCEDURE — 86923 COMPATIBILITY TEST ELECTRIC: CPT

## 2021-02-19 PROCEDURE — 96374 THER/PROPH/DIAG INJ IV PUSH: CPT

## 2021-02-19 PROCEDURE — P9040: CPT

## 2021-02-19 PROCEDURE — 86900 BLOOD TYPING SEROLOGIC ABO: CPT

## 2021-02-19 PROCEDURE — 86901 BLOOD TYPING SEROLOGIC RH(D): CPT

## 2021-02-19 PROCEDURE — 36415 COLL VENOUS BLD VENIPUNCTURE: CPT

## 2021-02-19 RX ORDER — FUROSEMIDE 40 MG
20 TABLET ORAL ONCE
Refills: 0 | Status: COMPLETED | OUTPATIENT
Start: 2021-02-19 | End: 2021-02-19

## 2021-02-19 RX ORDER — DIPHENHYDRAMINE HCL 50 MG
25 CAPSULE ORAL ONCE
Refills: 0 | Status: COMPLETED | OUTPATIENT
Start: 2021-02-19 | End: 2021-02-19

## 2021-02-19 RX ORDER — ACETAMINOPHEN 500 MG
650 TABLET ORAL ONCE
Refills: 0 | Status: COMPLETED | OUTPATIENT
Start: 2021-02-19 | End: 2021-02-19

## 2021-02-19 RX ADMIN — Medication 25 MILLIGRAM(S): at 09:45

## 2021-02-19 RX ADMIN — Medication 650 MILLIGRAM(S): at 09:45

## 2021-02-19 RX ADMIN — Medication 20 MILLIGRAM(S): at 09:45

## 2021-03-12 ENCOUNTER — OUTPATIENT (OUTPATIENT)
Dept: OUTPATIENT SERVICES | Facility: HOSPITAL | Age: 66
LOS: 1 days | End: 2021-03-12
Payer: MEDICARE

## 2021-03-12 ENCOUNTER — APPOINTMENT (OUTPATIENT)
Age: 66
End: 2021-03-12

## 2021-03-12 VITALS
TEMPERATURE: 97 F | SYSTOLIC BLOOD PRESSURE: 156 MMHG | WEIGHT: 108.03 LBS | DIASTOLIC BLOOD PRESSURE: 75 MMHG | OXYGEN SATURATION: 100 % | RESPIRATION RATE: 16 BRPM | HEART RATE: 76 BPM | HEIGHT: 67 IN

## 2021-03-12 VITALS
SYSTOLIC BLOOD PRESSURE: 161 MMHG | RESPIRATION RATE: 18 BRPM | HEART RATE: 70 BPM | DIASTOLIC BLOOD PRESSURE: 70 MMHG | TEMPERATURE: 98 F | OXYGEN SATURATION: 99 %

## 2021-03-12 DIAGNOSIS — D50.9 IRON DEFICIENCY ANEMIA, UNSPECIFIED: ICD-10-CM

## 2021-03-12 DIAGNOSIS — I71.8 AORTIC ANEURYSM OF UNSPECIFIED SITE, RUPTURED: Chronic | ICD-10-CM

## 2021-03-12 DIAGNOSIS — Z95.2 PRESENCE OF PROSTHETIC HEART VALVE: Chronic | ICD-10-CM

## 2021-03-12 DIAGNOSIS — I71.4 ABDOMINAL AORTIC ANEURYSM, WITHOUT RUPTURE: Chronic | ICD-10-CM

## 2021-03-12 DIAGNOSIS — I25.701 ATHEROSCLEROSIS OF CORONARY ARTERY BYPASS GRAFT(S), UNSPECIFIED, WITH ANGINA PECTORIS WITH DOCUMENTED SPASM: Chronic | ICD-10-CM

## 2021-03-12 LAB
ALBUMIN SERPL ELPH-MCNC: 3.6 G/DL — SIGNIFICANT CHANGE UP (ref 3.3–5)
ALP SERPL-CCNC: 85 U/L — SIGNIFICANT CHANGE UP (ref 40–120)
ALT FLD-CCNC: 7 U/L — LOW (ref 10–45)
ANION GAP SERPL CALC-SCNC: 12 MMOL/L — SIGNIFICANT CHANGE UP (ref 5–17)
AST SERPL-CCNC: 20 U/L — SIGNIFICANT CHANGE UP (ref 10–40)
BILIRUB SERPL-MCNC: 0.5 MG/DL — SIGNIFICANT CHANGE UP (ref 0.2–1.2)
BUN SERPL-MCNC: 23 MG/DL — SIGNIFICANT CHANGE UP (ref 7–23)
CALCIUM SERPL-MCNC: 10.8 MG/DL — HIGH (ref 8.4–10.5)
CHLORIDE SERPL-SCNC: 104 MMOL/L — SIGNIFICANT CHANGE UP (ref 96–108)
CO2 SERPL-SCNC: 28 MMOL/L — SIGNIFICANT CHANGE UP (ref 22–31)
CREAT SERPL-MCNC: 1.6 MG/DL — HIGH (ref 0.5–1.3)
GLUCOSE SERPL-MCNC: 110 MG/DL — HIGH (ref 70–99)
HCT VFR BLD CALC: 28.6 % — LOW (ref 34.5–45)
HGB BLD-MCNC: 8.7 G/DL — LOW (ref 11.5–15.5)
MCHC RBC-ENTMCNC: 28.8 PG — SIGNIFICANT CHANGE UP (ref 27–34)
MCHC RBC-ENTMCNC: 30.4 GM/DL — LOW (ref 32–36)
MCV RBC AUTO: 94.7 FL — SIGNIFICANT CHANGE UP (ref 80–100)
PLATELET # BLD AUTO: 295 K/UL — SIGNIFICANT CHANGE UP (ref 150–400)
POTASSIUM SERPL-MCNC: 3.2 MMOL/L — LOW (ref 3.5–5.3)
POTASSIUM SERPL-SCNC: 3.2 MMOL/L — LOW (ref 3.5–5.3)
PROT SERPL-MCNC: 8.2 G/DL — SIGNIFICANT CHANGE UP (ref 6–8.3)
RBC # BLD: 3.02 M/UL — LOW (ref 3.8–5.2)
RBC # FLD: 15.5 % — HIGH (ref 10.3–14.5)
SODIUM SERPL-SCNC: 144 MMOL/L — SIGNIFICANT CHANGE UP (ref 135–145)
VIT B12 SERPL-MCNC: >2000 PG/ML — HIGH (ref 232–1245)
WBC # BLD: 5.2 K/UL — SIGNIFICANT CHANGE UP (ref 3.8–10.5)
WBC # FLD AUTO: 5.2 K/UL — SIGNIFICANT CHANGE UP (ref 3.8–10.5)

## 2021-03-12 PROCEDURE — 96365 THER/PROPH/DIAG IV INF INIT: CPT

## 2021-03-12 PROCEDURE — 36415 COLL VENOUS BLD VENIPUNCTURE: CPT

## 2021-03-12 PROCEDURE — 82607 VITAMIN B-12: CPT

## 2021-03-12 PROCEDURE — 85027 COMPLETE CBC AUTOMATED: CPT

## 2021-03-12 PROCEDURE — U0005: CPT

## 2021-03-12 PROCEDURE — 96372 THER/PROPH/DIAG INJ SC/IM: CPT

## 2021-03-12 PROCEDURE — 80053 COMPREHEN METABOLIC PANEL: CPT

## 2021-03-12 PROCEDURE — U0003: CPT

## 2021-03-12 RX ORDER — SODIUM CHLORIDE 9 MG/ML
500 INJECTION, SOLUTION INTRAVENOUS
Refills: 0 | Status: COMPLETED | OUTPATIENT
Start: 2021-03-12 | End: 2021-03-12

## 2021-03-12 RX ORDER — SODIUM FERRIC GLUCONAT/SUCROSE 62.5MG/5ML
125 AMPUL (ML) INTRAVENOUS ONCE
Refills: 0 | Status: COMPLETED | OUTPATIENT
Start: 2021-03-12 | End: 2021-03-12

## 2021-03-12 RX ORDER — PREGABALIN 225 MG/1
1000 CAPSULE ORAL ONCE
Refills: 0 | Status: COMPLETED | OUTPATIENT
Start: 2021-03-12 | End: 2021-03-12

## 2021-03-12 RX ADMIN — Medication 110 MILLIGRAM(S): at 09:00

## 2021-03-12 RX ADMIN — PREGABALIN 1000 MICROGRAM(S): 225 CAPSULE ORAL at 09:48

## 2021-03-12 RX ADMIN — SODIUM CHLORIDE 255 MILLILITER(S): 9 INJECTION, SOLUTION INTRAVENOUS at 16:31

## 2021-03-12 RX ADMIN — Medication 125 MILLIGRAM(S): at 10:30

## 2021-04-01 ENCOUNTER — APPOINTMENT (OUTPATIENT)
Age: 66
End: 2021-04-01

## 2021-04-01 ENCOUNTER — OUTPATIENT (OUTPATIENT)
Dept: OUTPATIENT SERVICES | Facility: HOSPITAL | Age: 66
LOS: 1 days | End: 2021-04-01
Payer: MEDICARE

## 2021-04-01 VITALS
TEMPERATURE: 98 F | OXYGEN SATURATION: 98 % | HEART RATE: 95 BPM | HEIGHT: 67 IN | WEIGHT: 108.03 LBS | RESPIRATION RATE: 18 BRPM | SYSTOLIC BLOOD PRESSURE: 123 MMHG | DIASTOLIC BLOOD PRESSURE: 75 MMHG

## 2021-04-01 DIAGNOSIS — I25.701 ATHEROSCLEROSIS OF CORONARY ARTERY BYPASS GRAFT(S), UNSPECIFIED, WITH ANGINA PECTORIS WITH DOCUMENTED SPASM: Chronic | ICD-10-CM

## 2021-04-01 DIAGNOSIS — Z95.2 PRESENCE OF PROSTHETIC HEART VALVE: Chronic | ICD-10-CM

## 2021-04-01 DIAGNOSIS — D50.9 IRON DEFICIENCY ANEMIA, UNSPECIFIED: ICD-10-CM

## 2021-04-01 DIAGNOSIS — I71.8 AORTIC ANEURYSM OF UNSPECIFIED SITE, RUPTURED: Chronic | ICD-10-CM

## 2021-04-01 DIAGNOSIS — I71.4 ABDOMINAL AORTIC ANEURYSM, WITHOUT RUPTURE: Chronic | ICD-10-CM

## 2021-04-01 PROCEDURE — 82607 VITAMIN B-12: CPT

## 2021-04-01 PROCEDURE — 86901 BLOOD TYPING SEROLOGIC RH(D): CPT

## 2021-04-01 PROCEDURE — 86923 COMPATIBILITY TEST ELECTRIC: CPT

## 2021-04-01 PROCEDURE — 86850 RBC ANTIBODY SCREEN: CPT

## 2021-04-01 PROCEDURE — 36430 TRANSFUSION BLD/BLD COMPNT: CPT

## 2021-04-01 PROCEDURE — P9040: CPT

## 2021-04-01 PROCEDURE — 36415 COLL VENOUS BLD VENIPUNCTURE: CPT

## 2021-04-01 PROCEDURE — 86900 BLOOD TYPING SEROLOGIC ABO: CPT

## 2021-04-01 PROCEDURE — 96365 THER/PROPH/DIAG IV INF INIT: CPT

## 2021-04-01 PROCEDURE — 96372 THER/PROPH/DIAG INJ SC/IM: CPT

## 2021-04-01 PROCEDURE — 85027 COMPLETE CBC AUTOMATED: CPT

## 2021-04-01 PROCEDURE — 80053 COMPREHEN METABOLIC PANEL: CPT

## 2021-04-01 RX ORDER — SODIUM FERRIC GLUCONAT/SUCROSE 62.5MG/5ML
125 AMPUL (ML) INTRAVENOUS ONCE
Refills: 0 | Status: COMPLETED | OUTPATIENT
Start: 2021-04-01 | End: 2021-04-01

## 2021-04-01 RX ORDER — FUROSEMIDE 40 MG
40 TABLET ORAL ONCE
Refills: 0 | Status: COMPLETED | OUTPATIENT
Start: 2021-04-01 | End: 2021-04-01

## 2021-04-01 RX ORDER — PREGABALIN 225 MG/1
1000 CAPSULE ORAL ONCE
Refills: 0 | Status: COMPLETED | OUTPATIENT
Start: 2021-04-01 | End: 2021-04-01

## 2021-04-01 RX ORDER — FUROSEMIDE 40 MG
40 TABLET ORAL ONCE
Refills: 0 | Status: DISCONTINUED | OUTPATIENT
Start: 2021-04-01 | End: 2021-04-01

## 2021-04-01 RX ORDER — ERYTHROPOIETIN 10000 [IU]/ML
20000 INJECTION, SOLUTION INTRAVENOUS; SUBCUTANEOUS ONCE
Refills: 0 | Status: COMPLETED | OUTPATIENT
Start: 2021-04-01 | End: 2021-04-01

## 2021-04-01 RX ADMIN — ERYTHROPOIETIN 20000 UNIT(S): 10000 INJECTION, SOLUTION INTRAVENOUS; SUBCUTANEOUS at 09:45

## 2021-04-01 RX ADMIN — PREGABALIN 1000 MICROGRAM(S): 225 CAPSULE ORAL at 09:48

## 2021-04-01 RX ADMIN — Medication 110 MILLIGRAM(S): at 09:20

## 2021-04-01 RX ADMIN — Medication 125 MILLIGRAM(S): at 10:20

## 2021-04-02 ENCOUNTER — OUTPATIENT (OUTPATIENT)
Dept: OUTPATIENT SERVICES | Facility: HOSPITAL | Age: 66
LOS: 1 days | End: 2021-04-02
Payer: MEDICARE

## 2021-04-02 ENCOUNTER — APPOINTMENT (OUTPATIENT)
Age: 66
End: 2021-04-02

## 2021-04-02 VITALS
SYSTOLIC BLOOD PRESSURE: 144 MMHG | HEART RATE: 78 BPM | RESPIRATION RATE: 15 BRPM | DIASTOLIC BLOOD PRESSURE: 82 MMHG | TEMPERATURE: 98 F | OXYGEN SATURATION: 98 %

## 2021-04-02 DIAGNOSIS — Z95.2 PRESENCE OF PROSTHETIC HEART VALVE: Chronic | ICD-10-CM

## 2021-04-02 DIAGNOSIS — D64.9 ANEMIA, UNSPECIFIED: ICD-10-CM

## 2021-04-02 DIAGNOSIS — I25.701 ATHEROSCLEROSIS OF CORONARY ARTERY BYPASS GRAFT(S), UNSPECIFIED, WITH ANGINA PECTORIS WITH DOCUMENTED SPASM: Chronic | ICD-10-CM

## 2021-04-02 DIAGNOSIS — D50.9 IRON DEFICIENCY ANEMIA, UNSPECIFIED: ICD-10-CM

## 2021-04-02 DIAGNOSIS — I71.4 ABDOMINAL AORTIC ANEURYSM, WITHOUT RUPTURE: Chronic | ICD-10-CM

## 2021-04-02 DIAGNOSIS — I71.8 AORTIC ANEURYSM OF UNSPECIFIED SITE, RUPTURED: Chronic | ICD-10-CM

## 2021-04-02 LAB — VIT B12 SERPL-MCNC: >2000 PG/ML — HIGH (ref 232–1245)

## 2021-04-02 PROCEDURE — 36430 TRANSFUSION BLD/BLD COMPNT: CPT

## 2021-04-02 PROCEDURE — 36415 COLL VENOUS BLD VENIPUNCTURE: CPT

## 2021-04-02 PROCEDURE — 86923 COMPATIBILITY TEST ELECTRIC: CPT

## 2021-04-02 PROCEDURE — P9040: CPT

## 2021-04-09 ENCOUNTER — OUTPATIENT (OUTPATIENT)
Dept: OUTPATIENT SERVICES | Facility: HOSPITAL | Age: 66
LOS: 1 days | End: 2021-04-09
Payer: MEDICARE

## 2021-04-09 ENCOUNTER — APPOINTMENT (OUTPATIENT)
Age: 66
End: 2021-04-09

## 2021-04-09 VITALS
RESPIRATION RATE: 16 BRPM | TEMPERATURE: 97 F | HEART RATE: 87 BPM | HEIGHT: 67 IN | WEIGHT: 108.03 LBS | OXYGEN SATURATION: 97 % | SYSTOLIC BLOOD PRESSURE: 17 MMHG | DIASTOLIC BLOOD PRESSURE: 97 MMHG

## 2021-04-09 DIAGNOSIS — Z95.2 PRESENCE OF PROSTHETIC HEART VALVE: Chronic | ICD-10-CM

## 2021-04-09 DIAGNOSIS — I71.4 ABDOMINAL AORTIC ANEURYSM, WITHOUT RUPTURE: Chronic | ICD-10-CM

## 2021-04-09 DIAGNOSIS — I25.701 ATHEROSCLEROSIS OF CORONARY ARTERY BYPASS GRAFT(S), UNSPECIFIED, WITH ANGINA PECTORIS WITH DOCUMENTED SPASM: Chronic | ICD-10-CM

## 2021-04-09 DIAGNOSIS — D50.9 IRON DEFICIENCY ANEMIA, UNSPECIFIED: ICD-10-CM

## 2021-04-09 DIAGNOSIS — I71.8 AORTIC ANEURYSM OF UNSPECIFIED SITE, RUPTURED: Chronic | ICD-10-CM

## 2021-04-09 LAB
ALBUMIN SERPL ELPH-MCNC: 4.5 G/DL — SIGNIFICANT CHANGE UP (ref 3.3–5)
ALP SERPL-CCNC: 108 U/L — SIGNIFICANT CHANGE UP (ref 40–120)
ALT FLD-CCNC: 6 U/L — LOW (ref 10–45)
ANION GAP SERPL CALC-SCNC: 14 MMOL/L — SIGNIFICANT CHANGE UP (ref 5–17)
AST SERPL-CCNC: 17 U/L — SIGNIFICANT CHANGE UP (ref 10–40)
BILIRUB SERPL-MCNC: 0.2 MG/DL — SIGNIFICANT CHANGE UP (ref 0.2–1.2)
BUN SERPL-MCNC: 23 MG/DL — SIGNIFICANT CHANGE UP (ref 7–23)
CALCIUM SERPL-MCNC: 10.8 MG/DL — HIGH (ref 8.4–10.5)
CHLORIDE SERPL-SCNC: 99 MMOL/L — SIGNIFICANT CHANGE UP (ref 96–108)
CO2 SERPL-SCNC: 25 MMOL/L — SIGNIFICANT CHANGE UP (ref 22–31)
CREAT SERPL-MCNC: 1.57 MG/DL — HIGH (ref 0.5–1.3)
GLUCOSE SERPL-MCNC: 93 MG/DL — SIGNIFICANT CHANGE UP (ref 70–99)
HCT VFR BLD CALC: 40.3 % — SIGNIFICANT CHANGE UP (ref 34.5–45)
HGB BLD-MCNC: 12.3 G/DL — SIGNIFICANT CHANGE UP (ref 11.5–15.5)
MCHC RBC-ENTMCNC: 28.3 PG — SIGNIFICANT CHANGE UP (ref 27–34)
MCHC RBC-ENTMCNC: 30.5 GM/DL — LOW (ref 32–36)
MCV RBC AUTO: 92.9 FL — SIGNIFICANT CHANGE UP (ref 80–100)
NRBC # BLD: 0 /100 WBCS — SIGNIFICANT CHANGE UP (ref 0–0)
PLATELET # BLD AUTO: 230 K/UL — SIGNIFICANT CHANGE UP (ref 150–400)
POTASSIUM SERPL-MCNC: 3.8 MMOL/L — SIGNIFICANT CHANGE UP (ref 3.5–5.3)
POTASSIUM SERPL-SCNC: 3.8 MMOL/L — SIGNIFICANT CHANGE UP (ref 3.5–5.3)
PROT SERPL-MCNC: 8.6 G/DL — HIGH (ref 6–8.3)
RBC # BLD: 4.34 M/UL — SIGNIFICANT CHANGE UP (ref 3.8–5.2)
RBC # FLD: 16.6 % — HIGH (ref 10.3–14.5)
SODIUM SERPL-SCNC: 138 MMOL/L — SIGNIFICANT CHANGE UP (ref 135–145)
VIT B12 SERPL-MCNC: >2000 PG/ML — HIGH (ref 232–1245)
WBC # BLD: 6.3 K/UL — SIGNIFICANT CHANGE UP (ref 3.8–10.5)
WBC # FLD AUTO: 6.3 K/UL — SIGNIFICANT CHANGE UP (ref 3.8–10.5)

## 2021-04-09 PROCEDURE — 96372 THER/PROPH/DIAG INJ SC/IM: CPT

## 2021-04-09 PROCEDURE — 96366 THER/PROPH/DIAG IV INF ADDON: CPT

## 2021-04-09 PROCEDURE — 80053 COMPREHEN METABOLIC PANEL: CPT

## 2021-04-09 PROCEDURE — 96367 TX/PROPH/DG ADDL SEQ IV INF: CPT

## 2021-04-09 PROCEDURE — 96365 THER/PROPH/DIAG IV INF INIT: CPT

## 2021-04-09 PROCEDURE — 85027 COMPLETE CBC AUTOMATED: CPT

## 2021-04-09 PROCEDURE — 36415 COLL VENOUS BLD VENIPUNCTURE: CPT

## 2021-04-09 PROCEDURE — 82607 VITAMIN B-12: CPT

## 2021-04-09 RX ORDER — SODIUM CHLORIDE 9 MG/ML
500 INJECTION, SOLUTION INTRAVENOUS
Refills: 0 | Status: COMPLETED | OUTPATIENT
Start: 2021-04-09 | End: 2021-04-09

## 2021-04-09 RX ORDER — PREGABALIN 225 MG/1
1000 CAPSULE ORAL ONCE
Refills: 0 | Status: COMPLETED | OUTPATIENT
Start: 2021-04-09 | End: 2021-04-09

## 2021-04-09 RX ORDER — SODIUM FERRIC GLUCONAT/SUCROSE 62.5MG/5ML
125 AMPUL (ML) INTRAVENOUS ONCE
Refills: 0 | Status: COMPLETED | OUTPATIENT
Start: 2021-04-09 | End: 2021-04-09

## 2021-04-09 RX ADMIN — SODIUM CHLORIDE 255 MILLILITER(S): 9 INJECTION, SOLUTION INTRAVENOUS at 10:08

## 2021-04-09 RX ADMIN — SODIUM CHLORIDE 500 MILLILITER(S): 9 INJECTION, SOLUTION INTRAVENOUS at 12:08

## 2021-04-09 RX ADMIN — PREGABALIN 1000 MICROGRAM(S): 225 CAPSULE ORAL at 12:02

## 2021-04-09 RX ADMIN — Medication 125 MILLIGRAM(S): at 10:11

## 2021-04-09 RX ADMIN — Medication 110 MILLIGRAM(S): at 09:11

## 2021-04-16 ENCOUNTER — APPOINTMENT (OUTPATIENT)
Age: 66
End: 2021-04-16

## 2021-04-23 ENCOUNTER — APPOINTMENT (OUTPATIENT)
Dept: VASCULAR SURGERY | Facility: CLINIC | Age: 66
End: 2021-04-23
Payer: MEDICARE

## 2021-04-23 DIAGNOSIS — I73.9 PERIPHERAL VASCULAR DISEASE, UNSPECIFIED: ICD-10-CM

## 2021-04-23 PROCEDURE — 99215 OFFICE O/P EST HI 40 MIN: CPT

## 2021-04-23 PROCEDURE — 93923 UPR/LXTR ART STDY 3+ LVLS: CPT

## 2021-04-26 NOTE — ASSESSMENT
[FreeTextEntry1] : 64 yo F with multiple medical issues, including AAA, s/p open repair in 2015 with saccular AAA w/contained rupture adjacent to previous stent requiring open thoracoabdominal aneurysm repair, with 20mm tube graft and 6mm bypasses to the celiac, SMA,  left renal, right renal, presents for evaluation follow up evaluation of LE pain and discomfort. PVRs today show blunted waveforms particularly at the level of the left metatarsals. Given patients LE claudication. LLE angiogram is recommended at this time. Patient agrees and accepts. Once approved by patient insurance will contact her to schedule date.

## 2021-04-26 NOTE — HISTORY OF PRESENT ILLNESS
Pediatric PT Evaluation      Today's date: 2019   Patient name: Toby Peralta      : 2019       Age: 5 wk o  MRN: [id number]  Referring provider: Cliff Irving MD  Dx:   Encounter Diagnosis     ICD-10-CM    1  Torticollis M43 6 Ambulatory referral to Physical Therapy   2  Plagiocephaly, acquired M95 2 Ambulatory referral to Physical Therapy     Age at onset: insidious onset of infancy  Parent/caregiver concerns: He won't keep his head turned to the right when I help him turn it  Hx:  2 doctors appointments and no concerns about his head or neck until the 2nd appointment  Home Environment:  Sleeps in a bassinet that slightly hammocky/cradled rock and play  Sleeps through the night from 9-2/3am and then again at 6 or 7am in the morning  Mom reports he uses a swing, a boppy chair, and vibrating chair  Background   Medical History: No past medical history on file  Allergies: No Known Allergies  Current Medications:   No current outpatient prescriptions on file  No current facility-administered medications for this visit  Gestational History: No known concerns  Pregnancy complications: NA  Birth History:  Weight 8 #8 oz   Length approximately 20 5 inches     Born via vaginal delivery after 12 hours of labor  He required a NICU stay for 3 days due to ingesting meconium          Current/Previous therapies: none  Posture: Normal C-shape  Resting head position  Supine looks to the left  Seated NA  Prone  Prefers to look to the left and cries out in pain with greater than 5seconds of assisted right cervical rotation  Anthropometrics  Head shape: plagiocephaly    Parietal/occipital: flattening Left occiput and R parietal  Orbital: asymmetrical   Ears: symmetrical   Skin condition of neck erythema bilaterally and anteriorly with multiple skin folds    Tone  Trunk: increased  Extremities: increased  Hip status: WNL R/L     Passive range of motion  Cervical              Flexion [FreeTextEntry1] : 66 y/o  F former smoker with PMHx HTN, HLD , CAD, s/p PCI  and CABG, s/p Bioprosthetic AVR 2002, Mitral Valve, s/p Mitral valve annuloplasty 2002, PAD s/p pLSFA stent LCIA stent, with occluded Bilateral SFA (proximal to mid portion per U/S 2019), Chronic Anemia, AAA s/p Open repair 4/2/2015, CKD , Severe Renal Artery Stenosis who presented to [facility] ED 8/20/2020 She was noted to have saccular AAA and contained rupture adjacent to previous stent graft. On 8/25 she underwent open thoracoabdominal aneurysm repair, with 20mm tube graft and 6mm bypasses to the celiac, SMA,  left renal, right renal (end to end). \par \par Presents for routine follow up evaluation of BLE pain L>R. She reports her her feet feel heavy and are painful. She mentions having trouble walking due to the pain. She develops discomfort particularly to the calves and feels the need to stop after walking 2 blocks.Once the pain subsides she resumes her walking.  Otherwise patient denies no open sores/ulcers. She denies fever, chills.\par \par  limited 25%              Extension limited 25%              Sidebending Right limited 80%              Sidebending left limited 50%              Rotation Right limited 25%              Rotation left limited 70%  Trunk               lateral flexion right limited 50%              lateral flexion left limited 50%              rotation right limited 65%              rotation left limited 65%     Active range of motion           Cervical              Flexion 0deg = decreased strength/head control              Extension 3deg in prone                     Sidebending Right limited >95%              Sidebending left limited >95%              Rotation Right limited 100% in supine               Rotation left limited 75%  Trunk               lateral flexion right limited >75%              lateral flexion left limited >75%              rotation right limited >75%              rotation left limited >75%               Pull to sit:               Head lag: full              Head rotation: yes LEFT              Trunk rotation: no, inactive abdominal contraction  Righting reactions              Sitting                          Lateral neck: absent right and absent left                          Lateral trunk: absent right and absent left     Gross motor skills              ELAP scattered skills at 1 month          Assessment  Assessment details: Rafael Short is a 8 week old male infant presenting to [facility] with his mother present throughout the treatment session  He presents due to concerns of plagiocephaly and left torticollis  He presents with impairments as listed above  Patient displays notable plagiocephaly on the left side and will need to be monitored for need for cranial remodeling helmet  Patient will benefit from physical therapy to improve all functional impairments and muscle imbalances to meet all developmentally appropriate milestones     Impairments: abnormal coordination, abnormal gait, abnormal muscle firing, abnormal or restricted ROM, abnormal movement, difficulty understanding, impaired balance, impaired physical strength, lacks appropriate home exercise program, safety issue, poor posture  and poor body mechanics  Impairments: abnormal muscle firing, abnormal muscle tone, abnormal or restricted ROM, abnormal movement and poor posture   Understanding of Dx/Px/POC: good   Prognosis: good    Goals  Short term Goals:  1  Family will be independent and compliant with HEP weekly  2   Patient will tolerate prone play propping on elbows x10 minutes to demonstrate improved strength for age-appropriate play in 6 weeks  3   Patient will demonstrate independent rolling back to belly to demonstrate improved strength and coordination for age-appropriate mobility in 6 weeks  Long Term Goals:  1  Patient will demonstrate midline head position in all functional positions to demonstrate improved posture for age-appropriate play in 12 weeks  2   Patient will demonstrate symmetrical C/S lat flex in all functional positions to demonstrate improved ability to function during age-appropriate play in 12 weeks  3   Patient will demonstrate symmetrical C/S rotation in all functional positions to demonstrate improved ability to function during age-appropriate play in 12 weeks  4   Patient will demonstrate age-appropriate gross motor skills prior to d/c  Plan  Plan details: Caregiver given Torticollis PT intervention synopsis education packet and there ex packet for HEP and positioning ideas  Caregiver demonstrated attentive learning and motivation to replicate at home      Patient would benefit from: skilled physical therapy  Planned therapy interventions: home exercise program, therapeutic exercise, therapeutic activities, strengthening, functional ROM exercises, coordination, patient education, neuromuscular re-education, manual therapy and stretching  Frequency: 1x week  Duration in weeks: 12  Treatment plan discussed with: caregiver and patient  Frequency: 1x week  Treatment plan discussed with: caregiver

## 2021-04-26 NOTE — PHYSICAL EXAM
[Respiratory Effort] : normal respiratory effort [Normal Rate and Rhythm] : normal rate and rhythm [2+] : left 2+ [No Rash or Lesion] : No rash or lesion [Alert] : alert [Oriented to Person] : oriented to person [Oriented to Place] : oriented to place [Oriented to Time] : oriented to time [Calm] : calm [1+] : left 1+ [JVD] : no jugular venous distention  [Ankle Swelling (On Exam)] : not present [Varicose Veins Of Lower Extremities] : not present [] : not present [Abdomen Tenderness] : ~T ~M No abdominal tenderness [de-identified] : cachectic looking, NAD, ambulates with a walker [de-identified] : NC/AT [de-identified] : +FROM [de-identified] : Supple [de-identified] : grossly intact

## 2021-05-04 LAB — SARS-COV-2 N GENE NPH QL NAA+PROBE: NOT DETECTED

## 2021-05-05 ENCOUNTER — TRANSCRIPTION ENCOUNTER (OUTPATIENT)
Age: 66
End: 2021-05-05

## 2021-05-05 VITALS
DIASTOLIC BLOOD PRESSURE: 71 MMHG | OXYGEN SATURATION: 100 % | SYSTOLIC BLOOD PRESSURE: 126 MMHG | TEMPERATURE: 98 F | WEIGHT: 111.33 LBS | RESPIRATION RATE: 74 BRPM | HEART RATE: 74 BPM | HEIGHT: 67 IN

## 2021-05-06 ENCOUNTER — INPATIENT (INPATIENT)
Facility: HOSPITAL | Age: 66
LOS: 3 days | Discharge: HOME | DRG: 253 | End: 2021-05-10
Attending: SURGERY | Admitting: SURGERY
Payer: MEDICARE

## 2021-05-06 ENCOUNTER — APPOINTMENT (OUTPATIENT)
Dept: VASCULAR SURGERY | Facility: HOSPITAL | Age: 66
End: 2021-05-06

## 2021-05-06 DIAGNOSIS — I71.8 AORTIC ANEURYSM OF UNSPECIFIED SITE, RUPTURED: Chronic | ICD-10-CM

## 2021-05-06 DIAGNOSIS — I25.701 ATHEROSCLEROSIS OF CORONARY ARTERY BYPASS GRAFT(S), UNSPECIFIED, WITH ANGINA PECTORIS WITH DOCUMENTED SPASM: Chronic | ICD-10-CM

## 2021-05-06 DIAGNOSIS — Z95.2 PRESENCE OF PROSTHETIC HEART VALVE: Chronic | ICD-10-CM

## 2021-05-06 DIAGNOSIS — I71.4 ABDOMINAL AORTIC ANEURYSM, WITHOUT RUPTURE: Chronic | ICD-10-CM

## 2021-05-06 LAB
ANION GAP SERPL CALC-SCNC: 11 MMOL/L — SIGNIFICANT CHANGE UP (ref 5–17)
APTT BLD: 24.9 SEC — LOW (ref 27.5–35.5)
BASE EXCESS BLDA CALC-SCNC: -3.1 MMOL/L — LOW (ref -2–3)
BUN SERPL-MCNC: 24 MG/DL — HIGH (ref 7–23)
CA-I BLDA-SCNC: 1.37 MMOL/L — HIGH (ref 1.15–1.33)
CALCIUM SERPL-MCNC: 9.2 MG/DL — SIGNIFICANT CHANGE UP (ref 8.4–10.5)
CHLORIDE SERPL-SCNC: 104 MMOL/L — SIGNIFICANT CHANGE UP (ref 96–108)
CO2 BLDA-SCNC: 22 MMOL/L — SIGNIFICANT CHANGE UP (ref 19–24)
CO2 SERPL-SCNC: 22 MMOL/L — SIGNIFICANT CHANGE UP (ref 22–31)
COHGB MFR BLDA: 1.7 % — SIGNIFICANT CHANGE UP
CREAT SERPL-MCNC: 1.63 MG/DL — HIGH (ref 0.5–1.3)
GLUCOSE BLDC GLUCOMTR-MCNC: 160 MG/DL — HIGH (ref 70–99)
GLUCOSE SERPL-MCNC: 183 MG/DL — HIGH (ref 70–99)
HCO3 BLDA-SCNC: 21 MMOL/L — SIGNIFICANT CHANGE UP (ref 21–28)
HCT VFR BLD CALC: 18 % — CRITICAL LOW (ref 34.5–45)
HCT VFR BLD CALC: 18.7 % — CRITICAL LOW (ref 34.5–45)
HGB BLD-MCNC: 5.6 G/DL — CRITICAL LOW (ref 11.5–15.5)
HGB BLD-MCNC: 5.8 G/DL — CRITICAL LOW (ref 11.5–15.5)
INR BLD: 1.1 — SIGNIFICANT CHANGE UP (ref 0.88–1.16)
MAGNESIUM SERPL-MCNC: 2 MG/DL — SIGNIFICANT CHANGE UP (ref 1.6–2.6)
MCHC RBC-ENTMCNC: 29.6 PG — SIGNIFICANT CHANGE UP (ref 27–34)
MCHC RBC-ENTMCNC: 29.9 PG — SIGNIFICANT CHANGE UP (ref 27–34)
MCHC RBC-ENTMCNC: 31 GM/DL — LOW (ref 32–36)
MCHC RBC-ENTMCNC: 31.1 GM/DL — LOW (ref 32–36)
MCV RBC AUTO: 95.4 FL — SIGNIFICANT CHANGE UP (ref 80–100)
MCV RBC AUTO: 96.3 FL — SIGNIFICANT CHANGE UP (ref 80–100)
METHGB MFR BLDA: 0.8 % — SIGNIFICANT CHANGE UP
NRBC # BLD: 0 /100 WBCS — SIGNIFICANT CHANGE UP (ref 0–0)
NRBC # BLD: 0 /100 WBCS — SIGNIFICANT CHANGE UP (ref 0–0)
OXYHGB MFR BLDA: 95.8 % — HIGH (ref 90–95)
PCO2 BLDA: 35 MMHG — SIGNIFICANT CHANGE UP (ref 32–35)
PH BLDA: 7.39 — SIGNIFICANT CHANGE UP (ref 7.35–7.45)
PHOSPHATE SERPL-MCNC: 3.6 MG/DL — SIGNIFICANT CHANGE UP (ref 2.5–4.5)
PLATELET # BLD AUTO: 158 K/UL — SIGNIFICANT CHANGE UP (ref 150–400)
PLATELET # BLD AUTO: 164 K/UL — SIGNIFICANT CHANGE UP (ref 150–400)
PO2 BLDA: 85 MMHG — SIGNIFICANT CHANGE UP (ref 83–108)
POTASSIUM BLDA-SCNC: 3.4 MMOL/L — LOW (ref 3.5–5.1)
POTASSIUM SERPL-MCNC: 3.4 MMOL/L — LOW (ref 3.5–5.3)
POTASSIUM SERPL-SCNC: 3.4 MMOL/L — LOW (ref 3.5–5.3)
PROTHROM AB SERPL-ACNC: 13.1 SEC — SIGNIFICANT CHANGE UP (ref 10.6–13.6)
RBC # BLD: 1.87 M/UL — LOW (ref 3.8–5.2)
RBC # BLD: 1.96 M/UL — LOW (ref 3.8–5.2)
RBC # FLD: 19.4 % — HIGH (ref 10.3–14.5)
RBC # FLD: 19.5 % — HIGH (ref 10.3–14.5)
SAO2 % BLDA: 98.3 % — HIGH (ref 94–98)
SODIUM BLDA-SCNC: 138 MMOL/L — SIGNIFICANT CHANGE UP (ref 136–145)
SODIUM SERPL-SCNC: 137 MMOL/L — SIGNIFICANT CHANGE UP (ref 135–145)
WBC # BLD: 12.24 K/UL — HIGH (ref 3.8–10.5)
WBC # BLD: 13.38 K/UL — HIGH (ref 3.8–10.5)
WBC # FLD AUTO: 12.24 K/UL — HIGH (ref 3.8–10.5)
WBC # FLD AUTO: 13.38 K/UL — HIGH (ref 3.8–10.5)

## 2021-05-06 PROCEDURE — 75625 CONTRAST EXAM ABDOMINL AORTA: CPT | Mod: 26,GC

## 2021-05-06 PROCEDURE — 99221 1ST HOSP IP/OBS SF/LOW 40: CPT

## 2021-05-06 PROCEDURE — 74174 CTA ABD&PLVS W/CONTRAST: CPT | Mod: 26,MA

## 2021-05-06 PROCEDURE — 37221: CPT | Mod: LT,GC

## 2021-05-06 PROCEDURE — 75710 ARTERY X-RAYS ARM/LEG: CPT | Mod: 26,59,GC

## 2021-05-06 RX ORDER — HYDROMORPHONE HYDROCHLORIDE 2 MG/ML
0.5 INJECTION INTRAMUSCULAR; INTRAVENOUS; SUBCUTANEOUS ONCE
Refills: 0 | Status: DISCONTINUED | OUTPATIENT
Start: 2021-05-06 | End: 2021-05-06

## 2021-05-06 RX ORDER — DEXTROSE 50 % IN WATER 50 %
15 SYRINGE (ML) INTRAVENOUS ONCE
Refills: 0 | Status: DISCONTINUED | OUTPATIENT
Start: 2021-05-06 | End: 2021-05-08

## 2021-05-06 RX ORDER — INSULIN LISPRO 100/ML
VIAL (ML) SUBCUTANEOUS EVERY 6 HOURS
Refills: 0 | Status: DISCONTINUED | OUTPATIENT
Start: 2021-05-06 | End: 2021-05-08

## 2021-05-06 RX ORDER — ACETAMINOPHEN 500 MG
650 TABLET ORAL ONCE
Refills: 0 | Status: COMPLETED | OUTPATIENT
Start: 2021-05-06 | End: 2021-05-06

## 2021-05-06 RX ORDER — DEXTROSE 50 % IN WATER 50 %
25 SYRINGE (ML) INTRAVENOUS ONCE
Refills: 0 | Status: DISCONTINUED | OUTPATIENT
Start: 2021-05-06 | End: 2021-05-08

## 2021-05-06 RX ORDER — METOPROLOL TARTRATE 50 MG
5 TABLET ORAL EVERY 6 HOURS
Refills: 0 | Status: DISCONTINUED | OUTPATIENT
Start: 2021-05-06 | End: 2021-05-06

## 2021-05-06 RX ORDER — ATORVASTATIN CALCIUM 80 MG/1
80 TABLET, FILM COATED ORAL AT BEDTIME
Refills: 0 | Status: DISCONTINUED | OUTPATIENT
Start: 2021-05-06 | End: 2021-05-10

## 2021-05-06 RX ORDER — HYDRALAZINE HCL 50 MG
5 TABLET ORAL ONCE
Refills: 0 | Status: COMPLETED | OUTPATIENT
Start: 2021-05-06 | End: 2021-05-06

## 2021-05-06 RX ORDER — LIDOCAINE 4 G/100G
1 CREAM TOPICAL ONCE
Refills: 0 | Status: DISCONTINUED | OUTPATIENT
Start: 2021-05-06 | End: 2021-05-06

## 2021-05-06 RX ORDER — ONDANSETRON 8 MG/1
4 TABLET, FILM COATED ORAL ONCE
Refills: 0 | Status: COMPLETED | OUTPATIENT
Start: 2021-05-06 | End: 2021-05-06

## 2021-05-06 RX ORDER — DEXTROSE 50 % IN WATER 50 %
12.5 SYRINGE (ML) INTRAVENOUS ONCE
Refills: 0 | Status: DISCONTINUED | OUTPATIENT
Start: 2021-05-06 | End: 2021-05-08

## 2021-05-06 RX ORDER — PANTOPRAZOLE SODIUM 20 MG/1
40 TABLET, DELAYED RELEASE ORAL ONCE
Refills: 0 | Status: COMPLETED | OUTPATIENT
Start: 2021-05-06 | End: 2021-05-06

## 2021-05-06 RX ORDER — HYDROMORPHONE HYDROCHLORIDE 2 MG/ML
0.25 INJECTION INTRAMUSCULAR; INTRAVENOUS; SUBCUTANEOUS ONCE
Refills: 0 | Status: DISCONTINUED | OUTPATIENT
Start: 2021-05-06 | End: 2021-05-06

## 2021-05-06 RX ORDER — SERTRALINE 25 MG/1
50 TABLET, FILM COATED ORAL AT BEDTIME
Refills: 0 | Status: DISCONTINUED | OUTPATIENT
Start: 2021-05-06 | End: 2021-05-10

## 2021-05-06 RX ORDER — ACETAMINOPHEN 500 MG
1000 TABLET ORAL ONCE
Refills: 0 | Status: COMPLETED | OUTPATIENT
Start: 2021-05-06 | End: 2021-05-06

## 2021-05-06 RX ORDER — SODIUM CHLORIDE 9 MG/ML
1000 INJECTION, SOLUTION INTRAVENOUS
Refills: 0 | Status: DISCONTINUED | OUTPATIENT
Start: 2021-05-06 | End: 2021-05-07

## 2021-05-06 RX ORDER — PANTOPRAZOLE SODIUM 20 MG/1
40 TABLET, DELAYED RELEASE ORAL
Refills: 0 | Status: DISCONTINUED | OUTPATIENT
Start: 2021-05-06 | End: 2021-05-10

## 2021-05-06 RX ORDER — SODIUM CHLORIDE 9 MG/ML
1000 INJECTION, SOLUTION INTRAVENOUS
Refills: 0 | Status: DISCONTINUED | OUTPATIENT
Start: 2021-05-06 | End: 2021-05-08

## 2021-05-06 RX ORDER — METOPROLOL TARTRATE 50 MG
25 TABLET ORAL
Refills: 0 | Status: DISCONTINUED | OUTPATIENT
Start: 2021-05-07 | End: 2021-05-10

## 2021-05-06 RX ORDER — GABAPENTIN 400 MG/1
100 CAPSULE ORAL
Refills: 0 | Status: DISCONTINUED | OUTPATIENT
Start: 2021-05-06 | End: 2021-05-10

## 2021-05-06 RX ORDER — FOLIC ACID 0.8 MG
1 TABLET ORAL DAILY
Refills: 0 | Status: DISCONTINUED | OUTPATIENT
Start: 2021-05-06 | End: 2021-05-10

## 2021-05-06 RX ORDER — HYDRALAZINE HCL 50 MG
10 TABLET ORAL ONCE
Refills: 0 | Status: COMPLETED | OUTPATIENT
Start: 2021-05-06 | End: 2021-05-06

## 2021-05-06 RX ORDER — ACETAMINOPHEN 500 MG
650 TABLET ORAL EVERY 6 HOURS
Refills: 0 | Status: DISCONTINUED | OUTPATIENT
Start: 2021-05-06 | End: 2021-05-06

## 2021-05-06 RX ORDER — LIDOCAINE 4 G/100G
1 CREAM TOPICAL ONCE
Refills: 0 | Status: COMPLETED | OUTPATIENT
Start: 2021-05-06 | End: 2021-05-06

## 2021-05-06 RX ORDER — ASPIRIN/CALCIUM CARB/MAGNESIUM 324 MG
81 TABLET ORAL DAILY
Refills: 0 | Status: DISCONTINUED | OUTPATIENT
Start: 2021-05-06 | End: 2021-05-10

## 2021-05-06 RX ORDER — GLUCAGON INJECTION, SOLUTION 0.5 MG/.1ML
1 INJECTION, SOLUTION SUBCUTANEOUS ONCE
Refills: 0 | Status: DISCONTINUED | OUTPATIENT
Start: 2021-05-06 | End: 2021-05-08

## 2021-05-06 RX ORDER — LEVOTHYROXINE SODIUM 125 MCG
50 TABLET ORAL DAILY
Refills: 0 | Status: DISCONTINUED | OUTPATIENT
Start: 2021-05-06 | End: 2021-05-10

## 2021-05-06 RX ADMIN — HYDROMORPHONE HYDROCHLORIDE 0.25 MILLIGRAM(S): 2 INJECTION INTRAMUSCULAR; INTRAVENOUS; SUBCUTANEOUS at 10:01

## 2021-05-06 RX ADMIN — Medication 650 MILLIGRAM(S): at 13:17

## 2021-05-06 RX ADMIN — SERTRALINE 50 MILLIGRAM(S): 25 TABLET, FILM COATED ORAL at 21:47

## 2021-05-06 RX ADMIN — Medication 10 MILLIGRAM(S): at 22:20

## 2021-05-06 RX ADMIN — Medication 650 MILLIGRAM(S): at 15:59

## 2021-05-06 RX ADMIN — ATORVASTATIN CALCIUM 80 MILLIGRAM(S): 80 TABLET, FILM COATED ORAL at 21:48

## 2021-05-06 RX ADMIN — HYDROMORPHONE HYDROCHLORIDE 0.25 MILLIGRAM(S): 2 INJECTION INTRAMUSCULAR; INTRAVENOUS; SUBCUTANEOUS at 14:30

## 2021-05-06 RX ADMIN — HYDROMORPHONE HYDROCHLORIDE 0.25 MILLIGRAM(S): 2 INJECTION INTRAMUSCULAR; INTRAVENOUS; SUBCUTANEOUS at 14:00

## 2021-05-06 RX ADMIN — LIDOCAINE 1 PATCH: 4 CREAM TOPICAL at 22:00

## 2021-05-06 RX ADMIN — Medication 5 MILLIGRAM(S): at 21:18

## 2021-05-06 RX ADMIN — Medication 5 MILLIGRAM(S): at 14:15

## 2021-05-06 RX ADMIN — ONDANSETRON 4 MILLIGRAM(S): 8 TABLET, FILM COATED ORAL at 16:40

## 2021-05-06 RX ADMIN — Medication 400 MILLIGRAM(S): at 21:03

## 2021-05-06 RX ADMIN — Medication 81 MILLIGRAM(S): at 19:37

## 2021-05-06 RX ADMIN — PANTOPRAZOLE SODIUM 40 MILLIGRAM(S): 20 TABLET, DELAYED RELEASE ORAL at 16:40

## 2021-05-06 RX ADMIN — ONDANSETRON 4 MILLIGRAM(S): 8 TABLET, FILM COATED ORAL at 14:05

## 2021-05-06 RX ADMIN — Medication 260 MILLIGRAM(S): at 15:10

## 2021-05-06 RX ADMIN — Medication 1000 MILLIGRAM(S): at 21:16

## 2021-05-06 RX ADMIN — SODIUM CHLORIDE 50 MILLILITER(S): 9 INJECTION, SOLUTION INTRAVENOUS at 19:40

## 2021-05-06 NOTE — PROVIDER CONTACT NOTE (CRITICAL VALUE NOTIFICATION) - SITUATION
s/p stent to left iliac artery. hemoglobin 5.8 pressure stable.
Patient s/p stent placement in L common iliac artery. Patient had RP bleed.

## 2021-05-06 NOTE — CONSULT NOTE ADULT - SUBJECTIVE AND OBJECTIVE BOX
66 y/o F former smoker with PMHx HTN, HLD , CAD, s/p PCI and CABG, s/p Bioprosthetic AVR 2002, Mitral Valve, s/p Mitral valve annuloplasty 2002, PAD s/p pLSFA stent LCIA stent, with occluded Bilateral SFA (proximal to mid portion per U/S 2019), Chronic Anemia, AAA s/p Open repair 4/2/2015, CKD , Severe Renal Artery Stenosis who presented to [facility] ED 8/20/2020 She was noted to have saccular AAA and contained rupture adjacent to previous stent graft. On 8/25 she underwent open thoracoabdominal aneurysm repair, with 20mm tube graft and 6mm bypasses to the celiac, SMA, left renal, right renal (end to end). Recently, the patient was found to have LE claudication and presented today for elective LLE angiogram and is now s/p LLE angiogram and angioplasty with TIFFANY stent. Patient had a 6Fr sheath in L groin, 5Fr sheath in R groin. 3000U heparin given during the case. In the PACU, patient's ACT was 147 and both sheaths were pulled. Patient tolerated the sheath pull well and was on strict bedrest. The patient then began to complain of L groin pain, which remained soft. Around 5:30 she complained of L lower abdominal and back pain. SBP from 150s to 108. Patient transported to CT scanner to r/o [facility] and transferred to the SICU afterwards for close hemodynamic monitoring.     Vital Signs Last 24 Hrs  T(C): --  T(F): --  HR: 71 (06 May 2021 16:27) (64 - 83)  BP: 146/66 (06 May 2021 16:27) (107/57 - 168/79)  BP(mean): 95 (06 May 2021 16:27) (76 - 114)  RR: 19 (06 May 2021 16:27) (12 - 40)  SpO2: 98% (06 May 2021 16:27) (96% - 100%)    Physical Exam:  General: NAD  Pulmonary: Nonlabored breathing, no respiratory distress  Cardiovascular: regular rate and rhythm, no murmurs   Abdominal: soft, nondistended, nontender with no rebound or guarding  Extremities: WWP, normal strength, no clubbing/cyanosis/edema  Neuro: A/O x3    I&O's Summary    06 May 2021 07:01  -  06 May 2021 18:13  --------------------------------------------------------  IN: 315 mL / OUT: 0 mL / NET: 315 mL        LABS:      CAPILLARY BLOOD GLUCOSE      POCT Blood Glucose.: 160 mg/dL (06 May 2021 17:28)      RADIOLOGY & ADDITIONAL STUDIES:                   66 y/o F former smoker with PMHx HTN, HLD , CAD, s/p PCI and CABG, s/p Bioprosthetic AVR 2002, Mitral Valve, s/p Mitral valve annuloplasty 2002, PAD s/p pLSFA stent LCIA stent, with occluded Bilateral SFA (proximal to mid portion per U/S 2019), Chronic Anemia, AAA s/p Open repair 4/2/2015, CKD , Severe Renal Artery Stenosis who presented to [facility] ED 8/20/2020 She was noted to have saccular AAA and contained rupture adjacent to previous stent graft. On 8/25 she underwent open thoracoabdominal aneurysm repair, with 20mm tube graft and 6mm bypasses to the celiac, SMA, left renal, right renal (end to end). Recently, the patient was found to have LE claudication and presented today for elective LLE angiogram and is now s/p LLE angiogram and angioplasty with ITFFANY stent. Patient had a 6Fr sheath in L groin, 5Fr sheath in R groin. 3000U heparin given during the case. In the PACU, patient's ACT was 147 and both sheaths were pulled. Patient tolerated the sheath pull well and was on strict bedrest. The patient then began to complain of L groin pain, which remained soft. Around 5:30 she complained of L lower abdominal and back pain. SBP from 150s to 108. Patient transported to CT scanner to r/o [facility] and transferred to the SICU afterwards for close hemodynamic monitoring.     Vital Signs Last 24 Hrs  T(C): --  T(F): --  HR: 71 (06 May 2021 16:27) (64 - 83)  BP: 146/66 (06 May 2021 16:27) (107/57 - 168/79)  BP(mean): 95 (06 May 2021 16:27) (76 - 114)  RR: 19 (06 May 2021 16:27) (12 - 40)  SpO2: 98% (06 May 2021 16:27) (96% - 100%)    Physical Exam:  PHYSICAL EXAM:  General: NAD, resting comfortably in bed  HEENT: PERRL, JVD  C/V: NSR, no murmurs or gallops  Pulm: Nonlabored breathing, no respiratory distress, vibratory breath sounds bilaterally, no wheezes  Abd: soft, non-distended, moderate L sided tenderness, no rebound or guarding  Extrem: WWP, no edema, bilateral groins soft w/ dressing c/d  Neuro: A/O x 3, CNs II-XII grossly intact, no focal deficits, normal sensation      I&O's Summary    06 May 2021 07:01  -  06 May 2021 18:13  --------------------------------------------------------  IN: 315 mL / OUT: 0 mL / NET: 315 mL        LABS:      CAPILLARY BLOOD GLUCOSE      POCT Blood Glucose.: 160 mg/dL (06 May 2021 17:28)      RADIOLOGY & ADDITIONAL STUDIES:

## 2021-05-06 NOTE — H&P ADULT - ASSESSMENT
65F former smoker with PMH of HTN, HLD, CAD (s/p PCI and CABG), bioprosthetic AVR and MVR annuloplasty (2002), PAD s/p L SFA and L TIFFANY stent, AAA (s/p open repair 2015), tAAA s/p thoracoabdominal repair (8/2020) presented electively today b/l LE angiogram, now s/p L TIFFANY stent/angioplasty, admitted for possible RP hematoma    - Admit to SICU 65F former smoker with PMH of HTN, HLD, CAD (s/p PCI and CABG), bioprosthetic AVR and MVR annuloplasty (2002), PAD s/p L SFA and L TIFFANY stent, AAA (s/p open repair 2015), tAAA s/p thoracoabdominal repair (8/2020) presented electively today b/l LE angiogram, now s/p L TIFFANY stent/angioplasty, admitted for possible RP hematoma    - Stat labs  - Stat CT A/P  - 2u PRBC  - Transfer to SICU

## 2021-05-06 NOTE — PROGRESS NOTE ADULT - SUBJECTIVE AND OBJECTIVE BOX
Vascular Surgery Post-Op Note    Procedure: bilateral LE angiogram and right iliac artery angioplasty    Diagnosis/Indication: PAD with claudication    Surgeon: Dr. Sandra    S: Pt has no complaints. Denies CP, SOB, foot pain    O:  HR: 75 (05-06-21 @ 09:02) (71 - 83)  BP: 126/66 (05-06-21 @ 09:02) (107/57 - 126/66)  RR: 13 (05-06-21 @ 09:02) (12 - 16)  SpO2: 97% (05-06-21 @ 09:02) (96% - 98%)  Wt(kg): --      Gen: NAD, resting comfortably in bed  C/V: NSR  Pulm: Nonlabored breathing, no respiratory distress  Abd: soft, NT/ND  Groin: Right groin 5fr sheath, L groin 6fr sheath removed, manual pressure applied [id number] on both groins, hemostasis achieved, no hematoma  Extrem: RLE: biphasic DP, no PT LLE: biphasic DP/PT      A/P: 65yFemale s/p above procedure  Strict bedrest, lying flat keep both legs straight x 6 hours til 4pm  Diet - advance after 4 hrs can go up to 45 degrees for 15 mins to eat then back flat down  Pain/nausea control  Dispo plan: d/c home after bedrest if both groins soft

## 2021-05-06 NOTE — CONSULT NOTE ADULT - ASSESSMENT
65F former smoker with PMH of HTN, HLD, CAD (s/p PCI and CABG), bioprosthetic AVR and MVR annuloplasty (2002), PAD s/p L SFA and L TIFFANY stent, AAA (s/p open repair 2015), tAAA s/p thoracoabdominal repair (8/2020) presented electively today b/l LE angiogram, now s/p L TIFFANY stent/angioplasty, admitted to SICU for possible RP hematoma.     NEURO:  CV: MAP>65  RESP: NC PRN   FENGI: NPO/IVF   :   ENDO: ISS  ID:  HEME: 2u PRBC ordered  PPx:  LINES/WOUNDS: bilateral groin access (sheaths removed post-op)   PT/OT: not ordered 65F former smoker with PMH of HTN, HLD, CAD (s/p PCI and CABG), bioprosthetic AVR and MVR annuloplasty (2002), PAD s/p L SFA and L TIFFANY stent, AAA (s/p open repair 2015), tAAA s/p thoracoabdominal repair (8/2020) presented electively today b/l LE angiogram, now s/p L TIFFANY stent/angioplasty, admitted to SICU for possible RP hematoma.     NEURO: Tylenol PRN, No narcotics  CV: CAD s/p PCI & CABG Asa  holding plavix?, MAP>65, Systolic BP , Metop 5 Q6  RESP: sat on RA   FENGI: NPO/IVF   : Voids  ENDO: ISS  ID: none  HEME: 2u PRBC ordered  PPx: no SQH or SCDs  LINES/WOUNDS: bilateral groin access (sheaths removed post-op), PIVs, L rad-line  PT/OT: not ordered

## 2021-05-06 NOTE — CONSULT NOTE ADULT - ATTENDING COMMENTS
seen and evaluated w housestaff.  66 y/o f w extensive PVD, admitted for LE angiogram/stent, now w possible RPH. Hemodynamically stable, w LLQ tenderness.  Plan on managing hemodynamics , SBP , follow up renal function, and repeat CBCs.  Plavix on hold.  If bleeding becomes more problematic, d/w vascular re: holding ASA, platelet tfusion.

## 2021-05-06 NOTE — H&P ADULT - HISTORY OF PRESENT ILLNESS
65F former smoker with PMH of HTN, HLD, CAD (s/p PCI and CABG), bioprosthetic AVR and MVR annuloplasty (2002), PAD s/p L SFA and L TIFFANY stent, AAA (s/p open repair 2015), tAAA s/p thoracoabdominal repair (8/2020) presented electively today b/l LE angiogram, possible intervention. Patient had been complaining of b/l LE claudication worsening over the past few months. R JENNIFER 0.59, L 0.43.    Patient underwent the procedure, with L TIFFANY stent and angioplasty performed through the L groin. Patient had a 6Fr sheath in L groin, 5Fr sheath in R groin. 3000U heparin given during the case. In the PACU, patient's ACT was 147 and both sheaths were pulled. Patient tolerated the sheath pull well and was on strict bedrest. The patient then began to complain of L groin pain, which remained soft. Around 5:30 she complained of L lower abdominal and back pain. SBP from 150s to 108. Patient transported to CT scanner to r/o RP hematoma.

## 2021-05-06 NOTE — H&P ADULT - NSHPPHYSICALEXAM_GEN_ALL_CORE
Gen: NAD  Resp: Normal respiratory effort  Abd: LLE tenderness, soft, non-distended  Ext: b/l groins soft; L DP Bi, PT mono; R DP Bi, no PT

## 2021-05-06 NOTE — CHART NOTE - NSCHARTNOTEFT_GEN_A_CORE
I came to 10th floor PACU to reassess patient around 5:30pm. Patient was noted to be in pain in left flank and abdomen. Patients left groin was noted to be soft. I applied manual pressure to the left groin prophylactically in anticipation of possible bleed due to patients symptoms.  Patients SBP initially was noted to be 140. She was AAOx3 and denies any CP, SOB, headache, blurred vision. Repeat SBP dropped to 108 and became tachycardic to 110's. I requested STAT labs including Type and screen, 2 units of blood, CTA abdomen/pelvis, 1L bolus. I requested pressors at bedside in the event of hemodynamic instability. Second IV was inserted by nursing. Attending and chief resident were made aware of the incident. Patients SBP rebounded to 130's. Patient was AAOx3. total of 15 minutes of additional pressure was held to left groin. At this time patient was transferred to radiology and obtained CTA abd/pelvis. After CT patient was transferred to SICU 5 EAST where A-line was placed by Vascular Chief resident. Patient remained in stable condition. CT read Pending

## 2021-05-07 LAB
A1C WITH ESTIMATED AVERAGE GLUCOSE RESULT: 5.2 % — SIGNIFICANT CHANGE UP (ref 4–5.6)
ANION GAP SERPL CALC-SCNC: 11 MMOL/L — SIGNIFICANT CHANGE UP (ref 5–17)
ANION GAP SERPL CALC-SCNC: 11 MMOL/L — SIGNIFICANT CHANGE UP (ref 5–17)
BUN SERPL-MCNC: 23 MG/DL — SIGNIFICANT CHANGE UP (ref 7–23)
BUN SERPL-MCNC: 26 MG/DL — HIGH (ref 7–23)
CALCIUM SERPL-MCNC: 9.6 MG/DL — SIGNIFICANT CHANGE UP (ref 8.4–10.5)
CALCIUM SERPL-MCNC: 9.8 MG/DL — SIGNIFICANT CHANGE UP (ref 8.4–10.5)
CHLORIDE SERPL-SCNC: 105 MMOL/L — SIGNIFICANT CHANGE UP (ref 96–108)
CHLORIDE SERPL-SCNC: 106 MMOL/L — SIGNIFICANT CHANGE UP (ref 96–108)
CO2 SERPL-SCNC: 21 MMOL/L — LOW (ref 22–31)
CO2 SERPL-SCNC: 22 MMOL/L — SIGNIFICANT CHANGE UP (ref 22–31)
COVID-19 SPIKE DOMAIN AB INTERP: POSITIVE
COVID-19 SPIKE DOMAIN ANTIBODY RESULT: 12.9 U/ML — HIGH
CREAT SERPL-MCNC: 1.63 MG/DL — HIGH (ref 0.5–1.3)
CREAT SERPL-MCNC: 1.75 MG/DL — HIGH (ref 0.5–1.3)
ESTIMATED AVERAGE GLUCOSE: 103 MG/DL — SIGNIFICANT CHANGE UP (ref 68–114)
GLUCOSE BLDC GLUCOMTR-MCNC: 107 MG/DL — HIGH (ref 70–99)
GLUCOSE BLDC GLUCOMTR-MCNC: 117 MG/DL — HIGH (ref 70–99)
GLUCOSE BLDC GLUCOMTR-MCNC: 120 MG/DL — HIGH (ref 70–99)
GLUCOSE BLDC GLUCOMTR-MCNC: 152 MG/DL — HIGH (ref 70–99)
GLUCOSE SERPL-MCNC: 108 MG/DL — HIGH (ref 70–99)
GLUCOSE SERPL-MCNC: 136 MG/DL — HIGH (ref 70–99)
HCT VFR BLD CALC: 23.7 % — LOW (ref 34.5–45)
HCT VFR BLD CALC: 25.5 % — LOW (ref 34.5–45)
HCT VFR BLD CALC: 27.2 % — LOW (ref 34.5–45)
HGB BLD-MCNC: 7.6 G/DL — LOW (ref 11.5–15.5)
HGB BLD-MCNC: 8.2 G/DL — LOW (ref 11.5–15.5)
HGB BLD-MCNC: 9 G/DL — LOW (ref 11.5–15.5)
MAGNESIUM SERPL-MCNC: 2.2 MG/DL — SIGNIFICANT CHANGE UP (ref 1.6–2.6)
MAGNESIUM SERPL-MCNC: 2.3 MG/DL — SIGNIFICANT CHANGE UP (ref 1.6–2.6)
MCHC RBC-ENTMCNC: 28.9 PG — SIGNIFICANT CHANGE UP (ref 27–34)
MCHC RBC-ENTMCNC: 29 PG — SIGNIFICANT CHANGE UP (ref 27–34)
MCHC RBC-ENTMCNC: 30 PG — SIGNIFICANT CHANGE UP (ref 27–34)
MCHC RBC-ENTMCNC: 32.1 GM/DL — SIGNIFICANT CHANGE UP (ref 32–36)
MCHC RBC-ENTMCNC: 32.2 GM/DL — SIGNIFICANT CHANGE UP (ref 32–36)
MCHC RBC-ENTMCNC: 33.1 GM/DL — SIGNIFICANT CHANGE UP (ref 32–36)
MCV RBC AUTO: 89.8 FL — SIGNIFICANT CHANGE UP (ref 80–100)
MCV RBC AUTO: 90.5 FL — SIGNIFICANT CHANGE UP (ref 80–100)
MCV RBC AUTO: 90.7 FL — SIGNIFICANT CHANGE UP (ref 80–100)
NRBC # BLD: 0 /100 WBCS — SIGNIFICANT CHANGE UP (ref 0–0)
PHOSPHATE SERPL-MCNC: 3.8 MG/DL — SIGNIFICANT CHANGE UP (ref 2.5–4.5)
PHOSPHATE SERPL-MCNC: 3.9 MG/DL — SIGNIFICANT CHANGE UP (ref 2.5–4.5)
PLATELET # BLD AUTO: 113 K/UL — LOW (ref 150–400)
PLATELET # BLD AUTO: 123 K/UL — LOW (ref 150–400)
PLATELET # BLD AUTO: 132 K/UL — LOW (ref 150–400)
POTASSIUM SERPL-MCNC: 3.9 MMOL/L — SIGNIFICANT CHANGE UP (ref 3.5–5.3)
POTASSIUM SERPL-MCNC: 4 MMOL/L — SIGNIFICANT CHANGE UP (ref 3.5–5.3)
POTASSIUM SERPL-SCNC: 3.9 MMOL/L — SIGNIFICANT CHANGE UP (ref 3.5–5.3)
POTASSIUM SERPL-SCNC: 4 MMOL/L — SIGNIFICANT CHANGE UP (ref 3.5–5.3)
RBC # BLD: 2.62 M/UL — LOW (ref 3.8–5.2)
RBC # BLD: 2.84 M/UL — LOW (ref 3.8–5.2)
RBC # BLD: 3 M/UL — LOW (ref 3.8–5.2)
RBC # FLD: 17.4 % — HIGH (ref 10.3–14.5)
RBC # FLD: 18.2 % — HIGH (ref 10.3–14.5)
RBC # FLD: 18.5 % — HIGH (ref 10.3–14.5)
SARS-COV-2 IGG+IGM SERPL QL IA: 12.9 U/ML — HIGH
SARS-COV-2 IGG+IGM SERPL QL IA: POSITIVE
SODIUM SERPL-SCNC: 138 MMOL/L — SIGNIFICANT CHANGE UP (ref 135–145)
SODIUM SERPL-SCNC: 138 MMOL/L — SIGNIFICANT CHANGE UP (ref 135–145)
WBC # BLD: 10.41 K/UL — SIGNIFICANT CHANGE UP (ref 3.8–10.5)
WBC # BLD: 8.56 K/UL — SIGNIFICANT CHANGE UP (ref 3.8–10.5)
WBC # BLD: 8.91 K/UL — SIGNIFICANT CHANGE UP (ref 3.8–10.5)
WBC # FLD AUTO: 10.41 K/UL — SIGNIFICANT CHANGE UP (ref 3.8–10.5)
WBC # FLD AUTO: 8.56 K/UL — SIGNIFICANT CHANGE UP (ref 3.8–10.5)
WBC # FLD AUTO: 8.91 K/UL — SIGNIFICANT CHANGE UP (ref 3.8–10.5)

## 2021-05-07 PROCEDURE — 99234 HOSP IP/OBS SM DT SF/LOW 45: CPT | Mod: GC

## 2021-05-07 RX ORDER — HYDRALAZINE HCL 50 MG
10 TABLET ORAL ONCE
Refills: 0 | Status: COMPLETED | OUTPATIENT
Start: 2021-05-07 | End: 2021-05-07

## 2021-05-07 RX ORDER — ONDANSETRON 8 MG/1
4 TABLET, FILM COATED ORAL ONCE
Refills: 0 | Status: COMPLETED | OUTPATIENT
Start: 2021-05-07 | End: 2021-05-07

## 2021-05-07 RX ORDER — ONDANSETRON 8 MG/1
4 TABLET, FILM COATED ORAL EVERY 6 HOURS
Refills: 0 | Status: DISCONTINUED | OUTPATIENT
Start: 2021-05-07 | End: 2021-05-10

## 2021-05-07 RX ORDER — HYDROMORPHONE HYDROCHLORIDE 2 MG/ML
0.5 INJECTION INTRAMUSCULAR; INTRAVENOUS; SUBCUTANEOUS ONCE
Refills: 0 | Status: DISCONTINUED | OUTPATIENT
Start: 2021-05-07 | End: 2021-05-07

## 2021-05-07 RX ORDER — ACETAMINOPHEN 500 MG
1000 TABLET ORAL ONCE
Refills: 0 | Status: COMPLETED | OUTPATIENT
Start: 2021-05-07 | End: 2021-05-07

## 2021-05-07 RX ORDER — LIDOCAINE 4 G/100G
1 CREAM TOPICAL ONCE
Refills: 0 | Status: COMPLETED | OUTPATIENT
Start: 2021-05-07 | End: 2021-05-07

## 2021-05-07 RX ADMIN — Medication 1000 MILLIGRAM(S): at 19:15

## 2021-05-07 RX ADMIN — Medication 400 MILLIGRAM(S): at 18:42

## 2021-05-07 RX ADMIN — HYDROMORPHONE HYDROCHLORIDE 0.5 MILLIGRAM(S): 2 INJECTION INTRAMUSCULAR; INTRAVENOUS; SUBCUTANEOUS at 21:27

## 2021-05-07 RX ADMIN — LIDOCAINE 1 PATCH: 4 CREAM TOPICAL at 22:12

## 2021-05-07 RX ADMIN — Medication 2: at 00:27

## 2021-05-07 RX ADMIN — Medication 400 MILLIGRAM(S): at 08:00

## 2021-05-07 RX ADMIN — SERTRALINE 50 MILLIGRAM(S): 25 TABLET, FILM COATED ORAL at 21:25

## 2021-05-07 RX ADMIN — ATORVASTATIN CALCIUM 80 MILLIGRAM(S): 80 TABLET, FILM COATED ORAL at 21:25

## 2021-05-07 RX ADMIN — Medication 50 MICROGRAM(S): at 05:36

## 2021-05-07 RX ADMIN — Medication 1000 MILLIGRAM(S): at 08:15

## 2021-05-07 RX ADMIN — ONDANSETRON 4 MILLIGRAM(S): 8 TABLET, FILM COATED ORAL at 00:36

## 2021-05-07 RX ADMIN — Medication 25 MILLIGRAM(S): at 18:33

## 2021-05-07 RX ADMIN — GABAPENTIN 100 MILLIGRAM(S): 400 CAPSULE ORAL at 18:33

## 2021-05-07 RX ADMIN — PANTOPRAZOLE SODIUM 40 MILLIGRAM(S): 20 TABLET, DELAYED RELEASE ORAL at 05:36

## 2021-05-07 RX ADMIN — LIDOCAINE 1 PATCH: 4 CREAM TOPICAL at 10:02

## 2021-05-07 RX ADMIN — Medication 10 MILLIGRAM(S): at 00:17

## 2021-05-07 RX ADMIN — ONDANSETRON 4 MILLIGRAM(S): 8 TABLET, FILM COATED ORAL at 10:20

## 2021-05-07 RX ADMIN — HYDROMORPHONE HYDROCHLORIDE 0.5 MILLIGRAM(S): 2 INJECTION INTRAMUSCULAR; INTRAVENOUS; SUBCUTANEOUS at 22:45

## 2021-05-07 RX ADMIN — Medication 81 MILLIGRAM(S): at 12:29

## 2021-05-07 RX ADMIN — GABAPENTIN 100 MILLIGRAM(S): 400 CAPSULE ORAL at 05:36

## 2021-05-07 RX ADMIN — Medication 1 MILLIGRAM(S): at 12:29

## 2021-05-07 RX ADMIN — Medication 25 MILLIGRAM(S): at 05:36

## 2021-05-07 RX ADMIN — LIDOCAINE 1 PATCH: 4 CREAM TOPICAL at 07:50

## 2021-05-07 NOTE — PROGRESS NOTE ADULT - SUBJECTIVE AND OBJECTIVE BOX
STATUS POST:  POD#1 s/p b/l LE angiograms with L TIFFANY stent and angiplasty     SUBJECTIVE: Patient seen and examined bedside by vascular team. was hypotensive yesterday found to have left RP hematoma and intraperitoneal hematoma. Hgb was 5.8 last night given 2 u of PBRC. tony placed for retention. hydralazine given x 2 for HTN. Currently still complaining of back pain, but abdominal pain improved. No dizziness, lightheadedness.    aspirin enteric coated 81 milliGRAM(s) Oral daily  metoprolol tartrate 25 milliGRAM(s) Oral two times a day      Vital Signs Last 24 Hrs  T(C): 37.1 (07 May 2021 04:53), Max: 37.3 (07 May 2021 00:50)  T(F): 98.7 (07 May 2021 04:53), Max: 99.1 (07 May 2021 00:50)  HR: 71 (07 May 2021 08:00) (64 - 94)  BP: 159/79 (07 May 2021 08:00) (107/57 - 190/86)  BP(mean): 112 (07 May 2021 08:00) (75 - 123)  RR: 19 (07 May 2021 08:00) (12 - 40)  SpO2: 99% (07 May 2021 08:00) (96% - 100%)  I&O's Detail    06 May 2021 07:01  -  07 May 2021 07:00  --------------------------------------------------------  IN:    IV PiggyBack: 65 mL    Lactated Ringers: 850 mL    PRBCs (Packed Red Blood Cells): 600 mL  Total IN: 1515 mL    OUT:    Indwelling Catheter - Urethral (mL): 825 mL    Voided (mL): 150 mL  Total OUT: 975 mL    Total NET: 540 mL    Physical Exam:  General: No acute distress, resting comfortably in bed  C/V: normal sinus rhythm  Pulm: Nonlabored breathing, no respiratory distress  Abd: soft, non-tender, non-distended, groin dressings c/d/i, soft, no hematoma felt  Extrem: warm and well perfused, no edema, SCDs in place. 2+ b/l distal pulses in the feet    LABS:                        8.2    8.56  )-----------( 123      ( 07 May 2021 05:33 )             25.5     05-07    138  |  106  |  26<H>  ----------------------------<  108<H>  4.0   |  21<L>  |  1.75<H>    Ca    9.8      07 May 2021 05:33  Phos  3.9     05-07  Mg     2.2     05-07      PT/INR - ( 06 May 2021 19:03 )   PT: 13.1 sec;   INR: 1.10          PTT - ( 06 May 2021 19:03 )  PTT:24.9 sec      RADIOLOGY & ADDITIONAL STUDIES:   STATUS POST:  POD#1 s/p b/l LE angiograms with L TIFFANY stent and angiplasty     SUBJECTIVE: Patient seen and examined bedside by vascular team. was hypotensive yesterday found to have left RP hematoma and intraperitoneal hematoma. Hgb was 5.8 last night given 2 u of PBRC. tony placed for retention. hydralazine given x 2 for HTN. Currently still complaining of back pain, but abdominal pain improved. No dizziness, lightheadedness.    aspirin enteric coated 81 milliGRAM(s) Oral daily  metoprolol tartrate 25 milliGRAM(s) Oral two times a day      Vital Signs Last 24 Hrs  T(C): 37.1 (07 May 2021 04:53), Max: 37.3 (07 May 2021 00:50)  T(F): 98.7 (07 May 2021 04:53), Max: 99.1 (07 May 2021 00:50)  HR: 71 (07 May 2021 08:00) (64 - 94)  BP: 159/79 (07 May 2021 08:00) (107/57 - 190/86)  BP(mean): 112 (07 May 2021 08:00) (75 - 123)  RR: 19 (07 May 2021 08:00) (12 - 40)  SpO2: 99% (07 May 2021 08:00) (96% - 100%)  I&O's Detail    06 May 2021 07:01  -  07 May 2021 07:00  --------------------------------------------------------  IN:    IV PiggyBack: 65 mL    Lactated Ringers: 850 mL    PRBCs (Packed Red Blood Cells): 600 mL  Total IN: 1515 mL    OUT:    Indwelling Catheter - Urethral (mL): 825 mL    Voided (mL): 150 mL  Total OUT: 975 mL    Total NET: 540 mL    Physical Exam:  General: No acute distress, resting comfortably in bed  C/V: normal sinus rhythm  Pulm: Nonlabored breathing, no respiratory distress  Abd: soft, non-tender, non-distended, groin dressings c/d/i, soft, no hematoma felt  Extrem: warm and well perfused, no edema, SCDs in place. biphasic dp/pt on the R, biphasic dp/pt on the left    LABS:                        8.2    8.56  )-----------( 123      ( 07 May 2021 05:33 )             25.5     05-07    138  |  106  |  26<H>  ----------------------------<  108<H>  4.0   |  21<L>  |  1.75<H>    Ca    9.8      07 May 2021 05:33  Phos  3.9     05-07  Mg     2.2     05-07      PT/INR - ( 06 May 2021 19:03 )   PT: 13.1 sec;   INR: 1.10          PTT - ( 06 May 2021 19:03 )  PTT:24.9 sec      RADIOLOGY & ADDITIONAL STUDIES:

## 2021-05-07 NOTE — DIETITIAN INITIAL EVALUATION ADULT. - DIET TYPE
Diet to be advanced in 24-48hrs as feasible: Clears -> Regular diet, ensure enlive x2/day 350kcal/20gm prot per 1 can (350kcal/20gm prot per 1 can).

## 2021-05-07 NOTE — PROGRESS NOTE ADULT - SUBJECTIVE AND OBJECTIVE BOX
INCOMPLETE NOTE  24HR EVENTS: Pt underwent elective LLE angiogram/angioplasty of TIFFANY with stent yesterday. Initial POC wnl, however upon repeat evaluation, pt complaining of L flank pain, hypotensive on monitor. CT scan showed RP bleed without active extravasation. Pt given 2u PRBC overnight and transferred to SICU for hemodynamic monitoring. Gomez was placed for urinary retention 650cc on bladder scan. Pt hypertensive overnight to 190s/80s, given hydralazine 10 IVP x2.     SUBJECTIVE: Pt seen and examined on morning rounds. Pt in good spirits this morning, complaining of continued L flank pain. Pain unchanged from prior. Denies any nausea/vomiting, numbness in lower extremities.       Vital Signs Last 24 Hrs  T(C): 37.1 (07 May 2021 04:53), Max: 37.3 (07 May 2021 00:50)  T(F): 98.7 (07 May 2021 04:53), Max: 99.1 (07 May 2021 00:50)  HR: 94 (07 May 2021 06:00) (64 - 94)  BP: 158/74 (07 May 2021 06:00) (107/57 - 190/86)  BP(mean): 106 (07 May 2021 06:00) (75 - 123)  RR: 20 (07 May 2021 06:00) (12 - 40)  SpO2: 100% (07 May 2021 06:00) (96% - 100%)    PHYSICAL EXAM  Gen: NAD, resting comfortably in bed  C/V: NSR  Pulm: Nonlabored breathing, no respiratory distress  Abd: soft, diffuse tenderness to palpation over LUQ/LLQ/Flank region. No ecchymosis  Groin: B/L groins soft, compressible, dressing c/d/i  Extrem: RLE: biphasic DP, no PT LLE: biphasic DP/PT    aspirin enteric coated 81 milliGRAM(s) Oral daily  atorvastatin 80 milliGRAM(s) Oral at bedtime  dextrose 40% Gel 15 Gram(s) Oral once  dextrose 5%. 1000 milliLiter(s) IV Continuous <Continuous>  dextrose 5%. 1000 milliLiter(s) IV Continuous <Continuous>  dextrose 50% Injectable 25 Gram(s) IV Push once  dextrose 50% Injectable 12.5 Gram(s) IV Push once  dextrose 50% Injectable 25 Gram(s) IV Push once  folic acid 1 milliGRAM(s) Oral daily  gabapentin 100 milliGRAM(s) Oral two times a day  glucagon  Injectable 1 milliGRAM(s) IntraMuscular once  insulin lispro (ADMELOG) corrective regimen sliding scale   SubCutaneous every 6 hours  lactated ringers. 1000 milliLiter(s) IV Continuous <Continuous>  levothyroxine 50 MICROGram(s) Oral daily  lidocaine   Patch 1 Patch Transdermal once  metoprolol tartrate 25 milliGRAM(s) Oral two times a day  pantoprazole    Tablet 40 milliGRAM(s) Oral before breakfast  sertraline 50 milliGRAM(s) Oral at bedtime      Assessment/Plan:  65F former smoker with PMH of HTN, HLD, CAD (s/p PCI and CABG), bioprosthetic AVR and MVR annuloplasty (2002), PAD s/p L SFA and L TIFFANY stent, AAA (s/p open repair 2015), tAAA s/p thoracoabdominal repair (8/2020) presented electively today b/l LE angiogram, now s/p L TIFFANY stent/angioplasty, admitted to SICU for hemodynamic monitoring after finding of moderate RP hematoma without active extravasation on CT.     NEURO: Tylenol PRN, No narcotics  CV: CAD s/p PCI & CABG Asa  holding plavix?, MAP>65, Systolic BP , Metop 5 Q6  RESP: sat on RA   FENGI: NPO/IVF   : Voids  ENDO: ISS  ID: none  HEME: s/p 2u PRBC overnight; AM hgb 8.2  PPx: no SQH or SCDs  LINES/WOUNDS: bilateral groin access (sheaths removed post-op), PIVs, L rad-line  PT/OT: not ordered     24HR EVENTS: Pt underwent elective LLE angiogram/angioplasty of TIFFANY with stent yesterday. Initial POC wnl, however upon repeat evaluation, pt complaining of L flank pain, hypotensive on monitor. CT scan showed RP bleed without active extravasation. Pt given 2u PRBC overnight and transferred to SICU for hemodynamic monitoring. Gomez was placed for urinary retention 650cc on bladder scan. Pt hypertensive overnight to 190s/80s, given hydralazine 10 IVP x2.     SUBJECTIVE: Pt seen and examined on morning rounds. Pt in good spirits this morning, complaining of continued L flank pain. Pain unchanged from prior. Denies any nausea/vomiting, numbness in lower extremities.       Vital Signs Last 24 Hrs  T(C): 37.1 (07 May 2021 04:53), Max: 37.3 (07 May 2021 00:50)  T(F): 98.7 (07 May 2021 04:53), Max: 99.1 (07 May 2021 00:50)  HR: 94 (07 May 2021 06:00) (64 - 94)  BP: 158/74 (07 May 2021 06:00) (107/57 - 190/86)  BP(mean): 106 (07 May 2021 06:00) (75 - 123)  RR: 20 (07 May 2021 06:00) (12 - 40)  SpO2: 100% (07 May 2021 06:00) (96% - 100%)    PHYSICAL EXAM  Gen: NAD, resting comfortably in bed  C/V: NSR  Pulm: Nonlabored breathing, no respiratory distress  Abd: soft, tenderness to palpation over L abd + flank region. Endorses some but less pain over right abdomen. No ecchymoses  Groin: B/L groins soft, compressible, dressing c/d/i  Extrem: RLE: biphasic DP, no PT LLE: biphasic DP/PT    aspirin enteric coated 81 milliGRAM(s) Oral daily  atorvastatin 80 milliGRAM(s) Oral at bedtime  dextrose 40% Gel 15 Gram(s) Oral once  dextrose 5%. 1000 milliLiter(s) IV Continuous <Continuous>  dextrose 5%. 1000 milliLiter(s) IV Continuous <Continuous>  dextrose 50% Injectable 25 Gram(s) IV Push once  dextrose 50% Injectable 12.5 Gram(s) IV Push once  dextrose 50% Injectable 25 Gram(s) IV Push once  folic acid 1 milliGRAM(s) Oral daily  gabapentin 100 milliGRAM(s) Oral two times a day  glucagon  Injectable 1 milliGRAM(s) IntraMuscular once  insulin lispro (ADMELOG) corrective regimen sliding scale   SubCutaneous every 6 hours  lactated ringers. 1000 milliLiter(s) IV Continuous <Continuous>  levothyroxine 50 MICROGram(s) Oral daily  lidocaine   Patch 1 Patch Transdermal once  metoprolol tartrate 25 milliGRAM(s) Oral two times a day  pantoprazole    Tablet 40 milliGRAM(s) Oral before breakfast  sertraline 50 milliGRAM(s) Oral at bedtime

## 2021-05-07 NOTE — DIETITIAN INITIAL EVALUATION ADULT. - OTHER INFO
65yoF former smoker with PMH of HTN, HLD, CAD (s/p PCI and CABG), bioprosthetic AVR and MVR annuloplasty (2002), PAD s/p L SFA and L TIFFANY stent, AAA (s/p open repair 2015), tAAA s/p thoracoabdominal repair (8/2020) presented electively For b/l LE angiogram, possible intervention d/t b/l LE claudication worsening over the past few months. Underwent the procedure, with L TIFFANY stent and angioplasty performed through the L groin. Now admitted for possible RP hematoma. CT scan showed RP bleed without active extravasation, given 2u PRBC overnight and transferred to SICU for hemodynamic monitoring.   Pt visited in CCU under SICU, PCA at bedside. Pt had hard time recalling exact details. Pt had been ordered NPO, seen with clear liquid luigi at bedside however which she has been slowly sipping on. Consumed some tea during RD visit, limited intake d/t c/o N/V; Zofran and Protonix ordered. Reports varying PO intake PTA, consumes 3 meals/day, if she does not consume meals d/t decreased PO will also take ensure. May be alleric to carrots, added to EMR. No issues chewing/swallowing.  pounds "many years ago." Admitted at 111 pounds, suggests wt loss 41 pound wt loss/27% body wt loss. Noted with Mild/moderate wasting to temples. Malnutrition note placed today. No pain. No edema/pressure ulcers; SX site noted. Enzo Gray.   Please see below for Recs, d/w team.

## 2021-05-07 NOTE — DIETITIAN INITIAL EVALUATION ADULT. - PERSON TAUGHT/METHOD
Encourage PO intake during meals & reviewed importance - reviewed protein foods, oral nutrition supplements. Tips for GI provided./verbal instruction/patient instructed

## 2021-05-08 ENCOUNTER — TRANSCRIPTION ENCOUNTER (OUTPATIENT)
Age: 66
End: 2021-05-08

## 2021-05-08 LAB
ANION GAP SERPL CALC-SCNC: 10 MMOL/L — SIGNIFICANT CHANGE UP (ref 5–17)
BUN SERPL-MCNC: 23 MG/DL — SIGNIFICANT CHANGE UP (ref 7–23)
CALCIUM SERPL-MCNC: 9.7 MG/DL — SIGNIFICANT CHANGE UP (ref 8.4–10.5)
CHLORIDE SERPL-SCNC: 105 MMOL/L — SIGNIFICANT CHANGE UP (ref 96–108)
CO2 SERPL-SCNC: 22 MMOL/L — SIGNIFICANT CHANGE UP (ref 22–31)
CREAT SERPL-MCNC: 1.71 MG/DL — HIGH (ref 0.5–1.3)
GLUCOSE BLDC GLUCOMTR-MCNC: 105 MG/DL — HIGH (ref 70–99)
GLUCOSE SERPL-MCNC: 96 MG/DL — SIGNIFICANT CHANGE UP (ref 70–99)
HCT VFR BLD CALC: 26.2 % — LOW (ref 34.5–45)
HGB BLD-MCNC: 8.4 G/DL — LOW (ref 11.5–15.5)
MAGNESIUM SERPL-MCNC: 2.4 MG/DL — SIGNIFICANT CHANGE UP (ref 1.6–2.6)
MCHC RBC-ENTMCNC: 29.2 PG — SIGNIFICANT CHANGE UP (ref 27–34)
MCHC RBC-ENTMCNC: 32.1 GM/DL — SIGNIFICANT CHANGE UP (ref 32–36)
MCV RBC AUTO: 91 FL — SIGNIFICANT CHANGE UP (ref 80–100)
NRBC # BLD: 0 /100 WBCS — SIGNIFICANT CHANGE UP (ref 0–0)
PHOSPHATE SERPL-MCNC: 2.8 MG/DL — SIGNIFICANT CHANGE UP (ref 2.5–4.5)
PLATELET # BLD AUTO: 107 K/UL — LOW (ref 150–400)
POTASSIUM SERPL-MCNC: 3.8 MMOL/L — SIGNIFICANT CHANGE UP (ref 3.5–5.3)
POTASSIUM SERPL-SCNC: 3.8 MMOL/L — SIGNIFICANT CHANGE UP (ref 3.5–5.3)
RBC # BLD: 2.88 M/UL — LOW (ref 3.8–5.2)
RBC # FLD: 17.9 % — HIGH (ref 10.3–14.5)
SODIUM SERPL-SCNC: 137 MMOL/L — SIGNIFICANT CHANGE UP (ref 135–145)
WBC # BLD: 9.83 K/UL — SIGNIFICANT CHANGE UP (ref 3.8–10.5)
WBC # FLD AUTO: 9.83 K/UL — SIGNIFICANT CHANGE UP (ref 3.8–10.5)

## 2021-05-08 PROCEDURE — 99233 SBSQ HOSP IP/OBS HIGH 50: CPT | Mod: GC

## 2021-05-08 RX ORDER — POTASSIUM PHOSPHATE, MONOBASIC POTASSIUM PHOSPHATE, DIBASIC 236; 224 MG/ML; MG/ML
15 INJECTION, SOLUTION INTRAVENOUS ONCE
Refills: 0 | Status: COMPLETED | OUTPATIENT
Start: 2021-05-08 | End: 2021-05-08

## 2021-05-08 RX ORDER — SENNA PLUS 8.6 MG/1
2 TABLET ORAL AT BEDTIME
Refills: 0 | Status: DISCONTINUED | OUTPATIENT
Start: 2021-05-08 | End: 2021-05-10

## 2021-05-08 RX ORDER — POLYETHYLENE GLYCOL 3350 17 G/17G
17 POWDER, FOR SOLUTION ORAL DAILY
Refills: 0 | Status: DISCONTINUED | OUTPATIENT
Start: 2021-05-08 | End: 2021-05-10

## 2021-05-08 RX ORDER — FUROSEMIDE 40 MG
40 TABLET ORAL EVERY 24 HOURS
Refills: 0 | Status: DISCONTINUED | OUTPATIENT
Start: 2021-05-09 | End: 2021-05-10

## 2021-05-08 RX ORDER — ACETAMINOPHEN 500 MG
325 TABLET ORAL EVERY 4 HOURS
Refills: 0 | Status: DISCONTINUED | OUTPATIENT
Start: 2021-05-08 | End: 2021-05-10

## 2021-05-08 RX ORDER — MORPHINE SULFATE 50 MG/1
2 CAPSULE, EXTENDED RELEASE ORAL ONCE
Refills: 0 | Status: DISCONTINUED | OUTPATIENT
Start: 2021-05-08 | End: 2021-05-08

## 2021-05-08 RX ORDER — HEPARIN SODIUM 5000 [USP'U]/ML
5000 INJECTION INTRAVENOUS; SUBCUTANEOUS EVERY 8 HOURS
Refills: 0 | Status: DISCONTINUED | OUTPATIENT
Start: 2021-05-08 | End: 2021-05-10

## 2021-05-08 RX ORDER — OXYCODONE AND ACETAMINOPHEN 5; 325 MG/1; MG/1
1 TABLET ORAL EVERY 6 HOURS
Refills: 0 | Status: DISCONTINUED | OUTPATIENT
Start: 2021-05-08 | End: 2021-05-10

## 2021-05-08 RX ORDER — ACETAMINOPHEN 500 MG
1000 TABLET ORAL ONCE
Refills: 0 | Status: COMPLETED | OUTPATIENT
Start: 2021-05-08 | End: 2021-05-08

## 2021-05-08 RX ORDER — LABETALOL HCL 100 MG
10 TABLET ORAL ONCE
Refills: 0 | Status: DISCONTINUED | OUTPATIENT
Start: 2021-05-08 | End: 2021-05-10

## 2021-05-08 RX ADMIN — LIDOCAINE 1 PATCH: 4 CREAM TOPICAL at 07:16

## 2021-05-08 RX ADMIN — OXYCODONE AND ACETAMINOPHEN 1 TABLET(S): 5; 325 TABLET ORAL at 16:36

## 2021-05-08 RX ADMIN — Medication 81 MILLIGRAM(S): at 12:34

## 2021-05-08 RX ADMIN — Medication 1000 MILLIGRAM(S): at 03:27

## 2021-05-08 RX ADMIN — MORPHINE SULFATE 2 MILLIGRAM(S): 50 CAPSULE, EXTENDED RELEASE ORAL at 19:50

## 2021-05-08 RX ADMIN — PANTOPRAZOLE SODIUM 40 MILLIGRAM(S): 20 TABLET, DELAYED RELEASE ORAL at 05:24

## 2021-05-08 RX ADMIN — SERTRALINE 50 MILLIGRAM(S): 25 TABLET, FILM COATED ORAL at 22:54

## 2021-05-08 RX ADMIN — Medication 25 MILLIGRAM(S): at 05:25

## 2021-05-08 RX ADMIN — Medication 25 MILLIGRAM(S): at 18:45

## 2021-05-08 RX ADMIN — MORPHINE SULFATE 2 MILLIGRAM(S): 50 CAPSULE, EXTENDED RELEASE ORAL at 19:03

## 2021-05-08 RX ADMIN — OXYCODONE AND ACETAMINOPHEN 1 TABLET(S): 5; 325 TABLET ORAL at 17:21

## 2021-05-08 RX ADMIN — GABAPENTIN 100 MILLIGRAM(S): 400 CAPSULE ORAL at 05:24

## 2021-05-08 RX ADMIN — LIDOCAINE 1 PATCH: 4 CREAM TOPICAL at 10:56

## 2021-05-08 RX ADMIN — POTASSIUM PHOSPHATE, MONOBASIC POTASSIUM PHOSPHATE, DIBASIC 62.5 MILLIMOLE(S): 236; 224 INJECTION, SOLUTION INTRAVENOUS at 07:47

## 2021-05-08 RX ADMIN — ATORVASTATIN CALCIUM 80 MILLIGRAM(S): 80 TABLET, FILM COATED ORAL at 22:53

## 2021-05-08 RX ADMIN — Medication 1 MILLIGRAM(S): at 12:34

## 2021-05-08 RX ADMIN — GABAPENTIN 100 MILLIGRAM(S): 400 CAPSULE ORAL at 18:45

## 2021-05-08 RX ADMIN — HEPARIN SODIUM 5000 UNIT(S): 5000 INJECTION INTRAVENOUS; SUBCUTANEOUS at 22:54

## 2021-05-08 RX ADMIN — Medication 50 MICROGRAM(S): at 05:24

## 2021-05-08 RX ADMIN — Medication 400 MILLIGRAM(S): at 03:12

## 2021-05-08 NOTE — DISCHARGE NOTE PROVIDER - DETAILS OF MALNUTRITION DIAGNOSIS/DIAGNOSES
This patient has been assessed with a concern for Malnutrition and was treated during this hospitalization for the following Nutrition diagnosis/diagnoses:     -  05/07/2021: Mild protein-calorie malnutrition   -  05/07/2021: Underweight (BMI < 19)

## 2021-05-08 NOTE — DISCHARGE NOTE PROVIDER - NSDCCPTREATMENT_GEN_ALL_CORE_FT
PRINCIPAL PROCEDURE  Procedure: Angiogram, with bilateral lower extremity runoff  Findings and Treatment:

## 2021-05-08 NOTE — DISCHARGE NOTE PROVIDER - HOSPITAL COURSE
65F former smoker with PMH of HTN, HLD, CAD (s/p PCI and CABG), bioprosthetic AVR and MVR annuloplasty (2002), PAD s/p L SFA and L TIFFANY stent, AAA (s/p open repair 2015), tAAA s/p thoracoabdominal repair (8/2020) presented electively today b/l LE angiogram, possible intervention. Patient had been complaining of b/l LE claudication worsening over the past few months. R JENNIFER 0.59, L 0.43.    Patient underwent the procedure, with L TIFFANY stent and angioplasty performed through the L groin. Patient had a 6Fr sheath in L groin, 5Fr sheath in R groin. 3000U heparin given during the case. In the PACU, patient's ACT was 147 and both sheaths were pulled. Patient tolerated the sheath pull well and was on strict bedrest. The patient then began to complain of L groin pain, which remained soft. Around 5:30 she complained of L lower abdominal and back pain. SBP from 150s to 108. Patient transported to CT scanner to and found to have L RP hematoma. her Hgb was 5.6 and was hypotensive and was given 2 u PRBC. She was admitted to the ICU for hemodynamic monitoring and improved. Her vital signs and hemoglobin have remained stable. She was able to tolerate a diet, and walk, and is stable for discharge today.

## 2021-05-08 NOTE — PROGRESS NOTE ADULT - SUBJECTIVE AND OBJECTIVE BOX
STATUS POST:  POD#2 s/p b/l LE angiograms with L TIFFANY stent and angiplasty     SUBJECTIVE: Patient seen and examined bedside by vascular team, doing well, pain improving.     aspirin enteric coated 81 milliGRAM(s) Oral daily  metoprolol tartrate 25 milliGRAM(s) Oral two times a day      Vital Signs Last 24 Hrs  T(C): 37.4 (08 May 2021 10:00), Max: 37.4 (08 May 2021 10:00)  T(F): 99.3 (08 May 2021 10:00), Max: 99.3 (08 May 2021 10:00)  HR: 66 (08 May 2021 09:00) (60 - 81)  BP: 143/67 (08 May 2021 09:00) (119/61 - 174/82)  BP(mean): 96 (08 May 2021 09:00) (85 - 118)  RR: 19 (08 May 2021 09:00) (10 - 24)  SpO2: 97% (08 May 2021 09:00) (95% - 100%)  I&O's Detail    07 May 2021 07:01  -  08 May 2021 07:00  --------------------------------------------------------  IN:    IV PiggyBack: 100 mL    Oral Fluid: 1205 mL    PRBCs (Packed Red Blood Cells): 300 mL  Total IN: 1605 mL    OUT:    Indwelling Catheter - Urethral (mL): 910 mL    Lactated Ringers: 0 mL  Total OUT: 910 mL    Total NET: 695 mL      08 May 2021 07:01  -  08 May 2021 10:18  --------------------------------------------------------  IN:    IV PiggyBack: 125 mL  Total IN: 125 mL    OUT:    Indwelling Catheter - Urethral (mL): 100 mL  Total OUT: 100 mL    Total NET: 25 mL      Physical Exam:  General: No acute distress, resting comfortably in bed  C/V: normal sinus rhythm  Pulm: Nonlabored breathing, no respiratory distress  Abd: soft, non-tender, non-distended, groin dressings c/d/i, soft, no hematoma felt  Extrem: warm and well perfused, no edema, SCDs in place. biphasic dp/pt on the R, biphasic dp/pt on the left      LABS:                        8.4    9.83  )-----------( 107      ( 08 May 2021 05:13 )             26.2     05-08    137  |  105  |  23  ----------------------------<  96  3.8   |  22  |  1.71<H>    Ca    9.7      08 May 2021 05:13  Phos  2.8     05-08  Mg     2.4     05-08      PT/INR - ( 06 May 2021 19:03 )   PT: 13.1 sec;   INR: 1.10          PTT - ( 06 May 2021 19:03 )  PTT:24.9 sec      RADIOLOGY & ADDITIONAL STUDIES:

## 2021-05-08 NOTE — DISCHARGE NOTE PROVIDER - NSDCFUADDINST_GEN_ALL_CORE_FT
Please call [phone] to make follow-up appointment with Dr. Sandra after discharge FOLLOW UP: Dr. Sandra in 1 week. Your appointment has been made for _______. Call the office at  with any questions.    WOUND CARE: You may shower; soap and water over incision sites. Do not scrub. Pat dry when done.     ACTIVITY: Ambulate as tolerated, but no heavy lifting (>10lbs) or strenuous exercise.    DIET: You may resume regular diet.     Call the office if you experience increasing pain, redness, swelling or drainage from incision sites/wounds, or temperature >101.4F.     NEW MEDICATIONS:    ADDITIONAL FOLLOW UP AFTER DISCHARGE:  follow up with your primary care physician    DISCHARGE DESTINATION: home no needs FOLLOW UP: Dr. Sandra in 1 week. Your appointment has been made for 5/17 at 945am. Call the office at  with any questions.    WOUND CARE: You may shower; soap and water over incision sites. Do not scrub. Pat dry when done.     ACTIVITY: Ambulate as tolerated, but no heavy lifting (>10lbs) or strenuous exercise.    DIET: You may resume regular diet.     Call the office if you experience increasing pain, redness, swelling or drainage from incision sites/wounds, or temperature >101.4F.     ADDITIONAL FOLLOW UP AFTER DISCHARGE:  follow up with your primary care physician    DISCHARGE DESTINATION: home no needs

## 2021-05-08 NOTE — DISCHARGE NOTE PROVIDER - NSDCCPCAREPLAN_GEN_ALL_CORE_FT
PRINCIPAL DISCHARGE DIAGNOSIS  Diagnosis: Claudication  Assessment and Plan of Treatment:       SECONDARY DISCHARGE DIAGNOSES  Diagnosis: Retroperitoneal hematoma  Assessment and Plan of Treatment:      PRINCIPAL DISCHARGE DIAGNOSIS  Diagnosis: Claudication  Assessment and Plan of Treatment:       SECONDARY DISCHARGE DIAGNOSES  Diagnosis: Retroperitoneal hematoma  Assessment and Plan of Treatment:     Diagnosis: Stage 3 chronic kidney disease  Assessment and Plan of Treatment:     Diagnosis: Depression  Assessment and Plan of Treatment:     Diagnosis: Seizure  Assessment and Plan of Treatment:     Diagnosis: HTN (hypertension)  Assessment and Plan of Treatment:     Diagnosis: HLD (hyperlipidemia)  Assessment and Plan of Treatment:     Diagnosis: GERD (gastroesophageal reflux disease)  Assessment and Plan of Treatment:     Diagnosis: Anemia of chronic disease  Assessment and Plan of Treatment:     Diagnosis: Hypothyroid  Assessment and Plan of Treatment:     Diagnosis: CAD (coronary artery disease)  Assessment and Plan of Treatment:     Diagnosis: PVD (peripheral vascular disease)  Assessment and Plan of Treatment:

## 2021-05-08 NOTE — DISCHARGE NOTE PROVIDER - NSDCACTIVITY_GEN_ALL_CORE
No restrictions/Showering allowed/Walking - Indoors allowed/No heavy lifting/straining/Walking - Outdoors allowed

## 2021-05-08 NOTE — DISCHARGE NOTE PROVIDER - CARE PROVIDER_API CALL
Sarai Sandra)  Surgery; Vascular Surgery  [address], [address] [place] [address]  Phone: [phone]  Fax: [phone]  Follow Up Time: 1 week

## 2021-05-08 NOTE — PROGRESS NOTE ADULT - SUBJECTIVE AND OBJECTIVE BOX
24 hr events: No events overnight    SUBJECTIVE: Pt seen and examined at bedside. She reports she is feeling well, has no current complaints. Denies any n/v/cp/sob.       MEDICATIONS  (STANDING):  aspirin enteric coated 81 milliGRAM(s) Oral daily  atorvastatin 80 milliGRAM(s) Oral at bedtime  folic acid 1 milliGRAM(s) Oral daily  gabapentin 100 milliGRAM(s) Oral two times a day  levothyroxine 50 MICROGram(s) Oral daily  metoprolol tartrate 25 milliGRAM(s) Oral two times a day  pantoprazole    Tablet 40 milliGRAM(s) Oral before breakfast  sertraline 50 milliGRAM(s) Oral at bedtime    MEDICATIONS  (PRN):  ondansetron Injectable 4 milliGRAM(s) IV Push every 6 hours PRN Nausea and/or Vomiting      Gomez:	  [ ] None	[x ] Daily Gomez Order Placed	   Indication:	  [x ] Strict I and O's    [ ] Obstruction     [ ] Incontinence + Stage 3 or 4 Decubitus  Central Line:  [ ] None	   [ ]  Medication / TPN Administration     [ ] No Peripheral IV     ICU Vital Signs Last 24 Hrs  T(C): 36.8 (08 May 2021 05:51), Max: 37.1 (07 May 2021 10:00)  T(F): 98.3 (08 May 2021 05:51), Max: 98.8 (07 May 2021 10:00)  HR: 62 (08 May 2021 08:00) (60 - 81)  BP: 157/70 (08 May 2021 08:00) (119/61 - 174/82)  BP(mean): 100 (08 May 2021 08:00) (85 - 118)  ABP: 165/64 (08 May 2021 08:00) (125/45 - 172/72)  ABP(mean): 102 (08 May 2021 08:00) (75 - 110)  RR: 18 (08 May 2021 08:00) (10 - 24)  SpO2: 97% (08 May 2021 08:00) (95% - 100%)      Physical Exam:  Gen: NAD, resting comfortably in bed  C/V: NSR  Pulm: Nonlabored breathing, no respiratory distress  Abd: soft, tenderness to palpation over L abd + flank region. Endorses some but less pain over right abdomen. No ecchymoses  Groin: B/L groins soft, compressible, dressing c/d/i  Extrem: RLE: biphasic DP, no PT LLE: biphasic DP/PT    Lines/tubes/drains:    Vent settings:      I&O's Summary    07 May 2021 07:01  -  08 May 2021 07:00  --------------------------------------------------------  IN: 1605 mL / OUT: 910 mL / NET: 695 mL    08 May 2021 07:01  -  08 May 2021 09:14  --------------------------------------------------------  IN: 62.5 mL / OUT: 60 mL / NET: 2.5 mL        LABS:                        8.4    9.83  )-----------( 107      ( 08 May 2021 05:13 )             26.2     05-08    137  |  105  |  23  ----------------------------<  96  3.8   |  22  |  1.71<H>    Ca    9.7      08 May 2021 05:13  Phos  2.8     05-08  Mg     2.4     05-08      PT/INR - ( 06 May 2021 19:03 )   PT: 13.1 sec;   INR: 1.10          PTT - ( 06 May 2021 19:03 )  PTT:24.9 sec    CAPILLARY BLOOD GLUCOSE      POCT Blood Glucose.: 105 mg/dL (08 May 2021 00:05)  POCT Blood Glucose.: 107 mg/dL (07 May 2021 16:52)  POCT Blood Glucose.: 117 mg/dL (07 May 2021 12:09)        Cultures:    Drips:    RADIOLOGY & ADDITIONAL STUDIES:

## 2021-05-08 NOTE — PROGRESS NOTE ADULT - ASSESSMENT
65F former smoker with PMH of HTN, HLD, CAD (s/p PCI and CABG), bioprosthetic AVR and MVR annuloplasty (2002), PAD s/p L SFA and L TIFFANY stent, AAA (s/p open repair 2015), tAAA s/p thoracoabdominal repair (8/2020) presented electively today b/l LE angiogram, now s/p L TIFFANY stent/angioplasty, c/b RP hematoma    - maintain bed rest  - advance diet  - can sit up  - maintain tony  - d/c IVF  - maintain joon
65F former smoker with PMH of HTN, HLD, CAD (s/p PCI and CABG), bioprosthetic AVR and MVR annuloplasty (2002), PAD s/p L SFA and L TIFFANY stent, AAA (s/p open repair 2015), tAAA s/p thoracoabdominal repair (8/2020) presented electively today b/l LE angiogram, now s/p L TIFFANY stent/angioplasty, admitted to SICU for possible RP hematoma.     NEURO: Tylenol PRN, Gabapentin,No narcotics  CV: CAD s/p PCI & CABG Asa  holding plavix?, MAP>65, Systolic BP , Metop 5 Q6, Rosuvastatin  RESP: sat on RA   FENGI: NPO/IVF @50cc   : Voids  ENDO: Levothyroxine,   ID: none  HEME: transfuse to Hgb >7  PPx: no SQH or SCDs  LINES/WOUNDS: bilateral groin access (sheaths removed post-op), PIVs, L rad-line  PT/OT: not ordered  
65F former smoker with PMH of HTN, HLD, CAD (s/p PCI and CABG), bioprosthetic AVR and MVR annuloplasty (2002), PAD s/p L SFA and L TIFFANY stent, AAA (s/p open repair 2015), tAAA s/p thoracoabdominal repair (8/2020) presented electively today b/l LE angiogram, now s/p L TIFFANY stent/angioplasty, c/b RP hematoma    - d/c tony  - d/c joon  - can start SQH  - OOB/Ambulate  - can d/c today if feeling well  - can stepdown if not ready for d/c  
Assessment/Plan:  65F former smoker with PMH of HTN, HLD, CAD (s/p PCI and CABG), bioprosthetic AVR and MVR annuloplasty (2002), PAD s/p L SFA and L TIFFANY stent, AAA (s/p open repair 2015), tAAA s/p thoracoabdominal repair (8/2020) presented electively today b/l LE angiogram, now s/p L TIFFANY stent/angioplasty, admitted to SICU for hemodynamic monitoring after finding of moderate RP hematoma without active extravasation on CT.     NEURO: Tylenol PRN w/ lidocaine patch, No narcotics - s/p 1x IV tylenol and lidocaine patch; on gabapentin home meds  CV: CAD s/p PCI & CABG ASA, holding plavix, MAPs>65, Systolic -190 s/p hydralazine IVP x2, Metop 5 Q6 + statin  RESP: sat 99% on RA   FENGI: d/faith IVF, DASH diet + folate  : Gomez cath w/ 700 cc UOP  ENDO: ISS, on levothyroxine home meds  ID: none  HEME: RP hematoma; Hg 5.8, s/p 2 u pRBCs -> 9 -> 8.2; transfuse @ Hg <8  PPx: no SQH or SCDs  LINES/WOUNDS: bilateral groin access (sheaths removed post-op), PIVs, L rad-line  PT/OT: not ordered  DISPO: SICU

## 2021-05-08 NOTE — DISCHARGE NOTE PROVIDER - NSDCMRMEDTOKEN_GEN_ALL_CORE_FT
aspirin 81 mg oral delayed release tablet: 1 tab(s) orally once a day MDD:1  cilostazol 50 mg oral tablet: 1 tab(s) orally 2 times a day  folic acid 1 mg oral tablet: 1 tab(s) orally once a day  furosemide 40 mg oral tablet: 1 tab(s) orally once a day  gabapentin 100 mg oral capsule: 1 cap(s) orally 2 times a day  levothyroxine 50 mcg (0.05 mg) oral tablet: 1 tab(s) orally once a day  metoprolol tartrate 25 mg oral tablet: 1 tab(s) orally 2 times a day  pantoprazole 40 mg oral delayed release tablet: 1 tab(s) orally once a day   Rollator: 1 Rollator for support and decreased mobility  rosuvastatin 40 mg oral tablet: 1 tab(s) orally once a day  sertraline 50 mg oral tablet: 1 tab(s) orally once a day (at bedtime)  Vitamin D2 50,000 intl units (1.25 mg) oral capsule: 1 cap(s) orally once a week

## 2021-05-09 LAB
ANION GAP SERPL CALC-SCNC: 11 MMOL/L — SIGNIFICANT CHANGE UP (ref 5–17)
BUN SERPL-MCNC: 18 MG/DL — SIGNIFICANT CHANGE UP (ref 7–23)
CALCIUM SERPL-MCNC: 9.7 MG/DL — SIGNIFICANT CHANGE UP (ref 8.4–10.5)
CHLORIDE SERPL-SCNC: 106 MMOL/L — SIGNIFICANT CHANGE UP (ref 96–108)
CO2 SERPL-SCNC: 22 MMOL/L — SIGNIFICANT CHANGE UP (ref 22–31)
CREAT SERPL-MCNC: 1.52 MG/DL — HIGH (ref 0.5–1.3)
GLUCOSE SERPL-MCNC: 77 MG/DL — SIGNIFICANT CHANGE UP (ref 70–99)
HCT VFR BLD CALC: 28.1 % — LOW (ref 34.5–45)
HGB BLD-MCNC: 8.8 G/DL — LOW (ref 11.5–15.5)
MAGNESIUM SERPL-MCNC: 2.4 MG/DL — SIGNIFICANT CHANGE UP (ref 1.6–2.6)
MCHC RBC-ENTMCNC: 29.1 PG — SIGNIFICANT CHANGE UP (ref 27–34)
MCHC RBC-ENTMCNC: 31.3 GM/DL — LOW (ref 32–36)
MCV RBC AUTO: 93 FL — SIGNIFICANT CHANGE UP (ref 80–100)
NRBC # BLD: 0 /100 WBCS — SIGNIFICANT CHANGE UP (ref 0–0)
PHOSPHATE SERPL-MCNC: 2.6 MG/DL — SIGNIFICANT CHANGE UP (ref 2.5–4.5)
PLATELET # BLD AUTO: 109 K/UL — LOW (ref 150–400)
POTASSIUM SERPL-MCNC: 4.2 MMOL/L — SIGNIFICANT CHANGE UP (ref 3.5–5.3)
POTASSIUM SERPL-SCNC: 4.2 MMOL/L — SIGNIFICANT CHANGE UP (ref 3.5–5.3)
RBC # BLD: 3.02 M/UL — LOW (ref 3.8–5.2)
RBC # FLD: 17.6 % — HIGH (ref 10.3–14.5)
SODIUM SERPL-SCNC: 139 MMOL/L — SIGNIFICANT CHANGE UP (ref 135–145)
WBC # BLD: 8.43 K/UL — SIGNIFICANT CHANGE UP (ref 3.8–10.5)
WBC # FLD AUTO: 8.43 K/UL — SIGNIFICANT CHANGE UP (ref 3.8–10.5)

## 2021-05-09 PROCEDURE — 99233 SBSQ HOSP IP/OBS HIGH 50: CPT | Mod: GC

## 2021-05-09 RX ADMIN — GABAPENTIN 100 MILLIGRAM(S): 400 CAPSULE ORAL at 17:58

## 2021-05-09 RX ADMIN — Medication 325 MILLIGRAM(S): at 12:30

## 2021-05-09 RX ADMIN — Medication 325 MILLIGRAM(S): at 11:40

## 2021-05-09 RX ADMIN — SERTRALINE 50 MILLIGRAM(S): 25 TABLET, FILM COATED ORAL at 22:48

## 2021-05-09 RX ADMIN — Medication 40 MILLIGRAM(S): at 06:20

## 2021-05-09 RX ADMIN — HEPARIN SODIUM 5000 UNIT(S): 5000 INJECTION INTRAVENOUS; SUBCUTANEOUS at 06:21

## 2021-05-09 RX ADMIN — Medication 325 MILLIGRAM(S): at 22:51

## 2021-05-09 RX ADMIN — Medication 25 MILLIGRAM(S): at 06:22

## 2021-05-09 RX ADMIN — Medication 1 MILLIGRAM(S): at 11:40

## 2021-05-09 RX ADMIN — HEPARIN SODIUM 5000 UNIT(S): 5000 INJECTION INTRAVENOUS; SUBCUTANEOUS at 22:48

## 2021-05-09 RX ADMIN — Medication 25 MILLIGRAM(S): at 17:58

## 2021-05-09 RX ADMIN — PANTOPRAZOLE SODIUM 40 MILLIGRAM(S): 20 TABLET, DELAYED RELEASE ORAL at 06:21

## 2021-05-09 RX ADMIN — Medication 81 MILLIGRAM(S): at 11:40

## 2021-05-09 RX ADMIN — HEPARIN SODIUM 5000 UNIT(S): 5000 INJECTION INTRAVENOUS; SUBCUTANEOUS at 13:20

## 2021-05-09 RX ADMIN — SENNA PLUS 2 TABLET(S): 8.6 TABLET ORAL at 22:48

## 2021-05-09 RX ADMIN — Medication 325 MILLIGRAM(S): at 00:32

## 2021-05-09 RX ADMIN — GABAPENTIN 100 MILLIGRAM(S): 400 CAPSULE ORAL at 06:21

## 2021-05-09 RX ADMIN — Medication 50 MICROGRAM(S): at 06:21

## 2021-05-09 RX ADMIN — ATORVASTATIN CALCIUM 80 MILLIGRAM(S): 80 TABLET, FILM COATED ORAL at 22:48

## 2021-05-09 NOTE — PROGRESS NOTE ADULT - SUBJECTIVE AND OBJECTIVE BOX
Patient is a 65y old  Female who presents with a chief complaint of     HPI:  65F former smoker with PMH of HTN, HLD, CAD (s/p PCI and CABG), bioprosthetic AVR and MVR annuloplasty (2002), PAD s/p L SFA and L TIFFANY stent, AAA (s/p open repair 2015), tAAA s/p thoracoabdominal repair (8/2020) presented electively today b/l LE angiogram, possible intervention. Patient had been complaining of b/l LE claudication worsening over the past few months. R JENNIFER 0.59, L 0.43.    Patient underwent the procedure, with L TIFFANY stent and angioplasty performed through the L groin. Patient had a 6Fr sheath in L groin, 5Fr sheath in R groin. 3000U heparin given during the case. In the PACU, patient's ACT was 147 and both sheaths were pulled. Patient tolerated the sheath pull well and was on strict bedrest. The patient then began to complain of L groin pain, which remained soft. Around 5:30 she complained of L lower abdominal and back pain. SBP from 150s to 108. Patient transported to CT scanner to r/o RP hematoma. (06 May 2021 17:59)      REVIEW OF SYSTEMS      General:	    Skin/Breast:  	  Ophthalmologic:  	  ENMT:	    Respiratory and Thorax:  	  Cardiovascular:	    Gastrointestinal:	    Genitourinary:	    Musculoskeletal:	    Neurological:	    Psychiatric:	    Hematology/Lymphatics:	    Endocrine:	    Allergic/Immunologic:	    Allergies    Carrots (Unknown)  No Known Drug Allergies    Intolerances        Home Medications:  cilostazol 50 mg oral tablet: 1 tab(s) orally 2 times a day (05 May 2021 13:10)  folic acid 1 mg oral tablet: 1 tab(s) orally once a day (05 May 2021 13:10)  furosemide 40 mg oral tablet: 1 tab(s) orally once a day (05 May 2021 13:10)  levothyroxine 50 mcg (0.05 mg) oral tablet: 1 tab(s) orally once a day (05 May 2021 13:10)  metoprolol tartrate 25 mg oral tablet: 1 tab(s) orally 2 times a day (05 May 2021 13:10)  rosuvastatin 40 mg oral tablet: 1 tab(s) orally once a day (05 May 2021 13:10)  sertraline 50 mg oral tablet: 1 tab(s) orally once a day (at bedtime) (05 May 2021 13:10)  Vitamin D2 50,000 intl units (1.25 mg) oral capsule: 1 cap(s) orally once a week (05 May 2021 13:10)      PAST MEDICAL & SURGICAL HISTORY:  Atherosclerosis of coronary artery  CAD (coronary artery disease)    Peripheral vascular disease  PVD (peripheral vascular disease)    Anemia  Anemia    Hypothyroidism  Hypothyroidism    Gastroesophageal reflux disease  GERD (gastroesophageal reflux disease)    Hyperlipidemia  Hyperlipidemia    Essential hypertension  HTN (hypertension)    Seizure    Anxiety    Depression, unspecified depression type    Chronic kidney disease, unspecified CKD stage    Status post aorto-coronary artery bypass graft  2012    Atherosclerosis of coronary artery bypass graft(s), unspecified, with angina pectoris with documented spasm    H/O aortic valve replacement  Bioprosthetic    Ruptured aortic aneurysm  surgery august 2020    Abdominal aortic aneurysm (AAA) without rupture  2015        FAMILY HISTORY:  FH: myocardial infarction  in Dad (at age 50s)        SOCIAL HISTORY:      Vital Signs Last 24 Hrs  T(C): 37.7 (09 May 2021 13:00), Max: 37.7 (09 May 2021 13:00)  T(F): 99.8 (09 May 2021 13:00), Max: 99.8 (09 May 2021 13:00)  HR: 81 (09 May 2021 11:39) (64 - 82)  BP: 161/77 (09 May 2021 11:39) (134/64 - 165/70)  BP(mean): 95 (08 May 2021 18:37) (95 - 95)  RR: 18 (09 May 2021 11:39) (18 - 20)  SpO2: 97% (09 May 2021 11:39) (95% - 97%)   I&O's Detail    08 May 2021 07:01  -  09 May 2021 07:00  --------------------------------------------------------  IN:    Albumin 5%  - 250 mL: 250 mL    IV PiggyBack: 125 mL    Oral Fluid: 180 mL  Total IN: 555 mL    OUT:    Indwelling Catheter - Urethral (mL): 190 mL    Voided (mL): 500 mL  Total OUT: 690 mL    Total NET: -135 mL      09 May 2021 07:01  -  09 May 2021 15:57  --------------------------------------------------------  IN:    Oral Fluid: 180 mL  Total IN: 180 mL    OUT:  Total OUT: 0 mL    Total NET: 180 mL        Daily     Daily     Physical Exam:   GEN: NAD, AAOx3  HEENT: MMM, no icterus  CV: S1 S2 RRR, no MRG  Lung: CTAB  Abd: soft NT ND +BS  Ext: no c/c/e  Neuro: no focal neuro deficit    MEDICATIONS  (STANDING):  aspirin enteric coated 81 milliGRAM(s) Oral daily  atorvastatin 80 milliGRAM(s) Oral at bedtime  folic acid 1 milliGRAM(s) Oral daily  furosemide    Tablet 40 milliGRAM(s) Oral every 24 hours  gabapentin 100 milliGRAM(s) Oral two times a day  heparin   Injectable 5000 Unit(s) SubCutaneous every 8 hours  labetalol Injectable 10 milliGRAM(s) IV Push once  levothyroxine 50 MICROGram(s) Oral daily  metoprolol tartrate 25 milliGRAM(s) Oral two times a day  pantoprazole    Tablet 40 milliGRAM(s) Oral before breakfast  polyethylene glycol 3350 17 Gram(s) Oral daily  senna 2 Tablet(s) Oral at bedtime  sertraline 50 milliGRAM(s) Oral at bedtime    MEDICATIONS  (PRN):  acetaminophen   Tablet .. 325 milliGRAM(s) Oral every 4 hours PRN Mild Pain (1 - 3)  ondansetron Injectable 4 milliGRAM(s) IV Push every 6 hours PRN Nausea and/or Vomiting  oxycodone    5 mG/acetaminophen 325 mG 1 Tablet(s) Oral every 6 hours PRN Severe Pain (7 - 10)                LABS:                        8.8    8.43  )-----------( 109      ( 09 May 2021 07:09 )             28.1     05-09    139  |  106  |  18  ----------------------------<  77  4.2   |  22  |  1.52<H>    Ca    9.7      09 May 2021 07:09  Phos  2.6     05-09  Mg     2.4     05-09            CAPILLARY BLOOD GLUCOSE          RADIOLOGY & ADDITIONAL STUDIES:   Patient is a 65y old  Female who presents with a chief complaint of     HPI:  65F former smoker with PMH of HTN, HLD, CAD (s/p PCI and CABG), bioprosthetic AVR and MVR annuloplasty (2002), PAD s/p L SFA and L TIFFANY stent, AAA (s/p open repair 2015), tAAA s/p thoracoabdominal repair (8/2020) presented electively today b/l LE angiogram, possible intervention. Patient had been complaining of b/l LE claudication worsening over the past few months. R JENNIFER 0.59, L 0.43.    Patient underwent the procedure, with L TIFFANY stent and angioplasty performed through the L groin. Patient had a 6Fr sheath in L groin, 5Fr sheath in R groin. 3000U heparin given during the case. In the PACU, patient's ACT was 147 and both sheaths were pulled. Patient tolerated the sheath pull well and was on strict bedrest. The patient then began to complain of L groin pain, which remained soft. Around 5:30 she complained of L lower abdominal and back pain. SBP from 150s to 108. Patient transported to CT scanner to r/o RP hematoma. (06 May 2021 17:59)      	            Home Medications:  cilostazol 50 mg oral tablet: 1 tab(s) orally 2 times a day (05 May 2021 13:10)  folic acid 1 mg oral tablet: 1 tab(s) orally once a day (05 May 2021 13:10)  furosemide 40 mg oral tablet: 1 tab(s) orally once a day (05 May 2021 13:10)  levothyroxine 50 mcg (0.05 mg) oral tablet: 1 tab(s) orally once a day (05 May 2021 13:10)  metoprolol tartrate 25 mg oral tablet: 1 tab(s) orally 2 times a day (05 May 2021 13:10)  rosuvastatin 40 mg oral tablet: 1 tab(s) orally once a day (05 May 2021 13:10)  sertraline 50 mg oral tablet: 1 tab(s) orally once a day (at bedtime) (05 May 2021 13:10)  Vitamin D2 50,000 intl units (1.25 mg) oral capsule: 1 cap(s) orally once a week (05 May 2021 13:10)      PAST MEDICAL & SURGICAL HISTORY:  Atherosclerosis of coronary artery  CAD (coronary artery disease)    Peripheral vascular disease  PVD (peripheral vascular disease)    Anemia  Anemia    Hypothyroidism  Hypothyroidism    Gastroesophageal reflux disease  GERD (gastroesophageal reflux disease)    Hyperlipidemia  Hyperlipidemia    Essential hypertension  HTN (hypertension)    Seizure    Anxiety    Depression, unspecified depression type    Chronic kidney disease, unspecified CKD stage    Status post aorto-coronary artery bypass graft  2012    Atherosclerosis of coronary artery bypass graft(s), unspecified, with angina pectoris with documented spasm    H/O aortic valve replacement  Bioprosthetic    Ruptured aortic aneurysm  surgery august 2020    Abdominal aortic aneurysm (AAA) without rupture  2015        FAMILY HISTORY:  FH: myocardial infarction  in Dad (at age 50s)        SOCIAL HISTORY:      Vital Signs Last 24 Hrs  T(C): 37.7 (09 May 2021 13:00), Max: 37.7 (09 May 2021 13:00)  T(F): 99.8 (09 May 2021 13:00), Max: 99.8 (09 May 2021 13:00)  HR: 81 (09 May 2021 11:39) (64 - 82)  BP: 161/77 (09 May 2021 11:39) (134/64 - 165/70)  BP(mean): 95 (08 May 2021 18:37) (95 - 95)  RR: 18 (09 May 2021 11:39) (18 - 20)  SpO2: 97% (09 May 2021 11:39) (95% - 97%)   I&O's Detail    08 May 2021 07:01  -  09 May 2021 07:00  --------------------------------------------------------  IN:    Albumin 5%  - 250 mL: 250 mL    IV PiggyBack: 125 mL    Oral Fluid: 180 mL  Total IN: 555 mL    OUT:    Indwelling Catheter - Urethral (mL): 190 mL    Voided (mL): 500 mL  Total OUT: 690 mL    Total NET: -135 mL      09 May 2021 07:01  -  09 May 2021 15:57  --------------------------------------------------------  IN:    Oral Fluid: 180 mL  Total IN: 180 mL    OUT:  Total OUT: 0 mL    Total NET: 180 mL        Daily     Daily     Physical Exam:   GEN: NAD, AAOx3  HEENT: MMM, no icterus  CV: S1 S2 RRR, no MRG  Lung: CTAB  Abd: soft NT ND +BS  Ext: no c/c/e  Neuro: no focal neuro deficit    MEDICATIONS  (STANDING):  aspirin enteric coated 81 milliGRAM(s) Oral daily  atorvastatin 80 milliGRAM(s) Oral at bedtime  folic acid 1 milliGRAM(s) Oral daily  furosemide    Tablet 40 milliGRAM(s) Oral every 24 hours  gabapentin 100 milliGRAM(s) Oral two times a day  heparin   Injectable 5000 Unit(s) SubCutaneous every 8 hours  labetalol Injectable 10 milliGRAM(s) IV Push once  levothyroxine 50 MICROGram(s) Oral daily  metoprolol tartrate 25 milliGRAM(s) Oral two times a day  pantoprazole    Tablet 40 milliGRAM(s) Oral before breakfast  polyethylene glycol 3350 17 Gram(s) Oral daily  senna 2 Tablet(s) Oral at bedtime  sertraline 50 milliGRAM(s) Oral at bedtime    MEDICATIONS  (PRN):  acetaminophen   Tablet .. 325 milliGRAM(s) Oral every 4 hours PRN Mild Pain (1 - 3)  ondansetron Injectable 4 milliGRAM(s) IV Push every 6 hours PRN Nausea and/or Vomiting  oxycodone    5 mG/acetaminophen 325 mG 1 Tablet(s) Oral every 6 hours PRN Severe Pain (7 - 10)                LABS:                        8.8    8.43  )-----------( 109      ( 09 May 2021 07:09 )             28.1     05-09    139  |  106  |  18  ----------------------------<  77  4.2   |  22  |  1.52<H>    Ca    9.7      09 May 2021 07:09  Phos  2.6     05-09  Mg     2.4     05-09            CAPILLARY BLOOD GLUCOSE          RADIOLOGY & ADDITIONAL STUDIES:

## 2021-05-09 NOTE — PROGRESS NOTE ADULT - SUBJECTIVE AND OBJECTIVE BOX
O/N: YANDY PATTERSON    ---------------------------------------------------------------------------  PLEASE CHECK WHEN PRESENT:     [  ] Heart Failure     [  ] Acute     [  ] Acute on Chronic     [  ] Chronic  -------------------------------------------------------------------     [  ]Diastolic [HFpEF]     [  ]Systolic [HFrEF]     [  ]Combined [HFpEF & HFrEF]     [  ] Afib     [ x ] Hypertensive heart disease     [  ]Other:  -------------------------------------------------------------------  [ ] Respiratory failure  [ ] Acute cor pulmonale  [ ] h/o COPD   [ ] Pleural effusion  [ ] Aspiration pneumonia  -------------------------------------------------------------------  [  ]CHERRI     [  ]ATN     [  ]Reneal Medullary Necrosis     [  ]Renal Cortical Necrosis     [  ]Other Pathological Lesions:    [  ]CKD 1  [  ]CKD 2  [  ]CKD 3  [  ]CKD 4  [  ]CKD 5  [  ]Other  -------------------------------------------------------------------  [  ]Diabetes  [  ] Diabetic PVD Ulcer  [  ] Neuropathic ulcer to DM  [  ] Diabetes with Nephropathy  [  ] Osteomyelitis due to diabetes  --------------------------------------------------------------------  [  ]Malnutrition: See Nutrition Note  [  ]Cachexia  [  ]Other:   [  ]Supplement Ordered:  [  ]Morbid Obesity (BMI >=40]  ---------------------------------------------------------------------  [ ] Sepsis/severe sepsis/septic shock  [ ] UTI  [ ] Pneumonia  -----------------------------------------------------------------------  [ ] Acidosis/alkalosis  [ ] Fluid overload  [ ] Hypokalemia  [ ] Hyperkalemia  [ ] Hypomagnesemia  [ ] Hypophosphatemia  [ ] Hyperphosphatemia  ------------------------------------------------------------------------  [ x ] Acute blood loss anemia  [ ] Post op blood loss anemia  [ ] Iron deficiency anemia  [ ] Anemia due to chronic disease  [ ] Hypercoagulable state  ----------------------------------------------------------------------  [ ] Cerebral infarction  [ ] Transient ischemia attack  [ ] Encephalopathy    65F former smoker with PMH of HTN, HLD, CAD (s/p PCI and CABG), bioprosthetic AVR and MVR annuloplasty (2002), PAD s/p L SFA and L TIFFANY stent, AAA (s/p open repair 2015), tAAA s/p thoracoabdominal repair (8/2020) presented electively today b/l LE angiogram, now s/p L TIFFANY stent/angioplasty, c/b RP hematoma    - OOB/Ambulate  - can d/c today if feeling well   O/N: YESENIA, VSS    aspirin enteric coated 81  furosemide    Tablet 40  heparin   Injectable 5000  labetalol Injectable 10  metoprolol tartrate 25        Vital Signs Last 24 Hrs  T(C): 36.6 (09 May 2021 07:00), Max: 37.4 (08 May 2021 10:00)  T(F): 97.9 (09 May 2021 07:00), Max: 99.3 (08 May 2021 10:00)  HR: 64 (09 May 2021 08:09) (64 - 83)  BP: 151/70 (09 May 2021 08:09) (134/64 - 191/86)  BP(mean): 95 (08 May 2021 18:37) (95 - 123)  RR: 18 (09 May 2021 08:09) (14 - 24)  SpO2: 97% (09 May 2021 08:09) (95% - 98%)  I&O's Summary    08 May 2021 07:01  -  09 May 2021 07:00  --------------------------------------------------------  IN: 555 mL / OUT: 690 mL / NET: -135 mL        Physical Exam:  General: NAD, resting comfortably in bed  Pulmonary: Nonlabored breathing, no respiratory distress  Abd: soft, non-distended, groin dressings c/d/i, soft, no hematoma felt +TTP LLQ  Extrem: warm and well perfused, no edema, biphasic dp/pt on the R, biphasic dp/pt on the left      LABS:                        8.8    8.43  )-----------( 109      ( 09 May 2021 07:09 )             28.1     05-09    139  |  106  |  18  ----------------------------<  77  4.2   |  22  |  1.52<H>    Ca    9.7      09 May 2021 07:09  Phos  2.6     05-09  Mg     2.4     05-09    ---------------------------------------------------------------------------  PLEASE CHECK WHEN PRESENT:     [  ] Heart Failure     [  ] Acute     [  ] Acute on Chronic     [  ] Chronic  -------------------------------------------------------------------     [  ]Diastolic [HFpEF]     [  ]Systolic [HFrEF]     [  ]Combined [HFpEF & HFrEF]     [  ] Afib     [ x ] Hypertensive heart disease     [  ]Other:  -------------------------------------------------------------------  [ ] Respiratory failure  [ ] Acute cor pulmonale  [ ] h/o COPD   [ ] Pleural effusion  [ ] Aspiration pneumonia  -------------------------------------------------------------------  [  ]CHERRI     [  ]ATN     [  ]Reneal Medullary Necrosis     [  ]Renal Cortical Necrosis     [  ]Other Pathological Lesions:    [  ]CKD 1  [  ]CKD 2  [ x ]CKD 3  [  ]CKD 4  [  ]CKD 5  [  ]Other  -------------------------------------------------------------------  [  ]Diabetes  [  ] Diabetic PVD Ulcer  [  ] Neuropathic ulcer to DM  [  ] Diabetes with Nephropathy  [  ] Osteomyelitis due to diabetes  --------------------------------------------------------------------  [  ]Malnutrition: See Nutrition Note  [  ]Cachexia  [  ]Other:   [  ]Supplement Ordered:  [  ]Morbid Obesity (BMI >=40]  ---------------------------------------------------------------------  [ ] Sepsis/severe sepsis/septic shock  [ ] UTI  [ ] Pneumonia  -----------------------------------------------------------------------  [ ] Acidosis/alkalosis  [ ] Fluid overload  [x ] Hypokalemia  [ ] Hyperkalemia  [ ] Hypomagnesemia  [ ] Hypophosphatemia  [ ] Hyperphosphatemia  ------------------------------------------------------------------------  [ x ] Acute blood loss anemia  [ ] Post op blood loss anemia  [ ] Iron deficiency anemia  [ ] Anemia due to chronic disease  [ ] Hypercoagulable state  ----------------------------------------------------------------------  [ ] Cerebral infarction  [ ] Transient ischemia attack  [ ] Encephalopathy    65F former smoker with PMH of HTN, HLD, CAD (s/p PCI and CABG), bioprosthetic AVR and MVR annuloplasty (2002), PAD s/p L SFA and L TIFFANY stent, AAA (s/p open repair 2015), tAAA s/p thoracoabdominal repair (8/2020) presented electively today b/l LE angiogram, now s/p L TIFFANY stent/angioplasty, c/b RP hematoma    #PVD  -s/p LLE angio with TIFFANY plasty and stent 5/6  - OOB/Ambulate  - pain control  - asa  - f/u labs    #HTN  - labetalol, lasix, metoprolol    #hypothyroid  -levothyroxine    #HLD  -atorvastatin    #depression  -sertraline    Diet: DASH  DVT ppx: hsq  Dispo: plan for dc 5/10

## 2021-05-10 ENCOUNTER — TRANSCRIPTION ENCOUNTER (OUTPATIENT)
Age: 66
End: 2021-05-10

## 2021-05-10 VITALS
HEART RATE: 71 BPM | DIASTOLIC BLOOD PRESSURE: 90 MMHG | RESPIRATION RATE: 16 BRPM | SYSTOLIC BLOOD PRESSURE: 148 MMHG | OXYGEN SATURATION: 97 %

## 2021-05-10 LAB
ANION GAP SERPL CALC-SCNC: 11 MMOL/L — SIGNIFICANT CHANGE UP (ref 5–17)
BUN SERPL-MCNC: 17 MG/DL — SIGNIFICANT CHANGE UP (ref 7–23)
CALCIUM SERPL-MCNC: 10.2 MG/DL — SIGNIFICANT CHANGE UP (ref 8.4–10.5)
CHLORIDE SERPL-SCNC: 104 MMOL/L — SIGNIFICANT CHANGE UP (ref 96–108)
CO2 SERPL-SCNC: 23 MMOL/L — SIGNIFICANT CHANGE UP (ref 22–31)
CREAT SERPL-MCNC: 1.55 MG/DL — HIGH (ref 0.5–1.3)
GLUCOSE SERPL-MCNC: 93 MG/DL — SIGNIFICANT CHANGE UP (ref 70–99)
HCT VFR BLD CALC: 29.6 % — LOW (ref 34.5–45)
HGB BLD-MCNC: 9.2 G/DL — LOW (ref 11.5–15.5)
MAGNESIUM SERPL-MCNC: 2.5 MG/DL — SIGNIFICANT CHANGE UP (ref 1.6–2.6)
MCHC RBC-ENTMCNC: 29.2 PG — SIGNIFICANT CHANGE UP (ref 27–34)
MCHC RBC-ENTMCNC: 31.1 GM/DL — LOW (ref 32–36)
MCV RBC AUTO: 94 FL — SIGNIFICANT CHANGE UP (ref 80–100)
NRBC # BLD: 0 /100 WBCS — SIGNIFICANT CHANGE UP (ref 0–0)
PHOSPHATE SERPL-MCNC: 2.7 MG/DL — SIGNIFICANT CHANGE UP (ref 2.5–4.5)
PLATELET # BLD AUTO: 137 K/UL — LOW (ref 150–400)
POTASSIUM SERPL-MCNC: 4.1 MMOL/L — SIGNIFICANT CHANGE UP (ref 3.5–5.3)
POTASSIUM SERPL-SCNC: 4.1 MMOL/L — SIGNIFICANT CHANGE UP (ref 3.5–5.3)
RBC # BLD: 3.15 M/UL — LOW (ref 3.8–5.2)
RBC # FLD: 17 % — HIGH (ref 10.3–14.5)
SODIUM SERPL-SCNC: 138 MMOL/L — SIGNIFICANT CHANGE UP (ref 135–145)
WBC # BLD: 8.35 K/UL — SIGNIFICANT CHANGE UP (ref 3.8–10.5)
WBC # FLD AUTO: 8.35 K/UL — SIGNIFICANT CHANGE UP (ref 3.8–10.5)

## 2021-05-10 PROCEDURE — 82330 ASSAY OF CALCIUM: CPT

## 2021-05-10 PROCEDURE — 85018 HEMOGLOBIN: CPT

## 2021-05-10 PROCEDURE — 82962 GLUCOSE BLOOD TEST: CPT

## 2021-05-10 PROCEDURE — P9040: CPT

## 2021-05-10 PROCEDURE — 86900 BLOOD TYPING SEROLOGIC ABO: CPT

## 2021-05-10 PROCEDURE — 86923 COMPATIBILITY TEST ELECTRIC: CPT

## 2021-05-10 PROCEDURE — 84295 ASSAY OF SERUM SODIUM: CPT

## 2021-05-10 PROCEDURE — 86850 RBC ANTIBODY SCREEN: CPT

## 2021-05-10 PROCEDURE — 84132 ASSAY OF SERUM POTASSIUM: CPT

## 2021-05-10 PROCEDURE — 85610 PROTHROMBIN TIME: CPT

## 2021-05-10 PROCEDURE — 80048 BASIC METABOLIC PNL TOTAL CA: CPT

## 2021-05-10 PROCEDURE — 85730 THROMBOPLASTIN TIME PARTIAL: CPT

## 2021-05-10 PROCEDURE — 36430 TRANSFUSION BLD/BLD COMPNT: CPT

## 2021-05-10 PROCEDURE — 74174 CTA ABD&PLVS W/CONTRAST: CPT

## 2021-05-10 PROCEDURE — C1725: CPT

## 2021-05-10 PROCEDURE — 76000 FLUOROSCOPY <1 HR PHYS/QHP: CPT

## 2021-05-10 PROCEDURE — C1894: CPT

## 2021-05-10 PROCEDURE — 85027 COMPLETE CBC AUTOMATED: CPT

## 2021-05-10 PROCEDURE — 99233 SBSQ HOSP IP/OBS HIGH 50: CPT | Mod: GC

## 2021-05-10 PROCEDURE — 83036 HEMOGLOBIN GLYCOSYLATED A1C: CPT

## 2021-05-10 PROCEDURE — 84100 ASSAY OF PHOSPHORUS: CPT

## 2021-05-10 PROCEDURE — 86769 SARS-COV-2 COVID-19 ANTIBODY: CPT

## 2021-05-10 PROCEDURE — C1769: CPT

## 2021-05-10 PROCEDURE — 86901 BLOOD TYPING SEROLOGIC RH(D): CPT

## 2021-05-10 PROCEDURE — C1887: CPT

## 2021-05-10 PROCEDURE — 83735 ASSAY OF MAGNESIUM: CPT

## 2021-05-10 PROCEDURE — 36415 COLL VENOUS BLD VENIPUNCTURE: CPT

## 2021-05-10 RX ADMIN — HEPARIN SODIUM 5000 UNIT(S): 5000 INJECTION INTRAVENOUS; SUBCUTANEOUS at 06:08

## 2021-05-10 RX ADMIN — Medication 81 MILLIGRAM(S): at 13:01

## 2021-05-10 RX ADMIN — PANTOPRAZOLE SODIUM 40 MILLIGRAM(S): 20 TABLET, DELAYED RELEASE ORAL at 06:08

## 2021-05-10 RX ADMIN — Medication 1 MILLIGRAM(S): at 13:01

## 2021-05-10 RX ADMIN — Medication 50 MICROGRAM(S): at 06:08

## 2021-05-10 RX ADMIN — Medication 25 MILLIGRAM(S): at 06:08

## 2021-05-10 RX ADMIN — GABAPENTIN 100 MILLIGRAM(S): 400 CAPSULE ORAL at 06:09

## 2021-05-10 RX ADMIN — Medication 40 MILLIGRAM(S): at 06:08

## 2021-05-10 NOTE — PROGRESS NOTE ADULT - SUBJECTIVE AND OBJECTIVE BOX
Patient is a 65y old  Female who presents with a chief complaint of     HPI:  65F former smoker with PMH of HTN, HLD, CAD (s/p PCI and CABG), bioprosthetic AVR and MVR annuloplasty (2002), PAD s/p L SFA and L TIFFANY stent, AAA (s/p open repair 2015), tAAA s/p thoracoabdominal repair (8/2020) presented electively today b/l LE angiogram, possible intervention. Patient had been complaining of b/l LE claudication worsening over the past few months. R JENNIFER 0.59, L 0.43.    Patient underwent the procedure, with L TIFFANY stent and angioplasty performed through the L groin. Patient had a 6Fr sheath in L groin, 5Fr sheath in R groin. 3000U heparin given during the case. In the PACU, patient's ACT was 147 and both sheaths were pulled. Patient tolerated the sheath pull well and was on strict bedrest. The patient then began to complain of L groin pain, which remained soft. Around 5:30 she complained of L lower abdominal and back pain. SBP from 150s to 108. Patient transported to CT scanner to r/o RP hematoma. (06 May 2021 17:59)      	            Home Medications:  cilostazol 50 mg oral tablet: 1 tab(s) orally 2 times a day (05 May 2021 13:10)  folic acid 1 mg oral tablet: 1 tab(s) orally once a day (05 May 2021 13:10)  furosemide 40 mg oral tablet: 1 tab(s) orally once a day (05 May 2021 13:10)  levothyroxine 50 mcg (0.05 mg) oral tablet: 1 tab(s) orally once a day (05 May 2021 13:10)  metoprolol tartrate 25 mg oral tablet: 1 tab(s) orally 2 times a day (05 May 2021 13:10)  rosuvastatin 40 mg oral tablet: 1 tab(s) orally once a day (05 May 2021 13:10)  sertraline 50 mg oral tablet: 1 tab(s) orally once a day (at bedtime) (05 May 2021 13:10)  Vitamin D2 50,000 intl units (1.25 mg) oral capsule: 1 cap(s) orally once a week (05 May 2021 13:10)      PAST MEDICAL & SURGICAL HISTORY:  Atherosclerosis of coronary artery  CAD (coronary artery disease)    Peripheral vascular disease  PVD (peripheral vascular disease)    Anemia  Anemia    Hypothyroidism  Hypothyroidism    Gastroesophageal reflux disease  GERD (gastroesophageal reflux disease)    Hyperlipidemia  Hyperlipidemia    Essential hypertension  HTN (hypertension)    Seizure    Anxiety    Depression, unspecified depression type    Chronic kidney disease, unspecified CKD stage    Status post aorto-coronary artery bypass graft  2012    Atherosclerosis of coronary artery bypass graft(s), unspecified, with angina pectoris with documented spasm    H/O aortic valve replacement  Bioprosthetic    Ruptured aortic aneurysm  surgery august 2020    Abdominal aortic aneurysm (AAA) without rupture  2015        FAMILY HISTORY:  FH: myocardial infarction  in Dad (at age 50s)        SOCIAL HISTORY:      Vital Signs Last 24 Hrs  T(C): 37.7 (09 May 2021 13:00), Max: 37.7 (09 May 2021 13:00)  T(F): 99.8 (09 May 2021 13:00), Max: 99.8 (09 May 2021 13:00)  HR: 81 (09 May 2021 11:39) (64 - 82)  BP: 161/77 (09 May 2021 11:39) (134/64 - 165/70)  BP(mean): 95 (08 May 2021 18:37) (95 - 95)  RR: 18 (09 May 2021 11:39) (18 - 20)  SpO2: 97% (09 May 2021 11:39) (95% - 97%)   I&O's Detail    08 May 2021 07:01  -  09 May 2021 07:00  --------------------------------------------------------  IN:    Albumin 5%  - 250 mL: 250 mL    IV PiggyBack: 125 mL    Oral Fluid: 180 mL  Total IN: 555 mL    OUT:    Indwelling Catheter - Urethral (mL): 190 mL    Voided (mL): 500 mL  Total OUT: 690 mL    Total NET: -135 mL      09 May 2021 07:01  -  09 May 2021 15:57  --------------------------------------------------------  IN:    Oral Fluid: 180 mL  Total IN: 180 mL    OUT:  Total OUT: 0 mL    Total NET: 180 mL        Daily     Daily     Physical Exam:   GEN: NAD, AAOx3  HEENT: MMM, no icterus  CV: S1 S2 RRR, no MRG  Lung: CTAB  Abd: soft NT ND +BS  Ext: no c/c/e  Neuro: no focal neuro deficit    MEDICATIONS  (STANDING):  aspirin enteric coated 81 milliGRAM(s) Oral daily  atorvastatin 80 milliGRAM(s) Oral at bedtime  folic acid 1 milliGRAM(s) Oral daily  furosemide    Tablet 40 milliGRAM(s) Oral every 24 hours  gabapentin 100 milliGRAM(s) Oral two times a day  heparin   Injectable 5000 Unit(s) SubCutaneous every 8 hours  labetalol Injectable 10 milliGRAM(s) IV Push once  levothyroxine 50 MICROGram(s) Oral daily  metoprolol tartrate 25 milliGRAM(s) Oral two times a day  pantoprazole    Tablet 40 milliGRAM(s) Oral before breakfast  polyethylene glycol 3350 17 Gram(s) Oral daily  senna 2 Tablet(s) Oral at bedtime  sertraline 50 milliGRAM(s) Oral at bedtime    MEDICATIONS  (PRN):  acetaminophen   Tablet .. 325 milliGRAM(s) Oral every 4 hours PRN Mild Pain (1 - 3)  ondansetron Injectable 4 milliGRAM(s) IV Push every 6 hours PRN Nausea and/or Vomiting  oxycodone    5 mG/acetaminophen 325 mG 1 Tablet(s) Oral every 6 hours PRN Severe Pain (7 - 10)                LABS:                        8.8    8.43  )-----------( 109      ( 09 May 2021 07:09 )             28.1     05-09    139  |  106  |  18  ----------------------------<  77  4.2   |  22  |  1.52<H>    Ca    9.7      09 May 2021 07:09  Phos  2.6     05-09  Mg     2.4     05-09            CAPILLARY BLOOD GLUCOSE          RADIOLOGY & ADDITIONAL STUDIES:

## 2021-05-10 NOTE — PROGRESS NOTE ADULT - ATTENDING COMMENTS
65F former smoker with PMH of HTN, HLD, CAD (s/p PCI and CABG), bioprosthetic AVR and MVR annuloplasty (2002), PAD s/p L SFA and L TIFFANY stent, AAA (s/p open repair 2015), tAAA s/p thoracoabdominal repair (8/2020) presented electively today b/l LE angiogram, possible intervention. Patient had been complaining of b/l LE claudication worsening over the past few months. R JENNIFER 0.59, L 0.43.    Patient underwent the procedure, with L TIFFANY stent and angioplasty performed through the L groin.    1- Postop pain- pain regimen optimized  out of bed encouraged  2- RP hematoma- stable  h/h stable  3- HTN- c/w current regimen  4- CAD- s/p PCI and CABG  5- Hypothyroid- synthroid
65F former smoker with PMH of HTN, HLD, CAD (s/p PCI and CABG), bioprosthetic AVR and MVR annuloplasty (2002), PAD s/p L SFA and L TIFFANY stent, AAA (s/p open repair 2015), tAAA s/p thoracoabdominal repair (8/2020) presented electively today b/l LE angiogram, possible intervention. Patient had been complaining of b/l LE claudication worsening over the past few months. R JENNIFER 0.59, L 0.43.    Patient underwent the procedure, with L TIFFANY stent and angioplasty performed through the L groin.    1- Postop pain- pain regimen optimized  out of bed encouraged  2- RP hematoma- stable  h/h stable  3- HTN- c/w current regimen  4- CAD- s/p PCI and CABG  5- Hypothyroid- synthroid
LE angiogram showed arterial stenosis now s/p L TIFFANY stent/angioplasty, admitted to SICU for possible RP hematoma. Stable. May transfer to floor.

## 2021-05-10 NOTE — PROGRESS NOTE ADULT - NUTRITIONAL ASSESSMENT
This patient has been assessed with a concern for Malnutrition and has been determined to have a diagnosis/diagnoses of Mild protein-calorie malnutrition and Underweight (BMI < 19).    This patient is being managed with:   Diet DASH/TLC-  Sodium & Cholesterol Restricted  Entered: May  7 2021  8:28AM    

## 2021-05-10 NOTE — DISCHARGE NOTE NURSING/CASE MANAGEMENT/SOCIAL WORK - PATIENT PORTAL LINK FT
You can access the FollowMyHealth Patient Portal offered by [facility] by registering at the following website: [email or web address]. By joining [facility]’s FollowMyHealth portal, you will also be able to view your health information using other applications (apps) compatible with our system.

## 2021-05-10 NOTE — PROGRESS NOTE ADULT - PROVIDER SPECIALTY LIST ADULT
Hospitalist
SICU
Vascular Surgery
SICU
Vascular Surgery
Hospitalist

## 2021-05-10 NOTE — PROGRESS NOTE ADULT - SUBJECTIVE AND OBJECTIVE BOX
O/N: got OOB to chair and ambulated, pain improving  5/9: 5 beats vtach ekg unchanged          ---------------------------------------------------------------------------  PLEASE CHECK WHEN PRESENT:     [  ] Heart Failure     [  ] Acute     [  ] Acute on Chronic     [  ] Chronic  -------------------------------------------------------------------     [  ]Diastolic [HFpEF]     [  ]Systolic [HFrEF]     [  ]Combined [HFpEF & HFrEF]     [  ] Afib     [ x ] Hypertensive heart disease     [  ]Other:  -------------------------------------------------------------------  [ ] Respiratory failure  [ ] Acute cor pulmonale  [ ] h/o COPD   [ ] Pleural effusion  [ ] Aspiration pneumonia  -------------------------------------------------------------------  [  ]CHERRI     [  ]ATN     [  ]Reneal Medullary Necrosis     [  ]Renal Cortical Necrosis     [  ]Other Pathological Lesions:    [  ]CKD 1  [  ]CKD 2  [ x ]CKD 3  [  ]CKD 4  [  ]CKD 5  [  ]Other  -------------------------------------------------------------------  [  ]Diabetes  [  ] Diabetic PVD Ulcer  [  ] Neuropathic ulcer to DM  [  ] Diabetes with Nephropathy  [  ] Osteomyelitis due to diabetes  --------------------------------------------------------------------  [  ]Malnutrition: See Nutrition Note  [  ]Cachexia  [  ]Other:   [  ]Supplement Ordered:  [  ]Morbid Obesity (BMI >=40]  ---------------------------------------------------------------------  [ ] Sepsis/severe sepsis/septic shock  [ ] UTI  [ ] Pneumonia  -----------------------------------------------------------------------  [ ] Acidosis/alkalosis  [ ] Fluid overload  [x ] Hypokalemia  [ ] Hyperkalemia  [ ] Hypomagnesemia  [ ] Hypophosphatemia  [ ] Hyperphosphatemia  ------------------------------------------------------------------------  [ x ] Acute blood loss anemia  [ ] Post op blood loss anemia  [ ] Iron deficiency anemia  [ ] Anemia due to chronic disease  [ ] Hypercoagulable state  ----------------------------------------------------------------------  [ ] Cerebral infarction  [ ] Transient ischemia attack  [ ] Encephalopathy    65F former smoker with PMH of HTN, HLD, CAD (s/p PCI and CABG), bioprosthetic AVR and MVR annuloplasty (2002), PAD s/p L SFA and L TIFFANY stent, AAA (s/p open repair 2015), tAAA s/p thoracoabdominal repair (8/2020) presented electively today b/l LE angiogram, now s/p L TIFFANY stent/angioplasty, c/b RP hematoma    #PVD  -s/p LLE angio with TIFFANY plasty and stent 5/6  - OOB/Ambulate  - pain control  - asa  - f/u labs    #HTN  - labetalol, lasix, metoprolol    #hypothyroid  -levothyroxine    #HLD  -atorvastatin    #depression  -sertraline    Diet: DASH  DVT ppx: hsq  Dispo: plan for dc 5/10   O/N: got OOB to chair and ambulated, pain improving  5/9: 5 beats vtach ekg unchanged    aspirin enteric coated 81  furosemide    Tablet 40  heparin   Injectable 5000  labetalol Injectable 10  metoprolol tartrate 25        Vital Signs Last 24 Hrs  T(C): 37 (10 May 2021 06:09), Max: 37.7 (09 May 2021 13:00)  T(F): 98.6 (10 May 2021 06:09), Max: 99.8 (09 May 2021 13:00)  HR: 65 (10 May 2021 06:03) (64 - 86)  BP: 136/66 (10 May 2021 06:03) (111/61 - 161/77)  BP(mean): --  RR: 16 (10 May 2021 06:03) (16 - 18)  SpO2: 96% (10 May 2021 06:03) (93% - 97%)  I&O's Summary    09 May 2021 07:01  -  10 May 2021 07:00  --------------------------------------------------------  IN: 360 mL / OUT: 1100 mL / NET: -740 mL        Physical Exam:  General: NAD, resting comfortably in bed  Pulmonary: Nonlabored breathing, no respiratory distress  Abd: soft, non-distended, groin dressings c/d/i, soft, no hematoma felt +TTP LLQ  Extrem: warm and well perfused, no edema, biphasic dp/pt on the R, biphasic dp/pt on the left      LABS:                        8.8    8.43  )-----------( 109      ( 09 May 2021 07:09 )             28.1     05-09    139  |  106  |  18  ----------------------------<  77  4.2   |  22  |  1.52<H>    Ca    9.7      09 May 2021 07:09  Phos  2.6     05-09  Mg     2.4     05-09            ---------------------------------------------------------------------------  PLEASE CHECK WHEN PRESENT:     [  ] Heart Failure     [  ] Acute     [  ] Acute on Chronic     [  ] Chronic  -------------------------------------------------------------------     [  ]Diastolic [HFpEF]     [  ]Systolic [HFrEF]     [  ]Combined [HFpEF & HFrEF]     [  ] Afib     [ x ] Hypertensive heart disease     [  ]Other:  -------------------------------------------------------------------  [ ] Respiratory failure  [ ] Acute cor pulmonale  [ ] h/o COPD   [ ] Pleural effusion  [ ] Aspiration pneumonia  -------------------------------------------------------------------  [  ]CHERRI     [  ]ATN     [  ]Reneal Medullary Necrosis     [  ]Renal Cortical Necrosis     [  ]Other Pathological Lesions:    [  ]CKD 1  [  ]CKD 2  [ x ]CKD 3  [  ]CKD 4  [  ]CKD 5  [  ]Other  -------------------------------------------------------------------  [  ]Diabetes  [  ] Diabetic PVD Ulcer  [  ] Neuropathic ulcer to DM  [  ] Diabetes with Nephropathy  [  ] Osteomyelitis due to diabetes  --------------------------------------------------------------------  [  ]Malnutrition: See Nutrition Note  [  ]Cachexia  [  ]Other:   [  ]Supplement Ordered:  [  ]Morbid Obesity (BMI >=40]  ---------------------------------------------------------------------  [ ] Sepsis/severe sepsis/septic shock  [ ] UTI  [ ] Pneumonia  -----------------------------------------------------------------------  [ ] Acidosis/alkalosis  [ ] Fluid overload  [x ] Hypokalemia  [ ] Hyperkalemia  [ ] Hypomagnesemia  [ ] Hypophosphatemia  [ ] Hyperphosphatemia  ------------------------------------------------------------------------  [ x ] Acute blood loss anemia  [ ] Post op blood loss anemia  [ ] Iron deficiency anemia  [ ] Anemia due to chronic disease  [ ] Hypercoagulable state  ----------------------------------------------------------------------  [ ] Cerebral infarction  [ ] Transient ischemia attack  [ ] Encephalopathy    65F former smoker with PMH of HTN, HLD, CAD (s/p PCI and CABG), bioprosthetic AVR and MVR annuloplasty (2002), PAD s/p L SFA and L TIFFANY stent, AAA (s/p open repair 2015), tAAA s/p thoracoabdominal repair (8/2020) presented electively today b/l LE angiogram, now s/p L TIFFANY stent/angioplasty, c/b RP hematoma    #PVD  -s/p LLE angio with TIFFANY plasty and stent 5/6  - OOB/Ambulate  - pain control  - asa  - f/u labs    #HTN  - labetalol, lasix, metoprolol    #hypothyroid  -levothyroxine    #HLD  -atorvastatin    #depression  -sertraline    Diet: DASH  DVT ppx: hsq  Dispo: plan for dc 5/10

## 2021-05-17 DIAGNOSIS — I25.10 ATHEROSCLEROTIC HEART DISEASE OF NATIVE CORONARY ARTERY WITHOUT ANGINA PECTORIS: ICD-10-CM

## 2021-05-17 DIAGNOSIS — S36.892A CONTUSION OF OTHER INTRA-ABDOMINAL ORGANS, INITIAL ENCOUNTER: ICD-10-CM

## 2021-05-17 DIAGNOSIS — E87.6 HYPOKALEMIA: ICD-10-CM

## 2021-05-17 DIAGNOSIS — Z95.2 PRESENCE OF PROSTHETIC HEART VALVE: ICD-10-CM

## 2021-05-17 DIAGNOSIS — I70.92 CHRONIC TOTAL OCCLUSION OF ARTERY OF THE EXTREMITIES: ICD-10-CM
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
## 2021-05-17 DIAGNOSIS — E78.5 HYPERLIPIDEMIA, UNSPECIFIED: ICD-10-CM

## 2021-05-17 DIAGNOSIS — Z98.61 CORONARY ANGIOPLASTY STATUS: ICD-10-CM

## 2021-05-17 DIAGNOSIS — E44.1 MILD PROTEIN-CALORIE MALNUTRITION: ICD-10-CM

## 2021-05-17 DIAGNOSIS — K21.9 GASTRO-ESOPHAGEAL REFLUX DISEASE WITHOUT ESOPHAGITIS: ICD-10-CM
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
## 2021-05-17 DIAGNOSIS — D62 ACUTE POSTHEMORRHAGIC ANEMIA: ICD-10-CM

## 2021-05-17 DIAGNOSIS — Z95.1 PRESENCE OF AORTOCORONARY BYPASS GRAFT: ICD-10-CM

## 2021-05-17 DIAGNOSIS — I12.9 HYPERTENSIVE CHRONIC KIDNEY DISEASE WITH STAGE 1 THROUGH STAGE 4 CHRONIC KIDNEY DISEASE, OR UNSPECIFIED CHRONIC KIDNEY DISEASE: ICD-10-CM

## 2021-05-17 DIAGNOSIS — D63.8 ANEMIA IN OTHER CHRONIC DISEASES CLASSIFIED ELSEWHERE: ICD-10-CM

## 2021-05-17 DIAGNOSIS — I70.213 ATHEROSCLEROSIS OF NATIVE ARTERIES OF EXTREMITIES WITH INTERMITTENT CLAUDICATION, BILATERAL LEGS: ICD-10-CM

## 2021-05-17 DIAGNOSIS — R56.9 UNSPECIFIED CONVULSIONS: ICD-10-CM

## 2021-05-17 DIAGNOSIS — N18.32 CHRONIC KIDNEY DISEASE, STAGE 3B: ICD-10-CM

## 2021-05-17 DIAGNOSIS — M79.606 PAIN IN LEG, UNSPECIFIED: ICD-10-CM

## 2021-05-17 DIAGNOSIS — F32.9 MAJOR DEPRESSIVE DISORDER, SINGLE EPISODE, UNSPECIFIED: ICD-10-CM

## 2021-05-21 ENCOUNTER — APPOINTMENT (OUTPATIENT)
Dept: VASCULAR SURGERY | Facility: CLINIC | Age: 66
End: 2021-05-21
Payer: MEDICARE

## 2021-05-21 PROCEDURE — 99213 OFFICE O/P EST LOW 20 MIN: CPT

## 2021-05-25 NOTE — PHYSICAL EXAM
[Respiratory Effort] : normal respiratory effort [Normal Rate and Rhythm] : normal rate and rhythm [2+] : left 2+ [1+] : left 1+ [No Rash or Lesion] : No rash or lesion [Oriented to Person] : oriented to person [Alert] : alert [Oriented to Place] : oriented to place [Oriented to Time] : oriented to time [Calm] : calm [JVD] : no jugular venous distention  [Ankle Swelling (On Exam)] : not present [Varicose Veins Of Lower Extremities] : not present [] : not present [Abdomen Tenderness] : ~T ~M No abdominal tenderness [de-identified] : NAD, ambulates with a walker [de-identified] : NC/AT [de-identified] : R groin access site nicely healed with no active drainage, no purulence and no signs of infection.  [de-identified] : Supple [de-identified] : +FROM [de-identified] : grossly intact

## 2021-05-25 NOTE — ADDENDUM
[FreeTextEntry1] : This note was written by Ana Oakley on 05/21/2021 acting as scribe for Sarai Hollingsworth M.D.\par \par I, Dr. Sarai Sandra, personally performed the evaluation and management (E/M) services for this established patient who presents today with (an) existing condition(s).  That E/M includes conducting the examination, assessing all conditions, and (re)establishing/reinforcing a plan of care.  Today, my ACP, Lizzy BULLARD, was here to observe my evaluation and management services for this condition to be followed going forward.\par \par \par

## 2021-05-25 NOTE — ASSESSMENT
[Arterial/Venous Disease] : arterial/venous disease [Medication Management] : medication management [Foot care/Footwear] : foot care/footwear [FreeTextEntry1] : 66 y/o F former smoker with PAD s/p pLSFA stent LCIA stent, with occluded Bilateral SFA (proximal to mid portion per U/S 2019) AAA s/p Open repair 4/2/2015, CKD , Severe Renal Artery Stenosis who presented to [facility] ED 8/20/2020 She was noted to have saccular AAA and contained rupture adjacent to previous stent graft. On 8/25 she underwent open thoracoabdominal aneurysm repair, with 20mm tube graft and 6mm bypasses to the celiac, SMA, left renal, right renal (end to end) LLE < 2 block claudication s/p LLE angiogram angioplasty and stent to the left internal iliac artery on 5/6/2021. On exam, R groin access site nicely healed with no active drainage, no purulence and no signs of infection. \par \par Plan: \par Reviewed previous arterial studies. PVRs with dampening waveforms at the level of the L metatarsals. \par Given severe L leg claudication symptoms. I recommend left leg bypass. Surgical approach, risk , benefits, convalescence and recovery time were all reviewed. All questions were answered to patient satisfaction. She consents and would like to move forward with procedure. Once approved by her insurance will contact patient to schedule date. \par

## 2021-05-26 NOTE — ED ADULT NURSE NOTE - HIV OFFER
Price (Do Not Change): 0.00
Instructions: This plan will send the code FBSE to the PM system.  DO NOT or CHANGE the price.
Detail Level: Simple
Previously Declined (within the last year)

## 2021-05-28 ENCOUNTER — APPOINTMENT (OUTPATIENT)
Dept: VASCULAR SURGERY | Facility: CLINIC | Age: 66
End: 2021-05-28

## 2021-06-04 ENCOUNTER — OUTPATIENT (OUTPATIENT)
Dept: OUTPATIENT SERVICES | Facility: HOSPITAL | Age: 66
LOS: 1 days | End: 2021-06-04
Payer: MEDICARE

## 2021-06-04 DIAGNOSIS — I71.4 ABDOMINAL AORTIC ANEURYSM, WITHOUT RUPTURE: Chronic | ICD-10-CM

## 2021-06-04 DIAGNOSIS — I71.8 AORTIC ANEURYSM OF UNSPECIFIED SITE, RUPTURED: Chronic | ICD-10-CM

## 2021-06-04 DIAGNOSIS — Z95.2 PRESENCE OF PROSTHETIC HEART VALVE: Chronic | ICD-10-CM

## 2021-06-04 DIAGNOSIS — I25.701 ATHEROSCLEROSIS OF CORONARY ARTERY BYPASS GRAFT(S), UNSPECIFIED, WITH ANGINA PECTORIS WITH DOCUMENTED SPASM: Chronic | ICD-10-CM

## 2021-06-04 PROCEDURE — 74176 CT ABD & PELVIS W/O CONTRAST: CPT

## 2021-06-04 PROCEDURE — 74176 CT ABD & PELVIS W/O CONTRAST: CPT | Mod: 26,MH

## 2021-06-11 ENCOUNTER — APPOINTMENT (OUTPATIENT)
Dept: CT IMAGING | Facility: HOSPITAL | Age: 66
End: 2021-06-11
Payer: MEDICARE

## 2021-06-11 ENCOUNTER — OUTPATIENT (OUTPATIENT)
Dept: OUTPATIENT SERVICES | Facility: HOSPITAL | Age: 66
LOS: 1 days | End: 2021-06-11
Payer: MEDICARE

## 2021-06-11 DIAGNOSIS — I71.8 AORTIC ANEURYSM OF UNSPECIFIED SITE, RUPTURED: Chronic | ICD-10-CM

## 2021-06-11 DIAGNOSIS — I25.701 ATHEROSCLEROSIS OF CORONARY ARTERY BYPASS GRAFT(S), UNSPECIFIED, WITH ANGINA PECTORIS WITH DOCUMENTED SPASM: Chronic | ICD-10-CM

## 2021-06-11 DIAGNOSIS — Z95.2 PRESENCE OF PROSTHETIC HEART VALVE: Chronic | ICD-10-CM

## 2021-06-11 DIAGNOSIS — I71.4 ABDOMINAL AORTIC ANEURYSM, WITHOUT RUPTURE: Chronic | ICD-10-CM

## 2021-06-11 PROCEDURE — 74174 CTA ABD&PLVS W/CONTRAST: CPT | Mod: 26,MH

## 2021-06-11 PROCEDURE — 74174 CTA ABD&PLVS W/CONTRAST: CPT

## 2021-06-12 LAB — HGB BLDA-MCNC: SIGNIFICANT CHANGE UP G/DL (ref 11.7–16.1)

## 2021-06-18 ENCOUNTER — NON-APPOINTMENT (OUTPATIENT)
Age: 66
End: 2021-06-18

## 2021-06-18 ENCOUNTER — APPOINTMENT (OUTPATIENT)
Dept: HEART AND VASCULAR | Facility: CLINIC | Age: 66
End: 2021-06-18
Payer: MEDICARE

## 2021-06-18 VITALS
SYSTOLIC BLOOD PRESSURE: 126 MMHG | HEART RATE: 104 BPM | DIASTOLIC BLOOD PRESSURE: 63 MMHG | HEIGHT: 67 IN | BODY MASS INDEX: 21.82 KG/M2 | WEIGHT: 139 LBS

## 2021-06-18 PROCEDURE — 93000 ELECTROCARDIOGRAM COMPLETE: CPT

## 2021-06-18 PROCEDURE — 99213 OFFICE O/P EST LOW 20 MIN: CPT | Mod: 25

## 2021-06-18 NOTE — REASON FOR VISIT
[Arrhythmia/ECG Abnorrmalities] : arrhythmia/ECG abnormalities [Follow-up Device Check] : follow-up device check visit [Initial Evaluation] : an initial evaluation of [Palpitations] : palpitations

## 2021-06-18 NOTE — HISTORY OF PRESENT ILLNESS
[FreeTextEntry1] : 64 y/o F former smoker with PMHx HTN, HLD , CAD, s/p PCI and CABG, s/p Bioprosthetic AVR 2002, Mitral Valve, s/p Mitral valve annuloplasty 2002, PAD s/p pLSFA stent LCIA stent, with occluded Bilateral SFA (proximal to mid portion per U/S 2019), Chronic Anemia, AAA s/p Open repair 4/2/2015, CKD , Severe Renal Artery Stenosis, saccular AAA and contained rupture adjacent to previous stent graft s/p open thoracoabdominal aneurysm repair 8/2020, with 20mm tube graft and 6mm bypasses to the celiac, SMA, left renal, right renal (end to end), with recent finding of LLE claudication now s/p LLE angiogram angioplasty and stent to the left internal iliac artery on 5/2021 whose arrhythmia history includes longstanding palpitations s/p ILR 2016 (now dead) who presents today after being found to be in AFL/AT in Dr. Preston's office. \par \par Of note, prior ILR interrogation significant for one pause event c/w sinus arrest- pause ~ 4.4 seconds 10/9/18. She was unaware. Most recent holter showed SR with no sustained arrhythmia or pauses. \par \par She presents today reporting worsening palpitations - cannot specify when this began. Denies syncope or lightheadedness. \par \par 5/2021: SCr 1.55\par \par \par

## 2021-06-18 NOTE — PHYSICAL EXAM
[General Appearance - Well Developed] : well developed [Normal Appearance] : normal appearance [Well Groomed] : well groomed [General Appearance - Well Nourished] : well nourished [No Deformities] : no deformities [General Appearance - In No Acute Distress] : no acute distress [Respiration, Rhythm And Depth] : normal respiratory rhythm and effort [] : no respiratory distress [Exaggerated Use Of Accessory Muscles For Inspiration] : no accessory muscle use [Auscultation Breath Sounds / Voice Sounds] : lungs were clear to auscultation bilaterally [Bowel Sounds] : normal bowel sounds [Abdomen Soft] : soft [Abdomen Tenderness] : non-tender [Cyanosis, Localized] : no localized cyanosis [Normal Conjunctiva] : the conjunctiva exhibited no abnormalities [No Oral Pallor] : no oral pallor [5th Left ICS - MCL] : palpated at the 5th LICS in the midclavicular line [Normal] : normal [No Precordial Heave] : no precordial heave was noted [Normal Rate] : normal [Rhythm Regular] : regular [Normal S1] : normal S1 [Normal S2] : normal S2 [No Gallop] : no gallop heard [No Murmur] : no murmurs heard [No Pitting Edema] : no pitting edema present [Skin Color & Pigmentation] : normal skin color and pigmentation [Oriented To Time, Place, And Person] : oriented to person, place, and time [Skin Turgor] : normal skin turgor [Impaired Insight] : insight and judgment were intact [Affect] : the affect was normal [Mood] : the mood was normal [No Anxiety] : not feeling anxious [S1 Varying Intensity] : was of varying intensity [S3] : no S3 [S4] : no S4 [Click] : no click [Pericardial Rub] : no pericardial rub

## 2021-06-18 NOTE — PROCEDURE
[No] : not [None] : none [de-identified] : MDT REVEAL LINQ\par Implanted 11/2016\par Battery status Good\par Adequate sensing today\par 1 pause event, 1 selvin event. EGMs c/w SR with undersensing\par No AT/AF/Tachy/Symptom events

## 2021-07-02 ENCOUNTER — APPOINTMENT (OUTPATIENT)
Dept: HEART AND VASCULAR | Facility: CLINIC | Age: 66
End: 2021-07-02
Payer: MEDICARE

## 2021-07-02 PROCEDURE — 93248 EXT ECG>7D<15D REV&INTERPJ: CPT

## 2021-07-13 ENCOUNTER — OUTPATIENT (OUTPATIENT)
Dept: OUTPATIENT SERVICES | Facility: HOSPITAL | Age: 66
LOS: 1 days | End: 2021-07-13
Payer: MEDICARE

## 2021-07-13 DIAGNOSIS — I71.8 AORTIC ANEURYSM OF UNSPECIFIED SITE, RUPTURED: Chronic | ICD-10-CM

## 2021-07-13 DIAGNOSIS — Z95.2 PRESENCE OF PROSTHETIC HEART VALVE: Chronic | ICD-10-CM

## 2021-07-13 DIAGNOSIS — I71.4 ABDOMINAL AORTIC ANEURYSM, WITHOUT RUPTURE: Chronic | ICD-10-CM

## 2021-07-13 DIAGNOSIS — I25.701 ATHEROSCLEROSIS OF CORONARY ARTERY BYPASS GRAFT(S), UNSPECIFIED, WITH ANGINA PECTORIS WITH DOCUMENTED SPASM: Chronic | ICD-10-CM

## 2021-07-13 DIAGNOSIS — I25.10 ATHEROSCLEROTIC HEART DISEASE OF NATIVE CORONARY ARTERY WITHOUT ANGINA PECTORIS: ICD-10-CM

## 2021-07-13 PROCEDURE — 93010 ELECTROCARDIOGRAM REPORT: CPT

## 2021-07-13 PROCEDURE — 93306 TTE W/DOPPLER COMPLETE: CPT | Mod: 26

## 2021-07-13 PROCEDURE — 93005 ELECTROCARDIOGRAM TRACING: CPT

## 2021-07-13 PROCEDURE — 93306 TTE W/DOPPLER COMPLETE: CPT

## 2021-08-17 ENCOUNTER — APPOINTMENT (OUTPATIENT)
Dept: HEART AND VASCULAR | Facility: CLINIC | Age: 66
End: 2021-08-17
Payer: MEDICARE

## 2021-08-17 ENCOUNTER — NON-APPOINTMENT (OUTPATIENT)
Age: 66
End: 2021-08-17

## 2021-08-17 VITALS
SYSTOLIC BLOOD PRESSURE: 120 MMHG | BODY MASS INDEX: 17.42 KG/M2 | DIASTOLIC BLOOD PRESSURE: 60 MMHG | WEIGHT: 111 LBS | HEIGHT: 67 IN | HEART RATE: 64 BPM

## 2021-08-17 PROCEDURE — 99214 OFFICE O/P EST MOD 30 MIN: CPT

## 2021-08-17 PROCEDURE — 93000 ELECTROCARDIOGRAM COMPLETE: CPT

## 2021-08-17 RX ORDER — ATENOLOL 25 MG/1
25 TABLET ORAL
Refills: 0 | Status: DISCONTINUED | COMMUNITY
End: 2021-08-17

## 2021-08-17 RX ORDER — CARVEDILOL 12.5 MG/1
12.5 TABLET, FILM COATED ORAL TWICE DAILY
Qty: 60 | Refills: 0 | Status: DISCONTINUED | COMMUNITY
Start: 2020-10-02 | End: 2021-08-17

## 2021-08-17 NOTE — PHYSICAL EXAM
[General Appearance - Well Developed] : well developed [Normal Appearance] : normal appearance [General Appearance - Well Nourished] : well nourished [Well Groomed] : well groomed [No Deformities] : no deformities [General Appearance - In No Acute Distress] : no acute distress [] : no respiratory distress [Respiration, Rhythm And Depth] : normal respiratory rhythm and effort [Exaggerated Use Of Accessory Muscles For Inspiration] : no accessory muscle use [Auscultation Breath Sounds / Voice Sounds] : lungs were clear to auscultation bilaterally [Bowel Sounds] : normal bowel sounds [Abdomen Soft] : soft [Abdomen Tenderness] : non-tender [Cyanosis, Localized] : no localized cyanosis [Normal Conjunctiva] : the conjunctiva exhibited no abnormalities [No Oral Pallor] : no oral pallor [5th Left ICS - MCL] : palpated at the 5th LICS in the midclavicular line [Normal] : normal [No Precordial Heave] : no precordial heave was noted [Normal Rate] : normal [Rhythm Regular] : regular [Normal S1] : normal S1 [S1 Varying Intensity] : was of varying intensity [Normal S2] : normal S2 [No Gallop] : no gallop heard [No Murmur] : no murmurs heard [No Pitting Edema] : no pitting edema present [Skin Color & Pigmentation] : normal skin color and pigmentation [Skin Turgor] : normal skin turgor [Oriented To Time, Place, And Person] : oriented to person, place, and time [Impaired Insight] : insight and judgment were intact [Affect] : the affect was normal [Mood] : the mood was normal [No Anxiety] : not feeling anxious [S3] : no S3 [S4] : no S4 [Click] : no click [Pericardial Rub] : no pericardial rub

## 2021-08-17 NOTE — PROCEDURE
[No] : not [None] : none [de-identified] : MDT REVEAL LINQ\par Implanted 11/2016\par Battery status Good\par Adequate sensing today\par 1 pause event, 1 selvin event. EGMs c/w SR with undersensing\par No AT/AF/Tachy/Symptom events

## 2021-08-17 NOTE — HISTORY OF PRESENT ILLNESS
[FreeTextEntry1] : 66 y/o F former smoker with PMHx HTN, HLD , CAD, s/p PCI and CABG, s/p Bioprosthetic AVR 2002, Mitral Valve, s/p Mitral valve annuloplasty 2002, PAD s/p pLSFA stent LCIA stent, with occluded Bilateral SFA (proximal to mid portion per U/S 2019), Chronic Anemia, AAA s/p Open repair 4/2/2015, CKD , Severe Renal Artery Stenosis, saccular AAA and contained rupture adjacent to previous stent graft s/p open thoracoabdominal aneurysm repair 8/2020, with 20mm tube graft and 6mm bypasses to the celiac, SMA, left renal, right renal (end to end), with recent finding of LLE claudication now s/p LLE angiogram angioplasty and stent to the left internal iliac artery on 5/2021.  Procedure complicated by left RP hematoma, s/p 2 U PRBC. \par \par Arrhythmia history includes longstanding palpitations s/p ILR 2016 (now dead).  Of note, prior ILR interrogation significant for one pause event c/w sinus arrest- pause ~ 4.4 seconds 10/9/18. She was unaware.  She presented for EP evaluation 6/2021 after being found to be in AFL/AT in Dr. Preston's office.  She continues to complain of persistent palpitations since her prior visit in June.  \par \par She notes chronic anemia for which she sees Dr. Izquierdo.  \par \par \par \par 5/2021: SCr 1.55\par \par \par \par

## 2021-09-27 ENCOUNTER — OUTPATIENT (OUTPATIENT)
Dept: OUTPATIENT SERVICES | Facility: HOSPITAL | Age: 66
LOS: 1 days | End: 2021-09-27
Payer: MEDICARE

## 2021-09-27 DIAGNOSIS — I25.701 ATHEROSCLEROSIS OF CORONARY ARTERY BYPASS GRAFT(S), UNSPECIFIED, WITH ANGINA PECTORIS WITH DOCUMENTED SPASM: Chronic | ICD-10-CM

## 2021-09-27 DIAGNOSIS — I25.9 CHRONIC ISCHEMIC HEART DISEASE, UNSPECIFIED: ICD-10-CM

## 2021-09-27 DIAGNOSIS — I71.8 AORTIC ANEURYSM OF UNSPECIFIED SITE, RUPTURED: Chronic | ICD-10-CM

## 2021-09-27 DIAGNOSIS — Z95.2 PRESENCE OF PROSTHETIC HEART VALVE: Chronic | ICD-10-CM

## 2021-09-27 DIAGNOSIS — I71.4 ABDOMINAL AORTIC ANEURYSM, WITHOUT RUPTURE: Chronic | ICD-10-CM

## 2021-09-27 PROCEDURE — 93010 ELECTROCARDIOGRAM REPORT: CPT

## 2021-09-27 PROCEDURE — 93005 ELECTROCARDIOGRAM TRACING: CPT

## 2021-11-18 RX ORDER — FUROSEMIDE 40 MG
40 TABLET ORAL ONCE
Refills: 0 | Status: COMPLETED | OUTPATIENT
Start: 2021-11-19 | End: 2021-11-19

## 2021-11-18 RX ORDER — ACETAMINOPHEN 500 MG
650 TABLET ORAL ONCE
Refills: 0 | Status: COMPLETED | OUTPATIENT
Start: 2021-11-19 | End: 2021-11-19

## 2021-11-19 ENCOUNTER — APPOINTMENT (OUTPATIENT)
Age: 66
End: 2021-11-19

## 2021-11-19 ENCOUNTER — OUTPATIENT (OUTPATIENT)
Dept: OUTPATIENT SERVICES | Facility: HOSPITAL | Age: 66
LOS: 1 days | End: 2021-11-19
Payer: MEDICARE

## 2021-11-19 DIAGNOSIS — Z95.2 PRESENCE OF PROSTHETIC HEART VALVE: Chronic | ICD-10-CM

## 2021-11-19 DIAGNOSIS — I71.8 AORTIC ANEURYSM OF UNSPECIFIED SITE, RUPTURED: Chronic | ICD-10-CM

## 2021-11-19 DIAGNOSIS — I25.701 ATHEROSCLEROSIS OF CORONARY ARTERY BYPASS GRAFT(S), UNSPECIFIED, WITH ANGINA PECTORIS WITH DOCUMENTED SPASM: Chronic | ICD-10-CM

## 2021-11-19 DIAGNOSIS — D64.9 ANEMIA, UNSPECIFIED: ICD-10-CM

## 2021-11-19 DIAGNOSIS — I71.4 ABDOMINAL AORTIC ANEURYSM, WITHOUT RUPTURE: Chronic | ICD-10-CM

## 2021-11-19 PROCEDURE — 86901 BLOOD TYPING SEROLOGIC RH(D): CPT

## 2021-11-19 PROCEDURE — 86900 BLOOD TYPING SEROLOGIC ABO: CPT

## 2021-11-19 PROCEDURE — 86923 COMPATIBILITY TEST ELECTRIC: CPT

## 2021-11-19 PROCEDURE — 36430 TRANSFUSION BLD/BLD COMPNT: CPT

## 2021-11-19 PROCEDURE — P9040: CPT

## 2021-11-19 PROCEDURE — 86850 RBC ANTIBODY SCREEN: CPT

## 2021-11-19 PROCEDURE — 36415 COLL VENOUS BLD VENIPUNCTURE: CPT

## 2021-11-19 RX ADMIN — Medication 650 MILLIGRAM(S): at 14:45

## 2021-11-19 RX ADMIN — Medication 40 MILLIGRAM(S): at 14:45

## 2022-01-08 NOTE — ASU PREOP CHECKLIST - PATIENT'S PERSONAL PROPERTY REMOVED
Johan et al., NEJM, DOI: [id number])   dentures/glasses

## 2022-04-12 ENCOUNTER — APPOINTMENT (OUTPATIENT)
Dept: HEART AND VASCULAR | Facility: CLINIC | Age: 67
End: 2022-04-12
Payer: MEDICARE

## 2022-04-12 ENCOUNTER — NON-APPOINTMENT (OUTPATIENT)
Age: 67
End: 2022-04-12

## 2022-04-12 VITALS
DIASTOLIC BLOOD PRESSURE: 66 MMHG | HEART RATE: 65 BPM | WEIGHT: 111 LBS | SYSTOLIC BLOOD PRESSURE: 141 MMHG | HEIGHT: 67 IN | BODY MASS INDEX: 17.42 KG/M2

## 2022-04-12 DIAGNOSIS — I45.5 OTHER SPECIFIED HEART BLOCK: ICD-10-CM

## 2022-04-12 PROCEDURE — 99213 OFFICE O/P EST LOW 20 MIN: CPT

## 2022-04-12 PROCEDURE — 93000 ELECTROCARDIOGRAM COMPLETE: CPT

## 2022-04-12 RX ORDER — APIXABAN 2.5 MG/1
2.5 TABLET, FILM COATED ORAL
Qty: 60 | Refills: 2 | Status: DISCONTINUED | COMMUNITY
Start: 2021-06-18 | End: 2022-04-12

## 2022-04-12 RX ORDER — PANTOPRAZOLE SODIUM 40 MG/1
40 TABLET, DELAYED RELEASE ORAL
Refills: 0 | Status: ACTIVE | COMMUNITY

## 2022-04-12 NOTE — PROCEDURE
[No] : not [None] : none [de-identified] : MDT REVEAL LINQ\par Implanted 11/2016\par Battery status Good\par Adequate sensing today\par 1 pause event, 1 selvin event. EGMs c/w SR with undersensing\par No AT/AF/Tachy/Symptom events

## 2022-04-12 NOTE — HISTORY OF PRESENT ILLNESS
[FreeTextEntry1] : 65 y/o F former smoker with PMHx HTN, HLD , CAD, s/p PCI and CABG, s/p Bioprosthetic AVR 2002, Mitral Valve, s/p Mitral valve annuloplasty 2002, PAD s/p pLSFA stent LCIA stent, with occluded Bilateral SFA (proximal to mid portion per U/S 2019), Chronic Anemia, AAA s/p Open repair 4/2/2015, CKD , Severe Renal Artery Stenosis, saccular AAA and contained rupture adjacent to previous stent graft s/p open thoracoabdominal aneurysm repair 8/2020, with 20mm tube graft and 6mm bypasses to the celiac, SMA, left renal, right renal (end to end), with recent finding of LLE claudication now s/p LLE angiogram angioplasty and stent to the left internal iliac artery on 5/2021.  Procedure complicated by left RP hematoma, s/p 2 U PRBC. \par \par Arrhythmia history includes longstanding palpitations s/p ILR 2016 (now dead).  Of note, prior ILR interrogation significant for one pause event c/w sinus arrest- pause ~ 4.4 seconds 10/9/18. She was unaware.  She presented for EP evaluation 6/2021 after being found to be in AFL/AT in Dr. Preston's office.  She notes chronic anemia for which she sees Dr. Izquierdo.  Underwent ambulatory telemetry 6/18 - 7/2/21 ZioXT: Atrial flutter continuously with HR range 47-.  She has not been maintained on anticoagulation.  Continues to complain of palpitations "all the time".  She is receiving iron infusions and Procrit with Dr. Izquierdo. \par \par 5/2021: SCr 1.55\par \par \par \par

## 2022-04-12 NOTE — PHYSICAL EXAM
[General Appearance - Well Developed] : well developed [Normal Appearance] : normal appearance [Well Groomed] : well groomed [General Appearance - Well Nourished] : well nourished [No Deformities] : no deformities [General Appearance - In No Acute Distress] : no acute distress [] : no respiratory distress [Respiration, Rhythm And Depth] : normal respiratory rhythm and effort [Exaggerated Use Of Accessory Muscles For Inspiration] : no accessory muscle use [Auscultation Breath Sounds / Voice Sounds] : lungs were clear to auscultation bilaterally [Abdomen Soft] : soft [Bowel Sounds] : normal bowel sounds [Abdomen Tenderness] : non-tender [Cyanosis, Localized] : no localized cyanosis [Normal Conjunctiva] : the conjunctiva exhibited no abnormalities [No Oral Pallor] : no oral pallor [5th Left ICS - MCL] : palpated at the 5th LICS in the midclavicular line [Normal] : normal [No Precordial Heave] : no precordial heave was noted [Normal Rate] : normal [Rhythm Regular] : regular [Normal S1] : normal S1 [S1 Varying Intensity] : was of varying intensity [Normal S2] : normal S2 [No Gallop] : no gallop heard [No Murmur] : no murmurs heard [No Pitting Edema] : no pitting edema present [Skin Color & Pigmentation] : normal skin color and pigmentation [Skin Turgor] : normal skin turgor [Oriented To Time, Place, And Person] : oriented to person, place, and time [Impaired Insight] : insight and judgment were intact [Affect] : the affect was normal [Mood] : the mood was normal [No Anxiety] : not feeling anxious [S3] : no S3 [S4] : no S4 [Click] : no click [Pericardial Rub] : no pericardial rub

## 2022-05-12 NOTE — PATIENT PROFILE ADULT - BILL PAYMENT
"Vinicius"Evy Quintana was seen and treated in our emergency department on 5/12/2022.  He may return to work on 05/19/2022.       If you have any questions or concerns, please don't hesitate to call.      Enoc Soliman PA-C"
no

## 2022-06-02 ENCOUNTER — OUTPATIENT (OUTPATIENT)
Dept: OUTPATIENT SERVICES | Facility: HOSPITAL | Age: 67
LOS: 1 days | End: 2022-06-02
Payer: MEDICARE

## 2022-06-02 DIAGNOSIS — I25.701 ATHEROSCLEROSIS OF CORONARY ARTERY BYPASS GRAFT(S), UNSPECIFIED, WITH ANGINA PECTORIS WITH DOCUMENTED SPASM: Chronic | ICD-10-CM

## 2022-06-02 DIAGNOSIS — Z95.2 PRESENCE OF PROSTHETIC HEART VALVE: Chronic | ICD-10-CM

## 2022-06-02 DIAGNOSIS — I71.4 ABDOMINAL AORTIC ANEURYSM, WITHOUT RUPTURE: Chronic | ICD-10-CM

## 2022-06-02 DIAGNOSIS — I25.10 ATHEROSCLEROTIC HEART DISEASE OF NATIVE CORONARY ARTERY WITHOUT ANGINA PECTORIS: ICD-10-CM

## 2022-06-02 DIAGNOSIS — I71.8 AORTIC ANEURYSM OF UNSPECIFIED SITE, RUPTURED: Chronic | ICD-10-CM

## 2022-06-02 PROCEDURE — 93005 ELECTROCARDIOGRAM TRACING: CPT

## 2022-06-02 PROCEDURE — 76376 3D RENDER W/INTRP POSTPROCES: CPT | Mod: 26

## 2022-06-02 PROCEDURE — 93306 TTE W/DOPPLER COMPLETE: CPT

## 2022-06-02 PROCEDURE — 93306 TTE W/DOPPLER COMPLETE: CPT | Mod: 26

## 2022-06-02 PROCEDURE — 93010 ELECTROCARDIOGRAM REPORT: CPT

## 2022-08-08 ENCOUNTER — EMERGENCY (EMERGENCY)
Facility: HOSPITAL | Age: 67
LOS: 1 days | Discharge: HOME | End: 2022-08-08
Attending: EMERGENCY MEDICINE | Admitting: EMERGENCY MEDICINE
Payer: MEDICARE

## 2022-08-08 VITALS
SYSTOLIC BLOOD PRESSURE: 138 MMHG | HEART RATE: 78 BPM | RESPIRATION RATE: 18 BRPM | DIASTOLIC BLOOD PRESSURE: 76 MMHG | TEMPERATURE: 99 F | OXYGEN SATURATION: 96 %

## 2022-08-08 VITALS
OXYGEN SATURATION: 99 % | HEIGHT: 67 IN | SYSTOLIC BLOOD PRESSURE: 124 MMHG | WEIGHT: 111.99 LBS | HEART RATE: 167 BPM | DIASTOLIC BLOOD PRESSURE: 89 MMHG | RESPIRATION RATE: 19 BRPM | TEMPERATURE: 98 F

## 2022-08-08 DIAGNOSIS — Z20.822 CONTACT WITH AND (SUSPECTED) EXPOSURE TO COVID-19: ICD-10-CM

## 2022-08-08 DIAGNOSIS — E03.9 HYPOTHYROIDISM, UNSPECIFIED: ICD-10-CM

## 2022-08-08 DIAGNOSIS — Z95.820 PERIPHERAL VASCULAR ANGIOPLASTY STATUS WITH IMPLANTS AND GRAFTS: ICD-10-CM

## 2022-08-08 DIAGNOSIS — Z95.1 PRESENCE OF AORTOCORONARY BYPASS GRAFT: ICD-10-CM

## 2022-08-08 DIAGNOSIS — R94.6 ABNORMAL RESULTS OF THYROID FUNCTION STUDIES: ICD-10-CM

## 2022-08-08 DIAGNOSIS — I25.10 ATHEROSCLEROTIC HEART DISEASE OF NATIVE CORONARY ARTERY WITHOUT ANGINA PECTORIS: ICD-10-CM

## 2022-08-08 DIAGNOSIS — Z79.82 LONG TERM (CURRENT) USE OF ASPIRIN: ICD-10-CM

## 2022-08-08 DIAGNOSIS — I12.9 HYPERTENSIVE CHRONIC KIDNEY DISEASE WITH STAGE 1 THROUGH STAGE 4 CHRONIC KIDNEY DISEASE, OR UNSPECIFIED CHRONIC KIDNEY DISEASE: ICD-10-CM

## 2022-08-08 DIAGNOSIS — F32.A DEPRESSION, UNSPECIFIED: ICD-10-CM

## 2022-08-08 DIAGNOSIS — Z95.4 PRESENCE OF OTHER HEART-VALVE REPLACEMENT: ICD-10-CM

## 2022-08-08 DIAGNOSIS — R00.2 PALPITATIONS: ICD-10-CM

## 2022-08-08 DIAGNOSIS — I48.91 UNSPECIFIED ATRIAL FIBRILLATION: ICD-10-CM

## 2022-08-08 DIAGNOSIS — E78.5 HYPERLIPIDEMIA, UNSPECIFIED: ICD-10-CM

## 2022-08-08 DIAGNOSIS — N18.9 CHRONIC KIDNEY DISEASE, UNSPECIFIED: ICD-10-CM

## 2022-08-08 DIAGNOSIS — I71.4 ABDOMINAL AORTIC ANEURYSM, WITHOUT RUPTURE: Chronic | ICD-10-CM

## 2022-08-08 DIAGNOSIS — Z86.79 PERSONAL HISTORY OF OTHER DISEASES OF THE CIRCULATORY SYSTEM: ICD-10-CM

## 2022-08-08 DIAGNOSIS — G89.29 OTHER CHRONIC PAIN: ICD-10-CM

## 2022-08-08 DIAGNOSIS — I48.92 UNSPECIFIED ATRIAL FLUTTER: ICD-10-CM

## 2022-08-08 DIAGNOSIS — F41.9 ANXIETY DISORDER, UNSPECIFIED: ICD-10-CM

## 2022-08-08 DIAGNOSIS — R77.8 OTHER SPECIFIED ABNORMALITIES OF PLASMA PROTEINS: ICD-10-CM

## 2022-08-08 DIAGNOSIS — I71.8 AORTIC ANEURYSM OF UNSPECIFIED SITE, RUPTURED: Chronic | ICD-10-CM

## 2022-08-08 DIAGNOSIS — Z87.891 PERSONAL HISTORY OF NICOTINE DEPENDENCE: ICD-10-CM

## 2022-08-08 DIAGNOSIS — I25.701 ATHEROSCLEROSIS OF CORONARY ARTERY BYPASS GRAFT(S), UNSPECIFIED, WITH ANGINA PECTORIS WITH DOCUMENTED SPASM: Chronic | ICD-10-CM

## 2022-08-08 DIAGNOSIS — I73.9 PERIPHERAL VASCULAR DISEASE, UNSPECIFIED: ICD-10-CM

## 2022-08-08 DIAGNOSIS — Z95.2 PRESENCE OF PROSTHETIC HEART VALVE: Chronic | ICD-10-CM

## 2022-08-08 DIAGNOSIS — R79.89 OTHER SPECIFIED ABNORMAL FINDINGS OF BLOOD CHEMISTRY: ICD-10-CM

## 2022-08-08 DIAGNOSIS — I47.1 SUPRAVENTRICULAR TACHYCARDIA: ICD-10-CM

## 2022-08-08 DIAGNOSIS — Z82.49 FAMILY HISTORY OF ISCHEMIC HEART DISEASE AND OTHER DISEASES OF THE CIRCULATORY SYSTEM: ICD-10-CM

## 2022-08-08 DIAGNOSIS — R10.12 LEFT UPPER QUADRANT PAIN: ICD-10-CM

## 2022-08-08 DIAGNOSIS — K21.9 GASTRO-ESOPHAGEAL REFLUX DISEASE WITHOUT ESOPHAGITIS: ICD-10-CM

## 2022-08-08 DIAGNOSIS — D63.1 ANEMIA IN CHRONIC KIDNEY DISEASE: ICD-10-CM

## 2022-08-08 DIAGNOSIS — G40.909 EPILEPSY, UNSPECIFIED, NOT INTRACTABLE, WITHOUT STATUS EPILEPTICUS: ICD-10-CM

## 2022-08-08 LAB
ALBUMIN SERPL ELPH-MCNC: 4.2 G/DL — SIGNIFICANT CHANGE UP (ref 3.3–5)
ALBUMIN SERPL ELPH-MCNC: 4.7 G/DL — SIGNIFICANT CHANGE UP (ref 3.3–5)
ALP SERPL-CCNC: 115 U/L — SIGNIFICANT CHANGE UP (ref 40–120)
ALP SERPL-CCNC: 135 U/L — HIGH (ref 40–120)
ALT FLD-CCNC: SIGNIFICANT CHANGE UP (ref 10–45)
ALT FLD-CCNC: SIGNIFICANT CHANGE UP U/L (ref 10–45)
ANION GAP SERPL CALC-SCNC: 13 MMOL/L — SIGNIFICANT CHANGE UP (ref 5–17)
ANION GAP SERPL CALC-SCNC: 16 MMOL/L — SIGNIFICANT CHANGE UP (ref 5–17)
ANION GAP SERPL CALC-SCNC: 20 MMOL/L — HIGH (ref 5–17)
APTT BLD: 30.5 SEC — SIGNIFICANT CHANGE UP (ref 27.5–35.5)
AST SERPL-CCNC: SIGNIFICANT CHANGE UP (ref 10–40)
AST SERPL-CCNC: SIGNIFICANT CHANGE UP U/L (ref 10–40)
BASE EXCESS BLDV CALC-SCNC: -2.2 MMOL/L — LOW (ref -2–3)
BASOPHILS # BLD AUTO: 0.03 K/UL — SIGNIFICANT CHANGE UP (ref 0–0.2)
BASOPHILS NFR BLD AUTO: 0.5 % — SIGNIFICANT CHANGE UP (ref 0–2)
BILIRUB SERPL-MCNC: 0.2 MG/DL — SIGNIFICANT CHANGE UP (ref 0.2–1.2)
BILIRUB SERPL-MCNC: 0.2 MG/DL — SIGNIFICANT CHANGE UP (ref 0.2–1.2)
BUN SERPL-MCNC: 20 MG/DL — SIGNIFICANT CHANGE UP (ref 7–23)
BUN SERPL-MCNC: 21 MG/DL — SIGNIFICANT CHANGE UP (ref 7–23)
BUN SERPL-MCNC: 21 MG/DL — SIGNIFICANT CHANGE UP (ref 7–23)
CA-I SERPL-SCNC: 1.3 MMOL/L — SIGNIFICANT CHANGE UP (ref 1.15–1.33)
CALCIUM SERPL-MCNC: 10.5 MG/DL — SIGNIFICANT CHANGE UP (ref 8.4–10.5)
CALCIUM SERPL-MCNC: 10.9 MG/DL — HIGH (ref 8.4–10.5)
CALCIUM SERPL-MCNC: 9.6 MG/DL — SIGNIFICANT CHANGE UP (ref 8.4–10.5)
CHLORIDE SERPL-SCNC: 102 MMOL/L — SIGNIFICANT CHANGE UP (ref 96–108)
CHLORIDE SERPL-SCNC: 105 MMOL/L — SIGNIFICANT CHANGE UP (ref 96–108)
CHLORIDE SERPL-SCNC: 106 MMOL/L — SIGNIFICANT CHANGE UP (ref 96–108)
CO2 BLDV-SCNC: 24.5 MMOL/L — SIGNIFICANT CHANGE UP (ref 22–26)
CO2 SERPL-SCNC: 21 MMOL/L — LOW (ref 22–31)
CO2 SERPL-SCNC: 22 MMOL/L — SIGNIFICANT CHANGE UP (ref 22–31)
CO2 SERPL-SCNC: 22 MMOL/L — SIGNIFICANT CHANGE UP (ref 22–31)
CREAT SERPL-MCNC: 1.73 MG/DL — HIGH (ref 0.5–1.3)
CREAT SERPL-MCNC: 1.75 MG/DL — HIGH (ref 0.5–1.3)
CREAT SERPL-MCNC: 1.85 MG/DL — HIGH (ref 0.5–1.3)
EGFR: 30 ML/MIN/1.73M2 — LOW
EGFR: 32 ML/MIN/1.73M2 — LOW
EGFR: 32 ML/MIN/1.73M2 — LOW
EOSINOPHIL # BLD AUTO: 0.08 K/UL — SIGNIFICANT CHANGE UP (ref 0–0.5)
EOSINOPHIL NFR BLD AUTO: 1.3 % — SIGNIFICANT CHANGE UP (ref 0–6)
GAS PNL BLDV: 135 MMOL/L — LOW (ref 136–145)
GAS PNL BLDV: SIGNIFICANT CHANGE UP
GLUCOSE SERPL-MCNC: 103 MG/DL — HIGH (ref 70–99)
GLUCOSE SERPL-MCNC: 103 MG/DL — HIGH (ref 70–99)
GLUCOSE SERPL-MCNC: 97 MG/DL — SIGNIFICANT CHANGE UP (ref 70–99)
HCO3 BLDV-SCNC: 23 MMOL/L — SIGNIFICANT CHANGE UP (ref 22–29)
HCT VFR BLD CALC: 38.3 % — SIGNIFICANT CHANGE UP (ref 34.5–45)
HGB BLD-MCNC: 12 G/DL — SIGNIFICANT CHANGE UP (ref 11.5–15.5)
IMM GRANULOCYTES NFR BLD AUTO: 0.2 % — SIGNIFICANT CHANGE UP (ref 0–1.5)
INR BLD: 1.03 — SIGNIFICANT CHANGE UP (ref 0.88–1.16)
LYMPHOCYTES # BLD AUTO: 1.02 K/UL — SIGNIFICANT CHANGE UP (ref 1–3.3)
LYMPHOCYTES # BLD AUTO: 16.5 % — SIGNIFICANT CHANGE UP (ref 13–44)
MAGNESIUM SERPL-MCNC: 2.8 MG/DL — HIGH (ref 1.6–2.6)
MCHC RBC-ENTMCNC: 30.1 PG — SIGNIFICANT CHANGE UP (ref 27–34)
MCHC RBC-ENTMCNC: 31.3 GM/DL — LOW (ref 32–36)
MCV RBC AUTO: 96 FL — SIGNIFICANT CHANGE UP (ref 80–100)
MONOCYTES # BLD AUTO: 0.65 K/UL — SIGNIFICANT CHANGE UP (ref 0–0.9)
MONOCYTES NFR BLD AUTO: 10.5 % — SIGNIFICANT CHANGE UP (ref 2–14)
NEUTROPHILS # BLD AUTO: 4.41 K/UL — SIGNIFICANT CHANGE UP (ref 1.8–7.4)
NEUTROPHILS NFR BLD AUTO: 71 % — SIGNIFICANT CHANGE UP (ref 43–77)
NRBC # BLD: 0 /100 WBCS — SIGNIFICANT CHANGE UP (ref 0–0)
PCO2 BLDV: 41 MMHG — SIGNIFICANT CHANGE UP (ref 39–42)
PH BLDV: 7.36 — SIGNIFICANT CHANGE UP (ref 7.32–7.43)
PHOSPHATE SERPL-MCNC: 2.9 MG/DL — SIGNIFICANT CHANGE UP (ref 2.5–4.5)
PLATELET # BLD AUTO: 203 K/UL — SIGNIFICANT CHANGE UP (ref 150–400)
PO2 BLDV: 37 MMHG — SIGNIFICANT CHANGE UP (ref 25–45)
POTASSIUM BLDV-SCNC: 4.3 MMOL/L — SIGNIFICANT CHANGE UP (ref 3.5–5.1)
POTASSIUM SERPL-MCNC: 3.5 MMOL/L — SIGNIFICANT CHANGE UP (ref 3.5–5.3)
POTASSIUM SERPL-MCNC: SIGNIFICANT CHANGE UP (ref 3.5–5.3)
POTASSIUM SERPL-MCNC: SIGNIFICANT CHANGE UP MMOL/L (ref 3.5–5.3)
POTASSIUM SERPL-SCNC: 3.5 MMOL/L — SIGNIFICANT CHANGE UP (ref 3.5–5.3)
POTASSIUM SERPL-SCNC: SIGNIFICANT CHANGE UP (ref 3.5–5.3)
POTASSIUM SERPL-SCNC: SIGNIFICANT CHANGE UP MMOL/L (ref 3.5–5.3)
PROT SERPL-MCNC: 7.6 G/DL — SIGNIFICANT CHANGE UP (ref 6–8.3)
PROT SERPL-MCNC: 9 G/DL — HIGH (ref 6–8.3)
PROTHROM AB SERPL-ACNC: 12.3 SEC — SIGNIFICANT CHANGE UP (ref 10.5–13.4)
RBC # BLD: 3.99 M/UL — SIGNIFICANT CHANGE UP (ref 3.8–5.2)
RBC # FLD: 15.1 % — HIGH (ref 10.3–14.5)
SAO2 % BLDV: 47.1 % — LOW (ref 67–88)
SARS-COV-2 RNA SPEC QL NAA+PROBE: NEGATIVE — SIGNIFICANT CHANGE UP
SODIUM SERPL-SCNC: 140 MMOL/L — SIGNIFICANT CHANGE UP (ref 135–145)
SODIUM SERPL-SCNC: 143 MMOL/L — SIGNIFICANT CHANGE UP (ref 135–145)
SODIUM SERPL-SCNC: 144 MMOL/L — SIGNIFICANT CHANGE UP (ref 135–145)
TROPONIN T SERPL-MCNC: 0.02 NG/ML — HIGH (ref 0–0.01)
TSH SERPL-MCNC: 6.47 UIU/ML — HIGH (ref 0.27–4.2)
WBC # BLD: 6.2 K/UL — SIGNIFICANT CHANGE UP (ref 3.8–10.5)
WBC # FLD AUTO: 6.2 K/UL — SIGNIFICANT CHANGE UP (ref 3.8–10.5)

## 2022-08-08 PROCEDURE — 82330 ASSAY OF CALCIUM: CPT

## 2022-08-08 PROCEDURE — 84100 ASSAY OF PHOSPHORUS: CPT

## 2022-08-08 PROCEDURE — 36415 COLL VENOUS BLD VENIPUNCTURE: CPT

## 2022-08-08 PROCEDURE — 96375 TX/PRO/DX INJ NEW DRUG ADDON: CPT

## 2022-08-08 PROCEDURE — 84132 ASSAY OF SERUM POTASSIUM: CPT

## 2022-08-08 PROCEDURE — 84484 ASSAY OF TROPONIN QUANT: CPT

## 2022-08-08 PROCEDURE — 96374 THER/PROPH/DIAG INJ IV PUSH: CPT

## 2022-08-08 PROCEDURE — 71045 X-RAY EXAM CHEST 1 VIEW: CPT

## 2022-08-08 PROCEDURE — 85610 PROTHROMBIN TIME: CPT

## 2022-08-08 PROCEDURE — 80053 COMPREHEN METABOLIC PANEL: CPT

## 2022-08-08 PROCEDURE — 93005 ELECTROCARDIOGRAM TRACING: CPT

## 2022-08-08 PROCEDURE — 85025 COMPLETE CBC W/AUTO DIFF WBC: CPT

## 2022-08-08 PROCEDURE — 71045 X-RAY EXAM CHEST 1 VIEW: CPT | Mod: 26

## 2022-08-08 PROCEDURE — 99291 CRITICAL CARE FIRST HOUR: CPT

## 2022-08-08 PROCEDURE — 99291 CRITICAL CARE FIRST HOUR: CPT | Mod: 25

## 2022-08-08 PROCEDURE — 80048 BASIC METABOLIC PNL TOTAL CA: CPT

## 2022-08-08 PROCEDURE — 83735 ASSAY OF MAGNESIUM: CPT

## 2022-08-08 PROCEDURE — 82803 BLOOD GASES ANY COMBINATION: CPT

## 2022-08-08 PROCEDURE — 84295 ASSAY OF SERUM SODIUM: CPT

## 2022-08-08 PROCEDURE — 85730 THROMBOPLASTIN TIME PARTIAL: CPT

## 2022-08-08 PROCEDURE — 84443 ASSAY THYROID STIM HORMONE: CPT

## 2022-08-08 PROCEDURE — 87635 SARS-COV-2 COVID-19 AMP PRB: CPT

## 2022-08-08 RX ORDER — METOPROLOL TARTRATE 50 MG
50 TABLET ORAL ONCE
Refills: 0 | Status: COMPLETED | OUTPATIENT
Start: 2022-08-08 | End: 2022-08-08

## 2022-08-08 RX ORDER — SODIUM CHLORIDE 9 MG/ML
500 INJECTION INTRAMUSCULAR; INTRAVENOUS; SUBCUTANEOUS ONCE
Refills: 0 | Status: COMPLETED | OUTPATIENT
Start: 2022-08-08 | End: 2022-08-08

## 2022-08-08 RX ORDER — ONDANSETRON 8 MG/1
4 TABLET, FILM COATED ORAL ONCE
Refills: 0 | Status: COMPLETED | OUTPATIENT
Start: 2022-08-08 | End: 2022-08-08

## 2022-08-08 RX ORDER — METOPROLOL TARTRATE 50 MG
1 TABLET ORAL
Qty: 60 | Refills: 0
Start: 2022-08-08 | End: 2022-09-06

## 2022-08-08 RX ORDER — ADENOSINE 3 MG/ML
6 INJECTION INTRAVENOUS ONCE
Refills: 0 | Status: COMPLETED | OUTPATIENT
Start: 2022-08-08 | End: 2022-08-08

## 2022-08-08 RX ORDER — METOPROLOL TARTRATE 50 MG
5 TABLET ORAL ONCE
Refills: 0 | Status: COMPLETED | OUTPATIENT
Start: 2022-08-08 | End: 2022-08-08

## 2022-08-08 RX ADMIN — Medication 5 MILLIGRAM(S): at 14:35

## 2022-08-08 RX ADMIN — ADENOSINE 6 MILLIGRAM(S): 3 INJECTION INTRAVENOUS at 14:30

## 2022-08-08 RX ADMIN — SODIUM CHLORIDE 500 MILLILITER(S): 9 INJECTION INTRAMUSCULAR; INTRAVENOUS; SUBCUTANEOUS at 14:40

## 2022-08-08 RX ADMIN — Medication 50 MILLIGRAM(S): at 14:55

## 2022-08-08 NOTE — CONSULT NOTE ADULT - SUBJECTIVE AND OBJECTIVE BOX
HPI:    PAST MEDICAL & SURGICAL HISTORY:  CAD (coronary artery disease)  PVD (peripheral vascular disease)  Anemia  Hypothyroidism  GERD (gastroesophageal reflux disease)  Hyperlipidemia  HTN (hypertension)  Seizure  Anxiety  Depression, unspecified depression type  Chronic kidney disease, unspecified CKD stage  Status post aorto-coronary artery bypass graft  2012      Atherosclerosis of coronary artery bypass graft(s), unspecified, with angina pectoris with documented spasm      H/O aortic valve replacement  Bioprosthetic      Ruptured aortic aneurysm  surgery august 2020      Abdominal aortic aneurysm (AAA) without rupture  2015          No pertinent family history in first degree relatives    FH: myocardial infarction        Social History:no smoking, no drugs, no algohol    pertinent home medications:    Inpatient Medications:       Allergies: No Known Allergies      ROS:   CONSTITUTIONAL: No fever, weight loss + fatigue  EYES: Pt denies  RESPIRATORY: No cough, wheezing, chills or hemoptysis; No Shortness of Breath  CARDIOVASCULAR: see HPI  GASTROINTESTINAL: Pt denies  NEUROLOGICAL: Pt denies  SKIN: Pt denies   PSYCHIATRIC: Pt denies  HEME/LYMPH: Pt denies    PHYSICAL:  T(C): 36.9 (08-08-22 @ 14:12), Max: 36.9 (08-08-22 @ 14:12)  HR: 75 (08-08-22 @ 15:30) (71 - 167)  BP: 138/77 (08-08-22 @ 15:30) (122/90 - 138/77)  RR: 19 (08-08-22 @ 14:12) (19 - 19)  SpO2: 99% (08-08-22 @ 14:12) (99% - 99%)  Wt(kg): --  Appearance: No acute distress, well developed  Eyes: normal appearing conjunctiva, pupils and eyelids  Cardiovascular: Normal S1 S2, No JVD, No murmurs, No edema  Respiratory: Lungs clear to auscultation	bilaterally.  No wheeze, rhonchi, rales noted  Gastrointestinal:  Soft, NT/ND 	  Neurologic:  No deficit noted  Psych: A&Ox3, normal mood/affect  Musculoskeletal: normal gait  Skin: no rash noted, normal color and pigmentation.        LABS:                        12.0   6.20  )-----------( 203      ( 08 Aug 2022 14:49 )             38.3     08-08    143  |  102  |  21  ----------------------------<  103<H>  See Note   |  21<L>  |  1.85<H>    Ca    10.9<H>      08 Aug 2022 14:49  Phos  2.9     08-08  Mg     2.8     08-08    TPro  9.0<H>  /  Alb  4.7  /  TBili  0.2  /  DBili  x   /  AST  See Note  /  ALT  See Note  /  AlkPhos  135<H>  08-08    PT/INR - ( 08 Aug 2022 14:49 )   PT: 12.3 sec;   INR: 1.03          PTT - ( 08 Aug 2022 14:49 )  PTT:30.5 sec  TSH  Troponin    EKG:    Telemetry:    ECHO:    Prior EP procedures:    Cath / stress / Cardiac CTa:    Assessment Plan:         HPI:    66 year old female with medical history of CAD s/p CABG, bioAVR/ mitral annuloplasty in 2002, PAD s/p LSFA stenting and history of occluded bilateral SFA, AAA s/p open repair 2015 and saccular AAA and contained rupture s/p thoracoabdominal aneurysm repair 2020, history of L sided RP hematoma requiring transfusions, chronic anemia receiving iron infusions, and known history of AT. Patient presented to ED today after being sent from Dr. Ng office for rapid atrial arrhythmia.    patient is known to Dr. Jasso, seen in the office most recently in April 2022. She has a long standing history of palpitations, had an ILR implanted in 2016 which is since inert, and documented AFL versus AT. Plan was to possibly pursue ablation but patient not able to be on anticoagulation due to history of RP bleed and chronic anemia.     Arrived to the ED in NCT @170bpm, adenosine given which blocked arrhythmia down to AT versus AFL @70bpm. patient initially was having palpitations but now symptoms have improved.     pt has been on metoprolol tartrate 50mg q12hr which she has been compliant with. She saw her hematologist Dr Izquierdo on Thursday who sent her a prescription for dipyridamole (antiplatelet agent) to see if pt had any changes in h/h, patient never filled rx.        PAST MEDICAL & SURGICAL HISTORY:  CAD (coronary artery disease)  PVD (peripheral vascular disease)  Anemia  Hypothyroidism  GERD (gastroesophageal reflux disease)  Hyperlipidemia  HTN (hypertension)  Seizure  Anxiety  Depression, unspecified depression type  Chronic kidney disease, unspecified CKD stage  Status post aorto-coronary artery bypass graft  2012  H/O aortic valve replacement  Bioprosthetic  Ruptured aortic aneurysm  surgery august 2020  Abdominal aortic aneurysm (AAA) without rupture  2015      No pertinent family history in first degree relatives    FH: myocardial infarction        Social History: past smoking, no drugs, no alcohol      Allergies: No Known Allergies      ROS:   CONSTITUTIONAL: No fever, weight loss + fatigue  EYES: Pt denies  RESPIRATORY: No cough, wheezing, chills or hemoptysis; No Shortness of Breath  CARDIOVASCULAR: see HPI  GASTROINTESTINAL: Pt denies  NEUROLOGICAL: Pt denies  SKIN: Pt denies   PSYCHIATRIC: Pt denies  HEME/LYMPH: Pt denies    PHYSICAL:  T(C): 36.9 (08-08-22 @ 14:12), Max: 36.9 (08-08-22 @ 14:12)  HR: 75 (08-08-22 @ 15:30) (71 - 167)  BP: 138/77 (08-08-22 @ 15:30) (122/90 - 138/77)  RR: 19 (08-08-22 @ 14:12) (19 - 19)  SpO2: 99% (08-08-22 @ 14:12) (99% - 99%)  Wt(kg): 50.8kG  Appearance: No acute distress, well developed  Eyes: normal appearing conjunctiva, pupils and eyelids  Cardiovascular: irregularly irregular  Respiratory: Lungs clear to auscultation	bilaterally.  No wheeze, rhonchi, rales noted  Gastrointestinal:  Soft, NT/ND 	  Neurologic:  No deficit noted  Psych: A&Ox3, normal mood/affect  Musculoskeletal: normal gait  Skin: no rash noted, normal color and pigmentation.        LABS:                        12.0   6.20  )-----------( 203      ( 08 Aug 2022 14:49 )             38.3     08-08    143  |  102  |  21  ----------------------------<  103<H>  See Note   |  21<L>  |  1.85<H>    Ca    10.9<H>      08 Aug 2022 14:49  Phos  2.9     08-08  Mg     2.8     08-08    TPro  9.0<H>  /  Alb  4.7  /  TBili  0.2  /  DBili  x   /  AST  See Note  /  ALT  See Note  /  AlkPhos  135<H>  08-08    PT/INR - ( 08 Aug 2022 14:49 )   PT: 12.3 sec;   INR: 1.03          PTT - ( 08 Aug 2022 14:49 )  PTT:30.5 sec      Assessment Plan:    66 year old female with medical history of CAD s/p CABG, bioAVR/ mitral annuloplasty in 2002, PAD s/p LSFA stenting and history of occluded bilateral SFA, AAA s/p open repair 2015 and saccular AAA and contained rupture s/p thoracoabdominal aneurysm repair 2020, history of L sided RP hematoma requiring transfusions, chronic anemia receiving iron infusions, and known history of AT. Patient presented to ED today after being sent from Dr. Ng office for rapid atrial arrhythmia likely AT, s/p adenosine with rate improved.     Long discussion had with patient. Rhythm control strategies (ablation) limited 2/2 inability to be on anticoagulation. Options for strategy are between rate control with medications versus AV mayito ablation and pacemaker implant. Patient weighing options.    -stop metoprolol tartrate. start metoprolol succinate 50mg PO BID.   -will follow up with patient as an outpatient to see how she is feeling with increasing beta blockers and to further discuss options.

## 2022-08-08 NOTE — ED PROVIDER NOTE - NSFOLLOWUPINSTRUCTIONS_ED_ALL_ED_FT
Follow up with Dr. Jasso.     Instead of talking metoprolol tartrate 50mg twice a day - take metoprolol succinate (long acting) twice a day. STOP TAKING THE METOPROLOL TARTRATE. TAKE THE METOPROLOL SUCCINATE INSTEAD.     Return to ER for worsening chest pain, worsening breathing, worsening lightheaded, fevers, persistent vomiting.

## 2022-08-08 NOTE — ED PROVIDER NOTE - CARE PLAN
Principal Discharge DX:	SVT (supraventricular tachycardia)   1 Principal Discharge DX:	SVT (supraventricular tachycardia)  Secondary Diagnosis:	Atrial flutter

## 2022-08-08 NOTE — ED PROVIDER NOTE - PROGRESS NOTE DETAILS
[place]: Initial CMP hemolyzed twice. Rpt labs w/ normal K. Mild Cr increase to 1.85, improved to 1.72. TSH minimally elevated at 6.47. Trop minimally elevated at 0.02. Other labs grossly wnl. Pt asymptomatic and HR rate controlled ever since initial metoprolol. Observed for several hrs. Evaluated by Dr. Jasso. Recommending changing metoprolol BID to ER instead of IR and recommending outpt f/u.

## 2022-08-08 NOTE — ED PROVIDER NOTE - OBJECTIVE STATEMENT
66F former smoker PMH HTN, HLD, CAD (s/p PCI and CABG), bioprosthetic AVR and MVR annuloplasty (2002), PAD s/p L SFA and L TIFFANY stent, AAA (s/p open repair 2015), tAAA s/p thoracoabdominal repair (8/2020), afib (x1, not on AC) p/w palpitations. Pt c/o palpitations/fatigue/chest pressure x3d, constant, finally came to ED. Chronic intermittent LUQ pain.   Denies lightheaded, URI symptoms, f/c, NVD, SOB, urinary complaints, focal weakness/numbness, black/bloody stool. 66F former smoker PMH HTN, HLD, CAD (s/p PCI and CABG), bioprosthetic AVR and MVR annuloplasty (2002), PAD s/p L SFA and L TIFFANY stent, AAA (s/p open repair 2015), tAAA s/p thoracoabdominal repair (8/2020), afib (x1, not on AC) p/w palpitations. Pt c/o palpitations/fatigue/chest pressure x3d, constant, finally came to ED. Chronic intermittent LUQ pain.   Denies lightheaded, URI symptoms, f/c, NVD, SOB, urinary complaints, focal weakness/numbness, black/bloody stool.  meds: metoprolol, rosuvastatin, pantoprazole, furosemide, cilosatozol, amloidpine, levothyroxine, sertraline, folic acid, losartan, vitamin d

## 2022-08-08 NOTE — ED PROVIDER NOTE - PATIENT PORTAL LINK FT
You can access the FollowMyHealth Patient Portal offered by [facility] by registering at the following website: [email or web address]. By joining "[email or web address]’s FollowMyHealth portal, you will also be able to view your health information using other applications (apps) compatible with our system.

## 2022-08-08 NOTE — ED ADULT TRIAGE NOTE - OTHER COMPLAINTS
patient reports palpitations x Friday with CP-- reports hx of afib-- EKG in progress-- denies dizziness

## 2022-08-08 NOTE — ED PROVIDER NOTE - CLINICAL SUMMARY MEDICAL DECISION MAKING FREE TEXT BOX
66F former smoker PMH HTN, HLD, CAD (s/p PCI and CABG), bioprosthetic AVR and MVR annuloplasty (2002), PAD s/p L SFA and L TIFFANY stent, AAA (s/p open repair 2015), tAAA s/p thoracoabdominal repair (8/2020), afib (x1, not on AC) p/w palpitations. Pt c/o palpitations/fatigue/chest pressure x3d, constant, finally came to ED. Chronic intermittent LUQ pain.   Tachycardic, other vitals wnl. Exam as above.  ddx: Stable regular narrow complex tachycardia. Likely SVT/aflutter.   Given adenosine - underlying rhythym likely flutter - reverted back to 160s.   Will give lopressor, reassess.   Labs, reassess.

## 2022-08-08 NOTE — ED ADULT NURSE NOTE - OBJECTIVE STATEMENT
Pt c/p palpitations and fatigue since friday, presented w/ tachycardia, and "little short of breath", and slight nausea. PMH tachycardia (unsure a fib, on daily metoprolol but not on blood thinner). Pt denies chest pain, vomiting, dizziness.

## 2022-08-23 ENCOUNTER — INPATIENT (INPATIENT)
Facility: HOSPITAL | Age: 67
LOS: 1 days | Discharge: HOME | DRG: 243 | End: 2022-08-25
Attending: INTERNAL MEDICINE | Admitting: INTERNAL MEDICINE
Payer: MEDICARE

## 2022-08-23 VITALS
HEIGHT: 67 IN | WEIGHT: 115.96 LBS | TEMPERATURE: 98 F | OXYGEN SATURATION: 98 % | HEART RATE: 85 BPM | RESPIRATION RATE: 18 BRPM | SYSTOLIC BLOOD PRESSURE: 116 MMHG | DIASTOLIC BLOOD PRESSURE: 58 MMHG

## 2022-08-23 DIAGNOSIS — I10 ESSENTIAL (PRIMARY) HYPERTENSION: ICD-10-CM

## 2022-08-23 DIAGNOSIS — E03.9 HYPOTHYROIDISM, UNSPECIFIED: ICD-10-CM

## 2022-08-23 DIAGNOSIS — I71.8 AORTIC ANEURYSM OF UNSPECIFIED SITE, RUPTURED: Chronic | ICD-10-CM

## 2022-08-23 DIAGNOSIS — N18.30 CHRONIC KIDNEY DISEASE, STAGE 3 UNSPECIFIED: ICD-10-CM

## 2022-08-23 DIAGNOSIS — I25.701 ATHEROSCLEROSIS OF CORONARY ARTERY BYPASS GRAFT(S), UNSPECIFIED, WITH ANGINA PECTORIS WITH DOCUMENTED SPASM: Chronic | ICD-10-CM

## 2022-08-23 DIAGNOSIS — I71.4 ABDOMINAL AORTIC ANEURYSM, WITHOUT RUPTURE: Chronic | ICD-10-CM

## 2022-08-23 DIAGNOSIS — Z95.2 PRESENCE OF PROSTHETIC HEART VALVE: Chronic | ICD-10-CM

## 2022-08-23 DIAGNOSIS — I48.91 UNSPECIFIED ATRIAL FIBRILLATION: ICD-10-CM

## 2022-08-23 DIAGNOSIS — I25.10 ATHEROSCLEROTIC HEART DISEASE OF NATIVE CORONARY ARTERY WITHOUT ANGINA PECTORIS: ICD-10-CM

## 2022-08-23 LAB
ANION GAP SERPL CALC-SCNC: 13 MMOL/L — SIGNIFICANT CHANGE UP (ref 5–17)
ANION GAP SERPL CALC-SCNC: 9 MMOL/L — SIGNIFICANT CHANGE UP (ref 5–17)
BASE EXCESS BLDV CALC-SCNC: -0.2 MMOL/L — SIGNIFICANT CHANGE UP (ref -2–3)
BASOPHILS # BLD AUTO: 0.05 K/UL — SIGNIFICANT CHANGE UP (ref 0–0.2)
BASOPHILS NFR BLD AUTO: 0.9 % — SIGNIFICANT CHANGE UP (ref 0–2)
BLD GP AB SCN SERPL QL: NEGATIVE — SIGNIFICANT CHANGE UP
BUN SERPL-MCNC: 17 MG/DL — SIGNIFICANT CHANGE UP (ref 7–23)
BUN SERPL-MCNC: 17 MG/DL — SIGNIFICANT CHANGE UP (ref 7–23)
CA-I SERPL-SCNC: 1.34 MMOL/L — HIGH (ref 1.15–1.33)
CALCIUM SERPL-MCNC: 10.2 MG/DL — SIGNIFICANT CHANGE UP (ref 8.4–10.5)
CALCIUM SERPL-MCNC: 9.9 MG/DL — SIGNIFICANT CHANGE UP (ref 8.4–10.5)
CHLORIDE SERPL-SCNC: 104 MMOL/L — SIGNIFICANT CHANGE UP (ref 96–108)
CHLORIDE SERPL-SCNC: 108 MMOL/L — SIGNIFICANT CHANGE UP (ref 96–108)
CK MB CFR SERPL CALC: 1.7 NG/ML — SIGNIFICANT CHANGE UP (ref 0–6.7)
CK SERPL-CCNC: 52 U/L — SIGNIFICANT CHANGE UP (ref 25–170)
CO2 BLDV-SCNC: 26.1 MMOL/L — HIGH (ref 22–26)
CO2 SERPL-SCNC: 22 MMOL/L — SIGNIFICANT CHANGE UP (ref 22–31)
CO2 SERPL-SCNC: 25 MMOL/L — SIGNIFICANT CHANGE UP (ref 22–31)
CREAT SERPL-MCNC: 1.99 MG/DL — HIGH (ref 0.5–1.3)
CREAT SERPL-MCNC: 2.01 MG/DL — HIGH (ref 0.5–1.3)
EGFR: 27 ML/MIN/1.73M2 — LOW
EGFR: 27 ML/MIN/1.73M2 — LOW
EOSINOPHIL # BLD AUTO: 0.19 K/UL — SIGNIFICANT CHANGE UP (ref 0–0.5)
EOSINOPHIL NFR BLD AUTO: 3.6 % — SIGNIFICANT CHANGE UP (ref 0–6)
GAS PNL BLDV: 137 MMOL/L — SIGNIFICANT CHANGE UP (ref 136–145)
GAS PNL BLDV: SIGNIFICANT CHANGE UP
GAS PNL BLDV: SIGNIFICANT CHANGE UP
GLUCOSE SERPL-MCNC: 124 MG/DL — HIGH (ref 70–99)
GLUCOSE SERPL-MCNC: 125 MG/DL — HIGH (ref 70–99)
HCO3 BLDV-SCNC: 25 MMOL/L — SIGNIFICANT CHANGE UP (ref 22–29)
HCT VFR BLD CALC: 36.1 % — SIGNIFICANT CHANGE UP (ref 34.5–45)
HGB BLD-MCNC: 11.2 G/DL — LOW (ref 11.5–15.5)
IMM GRANULOCYTES NFR BLD AUTO: 0.4 % — SIGNIFICANT CHANGE UP (ref 0–1.5)
LYMPHOCYTES # BLD AUTO: 1.06 K/UL — SIGNIFICANT CHANGE UP (ref 1–3.3)
LYMPHOCYTES # BLD AUTO: 19.9 % — SIGNIFICANT CHANGE UP (ref 13–44)
MAGNESIUM SERPL-MCNC: 2.6 MG/DL — SIGNIFICANT CHANGE UP (ref 1.6–2.6)
MCHC RBC-ENTMCNC: 29.4 PG — SIGNIFICANT CHANGE UP (ref 27–34)
MCHC RBC-ENTMCNC: 31 GM/DL — LOW (ref 32–36)
MCV RBC AUTO: 94.8 FL — SIGNIFICANT CHANGE UP (ref 80–100)
MONOCYTES # BLD AUTO: 0.4 K/UL — SIGNIFICANT CHANGE UP (ref 0–0.9)
MONOCYTES NFR BLD AUTO: 7.5 % — SIGNIFICANT CHANGE UP (ref 2–14)
NEUTROPHILS # BLD AUTO: 3.61 K/UL — SIGNIFICANT CHANGE UP (ref 1.8–7.4)
NEUTROPHILS NFR BLD AUTO: 67.7 % — SIGNIFICANT CHANGE UP (ref 43–77)
NRBC # BLD: 0 /100 WBCS — SIGNIFICANT CHANGE UP (ref 0–0)
PCO2 BLDV: 41 MMHG — SIGNIFICANT CHANGE UP (ref 39–42)
PH BLDV: 7.39 — SIGNIFICANT CHANGE UP (ref 7.32–7.43)
PLATELET # BLD AUTO: 177 K/UL — SIGNIFICANT CHANGE UP (ref 150–400)
PO2 BLDV: 49 MMHG — HIGH (ref 25–45)
POTASSIUM BLDV-SCNC: 2.4 MMOL/L — CRITICAL LOW (ref 3.5–5.1)
POTASSIUM SERPL-MCNC: 2.9 MMOL/L — CRITICAL LOW (ref 3.5–5.3)
POTASSIUM SERPL-MCNC: 3.4 MMOL/L — LOW (ref 3.5–5.3)
POTASSIUM SERPL-SCNC: 2.9 MMOL/L — CRITICAL LOW (ref 3.5–5.3)
POTASSIUM SERPL-SCNC: 3.4 MMOL/L — LOW (ref 3.5–5.3)
RBC # BLD: 3.81 M/UL — SIGNIFICANT CHANGE UP (ref 3.8–5.2)
RBC # FLD: 14.8 % — HIGH (ref 10.3–14.5)
RH IG SCN BLD-IMP: POSITIVE — SIGNIFICANT CHANGE UP
SAO2 % BLDV: 81.7 % — SIGNIFICANT CHANGE UP (ref 67–88)
SARS-COV-2 RNA SPEC QL NAA+PROBE: NEGATIVE — SIGNIFICANT CHANGE UP
SODIUM SERPL-SCNC: 139 MMOL/L — SIGNIFICANT CHANGE UP (ref 135–145)
SODIUM SERPL-SCNC: 142 MMOL/L — SIGNIFICANT CHANGE UP (ref 135–145)
TROPONIN T SERPL-MCNC: 0.01 NG/ML — SIGNIFICANT CHANGE UP (ref 0–0.01)
WBC # BLD: 5.33 K/UL — SIGNIFICANT CHANGE UP (ref 3.8–10.5)
WBC # FLD AUTO: 5.33 K/UL — SIGNIFICANT CHANGE UP (ref 3.8–10.5)

## 2022-08-23 PROCEDURE — 99285 EMERGENCY DEPT VISIT HI MDM: CPT | Mod: 25

## 2022-08-23 PROCEDURE — 99223 1ST HOSP IP/OBS HIGH 75: CPT

## 2022-08-23 PROCEDURE — 99223 1ST HOSP IP/OBS HIGH 75: CPT | Mod: 57

## 2022-08-23 PROCEDURE — 71045 X-RAY EXAM CHEST 1 VIEW: CPT | Mod: 26

## 2022-08-23 PROCEDURE — 93010 ELECTROCARDIOGRAM REPORT: CPT

## 2022-08-23 RX ORDER — METOPROLOL TARTRATE 50 MG
1 TABLET ORAL
Qty: 0 | Refills: 0 | DISCHARGE

## 2022-08-23 RX ORDER — POTASSIUM CHLORIDE 20 MEQ
10 PACKET (EA) ORAL
Refills: 0 | Status: COMPLETED | OUTPATIENT
Start: 2022-08-23 | End: 2022-08-23

## 2022-08-23 RX ORDER — PANTOPRAZOLE SODIUM 20 MG/1
40 TABLET, DELAYED RELEASE ORAL
Refills: 0 | Status: DISCONTINUED | OUTPATIENT
Start: 2022-08-23 | End: 2022-08-25

## 2022-08-23 RX ORDER — FUROSEMIDE 40 MG
40 TABLET ORAL DAILY
Refills: 0 | Status: DISCONTINUED | OUTPATIENT
Start: 2022-08-24 | End: 2022-08-25

## 2022-08-23 RX ORDER — SERTRALINE 25 MG/1
1 TABLET, FILM COATED ORAL
Qty: 0 | Refills: 0 | DISCHARGE

## 2022-08-23 RX ORDER — LOSARTAN POTASSIUM 100 MG/1
25 TABLET, FILM COATED ORAL DAILY
Refills: 0 | Status: DISCONTINUED | OUTPATIENT
Start: 2022-08-23 | End: 2022-08-23

## 2022-08-23 RX ORDER — POTASSIUM CHLORIDE 20 MEQ
40 PACKET (EA) ORAL ONCE
Refills: 0 | Status: COMPLETED | OUTPATIENT
Start: 2022-08-23 | End: 2022-08-23

## 2022-08-23 RX ORDER — CILOSTAZOL 100 MG/1
50 TABLET ORAL
Refills: 0 | Status: DISCONTINUED | OUTPATIENT
Start: 2022-08-23 | End: 2022-08-25

## 2022-08-23 RX ORDER — ATORVASTATIN CALCIUM 80 MG/1
80 TABLET, FILM COATED ORAL AT BEDTIME
Refills: 0 | Status: DISCONTINUED | OUTPATIENT
Start: 2022-08-23 | End: 2022-08-25

## 2022-08-23 RX ORDER — FOLIC ACID 0.8 MG
1 TABLET ORAL DAILY
Refills: 0 | Status: DISCONTINUED | OUTPATIENT
Start: 2022-08-23 | End: 2022-08-25

## 2022-08-23 RX ORDER — ROSUVASTATIN CALCIUM 5 MG/1
1 TABLET ORAL
Qty: 0 | Refills: 0 | DISCHARGE

## 2022-08-23 RX ORDER — SERTRALINE 25 MG/1
50 TABLET, FILM COATED ORAL DAILY
Refills: 0 | Status: DISCONTINUED | OUTPATIENT
Start: 2022-08-24 | End: 2022-08-25

## 2022-08-23 RX ORDER — LOSARTAN POTASSIUM 100 MG/1
25 TABLET, FILM COATED ORAL DAILY
Refills: 0 | Status: DISCONTINUED | OUTPATIENT
Start: 2022-08-24 | End: 2022-08-25

## 2022-08-23 RX ORDER — AMLODIPINE BESYLATE 2.5 MG/1
5 TABLET ORAL DAILY
Refills: 0 | Status: DISCONTINUED | OUTPATIENT
Start: 2022-08-24 | End: 2022-08-25

## 2022-08-23 RX ORDER — LEVOTHYROXINE SODIUM 125 MCG
50 TABLET ORAL DAILY
Refills: 0 | Status: DISCONTINUED | OUTPATIENT
Start: 2022-08-24 | End: 2022-08-25

## 2022-08-23 RX ADMIN — Medication 40 MILLIEQUIVALENT(S): at 21:40

## 2022-08-23 RX ADMIN — ATORVASTATIN CALCIUM 80 MILLIGRAM(S): 80 TABLET, FILM COATED ORAL at 21:40

## 2022-08-23 RX ADMIN — Medication 100 MILLIEQUIVALENT(S): at 14:53

## 2022-08-23 RX ADMIN — Medication 40 MILLIEQUIVALENT(S): at 14:52

## 2022-08-23 RX ADMIN — Medication 100 MILLIEQUIVALENT(S): at 17:15

## 2022-08-23 RX ADMIN — Medication 100 MILLIEQUIVALENT(S): at 15:58

## 2022-08-23 RX ADMIN — CILOSTAZOL 50 MILLIGRAM(S): 100 TABLET ORAL at 21:56

## 2022-08-23 NOTE — H&P ADULT - PROBLEM SELECTOR PLAN 2
Hx of CAD s/p CABG, bioAVR/ mitral annuloplasty in 2002,  Also Hx of PCI (unknown date).  denies any CP.  Followed by Dr. Preston.   - Trop T 0.02, f/u Trop 8pm and AM  -  Prior Echo 6/2022: Normal left and right ventricular size and systolic function, Severely dilated LA, bioprosthetic valve noted in the aortic position. Bioprosthetic leaflets are thickened and restricted in motion. Elevated gradients,   prolonged AT and DI <0.25 are suggestive of significant prosthetic stenosis, Mild AR, An annuloplasty ring is noted in the mitral position. Mean transvalvular gradient is 9.00 mmHg at a heart rate of 63 bpm. Elevated gradients and PHT are suggestive of mitral stenosis, Mild-to-moderate MR, Moderate TR  - discuss if repeat Echo needed.   - Takes Dipyridamole 75mg QD at home - not on formulary.   - c/w Lipitor 80mg QHS Hx of CAD s/p CABG, bioAVR/ mitral annuloplasty in 2002,  Also Hx of PCI (unknown date).  denies any CP.  Followed by Dr. Preston.   - Trop T 0.02, f/u Trop 8pm  -  Prior Echo 6/2022: Normal left and right ventricular size and systolic function, Severely dilated LA, bioprosthetic valve noted in the aortic position. Bioprosthetic leaflets are thickened and restricted in motion. Elevated gradients,   prolonged AT and DI <0.25 are suggestive of significant prosthetic stenosis, Mild AR, An annuloplasty ring is noted in the mitral position. Mean transvalvular gradient is 9.00 mmHg at a heart rate of 63 bpm. Elevated gradients and PHT are suggestive of mitral stenosis, Mild-to-moderate MR, Moderate TR  - discuss if repeat Echo needed.   - Takes Dipyridamole 75mg QD at home - not on formulary.  Patient aware and If told to bring from home if able  - c/w Lipitor 80mg QHS

## 2022-08-23 NOTE — ED PROVIDER NOTE - PROGRESS NOTE DETAILS
EP consulted and will see the pt Left message for Dr Jasso at this time Pt seen by EP. Pt to be admitted to gen cardio. Pt for planned AV mayito ablation

## 2022-08-23 NOTE — ED ADULT TRIAGE NOTE - CHIEF COMPLAINT QUOTE
Pt referred by her hematologist for Afib in the office today. HX of Afib-- not on blood thinners. Takes Metoprolol daily. C/o SOB and generalized weakness since Friday. Denies fevers, chills, n/v, chest pain.

## 2022-08-23 NOTE — ED PROVIDER NOTE - PHYSICAL EXAMINATION
Constitutional: Well appearing, awake, alert, oriented to person, place, time/situation and in no apparent distress.  ENMT: Airway patent. Normal MM  Eyes: Clear bilaterally  Cardiac: HR 60s, irregularly irregular rhythm.  Heart sounds S1, S2.  No murmurs, rubs or gallops. No JVD or LE edema  Respiratory: Breaths sounds equal and clear b/l. No W/R/R. No increased WOB, tachypnea, hypoxia, or accessory mm use. Pt speaks in full sentences.   Gastrointestinal: Abd soft, NT, ND, NABS. No guarding, rebound, or rigidity. No pulsatile abdominal masses. No organomegaly appreciated.   Musculoskeletal: Range of motion is not limited  Neuro: Alert and oriented x 3, face symmetric and speech fluent. Strength 5/5 x 4 ext and symmetric, nml gross motor movement, nml gait. No focal deficits noted.  Skin: Skin normal color for race, warm, dry and intact. No evidence of rash.  Psych: Alert and oriented to person, place, time/situation. normal mood and affect. no apparent risk to self or others.

## 2022-08-23 NOTE — ED PROVIDER NOTE - CARE PLAN
1 Principal Discharge DX:	Atrial fibrillation with slow ventricular response   Principal Discharge DX:	Atrial fibrillation with slow ventricular response  Secondary Diagnosis:	Hypokalemia  Secondary Diagnosis:	Chronic kidney disease (CKD)

## 2022-08-23 NOTE — H&P ADULT - PROBLEM SELECTOR PLAN 1
Hx of Afib and Atach.  Known to Dr. Jasso.    -replete lytes for K>4 and Mg >2   -hold all AVN blocking agents including metoprolol  -plan for AV node ablation +BI-V PPM implant tomorrow, 8/24, consented   -NPO after midnight  -covid swab  -labs overnight including cbc, bmp, type and screen x2, coag panel  -d/w Dr Jasso Hx of Afib and Atach.  Known to Dr. Jasso.  Found to be in Slow Afib at transfusion center HR 30s. c/o fatigue/palpitations.   - EKG Afib HR 66 in ED.   - EP consulted.  NPO after midnight for AV note ablation +BI-V PPM implant tomorrow, 8/24.    - Holding all AVN blocking agents.   - No A/C given Hx of chronic anemia and Hx of RP bleed

## 2022-08-23 NOTE — H&P ADULT - PROBLEM SELECTOR PLAN 4
stable  - c/w home Norvasc 5mg QD and Furosemide 40mg QD  - Held dose of Losartan 25mg QD on 8/23 given elevated Crt but ordered to resume at 10 AM on 8/24, hold if necessary pending AM Crt  - Holding home Toprol XL 50mg PO BID in setting of slow Afib.

## 2022-08-23 NOTE — ED ADULT NURSE NOTE - OBJECTIVE STATEMENT
66 y.o female a&ox3, speaking in full sentences, no acute distress. Pt reports to ED c/o generalized weakness x "a while". Pt sent for abnormal EKG- afib. Pt denies cp, sob, headache, dizziness, n/v. Pt states she does not currently take medication for afib. placed on CCM.

## 2022-08-23 NOTE — ED PROVIDER NOTE - OBJECTIVE STATEMENT
Pt w/ PMHx CAD s/p CABG, bioAVR/ mitral annuloplasty in 2002, PAD s/p LSFA stenting and history of occluded bilateral SFA, AAA s/p open repair 2015 and saccular AAA and contained rupture s/p thoracoabdominal aneurysm repair 2020, history of L sided RP hematoma requiring transfusions, chronic anemia receiving iron infusions (therefore not on AC), and known history of AT and AFl referred to the ED by hematologist Dr Izquierdo for AF w/ rates in the 30s. She states she was seeing Dr Izquierdo for f/u visit and iron infusion. She noted to Dr Izquierdo she has been feeling very weak generally w/ KRAFT. No syncope. She reports sometimes she feels faint. EKG was performed and pt was in AF w/ HR 39. Pt received her iron infusion today prior to being sent to the ED. She sees EP Dr Jasso.    Last ED visit on 8/8, seen by EP at that time: stopped metoprolol tartrate and started metoprolol succinate 50mg PO BID, which pt has been taking, last dose this morning.

## 2022-08-23 NOTE — H&P ADULT - PROBLEM SELECTOR PLAN 5
f/u thyroid function.   - c/w Levothyroxine 50mcg QD    VTE: holding w/ plan for procedure  PT/SW consult  Dispo: Tele w/ plan for EP procedure on 8/24. f/u thyroid function.   - c/w Levothyroxine 50mcg QD    ** Hypokalemia.  Potassium 2.9 on admit, s/p Potassium 10meq IV x1 and 40meq PO x1 w/ improvement to 3.6.  Given additional potassium 40meq PO x1.  f/u K in AM.     VTE: holding w/ plan for procedure  Dispo: Tele w/ plan for EP procedure on 8/24.

## 2022-08-23 NOTE — ED ADULT NURSE NOTE - CHPI ED NUR SYMPTOMS NEG
no back pain/no chest pain/no chills/no congestion/no diaphoresis/no dizziness/no fever/no nausea/no shortness of breath

## 2022-08-23 NOTE — H&P ADULT - ASSESSMENT
65yo female, former smoker, with PMHx of HTN, hyperlipidemia, CAD s/p prior PCI and CABG, bioAVR/ mitral annuloplasty in 2002, PAD s/p LSFA  and L TIFFANY (history of occluded bilateral SFA), AAA s/p open repair 2015 and saccular AAA and contained rupture s/p thoracoabdominal aneurysm repair 2020, history of L sided RP hematoma requiring transfusions, chronic anemia (receives Iron infusions, Last today 8/23), hypothyroid, CKD Stage III, Atach and Afib (unable to be on A/C due to bleeding/anemia, known to Dr. Jasso) who is referred to [facility] ED today 8/23 by Dr. Izquierdo with c/o Fatigue and slow Afib.  Patient recently seen by EP team in the ED on 8/8 for atrial tachycardia @ 170bpm which improved w/ adenosine and at that time patient was switched from metoprolol tartrate to Succinate 50mg PO BID w/ plan for further outpatient Eval.  Since that visit patient has been significantly fatigued, slight lightheadedness w/ decrease exercise tolerance and intermittent palpitations.  While at the infusion center receiving a Iron infusion patient mentioned these symptoms to Dr. Izquierdo for which an EKG was performed revealing Afib w/ HR 39bpm and patient was recommended to go to the ED.  On arrival to [facility] ED patient HD stable and HR improved to 60s.  EKG revealing Afib HR 66bpm.  Vitals: Temp 98, HR 85, /58, RR 18, O2 98% RA.  Labs significant for Potassium 2.9, BUN/Crt 17/2.01, Trop T 0.02, BNP 2726, Covid PCR negative.  Patient receiving Potassium 10meq IV x1 and potassium 40meq PO x1.  EP service evaluated patient in the ED w/ plan for AV node ablation +BI-V PPM implant.  Patient now admitted to cardiac Tele for Slow Afib w/ plan for EP procedure on 8/24.  65yo female, former smoker, with PMHx of HTN, hyperlipidemia, CAD s/p prior PCI and CABG, bioAVR/ mitral annuloplasty in 2002, PAD s/p LSFA  and L TIFFANY (history of occluded bilateral SFA), AAA s/p open repair 2015 and saccular AAA and contained rupture s/p thoracoabdominal aneurysm repair 2020, history of L sided RP hematoma requiring transfusions, chronic anemia (receives Iron infusions, Last today 8/23), hypothyroid, CKD Stage III, Atach and Afib (unable to be on A/C due to bleeding/anemia, known to Dr. Jasso) who is referred to [facility] ED today 8/23 by Dr. Izquierdo with c/o Fatigue/palpitations found to be in Afib HR 30s, admitted for AV node ablation +BI-V PPM implant on 8/24.

## 2022-08-23 NOTE — ED PROVIDER NOTE - WR INTERPRETATION 1
CXR negative - No pneumothorax, No opacities, No free airCXR negative - No infiltrates, No consolidation, No atelectasis seenCXR negative - No CHF, No cardiomegaly, No pleural effusions. no sig interval change

## 2022-08-23 NOTE — ED PROVIDER NOTE - CLINICAL SUMMARY MEDICAL DECISION MAKING FREE TEXT BOX
Pt p/w AF w/ slow ventricular response in outpatient setting, unclear duration. Now w/ HR in 60s, AF. Will continue to monitor. Likely to dec Meto dosing. Consult EP. Dispo pending clinical status, EP recommendations

## 2022-08-23 NOTE — ED PROVIDER NOTE - NS ED ROS FT
Constitutional: No fever or chills.   Eyes: No pain, blurry vision, or discharge.  ENMT: No hearing changes, pain, discharge or infections. No neck pain or stiffness.  Cardiac: No edema. No chest pain with exertion. See HPI  Respiratory: No cough or respiratory distress. No hemoptysis. No history of asthma or RAD.  GI: No nausea, vomiting, diarrhea or abdominal pain.  : No dysuria, frequency or burning.  MS: No myalgia, muscle weakness, joint pain or back pain.  Neuro: No headache or weakness. No LOC.  Skin: No skin rash.   Endocrine: No history of thyroid disease or diabetes.  Except as documented in the HPI, all other systems are negative.

## 2022-08-23 NOTE — ED ADULT NURSE NOTE - PRO INTERPRETER NEED 2
[facility]     NICU/SCN NUTRITION ASSESSMENT    Girl  Bharathi Jung and 201/201-A    Continue feeds of EBM or Enfamil 20 enmanuel term formula at 40 ml Q 3 hrs, advancing as medically able and weight gain realized to goal volume of 60 ml Q 3 hrs.   When pt reach
English

## 2022-08-23 NOTE — H&P ADULT - PROBLEM SELECTOR PLAN 3
known CKD stage III (unclear baseline, recently in ED w/ Crt 1.7-1.8).  Crt elevated to 2.01.   - f/u Urine Lytes  - Held Losartan 25mg Dose on 8/23, reordered to start at 10am on 8/24, hold if necessary pending AM crt

## 2022-08-23 NOTE — PATIENT PROFILE ADULT - FALL HARM RISK - HARM RISK INTERVENTIONS

## 2022-08-23 NOTE — CONSULT NOTE ADULT - SUBJECTIVE AND OBJECTIVE BOX
HPI:    66 year old female with medical history of CAD s/p CABG, bioAVR/ mitral annuloplasty in 2002, PAD s/p LSFA stenting and history of occluded bilateral SFA, AAA s/p open repair 2015 and saccular AAA and contained rupture s/p thoracoabdominal aneurysm repair 2020, history of L sided RP hematoma requiring transfusions, chronic anemia receiving iron infusions, and known history of AT. Patient presented to ED today after being sent from Dr. Ng office forslowly conducted AF.     I saw this patient in the emergency room on 8/8 and at that time she presented with a NCT likely AT @170bpm which blocked down to 70s after adenosine was given. At that time we stopped her metoprolol tartrate and started metoprolol succinate 50 BID with the understanding that we may need to do AV node ablation and PPM implant to control her symptoms as she is not a candidate for AF ablation given her inability to be anticoagulated due to RP bleed and chronic anemia.     Patient today reports increased fatigue and decreased exercise tolerance over the past few weeks. Went to Dr Preston office today where EKG revealed AF conducting with VR in the 30s. Upon arrival to ED patient noted to have improved HR up to 60s. K+ on vbg was low @ 2.4, repeat pending.           PAST MEDICAL & SURGICAL HISTORY:  CAD (coronary artery disease)  PVD (peripheral vascular disease)  Anemia  Hypothyroidism  GERD (gastroesophageal reflux disease)  Hyperlipidemia  HTN (hypertension)  Seizure  Anxiety  Depression, unspecified depression type  Chronic kidney disease, unspecified CKD stage  Status post aorto-coronary artery bypass graft  2012  H/O aortic valve replacement  Bioprosthetic  Ruptured aortic aneurysm  surgery august 2020  Abdominal aortic aneurysm (AAA) without rupture  2015    No pertinent family history in first degree relatives    FH: myocardial infarction        Social History: no smoking, no drugs, no alcohol    pertinent home medications:    Inpatient Medications:   potassium chloride  10 mEq/100 mL IVPB 10 milliEquivalent(s) IV Intermittent every 1 hour      Allergies: No Known Allergies      ROS:   CONSTITUTIONAL: No fever, weight loss + fatigue  EYES: Pt denies  RESPIRATORY: No cough, wheezing, chills or hemoptysis; No Shortness of Breath  CARDIOVASCULAR: see HPI  GASTROINTESTINAL: Pt denies  NEUROLOGICAL: Pt denies  SKIN: Pt denies   PSYCHIATRIC: Pt denies  HEME/LYMPH: Pt denies    PHYSICAL:  T(C): 36.7 (08-23-22 @ 13:17), Max: 36.7 (08-23-22 @ 13:17)  HR: 85 (08-23-22 @ 13:17) (85 - 85)  BP: 116/58 (08-23-22 @ 13:17) (116/58 - 116/58)  RR: 18 (08-23-22 @ 13:17) (18 - 18)  SpO2: 98% (08-23-22 @ 13:17) (98% - 98%)  Appearance: No acute distress, well developed  Eyes: normal appearing conjunctiva, pupils and eyelids  Cardiovascular: Normal S1 S2, No JVD, No murmurs, No edema  Respiratory: Lungs clear to auscultation	bilaterally.  No wheeze, rhonchi, rales noted  Gastrointestinal:  Soft, NT/ND 	  Neurologic:  No deficit noted  Psych: A&Ox3, normal mood/affect  Musculoskeletal: normal gait  Skin: no rash noted, normal color and pigmentation.        LABS:                        11.2   5.33  )-----------( 177      ( 23 Aug 2022 14:33 )             36.1     08-23    142  |  104  |  17  ----------------------------<  124<H>  x    |  25  |  x     Ca    10.2      23 Aug 2022 14:33  Mg     2.6     08-23        ECHO: 6/2/2022:  1. Normal left and right ventricular size and systolic function.  2. Severely dilated left atrium.  3. A bioprosthetic valve noted in the aortic position. Bioprosthetic leaflets are thickened and restricted in motion. Elevated gradients, prolonged AT and DI <0.25 are suggestive of significant prosthetic stenosis.  4. Mild aortic regurgitation.  5. An annuloplasty ring is noted in the mitral position. Mean transvalvular gradient is 9.00 mmHg at a heart rate of 63 bpm. Elevated gradients and PHT are suggestive of mitral stenosis.  6. Mild-to-moderate mitral regurgitation.  7. Moderate tricuspid regurgitation.  8. Pulmonary hypertension present, pulmonary artery systolic pressure is 42 mmHg.  9. No pericardial effusion.        Assessment Plan:  66 year old female with medical history of CAD s/p CABG, bioAVR/ mitral annuloplasty in 2002, PAD s/p LSFA stenting and history of occluded bilateral SFA, AAA s/p open repair 2015 and saccular AAA and contained rupture s/p thoracoabdominal aneurysm repair 2020, history of L sided RP hematoma requiring transfusions, chronic anemia receiving iron infusions, and known history of AT. Patient presented to ED today after being sent from Dr. Ng office for slowly conducted AF.     Discussions were given to patient. Patient is difficult to rate control due to intolerance to beta blockers and AF ablation not feasible given inability to anticoagulate. The patient would benefit from AV mayito ablation and implantation of a permanent pacemaker to help her with symptoms from atrial arrhythmias.    -admit to general cardiology  -replete lytes for K>4 and Mg >2   -hold all AVN blocking agents including metoprolol  -plan for AV node ablation +BI-V PPM implant tomorrow, 8/24  -NPO after midnight  -d/w Dr Jasso            HPI:    66 year old female with medical history of CAD s/p CABG, bioAVR/ mitral annuloplasty in 2002, PAD s/p LSFA stenting and history of occluded bilateral SFA, AAA s/p open repair 2015 and saccular AAA and contained rupture s/p thoracoabdominal aneurysm repair 2020, history of L sided RP hematoma requiring transfusions, chronic anemia receiving iron infusions, and known history of AT. Patient presented to ED today after being sent from Dr. Ng office forslowly conducted AF.     I saw this patient in the emergency room on 8/8 and at that time she presented with a NCT likely AT @170bpm which blocked down to 70s after adenosine was given. At that time we stopped her metoprolol tartrate and started metoprolol succinate 50 BID with the understanding that we may need to do AV node ablation and PPM implant to control her symptoms as she is not a candidate for AF ablation given her inability to be anticoagulated due to RP bleed and chronic anemia.     Patient today reports increased fatigue and decreased exercise tolerance over the past few weeks. Went to Dr Preston office today where EKG revealed AF conducting with VR in the 30s. Upon arrival to ED patient noted to have improved HR up to 60s. K+ on vbg was low @ 2.4, repeat pending.           PAST MEDICAL & SURGICAL HISTORY:  CAD (coronary artery disease)  PVD (peripheral vascular disease)  Anemia  Hypothyroidism  GERD (gastroesophageal reflux disease)  Hyperlipidemia  HTN (hypertension)  Seizure  Anxiety  Depression, unspecified depression type  Chronic kidney disease, unspecified CKD stage  Status post aorto-coronary artery bypass graft  2012  H/O aortic valve replacement  Bioprosthetic  Ruptured aortic aneurysm  surgery august 2020  Abdominal aortic aneurysm (AAA) without rupture  2015    No pertinent family history in first degree relatives    FH: myocardial infarction        Social History: no smoking, no drugs, no alcohol    pertinent home medications:    Inpatient Medications:   potassium chloride  10 mEq/100 mL IVPB 10 milliEquivalent(s) IV Intermittent every 1 hour      Allergies: No Known Allergies      ROS:   CONSTITUTIONAL: No fever, weight loss + fatigue  EYES: Pt denies  RESPIRATORY: No cough, wheezing, chills or hemoptysis; No Shortness of Breath  CARDIOVASCULAR: see HPI  GASTROINTESTINAL: Pt denies  NEUROLOGICAL: Pt denies  SKIN: Pt denies   PSYCHIATRIC: Pt denies  HEME/LYMPH: Pt denies    PHYSICAL:  T(C): 36.7 (08-23-22 @ 13:17), Max: 36.7 (08-23-22 @ 13:17)  HR: 85 (08-23-22 @ 13:17) (85 - 85)  BP: 116/58 (08-23-22 @ 13:17) (116/58 - 116/58)  RR: 18 (08-23-22 @ 13:17) (18 - 18)  SpO2: 98% (08-23-22 @ 13:17) (98% - 98%)  Appearance: No acute distress, well developed  Eyes: normal appearing conjunctiva, pupils and eyelids  Cardiovascular: Normal S1 S2, No JVD, No murmurs, No edema  Respiratory: Lungs clear to auscultation	bilaterally.  No wheeze, rhonchi, rales noted  Gastrointestinal:  Soft, NT/ND 	  Neurologic:  No deficit noted  Psych: A&Ox3, normal mood/affect  Musculoskeletal: normal gait  Skin: no rash noted, normal color and pigmentation.        LABS:                        11.2   5.33  )-----------( 177      ( 23 Aug 2022 14:33 )             36.1     08-23    142  |  104  |  17  ----------------------------<  124<H>  x    |  25  |  x     Ca    10.2      23 Aug 2022 14:33  Mg     2.6     08-23        ECHO: 6/2/2022:  1. Normal left and right ventricular size and systolic function.  2. Severely dilated left atrium.  3. A bioprosthetic valve noted in the aortic position. Bioprosthetic leaflets are thickened and restricted in motion. Elevated gradients, prolonged AT and DI <0.25 are suggestive of significant prosthetic stenosis.  4. Mild aortic regurgitation.  5. An annuloplasty ring is noted in the mitral position. Mean transvalvular gradient is 9.00 mmHg at a heart rate of 63 bpm. Elevated gradients and PHT are suggestive of mitral stenosis.  6. Mild-to-moderate mitral regurgitation.  7. Moderate tricuspid regurgitation.  8. Pulmonary hypertension present, pulmonary artery systolic pressure is 42 mmHg.  9. No pericardial effusion.        Assessment Plan:  66 year old female with medical history of CAD s/p CABG, bioAVR/ mitral annuloplasty in 2002, PAD s/p LSFA stenting and history of occluded bilateral SFA, AAA s/p open repair 2015 and saccular AAA and contained rupture s/p thoracoabdominal aneurysm repair 2020, history of L sided RP hematoma requiring transfusions, chronic anemia receiving iron infusions, and known history of AT. Patient presented to ED today after being sent from Dr. Ng office for slowly conducted AF.     Discussions were given to patient. Patient is difficult to rate control due to intolerance to beta blockers and AF ablation not feasible given inability to anticoagulate. The patient would benefit from AV mayito ablation and implantation of a permanent pacemaker to help her with symptoms from atrial arrhythmias.    -admit to general cardiology  -replete lytes for K>4 and Mg >2   -hold all AVN blocking agents including metoprolol  -plan for AV node ablation +BI-V PPM implant tomorrow, 8/24, consented   -NPO after midnight  -covid swab  -labs overnight including cbc, bmp, type and screen x2, coag panel  -d/w Dr Jasso

## 2022-08-24 DIAGNOSIS — Z29.9 ENCOUNTER FOR PROPHYLACTIC MEASURES, UNSPECIFIED: ICD-10-CM

## 2022-08-24 DIAGNOSIS — F32.9 MAJOR DEPRESSIVE DISORDER, SINGLE EPISODE, UNSPECIFIED: ICD-10-CM

## 2022-08-24 DIAGNOSIS — E87.6 HYPOKALEMIA: ICD-10-CM

## 2022-08-24 LAB
A1C WITH ESTIMATED AVERAGE GLUCOSE RESULT: 5.6 % — SIGNIFICANT CHANGE UP (ref 4–5.6)
ANION GAP SERPL CALC-SCNC: 11 MMOL/L — SIGNIFICANT CHANGE UP (ref 5–17)
APPEARANCE UR: CLEAR — SIGNIFICANT CHANGE UP
APTT BLD: 34.4 SEC — SIGNIFICANT CHANGE UP (ref 27.5–35.5)
BACTERIA # UR AUTO: PRESENT /HPF
BILIRUB UR-MCNC: NEGATIVE — SIGNIFICANT CHANGE UP
BLD GP AB SCN SERPL QL: NEGATIVE — SIGNIFICANT CHANGE UP
BUN SERPL-MCNC: 13 MG/DL — SIGNIFICANT CHANGE UP (ref 7–23)
CALCIUM SERPL-MCNC: 9.7 MG/DL — SIGNIFICANT CHANGE UP (ref 8.4–10.5)
CHLORIDE SERPL-SCNC: 108 MMOL/L — SIGNIFICANT CHANGE UP (ref 96–108)
CHLORIDE UR-SCNC: 48 MMOL/L — SIGNIFICANT CHANGE UP
CHOLEST SERPL-MCNC: 186 MG/DL — SIGNIFICANT CHANGE UP
CO2 SERPL-SCNC: 23 MMOL/L — SIGNIFICANT CHANGE UP (ref 22–31)
COLOR SPEC: YELLOW — SIGNIFICANT CHANGE UP
COMMENT - URINE: SIGNIFICANT CHANGE UP
CREAT ?TM UR-MCNC: 76 MG/DL — SIGNIFICANT CHANGE UP
CREAT SERPL-MCNC: 1.76 MG/DL — HIGH (ref 0.5–1.3)
DIFF PNL FLD: ABNORMAL
EGFR: 31 ML/MIN/1.73M2 — LOW
EPI CELLS # UR: ABNORMAL /HPF (ref 0–5)
ESTIMATED AVERAGE GLUCOSE: 114 MG/DL — SIGNIFICANT CHANGE UP (ref 68–114)
GLUCOSE SERPL-MCNC: 100 MG/DL — HIGH (ref 70–99)
GLUCOSE UR QL: NEGATIVE — SIGNIFICANT CHANGE UP
HCT VFR BLD CALC: 37.9 % — SIGNIFICANT CHANGE UP (ref 34.5–45)
HDLC SERPL-MCNC: 70 MG/DL — SIGNIFICANT CHANGE UP
HGB BLD-MCNC: 11.6 G/DL — SIGNIFICANT CHANGE UP (ref 11.5–15.5)
INR BLD: 0.97 — SIGNIFICANT CHANGE UP (ref 0.88–1.16)
KETONES UR-MCNC: NEGATIVE — SIGNIFICANT CHANGE UP
LEUKOCYTE ESTERASE UR-ACNC: NEGATIVE — SIGNIFICANT CHANGE UP
LIPID PNL WITH DIRECT LDL SERPL: 85 MG/DL — SIGNIFICANT CHANGE UP
MAGNESIUM SERPL-MCNC: 2.4 MG/DL — SIGNIFICANT CHANGE UP (ref 1.6–2.6)
MCHC RBC-ENTMCNC: 29.7 PG — SIGNIFICANT CHANGE UP (ref 27–34)
MCHC RBC-ENTMCNC: 30.6 GM/DL — LOW (ref 32–36)
MCV RBC AUTO: 96.9 FL — SIGNIFICANT CHANGE UP (ref 80–100)
NITRITE UR-MCNC: NEGATIVE — SIGNIFICANT CHANGE UP
NON HDL CHOLESTEROL: 116 MG/DL — SIGNIFICANT CHANGE UP
NRBC # BLD: 0 /100 WBCS — SIGNIFICANT CHANGE UP (ref 0–0)
OSMOLALITY UR: 283 MOSM/KG — LOW (ref 300–900)
PH UR: 6 — SIGNIFICANT CHANGE UP (ref 5–8)
PHOSPHATE SERPL-MCNC: 3.5 MG/DL — SIGNIFICANT CHANGE UP (ref 2.5–4.5)
PLATELET # BLD AUTO: 172 K/UL — SIGNIFICANT CHANGE UP (ref 150–400)
POTASSIUM SERPL-MCNC: 3.4 MMOL/L — LOW (ref 3.5–5.3)
POTASSIUM SERPL-SCNC: 3.4 MMOL/L — LOW (ref 3.5–5.3)
PROT ?TM UR-MCNC: 50 MG/DL — HIGH (ref 0–12)
PROT ?TM UR-MCNC: 50 MG/DL — HIGH (ref 0–12)
PROT UR-MCNC: 30 MG/DL
PROT/CREAT UR-RTO: 0.7 RATIO — HIGH (ref 0–0.2)
PROTHROM AB SERPL-ACNC: 11.5 SEC — SIGNIFICANT CHANGE UP (ref 10.5–13.4)
RBC # BLD: 3.91 M/UL — SIGNIFICANT CHANGE UP (ref 3.8–5.2)
RBC # FLD: 14.9 % — HIGH (ref 10.3–14.5)
RBC CASTS # UR COMP ASSIST: < 5 /HPF — SIGNIFICANT CHANGE UP
RH IG SCN BLD-IMP: POSITIVE — SIGNIFICANT CHANGE UP
SODIUM SERPL-SCNC: 142 MMOL/L — SIGNIFICANT CHANGE UP (ref 135–145)
SODIUM UR-SCNC: 39 MMOL/L — SIGNIFICANT CHANGE UP
SP GR SPEC: 1.01 — SIGNIFICANT CHANGE UP (ref 1–1.03)
TRIGL SERPL-MCNC: 157 MG/DL — HIGH
TSH SERPL-MCNC: 7.98 UIU/ML — HIGH (ref 0.27–4.2)
UROBILINOGEN FLD QL: 0.2 E.U./DL — SIGNIFICANT CHANGE UP
UUN UR-MCNC: 359 MG/DL — SIGNIFICANT CHANGE UP
WBC # BLD: 6.26 K/UL — SIGNIFICANT CHANGE UP (ref 3.8–10.5)
WBC # FLD AUTO: 6.26 K/UL — SIGNIFICANT CHANGE UP (ref 3.8–10.5)
WBC UR QL: ABNORMAL /HPF

## 2022-08-24 PROCEDURE — 33208 INSRT HEART PM ATRIAL & VENT: CPT | Mod: KX

## 2022-08-24 PROCEDURE — 93650 ICAR CATH ABLTJ AV NODE FUNC: CPT

## 2022-08-24 PROCEDURE — 33225 L VENTRIC PACING LEAD ADD-ON: CPT

## 2022-08-24 PROCEDURE — 99233 SBSQ HOSP IP/OBS HIGH 50: CPT

## 2022-08-24 PROCEDURE — 33286 RMVL SUBQ CAR RHYTHM MNTR: CPT

## 2022-08-24 RX ORDER — POTASSIUM CHLORIDE 20 MEQ
20 PACKET (EA) ORAL ONCE
Refills: 0 | Status: COMPLETED | OUTPATIENT
Start: 2022-08-24 | End: 2022-08-24

## 2022-08-24 RX ORDER — POTASSIUM CHLORIDE 20 MEQ
40 PACKET (EA) ORAL ONCE
Refills: 0 | Status: COMPLETED | OUTPATIENT
Start: 2022-08-24 | End: 2022-08-24

## 2022-08-24 RX ORDER — CEFAZOLIN SODIUM 1 G
VIAL (EA) INJECTION
Refills: 0 | Status: DISCONTINUED | OUTPATIENT
Start: 2022-08-24 | End: 2022-08-24

## 2022-08-24 RX ORDER — ACETAMINOPHEN 500 MG
650 TABLET ORAL EVERY 6 HOURS
Refills: 0 | Status: DISCONTINUED | OUTPATIENT
Start: 2022-08-24 | End: 2022-08-25

## 2022-08-24 RX ORDER — CEFAZOLIN SODIUM 1 G
2000 VIAL (EA) INJECTION EVERY 12 HOURS
Refills: 0 | Status: DISCONTINUED | OUTPATIENT
Start: 2022-08-24 | End: 2022-08-24

## 2022-08-24 RX ADMIN — CILOSTAZOL 50 MILLIGRAM(S): 100 TABLET ORAL at 17:28

## 2022-08-24 RX ADMIN — PANTOPRAZOLE SODIUM 40 MILLIGRAM(S): 20 TABLET, DELAYED RELEASE ORAL at 05:32

## 2022-08-24 RX ADMIN — Medication 20 MILLIEQUIVALENT(S): at 22:46

## 2022-08-24 RX ADMIN — CILOSTAZOL 50 MILLIGRAM(S): 100 TABLET ORAL at 05:31

## 2022-08-24 RX ADMIN — Medication 650 MILLIGRAM(S): at 18:54

## 2022-08-24 RX ADMIN — ATORVASTATIN CALCIUM 80 MILLIGRAM(S): 80 TABLET, FILM COATED ORAL at 21:10

## 2022-08-24 RX ADMIN — Medication 40 MILLIEQUIVALENT(S): at 18:15

## 2022-08-24 RX ADMIN — Medication 50 MICROGRAM(S): at 05:32

## 2022-08-24 RX ADMIN — Medication 650 MILLIGRAM(S): at 18:15

## 2022-08-24 RX ADMIN — AMLODIPINE BESYLATE 5 MILLIGRAM(S): 2.5 TABLET ORAL at 05:31

## 2022-08-24 RX ADMIN — SERTRALINE 50 MILLIGRAM(S): 25 TABLET, FILM COATED ORAL at 14:50

## 2022-08-24 RX ADMIN — Medication 1 MILLIGRAM(S): at 14:50

## 2022-08-24 RX ADMIN — Medication 40 MILLIGRAM(S): at 05:31

## 2022-08-24 NOTE — PROGRESS NOTE ADULT - ASSESSMENT
67yo female, former smoker, with PMHx of HTN, hyperlipidemia, CAD s/p prior PCI and CABG, bioAVR/ mitral annuloplasty in 2002, PAD s/p LSFA  and L TIFFANY (history of occluded bilateral SFA), AAA s/p open repair 2015 and saccular AAA and contained rupture s/p thoracoabdominal aneurysm repair 2020, history of L sided RP hematoma requiring transfusions, chronic anemia (receives Iron infusions, Last today 8/23), hypothyroid, CKD Stage III, Atach and Afib (unable to be on A/C due to bleeding/anemia, known to Dr. Jasso) who is referred to [facility] ED today 8/23 by Dr. Izquierdo with c/o Fatigue/palpitations found to be in Afib HR 30s, admitted for AV node ablation +BI-V PPM implant on 8/24.   65yo female, former smoker, with PMHx of HTN, hyperlipidemia, CAD s/p prior PCI and CABG, bioAVR/mitral annuloplasty in 2002, PAD s/p LSFA  and L TIFFANY (history of occluded bilateral SFA), AAA (last repaired in 2020), chronic anemia (receives Iron infusions, last 8/23), hypothyroid, CKD Stage III, Atach and Afib (unable to be on A/C due to bleeding/anemia, known to Dr. Jasso) who is referred to [facility] ED on 8/23 by Dr. Izquierdo with c/o fatigue, found to be in Afib HR 30s, s/p AV node ablation +BI-V PPM implant on 8/24.

## 2022-08-24 NOTE — DIETITIAN INITIAL EVALUATION ADULT - OTHER CALCULATIONS
Based on Standards of Care pt within % IBW thus actual body weight used for all calculations. Needs adjusted for advanced age and post PPM placement.

## 2022-08-24 NOTE — DIETITIAN INITIAL EVALUATION ADULT - PERTINENT LABORATORY DATA
08-23    139  |  108  |  17  ----------------------------<  125<H>  3.4<L>   |  22  |  1.99<H>    Ca    9.9      23 Aug 2022 20:28  Mg     2.6     08-23

## 2022-08-24 NOTE — PROGRESS NOTE ADULT - PROBLEM SELECTOR PLAN 4
stable  - c/w home Norvasc 5mg QD and Furosemide 40mg QD  - Held dose of Losartan 25mg QD on 8/23 given elevated Crt but ordered to resume at 10 AM on 8/24, hold if necessary pending AM Crt  - Holding home Toprol XL 50mg PO BID in setting of slow Afib. Known CKD stage III (unclear baseline, recently in ED w/ Crt 1.7-1.8). Crt elevated to 2.01-->1.76. Improving function, likely prerenal 2/2 afib with low HR, leading to decrease in overall flow.   - monitor BMP

## 2022-08-24 NOTE — DIETITIAN INITIAL EVALUATION ADULT - OTHER INFO
67yo female, former smoker, with PMHx of HTN, hyperlipidemia, CAD s/p prior PCI and CABG, bioAVR/ mitral annuloplasty in 2002, PAD s/p LSFA  and L TIFFANY (history of occluded bilateral SFA), AAA s/p open repair 2015 and saccular AAA and contained rupture s/p thoracoabdominal aneurysm repair 2020, history of L sided RP hematoma requiring transfusions, chronic anemia (receives Iron infusions, Last today 8/23), hypothyroid, CKD Stage III, Atach and Afib (unable to be on A/C due to bleeding/anemia, known to Dr. Jasso) who is referred to [facility] ED today 8/23 by Dr. Izquierdo with c/o Fatigue/palpitations found to be in Afib HR 30s, admitted for AV node ablation +BI-V PPM implant on 8/24.      Attempted to visit pt at bedside, however, pt out of room for PPM thus information obtained via EMR. NKFA documented. Unable to obtain diet recall PTA. Dosing weight 115 pounds. Per EMR, pt 111 pounds in May; relatively consistent. No edema documented at this time. No pressure ulcers documented at this time. incision per chart. Enzo score=21. Labs reviewed 8/24; pt hypokalemic. Unable to perform NFPE at this time. Based on ASPEN guidelines, pt does not meet criteria for malnutrition at this time, will continue to monitor. Pt NPO pending PPM placement. RD to follow up per protocol. See nutrition recommendations below.

## 2022-08-24 NOTE — DIETITIAN INITIAL EVALUATION ADULT - PERTINENT MEDS FT
MEDICATIONS  (STANDING):  amLODIPine   Tablet 5 milliGRAM(s) Oral daily  atorvastatin 80 milliGRAM(s) Oral at bedtime  ceFAZolin   IVPB 2000 milliGRAM(s) IV Intermittent every 12 hours  cilostazol 50 milliGRAM(s) Oral two times a day  folic acid 1 milliGRAM(s) Oral daily  furosemide    Tablet 40 milliGRAM(s) Oral daily  levothyroxine 50 MICROGram(s) Oral daily  losartan 25 milliGRAM(s) Oral daily  pantoprazole    Tablet 40 milliGRAM(s) Oral before breakfast  sertraline 50 milliGRAM(s) Oral daily    MEDICATIONS  (PRN):

## 2022-08-24 NOTE — PROGRESS NOTE ADULT - PROBLEM SELECTOR PLAN 2
Hx of CAD s/p CABG, bioAVR/ mitral annuloplasty in 2002,  Also Hx of PCI (unknown date).  denies any CP.  Followed by Dr. Preston.   - Trop T 0.02, f/u Trop 8pm  -  Prior Echo 6/2022: Normal left and right ventricular size and systolic function, Severely dilated LA, bioprosthetic valve noted in the aortic position. Bioprosthetic leaflets are thickened and restricted in motion. Elevated gradients,   prolonged AT and DI <0.25 are suggestive of significant prosthetic stenosis, Mild AR, An annuloplasty ring is noted in the mitral position. Mean transvalvular gradient is 9.00 mmHg at a heart rate of 63 bpm. Elevated gradients and PHT are suggestive of mitral stenosis, Mild-to-moderate MR, Moderate TR  - discuss if repeat Echo needed.   - Takes Dipyridamole 75mg QD at home - not on formulary.  Patient aware and If told to bring from home if able  - c/w Lipitor 80mg QHS Hx of CAD s/p CABG, bioAVR/ mitral annuloplasty in 2002, Hx of PCI (unknown date). Denies any CP. Followed by Dr. Preston. Trop T 0.02-->0.01.   - Prior Echo 6/2022: Normal L & R ventricular size and systolic function, severely dilated LA, bioprosthetic valve noted in the aortic position. Bioprosthetic leaflets are thickened and restricted in motion; significant prosthetic stenosis. Mild AR, annuloplasty ring noted in mitral position; elevated gradients and PHT are suggestive of mitral stenosis. Mild-to-moderate MR, Moderate TR.  - Discuss if repeat Echo needed  - Takes Dipyridamole 75mg QD at home - not on formulary. Patient aware & told to bring from home if able  - c/w Lipitor 80mg QHS

## 2022-08-24 NOTE — PROGRESS NOTE ADULT - PROBLEM SELECTOR PLAN 3
known CKD stage III (unclear baseline, recently in ED w/ Crt 1.7-1.8).  Crt elevated to 2.01.   - f/u Urine Lytes  - Held Losartan 25mg Dose on 8/23, reordered to start at 10am on 8/24, hold if necessary pending AM crt Potassium 2.9 on admit, s/p Potassium 10meq IV x1 and 40meq PO x1 w/ improvement to 3.6. No change this AM in K.   - Replete PRN to K>4  - Monitor BMP

## 2022-08-24 NOTE — PROGRESS NOTE ADULT - PROBLEM SELECTOR PLAN 5
f/u thyroid function.   - c/w Levothyroxine 50mcg QD    ** Hypokalemia.  Potassium 2.9 on admit, s/p Potassium 10meq IV x1 and 40meq PO x1 w/ improvement to 3.6.  Given additional potassium 40meq PO x1.  f/u K in AM.     VTE: holding w/ plan for procedure  Dispo: Tele w/ plan for EP procedure on 8/24. BP moderately controlled, but stable.   - c/w home Norvasc 5mg QD and Furosemide 40mg QD  - Restart Losartan 25mg QD  - Holding home Toprol XL 50mg PO BID in setting of slow Afib

## 2022-08-24 NOTE — PROGRESS NOTE ADULT - SUBJECTIVE AND OBJECTIVE BOX
***Incomplete***      SUBJECTIVE:  Patient is a 66y old  Female who presents with a chief complaint of fatigue and slow HR (23 Aug 2022 18:27)    Currently admitted to medicine with the primary diagnosis of ______.  Hospital course has been complicated by _______.   Today is hospital day 1d. This morning patient is _________ and reports ________ overnight events.     Present Today:           Gomez Catheter ()No/ ()Yes? Indication:          Central Line ()No/ ()Yes? Indication:          IV Fluids ()No/ ()Yes? Type:  Rate:  Indication:    OBJECTIVE:  PAST MEDICAL & SURGERY HISTORY:   PAST MEDICAL & SURGICAL HISTORY:  Atherosclerosis of coronary artery  CAD (coronary artery disease)      Peripheral vascular disease  PVD (peripheral vascular disease)      Anemia  Anemia      Hypothyroidism  Hypothyroidism      Gastroesophageal reflux disease  GERD (gastroesophageal reflux disease)      Hyperlipidemia  Hyperlipidemia      Essential hypertension  HTN (hypertension)      Seizure      Anxiety      Depression, unspecified depression type      Chronic kidney disease, unspecified CKD stage      Status post aorto-coronary artery bypass graft  2012      Atherosclerosis of coronary artery bypass graft(s), unspecified, with angina pectoris with documented spasm      H/O aortic valve replacement  Bioprosthetic      Ruptured aortic aneurysm  surgery august 2020      Abdominal aortic aneurysm (AAA) without rupture  2015          ALLERGIES: No Known Allergies      MEDICATIONS:  MEDICATIONS  (STANDING):  amLODIPine   Tablet 5 milliGRAM(s) Oral daily  atorvastatin 80 milliGRAM(s) Oral at bedtime  cilostazol 50 milliGRAM(s) Oral two times a day  folic acid 1 milliGRAM(s) Oral daily  furosemide    Tablet 40 milliGRAM(s) Oral daily  levothyroxine 50 MICROGram(s) Oral daily  losartan 25 milliGRAM(s) Oral daily  pantoprazole    Tablet 40 milliGRAM(s) Oral before breakfast  sertraline 50 milliGRAM(s) Oral daily    MEDICATIONS  (PRN):      HOME MEDICATIONS:  Home Medications:  amLODIPine 5 mg oral tablet: 1 tab(s) orally once a day (23 Aug 2022 19:48)  cilostazol 50 mg oral tablet: 1 tab(s) orally 2 times a day (23 Aug 2022 19:47)  dipyridamole 75 mg oral tablet: 1 tab(s) orally once a day (23 Aug 2022 19:48)  folic acid 1 mg oral tablet: 1 tab(s) orally once a day (23 Aug 2022 19:47)  furosemide 40 mg oral tablet: 1 tab(s) orally once a day (23 Aug 2022 19:47)  levothyroxine 50 mcg (0.05 mg) oral tablet: 1 tab(s) orally once a day (23 Aug 2022 19:47)  losartan 25 mg oral tablet: 1 tab(s) orally once a day (23 Aug 2022 19:48)  rosuvastatin 40 mg oral tablet: 1 tab(s) orally once a day (23 Aug 2022 19:47)  sertraline 50 mg oral tablet: 1 tab(s) orally once a day (at bedtime) (23 Aug 2022 19:47)  Vitamin D2 50,000 intl units (1.25 mg) oral capsule: 1 cap(s) orally once a week (23 Aug 2022 19:47)      PHYSICAL EXAM:  General: NAD, resting comfortably  HEENT: NC/AT, sclera anicteric, moist mucosal membranes, neck supple  Pulmonary: no signs of respiratory distress, CTA-B  Cardiovascular: RRR, no murmurs, rubs, or gallops, Radial pulses 2+ bilaterally  Abdominal: soft, ND/NT, no organomegaly  Extremities: normal strength, no clubbing/cyanosis/edema  Neuro: A/O x 3, normal sensation, no focal deficits    VITAL SIGNS:  Vital Signs Last 24 Hrs  T(C): 36.9 (24 Aug 2022 05:12), Max: 36.9 (24 Aug 2022 05:12)  T(F): 98.4 (24 Aug 2022 05:12), Max: 98.4 (24 Aug 2022 05:12)  HR: 92 (24 Aug 2022 05:00) (58 - 92)  BP: 158/70 (24 Aug 2022 05:00) (116/58 - 162/77)  BP(mean): 100 (24 Aug 2022 05:00) (77 - 111)  RR: 17 (24 Aug 2022 05:00) (16 - 18)  SpO2: 100% (24 Aug 2022 05:00) (98% - 100%)    Parameters below as of 24 Aug 2022 05:00  Patient On (Oxygen Delivery Method): room air      I&O's Summary      LABS:                        11.2   5.33  )-----------( 177      ( 23 Aug 2022 14:33 )             36.1     08-23    139  |  108  |  17  ----------------------------<  125<H>  3.4<L>   |  22  |  1.99<H>    Ca    9.9      23 Aug 2022 20:28  Mg     2.6     08-23              CARDIAC MARKERS ( 23 Aug 2022 20:28 )  x     / 0.01 ng/mL / 52 U/L / x     / 1.7 ng/mL  CARDIAC MARKERS ( 23 Aug 2022 14:33 )  x     / 0.02 ng/mL / x     / x     / x          CAPILLARY BLOOD GLUCOSE          CULTURES:      RADIOLOGY & ADDITIONAL STUDIES:               SUBJECTIVE:  Patient is a 66y old  Female who presents with a chief complaint of fatigue.    Currently admitted to cardiology with the primary diagnosis of Afib.     Today is hospital day 1. Patient was seen and examined at bedside after EP procedure for AV node ablation, pacemaker placement, and loop recorder explant. Patient complaining of nausea, nonbloody/nonbilious vomiting x1, and fatigue after procedure with anesthesia. Admits to pain rating 5/10 at procedure site. Denies headache, dizziness, chest pain, palpitations, SOB, abdominal pain. Suspects she was not able to tolerate her dinner well; vomited the soup she had. Drinking hot decaf tea and water is helping her symptoms. Was able to tolerate PO potassium chloride powder.       OBJECTIVE:  PAST MEDICAL & SURGERY HISTORY:     CAD (coronary artery disease)    PVD (peripheral vascular disease)    Anemia    Hypothyroidism    GERD (gastroesophageal reflux disease)    Hyperlipidemia    HTN (hypertension)    Seizure    Anxiety    Depression, unspecified depression type    Chronic kidney disease, unspecified CKD stage    Status post aorto-coronary artery bypass graft, 2012    Atherosclerosis of coronary artery bypass graft(s), unspecified, with angina pectoris with documented spasm    H/O aortic valve replacement, Bioprosthetic    Ruptured aortic aneurysm, surgery august 2020    Abdominal aortic aneurysm (AAA) without rupture, 2015      ALLERGIES: No Known Allergies      MEDICATIONS:  MEDICATIONS  (STANDING):  amLODIPine   Tablet 5 milliGRAM(s) Oral daily  atorvastatin 80 milliGRAM(s) Oral at bedtime  cilostazol 50 milliGRAM(s) Oral two times a day  folic acid 1 milliGRAM(s) Oral daily  furosemide    Tablet 40 milliGRAM(s) Oral daily  levothyroxine 50 MICROGram(s) Oral daily  losartan 25 milliGRAM(s) Oral daily  pantoprazole    Tablet 40 milliGRAM(s) Oral before breakfast  sertraline 50 milliGRAM(s) Oral daily      HOME MEDICATIONS:  Home Medications:  amLODIPine 5 mg oral tablet: 1 tab(s) orally once a day (23 Aug 2022 19:48)  cilostazol 50 mg oral tablet: 1 tab(s) orally 2 times a day (23 Aug 2022 19:47)  dipyridamole 75 mg oral tablet: 1 tab(s) orally once a day (23 Aug 2022 19:48)  folic acid 1 mg oral tablet: 1 tab(s) orally once a day (23 Aug 2022 19:47)  furosemide 40 mg oral tablet: 1 tab(s) orally once a day (23 Aug 2022 19:47)  levothyroxine 50 mcg (0.05 mg) oral tablet: 1 tab(s) orally once a day (23 Aug 2022 19:47)  losartan 25 mg oral tablet: 1 tab(s) orally once a day (23 Aug 2022 19:48)  rosuvastatin 40 mg oral tablet: 1 tab(s) orally once a day (23 Aug 2022 19:47)  sertraline 50 mg oral tablet: 1 tab(s) orally once a day (at bedtime) (23 Aug 2022 19:47)  Vitamin D2 50,000 intl units (1.25 mg) oral capsule: 1 cap(s) orally once a week (23 Aug 2022 19:47)      PHYSICAL EXAM:  General: uncomfortable elderly female laying in bed  HEENT: NC/AT, EOMI, sclera anicteric, MMM  Pulmonary: no signs of respiratory distress, CTA b/l  Cardiovascular: RRR, no murmurs, radial pulses 2+ bilaterally, no bleeding from site of procedure  Abdominal: +BS, soft, NT/ND, no rebound or guarding  Extremities: WWP, normal strength, no clubbing/cyanosis/edema  Neuro: A&Ox3, normal sensation, no focal deficits    VITAL SIGNS:  Vital Signs Last 24 Hrs  T(C): 36.9 (24 Aug 2022 05:12), Max: 36.9 (24 Aug 2022 05:12)  T(F): 98.4 (24 Aug 2022 05:12), Max: 98.4 (24 Aug 2022 05:12)  HR: 92 (24 Aug 2022 05:00) (58 - 92)  BP: 158/70 (24 Aug 2022 05:00) (116/58 - 162/77)  BP(mean): 100 (24 Aug 2022 05:00) (77 - 111)  RR: 17 (24 Aug 2022 05:00) (16 - 18)  SpO2: 100% (24 Aug 2022 05:00) (98% - 100%) on room air      LABS:             11.2   5.33  )-----------( 177      ( 23 Aug 2022 14:33 )             36.1     08-23    139  |  108  |  17  ----------------------------<  125<H>  3.4<L>   |  22  |  1.99<H>    Ca    9.9      23 Aug 2022 20:28  Mg     2.6     08-23    CARDIAC MARKERS ( 23 Aug 2022 20:28 )  x     / 0.01 ng/mL / 52 U/L / x     / 1.7 ng/mL  CARDIAC MARKERS ( 23 Aug 2022 14:33 )  x     / 0.02 ng/mL / x     / x     / x

## 2022-08-24 NOTE — DIETITIAN INITIAL EVALUATION ADULT - NS FNS DIET ORDER
Diet, NPO after Midnight:      NPO Start Date: 23-Aug-2022,   NPO Start Time: 23:59 (08-23-22 @ 18:19)  Diet, DASH/TLC:   Sodium & Cholesterol Restricted (08-23-22 @ 18:19)

## 2022-08-24 NOTE — DIETITIAN NUTRITION RISK NOTIFICATION - TREATMENT: THE FOLLOWING DIET HAS BEEN RECOMMENDED
Diet, NPO after Midnight:      NPO Start Date: 23-Aug-2022,   NPO Start Time: 23:59 (08-23-22 @ 18:19) [Active]  Diet, DASH/TLC:   Sodium & Cholesterol Restricted (08-23-22 @ 18:19) [Active]

## 2022-08-24 NOTE — CHART NOTE - NSCHARTNOTEFT_GEN_A_CORE
SHERRI MEDLEY  [id number]    PROCEDURE:  AV node ablation  Dual chamber PPM implant  ILR explant    INDICATION:  Afib with SVR    ELECTROPHYSIOLOGIST(S):  MD Lane Goyal MD (fellow)    ANESTHESIOLOGY:  MAC    FINDINGS:  Successful AV node ablation  Successful implantation of dual chamber PPM with LBB lead and backup RV lead  Removal of ILR    COMPLICATIONS:  None    RECOMMENDATIONS:  PA/Lat CXR  Bedrest 4 hours (manual compression)  Resume home meds  Wound check in 10-14 days

## 2022-08-24 NOTE — PROGRESS NOTE ADULT - PROBLEM SELECTOR PLAN 1
Hx of Afib and Atach.  Known to Dr. Jasso.  Found to be in Slow Afib at transfusion center HR 30s. c/o fatigue/palpitations.   - EKG Afib HR 66 in ED.   - EP consulted.  NPO after midnight for AV note ablation +BI-V PPM implant tomorrow, 8/24.    - Holding all AVN blocking agents.   - No A/C given Hx of chronic anemia and Hx of RP bleed Hx of Afib and Atach. Known to Dr. Jasso. C/o fatigue. Found to be in Slow Afib at transfusion center HR 30s. C/o fatigue. EKG: Afib HR 66 in ED. S/p AV note ablation +BI-V PPM implant on 8/24 per EP. Post procedure CXR: left-sided loop recorder in place, unchanged left lower lobe atelectasis.   - Patient tolerated procedure with some nausea/vomiting after  - Continue supportive care with hot tea and water  - Monitor symptoms

## 2022-08-25 ENCOUNTER — TRANSCRIPTION ENCOUNTER (OUTPATIENT)
Age: 67
End: 2022-08-25

## 2022-08-25 VITALS
SYSTOLIC BLOOD PRESSURE: 135 MMHG | OXYGEN SATURATION: 98 % | DIASTOLIC BLOOD PRESSURE: 64 MMHG | RESPIRATION RATE: 16 BRPM | HEART RATE: 77 BPM

## 2022-08-25 DIAGNOSIS — E87.6 HYPOKALEMIA: ICD-10-CM

## 2022-08-25 DIAGNOSIS — N18.9 CHRONIC KIDNEY DISEASE, UNSPECIFIED: ICD-10-CM

## 2022-08-25 LAB
ANION GAP SERPL CALC-SCNC: 12 MMOL/L — SIGNIFICANT CHANGE UP (ref 5–17)
BUN SERPL-MCNC: 13 MG/DL — SIGNIFICANT CHANGE UP (ref 7–23)
CALCIUM SERPL-MCNC: 9.8 MG/DL — SIGNIFICANT CHANGE UP (ref 8.4–10.5)
CHLORIDE SERPL-SCNC: 106 MMOL/L — SIGNIFICANT CHANGE UP (ref 96–108)
CO2 SERPL-SCNC: 20 MMOL/L — LOW (ref 22–31)
CREAT SERPL-MCNC: 1.63 MG/DL — HIGH (ref 0.5–1.3)
EGFR: 34 ML/MIN/1.73M2 — LOW
GLUCOSE SERPL-MCNC: 113 MG/DL — HIGH (ref 70–99)
HCT VFR BLD CALC: 34 % — LOW (ref 34.5–45)
HGB BLD-MCNC: 10.5 G/DL — LOW (ref 11.5–15.5)
MAGNESIUM SERPL-MCNC: 2.4 MG/DL — SIGNIFICANT CHANGE UP (ref 1.6–2.6)
MCHC RBC-ENTMCNC: 29.8 PG — SIGNIFICANT CHANGE UP (ref 27–34)
MCHC RBC-ENTMCNC: 30.9 GM/DL — LOW (ref 32–36)
MCV RBC AUTO: 96.6 FL — SIGNIFICANT CHANGE UP (ref 80–100)
NRBC # BLD: 0 /100 WBCS — SIGNIFICANT CHANGE UP (ref 0–0)
PHOSPHATE 24H UR-MCNC: 15.2 MG/DL — SIGNIFICANT CHANGE UP
PHOSPHATE SERPL-MCNC: 2.1 MG/DL — LOW (ref 2.5–4.5)
PLATELET # BLD AUTO: 175 K/UL — SIGNIFICANT CHANGE UP (ref 150–400)
POTASSIUM SERPL-MCNC: 3.7 MMOL/L — SIGNIFICANT CHANGE UP (ref 3.5–5.3)
POTASSIUM SERPL-SCNC: 3.7 MMOL/L — SIGNIFICANT CHANGE UP (ref 3.5–5.3)
RBC # BLD: 3.52 M/UL — LOW (ref 3.8–5.2)
RBC # FLD: 14.9 % — HIGH (ref 10.3–14.5)
SODIUM SERPL-SCNC: 138 MMOL/L — SIGNIFICANT CHANGE UP (ref 135–145)
WBC # BLD: 6.53 K/UL — SIGNIFICANT CHANGE UP (ref 3.8–10.5)
WBC # FLD AUTO: 6.53 K/UL — SIGNIFICANT CHANGE UP (ref 3.8–10.5)

## 2022-08-25 PROCEDURE — 71046 X-RAY EXAM CHEST 2 VIEWS: CPT | Mod: 26

## 2022-08-25 PROCEDURE — 99239 HOSP IP/OBS DSCHRG MGMT >30: CPT

## 2022-08-25 RX ORDER — CILOSTAZOL 100 MG/1
1 TABLET ORAL
Qty: 60 | Refills: 3
Start: 2022-08-25 | End: 2022-12-22

## 2022-08-25 RX ORDER — DIPYRIDAMOLE 50 MG
1 TABLET ORAL
Qty: 0 | Refills: 0 | DISCHARGE

## 2022-08-25 RX ORDER — SODIUM,POTASSIUM PHOSPHATES 278-250MG
1 POWDER IN PACKET (EA) ORAL
Refills: 0 | Status: COMPLETED | OUTPATIENT
Start: 2022-08-25 | End: 2022-08-25

## 2022-08-25 RX ORDER — CILOSTAZOL 100 MG/1
1 TABLET ORAL
Qty: 0 | Refills: 0 | DISCHARGE

## 2022-08-25 RX ORDER — POTASSIUM CHLORIDE 20 MEQ
40 PACKET (EA) ORAL ONCE
Refills: 0 | Status: DISCONTINUED | OUTPATIENT
Start: 2022-08-25 | End: 2022-08-25

## 2022-08-25 RX ORDER — METOPROLOL TARTRATE 50 MG
1 TABLET ORAL
Qty: 60 | Refills: 3
Start: 2022-08-25 | End: 2022-12-22

## 2022-08-25 RX ORDER — POTASSIUM CHLORIDE 20 MEQ
20 PACKET (EA) ORAL ONCE
Refills: 0 | Status: COMPLETED | OUTPATIENT
Start: 2022-08-25 | End: 2022-08-25

## 2022-08-25 RX ADMIN — Medication 650 MILLIGRAM(S): at 01:04

## 2022-08-25 RX ADMIN — PANTOPRAZOLE SODIUM 40 MILLIGRAM(S): 20 TABLET, DELAYED RELEASE ORAL at 05:42

## 2022-08-25 RX ADMIN — Medication 650 MILLIGRAM(S): at 07:27

## 2022-08-25 RX ADMIN — Medication 1 MILLIGRAM(S): at 11:02

## 2022-08-25 RX ADMIN — Medication 650 MILLIGRAM(S): at 00:20

## 2022-08-25 RX ADMIN — Medication 650 MILLIGRAM(S): at 06:31

## 2022-08-25 RX ADMIN — SERTRALINE 50 MILLIGRAM(S): 25 TABLET, FILM COATED ORAL at 11:02

## 2022-08-25 RX ADMIN — CILOSTAZOL 50 MILLIGRAM(S): 100 TABLET ORAL at 05:42

## 2022-08-25 RX ADMIN — LOSARTAN POTASSIUM 25 MILLIGRAM(S): 100 TABLET, FILM COATED ORAL at 05:42

## 2022-08-25 RX ADMIN — Medication 1 PACKET(S): at 12:03

## 2022-08-25 RX ADMIN — Medication 1 PACKET(S): at 10:42

## 2022-08-25 RX ADMIN — Medication 40 MILLIGRAM(S): at 05:43

## 2022-08-25 RX ADMIN — Medication 50 MICROGRAM(S): at 05:42

## 2022-08-25 RX ADMIN — Medication 20 MILLIEQUIVALENT(S): at 10:42

## 2022-08-25 RX ADMIN — AMLODIPINE BESYLATE 5 MILLIGRAM(S): 2.5 TABLET ORAL at 05:43

## 2022-08-25 NOTE — DISCHARGE NOTE PROVIDER - PROVIDER TOKENS
PROVIDER:[TOKEN:[[id number]],FOLLOWUP:[1 week],ESTABLISHEDPATIENT:[T]] PROVIDER:[TOKEN:[[id number]],SCHEDULEDAPPT:[09/16/2022],ESTABLISHEDPATIENT:[T]]

## 2022-08-25 NOTE — PROGRESS NOTE ADULT - NUTRITIONAL ASSESSMENT
This patient has been assessed with a concern for Malnutrition and has been determined to have a diagnosis/diagnoses of Underweight (BMI < 19).    This patient is being managed with:   Diet DASH/TLC-  Sodium & Cholesterol Restricted  Entered: Aug 23 2022  6:17PM

## 2022-08-25 NOTE — DISCHARGE NOTE PROVIDER - HOSPITAL COURSE
#Discharge      65yo female, former smoker, with PMHx of HTN, hyperlipidemia, CAD s/p prior PCI and CABG, bioAVR/mitral annuloplasty in 2002, PAD s/p LSFA  and L TIFFANY (history of occluded bilateral SFA), AAA (last repaired in 2020), chronic anemia (receives Iron infusions, last 8/23), hypothyroid, CKD Stage III, Atach and Afib (unable to be on A/C due to bleeding/anemia, known to Dr. Jasso) who is referred to [facility] ED on 8/23 by Dr. Izquierdo with c/o fatigue, found to be in Afib HR 30s, s/p AV node ablation +BI-V PPM implant on 8/24.        #Hx of Afib  Known to Dr. Jasso. C/o fatigue. Found to be in Slow Afib at transfusion center HR 30s. C/o fatigue. EKG: Afib HR 66 in ED.   - S/p AV note ablation +BI-V PPM implant on 8/24 per EP.     #CAD (coronary artery disease)  Hx of CAD s/p CABG, bioAVR/ mitral annuloplasty in 2002, Hx of PCI (unknown date). Denies any CP. Followed by Dr. Preston. Trop T 0.02-->0.01.   - Prior Echo 6/2022: Normal L & R ventricular size and systolic function, severely dilated LA, bioprosthetic valve noted in the aortic position. Bioprosthetic leaflets are thickened and restricted in motion; significant prosthetic stenosis. Mild AR, annuloplasty ring noted in mitral position; elevated gradients and PHT are suggestive of mitral stenosis. Mild-to-moderate MR, Moderate TR.  - Discuss if repeat Echo needed  - Takes Dipyridamole 75mg QD at home - not on formulary. Patient aware & told to bring from home if able  - c/w Lipitor 80mg QHS.    #Stage 3 chronic kidney disease  Known CKD stage III (unclear baseline, recently in ED w/ Crt 1.7-1.8). Crt elevated to 2.01-->1.76. Improving function, likely prerenal 2/2 afib with low HR, leading to decrease in overall flow.   - monitor BMP.    #Hypertension  BP moderately controlled, but stable.   - Norvasc 5mg QD and Furosemide 40mg QD  - Losartan 25mg QD  - Holding home Toprol XL 50mg PO BID in setting of slow Afib    #Hypothyroid  TSH elevated at 7.98. Could be in the setting of acute sickness.   - Levothyroxine 50mcg QD    #Depression   - Sertraline 50mg daily      New medications:     Labs to be followed outpatient:     Exam to be followed outpatient:           Dx: TREATMENT FOR STEMI or HEART FAILURE THIS ADMISSION: NO              Cardiac Rehab Indications (STEMI/NSTEMI/ACS/Unstable Angina/CHF/Chronic Stable Angina/Heart Surgery (CABG,Valve)/Post PCI):  NO (not indicated)    Statin Prescribed (STEMI/NSTEMI/ACS/UA &/OR Post PCI this admission):  Patient already taking         #Discharge      65yo female, former smoker, with PMHx of HTN, hyperlipidemia, CAD s/p prior PCI and CABG, bioAVR/mitral annuloplasty in 2002, PAD s/p LSFA  and L TIFFANY (history of occluded bilateral SFA), AAA (last repaired in 2020), chronic anemia (receives Iron infusions, last 8/23), hypothyroid, CKD Stage III, Atach and Afib (unable to be on A/C due to bleeding/anemia, known to Dr. Jasso) who is referred to [facility] ED on 8/23 by Dr. Izquierdo with c/o fatigue, found to be in Afib HR 30s, s/p AV node ablation +BI-V PPM implant on 8/24.        #Hx of Afib  Hx of Afib and Atach. Known to Dr. Jasso. C/o fatigue. Found to be in Slow Afib at transfusion center HR 30s. C/o fatigue. EKG: Afib HR 66 in ED. S/p AV note ablation +BI-V PPM implant on 8/24 per EP. Post procedure CXR: left-sided loop recorder in place, unchanged left lower lobe atelectasis.   - CXR 2 views this AM without acute infiltrate or effusions and pacemaker on left side.  - Discontinue Toprol 50mg daily  - Start Lopressor 50mg BID  - Patient's cardiologist called and aware.    #CAD (coronary artery disease)  Hx of CAD s/p CABG, bioAVR/ mitral annuloplasty in 2002, Hx of PCI (unknown date). Denies any CP. Followed by Dr. Preston. Trop T 0.02-->0.01. Prior Echo 6/2022: Normal L & R ventricular size and systolic function, severely dilated LA, bioprosthetic valve noted in the aortic position. Bioprosthetic leaflets are thickened and restricted in motion; significant prosthetic stenosis. Mild AR, annuloplasty ring noted in mitral position; elevated gradients and PHT are suggestive of mitral stenosis. Mild-to-moderate MR, Moderate TR.  - Spoke to patient's cardiologist regarding continued management outpatient  - Discontinue home med Dipyridamole  - c/w Lipitor 80mg QHS.    #Hypertension  BP moderately controlled, but stable.   - Patient to follow up with cardiologist outpatient for further management   - c/w Norvasc 5mg QD  - c/w Furosemide 40mg QD  - c/w Losartan 25mg QD.    #Stage 3 chronic kidney disease  Known CKD stage III (unclear baseline, recently in ED w/ Crt 1.7-1.8). Crt elevated to 2.01-->1.76. Improving function, likely prerenal 2/2 afib with low HR, leading to decrease in overall flow.   - monitor BMP.    #Hypothyroid  TSH elevated at 7.98. Could be in the setting of acute sickness.   - Levothyroxine 50mcg QD    #Depression   - Sertraline 50mg daily      New medications: Lopressor 50mg BID    Stopped medications: Toprol 50mg XL daily, Dipyridamole    Labs to be followed outpatient: BMP    Exam to be followed outpatient:   General: NAD, pleasant elderly female resting comfortably, sitting upright  HEENT: NC/AT, sclera anicteric, moist mucosal membranes, neck supple  Pulmonary: no signs of respiratory distress, CTA b/l  Cardiovascular: RRR, no murmurs, rubs, or gallops, radial pulses 2+ bilaterally  Abdominal: soft, ND/NT, no organomegaly  Extremities: normal strength, no clubbing/cyanosis/edema  Neuro: A/O x 3, normal sensation, no focal deficits        Dx: TREATMENT FOR STEMI or HEART FAILURE THIS ADMISSION: NO              Cardiac Rehab Indications (STEMI/NSTEMI/ACS/Unstable Angina/CHF/Chronic Stable Angina/Heart Surgery (CABG,Valve)/Post PCI):  NO (not indicated)    Statin Prescribed (STEMI/NSTEMI/ACS/UA &/OR Post PCI this admission):  Patient already taking         #Discharge      67yo female, former smoker, with PMHx of HTN, hyperlipidemia, CAD s/p prior PCI and CABG, bioAVR/mitral annuloplasty in 2002, PAD s/p LSFA  and L TIFFANY (history of occluded bilateral SFA), AAA (last repaired in 2020), chronic anemia (receives Iron infusions, last 8/23), hypothyroid, CKD Stage III, Atach and Afib (unable to be on A/C due to bleeding/anemia, known to Dr. Jasso) who is referred to [facility] ED on 8/23 by Dr. Izquierdo with c/o fatigue, found to be in Afib HR 30s, s/p AV node ablation +BI-V PPM implant on 8/24.        #Hx of Afib  Hx of Afib and Atach. Known to Dr. Jasso. C/o fatigue. Found to be in Slow Afib at transfusion center HR 30s. C/o fatigue. EKG: Afib HR 66 in ED. S/p AV note ablation +BI-V PPM implant on 8/24 per EP. Post procedure CXR: left-sided loop recorder in place, unchanged left lower lobe atelectasis.   - CXR 2 views this AM without acute infiltrate or effusions and pacemaker on left side.  - Discontinue Toprol 50mg daily  - Start Lopressor 50mg BID  - Patient's cardiologist called and aware.    #CAD (coronary artery disease)  Hx of CAD s/p CABG, bioAVR/ mitral annuloplasty in 2002, Hx of PCI (unknown date). Denies any CP. Followed by Dr. Preston. Trop T 0.02-->0.01. Prior Echo 6/2022: Normal L & R ventricular size and systolic function, severely dilated LA, bioprosthetic valve noted in the aortic position. Bioprosthetic leaflets are thickened and restricted in motion; significant prosthetic stenosis. Mild AR, annuloplasty ring noted in mitral position; elevated gradients and PHT are suggestive of mitral stenosis. Mild-to-moderate MR, Moderate TR.  - Spoke to patient's cardiologist regarding continued management outpatient  - Discontinue home med Dipyridamole  - Agree with holding home aspirin  - c/w Cilostazol 50mg BID  - c/w Lipitor 80mg QHS    #Hypertension  BP moderately controlled, but stable.   - Patient to follow up with cardiologist outpatient for further management   - c/w Norvasc 5mg QD  - c/w Furosemide 40mg QD  - c/w Losartan 25mg QD.    #Stage 3 chronic kidney disease  Known CKD stage III (unclear baseline, recently in ED w/ Crt 1.7-1.8). Crt elevated to 2.01-->1.76. Improving function, likely prerenal 2/2 afib with low HR, leading to decrease in overall flow.     #Hypothyroid  TSH elevated at 7.98. Could be in the setting of acute sickness.   - Levothyroxine 50mcg QD    #Depression   - Sertraline 50mg daily      New medications: Lopressor 50mg BID    Stopped medications: Toprol 50mg XL daily, Dipyridamole, aspirin    Labs to be followed outpatient: BMP    Exam to be followed outpatient:   General: NAD, pleasant elderly female resting comfortably, sitting upright  HEENT: NC/AT, sclera anicteric, moist mucosal membranes, neck supple  Pulmonary: no signs of respiratory distress, CTA b/l  Cardiovascular: RRR, no murmurs, rubs, or gallops, radial pulses 2+ bilaterally  Abdominal: soft, ND/NT, no organomegaly  Extremities: normal strength, no clubbing/cyanosis/edema  Neuro: A/O x 3, normal sensation, no focal deficits        Dx: TREATMENT FOR STEMI or HEART FAILURE THIS ADMISSION: NO              Cardiac Rehab Indications (STEMI/NSTEMI/ACS/Unstable Angina/CHF/Chronic Stable Angina/Heart Surgery (CABG,Valve)/Post PCI):  NO (not indicated)    Statin Prescribed (STEMI/NSTEMI/ACS/UA &/OR Post PCI this admission):  Patient already taking

## 2022-08-25 NOTE — DISCHARGE NOTE NURSING/CASE MANAGEMENT/SOCIAL WORK - PATIENT PORTAL LINK FT
You can access the FollowMyHealth Patient Portal offered by [facility] by registering at the following website: [email or web address]. By joining [facility]’s FollowMyHealth portal, you will also be able to view your health information using other applications (apps) compatible with our system.

## 2022-08-25 NOTE — PROGRESS NOTE ADULT - PROBLEM SELECTOR PLAN 7
Parents
On sertraline 50mg daily at home.   - c/w home med
On sertraline 50mg daily at home.   - c/w home med

## 2022-08-25 NOTE — PROGRESS NOTE ADULT - SUBJECTIVE AND OBJECTIVE BOX
EPS Progress Note    S:     s/p successful AV node ablation + implant of a [place] sci bi-ventricular pacemaker + ILR removal.   Incisions/groin healing well. Patient feeling well, has 4/10 pain at the ppm chest wall incision but its tolerable    Device model: 	[employer] CRT-P			                            Functioning Mode: 		DDD 75 to 130     Underlying Rhythm:  AT with AV block    Pacemaker dependency: pacemaker dependent    Battery status: josef     Interrogating parameters:   				RA			RV			LV  Sense:                                    4.8mV                           16.0mV                          17.3mV  Threshold:                                n/r                            0.4V@0.5ms                  0.6V@0.5ms                                           Pacing Impedance:                  517 ohms                     728 ohms                       452 ohms                                                                                                                                                   CXR wet read with no ptx, appropriate lead placement. Dr. Mcdonnell radiology.     O: T(C): 37.2 (08-25-22 @ 10:27), Max: 37.2 (08-25-22 @ 10:27)  HR: 75 (08-25-22 @ 08:25) (75 - 76)  BP: 136/73 (08-25-22 @ 08:25) (134/79 - 161/78)  RR: 16 (08-25-22 @ 08:25) (15 - 18)  SpO2: 98% (08-25-22 @ 08:25) (96% - 100%)  Wt(kg): 52.6kG      PHYSICAL  General:  NAD        Chest:  CTA B/L, no w/r/r  PPM incision site: clean, dry, intact. dermabond in place. no hematoma.   ILR removal site: c/d/i no hematoma  Cardiac:  RRR, + s1/s2 , no m/g/r  Abdomen:   soft ND/NT  Extremities: No edema, R groin no hematoma/bleeding/oozing  Skin: no rash noted, normal color and pigmentation  Psych: A&Ox3, normal affect and mood  Neuro: no deficit noted     LABS:                        10.5   6.53  )-----------( 175      ( 25 Aug 2022 05:30 )             34.0     08-25    138  |  106  |  13  ----------------------------<  113<H>  3.7   |  20<L>  |  1.63<H>    Ca    9.8      25 Aug 2022 05:30  Phos  2.1     08-25  Mg     2.4     08-25      PT/INR - ( 24 Aug 2022 15:25 )   PT: 11.5 sec;   INR: 0.97          PTT - ( 24 Aug 2022 15:25 )  PTT:34.4 sec      MEDICATIONS:  acetaminophen     Tablet .. 650 milliGRAM(s) Oral every 6 hours PRN  amLODIPine   Tablet 5 milliGRAM(s) Oral daily  atorvastatin 80 milliGRAM(s) Oral at bedtime  cilostazol 50 milliGRAM(s) Oral two times a day  folic acid 1 milliGRAM(s) Oral daily  furosemide    Tablet 40 milliGRAM(s) Oral daily  levothyroxine 50 MICROGram(s) Oral daily  losartan 25 milliGRAM(s) Oral daily  pantoprazole    Tablet 40 milliGRAM(s) Oral before breakfast  potassium chloride   Powder 20 milliEquivalent(s) Oral once  potassium phosphate / sodium phosphate Powder (PHOS-NaK) 1 Packet(s) Oral every 2 hours  sertraline 50 milliGRAM(s) Oral daily      ASSESSMENT/PLAN  66 year old female history of CAD s/p CABG, bioAVR/ mitral annuloplasty in 2002, PAD s/p LSFA stenting and history of occluded bilateral SFA, AAA s/p open repair 2015 and saccular AAA and contained rupture s/p thoracoabdominal aneurysm repair 2020, history of L sided RP hematoma requiring transfusions, chronic anemia receiving iron infusions, and known history of AT. Patient presented to ED today after being sent from Dr. Ng office for slowly conducted AF. We saw patient on 8/8 for rapid AT and increased BB which caused selvin. s/p successful AV mayito ablation and [employer] CRT-P implant. Numbers stable from implant.     -wound check, device check OK   -CXR wet read WNL, pending official read by radiology  -follow up with Dr Jasso on 9/13/22 @ 1115am  -post procedure instructions given to the patient.

## 2022-08-25 NOTE — PROGRESS NOTE ADULT - SUBJECTIVE AND OBJECTIVE BOX
SUBJECTIVE:  Patient is a 66y old female who presents with a chief complaint of fatigue.     Currently admitted to cardiology with the primary diagnosis of Afib with slow rate.    Today is hospital day 2. Patient was seen and examined at bedside. As per nurse and patient, no o/n events, patient rested comfortably. No complaints at this time. Patient denies: fever, chills, dizziness, weakness, HA, CP, palpitations, SOB, cough, N/V/D/C, dysuria, changes in bowel movements. ROS otherwise negative.    OBJECTIVE:  PAST MEDICAL & SURGERY HISTORY:     CAD (coronary artery disease)    PVD (peripheral vascular disease)    Anemia    Hypothyroidism    GERD (gastroesophageal reflux disease)    Hyperlipidemia    HTN (hypertension)    Seizure    Anxiety    Chronic kidney disease, unspecified CKD stage    Depression, unspecified depression type    Status post aorto-coronary artery bypass graft,     Atherosclerosis of coronary artery bypass graft(s), unspecified, with angina pectoris with documented spasm    H/O aortic valve replacement, Bioprosthetic    Ruptured aortic aneurysm, surgery 2020    Abdominal aortic aneurysm (AAA) without rupture,       ALLERGIES: No Known Allergies      MEDICATIONS:  MEDICATIONS  (STANDING):  amLODIPine   Tablet 5 milliGRAM(s) Oral daily  atorvastatin 80 milliGRAM(s) Oral at bedtime  cilostazol 50 milliGRAM(s) Oral two times a day  folic acid 1 milliGRAM(s) Oral daily  furosemide    Tablet 40 milliGRAM(s) Oral daily  levothyroxine 50 MICROGram(s) Oral daily  losartan 25 milliGRAM(s) Oral daily  pantoprazole    Tablet 40 milliGRAM(s) Oral before breakfast  sertraline 50 milliGRAM(s) Oral daily    MEDICATIONS  (PRN):  acetaminophen     Tablet .. 650 milliGRAM(s) Oral every 6 hours PRN Temp greater or equal to 38C (100.4F), Moderate Pain (4 - 6)      HOME MEDICATIONS:  Home Medications:  amLODIPine 5 mg oral tablet: 1 tab(s) orally once a day (23 Aug 2022 19:48)  cilostazol 50 mg oral tablet: 1 tab(s) orally 2 times a day (23 Aug 2022 19:47)  dipyridamole 75 mg oral tablet: 1 tab(s) orally once a day (23 Aug 2022 19:48)  folic acid 1 mg oral tablet: 1 tab(s) orally once a day (23 Aug 2022 19:47)  furosemide 40 mg oral tablet: 1 tab(s) orally once a day (23 Aug 2022 19:47)  levothyroxine 50 mcg (0.05 mg) oral tablet: 1 tab(s) orally once a day (23 Aug 2022 19:47)  losartan 25 mg oral tablet: 1 tab(s) orally once a day (23 Aug 2022 19:48)  rosuvastatin 40 mg oral tablet: 1 tab(s) orally once a day (23 Aug 2022 19:47)  sertraline 50 mg oral tablet: 1 tab(s) orally once a day (at bedtime) (23 Aug 2022 19:47)  Vitamin D2 50,000 intl units (1.25 mg) oral capsule: 1 cap(s) orally once a week (23 Aug 2022 19:47)      PHYSICAL EXAM:  General: NAD, resting comfortably  HEENT: NC/AT, sclera anicteric, moist mucosal membranes, neck supple  Pulmonary: no signs of respiratory distress, CTA-B  Cardiovascular: RRR, no murmurs, rubs, or gallops, Radial pulses 2+ bilaterally  Abdominal: soft, ND/NT, no organomegaly  Extremities: normal strength, no clubbing/cyanosis/edema  Neuro: A/O x 3, normal sensation, no focal deficits    VITAL SIGNS:  Vital Signs Last 24 Hrs  T(C): 37 (25 Aug 2022 06:36), Max: 37 (25 Aug 2022 06:36)  T(F): 98.6 (25 Aug 2022 06:36), Max: 98.6 (25 Aug 2022 06:36)  HR: 75 (25 Aug 2022 04:56) (75 - 92)  BP: 161/78 (25 Aug 2022 04:56) (134/79 - 161/78)  BP(mean): 112 (25 Aug 2022 04:56) (100 - 112)  RR: 16 (25 Aug 2022 04:56) (15 - 18)  SpO2: 99% (25 Aug 2022 04:56) (96% - 100%) on room air      I&O's Summary    24 Aug 2022 07:01  -  25 Aug 2022 07:00  --------------------------------------------------------  IN: 300 mL / OUT: 1150 mL / NET: -850 mL      LABS:                        11.6   6.26  )-----------( 172      ( 24 Aug 2022 15:25 )             37.9     08-24    142  |  108  |  13  ----------------------------<  100<H>  3.4<L>   |  23  |  1.76<H>    Ca    9.7      24 Aug 2022 15:25  Phos  3.5     08-  Mg     2.4     08-24      PT/INR - ( 24 Aug 2022 15:25 )   PT: 11.5 sec;   INR: 0.97          PTT - ( 24 Aug 2022 15:25 )  PTT:34.4 sec    Urinalysis Basic - ( 24 Aug 2022 10:04 )    Color: Yellow / Appearance: Clear / S.010 / pH: x  Gluc: x / Ketone: NEGATIVE  / Bili: Negative / Urobili: 0.2 E.U./dL   Blood: x / Protein: 30 mg/dL / Nitrite: NEGATIVE   Leuk Esterase: NEGATIVE / RBC: < 5 /HPF / WBC 5-10 /HPF   Sq Epi: x / Non Sq Epi: 5-10 /HPF / Bacteria: Present /HPF    CARDIAC MARKERS ( 23 Aug 2022 20:28 )  x     / 0.01 ng/mL / 52 U/L / x     / 1.7 ng/mL  CARDIAC MARKERS ( 23 Aug 2022 14:33 )  x     / 0.02 ng/mL / x     / x     / x         SUBJECTIVE:  Patient is a 66y old female who presents with a chief complaint of fatigue.     Currently admitted to cardiology with the primary diagnosis of Afib with slow rate.    Today is hospital day 2. Patient was seen and examined at bedside. As per nurse and patient, no o/n events, patient rested comfortably. No complaints at this time, enjoying her breakfast. States she is feeling much better since yesterday. No nausea or vomiting since yesterday. Denies dizziness, weakness, HA, CP, palpitations, SOB. ROS otherwise negative.    OBJECTIVE:  PAST MEDICAL & SURGERY HISTORY:     CAD (coronary artery disease)    PVD (peripheral vascular disease)    Anemia    Hypothyroidism    GERD (gastroesophageal reflux disease)    Hyperlipidemia    HTN (hypertension)    Seizure    Anxiety    Chronic kidney disease, unspecified CKD stage    Depression, unspecified depression type    Status post aorto-coronary artery bypass graft,     Atherosclerosis of coronary artery bypass graft(s), unspecified, with angina pectoris with documented spasm    H/O aortic valve replacement, Bioprosthetic    Ruptured aortic aneurysm, surgery 2020    Abdominal aortic aneurysm (AAA) without rupture,       ALLERGIES: No Known Allergies      MEDICATIONS:  MEDICATIONS  (STANDING):  amLODIPine   Tablet 5 milliGRAM(s) Oral daily  atorvastatin 80 milliGRAM(s) Oral at bedtime  cilostazol 50 milliGRAM(s) Oral two times a day  folic acid 1 milliGRAM(s) Oral daily  furosemide    Tablet 40 milliGRAM(s) Oral daily  levothyroxine 50 MICROGram(s) Oral daily  losartan 25 milliGRAM(s) Oral daily  pantoprazole    Tablet 40 milliGRAM(s) Oral before breakfast  sertraline 50 milliGRAM(s) Oral daily    MEDICATIONS  (PRN):  acetaminophen     Tablet .. 650 milliGRAM(s) Oral every 6 hours PRN Temp greater or equal to 38C (100.4F), Moderate Pain (4 - 6)      HOME MEDICATIONS:  Home Medications:  amLODIPine 5 mg oral tablet: 1 tab(s) orally once a day (23 Aug 2022 19:48)  cilostazol 50 mg oral tablet: 1 tab(s) orally 2 times a day (23 Aug 2022 19:47)  dipyridamole 75 mg oral tablet: 1 tab(s) orally once a day (23 Aug 2022 19:48)  folic acid 1 mg oral tablet: 1 tab(s) orally once a day (23 Aug 2022 19:47)  furosemide 40 mg oral tablet: 1 tab(s) orally once a day (23 Aug 2022 19:47)  levothyroxine 50 mcg (0.05 mg) oral tablet: 1 tab(s) orally once a day (23 Aug 2022 19:47)  losartan 25 mg oral tablet: 1 tab(s) orally once a day (23 Aug 2022 19:48)  rosuvastatin 40 mg oral tablet: 1 tab(s) orally once a day (23 Aug 2022 19:47)  sertraline 50 mg oral tablet: 1 tab(s) orally once a day (at bedtime) (23 Aug 2022 19:47)  Vitamin D2 50,000 intl units (1.25 mg) oral capsule: 1 cap(s) orally once a week (23 Aug 2022 19:47)      PHYSICAL EXAM:  General: NAD, pleasant elderly female resting comfortably, sitting upright  HEENT: NC/AT, sclera anicteric, moist mucosal membranes, neck supple  Pulmonary: no signs of respiratory distress, CTA b/l  Cardiovascular: RRR, no murmurs, rubs, or gallops, radial pulses 2+ bilaterally  Abdominal: soft, ND/NT, no organomegaly  Extremities: normal strength, no clubbing/cyanosis/edema  Neuro: A/O x 3, normal sensation, no focal deficits    VITAL SIGNS:  Vital Signs Last 24 Hrs  T(C): 37 (25 Aug 2022 06:36), Max: 37 (25 Aug 2022 06:36)  T(F): 98.6 (25 Aug 2022 06:36), Max: 98.6 (25 Aug 2022 06:36)  HR: 75 (25 Aug 2022 04:56) (75 - 92)  BP: 161/78 (25 Aug 2022 04:56) (134/79 - 161/78)  BP(mean): 112 (25 Aug 2022 04:56) (100 - 112)  RR: 16 (25 Aug 2022 04:56) (15 - 18)  SpO2: 99% (25 Aug 2022 04:56) (96% - 100%) on room air      I&O's Summary    24 Aug 2022 07:01  -  25 Aug 2022 07:00  --------------------------------------------------------  IN: 300 mL / OUT: 1150 mL / NET: -850 mL      LABS:                        11.6   6.26  )-----------( 172      ( 24 Aug 2022 15:25 )             37.9     08-24    142  |  108  |  13  ----------------------------<  100<H>  3.4<L>   |  23  |  1.76<H>    Ca    9.7      24 Aug 2022 15:25  Phos  3.5     08-24  Mg     2.4     08-24      PT/INR - ( 24 Aug 2022 15:25 )   PT: 11.5 sec;   INR: 0.97          PTT - ( 24 Aug 2022 15:25 )  PTT:34.4 sec    Urinalysis Basic - ( 24 Aug 2022 10:04 )    Color: Yellow / Appearance: Clear / S.010 / pH: x  Gluc: x / Ketone: NEGATIVE  / Bili: Negative / Urobili: 0.2 E.U./dL   Blood: x / Protein: 30 mg/dL / Nitrite: NEGATIVE   Leuk Esterase: NEGATIVE / RBC: < 5 /HPF / WBC 5-10 /HPF   Sq Epi: x / Non Sq Epi: 5-10 /HPF / Bacteria: Present /HPF    CARDIAC MARKERS ( 23 Aug 2022 20:28 )  x     / 0.01 ng/mL / 52 U/L / x     / 1.7 ng/mL  CARDIAC MARKERS ( 23 Aug 2022 14:33 )  x     / 0.02 ng/mL / x     / x     / x

## 2022-08-25 NOTE — DISCHARGE NOTE PROVIDER - NSDCFUSCHEDAPPT_GEN_ALL_CORE_FT
Shahriar Jasso  [facility] Physician [facility]  [facility] [address]  Scheduled Appointment: 09/13/2022

## 2022-08-25 NOTE — PROGRESS NOTE ADULT - PROBLEM SELECTOR PLAN 3
Potassium 2.9 on admit, s/p Potassium 10meq IV x1 and 40meq PO x1 w/ improvement to 3.6. No change this AM in K.   - Replete PRN to K>4  - Monitor BMP BP moderately controlled, but stable.   - Patient to follow up with cardiologist outpatient for further management   - c/w home Norvasc 5mg QD  - c/w Furosemide 40mg QD  - c/w Losartan 25mg QD

## 2022-08-25 NOTE — PROGRESS NOTE ADULT - PROBLEM SELECTOR PROBLEM 6
· Refill requested by: Pharmacy Fax  · Last office visit for this medication: 9/15/2021  · Next office visit: 9/19/2022  · Medication(s) Requested: Citalopram 20 mg  · Last refill: 1/19/2021 for 90 and 3 refills     Refer to paper form refill protocol  Filled per protocol    
Hypothyroid
Hypothyroid

## 2022-08-25 NOTE — PROGRESS NOTE ADULT - PROBLEM SELECTOR PLAN 6
TSH elevated at 7.98. Could be in the setting of acute sickness.   - c/w Levothyroxine 50mcg QD
TSH elevated at 7.98. Could be in the setting of acute sickness.   - c/w Levothyroxine 50mcg QD

## 2022-08-25 NOTE — DISCHARGE NOTE PROVIDER - NSDCCPCAREPLAN_GEN_ALL_CORE_FT
PRINCIPAL DISCHARGE DIAGNOSIS  Diagnosis: Atrial fibrillation with slow ventricular response  Assessment and Plan of Treatment: You were admitted to the hospital due to slow and irregular heart rate. This condition was likely causing your symptoms of fatigue and palpitations. In the hospital, you had a procedure to place a pacemaker to better control your heart rate. This was done by specialists called electrophysiologists (EP). The day after the procedure, you felt better and were deemed medically stable to be discharges. We also adjusted some of your medications for better control. This was all discussed with your cardiologist. Please follow up with your doctor for continued management.   - Stop taking Aspirin.   - Stop taking Dipyridamole.  - Stop taking Metoprolol succinate (Toprol).  - Start taking Metoprolol tartrate (Lopressor) 50mg 1 tablet two times a day.  - Continue Losartan 25mg 1 tablet once a day.  - Continue Norvasc 5mg 1 tablet once a day.  - Continue Furosemide 1 tablet once a day.  - Follow up with Dr. Preston on September 16th 2022.   - Follow up ...       PRINCIPAL DISCHARGE DIAGNOSIS  Diagnosis: Atrial fibrillation with slow ventricular response  Assessment and Plan of Treatment: You were admitted to the hospital due to slow and irregular heart rate. This condition was likely causing your symptoms of fatigue and palpitations. In the hospital, you had a procedure to place a pacemaker to better control your heart rate. This was done by specialists called electrophysiologists (EP). The day after the procedure, you felt better and were deemed medically stable to be discharges. We also adjusted some of your medications for better control. This was all discussed with your cardiologist. Please follow up with your doctor for continued management.   - Stop taking Aspirin.   - Stop taking Dipyridamole.  - Stop taking Metoprolol succinate (Toprol).  - Start taking Metoprolol tartrate (Lopressor) 50mg 1 tablet two times a day.  - Continue Losartan 25mg 1 tablet once a day.  - Continue Norvasc 5mg 1 tablet once a day.  - Continue Furosemide 1 tablet once a day.  - Continue Cilostazol 50mg 1 tablet two times a day.   - Follow up with Dr. Preston on September 16th 2022.   - Follow up ...       PRINCIPAL DISCHARGE DIAGNOSIS  Diagnosis: Atrial fibrillation with slow ventricular response  Assessment and Plan of Treatment: You were admitted to the hospital due to slow and irregular heart rate. This condition was likely causing your symptoms of fatigue and palpitations. In the hospital, you had a procedure to place a pacemaker to better control your heart rate. This was done by specialists called electrophysiologists (EP). The day after the procedure, you felt better and were deemed medically stable to be discharges. We also adjusted some of your medications for better control. This was all discussed with your cardiologist. Please follow up with your doctor for continued management.   - Stop taking Aspirin.   - Stop taking Dipyridamole.  - Stop taking Metoprolol succinate (Toprol).  - Start taking Metoprolol tartrate (Lopressor) 50mg 1 tablet two times a day.  - Continue Losartan 25mg 1 tablet once a day.  - Continue Norvasc 5mg 1 tablet once a day.  - Continue Furosemide 1 tablet once a day.  - Continue Cilostazol 50mg 1 tablet two times a day.   - Follow up with Dr. Preston on September 16th 2022.   - Follow up EP specialist Dr. Jasso on September 13th 2022 at 11:15am.

## 2022-08-25 NOTE — DISCHARGE NOTE PROVIDER - NSDCFUADDAPPT_GEN_ALL_CORE_FT
- Please follow up with Dr. Preston on September 16th 2022.  - Please follow up with EP specialist Dr. Jasso on September 13th 2022 at 11:15am.

## 2022-08-25 NOTE — PROGRESS NOTE ADULT - PROBLEM SELECTOR PLAN 8
F: none  N: DASH diet  GI ppx: protonix 40  DVT ppx: cilostazol 50 bid    Code: Full  Dispo: 5Lach
F: none  N: DASH diet  GI ppx: protonix 40  DVT ppx: cilostazol 50 bid    Code: Full  Dispo: 5Lach

## 2022-08-25 NOTE — PROGRESS NOTE ADULT - ASSESSMENT
65yo female, former smoker, with PMHx of HTN, hyperlipidemia, CAD s/p prior PCI and CABG, bioAVR/mitral annuloplasty in 2002, PAD s/p LSFA  and L TIFFANY (history of occluded bilateral SFA), AAA (last repaired in 2020), chronic anemia (receives Iron infusions, last 8/23), hypothyroid, CKD Stage III, Atach and Afib (unable to be on A/C due to bleeding/anemia, known to Dr. Jasso) who is referred to [facility] ED on 8/23 by Dr. Izquierdo with c/o fatigue, found to be in Afib HR 30s, s/p AV node ablation +BI-V PPM implant on 8/24.

## 2022-08-25 NOTE — PROGRESS NOTE ADULT - PROBLEM SELECTOR PLAN 2
Hx of CAD s/p CABG, bioAVR/ mitral annuloplasty in 2002, Hx of PCI (unknown date). Denies any CP. Followed by Dr. Preston. Trop T 0.02-->0.01.   - Prior Echo 6/2022: Normal L & R ventricular size and systolic function, severely dilated LA, bioprosthetic valve noted in the aortic position. Bioprosthetic leaflets are thickened and restricted in motion; significant prosthetic stenosis. Mild AR, annuloplasty ring noted in mitral position; elevated gradients and PHT are suggestive of mitral stenosis. Mild-to-moderate MR, Moderate TR.  - Discuss if repeat Echo needed  - Takes Dipyridamole 75mg QD at home - not on formulary. Patient aware & told to bring from home if able  - c/w Lipitor 80mg QHS Hx of CAD s/p CABG, bioAVR/ mitral annuloplasty in 2002, Hx of PCI (unknown date). Denies any CP. Followed by Dr. Preston. Trop T 0.02-->0.01.   - Prior Echo 6/2022: Normal L & R ventricular size and systolic function, severely dilated LA, bioprosthetic valve noted in the aortic position. Bioprosthetic leaflets are thickened and restricted in motion; significant prosthetic stenosis. Mild AR, annuloplasty ring noted in mitral position; elevated gradients and PHT are suggestive of mitral stenosis. Mild-to-moderate MR, Moderate TR.  - Spoke to patient's cardiologist regarding continued management outpatient  - Discontinue home med Dipyridamole  - c/w Lipitor 80mg QHS

## 2022-08-25 NOTE — PROGRESS NOTE ADULT - PROBLEM SELECTOR PLAN 1
Hx of Afib and Atach. Known to Dr. Jasso. C/o fatigue. Found to be in Slow Afib at transfusion center HR 30s. C/o fatigue. EKG: Afib HR 66 in ED. S/p AV note ablation +BI-V PPM implant on 8/24 per EP. Post procedure CXR: left-sided loop recorder in place, unchanged left lower lobe atelectasis.   - Patient tolerated procedure with some nausea/vomiting after  - Continue supportive care with hot tea and water  - Monitor symptoms Hx of Afib and Atach. Known to Dr. Jasso. C/o fatigue. Found to be in Slow Afib at transfusion center HR 30s. C/o fatigue. EKG: Afib HR 66 in ED. S/p AV note ablation +BI-V PPM implant on 8/24 per EP. Post procedure CXR: left-sided loop recorder in place, unchanged left lower lobe atelectasis.   - CXR 2 views this AM without acute infiltrate or effusions and pacemaker on left side.  - Discontinue Toprol 50mg daily  - Start Lopressor 50mg BID  - Patient's cardiologist called and aware

## 2022-08-25 NOTE — DISCHARGE NOTE PROVIDER - CARE PROVIDERS DIRECT ADDRESSES
[email or web address]

## 2022-08-25 NOTE — DISCHARGE NOTE PROVIDER - NSDCMRMEDTOKEN_GEN_ALL_CORE_FT
amLODIPine 5 mg oral tablet: 1 tab(s) orally once a day  cilostazol 50 mg oral tablet: 1 tab(s) orally 2 times a day  dipyridamole 75 mg oral tablet: 1 tab(s) orally once a day  folic acid 1 mg oral tablet: 1 tab(s) orally once a day  furosemide 40 mg oral tablet: 1 tab(s) orally once a day  levothyroxine 50 mcg (0.05 mg) oral tablet: 1 tab(s) orally once a day  losartan 25 mg oral tablet: 1 tab(s) orally once a day  Metoprolol Succinate ER 50 mg oral tablet, extended release: 1 tab(s) orally 2 times a day   pantoprazole 40 mg oral delayed release tablet: 1 tab(s) orally once a day   rosuvastatin 40 mg oral tablet: 1 tab(s) orally once a day  sertraline 50 mg oral tablet: 1 tab(s) orally once a day (at bedtime)  Vitamin D2 50,000 intl units (1.25 mg) oral capsule: 1 cap(s) orally once a week   amLODIPine 5 mg oral tablet: 1 tab(s) orally once a day  cilostazol 50 mg oral tablet: 1 tab(s) orally 2 times a day  folic acid 1 mg oral tablet: 1 tab(s) orally once a day  furosemide 40 mg oral tablet: 1 tab(s) orally once a day  levothyroxine 50 mcg (0.05 mg) oral tablet: 1 tab(s) orally once a day  losartan 25 mg oral tablet: 1 tab(s) orally once a day  metoprolol tartrate 50 mg oral tablet: 1 tab(s) orally 2 times a day   pantoprazole 40 mg oral delayed release tablet: 1 tab(s) orally once a day   rosuvastatin 40 mg oral tablet: 1 tab(s) orally once a day  sertraline 50 mg oral tablet: 1 tab(s) orally once a day (at bedtime)  Vitamin D2 50,000 intl units (1.25 mg) oral capsule: 1 cap(s) orally once a week

## 2022-08-25 NOTE — PROGRESS NOTE ADULT - PROBLEM SELECTOR PLAN 5
BP moderately controlled, but stable.   - c/w home Norvasc 5mg QD and Furosemide 40mg QD  - Restart Losartan 25mg QD  - Holding home Toprol XL 50mg PO BID in setting of slow Afib Known CKD stage III (unclear baseline, recently in ED w/ Crt 1.7-1.8). Crt elevated to 2.01-->1.76. Improving function, likely prerenal 2/2 afib with low HR, leading to decrease in overall flow.   - monitor BMP

## 2022-08-25 NOTE — DISCHARGE NOTE PROVIDER - CARE PROVIDER_API CALL
Shahriar Jasos (MD)  Cardiac Electrophysiology; Cardiovascular Disease  [address], [address] [place] [address]  Phone: [phone]  Fax: [phone]  Established Patient  Follow Up Time: 1 week   Hoang Preston)  Cardiovascular Disease  [address] [place] [address], [address] [place] [address]  Phone: [phone]  Fax: [phone]  Established Patient  Scheduled Appointment: 09/16/2022

## 2022-08-25 NOTE — PROGRESS NOTE ADULT - PROBLEM SELECTOR PLAN 4
Known CKD stage III (unclear baseline, recently in ED w/ Crt 1.7-1.8). Crt elevated to 2.01-->1.76. Improving function, likely prerenal 2/2 afib with low HR, leading to decrease in overall flow.   - monitor BMP Improved. Potassium 2.9 on admit, s/p Potassium 10meq IV x1 and 40meq PO x1 w/ improvement to 3.6. No change this AM in K.   - Replete PRN to K>4  - Monitor BMP

## 2022-08-25 NOTE — DISCHARGE NOTE PROVIDER - DETAILS OF MALNUTRITION DIAGNOSIS/DIAGNOSES
This patient has been assessed with a concern for Malnutrition and was treated during this hospitalization for the following Nutrition diagnosis/diagnoses:     -  08/24/2022: Underweight (BMI < 19)

## 2022-08-25 NOTE — DISCHARGE NOTE NURSING/CASE MANAGEMENT/SOCIAL WORK - NSDCPEFALRISK_GEN_ALL_CORE
For information on Fall & Injury Prevention, visit: [email or web address] OR  [email or web address] OR  https://www.cdc.gov/steadi/patient.html

## 2022-09-06 DIAGNOSIS — I48.19 OTHER PERSISTENT ATRIAL FIBRILLATION: ICD-10-CM

## 2022-09-06 DIAGNOSIS — Z79.82 LONG TERM (CURRENT) USE OF ASPIRIN: ICD-10-CM

## 2022-09-06 DIAGNOSIS — K21.9 GASTRO-ESOPHAGEAL REFLUX DISEASE WITHOUT ESOPHAGITIS: ICD-10-CM

## 2022-09-06 DIAGNOSIS — E78.5 HYPERLIPIDEMIA, UNSPECIFIED: ICD-10-CM

## 2022-09-06 DIAGNOSIS — D63.1 ANEMIA IN CHRONIC KIDNEY DISEASE: ICD-10-CM
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
## 2022-09-06 DIAGNOSIS — R56.9 UNSPECIFIED CONVULSIONS: ICD-10-CM
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
## 2022-09-06 DIAGNOSIS — Z87.891 PERSONAL HISTORY OF NICOTINE DEPENDENCE: ICD-10-CM

## 2022-09-06 DIAGNOSIS — F32.A DEPRESSION, UNSPECIFIED: ICD-10-CM

## 2022-09-06 DIAGNOSIS — E03.9 HYPOTHYROIDISM, UNSPECIFIED: ICD-10-CM

## 2022-09-06 DIAGNOSIS — I12.9 HYPERTENSIVE CHRONIC KIDNEY DISEASE WITH STAGE 1 THROUGH STAGE 4 CHRONIC KIDNEY DISEASE, OR UNSPECIFIED CHRONIC KIDNEY DISEASE: ICD-10-CM

## 2022-09-06 DIAGNOSIS — E87.6 HYPOKALEMIA: ICD-10-CM

## 2022-09-06 DIAGNOSIS — I25.10 ATHEROSCLEROTIC HEART DISEASE OF NATIVE CORONARY ARTERY WITHOUT ANGINA PECTORIS: ICD-10-CM

## 2022-09-06 DIAGNOSIS — J98.11 ATELECTASIS: ICD-10-CM

## 2022-09-06 DIAGNOSIS — I47.1 SUPRAVENTRICULAR TACHYCARDIA: ICD-10-CM

## 2022-09-06 DIAGNOSIS — R63.6 UNDERWEIGHT: ICD-10-CM

## 2022-09-06 DIAGNOSIS — Z95.828 PRESENCE OF OTHER VASCULAR IMPLANTS AND GRAFTS: ICD-10-CM

## 2022-09-06 DIAGNOSIS — F41.9 ANXIETY DISORDER, UNSPECIFIED: ICD-10-CM

## 2022-09-06 DIAGNOSIS — Z95.5 PRESENCE OF CORONARY ANGIOPLASTY IMPLANT AND GRAFT: ICD-10-CM

## 2022-09-06 DIAGNOSIS — I08.1 RHEUMATIC DISORDERS OF BOTH MITRAL AND TRICUSPID VALVES: ICD-10-CM

## 2022-09-06 DIAGNOSIS — Z95.3 PRESENCE OF XENOGENIC HEART VALVE: ICD-10-CM

## 2022-09-06 DIAGNOSIS — N18.30 CHRONIC KIDNEY DISEASE, STAGE 3 UNSPECIFIED: ICD-10-CM

## 2022-09-06 DIAGNOSIS — Z95.1 PRESENCE OF AORTOCORONARY BYPASS GRAFT: ICD-10-CM

## 2022-09-06 DIAGNOSIS — I73.9 PERIPHERAL VASCULAR DISEASE, UNSPECIFIED: ICD-10-CM

## 2022-09-06 DIAGNOSIS — T82.857A STENOSIS OF OTHER CARDIAC PROSTHETIC DEVICES, IMPLANTS AND GRAFTS, INITIAL ENCOUNTER: ICD-10-CM

## 2022-09-13 ENCOUNTER — APPOINTMENT (OUTPATIENT)
Dept: HEART AND VASCULAR | Facility: CLINIC | Age: 67
End: 2022-09-13

## 2022-09-13 DIAGNOSIS — R00.1 BRADYCARDIA, UNSPECIFIED: ICD-10-CM

## 2022-09-13 PROCEDURE — 99024 POSTOP FOLLOW-UP VISIT: CPT

## 2022-09-13 PROCEDURE — 93280 PM DEVICE PROGR EVAL DUAL: CPT

## 2022-09-13 NOTE — PROCEDURE
[Complete Heart Block] : complete heart block [CRT-P] : Cardiac resynchronization therapy pacemaker [DDD] : DDD [Lead Imp:  ___ohms] : lead impedance was [unfilled] ohms [Sensing Amplitude ___mv] : sensing amplitude was [unfilled] mv [___V @] : [unfilled] V [___ ms] : [unfilled] ms [Programmed for Longevity] : output reprogrammed for improved battery longevity [de-identified] : [facility]  [de-identified] : Visionist [de-identified] : 8/24/22 [de-identified] : Bi-V [de-identified] :  [de-identified] : AT/AF 99%\par RVP 99\par LVP 99

## 2022-09-13 NOTE — HISTORY OF PRESENT ILLNESS
[FreeTextEntry1] : 67 y/o F former smoker with PMHx HTN, HLD , CAD, s/p PCI and CABG, s/p Bioprosthetic AVR 2002, Mitral Valve, s/p Mitral valve annuloplasty 2002, PAD s/p pLSFA stent LCIA stent, with occluded Bilateral SFA (proximal to mid portion per U/S 2019), Chronic Anemia, AAA s/p Open repair 4/2/2015, CKD , Severe Renal Artery Stenosis, saccular AAA and contained rupture adjacent to previous stent graft s/p open thoracoabdominal aneurysm repair 8/2020, with 20mm tube graft and 6mm bypasses to the celiac, SMA, left renal, right renal (end to end), with recent finding of LLE claudication now s/p LLE angiogram angioplasty and stent to the left internal iliac artery on 5/2021.  Procedure complicated by left RP hematoma, s/p 2 U PRBC. \par \par Arrhythmia history includes longstanding palpitations s/p ILR 2016 (now dead).  Of note, prior ILR interrogation significant for one pause event c/w sinus arrest- pause ~ 4.4 seconds 10/9/18. She was unaware.  She presented for EP evaluation 6/2021 after being found to be in AFL/AT in Dr. Preston's office.  She has chronic anemia and has been unable to be maintained on OAC.  History of AF/AFL; found to be in AFib with VR in the 30 bpm.  Now s/p AV node ablation and dual chamber PPM placement with left bundle lead placement on 8/24/22.  She presents for post procedure follow-up and offers no acute complaints.  \par \par \par \par

## 2022-09-15 PROCEDURE — 96374 THER/PROPH/DIAG INJ IV PUSH: CPT

## 2022-09-15 PROCEDURE — 80061 LIPID PANEL: CPT

## 2022-09-15 PROCEDURE — C1733: CPT

## 2022-09-15 PROCEDURE — 71046 X-RAY EXAM CHEST 2 VIEWS: CPT

## 2022-09-15 PROCEDURE — 83735 ASSAY OF MAGNESIUM: CPT

## 2022-09-15 PROCEDURE — 99285 EMERGENCY DEPT VISIT HI MDM: CPT

## 2022-09-15 PROCEDURE — 82436 ASSAY OF URINE CHLORIDE: CPT

## 2022-09-15 PROCEDURE — 36415 COLL VENOUS BLD VENIPUNCTURE: CPT

## 2022-09-15 PROCEDURE — C1893: CPT

## 2022-09-15 PROCEDURE — 86850 RBC ANTIBODY SCREEN: CPT

## 2022-09-15 PROCEDURE — 84100 ASSAY OF PHOSPHORUS: CPT

## 2022-09-15 PROCEDURE — C2621: CPT

## 2022-09-15 PROCEDURE — 82803 BLOOD GASES ANY COMBINATION: CPT

## 2022-09-15 PROCEDURE — 85027 COMPLETE CBC AUTOMATED: CPT

## 2022-09-15 PROCEDURE — 87635 SARS-COV-2 COVID-19 AMP PRB: CPT

## 2022-09-15 PROCEDURE — 82550 ASSAY OF CK (CPK): CPT

## 2022-09-15 PROCEDURE — 82570 ASSAY OF URINE CREATININE: CPT

## 2022-09-15 PROCEDURE — 84300 ASSAY OF URINE SODIUM: CPT

## 2022-09-15 PROCEDURE — C1730: CPT

## 2022-09-15 PROCEDURE — 80048 BASIC METABOLIC PNL TOTAL CA: CPT

## 2022-09-15 PROCEDURE — 85610 PROTHROMBIN TIME: CPT

## 2022-09-15 PROCEDURE — 86901 BLOOD TYPING SEROLOGIC RH(D): CPT

## 2022-09-15 PROCEDURE — 71045 X-RAY EXAM CHEST 1 VIEW: CPT

## 2022-09-15 PROCEDURE — 86900 BLOOD TYPING SEROLOGIC ABO: CPT

## 2022-09-15 PROCEDURE — 82330 ASSAY OF CALCIUM: CPT

## 2022-09-15 PROCEDURE — 84443 ASSAY THYROID STIM HORMONE: CPT

## 2022-09-15 PROCEDURE — 84484 ASSAY OF TROPONIN QUANT: CPT

## 2022-09-15 PROCEDURE — 81001 URINALYSIS AUTO W/SCOPE: CPT

## 2022-09-15 PROCEDURE — 83880 ASSAY OF NATRIURETIC PEPTIDE: CPT

## 2022-09-15 PROCEDURE — C1894: CPT

## 2022-09-15 PROCEDURE — 84540 ASSAY OF URINE/UREA-N: CPT

## 2022-09-15 PROCEDURE — 84132 ASSAY OF SERUM POTASSIUM: CPT

## 2022-09-15 PROCEDURE — 84105 ASSAY OF URINE PHOSPHORUS: CPT

## 2022-09-15 PROCEDURE — 84295 ASSAY OF SERUM SODIUM: CPT

## 2022-09-15 PROCEDURE — 83935 ASSAY OF URINE OSMOLALITY: CPT

## 2022-09-15 PROCEDURE — 83036 HEMOGLOBIN GLYCOSYLATED A1C: CPT

## 2022-09-15 PROCEDURE — C1889: CPT

## 2022-09-15 PROCEDURE — C1769: CPT

## 2022-09-15 PROCEDURE — 96376 TX/PRO/DX INJ SAME DRUG ADON: CPT

## 2022-09-15 PROCEDURE — 84156 ASSAY OF PROTEIN URINE: CPT

## 2022-09-15 PROCEDURE — 85025 COMPLETE CBC W/AUTO DIFF WBC: CPT

## 2022-09-15 PROCEDURE — 82553 CREATINE MB FRACTION: CPT

## 2022-09-15 PROCEDURE — C1898: CPT

## 2022-09-15 PROCEDURE — 85730 THROMBOPLASTIN TIME PARTIAL: CPT

## 2022-11-05 NOTE — CONSULT NOTE ADULT - CONSULT REQUESTED BY NAME
Patient advised to restart Omeprazole.  Discussed diet and behavior modifications for further relief.  Call if any problems arise  
Dr. Sandra

## 2022-11-28 ENCOUNTER — NON-APPOINTMENT (OUTPATIENT)
Age: 67
End: 2022-11-28

## 2022-11-28 ENCOUNTER — APPOINTMENT (OUTPATIENT)
Dept: HEART AND VASCULAR | Facility: CLINIC | Age: 67
End: 2022-11-28

## 2022-11-28 PROCEDURE — 93296 REM INTERROG EVL PM/IDS: CPT

## 2022-11-28 PROCEDURE — 93294 REM INTERROG EVL PM/LDLS PM: CPT

## 2022-12-02 ENCOUNTER — APPOINTMENT (OUTPATIENT)
Dept: HEART AND VASCULAR | Facility: CLINIC | Age: 67
End: 2022-12-02

## 2022-12-02 VITALS
SYSTOLIC BLOOD PRESSURE: 111 MMHG | HEIGHT: 67 IN | WEIGHT: 111 LBS | DIASTOLIC BLOOD PRESSURE: 69 MMHG | HEART RATE: 91 BPM | BODY MASS INDEX: 17.42 KG/M2

## 2022-12-02 PROCEDURE — 93280 PM DEVICE PROGR EVAL DUAL: CPT

## 2022-12-02 NOTE — PHYSICAL EXAM
[General Appearance - Well Developed] : well developed [Normal Appearance] : normal appearance [Well Groomed] : well groomed [General Appearance - Well Nourished] : well nourished [No Deformities] : no deformities [General Appearance - In No Acute Distress] : no acute distress [] : no respiratory distress [Respiration, Rhythm And Depth] : normal respiratory rhythm and effort [Exaggerated Use Of Accessory Muscles For Inspiration] : no accessory muscle use [Auscultation Breath Sounds / Voice Sounds] : lungs were clear to auscultation bilaterally [Bowel Sounds] : normal bowel sounds [Abdomen Soft] : soft [Abdomen Tenderness] : non-tender [Cyanosis, Localized] : no localized cyanosis [Normal Conjunctiva] : the conjunctiva exhibited no abnormalities [No Oral Pallor] : no oral pallor [5th Left ICS - MCL] : palpated at the 5th LICS in the midclavicular line [Normal] : normal [No Precordial Heave] : no precordial heave was noted [Normal Rate] : normal [Rhythm Regular] : regular [Normal S1] : normal S1 [S1 Varying Intensity] : was of varying intensity [Normal S2] : normal S2 [No Gallop] : no gallop heard [No Murmur] : no murmurs heard [No Pitting Edema] : no pitting edema present [Skin Color & Pigmentation] : normal skin color and pigmentation [Skin Turgor] : normal skin turgor [Oriented To Time, Place, And Person] : oriented to person, place, and time [Impaired Insight] : insight and judgment were intact [Affect] : the affect was normal [Mood] : the mood was normal [No Anxiety] : not feeling anxious [S3] : no S3 [S4] : no S4 [Click] : no click [Pericardial Rub] : no pericardial rub

## 2022-12-02 NOTE — HISTORY OF PRESENT ILLNESS
[FreeTextEntry1] : 65 y/o F former smoker with PMHx HTN, HLD , CAD, s/p PCI and CABG, s/p Bioprosthetic AVR 2002, Mitral Valve, s/p Mitral valve annuloplasty 2002, PAD s/p pLSFA stent LCIA stent, with occluded Bilateral SFA (proximal to mid portion per U/S 2019), Chronic Anemia, AAA s/p Open repair 4/2/2015, CKD , Severe Renal Artery Stenosis, saccular AAA and contained rupture adjacent to previous stent graft s/p open thoracoabdominal aneurysm repair 8/2020, with 20mm tube graft and 6mm bypasses to the celiac, SMA, left renal, right renal (end to end), with recent finding of LLE claudication now s/p LLE angiogram angioplasty and stent to the left internal iliac artery on 5/2021.  Procedure complicated by left RP hematoma, s/p 2 U PRBC. \par \par Arrhythmia history includes longstanding palpitations s/p ILR 2016 (now dead).  Of note, prior ILR interrogation significant for one pause event c/w sinus arrest- pause ~ 4.4 seconds 10/9/18. She was unaware.  She presented for EP evaluation 6/2021 after being found to be in AFL/AT in Dr. Preston's office.  She has chronic anemia and has been unable to be maintained on OAC.  History of AF/AFL; found to be in AFib with VR in the 30 bpm.  Now s/p AV node ablation and dual chamber PPM placement with left bundle lead placement on 8/24/22.  She presents for follow-up today noting some brief palpitations lasting seconds occurring infrequently, but they are concerning to her. She wants to make sure the device is ok.\par \par \par \par

## 2022-12-02 NOTE — PROCEDURE
[Complete Heart Block] : complete heart block [CRT-P] : Cardiac resynchronization therapy pacemaker [DDD] : DDD [None] : none [Programmed for Longevity] : output reprogrammed for improved battery longevity [Lead Imp:  ___ohms] : lead impedance was [unfilled] ohms [Sensing Amplitude ___mv] : sensing amplitude was [unfilled] mv [___V @] : [unfilled] V [___ ms] : [unfilled] ms [de-identified] : [facility]  [de-identified] : Visionist [de-identified] : [id number] [de-identified] : 8/24/22 [de-identified] : Bi-V [de-identified] :  [de-identified] : 6.5 years [de-identified] : AT/AF 99%\par RVP 99\par LVP 99

## 2022-12-02 NOTE — REASON FOR VISIT
[Follow-up Device Check] : is here today for a follow-up device check visit for [Arrhythmia/ECG Abnorrmalities] : arrhythmia/ECG abnormalities [Palpitations] : palpitations

## 2023-01-01 ENCOUNTER — NON-APPOINTMENT (OUTPATIENT)
Age: 68
End: 2023-01-01

## 2023-01-01 ENCOUNTER — APPOINTMENT (OUTPATIENT)
Dept: CARDIOTHORACIC SURGERY | Facility: CLINIC | Age: 68
End: 2023-01-01

## 2023-01-01 ENCOUNTER — APPOINTMENT (OUTPATIENT)
Dept: ULTRASOUND IMAGING | Facility: HOSPITAL | Age: 68
End: 2023-01-01

## 2023-01-01 ENCOUNTER — APPOINTMENT (OUTPATIENT)
Dept: HEART AND VASCULAR | Facility: CLINIC | Age: 68
End: 2023-01-01
Payer: MEDICARE

## 2023-01-01 ENCOUNTER — OUTPATIENT (OUTPATIENT)
Dept: OUTPATIENT SERVICES | Facility: HOSPITAL | Age: 68
LOS: 1 days | End: 2023-01-01
Payer: MEDICARE

## 2023-01-01 ENCOUNTER — APPOINTMENT (OUTPATIENT)
Dept: CT IMAGING | Facility: HOSPITAL | Age: 68
End: 2023-01-01

## 2023-01-01 ENCOUNTER — APPOINTMENT (OUTPATIENT)
Dept: VASCULAR SURGERY | Facility: CLINIC | Age: 68
End: 2023-01-01
Payer: MEDICARE

## 2023-01-01 ENCOUNTER — APPOINTMENT (OUTPATIENT)
Dept: CARDIOTHORACIC SURGERY | Facility: CLINIC | Age: 68
End: 2023-01-01
Payer: MEDICARE

## 2023-01-01 ENCOUNTER — APPOINTMENT (OUTPATIENT)
Dept: PULMONOLOGY | Facility: CLINIC | Age: 68
End: 2023-01-01
Payer: MEDICARE

## 2023-01-01 VITALS
WEIGHT: 118 LBS | BODY MASS INDEX: 18.52 KG/M2 | TEMPERATURE: 97.9 F | HEIGHT: 67 IN | SYSTOLIC BLOOD PRESSURE: 99 MMHG | DIASTOLIC BLOOD PRESSURE: 60 MMHG | HEART RATE: 64 BPM

## 2023-01-01 VITALS
HEIGHT: 67 IN | HEART RATE: 73 BPM | SYSTOLIC BLOOD PRESSURE: 108 MMHG | DIASTOLIC BLOOD PRESSURE: 70 MMHG | TEMPERATURE: 97.7 F | WEIGHT: 113 LBS | OXYGEN SATURATION: 99 % | BODY MASS INDEX: 17.74 KG/M2

## 2023-01-01 VITALS
DIASTOLIC BLOOD PRESSURE: 65 MMHG | TEMPERATURE: 97.5 F | SYSTOLIC BLOOD PRESSURE: 125 MMHG | OXYGEN SATURATION: 100 % | BODY MASS INDEX: 17.74 KG/M2 | RESPIRATION RATE: 16 BRPM | HEIGHT: 67 IN | HEART RATE: 74 BPM | WEIGHT: 113 LBS

## 2023-01-01 VITALS
SYSTOLIC BLOOD PRESSURE: 119 MMHG | WEIGHT: 113 LBS | BODY MASS INDEX: 17.74 KG/M2 | HEART RATE: 76 BPM | DIASTOLIC BLOOD PRESSURE: 63 MMHG | HEIGHT: 67 IN

## 2023-01-01 DIAGNOSIS — I35.0 NONRHEUMATIC AORTIC (VALVE) STENOSIS: ICD-10-CM

## 2023-01-01 DIAGNOSIS — I25.701 ATHEROSCLEROSIS OF CORONARY ARTERY BYPASS GRAFT(S), UNSPECIFIED, WITH ANGINA PECTORIS WITH DOCUMENTED SPASM: Chronic | ICD-10-CM

## 2023-01-01 DIAGNOSIS — I71.4 ABDOMINAL AORTIC ANEURYSM, WITHOUT RUPTURE: Chronic | ICD-10-CM

## 2023-01-01 DIAGNOSIS — R00.2 PALPITATIONS: ICD-10-CM

## 2023-01-01 DIAGNOSIS — I71.8 AORTIC ANEURYSM OF UNSPECIFIED SITE, RUPTURED: Chronic | ICD-10-CM

## 2023-01-01 DIAGNOSIS — J43.9 EMPHYSEMA, UNSPECIFIED: ICD-10-CM

## 2023-01-01 DIAGNOSIS — Z95.2 PRESENCE OF PROSTHETIC HEART VALVE: Chronic | ICD-10-CM

## 2023-01-01 DIAGNOSIS — Z87.448 PERSONAL HISTORY OF OTHER DISEASES OF URINARY SYSTEM: ICD-10-CM

## 2023-01-01 PROCEDURE — 99213 OFFICE O/P EST LOW 20 MIN: CPT

## 2023-01-01 PROCEDURE — 93294 REM INTERROG EVL PM/LDLS PM: CPT

## 2023-01-01 PROCEDURE — 93280 PM DEVICE PROGR EVAL DUAL: CPT

## 2023-01-01 PROCEDURE — 94060 EVALUATION OF WHEEZING: CPT

## 2023-01-01 PROCEDURE — 93979 VASCULAR STUDY: CPT

## 2023-01-01 PROCEDURE — 93880 EXTRACRANIAL BILAT STUDY: CPT | Mod: 26

## 2023-01-01 PROCEDURE — 99214 OFFICE O/P EST MOD 30 MIN: CPT | Mod: 25

## 2023-01-01 PROCEDURE — 94729 DIFFUSING CAPACITY: CPT

## 2023-01-01 PROCEDURE — 99211 OFF/OP EST MAY X REQ PHY/QHP: CPT | Mod: 25

## 2023-01-01 PROCEDURE — 93880 EXTRACRANIAL BILAT STUDY: CPT

## 2023-01-01 PROCEDURE — 93296 REM INTERROG EVL PM/IDS: CPT

## 2023-01-01 PROCEDURE — 94010 BREATHING CAPACITY TEST: CPT | Mod: 26

## 2023-01-01 PROCEDURE — 99214 OFFICE O/P EST MOD 30 MIN: CPT

## 2023-01-01 PROCEDURE — 93978 VASCULAR STUDY: CPT

## 2023-01-01 PROCEDURE — 93281 PM DEVICE PROGR EVAL MULTI: CPT

## 2023-01-01 PROCEDURE — 94760 N-INVAS EAR/PLS OXIMETRY 1: CPT

## 2023-01-01 PROCEDURE — 99205 OFFICE O/P NEW HI 60 MIN: CPT

## 2023-01-01 PROCEDURE — 94726 PLETHYSMOGRAPHY LUNG VOLUMES: CPT

## 2023-01-01 PROCEDURE — 94729 DIFFUSING CAPACITY: CPT | Mod: 26

## 2023-01-01 PROCEDURE — 94726 PLETHYSMOGRAPHY LUNG VOLUMES: CPT | Mod: 26

## 2023-01-01 RX ORDER — ALBUTEROL 90 UG/1
2 AEROSOL, METERED ORAL ONCE
Refills: 0 | Status: DISCONTINUED | OUTPATIENT
Start: 2023-01-01 | End: 2023-01-01

## 2023-01-01 RX ORDER — METOPROLOL TARTRATE 25 MG/1
25 TABLET, FILM COATED ORAL TWICE DAILY
Qty: 180 | Refills: 3 | Status: ACTIVE | COMMUNITY

## 2023-01-03 ENCOUNTER — APPOINTMENT (OUTPATIENT)
Dept: VASCULAR SURGERY | Facility: CLINIC | Age: 68
End: 2023-01-03
Payer: MEDICARE

## 2023-01-03 VITALS
WEIGHT: 111 LBS | HEART RATE: 96 BPM | DIASTOLIC BLOOD PRESSURE: 72 MMHG | SYSTOLIC BLOOD PRESSURE: 112 MMHG | HEIGHT: 67 IN | BODY MASS INDEX: 17.42 KG/M2

## 2023-01-03 DIAGNOSIS — I73.9 PERIPHERAL VASCULAR DISEASE, UNSPECIFIED: ICD-10-CM

## 2023-01-03 PROCEDURE — 99213 OFFICE O/P EST LOW 20 MIN: CPT

## 2023-01-03 PROCEDURE — 93926 LOWER EXTREMITY STUDY: CPT

## 2023-01-09 NOTE — PROCEDURE
[FreeTextEntry1] : LLE duplex performed today to evaluate her LE circulation reveals widely patent L TIFFANY stent, patent L pSFA stent, but SFA otherwise occluded w/low-flow noted in her tibial vessels.

## 2023-01-09 NOTE — ADDENDUM
[FreeTextEntry1] : This note was written by Trever Retana, acting as a scribe for Dr. Daria Soriano.  I, Dr. Daria Soriano, have read and attest that all the information, medical decision-making, and discharge instructions within are true and accurate.\par \par I, Dr. Daria Soriano, personally performed the evaluation and management (E/M) services for this established patient who presents today with (an) existing condition(s).  That E/M includes conducting the examination, assessing all conditions, and (re)establishing/reinforcing a plan of care.  Today, my ACP, Trever Retana, was here to observe my evaluation and management services for this condition to be followed going forward.\par \par

## 2023-01-09 NOTE — HISTORY OF PRESENT ILLNESS
[FreeTextEntry1] : 67yoF former smoker w/PMHx of HTN, HLD , CAD, s/p PCI and CABG, s/p bioprosthetic AVR in 2002, s/p Mitral valve annuloplasty in 2002, PAD s/p L pSFA stent/L TIFFANY stent, but occluded b/l SFA, chronic anemia, AAA s/p open repair in 2015, CKD , severe CARLI, presenting for reevaluation of her b/l LE short-distance claudication.  At her previous visit w/Dr. Sandra, he recommended she undergo a L fem-pop bypass w/PTFE as her LLE was her more symptomatic LE, and percutaneous revascularization was not an option.

## 2023-01-09 NOTE — PHYSICAL EXAM
[Normal Breath Sounds] : Normal breath sounds [0] : right 0 [Alert] : alert [Oriented to Person] : oriented to person [Oriented to Place] : oriented to place [Oriented to Time] : oriented to time [Calm] : calm [Respiratory Effort] : normal respiratory effort [Normal Rate and Rhythm] : normal rate and rhythm [2+] : left 2+ [1+] : left 1+ [No Rash or Lesion] : No rash or lesion [JVD] : no jugular venous distention  [Ankle Swelling (On Exam)] : not present [Varicose Veins Of Lower Extremities] : not present [Abdomen Tenderness] : ~T ~M No abdominal tenderness [] : not present [Skin Ulcer] : no ulcer [Skin Induration] : no induration [de-identified] : NAD, ambulates with a walker [de-identified] : NC/AT [de-identified] : Supple [de-identified] : No respiratory effort. New incision over left chest for PPM.  [de-identified] : R groin access site nicely healed with no active drainage, no purulence and no signs of infection.  [de-identified] : FROM throughout, strength 5/5x4 [de-identified] : grossly intact [de-identified] : Normal, dry skin of the feet. No skin changes. No wounds. No edema.

## 2023-01-09 NOTE — ASSESSMENT
[Arterial/Venous Disease] : arterial/venous disease [Medication Management] : medication management [Foot care/Footwear] : foot care/footwear [FreeTextEntry1] : 67yoF former smoker w/PMHx of HTN, HLD , CAD, s/p PCI and CABG, s/p bioprosthetic AVR in 2002, s/p Mitral valve annuloplasty in 2002, PAD s/p L pSFA stent/L TIFFANY stent, but occluded b/l SFA, chronic anemia, AAA s/p open repair in 2015, CKD , severe CARLI, presenting for reevaluation of her b/l LE short-distance claudication.  At her previous visit w/Dr. Sandra, he recommended she undergo a L fem-pop bypass w/PTFE as her LLE was her more symptomatic LE, and percutaneous revascularization was not an option.\par  \par LEs not threatened at this time.  We reviewed previous arterial studies-PVRs demonstrated dampened waveforms at the level of the L metatarsals.  LLE duplex performed today to evaluate her LE circulation reveals widely patent L TIFFANY stent, patent L pSFA stent, but SFA otherwise occluded w/low-flow noted in her tibial vessels.  Surgical bypass is currently the only reasonable option for this pt's LLE symptoms, but encouraged pt to continue regular exercise to promote LE perfusion, thereby postponing the need for a surgery.  Pt will continue medication and an exercise regimen.

## 2023-01-09 NOTE — REVIEW OF SYSTEMS
[Palpitations] : palpitations [Recent Weight Loss (___ Lbs)] : recent [unfilled] ~Ulb weight loss [As Noted in HPI] : as noted in HPI [Muscle Weakness] : muscle weakness [Negative] : Heme/Lymph [Fever] : no fever [Chills] : no chills [Feeling Poorly] : not feeling poorly [Leg Claudication] : no intermittent leg claudication [Lower Ext Edema] : no lower extremity edema [Abdominal Pain] : no abdominal pain [Vomiting] : no vomiting [Constipation] : no constipation [FreeTextEntry5] : Improved following PPM

## 2023-01-28 NOTE — PATIENT PROFILE ADULT. - AS [facility] [employer]
Problem: GI Bleed  Goal: S/S of GI bleeding reduced/controlled  1/28/2023 1053 by Adrianne Chau RN  Outcome: Outcome Met, Continue evaluating goal progress toward completion  1/28/2023 1053 by Adrianne Chau RN  Outcome: Outcome Met, Continue evaluating goal progress toward completion  Goal: Verbalizes understanding of s/s of GI bleeding and treatment  Description: Document education using the patient education activity.   1/28/2023 1053 by Adrianne Chau, RN  Outcome: Outcome Met, Continue evaluating goal progress toward completion  1/28/2023 1053 by Adrianne Chau RN  Outcome: Outcome Met, Continue evaluating goal progress toward completion      (4) no limitation

## 2023-02-22 ENCOUNTER — APPOINTMENT (OUTPATIENT)
Dept: HEART AND VASCULAR | Facility: CLINIC | Age: 68
End: 2023-02-22
Payer: MEDICARE

## 2023-02-22 ENCOUNTER — NON-APPOINTMENT (OUTPATIENT)
Age: 68
End: 2023-02-22

## 2023-02-22 PROCEDURE — 93294 REM INTERROG EVL PM/LDLS PM: CPT

## 2023-02-22 PROCEDURE — 93296 REM INTERROG EVL PM/IDS: CPT

## 2023-04-21 ENCOUNTER — OUTPATIENT (OUTPATIENT)
Dept: OUTPATIENT SERVICES | Facility: HOSPITAL | Age: 68
LOS: 1 days | End: 2023-04-21
Payer: MEDICARE

## 2023-04-21 DIAGNOSIS — Z95.2 PRESENCE OF PROSTHETIC HEART VALVE: Chronic | ICD-10-CM

## 2023-04-21 DIAGNOSIS — I25.701 ATHEROSCLEROSIS OF CORONARY ARTERY BYPASS GRAFT(S), UNSPECIFIED, WITH ANGINA PECTORIS WITH DOCUMENTED SPASM: Chronic | ICD-10-CM

## 2023-04-21 DIAGNOSIS — R01.1 CARDIAC MURMUR, UNSPECIFIED: ICD-10-CM

## 2023-04-21 DIAGNOSIS — I71.8 AORTIC ANEURYSM OF UNSPECIFIED SITE, RUPTURED: Chronic | ICD-10-CM

## 2023-04-21 DIAGNOSIS — I25.10 ATHEROSCLEROTIC HEART DISEASE OF NATIVE CORONARY ARTERY WITHOUT ANGINA PECTORIS: ICD-10-CM

## 2023-04-21 DIAGNOSIS — I71.4 ABDOMINAL AORTIC ANEURYSM, WITHOUT RUPTURE: Chronic | ICD-10-CM

## 2023-04-21 PROCEDURE — 93005 ELECTROCARDIOGRAM TRACING: CPT

## 2023-04-21 PROCEDURE — 93306 TTE W/DOPPLER COMPLETE: CPT | Mod: 26

## 2023-04-21 PROCEDURE — 93306 TTE W/DOPPLER COMPLETE: CPT

## 2023-04-21 PROCEDURE — 93010 ELECTROCARDIOGRAM REPORT: CPT

## 2023-05-18 NOTE — H&P ADULT - HISTORY OF PRESENT ILLNESS
67yo female, former smoker, with PMHx CAD s/p prior PCI and CABG, bioAVR/ mitral annuloplasty in 2002, PAD s/p LSFA stenting and history of occluded bilateral SFA, AAA s/p open repair 2015 and saccular AAA and contained rupture s/p thoracoabdominal aneurysm repair 2020, history of L sided RP hematoma requiring transfusions, chronic anemia receiving iron infusions, and known history of AT. Patient presented to ED today after being sent from Dr. Ng office for slowly conducted AF.  65yo female, former smoker, with PMHx of HTN, hyperlipidemia, CAD s/p prior PCI and CABG, bioAVR/ mitral annuloplasty in 2002, PAD s/p LSFA  and L TIFFANY (history of occluded bilateral SFA), AAA s/p open repair 2015 and saccular AAA and contained rupture s/p thoracoabdominal aneurysm repair 2020, history of L sided RP hematoma requiring transfusions, chronic anemia (receives Iron infusions, Last today 8/23), hypothyroid, CKD Stage III, Atach and Afib (unable to be on A/C due to bleeding/anemia, known to Dr. Jasso) who is referred to [facility] ED today 8/23 by Dr. Izquierdo with c/o Fatigue and slow Afib.  Patient recently seen by EP team in the ED on 8/8 for atrial tachycardia @ 170bpm which improved w/ adenosine and at that time patient was switched from metoprolol tartrate to Succinate 50mg PO BID w/ plan for further outpatient Eval.  Since that visit patient has been significantly fatigued, slight lightheadedness w/ decrease exercise tolerance and intermittent palpitations.  While at the infusion center receiving a Iron infusion patient mentioned these symptoms to Dr. Izquierdo for which an EKG was performed revealing Afib w/ HR 39bpm and patient was recommended to go to the ED.  On arrival to [facility] ED patient HD stable and HR improved to 60s.  EKG revealing Afib HR 66bpm.  Vitals: Temp 98, HR 85, /58, RR 18, O2 98% RA.  Labs significant for Potassium 2.9, BUN/Crt 17/2.01, Trop T 0.02, BNP 2726, Covid PCR negative.  Patient receiving Potassium 10meq IV x1 and potassium 40meq PO x1.  EP service evaluated patient in the ED w/ plan for AV node ablation +BI-V PPM implant.  Patient now admitted to cardiac Tele for Slow Afib w/ plan for EP procedure on 8/24.        Cimetidine Counseling:  I discussed with the patient the risks of Cimetidine including but not limited to gynecomastia, headache, diarrhea, nausea, drowsiness, arrhythmias, pancreatitis, skin rashes, psychosis, bone marrow suppression and kidney toxicity.

## 2023-06-02 NOTE — REASON FOR VISIT
[Follow-up Device Check] : is here today for a follow-up device check visit for [Other ___] : [unfilled] [Arrhythmia/ECG Abnorrmalities] : arrhythmia/ECG abnormalities [Palpitations] : palpitations

## 2023-06-02 NOTE — HISTORY OF PRESENT ILLNESS
[FreeTextEntry1] : 68 y/o F former smoker with PMHx HTN, HLD , CAD, s/p PCI and CABG, s/p Bioprosthetic AVR 2002, Mitral Valve, s/p Mitral valve annuloplasty 2002, PAD s/p pLSFA stent LCIA stent, with occluded Bilateral SFA (proximal to mid portion per U/S 2019), Chronic Anemia, AAA s/p Open repair 4/2/2015, CKD , Severe Renal Artery Stenosis, saccular AAA and contained rupture adjacent to previous stent graft s/p open thoracoabdominal aneurysm repair 8/2020, with 20mm tube graft and 6mm bypasses to the celiac, SMA, left renal, right renal (end to end), with recent finding of LLE claudication now s/p LLE angiogram angioplasty and stent to the left internal iliac artery on 5/2021.  Procedure complicated by left RP hematoma, s/p 2 U PRBC. \par \par Arrhythmia history includes longstanding palpitations s/p ILR 2016 (now dead).  Of note, prior ILR interrogation significant for one pause event c/w sinus arrest- pause ~ 4.4 seconds 10/9/18. She was unaware.  She presented for EP evaluation 6/2021 after being found to be in AFL/AT in Dr. Preston's office.  She has chronic anemia and has been unable to be maintained on OAC.  History of AF/AFL; found to be in AFib with VR in the 30 bpm.  Now s/p AV node ablation and dual chamber PPM placement with left bundle lead placement on 8/24/22.  She presents for follow-up today noting some brief palpitations lasting seconds occurring infrequently, but they are concerning to her. She wants to make sure the device is ok.\par \par \par \par

## 2023-06-02 NOTE — PHYSICAL EXAM
[General Appearance - Well Developed] : well developed [Normal Appearance] : normal appearance [Well Groomed] : well groomed [General Appearance - Well Nourished] : well nourished [General Appearance - In No Acute Distress] : no acute distress [No Deformities] : no deformities [] : no respiratory distress [Respiration, Rhythm And Depth] : normal respiratory rhythm and effort [Auscultation Breath Sounds / Voice Sounds] : lungs were clear to auscultation bilaterally [Exaggerated Use Of Accessory Muscles For Inspiration] : no accessory muscle use [Bowel Sounds] : normal bowel sounds [Abdomen Soft] : soft [Abdomen Tenderness] : non-tender [Cyanosis, Localized] : no localized cyanosis [Normal Conjunctiva] : the conjunctiva exhibited no abnormalities [No Oral Pallor] : no oral pallor [5th Left ICS - MCL] : palpated at the 5th LICS in the midclavicular line [Normal] : normal [Normal Rate] : normal [No Precordial Heave] : no precordial heave was noted [Rhythm Regular] : regular [Normal S1] : normal S1 [S1 Varying Intensity] : was of varying intensity [Normal S2] : normal S2 [No Gallop] : no gallop heard [No Murmur] : no murmurs heard [No Pitting Edema] : no pitting edema present [Skin Color & Pigmentation] : normal skin color and pigmentation [Skin Turgor] : normal skin turgor [Oriented To Time, Place, And Person] : oriented to person, place, and time [Impaired Insight] : insight and judgment were intact [Affect] : the affect was normal [Mood] : the mood was normal [No Anxiety] : not feeling anxious [S3] : no S3 [S4] : no S4 [Click] : no click [Pericardial Rub] : no pericardial rub

## 2023-06-02 NOTE — PROCEDURE
[Complete Heart Block] : complete heart block [CRT-P] : Cardiac resynchronization therapy pacemaker [DDD] : DDD [Lead Imp:  ___ohms] : lead impedance was [unfilled] ohms [Sensing Amplitude ___mv] : sensing amplitude was [unfilled] mv [___V @] : [unfilled] V [___ ms] : [unfilled] ms [None] : none [Programmed for Longevity] : output reprogrammed for improved battery longevity [de-identified] : [facility]  [de-identified] : [employer] [de-identified] : [id number] [de-identified] : 8/24/22 [de-identified] : Bi-V [de-identified] :  [de-identified] : 7 years [de-identified] : AT/AF 99%\par RVP 99\par LVP 99\par Now DDDR\par See Paceart

## 2023-06-02 NOTE — END OF VISIT
[FreeTextEntry3] : I, Gina Phillips, am scribing for (in the presence of) Dr. Jasso the following sections: HPI, PMH,Family/social history, ROS, Physical Exam, Assessment / Plan. \par \par I, Shahriar Jasso, personally performed the services described in the documentation, reviewed the documentation recorded by the scribe in my presence and it accurately and completely records my words and actions.

## 2023-06-02 NOTE — DISCUSSION/SUMMARY
[___ Month(s)] : in [unfilled] month(s) [Pacemaker Function Normal] : normal pacemaker function [de-identified] : RTO in 6 months

## 2023-06-09 NOTE — PHYSICAL EXAM
[Normal Breath Sounds] : Normal breath sounds [Respiratory Effort] : normal respiratory effort [Normal Rate and Rhythm] : normal rate and rhythm [2+] : left 2+ [0] : right 0 [1+] : left 1+ [No Rash or Lesion] : No rash or lesion [Alert] : alert [Oriented to Person] : oriented to person [Oriented to Place] : oriented to place [Oriented to Time] : oriented to time [Calm] : calm [Murmur] : murmur was appreciated  [JVD] : no jugular venous distention  [Carotid Bruits] : no carotid bruits [Ankle Swelling (On Exam)] : not present [Varicose Veins Of Lower Extremities] : not present [] : not present [Abdomen Tenderness] : ~T ~M No abdominal tenderness [Skin Ulcer] : no ulcer [Skin Induration] : no induration [de-identified] : NAD, ambulates with a walker [de-identified] : NC/AT [de-identified] : Supple, transmitted murmur appreciated in both carotid arteries [de-identified] : No respiratory effort. New incision over left chest for PPM.  [de-identified] : R groin access site nicely healed with no active drainage, no purulence and no signs of infection.  [de-identified] : FROM throughout, strength 5/5x4 [de-identified] : Normal, dry skin of the feet. No skin changes. No wounds. No edema.  [de-identified] : grossly intact

## 2023-06-09 NOTE — REVIEW OF SYSTEMS
[Recent Weight Loss (___ Lbs)] : recent [unfilled] ~Ulb weight loss [Palpitations] : palpitations [As Noted in HPI] : as noted in HPI [Muscle Weakness] : muscle weakness [Negative] : Heme/Lymph [Fever] : no fever [Chills] : no chills [Feeling Poorly] : not feeling poorly [Leg Claudication] : no intermittent leg claudication [Lower Ext Edema] : no lower extremity edema [Abdominal Pain] : no abdominal pain [Vomiting] : no vomiting [Constipation] : no constipation [FreeTextEntry5] : Improved following PPM

## 2023-06-09 NOTE — DATA REVIEWED
[FreeTextEntry1] : Previous LLE duplex performed today to evaluate her LE circulation reveals widely patent L TIFFANY stent, patent L pSFA stent, but SFA otherwise occluded w/low-flow noted in her tibial vessels.

## 2023-06-09 NOTE — PROCEDURE
[FreeTextEntry1] : Aorto-iliac duplex performed to evaluate patency reveals widely patent aortic graft, patent L TIFFANY stent, L EIA 50% stenosis noted, patent R pSFA stent noted.\par \par Carotid duplex performed to evaluate for progression of stenosis reveals b/l <50% stenosis due to fibrocalcific plaque, both vertebral arteries patent w/antegrade flow.

## 2023-06-09 NOTE — ASSESSMENT
[Arterial/Venous Disease] : arterial/venous disease [Medication Management] : medication management [Foot care/Footwear] : foot care/footwear [FreeTextEntry1] : 67yoF former smoker w/PMHx of HTN, HLD , CAD, s/p PCI and CABG, s/p bioprosthetic AVR in 2002, s/p Mitral valve annuloplasty in 2002, PAD s/p L pSFA stent/L TIFFANY stent, but occluded b/l SFA, chronic anemia, AAA s/p open repair in 2015, CKD , severe CARLI, presenting for reevaluation of her b/l LE short-distance claudication.  At her previous visit w/Dr. Sandra, he recommended she undergo a L fem-pop bypass w/PTFE as her LLE was her more symptomatic LE, and percutaneous revascularization was not an option.\par  \par LEs not threatened at this time.  We reviewed previous arterial studies-PVRs demonstrated dampened waveforms at the level of the L metatarsals.  Aorto-iliac duplex performed to evaluate patency reveals widely patent aortic graft, patent L TIFFANY stent, L EIA 50% stenosis noted, patent R pSFA stent noted.  Carotid duplex performed to evaluate for progression of stenosis reveals b/l <50% stenosis due to fibrocalcific plaque, both vertebral arteries patent w/antegrade flow..  Surgical bypass is currently the only reasonable option for this pt's LLE symptoms, but encouraged pt to continue regular exercise to promote LE perfusion, thereby postponing the need for a surgery.  Pt will continue medication and an exercise regimen.

## 2023-06-22 NOTE — ED ADULT NURSE NOTE - [id number]_ED_A_ED
Faxed completed cardiac risk assessment to THE [facility] dental # [phone]. No

## 2023-06-28 NOTE — DISCHARGE NOTE PROVIDER - REASON FOR ADMISSION
[de-identified] : Patient is a 55-year-old male here today for follow-up of his bilateral knee osteoarthritis.  Overall doing very well since his last visit.  He states that the cortisone injections have provided great relief.  Has been doing directed at home exercise as well as going to the gym.  Has finished his anti-inflammatories.  Overall is very happy with his progress
fatigue and slow HR

## 2023-07-05 NOTE — CONSULT NOTE ADULT - ATTENDING COMMENTS
Follow-up with your primary care physician in one week if symptoms have not improved or symptoms are starting to get worse. Increase fluids, keep well-hydrated. Take Tylenol and Motrin for fever and pain.   Finish the full course of Augmentin  Return to the emergency room if your symptoms or concerns 64 year old woman with refractory anemia, ASHD, valvulasr heart disease, prior AAAis admitted for repair of two additional thoracoabdominal aortic aneurysms.  She ha CKD3 which increases her risk for developing ischemic CHERRI following potential renal hypoperfusion during vascular surgery.  Patient is feeling well today and I pre-op for tomorrow.  I agree with the renal fellow's note above.

## 2023-08-01 NOTE — H&P ADULT. - PMH
Anemia  Anemia  Atherosclerosis of coronary artery  CAD (coronary artery disease)  Essential hypertension  HTN (hypertension)  Gastroesophageal reflux disease  GERD (gastroesophageal reflux disease)  Hyperlipidemia  Hyperlipidemia  Hypothyroidism  Hypothyroidism  Peripheral vascular disease  PVD (peripheral vascular disease)  Seizure
 contractions

## 2023-10-30 NOTE — H&P ADULT - I WAS PHYSICALLY PRESENT FOR THE KEY PORTIONS OF THE EVALUATION AND MANAGEMENT (E/M) SERVICE PROVIDED.  I AGREE WITH THE ABOVE HISTORY, PHYSICAL, AND PLAN WHICH I HAVE REVIEWED AND EDITED WHERE APPROPRIATE
Medication: HYDROcodone-acetaminophen (0031 Britely) 7.5-325 MG     Date of last refill: 09/26/23  [place] 82/95/24    Last office visit: 10/16/23  Due back to clinic per last office note:  na  Date next office visit scheduled:  none    Last URINE Screen: 04/10/23  Screen Results:  please review in chart      Narcotic Contract EXPIRATION date: 04/04/23    Last OV note recommendation:   ASSESSMENT AND PLAN:   Lumbar spondylosis  (primary encounter diagnosis)     The patient is a pleasant 68-year-old gentleman with a history of low back pain. This is due to lumbar facet arthropathy. Had good symptom relief with diagnostic medial branch block completed in March 2023. Now has return of pain symptoms with bilateral facet loading. Discussed repeat lumbar medial branch block to facilitate radiofrequency ablation. Patient reports good understanding like to proceed. Of note, patient had more than 95% relief during the initial diagnostic phase of the local anesthetic which is 0.5% bupivacaine. This lasted for 8 hours after the injection. This was also accompanied by improved walking and patient was able to vacuum and do dishes. Statement Selected

## 2023-11-16 PROBLEM — Z87.448 HISTORY OF CHRONIC KIDNEY DISEASE: Status: RESOLVED | Noted: 2023-01-01 | Resolved: 2023-01-01

## 2023-11-20 PROBLEM — I35.0 AORTIC STENOSIS: Status: ACTIVE | Noted: 2023-01-01

## 2023-11-20 NOTE — BRIEF OPERATIVE NOTE - PRIMARY SURGEON
2308 MD notified of severe range BP's after administration of 80mg of labetalol. MD ordered for 10mg of hydralazine and 0.1mg clonidine patch if the hydralazine did not resolve the severe range pressures.     Mg+ level 4.2 MD ordered to leave Mg+ at 2g/hr.    Jocy

## 2023-12-21 PROBLEM — J43.9 PULMONARY EMPHYSEMA, UNSPECIFIED EMPHYSEMA TYPE: Status: ACTIVE | Noted: 2023-01-01

## 2023-12-21 NOTE — ASSESSMENT
[FreeTextEntry1] : Pulmonary function testing and long smoking history suggest that much of her dyspnea could be COPD as well as her underlying coronary disease and history of heart failure.  I have begun her on Anoro (LAMA LABA) for at least a 1 month trial.  I like to see her again at the end of that time.

## 2023-12-21 NOTE — PHYSICAL EXAM
[Normal Oropharynx] : normal oropharynx [Normal Appearance] : normal appearance [No Neck Mass] : no neck mass [Normal Rate/Rhythm] : normal rate/rhythm [Normal S1, S2] : normal s1, s2 [No Murmurs] : no murmurs [No Resp Distress] : no resp distress [Clear to Auscultation Bilaterally] : clear to auscultation bilaterally [No Clubbing] : no clubbing [No Cyanosis] : no cyanosis [No Edema] : no edema [TextBox_2] : Very thin elderly female in no distress

## 2023-12-21 NOTE — HISTORY OF PRESENT ILLNESS
[TextBox_4] : Initial visit for this 68-year-old woman complaining of dyspnea on exertion.  She has a significant cardiac history including coronary artery disease and aortic valve replacement and has a permanent pacemaker.  She reports dyspnea on exertion for at least several months.  She has a mild morning productive cough, and sometimes wheezes on exertion.  She is short of breath climbing 1 flight of stairs.  She is a former smoker, smoked 2 packs/day until March 1998.  She has not been told of asthma or previous chest infection, she has been given albuterol in the past which she has used occasionally, did not find it helpful.  She does report that sometimes her dyspnea is accompanied by a feeling of anterior chest pain. Pulmonary function testing done 11/20/2023 shows mild obstruction, FEV1 75% predicted, 10% improvement after bronchodilator.  Lung volumes are probably normal.  Fusion capacity reduced.

## 2024-01-01 ENCOUNTER — APPOINTMENT (OUTPATIENT)
Dept: PULMONOLOGY | Facility: CLINIC | Age: 69
End: 2024-01-01

## 2024-01-01 ENCOUNTER — TRANSCRIPTION ENCOUNTER (OUTPATIENT)
Age: 69
End: 2024-01-01

## 2024-01-01 ENCOUNTER — OUTPATIENT (OUTPATIENT)
Dept: OUTPATIENT SERVICES | Facility: HOSPITAL | Age: 69
LOS: 1 days | End: 2024-01-01
Payer: MEDICARE

## 2024-01-01 ENCOUNTER — INPATIENT (INPATIENT)
Facility: HOSPITAL | Age: 69
LOS: 1 days | End: 2024-04-29
Attending: SURGERY | Admitting: SURGERY
Payer: MEDICARE

## 2024-01-01 ENCOUNTER — APPOINTMENT (OUTPATIENT)
Dept: HEART AND VASCULAR | Facility: CLINIC | Age: 69
End: 2024-01-01
Payer: MEDICARE

## 2024-01-01 ENCOUNTER — RESULT REVIEW (OUTPATIENT)
Age: 69
End: 2024-01-01

## 2024-01-01 ENCOUNTER — APPOINTMENT (OUTPATIENT)
Dept: CARDIOTHORACIC SURGERY | Facility: CLINIC | Age: 69
End: 2024-01-01
Payer: MEDICARE

## 2024-01-01 ENCOUNTER — APPOINTMENT (OUTPATIENT)
Dept: VASCULAR SURGERY | Facility: CLINIC | Age: 69
End: 2024-01-01
Payer: MEDICARE

## 2024-01-01 ENCOUNTER — INPATIENT (INPATIENT)
Facility: HOSPITAL | Age: 69
LOS: 14 days | Discharge: NURSING, REHAB OR LONG-TERM CARE | DRG: 268 | End: 2024-04-09
Attending: SURGERY | Admitting: GENERAL ACUTE CARE HOSPITAL
Payer: MEDICARE

## 2024-01-01 ENCOUNTER — NON-APPOINTMENT (OUTPATIENT)
Age: 69
End: 2024-01-01

## 2024-01-01 VITALS
OXYGEN SATURATION: 100 % | SYSTOLIC BLOOD PRESSURE: 114 MMHG | HEART RATE: 75 BPM | DIASTOLIC BLOOD PRESSURE: 68 MMHG | TEMPERATURE: 98 F | RESPIRATION RATE: 16 BRPM

## 2024-01-01 VITALS
TEMPERATURE: 98 F | OXYGEN SATURATION: 100 % | HEART RATE: 75 BPM | DIASTOLIC BLOOD PRESSURE: 67 MMHG | WEIGHT: 108.91 LBS | RESPIRATION RATE: 22 BRPM | HEIGHT: 66 IN | SYSTOLIC BLOOD PRESSURE: 101 MMHG

## 2024-01-01 VITALS
SYSTOLIC BLOOD PRESSURE: 121 MMHG | BODY MASS INDEX: 18.32 KG/M2 | TEMPERATURE: 98.1 F | DIASTOLIC BLOOD PRESSURE: 69 MMHG | HEART RATE: 75 BPM | WEIGHT: 114 LBS | HEIGHT: 66 IN

## 2024-01-01 VITALS
SYSTOLIC BLOOD PRESSURE: 114 MMHG | HEART RATE: 86 BPM | OXYGEN SATURATION: 100 % | WEIGHT: 115.08 LBS | TEMPERATURE: 98 F | RESPIRATION RATE: 17 BRPM | DIASTOLIC BLOOD PRESSURE: 72 MMHG | HEIGHT: 66 IN

## 2024-01-01 VITALS — WEIGHT: 115 LBS | BODY MASS INDEX: 18.48 KG/M2 | HEART RATE: 76 BPM | HEIGHT: 66 IN | TEMPERATURE: 97.1 F

## 2024-01-01 VITALS — RESPIRATION RATE: 16 BRPM

## 2024-01-01 VITALS
HEIGHT: 66 IN | RESPIRATION RATE: 18 BRPM | BODY MASS INDEX: 18.48 KG/M2 | WEIGHT: 115 LBS | DIASTOLIC BLOOD PRESSURE: 51 MMHG | HEART RATE: 70 BPM | SYSTOLIC BLOOD PRESSURE: 98 MMHG | TEMPERATURE: 97.1 F

## 2024-01-01 VITALS — DIASTOLIC BLOOD PRESSURE: 59 MMHG | SYSTOLIC BLOOD PRESSURE: 114 MMHG

## 2024-01-01 DIAGNOSIS — K92.0 HEMATEMESIS: ICD-10-CM
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
## 2024-01-01 DIAGNOSIS — I13.0 HYPERTENSIVE HEART AND CHRONIC KIDNEY DISEASE WITH HEART FAILURE AND STAGE 1 THROUGH STAGE 4 CHRONIC KIDNEY DISEASE, OR UNSPECIFIED CHRONIC KIDNEY DISEASE: ICD-10-CM

## 2024-01-01 DIAGNOSIS — Z79.01 LONG TERM (CURRENT) USE OF ANTICOAGULANTS: ICD-10-CM

## 2024-01-01 DIAGNOSIS — I70.211 ATHEROSCLEROSIS OF NATIVE ARTERIES OF EXTREMITIES WITH INTERMITTENT CLAUDICATION, RIGHT LEG: ICD-10-CM

## 2024-01-01 DIAGNOSIS — G82.20 PARAPLEGIA, UNSPECIFIED: ICD-10-CM

## 2024-01-01 DIAGNOSIS — Z95.1 PRESENCE OF AORTOCORONARY BYPASS GRAFT: ICD-10-CM

## 2024-01-01 DIAGNOSIS — T82.390A OTHER MECHANICAL COMPLICATION OF AORTIC (BIFURCATION) GRAFT (REPLACEMENT), INITIAL ENCOUNTER: ICD-10-CM

## 2024-01-01 DIAGNOSIS — I71.8 AORTIC ANEURYSM OF UNSPECIFIED SITE, RUPTURED: Chronic | ICD-10-CM

## 2024-01-01 DIAGNOSIS — I48.91 UNSPECIFIED ATRIAL FIBRILLATION: ICD-10-CM

## 2024-01-01 DIAGNOSIS — Z95.3 PRESENCE OF XENOGENIC HEART VALVE: ICD-10-CM

## 2024-01-01 DIAGNOSIS — Z41.8 ENCOUNTER FOR OTHER PROCEDURES FOR PURPOSES OTHER THAN REMEDYING HEALTH STATE: ICD-10-CM

## 2024-01-01 DIAGNOSIS — I25.701 ATHEROSCLEROSIS OF CORONARY ARTERY BYPASS GRAFT(S), UNSPECIFIED, WITH ANGINA PECTORIS WITH DOCUMENTED SPASM: Chronic | ICD-10-CM

## 2024-01-01 DIAGNOSIS — I25.10 ATHEROSCLEROTIC HEART DISEASE OF NATIVE CORONARY ARTERY WITHOUT ANGINA PECTORIS: ICD-10-CM

## 2024-01-01 DIAGNOSIS — E83.52 HYPERCALCEMIA: ICD-10-CM

## 2024-01-01 DIAGNOSIS — I71.4 ABDOMINAL AORTIC ANEURYSM, WITHOUT RUPTURE: Chronic | ICD-10-CM

## 2024-01-01 DIAGNOSIS — D62 ACUTE POSTHEMORRHAGIC ANEMIA: ICD-10-CM

## 2024-01-01 DIAGNOSIS — K59.00 CONSTIPATION, UNSPECIFIED: ICD-10-CM

## 2024-01-01 DIAGNOSIS — T81.719A COMPLICATION OF UNSPECIFIED ARTERY FOLLOWING A PROCEDURE, NOT ELSEWHERE CLASSIFIED, INITIAL ENCOUNTER: ICD-10-CM

## 2024-01-01 DIAGNOSIS — G93.41 METABOLIC ENCEPHALOPATHY: ICD-10-CM

## 2024-01-01 DIAGNOSIS — N18.30 CHRONIC KIDNEY DISEASE, STAGE 3 UNSPECIFIED: ICD-10-CM

## 2024-01-01 DIAGNOSIS — K29.81 DUODENITIS WITH BLEEDING: ICD-10-CM

## 2024-01-01 DIAGNOSIS — E86.1 HYPOVOLEMIA: ICD-10-CM

## 2024-01-01 DIAGNOSIS — I71.9 AORTIC ANEURYSM OF UNSPECIFIED SITE, W/OUT RUPTURE: ICD-10-CM

## 2024-01-01 DIAGNOSIS — Z78.1 PHYSICAL RESTRAINT STATUS: ICD-10-CM

## 2024-01-01 DIAGNOSIS — G89.18 OTHER ACUTE POSTPROCEDURAL PAIN: ICD-10-CM

## 2024-01-01 DIAGNOSIS — D69.6 THROMBOCYTOPENIA, UNSPECIFIED: ICD-10-CM

## 2024-01-01 DIAGNOSIS — I70.1 ATHEROSCLEROSIS OF RENAL ARTERY: ICD-10-CM

## 2024-01-01 DIAGNOSIS — Z87.891 PERSONAL HISTORY OF NICOTINE DEPENDENCE: ICD-10-CM

## 2024-01-01 DIAGNOSIS — I10 ESSENTIAL (PRIMARY) HYPERTENSION: ICD-10-CM

## 2024-01-01 DIAGNOSIS — K29.71 GASTRITIS, UNSPECIFIED, WITH BLEEDING: ICD-10-CM

## 2024-01-01 DIAGNOSIS — E87.1 HYPO-OSMOLALITY AND HYPONATREMIA: ICD-10-CM

## 2024-01-01 DIAGNOSIS — I73.9 PERIPHERAL VASCULAR DISEASE, UNSPECIFIED: ICD-10-CM

## 2024-01-01 DIAGNOSIS — F32.9 MAJOR DEPRESSIVE DISORDER, SINGLE EPISODE, UNSPECIFIED: ICD-10-CM

## 2024-01-01 DIAGNOSIS — I71.9 AORTIC ANEURYSM OF UNSPECIFIED SITE, WITHOUT RUPTURE: ICD-10-CM

## 2024-01-01 DIAGNOSIS — I48.20 CHRONIC ATRIAL FIBRILLATION, UNSPECIFIED: ICD-10-CM

## 2024-01-01 DIAGNOSIS — K22.2 ESOPHAGEAL OBSTRUCTION: ICD-10-CM

## 2024-01-01 DIAGNOSIS — I50.30 UNSPECIFIED DIASTOLIC (CONGESTIVE) HEART FAILURE: ICD-10-CM

## 2024-01-01 DIAGNOSIS — Z95.0 PRESENCE OF CARDIAC PACEMAKER: ICD-10-CM

## 2024-01-01 DIAGNOSIS — I08.0 RHEUMATIC DISORDERS OF BOTH MITRAL AND AORTIC VALVES: ICD-10-CM

## 2024-01-01 DIAGNOSIS — D64.9 ANEMIA, UNSPECIFIED: ICD-10-CM
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
## 2024-01-01 DIAGNOSIS — G89.29 OTHER CHRONIC PAIN: ICD-10-CM

## 2024-01-01 DIAGNOSIS — K92.1 MELENA: ICD-10-CM

## 2024-01-01 DIAGNOSIS — E03.9 HYPOTHYROIDISM, UNSPECIFIED: ICD-10-CM

## 2024-01-01 DIAGNOSIS — I71.60 THORACOABDOMINAL AORTIC ANEURYSM, WITHOUT RUPTURE, UNSPECIFIED: ICD-10-CM

## 2024-01-01 DIAGNOSIS — G40.401 OTHER GENERALIZED EPILEPSY AND EPILEPTIC SYNDROMES, NOT INTRACTABLE, WITH STATUS EPILEPTICUS: ICD-10-CM

## 2024-01-01 DIAGNOSIS — R56.9 UNSPECIFIED CONVULSIONS: ICD-10-CM

## 2024-01-01 DIAGNOSIS — Z95.2 PRESENCE OF PROSTHETIC HEART VALVE: Chronic | ICD-10-CM

## 2024-01-01 DIAGNOSIS — R09.89 OTHER SPECIFIED SYMPTOMS AND SIGNS INVOLVING THE CIRCULATORY AND RESPIRATORY SYSTEMS: ICD-10-CM

## 2024-01-01 DIAGNOSIS — M25.18 FISTULA, OTHER SPECIFIED SITE: ICD-10-CM

## 2024-01-01 DIAGNOSIS — T81.19XA OTHER POSTPROCEDURAL SHOCK, INITIAL ENCOUNTER: ICD-10-CM

## 2024-01-01 DIAGNOSIS — I48.4 ATYPICAL ATRIAL FLUTTER: ICD-10-CM

## 2024-01-01 DIAGNOSIS — I35.0 NONRHEUMATIC AORTIC (VALVE) STENOSIS: ICD-10-CM

## 2024-01-01 DIAGNOSIS — I71.40 ABDOMINAL AORTIC ANEURYSM, WITHOUT RUPTURE, UNSPECIFIED: ICD-10-CM

## 2024-01-01 DIAGNOSIS — T82.7XXA INFECTION AND INFLAMMATORY REACTION DUE TO OTHER CARDIAC AND VASCULAR DEVICES, IMPLANTS AND GRAFTS, INITIAL ENCOUNTER: ICD-10-CM

## 2024-01-01 DIAGNOSIS — K21.9 GASTRO-ESOPHAGEAL REFLUX DISEASE WITHOUT ESOPHAGITIS: ICD-10-CM

## 2024-01-01 DIAGNOSIS — E87.20 ACIDOSIS, UNSPECIFIED: ICD-10-CM

## 2024-01-01 DIAGNOSIS — Z79.82 LONG TERM (CURRENT) USE OF ASPIRIN: ICD-10-CM

## 2024-01-01 DIAGNOSIS — E78.5 HYPERLIPIDEMIA, UNSPECIFIED: ICD-10-CM

## 2024-01-01 DIAGNOSIS — Z01.818 ENCOUNTER FOR OTHER PREPROCEDURAL EXAMINATION: ICD-10-CM

## 2024-01-01 DIAGNOSIS — R65.21 SEVERE SEPSIS WITH SEPTIC SHOCK: ICD-10-CM

## 2024-01-01 DIAGNOSIS — J98.11 ATELECTASIS: ICD-10-CM

## 2024-01-01 DIAGNOSIS — Z29.9 ENCOUNTER FOR PROPHYLACTIC MEASURES, UNSPECIFIED: ICD-10-CM

## 2024-01-01 DIAGNOSIS — E43 UNSPECIFIED SEVERE PROTEIN-CALORIE MALNUTRITION: ICD-10-CM

## 2024-01-01 DIAGNOSIS — R41.82 ALTERED MENTAL STATUS, UNSPECIFIED: ICD-10-CM

## 2024-01-01 DIAGNOSIS — Z95.2 PRESENCE OF PROSTHETIC HEART VALVE: ICD-10-CM

## 2024-01-01 DIAGNOSIS — G40.909 EPILEPSY, UNSPECIFIED, NOT INTRACTABLE, WITHOUT STATUS EPILEPTICUS: ICD-10-CM

## 2024-01-01 DIAGNOSIS — N17.9 ACUTE KIDNEY FAILURE, UNSPECIFIED: ICD-10-CM

## 2024-01-01 DIAGNOSIS — E21.0 PRIMARY HYPERPARATHYROIDISM: ICD-10-CM

## 2024-01-01 DIAGNOSIS — A41.9 SEPSIS, UNSPECIFIED ORGANISM: ICD-10-CM

## 2024-01-01 DIAGNOSIS — Z95.820 PERIPHERAL VASCULAR ANGIOPLASTY STATUS WITH IMPLANTS AND GRAFTS: ICD-10-CM

## 2024-01-01 DIAGNOSIS — E87.5 HYPERKALEMIA: ICD-10-CM

## 2024-01-01 DIAGNOSIS — T81.31XA DISRUPTION OF EXTERNAL OPERATION (SURGICAL) WOUND, NOT ELSEWHERE CLASSIFIED, INITIAL ENCOUNTER: ICD-10-CM

## 2024-01-01 DIAGNOSIS — I44.2 ATRIOVENTRICULAR BLOCK, COMPLETE: ICD-10-CM

## 2024-01-01 DIAGNOSIS — I50.32 CHRONIC DIASTOLIC (CONGESTIVE) HEART FAILURE: ICD-10-CM

## 2024-01-01 DIAGNOSIS — Z66 DO NOT RESUSCITATE: ICD-10-CM

## 2024-01-01 DIAGNOSIS — G40.901 EPILEPSY, UNSPECIFIED, NOT INTRACTABLE, WITH STATUS EPILEPTICUS: ICD-10-CM

## 2024-01-01 DIAGNOSIS — N17.0 ACUTE KIDNEY FAILURE WITH TUBULAR NECROSIS: ICD-10-CM

## 2024-01-01 DIAGNOSIS — R06.09 OTHER FORMS OF DYSPNEA: ICD-10-CM

## 2024-01-01 DIAGNOSIS — D50.9 IRON DEFICIENCY ANEMIA, UNSPECIFIED: ICD-10-CM

## 2024-01-01 DIAGNOSIS — R62.7 ADULT FAILURE TO THRIVE: ICD-10-CM

## 2024-01-01 DIAGNOSIS — J43.9 EMPHYSEMA, UNSPECIFIED: ICD-10-CM

## 2024-01-01 DIAGNOSIS — M54.9 DORSALGIA, UNSPECIFIED: ICD-10-CM

## 2024-01-01 DIAGNOSIS — T82.857A STENOSIS OF OTHER CARDIAC PROSTHETIC DEVICES, IMPLANTS AND GRAFTS, INITIAL ENCOUNTER: ICD-10-CM

## 2024-01-01 DIAGNOSIS — G40.409 OTHER GENERALIZED EPILEPSY AND EPILEPTIC SYNDROMES, NOT INTRACTABLE, WITHOUT STATUS EPILEPTICUS: ICD-10-CM

## 2024-01-01 DIAGNOSIS — I95.9 HYPOTENSION, UNSPECIFIED: ICD-10-CM

## 2024-01-01 LAB
% ALBUMIN: 53.4 % — SIGNIFICANT CHANGE UP
% ALPHA 1: 6.7 % — SIGNIFICANT CHANGE UP
% ALPHA 2: 10.2 % — SIGNIFICANT CHANGE UP
% BETA: 12.4 % — SIGNIFICANT CHANGE UP
% GAMMA, URINE: 14.1 % — SIGNIFICANT CHANGE UP
% GAMMA: 17.3 % — SIGNIFICANT CHANGE UP
% M SPIKE: SIGNIFICANT CHANGE UP
-  K. PNEUMONIAE GROUP: SIGNIFICANT CHANGE UP
24R-OH-CALCIDIOL SERPL-MCNC: 57.8 NG/ML — SIGNIFICANT CHANGE UP (ref 30–80)
ADD ON TEST-SPECIMEN IN LAB: SIGNIFICANT CHANGE UP
ALBUMIN 24H MFR UR ELPH: 8.9 % — SIGNIFICANT CHANGE UP
ALBUMIN SERPL ELPH-MCNC: 1.7 G/DL — LOW (ref 3.3–5)
ALBUMIN SERPL ELPH-MCNC: 1.9 G/DL — LOW (ref 3.3–5)
ALBUMIN SERPL ELPH-MCNC: 1.9 G/DL — LOW (ref 3.3–5)
ALBUMIN SERPL ELPH-MCNC: 2.1 G/DL — LOW (ref 3.3–5)
ALBUMIN SERPL ELPH-MCNC: 2.2 G/DL — LOW (ref 3.3–5)
ALBUMIN SERPL ELPH-MCNC: 2.2 G/DL — LOW (ref 3.3–5)
ALBUMIN SERPL ELPH-MCNC: 2.3 G/DL — LOW (ref 3.3–5)
ALBUMIN SERPL ELPH-MCNC: 2.3 G/DL — LOW (ref 3.3–5)
ALBUMIN SERPL ELPH-MCNC: 2.7 G/DL — LOW (ref 3.3–5)
ALBUMIN SERPL ELPH-MCNC: 2.9 G/DL — LOW (ref 3.3–5)
ALBUMIN SERPL ELPH-MCNC: 3.7 G/DL — SIGNIFICANT CHANGE UP (ref 3.3–5)
ALBUMIN SERPL ELPH-MCNC: 3.8 G/DL — SIGNIFICANT CHANGE UP (ref 3.3–5)
ALBUMIN SERPL ELPH-MCNC: 4 G/DL — SIGNIFICANT CHANGE UP (ref 3.3–5)
ALBUMIN SERPL ELPH-MCNC: 4 G/DL — SIGNIFICANT CHANGE UP (ref 3.6–5.5)
ALBUMIN SERPL ELPH-MCNC: 4.1 G/DL — SIGNIFICANT CHANGE UP (ref 3.3–5)
ALBUMIN SERPL ELPH-MCNC: 4.2 G/DL — SIGNIFICANT CHANGE UP (ref 3.3–5)
ALBUMIN SERPL ELPH-MCNC: 4.3 G/DL — SIGNIFICANT CHANGE UP (ref 3.3–5)
ALBUMIN SERPL ELPH-MCNC: 4.4 G/DL — SIGNIFICANT CHANGE UP (ref 3.3–5)
ALBUMIN/GLOB SERPL ELPH: 1.1 RATIO — SIGNIFICANT CHANGE UP
ALP SERPL-CCNC: 100 U/L — SIGNIFICANT CHANGE UP (ref 40–120)
ALP SERPL-CCNC: 100 U/L — SIGNIFICANT CHANGE UP (ref 40–120)
ALP SERPL-CCNC: 101 U/L — SIGNIFICANT CHANGE UP (ref 40–120)
ALP SERPL-CCNC: 103 U/L — SIGNIFICANT CHANGE UP (ref 40–120)
ALP SERPL-CCNC: 106 U/L — SIGNIFICANT CHANGE UP (ref 40–120)
ALP SERPL-CCNC: 111 U/L — SIGNIFICANT CHANGE UP (ref 40–120)
ALP SERPL-CCNC: 114 U/L — SIGNIFICANT CHANGE UP (ref 40–120)
ALP SERPL-CCNC: 118 U/L — SIGNIFICANT CHANGE UP (ref 40–120)
ALP SERPL-CCNC: 139 U/L — HIGH (ref 40–120)
ALP SERPL-CCNC: 57 U/L — SIGNIFICANT CHANGE UP (ref 40–120)
ALP SERPL-CCNC: 63 U/L — SIGNIFICANT CHANGE UP (ref 40–120)
ALP SERPL-CCNC: 75 U/L — SIGNIFICANT CHANGE UP (ref 40–120)
ALP SERPL-CCNC: 86 U/L — SIGNIFICANT CHANGE UP (ref 40–120)
ALP SERPL-CCNC: 91 U/L — SIGNIFICANT CHANGE UP (ref 40–120)
ALP SERPL-CCNC: 91 U/L — SIGNIFICANT CHANGE UP (ref 40–120)
ALP SERPL-CCNC: 92 U/L — SIGNIFICANT CHANGE UP (ref 40–120)
ALP SERPL-CCNC: 97 U/L — SIGNIFICANT CHANGE UP (ref 40–120)
ALP SERPL-CCNC: 98 U/L — SIGNIFICANT CHANGE UP (ref 40–120)
ALP SERPL-CCNC: 98 U/L — SIGNIFICANT CHANGE UP (ref 40–120)
ALPHA1 GLOB 24H MFR UR ELPH: 41.9 % — SIGNIFICANT CHANGE UP
ALPHA1 GLOB SERPL ELPH-MCNC: 0.5 G/DL — HIGH (ref 0.1–0.4)
ALPHA2 GLOB 24H MFR UR ELPH: 16.6 % — SIGNIFICANT CHANGE UP
ALPHA2 GLOB SERPL ELPH-MCNC: 0.8 G/DL — SIGNIFICANT CHANGE UP (ref 0.5–1)
ALT FLD-CCNC: 10 U/L — SIGNIFICANT CHANGE UP (ref 10–45)
ALT FLD-CCNC: 1032 U/L — HIGH (ref 10–45)
ALT FLD-CCNC: 11 U/L — SIGNIFICANT CHANGE UP (ref 10–45)
ALT FLD-CCNC: 11 U/L — SIGNIFICANT CHANGE UP (ref 10–45)
ALT FLD-CCNC: 1139 U/L — HIGH (ref 10–45)
ALT FLD-CCNC: 12 U/L — SIGNIFICANT CHANGE UP (ref 10–45)
ALT FLD-CCNC: 12 U/L — SIGNIFICANT CHANGE UP (ref 10–45)
ALT FLD-CCNC: 13 U/L — SIGNIFICANT CHANGE UP (ref 10–45)
ALT FLD-CCNC: 13 U/L — SIGNIFICANT CHANGE UP (ref 10–45)
ALT FLD-CCNC: 16 U/L — SIGNIFICANT CHANGE UP (ref 10–45)
ALT FLD-CCNC: 203 U/L — HIGH (ref 10–45)
ALT FLD-CCNC: 545 U/L — HIGH (ref 10–45)
ALT FLD-CCNC: 646 U/L — HIGH (ref 10–45)
ALT FLD-CCNC: 8 U/L — LOW (ref 10–45)
ALT FLD-CCNC: 9 U/L — LOW (ref 10–45)
AMPHET UR-MCNC: NEGATIVE — SIGNIFICANT CHANGE UP
ANION GAP SERPL CALC-SCNC: 10 MMOL/L — SIGNIFICANT CHANGE UP (ref 5–17)
ANION GAP SERPL CALC-SCNC: 10 MMOL/L — SIGNIFICANT CHANGE UP (ref 5–17)
ANION GAP SERPL CALC-SCNC: 11 MMOL/L — SIGNIFICANT CHANGE UP (ref 5–17)
ANION GAP SERPL CALC-SCNC: 12 MMOL/L — SIGNIFICANT CHANGE UP (ref 5–17)
ANION GAP SERPL CALC-SCNC: 13 MMOL/L — SIGNIFICANT CHANGE UP (ref 5–17)
ANION GAP SERPL CALC-SCNC: 14 MMOL/L — SIGNIFICANT CHANGE UP (ref 5–17)
ANION GAP SERPL CALC-SCNC: 14 MMOL/L — SIGNIFICANT CHANGE UP (ref 5–17)
ANION GAP SERPL CALC-SCNC: 15 MMOL/L — SIGNIFICANT CHANGE UP (ref 5–17)
ANION GAP SERPL CALC-SCNC: 16 MMOL/L — SIGNIFICANT CHANGE UP (ref 5–17)
ANION GAP SERPL CALC-SCNC: 17 MMOL/L — SIGNIFICANT CHANGE UP (ref 5–17)
ANION GAP SERPL CALC-SCNC: 19 MMOL/L — HIGH (ref 5–17)
ANION GAP SERPL CALC-SCNC: 26 MMOL/L — HIGH (ref 5–17)
ANION GAP SERPL CALC-SCNC: 29 MMOL/L — HIGH (ref 5–17)
ANION GAP SERPL CALC-SCNC: 30 MMOL/L — HIGH (ref 5–17)
ANION GAP SERPL CALC-SCNC: 31 MMOL/L — HIGH (ref 5–17)
ANION GAP SERPL CALC-SCNC: 9 MMOL/L — SIGNIFICANT CHANGE UP (ref 5–17)
ANISOCYTOSIS BLD QL: SIGNIFICANT CHANGE UP
ANISOCYTOSIS BLD QL: SLIGHT — SIGNIFICANT CHANGE UP
APPEARANCE UR: ABNORMAL
APPEARANCE UR: CLEAR — SIGNIFICANT CHANGE UP
APTT BLD: 117.7 SEC — HIGH (ref 24.5–35.6)
APTT BLD: 171.7 SEC — CRITICAL HIGH (ref 24.5–35.6)
APTT BLD: 24.9 SEC — SIGNIFICANT CHANGE UP (ref 24.5–35.6)
APTT BLD: 28.4 SEC — SIGNIFICANT CHANGE UP (ref 24.5–35.6)
APTT BLD: 28.8 SEC — SIGNIFICANT CHANGE UP (ref 24.5–35.6)
APTT BLD: 30 SEC — SIGNIFICANT CHANGE UP (ref 24.5–35.6)
APTT BLD: 30.6 SEC — SIGNIFICANT CHANGE UP (ref 24.5–35.6)
APTT BLD: 30.8 SEC — SIGNIFICANT CHANGE UP (ref 24.5–35.6)
APTT BLD: 32.1 SEC — SIGNIFICANT CHANGE UP (ref 24.5–35.6)
APTT BLD: 32.3 SEC — SIGNIFICANT CHANGE UP (ref 24.5–35.6)
APTT BLD: 33.1 SEC — SIGNIFICANT CHANGE UP (ref 24.5–35.6)
APTT BLD: 33.5 SEC — SIGNIFICANT CHANGE UP (ref 24.5–35.6)
APTT BLD: 34.2 SEC — SIGNIFICANT CHANGE UP (ref 24.5–35.6)
APTT BLD: 36 SEC — HIGH (ref 24.5–35.6)
APTT BLD: 36.7 SEC — HIGH (ref 24.5–35.6)
APTT BLD: 41.2 SEC — HIGH (ref 24.5–35.6)
APTT BLD: 41.2 SEC — HIGH (ref 24.5–35.6)
APTT BLD: 45.3 SEC — HIGH (ref 24.5–35.6)
APTT BLD: 50.1 SEC — HIGH (ref 24.5–35.6)
APTT BLD: 73.7 SEC — HIGH (ref 24.5–35.6)
APTT BLD: 82.8 SEC — HIGH (ref 24.5–35.6)
APTT BLD: 83 SEC — HIGH (ref 24.5–35.6)
APTT BLD: 83.1 SEC — HIGH (ref 24.5–35.6)
APTT BLD: 84.8 SEC — HIGH (ref 24.5–35.6)
APTT BLD: 93.7 SEC — HIGH (ref 24.5–35.6)
APTT BLD: 96 SEC — HIGH (ref 24.5–35.6)
APTT BLD: 97.9 SEC — HIGH (ref 24.5–35.6)
APTT BLD: 99.2 SEC — HIGH (ref 24.5–35.6)
AST SERPL-CCNC: 1862 U/L — HIGH (ref 10–40)
AST SERPL-CCNC: 19 U/L — SIGNIFICANT CHANGE UP (ref 10–40)
AST SERPL-CCNC: 20 U/L — SIGNIFICANT CHANGE UP (ref 10–40)
AST SERPL-CCNC: 21 U/L — SIGNIFICANT CHANGE UP (ref 10–40)
AST SERPL-CCNC: 22 U/L — SIGNIFICANT CHANGE UP (ref 10–40)
AST SERPL-CCNC: 22 U/L — SIGNIFICANT CHANGE UP (ref 10–40)
AST SERPL-CCNC: 23 U/L — SIGNIFICANT CHANGE UP (ref 10–40)
AST SERPL-CCNC: 24 U/L — SIGNIFICANT CHANGE UP (ref 10–40)
AST SERPL-CCNC: 25 U/L — SIGNIFICANT CHANGE UP (ref 10–40)
AST SERPL-CCNC: 2695 U/L — HIGH (ref 10–40)
AST SERPL-CCNC: 30 U/L — SIGNIFICANT CHANGE UP (ref 10–40)
AST SERPL-CCNC: 5512 U/L — HIGH (ref 10–40)
AST SERPL-CCNC: 6234 U/L — HIGH (ref 10–40)
AST SERPL-CCNC: 630 U/L — HIGH (ref 10–40)
B-GLOBULIN 24H MFR UR ELPH: 18.5 % — SIGNIFICANT CHANGE UP
B-GLOBULIN SERPL ELPH-MCNC: 0.9 G/DL — SIGNIFICANT CHANGE UP (ref 0.5–1)
BACTERIA # UR AUTO: NEGATIVE /HPF — SIGNIFICANT CHANGE UP
BACTERIA # UR AUTO: NEGATIVE /HPF — SIGNIFICANT CHANGE UP
BARBITURATES UR SCN-MCNC: NEGATIVE — SIGNIFICANT CHANGE UP
BASE EXCESS BLDA CALC-SCNC: -11.3 MMOL/L — LOW (ref -2–3)
BASE EXCESS BLDA CALC-SCNC: -11.5 MMOL/L — LOW (ref -2–3)
BASE EXCESS BLDA CALC-SCNC: -12 MMOL/L — LOW (ref -2–3)
BASE EXCESS BLDA CALC-SCNC: -19.6 MMOL/L — LOW (ref -2–3)
BASE EXCESS BLDA CALC-SCNC: -7.7 MMOL/L — LOW (ref -2–3)
BASE EXCESS BLDA CALC-SCNC: -7.8 MMOL/L — LOW (ref -2–3)
BASE EXCESS BLDA CALC-SCNC: -7.9 MMOL/L — LOW (ref -2–3)
BASE EXCESS BLDA CALC-SCNC: -8 MMOL/L — LOW (ref -2–3)
BASOPHILS # BLD AUTO: 0 K/UL — SIGNIFICANT CHANGE UP (ref 0–0.2)
BASOPHILS # BLD AUTO: 0 K/UL — SIGNIFICANT CHANGE UP (ref 0–0.2)
BASOPHILS # BLD AUTO: 0.03 K/UL — SIGNIFICANT CHANGE UP (ref 0–0.2)
BASOPHILS # BLD AUTO: 0.03 K/UL — SIGNIFICANT CHANGE UP (ref 0–0.2)
BASOPHILS # BLD AUTO: 0.04 K/UL — SIGNIFICANT CHANGE UP (ref 0–0.2)
BASOPHILS # BLD AUTO: 0.05 K/UL — SIGNIFICANT CHANGE UP (ref 0–0.2)
BASOPHILS # BLD AUTO: 0.05 K/UL — SIGNIFICANT CHANGE UP (ref 0–0.2)
BASOPHILS # BLD AUTO: 0.06 K/UL — SIGNIFICANT CHANGE UP (ref 0–0.2)
BASOPHILS # BLD AUTO: 0.11 K/UL — SIGNIFICANT CHANGE UP (ref 0–0.2)
BASOPHILS NFR BLD AUTO: 0 % — SIGNIFICANT CHANGE UP (ref 0–2)
BASOPHILS NFR BLD AUTO: 0 % — SIGNIFICANT CHANGE UP (ref 0–2)
BASOPHILS NFR BLD AUTO: 0.3 % — SIGNIFICANT CHANGE UP (ref 0–2)
BASOPHILS NFR BLD AUTO: 0.4 % — SIGNIFICANT CHANGE UP (ref 0–2)
BASOPHILS NFR BLD AUTO: 0.5 % — SIGNIFICANT CHANGE UP (ref 0–2)
BASOPHILS NFR BLD AUTO: 0.6 % — SIGNIFICANT CHANGE UP (ref 0–2)
BASOPHILS NFR BLD AUTO: 0.7 % — SIGNIFICANT CHANGE UP (ref 0–2)
BASOPHILS NFR BLD AUTO: 0.9 % — SIGNIFICANT CHANGE UP (ref 0–2)
BASOPHILS NFR BLD AUTO: 1 % — SIGNIFICANT CHANGE UP (ref 0–2)
BASOPHILS NFR BLD AUTO: 2.1 % — HIGH (ref 0–2)
BENZODIAZ UR-MCNC: NEGATIVE — SIGNIFICANT CHANGE UP
BILIRUB DIRECT SERPL-MCNC: 0.4 MG/DL — HIGH (ref 0–0.3)
BILIRUB DIRECT SERPL-MCNC: 0.4 MG/DL — HIGH (ref 0–0.3)
BILIRUB DIRECT SERPL-MCNC: 0.5 MG/DL — HIGH (ref 0–0.3)
BILIRUB DIRECT SERPL-MCNC: 0.6 MG/DL — HIGH (ref 0–0.3)
BILIRUB INDIRECT FLD-MCNC: 0.2 MG/DL — SIGNIFICANT CHANGE UP (ref 0.2–1)
BILIRUB INDIRECT FLD-MCNC: 0.2 MG/DL — SIGNIFICANT CHANGE UP (ref 0.2–1)
BILIRUB INDIRECT FLD-MCNC: 0.3 MG/DL — SIGNIFICANT CHANGE UP (ref 0.2–1)
BILIRUB INDIRECT FLD-MCNC: 0.3 MG/DL — SIGNIFICANT CHANGE UP (ref 0.2–1)
BILIRUB SERPL-MCNC: 0.2 MG/DL — SIGNIFICANT CHANGE UP (ref 0.2–1.2)
BILIRUB SERPL-MCNC: 0.3 MG/DL — SIGNIFICANT CHANGE UP (ref 0.2–1.2)
BILIRUB SERPL-MCNC: 0.4 MG/DL — SIGNIFICANT CHANGE UP (ref 0.2–1.2)
BILIRUB SERPL-MCNC: 0.5 MG/DL — SIGNIFICANT CHANGE UP (ref 0.2–1.2)
BILIRUB SERPL-MCNC: 0.6 MG/DL — SIGNIFICANT CHANGE UP (ref 0.2–1.2)
BILIRUB SERPL-MCNC: 0.7 MG/DL — SIGNIFICANT CHANGE UP (ref 0.2–1.2)
BILIRUB SERPL-MCNC: 0.7 MG/DL — SIGNIFICANT CHANGE UP (ref 0.2–1.2)
BILIRUB SERPL-MCNC: 0.8 MG/DL — SIGNIFICANT CHANGE UP (ref 0.2–1.2)
BILIRUB SERPL-MCNC: 0.8 MG/DL — SIGNIFICANT CHANGE UP (ref 0.2–1.2)
BILIRUB UR-MCNC: NEGATIVE — SIGNIFICANT CHANGE UP
BILIRUB UR-MCNC: NEGATIVE — SIGNIFICANT CHANGE UP
BLD GP AB SCN SERPL QL: NEGATIVE — SIGNIFICANT CHANGE UP
BUN SERPL-MCNC: 12 MG/DL — SIGNIFICANT CHANGE UP (ref 7–23)
BUN SERPL-MCNC: 14 MG/DL — SIGNIFICANT CHANGE UP (ref 7–23)
BUN SERPL-MCNC: 15 MG/DL — SIGNIFICANT CHANGE UP (ref 7–23)
BUN SERPL-MCNC: 16 MG/DL — SIGNIFICANT CHANGE UP (ref 7–23)
BUN SERPL-MCNC: 17 MG/DL — SIGNIFICANT CHANGE UP (ref 7–23)
BUN SERPL-MCNC: 18 MG/DL — SIGNIFICANT CHANGE UP (ref 7–23)
BUN SERPL-MCNC: 20 MG/DL — SIGNIFICANT CHANGE UP (ref 7–23)
BUN SERPL-MCNC: 21 MG/DL — SIGNIFICANT CHANGE UP (ref 7–23)
BUN SERPL-MCNC: 23 MG/DL — SIGNIFICANT CHANGE UP (ref 7–23)
BUN SERPL-MCNC: 24 MG/DL — HIGH (ref 7–23)
BUN SERPL-MCNC: 25 MG/DL — HIGH (ref 7–23)
BUN SERPL-MCNC: 25 MG/DL — HIGH (ref 7–23)
BUN SERPL-MCNC: 27 MG/DL — HIGH (ref 7–23)
BUN SERPL-MCNC: 28 MG/DL — HIGH (ref 7–23)
BUN SERPL-MCNC: 29 MG/DL — HIGH (ref 7–23)
BUN SERPL-MCNC: 37 MG/DL — HIGH (ref 7–23)
BUN SERPL-MCNC: 38 MG/DL — HIGH (ref 7–23)
BUN SERPL-MCNC: 43 MG/DL — HIGH (ref 7–23)
BUN SERPL-MCNC: 47 MG/DL — HIGH (ref 7–23)
BUN SERPL-MCNC: 47 MG/DL — HIGH (ref 7–23)
BUN SERPL-MCNC: 48 MG/DL — HIGH (ref 7–23)
BUN SERPL-MCNC: 54 MG/DL — HIGH (ref 7–23)
BURR CELLS BLD QL SMEAR: PRESENT — SIGNIFICANT CHANGE UP
CA-I BLDA-SCNC: 1.18 MMOL/L — SIGNIFICANT CHANGE UP (ref 1.15–1.33)
CALCIUM SERPL-MCNC: 10.1 MG/DL — SIGNIFICANT CHANGE UP (ref 8.4–10.5)
CALCIUM SERPL-MCNC: 10.2 MG/DL — SIGNIFICANT CHANGE UP (ref 8.4–10.5)
CALCIUM SERPL-MCNC: 10.4 MG/DL — SIGNIFICANT CHANGE UP (ref 8.4–10.5)
CALCIUM SERPL-MCNC: 10.5 MG/DL — SIGNIFICANT CHANGE UP (ref 8.4–10.5)
CALCIUM SERPL-MCNC: 10.7 MG/DL — HIGH (ref 8.4–10.5)
CALCIUM SERPL-MCNC: 10.8 MG/DL — HIGH (ref 8.4–10.5)
CALCIUM SERPL-MCNC: 10.9 MG/DL — HIGH (ref 8.4–10.5)
CALCIUM SERPL-MCNC: 11.1 MG/DL — HIGH (ref 8.4–10.5)
CALCIUM SERPL-MCNC: 11.4 MG/DL — HIGH (ref 8.4–10.5)
CALCIUM SERPL-MCNC: 7 MG/DL — LOW (ref 8.4–10.5)
CALCIUM SERPL-MCNC: 7.6 MG/DL — LOW (ref 8.4–10.5)
CALCIUM SERPL-MCNC: 8 MG/DL — LOW (ref 8.4–10.5)
CALCIUM SERPL-MCNC: 8.2 MG/DL — LOW (ref 8.4–10.5)
CALCIUM SERPL-MCNC: 8.6 MG/DL — SIGNIFICANT CHANGE UP (ref 8.4–10.5)
CALCIUM SERPL-MCNC: 8.8 MG/DL — SIGNIFICANT CHANGE UP (ref 8.4–10.5)
CALCIUM SERPL-MCNC: 9 MG/DL — SIGNIFICANT CHANGE UP (ref 8.4–10.5)
CALCIUM SERPL-MCNC: 9 MG/DL — SIGNIFICANT CHANGE UP (ref 8.4–10.5)
CALCIUM SERPL-MCNC: 9.3 MG/DL — SIGNIFICANT CHANGE UP (ref 8.4–10.5)
CALCIUM SERPL-MCNC: 9.4 MG/DL — SIGNIFICANT CHANGE UP (ref 8.4–10.5)
CALCIUM SERPL-MCNC: 9.5 MG/DL — SIGNIFICANT CHANGE UP (ref 8.4–10.5)
CAST: 0 /LPF — SIGNIFICANT CHANGE UP (ref 0–4)
CAST: 1 /LPF — SIGNIFICANT CHANGE UP (ref 0–4)
CHLORIDE SERPL-SCNC: 100 MMOL/L — SIGNIFICANT CHANGE UP (ref 96–108)
CHLORIDE SERPL-SCNC: 102 MMOL/L — SIGNIFICANT CHANGE UP (ref 96–108)
CHLORIDE SERPL-SCNC: 103 MMOL/L — SIGNIFICANT CHANGE UP (ref 96–108)
CHLORIDE SERPL-SCNC: 104 MMOL/L — SIGNIFICANT CHANGE UP (ref 96–108)
CHLORIDE SERPL-SCNC: 105 MMOL/L — SIGNIFICANT CHANGE UP (ref 96–108)
CHLORIDE SERPL-SCNC: 106 MMOL/L — SIGNIFICANT CHANGE UP (ref 96–108)
CHLORIDE SERPL-SCNC: 107 MMOL/L — SIGNIFICANT CHANGE UP (ref 96–108)
CHLORIDE SERPL-SCNC: 108 MMOL/L — SIGNIFICANT CHANGE UP (ref 96–108)
CHLORIDE SERPL-SCNC: 108 MMOL/L — SIGNIFICANT CHANGE UP (ref 96–108)
CHLORIDE SERPL-SCNC: 109 MMOL/L — HIGH (ref 96–108)
CHLORIDE SERPL-SCNC: 111 MMOL/L — HIGH (ref 96–108)
CHLORIDE SERPL-SCNC: 97 MMOL/L — SIGNIFICANT CHANGE UP (ref 96–108)
CHLORIDE UR-SCNC: 82 MMOL/L — SIGNIFICANT CHANGE UP
CK MB CFR SERPL CALC: 1.2 NG/ML — SIGNIFICANT CHANGE UP (ref 0–6.7)
CK MB CFR SERPL CALC: 2.3 NG/ML — SIGNIFICANT CHANGE UP (ref 0–6.7)
CK MB CFR SERPL CALC: 41.2 NG/ML — HIGH (ref 0–6.7)
CK MB CFR SERPL CALC: 7 NG/ML — HIGH (ref 0–6.7)
CK SERPL-CCNC: 1947 U/L — HIGH (ref 25–170)
CK SERPL-CCNC: 303 U/L — HIGH (ref 25–170)
CK SERPL-CCNC: 350 U/L — HIGH (ref 25–170)
CK SERPL-CCNC: 414 U/L — HIGH (ref 25–170)
CK SERPL-CCNC: 52 U/L — SIGNIFICANT CHANGE UP (ref 25–170)
CK SERPL-CCNC: 5719 U/L — HIGH (ref 25–170)
CK SERPL-CCNC: 78 U/L — SIGNIFICANT CHANGE UP (ref 25–170)
CLOSURE TME COLL+EPINEP BLD: 116 K/UL — LOW (ref 150–400)
CO2 BLDA-SCNC: 10 MMOL/L — CRITICAL LOW (ref 19–24)
CO2 BLDA-SCNC: 13 MMOL/L — LOW (ref 19–24)
CO2 BLDA-SCNC: 15 MMOL/L — LOW (ref 19–24)
CO2 BLDA-SCNC: 18 MMOL/L — LOW (ref 19–24)
CO2 BLDA-SCNC: 18 MMOL/L — LOW (ref 19–24)
CO2 BLDA-SCNC: 19 MMOL/L — SIGNIFICANT CHANGE UP (ref 19–24)
CO2 SERPL-SCNC: 12 MMOL/L — LOW (ref 22–31)
CO2 SERPL-SCNC: 12 MMOL/L — LOW (ref 22–31)
CO2 SERPL-SCNC: 13 MMOL/L — LOW (ref 22–31)
CO2 SERPL-SCNC: 14 MMOL/L — LOW (ref 22–31)
CO2 SERPL-SCNC: 15 MMOL/L — LOW (ref 22–31)
CO2 SERPL-SCNC: 16 MMOL/L — LOW (ref 22–31)
CO2 SERPL-SCNC: 16 MMOL/L — LOW (ref 22–31)
CO2 SERPL-SCNC: 17 MMOL/L — LOW (ref 22–31)
CO2 SERPL-SCNC: 18 MMOL/L — LOW (ref 22–31)
CO2 SERPL-SCNC: 19 MMOL/L — LOW (ref 22–31)
CO2 SERPL-SCNC: 20 MMOL/L — LOW (ref 22–31)
CO2 SERPL-SCNC: 21 MMOL/L — LOW (ref 22–31)
CO2 SERPL-SCNC: 26 MMOL/L — SIGNIFICANT CHANGE UP (ref 22–31)
COCAINE METAB.OTHER UR-MCNC: NEGATIVE — SIGNIFICANT CHANGE UP
COHGB MFR BLDA: 1.1 % — SIGNIFICANT CHANGE UP
COLOR SPEC: YELLOW — SIGNIFICANT CHANGE UP
COLOR SPEC: YELLOW — SIGNIFICANT CHANGE UP
CREAT ?TM UR-MCNC: 53 MG/DL — SIGNIFICANT CHANGE UP
CREAT SERPL-MCNC: 1.05 MG/DL — SIGNIFICANT CHANGE UP (ref 0.5–1.3)
CREAT SERPL-MCNC: 1.35 MG/DL — HIGH (ref 0.5–1.3)
CREAT SERPL-MCNC: 1.46 MG/DL — HIGH (ref 0.5–1.3)
CREAT SERPL-MCNC: 1.48 MG/DL — HIGH (ref 0.5–1.3)
CREAT SERPL-MCNC: 1.57 MG/DL — HIGH (ref 0.5–1.3)
CREAT SERPL-MCNC: 1.57 MG/DL — HIGH (ref 0.5–1.3)
CREAT SERPL-MCNC: 1.61 MG/DL — HIGH (ref 0.5–1.3)
CREAT SERPL-MCNC: 1.68 MG/DL — HIGH (ref 0.5–1.3)
CREAT SERPL-MCNC: 1.71 MG/DL — HIGH (ref 0.5–1.3)
CREAT SERPL-MCNC: 1.77 MG/DL — HIGH (ref 0.5–1.3)
CREAT SERPL-MCNC: 1.78 MG/DL — HIGH (ref 0.5–1.3)
CREAT SERPL-MCNC: 1.79 MG/DL — HIGH (ref 0.5–1.3)
CREAT SERPL-MCNC: 1.8 MG/DL — HIGH (ref 0.5–1.3)
CREAT SERPL-MCNC: 1.89 MG/DL — HIGH (ref 0.5–1.3)
CREAT SERPL-MCNC: 1.91 MG/DL — HIGH (ref 0.5–1.3)
CREAT SERPL-MCNC: 1.93 MG/DL — HIGH (ref 0.5–1.3)
CREAT SERPL-MCNC: 1.94 MG/DL — HIGH (ref 0.5–1.3)
CREAT SERPL-MCNC: 2.2 MG/DL — HIGH (ref 0.5–1.3)
CREAT SERPL-MCNC: 2.26 MG/DL — HIGH (ref 0.5–1.3)
CREAT SERPL-MCNC: 2.34 MG/DL — HIGH (ref 0.5–1.3)
CREAT SERPL-MCNC: 2.4 MG/DL — HIGH (ref 0.5–1.3)
CREAT SERPL-MCNC: 2.47 MG/DL — HIGH (ref 0.5–1.3)
CREAT SERPL-MCNC: 2.49 MG/DL — HIGH (ref 0.5–1.3)
CREAT SERPL-MCNC: 2.55 MG/DL — HIGH (ref 0.5–1.3)
CREAT SERPL-MCNC: 2.58 MG/DL — HIGH (ref 0.5–1.3)
CREAT SERPL-MCNC: 2.64 MG/DL — HIGH (ref 0.5–1.3)
CREAT SERPL-MCNC: 2.81 MG/DL — HIGH (ref 0.5–1.3)
CREAT SERPL-MCNC: 2.84 MG/DL — HIGH (ref 0.5–1.3)
CREAT SERPL-MCNC: 2.95 MG/DL — HIGH (ref 0.5–1.3)
CREAT SERPL-MCNC: 3 MG/DL — HIGH (ref 0.5–1.3)
CREAT SERPL-MCNC: 3.01 MG/DL — HIGH (ref 0.5–1.3)
CREATININE, URINE RESULT: 137 MG/DL — SIGNIFICANT CHANGE UP
D DIMER BLD IA.RAPID-MCNC: 2831 NG/ML DDU — HIGH
D DIMER BLD IA.RAPID-MCNC: 3862 NG/ML DDU — HIGH
DACRYOCYTES BLD QL SMEAR: SLIGHT — SIGNIFICANT CHANGE UP
DIFF PNL FLD: ABNORMAL
DIFF PNL FLD: ABNORMAL
EGFR: 16 ML/MIN/1.73M2 — LOW
EGFR: 16 ML/MIN/1.73M2 — LOW
EGFR: 17 ML/MIN/1.73M2 — LOW
EGFR: 17 ML/MIN/1.73M2 — LOW
EGFR: 18 ML/MIN/1.73M2 — LOW
EGFR: 19 ML/MIN/1.73M2 — LOW
EGFR: 20 ML/MIN/1.73M2 — LOW
EGFR: 21 ML/MIN/1.73M2 — LOW
EGFR: 21 ML/MIN/1.73M2 — LOW
EGFR: 22 ML/MIN/1.73M2 — LOW
EGFR: 23 ML/MIN/1.73M2 — LOW
EGFR: 24 ML/MIN/1.73M2 — LOW
EGFR: 28 ML/MIN/1.73M2 — LOW
EGFR: 30 ML/MIN/1.73M2 — LOW
EGFR: 30 ML/MIN/1.73M2 — LOW
EGFR: 31 ML/MIN/1.73M2 — LOW
EGFR: 31 ML/MIN/1.73M2 — LOW
EGFR: 32 ML/MIN/1.73M2 — LOW
EGFR: 33 ML/MIN/1.73M2 — LOW
EGFR: 34 ML/MIN/1.73M2 — LOW
EGFR: 35 ML/MIN/1.73M2 — LOW
EGFR: 35 ML/MIN/1.73M2 — LOW
EGFR: 38 ML/MIN/1.73M2 — LOW
EGFR: 39 ML/MIN/1.73M2 — LOW
EGFR: 43 ML/MIN/1.73M2 — LOW
EGFR: 58 ML/MIN/1.73M2 — LOW
ELLIPTOCYTES BLD QL SMEAR: SLIGHT — SIGNIFICANT CHANGE UP
EOSINOPHIL # BLD AUTO: 0 K/UL — SIGNIFICANT CHANGE UP (ref 0–0.5)
EOSINOPHIL # BLD AUTO: 0.03 K/UL — SIGNIFICANT CHANGE UP (ref 0–0.5)
EOSINOPHIL # BLD AUTO: 0.04 K/UL — SIGNIFICANT CHANGE UP (ref 0–0.5)
EOSINOPHIL # BLD AUTO: 0.05 K/UL — SIGNIFICANT CHANGE UP (ref 0–0.5)
EOSINOPHIL # BLD AUTO: 0.09 K/UL — SIGNIFICANT CHANGE UP (ref 0–0.5)
EOSINOPHIL # BLD AUTO: 0.1 K/UL — SIGNIFICANT CHANGE UP (ref 0–0.5)
EOSINOPHIL # BLD AUTO: 0.12 K/UL — SIGNIFICANT CHANGE UP (ref 0–0.5)
EOSINOPHIL # BLD AUTO: 0.13 K/UL — SIGNIFICANT CHANGE UP (ref 0–0.5)
EOSINOPHIL # BLD AUTO: 0.13 K/UL — SIGNIFICANT CHANGE UP (ref 0–0.5)
EOSINOPHIL # BLD AUTO: 0.16 K/UL — SIGNIFICANT CHANGE UP (ref 0–0.5)
EOSINOPHIL NFR BLD AUTO: 0 % — SIGNIFICANT CHANGE UP (ref 0–6)
EOSINOPHIL NFR BLD AUTO: 0.3 % — SIGNIFICANT CHANGE UP (ref 0–6)
EOSINOPHIL NFR BLD AUTO: 0.6 % — SIGNIFICANT CHANGE UP (ref 0–6)
EOSINOPHIL NFR BLD AUTO: 0.8 % — SIGNIFICANT CHANGE UP (ref 0–6)
EOSINOPHIL NFR BLD AUTO: 1.1 % — SIGNIFICANT CHANGE UP (ref 0–6)
EOSINOPHIL NFR BLD AUTO: 1.3 % — SIGNIFICANT CHANGE UP (ref 0–6)
EOSINOPHIL NFR BLD AUTO: 1.4 % — SIGNIFICANT CHANGE UP (ref 0–6)
EOSINOPHIL NFR BLD AUTO: 1.6 % — SIGNIFICANT CHANGE UP (ref 0–6)
EOSINOPHIL NFR BLD AUTO: 1.9 % — SIGNIFICANT CHANGE UP (ref 0–6)
EOSINOPHIL NFR BLD AUTO: 2.1 % — SIGNIFICANT CHANGE UP (ref 0–6)
EOSINOPHIL NFR BLD AUTO: 3.2 % — SIGNIFICANT CHANGE UP (ref 0–6)
EOSINOPHIL NFR BLD AUTO: 5.2 % — SIGNIFICANT CHANGE UP (ref 0–6)
FERRITIN SERPL-MCNC: 243 NG/ML — SIGNIFICANT CHANGE UP (ref 13–330)
FIBRINOGEN PPP-MCNC: 185 MG/DL — LOW (ref 200–445)
FIBRINOGEN PPP-MCNC: 191 MG/DL — LOW (ref 200–445)
FIBRINOGEN PPP-MCNC: 218 MG/DL — SIGNIFICANT CHANGE UP (ref 200–445)
FIBRINOGEN PPP-MCNC: 225 MG/DL — SIGNIFICANT CHANGE UP (ref 200–445)
FIBRINOGEN PPP-MCNC: 270 MG/DL — SIGNIFICANT CHANGE UP (ref 200–445)
FIBRINOGEN PPP-MCNC: 299 MG/DL — SIGNIFICANT CHANGE UP (ref 200–445)
FLUAV AG NPH QL: SIGNIFICANT CHANGE UP
FLUBV AG NPH QL: SIGNIFICANT CHANGE UP
GAMMA GLOBULIN: 1.3 G/DL — SIGNIFICANT CHANGE UP (ref 0.6–1.6)
GAS PNL BLDA: SIGNIFICANT CHANGE UP
GIANT PLATELETS BLD QL SMEAR: PRESENT — SIGNIFICANT CHANGE UP
GLUCOSE BLDA-MCNC: 255 MG/DL — HIGH (ref 70–99)
GLUCOSE BLDC GLUCOMTR-MCNC: 101 MG/DL — HIGH (ref 70–99)
GLUCOSE BLDC GLUCOMTR-MCNC: 112 MG/DL — HIGH (ref 70–99)
GLUCOSE BLDC GLUCOMTR-MCNC: 127 MG/DL — HIGH (ref 70–99)
GLUCOSE BLDC GLUCOMTR-MCNC: 13 MG/DL — CRITICAL LOW (ref 70–99)
GLUCOSE BLDC GLUCOMTR-MCNC: 151 MG/DL — HIGH (ref 70–99)
GLUCOSE BLDC GLUCOMTR-MCNC: 164 MG/DL — HIGH (ref 70–99)
GLUCOSE BLDC GLUCOMTR-MCNC: 17 MG/DL — CRITICAL LOW (ref 70–99)
GLUCOSE BLDC GLUCOMTR-MCNC: 180 MG/DL — HIGH (ref 70–99)
GLUCOSE BLDC GLUCOMTR-MCNC: 188 MG/DL — HIGH (ref 70–99)
GLUCOSE BLDC GLUCOMTR-MCNC: 208 MG/DL — HIGH (ref 70–99)
GLUCOSE BLDC GLUCOMTR-MCNC: 467 MG/DL — CRITICAL HIGH (ref 70–99)
GLUCOSE BLDC GLUCOMTR-MCNC: 522 MG/DL — CRITICAL HIGH (ref 70–99)
GLUCOSE BLDC GLUCOMTR-MCNC: 68 MG/DL — LOW (ref 70–99)
GLUCOSE BLDC GLUCOMTR-MCNC: 77 MG/DL — SIGNIFICANT CHANGE UP (ref 70–99)
GLUCOSE BLDC GLUCOMTR-MCNC: 77 MG/DL — SIGNIFICANT CHANGE UP (ref 70–99)
GLUCOSE BLDC GLUCOMTR-MCNC: 90 MG/DL — SIGNIFICANT CHANGE UP (ref 70–99)
GLUCOSE SERPL-MCNC: 100 MG/DL — HIGH (ref 70–99)
GLUCOSE SERPL-MCNC: 103 MG/DL — HIGH (ref 70–99)
GLUCOSE SERPL-MCNC: 104 MG/DL — HIGH (ref 70–99)
GLUCOSE SERPL-MCNC: 107 MG/DL — HIGH (ref 70–99)
GLUCOSE SERPL-MCNC: 107 MG/DL — HIGH (ref 70–99)
GLUCOSE SERPL-MCNC: 109 MG/DL — HIGH (ref 70–99)
GLUCOSE SERPL-MCNC: 112 MG/DL — HIGH (ref 70–99)
GLUCOSE SERPL-MCNC: 119 MG/DL — HIGH (ref 70–99)
GLUCOSE SERPL-MCNC: 119 MG/DL — HIGH (ref 70–99)
GLUCOSE SERPL-MCNC: 121 MG/DL — HIGH (ref 70–99)
GLUCOSE SERPL-MCNC: 124 MG/DL — HIGH (ref 70–99)
GLUCOSE SERPL-MCNC: 125 MG/DL — HIGH (ref 70–99)
GLUCOSE SERPL-MCNC: 137 MG/DL — HIGH (ref 70–99)
GLUCOSE SERPL-MCNC: 151 MG/DL — HIGH (ref 70–99)
GLUCOSE SERPL-MCNC: 151 MG/DL — HIGH (ref 70–99)
GLUCOSE SERPL-MCNC: 180 MG/DL — HIGH (ref 70–99)
GLUCOSE SERPL-MCNC: 187 MG/DL — HIGH (ref 70–99)
GLUCOSE SERPL-MCNC: 221 MG/DL — HIGH (ref 70–99)
GLUCOSE SERPL-MCNC: 241 MG/DL — HIGH (ref 70–99)
GLUCOSE SERPL-MCNC: 369 MG/DL — HIGH (ref 70–99)
GLUCOSE SERPL-MCNC: 42 MG/DL — CRITICAL LOW (ref 70–99)
GLUCOSE SERPL-MCNC: 47 MG/DL — CRITICAL LOW (ref 70–99)
GLUCOSE SERPL-MCNC: 78 MG/DL — SIGNIFICANT CHANGE UP (ref 70–99)
GLUCOSE SERPL-MCNC: 78 MG/DL — SIGNIFICANT CHANGE UP (ref 70–99)
GLUCOSE SERPL-MCNC: 80 MG/DL — SIGNIFICANT CHANGE UP (ref 70–99)
GLUCOSE SERPL-MCNC: 81 MG/DL — SIGNIFICANT CHANGE UP (ref 70–99)
GLUCOSE SERPL-MCNC: 85 MG/DL — SIGNIFICANT CHANGE UP (ref 70–99)
GLUCOSE SERPL-MCNC: 88 MG/DL — SIGNIFICANT CHANGE UP (ref 70–99)
GLUCOSE SERPL-MCNC: 89 MG/DL — SIGNIFICANT CHANGE UP (ref 70–99)
GLUCOSE SERPL-MCNC: 91 MG/DL — SIGNIFICANT CHANGE UP (ref 70–99)
GLUCOSE SERPL-MCNC: 98 MG/DL — SIGNIFICANT CHANGE UP (ref 70–99)
GLUCOSE UR QL: NEGATIVE MG/DL — SIGNIFICANT CHANGE UP
GLUCOSE UR QL: NEGATIVE MG/DL — SIGNIFICANT CHANGE UP
GRAM STN FLD: ABNORMAL
GRAM STN FLD: ABNORMAL
GRAM STN FLD: SIGNIFICANT CHANGE UP
HAPTOGLOB SERPL-MCNC: 162 MG/DL — SIGNIFICANT CHANGE UP (ref 34–200)
HAPTOGLOB SERPL-MCNC: 168 MG/DL — SIGNIFICANT CHANGE UP (ref 34–200)
HCO3 BLDA-SCNC: 13 MMOL/L — LOW (ref 21–28)
HCO3 BLDA-SCNC: 14 MMOL/L — LOW (ref 21–28)
HCO3 BLDA-SCNC: 14 MMOL/L — LOW (ref 21–28)
HCO3 BLDA-SCNC: 15 MMOL/L — LOW (ref 21–28)
HCO3 BLDA-SCNC: 17 MMOL/L — LOW (ref 21–28)
HCO3 BLDA-SCNC: 18 MMOL/L — LOW (ref 21–28)
HCO3 BLDA-SCNC: 18 MMOL/L — LOW (ref 21–28)
HCO3 BLDA-SCNC: 9 MMOL/L — LOW (ref 21–28)
HCT VFR BLD CALC: 16.2 % — CRITICAL LOW (ref 34.5–45)
HCT VFR BLD CALC: 17.9 % — CRITICAL LOW (ref 34.5–45)
HCT VFR BLD CALC: 21.6 % — LOW (ref 34.5–45)
HCT VFR BLD CALC: 22.7 % — LOW (ref 34.5–45)
HCT VFR BLD CALC: 22.8 % — LOW (ref 34.5–45)
HCT VFR BLD CALC: 23.9 % — LOW (ref 34.5–45)
HCT VFR BLD CALC: 24 % — LOW (ref 34.5–45)
HCT VFR BLD CALC: 24.8 % — LOW (ref 34.5–45)
HCT VFR BLD CALC: 26.4 % — LOW (ref 34.5–45)
HCT VFR BLD CALC: 26.6 % — LOW (ref 34.5–45)
HCT VFR BLD CALC: 26.9 % — LOW (ref 34.5–45)
HCT VFR BLD CALC: 27.1 % — LOW (ref 34.5–45)
HCT VFR BLD CALC: 27.2 % — LOW (ref 34.5–45)
HCT VFR BLD CALC: 27.7 % — LOW (ref 34.5–45)
HCT VFR BLD CALC: 27.8 % — LOW (ref 34.5–45)
HCT VFR BLD CALC: 28.2 % — LOW (ref 34.5–45)
HCT VFR BLD CALC: 28.3 % — LOW (ref 34.5–45)
HCT VFR BLD CALC: 28.5 % — LOW (ref 34.5–45)
HCT VFR BLD CALC: 28.6 % — LOW (ref 34.5–45)
HCT VFR BLD CALC: 28.8 % — LOW (ref 34.5–45)
HCT VFR BLD CALC: 29.7 % — LOW (ref 34.5–45)
HCT VFR BLD CALC: 29.7 % — LOW (ref 34.5–45)
HCT VFR BLD CALC: 30 % — LOW (ref 34.5–45)
HCT VFR BLD CALC: 30.1 % — LOW (ref 34.5–45)
HCT VFR BLD CALC: 30.1 % — LOW (ref 34.5–45)
HCT VFR BLD CALC: 30.3 % — LOW (ref 34.5–45)
HCT VFR BLD CALC: 31.1 % — LOW (ref 34.5–45)
HCT VFR BLD CALC: 32.2 % — LOW (ref 34.5–45)
HCT VFR BLD CALC: 32.3 % — LOW (ref 34.5–45)
HCT VFR BLD CALC: 33.2 % — LOW (ref 34.5–45)
HCT VFR BLD CALC: 33.8 % — LOW (ref 34.5–45)
HCT VFR BLD CALC: 33.9 % — LOW (ref 34.5–45)
HCT VFR BLD CALC: 35.2 % — SIGNIFICANT CHANGE UP (ref 34.5–45)
HCT VFR BLD CALC: 35.6 % — SIGNIFICANT CHANGE UP (ref 34.5–45)
HCT VFR BLD CALC: 39.1 % — SIGNIFICANT CHANGE UP (ref 34.5–45)
HCT VFR BLD CALC: 39.5 % — SIGNIFICANT CHANGE UP (ref 34.5–45)
HGB BLD-MCNC: 10 G/DL — LOW (ref 11.5–15.5)
HGB BLD-MCNC: 10 G/DL — LOW (ref 11.5–15.5)
HGB BLD-MCNC: 10.4 G/DL — LOW (ref 11.5–15.5)
HGB BLD-MCNC: 10.7 G/DL — LOW (ref 11.5–15.5)
HGB BLD-MCNC: 10.8 G/DL — LOW (ref 11.5–15.5)
HGB BLD-MCNC: 11 G/DL — LOW (ref 11.5–15.5)
HGB BLD-MCNC: 11.1 G/DL — LOW (ref 11.5–15.5)
HGB BLD-MCNC: 11.4 G/DL — LOW (ref 11.5–15.5)
HGB BLD-MCNC: 11.4 G/DL — LOW (ref 11.5–15.5)
HGB BLD-MCNC: 11.7 G/DL — SIGNIFICANT CHANGE UP (ref 11.5–15.5)
HGB BLD-MCNC: 13.1 G/DL — SIGNIFICANT CHANGE UP (ref 11.5–15.5)
HGB BLD-MCNC: 4.8 G/DL — CRITICAL LOW (ref 11.5–15.5)
HGB BLD-MCNC: 5.5 G/DL — CRITICAL LOW (ref 11.5–15.5)
HGB BLD-MCNC: 6.8 G/DL — CRITICAL LOW (ref 11.5–15.5)
HGB BLD-MCNC: 6.9 G/DL — CRITICAL LOW (ref 11.5–15.5)
HGB BLD-MCNC: 7.1 G/DL — LOW (ref 11.5–15.5)
HGB BLD-MCNC: 7.5 G/DL — LOW (ref 11.5–15.5)
HGB BLD-MCNC: 7.6 G/DL — LOW (ref 11.5–15.5)
HGB BLD-MCNC: 7.8 G/DL — LOW (ref 11.5–15.5)
HGB BLD-MCNC: 8.4 G/DL — LOW (ref 11.5–15.5)
HGB BLD-MCNC: 8.4 G/DL — LOW (ref 11.5–15.5)
HGB BLD-MCNC: 8.7 G/DL — LOW (ref 11.5–15.5)
HGB BLD-MCNC: 8.9 G/DL — LOW (ref 11.5–15.5)
HGB BLD-MCNC: 9.1 G/DL — LOW (ref 11.5–15.5)
HGB BLD-MCNC: 9.1 G/DL — LOW (ref 11.5–15.5)
HGB BLD-MCNC: 9.2 G/DL — LOW (ref 11.5–15.5)
HGB BLD-MCNC: 9.2 G/DL — LOW (ref 11.5–15.5)
HGB BLD-MCNC: 9.3 G/DL — LOW (ref 11.5–15.5)
HGB BLD-MCNC: 9.4 G/DL — LOW (ref 11.5–15.5)
HGB BLD-MCNC: 9.4 G/DL — LOW (ref 11.5–15.5)
HGB BLD-MCNC: 9.5 G/DL — LOW (ref 11.5–15.5)
HGB BLD-MCNC: 9.5 G/DL — LOW (ref 11.5–15.5)
HGB BLD-MCNC: 9.7 G/DL — LOW (ref 11.5–15.5)
HGB BLD-MCNC: 9.7 G/DL — LOW (ref 11.5–15.5)
HGB BLDA-MCNC: 12 G/DL — SIGNIFICANT CHANGE UP (ref 11.7–16.1)
HYPOCHROMIA BLD QL: SIGNIFICANT CHANGE UP
HYPOCHROMIA BLD QL: SIGNIFICANT CHANGE UP
HYPOCHROMIA BLD QL: SLIGHT — SIGNIFICANT CHANGE UP
HYPOCHROMIA BLD QL: SLIGHT — SIGNIFICANT CHANGE UP
IMM GRANULOCYTES NFR BLD AUTO: 0.2 % — SIGNIFICANT CHANGE UP (ref 0–0.9)
IMM GRANULOCYTES NFR BLD AUTO: 0.3 % — SIGNIFICANT CHANGE UP (ref 0–0.9)
IMM GRANULOCYTES NFR BLD AUTO: 0.4 % — SIGNIFICANT CHANGE UP (ref 0–0.9)
IMM GRANULOCYTES NFR BLD AUTO: 0.4 % — SIGNIFICANT CHANGE UP (ref 0–0.9)
IMM GRANULOCYTES NFR BLD AUTO: 0.5 % — SIGNIFICANT CHANGE UP (ref 0–0.9)
IMM GRANULOCYTES NFR BLD AUTO: 0.5 % — SIGNIFICANT CHANGE UP (ref 0–0.9)
INR BLD: 0.98 — SIGNIFICANT CHANGE UP (ref 0.85–1.18)
INR BLD: 1 — SIGNIFICANT CHANGE UP (ref 0.85–1.18)
INR BLD: 1.01 — SIGNIFICANT CHANGE UP (ref 0.85–1.18)
INR BLD: 1.01 — SIGNIFICANT CHANGE UP (ref 0.85–1.18)
INR BLD: 1.04 — SIGNIFICANT CHANGE UP (ref 0.85–1.18)
INR BLD: 1.07 — SIGNIFICANT CHANGE UP (ref 0.85–1.18)
INR BLD: 1.1 — SIGNIFICANT CHANGE UP (ref 0.85–1.18)
INR BLD: 1.16 — SIGNIFICANT CHANGE UP (ref 0.85–1.18)
INR BLD: 1.23 — HIGH (ref 0.85–1.18)
INR BLD: 1.3 — HIGH (ref 0.85–1.18)
INR BLD: 1.44 — HIGH (ref 0.85–1.18)
INR BLD: 1.72 — HIGH (ref 0.85–1.18)
INR BLD: 1.74 — HIGH (ref 0.85–1.18)
INR BLD: 1.82 — HIGH (ref 0.85–1.18)
INR BLD: 1.86 — HIGH (ref 0.85–1.18)
INR BLD: 1.93 — HIGH (ref 0.85–1.18)
INR BLD: 2.01 — HIGH (ref 0.85–1.18)
INR BLD: 2.21 — HIGH (ref 0.85–1.18)
INR BLD: 2.94 — HIGH (ref 0.85–1.18)
INTERPRETATION 24H UR IFE-IMP: SIGNIFICANT CHANGE UP
INTERPRETATION SERPL IFE-IMP: SIGNIFICANT CHANGE UP
IRON SATN MFR SERPL: 21 % — SIGNIFICANT CHANGE UP (ref 14–50)
IRON SATN MFR SERPL: 49 UG/DL — SIGNIFICANT CHANGE UP (ref 30–160)
KAPPA LC SER QL IFE: 10.34 MG/DL — HIGH (ref 0.33–1.94)
KAPPA/LAMBDA FREE LIGHT CHAIN RATIO, SERUM: 2.38 RATIO — HIGH (ref 0.26–1.65)
KETONES UR-MCNC: NEGATIVE MG/DL — SIGNIFICANT CHANGE UP
KETONES UR-MCNC: NEGATIVE MG/DL — SIGNIFICANT CHANGE UP
LACTATE SERPL-SCNC: 0.7 MMOL/L — SIGNIFICANT CHANGE UP (ref 0.5–2)
LACTATE SERPL-SCNC: 0.8 MMOL/L — SIGNIFICANT CHANGE UP (ref 0.5–2)
LACTATE SERPL-SCNC: 1.2 MMOL/L — SIGNIFICANT CHANGE UP (ref 0.5–2)
LACTATE SERPL-SCNC: 1.9 MMOL/L — SIGNIFICANT CHANGE UP (ref 0.5–2)
LACTATE SERPL-SCNC: 14.8 MMOL/L — CRITICAL HIGH (ref 0.5–2)
LACTATE SERPL-SCNC: 16 MMOL/L — CRITICAL HIGH (ref 0.5–2)
LACTATE SERPL-SCNC: 16.9 MMOL/L — CRITICAL HIGH (ref 0.5–2)
LACTATE SERPL-SCNC: 2.9 MMOL/L — HIGH (ref 0.5–2)
LACTATE SERPL-SCNC: 3.3 MMOL/L — HIGH (ref 0.5–2)
LACTATE SERPL-SCNC: 3.6 MMOL/L — HIGH (ref 0.5–2)
LACTATE SERPL-SCNC: 4.1 MMOL/L — CRITICAL HIGH (ref 0.5–2)
LACTATE SERPL-SCNC: 5.1 MMOL/L — CRITICAL HIGH (ref 0.5–2)
LACTATE SERPL-SCNC: 6.8 MMOL/L — CRITICAL HIGH (ref 0.5–2)
LACTATE SERPL-SCNC: 7.9 MMOL/L — CRITICAL HIGH (ref 0.5–2)
LACTATE SERPL-SCNC: >17 MMOL/L — CRITICAL HIGH (ref 0.5–2)
LACTATE SERPL-SCNC: >17 MMOL/L — CRITICAL HIGH (ref 0.5–2)
LAMBDA LC SER QL IFE: 4.35 MG/DL — HIGH (ref 0.57–2.63)
LDH SERPL L TO P-CCNC: 219 U/L — SIGNIFICANT CHANGE UP (ref 50–242)
LDH SERPL L TO P-CCNC: 238 U/L — SIGNIFICANT CHANGE UP (ref 50–242)
LEUKOCYTE ESTERASE UR-ACNC: NEGATIVE — SIGNIFICANT CHANGE UP
LEUKOCYTE ESTERASE UR-ACNC: NEGATIVE — SIGNIFICANT CHANGE UP
LEVETIRACETAM SERPL-MCNC: 16 UG/ML — SIGNIFICANT CHANGE UP (ref 10–40)
LYMPHOCYTES # BLD AUTO: 0.31 K/UL — LOW (ref 1–3.3)
LYMPHOCYTES # BLD AUTO: 0.58 K/UL — LOW (ref 1–3.3)
LYMPHOCYTES # BLD AUTO: 0.69 K/UL — LOW (ref 1–3.3)
LYMPHOCYTES # BLD AUTO: 0.8 K/UL — LOW (ref 1–3.3)
LYMPHOCYTES # BLD AUTO: 0.84 K/UL — LOW (ref 1–3.3)
LYMPHOCYTES # BLD AUTO: 0.93 K/UL — LOW (ref 1–3.3)
LYMPHOCYTES # BLD AUTO: 0.98 K/UL — LOW (ref 1–3.3)
LYMPHOCYTES # BLD AUTO: 0.98 K/UL — LOW (ref 1–3.3)
LYMPHOCYTES # BLD AUTO: 1.03 K/UL — SIGNIFICANT CHANGE UP (ref 1–3.3)
LYMPHOCYTES # BLD AUTO: 1.04 K/UL — SIGNIFICANT CHANGE UP (ref 1–3.3)
LYMPHOCYTES # BLD AUTO: 1.07 K/UL — SIGNIFICANT CHANGE UP (ref 1–3.3)
LYMPHOCYTES # BLD AUTO: 1.08 K/UL — SIGNIFICANT CHANGE UP (ref 1–3.3)
LYMPHOCYTES # BLD AUTO: 11.3 % — LOW (ref 13–44)
LYMPHOCYTES # BLD AUTO: 12.8 % — LOW (ref 13–44)
LYMPHOCYTES # BLD AUTO: 13.9 % — SIGNIFICANT CHANGE UP (ref 13–44)
LYMPHOCYTES # BLD AUTO: 14.3 % — SIGNIFICANT CHANGE UP (ref 13–44)
LYMPHOCYTES # BLD AUTO: 14.6 % — SIGNIFICANT CHANGE UP (ref 13–44)
LYMPHOCYTES # BLD AUTO: 16.5 % — SIGNIFICANT CHANGE UP (ref 13–44)
LYMPHOCYTES # BLD AUTO: 18.5 % — SIGNIFICANT CHANGE UP (ref 13–44)
LYMPHOCYTES # BLD AUTO: 2.6 % — LOW (ref 13–44)
LYMPHOCYTES # BLD AUTO: 32.8 % — SIGNIFICANT CHANGE UP (ref 13–44)
LYMPHOCYTES # BLD AUTO: 38.7 % — SIGNIFICANT CHANGE UP (ref 13–44)
LYMPHOCYTES # BLD AUTO: 5.9 % — LOW (ref 13–44)
LYMPHOCYTES # BLD AUTO: 9.8 % — LOW (ref 13–44)
M PROTEIN 24H UR ELPH-MRATE: PRESENT
M-SPIKE: SIGNIFICANT CHANGE UP (ref 0–0)
MACROCYTES BLD QL: SLIGHT — SIGNIFICANT CHANGE UP
MAGNESIUM SERPL-MCNC: 1.6 MG/DL — SIGNIFICANT CHANGE UP (ref 1.6–2.6)
MAGNESIUM SERPL-MCNC: 1.8 MG/DL — SIGNIFICANT CHANGE UP (ref 1.6–2.6)
MAGNESIUM SERPL-MCNC: 1.9 MG/DL — SIGNIFICANT CHANGE UP (ref 1.6–2.6)
MAGNESIUM SERPL-MCNC: 2 MG/DL — SIGNIFICANT CHANGE UP (ref 1.6–2.6)
MAGNESIUM SERPL-MCNC: 2.1 MG/DL — SIGNIFICANT CHANGE UP (ref 1.6–2.6)
MAGNESIUM SERPL-MCNC: 2.2 MG/DL — SIGNIFICANT CHANGE UP (ref 1.6–2.6)
MAGNESIUM SERPL-MCNC: 2.2 MG/DL — SIGNIFICANT CHANGE UP (ref 1.6–2.6)
MAGNESIUM SERPL-MCNC: 2.3 MG/DL — SIGNIFICANT CHANGE UP (ref 1.6–2.6)
MAGNESIUM SERPL-MCNC: 2.4 MG/DL — SIGNIFICANT CHANGE UP (ref 1.6–2.6)
MAGNESIUM SERPL-MCNC: 2.6 MG/DL — SIGNIFICANT CHANGE UP (ref 1.6–2.6)
MAGNESIUM SERPL-MCNC: 2.7 MG/DL — HIGH (ref 1.6–2.6)
MANUAL SMEAR VERIFICATION: SIGNIFICANT CHANGE UP
MCHC RBC-ENTMCNC: 28.5 PG — SIGNIFICANT CHANGE UP (ref 27–34)
MCHC RBC-ENTMCNC: 28.6 PG — SIGNIFICANT CHANGE UP (ref 27–34)
MCHC RBC-ENTMCNC: 28.7 PG — SIGNIFICANT CHANGE UP (ref 27–34)
MCHC RBC-ENTMCNC: 28.8 PG — SIGNIFICANT CHANGE UP (ref 27–34)
MCHC RBC-ENTMCNC: 28.8 PG — SIGNIFICANT CHANGE UP (ref 27–34)
MCHC RBC-ENTMCNC: 28.9 PG — SIGNIFICANT CHANGE UP (ref 27–34)
MCHC RBC-ENTMCNC: 29 PG — SIGNIFICANT CHANGE UP (ref 27–34)
MCHC RBC-ENTMCNC: 29.1 PG — SIGNIFICANT CHANGE UP (ref 27–34)
MCHC RBC-ENTMCNC: 29.1 PG — SIGNIFICANT CHANGE UP (ref 27–34)
MCHC RBC-ENTMCNC: 29.3 PG — SIGNIFICANT CHANGE UP (ref 27–34)
MCHC RBC-ENTMCNC: 29.4 PG — SIGNIFICANT CHANGE UP (ref 27–34)
MCHC RBC-ENTMCNC: 29.5 PG — SIGNIFICANT CHANGE UP (ref 27–34)
MCHC RBC-ENTMCNC: 29.6 GM/DL — LOW (ref 32–36)
MCHC RBC-ENTMCNC: 29.6 GM/DL — LOW (ref 32–36)
MCHC RBC-ENTMCNC: 29.6 PG — SIGNIFICANT CHANGE UP (ref 27–34)
MCHC RBC-ENTMCNC: 29.7 PG — SIGNIFICANT CHANGE UP (ref 27–34)
MCHC RBC-ENTMCNC: 29.8 PG — SIGNIFICANT CHANGE UP (ref 27–34)
MCHC RBC-ENTMCNC: 30.1 PG — SIGNIFICANT CHANGE UP (ref 27–34)
MCHC RBC-ENTMCNC: 30.3 GM/DL — LOW (ref 32–36)
MCHC RBC-ENTMCNC: 30.3 GM/DL — LOW (ref 32–36)
MCHC RBC-ENTMCNC: 30.3 PG — SIGNIFICANT CHANGE UP (ref 27–34)
MCHC RBC-ENTMCNC: 30.7 GM/DL — LOW (ref 32–36)
MCHC RBC-ENTMCNC: 31 GM/DL — LOW (ref 32–36)
MCHC RBC-ENTMCNC: 31.2 GM/DL — LOW (ref 32–36)
MCHC RBC-ENTMCNC: 31.3 GM/DL — LOW (ref 32–36)
MCHC RBC-ENTMCNC: 31.5 GM/DL — LOW (ref 32–36)
MCHC RBC-ENTMCNC: 31.5 GM/DL — LOW (ref 32–36)
MCHC RBC-ENTMCNC: 31.6 GM/DL — LOW (ref 32–36)
MCHC RBC-ENTMCNC: 31.6 GM/DL — LOW (ref 32–36)
MCHC RBC-ENTMCNC: 31.8 GM/DL — LOW (ref 32–36)
MCHC RBC-ENTMCNC: 31.9 GM/DL — LOW (ref 32–36)
MCHC RBC-ENTMCNC: 31.9 GM/DL — LOW (ref 32–36)
MCHC RBC-ENTMCNC: 32 GM/DL — SIGNIFICANT CHANGE UP (ref 32–36)
MCHC RBC-ENTMCNC: 32.1 GM/DL — SIGNIFICANT CHANGE UP (ref 32–36)
MCHC RBC-ENTMCNC: 32.2 GM/DL — SIGNIFICANT CHANGE UP (ref 32–36)
MCHC RBC-ENTMCNC: 32.2 GM/DL — SIGNIFICANT CHANGE UP (ref 32–36)
MCHC RBC-ENTMCNC: 32.4 GM/DL — SIGNIFICANT CHANGE UP (ref 32–36)
MCHC RBC-ENTMCNC: 32.5 GM/DL — SIGNIFICANT CHANGE UP (ref 32–36)
MCHC RBC-ENTMCNC: 32.5 GM/DL — SIGNIFICANT CHANGE UP (ref 32–36)
MCHC RBC-ENTMCNC: 33 GM/DL — SIGNIFICANT CHANGE UP (ref 32–36)
MCHC RBC-ENTMCNC: 33 GM/DL — SIGNIFICANT CHANGE UP (ref 32–36)
MCHC RBC-ENTMCNC: 33.1 GM/DL — SIGNIFICANT CHANGE UP (ref 32–36)
MCHC RBC-ENTMCNC: 33.5 GM/DL — SIGNIFICANT CHANGE UP (ref 32–36)
MCHC RBC-ENTMCNC: 33.7 GM/DL — SIGNIFICANT CHANGE UP (ref 32–36)
MCHC RBC-ENTMCNC: 33.8 GM/DL — SIGNIFICANT CHANGE UP (ref 32–36)
MCHC RBC-ENTMCNC: 33.8 GM/DL — SIGNIFICANT CHANGE UP (ref 32–36)
MCHC RBC-ENTMCNC: 34.2 GM/DL — SIGNIFICANT CHANGE UP (ref 32–36)
MCHC RBC-ENTMCNC: 34.2 GM/DL — SIGNIFICANT CHANGE UP (ref 32–36)
MCV RBC AUTO: 100 FL — SIGNIFICANT CHANGE UP (ref 80–100)
MCV RBC AUTO: 84 FL — SIGNIFICANT CHANGE UP (ref 80–100)
MCV RBC AUTO: 85.5 FL — SIGNIFICANT CHANGE UP (ref 80–100)
MCV RBC AUTO: 85.6 FL — SIGNIFICANT CHANGE UP (ref 80–100)
MCV RBC AUTO: 86 FL — SIGNIFICANT CHANGE UP (ref 80–100)
MCV RBC AUTO: 87.5 FL — SIGNIFICANT CHANGE UP (ref 80–100)
MCV RBC AUTO: 87.6 FL — SIGNIFICANT CHANGE UP (ref 80–100)
MCV RBC AUTO: 87.7 FL — SIGNIFICANT CHANGE UP (ref 80–100)
MCV RBC AUTO: 88.4 FL — SIGNIFICANT CHANGE UP (ref 80–100)
MCV RBC AUTO: 88.7 FL — SIGNIFICANT CHANGE UP (ref 80–100)
MCV RBC AUTO: 88.9 FL — SIGNIFICANT CHANGE UP (ref 80–100)
MCV RBC AUTO: 89.1 FL — SIGNIFICANT CHANGE UP (ref 80–100)
MCV RBC AUTO: 89.2 FL — SIGNIFICANT CHANGE UP (ref 80–100)
MCV RBC AUTO: 90.2 FL — SIGNIFICANT CHANGE UP (ref 80–100)
MCV RBC AUTO: 90.9 FL — SIGNIFICANT CHANGE UP (ref 80–100)
MCV RBC AUTO: 91.3 FL — SIGNIFICANT CHANGE UP (ref 80–100)
MCV RBC AUTO: 91.3 FL — SIGNIFICANT CHANGE UP (ref 80–100)
MCV RBC AUTO: 91.5 FL — SIGNIFICANT CHANGE UP (ref 80–100)
MCV RBC AUTO: 91.8 FL — SIGNIFICANT CHANGE UP (ref 80–100)
MCV RBC AUTO: 91.9 FL — SIGNIFICANT CHANGE UP (ref 80–100)
MCV RBC AUTO: 92 FL — SIGNIFICANT CHANGE UP (ref 80–100)
MCV RBC AUTO: 92 FL — SIGNIFICANT CHANGE UP (ref 80–100)
MCV RBC AUTO: 92.6 FL — SIGNIFICANT CHANGE UP (ref 80–100)
MCV RBC AUTO: 92.6 FL — SIGNIFICANT CHANGE UP (ref 80–100)
MCV RBC AUTO: 92.7 FL — SIGNIFICANT CHANGE UP (ref 80–100)
MCV RBC AUTO: 93 FL — SIGNIFICANT CHANGE UP (ref 80–100)
MCV RBC AUTO: 93 FL — SIGNIFICANT CHANGE UP (ref 80–100)
MCV RBC AUTO: 93.2 FL — SIGNIFICANT CHANGE UP (ref 80–100)
MCV RBC AUTO: 93.5 FL — SIGNIFICANT CHANGE UP (ref 80–100)
MCV RBC AUTO: 93.8 FL — SIGNIFICANT CHANGE UP (ref 80–100)
MCV RBC AUTO: 94.7 FL — SIGNIFICANT CHANGE UP (ref 80–100)
MCV RBC AUTO: 95.4 FL — SIGNIFICANT CHANGE UP (ref 80–100)
MCV RBC AUTO: 95.4 FL — SIGNIFICANT CHANGE UP (ref 80–100)
MCV RBC AUTO: 95.8 FL — SIGNIFICANT CHANGE UP (ref 80–100)
MCV RBC AUTO: 96.5 FL — SIGNIFICANT CHANGE UP (ref 80–100)
MCV RBC AUTO: 96.6 FL — SIGNIFICANT CHANGE UP (ref 80–100)
METAMYELOCYTES # FLD: 0.9 % — HIGH (ref 0–0)
METAMYELOCYTES # FLD: 2.6 % — HIGH (ref 0–0)
METAMYELOCYTES # FLD: 5.2 % — HIGH (ref 0–0)
METHADONE UR-MCNC: NEGATIVE — SIGNIFICANT CHANGE UP
METHGB MFR BLDA: 0.7 % — SIGNIFICANT CHANGE UP
METHOD TYPE: SIGNIFICANT CHANGE UP
MICROCYTES BLD QL: SLIGHT — SIGNIFICANT CHANGE UP
MONOCYTES # BLD AUTO: 0 K/UL — SIGNIFICANT CHANGE UP (ref 0–0.9)
MONOCYTES # BLD AUTO: 0.09 K/UL — SIGNIFICANT CHANGE UP (ref 0–0.9)
MONOCYTES # BLD AUTO: 0.11 K/UL — SIGNIFICANT CHANGE UP (ref 0–0.9)
MONOCYTES # BLD AUTO: 0.39 K/UL — SIGNIFICANT CHANGE UP (ref 0–0.9)
MONOCYTES # BLD AUTO: 0.44 K/UL — SIGNIFICANT CHANGE UP (ref 0–0.9)
MONOCYTES # BLD AUTO: 0.55 K/UL — SIGNIFICANT CHANGE UP (ref 0–0.9)
MONOCYTES # BLD AUTO: 0.55 K/UL — SIGNIFICANT CHANGE UP (ref 0–0.9)
MONOCYTES # BLD AUTO: 0.57 K/UL — SIGNIFICANT CHANGE UP (ref 0–0.9)
MONOCYTES # BLD AUTO: 0.61 K/UL — SIGNIFICANT CHANGE UP (ref 0–0.9)
MONOCYTES # BLD AUTO: 0.63 K/UL — SIGNIFICANT CHANGE UP (ref 0–0.9)
MONOCYTES # BLD AUTO: 0.64 K/UL — SIGNIFICANT CHANGE UP (ref 0–0.9)
MONOCYTES # BLD AUTO: 0.71 K/UL — SIGNIFICANT CHANGE UP (ref 0–0.9)
MONOCYTES NFR BLD AUTO: 0 % — LOW (ref 2–14)
MONOCYTES NFR BLD AUTO: 1.7 % — LOW (ref 2–14)
MONOCYTES NFR BLD AUTO: 3.4 % — SIGNIFICANT CHANGE UP (ref 2–14)
MONOCYTES NFR BLD AUTO: 5.2 % — SIGNIFICANT CHANGE UP (ref 2–14)
MONOCYTES NFR BLD AUTO: 5.4 % — SIGNIFICANT CHANGE UP (ref 2–14)
MONOCYTES NFR BLD AUTO: 6.4 % — SIGNIFICANT CHANGE UP (ref 2–14)
MONOCYTES NFR BLD AUTO: 7.2 % — SIGNIFICANT CHANGE UP (ref 2–14)
MONOCYTES NFR BLD AUTO: 7.5 % — SIGNIFICANT CHANGE UP (ref 2–14)
MONOCYTES NFR BLD AUTO: 7.7 % — SIGNIFICANT CHANGE UP (ref 2–14)
MONOCYTES NFR BLD AUTO: 7.9 % — SIGNIFICANT CHANGE UP (ref 2–14)
MONOCYTES NFR BLD AUTO: 8.2 % — SIGNIFICANT CHANGE UP (ref 2–14)
MONOCYTES NFR BLD AUTO: 8.6 % — SIGNIFICANT CHANGE UP (ref 2–14)
MYELOCYTES NFR BLD: 2.2 % — HIGH (ref 0–0)
MYELOCYTES NFR BLD: 3.4 % — HIGH (ref 0–0)
NEUTROPHILS # BLD AUTO: 0.96 K/UL — LOW (ref 1.8–7.4)
NEUTROPHILS # BLD AUTO: 1.28 K/UL — LOW (ref 1.8–7.4)
NEUTROPHILS # BLD AUTO: 10.63 K/UL — HIGH (ref 1.8–7.4)
NEUTROPHILS # BLD AUTO: 3.5 K/UL — SIGNIFICANT CHANGE UP (ref 1.8–7.4)
NEUTROPHILS # BLD AUTO: 5 K/UL — SIGNIFICANT CHANGE UP (ref 1.8–7.4)
NEUTROPHILS # BLD AUTO: 5.24 K/UL — SIGNIFICANT CHANGE UP (ref 1.8–7.4)
NEUTROPHILS # BLD AUTO: 5.41 K/UL — SIGNIFICANT CHANGE UP (ref 1.8–7.4)
NEUTROPHILS # BLD AUTO: 5.61 K/UL — SIGNIFICANT CHANGE UP (ref 1.8–7.4)
NEUTROPHILS # BLD AUTO: 5.96 K/UL — SIGNIFICANT CHANGE UP (ref 1.8–7.4)
NEUTROPHILS # BLD AUTO: 6.78 K/UL — SIGNIFICANT CHANGE UP (ref 1.8–7.4)
NEUTROPHILS # BLD AUTO: 7.47 K/UL — HIGH (ref 1.8–7.4)
NEUTROPHILS # BLD AUTO: 8.56 K/UL — HIGH (ref 1.8–7.4)
NEUTROPHILS NFR BLD AUTO: 46.6 % — SIGNIFICANT CHANGE UP (ref 43–77)
NEUTROPHILS NFR BLD AUTO: 51.6 % — SIGNIFICANT CHANGE UP (ref 43–77)
NEUTROPHILS NFR BLD AUTO: 69.4 % — SIGNIFICANT CHANGE UP (ref 43–77)
NEUTROPHILS NFR BLD AUTO: 73 % — SIGNIFICANT CHANGE UP (ref 43–77)
NEUTROPHILS NFR BLD AUTO: 74.6 % — SIGNIFICANT CHANGE UP (ref 43–77)
NEUTROPHILS NFR BLD AUTO: 75 % — SIGNIFICANT CHANGE UP (ref 43–77)
NEUTROPHILS NFR BLD AUTO: 75.4 % — SIGNIFICANT CHANGE UP (ref 43–77)
NEUTROPHILS NFR BLD AUTO: 77.7 % — HIGH (ref 43–77)
NEUTROPHILS NFR BLD AUTO: 79.1 % — HIGH (ref 43–77)
NEUTROPHILS NFR BLD AUTO: 83.2 % — HIGH (ref 43–77)
NEUTROPHILS NFR BLD AUTO: 86.8 % — HIGH (ref 43–77)
NEUTROPHILS NFR BLD AUTO: 90.5 % — HIGH (ref 43–77)
NEUTS BAND # BLD: 2.2 % — SIGNIFICANT CHANGE UP (ref 0–8)
NEUTS BAND # BLD: 3.4 % — SIGNIFICANT CHANGE UP (ref 0–8)
NEUTS BAND # BLD: 7 % — SIGNIFICANT CHANGE UP (ref 0–8)
NEUTS BAND # BLD: 7 % — SIGNIFICANT CHANGE UP (ref 0–8)
NITRITE UR-MCNC: NEGATIVE — SIGNIFICANT CHANGE UP
NITRITE UR-MCNC: NEGATIVE — SIGNIFICANT CHANGE UP
NRBC # BLD: 0 /100 WBCS — SIGNIFICANT CHANGE UP (ref 0–0)
NRBC # BLD: 1 /100 WBCS — HIGH (ref 0–0)
NRBC # BLD: 1 /100 WBCS — HIGH (ref 0–0)
NRBC # BLD: 10 /100 WBCS — HIGH (ref 0–0)
NRBC # BLD: 2 /100 WBCS — HIGH (ref 0–0)
NRBC # BLD: 2 /100 WBCS — HIGH (ref 0–0)
NRBC # BLD: 3 /100 WBCS — HIGH (ref 0–0)
NRBC # BLD: 7 /100 WBCS — HIGH (ref 0–0)
NRBC # BLD: 9 /100 WBCS — HIGH (ref 0–0)
NRBC # BLD: SIGNIFICANT CHANGE UP /100 WBCS (ref 0–0)
NT-PROBNP SERPL-SCNC: 1564 PG/ML — HIGH (ref 0–300)
NT-PROBNP SERPL-SCNC: 3727 PG/ML — HIGH (ref 0–300)
OB PNL STL: NEGATIVE — SIGNIFICANT CHANGE UP
OPIATES UR-MCNC: POSITIVE
OSMOLALITY UR: 477 MOSM/KG — SIGNIFICANT CHANGE UP (ref 300–900)
OVALOCYTES BLD QL SMEAR: SIGNIFICANT CHANGE UP
OVALOCYTES BLD QL SMEAR: SLIGHT — SIGNIFICANT CHANGE UP
OXYHGB MFR BLDA: 98 % — HIGH (ref 90–95)
PCO2 BLDA: 23 MMHG — LOW (ref 32–45)
PCO2 BLDA: 24 MMHG — LOW (ref 32–45)
PCO2 BLDA: 31 MMHG — LOW (ref 32–45)
PCO2 BLDA: 31 MMHG — LOW (ref 32–45)
PCO2 BLDA: 32 MMHG — SIGNIFICANT CHANGE UP (ref 32–45)
PCO2 BLDA: 32 MMHG — SIGNIFICANT CHANGE UP (ref 32–45)
PCO2 BLDA: 34 MMHG — SIGNIFICANT CHANGE UP (ref 32–45)
PCO2 BLDA: 35 MMHG — SIGNIFICANT CHANGE UP (ref 32–45)
PCP SPEC-MCNC: SIGNIFICANT CHANGE UP
PCP UR-MCNC: NEGATIVE — SIGNIFICANT CHANGE UP
PH BLDA: 7.08 — CRITICAL LOW (ref 7.35–7.45)
PH BLDA: 7.25 — LOW (ref 7.35–7.45)
PH BLDA: 7.26 — LOW (ref 7.35–7.45)
PH BLDA: 7.31 — LOW (ref 7.35–7.45)
PH BLDA: 7.32 — LOW (ref 7.35–7.45)
PH BLDA: 7.33 — LOW (ref 7.35–7.45)
PH BLDA: 7.34 — LOW (ref 7.35–7.45)
PH BLDA: 7.41 — SIGNIFICANT CHANGE UP (ref 7.35–7.45)
PH UR: 6 — SIGNIFICANT CHANGE UP (ref 5–8)
PH UR: 6.5 — SIGNIFICANT CHANGE UP (ref 5–8)
PHOSPHATE SERPL-MCNC: 2.1 MG/DL — LOW (ref 2.5–4.5)
PHOSPHATE SERPL-MCNC: 2.2 MG/DL — LOW (ref 2.5–4.5)
PHOSPHATE SERPL-MCNC: 2.5 MG/DL — SIGNIFICANT CHANGE UP (ref 2.5–4.5)
PHOSPHATE SERPL-MCNC: 2.7 MG/DL — SIGNIFICANT CHANGE UP (ref 2.5–4.5)
PHOSPHATE SERPL-MCNC: 3.1 MG/DL — SIGNIFICANT CHANGE UP (ref 2.5–4.5)
PHOSPHATE SERPL-MCNC: 3.3 MG/DL — SIGNIFICANT CHANGE UP (ref 2.5–4.5)
PHOSPHATE SERPL-MCNC: 3.5 MG/DL — SIGNIFICANT CHANGE UP (ref 2.5–4.5)
PHOSPHATE SERPL-MCNC: 3.6 MG/DL — SIGNIFICANT CHANGE UP (ref 2.5–4.5)
PHOSPHATE SERPL-MCNC: 4 MG/DL — SIGNIFICANT CHANGE UP (ref 2.5–4.5)
PHOSPHATE SERPL-MCNC: 4.1 MG/DL — SIGNIFICANT CHANGE UP (ref 2.5–4.5)
PHOSPHATE SERPL-MCNC: 4.6 MG/DL — HIGH (ref 2.5–4.5)
PHOSPHATE SERPL-MCNC: 4.9 MG/DL — HIGH (ref 2.5–4.5)
PHOSPHATE SERPL-MCNC: 7.7 MG/DL — HIGH (ref 2.5–4.5)
PHOSPHATE SERPL-MCNC: 8.5 MG/DL — HIGH (ref 2.5–4.5)
PHOSPHATE SERPL-MCNC: 9 MG/DL — HIGH (ref 2.5–4.5)
PHOSPHATE SERPL-MCNC: 9.3 MG/DL — HIGH (ref 2.5–4.5)
PHOSPHATE SERPL-MCNC: 9.6 MG/DL — HIGH (ref 2.5–4.5)
PLAT MORPH BLD: ABNORMAL
PLAT MORPH BLD: NORMAL — SIGNIFICANT CHANGE UP
PLATELET # BLD AUTO: 101 K/UL — LOW (ref 150–400)
PLATELET # BLD AUTO: 104 K/UL — LOW (ref 150–400)
PLATELET # BLD AUTO: 109 K/UL — LOW (ref 150–400)
PLATELET # BLD AUTO: 118 K/UL — LOW (ref 150–400)
PLATELET # BLD AUTO: 121 K/UL — LOW (ref 150–400)
PLATELET # BLD AUTO: 133 K/UL — LOW (ref 150–400)
PLATELET # BLD AUTO: 134 K/UL — LOW (ref 150–400)
PLATELET # BLD AUTO: 137 K/UL — LOW (ref 150–400)
PLATELET # BLD AUTO: 137 K/UL — LOW (ref 150–400)
PLATELET # BLD AUTO: 139 K/UL — LOW (ref 150–400)
PLATELET # BLD AUTO: 143 K/UL — LOW (ref 150–400)
PLATELET # BLD AUTO: 144 K/UL — LOW (ref 150–400)
PLATELET # BLD AUTO: 146 K/UL — LOW (ref 150–400)
PLATELET # BLD AUTO: 149 K/UL — LOW (ref 150–400)
PLATELET # BLD AUTO: 151 K/UL — SIGNIFICANT CHANGE UP (ref 150–400)
PLATELET # BLD AUTO: 159 K/UL — SIGNIFICANT CHANGE UP (ref 150–400)
PLATELET # BLD AUTO: 160 K/UL — SIGNIFICANT CHANGE UP (ref 150–400)
PLATELET # BLD AUTO: 163 K/UL — SIGNIFICANT CHANGE UP (ref 150–400)
PLATELET # BLD AUTO: 164 K/UL — SIGNIFICANT CHANGE UP (ref 150–400)
PLATELET # BLD AUTO: 164 K/UL — SIGNIFICANT CHANGE UP (ref 150–400)
PLATELET # BLD AUTO: 166 K/UL — SIGNIFICANT CHANGE UP (ref 150–400)
PLATELET # BLD AUTO: 171 K/UL — SIGNIFICANT CHANGE UP (ref 150–400)
PLATELET # BLD AUTO: 180 K/UL — SIGNIFICANT CHANGE UP (ref 150–400)
PLATELET # BLD AUTO: 184 K/UL — SIGNIFICANT CHANGE UP (ref 150–400)
PLATELET # BLD AUTO: 185 K/UL — SIGNIFICANT CHANGE UP (ref 150–400)
PLATELET # BLD AUTO: 188 K/UL — SIGNIFICANT CHANGE UP (ref 150–400)
PLATELET # BLD AUTO: 201 K/UL — SIGNIFICANT CHANGE UP (ref 150–400)
PLATELET # BLD AUTO: 207 K/UL — SIGNIFICANT CHANGE UP (ref 150–400)
PLATELET # BLD AUTO: 210 K/UL — SIGNIFICANT CHANGE UP (ref 150–400)
PLATELET # BLD AUTO: 210 K/UL — SIGNIFICANT CHANGE UP (ref 150–400)
PLATELET # BLD AUTO: 218 K/UL — SIGNIFICANT CHANGE UP (ref 150–400)
PLATELET # BLD AUTO: 255 K/UL — SIGNIFICANT CHANGE UP (ref 150–400)
PLATELET # BLD AUTO: 54 K/UL — LOW (ref 150–400)
PLATELET # BLD AUTO: 74 K/UL — LOW (ref 150–400)
PLATELET # BLD AUTO: 79 K/UL — LOW (ref 150–400)
PLATELET # BLD AUTO: 83 K/UL — LOW (ref 150–400)
PO2 BLDA: 120 MMHG — HIGH (ref 83–108)
PO2 BLDA: 140 MMHG — HIGH (ref 83–108)
PO2 BLDA: 319 MMHG — HIGH (ref 83–108)
PO2 BLDA: 50 MMHG — CRITICAL LOW (ref 83–108)
PO2 BLDA: 55 MMHG — LOW (ref 83–108)
PO2 BLDA: 68 MMHG — LOW (ref 83–108)
PO2 BLDA: 69 MMHG — LOW (ref 83–108)
PO2 BLDA: 94 MMHG — SIGNIFICANT CHANGE UP (ref 83–108)
POIKILOCYTOSIS BLD QL AUTO: SIGNIFICANT CHANGE UP
POLYCHROMASIA BLD QL SMEAR: SLIGHT — SIGNIFICANT CHANGE UP
POTASSIUM BLDA-SCNC: 5.3 MMOL/L — HIGH (ref 3.5–5.1)
POTASSIUM SERPL-MCNC: 3.5 MMOL/L — SIGNIFICANT CHANGE UP (ref 3.5–5.3)
POTASSIUM SERPL-MCNC: 3.7 MMOL/L — SIGNIFICANT CHANGE UP (ref 3.5–5.3)
POTASSIUM SERPL-MCNC: 3.7 MMOL/L — SIGNIFICANT CHANGE UP (ref 3.5–5.3)
POTASSIUM SERPL-MCNC: 3.8 MMOL/L — SIGNIFICANT CHANGE UP (ref 3.5–5.3)
POTASSIUM SERPL-MCNC: 4 MMOL/L — SIGNIFICANT CHANGE UP (ref 3.5–5.3)
POTASSIUM SERPL-MCNC: 4 MMOL/L — SIGNIFICANT CHANGE UP (ref 3.5–5.3)
POTASSIUM SERPL-MCNC: 4.1 MMOL/L — SIGNIFICANT CHANGE UP (ref 3.5–5.3)
POTASSIUM SERPL-MCNC: 4.2 MMOL/L — SIGNIFICANT CHANGE UP (ref 3.5–5.3)
POTASSIUM SERPL-MCNC: 4.2 MMOL/L — SIGNIFICANT CHANGE UP (ref 3.5–5.3)
POTASSIUM SERPL-MCNC: 4.3 MMOL/L — SIGNIFICANT CHANGE UP (ref 3.5–5.3)
POTASSIUM SERPL-MCNC: 4.4 MMOL/L — SIGNIFICANT CHANGE UP (ref 3.5–5.3)
POTASSIUM SERPL-MCNC: 4.5 MMOL/L — SIGNIFICANT CHANGE UP (ref 3.5–5.3)
POTASSIUM SERPL-MCNC: 4.7 MMOL/L — SIGNIFICANT CHANGE UP (ref 3.5–5.3)
POTASSIUM SERPL-MCNC: 4.7 MMOL/L — SIGNIFICANT CHANGE UP (ref 3.5–5.3)
POTASSIUM SERPL-MCNC: 4.8 MMOL/L — SIGNIFICANT CHANGE UP (ref 3.5–5.3)
POTASSIUM SERPL-MCNC: 4.9 MMOL/L — SIGNIFICANT CHANGE UP (ref 3.5–5.3)
POTASSIUM SERPL-MCNC: 5 MMOL/L — SIGNIFICANT CHANGE UP (ref 3.5–5.3)
POTASSIUM SERPL-MCNC: 5.1 MMOL/L — SIGNIFICANT CHANGE UP (ref 3.5–5.3)
POTASSIUM SERPL-MCNC: 5.6 MMOL/L — HIGH (ref 3.5–5.3)
POTASSIUM SERPL-MCNC: 5.8 MMOL/L — HIGH (ref 3.5–5.3)
POTASSIUM SERPL-MCNC: 7.1 MMOL/L — CRITICAL HIGH (ref 3.5–5.3)
POTASSIUM SERPL-MCNC: 7.4 MMOL/L — CRITICAL HIGH (ref 3.5–5.3)
POTASSIUM SERPL-SCNC: 3.5 MMOL/L — SIGNIFICANT CHANGE UP (ref 3.5–5.3)
POTASSIUM SERPL-SCNC: 3.7 MMOL/L — SIGNIFICANT CHANGE UP (ref 3.5–5.3)
POTASSIUM SERPL-SCNC: 3.7 MMOL/L — SIGNIFICANT CHANGE UP (ref 3.5–5.3)
POTASSIUM SERPL-SCNC: 3.8 MMOL/L — SIGNIFICANT CHANGE UP (ref 3.5–5.3)
POTASSIUM SERPL-SCNC: 4 MMOL/L — SIGNIFICANT CHANGE UP (ref 3.5–5.3)
POTASSIUM SERPL-SCNC: 4 MMOL/L — SIGNIFICANT CHANGE UP (ref 3.5–5.3)
POTASSIUM SERPL-SCNC: 4.1 MMOL/L — SIGNIFICANT CHANGE UP (ref 3.5–5.3)
POTASSIUM SERPL-SCNC: 4.2 MMOL/L — SIGNIFICANT CHANGE UP (ref 3.5–5.3)
POTASSIUM SERPL-SCNC: 4.2 MMOL/L — SIGNIFICANT CHANGE UP (ref 3.5–5.3)
POTASSIUM SERPL-SCNC: 4.3 MMOL/L — SIGNIFICANT CHANGE UP (ref 3.5–5.3)
POTASSIUM SERPL-SCNC: 4.4 MMOL/L — SIGNIFICANT CHANGE UP (ref 3.5–5.3)
POTASSIUM SERPL-SCNC: 4.5 MMOL/L — SIGNIFICANT CHANGE UP (ref 3.5–5.3)
POTASSIUM SERPL-SCNC: 4.7 MMOL/L — SIGNIFICANT CHANGE UP (ref 3.5–5.3)
POTASSIUM SERPL-SCNC: 4.7 MMOL/L — SIGNIFICANT CHANGE UP (ref 3.5–5.3)
POTASSIUM SERPL-SCNC: 4.8 MMOL/L — SIGNIFICANT CHANGE UP (ref 3.5–5.3)
POTASSIUM SERPL-SCNC: 4.9 MMOL/L — SIGNIFICANT CHANGE UP (ref 3.5–5.3)
POTASSIUM SERPL-SCNC: 5 MMOL/L — SIGNIFICANT CHANGE UP (ref 3.5–5.3)
POTASSIUM SERPL-SCNC: 5.1 MMOL/L — SIGNIFICANT CHANGE UP (ref 3.5–5.3)
POTASSIUM SERPL-SCNC: 5.6 MMOL/L — HIGH (ref 3.5–5.3)
POTASSIUM SERPL-SCNC: 5.8 MMOL/L — HIGH (ref 3.5–5.3)
POTASSIUM SERPL-SCNC: 7.1 MMOL/L — CRITICAL HIGH (ref 3.5–5.3)
POTASSIUM SERPL-SCNC: 7.4 MMOL/L — CRITICAL HIGH (ref 3.5–5.3)
POTASSIUM UR-SCNC: 22 MMOL/L — SIGNIFICANT CHANGE UP
PROCALCITONIN SERPL-MCNC: 14.65 NG/ML — HIGH (ref 0.02–0.1)
PROT ?TM UR-MCNC: 37 MG/DL — HIGH (ref 0–12)
PROT ?TM UR-MCNC: 37 MG/DL — HIGH (ref 0–12)
PROT ?TM UR-MCNC: 54 MG/DL — HIGH (ref 0–12)
PROT PATTERN 24H UR ELPH-IMP: SIGNIFICANT CHANGE UP
PROT PATTERN SERPL ELPH-IMP: SIGNIFICANT CHANGE UP
PROT SERPL-MCNC: 2.8 G/DL — LOW (ref 6–8.3)
PROT SERPL-MCNC: 3.5 G/DL — LOW (ref 6–8.3)
PROT SERPL-MCNC: 3.7 G/DL — LOW (ref 6–8.3)
PROT SERPL-MCNC: 4 G/DL — LOW (ref 6–8.3)
PROT SERPL-MCNC: 4 G/DL — LOW (ref 6–8.3)
PROT SERPL-MCNC: 4.1 G/DL — LOW (ref 6–8.3)
PROT SERPL-MCNC: 4.1 G/DL — LOW (ref 6–8.3)
PROT SERPL-MCNC: 4.4 G/DL — LOW (ref 6–8.3)
PROT SERPL-MCNC: 5 G/DL — LOW (ref 6–8.3)
PROT SERPL-MCNC: 6.4 G/DL — SIGNIFICANT CHANGE UP (ref 6–8.3)
PROT SERPL-MCNC: 6.7 G/DL — SIGNIFICANT CHANGE UP (ref 6–8.3)
PROT SERPL-MCNC: 7.1 G/DL — SIGNIFICANT CHANGE UP (ref 6–8.3)
PROT SERPL-MCNC: 7.3 G/DL — SIGNIFICANT CHANGE UP (ref 6–8.3)
PROT SERPL-MCNC: 7.3 G/DL — SIGNIFICANT CHANGE UP (ref 6–8.3)
PROT SERPL-MCNC: 7.5 G/DL — SIGNIFICANT CHANGE UP (ref 6–8.3)
PROT SERPL-MCNC: 7.5 G/DL — SIGNIFICANT CHANGE UP (ref 6–8.3)
PROT SERPL-MCNC: 7.6 G/DL — SIGNIFICANT CHANGE UP (ref 6–8.3)
PROT SERPL-MCNC: 7.6 G/DL — SIGNIFICANT CHANGE UP (ref 6–8.3)
PROT SERPL-MCNC: 7.7 G/DL — SIGNIFICANT CHANGE UP (ref 6–8.3)
PROT UR-MCNC: 100 MG/DL
PROT UR-MCNC: 300 MG/DL
PROT/CREAT UR-RTO: 1 RATIO — HIGH (ref 0–0.2)
PROTHROM AB SERPL-ACNC: 11.2 SEC — SIGNIFICANT CHANGE UP (ref 9.5–13)
PROTHROM AB SERPL-ACNC: 11.4 SEC — SIGNIFICANT CHANGE UP (ref 9.5–13)
PROTHROM AB SERPL-ACNC: 11.5 SEC — SIGNIFICANT CHANGE UP (ref 9.5–13)
PROTHROM AB SERPL-ACNC: 11.5 SEC — SIGNIFICANT CHANGE UP (ref 9.5–13)
PROTHROM AB SERPL-ACNC: 11.8 SEC — SIGNIFICANT CHANGE UP (ref 9.5–13)
PROTHROM AB SERPL-ACNC: 12.2 SEC — SIGNIFICANT CHANGE UP (ref 9.5–13)
PROTHROM AB SERPL-ACNC: 12.5 SEC — SIGNIFICANT CHANGE UP (ref 9.5–13)
PROTHROM AB SERPL-ACNC: 13.2 SEC — HIGH (ref 9.5–13)
PROTHROM AB SERPL-ACNC: 13.9 SEC — HIGH (ref 9.5–13)
PROTHROM AB SERPL-ACNC: 14.7 SEC — HIGH (ref 9.5–13)
PROTHROM AB SERPL-ACNC: 16.2 SEC — HIGH (ref 9.5–13)
PROTHROM AB SERPL-ACNC: 19.3 SEC — HIGH (ref 9.5–13)
PROTHROM AB SERPL-ACNC: 19.5 SEC — HIGH (ref 9.5–13)
PROTHROM AB SERPL-ACNC: 20.4 SEC — HIGH (ref 9.5–13)
PROTHROM AB SERPL-ACNC: 20.8 SEC — HIGH (ref 9.5–13)
PROTHROM AB SERPL-ACNC: 21.6 SEC — HIGH (ref 9.5–13)
PROTHROM AB SERPL-ACNC: 22.4 SEC — HIGH (ref 9.5–13)
PROTHROM AB SERPL-ACNC: 24.6 SEC — HIGH (ref 9.5–13)
PROTHROM AB SERPL-ACNC: 32.5 SEC — HIGH (ref 9.5–13)
PTH-INTACT FLD-MCNC: 150 PG/ML — HIGH (ref 15–65)
RBC # BLD: 1.62 M/UL — LOW (ref 3.8–5.2)
RBC # BLD: 1.89 M/UL — LOW (ref 3.8–5.2)
RBC # BLD: 2.31 M/UL — LOW (ref 3.8–5.2)
RBC # BLD: 2.38 M/UL — LOW (ref 3.8–5.2)
RBC # BLD: 2.44 M/UL — LOW (ref 3.8–5.2)
RBC # BLD: 2.44 M/UL — LOW (ref 3.8–5.2)
RBC # BLD: 2.57 M/UL — LOW (ref 3.8–5.2)
RBC # BLD: 2.57 M/UL — LOW (ref 3.8–5.2)
RBC # BLD: 2.63 M/UL — LOW (ref 3.8–5.2)
RBC # BLD: 2.7 M/UL — LOW (ref 3.8–5.2)
RBC # BLD: 2.84 M/UL — LOW (ref 3.8–5.2)
RBC # BLD: 2.87 M/UL — LOW (ref 3.8–5.2)
RBC # BLD: 2.87 M/UL — LOW (ref 3.8–5.2)
RBC # BLD: 3.07 M/UL — LOW (ref 3.8–5.2)
RBC # BLD: 3.07 M/UL — LOW (ref 3.8–5.2)
RBC # BLD: 3.1 M/UL — LOW (ref 3.8–5.2)
RBC # BLD: 3.11 M/UL — LOW (ref 3.8–5.2)
RBC # BLD: 3.11 M/UL — LOW (ref 3.8–5.2)
RBC # BLD: 3.16 M/UL — LOW (ref 3.8–5.2)
RBC # BLD: 3.21 M/UL — LOW (ref 3.8–5.2)
RBC # BLD: 3.23 M/UL — LOW (ref 3.8–5.2)
RBC # BLD: 3.24 M/UL — LOW (ref 3.8–5.2)
RBC # BLD: 3.25 M/UL — LOW (ref 3.8–5.2)
RBC # BLD: 3.28 M/UL — LOW (ref 3.8–5.2)
RBC # BLD: 3.31 M/UL — LOW (ref 3.8–5.2)
RBC # BLD: 3.36 M/UL — LOW (ref 3.8–5.2)
RBC # BLD: 3.4 M/UL — LOW (ref 3.8–5.2)
RBC # BLD: 3.54 M/UL — LOW (ref 3.8–5.2)
RBC # BLD: 3.64 M/UL — LOW (ref 3.8–5.2)
RBC # BLD: 3.66 M/UL — LOW (ref 3.8–5.2)
RBC # BLD: 3.73 M/UL — LOW (ref 3.8–5.2)
RBC # BLD: 3.76 M/UL — LOW (ref 3.8–5.2)
RBC # BLD: 3.93 M/UL — SIGNIFICANT CHANGE UP (ref 3.8–5.2)
RBC # BLD: 3.96 M/UL — SIGNIFICANT CHANGE UP (ref 3.8–5.2)
RBC # BLD: 4.09 M/UL — SIGNIFICANT CHANGE UP (ref 3.8–5.2)
RBC # BLD: 4.47 M/UL — SIGNIFICANT CHANGE UP (ref 3.8–5.2)
RBC # FLD: 15.2 % — HIGH (ref 10.3–14.5)
RBC # FLD: 15.4 % — HIGH (ref 10.3–14.5)
RBC # FLD: 15.5 % — HIGH (ref 10.3–14.5)
RBC # FLD: 15.7 % — HIGH (ref 10.3–14.5)
RBC # FLD: 15.8 % — HIGH (ref 10.3–14.5)
RBC # FLD: 16.1 % — HIGH (ref 10.3–14.5)
RBC # FLD: 16.4 % — HIGH (ref 10.3–14.5)
RBC # FLD: 16.7 % — HIGH (ref 10.3–14.5)
RBC # FLD: 17.2 % — HIGH (ref 10.3–14.5)
RBC # FLD: 17.3 % — HIGH (ref 10.3–14.5)
RBC # FLD: 17.4 % — HIGH (ref 10.3–14.5)
RBC # FLD: 17.5 % — HIGH (ref 10.3–14.5)
RBC # FLD: 17.6 % — HIGH (ref 10.3–14.5)
RBC # FLD: 17.7 % — HIGH (ref 10.3–14.5)
RBC # FLD: 17.8 % — HIGH (ref 10.3–14.5)
RBC # FLD: 17.9 % — HIGH (ref 10.3–14.5)
RBC # FLD: 18 % — HIGH (ref 10.3–14.5)
RBC # FLD: 18.1 % — HIGH (ref 10.3–14.5)
RBC # FLD: 18.1 % — HIGH (ref 10.3–14.5)
RBC # FLD: 18.5 % — HIGH (ref 10.3–14.5)
RBC BLD AUTO: ABNORMAL
RBC CASTS # UR COMP ASSIST: 0 /HPF — SIGNIFICANT CHANGE UP (ref 0–4)
RBC CASTS # UR COMP ASSIST: 2 /HPF — SIGNIFICANT CHANGE UP (ref 0–4)
RETICS #: 67.6 K/UL — SIGNIFICANT CHANGE UP (ref 25–125)
RETICS #: 68.1 K/UL — SIGNIFICANT CHANGE UP (ref 25–125)
RETICS/RBC NFR: 2.6 % — HIGH (ref 0.5–2.5)
RETICS/RBC NFR: 2.8 % — HIGH (ref 0.5–2.5)
RH IG SCN BLD-IMP: POSITIVE — SIGNIFICANT CHANGE UP
RSV RNA NPH QL NAA+NON-PROBE: SIGNIFICANT CHANGE UP
SAO2 % BLDA: 100 % — HIGH (ref 94–98)
SAO2 % BLDA: 88 % — LOW (ref 94–98)
SAO2 % BLDA: 88.9 % — LOW (ref 94–98)
SAO2 % BLDA: 97.7 % — SIGNIFICANT CHANGE UP (ref 94–98)
SAO2 % BLDA: 98 % — SIGNIFICANT CHANGE UP (ref 94–98)
SAO2 % BLDA: 98 % — SIGNIFICANT CHANGE UP (ref 94–98)
SAO2 % BLDA: 99.4 % — HIGH (ref 94–98)
SAO2 % BLDA: 99.9 % — HIGH (ref 94–98)
SARS-COV-2 RNA SPEC QL NAA+PROBE: SIGNIFICANT CHANGE UP
SCHISTOCYTES BLD QL AUTO: SLIGHT — SIGNIFICANT CHANGE UP
SODIUM BLDA-SCNC: 134 MMOL/L — LOW (ref 136–145)
SODIUM SERPL-SCNC: 132 MMOL/L — LOW (ref 135–145)
SODIUM SERPL-SCNC: 133 MMOL/L — LOW (ref 135–145)
SODIUM SERPL-SCNC: 134 MMOL/L — LOW (ref 135–145)
SODIUM SERPL-SCNC: 135 MMOL/L — SIGNIFICANT CHANGE UP (ref 135–145)
SODIUM SERPL-SCNC: 136 MMOL/L — SIGNIFICANT CHANGE UP (ref 135–145)
SODIUM SERPL-SCNC: 137 MMOL/L — SIGNIFICANT CHANGE UP (ref 135–145)
SODIUM SERPL-SCNC: 138 MMOL/L — SIGNIFICANT CHANGE UP (ref 135–145)
SODIUM SERPL-SCNC: 145 MMOL/L — SIGNIFICANT CHANGE UP (ref 135–145)
SODIUM SERPL-SCNC: 145 MMOL/L — SIGNIFICANT CHANGE UP (ref 135–145)
SODIUM SERPL-SCNC: 148 MMOL/L — HIGH (ref 135–145)
SODIUM SERPL-SCNC: 148 MMOL/L — HIGH (ref 135–145)
SODIUM SERPL-SCNC: 149 MMOL/L — HIGH (ref 135–145)
SODIUM SERPL-SCNC: 153 MMOL/L — HIGH (ref 135–145)
SODIUM SERPL-SCNC: 153 MMOL/L — HIGH (ref 135–145)
SODIUM SERPL-SCNC: 154 MMOL/L — HIGH (ref 135–145)
SODIUM UR-SCNC: 96 MMOL/L — SIGNIFICANT CHANGE UP
SP GR SPEC: 1.02 — SIGNIFICANT CHANGE UP (ref 1–1.03)
SP GR SPEC: >1.03 — HIGH (ref 1–1.03)
SPECIMEN SOURCE: SIGNIFICANT CHANGE UP
SPECIMEN SOURCE: SIGNIFICANT CHANGE UP
SPHEROCYTES BLD QL SMEAR: SLIGHT — SIGNIFICANT CHANGE UP
SQUAMOUS # UR AUTO: 1 /HPF — SIGNIFICANT CHANGE UP (ref 0–5)
SQUAMOUS # UR AUTO: 8 /HPF — HIGH (ref 0–5)
SURGICAL PATHOLOGY STUDY: SIGNIFICANT CHANGE UP
THC UR QL: POSITIVE
TIBC SERPL-MCNC: 231 UG/DL — SIGNIFICANT CHANGE UP (ref 220–430)
TOTAL VOLUME - 24 HOUR: SIGNIFICANT CHANGE UP ML
TRANSFERRIN SERPL-MCNC: 185 MG/DL — LOW (ref 200–360)
TROPONIN T, HIGH SENSITIVITY RESULT: 114 NG/L — CRITICAL HIGH (ref 0–51)
TROPONIN T, HIGH SENSITIVITY RESULT: 17 NG/L — SIGNIFICANT CHANGE UP (ref 0–51)
TROPONIN T, HIGH SENSITIVITY RESULT: 20 NG/L — SIGNIFICANT CHANGE UP (ref 0–51)
TROPONIN T, HIGH SENSITIVITY RESULT: 41 NG/L — SIGNIFICANT CHANGE UP (ref 0–51)
TROPONIN T, HIGH SENSITIVITY RESULT: 43 NG/L — SIGNIFICANT CHANGE UP (ref 0–51)
TROPONIN T, HIGH SENSITIVITY RESULT: 51 NG/L — SIGNIFICANT CHANGE UP (ref 0–51)
TROPONIN T, HIGH SENSITIVITY RESULT: 86 NG/L — CRITICAL HIGH (ref 0–51)
UIBC SERPL-MCNC: 182 UG/DL — SIGNIFICANT CHANGE UP (ref 110–370)
URINE CREATININE CALCULATION: SIGNIFICANT CHANGE UP G/24 H (ref 0.8–1.8)
UROBILINOGEN FLD QL: 0.2 MG/DL — SIGNIFICANT CHANGE UP (ref 0.2–1)
UROBILINOGEN FLD QL: 2 MG/DL (ref 0.2–1)
VANCOMYCIN TROUGH SERPL-MCNC: 7.5 UG/ML — LOW (ref 10–20)
VARIANT LYMPHS # BLD: 0.9 % — SIGNIFICANT CHANGE UP (ref 0–6)
VARIANT LYMPHS # BLD: 1.1 % — SIGNIFICANT CHANGE UP (ref 0–6)
VIT D25+D1,25 OH+D1,25 PNL SERPL-MCNC: 74.3 PG/ML — SIGNIFICANT CHANGE UP (ref 19.9–79.3)
WBC # BLD: 1.78 K/UL — LOW (ref 3.8–10.5)
WBC # BLD: 10.22 K/UL — SIGNIFICANT CHANGE UP (ref 3.8–10.5)
WBC # BLD: 10.39 K/UL — SIGNIFICANT CHANGE UP (ref 3.8–10.5)
WBC # BLD: 10.93 K/UL — HIGH (ref 3.8–10.5)
WBC # BLD: 11.32 K/UL — HIGH (ref 3.8–10.5)
WBC # BLD: 11.62 K/UL — HIGH (ref 3.8–10.5)
WBC # BLD: 11.75 K/UL — HIGH (ref 3.8–10.5)
WBC # BLD: 12.88 K/UL — HIGH (ref 3.8–10.5)
WBC # BLD: 13.76 K/UL — HIGH (ref 3.8–10.5)
WBC # BLD: 2.56 K/UL — LOW (ref 3.8–10.5)
WBC # BLD: 5.04 K/UL — SIGNIFICANT CHANGE UP (ref 3.8–10.5)
WBC # BLD: 6.18 K/UL — SIGNIFICANT CHANGE UP (ref 3.8–10.5)
WBC # BLD: 6.2 K/UL — SIGNIFICANT CHANGE UP (ref 3.8–10.5)
WBC # BLD: 6.27 K/UL — SIGNIFICANT CHANGE UP (ref 3.8–10.5)
WBC # BLD: 6.33 K/UL — SIGNIFICANT CHANGE UP (ref 3.8–10.5)
WBC # BLD: 6.55 K/UL — SIGNIFICANT CHANGE UP (ref 3.8–10.5)
WBC # BLD: 6.61 K/UL — SIGNIFICANT CHANGE UP (ref 3.8–10.5)
WBC # BLD: 6.69 K/UL — SIGNIFICANT CHANGE UP (ref 3.8–10.5)
WBC # BLD: 6.71 K/UL — SIGNIFICANT CHANGE UP (ref 3.8–10.5)
WBC # BLD: 6.9 K/UL — SIGNIFICANT CHANGE UP (ref 3.8–10.5)
WBC # BLD: 7.18 K/UL — SIGNIFICANT CHANGE UP (ref 3.8–10.5)
WBC # BLD: 7.48 K/UL — SIGNIFICANT CHANGE UP (ref 3.8–10.5)
WBC # BLD: 7.49 K/UL — SIGNIFICANT CHANGE UP (ref 3.8–10.5)
WBC # BLD: 7.59 K/UL — SIGNIFICANT CHANGE UP (ref 3.8–10.5)
WBC # BLD: 7.67 K/UL — SIGNIFICANT CHANGE UP (ref 3.8–10.5)
WBC # BLD: 7.69 K/UL — SIGNIFICANT CHANGE UP (ref 3.8–10.5)
WBC # BLD: 7.89 K/UL — SIGNIFICANT CHANGE UP (ref 3.8–10.5)
WBC # BLD: 8.15 K/UL — SIGNIFICANT CHANGE UP (ref 3.8–10.5)
WBC # BLD: 8.54 K/UL — SIGNIFICANT CHANGE UP (ref 3.8–10.5)
WBC # BLD: 8.58 K/UL — SIGNIFICANT CHANGE UP (ref 3.8–10.5)
WBC # BLD: 8.65 K/UL — SIGNIFICANT CHANGE UP (ref 3.8–10.5)
WBC # BLD: 8.74 K/UL — SIGNIFICANT CHANGE UP (ref 3.8–10.5)
WBC # BLD: 8.96 K/UL — SIGNIFICANT CHANGE UP (ref 3.8–10.5)
WBC # BLD: 9.45 K/UL — SIGNIFICANT CHANGE UP (ref 3.8–10.5)
WBC # BLD: 9.83 K/UL — SIGNIFICANT CHANGE UP (ref 3.8–10.5)
WBC # BLD: 9.86 K/UL — SIGNIFICANT CHANGE UP (ref 3.8–10.5)
WBC # FLD AUTO: 1.78 K/UL — LOW (ref 3.8–10.5)
WBC # FLD AUTO: 10.22 K/UL — SIGNIFICANT CHANGE UP (ref 3.8–10.5)
WBC # FLD AUTO: 10.39 K/UL — SIGNIFICANT CHANGE UP (ref 3.8–10.5)
WBC # FLD AUTO: 10.93 K/UL — HIGH (ref 3.8–10.5)
WBC # FLD AUTO: 11.32 K/UL — HIGH (ref 3.8–10.5)
WBC # FLD AUTO: 11.62 K/UL — HIGH (ref 3.8–10.5)
WBC # FLD AUTO: 11.75 K/UL — HIGH (ref 3.8–10.5)
WBC # FLD AUTO: 12.88 K/UL — HIGH (ref 3.8–10.5)
WBC # FLD AUTO: 13.76 K/UL — HIGH (ref 3.8–10.5)
WBC # FLD AUTO: 2.56 K/UL — LOW (ref 3.8–10.5)
WBC # FLD AUTO: 5.04 K/UL — SIGNIFICANT CHANGE UP (ref 3.8–10.5)
WBC # FLD AUTO: 6.18 K/UL — SIGNIFICANT CHANGE UP (ref 3.8–10.5)
WBC # FLD AUTO: 6.2 K/UL — SIGNIFICANT CHANGE UP (ref 3.8–10.5)
WBC # FLD AUTO: 6.27 K/UL — SIGNIFICANT CHANGE UP (ref 3.8–10.5)
WBC # FLD AUTO: 6.33 K/UL — SIGNIFICANT CHANGE UP (ref 3.8–10.5)
WBC # FLD AUTO: 6.55 K/UL — SIGNIFICANT CHANGE UP (ref 3.8–10.5)
WBC # FLD AUTO: 6.61 K/UL — SIGNIFICANT CHANGE UP (ref 3.8–10.5)
WBC # FLD AUTO: 6.69 K/UL — SIGNIFICANT CHANGE UP (ref 3.8–10.5)
WBC # FLD AUTO: 6.71 K/UL — SIGNIFICANT CHANGE UP (ref 3.8–10.5)
WBC # FLD AUTO: 6.9 K/UL — SIGNIFICANT CHANGE UP (ref 3.8–10.5)
WBC # FLD AUTO: 7.18 K/UL — SIGNIFICANT CHANGE UP (ref 3.8–10.5)
WBC # FLD AUTO: 7.48 K/UL — SIGNIFICANT CHANGE UP (ref 3.8–10.5)
WBC # FLD AUTO: 7.49 K/UL — SIGNIFICANT CHANGE UP (ref 3.8–10.5)
WBC # FLD AUTO: 7.59 K/UL — SIGNIFICANT CHANGE UP (ref 3.8–10.5)
WBC # FLD AUTO: 7.67 K/UL — SIGNIFICANT CHANGE UP (ref 3.8–10.5)
WBC # FLD AUTO: 7.69 K/UL — SIGNIFICANT CHANGE UP (ref 3.8–10.5)
WBC # FLD AUTO: 7.89 K/UL — SIGNIFICANT CHANGE UP (ref 3.8–10.5)
WBC # FLD AUTO: 8.15 K/UL — SIGNIFICANT CHANGE UP (ref 3.8–10.5)
WBC # FLD AUTO: 8.54 K/UL — SIGNIFICANT CHANGE UP (ref 3.8–10.5)
WBC # FLD AUTO: 8.58 K/UL — SIGNIFICANT CHANGE UP (ref 3.8–10.5)
WBC # FLD AUTO: 8.65 K/UL — SIGNIFICANT CHANGE UP (ref 3.8–10.5)
WBC # FLD AUTO: 8.74 K/UL — SIGNIFICANT CHANGE UP (ref 3.8–10.5)
WBC # FLD AUTO: 8.96 K/UL — SIGNIFICANT CHANGE UP (ref 3.8–10.5)
WBC # FLD AUTO: 9.45 K/UL — SIGNIFICANT CHANGE UP (ref 3.8–10.5)
WBC # FLD AUTO: 9.83 K/UL — SIGNIFICANT CHANGE UP (ref 3.8–10.5)
WBC # FLD AUTO: 9.86 K/UL — SIGNIFICANT CHANGE UP (ref 3.8–10.5)
WBC UR QL: 0 /HPF — SIGNIFICANT CHANGE UP (ref 0–5)
WBC UR QL: 2 /HPF — SIGNIFICANT CHANGE UP (ref 0–5)

## 2024-01-01 PROCEDURE — C8925: CPT

## 2024-01-01 PROCEDURE — 99233 SBSQ HOSP IP/OBS HIGH 50: CPT

## 2024-01-01 PROCEDURE — 86901 BLOOD TYPING SEROLOGIC RH(D): CPT

## 2024-01-01 PROCEDURE — 85025 COMPLETE CBC W/AUTO DIFF WBC: CPT

## 2024-01-01 PROCEDURE — 34812 OPN FEM ART EXPOS: CPT | Mod: 81,50,59

## 2024-01-01 PROCEDURE — C1887: CPT

## 2024-01-01 PROCEDURE — 83605 ASSAY OF LACTIC ACID: CPT

## 2024-01-01 PROCEDURE — 99223 1ST HOSP IP/OBS HIGH 75: CPT | Mod: GC

## 2024-01-01 PROCEDURE — 99233 SBSQ HOSP IP/OBS HIGH 50: CPT | Mod: GC

## 2024-01-01 PROCEDURE — 82436 ASSAY OF URINE CHLORIDE: CPT

## 2024-01-01 PROCEDURE — 86850 RBC ANTIBODY SCREEN: CPT

## 2024-01-01 PROCEDURE — 34708 EVASC RPR ILIO-ILIAC RPT: CPT | Mod: GC

## 2024-01-01 PROCEDURE — 84165 PROTEIN E-PHORESIS SERUM: CPT

## 2024-01-01 PROCEDURE — 97116 GAIT TRAINING THERAPY: CPT

## 2024-01-01 PROCEDURE — 86900 BLOOD TYPING SEROLOGIC ABO: CPT

## 2024-01-01 PROCEDURE — 97606 NEG PRS WND THER DME>50 SQCM: CPT | Mod: GC

## 2024-01-01 PROCEDURE — 83550 IRON BINDING TEST: CPT

## 2024-01-01 PROCEDURE — 99292 CRITICAL CARE ADDL 30 MIN: CPT

## 2024-01-01 PROCEDURE — 85730 THROMBOPLASTIN TIME PARTIAL: CPT

## 2024-01-01 PROCEDURE — 80053 COMPREHEN METABOLIC PANEL: CPT

## 2024-01-01 PROCEDURE — P9040: CPT

## 2024-01-01 PROCEDURE — 83880 ASSAY OF NATRIURETIC PEPTIDE: CPT

## 2024-01-01 PROCEDURE — 99232 SBSQ HOSP IP/OBS MODERATE 35: CPT

## 2024-01-01 PROCEDURE — 88311 DECALCIFY TISSUE: CPT

## 2024-01-01 PROCEDURE — 82803 BLOOD GASES ANY COMBINATION: CPT

## 2024-01-01 PROCEDURE — 82962 GLUCOSE BLOOD TEST: CPT

## 2024-01-01 PROCEDURE — 88311 DECALCIFY TISSUE: CPT | Mod: 26

## 2024-01-01 PROCEDURE — 76000 FLUOROSCOPY <1 HR PHYS/QHP: CPT

## 2024-01-01 PROCEDURE — 99222 1ST HOSP IP/OBS MODERATE 55: CPT | Mod: GC

## 2024-01-01 PROCEDURE — 71045 X-RAY EXAM CHEST 1 VIEW: CPT | Mod: 26

## 2024-01-01 PROCEDURE — 74018 RADEX ABDOMEN 1 VIEW: CPT

## 2024-01-01 PROCEDURE — 71250 CT THORAX DX C-: CPT | Mod: 26,MC

## 2024-01-01 PROCEDURE — 85027 COMPLETE CBC AUTOMATED: CPT

## 2024-01-01 PROCEDURE — 99213 OFFICE O/P EST LOW 20 MIN: CPT

## 2024-01-01 PROCEDURE — 75635 CT ANGIO ABDOMINAL ARTERIES: CPT | Mod: MC

## 2024-01-01 PROCEDURE — ZZZZZ: CPT

## 2024-01-01 PROCEDURE — 80048 BASIC METABOLIC PNL TOTAL CA: CPT

## 2024-01-01 PROCEDURE — 87040 BLOOD CULTURE FOR BACTERIA: CPT

## 2024-01-01 PROCEDURE — 71275 CT ANGIOGRAPHY CHEST: CPT | Mod: MC

## 2024-01-01 PROCEDURE — 88304 TISSUE EXAM BY PATHOLOGIST: CPT

## 2024-01-01 PROCEDURE — 43235 EGD DIAGNOSTIC BRUSH WASH: CPT

## 2024-01-01 PROCEDURE — 76377 3D RENDER W/INTRP POSTPROCES: CPT | Mod: 26

## 2024-01-01 PROCEDURE — 36415 COLL VENOUS BLD VENIPUNCTURE: CPT

## 2024-01-01 PROCEDURE — P9045: CPT

## 2024-01-01 PROCEDURE — 71045 X-RAY EXAM CHEST 1 VIEW: CPT | Mod: 26,76

## 2024-01-01 PROCEDURE — 83615 LACTATE (LD) (LDH) ENZYME: CPT

## 2024-01-01 PROCEDURE — 96374 THER/PROPH/DIAG INJ IV PUSH: CPT

## 2024-01-01 PROCEDURE — C1889: CPT

## 2024-01-01 PROCEDURE — 99291 CRITICAL CARE FIRST HOUR: CPT

## 2024-01-01 PROCEDURE — 34812 OPN FEM ART EXPOS: CPT | Mod: GC,LT

## 2024-01-01 PROCEDURE — 88304 TISSUE EXAM BY PATHOLOGIST: CPT | Mod: 26

## 2024-01-01 PROCEDURE — 85049 AUTOMATED PLATELET COUNT: CPT

## 2024-01-01 PROCEDURE — 97161 PT EVAL LOW COMPLEX 20 MIN: CPT

## 2024-01-01 PROCEDURE — 93312 ECHO TRANSESOPHAGEAL: CPT | Mod: 26

## 2024-01-01 PROCEDURE — C1876: CPT

## 2024-01-01 PROCEDURE — 85610 PROTHROMBIN TIME: CPT

## 2024-01-01 PROCEDURE — 99291 CRITICAL CARE FIRST HOUR: CPT | Mod: 25

## 2024-01-01 PROCEDURE — 84132 ASSAY OF SERUM POTASSIUM: CPT

## 2024-01-01 PROCEDURE — 82553 CREATINE MB FRACTION: CPT

## 2024-01-01 PROCEDURE — 31500 INSERT EMERGENCY AIRWAY: CPT

## 2024-01-01 PROCEDURE — 99223 1ST HOSP IP/OBS HIGH 75: CPT | Mod: 57

## 2024-01-01 PROCEDURE — 93978 VASCULAR STUDY: CPT

## 2024-01-01 PROCEDURE — 74174 CTA ABD&PLVS W/CONTRAST: CPT | Mod: MC

## 2024-01-01 PROCEDURE — 84100 ASSAY OF PHOSPHORUS: CPT

## 2024-01-01 PROCEDURE — 75635 CT ANGIO ABDOMINAL ARTERIES: CPT | Mod: 26

## 2024-01-01 PROCEDURE — 80076 HEPATIC FUNCTION PANEL: CPT

## 2024-01-01 PROCEDURE — 84484 ASSAY OF TROPONIN QUANT: CPT

## 2024-01-01 PROCEDURE — 34706 EVASC RPR A-BIILIAC RPT: CPT | Mod: GC

## 2024-01-01 PROCEDURE — 82947 ASSAY GLUCOSE BLOOD QUANT: CPT

## 2024-01-01 PROCEDURE — 97164 PT RE-EVAL EST PLAN CARE: CPT

## 2024-01-01 PROCEDURE — 99292 CRITICAL CARE ADDL 30 MIN: CPT | Mod: 25

## 2024-01-01 PROCEDURE — C1874: CPT

## 2024-01-01 PROCEDURE — 87205 SMEAR GRAM STAIN: CPT

## 2024-01-01 PROCEDURE — 82550 ASSAY OF CK (CPK): CPT

## 2024-01-01 PROCEDURE — 84156 ASSAY OF PROTEIN URINE: CPT

## 2024-01-01 PROCEDURE — 83735 ASSAY OF MAGNESIUM: CPT

## 2024-01-01 PROCEDURE — 93005 ELECTROCARDIOGRAM TRACING: CPT

## 2024-01-01 PROCEDURE — 70450 CT HEAD/BRAIN W/O DYE: CPT | Mod: MC

## 2024-01-01 PROCEDURE — 82652 VIT D 1 25-DIHYDROXY: CPT

## 2024-01-01 PROCEDURE — P9037: CPT

## 2024-01-01 PROCEDURE — P9012: CPT

## 2024-01-01 PROCEDURE — 93010 ELECTROCARDIOGRAM REPORT: CPT

## 2024-01-01 PROCEDURE — 35371 RECHANNELING OF ARTERY: CPT | Mod: GC,RT

## 2024-01-01 PROCEDURE — 93306 TTE W/DOPPLER COMPLETE: CPT | Mod: 26

## 2024-01-01 PROCEDURE — 85018 HEMOGLOBIN: CPT

## 2024-01-01 PROCEDURE — 93280 PM DEVICE PROGR EVAL DUAL: CPT

## 2024-01-01 PROCEDURE — C1769: CPT

## 2024-01-01 PROCEDURE — 36430 TRANSFUSION BLD/BLD COMPNT: CPT

## 2024-01-01 PROCEDURE — C1725: CPT

## 2024-01-01 PROCEDURE — 82570 ASSAY OF URINE CREATININE: CPT

## 2024-01-01 PROCEDURE — 74176 CT ABD & PELVIS W/O CONTRAST: CPT | Mod: MC

## 2024-01-01 PROCEDURE — 94002 VENT MGMT INPAT INIT DAY: CPT

## 2024-01-01 PROCEDURE — 84166 PROTEIN E-PHORESIS/URINE/CSF: CPT

## 2024-01-01 PROCEDURE — 86334 IMMUNOFIX E-PHORESIS SERUM: CPT

## 2024-01-01 PROCEDURE — 86923 COMPATIBILITY TEST ELECTRIC: CPT

## 2024-01-01 PROCEDURE — 99222 1ST HOSP IP/OBS MODERATE 55: CPT

## 2024-01-01 PROCEDURE — 85379 FIBRIN DEGRADATION QUANT: CPT

## 2024-01-01 PROCEDURE — 84466 ASSAY OF TRANSFERRIN: CPT

## 2024-01-01 PROCEDURE — 87077 CULTURE AEROBIC IDENTIFY: CPT

## 2024-01-01 PROCEDURE — 99223 1ST HOSP IP/OBS HIGH 75: CPT

## 2024-01-01 PROCEDURE — 95700 EEG CONT REC W/VID EEG TECH: CPT

## 2024-01-01 PROCEDURE — 99203 OFFICE O/P NEW LOW 30 MIN: CPT

## 2024-01-01 PROCEDURE — 87186 SC STD MICRODIL/AGAR DIL: CPT

## 2024-01-01 PROCEDURE — 83521 IG LIGHT CHAINS FREE EACH: CPT

## 2024-01-01 PROCEDURE — 84155 ASSAY OF PROTEIN SERUM: CPT

## 2024-01-01 PROCEDURE — 37221: CPT | Mod: 50,59,GC

## 2024-01-01 PROCEDURE — 74018 RADEX ABDOMEN 1 VIEW: CPT | Mod: 26

## 2024-01-01 PROCEDURE — 71045 X-RAY EXAM CHEST 1 VIEW: CPT

## 2024-01-01 PROCEDURE — 87075 CULTR BACTERIA EXCEPT BLOOD: CPT

## 2024-01-01 PROCEDURE — 76937 US GUIDE VASCULAR ACCESS: CPT | Mod: 26

## 2024-01-01 PROCEDURE — 84295 ASSAY OF SERUM SODIUM: CPT

## 2024-01-01 PROCEDURE — C1894: CPT

## 2024-01-01 PROCEDURE — 36556 INSERT NON-TUNNEL CV CATH: CPT | Mod: GC

## 2024-01-01 PROCEDURE — 84300 ASSAY OF URINE SODIUM: CPT

## 2024-01-01 PROCEDURE — 99024 POSTOP FOLLOW-UP VISIT: CPT

## 2024-01-01 PROCEDURE — 83970 ASSAY OF PARATHORMONE: CPT

## 2024-01-01 PROCEDURE — 82306 VITAMIN D 25 HYDROXY: CPT

## 2024-01-01 PROCEDURE — 36000 PLACE NEEDLE IN VEIN: CPT | Mod: 59

## 2024-01-01 PROCEDURE — 83935 ASSAY OF URINE OSMOLALITY: CPT

## 2024-01-01 PROCEDURE — 99282 EMERGENCY DEPT VISIT SF MDM: CPT

## 2024-01-01 PROCEDURE — P9100: CPT

## 2024-01-01 PROCEDURE — 84145 PROCALCITONIN (PCT): CPT

## 2024-01-01 PROCEDURE — 85045 AUTOMATED RETICULOCYTE COUNT: CPT

## 2024-01-01 PROCEDURE — 86965 POOLING BLOOD PLATELETS: CPT

## 2024-01-01 PROCEDURE — 34812 OPN FEM ART EXPOS: CPT | Mod: 50,59,GC

## 2024-01-01 PROCEDURE — 83540 ASSAY OF IRON: CPT

## 2024-01-01 PROCEDURE — 87150 DNA/RNA AMPLIFIED PROBE: CPT

## 2024-01-01 PROCEDURE — 81001 URINALYSIS AUTO W/SCOPE: CPT

## 2024-01-01 PROCEDURE — P9059: CPT

## 2024-01-01 PROCEDURE — 85384 FIBRINOGEN ACTIVITY: CPT

## 2024-01-01 PROCEDURE — 80202 ASSAY OF VANCOMYCIN: CPT

## 2024-01-01 PROCEDURE — 93306 TTE W/DOPPLER COMPLETE: CPT

## 2024-01-01 PROCEDURE — 82272 OCCULT BLD FECES 1-3 TESTS: CPT

## 2024-01-01 PROCEDURE — 99214 OFFICE O/P EST MOD 30 MIN: CPT | Mod: 25

## 2024-01-01 PROCEDURE — 71275 CT ANGIOGRAPHY CHEST: CPT | Mod: 26

## 2024-01-01 PROCEDURE — 82728 ASSAY OF FERRITIN: CPT

## 2024-01-01 PROCEDURE — 37242 VASC EMBOLIZE/OCCLUDE ARTERY: CPT | Mod: GC

## 2024-01-01 PROCEDURE — 74174 CTA ABD&PLVS W/CONTRAST: CPT | Mod: 26

## 2024-01-01 PROCEDURE — 96375 TX/PRO/DX INJ NEW DRUG ADDON: CPT

## 2024-01-01 PROCEDURE — 99213 OFFICE O/P EST LOW 20 MIN: CPT | Mod: 25

## 2024-01-01 PROCEDURE — 83010 ASSAY OF HAPTOGLOBIN QUANT: CPT

## 2024-01-01 PROCEDURE — 87070 CULTURE OTHR SPECIMN AEROBIC: CPT

## 2024-01-01 PROCEDURE — 37221: CPT | Mod: 81,50,59

## 2024-01-01 PROCEDURE — 99285 EMERGENCY DEPT VISIT HI MDM: CPT

## 2024-01-01 PROCEDURE — 95720 EEG PHY/QHP EA INCR W/VEEG: CPT

## 2024-01-01 PROCEDURE — 82330 ASSAY OF CALCIUM: CPT

## 2024-01-01 PROCEDURE — 80307 DRUG TEST PRSMV CHEM ANLYZR: CPT

## 2024-01-01 PROCEDURE — 35371 RECHANNELING OF ARTERY: CPT | Mod: 81,RT

## 2024-01-01 PROCEDURE — 80177 DRUG SCRN QUAN LEVETIRACETAM: CPT

## 2024-01-01 PROCEDURE — 36000 PLACE NEEDLE IN VEIN: CPT

## 2024-01-01 PROCEDURE — 84133 ASSAY OF URINE POTASSIUM: CPT

## 2024-01-01 PROCEDURE — 70450 CT HEAD/BRAIN W/O DYE: CPT | Mod: 26

## 2024-01-01 PROCEDURE — 34706 EVASC RPR A-BIILIAC RPT: CPT | Mod: 81

## 2024-01-01 PROCEDURE — 87637 SARSCOV2&INF A&B&RSV AMP PRB: CPT

## 2024-01-01 PROCEDURE — 82310 ASSAY OF CALCIUM: CPT

## 2024-01-01 PROCEDURE — 95708 EEG WO VID EA 12-26HR UNMNTR: CPT

## 2024-01-01 PROCEDURE — 97530 THERAPEUTIC ACTIVITIES: CPT

## 2024-01-01 PROCEDURE — 74176 CT ABD & PELVIS W/O CONTRAST: CPT | Mod: 26,MC

## 2024-01-01 PROCEDURE — 71250 CT THORAX DX C-: CPT | Mod: MC

## 2024-01-01 DEVICE — LIGATING CLIPS WECK HORIZON MEDIUM (BLUE) 24: Type: IMPLANTABLE DEVICE | Status: FUNCTIONAL

## 2024-01-01 DEVICE — BLLN MUSTANG 7X40MMX75CM: Type: IMPLANTABLE DEVICE | Status: FUNCTIONAL

## 2024-01-01 DEVICE — SHEATH INTRODUCER TERUMO PINNACLE CORONARY 8FR X 10CM X 0.038" MINI WIRE: Type: IMPLANTABLE DEVICE | Status: FUNCTIONAL

## 2024-01-01 DEVICE — SURGIFLO HEMOSTATIC MATRIX KIT: Type: IMPLANTABLE DEVICE | Status: FUNCTIONAL

## 2024-01-01 DEVICE — CATH ANG BERENSTN 5FRX65CM: Type: IMPLANTABLE DEVICE | Status: FUNCTIONAL

## 2024-01-01 DEVICE — CATH BLLN RELIANT STENT 12X10-46MM: Type: IMPLANTABLE DEVICE | Status: FUNCTIONAL

## 2024-01-01 DEVICE — BLLN ATLAS 14X4: Type: IMPLANTABLE DEVICE | Status: FUNCTIONAL

## 2024-01-01 DEVICE — SHEATH INTRODUCER TERUMO PINNACLE PERIPHERAL 11FR X 10CM X 0.035" MINI WIRE: Type: IMPLANTABLE DEVICE | Status: FUNCTIONAL

## 2024-01-01 DEVICE — IMPLANTABLE DEVICE: Type: IMPLANTABLE DEVICE | Status: FUNCTIONAL

## 2024-01-01 DEVICE — DRYSEAL FLEX INTRO SHEATH 12FR 33CM: Type: IMPLANTABLE DEVICE | Status: FUNCTIONAL

## 2024-01-01 DEVICE — SHEATH INTRODUCER TERUMO PINNACLE CORONARY 5FR X 10CM X 0.038" MINI WIRE: Type: IMPLANTABLE DEVICE | Status: FUNCTIONAL

## 2024-01-01 DEVICE — SURGCEL 4 X 8": Type: IMPLANTABLE DEVICE | Status: FUNCTIONAL

## 2024-01-01 DEVICE — INTRODUCER  SENTRANT SHEATH WITH HYDRO COATING: Type: IMPLANTABLE DEVICE | Status: FUNCTIONAL

## 2024-01-01 DEVICE — PATCH PERICARDIAL PHOTOFIX .8X8CM: Type: IMPLANTABLE DEVICE | Status: FUNCTIONAL

## 2024-01-01 DEVICE — INTRO MICROPUNC 5FRX10CM SS: Type: IMPLANTABLE DEVICE | Status: FUNCTIONAL

## 2024-01-01 DEVICE — GWIRE ANGL .035X180 STD: Type: IMPLANTABLE DEVICE | Status: FUNCTIONAL

## 2024-01-01 DEVICE — ARISTA 3GR: Type: IMPLANTABLE DEVICE | Status: FUNCTIONAL

## 2024-01-01 DEVICE — GWIRE FC ST BENT 0.035INX180CM: Type: IMPLANTABLE DEVICE | Status: FUNCTIONAL

## 2024-01-01 DEVICE — LIGATING CLIPS WECK HORIZON SMALL-WIDE (RED) 24: Type: IMPLANTABLE DEVICE | Status: FUNCTIONAL

## 2024-01-01 DEVICE — BLLN MUSTANG 8X80MMX75CM: Type: IMPLANTABLE DEVICE | Status: FUNCTIONAL

## 2024-01-01 DEVICE — LIGATING CLIPS WECK HORIZON SMALL (YELLOW) 24: Type: IMPLANTABLE DEVICE | Status: FUNCTIONAL

## 2024-01-01 DEVICE — CATH 5FR PIGTAIL 70CM LONG: Type: IMPLANTABLE DEVICE | Status: FUNCTIONAL

## 2024-01-01 DEVICE — STENT VASC GRAFT ENDURANT II 28MM: Type: IMPLANTABLE DEVICE | Status: FUNCTIONAL

## 2024-01-01 DEVICE — CATH ANGIO GLIDECATH ANGLE TAPER 5FR X 65CM: Type: IMPLANTABLE DEVICE | Status: FUNCTIONAL

## 2024-01-01 DEVICE — SHEATH INTRODUCER TERUMO PINNACLE CORONARY 9FR X 25CM: Type: IMPLANTABLE DEVICE | Status: FUNCTIONAL

## 2024-01-01 DEVICE — GUIDEWIRE LUNDERQUIST EXTRA-STIFF EXTENDED CURVE 0.035" X 300CM: Type: IMPLANTABLE DEVICE | Status: FUNCTIONAL

## 2024-01-01 DEVICE — GUIDEWIRE RADIFOCUS GLIDEWIRE ANGLED 0.035" X 260CM STIFF: Type: IMPLANTABLE DEVICE | Status: FUNCTIONAL

## 2024-01-01 DEVICE — GUIDEWIRE LUNDERQUIST EXTRA-STIFF CURVED 0.035" X 300CM: Type: IMPLANTABLE DEVICE | Status: FUNCTIONAL

## 2024-01-01 DEVICE — CATH OMNI FLSH 0.035IN 5FRX65: Type: IMPLANTABLE DEVICE | Status: FUNCTIONAL

## 2024-01-01 DEVICE — SURGIFOAM PAD 8CM X 12.5CM X 10MM (100): Type: IMPLANTABLE DEVICE | Status: FUNCTIONAL

## 2024-01-01 DEVICE — BLLN MUSTANG 8X40MMX75CM: Type: IMPLANTABLE DEVICE | Status: FUNCTIONAL

## 2024-01-01 DEVICE — CATH ANGIO C2 NON BR SOFVU 5FR: Type: IMPLANTABLE DEVICE | Status: FUNCTIONAL

## 2024-01-01 DEVICE — BLLN MUSTANG 6X40MMX75CM: Type: IMPLANTABLE DEVICE | Status: FUNCTIONAL

## 2024-01-01 RX ORDER — FENTANYL CITRATE 50 UG/ML
100 INJECTION INTRAVENOUS ONCE
Refills: 0 | Status: DISCONTINUED | OUTPATIENT
Start: 2024-01-01 | End: 2024-01-01

## 2024-01-01 RX ORDER — POLYETHYLENE GLYCOL 3350 17 G/17G
17 POWDER, FOR SOLUTION ORAL
Refills: 0 | Status: DISCONTINUED | OUTPATIENT
Start: 2024-01-01 | End: 2024-01-01

## 2024-01-01 RX ORDER — PIPERACILLIN AND TAZOBACTAM 4; .5 G/20ML; G/20ML
4.5 INJECTION, POWDER, LYOPHILIZED, FOR SOLUTION INTRAVENOUS EVERY 8 HOURS
Refills: 0 | Status: DISCONTINUED | OUTPATIENT
Start: 2024-01-01 | End: 2024-01-01

## 2024-01-01 RX ORDER — MAGNESIUM SULFATE 500 MG/ML
2 VIAL (ML) INJECTION ONCE
Refills: 0 | Status: COMPLETED | OUTPATIENT
Start: 2024-01-01 | End: 2024-01-01

## 2024-01-01 RX ORDER — CARVEDILOL PHOSPHATE 80 MG/1
12.5 CAPSULE, EXTENDED RELEASE ORAL EVERY 12 HOURS
Refills: 0 | Status: DISCONTINUED | OUTPATIENT
Start: 2024-01-01 | End: 2024-01-01

## 2024-01-01 RX ORDER — LEVETIRACETAM 250 MG/1
1000 TABLET, FILM COATED ORAL ONCE
Refills: 0 | Status: DISCONTINUED | OUTPATIENT
Start: 2024-01-01 | End: 2024-01-01

## 2024-01-01 RX ORDER — APIXABAN 2.5 MG/1
2.5 TABLET, FILM COATED ORAL
Qty: 60 | Refills: 3 | Status: ACTIVE | COMMUNITY
Start: 2023-01-01 | End: 1900-01-01

## 2024-01-01 RX ORDER — SODIUM CHLORIDE 9 MG/ML
500 INJECTION INTRAMUSCULAR; INTRAVENOUS; SUBCUTANEOUS ONCE
Refills: 0 | Status: COMPLETED | OUTPATIENT
Start: 2024-01-01 | End: 2024-01-01

## 2024-01-01 RX ORDER — GLUCAGON INJECTION, SOLUTION 0.5 MG/.1ML
1 INJECTION, SOLUTION SUBCUTANEOUS ONCE
Refills: 0 | Status: DISCONTINUED | OUTPATIENT
Start: 2024-01-01 | End: 2024-01-01

## 2024-01-01 RX ORDER — METOPROLOL TARTRATE 50 MG
25 TABLET ORAL EVERY 12 HOURS
Refills: 0 | Status: DISCONTINUED | OUTPATIENT
Start: 2024-01-01 | End: 2024-01-01

## 2024-01-01 RX ORDER — SERTRALINE 25 MG/1
50 TABLET, FILM COATED ORAL EVERY 24 HOURS
Refills: 0 | Status: DISCONTINUED | OUTPATIENT
Start: 2024-01-01 | End: 2024-01-01

## 2024-01-01 RX ORDER — CALCIUM GLUCONATE 100 MG/ML
2 VIAL (ML) INTRAVENOUS ONCE
Refills: 0 | Status: COMPLETED | OUTPATIENT
Start: 2024-01-01 | End: 2024-01-01

## 2024-01-01 RX ORDER — PANTOPRAZOLE SODIUM 20 MG/1
40 TABLET, DELAYED RELEASE ORAL DAILY
Refills: 0 | Status: DISCONTINUED | OUTPATIENT
Start: 2024-01-01 | End: 2024-01-01

## 2024-01-01 RX ORDER — DEXTROSE 50 % IN WATER 50 %
25 SYRINGE (ML) INTRAVENOUS ONCE
Refills: 0 | Status: DISCONTINUED | OUTPATIENT
Start: 2024-01-01 | End: 2024-01-01

## 2024-01-01 RX ORDER — PANTOPRAZOLE SODIUM 20 MG/1
40 TABLET, DELAYED RELEASE ORAL
Refills: 0 | Status: DISCONTINUED | OUTPATIENT
Start: 2024-01-01 | End: 2024-01-01

## 2024-01-01 RX ORDER — ASPIRIN/CALCIUM CARB/MAGNESIUM 324 MG
1 TABLET ORAL
Qty: 30 | Refills: 0
Start: 2024-01-01 | End: 2024-05-04

## 2024-01-01 RX ORDER — ATORVASTATIN CALCIUM 80 MG/1
40 TABLET, FILM COATED ORAL AT BEDTIME
Refills: 0 | Status: DISCONTINUED | OUTPATIENT
Start: 2024-01-01 | End: 2024-01-01

## 2024-01-01 RX ORDER — ROCURONIUM BROMIDE 10 MG/ML
50 VIAL (ML) INTRAVENOUS ONCE
Refills: 0 | Status: COMPLETED | OUTPATIENT
Start: 2024-01-01 | End: 2024-01-01

## 2024-01-01 RX ORDER — LEVETIRACETAM 250 MG/1
250 TABLET, FILM COATED ORAL
Refills: 0 | Status: DISCONTINUED | OUTPATIENT
Start: 2024-01-01 | End: 2024-01-01

## 2024-01-01 RX ORDER — LEVOTHYROXINE SODIUM 125 MCG
60 TABLET ORAL
Refills: 0 | Status: DISCONTINUED | OUTPATIENT
Start: 2024-01-01 | End: 2024-01-01

## 2024-01-01 RX ORDER — HEPARIN SODIUM 5000 [USP'U]/ML
INJECTION INTRAVENOUS; SUBCUTANEOUS
Qty: 25000 | Refills: 0 | Status: DISCONTINUED | OUTPATIENT
Start: 2024-01-01 | End: 2024-01-01

## 2024-01-01 RX ORDER — ATORVASTATIN CALCIUM 80 MG/1
80 TABLET, FILM COATED ORAL AT BEDTIME
Refills: 0 | Status: DISCONTINUED | OUTPATIENT
Start: 2024-01-01 | End: 2024-01-01

## 2024-01-01 RX ORDER — PANTOPRAZOLE SODIUM 20 MG/1
40 TABLET, DELAYED RELEASE ORAL EVERY 12 HOURS
Refills: 0 | Status: DISCONTINUED | OUTPATIENT
Start: 2024-01-01 | End: 2024-01-01

## 2024-01-01 RX ORDER — LEVOTHYROXINE SODIUM 125 MCG
75 TABLET ORAL EVERY 24 HOURS
Refills: 0 | Status: DISCONTINUED | OUTPATIENT
Start: 2024-01-01 | End: 2024-01-01

## 2024-01-01 RX ORDER — INSULIN HUMAN 100 [IU]/ML
10 INJECTION, SOLUTION SUBCUTANEOUS ONCE
Refills: 0 | Status: COMPLETED | OUTPATIENT
Start: 2024-01-01 | End: 2024-01-01

## 2024-01-01 RX ORDER — HEPARIN SODIUM 5000 [USP'U]/ML
4000 INJECTION INTRAVENOUS; SUBCUTANEOUS EVERY 6 HOURS
Refills: 0 | Status: DISCONTINUED | OUTPATIENT
Start: 2024-01-01 | End: 2024-01-01

## 2024-01-01 RX ORDER — HYDROMORPHONE HYDROCHLORIDE 2 MG/ML
0.5 INJECTION INTRAMUSCULAR; INTRAVENOUS; SUBCUTANEOUS ONCE
Refills: 0 | Status: DISCONTINUED | OUTPATIENT
Start: 2024-01-01 | End: 2024-01-01

## 2024-01-01 RX ORDER — INSULIN LISPRO 100/ML
VIAL (ML) SUBCUTANEOUS EVERY 6 HOURS
Refills: 0 | Status: DISCONTINUED | OUTPATIENT
Start: 2024-01-01 | End: 2024-01-01

## 2024-01-01 RX ORDER — INSULIN LISPRO 100/ML
VIAL (ML) SUBCUTANEOUS
Refills: 0 | Status: DISCONTINUED | OUTPATIENT
Start: 2024-01-01 | End: 2024-01-01

## 2024-01-01 RX ORDER — VANCOMYCIN HCL 1 G
750 VIAL (EA) INTRAVENOUS ONCE
Refills: 0 | Status: COMPLETED | OUTPATIENT
Start: 2024-01-01 | End: 2024-01-01

## 2024-01-01 RX ORDER — FUROSEMIDE 40 MG
1 TABLET ORAL
Qty: 0 | Refills: 0 | DISCHARGE

## 2024-01-01 RX ORDER — DEXTROSE 50 % IN WATER 50 %
15 SYRINGE (ML) INTRAVENOUS ONCE
Refills: 0 | Status: DISCONTINUED | OUTPATIENT
Start: 2024-01-01 | End: 2024-01-01

## 2024-01-01 RX ORDER — SODIUM CHLORIDE 9 MG/ML
1000 INJECTION INTRAMUSCULAR; INTRAVENOUS; SUBCUTANEOUS ONCE
Refills: 0 | Status: COMPLETED | OUTPATIENT
Start: 2024-01-01 | End: 2024-01-01

## 2024-01-01 RX ORDER — ACETAMINOPHEN 500 MG
2 TABLET ORAL
Qty: 0 | Refills: 0 | DISCHARGE
Start: 2024-01-01

## 2024-01-01 RX ORDER — MAGNESIUM SULFATE 500 MG/ML
2 VIAL (ML) INJECTION EVERY 6 HOURS
Refills: 0 | Status: COMPLETED | OUTPATIENT
Start: 2024-01-01 | End: 2024-01-01

## 2024-01-01 RX ORDER — HYDROMORPHONE HYDROCHLORIDE 2 MG/ML
2 INJECTION INTRAMUSCULAR; INTRAVENOUS; SUBCUTANEOUS EVERY 4 HOURS
Refills: 0 | Status: DISCONTINUED | OUTPATIENT
Start: 2024-01-01 | End: 2024-01-01

## 2024-01-01 RX ORDER — HYDROMORPHONE HYDROCHLORIDE 2 MG/ML
0.2 INJECTION INTRAMUSCULAR; INTRAVENOUS; SUBCUTANEOUS EVERY 4 HOURS
Refills: 0 | Status: DISCONTINUED | OUTPATIENT
Start: 2024-01-01 | End: 2024-01-01

## 2024-01-01 RX ORDER — MEROPENEM 1 G/30ML
500 INJECTION INTRAVENOUS ONCE
Refills: 0 | Status: COMPLETED | OUTPATIENT
Start: 2024-01-01 | End: 2024-01-01

## 2024-01-01 RX ORDER — ETOMIDATE 2 MG/ML
15 INJECTION INTRAVENOUS ONCE
Refills: 0 | Status: COMPLETED | OUTPATIENT
Start: 2024-01-01 | End: 2024-01-01

## 2024-01-01 RX ORDER — APIXABAN 2.5 MG/1
2.5 TABLET, FILM COATED ORAL EVERY 12 HOURS
Refills: 0 | Status: DISCONTINUED | OUTPATIENT
Start: 2024-01-01 | End: 2024-01-01

## 2024-01-01 RX ORDER — VANCOMYCIN HCL 1 G
750 VIAL (EA) INTRAVENOUS EVERY 12 HOURS
Refills: 0 | Status: DISCONTINUED | OUTPATIENT
Start: 2024-01-01 | End: 2024-01-01

## 2024-01-01 RX ORDER — CHLORHEXIDINE GLUCONATE 213 G/1000ML
15 SOLUTION TOPICAL EVERY 12 HOURS
Refills: 0 | Status: DISCONTINUED | OUTPATIENT
Start: 2024-01-01 | End: 2024-01-01

## 2024-01-01 RX ORDER — ONDANSETRON 8 MG/1
4 TABLET, FILM COATED ORAL ONCE
Refills: 0 | Status: COMPLETED | OUTPATIENT
Start: 2024-01-01 | End: 2024-01-01

## 2024-01-01 RX ORDER — MAGNESIUM SULFATE 500 MG/ML
1 VIAL (ML) INJECTION ONCE
Refills: 0 | Status: COMPLETED | OUTPATIENT
Start: 2024-01-01 | End: 2024-01-01

## 2024-01-01 RX ORDER — OXYCODONE HYDROCHLORIDE 5 MG/1
2.5 TABLET ORAL EVERY 4 HOURS
Refills: 0 | Status: DISCONTINUED | OUTPATIENT
Start: 2024-01-01 | End: 2024-01-01

## 2024-01-01 RX ORDER — ACETAMINOPHEN 500 MG
650 TABLET ORAL EVERY 6 HOURS
Refills: 0 | Status: DISCONTINUED | OUTPATIENT
Start: 2024-01-01 | End: 2024-01-01

## 2024-01-01 RX ORDER — NOREPINEPHRINE BITARTRATE/D5W 8 MG/250ML
0.05 PLASTIC BAG, INJECTION (ML) INTRAVENOUS
Qty: 8 | Refills: 0 | Status: DISCONTINUED | OUTPATIENT
Start: 2024-01-01 | End: 2024-01-01

## 2024-01-01 RX ORDER — HYDRALAZINE HCL 50 MG
5 TABLET ORAL ONCE
Refills: 0 | Status: COMPLETED | OUTPATIENT
Start: 2024-01-01 | End: 2024-01-01

## 2024-01-01 RX ORDER — NIFEDIPINE 30 MG
30 TABLET, EXTENDED RELEASE 24 HR ORAL DAILY
Refills: 0 | Status: DISCONTINUED | OUTPATIENT
Start: 2024-01-01 | End: 2024-01-01

## 2024-01-01 RX ORDER — SODIUM CHLORIDE 9 MG/ML
1000 INJECTION, SOLUTION INTRAVENOUS
Refills: 0 | Status: DISCONTINUED | OUTPATIENT
Start: 2024-01-01 | End: 2024-01-01

## 2024-01-01 RX ORDER — DEXTROSE 10 % IN WATER 10 %
125 INTRAVENOUS SOLUTION INTRAVENOUS ONCE
Refills: 0 | Status: DISCONTINUED | OUTPATIENT
Start: 2024-01-01 | End: 2024-01-01

## 2024-01-01 RX ORDER — PIPERACILLIN AND TAZOBACTAM 4; .5 G/20ML; G/20ML
3.38 INJECTION, POWDER, LYOPHILIZED, FOR SOLUTION INTRAVENOUS ONCE
Refills: 0 | Status: DISCONTINUED | OUTPATIENT
Start: 2024-01-01 | End: 2024-01-01

## 2024-01-01 RX ORDER — ERGOCALCIFEROL 1.25 MG/1
1 CAPSULE ORAL
Qty: 0 | Refills: 0 | DISCHARGE

## 2024-01-01 RX ORDER — LEVETIRACETAM 250 MG/1
3000 TABLET, FILM COATED ORAL ONCE
Refills: 0 | Status: DISCONTINUED | OUTPATIENT
Start: 2024-01-01 | End: 2024-01-01

## 2024-01-01 RX ORDER — SODIUM CHLORIDE 9 MG/ML
1000 INJECTION INTRAMUSCULAR; INTRAVENOUS; SUBCUTANEOUS
Refills: 0 | Status: DISCONTINUED | OUTPATIENT
Start: 2024-01-01 | End: 2024-01-01

## 2024-01-01 RX ORDER — DEXTROSE 50 % IN WATER 50 %
200 SYRINGE (ML) INTRAVENOUS ONCE
Refills: 0 | Status: DISCONTINUED | OUTPATIENT
Start: 2024-01-01 | End: 2024-01-01

## 2024-01-01 RX ORDER — HEPARIN SODIUM 5000 [USP'U]/ML
2000 INJECTION INTRAVENOUS; SUBCUTANEOUS EVERY 6 HOURS
Refills: 0 | Status: DISCONTINUED | OUTPATIENT
Start: 2024-01-01 | End: 2024-01-01

## 2024-01-01 RX ORDER — SODIUM BICARBONATE 1 MEQ/ML
200 SYRINGE (ML) INTRAVENOUS ONCE
Refills: 0 | Status: COMPLETED | OUTPATIENT
Start: 2024-01-01 | End: 2024-01-01

## 2024-01-01 RX ORDER — SODIUM CHLORIDE 9 MG/ML
250 INJECTION, SOLUTION INTRAVENOUS ONCE
Refills: 0 | Status: COMPLETED | OUTPATIENT
Start: 2024-01-01 | End: 2024-01-01

## 2024-01-01 RX ORDER — ASPIRIN/CALCIUM CARB/MAGNESIUM 324 MG
81 TABLET ORAL DAILY
Refills: 0 | Status: DISCONTINUED | OUTPATIENT
Start: 2024-01-01 | End: 2024-01-01

## 2024-01-01 RX ORDER — SENNA PLUS 8.6 MG/1
2 TABLET ORAL ONCE
Refills: 0 | Status: COMPLETED | OUTPATIENT
Start: 2024-01-01 | End: 2024-01-01

## 2024-01-01 RX ORDER — LEVOTHYROXINE SODIUM 125 MCG
56 TABLET ORAL
Refills: 0 | Status: DISCONTINUED | OUTPATIENT
Start: 2024-01-01 | End: 2024-01-01

## 2024-01-01 RX ORDER — CHLORHEXIDINE GLUCONATE 213 G/1000ML
1 SOLUTION TOPICAL
Refills: 0 | Status: DISCONTINUED | OUTPATIENT
Start: 2024-01-01 | End: 2024-01-01

## 2024-01-01 RX ORDER — SODIUM BICARBONATE 1 MEQ/ML
12.5 SYRINGE (ML) INTRAVENOUS ONCE
Refills: 0 | Status: COMPLETED | OUTPATIENT
Start: 2024-01-01 | End: 2024-01-01

## 2024-01-01 RX ORDER — ACETAMINOPHEN 500 MG
1000 TABLET ORAL ONCE
Refills: 0 | Status: COMPLETED | OUTPATIENT
Start: 2024-01-01 | End: 2024-01-01

## 2024-01-01 RX ORDER — FENTANYL CITRATE 50 UG/ML
0.5 INJECTION INTRAVENOUS
Qty: 2500 | Refills: 0 | Status: DISCONTINUED | OUTPATIENT
Start: 2024-01-01 | End: 2024-01-01

## 2024-01-01 RX ORDER — CEFAZOLIN SODIUM 1 G
1000 VIAL (EA) INJECTION EVERY 8 HOURS
Refills: 0 | Status: COMPLETED | OUTPATIENT
Start: 2024-01-01 | End: 2024-01-01

## 2024-01-01 RX ORDER — PIPERACILLIN AND TAZOBACTAM 4; .5 G/20ML; G/20ML
3.38 INJECTION, POWDER, LYOPHILIZED, FOR SOLUTION INTRAVENOUS ONCE
Refills: 0 | Status: COMPLETED | OUTPATIENT
Start: 2024-01-01 | End: 2024-01-01

## 2024-01-01 RX ORDER — APIXABAN 2.5 MG/1
1 TABLET, FILM COATED ORAL
Refills: 0 | DISCHARGE

## 2024-01-01 RX ORDER — LANOLIN ALCOHOL/MO/W.PET/CERES
5 CREAM (GRAM) TOPICAL AT BEDTIME
Refills: 0 | Status: DISCONTINUED | OUTPATIENT
Start: 2024-01-01 | End: 2024-01-01

## 2024-01-01 RX ORDER — POLYETHYLENE GLYCOL 3350 17 G/17G
17 POWDER, FOR SOLUTION ORAL EVERY 24 HOURS
Refills: 0 | Status: DISCONTINUED | OUTPATIENT
Start: 2024-01-01 | End: 2024-01-01

## 2024-01-01 RX ORDER — DEXTROSE 50 % IN WATER 50 %
50 SYRINGE (ML) INTRAVENOUS ONCE
Refills: 0 | Status: COMPLETED | OUTPATIENT
Start: 2024-01-01 | End: 2024-01-01

## 2024-01-01 RX ORDER — MEROPENEM 1 G/30ML
1000 INJECTION INTRAVENOUS EVERY 12 HOURS
Refills: 0 | Status: DISCONTINUED | OUTPATIENT
Start: 2024-01-01 | End: 2024-01-01

## 2024-01-01 RX ORDER — LIDOCAINE 4 G/100G
1 CREAM TOPICAL DAILY
Refills: 0 | Status: DISCONTINUED | OUTPATIENT
Start: 2024-01-01 | End: 2024-01-01

## 2024-01-01 RX ORDER — LIDOCAINE 4 G/100G
1 CREAM TOPICAL
Qty: 0 | Refills: 0 | DISCHARGE
Start: 2024-01-01

## 2024-01-01 RX ORDER — LEVETIRACETAM 250 MG/1
2000 TABLET, FILM COATED ORAL ONCE
Refills: 0 | Status: COMPLETED | OUTPATIENT
Start: 2024-01-01 | End: 2024-01-01

## 2024-01-01 RX ORDER — FOLIC ACID 0.8 MG
1 TABLET ORAL
Qty: 0 | Refills: 0 | DISCHARGE

## 2024-01-01 RX ORDER — MECLIZINE HYDROCHLORIDE 12.5 MG/1
12.5 TABLET ORAL
Qty: 30 | Refills: 0 | Status: ACTIVE | COMMUNITY
Start: 2024-01-01

## 2024-01-01 RX ORDER — PHENYLEPHRINE HYDROCHLORIDE 10 MG/ML
2 INJECTION INTRAVENOUS
Qty: 40 | Refills: 0 | Status: DISCONTINUED | OUTPATIENT
Start: 2024-01-01 | End: 2024-01-01

## 2024-01-01 RX ORDER — PROPOFOL 10 MG/ML
20 INJECTION, EMULSION INTRAVENOUS
Qty: 1000 | Refills: 0 | Status: DISCONTINUED | OUTPATIENT
Start: 2024-01-01 | End: 2024-01-01

## 2024-01-01 RX ORDER — SODIUM,POTASSIUM PHOSPHATES 278-250MG
1 POWDER IN PACKET (EA) ORAL ONCE
Refills: 0 | Status: DISCONTINUED | OUTPATIENT
Start: 2024-01-01 | End: 2024-01-01

## 2024-01-01 RX ORDER — DEXTROSE 50 % IN WATER 50 %
100 SYRINGE (ML) INTRAVENOUS ONCE
Refills: 0 | Status: COMPLETED | OUTPATIENT
Start: 2024-01-01 | End: 2024-01-01

## 2024-01-01 RX ORDER — PROTHROMBIN COMPLEX CONCENTRATE (HUMAN) 25.5; 16.5; 24; 22; 22; 26 [IU]/ML; [IU]/ML; [IU]/ML; [IU]/ML; [IU]/ML; [IU]/ML
2500 POWDER, FOR SOLUTION INTRAVENOUS ONCE
Refills: 0 | Status: COMPLETED | OUTPATIENT
Start: 2024-01-01 | End: 2024-01-01

## 2024-01-01 RX ORDER — HEPARIN SODIUM 5000 [USP'U]/ML
5000 INJECTION INTRAVENOUS; SUBCUTANEOUS EVERY 8 HOURS
Refills: 0 | Status: DISCONTINUED | OUTPATIENT
Start: 2024-01-01 | End: 2024-01-01

## 2024-01-01 RX ORDER — LEVOTHYROXINE SODIUM 125 MCG
1 TABLET ORAL
Refills: 0 | DISCHARGE

## 2024-01-01 RX ORDER — METOPROLOL TARTRATE 50 MG
25 TABLET ORAL DAILY
Refills: 0 | Status: DISCONTINUED | OUTPATIENT
Start: 2024-01-01 | End: 2024-01-01

## 2024-01-01 RX ORDER — ACETAMINOPHEN 500 MG
975 TABLET ORAL EVERY 6 HOURS
Refills: 0 | Status: DISCONTINUED | OUTPATIENT
Start: 2024-01-01 | End: 2024-01-01

## 2024-01-01 RX ORDER — HYDROCORTISONE 20 MG
50 TABLET ORAL EVERY 6 HOURS
Refills: 0 | Status: DISCONTINUED | OUTPATIENT
Start: 2024-01-01 | End: 2024-01-01

## 2024-01-01 RX ORDER — LEVOTHYROXINE SODIUM 125 MCG
1 TABLET ORAL
Qty: 0 | Refills: 0 | DISCHARGE

## 2024-01-01 RX ORDER — NIFEDIPINE 30 MG
30 TABLET, EXTENDED RELEASE 24 HR ORAL
Refills: 0 | Status: DISCONTINUED | OUTPATIENT
Start: 2024-01-01 | End: 2024-01-01

## 2024-01-01 RX ORDER — CARVEDILOL PHOSPHATE 80 MG/1
1 CAPSULE, EXTENDED RELEASE ORAL
Qty: 60 | Refills: 0
Start: 2024-01-01 | End: 2024-05-04

## 2024-01-01 RX ORDER — OXYCODONE HYDROCHLORIDE 5 MG/1
5 TABLET ORAL EVERY 4 HOURS
Refills: 0 | Status: DISCONTINUED | OUTPATIENT
Start: 2024-01-01 | End: 2024-01-01

## 2024-01-01 RX ORDER — MORPHINE SULFATE 50 MG/1
2 CAPSULE, EXTENDED RELEASE ORAL ONCE
Refills: 0 | Status: DISCONTINUED | OUTPATIENT
Start: 2024-01-01 | End: 2024-01-01

## 2024-01-01 RX ORDER — CILOSTAZOL 100 MG/1
50 TABLET ORAL EVERY 12 HOURS
Refills: 0 | Status: DISCONTINUED | OUTPATIENT
Start: 2024-01-01 | End: 2024-01-01

## 2024-01-01 RX ORDER — SENNA PLUS 8.6 MG/1
2 TABLET ORAL AT BEDTIME
Refills: 0 | Status: DISCONTINUED | OUTPATIENT
Start: 2024-01-01 | End: 2024-01-01

## 2024-01-01 RX ORDER — LEVETIRACETAM 250 MG/1
250 TABLET, FILM COATED ORAL EVERY 24 HOURS
Refills: 0 | Status: DISCONTINUED | OUTPATIENT
Start: 2024-01-01 | End: 2024-01-01

## 2024-01-01 RX ORDER — HYDROMORPHONE HYDROCHLORIDE 2 MG/ML
1 INJECTION INTRAMUSCULAR; INTRAVENOUS; SUBCUTANEOUS
Qty: 0 | Refills: 0 | DISCHARGE
Start: 2024-01-01

## 2024-01-01 RX ORDER — DEXTROSE 50 % IN WATER 50 %
25 SYRINGE (ML) INTRAVENOUS ONCE
Refills: 0 | Status: COMPLETED | OUTPATIENT
Start: 2024-01-01 | End: 2024-01-01

## 2024-01-01 RX ORDER — AMLODIPINE BESYLATE 2.5 MG/1
1 TABLET ORAL
Qty: 0 | Refills: 0 | DISCHARGE

## 2024-01-01 RX ORDER — CARVEDILOL PHOSPHATE 80 MG/1
6.25 CAPSULE, EXTENDED RELEASE ORAL EVERY 12 HOURS
Refills: 0 | Status: DISCONTINUED | OUTPATIENT
Start: 2024-01-01 | End: 2024-01-01

## 2024-01-01 RX ORDER — FUROSEMIDE 40 MG
40 TABLET ORAL ONCE
Refills: 0 | Status: COMPLETED | OUTPATIENT
Start: 2024-01-01 | End: 2024-01-01

## 2024-01-01 RX ORDER — DEXTROSE 50 % IN WATER 50 %
150 SYRINGE (ML) INTRAVENOUS ONCE
Refills: 0 | Status: COMPLETED | OUTPATIENT
Start: 2024-01-01 | End: 2024-01-01

## 2024-01-01 RX ORDER — POTASSIUM CHLORIDE 20 MEQ
40 PACKET (EA) ORAL ONCE
Refills: 0 | Status: COMPLETED | OUTPATIENT
Start: 2024-01-01 | End: 2024-01-01

## 2024-01-01 RX ORDER — PIPERACILLIN AND TAZOBACTAM 4; .5 G/20ML; G/20ML
3.38 INJECTION, POWDER, LYOPHILIZED, FOR SOLUTION INTRAVENOUS EVERY 8 HOURS
Refills: 0 | Status: DISCONTINUED | OUTPATIENT
Start: 2024-01-01 | End: 2024-01-01

## 2024-01-01 RX ORDER — LOSARTAN POTASSIUM 100 MG/1
1 TABLET, FILM COATED ORAL
Qty: 0 | Refills: 0 | DISCHARGE

## 2024-01-01 RX ORDER — POLYETHYLENE GLYCOL 3350 17 G/17G
17 POWDER, FOR SOLUTION ORAL ONCE
Refills: 0 | Status: COMPLETED | OUTPATIENT
Start: 2024-01-01 | End: 2024-01-01

## 2024-01-01 RX ORDER — NIFEDIPINE 30 MG
1 TABLET, EXTENDED RELEASE 24 HR ORAL
Qty: 60 | Refills: 0
Start: 2024-01-01 | End: 2024-05-04

## 2024-01-01 RX ORDER — CILOSTAZOL 50 MG/1
50 TABLET ORAL
Refills: 0 | Status: ACTIVE | COMMUNITY

## 2024-01-01 RX ORDER — LEVETIRACETAM 250 MG/1
1 TABLET, FILM COATED ORAL
Qty: 60 | Refills: 0
Start: 2024-01-01 | End: 2024-05-04

## 2024-01-01 RX ORDER — VASOPRESSIN 20 [USP'U]/ML
0.04 INJECTION INTRAVENOUS
Qty: 40 | Refills: 0 | Status: DISCONTINUED | OUTPATIENT
Start: 2024-01-01 | End: 2024-01-01

## 2024-01-01 RX ORDER — INSULIN HUMAN 100 [IU]/ML
20 INJECTION, SOLUTION SUBCUTANEOUS ONCE
Refills: 0 | Status: COMPLETED | OUTPATIENT
Start: 2024-01-01 | End: 2024-01-01

## 2024-01-01 RX ORDER — HYDROMORPHONE HYDROCHLORIDE 2 MG/ML
0.2 INJECTION INTRAMUSCULAR; INTRAVENOUS; SUBCUTANEOUS DAILY
Refills: 0 | Status: DISCONTINUED | OUTPATIENT
Start: 2024-01-01 | End: 2024-01-01

## 2024-01-01 RX ORDER — HYDROMORPHONE HYDROCHLORIDE 2 MG/ML
0.2 INJECTION INTRAMUSCULAR; INTRAVENOUS; SUBCUTANEOUS EVERY 6 HOURS
Refills: 0 | Status: DISCONTINUED | OUTPATIENT
Start: 2024-01-01 | End: 2024-01-01

## 2024-01-01 RX ORDER — CALCIUM CHLORIDE
1000 POWDER (GRAM) MISCELLANEOUS ONCE
Refills: 0 | Status: COMPLETED | OUTPATIENT
Start: 2024-01-01 | End: 2024-01-01

## 2024-01-01 RX ORDER — ACETAMINOPHEN 500 MG
3 TABLET ORAL
Qty: 0 | Refills: 0 | DISCHARGE
Start: 2024-01-01

## 2024-01-01 RX ORDER — HYDRALAZINE HCL 50 MG
5 TABLET ORAL ONCE
Refills: 0 | Status: DISCONTINUED | OUTPATIENT
Start: 2024-01-01 | End: 2024-01-01

## 2024-01-01 RX ORDER — NIFEDIPINE 30 MG
1 TABLET, EXTENDED RELEASE 24 HR ORAL
Qty: 0 | Refills: 0 | DISCHARGE
Start: 2024-01-01

## 2024-01-01 RX ORDER — SODIUM CHLORIDE 9 MG/ML
250 INJECTION INTRAMUSCULAR; INTRAVENOUS; SUBCUTANEOUS ONCE
Refills: 0 | Status: DISCONTINUED | OUTPATIENT
Start: 2024-01-01 | End: 2024-01-01

## 2024-01-01 RX ORDER — CARVEDILOL PHOSPHATE 80 MG/1
1 CAPSULE, EXTENDED RELEASE ORAL
Qty: 0 | Refills: 0 | DISCHARGE
Start: 2024-01-01

## 2024-01-01 RX ORDER — LABETALOL HCL 100 MG
10 TABLET ORAL ONCE
Refills: 0 | Status: COMPLETED | OUTPATIENT
Start: 2024-01-01 | End: 2024-01-01

## 2024-01-01 RX ORDER — VANCOMYCIN HCL 1 G
750 VIAL (EA) INTRAVENOUS EVERY 24 HOURS
Refills: 0 | Status: DISCONTINUED | OUTPATIENT
Start: 2024-01-01 | End: 2024-01-01

## 2024-01-01 RX ORDER — NALOXONE HYDROCHLORIDE 4 MG/.1ML
0.2 SPRAY NASAL ONCE
Refills: 0 | Status: COMPLETED | OUTPATIENT
Start: 2024-01-01 | End: 2024-01-01

## 2024-01-01 RX ORDER — POLYETHYLENE GLYCOL 3350 17 G/17G
17 POWDER, FOR SOLUTION ORAL
Qty: 0 | Refills: 0 | DISCHARGE
Start: 2024-01-01

## 2024-01-01 RX ORDER — SODIUM BICARBONATE 1 MEQ/ML
50 SYRINGE (ML) INTRAVENOUS
Refills: 0 | Status: COMPLETED | OUTPATIENT
Start: 2024-01-01 | End: 2024-01-01

## 2024-01-01 RX ADMIN — Medication 1000 MILLIGRAM(S): at 22:54

## 2024-01-01 RX ADMIN — Medication 30 MILLIGRAM(S): at 10:36

## 2024-01-01 RX ADMIN — OXYCODONE HYDROCHLORIDE 5 MILLIGRAM(S): 5 TABLET ORAL at 14:59

## 2024-01-01 RX ADMIN — Medication 25 GRAM(S): at 09:21

## 2024-01-01 RX ADMIN — INSULIN HUMAN 20 UNIT(S): 100 INJECTION, SOLUTION SUBCUTANEOUS at 06:15

## 2024-01-01 RX ADMIN — HYDROMORPHONE HYDROCHLORIDE 2 MILLIGRAM(S): 2 INJECTION INTRAMUSCULAR; INTRAVENOUS; SUBCUTANEOUS at 18:10

## 2024-01-01 RX ADMIN — Medication 1000 MILLIGRAM(S): at 09:56

## 2024-01-01 RX ADMIN — Medication 25 GRAM(S): at 07:10

## 2024-01-01 RX ADMIN — LEVETIRACETAM 250 MILLIGRAM(S): 250 TABLET, FILM COATED ORAL at 17:19

## 2024-01-01 RX ADMIN — HYDROMORPHONE HYDROCHLORIDE 2 MILLIGRAM(S): 2 INJECTION INTRAMUSCULAR; INTRAVENOUS; SUBCUTANEOUS at 12:00

## 2024-01-01 RX ADMIN — Medication 75 MICROGRAM(S): at 11:19

## 2024-01-01 RX ADMIN — Medication 4.63 MICROGRAM(S)/KG/MIN: at 14:34

## 2024-01-01 RX ADMIN — POLYETHYLENE GLYCOL 3350 17 GRAM(S): 17 POWDER, FOR SOLUTION ORAL at 14:45

## 2024-01-01 RX ADMIN — POLYETHYLENE GLYCOL 3350 17 GRAM(S): 17 POWDER, FOR SOLUTION ORAL at 06:32

## 2024-01-01 RX ADMIN — SERTRALINE 50 MILLIGRAM(S): 25 TABLET, FILM COATED ORAL at 22:10

## 2024-01-01 RX ADMIN — Medication 81 MILLIGRAM(S): at 11:09

## 2024-01-01 RX ADMIN — Medication 56 MICROGRAM(S): at 07:11

## 2024-01-01 RX ADMIN — HYDROMORPHONE HYDROCHLORIDE 2 MILLIGRAM(S): 2 INJECTION INTRAMUSCULAR; INTRAVENOUS; SUBCUTANEOUS at 22:03

## 2024-01-01 RX ADMIN — Medication 650 MILLIGRAM(S): at 00:07

## 2024-01-01 RX ADMIN — Medication 5 MILLIGRAM(S): at 18:59

## 2024-01-01 RX ADMIN — HEPARIN SODIUM 800 UNIT(S)/HR: 5000 INJECTION INTRAVENOUS; SUBCUTANEOUS at 08:08

## 2024-01-01 RX ADMIN — Medication 650 MILLIGRAM(S): at 01:08

## 2024-01-01 RX ADMIN — CARVEDILOL PHOSPHATE 12.5 MILLIGRAM(S): 80 CAPSULE, EXTENDED RELEASE ORAL at 17:17

## 2024-01-01 RX ADMIN — OXYCODONE HYDROCHLORIDE 5 MILLIGRAM(S): 5 TABLET ORAL at 05:42

## 2024-01-01 RX ADMIN — Medication 75 MICROGRAM(S): at 05:01

## 2024-01-01 RX ADMIN — Medication 1 TABLET(S): at 17:37

## 2024-01-01 RX ADMIN — HYDROMORPHONE HYDROCHLORIDE 2 MILLIGRAM(S): 2 INJECTION INTRAMUSCULAR; INTRAVENOUS; SUBCUTANEOUS at 06:02

## 2024-01-01 RX ADMIN — PANTOPRAZOLE SODIUM 40 MILLIGRAM(S): 20 TABLET, DELAYED RELEASE ORAL at 06:11

## 2024-01-01 RX ADMIN — LIDOCAINE 1 PATCH: 4 CREAM TOPICAL at 18:49

## 2024-01-01 RX ADMIN — LIDOCAINE 1 PATCH: 4 CREAM TOPICAL at 10:38

## 2024-01-01 RX ADMIN — Medication 75 MICROGRAM(S): at 06:41

## 2024-01-01 RX ADMIN — Medication 650 MILLIGRAM(S): at 22:47

## 2024-01-01 RX ADMIN — PANTOPRAZOLE SODIUM 40 MILLIGRAM(S): 20 TABLET, DELAYED RELEASE ORAL at 03:05

## 2024-01-01 RX ADMIN — Medication 30 MILLILITER(S): at 22:26

## 2024-01-01 RX ADMIN — HYDROMORPHONE HYDROCHLORIDE 0.2 MILLIGRAM(S): 2 INJECTION INTRAMUSCULAR; INTRAVENOUS; SUBCUTANEOUS at 00:15

## 2024-01-01 RX ADMIN — Medication 400 MILLIGRAM(S): at 08:56

## 2024-01-01 RX ADMIN — Medication 650 MILLIGRAM(S): at 13:42

## 2024-01-01 RX ADMIN — SENNA PLUS 2 TABLET(S): 8.6 TABLET ORAL at 22:44

## 2024-01-01 RX ADMIN — HYDROMORPHONE HYDROCHLORIDE 0.2 MILLIGRAM(S): 2 INJECTION INTRAMUSCULAR; INTRAVENOUS; SUBCUTANEOUS at 13:58

## 2024-01-01 RX ADMIN — HEPARIN SODIUM 700 UNIT(S)/HR: 5000 INJECTION INTRAVENOUS; SUBCUTANEOUS at 15:18

## 2024-01-01 RX ADMIN — PANTOPRAZOLE SODIUM 40 MILLIGRAM(S): 20 TABLET, DELAYED RELEASE ORAL at 06:04

## 2024-01-01 RX ADMIN — HYDROMORPHONE HYDROCHLORIDE 0.2 MILLIGRAM(S): 2 INJECTION INTRAMUSCULAR; INTRAVENOUS; SUBCUTANEOUS at 10:48

## 2024-01-01 RX ADMIN — Medication 30 MILLIGRAM(S): at 05:41

## 2024-01-01 RX ADMIN — Medication 650 MILLIGRAM(S): at 00:09

## 2024-01-01 RX ADMIN — LEVETIRACETAM 400 MILLIGRAM(S): 250 TABLET, FILM COATED ORAL at 05:59

## 2024-01-01 RX ADMIN — Medication 650 MILLIGRAM(S): at 02:30

## 2024-01-01 RX ADMIN — HEPARIN SODIUM 700 UNIT(S)/HR: 5000 INJECTION INTRAVENOUS; SUBCUTANEOUS at 08:08

## 2024-01-01 RX ADMIN — PANTOPRAZOLE SODIUM 40 MILLIGRAM(S): 20 TABLET, DELAYED RELEASE ORAL at 05:59

## 2024-01-01 RX ADMIN — INSULIN HUMAN 10 UNIT(S): 100 INJECTION, SOLUTION SUBCUTANEOUS at 08:53

## 2024-01-01 RX ADMIN — PROPOFOL 5.93 MICROGRAM(S)/KG/MIN: 10 INJECTION, EMULSION INTRAVENOUS at 22:52

## 2024-01-01 RX ADMIN — Medication 650 MILLIGRAM(S): at 18:30

## 2024-01-01 RX ADMIN — SERTRALINE 50 MILLIGRAM(S): 25 TABLET, FILM COATED ORAL at 11:08

## 2024-01-01 RX ADMIN — Medication 10 MILLIGRAM(S): at 05:09

## 2024-01-01 RX ADMIN — HYDROMORPHONE HYDROCHLORIDE 2 MILLIGRAM(S): 2 INJECTION INTRAMUSCULAR; INTRAVENOUS; SUBCUTANEOUS at 21:33

## 2024-01-01 RX ADMIN — Medication 200 GRAM(S): at 22:09

## 2024-01-01 RX ADMIN — Medication 650 MILLIGRAM(S): at 17:10

## 2024-01-01 RX ADMIN — LEVETIRACETAM 250 MILLIGRAM(S): 250 TABLET, FILM COATED ORAL at 17:09

## 2024-01-01 RX ADMIN — ATORVASTATIN CALCIUM 40 MILLIGRAM(S): 80 TABLET, FILM COATED ORAL at 21:32

## 2024-01-01 RX ADMIN — Medication 75 MICROGRAM(S): at 06:25

## 2024-01-01 RX ADMIN — CILOSTAZOL 50 MILLIGRAM(S): 100 TABLET ORAL at 23:49

## 2024-01-01 RX ADMIN — HEPARIN SODIUM 1000 UNIT(S)/HR: 5000 INJECTION INTRAVENOUS; SUBCUTANEOUS at 22:21

## 2024-01-01 RX ADMIN — Medication 975 MILLIGRAM(S): at 01:25

## 2024-01-01 RX ADMIN — ATORVASTATIN CALCIUM 40 MILLIGRAM(S): 80 TABLET, FILM COATED ORAL at 21:28

## 2024-01-01 RX ADMIN — Medication 40 MILLIEQUIVALENT(S): at 09:22

## 2024-01-01 RX ADMIN — SERTRALINE 50 MILLIGRAM(S): 25 TABLET, FILM COATED ORAL at 21:40

## 2024-01-01 RX ADMIN — PANTOPRAZOLE SODIUM 40 MILLIGRAM(S): 20 TABLET, DELAYED RELEASE ORAL at 06:03

## 2024-01-01 RX ADMIN — CARVEDILOL PHOSPHATE 6.25 MILLIGRAM(S): 80 CAPSULE, EXTENDED RELEASE ORAL at 06:08

## 2024-01-01 RX ADMIN — Medication 975 MILLIGRAM(S): at 11:19

## 2024-01-01 RX ADMIN — SERTRALINE 50 MILLIGRAM(S): 25 TABLET, FILM COATED ORAL at 21:57

## 2024-01-01 RX ADMIN — HYDROMORPHONE HYDROCHLORIDE 0.2 MILLIGRAM(S): 2 INJECTION INTRAMUSCULAR; INTRAVENOUS; SUBCUTANEOUS at 00:52

## 2024-01-01 RX ADMIN — Medication 975 MILLIGRAM(S): at 00:55

## 2024-01-01 RX ADMIN — PANTOPRAZOLE SODIUM 40 MILLIGRAM(S): 20 TABLET, DELAYED RELEASE ORAL at 06:25

## 2024-01-01 RX ADMIN — Medication 650 MILLIGRAM(S): at 11:07

## 2024-01-01 RX ADMIN — PROPOFOL 5.93 MICROGRAM(S)/KG/MIN: 10 INJECTION, EMULSION INTRAVENOUS at 14:34

## 2024-01-01 RX ADMIN — LIDOCAINE 1 PATCH: 4 CREAM TOPICAL at 11:44

## 2024-01-01 RX ADMIN — MEROPENEM 100 MILLIGRAM(S): 1 INJECTION INTRAVENOUS at 22:09

## 2024-01-01 RX ADMIN — Medication 25 MILLIGRAM(S): at 06:12

## 2024-01-01 RX ADMIN — SERTRALINE 50 MILLIGRAM(S): 25 TABLET, FILM COATED ORAL at 21:49

## 2024-01-01 RX ADMIN — PIPERACILLIN AND TAZOBACTAM 200 GRAM(S): 4; .5 INJECTION, POWDER, LYOPHILIZED, FOR SOLUTION INTRAVENOUS at 20:54

## 2024-01-01 RX ADMIN — APIXABAN 2.5 MILLIGRAM(S): 2.5 TABLET, FILM COATED ORAL at 17:35

## 2024-01-01 RX ADMIN — ATORVASTATIN CALCIUM 40 MILLIGRAM(S): 80 TABLET, FILM COATED ORAL at 21:47

## 2024-01-01 RX ADMIN — ATORVASTATIN CALCIUM 40 MILLIGRAM(S): 80 TABLET, FILM COATED ORAL at 21:37

## 2024-01-01 RX ADMIN — LEVETIRACETAM 250 MILLIGRAM(S): 250 TABLET, FILM COATED ORAL at 05:37

## 2024-01-01 RX ADMIN — LIDOCAINE 1 PATCH: 4 CREAM TOPICAL at 18:59

## 2024-01-01 RX ADMIN — Medication 50 MILLIGRAM(S): at 20:00

## 2024-01-01 RX ADMIN — Medication 5 MILLIGRAM(S): at 21:36

## 2024-01-01 RX ADMIN — Medication 25 MILLIGRAM(S): at 06:41

## 2024-01-01 RX ADMIN — SENNA PLUS 2 TABLET(S): 8.6 TABLET ORAL at 21:48

## 2024-01-01 RX ADMIN — Medication 400 MILLIGRAM(S): at 04:38

## 2024-01-01 RX ADMIN — Medication 25 MILLIGRAM(S): at 17:55

## 2024-01-01 RX ADMIN — Medication 30 MILLIGRAM(S): at 17:34

## 2024-01-01 RX ADMIN — Medication 5 MILLIGRAM(S): at 21:33

## 2024-01-01 RX ADMIN — Medication 650 MILLIGRAM(S): at 06:33

## 2024-01-01 RX ADMIN — CHLORHEXIDINE GLUCONATE 1 APPLICATION(S): 213 SOLUTION TOPICAL at 12:39

## 2024-01-01 RX ADMIN — Medication 30 MILLIGRAM(S): at 06:02

## 2024-01-01 RX ADMIN — APIXABAN 2.5 MILLIGRAM(S): 2.5 TABLET, FILM COATED ORAL at 17:18

## 2024-01-01 RX ADMIN — LIDOCAINE 1 PATCH: 4 CREAM TOPICAL at 12:16

## 2024-01-01 RX ADMIN — LEVETIRACETAM 250 MILLIGRAM(S): 250 TABLET, FILM COATED ORAL at 18:37

## 2024-01-01 RX ADMIN — Medication 1 TABLET(S): at 17:18

## 2024-01-01 RX ADMIN — LEVETIRACETAM 400 MILLIGRAM(S): 250 TABLET, FILM COATED ORAL at 06:27

## 2024-01-01 RX ADMIN — SERTRALINE 50 MILLIGRAM(S): 25 TABLET, FILM COATED ORAL at 21:32

## 2024-01-01 RX ADMIN — Medication 975 MILLIGRAM(S): at 05:02

## 2024-01-01 RX ADMIN — Medication 975 MILLIGRAM(S): at 00:43

## 2024-01-01 RX ADMIN — SENNA PLUS 2 TABLET(S): 8.6 TABLET ORAL at 21:27

## 2024-01-01 RX ADMIN — SENNA PLUS 2 TABLET(S): 8.6 TABLET ORAL at 23:22

## 2024-01-01 RX ADMIN — HYDROMORPHONE HYDROCHLORIDE 0.2 MILLIGRAM(S): 2 INJECTION INTRAMUSCULAR; INTRAVENOUS; SUBCUTANEOUS at 00:13

## 2024-01-01 RX ADMIN — APIXABAN 2.5 MILLIGRAM(S): 2.5 TABLET, FILM COATED ORAL at 17:10

## 2024-01-01 RX ADMIN — SODIUM CHLORIDE 50 MILLILITER(S): 9 INJECTION INTRAMUSCULAR; INTRAVENOUS; SUBCUTANEOUS at 00:02

## 2024-01-01 RX ADMIN — ATORVASTATIN CALCIUM 40 MILLIGRAM(S): 80 TABLET, FILM COATED ORAL at 21:25

## 2024-01-01 RX ADMIN — HYDROMORPHONE HYDROCHLORIDE 2 MILLIGRAM(S): 2 INJECTION INTRAMUSCULAR; INTRAVENOUS; SUBCUTANEOUS at 22:10

## 2024-01-01 RX ADMIN — HYDROMORPHONE HYDROCHLORIDE 2 MILLIGRAM(S): 2 INJECTION INTRAMUSCULAR; INTRAVENOUS; SUBCUTANEOUS at 17:16

## 2024-01-01 RX ADMIN — Medication 75 MICROGRAM(S): at 05:03

## 2024-01-01 RX ADMIN — SENNA PLUS 2 TABLET(S): 8.6 TABLET ORAL at 22:14

## 2024-01-01 RX ADMIN — SERTRALINE 50 MILLIGRAM(S): 25 TABLET, FILM COATED ORAL at 12:14

## 2024-01-01 RX ADMIN — Medication 200 GRAM(S): at 02:29

## 2024-01-01 RX ADMIN — PANTOPRAZOLE SODIUM 40 MILLIGRAM(S): 20 TABLET, DELAYED RELEASE ORAL at 05:37

## 2024-01-01 RX ADMIN — Medication 25 MILLIGRAM(S): at 06:55

## 2024-01-01 RX ADMIN — SERTRALINE 50 MILLIGRAM(S): 25 TABLET, FILM COATED ORAL at 12:17

## 2024-01-01 RX ADMIN — LIDOCAINE 1 PATCH: 4 CREAM TOPICAL at 11:09

## 2024-01-01 RX ADMIN — Medication 81 MILLIGRAM(S): at 10:37

## 2024-01-01 RX ADMIN — ATORVASTATIN CALCIUM 40 MILLIGRAM(S): 80 TABLET, FILM COATED ORAL at 21:44

## 2024-01-01 RX ADMIN — SENNA PLUS 2 TABLET(S): 8.6 TABLET ORAL at 21:33

## 2024-01-01 RX ADMIN — ETOMIDATE 15 MILLIGRAM(S): 2 INJECTION INTRAVENOUS at 22:54

## 2024-01-01 RX ADMIN — Medication 81 MILLIGRAM(S): at 11:19

## 2024-01-01 RX ADMIN — LEVETIRACETAM 250 MILLIGRAM(S): 250 TABLET, FILM COATED ORAL at 05:01

## 2024-01-01 RX ADMIN — Medication 650 MILLIGRAM(S): at 04:13

## 2024-01-01 RX ADMIN — SODIUM CHLORIDE 50 MILLILITER(S): 9 INJECTION INTRAMUSCULAR; INTRAVENOUS; SUBCUTANEOUS at 17:05

## 2024-01-01 RX ADMIN — LIDOCAINE 1 PATCH: 4 CREAM TOPICAL at 00:57

## 2024-01-01 RX ADMIN — Medication 100 GRAM(S): at 17:25

## 2024-01-01 RX ADMIN — HYDROMORPHONE HYDROCHLORIDE 0.2 MILLIGRAM(S): 2 INJECTION INTRAMUSCULAR; INTRAVENOUS; SUBCUTANEOUS at 15:20

## 2024-01-01 RX ADMIN — HEPARIN SODIUM 800 UNIT(S)/HR: 5000 INJECTION INTRAVENOUS; SUBCUTANEOUS at 13:15

## 2024-01-01 RX ADMIN — Medication 650 MILLIGRAM(S): at 07:33

## 2024-01-01 RX ADMIN — PANTOPRAZOLE SODIUM 40 MILLIGRAM(S): 20 TABLET, DELAYED RELEASE ORAL at 06:33

## 2024-01-01 RX ADMIN — LEVETIRACETAM 250 MILLIGRAM(S): 250 TABLET, FILM COATED ORAL at 06:12

## 2024-01-01 RX ADMIN — HYDROMORPHONE HYDROCHLORIDE 2 MILLIGRAM(S): 2 INJECTION INTRAMUSCULAR; INTRAVENOUS; SUBCUTANEOUS at 15:40

## 2024-01-01 RX ADMIN — INSULIN HUMAN 10 UNIT(S): 100 INJECTION, SOLUTION SUBCUTANEOUS at 22:22

## 2024-01-01 RX ADMIN — Medication 50 MILLIEQUIVALENT(S): at 10:15

## 2024-01-01 RX ADMIN — HYDROMORPHONE HYDROCHLORIDE 2 MILLIGRAM(S): 2 INJECTION INTRAMUSCULAR; INTRAVENOUS; SUBCUTANEOUS at 13:04

## 2024-01-01 RX ADMIN — Medication 25 GRAM(S): at 04:18

## 2024-01-01 RX ADMIN — MEROPENEM 100 MILLIGRAM(S): 1 INJECTION INTRAVENOUS at 06:02

## 2024-01-01 RX ADMIN — Medication 50 MILLILITER(S): at 22:09

## 2024-01-01 RX ADMIN — POLYETHYLENE GLYCOL 3350 17 GRAM(S): 17 POWDER, FOR SOLUTION ORAL at 06:20

## 2024-01-01 RX ADMIN — Medication 50 MILLILITER(S): at 02:13

## 2024-01-01 RX ADMIN — HYDROMORPHONE HYDROCHLORIDE 2 MILLIGRAM(S): 2 INJECTION INTRAMUSCULAR; INTRAVENOUS; SUBCUTANEOUS at 06:33

## 2024-01-01 RX ADMIN — Medication 4.63 MICROGRAM(S)/KG/MIN: at 09:16

## 2024-01-01 RX ADMIN — OXYCODONE HYDROCHLORIDE 5 MILLIGRAM(S): 5 TABLET ORAL at 21:59

## 2024-01-01 RX ADMIN — OXYCODONE HYDROCHLORIDE 2.5 MILLIGRAM(S): 5 TABLET ORAL at 18:31

## 2024-01-01 RX ADMIN — Medication 650 MILLIGRAM(S): at 10:38

## 2024-01-01 RX ADMIN — PANTOPRAZOLE SODIUM 40 MILLIGRAM(S): 20 TABLET, DELAYED RELEASE ORAL at 06:12

## 2024-01-01 RX ADMIN — Medication 30 MILLIGRAM(S): at 05:02

## 2024-01-01 RX ADMIN — Medication 5 MILLIGRAM(S): at 02:45

## 2024-01-01 RX ADMIN — Medication 5 MILLIGRAM(S): at 22:13

## 2024-01-01 RX ADMIN — Medication 975 MILLIGRAM(S): at 18:16

## 2024-01-01 RX ADMIN — CHLORHEXIDINE GLUCONATE 1 APPLICATION(S): 213 SOLUTION TOPICAL at 06:16

## 2024-01-01 RX ADMIN — LEVETIRACETAM 400 MILLIGRAM(S): 250 TABLET, FILM COATED ORAL at 03:49

## 2024-01-01 RX ADMIN — SODIUM CHLORIDE 500 MILLILITER(S): 9 INJECTION INTRAMUSCULAR; INTRAVENOUS; SUBCUTANEOUS at 16:08

## 2024-01-01 RX ADMIN — HEPARIN SODIUM 800 UNIT(S)/HR: 5000 INJECTION INTRAVENOUS; SUBCUTANEOUS at 05:40

## 2024-01-01 RX ADMIN — ONDANSETRON 4 MILLIGRAM(S): 8 TABLET, FILM COATED ORAL at 00:02

## 2024-01-01 RX ADMIN — LEVETIRACETAM 250 MILLIGRAM(S): 250 TABLET, FILM COATED ORAL at 05:57

## 2024-01-01 RX ADMIN — Medication 30 MILLIGRAM(S): at 17:31

## 2024-01-01 RX ADMIN — Medication 75 MICROGRAM(S): at 06:31

## 2024-01-01 RX ADMIN — Medication 75 MICROGRAM(S): at 06:16

## 2024-01-01 RX ADMIN — OXYCODONE HYDROCHLORIDE 5 MILLIGRAM(S): 5 TABLET ORAL at 22:59

## 2024-01-01 RX ADMIN — HEPARIN SODIUM 1000 UNIT(S)/HR: 5000 INJECTION INTRAVENOUS; SUBCUTANEOUS at 20:05

## 2024-01-01 RX ADMIN — Medication 25 GRAM(S): at 21:37

## 2024-01-01 RX ADMIN — SODIUM CHLORIDE 1000 MILLILITER(S): 9 INJECTION, SOLUTION INTRAVENOUS at 10:29

## 2024-01-01 RX ADMIN — HYDROMORPHONE HYDROCHLORIDE 0.2 MILLIGRAM(S): 2 INJECTION INTRAMUSCULAR; INTRAVENOUS; SUBCUTANEOUS at 12:58

## 2024-01-01 RX ADMIN — PANTOPRAZOLE SODIUM 40 MILLIGRAM(S): 20 TABLET, DELAYED RELEASE ORAL at 17:55

## 2024-01-01 RX ADMIN — HYDROMORPHONE HYDROCHLORIDE 2 MILLIGRAM(S): 2 INJECTION INTRAMUSCULAR; INTRAVENOUS; SUBCUTANEOUS at 07:33

## 2024-01-01 RX ADMIN — LIDOCAINE 1 PATCH: 4 CREAM TOPICAL at 19:00

## 2024-01-01 RX ADMIN — HEPARIN SODIUM 700 UNIT(S)/HR: 5000 INJECTION INTRAVENOUS; SUBCUTANEOUS at 08:56

## 2024-01-01 RX ADMIN — ATORVASTATIN CALCIUM 40 MILLIGRAM(S): 80 TABLET, FILM COATED ORAL at 22:44

## 2024-01-01 RX ADMIN — Medication 4.63 MICROGRAM(S)/KG/MIN: at 21:22

## 2024-01-01 RX ADMIN — Medication 30 MILLIGRAM(S): at 17:38

## 2024-01-01 RX ADMIN — HYDROMORPHONE HYDROCHLORIDE 2 MILLIGRAM(S): 2 INJECTION INTRAMUSCULAR; INTRAVENOUS; SUBCUTANEOUS at 12:20

## 2024-01-01 RX ADMIN — LEVETIRACETAM 250 MILLIGRAM(S): 250 TABLET, FILM COATED ORAL at 18:03

## 2024-01-01 RX ADMIN — Medication 2 MILLIGRAM(S): at 10:50

## 2024-01-01 RX ADMIN — Medication 975 MILLIGRAM(S): at 17:16

## 2024-01-01 RX ADMIN — LEVETIRACETAM 250 MILLIGRAM(S): 250 TABLET, FILM COATED ORAL at 06:20

## 2024-01-01 RX ADMIN — POLYETHYLENE GLYCOL 3350 17 GRAM(S): 17 POWDER, FOR SOLUTION ORAL at 12:35

## 2024-01-01 RX ADMIN — ATORVASTATIN CALCIUM 40 MILLIGRAM(S): 80 TABLET, FILM COATED ORAL at 21:36

## 2024-01-01 RX ADMIN — HEPARIN SODIUM 800 UNIT(S)/HR: 5000 INJECTION INTRAVENOUS; SUBCUTANEOUS at 20:27

## 2024-01-01 RX ADMIN — Medication 50 MILLIEQUIVALENT(S): at 10:08

## 2024-01-01 RX ADMIN — HYDROMORPHONE HYDROCHLORIDE 2 MILLIGRAM(S): 2 INJECTION INTRAMUSCULAR; INTRAVENOUS; SUBCUTANEOUS at 17:18

## 2024-01-01 RX ADMIN — HEPARIN SODIUM 0 UNIT(S)/HR: 5000 INJECTION INTRAVENOUS; SUBCUTANEOUS at 04:29

## 2024-01-01 RX ADMIN — CARVEDILOL PHOSPHATE 12.5 MILLIGRAM(S): 80 CAPSULE, EXTENDED RELEASE ORAL at 06:32

## 2024-01-01 RX ADMIN — CILOSTAZOL 50 MILLIGRAM(S): 100 TABLET ORAL at 21:25

## 2024-01-01 RX ADMIN — LEVETIRACETAM 250 MILLIGRAM(S): 250 TABLET, FILM COATED ORAL at 17:03

## 2024-01-01 RX ADMIN — Medication 5 MILLIGRAM(S): at 21:28

## 2024-01-01 RX ADMIN — Medication 100 MILLIGRAM(S): at 16:47

## 2024-01-01 RX ADMIN — LEVETIRACETAM 600 MILLIGRAM(S): 250 TABLET, FILM COATED ORAL at 11:33

## 2024-01-01 RX ADMIN — OXYCODONE HYDROCHLORIDE 5 MILLIGRAM(S): 5 TABLET ORAL at 06:42

## 2024-01-01 RX ADMIN — HYDROMORPHONE HYDROCHLORIDE 0.2 MILLIGRAM(S): 2 INJECTION INTRAMUSCULAR; INTRAVENOUS; SUBCUTANEOUS at 00:37

## 2024-01-01 RX ADMIN — Medication 250 MILLIGRAM(S): at 21:50

## 2024-01-01 RX ADMIN — Medication 5 MILLIGRAM(S): at 22:09

## 2024-01-01 RX ADMIN — Medication 30 MILLIGRAM(S): at 06:33

## 2024-01-01 RX ADMIN — Medication 5 MILLIGRAM(S): at 13:00

## 2024-01-01 RX ADMIN — Medication 650 MILLIGRAM(S): at 07:03

## 2024-01-01 RX ADMIN — HEPARIN SODIUM 1000 UNIT(S)/HR: 5000 INJECTION INTRAVENOUS; SUBCUTANEOUS at 16:04

## 2024-01-01 RX ADMIN — Medication 30 MILLIGRAM(S): at 09:12

## 2024-01-01 RX ADMIN — Medication 81 MILLIGRAM(S): at 11:24

## 2024-01-01 RX ADMIN — Medication 250 MILLIGRAM(S): at 21:22

## 2024-01-01 RX ADMIN — Medication 30 MILLIGRAM(S): at 06:34

## 2024-01-01 RX ADMIN — SERTRALINE 50 MILLIGRAM(S): 25 TABLET, FILM COATED ORAL at 12:35

## 2024-01-01 RX ADMIN — LEVETIRACETAM 400 MILLIGRAM(S): 250 TABLET, FILM COATED ORAL at 18:13

## 2024-01-01 RX ADMIN — LEVETIRACETAM 400 MILLIGRAM(S): 250 TABLET, FILM COATED ORAL at 07:12

## 2024-01-01 RX ADMIN — SENNA PLUS 2 TABLET(S): 8.6 TABLET ORAL at 22:10

## 2024-01-01 RX ADMIN — PANTOPRAZOLE SODIUM 40 MILLIGRAM(S): 20 TABLET, DELAYED RELEASE ORAL at 06:42

## 2024-01-01 RX ADMIN — Medication 650 MILLIGRAM(S): at 11:09

## 2024-01-01 RX ADMIN — Medication 650 MILLIGRAM(S): at 11:24

## 2024-01-01 RX ADMIN — Medication 5 MILLIGRAM(S): at 21:49

## 2024-01-01 RX ADMIN — Medication 30 MILLIGRAM(S): at 06:26

## 2024-01-01 RX ADMIN — LEVETIRACETAM 250 MILLIGRAM(S): 250 TABLET, FILM COATED ORAL at 06:48

## 2024-01-01 RX ADMIN — LEVETIRACETAM 250 MILLIGRAM(S): 250 TABLET, FILM COATED ORAL at 17:17

## 2024-01-01 RX ADMIN — LIDOCAINE 1 PATCH: 4 CREAM TOPICAL at 11:24

## 2024-01-01 RX ADMIN — HYDROMORPHONE HYDROCHLORIDE 2 MILLIGRAM(S): 2 INJECTION INTRAMUSCULAR; INTRAVENOUS; SUBCUTANEOUS at 18:16

## 2024-01-01 RX ADMIN — LIDOCAINE 1 PATCH: 4 CREAM TOPICAL at 11:08

## 2024-01-01 RX ADMIN — APIXABAN 2.5 MILLIGRAM(S): 2.5 TABLET, FILM COATED ORAL at 06:33

## 2024-01-01 RX ADMIN — Medication 75 MICROGRAM(S): at 10:02

## 2024-01-01 RX ADMIN — HYDROMORPHONE HYDROCHLORIDE 2 MILLIGRAM(S): 2 INJECTION INTRAMUSCULAR; INTRAVENOUS; SUBCUTANEOUS at 21:48

## 2024-01-01 RX ADMIN — Medication 25 MILLIGRAM(S): at 05:14

## 2024-01-01 RX ADMIN — HYDROMORPHONE HYDROCHLORIDE 2 MILLIGRAM(S): 2 INJECTION INTRAMUSCULAR; INTRAVENOUS; SUBCUTANEOUS at 05:01

## 2024-01-01 RX ADMIN — HEPARIN SODIUM 800 UNIT(S)/HR: 5000 INJECTION INTRAVENOUS; SUBCUTANEOUS at 19:42

## 2024-01-01 RX ADMIN — Medication 1000 MILLIGRAM(S): at 04:53

## 2024-01-01 RX ADMIN — LIDOCAINE 1 PATCH: 4 CREAM TOPICAL at 19:05

## 2024-01-01 RX ADMIN — ONDANSETRON 4 MILLIGRAM(S): 8 TABLET, FILM COATED ORAL at 05:59

## 2024-01-01 RX ADMIN — Medication 200 GRAM(S): at 09:15

## 2024-01-01 RX ADMIN — OXYCODONE HYDROCHLORIDE 5 MILLIGRAM(S): 5 TABLET ORAL at 14:17

## 2024-01-01 RX ADMIN — Medication 650 MILLIGRAM(S): at 01:38

## 2024-01-01 RX ADMIN — APIXABAN 2.5 MILLIGRAM(S): 2.5 TABLET, FILM COATED ORAL at 06:35

## 2024-01-01 RX ADMIN — MEROPENEM 100 MILLIGRAM(S): 1 INJECTION INTRAVENOUS at 03:28

## 2024-01-01 RX ADMIN — HEPARIN SODIUM 700 UNIT(S)/HR: 5000 INJECTION INTRAVENOUS; SUBCUTANEOUS at 23:07

## 2024-01-01 RX ADMIN — Medication 650 MILLIGRAM(S): at 01:09

## 2024-01-01 RX ADMIN — Medication 650 MILLIGRAM(S): at 19:13

## 2024-01-01 RX ADMIN — Medication 30 MILLIGRAM(S): at 17:09

## 2024-01-01 RX ADMIN — Medication 30 MILLIGRAM(S): at 21:37

## 2024-01-01 RX ADMIN — Medication 650 MILLIGRAM(S): at 21:47

## 2024-01-01 RX ADMIN — LEVETIRACETAM 250 MILLIGRAM(S): 250 TABLET, FILM COATED ORAL at 06:32

## 2024-01-01 RX ADMIN — LEVETIRACETAM 250 MILLIGRAM(S): 250 TABLET, FILM COATED ORAL at 17:38

## 2024-01-01 RX ADMIN — Medication 30 MILLIGRAM(S): at 17:03

## 2024-01-01 RX ADMIN — Medication 5 MILLIGRAM(S): at 22:44

## 2024-01-01 RX ADMIN — MORPHINE SULFATE 2 MILLIGRAM(S): 50 CAPSULE, EXTENDED RELEASE ORAL at 22:27

## 2024-01-01 RX ADMIN — Medication 975 MILLIGRAM(S): at 23:43

## 2024-01-01 RX ADMIN — ATORVASTATIN CALCIUM 40 MILLIGRAM(S): 80 TABLET, FILM COATED ORAL at 22:10

## 2024-01-01 RX ADMIN — CARVEDILOL PHOSPHATE 12.5 MILLIGRAM(S): 80 CAPSULE, EXTENDED RELEASE ORAL at 05:01

## 2024-01-01 RX ADMIN — FENTANYL CITRATE 100 MICROGRAM(S): 50 INJECTION INTRAVENOUS at 22:54

## 2024-01-01 RX ADMIN — HEPARIN SODIUM 700 UNIT(S)/HR: 5000 INJECTION INTRAVENOUS; SUBCUTANEOUS at 20:17

## 2024-01-01 RX ADMIN — SODIUM CHLORIDE 1000 MILLILITER(S): 9 INJECTION INTRAMUSCULAR; INTRAVENOUS; SUBCUTANEOUS at 22:55

## 2024-01-01 RX ADMIN — INSULIN HUMAN 10 UNIT(S): 100 INJECTION, SOLUTION SUBCUTANEOUS at 02:12

## 2024-01-01 RX ADMIN — Medication 650 MILLIGRAM(S): at 12:00

## 2024-01-01 RX ADMIN — Medication 975 MILLIGRAM(S): at 11:30

## 2024-01-01 RX ADMIN — SENNA PLUS 2 TABLET(S): 8.6 TABLET ORAL at 21:57

## 2024-01-01 RX ADMIN — Medication 75 MICROGRAM(S): at 05:56

## 2024-01-01 RX ADMIN — Medication 5 MILLIGRAM(S): at 21:45

## 2024-01-01 RX ADMIN — SENNA PLUS 2 TABLET(S): 8.6 TABLET ORAL at 21:44

## 2024-01-01 RX ADMIN — HYDROMORPHONE HYDROCHLORIDE 2 MILLIGRAM(S): 2 INJECTION INTRAMUSCULAR; INTRAVENOUS; SUBCUTANEOUS at 11:30

## 2024-01-01 RX ADMIN — Medication 50 MILLIGRAM(S): at 22:54

## 2024-01-01 RX ADMIN — HEPARIN SODIUM 800 UNIT(S)/HR: 5000 INJECTION INTRAVENOUS; SUBCUTANEOUS at 20:41

## 2024-01-01 RX ADMIN — LEVETIRACETAM 250 MILLIGRAM(S): 250 TABLET, FILM COATED ORAL at 05:41

## 2024-01-01 RX ADMIN — Medication 100 MILLIGRAM(S): at 00:37

## 2024-01-01 RX ADMIN — HYDROMORPHONE HYDROCHLORIDE 2 MILLIGRAM(S): 2 INJECTION INTRAMUSCULAR; INTRAVENOUS; SUBCUTANEOUS at 11:24

## 2024-01-01 RX ADMIN — PANTOPRAZOLE SODIUM 40 MILLIGRAM(S): 20 TABLET, DELAYED RELEASE ORAL at 05:14

## 2024-01-01 RX ADMIN — PANTOPRAZOLE SODIUM 40 MILLIGRAM(S): 20 TABLET, DELAYED RELEASE ORAL at 05:41

## 2024-01-01 RX ADMIN — APIXABAN 2.5 MILLIGRAM(S): 2.5 TABLET, FILM COATED ORAL at 10:37

## 2024-01-01 RX ADMIN — Medication 75 MICROGRAM(S): at 05:36

## 2024-01-01 RX ADMIN — HYDROMORPHONE HYDROCHLORIDE 0.5 MILLIGRAM(S): 2 INJECTION INTRAMUSCULAR; INTRAVENOUS; SUBCUTANEOUS at 21:00

## 2024-01-01 RX ADMIN — Medication 25 MILLIGRAM(S): at 17:17

## 2024-01-01 RX ADMIN — HEPARIN SODIUM 700 UNIT(S)/HR: 5000 INJECTION INTRAVENOUS; SUBCUTANEOUS at 17:34

## 2024-01-01 RX ADMIN — VASOPRESSIN 6 UNIT(S)/MIN: 20 INJECTION INTRAVENOUS at 14:34

## 2024-01-01 RX ADMIN — HYDROMORPHONE HYDROCHLORIDE 0.2 MILLIGRAM(S): 2 INJECTION INTRAMUSCULAR; INTRAVENOUS; SUBCUTANEOUS at 14:20

## 2024-01-01 RX ADMIN — Medication 650 MILLIGRAM(S): at 12:08

## 2024-01-01 RX ADMIN — PANTOPRAZOLE SODIUM 40 MILLIGRAM(S): 20 TABLET, DELAYED RELEASE ORAL at 07:02

## 2024-01-01 RX ADMIN — HYDROMORPHONE HYDROCHLORIDE 0.5 MILLIGRAM(S): 2 INJECTION INTRAMUSCULAR; INTRAVENOUS; SUBCUTANEOUS at 21:15

## 2024-01-01 RX ADMIN — Medication 1 TABLET(S): at 17:16

## 2024-01-01 RX ADMIN — LIDOCAINE 1 PATCH: 4 CREAM TOPICAL at 23:06

## 2024-01-01 RX ADMIN — Medication 1000 MILLIGRAM(S): at 12:15

## 2024-01-01 RX ADMIN — Medication 100 MILLIGRAM(S): at 09:21

## 2024-01-01 RX ADMIN — LEVETIRACETAM 250 MILLIGRAM(S): 250 TABLET, FILM COATED ORAL at 06:41

## 2024-01-01 RX ADMIN — LIDOCAINE 1 PATCH: 4 CREAM TOPICAL at 07:00

## 2024-01-01 RX ADMIN — Medication 12.5 MILLIEQUIVALENT(S): at 06:02

## 2024-01-01 RX ADMIN — SENNA PLUS 2 TABLET(S): 8.6 TABLET ORAL at 21:25

## 2024-01-01 RX ADMIN — LIDOCAINE 1 PATCH: 4 CREAM TOPICAL at 22:30

## 2024-01-01 RX ADMIN — CARVEDILOL PHOSPHATE 12.5 MILLIGRAM(S): 80 CAPSULE, EXTENDED RELEASE ORAL at 17:10

## 2024-01-01 RX ADMIN — POLYETHYLENE GLYCOL 3350 17 GRAM(S): 17 POWDER, FOR SOLUTION ORAL at 11:34

## 2024-01-01 RX ADMIN — Medication 25 MILLIGRAM(S): at 06:21

## 2024-01-01 RX ADMIN — HEPARIN SODIUM 800 UNIT(S)/HR: 5000 INJECTION INTRAVENOUS; SUBCUTANEOUS at 08:28

## 2024-01-01 RX ADMIN — POLYETHYLENE GLYCOL 3350 17 GRAM(S): 17 POWDER, FOR SOLUTION ORAL at 17:10

## 2024-01-01 RX ADMIN — HEPARIN SODIUM 800 UNIT(S)/HR: 5000 INJECTION INTRAVENOUS; SUBCUTANEOUS at 08:21

## 2024-01-01 RX ADMIN — APIXABAN 2.5 MILLIGRAM(S): 2.5 TABLET, FILM COATED ORAL at 17:16

## 2024-01-01 RX ADMIN — Medication 25 MILLIGRAM(S): at 06:03

## 2024-01-01 RX ADMIN — CHLORHEXIDINE GLUCONATE 15 MILLILITER(S): 213 SOLUTION TOPICAL at 06:02

## 2024-01-01 RX ADMIN — Medication 81 MILLIGRAM(S): at 11:45

## 2024-01-01 RX ADMIN — Medication 25 MILLIGRAM(S): at 05:36

## 2024-01-01 RX ADMIN — CARVEDILOL PHOSPHATE 12.5 MILLIGRAM(S): 80 CAPSULE, EXTENDED RELEASE ORAL at 06:35

## 2024-01-01 RX ADMIN — ATORVASTATIN CALCIUM 40 MILLIGRAM(S): 80 TABLET, FILM COATED ORAL at 21:58

## 2024-01-01 RX ADMIN — POLYETHYLENE GLYCOL 3350 17 GRAM(S): 17 POWDER, FOR SOLUTION ORAL at 12:17

## 2024-01-01 RX ADMIN — PANTOPRAZOLE SODIUM 40 MILLIGRAM(S): 20 TABLET, DELAYED RELEASE ORAL at 06:16

## 2024-01-01 RX ADMIN — HYDROMORPHONE HYDROCHLORIDE 2 MILLIGRAM(S): 2 INJECTION INTRAMUSCULAR; INTRAVENOUS; SUBCUTANEOUS at 18:02

## 2024-01-01 RX ADMIN — Medication 650 MILLIGRAM(S): at 18:27

## 2024-01-01 RX ADMIN — Medication 650 MILLIGRAM(S): at 18:02

## 2024-01-01 RX ADMIN — SODIUM CHLORIDE 1000 MILLILITER(S): 9 INJECTION INTRAMUSCULAR; INTRAVENOUS; SUBCUTANEOUS at 21:44

## 2024-01-01 RX ADMIN — LEVETIRACETAM 250 MILLIGRAM(S): 250 TABLET, FILM COATED ORAL at 06:39

## 2024-01-01 RX ADMIN — APIXABAN 2.5 MILLIGRAM(S): 2.5 TABLET, FILM COATED ORAL at 05:02

## 2024-01-01 RX ADMIN — Medication 650 MILLIGRAM(S): at 11:30

## 2024-01-01 RX ADMIN — HYDROMORPHONE HYDROCHLORIDE 0.2 MILLIGRAM(S): 2 INJECTION INTRAMUSCULAR; INTRAVENOUS; SUBCUTANEOUS at 00:30

## 2024-01-01 RX ADMIN — OXYCODONE HYDROCHLORIDE 5 MILLIGRAM(S): 5 TABLET ORAL at 08:40

## 2024-01-01 RX ADMIN — HYDROMORPHONE HYDROCHLORIDE 2 MILLIGRAM(S): 2 INJECTION INTRAMUSCULAR; INTRAVENOUS; SUBCUTANEOUS at 17:09

## 2024-01-01 RX ADMIN — Medication 75 MICROGRAM(S): at 06:08

## 2024-01-01 RX ADMIN — OXYCODONE HYDROCHLORIDE 5 MILLIGRAM(S): 5 TABLET ORAL at 07:48

## 2024-01-01 RX ADMIN — CHLORHEXIDINE GLUCONATE 15 MILLILITER(S): 213 SOLUTION TOPICAL at 18:13

## 2024-01-01 RX ADMIN — OXYCODONE HYDROCHLORIDE 2.5 MILLIGRAM(S): 5 TABLET ORAL at 17:38

## 2024-01-01 RX ADMIN — CHLORHEXIDINE GLUCONATE 1 APPLICATION(S): 213 SOLUTION TOPICAL at 07:06

## 2024-01-01 RX ADMIN — HYDROMORPHONE HYDROCHLORIDE 2 MILLIGRAM(S): 2 INJECTION INTRAMUSCULAR; INTRAVENOUS; SUBCUTANEOUS at 22:48

## 2024-01-01 RX ADMIN — LEVETIRACETAM 250 MILLIGRAM(S): 250 TABLET, FILM COATED ORAL at 06:33

## 2024-01-01 RX ADMIN — HYDROMORPHONE HYDROCHLORIDE 2 MILLIGRAM(S): 2 INJECTION INTRAMUSCULAR; INTRAVENOUS; SUBCUTANEOUS at 10:38

## 2024-01-01 RX ADMIN — Medication 650 MILLIGRAM(S): at 03:13

## 2024-01-01 RX ADMIN — Medication 200 MILLIEQUIVALENT(S): at 02:11

## 2024-01-01 RX ADMIN — LEVETIRACETAM 250 MILLIGRAM(S): 250 TABLET, FILM COATED ORAL at 18:24

## 2024-01-01 RX ADMIN — Medication 25 GRAM(S): at 06:30

## 2024-01-01 RX ADMIN — PANTOPRAZOLE SODIUM 40 MILLIGRAM(S): 20 TABLET, DELAYED RELEASE ORAL at 18:01

## 2024-01-01 RX ADMIN — LIDOCAINE 1 PATCH: 4 CREAM TOPICAL at 00:05

## 2024-01-01 RX ADMIN — Medication 975 MILLIGRAM(S): at 17:18

## 2024-01-01 RX ADMIN — Medication 40 MILLIGRAM(S): at 17:55

## 2024-01-01 RX ADMIN — CARVEDILOL PHOSPHATE 12.5 MILLIGRAM(S): 80 CAPSULE, EXTENDED RELEASE ORAL at 17:18

## 2024-01-01 RX ADMIN — HEPARIN SODIUM 700 UNIT(S)/HR: 5000 INJECTION INTRAVENOUS; SUBCUTANEOUS at 06:40

## 2024-01-01 RX ADMIN — Medication 150 MILLILITER(S): at 06:16

## 2024-01-01 RX ADMIN — Medication 650 MILLIGRAM(S): at 12:33

## 2024-01-01 RX ADMIN — SODIUM CHLORIDE 100 MILLILITER(S): 9 INJECTION, SOLUTION INTRAVENOUS at 02:29

## 2024-01-01 RX ADMIN — Medication 1 TABLET(S): at 17:19

## 2024-01-01 RX ADMIN — Medication 0.5 MILLIGRAM(S): at 16:13

## 2024-01-01 RX ADMIN — Medication 975 MILLIGRAM(S): at 06:02

## 2024-01-01 RX ADMIN — Medication 5 MILLIGRAM(S): at 21:57

## 2024-01-01 RX ADMIN — CHLORHEXIDINE GLUCONATE 15 MILLILITER(S): 213 SOLUTION TOPICAL at 07:06

## 2024-01-01 RX ADMIN — PANTOPRAZOLE SODIUM 40 MILLIGRAM(S): 20 TABLET, DELAYED RELEASE ORAL at 06:34

## 2024-01-01 RX ADMIN — Medication 400 MILLIGRAM(S): at 11:45

## 2024-01-01 RX ADMIN — HEPARIN SODIUM 700 UNIT(S)/HR: 5000 INJECTION INTRAVENOUS; SUBCUTANEOUS at 07:52

## 2024-01-01 RX ADMIN — HYDROMORPHONE HYDROCHLORIDE 2 MILLIGRAM(S): 2 INJECTION INTRAMUSCULAR; INTRAVENOUS; SUBCUTANEOUS at 21:57

## 2024-01-01 RX ADMIN — HYDROMORPHONE HYDROCHLORIDE 2 MILLIGRAM(S): 2 INJECTION INTRAMUSCULAR; INTRAVENOUS; SUBCUTANEOUS at 12:04

## 2024-01-01 RX ADMIN — ATORVASTATIN CALCIUM 40 MILLIGRAM(S): 80 TABLET, FILM COATED ORAL at 22:13

## 2024-01-01 RX ADMIN — Medication 56 MICROGRAM(S): at 06:27

## 2024-01-01 RX ADMIN — SERTRALINE 50 MILLIGRAM(S): 25 TABLET, FILM COATED ORAL at 13:43

## 2024-01-01 RX ADMIN — LEVETIRACETAM 250 MILLIGRAM(S): 250 TABLET, FILM COATED ORAL at 17:31

## 2024-01-01 RX ADMIN — HYDROMORPHONE HYDROCHLORIDE 0.2 MILLIGRAM(S): 2 INJECTION INTRAMUSCULAR; INTRAVENOUS; SUBCUTANEOUS at 00:28

## 2024-01-01 RX ADMIN — Medication 50 MILLIEQUIVALENT(S): at 09:54

## 2024-01-01 RX ADMIN — Medication 650 MILLIGRAM(S): at 01:07

## 2024-01-01 RX ADMIN — SODIUM CHLORIDE 1000 MILLILITER(S): 9 INJECTION INTRAMUSCULAR; INTRAVENOUS; SUBCUTANEOUS at 21:43

## 2024-01-01 RX ADMIN — LIDOCAINE 1 PATCH: 4 CREAM TOPICAL at 23:48

## 2024-01-01 RX ADMIN — PANTOPRAZOLE SODIUM 40 MILLIGRAM(S): 20 TABLET, DELAYED RELEASE ORAL at 06:48

## 2024-01-01 RX ADMIN — Medication 975 MILLIGRAM(S): at 06:35

## 2024-01-01 RX ADMIN — Medication 650 MILLIGRAM(S): at 01:32

## 2024-01-01 RX ADMIN — SERTRALINE 50 MILLIGRAM(S): 25 TABLET, FILM COATED ORAL at 21:59

## 2024-01-01 RX ADMIN — Medication 5 MILLIGRAM(S): at 21:48

## 2024-01-01 RX ADMIN — Medication 40 MILLIEQUIVALENT(S): at 14:45

## 2024-01-01 RX ADMIN — Medication 100 MILLILITER(S): at 08:54

## 2024-01-01 RX ADMIN — HYDROMORPHONE HYDROCHLORIDE 2 MILLIGRAM(S): 2 INJECTION INTRAMUSCULAR; INTRAVENOUS; SUBCUTANEOUS at 16:30

## 2024-01-01 RX ADMIN — PROTHROMBIN COMPLEX CONCENTRATE (HUMAN) 400 INTERNATIONAL UNIT(S): 25.5; 16.5; 24; 22; 22; 26 POWDER, FOR SOLUTION INTRAVENOUS at 22:54

## 2024-01-01 RX ADMIN — Medication 50 MILLIGRAM(S): at 02:11

## 2024-01-01 RX ADMIN — CARVEDILOL PHOSPHATE 12.5 MILLIGRAM(S): 80 CAPSULE, EXTENDED RELEASE ORAL at 17:35

## 2024-01-01 RX ADMIN — Medication 60 MICROGRAM(S): at 07:32

## 2024-01-01 RX ADMIN — FENTANYL CITRATE 2.47 MICROGRAM(S)/KG/HR: 50 INJECTION INTRAVENOUS at 22:54

## 2024-01-01 RX ADMIN — Medication 25 MILLIGRAM(S): at 18:24

## 2024-01-01 RX ADMIN — SERTRALINE 50 MILLIGRAM(S): 25 TABLET, FILM COATED ORAL at 14:45

## 2024-01-01 RX ADMIN — SODIUM CHLORIDE 100 MILLILITER(S): 9 INJECTION, SOLUTION INTRAVENOUS at 17:25

## 2024-01-01 RX ADMIN — Medication 5 MILLIGRAM(S): at 21:58

## 2024-01-01 RX ADMIN — LIDOCAINE 1 PATCH: 4 CREAM TOPICAL at 19:13

## 2024-01-01 RX ADMIN — HEPARIN SODIUM 800 UNIT(S)/HR: 5000 INJECTION INTRAVENOUS; SUBCUTANEOUS at 07:47

## 2024-01-01 RX ADMIN — Medication 75 MICROGRAM(S): at 07:29

## 2024-01-01 RX ADMIN — Medication 650 MILLIGRAM(S): at 17:35

## 2024-01-01 RX ADMIN — MORPHINE SULFATE 2 MILLIGRAM(S): 50 CAPSULE, EXTENDED RELEASE ORAL at 23:29

## 2024-01-01 RX ADMIN — POLYETHYLENE GLYCOL 3350 17 GRAM(S): 17 POWDER, FOR SOLUTION ORAL at 23:22

## 2024-01-01 RX ADMIN — PANTOPRAZOLE SODIUM 40 MILLIGRAM(S): 20 TABLET, DELAYED RELEASE ORAL at 08:05

## 2024-01-01 RX ADMIN — Medication 4.63 MICROGRAM(S)/KG/MIN: at 03:16

## 2024-01-01 RX ADMIN — HYDROMORPHONE HYDROCHLORIDE 2 MILLIGRAM(S): 2 INJECTION INTRAMUSCULAR; INTRAVENOUS; SUBCUTANEOUS at 11:09

## 2024-01-01 RX ADMIN — LEVETIRACETAM 250 MILLIGRAM(S): 250 TABLET, FILM COATED ORAL at 17:35

## 2024-01-01 RX ADMIN — HYDROMORPHONE HYDROCHLORIDE 2 MILLIGRAM(S): 2 INJECTION INTRAMUSCULAR; INTRAVENOUS; SUBCUTANEOUS at 22:57

## 2024-01-01 RX ADMIN — HEPARIN SODIUM 700 UNIT(S)/HR: 5000 INJECTION INTRAVENOUS; SUBCUTANEOUS at 08:19

## 2024-01-01 RX ADMIN — LIDOCAINE 1 PATCH: 4 CREAM TOPICAL at 19:14

## 2024-01-01 RX ADMIN — FENTANYL CITRATE 2.47 MICROGRAM(S)/KG/HR: 50 INJECTION INTRAVENOUS at 14:15

## 2024-01-01 RX ADMIN — HEPARIN SODIUM 700 UNIT(S)/HR: 5000 INJECTION INTRAVENOUS; SUBCUTANEOUS at 20:12

## 2024-01-01 RX ADMIN — LIDOCAINE 1 PATCH: 4 CREAM TOPICAL at 19:30

## 2024-01-01 RX ADMIN — CARVEDILOL PHOSPHATE 6.25 MILLIGRAM(S): 80 CAPSULE, EXTENDED RELEASE ORAL at 17:38

## 2024-01-01 RX ADMIN — Medication 50 MILLIGRAM(S): at 06:15

## 2024-01-01 RX ADMIN — ATORVASTATIN CALCIUM 40 MILLIGRAM(S): 80 TABLET, FILM COATED ORAL at 21:49

## 2024-01-01 RX ADMIN — Medication 10 MILLIGRAM(S): at 18:46

## 2024-01-01 RX ADMIN — HYDROMORPHONE HYDROCHLORIDE 2 MILLIGRAM(S): 2 INJECTION INTRAMUSCULAR; INTRAVENOUS; SUBCUTANEOUS at 22:40

## 2024-01-01 RX ADMIN — LIDOCAINE 1 PATCH: 4 CREAM TOPICAL at 11:58

## 2024-01-01 RX ADMIN — LIDOCAINE 1 PATCH: 4 CREAM TOPICAL at 18:55

## 2024-01-01 RX ADMIN — Medication 650 MILLIGRAM(S): at 12:20

## 2024-01-01 RX ADMIN — CARVEDILOL PHOSPHATE 12.5 MILLIGRAM(S): 80 CAPSULE, EXTENDED RELEASE ORAL at 06:33

## 2024-01-01 RX ADMIN — Medication 30 MILLIGRAM(S): at 17:19

## 2024-01-01 RX ADMIN — Medication 975 MILLIGRAM(S): at 18:10

## 2024-01-01 RX ADMIN — HEPARIN SODIUM 700 UNIT(S)/HR: 5000 INJECTION INTRAVENOUS; SUBCUTANEOUS at 17:04

## 2024-01-01 RX ADMIN — ATORVASTATIN CALCIUM 40 MILLIGRAM(S): 80 TABLET, FILM COATED ORAL at 21:57

## 2024-01-02 NOTE — DISCUSSION/SUMMARY
[Pacemaker Function Normal] : normal pacemaker function [de-identified] : RTO in 6 months [___ Month(s)] : in [unfilled] month(s)

## 2024-01-02 NOTE — HISTORY OF PRESENT ILLNESS
[FreeTextEntry1] : 66 y/o F former smoker with PMHx HTN, HLD , CAD, s/p PCI and CABG, s/p Bioprosthetic AVR 2002, Mitral Valve, s/p Mitral valve annuloplasty 2002, PAD s/p pLSFA stent LCIA stent, with occluded Bilateral SFA (proximal to mid portion per U/S 2019), Chronic Anemia, AAA s/p Open repair 4/2/2015, CKD , Severe Renal Artery Stenosis, saccular AAA and contained rupture adjacent to previous stent graft s/p open thoracoabdominal aneurysm repair 8/2020, with 20mm tube graft and 6mm bypasses to the celiac, SMA, left renal, right renal (end to end), with recent finding of LLE claudication now s/p LLE angiogram angioplasty and stent to the left internal iliac artery on 5/2021.  Procedure complicated by left RP hematoma, s/p 2 U PRBC.   Arrhythmia history includes longstanding palpitations s/p ILR 2016 (now dead).  Of note, prior ILR interrogation significant for one pause event c/w sinus arrest- pause ~ 4.4 seconds 10/9/18. She was unaware.  She presented for EP evaluation 6/2021 after being found to be in AFL/AT in Dr. Preston's office.  She has chronic anemia and has been unable to be maintained on OAC.  History of AF/AFL; found to be in AFib with VR in the 30 bpm.  Now s/p AV node ablation and dual chamber PPM placement with left bundle lead placement on 8/24/22.  She presents for routine follow-up today.  Notes KRAFT that is unchanged. Has seen Dr. Gregory for valvular disease; work-up is ongoing.    SEE PACEART

## 2024-01-02 NOTE — ADDENDUM
[FreeTextEntry1] : I, Ryann Barraza, am scribing for and the presence of Dr. Jasso the following sections: HPI, PMH,Family/social history, ROS, Physical Exam, Assessment / Plan.  I, Shahriar Jasso, personally performed the services described in the documentation, reviewed the documentation recorded by the scribe in my presence and it accurately and completely records my words and actions.

## 2024-01-02 NOTE — PHYSICAL EXAM
[General Appearance - Well Developed] : well developed [Normal Appearance] : normal appearance [Well Groomed] : well groomed [No Deformities] : no deformities [General Appearance - Well Nourished] : well nourished [General Appearance - In No Acute Distress] : no acute distress [] : no respiratory distress [Respiration, Rhythm And Depth] : normal respiratory rhythm and effort [Exaggerated Use Of Accessory Muscles For Inspiration] : no accessory muscle use [Auscultation Breath Sounds / Voice Sounds] : lungs were clear to auscultation bilaterally [Bowel Sounds] : normal bowel sounds [Abdomen Soft] : soft [Abdomen Tenderness] : non-tender [Cyanosis, Localized] : no localized cyanosis [Normal Conjunctiva] : the conjunctiva exhibited no abnormalities [No Oral Pallor] : no oral pallor [5th Left ICS - MCL] : palpated at the 5th LICS in the midclavicular line [Normal] : normal [No Precordial Heave] : no precordial heave was noted [Normal Rate] : normal [Rhythm Regular] : regular [Normal S1] : normal S1 [S1 Varying Intensity] : was of varying intensity [Normal S2] : normal S2 [No Gallop] : no gallop heard [S3] : no S3 [S4] : no S4 [Click] : no click [Pericardial Rub] : no pericardial rub [No Murmur] : no murmurs heard [No Pitting Edema] : no pitting edema present [Skin Color & Pigmentation] : normal skin color and pigmentation [Skin Turgor] : normal skin turgor [Oriented To Time, Place, And Person] : oriented to person, place, and time [Impaired Insight] : insight and judgment were intact [Affect] : the affect was normal [Mood] : the mood was normal [No Anxiety] : not feeling anxious

## 2024-01-02 NOTE — PROCEDURE
[Complete Heart Block] : complete heart block [CRT-P] : Cardiac resynchronization therapy pacemaker [DDD] : DDD [Lead Imp:  ___ohms] : lead impedance was [unfilled] ohms [Sensing Amplitude ___mv] : sensing amplitude was [unfilled] mv [___V @] : [unfilled] V [___ ms] : [unfilled] ms [None] : none [Programmed for Longevity] : output reprogrammed for improved battery longevity [de-identified] : [facility]  [de-identified] : Visionist [de-identified] : [id number] [de-identified] : 8/24/22 [de-identified] : Bi-V [de-identified] :  [de-identified] : 7 years [de-identified] : AT/AF 99%\par  RVP 99\par  LVP 99\par  Now DDDR\par  See Paceart

## 2024-02-06 NOTE — BRIEF OPERATIVE NOTE - OPERATION/FINDINGS
B/l CFA access under ultrasound guidance. Prior thoracoabdominal graft visualized. LLE angiogram shows patent TIFFANY stent but >50% stenosis distal to stent. Stented with Ev3 8x40mm stent, angioplasty with Hurricane 8x40mm. LLE angiogram shows patent CFA, occluded SFA proximal with reconstitution of the above knee popliteal artery. 1 vessel runoff via peroneal artery. RLE proximal angiogram shows patent CFA, proximal SFA occlusion with reconstitution of above knee popliteal artery. 3000U heparin given during case, R 5Fr sheath, L 6Fr sheath retrograde, sutured in place. F/u, pt needs imaging and appt 6 months

## 2024-02-13 PROBLEM — I44.2 COMPLETE HEART BLOCK: Status: ACTIVE | Noted: 2024-01-01

## 2024-02-13 PROBLEM — Z95.0 ARTIFICIAL CARDIAC PACEMAKER: Status: ACTIVE | Noted: 2022-09-13

## 2024-02-13 PROBLEM — I48.4 ATYPICAL ATRIAL FLUTTER: Status: ACTIVE | Noted: 2021-06-18

## 2024-02-13 NOTE — DISCUSSION/SUMMARY
[Pacemaker Function Normal] : normal pacemaker function [___ Month(s)] : in [unfilled] month(s) [de-identified] : RTO in 6 months

## 2024-02-13 NOTE — PHYSICAL EXAM
[General Appearance - Well Developed] : well developed [Normal Appearance] : normal appearance [Well Groomed] : well groomed [General Appearance - Well Nourished] : well nourished [No Deformities] : no deformities [General Appearance - In No Acute Distress] : no acute distress [] : no respiratory distress [Respiration, Rhythm And Depth] : normal respiratory rhythm and effort [Exaggerated Use Of Accessory Muscles For Inspiration] : no accessory muscle use [Auscultation Breath Sounds / Voice Sounds] : lungs were clear to auscultation bilaterally [Bowel Sounds] : normal bowel sounds [Abdomen Soft] : soft [Abdomen Tenderness] : non-tender [Cyanosis, Localized] : no localized cyanosis [Normal Conjunctiva] : the conjunctiva exhibited no abnormalities [No Oral Pallor] : no oral pallor [5th Left ICS - MCL] : palpated at the 5th LICS in the midclavicular line [Normal] : normal [No Precordial Heave] : no precordial heave was noted [Normal Rate] : normal [Rhythm Regular] : regular [Normal S1] : normal S1 [S1 Varying Intensity] : was of varying intensity [Normal S2] : normal S2 [No Gallop] : no gallop heard [S3] : no S3 [S4] : no S4 [Click] : no click [Pericardial Rub] : no pericardial rub [No Murmur] : no murmurs heard [No Pitting Edema] : no pitting edema present [Skin Color & Pigmentation] : normal skin color and pigmentation [Skin Turgor] : normal skin turgor [Oriented To Time, Place, And Person] : oriented to person, place, and time [Impaired Insight] : insight and judgment were intact [Affect] : the affect was normal [Mood] : the mood was normal [No Anxiety] : not feeling anxious

## 2024-02-13 NOTE — PROCEDURE
[Complete Heart Block] : complete heart block [CRT-P] : Cardiac resynchronization therapy pacemaker [DDD] : DDD [Lead Imp:  ___ohms] : lead impedance was [unfilled] ohms [Sensing Amplitude ___mv] : sensing amplitude was [unfilled] mv [___V @] : [unfilled] V [___ ms] : [unfilled] ms [None] : none [Programmed for Longevity] : output reprogrammed for improved battery longevity [de-identified] : [facility]  [de-identified] : Visionist [de-identified] : [id number] [de-identified] : 8/24/22 [de-identified] : Bi-V [de-identified] :  [de-identified] : 7 years [de-identified] : AT/AF 99%\par  RVP 99\par  LVP 99\par  Now DDDR\par  See Paceart

## 2024-02-13 NOTE — HISTORY OF PRESENT ILLNESS
[FreeTextEntry1] : 69 y/o F former smoker with PMHx HTN, HLD , CAD, s/p PCI and CABG, s/p Bioprosthetic AVR 2002, Mitral Valve, s/p Mitral valve annuloplasty 2002, PAD s/p pLSFA stent LCIA stent, with occluded Bilateral SFA (proximal to mid portion per U/S 2019), Chronic Anemia, AAA s/p Open repair 4/2/2015, CKD , Severe Renal Artery Stenosis, saccular AAA and contained rupture adjacent to previous stent graft s/p open thoracoabdominal aneurysm repair 8/2020, with 20mm tube graft and 6mm bypasses to the celiac, SMA, left renal, right renal (end to end), with recent finding of LLE claudication now s/p LLE angiogram angioplasty and stent to the left internal iliac artery on 5/2021.  Procedure complicated by left RP hematoma, s/p 2 U PRBC.   Arrhythmia history includes longstanding palpitations s/p ILR 2016 (now dead).  Of note, prior ILR interrogation significant for one pause event c/w sinus arrest- pause ~ 4.4 seconds 10/9/18. She was unaware.  She presented for EP evaluation 6/2021 after being found to be in AFL/AT in Dr. Preston's office.  She has chronic anemia and has been unable to be maintained on OAC.  History of AF/AFL; found to be in AFib with VR in the 30 bpm.  Now s/p AV node ablation and dual chamber PPM placement with left bundle lead placement on 8/24/22.  She presents for routine follow-up today.  Notes KRAFT that is unchanged. Has seen Dr. Gregory for valvular disease; work-up is ongoing.    Started OAC ~ 3 weeks ago.  Supposed to receive iron infusions weekly but notes Dr. Izquierdo has been out sick and she hasn't been getting her infusions?  Will check CBC today.    SEE PACEART

## 2024-02-28 NOTE — REVIEW OF SYSTEMS
[Feeling Fatigued] : feeling fatigued [Dyspnea on exertion] : dyspnea during exertion [Chest Discomfort] : chest discomfort [Leg Claudication] : intermittent leg claudication [Palpitations] : palpitations [Orthopnea] : orthopnea [Dizziness] : dizziness [Negative] : Heme/Lymph [Fever] : no fever [Headache] : no headache [Chills] : no chills [Lower Ext Edema] : no extremity edema [PND] : no PND [Syncope] : no syncope [Cough] : no cough [Abdominal Pain] : no abdominal pain [Nausea] : no nausea [Vomiting] : no vomiting [Dysuria] : no dysuria [Rash] : no rash [Itching] : no itching [Tremor] : no tremor was seen [Numbness (Hypoesthesia)] : no numbness [Convulsions] : no convulsions [Tingling (Paresthesia)] : no tingling [Limb Weakness (Paresis)] : no limb weakness (Paresis) [Speech Disturbance] : no speech disturbance

## 2024-02-28 NOTE — REVIEW OF SYSTEMS
[Feeling Fatigued] : feeling fatigued [Dyspnea on exertion] : dyspnea during exertion [Chest Discomfort] : chest discomfort [Palpitations] : palpitations [Leg Claudication] : intermittent leg claudication [Orthopnea] : orthopnea [Dizziness] : dizziness [Negative] : Heme/Lymph [Fever] : no fever [Headache] : no headache [Chills] : no chills [Lower Ext Edema] : no extremity edema [PND] : no PND [Syncope] : no syncope [Cough] : no cough [Abdominal Pain] : no abdominal pain [Nausea] : no nausea [Vomiting] : no vomiting [Dysuria] : no dysuria [Rash] : no rash [Itching] : no itching [Tremor] : no tremor was seen [Numbness (Hypoesthesia)] : no numbness [Convulsions] : no convulsions [Tingling (Paresthesia)] : no tingling [Limb Weakness (Paresis)] : no limb weakness (Paresis) [Speech Disturbance] : no speech disturbance

## 2024-02-28 NOTE — ASSESSMENT
[FreeTextEntry1] : 68F, former smoker, with complex PMH including hypothyroidism, seizure disorder, Stage 3 CKD (baseline CR ~1.7-2.0), severe renal artery stenosis, chronic anemia (baseline Hgb ~9-10, followed by Heme: Dr. Izquierdo, gets weekly Fe infusion), AT, Afib s/p AVN ablation and CRT-P (BoSci, 8/2022; on Eliquis), tortuous esophagus and Schatzi ring at [facility] (per EDG in 2020), extensive vascular history (see below), HTN, HLD, CAD s/p CABG (LIMA to RI, ANTONIO to PDA) with SAVR (#19 Felder porcine bio-prosthetic valve) and mitral annuloplasty (Physio ring #26) at [facility] in 2002, HFpEF, and mixed valve disease: severe bioprosthetic AS (MG 27, COY 0.78, LVEF 50%), severe MS (MG 10), moderate to severe MR, severe TR referred for surgical consultation of treatment options for her mixed valve disease. Patient is clinically stable; NYHA Class III. Dr. Menard have reviewed patient's history and pertinent clinical data. Echocardiogram from 4/21/23 showed normal BiV function, severe MS, moderate to severe MR, severe TR, severe bioprosthetic AS. Treatment options including surgical, transcatheter, and medical therapy were discussed with patient. Dr. Menard deemed patient a high surgical risk due to age, frailty and comorbidities transcatheter intervention is recommended. In order to determine which valve needs to be intervened on first, we recommend a ALBER to assess her heart valves. All questions were answered.

## 2024-02-28 NOTE — HISTORY OF PRESENT ILLNESS
[FreeTextEntry1] : Referred by Dr. Hoang Preston.  68F, former smoker, with complex PMH including hypothyroidism, seizure disorder, Stage 3 CKD (baseline CR ~1.7-2.0), severe renal artery stenosis, chronic anemia (baseline Hgb ~9-10, followed by Heme: Dr. Izquierdo, gets weekly Fe infusion), AT, Afib s/p AVN ablation and CRT-P (BoSci, 8/2022; on Eliquis), tortuous esophagus and Schatzi ring at [facility] [place] (per EDG in 2020), extensive vascular history (see below), HTN, HLD, CAD s/p CABG (LIMA to RI, ANTONIO to PDA) with SAVR (#19 Felder porcine bio-prosthetic valve) and mitral annuloplasty (Physio ring #26) at [facility] in 2002, HFpEF, and mixed valve disease: severe bioprosthetic AS (MG 27, COY 0.78, LVEF 50%), severe MS (MG 10), moderate to severe MR, severe TR referred for surgical consultation of treatment options for her mixed valve disease.   Vascular history:  - AAA s/p open repair in 2015 followed saccular AAA and contained rupture adjacent to previous stent graft s/p open thoracoabdominal aneurysm repair with 20mm tube graft and 6mm bypasses to the celiac, SMA, left renal, right renal (end to end) in 8/2020  - PAD s/p L pSFA stent/L-TIFFANY stent with occluded b/l SFA followed by L-internal iliac artery stent in 5/2021 c/b L-RP hematoma requiring 2 units PRBC with ongoing LLE claudication - Dr. Sandra recommending patient to undergo left fem-pop bypass with PTFE. Saw Dr. Soriano in June 2023 - per note: "Aorto-iliac duplex performed to evaluate patency reveals widely patent aortic graft, patent L TIFFANY stent, L EIA 50% stenosis noted, patent R pSFA stent noted. Carotid duplex performed to evaluate for progression of stenosis reveals b/l <50% stenosis due to fibrocalcific plaque, both vertebral arteries patent w/antegrade flow. Surgical bypass is currently the only reasonable option for this pt's LLE symptoms but encouraged pt to continue regular exercise to promote LE perfusion, thereby postponing the need for a surgery. Pt will continue medication and an exercise regimen." Saw Dr. Soriano on 12/5/23 with stable symptoms, no intervention recommended and will follow up in 6 months.   Additional consults: Pulm rand (Dr. Willoughby)  Pulmonary function testing done 11/20/2023 shows mild obstruction, FEV1 75% predicted, 10% improvement after bronchodilator. Lung volumes are probably normal. Diffusion capacity reduced. IMP: pulmonary emphysema; Pulmonary function testing and long smoking history suggest that much of her dyspnea could be COPD as well as her underlying coronary disease and history of heart failure. I have begun her on Anoro (LAMA LABA) for at least a 1-month trial.  GI eval by Dr. Yadav. Patient underwent upper GI endoscopy on 1/18/24 that showed:  - esophageal mucosal changes suspicious for short-segment Ro's esophagus. Biopsied - small hiatal hernia - gastritis  Patient reports feeling the same as last time she was seen. Endorses feeling tired, exercise tolerance remains 0.25 blocks limited by shortness of breath, chest discomfort, and LLE claudication. Endorsed improved orthopnea from 6 down to 4 pillows. Has occasional lightheadedness/dizziness that resolves with PRN meclizine, and occasional palpitations. Denies any recent syncope, LE edema, PND, fever, dysuria, or recent hospitalization. She has not been using any inhalers for her pulmonary disease.   She lives alone in a 2nd floor walk-up apartment, independent with basic ADLs, but has family helping with higher level ADLs such as grocery shopping and cleaning her apartment. Patient has full dentures. Patient is a retired [employer] .  2/26/24 labs:  WBC 5.04 Hgb 11.7 Hct 39.5 Plt 151 Cr 2.55 BNP 1564  Care Team:  Cardiologist: Dr. Hoang Preston Renal: Dr. Chaitanya Davis ([address] [place] [address], [phone]) Heme: Dr. Izquierdo ([address] [place] [address], [id number], [place]; [phone])

## 2024-02-28 NOTE — RESULTS/DATA
[TextEntry] : Carotid US 11/20/23:  IMPRESSION: 1. Since August 21, 2020, no hemodynamically significant stenosis of either carotid artery. 2. Diminished velocities in the bilateral CCA distal portions. Waveform morphology consistent with stated aortic stenosis. 3. Moderate atherosclerotic disease.  5MWT 11/2023: 10.78s, 11.35s, 11.46s KCCQ done: 11/20/23  EKG 11/20/23: a-sensed, v-paced rate 75  TTE 4/21/23 CONCLUSIONS:  1. Borderline normal left ventricular systolic function.  2. Mid and apical inferior septum, apical lateral segment, and apex are hypokinetic.  3. Normal right ventricular size and systolic function.  4. Severely dilated left atrium.  5. Bioprosthetic valve is seen in the aortic position with findings suggestive of significant prosthetic aortic stenosis.  6. Mild aortic regurgitation.  7. Mitral valve is moderately thickened and calcified with annuloplasty ring in the mitral position. Elevated gradients and pressure half time of 146 ms are consistent with severe mitral stenosis.  8. Moderate-to-severe mitral regurgitation.  9. Severe tricuspid regurgitation. 10. Pulmonary hypertension present, pulmonary artery systolic pressure is 63 mmHg. 11. Trivial pericardial effusion. 12. Compared to the previous TTE performed on 6/2/2022, there have been interval progression of valvular disease and pulmonary hypertension.  Cor CTA 8/21/20 (per Dr. Preston's note): findings similar to cor CTA from 2014 LIMA to D1 (?Ramus graft) Patent; Patent ANTONIO to PDA. LM <50%, LAD no significant stenosis, Circ no significant stenosis, Patent stent in OM2 RCA severe stenosis. PDA fills via RMIA graft. LVEF 70%.

## 2024-02-28 NOTE — RESULTS/DATA
[TextEntry] : 5MWT 11/2023: 10.78s, 11.35s, 11.46s KCCQ done: 11/20/23  EKG 11/20/23: a-sensed, v-paced rate 75  TTE 4/21/23 CONCLUSIONS:  1. Borderline normal left ventricular systolic function.  2. Mid and apical inferior septum, apical lateral segment, and apex are hypokinetic.  3. Normal right ventricular size and systolic function.  4. Severely dilated left atrium.  5. Bioprosthetic valve is seen in the aortic position with findings suggestive of significant prosthetic aortic stenosis.  6. Mild aortic regurgitation.  7. Mitral valve is moderately thickened and calcified with annuloplasty ring in the mitral position. Elevated gradients and pressure half time of 146 ms are consistent with severe mitral stenosis.  8. Moderate-to-severe mitral regurgitation.  9. Severe tricuspid regurgitation. 10. Pulmonary hypertension present, pulmonary artery systolic pressure is 63 mmHg. 11. Trivial pericardial effusion. 12. Compared to the previous TTE performed on 6/2/2022, there have been interval progression of valvular disease and pulmonary hypertension.  Cor CTA 8/21/20 (per Dr. Preston's note): findings similar to cor CTA from 2014 LIMA to D1 (?Ramus graft) Patent; Patent ANTONIO to PDA. LM <50%, LAD no significant stenosis, Circ no significant stenosis, Patent stent in OM2 RCA severe stenosis. PDA fills via RMIA graft. LVEF 70%.

## 2024-02-28 NOTE — PHYSICAL EXAM
[Well Nourished] : well nourished [No Acute Distress] : no acute distress [Frail] : frail [Normal Conjunctiva] : normal conjunctiva [Normal S1, S2] : normal S1, S2 [No Rub] : no rub [No Gallop] : no gallop [Clear Lung Fields] : clear lung fields [Good Air Entry] : good air entry [No Respiratory Distress] : no respiratory distress  [Soft] : abdomen soft [Non Tender] : non-tender [Normal Bowel Sounds] : normal bowel sounds [Normal Gait] : normal gait [No Edema] : no edema [No Cyanosis] : no cyanosis [No Clubbing] : no clubbing [No Rash] : no rash [No Skin Lesions] : no skin lesions [Moves all extremities] : moves all extremities [No Focal Deficits] : no focal deficits [Alert and Oriented] : alert and oriented [Normal Speech] : normal speech [Normal memory] : normal memory [de-identified] : supple [de-identified] : III/VI SM

## 2024-02-28 NOTE — PLAN
[TextEntry] : # mixed valve disease  - continue current medication regimen.  - follow up with Dr. Preston for ongoing cardiology care as scheduled.  - schedule ALBER to assess heart valves once cleared by GI for ALBER.  - CV surgery consult today with Dr. Menard.  - return to [facility] clinic after ALBER to discuss next steps or as needed.   # acute on chronic kidney disease, Cr 2.5 today - patient asked to follow up with her nephrologist, Dr. Davis, for further management.   I, Dr. Fuentes Gregory, personally performed the evaluation and management (E/M) services for this established patient who presents today with (a) new problem(s)/exacerbation of (an) existing condition(s). That E/M includes conducting the clinically appropriate interval history &/or exam, assessing all new/exacerbated conditions, and establishing a new plan of care. Today, my TAYLOR, noted herewith, was here to observe my evaluation and management service for this new problem/exacerbated condition and follow the plan of care established by me going forward.

## 2024-02-28 NOTE — PHYSICAL EXAM
[Well Nourished] : well nourished [No Acute Distress] : no acute distress [Frail] : frail [Normal Conjunctiva] : normal conjunctiva [Normal S1, S2] : normal S1, S2 [No Rub] : no rub [No Gallop] : no gallop [Clear Lung Fields] : clear lung fields [Good Air Entry] : good air entry [No Respiratory Distress] : no respiratory distress  [Soft] : abdomen soft [Non Tender] : non-tender [Normal Bowel Sounds] : normal bowel sounds [Normal Gait] : normal gait [No Edema] : no edema [No Cyanosis] : no cyanosis [No Clubbing] : no clubbing [No Rash] : no rash [No Skin Lesions] : no skin lesions [Moves all extremities] : moves all extremities [No Focal Deficits] : no focal deficits [Normal Speech] : normal speech [Alert and Oriented] : alert and oriented [Normal memory] : normal memory [de-identified] : III/VI SM  [de-identified] : supple

## 2024-02-28 NOTE — ASSESSMENT
[FreeTextEntry1] : 68F, former smoker, with complex PMH including hypothyroidism, seizure disorder, Stage 3 CKD (baseline CR ~1.7-2.0), severe renal artery stenosis, chronic anemia (baseline Hgb ~9-10, followed by Heme: Dr. Izquierdo, gets weekly Fe infusion), AT, Afib s/p AVN ablation and CRT-P (BoSci, 8/2022; on Eliquis), tortuous esophagus and Schatzi ring at [facility] (per EDG in 2020), extensive vascular history (see below), HTN, HLD, CAD s/p CABG (LIMA to RI, ANTONIO to PDA) with SAVR (#19 Felder porcine bio-prosthetic valve) and mitral annuloplasty (Physio ring #26) at [facility] in 2002, HFpEF, and mixed valve disease: severe bioprosthetic AS (MG 27, COY 0.78, LVEF 50%), severe MS (MG 10), moderate to severe MR, severe TR, who presents for follow up. Patient is clinically stable; NYHA III. Appreciate pulmonary and GI consultation. We will need formal clearance from gastroenterologist, Dr. Pedersen, for a ALBER to better evaluate patient heart valves. Patient to be seen today by Dr. Menard for surgical consultation. Schedule ALBER once GI clearance is obtained, then follow up with [facility] clinic after ALBER to discuss next steps. All questions were answered.

## 2024-02-28 NOTE — HISTORY OF PRESENT ILLNESS
[FreeTextEntry1] : Referred by Dr. Hoang Preston.  68F, former smoker, with complex PMH including hypothyroidism, seizure disorder, Stage 3 CKD (baseline CR ~1.7-2.0), severe renal artery stenosis, chronic anemia (baseline Hgb ~9-10, followed by Heme: Dr. Izquierdo, gets weekly Fe infusion), AT, Afib s/p AVN ablation and CRT-P ([employer]Sci, 8/2022; on Eliquis), tortuous esophagus and Schatzi ring at [facility] (per EDG in 2020), extensive vascular history (see below), HTN, HLD, CAD s/p CABG (LIMA to RI, ANTONIO to PDA) with SAVR (#19 Felder porcine bio-prosthetic valve) and mitral annuloplasty (Physio ring #26) at [facility] in 2002, HFpEF, and mixed valve disease: severe bioprosthetic AS (MG 27, COY 0.78, LVEF 50%), severe MS (MG 10), moderate to severe MR, severe TR, who presents for follow up.   Vascular history:  - AAA s/p open repair in 2015 followed saccular AAA and contained rupture adjacent to previous stent graft s/p open thoracoabdominal aneurysm repair with 20mm tube graft and 6mm bypasses to the celiac, SMA, left renal, right renal (end to end) in 8/2020  - PAD s/p L pSFA stent/L-TIFFANY stent with occluded b/l SFA followed by L-internal iliac artery stent in 5/2021 c/b L-RP hematoma requiring 2 units PRBC with ongoing LLE claudication - Dr. Sandra recommending patient to undergo left fem-pop bypass with PTFE. Saw Dr. Soriano in June 2023 - per note: "Aorto-iliac duplex performed to evaluate patency reveals widely patent aortic graft, patent L TIFFANY stent, L EIA 50% stenosis noted, patent R pSFA stent noted. Carotid duplex performed to evaluate for progression of stenosis reveals b/l <50% stenosis due to fibrocalcific plaque, both vertebral arteries patent w/antegrade flow. Surgical bypass is currently the only reasonable option for this pt's LLE symptoms but encouraged pt to continue regular exercise to promote LE perfusion, thereby postponing the need for a surgery. Pt will continue medication and an exercise regimen." Saw Dr. Soriano on 12/5/23 with stable symptoms, no intervention recommended and will follow up in 6 months.   Patient was seen in [facility] clinic for initial consult in 11/20/23 at which time patient reported progressive fatigue, decreased exercise tolerance of 0.25 block limited by chest pain, shortness of breath and LLE claudication. Also endorsed shortness of breath with minimal activity, occasional lightheadedness/dizziness, palpitations and stable 3-pillow orthopnea. She lives alone in a 2nd floor walk-up apartment, independent with basic ADLs, but has family helping with higher level ADLs such as grocery shopping and cleaning her apartment. Patient has full dentures. Patient is a retired [employer] . Recommendation at that time was for GI clearance given history of tortuous esophagus, Schatzki ring at [facility]; PFT/pulm eval given smoking history and surgical evaluation.  Since patient has had the following: Carotid US 11/20/23:  IMPRESSION: 1. Since August 21, 2020, no hemodynamically significant stenosis of either carotid artery. 2. Diminished velocities in the bilateral CCA distal portions. Waveform morphology consistent with stated aortic stenosis. 3. Moderate atherosclerotic disease.  Pulm eval (Dr. Willoughby)  Pulmonary function testing done 11/20/2023 shows mild obstruction, FEV1 75% predicted, 10% improvement after bronchodilator. Lung volumes are probably normal. Diffusion capacity reduced. IMP: pulmonary emphysema; Pulmonary function testing and long smoking history suggest that much of her dyspnea could be COPD as well as her underlying coronary disease and history of heart failure. I have begun her on Anoro (LAMA LABA) for at least a 1-month trial.  GI eval by Dr. Yadav. Patient underwent upper GI endoscopy on 1/18/24 that showed:  - esophageal mucosal changes suspicious for short-segment Ro's esophagus. Biopsied - small hiatal hernia - gastritis  Patient reports feeling the same as last time she was seen. Endorses feeling tired, exercise tolerance remains 0.25 blocks limited by shortness of breath, chest discomfort, and LLE claudication. Endorsed improved orthopnea from 6 down to 4 pillows. Has occasional lightheadedness/dizziness that resolves with PRN meclizine, and occasional palpitations. Denies any recent syncope, LE edema, PND, fever, dysuria, or recent hospitalization. She has not been using any inhalers for her pulmonary disease.   2/26/24 labs:  WBC 5.04 Hgb 11.7 Hct 39.5 Plt 151 Cr 2.55 BNP 1564  Care Team:  Cardiologist: Dr. Hoang Preston Renal: Dr. Chaitanya Davis ([address] [place] [address], [phone]) Heme: Dr. Izquierdo ([address] [place] [address], [id number], [place]; [phone])

## 2024-03-04 NOTE — H&P ADULT - ASSESSMENT
Patient called back and informed of Dr. Quach's response. She understands to call for an appointment if no improvement after 24-48 hours.   A/p    61 yo AAF, presenting to [facility] ed for generalized weakness and new onset hypotension and bradycardia.  PMH of TASNEEM/ CAD s/p PCI and CABG/ PAD s/p stent in LLE/ AS s/p bio AVR and triple A s/p repair.

## 2024-03-25 NOTE — H&P ADULT - NSHPPHYSICALEXAM_GEN_ALL_CORE
.  VITAL SIGNS:  T(C): 36.9 (03-25-24 @ 20:02), Max: 36.9 (03-25-24 @ 20:02)  T(F): 98.5 (03-25-24 @ 20:02), Max: 98.5 (03-25-24 @ 20:02)  HR: 75 (03-25-24 @ 20:02) (75 - 86)  BP: 118/67 (03-25-24 @ 20:02) (106/55 - 138/67)  BP(mean): --  RR: 18 (03-25-24 @ 20:02) (17 - 18)  SpO2: 100% (03-25-24 @ 20:02) (100% - 100%)  Wt(kg): --    PHYSICAL EXAM:    Constitutional: resting comfortably in bed; NAD  Head: NC/AT  Eyes: PERRL, EOMI, anicteric sclera  ENT: no nasal discharge; uvula midline, no oropharyngeal erythema or exudates; MMM  Neck: supple; no JVD or thyromegaly  Respiratory: CTA B/L; no W/R/R, no retractions  Cardiac: +S1/S2; RRR; no M/R/G; PMI non-displaced  Gastrointestinal: abdomen soft, NT/ND; no rebound or guarding; +BSx4  Back: spine midline, no bony tenderness or step-offs; no CVAT B/L  Extremities: WWP, no clubbing or cyanosis; no peripheral edema  Musculoskeletal: NROM x4; no joint swelling, tenderness or erythema  Vascular: 2+ radial, DP/PT pulses B/L  Dermatologic: skin warm, dry and intact; no rashes, wounds, or scars  Lymphatic: no submandibular or cervical LAD  Neurologic: AAOx3; CNII-XII grossly intact; no focal deficits  Psychiatric: affect and characteristics of appearance, verbalizations, behaviors are appropriate .  VITAL SIGNS:  T(C): 36.9 (03-25-24 @ 20:02), Max: 36.9 (03-25-24 @ 20:02)  T(F): 98.5 (03-25-24 @ 20:02), Max: 98.5 (03-25-24 @ 20:02)  HR: 75 (03-25-24 @ 20:02) (75 - 86)  BP: 118/67 (03-25-24 @ 20:02) (106/55 - 138/67)  BP(mean): --  RR: 18 (03-25-24 @ 20:02) (17 - 18)  SpO2: 100% (03-25-24 @ 20:02) (100% - 100%)  Wt(kg): --    PHYSICAL EXAM:    Constitutional: thin female, resting comfortably in bed; NAD  Head: NC/AT  Eyes: PERRL, EOMI, anicteric sclera  ENT: no nasal discharge; dry MM  Neck: supple; no JVD or thyromegaly  Respiratory: CTA B/L; no W/R/R, no retractions  Cardiac: +S1/S2; RRR; no M/R/G; some TTP of right-middle chest wall  Gastrointestinal: abdomen soft, diffusely TTP worst in epigastric region; no rebound or guarding; +BSx4  Back: spine midline, no bony tenderness or step-offs; no CVAT B/L  Extremities: WWP, no clubbing or cyanosis; no peripheral edema  Musculoskeletal: NROM x4; no joint swelling, tenderness or erythema  Dermatologic: skin warm, dry and intact; no rashes, wounds, or scars  Neurologic: AAOx3; CNII-XII grossly intact; no focal deficits  Psychiatric: affect and characteristics of appearance, verbalizations, behaviors are appropriate .  VITAL SIGNS:  T(C): 36.9 (03-25-24 @ 20:02), Max: 36.9 (03-25-24 @ 20:02)  T(F): 98.5 (03-25-24 @ 20:02), Max: 98.5 (03-25-24 @ 20:02)  HR: 75 (03-25-24 @ 20:02) (75 - 86)  BP: 118/67 (03-25-24 @ 20:02) (106/55 - 138/67)  BP(mean): --  RR: 18 (03-25-24 @ 20:02) (17 - 18)  SpO2: 100% (03-25-24 @ 20:02) (100% - 100%)  Wt(kg): --    PHYSICAL EXAM:    Constitutional: thin female, resting comfortably in bed; NAD  Head: NC/AT  Eyes: PERRL, EOMI, anicteric sclera  ENT: no nasal discharge; dry MM  Neck: supple; no JVD or thyromegaly  Respiratory: CTA B/L; no W/R/R, no retractions  Cardiac: +S1/S2; RRR; no M/R/G; some TTP of right-middle chest wall  Gastrointestinal: abdomen soft, diffusely TTP worst in epigastric region; no rebound or guarding; +BSx4  Back: spine midline, no bony tenderness or step-offs; no CVAT B/L  Extremities: WWP, no clubbing or cyanosis; no peripheral edema  Musculoskeletal: NROM x4; no joint swelling, tenderness or erythema  Dermatologic: skin warm, dry and intact; large midline scar (well healed) extending from chest to groin  Neurologic: AAOx3; CNII-XII grossly intact; no focal deficits  Psychiatric: affect and characteristics of appearance, verbalizations, behaviors are appropriate

## 2024-03-25 NOTE — ED PROVIDER NOTE - OBJECTIVE STATEMENT
67 yo female, former smoker, with PMHx of HTN, hyperlipidemia, CAD s/p prior PCI and CABG, bioAVR/mitral annuloplasty in 2002, PAD s/p LSFA  and L TIFFANY (history of occluded bilateral SFA), AAA (last repaired in 2020), chronic anemia (receives Iron infusions, last 8/23), hypothyroid, CKD Stage III, Atach and Afib (non anticoagulated),  +BI-V PPM implant on 8/24. for 3 days has been experiencing exertional sob. also having back/chest pain that radiates from one to the other. +constipation as well, no bm in 5 days. has not been able to attend to her ADLs as she feels to sob. no leg swelling or pain.

## 2024-03-25 NOTE — ED ADULT NURSE NOTE - NSFALLHARMRISKINTERV_ED_ALL_ED
Communicate risk of Fall with Harm to all staff, patient, and family/Provide visual cue: red socks, yellow wristband, yellow gown, etc/Reinforce activity limits and safety measures with patient and family/Bed in lowest position, wheels locked, appropriate side rails in place/Call bell, personal items and telephone in reach/Instruct patient to call for assistance before getting out of bed/chair/stretcher/Non-slip footwear applied when patient is off stretcher/[place] to call system/Physically safe environment - no spills, clutter or unnecessary equipment/Purposeful Proactive Rounding/Room/bathroom lighting operational, light cord in reach Assistance OOB with selected safe patient handling equipment if applicable/Assistance with ambulation/Communicate risk of Fall with Harm to all staff, patient, and family/Monitor gait and stability/Provide patient with walking aids/Provide visual cue: red socks, yellow wristband, yellow gown, etc/Reinforce activity limits and safety measures with patient and family/Bed in lowest position, wheels locked, appropriate side rails in place/Call bell, personal items and telephone in reach/Instruct patient to call for assistance before getting out of bed/chair/stretcher/Non-slip footwear applied when patient is off stretcher/[place] to call system/Physically safe environment - no spills, clutter or unnecessary equipment/Purposeful Proactive Rounding/Room/bathroom lighting operational, light cord in reach

## 2024-03-25 NOTE — H&P ADULT - PROBLEM SELECTOR PLAN 7
#HTN  #HLD  Hx of CAD s/p CABG, bioAVR/ mitral annuloplasty in 2002, Hx of PCI (unknown date). S/p BiV-PPM placement.  EKG: paced rhythm. Trops negative.   Home meds: Crestor 40mg qhs, lopressor 50mg bid, losartan 25mg qd #HTN  #HLD  Hx of CAD s/p CABG, bioAVR/ mitral annuloplasty in 2002, Hx of PCI (unknown date). S/p BiV-PPM placement.  EKG: paced rhythm. Trops negative.   Home meds: Crestor 40mg qhs, lopressor 50mg bid, losartan 25mg qd.  - c/w crestor TI --> atorvastatin 80mg qhs  - hold losartan iso CHERRI  - hold lopressor given concern for possible bleed Hx of HFpEF.   Last TTE on file 04/2023: Borderline normal left ventricular systolic function 50%. Mid and apical inferior septum, apical lateral segment, and apex are hypokinetic. Severely dilated left atrium. Significant bioprosthetic aortic valve stenosis.   prosthetic aortic stenosis. Severe mitral stenosis. Moderate-to-severe mitral regurgitation. Severe tricuspid regurgitation. PASP 63 mmHg.  Home meds: lopressor 50g bid, lasix 40mg qd  - hold home meds

## 2024-03-25 NOTE — ED ADULT NURSE NOTE - OBJECTIVE STATEMENT
Pt is a 69 yo F c/o chest pain on and off for three days. Pain is in both sides, described as throbbing and worse with exertion. Some KRAFT and palpitations, denies radiation of pain to abd, arms, neck/jaw. Also c/o back pain but attributes it to posture.   Pmhx afib, CAD s/p PCI and pacemaker placement; on eliquis. Pt is unsure of when procedures were done.   On exam, pt speaking in full and complete sentences, respirations even and unlabored. Pt is tearful but calm. ECG completed in triage. Placed on continuous cardiac monitoring upon room assignment, PIV placed. Pt is a 67 yo F c/o chest pain on and off for three days. Pain is in both sides, described as throbbing and worse with exertion. Some KRAFT and palpitations, denies radiation of pain to abd, arms, neck/jaw. Also c/o back pain but attributes it to posture.   Pmhx afib, CAD s/p PCI and pacemaker placement; on eliquis. Pt is unsure of when procedures were done.   On exam, pt speaking in full and complete sentences, respirations even and unlabored. Pt is tearful but calm. Ambulates with cane. ECG completed in triage. Placed on continuous cardiac monitoring upon room assignment, PIV placed.

## 2024-03-25 NOTE — H&P ADULT - PROBLEM SELECTOR PLAN 8
Hx of peripheral artery disease.  PAD s/p L pSFA stent/L-TIFFANY stent with occluded b/l SFA followed by L-internal iliac artery stent in 5/2021 c/b L-RP hematoma requiring 2 units PRBC with ongoing LLE claudication.  Home med: cilostazol 50mg bid  - c/w home med #HTN  #HLD  Hx of CAD s/p CABG, bioAVR/ mitral annuloplasty in 2002, Hx of PCI (unknown date). S/p BiV-PPM placement.  EKG: paced rhythm. Trops negative.   Home meds: Crestor 40mg qhs, lopressor 50mg bid, losartan 25mg qd.  - c/w crestor TI --> atorvastatin 80mg qhs  - hold losartan iso CHERRI  - hold lopressor given concern for possible bleed #HTN  #HLD  Hx of CAD s/p CABG, bioAVR/ mitral annuloplasty in 2002, Hx of PCI (unknown date). S/p BiV-PPM placement.  EKG: paced rhythm. Trops negative.   Home meds: Crestor 40mg qhs, lopressor 50mg bid, losartan 25mg qd.  - c/w crestor TI --> atorvastatin 80mg qhs  - hold losartan iso CHERRI  - hold lopressor given concern for possible bleed  - f/u repeat trops

## 2024-03-25 NOTE — ED ADULT NURSE REASSESSMENT NOTE - NS ED NURSE REASSESS COMMENT FT1
Pt assessed 15 minutes into blood transfusion. Pt not complaining of any acute or new symptoms at this time. VS updated- see flowsheet. Pt instructed to use call bell to notify staff if any symptoms arise.

## 2024-03-25 NOTE — H&P ADULT - PROBLEM SELECTOR PLAN 1
Patient p/w KRAFT and chest pain, VSS.  Associated with intermittent chest pain although does not appear to be pleuritic in nature. Patient p/w KRAFT and chest pain, VSS. No evidence of hypoxia.   Associated with intermittent chest pain although does not appear to be pleuritic in nature.  Denies cough, fevers, s/s of bleeding. No recent travel, immobilization although has been walking less over the past week.   Denies hx of cancer, clotting disorders, no relevant family hx.  Labs showing elevated D-dimer 2831, p-BNP elevated to 3727  CXR + CT showing bibasilar atelectasis over left lung zones.   Ddx includes symptomatic anemia, PE, HF exacerbation (less likely).   - plan for V/Q scan v. CTA if Cr improving  - treat anemia as below  - given concern for bleeding and VSS stable, monitor off full dose AC tonight, can start heparin if  Hgb remains stable on AM labs Patient p/w KRAFT and chest pain, VSS. No evidence of hypoxia.   Associated with intermittent chest pain although does not appear to be pleuritic in nature.  Denies cough, fevers, s/s of bleeding. No recent travel, immobilization although has been walking less over the past week.   Denies hx of cancer, clotting disorders, no relevant family hx.  Labs showing elevated D-dimer 2831, p-BNP elevated to 3727  CXR + CT showing bibasilar atelectasis over left lung zones.   Ddx includes symptomatic anemia, severe AS, PE, HF exacerbation (less likely), ACS (unlikely).  - f/u B/L LE duplex   - consider V/Q scan v. CTA if Cr improving   - treat anemia as below  - f/ u TTE  - CTSX consult in the AM  - given concern for bleeding and VSS stable, monitor off full dose AC tonight, can start heparin if  Hgb remains stable on AM labs  - consider GI consult in the AM Patient p/w KRAFT and chest pain, VSS. No evidence of hypoxia.   Associated with intermittent chest pain although does not appear to be pleuritic in nature.  Denies cough, fevers, s/s of bleeding. No recent travel, immobilization although has been walking less over the past week.   Denies hx of cancer, clotting disorders, no relevant family hx.  Labs showing elevated D-dimer 2831, p-BNP elevated to 3727  CXR + CT showing bibasilar atelectasis over left lung zones.   Ddx includes symptomatic anemia, severe AS, PE, HF exacerbation (less likely), ACS (unlikely).  - f/u B/L LE duplex   - consider V/Q scan v. CTA if Cr improving   - treat anemia as below  - f/ u TTE  - CTSX consult in the AM  - given concern for bleeding and VSS stable, monitor off full dose AC tonight, can start heparin if  Hgb remains stable on AM labs

## 2024-03-25 NOTE — ED PROVIDER NOTE - PROGRESS NOTE DETAILS
ct with worsening aortic dilatio, this is distal on the aorta and likely not the cause for patines chest pain. d/w vascualr - reccomending urgent, though nonemergent ct aniogram of aorta, will defer imaging at this time due to gfr 16, will admit for medical optimization prior to ct angiogram.

## 2024-03-25 NOTE — PATIENT PROFILE ADULT - FUNCTIONAL ASSESSMENT - BASIC MOBILITY 6.
4-calculated by average/Not able to assess (calculate score using [facility] averaging method)

## 2024-03-25 NOTE — ED PROVIDER NOTE - CLINICAL SUMMARY MEDICAL DECISION MAKING FREE TEXT BOX
no sob in ED, no pain in ED. consider PE as patient nonanticoagulted. aortic dissection a possibility. acs less likely considering presence of back pain. hgb 6.9, significantly lower than recent values. patient reports she has not had BM for 5 days. consider possible pariaortic bleed, though not likely. will eval with noncon-ct to eval for possibility of collection next to aorta, will not obtain CTA at this time as patinet stable, well appearing, with normal vital signs and benefits of this study in patient with advanced and worsened than baseline renal function do not outweigh downside. prbc ordered. d-dimer elevated, unable to obtain v/q scan at this time as nuclear medicine not in ED, can be obtained as inpatient.

## 2024-03-25 NOTE — H&P ADULT - PROBLEM SELECTOR PLAN 12
F: none  E: replete as needed  N: DASH  DVT ppx: hold iso bleed, start in the AM if Hgb stable  Activity: ambulate w assistance  Dispo: F

## 2024-03-25 NOTE — H&P ADULT - PROBLEM SELECTOR PLAN 2
Patient p/w KRAFT and chest pain, VSS found to be anemic with Hgb 6.9.  Patient with hx of TASNEEM, gets once or twice weekly iron infusions outpatient, hematologist is Dr. Izquierdo.  No s/s of bleeding, baseline hgb reportedly 9-10.  Given 1u pRBC in the ED.  Ddx includes GIB (although pt constipated, no other signs of bleed), AoCD, aortic aneurysm leak, abdominal bleeding.  - monitor post-transfusion CBC --> improved to Hgb 8.4  - maintain active T&S  - maintain 2 large-bore IVs  - transfuse if Hgb < 7 Patient p/w KRAFT and chest pain, VSS found to be anemic with Hgb 6.9.  Patient with hx of TASNEEM, gets once or twice weekly iron infusions outpatient, hematologist is Dr. Izquierdo.  No s/s of bleeding, baseline hgb reportedly 9-10. On eliquis 2.5mg bid, does not take any antiplatelet agents.   Given 1u pRBC in the ED.  Ddx includes GIB (although pt constipated, no other signs of bleed), AoCD, aortic aneurysm leak, abdominal bleeding.  - monitor post-transfusion CBC --> improved to Hgb 8.4  - maintain active T&S  - maintain 2 large-bore IVs  - transfuse if Hgb < 7 Patient p/w KRAFT and chest pain, VSS found to be anemic with Hgb 6.9.  Patient with hx of TASNEEM, gets once or twice weekly iron infusions outpatient, hematologist is Dr. Izquierdo.  No s/s of bleeding, baseline hgb reportedly 9-10. On eliquis 2.5mg bid, does not take any antiplatelet agents.   Given 1u pRBC in the ED.  Ddx includes GIB (although pt constipated, no other signs of bleed), AoCD, aortic aneurysm leak, abdominal bleeding.  - monitor post-transfusion CBC --> improved to Hgb 8.4  - f/u hemolysis labs (hx of valve replacement)  - maintain active T&S  - maintain 2 large-bore IVs  - transfuse if Hgb < 7 Patient p/w KRAFT and chest pain, VSS found to be anemic with Hgb 6.9.  Patient with hx of TASNEEM, gets once or twice weekly iron infusions outpatient, hematologist is Dr. Izquierdo.  No s/s of bleeding, baseline hgb reportedly 9-10. On eliquis 2.5mg bid, does not take any antiplatelet agents.   Given 1u pRBC in the ED.  Ddx includes GIB (although pt constipated, no other signs of bleed), AoCD, aortic aneurysm leak, abdominal bleeding.  - monitor post-transfusion CBC --> improved to Hgb 8.4  - f/u hemolysis labs (hx of valve replacement)  - maintain active T&S  - maintain 2 large-bore IVs  - transfuse if Hgb < 7  - GI consult

## 2024-03-25 NOTE — H&P ADULT - PROBLEM SELECTOR PLAN 5
Hx of Afib and Atach. Known to Dr. Jasso.   Found to be in slow AFib with HR to 30s during last admission, now s/p AVN ablation and Bi-V PPM.   Home meds: eliquis 2.5mg bid, lopressor 50mg bid  - hold home meds iso possible bleed Patient reporting hx of constipation x 1 week.   Mild abdominal pain, on exam no guarding or rebound present.  Hx of abdominal surgery, but no alarm symptoms present.   - start miralax, senna  - monitor for BMs  - consider lactulose v enema if needed

## 2024-03-25 NOTE — H&P ADULT - PROBLEM SELECTOR PLAN 3
- urine lytes  - bladder scan x 1  - monitor BMP  - encourage PO fluid intake, consider bolus v. mIVF if Cr worsening  - avoid nephrotoxic agents Patient p/w CHERRI on CKD (baseline reportedly 1.7-2.0 outpatient).   Labs on admission: BUN/Cr 48/3.00  Reports poor PO intake over the past week due to feeling constipated.   Likely CHERRI 2/2 prerenal causes.    - urine lytes  - bladder scan x 1  - monitor BMP  - encourage PO fluid intake, consider bolus v. mIVF if Cr worsening  - avoid nephrotoxic agents

## 2024-03-25 NOTE — H&P ADULT - NSHPLABSRESULTS_GEN_ALL_CORE
6.9    7.18  )-----------( 210      ( 25 Mar 2024 14:43 )             22.8       03-25    145  |  111<H>  |  48<H>  ----------------------------<  103<H>  4.8   |  19<L>  |  3.00<H>    Ca    10.7<H>      25 Mar 2024 14:43  Mg     2.4     03-25    TPro  7.7  /  Alb  4.4  /  TBili  0.2  /  DBili  x   /  AST  23  /  ALT  13  /  AlkPhos  100  03-25              Urinalysis Basic - ( 25 Mar 2024 14:43 )    Color: x / Appearance: x / SG: x / pH: x  Gluc: 103 mg/dL / Ketone: x  / Bili: x / Urobili: x   Blood: x / Protein: x / Nitrite: x   Leuk Esterase: x / RBC: x / WBC x   Sq Epi: x / Non Sq Epi: x / Bacteria: x            Lactate Trend            CAPILLARY BLOOD GLUCOSE

## 2024-03-25 NOTE — H&P ADULT - PROBLEM SELECTOR PLAN 4
Pt p/w chest and abdominal pain with ?radiation to the back.  Hx of AAA s/p open repair in 2015 followed saccular AAA and contained rupture adjacent to previous stent graft s/p open thoracoabdominal aneurysm repair with graft and bypasses to the celiac, SMA, left renal, right renal in 8/2020.  CT A/P showing post surgical changes from prior open thoracoabdominal aortic bypass graft with new focal dilatation of the distalmost aspect of the graft measuring 4.4 x 6.1 cm just above the aortic bifurcation and previously measuring 3.2 x 4.1 cm on 6/11/2021.   Vascular consulted in the ED, no acute interventions.   - monitor BP  - f/u vascular recs  - plan for CTA C/A/P to better evaluate the AAA when CHERRI improves Pt p/w chest and abdominal pain with ?radiation to the back.  Hx of AAA s/p open repair in 2015 followed saccular AAA and contained rupture adjacent to previous stent graft s/p open thoracoabdominal aneurysm repair with graft and bypasses to the celiac, SMA, left renal, right renal in 8/2020.  CT A/P showing post surgical changes from prior open thoracoabdominal aortic bypass graft with new focal dilatation of the distalmost aspect of the graft measuring 4.4 x 6.1 cm just above the aortic bifurcation and previously measuring 3.2 x 4.1 cm on 6/11/2021.   Vascular consulted in the ED, no acute interventions.   - monitor BP  - f/u vascular recs  - plan for CTA C/A/P to better evaluate the AAA when CHERRI improves vs abd US

## 2024-03-25 NOTE — ED PROVIDER NOTE - PHYSICAL EXAMINATION
General: Awake, alert and oriented. No acute distress. Well developed, hydrated and nourished. Appears stated age.  Skin: Skin in warm, dry and intact without rashes or lesions. Appropriate color for ethnicity  HENMT: head normocephalic and atraumatic; bilateral external ears without swelling. no nasal discharge. moist oral mucosa. supple neck, trachea midline  EYES: Conjunctiva clear. nonicteric sclera. EOM intact, Eyelids are normal in appearance without swelling or lesions.  Cardiac: well perfused, s1, s2, rrr  Respiratory: breathing comfortably on room air. no audible wheezing or stridor, lungs ctab, no chest wall tenderness to palpation  Abdominal: nondistended  MSK: Neck and back are without deformity, visible external skin changes, or signs of trauma. Curvature of the cervical, thoracic, and lumbar spine are within normal limits. no external signs of trauma. no apparent deficits in ROM of any extremity. no leg swelling or tenderness  Neurological: The patient is awake, alert and oriented to person, place, and time with normal speech. CN 2-12 grossly intact. no apparent deficits. Memory is normal and thought process is intact.  Psychiatric: Appropriate mood and affect. Good judgement and insight. General: Awake, alert and oriented. No acute distress. Well developed, hydrated and nourished. Appears stated age.  Skin: Skin in warm, dry and intact without rashes or lesions. Appropriate color for ethnicity  HENMT: head normocephalic and atraumatic; bilateral external ears without swelling. no nasal discharge. moist oral mucosa. supple neck, trachea midline  EYES: Conjunctiva clear. nonicteric sclera. EOM intact, Eyelids are normal in appearance without swelling or lesions.  Cardiac: well perfused, s1, s2, rrr  Respiratory: breathing comfortably on room air. no audible wheezing or stridor, lungs ctab, no chest wall tenderness to palpation  Abdominal: nondistended, sft, nontender  MSK: Neck and back are without deformity, visible external skin changes, or signs of trauma. Curvature of the cervical, thoracic, and lumbar spine are within normal limits. no external signs of trauma. no apparent deficits in ROM of any extremity. no leg swelling or tenderness  Neurological: The patient is awake, alert and oriented to person, place, and time with normal speech. CN 2-12 grossly intact. no apparent deficits. Memory is normal and thought process is intact.  Psychiatric: Appropriate mood and affect. Good judgement and insight.

## 2024-03-25 NOTE — H&P ADULT - NSHPSOCIALHISTORY_GEN_ALL_CORE
Former smoker, quit 20 years ago. Denies drinking or other drug use.   Lives alone, ambulates with a cane. Does not have help at home. States she has two sisters who are nearby that she talks to regularly.

## 2024-03-25 NOTE — H&P ADULT - PROBLEM SELECTOR PLAN 10
Hx of depression, home med: sertraline 50mg qd.  - c/w home med Hx of hypothyroidism, home med: synthroid 75mcg.  - c/w home med

## 2024-03-25 NOTE — H&P ADULT - PROBLEM SELECTOR PLAN 11
F: none  E: replete as needed  N: DASH  DVT ppx: hold iso bleed, start in the AM if Hgb stable  Activity: ambulate w assistance  Dispo: F Hx of depression, home med: sertraline 50mg qd.  - c/w home med

## 2024-03-25 NOTE — H&P ADULT - PROBLEM SELECTOR PLAN 6
Hx of HFpEF.   Last TTE on file 04/2023: Borderline normal left ventricular systolic function 50%. Mid and apical inferior septum, apical lateral segment, and apex are hypokinetic. Severely dilated left atrium. Significant bioprosthetic aortic valve stenosis.   prosthetic aortic stenosis. Severe mitral stenosis. Moderate-to-severe mitral regurgitation. Severe tricuspid regurgitation. PASP 63 mmHg.  Home meds: lopressor 50g bid, lasix 40mg qd  - hold home meds Hx of Afib and Atach. Known to Dr. Jasso.   Found to be in slow AFib with HR to 30s during last admission, now s/p AVN ablation and Bi-V PPM.   Home meds: eliquis 2.5mg bid, lopressor 50mg bid  - hold home meds iso possible bleed

## 2024-03-25 NOTE — H&P ADULT - ASSESSMENT
Patient is a 67yo F former smoker (quit 20 year ago with PMH of hypothyroidism, seizure disorder, Stage 3 CKD (baseline CR ~1.7-2.0), severe renal artery stenosis, chronic anemia (baseline Hgb ~9-10, gets weekly Fe infusion last infusion 3/22/24), AT, Afib s/p AVN ablation and CRT-P (8/2022; on Eliquis), HTN, HLD, CAD s/p CABG, AVR (porcine bio-prosthetic valve) and mitral annuloplasty, HFpEF, AAA s/p open repair in 2015 with complications, PAD s/p L pSFA stent/L-TIFFANY stent/L-IIA stent who presents with intermittent chest pain and KRAFT x 3 days, admitted for symptomatic anemia and r/o PE.

## 2024-03-25 NOTE — H&P ADULT - NSICDXFAMILYHX_GEN_ALL_CORE_FT
pt works in construction and low back pain since  yesterday.  doesn't radiate down either leg.  no problem with bowel or bladder control . can't bend over fully .  pain worse with position change FAMILY HISTORY:  FH: myocardial infarction, in Dad (at age 50s)

## 2024-03-25 NOTE — H&P ADULT - PROBLEM SELECTOR PLAN 9
Hx of hypothyroidism, home med: synthroid 75mcg.  - c/w home med Hx of peripheral artery disease.  PAD s/p L pSFA stent/L-TIFFANY stent with occluded b/l SFA followed by L-internal iliac artery stent in 5/2021 c/b L-RP hematoma requiring 2 units PRBC with ongoing LLE claudication.  Home med: cilostazol 50mg bid  - c/w home med

## 2024-03-25 NOTE — CONSULT NOTE ADULT - SUBJECTIVE AND OBJECTIVE BOX
Vascular Attending:  Dr. Sandra     Chief Complaint: chest pain for 3 days       HPI:  67 yo female, former smoker, with PMHx of HTN, hyperlipidemia, CAD s/p prior PCI and CABG, bioAVR/mitral annuloplasty in 2002, PAD s/p LSFA  and L TIFFANY (history of occluded bilateral SFA), AAA (last repaired in 2020), chronic anemia (receives Iron infusions, last 8/23), hypothyroid, CKD Stage III, Atach and Afib (non anticoagulated),  +BI-V PPM implant on 8/24. for 3 days has been experiencing exertional sob. also having back/chest pain that radiates from one to the other. +constipation as well, no bm in 5 days. has not been able to attend to her ADLs as she feels to sob. no leg swelling or pain.    Vascular addendum:  PT was seen at bedside in the ED, she states that she had been having chronic back pain that started about 2-3 years ago but it has been progressively gotten worse, unable to recall when exactly. She also states that she has been having chest pain that comes and goes but has been the worse the past three days for her currently, she has tried OTC medication and does not seem to help. She said last time she saw Dr. Sandra has been over a year ago since his office has been moved. She complained of claudication in her leg that has gotten worse where she has trouble walking even a block. she states that she is able to only get a couple steps in before she needs to sit down which improves her pain. Her last appointment to her cardiologist was about 2 months ago where they started her on eliquis 2.5 BID and she states that she is compliant with her medication regime. She mentioned that her last BM was a week ago and has chronic constipation. ED had done a CT A/P without contrast due to her CHERRI - GFR is 16 (baseline 30). Cr. is elevated to 3 now. CT scan shows: post surgical changes from prior open thoracoabdominal aortic bypass graft with new focal dilatation of the distalmost aspect of the graft measuring 4.4 x 6.1 cm just above the aortic bifurcation and previously measuring 3.2 x 4.1 cm on 6/11/2021.      PAST MEDICAL & SURGICAL HISTORY:  Atherosclerosis of coronary artery  CAD (coronary artery disease)      Peripheral vascular disease  PVD (peripheral vascular disease)      Anemia  Anemia      Hypothyroidism  Hypothyroidism      Gastroesophageal reflux disease  GERD (gastroesophageal reflux disease)      Hyperlipidemia  Hyperlipidemia      Essential hypertension  HTN (hypertension)      Seizure      Anxiety      Depression, unspecified depression type      Chronic kidney disease, unspecified CKD stage      Status post aorto-coronary artery bypass graft  2012      Atherosclerosis of coronary artery bypass graft(s), unspecified, with angina pectoris with documented spasm      H/O aortic valve replacement  Bioprosthetic      Ruptured aortic aneurysm  surgery august 2020      Abdominal aortic aneurysm (AAA) without rupture  2015          REVIEW OF SYSTEMS  All neg unless otherwise mentioned in HPI.       MEDICATIONS  (STANDING):  morphine  - Injectable 2 milliGRAM(s) IV Push Once    MEDICATIONS  (PRN):      Allergies    No Known Allergies    Intolerances        SOCIAL HISTORY:    FAMILY HISTORY:  FH: myocardial infarction  in Dad (at age 50s)        Vital Signs Last 24 Hrs  T(C): 36.9 (25 Mar 2024 20:02), Max: 36.9 (25 Mar 2024 20:02)  T(F): 98.5 (25 Mar 2024 20:02), Max: 98.5 (25 Mar 2024 20:02)  HR: 75 (25 Mar 2024 20:02) (75 - 86)  BP: 118/67 (25 Mar 2024 20:02) (106/55 - 138/67)  BP(mean): --  RR: 18 (25 Mar 2024 20:02) (17 - 18)  SpO2: 100% (25 Mar 2024 20:02) (100% - 100%)    Parameters below as of 25 Mar 2024 20:02  Patient On (Oxygen Delivery Method): room air        PHYSICAL EXAM:  Constitutional: NAD, Aox3  Respiratory: RA  Cardiovascular: NSR  Gastrointestinal: soft, Non distended, mild tenderness to palpation in all quadrants. no dixon sign.   Extremities: warm, well perfused, motor sensory function intact.   Vascular: LLE: palpable fem, biphasic pop/DP/PT  RLE: palpable fem, biphasic POP, DP, PT   no wounds or ulcers.           LABS:                        6.9    7.18  )-----------( 210      ( 25 Mar 2024 14:43 )             22.8     03-25    145  |  111<H>  |  48<H>  ----------------------------<  103<H>  4.8   |  19<L>  |  3.00<H>    Ca    10.7<H>      25 Mar 2024 14:43  Mg     2.4     03-25    TPro  7.7  /  Alb  4.4  /  TBili  0.2  /  DBili  x   /  AST  23  /  ALT  13  /  AlkPhos  100  03-25      Urinalysis Basic - ( 25 Mar 2024 14:43 )    Color: x / Appearance: x / SG: x / pH: x  Gluc: 103 mg/dL / Ketone: x  / Bili: x / Urobili: x   Blood: x / Protein: x / Nitrite: x   Leuk Esterase: x / RBC: x / WBC x   Sq Epi: x / Non Sq Epi: x / Bacteria: x        RADIOLOGY & ADDITIONAL STUDIES      A/P: 67 yo female, former smoker, with PMHx of HTN, hyperlipidemia, CAD s/p prior PCI and CABG, bioAVR/mitral annuloplasty in 2002, PAD s/p LSFA  and L TIFFANY (history of occluded bilateral SFA), AAA (last repaired in 2020), chronic anemia (receives Iron infusions, last 8/23), hypothyroid, CKD Stage III, Atach and Afib (non anticoagulated),  +BI-V PPM implant on 8/24. for 3 days has been experiencing exertional sob. CT A/P non con shows: Post surgical changes from prior open thoracoabdominal aortic bypass graft with new focal dilatation of the distalmost aspect of the graft measuring 4.4 x 6.1 cm just above the aortic bifurcation and previously measuring 3.2 x 4.1 cm on 6/11/2021.      Vascular/PAD:  - no acute vascular surgical intervention at this time   - please give 1unit PRBC and trend Hgb  - please obtain a CTA C/A/P to evaluate the AAA when the patient is medically stable due to her CHERRI.   - vascular will continue to follow   - discussed with chief on call   - [phone]    Vascular Attending:  Dr. Sandra     Chief Complaint: chest pain for 3 days       HPI:  67 yo female, former smoker, with PMHx of HTN, hyperlipidemia, CAD s/p prior PCI and CABG, bioAVR/mitral annuloplasty in 2002, PAD s/p LSFA  and L TIFFANY (history of occluded bilateral SFA), AAA (last repaired in 2020), chronic anemia (receives Iron infusions, last 8/23), hypothyroid, CKD Stage III, Atach and Afib (non anticoagulated),  +BI-V PPM implant on 8/24. for 3 days has been experiencing exertional sob. also having back/chest pain that radiates from one to the other. +constipation as well, no bm in 5 days. has not been able to attend to her ADLs as she feels to sob. no leg swelling or pain.    Vascular addendum:  PT was seen at bedside in the ED, she states that she had been having chronic back pain that started about 2-3 years ago but it has been progressively gotten worse, unable to recall when exactly. She also states that she has been having chest pain that comes and goes but has been the worse the past three days for her currently, she has tried OTC medication and does not seem to help. She said last time she saw Dr. Sandra has been over a year ago since his office has been moved. She complained of claudication in her leg that has gotten worse where she has trouble walking even a block. she states that she is able to only get a couple steps in before she needs to sit down which improves her pain. Her last appointment to her cardiologist was about 2 months ago where they started her on eliquis 2.5 BID and she states that she is compliant with her medication regime. She mentioned that her last BM was a week ago and has chronic constipation. ED had done a CT A/P without contrast due to her CHERRI - GFR is 16 (baseline 30). Cr. is elevated to 3 now. CT scan shows: post surgical changes from prior open thoracoabdominal aortic bypass graft with new focal dilatation of the distalmost aspect of the graft measuring 4.4 x 6.1 cm just above the aortic bifurcation and previously measuring 3.2 x 4.1 cm on 6/11/2021.      PAST MEDICAL & SURGICAL HISTORY:  Atherosclerosis of coronary artery  CAD (coronary artery disease)      Peripheral vascular disease  PVD (peripheral vascular disease)      Anemia  Anemia      Hypothyroidism  Hypothyroidism      Gastroesophageal reflux disease  GERD (gastroesophageal reflux disease)      Hyperlipidemia  Hyperlipidemia      Essential hypertension  HTN (hypertension)      Seizure      Anxiety      Depression, unspecified depression type      Chronic kidney disease, unspecified CKD stage      Status post aorto-coronary artery bypass graft  2012      Atherosclerosis of coronary artery bypass graft(s), unspecified, with angina pectoris with documented spasm      H/O aortic valve replacement  Bioprosthetic      Ruptured aortic aneurysm  surgery august 2020      Abdominal aortic aneurysm (AAA) without rupture  2015          REVIEW OF SYSTEMS  All neg unless otherwise mentioned in HPI.       MEDICATIONS  (STANDING):  morphine  - Injectable 2 milliGRAM(s) IV Push Once    MEDICATIONS  (PRN):      Allergies    No Known Allergies    Intolerances        SOCIAL HISTORY:    FAMILY HISTORY:  FH: myocardial infarction  in Dad (at age 50s)        Vital Signs Last 24 Hrs  T(C): 36.9 (25 Mar 2024 20:02), Max: 36.9 (25 Mar 2024 20:02)  T(F): 98.5 (25 Mar 2024 20:02), Max: 98.5 (25 Mar 2024 20:02)  HR: 75 (25 Mar 2024 20:02) (75 - 86)  BP: 118/67 (25 Mar 2024 20:02) (106/55 - 138/67)  BP(mean): --  RR: 18 (25 Mar 2024 20:02) (17 - 18)  SpO2: 100% (25 Mar 2024 20:02) (100% - 100%)    Parameters below as of 25 Mar 2024 20:02  Patient On (Oxygen Delivery Method): room air        PHYSICAL EXAM:  Constitutional: NAD, Aox3  Respiratory: RA  Cardiovascular: NSR  Gastrointestinal: soft, Non distended, mild tenderness to palpation in all quadrants. no dixon sign.   Extremities: warm, well perfused, motor sensory function intact.   Vascular: LLE: palpable fem, biphasic pop/DP/PT  RLE: palpable fem, biphasic POP, DP, PT   no wounds or ulcers.           LABS:                        6.9    7.18  )-----------( 210      ( 25 Mar 2024 14:43 )             22.8     03-25    145  |  111<H>  |  48<H>  ----------------------------<  103<H>  4.8   |  19<L>  |  3.00<H>    Ca    10.7<H>      25 Mar 2024 14:43  Mg     2.4     03-25    TPro  7.7  /  Alb  4.4  /  TBili  0.2  /  DBili  x   /  AST  23  /  ALT  13  /  AlkPhos  100  03-25      Urinalysis Basic - ( 25 Mar 2024 14:43 )    Color: x / Appearance: x / SG: x / pH: x  Gluc: 103 mg/dL / Ketone: x  / Bili: x / Urobili: x   Blood: x / Protein: x / Nitrite: x   Leuk Esterase: x / RBC: x / WBC x   Sq Epi: x / Non Sq Epi: x / Bacteria: x        RADIOLOGY & ADDITIONAL STUDIES      A/P: 67 yo female, former smoker, with PMHx of HTN, hyperlipidemia, CAD s/p prior PCI and CABG, bioAVR/mitral annuloplasty in 2002, PAD s/p LSFA  and L TIFFANY (history of occluded bilateral SFA), AAA (last repaired in 2020), chronic anemia (receives Iron infusions, last 8/23), hypothyroid, CKD Stage III, Atach and Afib (non anticoagulated),  +BI-V PPM implant on 8/24. for 3 days has been experiencing exertional sob. CT A/P non con shows: Post surgical changes from prior open thoracoabdominal aortic bypass graft with new focal dilatation of the distalmost aspect of the graft measuring 4.4 x 6.1 cm just above the aortic bifurcation and previously measuring 3.2 x 4.1 cm on 6/11/2021.      Vascular/PAD:  - no acute vascular surgical intervention at this time   - please give 1unit PRBC and trend Hgb  - please obtain a CTA C/A/P to evaluate the AAA   - vascular will continue to follow   - discussed with chief on call   - [phone]

## 2024-03-25 NOTE — H&P ADULT - ATTENDING COMMENTS
#KRAFT: hx of extensive cardiac/valve dx, p/w KRAFT and intermittent CP, diffulty w/ ADLs, worsening for months. Hx of CAD/CABG, +multi valve dx (severe biprosthetic AS, MR/TR), Pulm HTN. EKG Paced, trops neg x1. Low c/f ACS. +elevated ddimer, however given chronicity of symptoms, no hypoxia/tachycardia and currently on eliquis. low c/f PE. f/up LE dopplers. CT chest/AP w/ worsening AAA, no evidence of dissection. Euvolemic on exam. Ddx Symptomatic anemia vs worsening multi valve dx, pulm htn. F/up post transfusion cbc, trend cardiac enzymes,  TTE, CT surgery consult, consider VQ scan if symptoms not improved.   #symptomatic anemia: hgb 6.8, baseline ~11. No s/s of bleeding. Hx of tortuous esophagus/Schazti ring,  denies melena/hematochezia. F/up KASH/FOBT, iron studies, hemolysis labs, trend hgb. Active TS. c/w PPI. Consider GI consult    #aortic stenosis: hx multi valve dx, (severe biprosthetic AS, MR/TR), medically managed. Currently Euvolemic on exam. Strict I/Os, caution w/ fluids. F/up TTE. CT surgery consult in am.

## 2024-03-25 NOTE — H&P ADULT - HISTORY OF PRESENT ILLNESS
HPI: Patient is a 69yo F with PMH of ___ who presents with intermittent chest pain and KRAFT x 3 days.     In the ED:  Initial vital signs: T: 98 F, HR: 86, BP: 114/72, R: 17, SpO2: 100% on RA  Labs: significant for Hgb 6.9, D-dimer 2831, Cl 111, HCO3 19, BUN/Cr 48/3.00, Ca 10.7, trop 41, p-BNP 3727  CXR: Persistent linear atelectasis over the left lower lung zone. The remainder the lung zones are clear. No pneumothorax.  CT C/A/P: Bibasilar atelectasis without evidence of consolidation. Borderline hepatomegaly with extensive coronary artery calcifications status post CABG. There is also a prosthetic aortic valve. Post surgical changes from prior open thoracoabdominal aortic bypass graft with new focal dilatation of the distalmost aspect of the graft measuring 4.4 x 6.1 cm just above the aortic bifurcation and previously measuring 3.2 x 4.1 cm on 6/11/2021.   EKG: paced rhythm  Medications: 1u pRBC, maalox 30mL PO x 1, morphine 2mg IV x 1  Consults: vascular HPI: Patient is a 67yo F former smoker (quit 20 year ago with PMH of hypothyroidism, seizure disorder, Stage 3 CKD (baseline CR ~1.7-2.0), severe renal artery stenosis, chronic anemia (baseline Hgb ~9-10, gets weekly Fe infusion last infusion 3/22/24), AT, Afib s/p AVN ablation and CRT-P (8/2022; on Eliquis), HTN, HLD, CAD s/p CABG, AVR (porcine bio-prosthetic valve) and mitral annuloplasty, HFpEF, AAA s/p open repair in 2015 with complications, PAD s/p L pSFA stent/L-TIFFANY stent/L-IIA stent who presents with intermittent chest pain and KRAFT x 3 days. States she has been having intermittent chest pain and KRAFT which worsened about 3 days ago. She has not been very mobile for the past week due to pain and KRAFT. At baseline ambulates with cane, however has not been mostly sitting on the couch due to current symptoms. Additionally c/o lower abdominal pain which she attributes to constipation (has not had a BM in 5-6 days). Usually has regular BMs. Has had very poor PO intake over the past few days 2/2 constipation. Denies any s/s of bleeding; denies hematuria, hemoptysis, hematemesis, etc. No recent illness/sick contacts. Denies any recent travel. Denies hx of cancer, bleeding/clotting disorders, lower extremity pain or swelling. Compliant with her medications.  No other complaints. States she feels mildly improved after blood transfusion.     In the ED:  Initial vital signs: T: 98 F, HR: 86, BP: 114/72, R: 17, SpO2: 100% on RA  Labs: significant for Hgb 6.9, D-dimer 2831, Cl 111, HCO3 19, BUN/Cr 48/3.00, Ca 10.7, trop 41, p-BNP 3727  CXR: Persistent linear atelectasis over the left lower lung zone. The remainder the lung zones are clear. No pneumothorax.  CT C/A/P: Bibasilar atelectasis without evidence of consolidation. Borderline hepatomegaly with extensive coronary artery calcifications status post CABG. There is also a prosthetic aortic valve. Post surgical changes from prior open thoracoabdominal aortic bypass graft with new focal dilatation of the distalmost aspect of the graft measuring 4.4 x 6.1 cm just above the aortic bifurcation and previously measuring 3.2 x 4.1 cm on 6/11/2021.   EKG: paced rhythm  Medications: 1u pRBC, maalox 30mL PO x 1, morphine 2mg IV x 1  Consults: vascular

## 2024-03-26 NOTE — CONSULT NOTE ADULT - PROBLEM SELECTOR RECOMMENDATION 3
Patient p/w KRAFT and chest pain, VSS. No evidence of hypoxia.   Associated with intermittent chest pain although does not appear to be pleuritic in nature.  Denies cough, fevers, s/s of bleeding. No recent travel, immobilization although has been walking less over the past week.   Denies hx of cancer, clotting disorders, no relevant family hx.  Labs showing elevated D-dimer 2831, p-BNP elevated to 3727. CXR + CT showing bibasilar atelectasis over left lung zones. Ddx includes symptomatic anemia, severe AS, PE, HF exacerbation (less likely), ACS (unlikely).  - f/u B/L LE duplex   - consider V/Q scan v. CTA if Cr improving   - treat anemia as below  - f/u TTE  - CTSX consult in the AM  - given concern for bleeding and VSS stable, monitor off full dose AC tonight, can start heparin if  Hgb remains stable on AM labs.

## 2024-03-26 NOTE — PROGRESS NOTE ADULT - SUBJECTIVE AND OBJECTIVE BOX
**TRANSFER FROM [facility] TO TELEMETRY NOTE**    Hospital Course: Pt is 68 year old female former smoker (quit 20 years ago PMH of hypothyroidism, seizure disorder, Stage 3 CKD (baseline Cr ~1.7-2), severe renal artery stenosis, chronic anemia (baseline Hgb 9-10; gets weekly Fe infusion - last infusion 3/22/24), AT, Afib s/p AVN ablation and CRT-P (8/2022; on Eliquis), HTN, HLD, CAD s/p CABG, AVR (porcine bio-prosthetic valve) and mitral annuloplasty, HFpEF, AAA s/p open repair in 2015 with complications, PAD s/p L pSFA stent/L-TIFFANY stent/L-IIA stent who presented with intermittent chest pain and dyspnea on exertion X 3 days. She also has not had a bowel movement in 5-6 days and reports some lower abdominal pain and poor PO intake over the last two days because of these symptoms. In the ED, pt Hgb: 6.9, D-dimer 2831, p-BNP: 3727, BUN: 48, Cr: 3. Pt received 1 u pRBC, maalox 30mL, morphine 2mg IV X1. CT Abdomen showed: post surgical changes from prior open thoracoabdominal aortic bypass graft with new focal dilation of the distal most aspect of the graft measuring 4.4 X 6.1 cm just above the aortic bifurcation (previously measuring 3.2 X 4.1 cm on 6/11/2021). Vascular consulted in ED and stated no immediate surgical intervention; recommend CTA. This AM, patient had reported episode of melena, KASH positive for melena, consulted GI. This AM rapid response was called for patient due to new AMS. Patient was found to be unresponsive, sent blood-work during rapid. AMS thought to be 2/2 seizure given patient has reported hx of seizures. Unsure details of patient's seizure history: unclear when patient's last seizure. Pt appeared to be confused, slowly returning back to her baseline mental status. Pt stepped up to telemetry.    OVERNIGHT EVENTS: No AOE    SUBJECTIVE / INTERVAL HPI: Patient seen and examined at bedside. States she is in pain and points to lower abdominal region. Denies dizziness, fatigue, CP, SOB, N/V/D, hematemesis, melena    VITAL SIGNS:  Vital Signs Last 24 Hrs  T(C): 36.7 (26 Mar 2024 14:00), Max: 36.9 (25 Mar 2024 20:02)  T(F): 98.1 (26 Mar 2024 14:00), Max: 98.5 (25 Mar 2024 20:02)  HR: 74 (26 Mar 2024 12:01) (71 - 75)  BP: 127/62 (26 Mar 2024 12:01) (91/54 - 138/67)  BP(mean): 89 (26 Mar 2024 12:01) (82 - 89)  RR: 18 (26 Mar 2024 12:01) (18 - 18)  SpO2: 100% (26 Mar 2024 12:01) (99% - 100%)    Parameters below as of 26 Mar 2024 12:01  Patient On (Oxygen Delivery Method): room air      I&O's Summary      PHYSICAL EXAM:    Constitutional: Tired-appearing, difficult to rouse but able to respond to questions  HEENT: NC/AT, PERRLA, EOMI, no conjunctival pallor or scleral icterus, MMM  Neck: Supple, no JVD  Respiratory: CTA B/L. No w/r/r.   Cardiovascular: RRR, normal S1/2  Gastrointestinal: +BS, no palpable masses, non-TTP  Extremities: WWP; no cyanosis, clubbing or edema.   Vascular: Pulses equal and strong throughout.   Neurological: AAOx2, no CN deficits, strength and sensation intact throughout.   Skin: No gross skin abnormalities or rashes, large midline scar (well healed) extending from chest to groin     MEDICATIONS:  MEDICATIONS  (STANDING):  atorvastatin 80 milliGRAM(s) Oral at bedtime  cilostazol 50 milliGRAM(s) Oral every 12 hours  levETIRAcetam  IVPB 250 milliGRAM(s) IV Intermittent every 24 hours  levothyroxine 75 MICROGram(s) Oral every 24 hours  pantoprazole  Injectable 40 milliGRAM(s) IV Push every 12 hours  polyethylene glycol 3350 17 Gram(s) Oral every 24 hours  senna 2 Tablet(s) Oral at bedtime  sertraline 50 milliGRAM(s) Oral every 24 hours  sodium chloride 0.9%. 1000 milliLiter(s) (50 mL/Hr) IV Continuous <Continuous>    MEDICATIONS  (PRN):  acetaminophen     Tablet .. 650 milliGRAM(s) Oral every 6 hours PRN Temp greater or equal to 38C (100.4F), Mild Pain (1 - 3)  LORazepam   Injectable 0.5 milliGRAM(s) IV Push every 6 hours PRN Agitation      ALLERGIES:  Allergies    No Known Allergies    Intolerances        LABS:                        7.5    11.32 )-----------( 164      ( 26 Mar 2024 11:45 )             24.0     03-26    134<L>  |  106  |  47<H>  ----------------------------<  137<H>  4.0   |  14<L>  |  2.84<H>    Ca    10.4      26 Mar 2024 11:45  Phos  3.5     03-26  Mg     2.4     03-26    TPro  7.1  /  Alb  4.0  /  TBili  0.2  /  DBili  x   /  AST  22  /  ALT  11  /  AlkPhos  92  03-26    PT/INR - ( 26 Mar 2024 09:44 )   PT: 11.5 sec;   INR: 1.01          PTT - ( 26 Mar 2024 09:44 )  PTT:24.9 sec  Urinalysis Basic - ( 26 Mar 2024 11:45 )    Color: x / Appearance: x / SG: x / pH: x  Gluc: 137 mg/dL / Ketone: x  / Bili: x / Urobili: x   Blood: x / Protein: x / Nitrite: x   Leuk Esterase: x / RBC: x / WBC x   Sq Epi: x / Non Sq Epi: x / Bacteria: x      CAPILLARY BLOOD GLUCOSE      POCT Blood Glucose.: 151 mg/dL (26 Mar 2024 10:49)      RADIOLOGY & ADDITIONAL TESTS: Reviewed.

## 2024-03-26 NOTE — CONSULT NOTE ADULT - ASSESSMENT
Patient is a 69yo F former smoker (quit 20 year ago with PMH of hypothyroidism, seizure disorder, Stage 3 CKD (baseline CR ~1.7-2.0), severe renal artery stenosis, chronic anemia (baseline Hgb ~9-10, gets weekly Fe infusion last infusion 3/22/24), AT, Afib s/p AVN ablation and CRT-P (8/2022; on Eliquis), HTN, HLD, CAD s/p CABG, AVR (porcine bio-prosthetic valve) and mitral annuloplasty, HFpEF, AAA s/p open repair in 2015 with complications, PAD s/p L pSFA stent/L-TIFFANY stent/L-IIA stent who presents with intermittent chest pain and KRAFT x 3 days. Admitted under medicine for suspected GI bleed and possible AAA stent migration. Epilepsy consulted for 2 episodes of seizure like activity, now upgraded to tele. On labs noted to have a lactate of 6.8, then 3.6. On exam,       Recommendations  - c/w vEEG  - Obtain a CTH  Patient is a 69yo F former smoker (quit 20 year ago with PMH of hypothyroidism, seizure disorder, Stage 3 CKD (baseline CR ~1.7-2.0), severe renal artery stenosis, chronic anemia (baseline Hgb ~9-10, gets weekly Fe infusion last infusion 3/22/24), AT, Afib s/p AVN ablation and CRT-P (8/2022; on Eliquis), HTN, HLD, CAD s/p CABG, AVR (porcine bio-prosthetic valve) and mitral annuloplasty, HFpEF, AAA s/p open repair in 2015 with complications, PAD s/p L pSFA stent/L-TIFFANY stent/L-IIA stent who presents with intermittent chest pain and KRAFT x 3 days. Admitted under medicine for suspected GI bleed and possible AAA stent migration. Epilepsy consulted for 2 episodes of seizure like activity, now upgraded to tele. s/p Ativan 2mg and Keppra 3g. On labs noted to have a lactate of 6.8, then 3.6. On exam, patient is lethargic but arousable, can squeeze hands. No obvious signs of seizures      Recommendations  - c/w vEEG  - Obtain a CTH   - c/w Keppra 500mg afterwards     Patient is a 69yo F former smoker (quit 20 year ago with PMH of hypothyroidism, seizure disorder, Stage 3 CKD (baseline CR ~1.7-2.0), severe renal artery stenosis, chronic anemia (baseline Hgb ~9-10, gets weekly Fe infusion last infusion 3/22/24), AT, Afib s/p AVN ablation and CRT-P (8/2022; on Eliquis), HTN, HLD, CAD s/p CABG, AVR (porcine bio-prosthetic valve) and mitral annuloplasty, HFpEF, AAA s/p open repair in 2015 with complications, PAD s/p L pSFA stent/L-TIFFANY stent/L-IIA stent who presents with intermittent chest pain and KRAFT x 3 days. Admitted under medicine for suspected GI bleed and possible AAA stent migration. Epilepsy consulted for 2 episodes of seizure like activity, now upgraded to tele. s/p Ativan 2mg and Keppra 3g. On labs noted to have a lactate of 6.8, then 3.6. On exam, patient is lethargic but arousable, can squeeze hands and follow some simple commands. No obvious signs of seizures. EEG thus far neg for seizures      Recommendations  - c/w vEEG  - Obtain a CTH   - c/w Keppra 250mg given CKD    will follow

## 2024-03-26 NOTE — CONSULT NOTE ADULT - ATTENDING COMMENTS
Suspect to be multi-factorial in etiology.     #Anemia  -Protonix 40 mg IVP BID for now, likely EGD soon  -Management of seizures as per primary, optimization with adequate control of seizures prior to consideration for endoscopic procedure  -Please obtain Fe studies (Serum Fe, TIBC, transferrin, ferritin) & Reticulocyte count  -Please obtain hemolysis labs: LDH, Haptoglobin  -Closely monitor H/H  -Maintain Active T&S  -Transfusion goal as per primary team

## 2024-03-26 NOTE — CHART NOTE - NSCHARTNOTEFT_GEN_A_CORE
KASH performed, positive for black stool. Patient reports one episode of dark black stool this morning. Primary team updated.

## 2024-03-26 NOTE — PROGRESS NOTE ADULT - PROBLEM SELECTOR PLAN 4
Patient p/w KRAFT and chest pain, VSS, found to be anemic with Hgb 6.9. s/p 1u pRBC. Patient with history of TASNEEM, gets once or twice weekly iron infusions outpatient, hematologist is Dr. Izquierdo. On 3/26, patient reported an episode of melena, KASH positive for melena as well. On Eliquis 2.5mg bid, does not take any antiplatelet agents or NSAIDs. Last Eliquis dose 3/25 around 10AM  -LDH, haptoglobin WNL (h/o valve replacement)    Plan:  - GI consulted, will follow up recs  - Vascular consulted, appreciate recs (possible enlarging aneurysm, aneurysmal rupture, and/or aortoenteric fistula)  - Maintain active T&S, 2 large-bore IVs  - Transfuse if Hgb < 7

## 2024-03-26 NOTE — PROGRESS NOTE ADULT - PROBLEM SELECTOR PLAN 11
History of hypothyroidism, home med: synthroid 75mcg    Plan:  - Continue home med    F: NS 50cc/hr  E: Caution in CKD  N: NPO  DVT: Holding for possible bleed  GI: Protonix 40 IV BID  Code: Full  Dispo: Tele

## 2024-03-26 NOTE — CONSULT NOTE ADULT - PROBLEM SELECTOR RECOMMENDATION 6
Hx of HFpEF. Last TTE on file 04/2023: Borderline normal left ventricular systolic function 50%. Mid and apical inferior septum, apical lateral segment, and apex are hypokinetic. Severely dilated left atrium. Significant bioprosthetic aortic valve stenosis. prosthetic aortic stenosis. Severe mitral stenosis. Moderate-to-severe mitral regurgitation. Severe tricuspid regurgitation. PASP 63 mmHg.  Home meds: lopressor 50g bid, lasix 40mg qd  - hold home meds.

## 2024-03-26 NOTE — CONSULT NOTE ADULT - ASSESSMENT
68F former smoker (quit 20 year ago) PMH of hypothyroidism, seizure disorder, Stage 3 CKD (baseline CR ~1.7-2.0), severe renal artery stenosis, chronic anemia (baseline Hgb ~9-10, gets weekly Fe infusion last infusion 3/22/24), AT, Afib s/p AVN ablation and CRT-P (8/2022; on Eliquis), HTN, HLD, CAD s/p CABG, AVR (porcine bio-prosthetic valve) and mitral annuloplasty, HFpEF, AAA s/p open repair in 2015 with complications, PAD s/p L pSFA stent/L-TIFFANY stent/L-IIA stent who presents with intermittent chest pain and KRAFT x 3 days. States she has been having intermittent chest pain and KRAFT which worsened about 3 days ago. She has not been very mobile for the past week due to pain and KRAFT. At baseline ambulates with cane, however has not been mostly sitting on the couch due to current symptoms. Additionally c/o lower abdominal pain which she attributes to constipation (has not had a BM in 5-6 days). Usually has regular BMs. Has had very poor PO intake over the past few days 2/2 constipation. Denies any s/s of bleeding; denies hematuria, hemoptysis, hematemesis, etc. No recent illness/sick contacts. Denies any recent travel. Denies hx of cancer, bleeding/clotting disorders, lower extremity pain or swelling. Compliant with her medications.  No other complaints. States she feels mildly improved after blood transfusion.       Labs: significant for Hgb 6.9, D-dimer 2831, Cl 111, HCO3 19, BUN/Cr 48/3.00, Ca 10.7, trop 41, p-BNP 3727  CT C/A/P: Bibasilar atelectasis without evidence of consolidation. Borderline hepatomegaly with extensive coronary artery calcifications status post CABG. There is also a prosthetic aortic valve. Post surgical changes from prior open thoracoabdominal aortic bypass graft with new focal dilatation of the distalmost aspect of the graft measuring 4.4 x 6.1 cm just above the aortic bifurcation and previously measuring 3.2 x 4.1 cm on 6/11/2021.     GI consulted for melena and for consideration of endoscopic evaluation.  68F former smoker (quit 20 year ago) PMH of hypothyroidism, seizure disorder, Stage 3 CKD (baseline CR ~1.7-2.0), severe renal artery stenosis, chronic anemia (baseline Hgb ~9-10, gets weekly Fe infusion last infusion 3/22/24), AT, Afib s/p AVN ablation and CRT-P (8/2022; on Eliquis), HTN, HLD, CAD s/p CABG, AVR (porcine bio-prosthetic valve) and mitral annuloplasty, HFpEF, AAA s/p open repair in 2015 with complications, PAD s/p L pSFA stent/L-TIFFANY stent/L-IIA stent presenting to [facility] with intermittent chest pain and KRAFT x 3 days, found to be anemic to 6.9. GI consulted for melena and for consideration of endoscopic evaluation.     Hgb presenting with Hgb   Suspect to be multi-factorial in etiology.     #Anemia  -Protonix 40 mg IVP BID for now  -Management of seizures as per primary, optimization with adequate control of seizures prior to consideration for endoscopic procedure  -Please obtain Fe studies (Serum Fe, TIBC, transferrin, ferritin) & Reticulocyte count  -Please obtain hemolysis labs: LDH, Haptoglobin  -Closely monitor H/H  -Maintain Active T&S  -Transfusion goal as per primary team    Case discussed with svc attending and primary team.     Gale Ceballos DO  Gastroenterology Fellow  Pager: [phone]     68F former smoker (quit 20 year ago) PMH of hypothyroidism, seizure disorder, Stage 3 CKD (baseline CR ~1.7-2.0), severe renal artery stenosis, chronic anemia (baseline Hgb ~9-10, gets weekly Fe infusion last infusion 3/22/24), AT, Afib s/p AVN ablation and CRT-P (8/2022; on Eliquis), HTN, HLD, CAD s/p CABG, AVR (porcine bio-prosthetic valve) and mitral annuloplasty, HFpEF, AAA s/p open repair in 2015 with complications, PAD s/p L pSFA stent/L-TIFFANY stent/L-IIA stent presenting to [facility] with intermittent chest pain and KRAFT x 3 days, found to be anemic to 6.9. GI consulted for melena and for consideration of endoscopic evaluation.       Suspect to be multi-factorial in etiology.     #Anemia  -Protonix 40 mg IVP BID for now, likely EGD soon  -Management of seizures as per primary, optimization with adequate control of seizures prior to consideration for endoscopic procedure  -Please obtain Fe studies (Serum Fe, TIBC, transferrin, ferritin) & Reticulocyte count  -Please obtain hemolysis labs: LDH, Haptoglobin  -Closely monitor H/H  -Maintain Active T&S  -Transfusion goal as per primary team    Case discussed with svc attending and primary team.     Gale Ceballos DO  Gastroenterology Fellow  Pager: [phone]     68F former smoker (quit 20 year ago) PMH of hypothyroidism, seizure disorder, Stage 3 CKD (baseline CR ~1.7-2.0), severe renal artery stenosis, chronic anemia (baseline Hgb ~9-10, gets weekly Fe infusion last infusion 3/22/24), AT, Afib s/p AVN ablation and CRT-P (8/2022; on Eliquis), HTN, HLD, CAD s/p CABG, AVR (porcine bio-prosthetic valve) and mitral annuloplasty, HFpEF, AAA s/p open repair in 2015 with complications, PAD s/p L pSFA stent/L-TIFFANY stent/L-IIA stent presenting to [facility] with intermittent chest pain and KRAFT x 3 days, found to be anemic to 6.9. GI consulted for melena and for consideration of endoscopic evaluation.     Hgb 6.9 on admission, down from 11.7 from Feb 2024 in the setting of reported melena. KASH with dark stool. On record review, previously noted to have anemia with Hgb downtrending down to 5.6, at the time likely 2/2 hematoma. Suspect anemia to be multi-factorial in etiology (i.e. CKD, AC use, etc). In the setting of overt bleeding, PUD on the ddx especially in light of AC.    #Anemia  -Protonix 40 mg IVP BID for now  -Management of seizures as per primary, optimization with adequate control of seizures prior to consideration for endoscopic procedure  -Please obtain Fe studies (Serum Fe, TIBC, transferrin, ferritin) & Reticulocyte count  -Please obtain hemolysis labs: LDH, Haptoglobin  -Hold home AC   -Closely monitor H/H  -Maintain Active T&S  -Transfusion goal as per primary team    Case discussed with svc attending and primary team.     Gale Ceballos DO  Gastroenterology Fellow  Pager: [phone]

## 2024-03-26 NOTE — PROGRESS NOTE ADULT - PROBLEM SELECTOR PLAN 3
History of AAA s/p open repair in 2015 followed by saccular AAA and contained rupture adjacent to previous stent graft, s/p open thoracoabdominal aneurysm repair with graft and bypasses to the celiac, SMA, left renal, right renal in 8/2020.  -CT A/P: Post surgical changes from prior open thoracoabdominal aortic bypass graft with new focal dilatation of the distalmost aspect of the graft measuring 4.4 x 6.1 cm just above the aortic bifurcation and previously measuring 3.2 x 4.1 cm on 6/11/2021    Plan:  - Follow up CT chest/abd/pelvis read  - Vascular consulted, appreciate recommendations History of AAA s/p open repair in 2015 followed by saccular AAA and contained rupture adjacent to previous stent graft, s/p open thoracoabdominal aneurysm repair with graft and bypasses to the celiac, SMA, left renal, right renal in 8/2020.  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    Plan:  - Vascular consulted, appreciate recommendations

## 2024-03-26 NOTE — PROGRESS NOTE ADULT - PROBLEM SELECTOR PLAN 4
Pt p/w chest and abdominal pain with ?radiation to the back.  Hx of AAA s/p open repair in 2015 followed saccular AAA and contained rupture adjacent to previous stent graft s/p open thoracoabdominal aneurysm repair with graft and bypasses to the celiac, SMA, left renal, right renal in 8/2020.  CT A/P showing post surgical changes from prior open thoracoabdominal aortic bypass graft with new focal dilatation of the distalmost aspect of the graft measuring 4.4 x 6.1 cm just above the aortic bifurcation and previously measuring 3.2 x 4.1 cm on 6/11/2021.   Vascular consulted in the ED, no acute interventions.   - monitor BP  - f/u vascular recs  - plan for CTA C/A/P to better evaluate the AAA when CHERRI improves vs abd US Pt p/w chest and abdominal pain with ?radiation to the back.  Hx of AAA s/p open repair in 2015 followed saccular AAA and contained rupture adjacent to previous stent graft s/p open thoracoabdominal aneurysm repair with graft and bypasses to the celiac, SMA, left renal, right renal in 8/2020.  CT A/P showing post surgical changes from prior open thoracoabdominal aortic bypass graft with new focal dilatation of the distalmost aspect of the graft measuring 4.4 x 6.1 cm just above the aortic bifurcation and previously measuring 3.2 x 4.1 cm on 6/11/2021.   Vascular consulted in the ED, no acute interventions.   - monitor BP  - f/u vascular recs: plan to do CTA C/A/P to evaluate AAA Patient p/w CHERRI on CKD (baseline reportedly 1.7-2.0 outpatient).   Labs on admission: BUN/Cr 48/3.00  Reports poor PO intake over the past week due to feeling constipated.   Likely CHERRI 2/2 prerenal causes.    - f/up urine lytes  - bladder scan done and not showing signs of retention   - monitor BMP  - encourage PO fluid intake, consider bolus v. mIVF if Cr worsening  - avoid nephrotoxic agents Patient p/w CHERRI on CKD (baseline reportedly 1.7-2.0 outpatient).   Labs on admission: BUN/Cr 48/3.00  Reports poor PO intake over the past week due to feeling constipated.   Likely CHERRI 2/2 prerenal causes.      - f/up urine lytes  - bladder scan done and not showing signs of retention   - monitor BMP  - encourage PO fluid intake, consider bolus v. mIVF if Cr worsening  - avoid nephrotoxic agents Patient p/w KRAFT and chest pain, VSS found to be anemic with Hgb 6.9. s/p 1u pRBC   Patient with hx of TASNEEM, gets once or twice weekly iron infusions outpatient, hematologist is Dr. Izquierdo.  This AM patient reported an episode of melena, KASH positive for melena as well   On eliquis 2.5mg bid, does not take any antiplatelet agents or NSAIDs . Last Eliquis dose: 3/25 around 10AM      - GI consulted, will f/u recs   - vascular consulted will continue to follow recs   - f/u hemolysis labs (hx of valve replacement)  - maintain active T&S  - maintain 2 large-bore IVs  - transfuse if Hgb < 7

## 2024-03-26 NOTE — CONSULT NOTE ADULT - SUBJECTIVE AND OBJECTIVE BOX
Patient is a 68y old  Female who presents with a chief complaint of anemia, CHERRI (25 Mar 2024 22:30)      HPI:  HPI: Patient is a 67yo F former smoker (quit 20 year ago with PMH of hypothyroidism, seizure disorder, Stage 3 CKD (baseline CR ~1.7-2.0), severe renal artery stenosis, chronic anemia (baseline Hgb ~9-10, gets weekly Fe infusion last infusion 3/22/24), AT, Afib s/p AVN ablation and CRT-P (8/2022; on Eliquis), HTN, HLD, CAD s/p CABG, AVR (porcine bio-prosthetic valve) and mitral annuloplasty, HFpEF, AAA s/p open repair in 2015 with complications, PAD s/p L pSFA stent/L-TIFFANY stent/L-IIA stent who presents with intermittent chest pain and KRAFT x 3 days. States she has been having intermittent chest pain and KRAFT which worsened about 3 days ago. She has not been very mobile for the past week due to pain and KRATF. At baseline ambulates with cane, however has not been mostly sitting on the couch due to current symptoms. Additionally c/o lower abdominal pain which she attributes to constipation (has not had a BM in 5-6 days). Usually has regular BMs. Has had very poor PO intake over the past few days 2/2 constipation. Denies any s/s of bleeding; denies hematuria, hemoptysis, hematemesis, etc. No recent illness/sick contacts. Denies any recent travel. Denies hx of cancer, bleeding/clotting disorders, lower extremity pain or swelling. Compliant with her medications.  No other complaints. States she feels mildly improved after blood transfusion.     In the ED:  Initial vital signs: T: 98 F, HR: 86, BP: 114/72, R: 17, SpO2: 100% on RA  Labs: significant for Hgb 6.9, D-dimer 2831, Cl 111, HCO3 19, BUN/Cr 48/3.00, Ca 10.7, trop 41, p-BNP 3727  CXR: Persistent linear atelectasis over the left lower lung zone. The remainder the lung zones are clear. No pneumothorax.  CT C/A/P: Bibasilar atelectasis without evidence of consolidation. Borderline hepatomegaly with extensive coronary artery calcifications status post CABG. There is also a prosthetic aortic valve. Post surgical changes from prior open thoracoabdominal aortic bypass graft with new focal dilatation of the distalmost aspect of the graft measuring 4.4 x 6.1 cm just above the aortic bifurcation and previously measuring 3.2 x 4.1 cm on 6/11/2021.   EKG: paced rhythm  Medications: 1u pRBC, maalox 30mL PO x 1, morphine 2mg IV x 1  Consults: vascular (25 Mar 2024 22:30)    PAST MEDICAL & SURGICAL HISTORY:  Atherosclerosis of coronary artery  CAD (coronary artery disease)      Peripheral vascular disease  PVD (peripheral vascular disease)      Anemia  Anemia      Hypothyroidism  Hypothyroidism      Gastroesophageal reflux disease  GERD (gastroesophageal reflux disease)      Hyperlipidemia  Hyperlipidemia      Essential hypertension  HTN (hypertension)      Seizure      Anxiety      Depression, unspecified depression type      Chronic kidney disease, unspecified CKD stage      Status post aorto-coronary artery bypass graft  2012      Atherosclerosis of coronary artery bypass graft(s), unspecified, with angina pectoris with documented spasm      H/O aortic valve replacement  Bioprosthetic      Ruptured aortic aneurysm  surgery august 2020      Abdominal aortic aneurysm (AAA) without rupture  2015        MEDICATIONS  (STANDING):  atorvastatin 80 milliGRAM(s) Oral at bedtime  cilostazol 50 milliGRAM(s) Oral every 12 hours  levothyroxine 75 MICROGram(s) Oral every 24 hours  pantoprazole  Injectable 40 milliGRAM(s) IV Push every 12 hours  polyethylene glycol 3350 17 Gram(s) Oral every 24 hours  senna 2 Tablet(s) Oral at bedtime  sertraline 50 milliGRAM(s) Oral every 24 hours    MEDICATIONS  (PRN):  acetaminophen     Tablet .. 650 milliGRAM(s) Oral every 6 hours PRN Temp greater or equal to 38C (100.4F), Mild Pain (1 - 3)            FAMILY HISTORY:  FH: myocardial infarction  in Dad (at age 50s)        CBC Full  -  ( 26 Mar 2024 07:13 )  WBC Count : 9.86 K/uL  RBC Count : 2.57 M/uL  Hemoglobin : 7.6 g/dL  Hematocrit : 23.9 %  Platelet Count - Automated : 164 K/uL  Mean Cell Volume : 93.0 fl  Mean Cell Hemoglobin : 29.6 pg  Mean Cell Hemoglobin Concentration : 31.8 gm/dL  Auto Neutrophil # : 8.56 K/uL  Auto Lymphocyte # : 0.58 K/uL  Auto Monocyte # : 0.63 K/uL  Auto Eosinophil # : 0.03 K/uL  Auto Basophil # : 0.03 K/uL  Auto Neutrophil % : 86.8 %  Auto Lymphocyte % : 5.9 %  Auto Monocyte % : 6.4 %  Auto Eosinophil % : 0.3 %  Auto Basophil % : 0.3 %      03-26    137  |  107  |  48<H>  ----------------------------<  109<H>  4.1   |  19<L>  |  3.01<H>    Ca    10.1      26 Mar 2024 07:13  Phos  3.5     03-26  Mg     2.4     03-26    TPro  7.7  /  Alb  4.4  /  TBili  0.2  /  DBili  x   /  AST  23  /  ALT  13  /  AlkPhos  100  03-25      Urinalysis Basic - ( 26 Mar 2024 07:13 )    Color: x / Appearance: x / SG: x / pH: x  Gluc: 109 mg/dL / Ketone: x  / Bili: x / Urobili: x   Blood: x / Protein: x / Nitrite: x   Leuk Esterase: x / RBC: x / WBC x   Sq Epi: x / Non Sq Epi: x / Bacteria: x        Radiology :     < from: CT Chest No Cont (03.25.24 @ 18:39) >  ACC: [id number] EXAM:  CT CHEST   ORDERED BY: DARIO MESA     ACC: [id number] EXAM:  CT ABDOMEN AND PELVIS   ORDERED BY: DARIO MESA     PROCEDURE DATE:  03/25/2024          INTERPRETATION:  CLINICAL INFORMATION: Chest pain, anemia, history of   abdominal aortic aneurysm repair.    COMPARISON: Cardiac CT angiogram dated 8/21/2020 and CT abdomen and   pelvis dated 6/11/2021 and prior.    CONTRAST/COMPLICATIONS:  IV Contrast: None.  Oral Contrast: None.  Complications: Not applicable.    PROCEDURE:  CT of the Chest, Abdomen and Pelvis was performed.  Sagittal and coronal reformats were performed.    FINDINGS:  CHEST:  LUNGS AND LARGE AIRWAYS: Patent central airways. Mild upper lobe   centrilobular pulmonary emphysema there are a few scatterednoncalcified   nodules measuring up to 2 mm in size within the right upper lobe (5-69),   likely unchanged in retrospect since 8/21/2020 and likely benign. There   are also a few scattered splenic granulomas. There is lingular and   bilateral lower lobe atelectasis. No consolidation identified.  PLEURA: No pleural effusion.  VESSELS: There is diffuse atherosclerotic calcifications of the thoracic   aorta, which measures up to 3.8 cm in diameter within the mid ascending   thoracic aorta.  HEART: Borderline cardiomegaly with extensive coronary artery   calcifications and evidence of prior CABG. Aortic valve prosthesis is   present. No pericardial effusion.  MEDIASTINUM AND LANDY: No lymphadenopathy.  CHEST WALL AND LOWER NECK: Within normal limits.    ABDOMEN AND PELVIS:  LIVER: Within normal limits.  BILE DUCTS: Normal caliber.  GALLBLADDER: There are a few tiny suspect gallstones dependently within   the gallbladder. The gallbladder is otherwise unremarkable.  SPLEEN: Within normal limits.  PANCREAS: Within normal limits.  ADRENALS: Within normal limits.  KIDNEYS/URETERS: The left kidney is severely atrophic and contains a   probable 1.2 cm cyst. There are a few low-attenuation right renal lesions   that are incompletely characterized but likely representing cysts,   measuring up to 2.6 cm within the lateral aspect of the right kidney and   previously measuring up to 1.9 cm on 6/11/2021. There is a 4 mm   nonobstructing calculus in the lower third of the right kidney. No   hydronephrosis.    BLADDER: Within normal limits.  REPRODUCTIVE ORGANS: There are multiple calcified/degenerated uterine   fibroids measuring up to 4.8 cm in size.    BOWEL: No bowel obstruction. Appendix is normal.  PERITONEUM: No ascites.  VESSELS: Postsurgical changes from prior open thoracoabdominal aortic   bypass graft with interval increase in size of the inferior aspect of the   graft at the aortic bifurcation, now measuring 4.4 x 6.1 cm (3-98) with   bulging along the left lateral aspect (6-38/coronal images) and   previously measuring approximately 3.2 x 4.1 cm on 6/11/2021. There are   extensive calcifications of the native iliac arteries, particularly the   internal iliac arteries, and a left common iliac artery stent extending   into the left external iliac artery. There are also extensive   calcifications of the femoral arteries and a left superficial femoral   artery stent. There is dense calcifications within the right common   femoral artery.  RETROPERITONEUM/LYMPH NODES: No lymphadenopathy.  ABDOMINAL WALL: Within normal limits.  BONES: There is chronic mild height loss of the T5 vertebral body. No   acute fractures identified. Multiple median sternotomy wires are present.   There is osseous bridging of the left anterior eighth and ninth ribs.    IMPRESSION:  No acute abnormality identified within the chest.    Bibasilar atelectasis without evidence of consolidation.    Borderline hepatomegaly with extensive coronary artery calcifications   status post CABG. There is alsoa prosthetic aortic valve.    Post surgical changes from prior open thoracoabdominal aortic bypass   graft with new focal dilatation of the distalmost aspect of the graft   measuring 4.4 x 6.1 cm just above the aortic bifurcation and previously   measuring 3.2 x 4.1 cm on 6/11/2021. Further evaluation with CT angiogram   is recommended.    Additional chronic and incidental findings are present as detailed above.         Review of Systems : per HPI         Vital Signs Last 24 Hrs  T(C): 36.3 (26 Mar 2024 05:58), Max: 36.9 (25 Mar 2024 20:02)  T(F): 97.4 (26 Mar 2024 05:58), Max: 98.5 (25 Mar 2024 20:02)  HR: 75 (26 Mar 2024 05:58) (71 - 86)  BP: 91/54 (26 Mar 2024 05:58) (91/54 - 138/67)  BP(mean): --  RR: 18 (26 Mar 2024 05:58) (17 - 18)  SpO2: 100% (26 Mar 2024 05:58) (99% - 100%)    Parameters below as of 26 Mar 2024 05:58  Patient On (Oxygen Delivery Method): room air            Physical Exam:  frail 68 y o woman lying comfortably in semi Reeder's position , awake , alert , no acute complaints , feels tired     Head: normocephalic , atraumatic    Eyes: PERRLA , EOMI , no nystagmus , sclera anicteric    ENT / FACE: neg nasal discharge , uvula midline , no oropharyngeal erythema / exudate    Neck: supple , negative JVD , negative carotid bruits , no thyromegaly    Chest: midline well healed surgical scar , CTA bilaterally     Cardiovascular: regular rate and rhythm ,  audible click    Abdomen: soft , non distended , non tender to palpation in all 4 quadrants ,  normal bowel sounds     Extremities: WWP , neg cyanosis /clubbing / edema     Neurologic Exam:     Alert and oriented  x 3        Motor Exam:        > 3+/5 x 4 extremities       Sensation:         intact to light touch x 4 extremities     DTR:           biceps/brachioradialis: equal                            patella/ankle: equal       Gait:  not tested           PM&R Impression: admitted for intermittent chest pain and KRAFT x 3 days    - anemia, CHERRI    - deconditioned          Recommendations / Plan:       1) Physical / Occupational therapy focusing on therapeutic exercises , equipment evaluation , bed mobility/transfer out of bed evaluation , progressive ambulation with assistive devices prn .    2) Current disposition plan recommendation:    pending functional progress

## 2024-03-26 NOTE — PROGRESS NOTE ADULT - PROBLEM SELECTOR PLAN 2
While on monitor after RR, patient has a witnessed tonic-clonic seizure (rhythmic jerking on b/l UE and blinking, w/ frothing at the mouth, mastication and AMS). Fingerstick glucose 151. Event lasted ~1 minute with post-ictal state. 2g of IV Ativan was pushed. Epilepsy was alerted, Keppra load continued. Unclear if patient is on any seizure meds at home, unclear of last seizure.     Plan:  - Epilepsy consulted, appreciate recommendations  - Continue vEEG; negative so far  - Keppra 250mg IV qd (renal dosing)  - Collateral on seizure disorder

## 2024-03-26 NOTE — CONSULT NOTE ADULT - PROBLEM SELECTOR RECOMMENDATION 2
Pt p/w chest and abdominal pain with ?radiation to the back. Hx of AAA s/p open repair in 2015 followed saccular AAA and contained rupture adjacent to previous stent graft s/p open thoracoabdominal aneurysm repair with graft and bypasses to the celiac, SMA, left renal, right renal in 8/2020. CT A/P showing post surgical changes from prior open thoracoabdominal aortic bypass graft with new focal dilatation of the distalmost aspect of the graft measuring 4.4 x 6.1 cm just above the aortic bifurcation and previously measuring 3.2 x 4.1 cm on 6/11/2021. Vascular consulted in the ED, no acute interventions.   - Strict SBP control 120-140 to lower risk of AAA rupture  - plan for CTA C/A/P w/ contrast to better evaluate the AAA leak. s/p 250cc bolus and on 50cc/hr for renal protection in anticipation of receiving contrast. 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  - f/u vascular recs

## 2024-03-26 NOTE — PROGRESS NOTE ADULT - PROBLEM SELECTOR PLAN 1
Patient p/w KRAFT and chest pain, VSS. No evidence of hypoxia.   Associated with intermittent chest pain although does not appear to be pleuritic in nature.  Denies cough, fevers, s/s of bleeding. No recent travel, immobilization although has been walking less over the past week.   Denies hx of cancer, clotting disorders, no relevant family hx.  Labs showing elevated D-dimer 2831, p-BNP elevated to 3727  CXR + CT showing bibasilar atelectasis over left lung zones.   Ddx includes symptomatic anemia, severe AS, PE, HF exacerbation (less likely), ACS (unlikely).  - f/u B/L LE duplex   - consider V/Q scan v. CTA if Cr improving   - treat anemia as below  - f/ u TTE  - CTSX consult in the AM  - given concern for bleeding and VSS stable, monitor off full dose AC tonight, can start heparin if  Hgb remains stable on AM labs Patient p/w KRAFT and chest pain, VSS. No evidence of hypoxia.   Associated with intermittent chest pain although does not appear to be pleuritic in nature.  Denies cough, fevers, s/s of bleeding. No recent travel, immobilization although has been walking less over the past week.   Denies hx of cancer, clotting disorders, no relevant family hx.  Labs showing elevated D-dimer 2831, p-BNP elevated to 3727  CXR + CT showing bibasilar atelectasis over left lung zones.   Ddx includes symptomatic anemia, severe AS, PE, HF exacerbation (less likely), ACS (unlikely).  - f/u B/L LE duplex     - treat anemia as below  - f/ u TTE  - CTSX consult in the AM  - given concern for bleeding and VSS stable, monitor off full dose AC tonight, can start heparin if  Hgb remains stable on AM labs Pt presented with AMS morning of 3/26- Rapid response team called. AMS thought to be due to pt's seizure disorder. More information needs to be obtained of pt's seizure history/seizure medications  -Pt stepped up to telemetry Pt. experienced AMS this AM for which RRT was called. Patient unresponsive and drooling when medical team presented to bedside. AMS likely 2/2 seizure and subsequent post-ictal state. Patient slowly returned back to baseline mental status. Unclear if patient is on any seizure meds at home, unclear of last seizure. Pt stepped up to telemetry    - collateral on seizure disorder  - keppra loading dose  - neuro/epilepsy consult

## 2024-03-26 NOTE — PROGRESS NOTE ADULT - PROBLEM SELECTOR PLAN 2
Patient p/w KRAFT and chest pain, VSS found to be anemic with Hgb 6.9.  Patient with hx of TASNEEM, gets once or twice weekly iron infusions outpatient, hematologist is Dr. Izquierdo.  No s/s of bleeding, baseline hgb reportedly 9-10. On eliquis 2.5mg bid, does not take any antiplatelet agents.   Given 1u pRBC in the ED.  Ddx includes GIB (although pt constipated, no other signs of bleed), AoCD, aortic aneurysm leak, abdominal bleeding.  - monitor post-transfusion CBC --> improved to Hgb 8.4  - f/u hemolysis labs (hx of valve replacement)  - maintain active T&S  - maintain 2 large-bore IVs  - transfuse if Hgb < 7  - GI consult Patient p/w KRAFT and chest pain, VSS found to be anemic with Hgb 6.9.  Patient with hx of TASNEEM, gets once or twice weekly iron infusions outpatient, hematologist is Dr. Izquierdo.  No s/s of bleeding, baseline hgb reportedly 9-10. On eliquis 2.5mg bid, does not take any antiplatelet agents.   Given 1u pRBC in the ED.  Ddx includes GIB (although pt constipated, no other signs of bleed), AoCD, aortic aneurysm leak, abdominal bleeding.  - monitor post-transfusion CBC --> improved to Hgb 8.4  - f/u hemolysis labs (hx of valve replacement)  - maintain active T&S  - maintain 2 large-bore IVs  - transfuse if Hgb < 7  - GI consult due to episode of bloody bowel movement Hx of AAA s/p open repair in 2015 followed saccular AAA and contained rupture adjacent to previous stent graft s/p open thoracoabdominal aneurysm repair with graft and bypasses to the celiac, SMA, left renal, right renal in 8/2020.  CT A/P showing post surgical changes from prior open thoracoabdominal aortic bypass graft with new focal dilatation of the distalmost aspect of the graft measuring 4.4 x 6.1 cm just above the aortic bifurcation and previously measuring 3.2 x 4.1 cm on 6/11/2021.   - Vascular consulted: plan to do CTA C/A/P to evaluate AAA Hx of AAA s/p open repair in 2015 followed saccular AAA and contained rupture adjacent to previous stent graft s/p open thoracoabdominal aneurysm repair with graft and bypasses to the celiac, SMA, left renal, right renal in 8/2020.  CT A/P showing post surgical changes from prior open thoracoabdominal aortic bypass graft with new focal dilatation of the distalmost aspect of the graft measuring 4.4 x 6.1 cm just above the aortic bifurcation and previously measuring 3.2 x 4.1 cm on 6/11/2021.     - Vascular consulted: plan to do CTA C/A/P to evaluate AAA Hx of AAA s/p open repair in 2015 followed saccular AAA and contained rupture adjacent to previous stent graft s/p open thoracoabdominal aneurysm repair with graft and bypasses to the celiac, SMA, left renal, right renal in 8/2020.  CT A/P showing post surgical changes from prior open thoracoabdominal aortic bypass graft with new focal dilatation of the distalmost aspect of the graft measuring 4.4 x 6.1 cm just above the aortic bifurcation and previously measuring 3.2 x 4.1 cm on 6/11/2021.     - Vascular consulted: plan to do CTA C/A/P to evaluate AAA but concern for worsening renal fx given CHERRI on CKD, can consider pre-hydration Keppra Loading Dose   ICU consult   Epilepsy Consult   Transfer to [facility]

## 2024-03-26 NOTE — PROGRESS NOTE ADULT - PROBLEM SELECTOR PLAN 12
F: none  E: replete as needed  N: DASH  DVT ppx: hold iso bleed, start in the AM if Hgb stable  Activity: ambulate w assistance  Dispo: F F: none  E: replete as needed  N: NPO  DVT ppx: hold iso bleed   Activity: ambulate w assistance  Dispo: F --> Tele Problem: Major depression.   Hx of depression, home med: sertraline 50mg qd.    - c/w home med.

## 2024-03-26 NOTE — PROGRESS NOTE ADULT - PROBLEM SELECTOR PLAN 5
Patient reporting hx of constipation x 1 week.   Mild abdominal pain, on exam no guarding or rebound present.  Hx of abdominal surgery, but no alarm symptoms present.   - start miralax, senna  - monitor for BMs  - consider lactulose v enema if needed Patient with hx of HFpEF. Last TTE on file 04/2023: Borderline normal left ventricular systolic function 50%. Mid and apical inferior septum, apical lateral segment, and apex are hypokinetic. Severely dilated left atrium. Significant bioprosthetic aortic valve stenosis.   prosthetic aortic stenosis. Severe mitral stenosis. Moderate-to-severe mitral regurgitation. Severe tricuspid regurgitation. PASP 63 mmHg.  Home meds: lopressor 50g bid, lasix 40mg qd    - hold home meds for now Patient p/w CHERRI on CKD (baseline reportedly 1.7-2.0 outpatient).   Labs on admission: BUN/Cr 48/3.00  Reports poor PO intake over the past week due to feeling constipated.   Likely CHERRI 2/2 prerenal causes.      - f/up urine lytes  - bladder scan done and not showing signs of retention   - monitor BMP  - encourage PO fluid intake, consider bolus v. mIVF if Cr worsening  - avoid nephrotoxic agents

## 2024-03-26 NOTE — CONSULT NOTE ADULT - ASSESSMENT
Patient is a 67yo F former smoker (quit 20 year ago with PMH of hypothyroidism, seizure disorder, Stage 3 CKD (baseline CR ~1.7-2.0), severe renal artery stenosis, chronic anemia (baseline Hgb ~9-10, gets weekly Fe infusion last infusion 3/22/24), AT, Afib s/p AVN ablation and CRT-P (8/2022; on Eliquis), HTN, HLD, CAD s/p CABG, AVR (porcine bio-prosthetic valve) and mitral annuloplasty, HFpEF, AAA s/p open repair in 2015 with complications, PAD s/p L pSFA stent/L-TIFFANY stent/L-IIA stent who presents with intermittent chest pain and KRAFT x 3 days, admitted for symptomatic anemia and r/o PE. RR called on 3/26AM for AMS c/f seizure, stepped up to telemetry for further monitoring. Patient is a 69yo F former smoker (quit 20 year ago with PMH of hypothyroidism, seizure disorder, Stage 3 CKD (baseline CR ~1.7-2.0), severe renal artery stenosis, chronic anemia (baseline Hgb ~9-10, gets weekly Fe infusion last infusion 3/22/24), AT, Afib s/p AVN ablation and CRT-P (8/2022; on Eliquis), HTN, HLD, CAD s/p CABG, AVR (porcine bio-prosthetic valve) and mitral annuloplasty, HFpEF, AAA s/p open repair in 2015 with complications, PAD s/p L pSFA stent/L-TIFFANY stent/L-IIA stent who presents with intermittent chest pain and KRAFT x 3 days, admitted for symptomatic anemia and r/o PE. RR called on 3/26AM for AMS w/ witnessed seizure, s/p 40mg/kg keppra load and 2g Ativan push stepped up to telemetry for further monitoring.

## 2024-03-26 NOTE — CONSULT NOTE ADULT - PROBLEM SELECTOR RECOMMENDATION 4
Patient p/w KRAFT and chest pain, VSS found to be anemic with Hgb 6.9.  Patient with hx of TASNEEM, gets once or twice weekly iron infusions outpatient, hematologist is Dr. Izquierdo.  No s/s of bleeding, baseline hgb reportedly 9-10. On eliquis 2.5mg bid, does not take any antiplatelet agents.   Given 1u pRBC in the ED.  Ddx includes GIB (although pt constipated, no other signs of bleed), AoCD, aortic aneurysm leak, abdominal bleeding.  - monitor post-transfusion CBC --> improved to Hgb 8.4  - f/u hemolysis labs (hx of valve replacement)  - maintain active T&S  - maintain 2 large-bore IVs  - transfuse if Hgb < 7  - GI consult.

## 2024-03-26 NOTE — CONSULT NOTE ADULT - PROBLEM SELECTOR RECOMMENDATION 12
F: none  E: replete as needed  N: NPO while on seizure precautions  DVT ppx: hold iso GIB  FULL CODE

## 2024-03-26 NOTE — PROGRESS NOTE ADULT - PROBLEM SELECTOR PLAN 6
Hx of Afib and Atach. Known to Dr. Jasso.   Found to be in slow AFib with HR to 30s during last admission, now s/p AVN ablation and Bi-V PPM.   Home meds: eliquis 2.5mg bid, lopressor 50mg bid  - hold home meds iso possible bleed Patient p/w KRAFT and chest pain, VSS. No evidence of hypoxia. Associated with intermittent chest pain although does not appear to be pleuritic in nature.  Denies cough, fevers, s/s of bleeding. No recent travel, immobilization although has been walking less over the past week. Denies hx of cancer, clotting disorders, no relevant family hx.  Labs showing elevated D-dimer 2831, p-BNP elevated to 3727  CXR + CT showing bibasilar atelectasis over left lung zones.   Ddx includes symptomatic anemia, severe AS, PE, HF exacerbation (less likely), ACS (unlikely).    - f/u B/L LE duplex   - consider V/Q scan v. CTA if Cr improving   - treat anemia   - f/ u TTE  - consider CTSX consult Patient with hx of HFpEF. Last TTE on file 04/2023: Borderline normal left ventricular systolic function 50%. Mid and apical inferior septum, apical lateral segment, and apex are hypokinetic. Severely dilated left atrium. Significant bioprosthetic aortic valve stenosis.   prosthetic aortic stenosis. Severe mitral stenosis. Moderate-to-severe mitral regurgitation. Severe tricuspid regurgitation. PASP 63 mmHg.  Home meds: lopressor 50g bid, lasix 40mg qd    - hold home meds for now

## 2024-03-26 NOTE — PROGRESS NOTE ADULT - PROBLEM SELECTOR PLAN 5
Patient p/w CHERRI on CKD (baseline reportedly 1.7-2.0 outpatient). Labs on admission: BUN/Cr 48/3.00 Reports poor PO intake over the past week due to feeling constipated. Likely CHERRI 2/2 prerenal causes.  -Patient voiding independently    Plan:  - Follow up urine electrolytes  - Encourage PO fluid intake, consider bolus v. mIVF if Cr worsening  - Avoid nephrotoxic agents

## 2024-03-26 NOTE — PROGRESS NOTE ADULT - PROBLEM SELECTOR PLAN 7
Patient p/w KRAFT and chest pain, VSS. No evidence of hypoxia. Associated with intermittent chest pain although does not appear to be pleuritic in nature. Denies cough, fevers, s/s of bleeding. No recent travel, immobilization although has been walking less over the past week. Denies history of cancer, clotting disorders, no relevant family history. Differential includes symptomatic anemia, severe AS, PE, HF exacerbation (less likely), ACS (unlikely).  -Labs showing elevated D-dimer 2831, p-BNP elevated to 3727  -CXR + CT showing bibasilar atelectasis over left lung zones    Plan:  - Duplex U/S bilateral LEs   - Treatment of anemia as above  - Follow up TTE

## 2024-03-26 NOTE — CONSULT NOTE ADULT - ATTENDING COMMENTS
Events noted. Suspect seizure. Agree with EEG and Keppra load. Pt protecting her airway. Epilepsy to see. Appreciate vascular surgery concern regarding drop in Hb. Maintain Hb > 8. Pt warm and well perfused. Good UO. Lactate elevation likely secondary to seizure. Repeat labs.

## 2024-03-26 NOTE — PROGRESS NOTE ADULT - PROBLEM SELECTOR PROBLEM 5
Constipation Chronic heart failure with preserved ejection fraction (HFpEF) Acute kidney injury superimposed on CKD

## 2024-03-26 NOTE — PROGRESS NOTE ADULT - PROBLEM SELECTOR PLAN 8
#HTN  #HLD  Hx of CAD s/p CABG, bioAVR/ mitral annuloplasty in 2002, Hx of PCI (unknown date). S/p BiV-PPM placement.  EKG: paced rhythm. Trops negative.   Home meds: Crestor 40mg qhs, lopressor 50mg bid, losartan 25mg qd.  - c/w crestor TI --> atorvastatin 80mg qhs  - hold losartan iso CHERRI  - hold lopressor given concern for possible bleed  - f/u repeat trops #HTN  #HLD  Hx of CAD s/p CABG, bioAVR/ mitral annuloplasty in 2002, Hx of PCI (unknown date). S/p BiV-PPM placement.  EKG: paced rhythm. Trops negative.   Home meds: Crestor 40mg qhs, lopressor 50mg bid, losartan 25mg qd.    - c/w crestor TI --> atorvastatin 80mg qhs  - hold losartan iso CHERRI  - hold lopressor given concern for possible bleed Hx of Afib and Atach. Known to Dr. Jasso.   Found to be in slow AFib with HR to 30s during last admission, now s/p AVN ablation and Bi-V PPM.   Home meds: eliquis 2.5mg bid, lopressor 50mg bid    - hold home meds iso possible bleed

## 2024-03-26 NOTE — PROGRESS NOTE ADULT - PROBLEM SELECTOR PLAN 6
Patient with history of HFpEF. Last TTE on file 04/2023 w/ borderline normal left ventricular systolic function 50%. Mid and apical inferior septum, apical lateral segment, and apex are hypokinetic. Severely dilated left atrium. Significant bioprosthetic aortic valve stenosis.   -Prosthetic aortic stenosis. Severe mitral stenosis. Moderate-to-severe mitral regurgitation. Severe tricuspid regurgitation. PASP 63 mmHg.  -Home meds: lopressor 50g bid, Lasix 40mg qd    Plan:  - Hold home meds for now; dry on exam w/ low BP

## 2024-03-26 NOTE — PROGRESS NOTE ADULT - ASSESSMENT
Patient is a 68-year-old F, former smoker (quit 20 year ago) with PMH of hypothyroidism, seizure disorder, stage 3 CKD (baseline CR ~1.7-2.0), severe renal artery stenosis, chronic anemia (baseline Hgb ~ 9-10, gets weekly iron infusion last infusion 3/22/24), AT, Afib s/p AVN ablation and CRT-P (8/2022; on Eliquis), HTN, HLD, CAD s/p CABG, AVR (porcine bio-prosthetic valve) and mitral annuloplasty, HFpEF, AAA s/p open repair in 2015 with complications, PAD s/p L pSFA stent/L-TIFFANY stent/L-IIA stent who presents with intermittent chest pain and KRAFT x 3 days, admitted for symptomatic anemia.

## 2024-03-26 NOTE — CONSULT NOTE ADULT - SUBJECTIVE AND OBJECTIVE BOX
CRITICAL CARE INITIAL CONSULT NOTE    HPI: Patient is a 67yo F former smoker (quit 20 year ago with PMH of hypothyroidism, seizure disorder, Stage 3 CKD (baseline CR ~1.7-2.0), severe renal artery stenosis, chronic anemia (baseline Hgb ~9-10, gets weekly Fe infusion last infusion 3/22/24), AT, Afib s/p AVN ablation and CRT-P (8/2022; on Eliquis), HTN, HLD, CAD s/p CABG, AVR (porcine bio-prosthetic valve) and mitral annuloplasty, HFpEF, AAA s/p open repair in 2015 with complications, PAD s/p L pSFA stent/L-TIFFANY stent/L-IIA stent who presents with intermittent chest pain and KRAFT x 3 days. States she has been having intermittent chest pain and KRAFT which worsened about 3 days ago. She has not been very mobile for the past week due to pain and KRAFT. At baseline ambulates with cane, however has not been mostly sitting on the couch due to current symptoms. Additionally c/o lower abdominal pain which she attributes to constipation (has not had a BM in 5-6 days). Usually has regular BMs. Has had very poor PO intake over the past few days 2/2 constipation. Denies any s/s of bleeding; denies hematuria, hemoptysis, hematemesis, etc. No recent illness/sick contacts. Denies any recent travel. Denies hx of cancer, bleeding/clotting disorders, lower extremity pain or swelling. Compliant with her medications.  No other complaints. States she feels mildly improved after blood transfusion.     In the ED:  Initial vital signs: T: 98 F, HR: 86, BP: 114/72, R: 17, SpO2: 100% on RA  Labs: significant for Hgb 6.9, D-dimer 2831, Cl 111, HCO3 19, BUN/Cr 48/3.00, Ca 10.7, trop 41, p-BNP 3727  CXR: Persistent linear atelectasis over the left lower lung zone. The remainder the lung zones are clear. No pneumothorax.  CT C/A/P: Bibasilar atelectasis without evidence of consolidation. Borderline hepatomegaly with extensive coronary artery calcifications status post CABG. There is also a prosthetic aortic valve. Post surgical changes from prior open thoracoabdominal aortic bypass graft with new focal dilatation of the distalmost aspect of the graft measuring 4.4 x 6.1 cm just above the aortic bifurcation and previously measuring 3.2 x 4.1 cm on 6/11/2021.   EKG: paced rhythm  Medications: 1u pRBC, maalox 30mL PO x 1, morphine 2mg IV x 1  Consults: vascular (25 Mar 2024 22:30)    ADDITIONAL MEDICINE HPI:    REVIEW OF SYSTEMS:   Otherwise negative except as specified in HPI    PAST MEDICAL HISTORY:     PAST SURGICAL HISTORY:    FAMILY HISTORY:    SOCIAL HISTORY:  Tobacco use:  EtOH use:  Illicit drug use:    MEDICATIONS:  MEDICATIONS  (STANDING):  atorvastatin 80 milliGRAM(s) Oral at bedtime  cilostazol 50 milliGRAM(s) Oral every 12 hours  levETIRAcetam  IVPB 2000 milliGRAM(s) IV Intermittent once  levothyroxine 75 MICROGram(s) Oral every 24 hours  naloxone Injectable 0.2 milliGRAM(s) IV Push once  pantoprazole  Injectable 40 milliGRAM(s) IV Push every 12 hours  polyethylene glycol 3350 17 Gram(s) Oral every 24 hours  senna 2 Tablet(s) Oral at bedtime  sertraline 50 milliGRAM(s) Oral every 24 hours  sodium chloride 0.9%. 1000 milliLiter(s) (50 mL/Hr) IV Continuous <Continuous>    MEDICATIONS  (PRN):  acetaminophen     Tablet .. 650 milliGRAM(s) Oral every 6 hours PRN Temp greater or equal to 38C (100.4F), Mild Pain (1 - 3)    ALLERGIES:  Allergies    No Known Allergies    Intolerances    VITAL SIGNS:  Vital Signs Last 24 Hrs  T(C): 36.3 (26 Mar 2024 05:58), Max: 36.9 (25 Mar 2024 20:02)  T(F): 97.4 (26 Mar 2024 05:58), Max: 98.5 (25 Mar 2024 20:02)  HR: 75 (26 Mar 2024 05:58) (71 - 86)  BP: 91/54 (26 Mar 2024 05:58) (91/54 - 138/67)  BP(mean): --  RR: 18 (26 Mar 2024 05:58) (17 - 18)  SpO2: 100% (26 Mar 2024 05:58) (99% - 100%)    Parameters below as of 26 Mar 2024 05:58  Patient On (Oxygen Delivery Method): room air    PHYSICAL EXAM:  Constitutional: thin female, resting comfortably in bed and answering questions; NAD  Head: NC/AT  Eyes: PERRL, EOMI, anicteric sclera  ENT: no nasal discharge; dry MM  Neck: supple; no JVD or thyromegaly  Respiratory: CTA B/L; no W/R/R, no retractions  Cardiac: +S1/S2; RRR; no M/R/G; some TTP of right-middle chest wall  Gastrointestinal: abdomen soft, diffusely TTP worst in epigastric region; no rebound or guarding; +BSx4  Back: spine midline, no bony tenderness or step-offs; no CVAT B/L  Extremities: WWP, no clubbing or cyanosis; no peripheral edema  Musculoskeletal: NROM x4; no joint swelling, tenderness or erythema  Dermatologic: skin warm, dry and intact; large midline scar (well healed) extending from chest to groin  Neurologic: AAOx3 but doesn't recall medical history; CNII-XII grossly intact; no focal deficits  Psychiatric: affect and characteristics of appearance, verbalizations, behaviors are appropriate    LABS:                        8.4    11.62 )-----------( 184      ( 26 Mar 2024 09:12 )             27.1     03-26    136  |  105  |  x   ----------------------------<  x   4.2   |  x   |  x     Ca    10.1      26 Mar 2024 07:13  Phos  3.5     03-26  Mg     2.4     03-26    TPro  7.7  /  Alb  4.4  /  TBili  0.2  /  DBili  x   /  AST  23  /  ALT  13  /  AlkPhos  100  03-25    PT/INR - ( 26 Mar 2024 09:44 )   PT: 11.5 sec;   INR: 1.01       PTT - ( 26 Mar 2024 09:44 )  PTT:24.9 sec  Urinalysis Basic - ( 26 Mar 2024 07:13 )    Color: x / Appearance: x / SG: x / pH: x  Gluc: 109 mg/dL / Ketone: x  / Bili: x / Urobili: x   Blood: x / Protein: x / Nitrite: x   Leuk Esterase: x / RBC: x / WBC x   Sq Epi: x / Non Sq Epi: x / Bacteria: x    CARDIAC MARKERS ( 25 Mar 2024 23:56 )  x     / x     / 350 U/L / x     / 7.0 ng/mL    CAPILLARY BLOOD GLUCOSE    POCT Blood Glucose.: 164 mg/dL (26 Mar 2024 09:11)    RADIOLOGY & ADDITIONAL TESTS: Reviewed.

## 2024-03-26 NOTE — CHART NOTE - NSCHARTNOTEFT_GEN_A_CORE
ERCP performed in endoscopy suite with Dr Chavez and myself, findings as noted below:    Impression:  ERCP s/p biliary stent placement (10 Fr x 7 cm)    Plan:  -Keep NPO pending IR intervention  -Monitor Liver enzymes      -c/w empiric abxs      -IR for hepatic abscess drainage      -CCY as per primary team     -Return to floor for further management

## 2024-03-26 NOTE — PROGRESS NOTE ADULT - ASSESSMENT
67 yo female, former smoker, with PMHx of HTN, hyperlipidemia, CAD s/p prior PCI and CABG, bioAVR/mitral annuloplasty in 2002, PAD s/p LSFA  and L TIFFANY (history of occluded bilateral SFA), AAA (last repaired in 2020), chronic anemia (receives Iron infusions, last 8/23), hypothyroid, CKD Stage III, Atach and Afib (non anticoagulated),  +BI-V PPM implant on 8/24. for 3 days has been experiencing exertional sob. CT A/P non con shows: Post surgical changes from prior open thoracoabdominal aortic bypass graft with new focal dilatation of the distalmost aspect of the graft measuring 4.4 x 6.1 cm just above the aortic bifurcation and previously measuring 3.2 x 4.1 cm on 6/11/2021.    Recommendations  No acute vascular intervention at this time  CTCAP to rule out symptomatic anemia from enlarging aneurysm, aneurysmal rupture, and/or aortoenteric fistula  Trend Hgb  Vascular will continue to follow

## 2024-03-26 NOTE — PROGRESS NOTE ADULT - PROBLEM SELECTOR PLAN 10
Hx of hypothyroidism, home med: synthroid 75mcg.  - c/w home med Problem: Hypothyroidism.   Hx of hypothyroidism, home med: synthroid 75mcg.    - c/w home med. Plan: Hx of peripheral artery disease.  PAD s/p L pSFA stent/L-TIFFANY stent with occluded b/l SFA followed by L-internal iliac artery stent in 5/2021 c/b L-RP hematoma requiring 2 units PRBC with ongoing LLE claudication.  Home med: cilostazol 50mg bid    - c/w home med.

## 2024-03-26 NOTE — PROGRESS NOTE ADULT - PROBLEM SELECTOR PLAN 1
Patient experienced AMS this AM for which RRT was called. Patient unresponsive and drooling when medical team presented to bedside. AMS likely 2/2 seizure w/ subsequent post-ictal state. Patient slowly returned back to baseline mental status. Pt stepped up to telemetry  -RR 3/26: Ativan 2mg and Keppra 3g (load). Noted to have a lactate of 6.8, downtrending to 3.6.  -Epilepsy consulted,    Plan:  - CT head w/o contrast; follow up read  - Treatment of seizure disorder as below Patient experienced AMS this AM for which RRT was called. Patient unresponsive and drooling when medical team presented to bedside. AMS likely 2/2 seizure w/ subsequent post-ictal state. Patient slowly returned back to baseline mental status. Pt stepped up to telemetry  -RR 3/26: Ativan 2mg and Keppra 3g (load). Noted to have a lactate of 6.8, downtrending to 3.6, cleared (0.7).  -Epilepsy consulted.    Plan:  - CT head w/o contrast; follow up read  - Treatment of seizure disorder as below

## 2024-03-26 NOTE — CONSULT NOTE ADULT - PROBLEM SELECTOR RECOMMENDATION 8
#HTN  #HLD  Hx of CAD s/p CABG, bioAVR/ mitral annuloplasty in 2002, Hx of PCI (unknown date). S/p BiV-PPM placement. EKG: paced rhythm. Trops negative. Home meds: Crestor 40mg qhs, lopressor 50mg bid, losartan 25mg qd.  - c/w crestor TI --> atorvastatin 80mg qhs  - hold losartan iso CHERRI  - hold lopressor given concern for possible bleed  - f/u repeat trops.

## 2024-03-26 NOTE — CONSULT NOTE ADULT - PROBLEM SELECTOR RECOMMENDATION 7
Hx of Afib and Atach. Known to Dr. Jasso. Found to be in slow AFib with HR to 30s during last admission, now s/p AVN ablation and Bi-V PPM.   Home meds: eliquis 2.5mg bid, lopressor 50mg bid  - hold home meds iso possible bleed.

## 2024-03-26 NOTE — PROGRESS NOTE ADULT - PROBLEM SELECTOR PLAN 8
History of Afib and Atach. Known to Dr. Jasso.  Found to be in slow AFib with HR to 30s during last admission, now s/p AVN ablation and Bi-V PPM.   -Home meds: Eliquis 2.5mg bid, lopressor 50mg bid    Plan:  - Hold home meds iso possible bleed

## 2024-03-26 NOTE — PROGRESS NOTE ADULT - PROBLEM SELECTOR PROBLEM 7
Chronic heart failure with preserved ejection fraction (HFpEF) Chronic atrial fibrillation Dyspnea on exertion

## 2024-03-26 NOTE — CONSULT NOTE ADULT - SUBJECTIVE AND OBJECTIVE BOX
HPI:  HPI: Patient is a 69yo F former smoker (quit 20 year ago with PMH of hypothyroidism, seizure disorder, Stage 3 CKD (baseline CR ~1.7-2.0), severe renal artery stenosis, chronic anemia (baseline Hgb ~9-10, gets weekly Fe infusion last infusion 3/22/24), AT, Afib s/p AVN ablation and CRT-P (8/2022; on Eliquis), HTN, HLD, CAD s/p CABG, AVR (porcine bio-prosthetic valve) and mitral annuloplasty, HFpEF, AAA s/p open repair in 2015 with complications, PAD s/p L pSFA stent/L-TIFFANY stent/L-IIA stent who presents with intermittent chest pain and KRAFT x 3 days. States she has been having intermittent chest pain and KRAFT which worsened about 3 days ago. She has not been very mobile for the past week due to pain and KRAFT. At baseline ambulates with cane, however has not been mostly sitting on the couch due to current symptoms. Additionally c/o lower abdominal pain which she attributes to constipation (has not had a BM in 5-6 days). Usually has regular BMs. Has had very poor PO intake over the past few days 2/2 constipation. Denies any s/s of bleeding; denies hematuria, hemoptysis, hematemesis, etc. No recent illness/sick contacts. Denies any recent travel. Denies hx of cancer, bleeding/clotting disorders, lower extremity pain or swelling. Compliant with her medications.  No other complaints. States she feels mildly improved after blood transfusion.     In the ED:  Initial vital signs: T: 98 F, HR: 86, BP: 114/72, R: 17, SpO2: 100% on RA  Labs: significant for Hgb 6.9, D-dimer 2831, Cl 111, HCO3 19, BUN/Cr 48/3.00, Ca 10.7, trop 41, p-BNP 3727  CXR: Persistent linear atelectasis over the left lower lung zone. The remainder the lung zones are clear. No pneumothorax.  CT C/A/P: Bibasilar atelectasis without evidence of consolidation. Borderline hepatomegaly with extensive coronary artery calcifications status post CABG. There is also a prosthetic aortic valve. Post surgical changes from prior open thoracoabdominal aortic bypass graft with new focal dilatation of the distalmost aspect of the graft measuring 4.4 x 6.1 cm just above the aortic bifurcation and previously measuring 3.2 x 4.1 cm on 6/11/2021.   EKG: paced rhythm  Medications: 1u pRBC, maalox 30mL PO x 1, morphine 2mg IV x 1  Consults: vascular (25 Mar 2024 22:30)    GI consulted for melena and for consideration of endoscopic evaluation.     Allergies    No Known Allergies    Intolerances      MEDICATIONS:  MEDICATIONS  (STANDING):  atorvastatin 80 milliGRAM(s) Oral at bedtime  cilostazol 50 milliGRAM(s) Oral every 12 hours  levETIRAcetam  IVPB 250 milliGRAM(s) IV Intermittent every 24 hours  levothyroxine 75 MICROGram(s) Oral every 24 hours  pantoprazole  Injectable 40 milliGRAM(s) IV Push every 12 hours  polyethylene glycol 3350 17 Gram(s) Oral every 24 hours  senna 2 Tablet(s) Oral at bedtime  sertraline 50 milliGRAM(s) Oral every 24 hours  sodium chloride 0.9%. 1000 milliLiter(s) (50 mL/Hr) IV Continuous <Continuous>    MEDICATIONS  (PRN):  acetaminophen     Tablet .. 650 milliGRAM(s) Oral every 6 hours PRN Temp greater or equal to 38C (100.4F), Mild Pain (1 - 3)    PAST MEDICAL & SURGICAL HISTORY:  Atherosclerosis of coronary artery  CAD (coronary artery disease)      Peripheral vascular disease  PVD (peripheral vascular disease)      Anemia  Anemia      Hypothyroidism  Hypothyroidism      Gastroesophageal reflux disease  GERD (gastroesophageal reflux disease)      Hyperlipidemia  Hyperlipidemia      Essential hypertension  HTN (hypertension)      Seizure      Anxiety      Depression, unspecified depression type      Chronic kidney disease, unspecified CKD stage      Status post aorto-coronary artery bypass graft  2012      Atherosclerosis of coronary artery bypass graft(s), unspecified, with angina pectoris with documented spasm      H/O aortic valve replacement  Bioprosthetic      Ruptured aortic aneurysm  surgery august 2020      Abdominal aortic aneurysm (AAA) without rupture  2015        FAMILY HISTORY:  FH: myocardial infarction  in Dad (at age 50s)      SOCIAL HISTORY:  [place]: [ ] Current, [ ] Former, [ ] Never; Pack Years:  Alcohol:  Illicit Drugs:    REVIEW OF SYSTEMS:  Constitutional: No fever, chills, weight loss, or fatigue  ENMT:  No visual changes; No difficulty hearing, tinnitus, vertigo; No sinus or throat pain  Neck: No pain or stiffness  Respiratory: No cough, wheezing, or hemoptysis; No shortness of breath  Cardiovascular: No chest pain, palpitations, dizziness, orthopnea, PND, or leg swelling  Gastrointestinal: No abdominal or epigastric pain. No  nausea, vomiting, or hematemesis. No diarrhea, constipation, or steatorrhea. No melena or hematochezia  Genitourinary: No dysuria, urinary frequency/hesitancy, or hematuria  Skin: No itching, burning, rashes or lesions   Musculoskeletal: No joint pain or swelling; No muscle, back or extremity pain    Vital Signs Last 24 Hrs  T(C): 36.7 (26 Mar 2024 17:44), Max: 36.9 (25 Mar 2024 20:02)  T(F): 98 (26 Mar 2024 17:44), Max: 98.5 (25 Mar 2024 20:02)  HR: 74 (26 Mar 2024 16:51) (71 - 75)  BP: 99/52 (26 Mar 2024 16:51) (91/54 - 132/61)  BP(mean): 73 (26 Mar 2024 16:51) (73 - 89)  RR: 18 (26 Mar 2024 16:51) (18 - 18)  SpO2: 100% (26 Mar 2024 16:51) (99% - 100%)    Parameters below as of 26 Mar 2024 16:51  Patient On (Oxygen Delivery Method): room air          PHYSICAL EXAM:    General: female, lying in bed; evaluated patient at time of rapid response   HEENT: MMM, conjunctiva and sclera clear  Gastrointestinal: Soft, non-tender non-distended; Normal bowel sounds; No rebound or guarding  Extremities: Normal range of motion, No clubbing, cyanosis or edema  Neurological: Alert and oriented x3  Skin: Warm and dry. No obvious rash    LABS:                        7.5    11.32 )-----------( 164      ( 26 Mar 2024 11:45 )             24.0     03-26    134<L>  |  106  |  47<H>  ----------------------------<  137<H>  4.0   |  14<L>  |  2.84<H>    Ca    10.4      26 Mar 2024 11:45  Phos  3.5     03-26  Mg     2.4     03-26    TPro  7.1  /  Alb  4.0  /  TBili  0.2  /  DBili  x   /  AST  22  /  ALT  11  /  AlkPhos  92  03-26        RADIOLOGY & ADDITIONAL STUDIES:

## 2024-03-26 NOTE — PROGRESS NOTE ADULT - PROBLEM SELECTOR PLAN 11
Hx of depression, home med: sertraline 50mg qd.  - c/w home med Problem: Major depression.   Hx of depression, home med: sertraline 50mg qd.    - c/w home med. Problem: Hypothyroidism.   Hx of hypothyroidism, home med: synthroid 75mcg.    - c/w home med.

## 2024-03-26 NOTE — PROGRESS NOTE ADULT - PROBLEM SELECTOR PLAN 3
Patient p/w CHERRI on CKD (baseline reportedly 1.7-2.0 outpatient).   Labs on admission: BUN/Cr 48/3.00  Reports poor PO intake over the past week due to feeling constipated.   Likely CHERRI 2/2 prerenal causes.    - urine lytes  - bladder scan x 1  - monitor BMP  - encourage PO fluid intake, consider bolus v. mIVF if Cr worsening  - avoid nephrotoxic agents Patient p/w CHERRI on CKD (baseline reportedly 1.7-2.0 outpatient).   Labs on admission: BUN/Cr 48/3.00  Reports poor PO intake over the past week due to feeling constipated.   Likely CHERRI 2/2 prerenal causes.    - f/up urine lytes  - bladder scan done and not showing signs of retention   - monitor BMP  - encourage PO fluid intake, consider bolus v. mIVF if Cr worsening  - avoid nephrotoxic agents Patient p/w KRAFT and chest pain, VSS found to be anemic with Hgb 6.9.  Patient with hx of TASNEEM, gets once or twice weekly iron infusions outpatient, hematologist is Dr. Izquierdo.  No s/s of bleeding, baseline hgb reportedly 9-10. On eliquis 2.5mg bid, does not take any antiplatelet agents.   Given 1u pRBC in the ED.  Ddx includes GIB (although pt constipated, no other signs of bleed), AoCD, aortic aneurysm leak, abdominal bleeding.  - monitor post-transfusion CBC --> improved to Hgb 8.4  - GI consult due to episode of bloody bowel movement  - f/u hemolysis labs (hx of valve replacement)  - maintain active T&S  - maintain 2 large-bore IVs  - transfuse if Hgb < 7 Patient p/w KRAFT and chest pain, VSS found to be anemic with Hgb 6.9. s/p 1u pRBC   Patient with hx of TASNEEM, gets once or twice weekly iron infusions outpatient, hematologist is Dr. Izquierdo.  No s/s of bleeding, baseline hgb reportedly 9-10. On eliquis 2.5mg bid, does not take any antiplatelet agents.   Given 1u pRBC in the ED.  Ddx includes GIB (although pt constipated, no other signs of bleed), AoCD, aortic aneurysm leak, abdominal bleeding.    - monitor post-transfusion CBC --> improved to Hgb 8.4  - GI consult due to episode of bloody bowel movement  - f/u hemolysis labs (hx of valve replacement)  - maintain active T&S  - maintain 2 large-bore IVs  - transfuse if Hgb < 7 Patient p/w KRAFT and chest pain, VSS found to be anemic with Hgb 6.9. s/p 1u pRBC   Patient with hx of TASNEEM, gets once or twice weekly iron infusions outpatient, hematologist is Dr. Izquierdo.  This AM patient reported an episode of melena, KASH positive for melena as well   On eliquis 2.5mg bid, does not take any antiplatelet agents or NSAIDs . Last Eliquis dose: 3/25 around 10AM      - GI consulted, will f/u recs   - vascular consulted will continue to follow recs   - f/u hemolysis labs (hx of valve replacement)  - maintain active T&S  - maintain 2 large-bore IVs  - transfuse if Hgb < 7 Hx of AAA s/p open repair in 2015 followed saccular AAA and contained rupture adjacent to previous stent graft s/p open thoracoabdominal aneurysm repair with graft and bypasses to the celiac, SMA, left renal, right renal in 8/2020.  CT A/P showing post surgical changes from prior open thoracoabdominal aortic bypass graft with new focal dilatation of the distalmost aspect of the graft measuring 4.4 x 6.1 cm just above the aortic bifurcation and previously measuring 3.2 x 4.1 cm on 6/11/2021.     - Vascular consulted: plan to do CTA C/A/P to evaluate AAA but concern for worsening renal fx given CHERRI on CKD, can consider pre-hydration

## 2024-03-26 NOTE — PROGRESS NOTE ADULT - PROBLEM SELECTOR PLAN 7
Hx of HFpEF.   Last TTE on file 04/2023: Borderline normal left ventricular systolic function 50%. Mid and apical inferior septum, apical lateral segment, and apex are hypokinetic. Severely dilated left atrium. Significant bioprosthetic aortic valve stenosis.   prosthetic aortic stenosis. Severe mitral stenosis. Moderate-to-severe mitral regurgitation. Severe tricuspid regurgitation. PASP 63 mmHg.  Home meds: lopressor 50g bid, lasix 40mg qd  - hold home meds Hx of Afib and Atach. Known to Dr. Jasso.   Found to be in slow AFib with HR to 30s during last admission, now s/p AVN ablation and Bi-V PPM.   Home meds: eliquis 2.5mg bid, lopressor 50mg bid    - hold home meds iso possible bleed Patient p/w KRAFT and chest pain, VSS. No evidence of hypoxia. Associated with intermittent chest pain although does not appear to be pleuritic in nature.  Denies cough, fevers, s/s of bleeding. No recent travel, immobilization although has been walking less over the past week. Denies hx of cancer, clotting disorders, no relevant family hx.  Labs showing elevated D-dimer 2831, p-BNP elevated to 3727  CXR + CT showing bibasilar atelectasis over left lung zones.   Ddx includes symptomatic anemia, severe AS, PE, HF exacerbation (less likely), ACS (unlikely).    - f/u B/L LE duplex   - consider V/Q scan v. CTA if Cr improving   - treat anemia   - f/ u TTE  - consider CTSX consult

## 2024-03-26 NOTE — CONSULT NOTE ADULT - SUBJECTIVE AND OBJECTIVE BOX
Neurology Consult    Patient is a 68y old  Female who presents with a chief complaint of anemia, CHERRI (26 Mar 2024 10:44). Epilepsy consulted for seizure rule out      HPI:  HPI: Patient is a 69yo F former smoker (quit 20 year ago with PMH of hypothyroidism, seizure disorder, Stage 3 CKD (baseline CR ~1.7-2.0), severe renal artery stenosis, chronic anemia (baseline Hgb ~9-10, gets weekly Fe infusion last infusion 3/22/24), AT, Afib s/p AVN ablation and CRT-P (8/2022; on Eliquis), HTN, HLD, CAD s/p CABG, AVR (porcine bio-prosthetic valve) and mitral annuloplasty, HFpEF, AAA s/p open repair in 2015 with complications, PAD s/p L pSFA stent/L-TIFFANY stent/L-IIA stent who presents with intermittent chest pain and KRAFT x 3 days. States she has been having intermittent chest pain and KRAFT which worsened about 3 days ago. She has not been very mobile for the past week due to pain and KRAFT. At baseline ambulates with cane, however has not been mostly sitting on the couch due to current symptoms. Additionally c/o lower abdominal pain which she attributes to constipation (has not had a BM in 5-6 days). Usually has regular BMs. Has had very poor PO intake over the past few days 2/2 constipation. Denies any s/s of bleeding; denies hematuria, hemoptysis, hematemesis, etc. No recent illness/sick contacts. Denies any recent travel. Denies hx of cancer, bleeding/clotting disorders, lower extremity pain or swelling. Compliant with her medications.  No other complaints. States she feels mildly improved after blood transfusion.     In the ED:  Initial vital signs: T: 98 F, HR: 86, BP: 114/72, R: 17, SpO2: 100% on RA  Labs: significant for Hgb 6.9, D-dimer 2831, Cl 111, HCO3 19, BUN/Cr 48/3.00, Ca 10.7, trop 41, p-BNP 3727  CXR: Persistent linear atelectasis over the left lower lung zone. The remainder the lung zones are clear. No pneumothorax.  CT C/A/P: Bibasilar atelectasis without evidence of consolidation. Borderline hepatomegaly with extensive coronary artery calcifications status post CABG. There is also a prosthetic aortic valve. Post surgical changes from prior open thoracoabdominal aortic bypass graft with new focal dilatation of the distalmost aspect of the graft measuring 4.4 x 6.1 cm just above the aortic bifurcation and previously measuring 3.2 x 4.1 cm on 6/11/2021.   EKG: paced rhythm  Medications: 1u pRBC, maalox 30mL PO x 1, morphine 2mg IV x 1  Consults: vascular (25 Mar 2024 22:30)      PAST MEDICAL & SURGICAL HISTORY:  Atherosclerosis of coronary artery  CAD (coronary artery disease)      Peripheral vascular disease  PVD (peripheral vascular disease)      Anemia  Anemia      Hypothyroidism  Hypothyroidism      Gastroesophageal reflux disease  GERD (gastroesophageal reflux disease)      Hyperlipidemia  Hyperlipidemia      Essential hypertension  HTN (hypertension)      Seizure      Anxiety      Depression, unspecified depression type      Chronic kidney disease, unspecified CKD stage      Status post aorto-coronary artery bypass graft  2012      Atherosclerosis of coronary artery bypass graft(s), unspecified, with angina pectoris with documented spasm      H/O aortic valve replacement  Bioprosthetic      Ruptured aortic aneurysm  surgery august 2020      Abdominal aortic aneurysm (AAA) without rupture  2015          FAMILY HISTORY:  FH: myocardial infarction  in Dad (at age 50s)        Social History: (-) x 3    Allergies    No Known Allergies    Intolerances        MEDICATIONS  (STANDING):  atorvastatin 80 milliGRAM(s) Oral at bedtime  cilostazol 50 milliGRAM(s) Oral every 12 hours  levothyroxine 75 MICROGram(s) Oral every 24 hours  pantoprazole  Injectable 40 milliGRAM(s) IV Push every 12 hours  polyethylene glycol 3350 17 Gram(s) Oral every 24 hours  senna 2 Tablet(s) Oral at bedtime  sertraline 50 milliGRAM(s) Oral every 24 hours  sodium chloride 0.9%. 1000 milliLiter(s) (50 mL/Hr) IV Continuous <Continuous>    MEDICATIONS  (PRN):  acetaminophen     Tablet .. 650 milliGRAM(s) Oral every 6 hours PRN Temp greater or equal to 38C (100.4F), Mild Pain (1 - 3)      Review of systems:    Unable to obtain due to patient's mental status    Vital Signs Last 24 Hrs  T(C): 36.3 (26 Mar 2024 05:58), Max: 36.9 (25 Mar 2024 20:02)  T(F): 97.4 (26 Mar 2024 05:58), Max: 98.5 (25 Mar 2024 20:02)  HR: 74 (26 Mar 2024 11:06) (71 - 86)  BP: 120/57 (26 Mar 2024 11:06) (91/54 - 138/67)  BP(mean): 82 (26 Mar 2024 11:06) (82 - 82)  RR: 18 (26 Mar 2024 11:06) (17 - 18)  SpO2: 100% (26 Mar 2024 11:06) (99% - 100%)    Parameters below as of 26 Mar 2024 11:06  Patient On (Oxygen Delivery Method): room air        Examination:  General:  Appearance is consistent with chronologic age.  No abnormal facies.  Gross skin survey within normal limits.    Cognitive/Language:  The patient is oriented to person, place, time and date.  Recent and remote memory intact.  Fund of knowledge is intact and normal.  Language with normal repetition, comprehension and naming.  Nondysarthric.    Eyes: intact VA, VFF.  EOMI w/o nystagmus, skew or reported double vision.  PERRL.  No ptosis/weakness of eyelid closure.    Face:  Facial sensation normal V1 - 3, no facial asymmetry.    Ears/Nose/Throat:  Hearing grossly intact b/l.  Palate elevates midline.  Tongue and uvula midline.   Motor examination:   Normal tone, bulk and range of motion.  No tenderness, twitching, tremors or involuntary movements.  Formal Muscle Strength Testing: (MRC grade R/L) 5/5 UE; 5/5 LE.  No observable drift.  Reflexes:   2+ b/l pectoralis, biceps, triceps, brachioradialis, patella and Achilles.  Plantar response downgoing b/l.  Jaw jerk, Jame, clonus absent.  Sensory examination:   Intact to light touch and pinprick, pain, temperature and proprioception and vibration in all extremities.  Cerebellum:   FTN/HKS intact with normal MICHAEL in all limbs.  No dysmetria or dysdiadokinesia.  Gait narrow based and normal.      Labs:   CBC Full  -  ( 26 Mar 2024 11:45 )  WBC Count : 11.32 K/uL  RBC Count : 2.63 M/uL  Hemoglobin : 7.5 g/dL  Hematocrit : 24.0 %  Platelet Count - Automated : 164 K/uL  Mean Cell Volume : 91.3 fl  Mean Cell Hemoglobin : 28.5 pg  Mean Cell Hemoglobin Concentration : 31.3 gm/dL  Auto Neutrophil # : x  Auto Lymphocyte # : x  Auto Monocyte # : x  Auto Eosinophil # : x  Auto Basophil # : x  Auto Neutrophil % : x  Auto Lymphocyte % : x  Auto Monocyte % : x  Auto Eosinophil % : x  Auto Basophil % : x    03-26    134<L>  |  106  |  47<H>  ----------------------------<  137<H>  4.0   |  14<L>  |  2.84<H>    Ca    10.4      26 Mar 2024 11:45  Phos  3.5     03-26  Mg     2.4     03-26    TPro  7.1  /  Alb  4.0  /  TBili  0.2  /  DBili  x   /  AST  22  /  ALT  11  /  AlkPhos  92  03-26    LIVER FUNCTIONS - ( 26 Mar 2024 11:45 )  Alb: 4.0 g/dL / Pro: 7.1 g/dL / ALK PHOS: 92 U/L / ALT: 11 U/L / AST: 22 U/L / GGT: x           PT/INR - ( 26 Mar 2024 09:44 )   PT: 11.5 sec;   INR: 1.01          PTT - ( 26 Mar 2024 09:44 )  PTT:24.9 sec  Urinalysis Basic - ( 26 Mar 2024 11:45 )    Color: x / Appearance: x / SG: x / pH: x  Gluc: 137 mg/dL / Ketone: x  / Bili: x / Urobili: x   Blood: x / Protein: x / Nitrite: x   Leuk Esterase: x / RBC: x / WBC x   Sq Epi: x / Non Sq Epi: x / Bacteria: x      Neuroimaging:  [facility]:     03-26-24 @ 13:07       Neurology Consult    Patient is a 68y old  Female who presents with a chief complaint of anemia, CHERRI (26 Mar 2024 10:44). Epilepsy consulted for seizure rule out      HPI:  HPI: Patient is a 69yo F former smoker (quit 20 year ago with PMH of hypothyroidism, seizure disorder, Stage 3 CKD (baseline CR ~1.7-2.0), severe renal artery stenosis, chronic anemia (baseline Hgb ~9-10, gets weekly Fe infusion last infusion 3/22/24), AT, Afib s/p AVN ablation and CRT-P (8/2022; on Eliquis), HTN, HLD, CAD s/p CABG, AVR (porcine bio-prosthetic valve) and mitral annuloplasty, HFpEF, AAA s/p open repair in 2015 with complications, PAD s/p L pSFA stent/L-TIFFANY stent/L-IIA stent who presents with intermittent chest pain and KRAFT x 3 days. States she has been having intermittent chest pain and KRAFT which worsened about 3 days ago. She has not been very mobile for the past week due to pain and KRAFT. At baseline ambulates with cane, however has not been mostly sitting on the couch due to current symptoms. Additionally c/o lower abdominal pain which she attributes to constipation (has not had a BM in 5-6 days). Usually has regular BMs. Has had very poor PO intake over the past few days 2/2 constipation. Denies any s/s of bleeding; denies hematuria, hemoptysis, hematemesis, etc. No recent illness/sick contacts. Denies any recent travel. Denies hx of cancer, bleeding/clotting disorders, lower extremity pain or swelling. Compliant with her medications.  No other complaints. States she feels mildly improved after blood transfusion.     In the ED:  Initial vital signs: T: 98 F, HR: 86, BP: 114/72, R: 17, SpO2: 100% on RA  Labs: significant for Hgb 6.9, D-dimer 2831, Cl 111, HCO3 19, BUN/Cr 48/3.00, Ca 10.7, trop 41, p-BNP 3727  CXR: Persistent linear atelectasis over the left lower lung zone. The remainder the lung zones are clear. No pneumothorax.  CT C/A/P: Bibasilar atelectasis without evidence of consolidation. Borderline hepatomegaly with extensive coronary artery calcifications status post CABG. There is also a prosthetic aortic valve. Post surgical changes from prior open thoracoabdominal aortic bypass graft with new focal dilatation of the distalmost aspect of the graft measuring 4.4 x 6.1 cm just above the aortic bifurcation and previously measuring 3.2 x 4.1 cm on 6/11/2021.   EKG: paced rhythm  Medications: 1u pRBC, maalox 30mL PO x 1, morphine 2mg IV x 1  Consults: vascular (25 Mar 2024 22:30)    Neurology History:  Patient with ?seizure history, no documentation or outpatient records available.       PAST MEDICAL & SURGICAL HISTORY:  Atherosclerosis of coronary artery  CAD (coronary artery disease)      Peripheral vascular disease  PVD (peripheral vascular disease)      Anemia  Anemia      Hypothyroidism  Hypothyroidism      Gastroesophageal reflux disease  GERD (gastroesophageal reflux disease)      Hyperlipidemia  Hyperlipidemia      Essential hypertension  HTN (hypertension)      Seizure      Anxiety      Depression, unspecified depression type      Chronic kidney disease, unspecified CKD stage      Status post aorto-coronary artery bypass graft  2012      Atherosclerosis of coronary artery bypass graft(s), unspecified, with angina pectoris with documented spasm      H/O aortic valve replacement  Bioprosthetic      Ruptured aortic aneurysm  surgery august 2020      Abdominal aortic aneurysm (AAA) without rupture  2015          FAMILY HISTORY:  FH: myocardial infarction  in Dad (at age 50s)        Social History: (-) x 3    Allergies    No Known Allergies    Intolerances        MEDICATIONS  (STANDING):  atorvastatin 80 milliGRAM(s) Oral at bedtime  cilostazol 50 milliGRAM(s) Oral every 12 hours  levothyroxine 75 MICROGram(s) Oral every 24 hours  pantoprazole  Injectable 40 milliGRAM(s) IV Push every 12 hours  polyethylene glycol 3350 17 Gram(s) Oral every 24 hours  senna 2 Tablet(s) Oral at bedtime  sertraline 50 milliGRAM(s) Oral every 24 hours  sodium chloride 0.9%. 1000 milliLiter(s) (50 mL/Hr) IV Continuous <Continuous>    MEDICATIONS  (PRN):  acetaminophen     Tablet .. 650 milliGRAM(s) Oral every 6 hours PRN Temp greater or equal to 38C (100.4F), Mild Pain (1 - 3)      Review of systems:    Unable to obtain due to patient's mental status    Vital Signs Last 24 Hrs  T(C): 36.3 (26 Mar 2024 05:58), Max: 36.9 (25 Mar 2024 20:02)  T(F): 97.4 (26 Mar 2024 05:58), Max: 98.5 (25 Mar 2024 20:02)  HR: 74 (26 Mar 2024 11:06) (71 - 86)  BP: 120/57 (26 Mar 2024 11:06) (91/54 - 138/67)  BP(mean): 82 (26 Mar 2024 11:06) (82 - 82)  RR: 18 (26 Mar 2024 11:06) (17 - 18)  SpO2: 100% (26 Mar 2024 11:06) (99% - 100%)    Parameters below as of 26 Mar 2024 11:06  Patient On (Oxygen Delivery Method): room air        Examination:  General:  Appearance is consistent with chronologic age.    Cognitive/Language:  The patient is oriented is lethargic, able to open eyes and answer to name but goes back to sleep  Eyes: Equal and reactive to light  Face: no facial asymmetry.    Motor examination:   Normal tone, bulk and range of motion.  No tenderness, twitching, tremors or involuntary movements.  Reflexes:   2+ b/l  biceps, triceps, brachioradialis, left patella 3+, right 2+ and Achilles 2+.  Upgoing toe on the right.  Sensory examination: withdraws to pain in all extremities.        Labs:   CBC Full  -  ( 26 Mar 2024 11:45 )  WBC Count : 11.32 K/uL  RBC Count : 2.63 M/uL  Hemoglobin : 7.5 g/dL  Hematocrit : 24.0 %  Platelet Count - Automated : 164 K/uL  Mean Cell Volume : 91.3 fl  Mean Cell Hemoglobin : 28.5 pg  Mean Cell Hemoglobin Concentration : 31.3 gm/dL  Auto Neutrophil # : x  Auto Lymphocyte # : x  Auto Monocyte # : x  Auto Eosinophil # : x  Auto Basophil # : x  Auto Neutrophil % : x  Auto Lymphocyte % : x  Auto Monocyte % : x  Auto Eosinophil % : x  Auto Basophil % : x    03-26    134<L>  |  106  |  47<H>  ----------------------------<  137<H>  4.0   |  14<L>  |  2.84<H>    Ca    10.4      26 Mar 2024 11:45  Phos  3.5     03-26  Mg     2.4     03-26    TPro  7.1  /  Alb  4.0  /  TBili  0.2  /  DBili  x   /  AST  22  /  ALT  11  /  AlkPhos  92  03-26    LIVER FUNCTIONS - ( 26 Mar 2024 11:45 )  Alb: 4.0 g/dL / Pro: 7.1 g/dL / ALK PHOS: 92 U/L / ALT: 11 U/L / AST: 22 U/L / GGT: x           PT/INR - ( 26 Mar 2024 09:44 )   PT: 11.5 sec;   INR: 1.01          PTT - ( 26 Mar 2024 09:44 )  PTT:24.9 sec  Urinalysis Basic - ( 26 Mar 2024 11:45 )    Color: x / Appearance: x / SG: x / pH: x  Gluc: 137 mg/dL / Ketone: x  / Bili: x / Urobili: x   Blood: x / Protein: x / Nitrite: x   Leuk Esterase: x / RBC: x / WBC x   Sq Epi: x / Non Sq Epi: x / Bacteria: x      Neuroimaging:  [facility]:     03-26-24 @ 13:07

## 2024-03-26 NOTE — CHART NOTE - NSCHARTNOTEFT_GEN_A_CORE
***Rapid Response Clinical Impact Nurse Practitioner Note***      INCOMPLETE TEMPLATE      Patient is a 68y old  Female admitted for HPI:  HPI: Patient is a 67yo F former smoker (quit 20 year ago with PMH of hypothyroidism, seizure disorder, Stage 3 CKD (baseline CR ~1.7-2.0), severe renal artery stenosis, chronic anemia (baseline Hgb ~9-10, gets weekly Fe infusion last infusion 3/22/24), AT, Afib s/p AVN ablation and CRT-P (8/2022; on Eliquis), HTN, HLD, CAD s/p CABG, AVR (porcine bio-prosthetic valve) and mitral annuloplasty, HFpEF, AAA s/p open repair in 2015 with complications, PAD s/p L pSFA stent/L-TIFFANY stent/L-IIA stent who presents with intermittent chest pain and KRAFT x 3 days. States she has been having intermittent chest pain and KRAFT which worsened about 3 days ago. She has not been very mobile for the past week due to pain and KRAFT. At baseline ambulates with cane, however has not been mostly sitting on the couch due to current symptoms. Additionally c/o lower abdominal pain which she attributes to constipation (has not had a BM in 5-6 days). Usually has regular BMs. Has had very poor PO intake over the past few days 2/2 constipation. Denies any s/s of bleeding; denies hematuria, hemoptysis, hematemesis, etc. No recent illness/sick contacts. Denies any recent travel. Denies hx of cancer, bleeding/clotting disorders, lower extremity pain or swelling. Compliant with her medications.  No other complaints. States she feels mildly improved after blood transfusion.     In the ED:  Initial vital signs: T: 98 F, HR: 86, BP: 114/72, R: 17, SpO2: 100% on RA  Labs: significant for Hgb 6.9, D-dimer 2831, Cl 111, HCO3 19, BUN/Cr 48/3.00, Ca 10.7, trop 41, p-BNP 3727  CXR: Persistent linear atelectasis over the left lower lung zone. The remainder the lung zones are clear. No pneumothorax.  CT C/A/P: Bibasilar atelectasis without evidence of consolidation. Borderline hepatomegaly with extensive coronary artery calcifications status post CABG. There is also a prosthetic aortic valve. Post surgical changes from prior open thoracoabdominal aortic bypass graft with new focal dilatation of the distalmost aspect of the graft measuring 4.4 x 6.1 cm just above the aortic bifurcation and previously measuring 3.2 x 4.1 cm on 6/11/2021.   EKG: paced rhythm  Medications: 1u pRBC, maalox 30mL PO x 1, morphine 2mg IV x 1  Consults: vascular (25 Mar 2024 22:30)        **********  Rapid Response Team Summary:    Rapid response team called @ _____ for  Patient was seen and examined at the bedside by the rapid response team.    Allergies    No Known Allergies    Intolerances        PAST MEDICAL & SURGICAL HISTORY:  Atherosclerosis of coronary artery  CAD (coronary artery disease)      Peripheral vascular disease  PVD (peripheral vascular disease)      Anemia  Anemia      Hypothyroidism  Hypothyroidism      Gastroesophageal reflux disease  GERD (gastroesophageal reflux disease)      Hyperlipidemia  Hyperlipidemia      Essential hypertension  HTN (hypertension)      Seizure      Anxiety      Depression, unspecified depression type      Chronic kidney disease, unspecified CKD stage      Status post aorto-coronary artery bypass graft  2012      Atherosclerosis of coronary artery bypass graft(s), unspecified, with angina pectoris with documented spasm      H/O aortic valve replacement  Bioprosthetic      Ruptured aortic aneurysm  surgery august 2020      Abdominal aortic aneurysm (AAA) without rupture  2015          REVIEW OF SYSTEMS:   unable to obtain 2/2 AMS    Vital Signs Last 24 Hrs  T(C): 36.3 (26 Mar 2024 05:58), Max: 36.9 (25 Mar 2024 20:02)  T(F): 97.4 (26 Mar 2024 05:58), Max: 98.5 (25 Mar 2024 20:02)  HR: 75 (26 Mar 2024 05:58) (71 - 86)  BP: 91/54 (26 Mar 2024 05:58) (91/54 - 138/67)  RR: 18 (26 Mar 2024 05:58) (17 - 18)  SpO2: 100% (26 Mar 2024 05:58) (99% - 100%)    Parameters below as of 26 Mar 2024 05:58  Patient On (Oxygen Delivery Method): room air      Physical Exam *NORMAL TEMPLATE*  GENERAL: The patient is awake and alert in no apparent distress.   HEENT: Head is normocephalic and atraumatic. Extraocular muscles are intact. Mucous membranes are moist. No throat erythema/exudates no lymphadenopathy, no JVD,   NECK: Supple.  LUNGS: Clear to auscultation BL without wheezing, rales or rhonchi; respirations unlabored  HEART: Regular rate and rhythm ,+S1/+S2, no murmurs, rubs, gallops  ABDOMEN: Soft, nontender, and nondistended, no rebound, guarding rigidity, bowel sounds in all 4 quadrants  EXTREMITIES: Without any cyanosis, clubbing, rash, lesions or edema.  SKIN: No new rashes or lesions.  MSK: strength equal BL  VASCULAR: Radial and Dorsal pedal pulses palpable BL  NEUROLOGIC: Grossly intact.  PSYCH: No new changes.    PHYSICAL EXAM: *TEMPLATE ONLY*  GENERAL: NAD. WD/WN. Sedated? well appearing vs ill appearing?   Alert, interactive, lethargic?     SKIN/HAIR/NAILS: Skin warm and dry. Nails without clubbing or cyanosis. CR<2 secs. No lesions, rash, petechiae, erythema or ecchymoses. No decubiti. Anicteric.   *if any wounds present: how do they look?     HEAD AND NECK: The skull is NC/AT. B/L Sclera white. X mm, PERRL. Nasal mucous membrane with no erythema or drainage. Trachea midline, neck supple. No LAD. Normal neck ROM.     THORAX/LUNGS: Thorax is symmetric with good expansion, assisted by mechanical ventilation? Normal inspiratory effort?  ETT # @  cm line. CTAB, diminished at bases? wheezing/rhonchi/rales? No tactile fremitus.     CARDIOVASCULAR: No JVD. Carotid upstrokes are brisk, without bruits. +S1 and S2, RRR; no extra heart sounds or M/R/G.     ABDOMEN/: Abdomen is flat with +BS normoactive x 4. It is soft, ND; no palpable masses or HSM. No rebound or guarding. No BRBPR, no melena. *tony? Date? Inserted by*     PERIPHERAL VASCULAR: Extremities are warm and with + edema?. No varicosities or stasis changes. Calves are supple and nontender. No femoral or abdominal bruits. Radial and dorsalis pedis pulses + and symmetric. No clubbing, no cyanosis.     MUSCULOSKELETAL: Normal tone/ANT, strength and sensation intact? No active ROM at this time, sedated?. No evidence of swelling or deformity.     NEUROLOGICAL: AxOx4, sensation normal, gait normal?   Unable to assess neuro status at this time, sedated on propofol.     INCISIONS:    DRAINS:    LINES (DATES):  sites, dates, how site looks    LABS:                        7.6    9.86  )-----------( 164      ( 26 Mar 2024 07:13 )             23.9        03-26    137  |  107  |  48<H>  ----------------------------<  109<H>  4.1   |  19<L>  |  3.01<H>    Ca    10.1      26 Mar 2024 07:13  Phos  3.5     03-26  Mg     2.4     03-26    TPro  7.7  /  Alb  4.4  /  TBili  0.2  /  DBili  x   /  AST  23  /  ALT  13  /  AlkPhos  100  03-25            LIVER FUNCTIONS - ( 25 Mar 2024 14:43 )  Alb: 4.4 g/dL / Pro: 7.7 g/dL / ALK PHOS: 100 U/L / ALT: 13 U/L / AST: 23 U/L / GGT: x             PT/INR - ( 26 Mar 2024 07:13 )   PT: 11.8 sec;   INR: 1.04          PTT - ( 26 Mar 2024 07:13 )  PTT:28.4 sec    Urinalysis Basic - ( 26 Mar 2024 07:13 )    Color: x / Appearance: x / SG: x / pH: x  Gluc: 109 mg/dL / Ketone: x  / Bili: x / Urobili: x   Blood: x / Protein: x / Nitrite: x   Leuk Esterase: x / RBC: x / WBC x   Sq Epi: x / Non Sq Epi: x / Bacteria: x             IMAGING    Chest Xray? ____________    EKG? _________________    MEDICATIONS  MEDICATIONS  (STANDING):  atorvastatin 80 milliGRAM(s) Oral at bedtime  cilostazol 50 milliGRAM(s) Oral every 12 hours  levETIRAcetam  IVPB 1000 milliGRAM(s) IV Intermittent once  levothyroxine 75 MICROGram(s) Oral every 24 hours  naloxone Injectable 0.2 milliGRAM(s) IV Push once  pantoprazole  Injectable 40 milliGRAM(s) IV Push every 12 hours  polyethylene glycol 3350 17 Gram(s) Oral every 24 hours  senna 2 Tablet(s) Oral at bedtime  sertraline 50 milliGRAM(s) Oral every 24 hours    MEDICATIONS  (PRN):  acetaminophen     Tablet .. 650 milliGRAM(s) Oral every 6 hours PRN Temp greater or equal to 38C (100.4F), Mild Pain (1 - 3)      ASSESSMENT & PLAN    Assessment- Rapid Response called for 68y year old Female with a past medical history of     Plan-  f/u labs sent  USGPIV x 1  keppra load  neuro consult  vEEG  Urgent Head, Chest, Abd/Pelvis CTs     ICU consult, PCCM Fellow     Upgrade to Tele ***Rapid Response Clinical Impact Nurse Practitioner Note***    HPI as per Chart Review:  Patient is 69yo F former smoker (quit 20 year ago with PMH of hypothyroidism, seizure disorder, Stage 3 CKD (baseline CR ~1.7-2.0), severe renal artery stenosis, chronic anemia (baseline Hgb ~9-10, gets weekly Fe infusion last infusion 3/22/24), AT, Afib s/p AVN ablation and CRT-P (8/2022; on Eliquis), HTN, HLD, CAD s/p CABG, AVR (porcine bio-prosthetic valve) and mitral annuloplasty, HFpEF, AAA s/p open repair in 2015 with complications, PAD s/p L pSFA stent/L-TIFFANY stent/L-IIA stent who presents with intermittent chest pain and KRAFT x 3 days. States she has been having intermittent chest pain and KRAFT which worsened about 3 days ago. She has not been very mobile for the past week due to pain and KRAFT. At baseline ambulates with cane, however has not been mostly sitting on the couch due to current symptoms. Additionally c/o lower abdominal pain which she attributes to constipation (has not had a BM in 5-6 days). Usually has regular BMs. Has had very poor PO intake over the past few days 2/2 constipation. Denies any s/s of bleeding; denies hematuria, hemoptysis, hematemesis, etc. No recent illness/sick contacts. Denies any recent travel. Denies hx of cancer, bleeding/clotting disorders, lower extremity pain or swelling. Compliant with her medications.  No other complaints. States she feels mildly improved after blood transfusion.     In the ED:  Initial vital signs: T: 98 F, HR: 86, BP: 114/72, R: 17, SpO2: 100% on RA  Labs: significant for Hgb 6.9, D-dimer 2831, Cl 111, HCO3 19, BUN/Cr 48/3.00, Ca 10.7, trop 41, p-BNP 3727  CXR: Persistent linear atelectasis over the left lower lung zone. The remainder the lung zones are clear. No pneumothorax.  CT C/A/P: Bibasilar atelectasis without evidence of consolidation. Borderline hepatomegaly with extensive coronary artery calcifications status post CABG. There is also a prosthetic aortic valve. Post surgical changes from prior open thoracoabdominal aortic bypass graft with new focal dilatation of the distalmost aspect of the graft measuring 4.4 x 6.1 cm just above the aortic bifurcation and previously measuring 3.2 x 4.1 cm on 6/11/2021.   EKG: paced rhythm  Medications: 1u pRBC, maalox 30mL PO x 1, morphine 2mg IV x 1  Consults: vascular (25 Mar 2024 22:30)  **********  Rapid Response Team Summary:    Rapid response team called @ 0910 for AMS. As per primary RN, patient was AxOx3 and when she went in to do vitals check, she noticed acute onset AMS. A Rapid Response was called. Upon arrival by writer, patient connected to [employer] monitor VS as follows: HR 85, /74, O2 sat 99% on room air with adequate waveform, RR 14-16. Patient afebrile and no hypoglycemia. Upon Airway assessment, diminished gag reflex noted and patient unresponsive. Writer and   Patient was seen and examined at the bedside by the rapid response team.    Allergies    No Known Allergies    Intolerances        PAST MEDICAL & SURGICAL HISTORY:  Atherosclerosis of coronary artery  CAD (coronary artery disease)      Peripheral vascular disease  PVD (peripheral vascular disease)      Anemia  Anemia      Hypothyroidism  Hypothyroidism      Gastroesophageal reflux disease  GERD (gastroesophageal reflux disease)      Hyperlipidemia  Hyperlipidemia      Essential hypertension  HTN (hypertension)      Seizure      Anxiety      Depression, unspecified depression type      Chronic kidney disease, unspecified CKD stage      Status post aorto-coronary artery bypass graft  2012      Atherosclerosis of coronary artery bypass graft(s), unspecified, with angina pectoris with documented spasm      H/O aortic valve replacement  Bioprosthetic      Ruptured aortic aneurysm  surgery august 2020      Abdominal aortic aneurysm (AAA) without rupture  2015          REVIEW OF SYSTEMS:   unable to obtain 2/2 AMS    Vital Signs Last 24 Hrs  T(C): 36.3 (26 Mar 2024 05:58), Max: 36.9 (25 Mar 2024 20:02)  T(F): 97.4 (26 Mar 2024 05:58), Max: 98.5 (25 Mar 2024 20:02)  HR: 75 (26 Mar 2024 05:58) (71 - 86)  BP: 91/54 (26 Mar 2024 05:58) (91/54 - 138/67)  RR: 18 (26 Mar 2024 05:58) (17 - 18)  SpO2: 100% (26 Mar 2024 05:58) (99% - 100%)    Parameters below as of 26 Mar 2024 05:58  Patient On (Oxygen Delivery Method): room air      Physical Exam *NORMAL TEMPLATE*  GENERAL: The patient is awake and alert in no apparent distress.   HEENT: Head is normocephalic and atraumatic. Extraocular muscles are intact. Mucous membranes are moist. No throat erythema/exudates no lymphadenopathy, no JVD,   NECK: Supple.  LUNGS: Clear to auscultation BL without wheezing, rales or rhonchi; respirations unlabored  HEART: Regular rate and rhythm ,+S1/+S2, no murmurs, rubs, gallops  ABDOMEN: Soft, nontender, and nondistended, no rebound, guarding rigidity, bowel sounds in all 4 quadrants  EXTREMITIES: Without any cyanosis, clubbing, rash, lesions or edema.  SKIN: No new rashes or lesions.  MSK: strength equal BL  VASCULAR: Radial and Dorsal pedal pulses palpable BL  NEUROLOGIC: Grossly intact.  PSYCH: No new changes.    PHYSICAL EXAM: *TEMPLATE ONLY*  GENERAL: NAD. WD/WN. Sedated? well appearing vs ill appearing?   Alert, interactive, lethargic?     SKIN/HAIR/NAILS: Skin warm and dry. Nails without clubbing or cyanosis. CR<2 secs. No lesions, rash, petechiae, erythema or ecchymoses. No decubiti. Anicteric.   *if any wounds present: how do they look?     HEAD AND NECK: The skull is NC/AT. B/L Sclera white. X mm, PERRL. Nasal mucous membrane with no erythema or drainage. Trachea midline, neck supple. No LAD. Normal neck ROM.     THORAX/LUNGS: Thorax is symmetric with good expansion, assisted by mechanical ventilation? Normal inspiratory effort?  ETT # @  cm line. CTAB, diminished at bases? wheezing/rhonchi/rales? No tactile fremitus.     CARDIOVASCULAR: No JVD. Carotid upstrokes are brisk, without bruits. +S1 and S2, RRR; no extra heart sounds or M/R/G.     ABDOMEN/: Abdomen is flat with +BS normoactive x 4. It is soft, ND; no palpable masses or HSM. No rebound or guarding. No BRBPR, no melena. *tony? Date? Inserted by*     PERIPHERAL VASCULAR: Extremities are warm and with + edema?. No varicosities or stasis changes. Calves are supple and nontender. No femoral or abdominal bruits. Radial and dorsalis pedis pulses + and symmetric. No clubbing, no cyanosis.     MUSCULOSKELETAL: Normal tone/ANT, strength and sensation intact? No active ROM at this time, sedated?. No evidence of swelling or deformity.     NEUROLOGICAL: AxOx4, sensation normal, gait normal?   Unable to assess neuro status at this time, sedated on propofol.     INCISIONS:    DRAINS:    LINES (DATES):  sites, dates, how site looks    LABS:                        7.6    9.86  )-----------( 164      ( 26 Mar 2024 07:13 )             23.9        03-26    137  |  107  |  48<H>  ----------------------------<  109<H>  4.1   |  19<L>  |  3.01<H>    Ca    10.1      26 Mar 2024 07:13  Phos  3.5     03-26  Mg     2.4     03-26    TPro  7.7  /  Alb  4.4  /  TBili  0.2  /  DBili  x   /  AST  23  /  ALT  13  /  AlkPhos  100  03-25            LIVER FUNCTIONS - ( 25 Mar 2024 14:43 )  Alb: 4.4 g/dL / Pro: 7.7 g/dL / ALK PHOS: 100 U/L / ALT: 13 U/L / AST: 23 U/L / GGT: x             PT/INR - ( 26 Mar 2024 07:13 )   PT: 11.8 sec;   INR: 1.04          PTT - ( 26 Mar 2024 07:13 )  PTT:28.4 sec    Urinalysis Basic - ( 26 Mar 2024 07:13 )    Color: x / Appearance: x / SG: x / pH: x  Gluc: 109 mg/dL / Ketone: x  / Bili: x / Urobili: x   Blood: x / Protein: x / Nitrite: x   Leuk Esterase: x / RBC: x / WBC x   Sq Epi: x / Non Sq Epi: x / Bacteria: x             IMAGING    Chest Xray? ____________    EKG? _________________    MEDICATIONS  MEDICATIONS  (STANDING):  atorvastatin 80 milliGRAM(s) Oral at bedtime  cilostazol 50 milliGRAM(s) Oral every 12 hours  levETIRAcetam  IVPB 1000 milliGRAM(s) IV Intermittent once  levothyroxine 75 MICROGram(s) Oral every 24 hours  naloxone Injectable 0.2 milliGRAM(s) IV Push once  pantoprazole  Injectable 40 milliGRAM(s) IV Push every 12 hours  polyethylene glycol 3350 17 Gram(s) Oral every 24 hours  senna 2 Tablet(s) Oral at bedtime  sertraline 50 milliGRAM(s) Oral every 24 hours    MEDICATIONS  (PRN):  acetaminophen     Tablet .. 650 milliGRAM(s) Oral every 6 hours PRN Temp greater or equal to 38C (100.4F), Mild Pain (1 - 3)      ASSESSMENT & PLAN    Assessment- Rapid Response called for 68y year old Female with a past medical history of     Plan-  f/u labs sent  USGPIV x 1  keppra load  neuro consult  vEEG  Urgent Head, Chest, Abd/Pelvis CTs     ICU consult, PCCM Fellow     Upgrade to Tele ***Rapid Response Clinical Impact Nurse Practitioner Note***    HPI as per Chart Review:  Patient is 67yo F former smoker (quit 20 year ago with PMH of hypothyroidism, seizure disorder, Stage 3 CKD (baseline CR ~1.7-2.0), severe renal artery stenosis, chronic anemia (baseline Hgb ~9-10, gets weekly Fe infusion last infusion 3/22/24), AT, Afib s/p AVN ablation and CRT-P (8/2022; on Eliquis), HTN, HLD, CAD s/p CABG, AVR (porcine bio-prosthetic valve) and mitral annuloplasty, HFpEF, AAA s/p open repair in 2015 with complications, PAD s/p L pSFA stent/L-TIFFANY stent/L-IIA stent who presents with intermittent chest pain and KRAFT x 3 days. States she has been having intermittent chest pain and KRAFT which worsened about 3 days ago. She has not been very mobile for the past week due to pain and KRAFT. At baseline ambulates with cane, however has not been mostly sitting on the couch due to current symptoms. Additionally c/o lower abdominal pain which she attributes to constipation (has not had a BM in 5-6 days). Usually has regular BMs. Has had very poor PO intake over the past few days 2/2 constipation. Denies any s/s of bleeding; denies hematuria, hemoptysis, hematemesis, etc. No recent illness/sick contacts. Denies any recent travel. Denies hx of cancer, bleeding/clotting disorders, lower extremity pain or swelling. Compliant with her medications.  No other complaints. States she feels mildly improved after blood transfusion.     In the ED:  Initial vital signs: T: 98 F, HR: 86, BP: 114/72, R: 17, SpO2: 100% on RA  Labs: significant for Hgb 6.9, D-dimer 2831, Cl 111, HCO3 19, BUN/Cr 48/3.00, Ca 10.7, trop 41, p-BNP 3727  CXR: Persistent linear atelectasis over the left lower lung zone. The remainder the lung zones are clear. No pneumothorax.  CT C/A/P: Bibasilar atelectasis without evidence of consolidation. Borderline hepatomegaly with extensive coronary artery calcifications status post CABG. There is also a prosthetic aortic valve. Post surgical changes from prior open thoracoabdominal aortic bypass graft with new focal dilatation of the distalmost aspect of the graft measuring 4.4 x 6.1 cm just above the aortic bifurcation and previously measuring 3.2 x 4.1 cm on 6/11/2021.   EKG: paced rhythm  Medications: 1u pRBC, maalox 30mL PO x 1, morphine 2mg IV x 1  Consults: vascular (25 Mar 2024 22:30)  **********  Rapid Response Team Summary:    Rapid response team called @ 0910 for AMS. As per primary RN, patient was AxOx3 and when she went in to do vitals check, she noticed acute onset AMS. A Rapid Response was called. Upon arrival by writer, patient connected to [employer] monitor VS as follows: HR 85, /74, O2 sat 99% on room air with adequate waveform, RR 14-16. Patient afebrile and no hypoglycemia. Upon Airway assessment, diminished gag reflex noted and patient unresponsive. Writer and PCCM Fellow Dr. Diaz, setting up for intubation and patient noted to have improved mental status. Pupils 3 mm PERRLA, all extremities responsive to painful stimuli. No recent medication administered as per primary team. As patient protecting Airway and mental status improvung, USGPIV and labs obtained. Vascular team at bedside, recommending STAT CTs as patient with known abdominal aneurysm. STAT Keppra load ordered and patient to be pan scanned (head, chest, A/P) and then stepped up to 7 [place]. Upon completion of RRT, patient alert and able to answer all questions. She states she does not know when her last seizure was and does not recall prodromal symptoms immediately prior to this episode. No staff member at bedside prior to the AMS, so unclear if she had seizure like activity. Vitals signs on monitor prior to [facility] Transport to CT by Dr. Rand: HR 78-80, DAMIEN 112/85, O2 sat 97%, intact gag.     Allergies  No Known Allergies    PAST MEDICAL & SURGICAL HISTORY:  Atherosclerosis of coronary artery  CAD (coronary artery disease)  Peripheral vascular disease  PVD (peripheral vascular disease)  Anemia  Hypothyroidism  Gastroesophageal reflux disease  Hyperlipidemia  Essential hypertension  Seizure  Anxiety  Depression, unspecified depression type  Chronic kidney disease, unspecified CKD stage  Status post aorto-coronary artery bypass graft, 2012  Atherosclerosis of coronary artery bypass graft(s), unspecified, with angina pectoris with documented spasm  H/O aortic valve replacement, Bioprosthetic  Ruptured aortic aneurysm, surgery august 2020  Abdominal aortic aneurysm (AAA) without rupture, 2015    REVIEW OF SYSTEMS:   unable to obtain 2/2 AMS    Vital Signs Last 24 Hrs  T(C): 36.3 (26 Mar 2024 05:58), Max: 36.9 (25 Mar 2024 20:02)  T(F): 97.4 (26 Mar 2024 05:58), Max: 98.5 (25 Mar 2024 20:02)  HR: 75 (26 Mar 2024 05:58) (71 - 86)  BP: 91/54 (26 Mar 2024 05:58) (91/54 - 138/67)  RR: 18 (26 Mar 2024 05:58) (17 - 18)  SpO2: 100% (26 Mar 2024 05:58) (99% - 100%)    Parameters below as of 26 Mar 2024 05:58  Patient On (Oxygen Delivery Method): room air    Physical Exam *NORMAL TEMPLATE*  GENERAL: The patient is awake and alert in no apparent distress.   HEENT: Head is normocephalic and atraumatic. Extraocular muscles are intact. Mucous membranes are moist. No throat erythema/exudates no lymphadenopathy, no JVD,   NECK: Supple.  LUNGS: Clear to auscultation BL without wheezing, rales or rhonchi; respirations unlabored  HEART: Regular rate and rhythm ,+S1/+S2, no murmurs, rubs, gallops  ABDOMEN: Soft, nontender, and nondistended, no rebound, guarding rigidity, bowel sounds in all 4 quadrants  EXTREMITIES: Without any cyanosis, clubbing, rash, lesions or edema.  SKIN: No new rashes or lesions.  MSK: strength equal BL  VASCULAR: Radial and Dorsal pedal pulses palpable BL  NEUROLOGIC: Grossly intact.  PSYCH: No new changes.    PHYSICAL EXAM: *TEMPLATE ONLY*  GENERAL: NAD. WD/WN. Sedated? well appearing vs ill appearing?   Alert, interactive, lethargic?     SKIN/HAIR/NAILS: Skin warm and dry. Nails without clubbing or cyanosis. CR<2 secs. No lesions, rash, petechiae, erythema or ecchymoses. No decubiti. Anicteric.   *if any wounds present: how do they look?     HEAD AND NECK: The skull is NC/AT. B/L Sclera white. X mm, PERRL. Nasal mucous membrane with no erythema or drainage. Trachea midline, neck supple. No LAD. Normal neck ROM.     THORAX/LUNGS: Thorax is symmetric with good expansion, assisted by mechanical ventilation? Normal inspiratory effort?  ETT # @  cm line. CTAB, diminished at bases? wheezing/rhonchi/rales? No tactile fremitus.     CARDIOVASCULAR: No JVD. Carotid upstrokes are brisk, without bruits. +S1 and S2, RRR; no extra heart sounds or M/R/G.     ABDOMEN/: Abdomen is flat with +BS normoactive x 4. It is soft, ND; no palpable masses or HSM. No rebound or guarding. No BRBPR, no melena. *tony? Date? Inserted by*     PERIPHERAL VASCULAR: Extremities are warm and with + edema?. No varicosities or stasis changes. Calves are supple and nontender. No femoral or abdominal bruits. Radial and dorsalis pedis pulses + and symmetric. No clubbing, no cyanosis.     MUSCULOSKELETAL: Normal tone/ANT, strength and sensation intact? No active ROM at this time, sedated?. No evidence of swelling or deformity.     NEUROLOGICAL: AxOx4, sensation normal, gait normal?   Unable to assess neuro status at this time, sedated on propofol.     INCISIONS:    DRAINS:    LINES (DATES):  sites, dates, how site looks    LABS:                        7.6    9.86  )-----------( 164      ( 26 Mar 2024 07:13 )             23.9        03-26    137  |  107  |  48<H>  ----------------------------<  109<H>  4.1   |  19<L>  |  3.01<H>    Ca    10.1      26 Mar 2024 07:13  Phos  3.5     03-26  Mg     2.4     03-26    TPro  7.7  /  Alb  4.4  /  TBili  0.2  /  DBili  x   /  AST  23  /  ALT  13  /  AlkPhos  100  03-25      LIVER FUNCTIONS - ( 25 Mar 2024 14:43 )  Alb: 4.4 g/dL / Pro: 7.7 g/dL / ALK PHOS: 100 U/L / ALT: 13 U/L / AST: 23 U/L / GGT: x             PT/INR - ( 26 Mar 2024 07:13 )   PT: 11.8 sec;   INR: 1.04     PTT - ( 26 Mar 2024 07:13 )  PTT:28.4 sec    Urinalysis Basic - ( 26 Mar 2024 07:13 )  Color: x / Appearance: x / SG: x / pH: x  Gluc: 109 mg/dL / Ketone: x  / Bili: x / Urobili: x   Blood: x / Protein: x / Nitrite: x   Leuk Esterase: x / RBC: x / WBC x   Sq Epi: x / Non Sq Epi: x / Bacteria: x      MEDICATIONS  MEDICATIONS  (STANDING):  atorvastatin 80 milliGRAM(s) Oral at bedtime  cilostazol 50 milliGRAM(s) Oral every 12 hours  levETIRAcetam  IVPB 1000 milliGRAM(s) IV Intermittent once  levothyroxine 75 MICROGram(s) Oral every 24 hours  naloxone Injectable 0.2 milliGRAM(s) IV Push once  pantoprazole  Injectable 40 milliGRAM(s) IV Push every 12 hours  polyethylene glycol 3350 17 Gram(s) Oral every 24 hours  senna 2 Tablet(s) Oral at bedtime  sertraline 50 milliGRAM(s) Oral every 24 hours    MEDICATIONS  (PRN):  acetaminophen     Tablet .. 650 milliGRAM(s) Oral every 6 hours PRN Temp greater or equal to 38C (100.4F), Mild Pain (1 - 3)      ASSESSMENT & PLAN    Assessment- Rapid Response called for 68y year old Female with a past medical history of     Plan-  f/u labs sent  USGPIV x 1 placed  keppra load  neuro/epilepsy consult, appreciate recs  vEEG  Urgent Head, Chest, Abd/Pelvis CTs   aspiration and seizure precautions    Case discussed with hospitalist, ICU consult, PCCM Fellow and patient to be upgrade to Tele ***Rapid Response Clinical Impact Nurse Practitioner Note***    HPI as per Chart Review:  Patient is 67yo F former smoker (quit 20 year ago with PMH of hypothyroidism, seizure disorder, Stage 3 CKD (baseline CR ~1.7-2.0), severe renal artery stenosis, chronic anemia (baseline Hgb ~9-10, gets weekly Fe infusion last infusion 3/22/24), AT, Afib s/p AVN ablation and CRT-P (8/2022; on Eliquis), HTN, HLD, CAD s/p CABG, AVR (porcine bio-prosthetic valve) and mitral annuloplasty, HFpEF, AAA s/p open repair in 2015 with complications, PAD s/p L pSFA stent/L-TIFFANY stent/L-IIA stent who presents with intermittent chest pain and KRAFT x 3 days. States she has been having intermittent chest pain and KRAFT which worsened about 3 days ago. She has not been very mobile for the past week due to pain and KRAFT. At baseline ambulates with cane, however has not been mostly sitting on the couch due to current symptoms. Additionally c/o lower abdominal pain which she attributes to constipation (has not had a BM in 5-6 days). Usually has regular BMs. Has had very poor PO intake over the past few days 2/2 constipation. Denies any s/s of bleeding; denies hematuria, hemoptysis, hematemesis, etc. No recent illness/sick contacts. Denies any recent travel. Denies hx of cancer, bleeding/clotting disorders, lower extremity pain or swelling. Compliant with her medications.  No other complaints. States she feels mildly improved after blood transfusion.     In the ED:  Initial vital signs: T: 98 F, HR: 86, BP: 114/72, R: 17, SpO2: 100% on RA  Labs: significant for Hgb 6.9, D-dimer 2831, Cl 111, HCO3 19, BUN/Cr 48/3.00, Ca 10.7, trop 41, p-BNP 3727  CXR: Persistent linear atelectasis over the left lower lung zone. The remainder the lung zones are clear. No pneumothorax.  CT C/A/P: Bibasilar atelectasis without evidence of consolidation. Borderline hepatomegaly with extensive coronary artery calcifications status post CABG. There is also a prosthetic aortic valve. Post surgical changes from prior open thoracoabdominal aortic bypass graft with new focal dilatation of the distalmost aspect of the graft measuring 4.4 x 6.1 cm just above the aortic bifurcation and previously measuring 3.2 x 4.1 cm on 6/11/2021.   EKG: paced rhythm  Medications: 1u pRBC, maalox 30mL PO x 1, morphine 2mg IV x 1  Consults: vascular (25 Mar 2024 22:30)  **********  Rapid Response Team Summary:    Rapid response team called @ 0910 for AMS. As per primary RN, patient was AxOx3 and when she went in to do vitals check, she noticed acute onset AMS. A Rapid Response was called. Upon arrival by writer, patient connected to [employer] monitor VS as follows: HR 85, /74, O2 sat 99% on room air with adequate waveform, RR 14-16. Patient afebrile and no hypoglycemia. Upon Airway assessment, diminished gag reflex noted and patient unresponsive. Writer and PCCM Fellow Dr. Diaz, setting up for intubation and patient noted to have improved mental status. Pupils 3 mm PERRLA, all extremities responsive to painful stimuli. No recent medication administered as per primary team. As patient protecting Airway and mental status improvung, USGPIV and labs obtained. Vascular team at bedside, recommending STAT CTs as patient with known abdominal aneurysm. STAT Keppra load ordered and patient to be pan scanned (head, chest, A/P) and then stepped up to 7 [place]. Upon completion of RRT, patient alert and able to answer all questions. She states she does not know when her last seizure was and does not recall prodromal symptoms immediately prior to this episode. No staff member at bedside prior to the AMS, so unclear if she had seizure like activity. Vitals signs on monitor prior to [facility] Transport to CT by Dr. Rand: HR 78-80, DAMIEN 112/85, O2 sat 97%, intact gag.     Allergies  No Known Allergies    PAST MEDICAL & SURGICAL HISTORY:  Atherosclerosis of coronary artery  CAD (coronary artery disease)  Peripheral vascular disease  PVD (peripheral vascular disease)  Anemia  Hypothyroidism  Gastroesophageal reflux disease  Hyperlipidemia  Essential hypertension  Seizure  Anxiety  Depression, unspecified depression type  Chronic kidney disease, unspecified CKD stage  Status post aorto-coronary artery bypass graft, 2012  Atherosclerosis of coronary artery bypass graft(s), unspecified, with angina pectoris with documented spasm  H/O aortic valve replacement, Bioprosthetic  Ruptured aortic aneurysm, surgery august 2020  Abdominal aortic aneurysm (AAA) without rupture, 2015    REVIEW OF SYSTEMS:   unable to obtain 2/2 AMS    Vital Signs Last 24 Hrs  T(C): 36.3 (26 Mar 2024 05:58), Max: 36.9 (25 Mar 2024 20:02)  T(F): 97.4 (26 Mar 2024 05:58), Max: 98.5 (25 Mar 2024 20:02)  HR: 75 (26 Mar 2024 05:58) (71 - 86)  BP: 91/54 (26 Mar 2024 05:58) (91/54 - 138/67)  RR: 18 (26 Mar 2024 05:58) (17 - 18)  SpO2: 100% (26 Mar 2024 05:58) (99% - 100%)    Parameters below as of 26 Mar 2024 05:58  Patient On (Oxygen Delivery Method): room air    Physical Exam  GENERAL: The patient is altered, likely postictal state.   HEENT: Head is normocephalic and atraumatic. PERRLA 3 mm. Mucous membranes are dry. No JVD.   NECK: Supple.  LUNGS: Clear to auscultation BL without wheezing, rales or rhonchi; respirations unlabored.  HEART: Regular rate and rhythm ,+S1/+S2, no murmurs, rubs, gallops.  ABDOMEN: Soft, slightly tender to deep palpation, and nondistended, no rebound, guarding rigidity, bowel sounds in all 4 quadrants.  EXTREMITIES: Without any cyanosis, clubbing, rash, lesions or edema.  SKIN: No new rashes or lesions. Healed MSI.   MSK: Decreased ROM due to AMS, strength equal BL when following commands.  VASCULAR: +2 Radial and Dorsal pedal pulses palpable BL  NEUROLOGIC: Initially AMS and not following commands, MS improved and back at baseline with no focal neuro deficits.  PSYCH: No new changes.  DRAINS/INCISIONS: None  LINES: PIV.    LABS:                        7.6    9.86  )-----------( 164      ( 26 Mar 2024 07:13 )             23.9        03-26    137  |  107  |  48<H>  ----------------------------<  109<H>  4.1   |  19<L>  |  3.01<H>    Ca    10.1      26 Mar 2024 07:13  Phos  3.5     03-26  Mg     2.4     03-26    TPro  7.7  /  Alb  4.4  /  TBili  0.2  /  DBili  x   /  AST  23  /  ALT  13  /  AlkPhos  100  03-25      LIVER FUNCTIONS - ( 25 Mar 2024 14:43 )  Alb: 4.4 g/dL / Pro: 7.7 g/dL / ALK PHOS: 100 U/L / ALT: 13 U/L / AST: 23 U/L / GGT: x           PT/INR - ( 26 Mar 2024 07:13 )   PT: 11.8 sec;   INR: 1.04     PTT - ( 26 Mar 2024 07:13 )  PTT:28.4 sec    Urinalysis Basic - ( 26 Mar 2024 07:13 )  Color: x / Appearance: x / SG: x / pH: x  Gluc: 109 mg/dL / Ketone: x  / Bili: x / Urobili: x   Blood: x / Protein: x / Nitrite: x   Leuk Esterase: x / RBC: x / WBC x   Sq Epi: x / Non Sq Epi: x / Bacteria: x    MEDICATIONS  MEDICATIONS  (STANDING):  atorvastatin 80 milliGRAM(s) Oral at bedtime  cilostazol 50 milliGRAM(s) Oral every 12 hours  levETIRAcetam  IVPB 1000 milliGRAM(s) IV Intermittent once  levothyroxine 75 MICROGram(s) Oral every 24 hours  naloxone Injectable 0.2 milliGRAM(s) IV Push once  pantoprazole  Injectable 40 milliGRAM(s) IV Push every 12 hours  polyethylene glycol 3350 17 Gram(s) Oral every 24 hours  senna 2 Tablet(s) Oral at bedtime  sertraline 50 milliGRAM(s) Oral every 24 hours    MEDICATIONS  (PRN):  acetaminophen     Tablet .. 650 milliGRAM(s) Oral every 6 hours PRN Temp greater or equal to 38C (100.4F), Mild Pain (1 - 3)      ASSESSMENT & PLAN    Assessment- Patient is a 67yo F former smoker with PMHx of hypothyroidism, seizure disorder, Stage 3 CKD (baseline CR ~1.7-2.0), severe CARLI, chronic anemia (baseline Hgb ~9-10, gets weekly Fe infusion last infusion 3/22/24), AT, Afib s/p AVN ablation and CRT-P (8/2022; on Eliquis), HTN, HLD, CAD s/p CABG, AVR (porcine bio-prosthetic valve) and mitral annuloplasty, HFpEF, AAA s/p open repair in 2015 with complications, PAD s/p L pSFA stent/L-TIFFANY stent/L-IIA stent who presented with intermittent chest pain and KRAFT x 3 days, admitted for symptomatic anemia and r/o PE. Patient now s/p rapid response for AMS with c/f seizure.    Plan-  Patient likely post-ictal, mental status slowly improved and protecting Airway  f/u labs sent  USGPIV x 1 placed  keppra load  neuro/epilepsy consult, appreciate recs  vEEG  Urgent Head, Chest, Abd/Pelvis CT  aspiration and seizure precautions  Ativan as needed for seizure activity    Case discussed with hospitalist, ICU consult, PCCM Fellow and patient to be upgrade to Tele    I have personally and independently provided 31 minutes of critical care services.  This excludes any time spent on separate procedures or teaching.

## 2024-03-26 NOTE — PROGRESS NOTE ADULT - PROBLEM SELECTOR PLAN 9
Hx of peripheral artery disease.  PAD s/p L pSFA stent/L-TIFFANY stent with occluded b/l SFA followed by L-internal iliac artery stent in 5/2021 c/b L-RP hematoma requiring 2 units PRBC with ongoing LLE claudication.  Home med: cilostazol 50mg bid  - c/w home med Plan: Hx of peripheral artery disease.  PAD s/p L pSFA stent/L-TIFFANY stent with occluded b/l SFA followed by L-internal iliac artery stent in 5/2021 c/b L-RP hematoma requiring 2 units PRBC with ongoing LLE claudication.  Home med: cilostazol 50mg bid    - c/w home med. #HTN  #HLD  Hx of CAD s/p CABG, bioAVR/ mitral annuloplasty in 2002, Hx of PCI (unknown date). S/p BiV-PPM placement.  EKG: paced rhythm. Trops negative.   Home meds: Crestor 40mg qhs, lopressor 50mg bid, losartan 25mg qd.    - c/w crestor TI --> atorvastatin 80mg qhs  - hold losartan iso CHERRI  - hold lopressor given concern for possible bleed

## 2024-03-26 NOTE — CONSULT NOTE ADULT - PROBLEM SELECTOR RECOMMENDATION 9
Per sister Faby #[phone], she had hx of 2 seizures in 2018/2019, stopped taking AED as she felt her seizures have stopped. RR called on 3/26AM for AMS c/f 2x seizure s/p 2g IV Ativan. Epilepsy was consulted. They advised a keppra load of 40mg/kg (2g) given that she was recoving her mental status quickly after her 1st suspected seizure. EEG was ordered.  - Seizure precautions, NPO  - On EEG, f/u reads daily  - If actively seizing, push IV Ativan 2g/min, q3-5min if seizures continue  - f/u Epilepsy recs for standing AED regimen/if brain imaging required Hx of peripheral artery disease. PAD s/p L pSFA stent/L-TIFFANY stent with occluded b/l SFA followed by L-internal iliac artery stent in 5/2021 c/b L-RP hematoma requiring 2 units PRBC with ongoing LLE claudication.  Home med: cilostazol 50mg bid  - c/w home med.

## 2024-03-26 NOTE — PROGRESS NOTE ADULT - ASSESSMENT
Patient is a 67yo F former smoker (quit 20 year ago with PMH of hypothyroidism, seizure disorder, Stage 3 CKD (baseline CR ~1.7-2.0), severe renal artery stenosis, chronic anemia (baseline Hgb ~9-10, gets weekly Fe infusion last infusion 3/22/24), AT, Afib s/p AVN ablation and CRT-P (8/2022; on Eliquis), HTN, HLD, CAD s/p CABG, AVR (porcine bio-prosthetic valve) and mitral annuloplasty, HFpEF, AAA s/p open repair in 2015 with complications, PAD s/p L pSFA stent/L-TIFFANY stent/L-IIA stent who presents with intermittent chest pain and KRAFT x 3 days, admitted for symptomatic anemia and r/o PE.  Patient is a 69yo F former smoker (quit 20 year ago with PMH of hypothyroidism, seizure disorder, Stage 3 CKD (baseline CR ~1.7-2.0), severe renal artery stenosis, chronic anemia (baseline Hgb ~9-10, gets weekly Fe infusion last infusion 3/22/24), AT, Afib s/p AVN ablation and CRT-P (8/2022; on Eliquis), HTN, HLD, CAD s/p CABG, AVR (porcine bio-prosthetic valve) and mitral annuloplasty, HFpEF, AAA s/p open repair in 2015 with complications, PAD s/p L pSFA stent/L-TIFFANY stent/L-IIA stent who presents with intermittent chest pain and KRAFT x 3 days, admitted for symptomatic anemia.

## 2024-03-26 NOTE — CONSULT NOTE ADULT - PROBLEM SELECTOR RECOMMENDATION 5
Patient p/w CHERRI on CKD (baseline reportedly 1.7-2.0 outpatient).   Labs on admission: BUN/Cr 48/3.00  Reports poor PO intake over the past week due to feeling constipated.   Likely CHERRI 2/2 prerenal causes.    - urine lytes  - bladder scan x 1  - monitor BMP  - encourage PO fluid intake, consider bolus v. mIVF if Cr worsening  - avoid nephrotoxic agents.

## 2024-03-26 NOTE — PROGRESS NOTE ADULT - PROBLEM SELECTOR PROBLEM 6
Chronic atrial fibrillation Dyspnea on exertion Chronic heart failure with preserved ejection fraction (HFpEF)

## 2024-03-26 NOTE — PROGRESS NOTE ADULT - SUBJECTIVE AND OBJECTIVE BOX
**INCOMPLETE NOTE    OVERNIGHT EVENTS:    SUBJECTIVE:  Patient seen and examined at bedside.    Vital Signs Last 12 Hrs  T(F): 97.4 (03-26-24 @ 05:58), Max: 97.9 (03-25-24 @ 22:52)  HR: 75 (03-26-24 @ 05:58) (71 - 75)  BP: 91/54 (03-26-24 @ 05:58) (91/54 - 132/61)  BP(mean): --  RR: 18 (03-26-24 @ 05:58) (18 - 18)  SpO2: 100% (03-26-24 @ 05:58) (99% - 100%)  I&O's Summary      PHYSICAL EXAM:  Constitutional: NAD, comfortable in bed.  HEENT: NC/AT, PERRLA, EOMI, no conjunctival pallor or scleral icterus, MMM  Neck: Supple, no JVD  Respiratory: CTA B/L. No w/r/r.   Cardiovascular: RRR, normal S1 and S2, no m/r/g.   Gastrointestinal: +BS, soft NTND, no guarding or rebound tenderness, no palpable masses   Extremities: wwp; no cyanosis, clubbing or edema.   Vascular: Pulses equal and strong throughout.   Neurological: AAOx3, no CN deficits, strength and sensation intact throughout.   Skin: No gross skin abnormalities or rashes        LABS:                        8.4    11.62 )-----------( 184      ( 26 Mar 2024 09:12 )             27.1     03-26    136  |  105  |  48<H>  ----------------------------<  180<H>  4.2   |  15<L>  |  2.95<H>    Ca    11.1<H>      26 Mar 2024 09:12  Phos  3.5     03-26  Mg     2.4     03-26    TPro  7.6  /  Alb  4.3  /  TBili  0.3  /  DBili  x   /  AST  23  /  ALT  12  /  AlkPhos  98  03-26    PT/INR - ( 26 Mar 2024 09:44 )   PT: 11.5 sec;   INR: 1.01          PTT - ( 26 Mar 2024 09:44 )  PTT:24.9 sec  Urinalysis Basic - ( 26 Mar 2024 09:12 )    Color: x / Appearance: x / SG: x / pH: x  Gluc: 180 mg/dL / Ketone: x  / Bili: x / Urobili: x   Blood: x / Protein: x / Nitrite: x   Leuk Esterase: x / RBC: x / WBC x   Sq Epi: x / Non Sq Epi: x / Bacteria: x          RADIOLOGY & ADDITIONAL TESTS:    MEDICATIONS  (STANDING):  atorvastatin 80 milliGRAM(s) Oral at bedtime  cilostazol 50 milliGRAM(s) Oral every 12 hours  levETIRAcetam  IVPB 2000 milliGRAM(s) IV Intermittent once  levothyroxine 75 MICROGram(s) Oral every 24 hours  naloxone Injectable 0.2 milliGRAM(s) IV Push once  pantoprazole  Injectable 40 milliGRAM(s) IV Push every 12 hours  polyethylene glycol 3350 17 Gram(s) Oral every 24 hours  senna 2 Tablet(s) Oral at bedtime  sertraline 50 milliGRAM(s) Oral every 24 hours  sodium chloride 0.9%. 1000 milliLiter(s) (50 mL/Hr) IV Continuous <Continuous>    MEDICATIONS  (PRN):  acetaminophen     Tablet .. 650 milliGRAM(s) Oral every 6 hours PRN Temp greater or equal to 38C (100.4F), Mild Pain (1 - 3)   **INCOMPLETE NOTE    OVERNIGHT EVENTS: No acute events overnight    SUBJECTIVE:  Patient seen and examined at bedside. Patient reports feeling her shortness of breath and chest discomfort have improved since she arrived to hospital, however she states she is still experiencing both. When asked to describe the chest pain, she describes it as a "knocking pain". She expresses for the past few months she feels she tires very easily. She denies any cough. She denies any noticing any lower extremity/swelling. She denies any feelings of dizziness or weakness. Previously she had no had a bowel movement for the last 4-5 days, but this morning patient had one bloody bowel movement.     Hospital Course: Pt is 68 year old female former smoker (quit 20 years ago PMH of hypothyroidism, seizure disorder, Stage 3 CKD (baseline Cr ~1.7-2), severe renal artery stenosis, chronic anemia (baseline Hgb 9-10; gets weekly Fe infusion - last infusion 3/22/24), AT, Afib s/p AVN ablation and CRT-P (8/2022; on Eliquis), HTN, HLD, CAD s/p CABG, AVR (porcine bio-prosthetic valve) and mitral annuloplasty, HFpEF, AAA s/p open repair in 2015 with complications, PAD s/p L pSFA stent/L-TIFFANY stent/L-IIA stent who presented with intermittent chest pain and dyspnea on exertion X 3 days. She states she has felt she tires more easily over the last few months. She also has not had a bowel movement in 5-6 days and reports some lower abdominal pain and poor PO intake over the last two days because of these symptoms. In the ED, pt Hgb: 6.9, D-dimer 2831, p-BNP: 3727, BUN: 48, Cr: 3. Pt received 1 u pRBC, maalox 30mL, morphine 2mg IV X1. CT Abdomen showed: post surgical changes from prior open thoracoabdominal aortic bypass graft with new focal dilation of the distal most aspect of the graft measuring 4.4 X 6.1 cm just above the aortic bifurcation (previously measuring 3.2 X 4.1 cm on 6/11/2021). Vascular consulted in ED and stated no immediate surgical intervention; recommend CTA; want kidney function stabilized so pt admitted to [facility].     Hospital Course: Pt is a 38-year-old male, with unknown past medical history, BIBEMS to [facility] after he was found down earlier in the morning. Found to have AMS, elevated blood alcohol level (650), hypoxia, leukocytosis, CT showing multi lobar bilateral pneumonia, consolidation greatest in right lower lobe. Was intubated in the ED, and admitted to MICU for further treatment of AHRF likely i/s/o alcohol intoxication and Aspiration pnuemonia. n the MICU, he was treated with ceftriaxone for the aspiration pna, respiratory status gradually improved and patient was extubated on 3/24, now saturating >95 in RA. Mental status also improved. Of note, given the patient's history of living in a shelter and emigrating from [place], the findings on CT were concerning for TB and therefore patient underwent a bronchoscopy on 3/24. At this time, given improvement in respiratory status, mental status and overall clinical improvement patient is deemed stable for transfer to [facility].       Vital Signs Last 12 Hrs  T(F): 97.4 (03-26-24 @ 05:58), Max: 97.9 (03-25-24 @ 22:52)  HR: 75 (03-26-24 @ 05:58) (71 - 75)  BP: 91/54 (03-26-24 @ 05:58) (91/54 - 132/61)  BP(mean): --  RR: 18 (03-26-24 @ 05:58) (18 - 18)  SpO2: 100% (03-26-24 @ 05:58) (99% - 100%)  I&O's Summary      PHYSICAL EXAM:  Constitutional: NAD, comfortable in bed.  HEENT: NC/AT, PERRLA, EOMI, no conjunctival pallor or scleral icterus, MMM  Neck: Supple, no JVD  Respiratory: CTA B/L. No w/r/r.   Cardiovascular: RRR, normal S1 and S2, no m/r/g.   Gastrointestinal: +BS, soft NTND, no guarding or rebound tenderness, no palpable masses   Extremities: wwp; no cyanosis, clubbing or edema.   Vascular: Pulses equal and strong throughout.   Neurological: AAOx3, no CN deficits, strength and sensation intact throughout.   Skin: No gross skin abnormalities or rashes        LABS:                        8.4    11.62 )-----------( 184      ( 26 Mar 2024 09:12 )             27.1     03-26    136  |  105  |  48<H>  ----------------------------<  180<H>  4.2   |  15<L>  |  2.95<H>    Ca    11.1<H>      26 Mar 2024 09:12  Phos  3.5     03-26  Mg     2.4     03-26    TPro  7.6  /  Alb  4.3  /  TBili  0.3  /  DBili  x   /  AST  23  /  ALT  12  /  AlkPhos  98  03-26    PT/INR - ( 26 Mar 2024 09:44 )   PT: 11.5 sec;   INR: 1.01          PTT - ( 26 Mar 2024 09:44 )  PTT:24.9 sec  Urinalysis Basic - ( 26 Mar 2024 09:12 )    Color: x / Appearance: x / SG: x / pH: x  Gluc: 180 mg/dL / Ketone: x  / Bili: x / Urobili: x   Blood: x / Protein: x / Nitrite: x   Leuk Esterase: x / RBC: x / WBC x   Sq Epi: x / Non Sq Epi: x / Bacteria: x          RADIOLOGY & ADDITIONAL TESTS:    MEDICATIONS  (STANDING):  atorvastatin 80 milliGRAM(s) Oral at bedtime  cilostazol 50 milliGRAM(s) Oral every 12 hours  levETIRAcetam  IVPB 2000 milliGRAM(s) IV Intermittent once  levothyroxine 75 MICROGram(s) Oral every 24 hours  naloxone Injectable 0.2 milliGRAM(s) IV Push once  pantoprazole  Injectable 40 milliGRAM(s) IV Push every 12 hours  polyethylene glycol 3350 17 Gram(s) Oral every 24 hours  senna 2 Tablet(s) Oral at bedtime  sertraline 50 milliGRAM(s) Oral every 24 hours  sodium chloride 0.9%. 1000 milliLiter(s) (50 mL/Hr) IV Continuous <Continuous>    MEDICATIONS  (PRN):  acetaminophen     Tablet .. 650 milliGRAM(s) Oral every 6 hours PRN Temp greater or equal to 38C (100.4F), Mild Pain (1 - 3)   **INCOMPLETE NOTE    OVERNIGHT EVENTS: No acute events overnight    SUBJECTIVE:  Patient seen and examined at bedside. Patient reports feeling her shortness of breath and chest discomfort have improved since she arrived to hospital, however she states she is still experiencing both. When asked to describe the chest pain, she describes it as a "knocking pain". She expresses for the past few months she feels she tires very easily. She denies any cough. She denies any noticing any lower extremity/swelling. She denies any feelings of dizziness or weakness. Previously she had no had a bowel movement for the last 4-5 days, but this morning patient had one bloody bowel movement.     Hospital Course: Pt is 68 year old female former smoker (quit 20 years ago PMH of hypothyroidism, seizure disorder, Stage 3 CKD (baseline Cr ~1.7-2), severe renal artery stenosis, chronic anemia (baseline Hgb 9-10; gets weekly Fe infusion - last infusion 3/22/24), AT, Afib s/p AVN ablation and CRT-P (8/2022; on Eliquis), HTN, HLD, CAD s/p CABG, AVR (porcine bio-prosthetic valve) and mitral annuloplasty, HFpEF, AAA s/p open repair in 2015 with complications, PAD s/p L pSFA stent/L-TIFFANY stent/L-IIA stent who presented with intermittent chest pain and dyspnea on exertion X 3 days. She states she has felt she tires more easily over the last few months. She also has not had a bowel movement in 5-6 days and reports some lower abdominal pain and poor PO intake over the last two days because of these symptoms. In the ED, pt Hgb: 6.9, D-dimer 2831, p-BNP: 3727, BUN: 48, Cr: 3. Pt received 1 u pRBC, maalox 30mL, morphine 2mg IV X1. CT Abdomen showed: post surgical changes from prior open thoracoabdominal aortic bypass graft with new focal dilation of the distal most aspect of the graft measuring 4.4 X 6.1 cm just above the aortic bifurcation (previously measuring 3.2 X 4.1 cm on 6/11/2021). Vascular consulted in ED and stated no immediate surgical intervention; recommend CTA; want kidney function stabilized so pt admitted to [facility]. Pt's most recent Hg 8.4 (s/p 1 u pRBC given in ED). On morning of 3/26 patient was AAO X3, afebrile, BP was 91/54. Later in the morning, Rapid Response was called for Altered mental status. Significant labs during rapid: WBC 11.62, Lactate 6.8, Creatinine kinase 414, Ca: 11.1, glucose 180, Cr 2.95, BUN 48. AMS thought to be due to a seizure. Unsure details of patient's seizure history: unclear when patient's last seizure is/if she is on any medications for it. Pt had post ictal confusion, slowly returning back to her baseline mental status. Pt stepped up to telemetry.         Vital Signs Last 12 Hrs  T(F): 97.4 (03-26-24 @ 05:58), Max: 97.9 (03-25-24 @ 22:52)  HR: 75 (03-26-24 @ 05:58) (71 - 75)  BP: 91/54 (03-26-24 @ 05:58) (91/54 - 132/61)  BP(mean): --  RR: 18 (03-26-24 @ 05:58) (18 - 18)  SpO2: 100% (03-26-24 @ 05:58) (99% - 100%)  I&O's Summary      PHYSICAL EXAM:  Constitutional: NAD, comfortable in bed.  HEENT: NC/AT, PERRLA, EOMI, no conjunctival pallor or scleral icterus, MMM  Neck: Supple, no JVD  Respiratory: CTA B/L. No w/r/r.   Cardiovascular: RRR, normal S1 and S2, mild tenderness to palpation of right middle chest wall  Gastrointestinal: +BS, no palpable masses, tenderness to palpation in epigastric region and RLQ  Extremities: wwp; no cyanosis, clubbing or edema.   Vascular: Pulses equal and strong throughout.   Neurological: AAOx3, no CN deficits, strength and sensation intact throughout.   Skin: No gross skin abnormalities or rashes, large midline scar (well healed) extending from chest to groin         LABS:                        8.4    11.62 )-----------( 184      ( 26 Mar 2024 09:12 )             27.1     03-26    136  |  105  |  48<H>  ----------------------------<  180<H>  4.2   |  15<L>  |  2.95<H>    Ca    11.1<H>      26 Mar 2024 09:12  Phos  3.5     03-26  Mg     2.4     03-26    TPro  7.6  /  Alb  4.3  /  TBili  0.3  /  DBili  x   /  AST  23  /  ALT  12  /  AlkPhos  98  03-26    PT/INR - ( 26 Mar 2024 09:44 )   PT: 11.5 sec;   INR: 1.01          PTT - ( 26 Mar 2024 09:44 )  PTT:24.9 sec  Urinalysis Basic - ( 26 Mar 2024 09:12 )    Color: x / Appearance: x / SG: x / pH: x  Gluc: 180 mg/dL / Ketone: x  / Bili: x / Urobili: x   Blood: x / Protein: x / Nitrite: x   Leuk Esterase: x / RBC: x / WBC x   Sq Epi: x / Non Sq Epi: x / Bacteria: x          RADIOLOGY & ADDITIONAL TESTS:    MEDICATIONS  (STANDING):  atorvastatin 80 milliGRAM(s) Oral at bedtime  cilostazol 50 milliGRAM(s) Oral every 12 hours  levETIRAcetam  IVPB 2000 milliGRAM(s) IV Intermittent once  levothyroxine 75 MICROGram(s) Oral every 24 hours  naloxone Injectable 0.2 milliGRAM(s) IV Push once  pantoprazole  Injectable 40 milliGRAM(s) IV Push every 12 hours  polyethylene glycol 3350 17 Gram(s) Oral every 24 hours  senna 2 Tablet(s) Oral at bedtime  sertraline 50 milliGRAM(s) Oral every 24 hours  sodium chloride 0.9%. 1000 milliLiter(s) (50 mL/Hr) IV Continuous <Continuous>    MEDICATIONS  (PRN):  acetaminophen     Tablet .. 650 milliGRAM(s) Oral every 6 hours PRN Temp greater or equal to 38C (100.4F), Mild Pain (1 - 3)   **INCOMPLETE NOTE  **TRANSFER FROM [facility] TO TELEMETRY NOTE**  Hospital Course: Pt is 68 year old female former smoker (quit 20 years ago PMH of hypothyroidism, seizure disorder, Stage 3 CKD (baseline Cr ~1.7-2), severe renal artery stenosis, chronic anemia (baseline Hgb 9-10; gets weekly Fe infusion - last infusion 3/22/24), AT, Afib s/p AVN ablation and CRT-P (8/2022; on Eliquis), HTN, HLD, CAD s/p CABG, AVR (porcine bio-prosthetic valve) and mitral annuloplasty, HFpEF, AAA s/p open repair in 2015 with complications, PAD s/p L pSFA stent/L-TIFFANY stent/L-IIA stent who presented with intermittent chest pain and dyspnea on exertion X 3 days. She also has not had a bowel movement in 5-6 days and reports some lower abdominal pain and poor PO intake over the last two days because of these symptoms. In the ED, pt Hgb: 6.9, D-dimer 2831, p-BNP: 3727, BUN: 48, Cr: 3. Pt received 1 u pRBC, maalox 30mL, morphine 2mg IV X1. CT Abdomen showed: post surgical changes from prior open thoracoabdominal aortic bypass graft with new focal dilation of the distal most aspect of the graft measuring 4.4 X 6.1 cm just above the aortic bifurcation (previously measuring 3.2 X 4.1 cm on 6/11/2021). Vascular consulted in ED and stated no immediate surgical intervention; recommend CTA. This AM, patient had reported episode of melena, KASH positive for melena, consulted GI. This AM rapid response was called for patient due to new AMS. Patient was found to be unresponsive, sent blood-work during rapid. AMS thought to be 2/2 seizure given patient has reported hx of seizures. Unsure details of patient's seizure history: unclear when patient's last seizure. Pt appeared to be confused, slowly returning back to her baseline mental status. Pt stepped up to telemetry.     OVERNIGHT EVENTS: No acute events overnight    SUBJECTIVE:  Patient seen and examined at bedside prior to rapid response- reported feeling her shortness of breath and chest discomfort have improved since she arrived to hospital, however she states she is still experiencing both. When asked to describe the chest pain, she describes it as a "knocking pain". She expresses for the past few months she feels she tires very easily. She denies any cough. She denies any noticing any lower extremity/swelling. She denies any feelings of dizziness or weakness. Previously she had no had a bowel movement for the last 4-5 days, but this morning patient had one episode of melena.       Vital Signs Last 12 Hrs  T(F): 97.4 (03-26-24 @ 05:58), Max: 97.9 (03-25-24 @ 22:52)  HR: 75 (03-26-24 @ 05:58) (71 - 75)  BP: 91/54 (03-26-24 @ 05:58) (91/54 - 132/61)  BP(mean): --  RR: 18 (03-26-24 @ 05:58) (18 - 18)  SpO2: 100% (03-26-24 @ 05:58) (99% - 100%)  I&O's Summary      PHYSICAL EXAM:  Constitutional: NAD, comfortable in bed.  HEENT: NC/AT, PERRLA, EOMI, no conjunctival pallor or scleral icterus, MMM  Neck: Supple, no JVD  Respiratory: CTA B/L. No w/r/r.   Cardiovascular: RRR, normal S1 and S2, mild tenderness to palpation of right middle chest wall  Gastrointestinal: +BS, no palpable masses, tenderness to palpation in epigastric region and RLQ  Extremities: wwp; no cyanosis, clubbing or edema.   Vascular: Pulses equal and strong throughout.   Neurological: AAOx3, no CN deficits, strength and sensation intact throughout.   Skin: No gross skin abnormalities or rashes, large midline scar (well healed) extending from chest to groin         LABS:                        8.4    11.62 )-----------( 184      ( 26 Mar 2024 09:12 )             27.1     03-26    136  |  105  |  48<H>  ----------------------------<  180<H>  4.2   |  15<L>  |  2.95<H>    Ca    11.1<H>      26 Mar 2024 09:12  Phos  3.5     03-26  Mg     2.4     03-26    TPro  7.6  /  Alb  4.3  /  TBili  0.3  /  DBili  x   /  AST  23  /  ALT  12  /  AlkPhos  98  03-26    PT/INR - ( 26 Mar 2024 09:44 )   PT: 11.5 sec;   INR: 1.01          PTT - ( 26 Mar 2024 09:44 )  PTT:24.9 sec  Urinalysis Basic - ( 26 Mar 2024 09:12 )    Color: x / Appearance: x / SG: x / pH: x  Gluc: 180 mg/dL / Ketone: x  / Bili: x / Urobili: x   Blood: x / Protein: x / Nitrite: x   Leuk Esterase: x / RBC: x / WBC x   Sq Epi: x / Non Sq Epi: x / Bacteria: x          RADIOLOGY & ADDITIONAL TESTS:    MEDICATIONS  (STANDING):  atorvastatin 80 milliGRAM(s) Oral at bedtime  cilostazol 50 milliGRAM(s) Oral every 12 hours  levETIRAcetam  IVPB 2000 milliGRAM(s) IV Intermittent once  levothyroxine 75 MICROGram(s) Oral every 24 hours  naloxone Injectable 0.2 milliGRAM(s) IV Push once  pantoprazole  Injectable 40 milliGRAM(s) IV Push every 12 hours  polyethylene glycol 3350 17 Gram(s) Oral every 24 hours  senna 2 Tablet(s) Oral at bedtime  sertraline 50 milliGRAM(s) Oral every 24 hours  sodium chloride 0.9%. 1000 milliLiter(s) (50 mL/Hr) IV Continuous <Continuous>    MEDICATIONS  (PRN):  acetaminophen     Tablet .. 650 milliGRAM(s) Oral every 6 hours PRN Temp greater or equal to 38C (100.4F), Mild Pain (1 - 3)   **TRANSFER FROM [facility] TO TELEMETRY NOTE**  Hospital Course: Pt is 68 year old female former smoker (quit 20 years ago PMH of hypothyroidism, seizure disorder, Stage 3 CKD (baseline Cr ~1.7-2), severe renal artery stenosis, chronic anemia (baseline Hgb 9-10; gets weekly Fe infusion - last infusion 3/22/24), AT, Afib s/p AVN ablation and CRT-P (8/2022; on Eliquis), HTN, HLD, CAD s/p CABG, AVR (porcine bio-prosthetic valve) and mitral annuloplasty, HFpEF, AAA s/p open repair in 2015 with complications, PAD s/p L pSFA stent/L-TIFFANY stent/L-IIA stent who presented with intermittent chest pain and dyspnea on exertion X 3 days. She also has not had a bowel movement in 5-6 days and reports some lower abdominal pain and poor PO intake over the last two days because of these symptoms. In the ED, pt Hgb: 6.9, D-dimer 2831, p-BNP: 3727, BUN: 48, Cr: 3. Pt received 1 u pRBC, maalox 30mL, morphine 2mg IV X1. CT Abdomen showed: post surgical changes from prior open thoracoabdominal aortic bypass graft with new focal dilation of the distal most aspect of the graft measuring 4.4 X 6.1 cm just above the aortic bifurcation (previously measuring 3.2 X 4.1 cm on 6/11/2021). Vascular consulted in ED and stated no immediate surgical intervention; recommend CTA. This AM, patient had reported episode of melena, KASH positive for melena, consulted GI. This AM rapid response was called for patient due to new AMS. Patient was found to be unresponsive, sent blood-work during rapid. AMS thought to be 2/2 seizure given patient has reported hx of seizures. Unsure details of patient's seizure history: unclear when patient's last seizure. Pt appeared to be confused, slowly returning back to her baseline mental status. Pt stepped up to telemetry.     OVERNIGHT EVENTS: No acute events overnight    SUBJECTIVE:  Patient seen and examined at bedside prior to rapid response- reported feeling her shortness of breath and chest discomfort have improved since she arrived to hospital, however she states she is still experiencing both. When asked to describe the chest pain, she describes it as a "knocking pain". She expresses for the past few months she feels she tires very easily. She denies any cough. She denies any noticing any lower extremity/swelling. She denies any feelings of dizziness or weakness. Previously she had no had a bowel movement for the last 4-5 days, but this morning patient had one episode of melena.       Vital Signs Last 12 Hrs  T(F): 97.4 (03-26-24 @ 05:58), Max: 97.9 (03-25-24 @ 22:52)  HR: 75 (03-26-24 @ 05:58) (71 - 75)  BP: 91/54 (03-26-24 @ 05:58) (91/54 - 132/61)  BP(mean): --  RR: 18 (03-26-24 @ 05:58) (18 - 18)  SpO2: 100% (03-26-24 @ 05:58) (99% - 100%)  I&O's Summary      PHYSICAL EXAM:  Constitutional: NAD, comfortable in bed.  HEENT: NC/AT, PERRLA, EOMI, no conjunctival pallor or scleral icterus, MMM  Neck: Supple, no JVD  Respiratory: CTA B/L. No w/r/r.   Cardiovascular: RRR, normal S1 and S2, mild tenderness to palpation of right middle chest wall  Gastrointestinal: +BS, no palpable masses, tenderness to palpation in epigastric region and RLQ  Extremities: wwp; no cyanosis, clubbing or edema.   Vascular: Pulses equal and strong throughout.   Neurological: AAOx3, no CN deficits, strength and sensation intact throughout.   Skin: No gross skin abnormalities or rashes, large midline scar (well healed) extending from chest to groin         LABS:                        8.4    11.62 )-----------( 184      ( 26 Mar 2024 09:12 )             27.1     03-26    136  |  105  |  48<H>  ----------------------------<  180<H>  4.2   |  15<L>  |  2.95<H>    Ca    11.1<H>      26 Mar 2024 09:12  Phos  3.5     03-26  Mg     2.4     03-26    TPro  7.6  /  Alb  4.3  /  TBili  0.3  /  DBili  x   /  AST  23  /  ALT  12  /  AlkPhos  98  03-26    PT/INR - ( 26 Mar 2024 09:44 )   PT: 11.5 sec;   INR: 1.01          PTT - ( 26 Mar 2024 09:44 )  PTT:24.9 sec  Urinalysis Basic - ( 26 Mar 2024 09:12 )    Color: x / Appearance: x / SG: x / pH: x  Gluc: 180 mg/dL / Ketone: x  / Bili: x / Urobili: x   Blood: x / Protein: x / Nitrite: x   Leuk Esterase: x / RBC: x / WBC x   Sq Epi: x / Non Sq Epi: x / Bacteria: x          RADIOLOGY & ADDITIONAL TESTS:    MEDICATIONS  (STANDING):  atorvastatin 80 milliGRAM(s) Oral at bedtime  cilostazol 50 milliGRAM(s) Oral every 12 hours  levETIRAcetam  IVPB 2000 milliGRAM(s) IV Intermittent once  levothyroxine 75 MICROGram(s) Oral every 24 hours  naloxone Injectable 0.2 milliGRAM(s) IV Push once  pantoprazole  Injectable 40 milliGRAM(s) IV Push every 12 hours  polyethylene glycol 3350 17 Gram(s) Oral every 24 hours  senna 2 Tablet(s) Oral at bedtime  sertraline 50 milliGRAM(s) Oral every 24 hours  sodium chloride 0.9%. 1000 milliLiter(s) (50 mL/Hr) IV Continuous <Continuous>    MEDICATIONS  (PRN):  acetaminophen     Tablet .. 650 milliGRAM(s) Oral every 6 hours PRN Temp greater or equal to 38C (100.4F), Mild Pain (1 - 3)

## 2024-03-26 NOTE — PROGRESS NOTE ADULT - PROBLEM SELECTOR PLAN 9
#HTN   #HLD  History of CAD s/p CABG, bioAVR/ mitral annuloplasty in 2002, PCI (unknown date), BiV-PPM placement. EKG w/ paced rhythm. Troponins negative.   -Home meds: Crestor 40mg qhs, lopressor 50mg bid, losartan 25mg qd.    Plan:  - Continue atorvastatin 80mg qhs (TI for Crestor)  - Hold losartan i/s/o CHERRI  - Hold lopressor given concern for possible bleed

## 2024-03-26 NOTE — PROGRESS NOTE ADULT - SUBJECTIVE AND OBJECTIVE BOX
SUBJECTIVE: Pt seen and examined at bedside this am by surgery team. Patient is lying comfortably in bed with no complaints. Patient states that she had a large BM this morning after being constipated for many days - it was very black. Denies nausea/vomiting. States chest pain is stable from admission, mild abdominal pain and back pain, constant for several months.     MEDICATIONS  (STANDING):  atorvastatin 80 milliGRAM(s) Oral at bedtime  cilostazol 50 milliGRAM(s) Oral every 12 hours  levETIRAcetam  IVPB 2000 milliGRAM(s) IV Intermittent once  levothyroxine 75 MICROGram(s) Oral every 24 hours  pantoprazole  Injectable 40 milliGRAM(s) IV Push every 12 hours  polyethylene glycol 3350 17 Gram(s) Oral every 24 hours  senna 2 Tablet(s) Oral at bedtime  sertraline 50 milliGRAM(s) Oral every 24 hours  sodium chloride 0.9%. 1000 milliLiter(s) (50 mL/Hr) IV Continuous <Continuous>    MEDICATIONS  (PRN):  acetaminophen     Tablet .. 650 milliGRAM(s) Oral every 6 hours PRN Temp greater or equal to 38C (100.4F), Mild Pain (1 - 3)      Vital Signs Last 24 Hrs  T(C): 36.3 (26 Mar 2024 05:58), Max: 36.9 (25 Mar 2024 20:02)  T(F): 97.4 (26 Mar 2024 05:58), Max: 98.5 (25 Mar 2024 20:02)  HR: 74 (26 Mar 2024 11:06) (71 - 86)  BP: 120/57 (26 Mar 2024 11:06) (91/54 - 138/67)  BP(mean): 82 (26 Mar 2024 11:06) (82 - 82)  RR: 18 (26 Mar 2024 11:06) (17 - 18)  SpO2: 100% (26 Mar 2024 11:06) (99% - 100%)    Parameters below as of 26 Mar 2024 11:06  Patient On (Oxygen Delivery Method): room air        Physical Exam  General: Patient is doing well and lying in bed comfortably  Constitutional: alert and oriented   Pulm: Nonlabored breathing, no respiratory distress  CV: Regular rate and rhythm, normal sinus rhythm  Abd:  soft, nontender, nondistended. No rebound, no guarding. no pulsatile mass  Extremities: warm, well perfused, no edema      I&O's Detail    LABS:                        8.4    11.62 )-----------( 184      ( 26 Mar 2024 09:12 )             27.1     03-26    136  |  105  |  48<H>  ----------------------------<  180<H>  4.2   |  15<L>  |  2.95<H>    Ca    11.1<H>      26 Mar 2024 09:12  Phos  3.5     03-26  Mg     2.4     03-26    TPro  7.6  /  Alb  4.3  /  TBili  0.3  /  DBili  x   /  AST  23  /  ALT  12  /  AlkPhos  98  03-26    PT/INR - ( 26 Mar 2024 09:44 )   PT: 11.5 sec;   INR: 1.01          PTT - ( 26 Mar 2024 09:44 )  PTT:24.9 sec  Urinalysis Basic - ( 26 Mar 2024 09:12 )    Color: x / Appearance: x / SG: x / pH: x  Gluc: 180 mg/dL / Ketone: x  / Bili: x / Urobili: x   Blood: x / Protein: x / Nitrite: x   Leuk Esterase: x / RBC: x / WBC x   Sq Epi: x / Non Sq Epi: x / Bacteria: x

## 2024-03-26 NOTE — CONSULT NOTE ADULT - ASSESSMENT
{\rtf1\qigaqz22168\ansi\bqtckle0742\ftnbj\uc1\deff0  {\fonttbl{\f0 \fnil Segoe UI;}{\f1 \fnil \fcharset0 Segoe UI;}{\f2 \fnil Times New Denis;}}  {\colortbl ;\lxr814\pdsus191\narr659 ;\red0\green0\blue0 ;\red0\green0\aqxj295 ;\red0\green0\blue0 ;}  {\stylesheet{\f0\fs20 Normal;}{\cs1 Default Paragraph Font;}{\cs2\f0\fs16 Line Number;}{\cs3\f2\fs24\ul\cf3 Hyperlink;}}  {\*\revtbl{Unknown;}}  \zkdfua63005\iteupm54253\djzbx3072\hgabx7654\tfwmy5331\ezlid6913\mojfxvb261\eafuhvf155\nogrowautofit\lfdpum102\formshade\nofeaturethrottle1\dntblnsbdb\fet4\aendnotes\aftnnrlc\pgbrdrhead\pgbrdrfoot  \sectd\jmxqhn91902\ncjxmh99561\guttersxn0\gmzyfalh8300\vhayyhxo9391\gkugohfu7713\srufdqts7331\pgoxmpa348\tteduvn254\sbkpage\pgncont\pgndec  \plain\plain\f0\fs24\ql\plain\f0\fs24\plain\f0\fs20\nyza4771\hich\f0\dbch\f0\loch\f0\fs20\par  I M\par  \par  68 y o F former smoker (quit 20 year ago with PMH of hypothyroidism, seizure disorder, Stage 3 CKD (baseline CR ~1.7-2.0), severe renal artery stenosis, chronic anemia (baseline Hgb ~9-10, gets weekly Fe infusion last infusion 3/22/24), AT, Afib s/p AVN   ablation and CRT-P (8/2022; on Eliquis), HTN, HLD, CAD s/p CABG, AVR (porcine bio-prosthetic valve) and mitral annuloplasty, HFpEF, AAA s/p open repair in 2015 with complications, PAD s/p L pSFA stent/L-TIFFANY stent/L-IIA stent who presents with intermittent   chest pain and KRAFT x 3 days, admitted for symptomatic anemia and r/o PE. \par  \par  \plain\f1\fs20\znlc4967\hich\f1\dbch\f1\loch\f1\cf2\fs20\ul{\field{\*\fldinst HYPERLINK [id number] }{\fldrslt Problem/Plan - 1:}}\plain\f0\fs20\aodw8065\hich\f0\dbch\f0\loch\f0\fs20\ql\par  \'b7  {\*\bkmkstart kx68994010726}{\*\bkmkend si49140097517}Problem: {\*\bkmkstart ww58003297894}{\*\bkmkend nx22124354099}Dyspnea on exertion. \par  \'b7  {\*\bkmkstart ap58466767615}{\*\bkmkend nx41548219134}Plan: {\*\bkmkstart dp79885235644}{\*\bkmkend lr81633559470}Patient p/w KRAFT and chest pain, VSS. No evidence of hypoxia. \par  Associated with intermittent chest pain although does not appear to be pleuritic in nature.\par  Denies cough, fevers, s/s of bleeding. No recent travel, immobilization although has been walking less over the past week. \par  Denies hx of cancer, clotting disorders, no relevant family hx.\par  Labs showing elevated D-dimer 2831, p-BNP elevated to 3727\par  CXR + CT showing bibasilar atelectasis over left lung zones. \par  Ddx includes symptomatic anemia, severe AS, PE, HF exacerbation (less likely), ACS (unlikely).\par  - f/u B/L LE duplex \par  - consider V/Q scan v. CTA if Cr improving \par  - treat anemia as below\par  - f/ u TTE\par  - CTSX consult in the AM\par  - given concern for bleeding and VSS stable, monitor off full dose AC tonight, can start heparin if  Hgb remains stable on AM labs.\plain\f1\fs20\fnyp8248\hich\f1\dbch\f1\loch\f1\cf2\fs20\strike\plain\f0\fs20\fxkw2856\hich\f0\dbch\f0\loch\f0\fs20\par  \par  \plain\f1\fs20\jabn1609\hich\f1\dbch\f1\loch\f1\cf2\fs20\ul{\field{\*\fldinst HYPERLINK [id number] }{\fldrslt Problem/Plan - 2:}}\plain\f0\fs20\qvlq6680\hich\f0\dbch\f0\loch\f0\fs20\ql\par  \'b7  {\*\bkmkstart bf96470013218}{\*\bkmkend bs50917025112}Problem: {\*\bkmkstart zh02565398654}{\*\bkmkend ow38119258236}Anemia. \par  \'b7  {\*\bkmkstart rl46918832397}{\*\bkmkend ow66422479355}Plan: {\*\bkmkstart hz48176157516}{\*\bkmkend gp53697182555}Patient p/w KRAFT and chest pain, VSS found to be anemic with Hgb 6.9.\par  Patient with hx of TASNEEM, gets once or twice weekly iron infusions outpatient, hematologist is Dr. Izquierdo.\par  No s/s of bleeding, baseline hgb reportedly 9-10. On eliquis 2.5mg bid, does not take any antiplatelet agents. \par  Given 1u pRBC in the ED.\par  Ddx includes GIB (although pt constipated, no other signs of bleed), AoCD, aortic aneurysm leak, abdominal bleeding.\par  - monitor post-transfusion CBC --> improved to Hgb 8.4\par  - f/u hemolysis labs (hx of valve replacement)\par  - maintain active T&S\par  - maintain 2 large-bore IVs\par  - transfuse if Hgb < 7\par  - GI consult.\plain\f1\fs20\gtxk6340\hich\f1\dbch\f1\loch\f1\cf2\fs20\strike\plain\f0\fs20\rcvm3454\hich\f0\dbch\f0\loch\f0\fs20\par  \par  \plain\f1\fs20\hvky0114\hich\f1\dbch\f1\loch\f1\cf2\fs20\ul{\field{\*\fldinst HYPERLINK [id number] }{\fldrslt Problem/Plan - 3:}}\plain\f0\fs20\nido0422\hich\f0\dbch\f0\loch\f0\fs20\ql\par  \'b7  {\*\bkmkstart ke76881311765}{\*\bkmkend rd08963651586}Problem: {\*\bkmkstart br94357415055}{\*\bkmkend uw15700319579}Acute kidney injury superimposed on CKD. \par  \'b7  {\*\bkmkstart yz25693677521}{\*\bkmkend pz73701570250}Plan: {\*\bkmkstart uf72673330140}{\*\bkmkend dp20508740607}Patient p/w CHERIR on CKD (baseline reportedly 1.7-2.0 outpatient). \par  Labs on admission: BUN/Cr 48/3.00\par  Reports poor PO intake over the past week due to feeling constipated. \par  Likely CHERRI 2/2 prerenal causes.  \par  - urine lytes\par  - bladder scan x 1\par  - monitor BMP\par  - encourage PO fluid intake, consider bolus v. mIVF if Cr worsening\par  - avoid nephrotoxic agents.\par  \par  \plain\f1\fs20\vppa5978\hich\f1\dbch\f1\loch\f1\cf2\fs20\ul{\field{\*\fldinst HYPERLINK [id number] }{\fldrslt Problem/Plan - 4:}}\plain\f0\fs20\kmsn3941\hich\f0\dbch\f0\loch\f0\fs20\ql\par  \'b7  {\*\bkmkstart zq11104903168}{\*\bkmkend dl25790522141}Problem: {\*\bkmkstart aq33296283127}{\*\bkmkend ww04812926700}Enlarging aortic aneurysm. \par  \'b7  {\*\bkmkstart ib99401712864}{\*\bkmkend ol02646965975}Plan: {\*\bkmkstart nh44194746340}{\*\bkmkend sq48842198681}Pt p/w chest and abdominal pain with ?radiation to the back.\par  Hx of AAA s/p open repair in 2015 followed saccular AAA and contained rupture adjacent to previous stent graft s/p open thoracoabdominal aneurysm repair with graft and bypasses to the celiac, SMA, left renal, right renal in 8/2020.\par  CT A/P showing post surgical changes from prior open thoracoabdominal aortic bypass graft with new focal dilatation of the distalmost aspect of the graft measuring 4.4 x 6.1 cm just above the aortic bifurcation and previously measuring 3.2 x 4.1 cm on   6/11/2021. \par  Vascular consulted in the ED, no acute interventions. \par  - monitor BP\par  - f/u vascular recs\par  - plan for CTA C/A/P to better evaluate the AAA when CHERRI improves vs abd US.\plain\f1\fs20\hqog1059\hich\f1\dbch\f1\loch\f1\cf2\fs20\strike\plain\f0\fs20\iquw7801\hich\f0\dbch\f0\loch\f0\fs20\par  \par  \plain\f1\fs20\jpss4883\hich\f1\dbch\f1\loch\f1\cf2\fs20\ul{\field{\*\fldinst HYPERLINK [id number] }{\fldrslt Problem/Plan - 5:}}\plain\f0\fs20\ieql4166\hich\f0\dbch\f0\loch\f0\fs20\ql\par  \'b7  {\*\bkmkstart ma63618393856}{\*\bkmkend tw60200209106}Problem: {\*\bkmkstart op31563222305}{\*\bkmkend gb66566021696}Constipation. \par  \'b7  {\*\bkmkstart ut54175143210}{\*\bkmkend vs48053781766}Plan: {\*\bkmkstart ms55542235029}{\*\bkmkend wh00193861544}Patient reporting hx of constipation x 1 week. \par  Mild abdominal pain, on exam no guarding or rebound present.\par  Hx of abdominal surgery, but no alarm symptoms present. \par  - start miralax, senna\par  - monitor for BMs\par  - consider lactulose v enema if needed.\par  \par  \plain\f1\fs20\thyw3669\hich\f1\dbch\f1\loch\f1\cf2\fs20\ul{\field{\*\fldinst HYPERLINK [id number] }{\fldrslt Problem/Plan - 6:}}\plain\f0\fs20\kfjh3818\hich\f0\dbch\f0\loch\f0\fs20\ql\par  \'b7  {\*\bkmkstart iu47033999599}{\*\bkmkend vl77200551341}Problem: {\*\bkmkstart md01446274997}{\*\bkmkend jt13036281297}Chronic atrial fibrillation. \par  \'b7  {\*\bkmkstart vt02106270691}{\*\bkmkend ge39380705044}Plan: {\*\bkmkstart mu40600207234}{\*\bkmkend cj96682282239}Hx of Afib and Atach. Known to Dr. Jasso. \par  Found to be in slow AFib with HR to 30s during last admission, now s/p AVN ablation and Bi-V PPM. \par  Home meds: eliquis 2.5mg bid, lopressor 50mg bid\par  - hold home meds iso possible bleed.\par  \par  \plain\f1\fs20\piei4557\hich\f1\dbch\f1\loch\f1\cf2\fs20\ul{\field{\*\fldinst HYPERLINK [id number] }{\fldrslt Problem/Plan - 7:}}\plain\f0\fs20\afcd8479\hich\f0\dbch\f0\loch\f0\fs20\ql\par  \'b7  {\*\bkmkstart xd00526801951}{\*\bkmkend ly69510498675}Problem: {\*\bkmkstart gd94641537411}{\*\bkmkend de32798918805}Chronic heart failure with preserved ejection fraction (HFpEF). \par  \'b7  {\*\bkmkstart se38935253841}{\*\bkmkend si21434639791}Plan: {\*\bkmkstart gt05699405361}{\*\bkmkend kl72615450705}Hx of HFpEF. \par  Last TTE on file 04/2023: Borderline normal left ventricular systolic function 50%. Mid and apical inferior septum, apical lateral segment, and apex are hypokinetic. Severely dilated left atrium. Significant bioprosthetic aortic valve stenosis. \par  prosthetic aortic stenosis. Severe mitral stenosis. Moderate-to-severe mitral regurgitation. Severe tricuspid regurgitation. PASP 63 mmHg.\par  Home meds: lopressor 50g bid, lasix 40mg qd\par  - hold home meds.\par  \par  \plain\f1\fs20\hrqh8481\hich\f1\dbch\f1\loch\f1\cf2\fs20\ul{\field{\*\fldinst HYPERLINK [id number] }{\fldrslt Problem/Plan - 8:}}\plain\f0\fs20\vcxm8664\hich\f0\dbch\f0\loch\f0\fs20\ql\par  \'b7  {\*\bkmkstart ts49213661951}{\*\bkmkend xh31992077163}Problem: {\*\bkmkstart ix47161821579}{\*\bkmkend is33020026290}CAD (coronary artery disease). \par  \'b7  {\*\bkmkstart gy42794243290}{\*\bkmkend xm29244512863}Plan: {\*\bkmkstart am35437901110}{\*\bkmkend me15198127356}#HTN\par  #HLD\par  Hx of CAD s/p CABG, bioAVR/ mitral annuloplasty in 2002, Hx of PCI (unknown date). S/p BiV-PPM placement.\par  EKG: paced rhythm. Trops negative. \par  Home meds: Crestor 40mg qhs, lopressor 50mg bid, losartan 25mg qd.\par  - c/w crestor TI --> atorvastatin 80mg qhs\par  - hold losartan iso CHERRI\par  - hold lopressor given concern for possible bleed\par  - f/u repeat trops.\par  \par  \plain\f1\fs20\qawb3617\hich\f1\dbch\f1\loch\f1\cf2\fs20\ul{\field{\*\fldinst HYPERLINK [id number] }{\fldrslt Problem/Plan - 9:}}\plain\f0\fs20\gloe9253\hich\f0\dbch\f0\loch\f0\fs20\ql\par  \'b7  {\*\bkmkstart jd63868353677}{\*\bkmkend mm01560731959}Problem: {\*\bkmkstart ms69726590000}{\*\bkmkend rh04368487886}PAD (peripheral artery disease). \par  \'b7  {\*\bkmkstart na97284314138}{\*\bkmkend cn85031386337}Plan: {\*\bkmkstart bp12912958161}{\*\bkmkend mp72901099608}Hx of peripheral artery disease.\par  PAD s/p L pSFA stent/L-TIFFANY stent with occluded b/l SFA followed by L-internal iliac artery stent in 5/2021 c/b L-RP hematoma requiring 2 units PRBC with ongoing LLE claudication.\par  Home med: cilostazol 50mg bid\par  - c/w home med.\par  \par  \plain\f1\fs20\vlem1273\hich\f1\dbch\f1\loch\f1\cf2\fs20\ul{\field{\*\fldinst HYPERLINK [id number] }{\fldrslt Problem/Plan - 10:}}\plain\f0\fs20\ecyq0742\hich\f0\dbch\f0\loch\f0\fs20\ql\par  \'b7  {\*\bkmkstart pz93456719490}{\*\bkmkend uo79304975399}Problem: {\*\bkmkstart tf16957894181}{\*\bkmkend nr01680521095}Hypothyroidism. \par  \'b7  {\*\bkmkstart ta81541820293}{\*\bkmkend ud11319350718}Plan; {\*\bkmkstart zi65576736008}{\*\bkmkend cg49759132142}Hx of hypothyroidism, home med: synthroid 75mcg.\par  - c/w home med.\par  \par  \plain\f1\fs20\accr1835\hich\f1\dbch\f1\loch\f1\cf2\fs20\ul{\field{\*\fldinst HYPERLINK [id number] }{\fldrslt Problem/Plan - 11:}}\plain\f0\fs20\wacp0894\hich\f0\dbch\f0\loch\f0\fs20\ql\par  \'b7  {\*\bkmkstart fy518361132313}{\*\bkmkend hy390778799310}Problem: {\*\bkmkstart qd212939185931}{\*\bkmkend hv684833558647}Major depression. \par  \'b7  {\*\bkmkstart gr876822907719}{\*\bkmkend es329339874049}Plan: {\*\bkmkstart mu080410351149}{\*\bkmkend ij836763050193}Hx of depression, home med: sertraline 50mg qd.\par  - c/w home med.\par  \par  \plain\f1\fs20\brnu9919\hich\f1\dbch\f1\loch\f1\cf2\fs20\ul{\field{\*\fldinst HYPERLINK [id number] }{\fldrslt Problem/Plan - 12:}}\plain\f0\fs20\gjeq4080\hich\f0\dbch\f0\loch\f0\fs20\ql\par  \'b7  {\*\bkmkstart ft016226012671}{\*\bkmkend uc942578697871}Problem: {\*\bkmkstart zu382523262443}{\*\bkmkend sg757849736938}Prophylactic measure. \par  \'b7  {\*\bkmkstart ay196532608224}{\*\bkmkend vu405783242028}Plan: {\*\bkmkstart qk256708187127}{\*\bkmkend qb016412258580}F: none\par  E: replete as needed\par  N: DASH\par  DVT ppx: hold iso bleed, start in the AM if Hgb stable\par  Activity: ambulate w assistance\par  Dispo: RMF.\plain\f1\fs16\vkok6536\hich\f1\dbch\f1\loch\f1\cf2\fs16\par  \plain\f0\fs20\ssvw1685\hich\f0\dbch\f0\loch\f0\fs20\par  }

## 2024-03-26 NOTE — CHART NOTE - NSCHARTNOTEFT_GEN_A_CORE
At 10:45am while on monitor after the rapid response callt his morning. Patient has a witnessed tonic-clonic seizure (rhythmic jerking on b/l UE and blinking, w/ frothing at the mouth, mastication and AMS). Epilepsy was alerted, and we were advised to keep her ongoing 2g Keppra load (40mg/kg) and to push 2g of IV Ativan. At 10:45am while on monitor after the rapid response call this morning. Patient has a witnessed tonic-clonic seizure (rhythmic jerking on b/l UE and blinking, w/ frothing at the mouth, mastication and AMS). 2g of IV Ativan was pushed. Epilepsy was alerted, and we were advised to keep her ongoing 2g Keppra load at 40mg/kg. At 10:45am while on monitor after the rapid response call this morning. Patient has a witnessed tonic-clonic seizure (rhythmic jerking on b/l UE and blinking, w/ frothing at the mouth, mastication and AMS). Fingerstick glucose 151. Event lasted ~1 minute with post-ictal state. 2g of IV Ativan was pushed. Epilepsy was alerted, and we were advised to keep her ongoing 2g Keppra load at 40mg/kg.

## 2024-03-26 NOTE — PROCEDURE NOTE - NSICDXPROCEDURE_GEN_ALL_CORE_FT
PROCEDURES:  Insertion of intravenous catheter with ultrasound guidance 26-Mar-2024 11:28:03  Ana Coelho  Blood draw 26-Mar-2024 11:28:10  Ana Coelho

## 2024-03-27 NOTE — PROGRESS NOTE ADULT - SUBJECTIVE AND OBJECTIVE BOX
***Transfer from [facility] to ***    Hospital Course:       OVERNIGHT EVENTS:    SUBJECTIVE: Patient seen and examined at bedside.    Vital Signs Last 12 Hrs  T(F): 98.6 (03-27-24 @ 14:38), Max: 98.6 (03-27-24 @ 14:38)  HR: 74 (03-27-24 @ 11:52) (74 - 74)  BP: 125/61 (03-27-24 @ 11:52) (125/61 - 147/69)  BP(mean): 87 (03-27-24 @ 11:52) (87 - 99)  RR: 18 (03-27-24 @ 11:52) (18 - 19)  SpO2: 100% (03-27-24 @ 11:52) (100% - 100%)  I&O's Summary    26 Mar 2024 07:01  -  27 Mar 2024 07:00  --------------------------------------------------------  IN: 550 mL / OUT: 600 mL / NET: -50 mL    27 Mar 2024 07:01  -  27 Mar 2024 16:17  --------------------------------------------------------  IN: 200 mL / OUT: 250 mL / NET: -50 mL        PHYSICAL EXAM:  Constitutional: NAD, comfortable in bed.  HEENT: NC/AT, PERRLA, EOMI, no conjunctival pallor or scleral icterus, MMM  Neck: Supple, no JVD  Respiratory: CTA B/L. No w/r/r.   Cardiovascular: RRR, normal S1 and S2, no m/r/g.   Gastrointestinal: +BS, soft NTND, no guarding or rebound tenderness, no palpable masses   Extremities: wwp; no cyanosis, clubbing or edema.   Vascular: Pulses equal and strong throughout.   Neurological: AAOx3, no CN deficits, strength and sensation intact throughout.   Skin: No gross skin abnormalities or rashes        LABS:                        7.1    7.69  )-----------( 149      ( 27 Mar 2024 05:30 )             22.7     03-27    138  |  109<H>  |  37<H>  ----------------------------<  78  3.8   |  16<L>  |  2.49<H>    Ca    10.1      27 Mar 2024 05:30  Phos  3.6     03-27  Mg     2.2     03-27    TPro  7.3  /  Alb  4.0  /  TBili  0.3  /  DBili  x   /  AST  21  /  ALT  10  /  AlkPhos  91  03-26    PT/INR - ( 26 Mar 2024 09:44 )   PT: 11.5 sec;   INR: 1.01          PTT - ( 26 Mar 2024 09:44 )  PTT:24.9 sec  Urinalysis Basic - ( 27 Mar 2024 05:30 )    Color: x / Appearance: x / SG: x / pH: x  Gluc: 78 mg/dL / Ketone: x  / Bili: x / Urobili: x   Blood: x / Protein: x / Nitrite: x   Leuk Esterase: x / RBC: x / WBC x   Sq Epi: x / Non Sq Epi: x / Bacteria: x          RADIOLOGY & ADDITIONAL TESTS:    MEDICATIONS  (STANDING):  atorvastatin 40 milliGRAM(s) Oral at bedtime  cilostazol 50 milliGRAM(s) Oral every 12 hours  levETIRAcetam  IVPB 250 milliGRAM(s) IV Intermittent every 24 hours  levothyroxine Injectable 56 MICROGram(s) IV Push <User Schedule>  metoprolol tartrate 25 milliGRAM(s) Oral every 12 hours  pantoprazole  Injectable 40 milliGRAM(s) IV Push every 12 hours  polyethylene glycol 3350 17 Gram(s) Oral every 24 hours  senna 2 Tablet(s) Oral at bedtime  sertraline 50 milliGRAM(s) Oral every 24 hours  sodium chloride 0.9%. 1000 milliLiter(s) (50 mL/Hr) IV Continuous <Continuous>    MEDICATIONS  (PRN):  acetaminophen     Tablet .. 650 milliGRAM(s) Oral every 6 hours PRN Temp greater or equal to 38C (100.4F), Mild Pain (1 - 3)   ***Transfer from [facility] to CCU***    Hospital Course:   68-year-old F, former smoker (quit 20 year ago) with PMH of hypothyroidism, seizure disorder, stage 3 CKD (baseline CR ~1.7-2.0), severe renal artery stenosis, chronic anemia (baseline Hgb ~ 9-10, gets weekly iron infusion last infusion 3/22/24), AT, Afib s/p AVN ablation and CRT-P (8/2022; on Eliquis), HTN, HLD, CAD s/p CABG, AVR (porcine bio-prosthetic valve) and mitral annuloplasty, HFpEF, AAA s/p open repair in 2015 with complications, PAD s/p L pSFA stent/L-TIFFANY stent/L-IIA stent who presents with intermittent chest pain and KRAFT x 3 days, admitted for symptomatic anemia w/ course complicated by status epilepticus (2 seizure events) 3/26 w/ RR called, requiring step-up to telemetry for management of seizures and symptomatic anemia 2/2 GIB vs. vascular dissection. On 3/27, pt noted to have episode of melena, KASH positive. Cardiology consulted for pre-op clearance for possible EGD, patient transferred to CCU for in-unit scope if pursuing EGD.     SUBJECTIVE: Patient seen and examined at bedside. ***    Vital Signs Last 12 Hrs  T(F): 98.6 (03-27-24 @ 14:38), Max: 98.6 (03-27-24 @ 14:38)  HR: 74 (03-27-24 @ 11:52) (74 - 74)  BP: 125/61 (03-27-24 @ 11:52) (125/61 - 147/69)  BP(mean): 87 (03-27-24 @ 11:52) (87 - 99)  RR: 18 (03-27-24 @ 11:52) (18 - 19)  SpO2: 100% (03-27-24 @ 11:52) (100% - 100%)  I&O's Summary    26 Mar 2024 07:01  -  27 Mar 2024 07:00  --------------------------------------------------------  IN: 550 mL / OUT: 600 mL / NET: -50 mL    27 Mar 2024 07:01  -  27 Mar 2024 16:17  --------------------------------------------------------  IN: 200 mL / OUT: 250 mL / NET: -50 mL        PHYSICAL EXAM:  Constitutional: NAD, comfortable in bed.  HEENT: NC/AT, PERRLA, EOMI, no conjunctival pallor or scleral icterus, MMM  Neck: Supple, no JVD  Respiratory: CTA B/L. No w/r/r.   Cardiovascular: RRR, normal S1 and S2, no m/r/g.   Gastrointestinal: +BS, soft NTND, no guarding or rebound tenderness, no palpable masses   Extremities: wwp; no cyanosis, clubbing or edema.   Vascular: Pulses equal and strong throughout.   Neurological: AAOx3, no CN deficits, strength and sensation intact throughout.   Skin: No gross skin abnormalities or rashes        LABS:                        7.1    7.69  )-----------( 149      ( 27 Mar 2024 05:30 )             22.7     03-27    138  |  109<H>  |  37<H>  ----------------------------<  78  3.8   |  16<L>  |  2.49<H>    Ca    10.1      27 Mar 2024 05:30  Phos  3.6     03-27  Mg     2.2     03-27    TPro  7.3  /  Alb  4.0  /  TBili  0.3  /  DBili  x   /  AST  21  /  ALT  10  /  AlkPhos  91  03-26    PT/INR - ( 26 Mar 2024 09:44 )   PT: 11.5 sec;   INR: 1.01          PTT - ( 26 Mar 2024 09:44 )  PTT:24.9 sec  Urinalysis Basic - ( 27 Mar 2024 05:30 )    Color: x / Appearance: x / SG: x / pH: x  Gluc: 78 mg/dL / Ketone: x  / Bili: x / Urobili: x   Blood: x / Protein: x / Nitrite: x   Leuk Esterase: x / RBC: x / WBC x   Sq Epi: x / Non Sq Epi: x / Bacteria: x          RADIOLOGY & ADDITIONAL TESTS:    MEDICATIONS  (STANDING):  atorvastatin 40 milliGRAM(s) Oral at bedtime  cilostazol 50 milliGRAM(s) Oral every 12 hours  levETIRAcetam  IVPB 250 milliGRAM(s) IV Intermittent every 24 hours  levothyroxine Injectable 56 MICROGram(s) IV Push <User Schedule>  metoprolol tartrate 25 milliGRAM(s) Oral every 12 hours  pantoprazole  Injectable 40 milliGRAM(s) IV Push every 12 hours  polyethylene glycol 3350 17 Gram(s) Oral every 24 hours  senna 2 Tablet(s) Oral at bedtime  sertraline 50 milliGRAM(s) Oral every 24 hours  sodium chloride 0.9%. 1000 milliLiter(s) (50 mL/Hr) IV Continuous <Continuous>    MEDICATIONS  (PRN):  acetaminophen     Tablet .. 650 milliGRAM(s) Oral every 6 hours PRN Temp greater or equal to 38C (100.4F), Mild Pain (1 - 3)   ***Transfer from [facility] to CCU***    Hospital Course:   68-year-old F, former smoker (quit 20 year ago) with PMH of hypothyroidism, seizure disorder, stage 3 CKD (baseline CR ~1.7-2.0), severe renal artery stenosis, chronic anemia (baseline Hgb ~ 9-10, gets weekly iron infusion last infusion 3/22/24), AT, Afib s/p AVN ablation and CRT-P (8/2022; on Eliquis), HTN, HLD, CAD s/p CABG, AVR (porcine bio-prosthetic valve) and mitral annuloplasty, HFpEF, AAA s/p open repair in 2015 with complications, PAD s/p L pSFA stent/L-TIFFANY stent/L-IIA stent who presents with intermittent chest pain and KRAFT x 3 days, admitted for symptomatic anemia w/ course complicated by status epilepticus (2 seizure events) 3/26 w/ RR called, requiring step-up to telemetry for management of seizures and symptomatic anemia 2/2 GIB vs. vascular dissection. On 3/27, pt noted to have episode of melena, KASH positive. Cardiology consulted for pre-op clearance for possible EGD, patient transferred to CCU for in-unit scope if pursuing EGD.     SUBJECTIVE: Patient seen and examined at bedside. Resting comfortably in bed. Reports b/l mid-abdominal pain. Chronic SOB. Denies CP.     Vital Signs Last 12 Hrs  T(F): 98.6 (03-27-24 @ 14:38), Max: 98.6 (03-27-24 @ 14:38)  HR: 74 (03-27-24 @ 11:52) (74 - 74)  BP: 125/61 (03-27-24 @ 11:52) (125/61 - 147/69)  BP(mean): 87 (03-27-24 @ 11:52) (87 - 99)  RR: 18 (03-27-24 @ 11:52) (18 - 19)  SpO2: 100% (03-27-24 @ 11:52) (100% - 100%)  I&O's Summary    26 Mar 2024 07:01  -  27 Mar 2024 07:00  --------------------------------------------------------  IN: 550 mL / OUT: 600 mL / NET: -50 mL    27 Mar 2024 07:01  -  27 Mar 2024 16:17  --------------------------------------------------------  IN: 200 mL / OUT: 250 mL / NET: -50 mL        PHYSICAL EXAM:  Constitutional: NAD, comfortable in bed.  HEENT: NC/AT, PERRLA, EOMI  Neck: Supple, no JVD  Respiratory: CTA B/L. No w/r/r.   Cardiovascular: systolic murmur   Gastrointestinal: b/l mid-abdominal TTP  Extremities: wwp; no cyanosis, clubbing or edema.   Vascular: Pulses equal and strong throughout.   Neurological: AAOx3, no CN deficits, strength and sensation intact throughout.   Skin: No gross skin abnormalities or rashes        LABS:                        7.1    7.69  )-----------( 149      ( 27 Mar 2024 05:30 )             22.7     03-27    138  |  109<H>  |  37<H>  ----------------------------<  78  3.8   |  16<L>  |  2.49<H>    Ca    10.1      27 Mar 2024 05:30  Phos  3.6     03-27  Mg     2.2     03-27    TPro  7.3  /  Alb  4.0  /  TBili  0.3  /  DBili  x   /  AST  21  /  ALT  10  /  AlkPhos  91  03-26    PT/INR - ( 26 Mar 2024 09:44 )   PT: 11.5 sec;   INR: 1.01          PTT - ( 26 Mar 2024 09:44 )  PTT:24.9 sec  Urinalysis Basic - ( 27 Mar 2024 05:30 )    Color: x / Appearance: x / SG: x / pH: x  Gluc: 78 mg/dL / Ketone: x  / Bili: x / Urobili: x   Blood: x / Protein: x / Nitrite: x   Leuk Esterase: x / RBC: x / WBC x   Sq Epi: x / Non Sq Epi: x / Bacteria: x          RADIOLOGY & ADDITIONAL TESTS:    MEDICATIONS  (STANDING):  atorvastatin 40 milliGRAM(s) Oral at bedtime  cilostazol 50 milliGRAM(s) Oral every 12 hours  levETIRAcetam  IVPB 250 milliGRAM(s) IV Intermittent every 24 hours  levothyroxine Injectable 56 MICROGram(s) IV Push <User Schedule>  metoprolol tartrate 25 milliGRAM(s) Oral every 12 hours  pantoprazole  Injectable 40 milliGRAM(s) IV Push every 12 hours  polyethylene glycol 3350 17 Gram(s) Oral every 24 hours  senna 2 Tablet(s) Oral at bedtime  sertraline 50 milliGRAM(s) Oral every 24 hours  sodium chloride 0.9%. 1000 milliLiter(s) (50 mL/Hr) IV Continuous <Continuous>    MEDICATIONS  (PRN):  acetaminophen     Tablet .. 650 milliGRAM(s) Oral every 6 hours PRN Temp greater or equal to 38C (100.4F), Mild Pain (1 - 3)

## 2024-03-27 NOTE — PHYSICAL THERAPY INITIAL EVALUATION ADULT - PATIENT/FAMILY/SIGNIFICANT OTHER GOALS STATEMENT, PT EVAL
Pt. would like to be able to walk independently again with SC. Pt. strongly prefers to be discharged home with services and not to [facility]>

## 2024-03-27 NOTE — PROGRESS NOTE ADULT - SUBJECTIVE AND OBJECTIVE BOX
EPS Device interrogation    Indication: Pre EGD     Device model: [employer] Visionist 		Implanting Physician:      Functioning Mode: DDDR BiV 70- 130 			    Underlying Rhythm: AFib /    Pacemaker dependency:  yes     Battery status: 6 years   Interrogating parameters:   				RA			RV			LV    Sense:                                         10.4   mV                    paced     Threshold:                              n/a                           0.6 V @ 0.5 ms              1.1 V@ 0.5  ms    Pacing Impedance:                      632 om                         658  om                         400 om     Shock Impedance:                                                                n/a     Events/Alert:  none    Parameter change: 	none    Normal function BiV PPM , dependent patient s/p AVN ablation, chronic atrial fibrillation     [ ]EPS attending: Interrogation reviewed. Agree with above.

## 2024-03-27 NOTE — DIETITIAN INITIAL EVALUATION ADULT - PERTINENT LABORATORY DATA
03-27    138  |  109<H>  |  37<H>  ----------------------------<  78  3.8   |  16<L>  |  2.49<H>    Ca    10.1      27 Mar 2024 05:30  Phos  3.6     03-27  Mg     2.2     03-27    TPro  7.3  /  Alb  4.0  /  TBili  0.3  /  DBili  x   /  AST  21  /  ALT  10  /  AlkPhos  91  03-26

## 2024-03-27 NOTE — PROGRESS NOTE ADULT - PROBLEM SELECTOR PLAN 11
History of hypothyroidism, home med: synthroid 75mcg    Plan:  - Continue home med    F: None  E: Caution in CKD  N: Regular, NPO after MN for EGD in AM  DVT: Holding for possible bleed  GI: Protonix 40 IV BID  Code: Full  Dispo: Tele

## 2024-03-27 NOTE — CONSULT NOTE ADULT - NS ATTEND AMEND GEN_ALL_CORE FT
67yo F former smoker, with PMH of hypothyroidism, seizure disorder, Stage 3 CKD (baseline CR ~1.7-2.0), severe renal artery stenosis, chronic anemia (baseline Hgb ~9-10, gets weekly Fe infusion), AT/Afib s/p AVN ablation and CRT-P ([employer], 8/2022; on Eliquis), tortuous esophagus and Schatzi ring at [facility] (per EDG in 2020), extensive vascular history), HTN, HLD, CAD s/p CABG (LIMA to RI, ANTONIO to PDA) with SAVR (#19 Feldre porcine bio-prosthetic valve) and mitral annuloplasty (Physio ring #26) at [facility] in 2002, HFpEF who presented with CP/SOB in setting of hgb <7. Evaluated as outpt with elevated gradients across AV/MV on TTE with plan for ALBER. Patient was deemed inoperable risk for SAVR +/- MVR given comorbidities/prior surgery with LIMA/ANTONIO. Plan reviewed with MDT including Mary Jo Samaniego and Mohan. Prior CTA revealed need for alternative access TAVR Hima pending workup. 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     - pending medical optimization, pRBC  - plan for EGD/GI clearance followed by [facility] to evaluate valvular heart disease   - heme input appreciate  - vascular sx input appreciate, will d/w Dr. Blair

## 2024-03-27 NOTE — PROGRESS NOTE ADULT - ASSESSMENT
Patient is a 68-year-old F, former smoker (quit 20 year ago) with PMH of hypothyroidism, seizure disorder, stage 3 CKD (baseline CR ~1.7-2.0), severe renal artery stenosis, chronic anemia (baseline Hgb ~ 9-10, gets weekly iron infusion last infusion 3/22/24), AT, Afib s/p AVN ablation and CRT-P (8/2022; on Eliquis), HTN, HLD, CAD s/p CABG, AVR (porcine bio-prosthetic valve) and mitral annuloplasty, HFpEF, AAA s/p open repair in 2015 with complications, PAD s/p L pSFA stent/L-TIFFANY stent/L-IIA stent who presents with intermittent chest pain and KRAFT x 3 days, admitted for symptomatic anemia w/ course complicated by status epilepticus (2 seizure events) 3/26 w/ RR called, requiring step-up to telemetry for management of seizures and symptomatic anemia 2/2 GIB vs. vascular dissection.

## 2024-03-27 NOTE — PROGRESS NOTE ADULT - SUBJECTIVE AND OBJECTIVE BOX
GASTROENTEROLOGY PROGRESS NOTE  Patient seen and examined at bedside.  -Hgb downtrended from 7.8 to 7.1, with no BM overnight    ROS: Patient reports feeling well today. No fevers, chills, NS, nausea/vomiting. With some LLQ tenderness with palpation. Denies any history of constipation or diarrhea. No BM overnight.    PERTINENT REVIEW OF SYSTEMS:  CONSTITUTIONAL: No weakness, fevers or chills  HEENT: No visual changes; No vertigo or throat pain   GASTROINTESTINAL: As above.  NEUROLOGICAL: No numbness or weakness  SKIN: No itching, burning, rashes, or lesions     Allergies    No Known Allergies    Intolerances      MEDICATIONS:  MEDICATIONS  (STANDING):  atorvastatin 40 milliGRAM(s) Oral at bedtime  cilostazol 50 milliGRAM(s) Oral every 12 hours  levETIRAcetam  IVPB 250 milliGRAM(s) IV Intermittent every 24 hours  levothyroxine Injectable 56 MICROGram(s) IV Push <User Schedule>  pantoprazole  Injectable 40 milliGRAM(s) IV Push every 12 hours  polyethylene glycol 3350 17 Gram(s) Oral every 24 hours  senna 2 Tablet(s) Oral at bedtime  sertraline 50 milliGRAM(s) Oral every 24 hours  sodium chloride 0.9%. 1000 milliLiter(s) (50 mL/Hr) IV Continuous <Continuous>    MEDICATIONS  (PRN):  acetaminophen     Tablet .. 650 milliGRAM(s) Oral every 6 hours PRN Temp greater or equal to 38C (100.4F), Mild Pain (1 - 3)    Vital Signs Last 24 Hrs  T(C): 36.7 (27 Mar 2024 11:06), Max: 36.8 (26 Mar 2024 22:47)  T(F): 98.1 (27 Mar 2024 11:06), Max: 98.2 (26 Mar 2024 22:47)  HR: 74 (27 Mar 2024 11:52) (74 - 76)  BP: 125/61 (27 Mar 2024 11:52) (99/52 - 155/67)  BP(mean): 87 (27 Mar 2024 11:52) (73 - 99)  RR: 18 (27 Mar 2024 11:52) (16 - 19)  SpO2: 100% (27 Mar 2024 11:52) (99% - 100%)    Parameters below as of 27 Mar 2024 11:52  Patient On (Oxygen Delivery Method): room air        03-26 @ 07:01  -  03-27 @ 07:00  --------------------------------------------------------  IN: 550 mL / OUT: 600 mL / NET: -50 mL    03-27 @ 07:01 - 03-27 @ 14:12  --------------------------------------------------------  IN: 200 mL / OUT: 250 mL / NET: -50 mL      PHYSICAL EXAM:    General:  female, lying in bed; in no acute distress  HEENT: MMM, conjunctiva and sclera clear  Gastrointestinal: Soft non-distended; LLQ tenderness; No rebound or guarding  Skin: Warm and dry. No obvious rash    LABS:                        7.1    7.69  )-----------( 149      ( 27 Mar 2024 05:30 )             22.7     03-27    138  |  109<H>  |  37<H>  ----------------------------<  78  3.8   |  16<L>  |  2.49<H>    Ca    10.1      27 Mar 2024 05:30  Phos  3.6     03-27  Mg     2.2     03-27    TPro  7.3  /  Alb  4.0  /  TBili  0.3  /  DBili  x   /  AST  21  /  ALT  10  /  AlkPhos  91  03-26    PT/INR - ( 26 Mar 2024 09:44 )   PT: 11.5 sec;   INR: 1.01          PTT - ( 26 Mar 2024 09:44 )  PTT:24.9 sec      Urinalysis Basic - ( 27 Mar 2024 05:30 )    Color: x / Appearance: x / SG: x / pH: x  Gluc: 78 mg/dL / Ketone: x  / Bili: x / Urobili: x   Blood: x / Protein: x / Nitrite: x   Leuk Esterase: x / RBC: x / WBC x   Sq Epi: x / Non Sq Epi: x / Bacteria: x                RADIOLOGY & ADDITIONAL STUDIES:  Reviewed GASTROENTEROLOGY PROGRESS NOTE  Patient seen and examined at bedside.  -Hgb downtrended from 7.8 to 7.1, with no BM overnight    ROS: Patient reports feeling well today. No fevers, chills, NS, nausea/vomiting. With some LLQ tenderness with palpation. Denies any history of constipation or diarrhea. No BM overnight.    PERTINENT REVIEW OF SYSTEMS:  CONSTITUTIONAL: No weakness, fevers or chills  HEENT: No visual changes; No vertigo or throat pain   GASTROINTESTINAL: As above.  NEUROLOGICAL: No numbness or weakness  SKIN: No itching, burning, rashes, or lesions     Allergies    No Known Allergies    Intolerances      MEDICATIONS:  MEDICATIONS  (STANDING):  atorvastatin 40 milliGRAM(s) Oral at bedtime  cilostazol 50 milliGRAM(s) Oral every 12 hours  levETIRAcetam  IVPB 250 milliGRAM(s) IV Intermittent every 24 hours  levothyroxine Injectable 56 MICROGram(s) IV Push <User Schedule>  pantoprazole  Injectable 40 milliGRAM(s) IV Push every 12 hours  polyethylene glycol 3350 17 Gram(s) Oral every 24 hours  senna 2 Tablet(s) Oral at bedtime  sertraline 50 milliGRAM(s) Oral every 24 hours  sodium chloride 0.9%. 1000 milliLiter(s) (50 mL/Hr) IV Continuous <Continuous>    MEDICATIONS  (PRN):  acetaminophen     Tablet .. 650 milliGRAM(s) Oral every 6 hours PRN Temp greater or equal to 38C (100.4F), Mild Pain (1 - 3)    Vital Signs Last 24 Hrs  T(C): 36.7 (27 Mar 2024 11:06), Max: 36.8 (26 Mar 2024 22:47)  T(F): 98.1 (27 Mar 2024 11:06), Max: 98.2 (26 Mar 2024 22:47)  HR: 74 (27 Mar 2024 11:52) (74 - 76)  BP: 125/61 (27 Mar 2024 11:52) (99/52 - 155/67)  BP(mean): 87 (27 Mar 2024 11:52) (73 - 99)  RR: 18 (27 Mar 2024 11:52) (16 - 19)  SpO2: 100% (27 Mar 2024 11:52) (99% - 100%)    Parameters below as of 27 Mar 2024 11:52  Patient On (Oxygen Delivery Method): room air        03-26 @ 07:01  -  03-27 @ 07:00  --------------------------------------------------------  IN: 550 mL / OUT: 600 mL / NET: -50 mL    03-27 @ 07:01 - 03-27 @ 14:12  --------------------------------------------------------  IN: 200 mL / OUT: 250 mL / NET: -50 mL      PHYSICAL EXAM:    General:  female, lying in bed; in no acute distress  HEENT: MMM, conjunctiva and sclera clear  Gastrointestinal: Soft non-distended; LLQ tenderness; No rebound or guarding  Rectal: external hemorrhoid, black stool noted on KASH  Skin: Warm and dry. No obvious rash    LABS:                        7.1    7.69  )-----------( 149      ( 27 Mar 2024 05:30 )             22.7     03-27    138  |  109<H>  |  37<H>  ----------------------------<  78  3.8   |  16<L>  |  2.49<H>    Ca    10.1      27 Mar 2024 05:30  Phos  3.6     03-27  Mg     2.2     03-27    TPro  7.3  /  Alb  4.0  /  TBili  0.3  /  DBili  x   /  AST  21  /  ALT  10  /  AlkPhos  91  03-26    PT/INR - ( 26 Mar 2024 09:44 )   PT: 11.5 sec;   INR: 1.01          PTT - ( 26 Mar 2024 09:44 )  PTT:24.9 sec      Urinalysis Basic - ( 27 Mar 2024 05:30 )    Color: x / Appearance: x / SG: x / pH: x  Gluc: 78 mg/dL / Ketone: x  / Bili: x / Urobili: x   Blood: x / Protein: x / Nitrite: x   Leuk Esterase: x / RBC: x / WBC x   Sq Epi: x / Non Sq Epi: x / Bacteria: x                RADIOLOGY & ADDITIONAL STUDIES:  Reviewed

## 2024-03-27 NOTE — PROGRESS NOTE ADULT - ASSESSMENT
68F former smoker (quit 20 year ago) PMH of hypothyroidism, seizure disorder, Stage 3 CKD (baseline CR ~1.7-2.0), severe renal artery stenosis, chronic anemia (baseline Hgb ~9-10, gets weekly Fe infusion last infusion 3/22/24), AT, Afib s/p AVN ablation and CRT-P (8/2022; on Eliquis), HTN, HLD, CAD s/p CABG, AVR (porcine bio-prosthetic valve) and mitral annuloplasty, HFpEF, AAA s/p open repair in 2015 with complications, PAD s/p L pSFA stent/L-TIFFANY stent/L-IIA stent presenting to [facility] with intermittent chest pain and KRAFT x 3 days, found to be anemic to 6.9. GI consulted for melena and for consideration of endoscopic evaluation.     Hgb 6.9 on admission, down from 11.7 from Feb 2024 in the setting of reported melena. KASH with dark stool. On record review, previously noted to have anemia with Hgb downtrending down to 5.6, at the time likely 2/2 hematoma. Suspect anemia to be multi-factorial in etiology (i.e. CKD, AC use, etc). LDH/Haptoglobin not c/w hemolysis. Fe studies normal however in the setting of recent pRBC transfusion, so may be falsely normal. In the setting of overt bleeding, PUD, erosive gastritis on the ddx especially in light of recent initiation of AC.    EGD (outpatient, 1/18/24): small HH, diffuse severe inflammation characterized by erythema and erosions at the incisura and antrum (Bx taken), duodenitis in second portion of duodenum.     #Anemia  -Protonix 40 mg IVP BID for now  -Management of seizures as per primary, optimization with adequate control of seizures prior to consideration for endoscopic procedure  -Hold home AC   -Closely monitor H/H  -Maintain Active T&S  -Transfusion goal as per primary team    Case discussed with [facility] attending and primary team.     Gale Ceballos DO  Gastroenterology Fellow  Pager: [phone]     68F former smoker (quit 20 year ago) PMH of hypothyroidism, seizure disorder, Stage 3 CKD (baseline CR ~1.7-2.0), severe renal artery stenosis, chronic anemia (baseline Hgb ~9-10, gets weekly Fe infusion last infusion 3/22/24), AT, Afib s/p AVN ablation and CRT-P (8/2022; on Eliquis), HTN, HLD, CAD s/p CABG, AVR (porcine bio-prosthetic valve) and mitral annuloplasty, HFpEF, AAA s/p open repair in 2015 with complications, PAD s/p L pSFA stent/L-TIFFANY stent/L-IIA stent presenting to [facility] with intermittent chest pain and KRAFT x 3 days, found to be anemic to 6.9. GI consulted for melena and for consideration of endoscopic evaluation.     Hgb 6.9 on admission, down from 11.7 from Feb 2024 in the setting of reported melena. KASH with dark stool. On record review, previously noted to have anemia with Hgb downtrending down to 5.6, at the time likely 2/2 hematoma. Suspect anemia to be multi-factorial in etiology (i.e. CKD, AC use, etc). LDH/Haptoglobin not c/w hemolysis. Fe studies normal however in the setting of recent pRBC transfusion, so may be falsely normal. In the setting of overt bleeding, PUD, erosive gastritis on the ddx especially in light of recent initiation of AC.    EGD (outpatient, 1/18/24): small HH, diffuse severe inflammation characterized by erythema and erosions at the incisura and antrum (Bx taken), duodenitis in second portion of duodenum.     #Anemia  -Protonix 40 mg IVP BID for now  -TTE with findings of severe TR as well as additional valvular findings, appreciate cardiology clearance for EGD  -NPO after MN for tentative EGD, 3/28  -Hold home AC   -Closely monitor H/H  -Maintain Active T&S  -Transfusion goal as per primary team  -Remainder of management as per primary team    Case discussed with c attending and primary team.     Gale Ceballos DO  Gastroenterology Fellow  Pager: [phone]     68F former smoker (quit 20 year ago) PMH of hypothyroidism, seizure disorder, Stage 3 CKD (baseline CR ~1.7-2.0), severe renal artery stenosis, chronic anemia (baseline Hgb ~9-10, gets weekly Fe infusion last infusion 3/22/24), AT, Afib s/p AVN ablation and CRT-P (8/2022; on Eliquis), HTN, HLD, CAD s/p CABG, AVR (porcine bio-prosthetic valve) and mitral annuloplasty, HFpEF, AAA s/p open repair in 2015 with complications, PAD s/p L pSFA stent/L-TIFFANY stent/L-IIA stent presenting to [facility] with intermittent chest pain and KRAFT x 3 days, found to be anemic to 6.9. GI consulted for melena and for consideration of endoscopic evaluation.     Hgb 6.9 on admission, down from 11.7 from Feb 2024 in the setting of reported melena. KASH with dark stool. On record review, previously noted to have anemia with Hgb downtrending down to 5.6, at the time likely 2/2 hematoma. Suspect anemia to be multi-factorial in etiology (i.e. CKD, AC use, etc). LDH/Haptoglobin not c/w hemolysis. Fe studies normal on this admission however in the setting of recent pRBC transfusion, so may be falsely normal.     With slight downtrend in Hgb from 7.8 to 7.1 on AM labs. One dark colored BM today with KASH revealing black stool. Receiving 1 u pRBC today, awaiting post-transfusion. Hemodynamics remain stable, while on PPI BID. Stepped up to CCU for close monitoring in light of TTE findings with severe valvulopathies on TTE and radiographic findings concerning for enlarging (pseudo)aneurysms, which warrants surgical intervention per Vascular team. Overall, low suspicion for active bleeding at this time however physical and BW suggestive of recent bleeding.      Differential broad at this time, including PUD, erosive gastritis, AVMs.     EGD (outpatient, 1/18/24): small HH, diffuse severe inflammation characterized by erythema and erosions at the incisura and antrum (Bx taken), duodenitis in second portion of duodenum.     #Anemia  -Protonix 40 mg IVP BID  -High risk for low risk procedure per cardiology, needs cardiac anesthesia, please consult anesthesia to coordinate for tentative bedside EGD, 3/28   -NPO after MN  -Hold home AC   -Closely monitor H/H  -Maintain Active T&S  -Transfusion goal as per primary team  -Remainder of management as per primary team    Case discussed with [facility] attending and primary team.     Gale Ceballos DO  Gastroenterology Fellow  Pager: [phone]

## 2024-03-27 NOTE — PROGRESS NOTE ADULT - PROBLEM SELECTOR PLAN 6
Patient with history of HFpEF. Last TTE on file 04/2023 w/ borderline normal left ventricular systolic function 50%. Mid and apical inferior septum, apical lateral segment, and apex are hypokinetic. Severely dilated left atrium. Significant bioprosthetic aortic valve stenosis.   -Prosthetic aortic stenosis. Severe mitral stenosis. Moderate-to-severe mitral regurgitation. Severe tricuspid regurgitation. PASP 63 mmHg.  -Home meds: lopressor 50g bid, Lasix 40mg qd    Plan:  - Continue Lopressor as above, hold Lasix

## 2024-03-27 NOTE — PROGRESS NOTE ADULT - PROBLEM SELECTOR PLAN 10
PAD s/p L pSFA stent/L-TIFFANY stent with occluded b/l SFA followed by L-internal iliac artery stent in 5/2021 c/b L-RP hematoma requiring 2 units PRBC with ongoing LLE claudication.  -Home med: cilostazol 50mg bid    Plan:  - Continue home med

## 2024-03-27 NOTE — PHYSICAL THERAPY INITIAL EVALUATION ADULT - ELBOW EXTENSION MMT, REHAB EVAL
Quality 110: Preventive Care And Screening: Influenza Immunization: Influenza Immunization Administered during Influenza season Quality 111:Pneumonia Vaccination Status For Older Adults: Pneumococcal vaccine administered on or after patient’s 60th birthday and before the end of the measurement period Detail Level: Detailed (5) normal, left/(5) normal, right

## 2024-03-27 NOTE — CONSULT NOTE ADULT - SUBJECTIVE AND OBJECTIVE BOX
HPI:  HPI: Patient is a 67yo F former smoker (quit 20 year ago with PMH of hypothyroidism, seizure disorder, Stage 3 CKD (baseline CR ~1.7-2.0), severe renal artery stenosis, chronic anemia (baseline Hgb ~9-10, gets weekly Fe infusion last infusion 3/22/24), AT, Afib s/p AVN ablation and CRT-P (8/2022; on Eliquis), HTN, HLD, CAD s/p CABG, AVR (porcine bio-prosthetic valve) and mitral annuloplasty, HFpEF, AAA s/p open repair in 2015 with complications, PAD s/p L pSFA stent/L-TIFFANY stent/L-IIA stent who presents with intermittent chest pain and KRAFT x 3 days. States she has been having intermittent chest pain and KRAFT which worsened about 3 days ago. She has not been very mobile for the past week due to pain and KRAFT. At baseline ambulates with cane, however has not been mostly sitting on the couch due to current symptoms. Additionally c/o lower abdominal pain which she attributes to constipation (has not had a BM in 5-6 days). Usually has regular BMs. Has had very poor PO intake over the past few days 2/2 constipation. Denies any s/s of bleeding; denies hematuria, hemoptysis, hematemesis, etc. No recent illness/sick contacts. Denies any recent travel. Denies hx of cancer, bleeding/clotting disorders, lower extremity pain or swelling. Compliant with her medications.  No other complaints. States she feels mildly improved after blood transfusion.     In the ED:  Initial vital signs: T: 98 F, HR: 86, BP: 114/72, R: 17, SpO2: 100% on RA  Labs: significant for Hgb 6.9, D-dimer 2831, Cl 111, HCO3 19, BUN/Cr 48/3.00, Ca 10.7, trop 41, p-BNP 3727  CXR: Persistent linear atelectasis over the left lower lung zone. The remainder the lung zones are clear. No pneumothorax.  CT C/A/P: Bibasilar atelectasis without evidence of consolidation. Borderline hepatomegaly with extensive coronary artery calcifications status post CABG. There is also a prosthetic aortic valve. Post surgical changes from prior open thoracoabdominal aortic bypass graft with new focal dilatation of the distalmost aspect of the graft measuring 4.4 x 6.1 cm just above the aortic bifurcation and previously measuring 3.2 x 4.1 cm on 6/11/2021.   EKG: paced rhythm  Medications: 1u pRBC, maalox 30mL PO x 1, morphine 2mg IV x 1  Consults: vascular (25 Mar 2024 22:30)      ROS: A 10-point review of systems was otherwise negative.    PAST MEDICAL & SURGICAL HISTORY:  Atherosclerosis of coronary artery  CAD (coronary artery disease)      Peripheral vascular disease  PVD (peripheral vascular disease)      Anemia  Anemia      Hypothyroidism  Hypothyroidism      Gastroesophageal reflux disease  GERD (gastroesophageal reflux disease)      Hyperlipidemia  Hyperlipidemia      Essential hypertension  HTN (hypertension)      Seizure      Anxiety      Depression, unspecified depression type      Chronic kidney disease, unspecified CKD stage      Status post aorto-coronary artery bypass graft  2012      Atherosclerosis of coronary artery bypass graft(s), unspecified, with angina pectoris with documented spasm      H/O aortic valve replacement  Bioprosthetic      Ruptured aortic aneurysm  surgery august 2020      Abdominal aortic aneurysm (AAA) without rupture  2015        SOCIAL HISTORY:  FAMILY HISTORY:  FH: myocardial infarction  in Dad (at age 50s)      ALLERGIES: 	  No Known Allergies          MEDICATIONS:  acetaminophen     Tablet .. 650 milliGRAM(s) Oral every 6 hours PRN  atorvastatin 40 milliGRAM(s) Oral at bedtime  cilostazol 50 milliGRAM(s) Oral every 12 hours  levETIRAcetam  IVPB 250 milliGRAM(s) IV Intermittent every 24 hours  levothyroxine Injectable 56 MICROGram(s) IV Push <User Schedule>  pantoprazole  Injectable 40 milliGRAM(s) IV Push every 12 hours  polyethylene glycol 3350 17 Gram(s) Oral every 24 hours  senna 2 Tablet(s) Oral at bedtime  sertraline 50 milliGRAM(s) Oral every 24 hours  sodium chloride 0.9%. 1000 milliLiter(s) IV Continuous <Continuous>      PHYSICAL EXAM:  T(C): 36.7 (03-27-24 @ 11:06), Max: 36.8 (03-26-24 @ 22:47)  HR: 74 (03-27-24 @ 11:52) (74 - 76)  BP: 125/61 (03-27-24 @ 11:52) (99/52 - 155/67)  RR: 18 (03-27-24 @ 11:52) (16 - 19)  SpO2: 100% (03-27-24 @ 11:52) (99% - 100%)  Wt(kg): --    GEN: Awake, comfortable. NAD.   HEENT: NCAT, PERRL, EOMI. Mucosa moist. No JVD.   RESP: CTA b/l  CV: RRR, normal s1/s2. No m/r/g.  ABD: Soft, NTND. BS+  EXT: Warm. No edema, clubbing, or cyanosis.   NEURO: AAOx3. No focal deficits.    I&O's Summary    26 Mar 2024 07:01  -  27 Mar 2024 07:00  --------------------------------------------------------  IN: 550 mL / OUT: 600 mL / NET: -50 mL    27 Mar 2024 07:01  -  27 Mar 2024 13:33  --------------------------------------------------------  IN: 200 mL / OUT: 250 mL / NET: -50 mL        LABS:	 	                        7.1    7.69  )-----------( 149      ( 27 Mar 2024 05:30 )             22.7     03-27    138  |  109<H>  |  37<H>  ----------------------------<  78  3.8   |  16<L>  |  2.49<H>    Ca    10.1      27 Mar 2024 05:30  Phos  3.6     03-27  Mg     2.2     03-27    TPro  7.3  /  Alb  4.0  /  TBili  0.3  /  DBili  x   /  AST  21  /  ALT  10  /  AlkPhos  91  03-26    CARDIAC MARKERS ( 26 Mar 2024 09:12 )  x     / x     / 414 U/L / x     / x      CARDIAC MARKERS ( 25 Mar 2024 23:56 )  x     / x     / 350 U/L / x     / 7.0 ng/mL     HPI:  HPI: Patient is a 67yo F former smoker (quit 20 year ago with PMH of hypothyroidism, seizure disorder, Stage 3 CKD (baseline CR ~1.7-2.0), severe renal artery stenosis, chronic anemia (baseline Hgb ~9-10, gets weekly Fe infusion last infusion 3/22/24), AT, Afib s/p AVN ablation and CRT-P (8/2022; on Eliquis), HTN, HLD, CAD s/p CABG, AVR (porcine bio-prosthetic valve) and mitral annuloplasty, HFpEF, AAA s/p open repair in 2015 with complications, PAD s/p L pSFA stent/L-TIFFANY stent/L-IIA stent who presents with intermittent chest pain and KRAFT x 3 days. States she has been having intermittent chest pain and KRAFT which worsened about 3 days ago. She has not been very mobile for the past week due to pain and KRAFT. At baseline ambulates with cane, however has not been mostly sitting on the couch due to current symptoms. Additionally c/o lower abdominal pain which she attributes to constipation (has not had a BM in 5-6 days). Usually has regular BMs. Has had very poor PO intake over the past few days 2/2 constipation. Denies any s/s of bleeding; denies hematuria, hemoptysis, hematemesis, etc. No recent illness/sick contacts. Denies any recent travel. Denies hx of cancer, bleeding/clotting disorders, lower extremity pain or swelling. Compliant with her medications.  No other complaints. States she feels mildly improved after blood transfusion.     In the ED:  Initial vital signs: T: 98 F, HR: 86, BP: 114/72, R: 17, SpO2: 100% on RA  Labs: significant for Hgb 6.9, D-dimer 2831, Cl 111, HCO3 19, BUN/Cr 48/3.00, Ca 10.7, trop 41, p-BNP 3727  CXR: Persistent linear atelectasis over the left lower lung zone. The remainder the lung zones are clear. No pneumothorax.  CT C/A/P: Bibasilar atelectasis without evidence of consolidation. Borderline hepatomegaly with extensive coronary artery calcifications status post CABG. There is also a prosthetic aortic valve. Post surgical changes from prior open thoracoabdominal aortic bypass graft with new focal dilatation of the distalmost aspect of the graft measuring 4.4 x 6.1 cm just above the aortic bifurcation and previously measuring 3.2 x 4.1 cm on 6/11/2021.   EKG: paced rhythm  Medications: 1u pRBC, maalox 30mL PO x 1, morphine 2mg IV x 1  Consults: vascular (25 Mar 2024 22:30)      ROS: A 10-point review of systems was otherwise negative.    PAST MEDICAL & SURGICAL HISTORY:  Atherosclerosis of coronary artery  CAD (coronary artery disease)      Peripheral vascular disease  PVD (peripheral vascular disease)      Anemia  Anemia      Hypothyroidism  Hypothyroidism      Gastroesophageal reflux disease  GERD (gastroesophageal reflux disease)      Hyperlipidemia  Hyperlipidemia      Essential hypertension  HTN (hypertension)      Seizure      Anxiety      Depression, unspecified depression type      Chronic kidney disease, unspecified CKD stage      Status post aorto-coronary artery bypass graft  2012      Atherosclerosis of coronary artery bypass graft(s), unspecified, with angina pectoris with documented spasm      H/O aortic valve replacement  Bioprosthetic      Ruptured aortic aneurysm  surgery august 2020      Abdominal aortic aneurysm (AAA) without rupture  2015        SOCIAL HISTORY:  FAMILY HISTORY:  FH: myocardial infarction  in Dad (at age 50s)      ALLERGIES: 	  No Known Allergies          MEDICATIONS:  acetaminophen     Tablet .. 650 milliGRAM(s) Oral every 6 hours PRN  atorvastatin 40 milliGRAM(s) Oral at bedtime  cilostazol 50 milliGRAM(s) Oral every 12 hours  levETIRAcetam  IVPB 250 milliGRAM(s) IV Intermittent every 24 hours  levothyroxine Injectable 56 MICROGram(s) IV Push <User Schedule>  pantoprazole  Injectable 40 milliGRAM(s) IV Push every 12 hours  polyethylene glycol 3350 17 Gram(s) Oral every 24 hours  senna 2 Tablet(s) Oral at bedtime  sertraline 50 milliGRAM(s) Oral every 24 hours  sodium chloride 0.9%. 1000 milliLiter(s) IV Continuous <Continuous>      PHYSICAL EXAM:  T(C): 36.7 (03-27-24 @ 11:06), Max: 36.8 (03-26-24 @ 22:47)  HR: 74 (03-27-24 @ 11:52) (74 - 76)  BP: 125/61 (03-27-24 @ 11:52) (99/52 - 155/67)  RR: 18 (03-27-24 @ 11:52) (16 - 19)  SpO2: 100% (03-27-24 @ 11:52) (99% - 100%)  Wt(kg): --    GEN: Awake, comfortable. NAD.   HEENT: NCAT No JVD.   RESP: CTA b/l  CV: RRR, crescendo-decrescendo murmur, S3   ABD: Soft, tender in mid abdomen   EXT: Warm. No edema  NEURO: AAOx3.     I&O's Summary    26 Mar 2024 07:01  -  27 Mar 2024 07:00  --------------------------------------------------------  IN: 550 mL / OUT: 600 mL / NET: -50 mL    27 Mar 2024 07:01  -  27 Mar 2024 13:33  --------------------------------------------------------  IN: 200 mL / OUT: 250 mL / NET: -50 mL        LABS:	 	                        7.1    7.69  )-----------( 149      ( 27 Mar 2024 05:30 )             22.7     03-27    138  |  109<H>  |  37<H>  ----------------------------<  78  3.8   |  16<L>  |  2.49<H>    Ca    10.1      27 Mar 2024 05:30  Phos  3.6     03-27  Mg     2.2     03-27    TPro  7.3  /  Alb  4.0  /  TBili  0.3  /  DBili  x   /  AST  21  /  ALT  10  /  AlkPhos  91  03-26    CARDIAC MARKERS ( 26 Mar 2024 09:12 )  x     / x     / 414 U/L / x     / x      CARDIAC MARKERS ( 25 Mar 2024 23:56 )  x     / x     / 350 U/L / x     / 7.0 ng/mL

## 2024-03-27 NOTE — PROGRESS NOTE ADULT - PROBLEM SELECTOR PLAN 7
Patient p/w KRAFT and chest pain, VSS. No evidence of hypoxia. Associated with intermittent chest pain although does not appear to be pleuritic in nature. Denies cough, fevers, s/s of bleeding. No recent travel, immobilization although has been walking less over the past week. Denies history of cancer, clotting disorders, no relevant family history. Differential includes symptomatic anemia, severe AS, PE, HF exacerbation (less likely), ACS (unlikely).  -Labs showing elevated D-dimer 2831, p-BNP elevated to 3727  -CXR + CT showing bibasilar atelectasis over left lung zones  -TTE: Normal biventricular function, aortic bioprosthetic valve stenosis, mitral stenosis, severe TR    Plan:  - Treatment of anemia as above

## 2024-03-27 NOTE — PROGRESS NOTE ADULT - PROBLEM SELECTOR PLAN 3
Cardiology consulted for pre-EGD risk assessment per GI request i/s/o extensive cardiac history as above.    Plan:  - Consult structural heart  - EP consulted for PPM interrogation  - Needs strict BP control, keep BP <120/80  - Per cardiology, started Lopressor 25mg PO BID (i/s/o dissection despite GIB) Cardiology consulted for pre-EGD risk assessment per GI request i/s/o extensive cardiac history as above.    Plan:  - Consult structural heart  - EP consulted for PPM interrogation  - Needs strict BP control, keep BP <120/80  - Per cardiology, started Lopressor 25mg PO BID (i/s/o dissection despite GIB)  - PT eval

## 2024-03-27 NOTE — CONSULT NOTE ADULT - SUBJECTIVE AND OBJECTIVE BOX
Surgeon: Dr. Gregory    Requesting Physician: Dr. Potts    HISTORY OF PRESENT ILLNESS   68 year old female, former smoker, with PMH of hypothyroidism, seizure disorder, Stage 3 CKD (baseline CR ~1.7-2.0), severe renal artery stenosis, chronic anemia (baseline Hgb ~9-10, followed by Heme: Dr. Izquierdo, gets weekly Fe infusion), AT, Afib s/p AVN ablation and CRT-P (BoSci, 8/2022; on Eliquis), tortuous esophagus and Schatzi ring at [facility] [place] (per EDG in 2020), extensive vascular history), HTN, HLD, CAD s/p CABG (LIMA to RI, ANTONIO to PDA) with SAVR (#19 Felder porcine bio-prosthetic valve) and mitral annuloplasty (Physio ring #26) at [facility] in 2002, HFpEF. She presented to the ED on 3/25 c/p intermittent CP and DOEx3 days, admitted for symptomatic anemia and CHERRI. TTE from 3/27/234 showed severe bioprosthetic AS, severe MS (MG 16), moderate to severe MR, severe TR. SHD consulted for evaluation of severe bioprosthetic AS.    PAST MEDICAL & SURGICAL HISTORY:  Atherosclerosis of coronary artery  CAD (coronary artery disease)      Peripheral vascular disease  PVD (peripheral vascular disease)      Anemia  Anemia      Hypothyroidism  Hypothyroidism      Gastroesophageal reflux disease  GERD (gastroesophageal reflux disease)      Hyperlipidemia  Hyperlipidemia      Essential hypertension  HTN (hypertension)      Seizure      Anxiety      Depression, unspecified depression type      Chronic kidney disease, unspecified CKD stage      Status post aorto-coronary artery bypass graft  2012      Atherosclerosis of coronary artery bypass graft(s), unspecified, with angina pectoris with documented spasm      H/O aortic valve replacement  Bioprosthetic      Ruptured aortic aneurysm  surgery august 2020      Abdominal aortic aneurysm (AAA) without rupture  2015          MEDICATIONS  (STANDING):  atorvastatin 40 milliGRAM(s) Oral at bedtime  cilostazol 50 milliGRAM(s) Oral every 12 hours  levETIRAcetam  IVPB 250 milliGRAM(s) IV Intermittent every 24 hours  levothyroxine Injectable 56 MICROGram(s) IV Push <User Schedule>  metoprolol tartrate 25 milliGRAM(s) Oral every 12 hours  pantoprazole  Injectable 40 milliGRAM(s) IV Push every 12 hours  polyethylene glycol 3350 17 Gram(s) Oral every 24 hours  senna 2 Tablet(s) Oral at bedtime  sertraline 50 milliGRAM(s) Oral every 24 hours  sodium chloride 0.9%. 1000 milliLiter(s) (50 mL/Hr) IV Continuous <Continuous>    MEDICATIONS  (PRN):  acetaminophen     Tablet .. 650 milliGRAM(s) Oral every 6 hours PRN Temp greater or equal to 38C (100.4F), Mild Pain (1 - 3)      Allergies    No Known Allergies    Intolerances        SOCIAL HISTORY:  Smoker: former smoker, quit 20 years ago. Denies drinking or drug use.  Live with: Lives alone. Ambulates with a cane. She has two sisters that are nearby that she talks to regularly.    FAMILY HISTORY:  FH: myocardial infarction  in Dad (at age 50s)        Review of Systems (Need 10):  CONSTITUTIONAL: Denies fevers / chills, sweats, fatigue, weight loss, weight gain                                       NEURO:  Denies parathesias, seizures, syncope, confusion                                                                                  EYES:  Denies blurry vision, discharge, pain, loss of vision                                                                                    ENMT:  Denies difficulty hearing, vertigo, dysphagia, epistaxis, recent dental work                                       CV:  Denies chest pain, palpitations, KRAFT, orthopnea                                                                                           RESPIRATORY:  Denies wWheezing, SOB, cough / sputum, hemoptysis                                                               GI:  Denies nausea, vomiting, diarrhea, constipation, melena                                                                          : Denies hematuria, dysuria, urgency, incontinence                                                                                          MUSKULOSKELETAL:  Denies arthritis, joint swelling, muscle weakness                                                             SKIN/BREAST:  Denies rash, itching, hair loss, masses                                                                                              PSYCH:  Denies depression, anxiety, suicidal ideation                                                                                                HEME/LYMPH:  Denies bruises easily, enlarged lymph nodes, tender lymph nodes                                          ENDOCRINE:  Denies cold intolerance, heat intolerance, polydipsia                                                                      Vital Signs Last 24 Hrs  T(C): 37 (27 Mar 2024 14:38), Max: 37 (27 Mar 2024 14:38)  T(F): 98.6 (27 Mar 2024 14:38), Max: 98.6 (27 Mar 2024 14:38)  HR: 74 (27 Mar 2024 11:52) (74 - 76)  BP: 125/61 (27 Mar 2024 11:52) (99/52 - 155/67)  BP(mean): 87 (27 Mar 2024 11:52) (73 - 99)  RR: 18 (27 Mar 2024 11:52) (16 - 19)  SpO2: 100% (27 Mar 2024 11:52) (99% - 100%)    Parameters below as of 27 Mar 2024 11:52  Patient On (Oxygen Delivery Method): room air        Physical Exam  General: Patient lying comfortably in bed, no acute distress     Neurological: Alert and oriented. No focal neurological deficits     Cardiovascular: S1S2, RRR, no murmurs appreciated on exam     Respiratory: Clear to ausculation bilaterally, no wheeze/rhonchi/rales    Gastrointestinal: + BS, soft, non tender, non distended     Extremities: Warm and well perfused. No edema, no calf tenderness     Vascular: 2+ Peripheral pulses b/l     Incision Sites: Prior MSI.    LABS:                        7.1    7.69  )-----------( 149      ( 27 Mar 2024 05:30 )             22.7     03-27    138  |  109<H>  |  37<H>  ----------------------------<  78  3.8   |  16<L>  |  2.49<H>    Ca    10.1      27 Mar 2024 05:30  Phos  3.6     03-27  Mg     2.2     03-27    TPro  7.3  /  Alb  4.0  /  TBili  0.3  /  DBili  x   /  AST  21  /  ALT  10  /  AlkPhos  91  03-26    PT/INR - ( 26 Mar 2024 09:44 )   PT: 11.5 sec;   INR: 1.01          PTT - ( 26 Mar 2024 09:44 )  PTT:24.9 sec  Urinalysis Basic - ( 27 Mar 2024 05:30 )    Color: x / Appearance: x / SG: x / pH: x  Gluc: 78 mg/dL / Ketone: x  / Bili: x / Urobili: x   Blood: x / Protein: x / Nitrite: x   Leuk Esterase: x / RBC: x / WBC x   Sq Epi: x / Non Sq Epi: x / Bacteria: x      CARDIAC MARKERS ( 26 Mar 2024 09:12 )  x     / x     / 414 U/L / x     / x      CARDIAC MARKERS ( 25 Mar 2024 23:56 )  x     / x     / 350 U/L / x     / 7.0 ng/mL          RADIOLOGY & ADDITIONAL STUDIES:    CT angio Chest: Status post thoracoabdominal bypass.    6.5 x 5 cm lobulated sac of extravasation which arises from the distal graft at the site of bifurcation 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      TTE / ALBER:     1. Normal left ventricular size and systolic function.   2. Normal right ventricular size and systolic function.   3. Severely dilated left atrium.   4. A bioprosthetic valve noted in the aortic position. Leaflet of the bioprosthesis appear thickened. The peak transvalvular velocity is 3.37 m/s, the mean transvalvular gradient is 26.00 mmHg, and the LVOT/AV velocity ratio is 0.32. Velocity and gradients are elevated, suggestive of bioprosthetic stenosis. There is mild aortic regurgitation.   5. The mitral valve is moderately thickened and calcified. An annuloplasty ring is noted in the mitral position. The mean transvalvular gradient is 16.00 mmHg at a heart rate of 75 bpm. Transmitral gradients are elevated, suggestive of mitral stenosis.   6. At-least moderate mitral regurgitation. The jet is not well visualized.   7. Severe tricuspid regurgitation.   8. Pulmonary hypertension present, pulmonary artery systolic pressure is 55 mmHg.   9. Trivial pericardial effusion.  10. Compared to the previous TTE performed on 4/21/2023, previously noted wall motion abnormalities are not noted on this study.   Surgeon: Dr. Gregory    Requesting Physician: Dr. Potts    HISTORY OF PRESENT ILLNESS   68 year old female, former smoker, with PMH of hypothyroidism, seizure disorder, Stage 3 CKD (baseline CR ~1.7-2.0), severe renal artery stenosis, chronic anemia (baseline Hgb ~9-10, followed by Heme: Dr. Izquierdo, gets weekly Fe infusion), AT, Afib s/p AVN ablation and CRT-P (BoSci, 8/2022; on Eliquis), tortuous esophagus and Schatzi ring at [facility] [place] (per EDG in 2020), extensive vascular history), HTN, HLD, CAD s/p CABG (LIMA to RI, ANTONIO to PDA) with SAVR (#19 Felder porcine bio-prosthetic valve) and mitral annuloplasty (Physio ring #26) at [facility] in 2002, and AAA s/p open repair in 2015, HFpEF. She presented to the ED on 3/25 c/p intermittent CP and DOEx3 days, admitted for symptomatic anemia and CHERRI. TTE from 3/27/234 showed severe bioprosthetic AS, severe MS (MG 16), moderate to severe MR, severe TR. SHD consulted for evaluation of severe bioprosthetic AS.    PAST MEDICAL & SURGICAL HISTORY:  Atherosclerosis of coronary artery  CAD (coronary artery disease)      Peripheral vascular disease  PVD (peripheral vascular disease)      Anemia  Anemia      Hypothyroidism  Hypothyroidism      Gastroesophageal reflux disease  GERD (gastroesophageal reflux disease)      Hyperlipidemia  Hyperlipidemia      Essential hypertension  HTN (hypertension)      Seizure      Anxiety      Depression, unspecified depression type      Chronic kidney disease, unspecified CKD stage      Status post aorto-coronary artery bypass graft  2012      Atherosclerosis of coronary artery bypass graft(s), unspecified, with angina pectoris with documented spasm      H/O aortic valve replacement  Bioprosthetic      Ruptured aortic aneurysm  surgery august 2020      Abdominal aortic aneurysm (AAA) without rupture  2015          MEDICATIONS  (STANDING):  atorvastatin 40 milliGRAM(s) Oral at bedtime  cilostazol 50 milliGRAM(s) Oral every 12 hours  levETIRAcetam  IVPB 250 milliGRAM(s) IV Intermittent every 24 hours  levothyroxine Injectable 56 MICROGram(s) IV Push <User Schedule>  metoprolol tartrate 25 milliGRAM(s) Oral every 12 hours  pantoprazole  Injectable 40 milliGRAM(s) IV Push every 12 hours  polyethylene glycol 3350 17 Gram(s) Oral every 24 hours  senna 2 Tablet(s) Oral at bedtime  sertraline 50 milliGRAM(s) Oral every 24 hours  sodium chloride 0.9%. 1000 milliLiter(s) (50 mL/Hr) IV Continuous <Continuous>    MEDICATIONS  (PRN):  acetaminophen     Tablet .. 650 milliGRAM(s) Oral every 6 hours PRN Temp greater or equal to 38C (100.4F), Mild Pain (1 - 3)      Allergies    No Known Allergies    Intolerances        SOCIAL HISTORY:  Smoker: former smoker, quit 20 years ago. Denies drinking or drug use.  Live with: Lives alone. Ambulates with a cane. She has two sisters that are nearby that she talks to regularly.    FAMILY HISTORY:  FH: myocardial infarction  in Dad (at age 50s)        Review of Systems (Need 10):  CONSTITUTIONAL: Denies fevers / chills, sweats, fatigue, weight loss, weight gain                                       NEURO:  Denies parathesias, seizures, syncope, confusion                                                                                  EYES:  Denies blurry vision, discharge, pain, loss of vision                                                                                    ENMT:  Denies difficulty hearing, vertigo, dysphagia, epistaxis, recent dental work                                       CV:  Denies chest pain, palpitations, KRAFT, orthopnea                                                                                           RESPIRATORY:  Denies wWheezing, SOB, cough / sputum, hemoptysis                                                               GI:  Denies nausea, vomiting, diarrhea, constipation, melena                                                                          : Denies hematuria, dysuria, urgency, incontinence                                                                                          MUSKULOSKELETAL:  Denies arthritis, joint swelling, muscle weakness                                                             SKIN/BREAST:  Denies rash, itching, hair loss, masses                                                                                              PSYCH:  Denies depression, anxiety, suicidal ideation                                                                                                HEME/LYMPH:  Denies bruises easily, enlarged lymph nodes, tender lymph nodes                                          ENDOCRINE:  Denies cold intolerance, heat intolerance, polydipsia                                                                      Vital Signs Last 24 Hrs  T(C): 37 (27 Mar 2024 14:38), Max: 37 (27 Mar 2024 14:38)  T(F): 98.6 (27 Mar 2024 14:38), Max: 98.6 (27 Mar 2024 14:38)  HR: 74 (27 Mar 2024 11:52) (74 - 76)  BP: 125/61 (27 Mar 2024 11:52) (99/52 - 155/67)  BP(mean): 87 (27 Mar 2024 11:52) (73 - 99)  RR: 18 (27 Mar 2024 11:52) (16 - 19)  SpO2: 100% (27 Mar 2024 11:52) (99% - 100%)    Parameters below as of 27 Mar 2024 11:52  Patient On (Oxygen Delivery Method): room air        Physical Exam  General: Patient lying comfortably in bed, no acute distress     Neurological: Alert and oriented. No focal neurological deficits     Cardiovascular: S1S2, RRR, no murmurs appreciated on exam     Respiratory: Clear to ausculation bilaterally, no wheeze/rhonchi/rales    Gastrointestinal: + BS, soft, non tender, non distended     Extremities: Warm and well perfused. No edema, no calf tenderness     Vascular: 2+ Peripheral pulses b/l     Incision Sites: Prior MSI.    LABS:                        7.1    7.69  )-----------( 149      ( 27 Mar 2024 05:30 )             22.7     03-27    138  |  109<H>  |  37<H>  ----------------------------<  78  3.8   |  16<L>  |  2.49<H>    Ca    10.1      27 Mar 2024 05:30  Phos  3.6     03-27  Mg     2.2     03-27    TPro  7.3  /  Alb  4.0  /  TBili  0.3  /  DBili  x   /  AST  21  /  ALT  10  /  AlkPhos  91  03-26    PT/INR - ( 26 Mar 2024 09:44 )   PT: 11.5 sec;   INR: 1.01          PTT - ( 26 Mar 2024 09:44 )  PTT:24.9 sec  Urinalysis Basic - ( 27 Mar 2024 05:30 )    Color: x / Appearance: x / SG: x / pH: x  Gluc: 78 mg/dL / Ketone: x  / Bili: x / Urobili: x   Blood: x / Protein: x / Nitrite: x   Leuk Esterase: x / RBC: x / WBC x   Sq Epi: x / Non Sq Epi: x / Bacteria: x      CARDIAC MARKERS ( 26 Mar 2024 09:12 )  x     / x     / 414 U/L / x     / x      CARDIAC MARKERS ( 25 Mar 2024 23:56 )  x     / x     / 350 U/L / x     / 7.0 ng/mL          RADIOLOGY & ADDITIONAL STUDIES:    CT angio Chest: Status post thoracoabdominal bypass.    6.5 x 5 cm lobulated sac of extravasation which arises from the distal graft at the site of bifurcation 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      TTE / ALBER:     1. Normal left ventricular size and systolic function.   2. Normal right ventricular size and systolic function.   3. Severely dilated left atrium.   4. A bioprosthetic valve noted in the aortic position. Leaflet of the bioprosthesis appear thickened. The peak transvalvular velocity is 3.37 m/s, the mean transvalvular gradient is 26.00 mmHg, and the LVOT/AV velocity ratio is 0.32. Velocity and gradients are elevated, suggestive of bioprosthetic stenosis. There is mild aortic regurgitation.   5. The mitral valve is moderately thickened and calcified. An annuloplasty ring is noted in the mitral position. The mean transvalvular gradient is 16.00 mmHg at a heart rate of 75 bpm. Transmitral gradients are elevated, suggestive of mitral stenosis.   6. At-least moderate mitral regurgitation. The jet is not well visualized.   7. Severe tricuspid regurgitation.   8. Pulmonary hypertension present, pulmonary artery systolic pressure is 55 mmHg.   9. Trivial pericardial effusion.  10. Compared to the previous TTE performed on 4/21/2023, previously noted wall motion abnormalities are not noted on this study.

## 2024-03-27 NOTE — DIETITIAN INITIAL EVALUATION ADULT - ADD RECOMMEND
-Initiate nutrition within 24-48 hrs    *Recommend regular diet with appropriate textures/consistencies    *Consider Ensure TID in setting of protein-calorie malnutrition   -Encourage good PO intake when appropriate   -Weekly wts   -Gather full nutrition focused physical examination and nutrition hx as able at f/u   -Monitor chemistry, GI function, and skin integrity

## 2024-03-27 NOTE — DIETITIAN INITIAL EVALUATION ADULT - OTHER INFO
Patient is a 67yo F former smoker (quit 20 year ago with PMH of hypothyroidism, seizure disorder, Stage 3 CKD (baseline CR ~1.7-2.0), severe renal artery stenosis, chronic anemia (baseline Hgb ~9-10, gets weekly Fe infusion last infusion 3/22/24), AT, Afib s/p AVN ablation and CRT-P (8/2022; on Eliquis), HTN, HLD, CAD s/p CABG, AVR (porcine bio-prosthetic valve) and mitral annuloplasty, HFpEF, AAA s/p open repair in 2015 with complications, PAD s/p L pSFA stent/L-TIFFANY stent/L-IIA stent who presents with intermittent chest pain and KRATF x 3 days, admitted for symptomatic anemia and r/o PE.     Pt assessed at bedside on 7LA. Rx and labs reviewed. Pt presents with severe wasting of the orbital and interosseous regions. Limited nutrition focused physical examination as pt in bed contracted. Spoke briefly with pt, but limited nutrition interview as pt in with PT. Pt profile screen triggered for Malnutrition Screening Tool Score >2; reported wt loss of 14-23 pounds unintentionally. Gather full nutrition hx as able at f/u assessment. Reported complaints of constipation; on bowel care. No other reports GI distress or further nutritional concerns at this time. RDN will continue to reassess, intervene, and monitor as appropriate.     Pain: 0 per chart review   GI: Abdomen non-distended/non-tender, +BS x4, last bowel movement PTA -constipation   Skin: Warm/Dry/Intact, no edema noted

## 2024-03-27 NOTE — CONSULT NOTE ADULT - NS ATTEND OPT1 GEN_ALL_CORE
Subjective:      Patient ID: Micheline Santos is a 28 y.o. female. Rash   This is a recurrent problem. The current episode started more than 1 month ago. The problem has been waxing and waning since onset. The rash is diffuse. The rash is characterized by itchiness and redness. She was exposed to nothing. Past treatments include antihistamine. The treatment provided mild relief. Referred by Yehuda Segovia for evaluation of rash ongoing for over a month. She has not had similar symptoms in the past. Denies lip, tongue or throat swelling, difficulty breathing or swallowing and GI distress associated with hives. Review of Systems   Skin: Positive for rash. All other systems reviewed and are negative. Objective:   Physical Exam     Constitutional  Appears stated age. Head normocephalic and atraumatic. Psych  Alert and oriented. Pleasant and cooperative. Chest  Rises and falls symmetrically with no evidence of respiratory distress. Lungs  0/40 - no wheeze or other adventitious breath sounds    Nose  Pallor: 2-Healthy/Pink    Bogginess: 2-turbinate occupies less than or equal to 50% of the diameter of naris    Wetness: 2-scant secretions    Redness: 2-healthy pink    Mouth  Pharynx clear, uvula midline, dentition WDL. Ears  Ear canals WDL, TM's susy grey with visible light reflex. Eyes  Pupils equal and reactive. EOM's WDL. Heart  Heart rate regular. Rhythm without murmur, click, rub or gallop. Skin  Warm, dry and intact. Urticarial lesion trunk and extremities. Neck  Supple. ROM WDL for age. No lymphadenopathy or thyromegaly. Musculoskeletal  ROM WDL for stated age. Extremities without clubbing, cyanosis or edema.        Assessment:      Urticaria      Plan:      Levocetirizine 5 mg once daily  Ranitidine 300 mg once daily  Update on rash next week  RV 6 weeks         Lisa Greco, APRN - CNP I independently performed the documented:

## 2024-03-27 NOTE — PROGRESS NOTE ADULT - SUBJECTIVE AND OBJECTIVE BOX
Neurology Progress Note    Interval History:        PAST MEDICAL & SURGICAL HISTORY:  Atherosclerosis of coronary artery  CAD (coronary artery disease)    Peripheral vascular disease  PVD (peripheral vascular disease)    Anemia  Anemia    Hypothyroidism  Hypothyroidism    Gastroesophageal reflux disease  GERD (gastroesophageal reflux disease)    Hyperlipidemia  Hyperlipidemia    Essential hypertension  HTN (hypertension)    Seizure    Anxiety    Depression, unspecified depression type    Chronic kidney disease, unspecified CKD stage    Status post aorto-coronary artery bypass graft  2012    Atherosclerosis of coronary artery bypass graft(s), unspecified, with angina pectoris with documented spasm    H/O aortic valve replacement  Bioprosthetic    Ruptured aortic aneurysm  surgery august 2020    Abdominal aortic aneurysm (AAA) without rupture  2015      Medications:  acetaminophen     Tablet .. 650 milliGRAM(s) Oral every 6 hours PRN  cilostazol 50 milliGRAM(s) Oral every 12 hours  levETIRAcetam  IVPB 250 milliGRAM(s) IV Intermittent every 24 hours  levothyroxine Injectable 56 MICROGram(s) IV Push <User Schedule>  pantoprazole  Injectable 40 milliGRAM(s) IV Push every 12 hours  polyethylene glycol 3350 17 Gram(s) Oral every 24 hours  senna 2 Tablet(s) Oral at bedtime  sertraline 50 milliGRAM(s) Oral every 24 hours  sodium chloride 0.9%. 1000 milliLiter(s) IV Continuous <Continuous>      Vital Signs Last 24 Hrs  T(C): 36.4 (27 Mar 2024 06:29), Max: 36.8 (26 Mar 2024 22:47)  T(F): 97.5 (27 Mar 2024 06:29), Max: 98.2 (26 Mar 2024 22:47)  HR: 74 (27 Mar 2024 08:26) (74 - 76)  BP: 147/69 (27 Mar 2024 08:26) (99/52 - 155/67)  BP(mean): 99 (27 Mar 2024 08:26) (73 - 99)  RR: 16 (27 Mar 2024 01:00) (16 - 18)  SpO2: 100% (27 Mar 2024 01:00) (99% - 100%)    Parameters below as of 26 Mar 2024 20:12  Patient On (Oxygen Delivery Method): room air        Neurological Exam:   Mental status: Awake, alert and oriented x3.  Recent and remote memory intact.  Naming, repetition and comprehension intact.  Attention/concentration intact.  No dysarthria, no aphasia.  Fund of knowledge appropriate.    Cranial nerves: Pupils equally round and reactive to light, visual fields full, no nystagmus, extraocular muscles intact, V1 through V3 intact bilaterally and symmetric, face symmetric, hearing intact to finger rub, palate elevation symmetric, tongue was midline.  Motor:  MRC grading 5/5 b/l UE/LE.   strength 5/5.  Normal tone and bulk.  No abnormal movements.    Sensation: Intact to light touch, proprioception, and pinprick.   Coordination: No dysmetria on finger-to-nose and heel-to-shin.  No dysdiadokinesia.  Reflexes: 2+ in bilateral UE/LE, downgoing toes bilaterally. (-) Gilbert.  Gait: Narrow and steady. No ataxia.  Romberg negative    Labs:  CBC Full  -  ( 27 Mar 2024 05:30 )  WBC Count : 7.69 K/uL  RBC Count : 2.44 M/uL  Hemoglobin : 7.1 g/dL  Hematocrit : 22.7 %  Platelet Count - Automated : 149 K/uL  Mean Cell Volume : 93.0 fl  Mean Cell Hemoglobin : 29.1 pg  Mean Cell Hemoglobin Concentration : 31.3 gm/dL  Auto Neutrophil # : x  Auto Lymphocyte # : x  Auto Monocyte # : x  Auto Eosinophil # : x  Auto Basophil # : x  Auto Neutrophil % : x  Auto Lymphocyte % : x  Auto Monocyte % : x  Auto Eosinophil % : x  Auto Basophil % : x    03-27    138  |  109<H>  |  37<H>  ----------------------------<  78  3.8   |  16<L>  |  2.49<H>    Ca    10.1      27 Mar 2024 05:30  Phos  3.6     03-27  Mg     2.2     03-27    TPro  7.3  /  Alb  4.0  /  TBili  0.3  /  DBili  x   /  AST  21  /  ALT  10  /  AlkPhos  91  03-26    LIVER FUNCTIONS - ( 26 Mar 2024 18:45 )  Alb: 4.0 g/dL / Pro: 7.3 g/dL / ALK PHOS: 91 U/L / ALT: 10 U/L / AST: 21 U/L / GGT: x           PT/INR - ( 26 Mar 2024 09:44 )   PT: 11.5 sec;   INR: 1.01          PTT - ( 26 Mar 2024 09:44 )  PTT:24.9 sec  Urinalysis Basic - ( 27 Mar 2024 05:30 )    Color: x / Appearance: x / SG: x / pH: x  Gluc: 78 mg/dL / Ketone: x  / Bili: x / Urobili: x   Blood: x / Protein: x / Nitrite: x   Leuk Esterase: x / RBC: x / WBC x   Sq Epi: x / Non Sq Epi: x / Bacteria: x       Neurology Progress Note    Interval History:  Patient seen and examined bedside. States she feels much better today. Denies any complaints. Denies any prior history of seizures, has never been on anti seizure medications.       PAST MEDICAL & SURGICAL HISTORY:  Atherosclerosis of coronary artery  CAD (coronary artery disease)    Peripheral vascular disease  PVD (peripheral vascular disease)    Anemia  Anemia    Hypothyroidism  Hypothyroidism    Gastroesophageal reflux disease  GERD (gastroesophageal reflux disease)    Hyperlipidemia  Hyperlipidemia    Essential hypertension  HTN (hypertension)    Seizure    Anxiety    Depression, unspecified depression type    Chronic kidney disease, unspecified CKD stage    Status post aorto-coronary artery bypass graft  2012    Atherosclerosis of coronary artery bypass graft(s), unspecified, with angina pectoris with documented spasm    H/O aortic valve replacement  Bioprosthetic    Ruptured aortic aneurysm  surgery august 2020    Abdominal aortic aneurysm (AAA) without rupture  2015      Medications:  acetaminophen     Tablet .. 650 milliGRAM(s) Oral every 6 hours PRN  cilostazol 50 milliGRAM(s) Oral every 12 hours  levETIRAcetam  IVPB 250 milliGRAM(s) IV Intermittent every 24 hours  levothyroxine Injectable 56 MICROGram(s) IV Push <User Schedule>  pantoprazole  Injectable 40 milliGRAM(s) IV Push every 12 hours  polyethylene glycol 3350 17 Gram(s) Oral every 24 hours  senna 2 Tablet(s) Oral at bedtime  sertraline 50 milliGRAM(s) Oral every 24 hours  sodium chloride 0.9%. 1000 milliLiter(s) IV Continuous <Continuous>      Vital Signs Last 24 Hrs  T(C): 36.4 (27 Mar 2024 06:29), Max: 36.8 (26 Mar 2024 22:47)  T(F): 97.5 (27 Mar 2024 06:29), Max: 98.2 (26 Mar 2024 22:47)  HR: 74 (27 Mar 2024 08:26) (74 - 76)  BP: 147/69 (27 Mar 2024 08:26) (99/52 - 155/67)  BP(mean): 99 (27 Mar 2024 08:26) (73 - 99)  RR: 16 (27 Mar 2024 01:00) (16 - 18)  SpO2: 100% (27 Mar 2024 01:00) (99% - 100%)    Parameters below as of 26 Mar 2024 20:12  Patient On (Oxygen Delivery Method): room air        Neurological Exam:   General:  Appearance is consistent with chronologic age.    Cognitive/Language:  The patient is alert and oriented, AOx3, able to follow some simple commands  Eyes: Equal and reactive to light  Face: no facial asymmetry    Motor examination:   Normal tone, bulk and range of motion.  No tenderness, twitching, tremors or involuntary movements.  Reflexes:   2+ b/l  biceps, triceps, brachioradialis, left patella 3+, right 2+ and Achilles 2+.  Upgoing toe on the right.  Sensory examination: intact to LT    Labs:  CBC Full  -  ( 27 Mar 2024 05:30 )  WBC Count : 7.69 K/uL  RBC Count : 2.44 M/uL  Hemoglobin : 7.1 g/dL  Hematocrit : 22.7 %  Platelet Count - Automated : 149 K/uL  Mean Cell Volume : 93.0 fl  Mean Cell Hemoglobin : 29.1 pg  Mean Cell Hemoglobin Concentration : 31.3 gm/dL  Auto Neutrophil # : x  Auto Lymphocyte # : x  Auto Monocyte # : x  Auto Eosinophil # : x  Auto Basophil # : x  Auto Neutrophil % : x  Auto Lymphocyte % : x  Auto Monocyte % : x  Auto Eosinophil % : x  Auto Basophil % : x    03-27    138  |  109<H>  |  37<H>  ----------------------------<  78  3.8   |  16<L>  |  2.49<H>    Ca    10.1      27 Mar 2024 05:30  Phos  3.6     03-27  Mg     2.2     03-27    TPro  7.3  /  Alb  4.0  /  TBili  0.3  /  DBili  x   /  AST  21  /  ALT  10  /  AlkPhos  91  03-26    LIVER FUNCTIONS - ( 26 Mar 2024 18:45 )  Alb: 4.0 g/dL / Pro: 7.3 g/dL / ALK PHOS: 91 U/L / ALT: 10 U/L / AST: 21 U/L / GGT: x           PT/INR - ( 26 Mar 2024 09:44 )   PT: 11.5 sec;   INR: 1.01          PTT - ( 26 Mar 2024 09:44 )  PTT:24.9 sec  Urinalysis Basic - ( 27 Mar 2024 05:30 )    Color: x / Appearance: x / SG: x / pH: x  Gluc: 78 mg/dL / Ketone: x  / Bili: x / Urobili: x   Blood: x / Protein: x / Nitrite: x   Leuk Esterase: x / RBC: x / WBC x   Sq Epi: x / Non Sq Epi: x / Bacteria: x

## 2024-03-27 NOTE — DIETITIAN NUTRITION RISK NOTIFICATION - TREATMENT: THE FOLLOWING DIET HAS BEEN RECOMMENDED
Diet, NPO after Midnight:      NPO Start Date: 27-Mar-2024,   NPO Start Time: 23:59 (03-27-24 @ 12:59) [Active]  Diet, Regular (03-27-24 @ 12:59) [Active]

## 2024-03-27 NOTE — PROGRESS NOTE ADULT - ASSESSMENT
Patient is a 67yo F former smoker (quit 20 year ago with PMH of hypothyroidism, seizure disorder, Stage 3 CKD (baseline CR ~1.7-2.0), severe renal artery stenosis, chronic anemia (baseline Hgb ~9-10, gets weekly Fe infusion last infusion 3/22/24), AT, Afib s/p AVN ablation and CRT-P (8/2022; on Eliquis), HTN, HLD, CAD s/p CABG, AVR (porcine bio-prosthetic valve) and mitral annuloplasty, HFpEF, AAA s/p open repair in 2015 with complications, PAD s/p L pSFA stent/L-TIFFANY stent/L-IIA stent who presents with intermittent chest pain and KRAFT x 3 days. Admitted under medicine for suspected GI bleed and possible AAA stent migration. Epilepsy consulted for 2 episodes of seizure like activity. Today n exam, patient is awake and alert. AOx3, appears much improved from yesterday.  EEG thus far neg for seizures. CTH reviewed, showed no acute pathology.  She denies ever having seizures or being on seizures meds in the past.       Recommendations  - Discontinue vEEG  - c/w Keppra 250mg given CKD  - Obtain Keppra level before next dose  - Will sign off, please call back as needed

## 2024-03-27 NOTE — DIETITIAN INITIAL EVALUATION ADULT - PROBLEM SELECTOR PLAN 9
Hx of peripheral artery disease.  PAD s/p L pSFA stent/L-TIFFANY stent with occluded b/l SFA followed by L-internal iliac artery stent in 5/2021 c/b L-RP hematoma requiring 2 units PRBC with ongoing LLE claudication.  Home med: cilostazol 50mg bid  - c/w home med

## 2024-03-27 NOTE — PHYSICAL THERAPY INITIAL EVALUATION ADULT - PERTINENT HX OF CURRENT PROBLEM, REHAB EVAL
Pt. is a 68 y.o female with h/o seizure d/o, chronic anemia, s/p CABG, s/p AVR, s/p thoracoabdominal aortic bypass, currently p/w KRAFT/intermittent chest pain liiting activity. Pt. 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 Structural heart consulted for severe AS. Stepped up to telemetry s/p episode of unresponsiveness, placed on VEEG, EP consulted.

## 2024-03-27 NOTE — DIETITIAN INITIAL EVALUATION ADULT - PROBLEM SELECTOR PLAN 7
Hx of HFpEF.   Last TTE on file 04/2023: Borderline normal left ventricular systolic function 50%. Mid and apical inferior septum, apical lateral segment, and apex are hypokinetic. Severely dilated left atrium. Significant bioprosthetic aortic valve stenosis.   prosthetic aortic stenosis. Severe mitral stenosis. Moderate-to-severe mitral regurgitation. Severe tricuspid regurgitation. PASP 63 mmHg.  Home meds: lopressor 50g bid, lasix 40mg qd  - hold home meds

## 2024-03-27 NOTE — CONSULT NOTE ADULT - ASSESSMENT
Assesment:  former smoker, with PMH of hypothyroidism, seizure disorder, Stage 3 CKD (baseline CR ~1.7-2.0), severe renal artery stenosis, chronic anemia (baseline Hgb ~9-10, followed by Heme: Dr. Izquierdo, gets weekly Fe infusion), AT, Afib s/p AVN ablation and CRT-P ([facility], 8/2022; on Eliquis), tortuous esophagus and Schatzi ring at [place] (per [facility] in 2020), extensive vascular history), HTN, HLD, CAD s/p CABG (LIMA to RI, ANTONIO to PDA) with SAVR (#19 Felder porcine bio-prosthetic valve) and mitral annuloplasty (Physio ring #26) at [facility] in 2002, HFpEF. SHD consulted for severe bioprosthetic AS.      Plan:  Problem 1: Severe Bioprosthetic AS  - case discussed with Dr. Gregory      Problem 2: Anemia  - hx of TASNEEM - gets weekly out-patient Iron infusions  - follows with hematologist Dr. Izquierdo   - continue to hold home Eliquis   - care per primary team      Problem 3: HFpEF  - continue home lopressor  - care per primary team       Problem 4: chronic atrial fibrillation   - hx of Afib, s/p AVN ablation and Bi-V PPM placement  - holding home eliquis  - continue lopressor  - care per primary team    I have reviewed clinical labs tests and reports, radiology tests and reports, as well as old patient medical records, and discussed with the refering physician.     Assesment:  former smoker, with PMH of hypothyroidism, seizure disorder, Stage 3 CKD (baseline CR ~1.7-2.0), severe renal artery stenosis, chronic anemia (baseline Hgb ~9-10, followed by Heme: Dr. Izquierdo, gets weekly Fe infusion), AT, Afib s/p AVN ablation and CRT-P ([facility], 8/2022; on Eliquis), tortuous esophagus and Schatzi ring at [facility] (per EDG in 2020), extensive vascular history), HTN, HLD, CAD s/p CABG (LIMA to RI, ANTONIO to PDA) with SAVR (#19 Felder porcine bio-prosthetic valve) and mitral annuloplasty (Physio ring #26) at [facility] in 2002, HFpEF. SHD consulted for severe bioprosthetic AS.      Plan:  Problem 1: Severe Bioprosthetic AS  - case discussed with Dr. Gregory      Problem 2: Anemia  - hx of TASNEEM - gets weekly out-patient Iron infusions  - follows with hematologist Dr. Izquierdo   - GI following for melena and planning for EGD tomorrow  - continue to hold home AC  - care per primary team      Problem 3: AAA s/p open repair in 2015  - continue home lopressor  - care per primary team       Problem 4: chronic atrial fibrillation   - hx of Afib, s/p AVN ablation and Bi-V PPM placement  - holding home eliquis  - continue lopressor  - care per primary team    I have reviewed clinical labs tests and reports, radiology tests and reports, as well as old patient medical records, and discussed with the refering physician.     Assesment:  former smoker, with PMH of hypothyroidism, seizure disorder, Stage 3 CKD (baseline CR ~1.7-2.0), severe renal artery stenosis, chronic anemia (baseline Hgb ~9-10, followed by Heme: Dr. Izquierdo, gets weekly Fe infusion), AT, Afib s/p AVN ablation and CRT-P ([facility], 8/2022; on Eliquis), tortuous esophagus and Schatzi ring at [facility] (per EDG in 2020), extensive vascular history), HTN, HLD, CAD s/p CABG (LIMA to RI, ANTONIO to PDA) with SAVR (#19 Felder porcine bio-prosthetic valve) and mitral annuloplasty (Physio ring #26) at [facility] in 2002, HFpEF. SHD consulted for severe bioprosthetic AS.      Plan:  Problem 1: Severe Bioprosthetic AS  - case discussed with Dr. Gregory  - Hx of CABG/AVR/MVr, and AAA repair   - pending EGD with GI tomorrow  - pending medically optimization and AAA pseudoaneurysm repair before structural intervention for bioprosthetic AS at this time  - remainder of care per primary team       Problem 2: Anemia  - hx of TASNEEM - gets weekly out-patient Iron infusions  - follows with hematologist Dr. Izquierod   - GI following for melena and planning for EGD tomorrow  - continue to hold home AC  - care per primary team      Problem 3: AAA s/p open repair in 2015  - CT angio chest showing 6.5 x 5 cm lobulated sac of extravasation which arises from the distal graft at the site of bifurcation 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 enlarged from 6/11/21  - vascular following, pending repair of aneurysm/psudoaneurysm once medically optimized  - care per primary team       Problem 4: chronic atrial fibrillation   - hx of Afib, s/p AVN ablation and Bi-V PPM placement  - holding home eliquis  - continue lopressor  - care per primary team    I have reviewed clinical labs tests and reports, radiology tests and reports, as well as old patient medical records, and discussed with the refering physician.     Assesment:  former smoker, with PMH of hypothyroidism, seizure disorder, Stage 3 CKD (baseline CR ~1.7-2.0), severe renal artery stenosis, chronic anemia (baseline Hgb ~9-10, followed by Heme: Dr. Izquierdo, gets weekly Fe infusion), AT, Afib s/p AVN ablation and CRT-P ([facility], 8/2022; on Eliquis), tortuous esophagus and Schatzi ring at [facility] (per EDG in 2020), extensive vascular history), HTN, HLD, CAD s/p CABG (LIMA to RI, ANTONIO to PDA) with SAVR (#19 Felder porcine bio-prosthetic valve) and mitral annuloplasty (Physio ring #26) at [facility] in 2002, HFpEF. SHD consulted for severe bioprosthetic AS.      Plan:  Problem 1: Severe Bioprosthetic AS  - case discussed with Dr. Gregory  - Hx of CABG/AVR/MVr, and AAA repair   - pending medically optimization   - Pending EGD tomorrow and GI clearance prior to structural intervention fro Bioprosthetic AS  - Will need ALBER once medically optimized  - remainder of care per primary team       Problem 2: Anemia  - hx of TASNEEM - gets weekly out-patient Iron infusions  - follows with hematologist Dr. Izquierdo   - GI following for melena and planning for EGD tomorrow  - continue to hold home AC  - care per primary team      Problem 3: AAA s/p open repair in 2015  - CT angio chest showing 6.5 x 5 cm lobulated sac of extravasation which arises from the distal graft at the site of bifurcation 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 enlarged from 6/11/21  - vascular following, pending repair of aneurysm/psudoaneurysm once medically optimized  - care per primary team       Problem 4: chronic atrial fibrillation   - hx of Afib, s/p AVN ablation and Bi-V PPM placement  - holding home eliquis  - continue lopressor  - care per primary team    I have reviewed clinical labs tests and reports, radiology tests and reports, as well as old patient medical records, and discussed with the refering physician.

## 2024-03-27 NOTE — PHYSICAL THERAPY INITIAL EVALUATION ADULT - MODALITIES TREATMENT COMMENTS
Pt. currently presenting with limited endurance, impaired standing balance , and increased unsteadiness of gait compared to reported baseline.

## 2024-03-27 NOTE — DIETITIAN INITIAL EVALUATION ADULT - PROBLEM SELECTOR PLAN 1
Patient p/w KRAFT and chest pain, VSS. No evidence of hypoxia.   Associated with intermittent chest pain although does not appear to be pleuritic in nature.  Denies cough, fevers, s/s of bleeding. No recent travel, immobilization although has been walking less over the past week.   Denies hx of cancer, clotting disorders, no relevant family hx.  Labs showing elevated D-dimer 2831, p-BNP elevated to 3727  CXR + CT showing bibasilar atelectasis over left lung zones.   Ddx includes symptomatic anemia, severe AS, PE, HF exacerbation (less likely), ACS (unlikely).  - f/u B/L LE duplex   - consider V/Q scan v. CTA if Cr improving   - treat anemia as below  - f/ u TTE  - CTSX consult in the AM  - given concern for bleeding and VSS stable, monitor off full dose AC tonight, can start heparin if  Hgb remains stable on AM labs

## 2024-03-27 NOTE — DIETITIAN INITIAL EVALUATION ADULT - NS FNS DIET ORDER
Diet, NPO after Midnight:      NPO Start Date: 27-Mar-2024,   NPO Start Time: 23:59 (03-27-24 @ 12:59)  Diet, Regular (03-27-24 @ 12:59)

## 2024-03-27 NOTE — PROGRESS NOTE ADULT - PROBLEM SELECTOR PLAN 8
History of Afib and Atach. Known to Dr. Jasso.  Found to be in slow AFib with HR to 30s during last admission, now s/p AVN ablation and Bi-V PPM.   -Home meds: Eliquis 2.5mg bid, lopressor 50mg bid    Plan:  - Hold home Eliquis iso possible bleed, Lopressor as above

## 2024-03-27 NOTE — DIETITIAN INITIAL EVALUATION ADULT - PROBLEM SELECTOR PLAN 8
#HTN  #HLD  Hx of CAD s/p CABG, bioAVR/ mitral annuloplasty in 2002, Hx of PCI (unknown date). S/p BiV-PPM placement.  EKG: paced rhythm. Trops negative.   Home meds: Crestor 40mg qhs, lopressor 50mg bid, losartan 25mg qd.  - c/w crestor TI --> atorvastatin 80mg qhs  - hold losartan iso CHERRI  - hold lopressor given concern for possible bleed  - f/u repeat trops

## 2024-03-27 NOTE — PROGRESS NOTE ADULT - ASSESSMENT
68-year-old F, former smoker (quit 20 year ago) with PMH of hypothyroidism, seizure disorder, stage 3 CKD (baseline CR ~1.7-2.0), severe renal artery stenosis, chronic anemia (baseline Hgb ~ 9-10, gets weekly iron infusion last infusion 3/22/24), AT, Afib s/p AVN ablation and CRT-P (8/2022; on Eliquis), HTN, HLD, CAD s/p CABG, AVR (porcine bio-prosthetic valve) and mitral annuloplasty, HFpEF, AAA s/p open repair in 2015 with complications, PAD s/p L pSFA stent/L-TIFFANY stent/L-IIA stent who presents with intermittent chest pain and KRAFT x 3 days, admitted for symptomatic anemia w/ course complicated by status epilepticus (2 seizure events) 3/26 w/ RR called, requiring step-up to telemetry for management of seizures and symptomatic anemia 2/2 GIB vs. vascular dissection. On 3/27, pt noted to have episode of melena, KASH positive. Cardiology consulted for pre-op clearance for possible EGD, patient transferred to CCU for in-unit scope if pursuing EGD.     Neuro   #Status epilepticus     Cardiac    Pulm       GI   #Anemia   Hgb 6.9 on admission decrease from 11.7 from 2/24 w/ reported melena. KASH w/ dark stool. Likely multifactorial. Iron panel normal however confounded by recent transfusion. Recent EGD on 1/24 showing diffuse severe inflammation characterized by erythema and erosions at incisura, antrum, duodenitis.   Will need for     Plan:   -c/w protonix 40 mg IVP BID   -NPO MN for tentative EGD 3/28   -Hold AC  -monitor Hgb, transfuse Hgb < 8   -maintain active T&S, 2 large bore IVs   -Appreciate GI recs       Renal   #CHERRI on CKD    Endo   #Hypothyroidism     Plan:  -c/w home med synthroid 75mcg    Heme   #Anemia     ID     F: None  E: replete K < 4, Mg <2  DVT ppx- hold i/s/o possible GIB  Diet- regular  Activity- ambulate as tolerated  Code- Full. 68-year-old F, former smoker (quit 20 year ago) with PMH of hypothyroidism, seizure disorder, stage 3 CKD (baseline CR ~1.7-2.0), severe renal artery stenosis, chronic anemia (baseline Hgb ~ 9-10, gets weekly iron infusion last infusion 3/22/24), AT, Afib s/p AVN ablation and CRT-P (8/2022; on Eliquis), HTN, HLD, CAD s/p CABG, AVR (porcine bio-prosthetic valve) and mitral annuloplasty, HFpEF, AAA s/p open repair in 2015 with complications, PAD s/p L pSFA stent/L-TIFFANY stent/L-IIA stent who presents with intermittent chest pain and KRAFT x 3 days, admitted for symptomatic anemia w/ course complicated by status epilepticus (2 seizure events) 3/26 w/ RR called, requiring step-up to telemetry for management of seizures and symptomatic anemia 2/2 GIB vs. vascular dissection. On 3/27, pt noted to have episode of melena, KASH positive. Cardiology consulted for pre-op clearance for possible EGD, patient transferred to CCU for in-unit scope if pursuing EGD.     Neuro   #Status epilepticus  #h/o seizure disorder   Episode of AMS 3/26, RRT called, pt found to be unresponsive, drooling. s/p ativan 2mg, keppra 3mg load w/ subsequent post-ictal state. Second witnessed tonic-clonic seizure (rhythmic jerking, frothing at mouth) during transfer to telemetry. Now full recovery to baseline mentation. Unclear of any home seizure meds, or seizure hx. CT head shows no evidence of intracranial hemorrhage, midline shift. Lactate cleared. Placed on vEEG, vEEG negative for seizures, d/c'ed.     Plan:   -c/w keppra 250mg i/s/o CKD  -obtain keppra level before next dose   -Appreciate Epilepsy recs     Cardiac  #HTN  #HLD   #CAD  #PAD   #Chronic Afib   #HFpEF   #Enlarging aortic aneurysm           Pulm   No respiratory distress. O2 sat 100% on RA.     Plan:   -DONN    GI   #Anemia   Hgb 6.9 on admission decrease from 11.7 from 2/24 w/ reported melena. KASH w/ dark stool. Likely multifactorial. Iron panel normal however confounded by recent transfusion. Recent EGD on 1/24 showing diffuse severe inflammation characterized by erythema and erosions at incisura, antrum, duodenitis.   If pt requires EGD, likely will need in-unit scope d/t extensive cardiac, vascular dx     Plan:   -c/w protonix 40 mg IVP BID   -NPO MN for tentative EGD 3/28   -Hold AC  -monitor Hgb, transfuse Hgb < 8   -maintain active T&S, 2 large bore IVs   -Appreciate GI recs     Renal   #CHERRI on CKD  Baseline Cr reportedly 1.7-2.0. Poor PO intake d/t constipation. Likely pre-renal. No signs of urinary obstruction.   Urine studies w/ FeNa- intrinsic etiology   Cr trending downwards, 3/27- Cr 2.49     Plan:   -monitor Cr   -lasix IV 40 s/p 1 unit pRBC     Endo   #Hypothyroidism     Plan:  -c/w home med synthroid 75mcg    Heme   #Anemia   -See plan above    ID   -DONN       F: None  E: replete K < 4, Mg <2  DVT ppx- hold i/s/o possible GIB  Diet- regular  Activity- ambulate as tolerated  Code- Full. 68-year-old F, former smoker (quit 20 year ago) with PMH of hypothyroidism, seizure disorder, stage 3 CKD (baseline CR ~1.7-2.0), severe renal artery stenosis, chronic anemia (baseline Hgb ~ 9-10, gets weekly iron infusion last infusion 3/22/24), AT, Afib s/p AVN ablation and CRT-P (8/2022; on Eliquis), HTN, HLD, CAD s/p CABG, AVR (porcine bio-prosthetic valve) and mitral annuloplasty, HFpEF, AAA s/p open repair in 2015 with complications, PAD s/p L pSFA stent/L-TIFFANY stent/L-IIA stent who presents with intermittent chest pain and KRAFT x 3 days, admitted for symptomatic anemia w/ course complicated by status epilepticus (2 seizure events) 3/26 w/ RR called, requiring step-up to telemetry for management of seizures and symptomatic anemia 2/2 GIB vs. vascular dissection. On 3/27, pt noted to have episode of melena, KASH positive. Cardiology consulted for pre-op clearance for possible EGD, patient transferred to CCU for in-unit scope if pursuing EGD.     Neuro   #Status epilepticus  #h/o seizure disorder   Episode of AMS 3/26, RRT called, pt found to be unresponsive, drooling. s/p ativan 2mg, keppra 3mg load w/ subsequent post-ictal state. Second witnessed tonic-clonic seizure (rhythmic jerking, frothing at mouth) during transfer to telemetry. Now full recovery to baseline mentation. Unclear of any home seizure meds, or seizure hx. CT head shows no evidence of intracranial hemorrhage, midline shift. Lactate cleared. Placed on vEEG, vEEG negative for seizures, d/c'ed.     Plan:   -c/w keppra 250mg i/s/o CKD  -obtain keppra level before next dose   -Appreciate Epilepsy recs     Cardiac  #HTN  #HLD   #CAD  #PAD   #Chronic Afib   #HFpEF   #Enlarging aortic aneurysm   -TTE on 3/27 shows normal LV function, normal RV, severely dilated LA, bioprosthetic valve noted in aortic position, leaflet thickened, mild AI, mitral annuloplasty ring noted, MS, mod MR, PASP 63.           Pulm   No respiratory distress. O2 sat 100% on RA.     Plan:   -DONN    GI   #Anemia   Hgb 6.9 on admission decrease from 11.7 from 2/24 w/ reported melena. KASH w/ dark stool. Likely multifactorial. Iron panel normal however confounded by recent transfusion. Recent EGD on 1/24 showing diffuse severe inflammation characterized by erythema and erosions at incisura, antrum, duodenitis.   If pt requires EGD, likely will need in-unit scope d/t extensive cardiac, vascular dx     Plan:   -c/w protonix 40 mg IVP BID   -NPO MN for tentative EGD 3/28   -Hold AC  -monitor Hgb, transfuse Hgb < 8   -maintain active T&S, 2 large bore IVs   -Appreciate GI recs     Renal   #CHERRI on CKD  Baseline Cr reportedly 1.7-2.0. Poor PO intake d/t constipation. Likely pre-renal. No signs of urinary obstruction.   Urine studies w/ FeNa- intrinsic etiology   Cr trending downwards, 3/27- Cr 2.49     Plan:   -monitor Cr   -lasix IV 40 s/p 1 unit pRBC     Endo   #Hypothyroidism     Plan:  -c/w home med synthroid 75mcg    Heme   #Anemia   -See plan above    ID   -DONN       F: None  E: replete K < 4, Mg <2  DVT ppx- hold i/s/o possible GIB  Diet- regular  Activity- ambulate as tolerated  Code- Full. 68y F, former smoker, with PMH of hypothyroidism, seizure disorder, Stage 3 CKD (baseline CR ~1.7-2.0), severe renal artery stenosis, chronic anemia (baseline Hgb ~9-10, followed by Heme: Dr. Izquierdo, gets weekly Fe infusion), AT, Afib s/p AVN ablation and CRT-P ([facility], 8/2022; on Eliquis), tortuous esophagus and Schatzi ring at [facility] (per EDG in 2020), extensive vascular history: AAA s/p open repair in 2015 followed saccular AAA and contained rupture (see below), HTN, HLD, CAD s/p CABG (LIMA to RI, ANTONIO to PDA) with SAVR (#19 Felder porcine bio-prosthetic valve) and mitral annuloplasty (Physio ring #26) at [facility] in 2002, HFpEF, and mixed valve disease: severe bioprosthetic AS (echo 3/27/24 MG 26 COY 0.82), severe MS (MG 16), moderate to severe MR, severe TR. Admitted for symptomatic anemia w/ course complicated by status epilepticus (2 seizure events) 3/26 w/ RR called, requiring step-up to telemetry for management of seizures and symptomatic anemia 2/2 GIB vs. vascular dissection. On 3/27, pt noted to have episode of melena, KASH positive. Cardiology consulted for pre-op clearance for possible EGD, patient transferred to CCU for in-unit scope if pursuing EGD.     Neuro   #Status epilepticus  #h/o seizure disorder   Episode of AMS 3/26, RRT called, pt found to be unresponsive, drooling. s/p ativan 2mg, keppra 3mg load w/ subsequent post-ictal state. Second witnessed tonic-clonic seizure (rhythmic jerking, frothing at mouth) during transfer to telemetry. Now full recovery to baseline mentation. Unclear of any home seizure meds, or seizure hx. CT head shows no evidence of intracranial hemorrhage, midline shift. Lactate cleared. Placed on vEEG, vEEG negative for seizures, d/c'ed.     Plan:   -c/w keppra 250mg i/s/o CKD  -obtain keppra level before next dose   -Appreciate Epilepsy recs     Cardiac  #HTN  #HLD   #CAD  #PAD   #AAA s/p repair   #Chronic Afib   #HFpEF   #Enlarging aortic aneurysm   Cardiac history:   -EKG- a-sensed, v-paced, rate 75   -TTE on 3/27 shows normal LV function, normal RV, severely dilated LA, bioprosthetic valve noted in aortic position, leaflet thickened, mild AI, mitral annuloplasty ring noted, MS, mod MR, PASP 63.   -CCTA on 8/20 shows patent LIMA to First Diagonal branch.  Patent ANTONIO to RPDA. Severe stenosis of the large first diagonal branch. Distal vessel fills via LIMA graft. Severe stenosis of proximal RCA. Distal vessel fills via ANTONIO graft. Nonobstructive disease throughout the LAD. Patent stent in mid LCX extending to the OM2. MV annuloplasty ring and Bioprosthetic AVR noted. Calcification throughout the aorta noted.  -Home medications: Crestor 40mg qhs, lopressor 50mg bid, losartan 25mg qd, Lasix 40 mg PO twice a week; amlodipine 5 mg Po daily, cilostazol, eliquis 2.5 mg PO BID      D-dimer 2831, p-BNP elevated to 3727 (1564 in Feb 2024). CT showing bibasilar atelectasis.  On physical exam, patient is evoluemic, no LE edema, CTCL, and no visible JVD, she is also warm and well perfused, she is on room air     METS<4  RCRI: 3 points, class IV 15% of 30-day risk of death, MI or cardiac arrest, moreover the patient is with significant stenotic valvular disease, which elevates her risk even more   Patient is high risk for a low risk procedure (EGD)   Recommend cardiac anesthesia for the procedure  Recommend structural heart consult prior as well, patient is known to Dr. Gregory   Keep the patient euvolemic and normotensive for the procedure, if blood needed recommend slow infusion over 4 hours and give Lasix 40 mg IVP once after the blood is given  Will transfer the patient to CCU for observation and recommend to perform an EGD in the unit tomorrow     Vascular history:   - AAA s/p open repair in 2015 followed saccular AAA and contained rupture adjacent to previous stent graft s/p open thoracoabdominal aneurysm repair with 20mm tube graft and 6mm bypasses to the celiac, SMA, left renal, right renal (end to end) in 8/2020  - PAD s/p L pSFA stent/L-TIFFANY stent with occluded b/l SFA followed by L-internal iliac artery stent in 5/2021 c/b L-RP hematoma requiring 2 units PRBC with ongoing LLE claudication - Dr. Sandra recommending patient to undergo left fem-pop bypass with PTFE. Saw Dr. Soriano in June 2023 - per note: "Aorto-iliac duplex performed to evaluate patency reveals widely patent aortic graft, patent L TIFFANY stent, L EIA 50% stenosis noted, patent R pSFA stent noted. Carotid duplex performed to evaluate for progression of stenosis reveals b/l <50% stenosis due to fibrocalcific plaque, both vertebral arteries patent w/antegrade flow. Surgical bypass is currently the only reasonable option for this pt's LLE symptoms but encouraged pt to continue regular exercise to promote LE perfusion, thereby postponing the need for a surgery. Pt will continue medication and an exercise regimen."            # Severe bioprosthetic AS (echo 3/27/24 MG 26 COY 0.82)  Patient with SAVR (#19 Felder porcine bio-prosthetic valve) in 2002, now with severe bioprosthetic AS  Patient was following with Dr. Gregory and was pending GI clearance for a ALBER to evaluate the valves and plan for intervention   Recommend structural heart consult     # Mitral stenosis (MG 16)  Patient is s/p mitral annuloplasty (Physio ring #26) now with severe MS  Gradient increased form prior echo, from 10 to 16 now at HR 75 bpm   as above     #Afib s/p AVN ablation and CRT-P ([facility], 8/2022; on Eliquis)  restart AC once ok per primary team, can challange with heparin gtt  Home meds: eliquis 2.5mg bid, lopressor 50mg bid  can start lopressor 25 mg PO BID in leiu of 50 mg PO bid in a setting of GI bleed   EP consult for PPM interrogation     #Enlarging aortic aneurysm/pseudoaneurysm   AAA s/p open repair in 2015 with complications  CTA ( 3/26/24): 6.5 x 5 cm lobulated sac of extravasation which arises from the distal graft at the site of bifurcation 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  Vascular is following  Needs strict BP control, keep BP around 120-130/80 mmHg    #Chronic heart failure with preserved ejection fraction  Evoluemic on physical exam   Takes Lasix 40 mg twice a week, ok to hold for now, give Lasix 40 mg IVP after the transfusion  Frequent volume reassessments  Daily CXR    #CAD s/p CABG (LIMA to RI, ANTONIO to PDA)  Followed by Dr. Preston  Home meds: Crestor 40mg qhs, lopressor 50mg bid, losartan 25mg qd  BB as above   Ok to hold Losartan in setting of CHERRI  c/w crestor (on atorvastatin here)          Pulm   No respiratory distress. O2 sat 100% on RA.     Plan:   -DONN    GI   #Anemia   Hgb 6.9 on admission decrease from 11.7 from 2/24 w/ reported melena. KASH w/ dark stool. Likely multifactorial. Iron panel normal however confounded by recent transfusion. Recent EGD on 1/24 showing diffuse severe inflammation characterized by erythema and erosions at incisura, antrum, duodenitis.   If pt requires EGD, likely will need in-unit scope d/t extensive cardiac, vascular dx     Plan:   -c/w protonix 40 mg IVP BID   -NPO MN for tentative EGD 3/28   -Hold AC  -monitor Hgb, transfuse Hgb < 8   -maintain active T&S, 2 large bore IVs   -Appreciate GI recs     Renal   #CHERRI on CKD  Baseline Cr reportedly 1.7-2.0. Poor PO intake d/t constipation. Likely pre-renal. No signs of urinary obstruction.   Urine studies w/ FeNa- intrinsic etiology   Cr trending downwards, 3/27- Cr 2.49     Plan:   -monitor Cr   -lasix IV 40 s/p 1 unit pRBC     Endo   #Hypothyroidism     Plan:  -c/w home med synthroid 75mcg    Heme   #Anemia   -See plan above    ID   -DONN       F: None  E: replete K < 4, Mg <2  DVT ppx- hold i/s/o possible GIB  Diet- NPO @MN  Activity- ambulate as tolerated  Code- Full. 68y F, former smoker, with PMH of hypothyroidism, seizure disorder, Stage 3 CKD (baseline CR ~1.7-2.0), severe renal artery stenosis, chronic anemia (baseline Hgb ~9-10, followed by Heme: Dr. Izquierdo, gets weekly Fe infusion), AT, Afib s/p AVN ablation and CRT-P ([facility], 8/2022; on Eliquis), tortuous esophagus and Schatzi ring at [facility] (per EDG in 2020), extensive vascular history: AAA s/p open repair in 2015 followed saccular AAA and contained rupture (see below), HTN, HLD, CAD s/p CABG (LIMA to RI, ANTONIO to PDA) with SAVR (#19 Felder porcine bio-prosthetic valve) and mitral annuloplasty (Physio ring #26) at [facility] in 2002, HFpEF, and mixed valve disease: severe bioprosthetic AS (echo 3/27/24 MG 26 COY 0.82), severe MS (MG 16), moderate to severe MR, severe TR. Admitted for symptomatic anemia w/ course complicated by status epilepticus (2 seizure events) 3/26 w/ RR called, requiring step-up to telemetry for management of seizures and symptomatic anemia 2/2 GIB vs. vascular dissection. On 3/27, pt noted to have episode of melena, KASH positive. Cardiology consulted for pre-op clearance for possible EGD, patient transferred to CCU for in-unit scope if pursuing EGD.     Neuro   #Status epilepticus  #h/o seizure disorder   Episode of AMS 3/26, RRT called, pt found to be unresponsive, drooling. s/p ativan 2mg, keppra 3mg load w/ subsequent post-ictal state. Second witnessed tonic-clonic seizure (rhythmic jerking, frothing at mouth) during transfer to telemetry. Now full recovery to baseline mentation. Unclear of any home seizure meds, or seizure hx. CT head shows no evidence of intracranial hemorrhage, midline shift. Lactate cleared. Placed on vEEG, vEEG negative for seizures, d/c'ed.     Plan:   -c/w keppra 250mg i/s/o CKD  -obtain keppra level before next dose   -Appreciate Epilepsy recs     Cardiac  #HTN  #HLD   #CAD  #Chronic Afib   #HFpEF   #PAD   #AAA s/p repair   #Enlarging aortic aneurysm     Cardiac history:   -EKG- a-sensed, v-paced, rate 75   -TTE on 3/27 shows normal LV function, normal RV, severely dilated LA, bioprosthetic valve noted in aortic position, leaflet thickened, mild AI, mitral annuloplasty ring noted, MS, mod MR, PASP 63.   -CCTA on 8/20 shows patent LIMA to First Diagonal branch.  Patent ANTONIO to RPDA. Severe stenosis of the large first diagonal branch. Distal vessel fills via LIMA graft. Severe stenosis of proximal RCA. Distal vessel fills via ANTONIO graft. Nonobstructive disease throughout the LAD. Patent stent in mid LCX extending to the OM2. MV annuloplasty ring and Bioprosthetic AVR noted. Calcification throughout the aorta noted.  -Home medications: Crestor 40mg qhs, lopressor 50mg bid, losartan 25mg qd, Lasix 40 mg PO twice a week; amlodipine 5 mg Po daily, cilostazol, eliquis 2.5 mg PO BID    -Labs: D-dimer 2831, p-BNP elevated to 3727 (1564 in Feb 2024).   -Exam: euvolemic, no LE edema, cta b/l, -JVD, WWP, no RA     #Severe bioprosthetic AS (echo 3/27/24 MG 26 COY 0.82)  Patient with SAVR (#19 Felder porcine bio-prosthetic valve) in 2002, now with severe bioprosthetic AS  Patient was following with Dr. Gregory and was pending GI clearance for a ALBER to evaluate the valves and plan for intervention   #Mitral stenosis (MG 16)  Patient is s/p mitral annuloplasty (Physio ring #26) now with severe MS  Gradient increased form prior echo, from 10 to 16 now at HR 75 bpm   as above     Plan:   -Recommend structural heart consult     #Afib s/p AVN ablation and CRT-P ([facility], 8/2022; on Eliquis)  Home meds: eliquis 2.5mg bid, lopressor 50mg bid    Plan:   -EP consult for PPM interrogation  -can start lopressor 25 mg PO BID in lieu of 50 mg PO BID i/s/o GI bleed   -restart AC once ok per primary team, can challenge with heparin gtt    #Chronic heart failure with preserved ejection fraction  Euvolemic on physical exam   Home med: Lasix 40 mg twice a week    Plan:  -give Lasix 40 mg IVP after the transfusion  -Frequent volume reassessments  -Daily CXR    #CAD s/p CABG (LIMA to RI, ANTONIO to PDA)  Followed by Dr. Preston  Home meds: Crestor 40mg qhs, lopressor 50mg bid, losartan 25mg qd  BB as above     Plan:   -c/w atorvastatin   -hold losartan i/s/o CHERRI      Vascular history:   - AAA s/p open repair in 2015 followed saccular AAA and contained rupture adjacent to previous stent graft s/p open thoracoabdominal aneurysm repair with 20mm tube graft and 6mm bypasses to the celiac, SMA, left renal, right renal (end to end) in 8/2020  - PAD s/p L pSFA stent/L-TIFFANY stent with occluded b/l SFA followed by L-internal iliac artery stent in 5/2021 c/b L-RP hematoma requiring 2 units PRBC with ongoing LLE claudication - Dr. Sandra recommending patient to undergo left fem-pop bypass with PTFE. Saw Dr. Soriano in June 2023 - per note: "Aorto-iliac duplex performed to evaluate patency reveals widely patent aortic graft, patent L TIFFANY stent, L EIA 50% stenosis noted, patent R pSFA stent noted. Carotid duplex performed to evaluate for progression of stenosis reveals b/l <50% stenosis due to fibrocalcific plaque, both vertebral arteries patent w/antegrade flow. 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  -CT A/P non-con (3/25/24) showing post surgical changes from prior open thoracoabdominal aortic bypass graft w/ new focal dilatation of the distal most aspect of the graft measuring 4.4 x 6.1 just above the aortic bifurcation, previously measuring 3.2x4.1 cm on 6/11/21    Plan:   -vascular following, will need repair of aneurysm/pseudoaneurysm once medically optimized   -urgent vascular consult if hypotensive, tachycardic, c/f aneurysm rupture   -strict BP control, keep BP around 120-130/80 mmHg     Pulm   No respiratory distress. O2 sat 100% on RA.   CT showing bibasilar atelectasis, emphysema     Plan:   -DONN    GI   #Anemia   Hgb 6.9 on admission decrease from 11.7 from 2/24 w/ reported melena. KASH w/ dark stool. Likely multifactorial. Iron panel normal however confounded by recent transfusion. Recent EGD on 1/24 showing diffuse severe inflammation characterized by erythema and erosions at incisura, antrum, duodenitis.   If pt requires EGD, likely will need in-unit scope d/t extensive cardiac, vascular dx     Plan:   -c/w protonix 40 mg IVP BID   -NPO MN for tentative EGD 3/28   -Hold AC  -monitor Hgb, transfuse Hgb < 8   -maintain active T&S, 2 large bore IVs   -Appreciate GI recs     Renal   #CHERRI on CKD  Baseline Cr reportedly 1.7-2.0. Poor PO intake d/t constipation. Likely pre-renal. No signs of urinary obstruction.   Urine studies w/ FeNa- intrinsic etiology   Cr trending downwards, 3/27- Cr 2.49     Plan:   -monitor Cr   -lasix IV 40 s/p 1 unit pRBC     Endo   #Hypothyroidism     Plan:  -c/w home med synthroid 75mcg    Heme   #Anemia   -See plan above    ID   -DONN       F: None  E: replete K < 4, Mg <2  DVT ppx- hold i/s/o possible GIB  Diet- NPO @MN  Activity- ambulate as tolerated  Code- Full.

## 2024-03-27 NOTE — DIETITIAN INITIAL EVALUATION ADULT - OTHER CALCULATIONS
Estimated nutritional needs determined using [facility] Standards of Nutrition Care for protein-calorie malnutrition, elderly repletion using current body weight 52.2 kg (88%IBW).

## 2024-03-27 NOTE — PROGRESS NOTE ADULT - PROBLEM SELECTOR PLAN 1
Patient experienced AMS 3/26 AM for which RRT was called. Patient unresponsive and drooling when medical team presented to bedside. Received Ativan 2mg and Keppra 3g load as AMS likely 2/2 seizure (h/o seizure disorder) w/ subsequent post-ictal state. Patient w/ second witnessed tonic-clonic seizure (rhythmic jerking on b/l UE and blinking, w/ frothing at the mouth, mastication and AMS for 1 min, post-ictal w/ recovery) during transfer to telemetry w/o full recovery of mental status in between. Now AOx3  -Unclear if patient is on any seizure meds at home, unclear of last seizure (per sister, childhood seizures)   -CT Head: No evidence of acute intracranial hemorrhage, midline shift or CT evidence of acute territorial infarct.  -RR 3/26: Noted to have a lactate of 6.8, cleared (0.7)  -vEEG discontinued    Plan:  - Epilepsy consulted, appreciate recommendations       - Keppra 250mg IV qd (renal dosing)       - Follow up Keppra level in AM

## 2024-03-27 NOTE — PROGRESS NOTE ADULT - SUBJECTIVE AND OBJECTIVE BOX
OVERNIGHT EVENTS:    SUBJECTIVE / INTERVAL HPI: Patient seen and examined at bedside.     VITAL SIGNS:  Vital Signs Last 24 Hrs  T(C): 36.4 (27 Mar 2024 06:29), Max: 36.8 (26 Mar 2024 22:47)  T(F): 97.5 (27 Mar 2024 06:29), Max: 98.2 (26 Mar 2024 22:47)  HR: 75 (27 Mar 2024 01:00) (74 - 76)  BP: 145/71 (27 Mar 2024 01:00) (99/52 - 155/67)  BP(mean): 96 (26 Mar 2024 20:12) (73 - 96)  RR: 16 (27 Mar 2024 01:00) (16 - 18)  SpO2: 100% (27 Mar 2024 01:00) (99% - 100%)    Parameters below as of 26 Mar 2024 20:12  Patient On (Oxygen Delivery Method): room air      I&O's Summary    26 Mar 2024 07:01  -  27 Mar 2024 07:00  --------------------------------------------------------  IN: 300 mL / OUT: 250 mL / NET: 50 mL        PHYSICAL EXAM:    General: WDWN  HEENT: NC/AT; PERRL, anicteric sclera; MMM  Neck: supple  Cardiovascular: +S1/S2; RRR  Respiratory: CTA B/L; no W/R/R  Gastrointestinal: soft, NT/ND; +BSx4  Extremities: WWP; no edema, clubbing or cyanosis  Vascular: 2+ radial, DP/PT pulses B/L  Neurological: AAOx3; no focal deficits    MEDICATIONS:  MEDICATIONS  (STANDING):  cilostazol 50 milliGRAM(s) Oral every 12 hours  levETIRAcetam  IVPB 250 milliGRAM(s) IV Intermittent every 24 hours  levothyroxine Injectable 56 MICROGram(s) IV Push <User Schedule>  pantoprazole  Injectable 40 milliGRAM(s) IV Push every 12 hours  polyethylene glycol 3350 17 Gram(s) Oral every 24 hours  senna 2 Tablet(s) Oral at bedtime  sertraline 50 milliGRAM(s) Oral every 24 hours  sodium chloride 0.9%. 1000 milliLiter(s) (50 mL/Hr) IV Continuous <Continuous>    MEDICATIONS  (PRN):  acetaminophen     Tablet .. 650 milliGRAM(s) Oral every 6 hours PRN Temp greater or equal to 38C (100.4F), Mild Pain (1 - 3)      ALLERGIES:  Allergies    No Known Allergies    Intolerances        LABS:                        7.8    10.22 )-----------( 159      ( 26 Mar 2024 18:45 )             24.8     03-26    134<L>  |  105  |  43<H>  ----------------------------<  81  4.2   |  17<L>  |  2.64<H>    Ca    10.5      26 Mar 2024 18:45  Phos  3.5     03-26  Mg     2.4     03-26    TPro  7.3  /  Alb  4.0  /  TBili  0.3  /  DBili  x   /  AST  21  /  ALT  10  /  AlkPhos  91  03-26    PT/INR - ( 26 Mar 2024 09:44 )   PT: 11.5 sec;   INR: 1.01          PTT - ( 26 Mar 2024 09:44 )  PTT:24.9 sec  Urinalysis Basic - ( 27 Mar 2024 01:00 )    Color: Yellow / Appearance: Clear / SG: >1.030 / pH: x  Gluc: x / Ketone: Negative mg/dL  / Bili: Negative / Urobili: 0.2 mg/dL   Blood: x / Protein: 100 mg/dL / Nitrite: Negative   Leuk Esterase: Negative / RBC: 0 /HPF / WBC 0 /HPF   Sq Epi: x / Non Sq Epi: 1 /HPF / Bacteria: Negative /HPF      CAPILLARY BLOOD GLUCOSE      POCT Blood Glucose.: 151 mg/dL (26 Mar 2024 10:49)      RADIOLOGY & ADDITIONAL TESTS: Reviewed.   OVERNIGHT EVENTS: Vascular surgery reccs: no surgical intervention at this time. urine lytes sent, FENa 3.6% likely intrinsic.     SUBJECTIVE / INTERVAL HPI: Patient seen and examined at bedside. Significantly more alert; converses w/ examiner, AO x 3. Complains of abdominal pain, refusing to let examiner remove blankets. Denies CP, SOB, dizziness, fatigue, N/V/D    VITAL SIGNS:  Vital Signs Last 24 Hrs  T(C): 36.4 (27 Mar 2024 06:29), Max: 36.8 (26 Mar 2024 22:47)  T(F): 97.5 (27 Mar 2024 06:29), Max: 98.2 (26 Mar 2024 22:47)  HR: 75 (27 Mar 2024 01:00) (74 - 76)  BP: 145/71 (27 Mar 2024 01:00) (99/52 - 155/67)  BP(mean): 96 (26 Mar 2024 20:12) (73 - 96)  RR: 16 (27 Mar 2024 01:00) (16 - 18)  SpO2: 100% (27 Mar 2024 01:00) (99% - 100%)    Parameters below as of 26 Mar 2024 20:12  Patient On (Oxygen Delivery Method): room air      I&O's Summary    26 Mar 2024 07:01  -  27 Mar 2024 07:00  --------------------------------------------------------  IN: 300 mL / OUT: 250 mL / NET: 50 mL        PHYSICAL EXAM:    General: Thin, elderly woman in mild discomfort  HEENT: NC/AT; PERRL, anicteric sclera; MMM  Neck: supple  Cardiovascular: +S1/S2; RRR, II/VI systolic murmur at LSB  Respiratory: CTA B/L; no W/R/R  Gastrointestinal: soft, TTP worst in LLQ, ND; +BSx4  Extremities: WWP; no edema, clubbing or cyanosis  Vascular: 2+ radial, DP/PT pulses B/L  Neurological: AAOx3; no focal deficits    MEDICATIONS:  MEDICATIONS  (STANDING):  cilostazol 50 milliGRAM(s) Oral every 12 hours  levETIRAcetam  IVPB 250 milliGRAM(s) IV Intermittent every 24 hours  levothyroxine Injectable 56 MICROGram(s) IV Push <User Schedule>  pantoprazole  Injectable 40 milliGRAM(s) IV Push every 12 hours  polyethylene glycol 3350 17 Gram(s) Oral every 24 hours  senna 2 Tablet(s) Oral at bedtime  sertraline 50 milliGRAM(s) Oral every 24 hours  sodium chloride 0.9%. 1000 milliLiter(s) (50 mL/Hr) IV Continuous <Continuous>    MEDICATIONS  (PRN):  acetaminophen     Tablet .. 650 milliGRAM(s) Oral every 6 hours PRN Temp greater or equal to 38C (100.4F), Mild Pain (1 - 3)      ALLERGIES:  Allergies    No Known Allergies    Intolerances        LABS:                        7.8    10.22 )-----------( 159      ( 26 Mar 2024 18:45 )             24.8     03-26    134<L>  |  105  |  43<H>  ----------------------------<  81  4.2   |  17<L>  |  2.64<H>    Ca    10.5      26 Mar 2024 18:45  Phos  3.5     03-26  Mg     2.4     03-26    TPro  7.3  /  Alb  4.0  /  TBili  0.3  /  DBili  x   /  AST  21  /  ALT  10  /  AlkPhos  91  03-26    PT/INR - ( 26 Mar 2024 09:44 )   PT: 11.5 sec;   INR: 1.01          PTT - ( 26 Mar 2024 09:44 )  PTT:24.9 sec  Urinalysis Basic - ( 27 Mar 2024 01:00 )    Color: Yellow / Appearance: Clear / SG: >1.030 / pH: x  Gluc: x / Ketone: Negative mg/dL  / Bili: Negative / Urobili: 0.2 mg/dL   Blood: x / Protein: 100 mg/dL / Nitrite: Negative   Leuk Esterase: Negative / RBC: 0 /HPF / WBC 0 /HPF   Sq Epi: x / Non Sq Epi: 1 /HPF / Bacteria: Negative /HPF      CAPILLARY BLOOD GLUCOSE      POCT Blood Glucose.: 151 mg/dL (26 Mar 2024 10:49)      RADIOLOGY & ADDITIONAL TESTS: Reviewed.   OVERNIGHT EVENTS: Vascular surgery reccs: no surgical intervention at this time. urine lytes sent, FENa 3.6% likely intrinsic.     SUBJECTIVE / INTERVAL HPI: Patient seen and examined at bedside. Significantly more alert; converses w/ examiner, AO x 3. Complains of abdominal pain, however no hematemesis/melena. Denies CP, SOB, dizziness, fatigue, N/V/D    VITAL SIGNS:  Vital Signs Last 24 Hrs  T(C): 36.4 (27 Mar 2024 06:29), Max: 36.8 (26 Mar 2024 22:47)  T(F): 97.5 (27 Mar 2024 06:29), Max: 98.2 (26 Mar 2024 22:47)  HR: 75 (27 Mar 2024 01:00) (74 - 76)  BP: 145/71 (27 Mar 2024 01:00) (99/52 - 155/67)  BP(mean): 96 (26 Mar 2024 20:12) (73 - 96)  RR: 16 (27 Mar 2024 01:00) (16 - 18)  SpO2: 100% (27 Mar 2024 01:00) (99% - 100%)    Parameters below as of 26 Mar 2024 20:12  Patient On (Oxygen Delivery Method): room air      I&O's Summary    26 Mar 2024 07:01  -  27 Mar 2024 07:00  --------------------------------------------------------  IN: 300 mL / OUT: 250 mL / NET: 50 mL        PHYSICAL EXAM:    General: Thin, elderly woman in mild discomfort  HEENT: NC/AT; PERRL, anicteric sclera; MMM  Neck: supple  Cardiovascular: +S1/S2; RRR, II/VI systolic murmur at LSB  Respiratory: CTA B/L; no W/R/R  Gastrointestinal: soft, TTP worst in LLQ, ND; +BSx4  Extremities: WWP; no edema, clubbing or cyanosis  Vascular: 2+ radial, DP/PT pulses B/L  Neurological: AAOx3; no focal deficits    MEDICATIONS:  MEDICATIONS  (STANDING):  cilostazol 50 milliGRAM(s) Oral every 12 hours  levETIRAcetam  IVPB 250 milliGRAM(s) IV Intermittent every 24 hours  levothyroxine Injectable 56 MICROGram(s) IV Push <User Schedule>  pantoprazole  Injectable 40 milliGRAM(s) IV Push every 12 hours  polyethylene glycol 3350 17 Gram(s) Oral every 24 hours  senna 2 Tablet(s) Oral at bedtime  sertraline 50 milliGRAM(s) Oral every 24 hours  sodium chloride 0.9%. 1000 milliLiter(s) (50 mL/Hr) IV Continuous <Continuous>    MEDICATIONS  (PRN):  acetaminophen     Tablet .. 650 milliGRAM(s) Oral every 6 hours PRN Temp greater or equal to 38C (100.4F), Mild Pain (1 - 3)      ALLERGIES:  Allergies    No Known Allergies    Intolerances        LABS:                        7.8    10.22 )-----------( 159      ( 26 Mar 2024 18:45 )             24.8     03-26    134<L>  |  105  |  43<H>  ----------------------------<  81  4.2   |  17<L>  |  2.64<H>    Ca    10.5      26 Mar 2024 18:45  Phos  3.5     03-26  Mg     2.4     03-26    TPro  7.3  /  Alb  4.0  /  TBili  0.3  /  DBili  x   /  AST  21  /  ALT  10  /  AlkPhos  91  03-26    PT/INR - ( 26 Mar 2024 09:44 )   PT: 11.5 sec;   INR: 1.01          PTT - ( 26 Mar 2024 09:44 )  PTT:24.9 sec  Urinalysis Basic - ( 27 Mar 2024 01:00 )    Color: Yellow / Appearance: Clear / SG: >1.030 / pH: x  Gluc: x / Ketone: Negative mg/dL  / Bili: Negative / Urobili: 0.2 mg/dL   Blood: x / Protein: 100 mg/dL / Nitrite: Negative   Leuk Esterase: Negative / RBC: 0 /HPF / WBC 0 /HPF   Sq Epi: x / Non Sq Epi: 1 /HPF / Bacteria: Negative /HPF      CAPILLARY BLOOD GLUCOSE      POCT Blood Glucose.: 151 mg/dL (26 Mar 2024 10:49)      RADIOLOGY & ADDITIONAL TESTS: Reviewed.   **TRANSFER FROM TELEMETRY TO CCU NOTE**    Hospital Course: Pt is 68 year old F, former smoker (quit 20 years ago) w/ PMH of hypothyroidism, seizure disorder, stage 3 CKD (baseline Cr ~1.7-2), severe renal artery stenosis, chronic anemia (baseline Hgb 9-10; gets weekly Fe infusion - last infusion 3/22/24), AT, Afib s/p AVN ablation and CRT-P (8/2022; on Eliquis), HTN, HLD, CAD s/p CABG, AVR (porcine bio-prosthetic valve) and mitral annuloplasty, HFpEF, AAA s/p open repair in 2015 with complications, PAD s/p L pSFA stent/L-TIFFANY stent/L-IIA stent who presented with intermittent chest pain and dyspnea on exertion X 3 days. In the ED, HgB 6.9, patient received 1u pRBC, Maalox 30mL, morphine 2mg IV X 1. Patient w/ known history of AAA and iliac/femoral artery stents, c/f worsening aneurysm/bleed; vascular consulted in ED and stated no immediate surgical intervention but recommend CTA. 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 On 3/27 AM, patient had reported episode of melena, KASH positive for melena, consulted GI. 3/26 AM rapid response was called for patient due to new AMS, patient thought to have had seizure; during transport to telemetry had witnessed generalized tonic-clonic seizure. Lactate ~6 (since cleared), mental status improved, CT head negative but patient w/ another episode ?melena 3/27. Cardiology consulted for pre-op clearance for EGD per GI request, patient transferred to CCU.    OVERNIGHT EVENTS: Vascular surgery reccs: no surgical intervention at this time. urine lytes sent, FENa 3.6% likely intrinsic.     SUBJECTIVE / INTERVAL HPI: Patient seen and examined at bedside. Significantly more alert; converses w/ examiner, AO x 3. Complains of abdominal pain, however no hematemesis/melena. Denies CP, SOB, dizziness, fatigue, N/V/D    VITAL SIGNS:  Vital Signs Last 24 Hrs  T(C): 36.4 (27 Mar 2024 06:29), Max: 36.8 (26 Mar 2024 22:47)  T(F): 97.5 (27 Mar 2024 06:29), Max: 98.2 (26 Mar 2024 22:47)  HR: 75 (27 Mar 2024 01:00) (74 - 76)  BP: 145/71 (27 Mar 2024 01:00) (99/52 - 155/67)  BP(mean): 96 (26 Mar 2024 20:12) (73 - 96)  RR: 16 (27 Mar 2024 01:00) (16 - 18)  SpO2: 100% (27 Mar 2024 01:00) (99% - 100%)    Parameters below as of 26 Mar 2024 20:12  Patient On (Oxygen Delivery Method): room air      I&O's Summary    26 Mar 2024 07:01  -  27 Mar 2024 07:00  --------------------------------------------------------  IN: 300 mL / OUT: 250 mL / NET: 50 mL        PHYSICAL EXAM:    General: Thin, elderly woman in mild discomfort  HEENT: NC/AT; PERRL, anicteric sclera; MMM  Neck: supple  Cardiovascular: +S1/S2; RRR, II/VI systolic murmur at LSB  Respiratory: CTA B/L; no W/R/R  Gastrointestinal: soft, TTP worst in LLQ, ND; +BSx4  Extremities: WWP; no edema, clubbing or cyanosis  Vascular: 2+ radial, DP/PT pulses B/L  Neurological: AAOx3; no focal deficits    MEDICATIONS:  MEDICATIONS  (STANDING):  cilostazol 50 milliGRAM(s) Oral every 12 hours  levETIRAcetam  IVPB 250 milliGRAM(s) IV Intermittent every 24 hours  levothyroxine Injectable 56 MICROGram(s) IV Push <User Schedule>  pantoprazole  Injectable 40 milliGRAM(s) IV Push every 12 hours  polyethylene glycol 3350 17 Gram(s) Oral every 24 hours  senna 2 Tablet(s) Oral at bedtime  sertraline 50 milliGRAM(s) Oral every 24 hours  sodium chloride 0.9%. 1000 milliLiter(s) (50 mL/Hr) IV Continuous <Continuous>    MEDICATIONS  (PRN):  acetaminophen     Tablet .. 650 milliGRAM(s) Oral every 6 hours PRN Temp greater or equal to 38C (100.4F), Mild Pain (1 - 3)      ALLERGIES:  Allergies    No Known Allergies    Intolerances        LABS:                        7.8    10.22 )-----------( 159      ( 26 Mar 2024 18:45 )             24.8     03-26    134<L>  |  105  |  43<H>  ----------------------------<  81  4.2   |  17<L>  |  2.64<H>    Ca    10.5      26 Mar 2024 18:45  Phos  3.5     03-26  Mg     2.4     03-26    TPro  7.3  /  Alb  4.0  /  TBili  0.3  /  DBili  x   /  AST  21  /  ALT  10  /  AlkPhos  91  03-26    PT/INR - ( 26 Mar 2024 09:44 )   PT: 11.5 sec;   INR: 1.01          PTT - ( 26 Mar 2024 09:44 )  PTT:24.9 sec  Urinalysis Basic - ( 27 Mar 2024 01:00 )    Color: Yellow / Appearance: Clear / SG: >1.030 / pH: x  Gluc: x / Ketone: Negative mg/dL  / Bili: Negative / Urobili: 0.2 mg/dL   Blood: x / Protein: 100 mg/dL / Nitrite: Negative   Leuk Esterase: Negative / RBC: 0 /HPF / WBC 0 /HPF   Sq Epi: x / Non Sq Epi: 1 /HPF / Bacteria: Negative /HPF      CAPILLARY BLOOD GLUCOSE      POCT Blood Glucose.: 151 mg/dL (26 Mar 2024 10:49)      RADIOLOGY & ADDITIONAL TESTS: Reviewed.

## 2024-03-27 NOTE — PROGRESS NOTE ADULT - SUBJECTIVE AND OBJECTIVE BOX
SUBJECTIVE: Pt seen and examined at bedside this am by surgery team. Patient is lying comfortably in bed with no complaints. Complains of same abdominal/back pain as on admission - no changes in severity or character. Patient denies fever, nausea, vomiting, chest pain, and shortness of breath.     MEDICATIONS  (STANDING):  atorvastatin 40 milliGRAM(s) Oral at bedtime  cilostazol 50 milliGRAM(s) Oral every 12 hours  levETIRAcetam  IVPB 250 milliGRAM(s) IV Intermittent every 24 hours  levothyroxine Injectable 56 MICROGram(s) IV Push <User Schedule>  pantoprazole  Injectable 40 milliGRAM(s) IV Push every 12 hours  polyethylene glycol 3350 17 Gram(s) Oral every 24 hours  senna 2 Tablet(s) Oral at bedtime  sertraline 50 milliGRAM(s) Oral every 24 hours  sodium chloride 0.9%. 1000 milliLiter(s) (50 mL/Hr) IV Continuous <Continuous>    MEDICATIONS  (PRN):  acetaminophen     Tablet .. 650 milliGRAM(s) Oral every 6 hours PRN Temp greater or equal to 38C (100.4F), Mild Pain (1 - 3)      Vital Signs Last 24 Hrs  T(C): 36.7 (27 Mar 2024 11:06), Max: 36.8 (26 Mar 2024 22:47)  T(F): 98.1 (27 Mar 2024 11:06), Max: 98.2 (26 Mar 2024 22:47)  HR: 74 (27 Mar 2024 11:52) (74 - 76)  BP: 125/61 (27 Mar 2024 11:52) (99/52 - 155/67)  BP(mean): 87 (27 Mar 2024 11:52) (73 - 99)  RR: 18 (27 Mar 2024 11:52) (16 - 19)  SpO2: 100% (27 Mar 2024 11:52) (99% - 100%)    Parameters below as of 27 Mar 2024 11:52  Patient On (Oxygen Delivery Method): room air        Physical Exam  General: Patient is doing well and lying in bed comfortably  Constitutional: alert and oriented   Pulm: Nonlabored breathing, no respiratory distress  CV: Regular rate and rhythm, normal sinus rhythm  Abd:  soft, nontender, nondistended. No rebound, no guarding.   Extremities: warm, well perfused, no edema      I&O's Detail    26 Mar 2024 07:01  -  27 Mar 2024 07:00  --------------------------------------------------------  IN:    Oral Fluid: 50 mL    sodium chloride 0.9%: 500 mL  Total IN: 550 mL    OUT:    Voided (mL): 600 mL  Total OUT: 600 mL    Total NET: -50 mL      27 Mar 2024 07:01  -  27 Mar 2024 14:35  --------------------------------------------------------  IN:    sodium chloride 0.9%: 200 mL  Total IN: 200 mL    OUT:    Voided (mL): 250 mL  Total OUT: 250 mL    Total NET: -50 mL        LABS:                        7.1    7.69  )-----------( 149      ( 27 Mar 2024 05:30 )             22.7     03-27    138  |  109<H>  |  37<H>  ----------------------------<  78  3.8   |  16<L>  |  2.49<H>    Ca    10.1      27 Mar 2024 05:30  Phos  3.6     03-27  Mg     2.2     03-27    TPro  7.3  /  Alb  4.0  /  TBili  0.3  /  DBili  x   /  AST  21  /  ALT  10  /  AlkPhos  91  03-26    PT/INR - ( 26 Mar 2024 09:44 )   PT: 11.5 sec;   INR: 1.01          PTT - ( 26 Mar 2024 09:44 )  PTT:24.9 sec  Urinalysis Basic - ( 27 Mar 2024 05:30 )    Color: x / Appearance: x / SG: x / pH: x  Gluc: 78 mg/dL / Ketone: x  / Bili: x / Urobili: x   Blood: x / Protein: x / Nitrite: x   Leuk Esterase: x / RBC: x / WBC x   Sq Epi: x / Non Sq Epi: x / Bacteria: x

## 2024-03-27 NOTE — PROGRESS NOTE ADULT - PROBLEM SELECTOR PLAN 2
Patient p/w KRAFT and chest pain, VSS, found to be anemic with Hgb 6.9. s/p 1u pRBC. Patient with history of TASNEEM, gets once or twice weekly iron infusions outpatient, hematologist is Dr. Izquierdo. On 3/26, patient reported an episode of melena, KASH positive for melena as well. Possible repeat melena 3/27. On Eliquis 2.5mg bid, does not take any antiplatelet agents or NSAIDs. Last Eliquis dose 3/25 around 10AM. Differential 2/2 GIB (melena) vs. dissection of known AAA (abdominal pain, progression on CT).  -LDH, haptoglobin WNL (h/o valve replacement), unlikely hemolysis    Plan:  - Plan for EGD in AM per GI; NPO at 12am  - GI consulted, will follow up recommendations  - Vascular consulted, appreciate recommendations (repair of aneurysm/pseudoaneurysm once medically optimized)  - Maintain active T&S, 2 large-bore IVs  - Transfuse if Hgb < 7 Patient p/w KRAFT and chest pain, VSS, found to be anemic with Hgb 6.9. s/p 1u pRBC. Patient with history of TASNEEM, gets once or twice weekly iron infusions outpatient, hematologist is Dr. Izquierdo. On 3/26, patient reported an episode of melena, KASH positive for melena as well. Possible repeat melena 3/27. On Eliquis 2.5mg bid, does not take any antiplatelet agents or NSAIDs. Last Eliquis dose 3/25 around 10AM. Differential 2/2 GIB (melena) vs. dissection of known AAA (abdominal pain, progression on CT).  -LDH, haptoglobin WNL (h/o valve replacement), unlikely hemolysis    Plan:  - Transfuse 1u pRBCs  - Plan for EGD in AM per GI; NPO at 12am  - GI consulted, will follow up recommendations  - Vascular consulted, appreciate recommendations (repair of aneurysm/pseudoaneurysm once medically optimized)  - Maintain active T&S, 2 large-bore IVs  - Transfuse if Hgb < 7

## 2024-03-27 NOTE — DIETITIAN INITIAL EVALUATION ADULT - PROBLEM SELECTOR PLAN 5
Patient reporting hx of constipation x 1 week.   Mild abdominal pain, on exam no guarding or rebound present.  Hx of abdominal surgery, but no alarm symptoms present.   - start miralax, senna  - monitor for BMs  - consider lactulose v enema if needed

## 2024-03-27 NOTE — DIETITIAN INITIAL EVALUATION ADULT - PERTINENT MEDS FT
MEDICATIONS  (STANDING):  atorvastatin 40 milliGRAM(s) Oral at bedtime  cilostazol 50 milliGRAM(s) Oral every 12 hours  levETIRAcetam  IVPB 250 milliGRAM(s) IV Intermittent every 24 hours  levothyroxine Injectable 56 MICROGram(s) IV Push <User Schedule>  metoprolol tartrate 25 milliGRAM(s) Oral every 12 hours  pantoprazole  Injectable 40 milliGRAM(s) IV Push every 12 hours  polyethylene glycol 3350 17 Gram(s) Oral every 24 hours  senna 2 Tablet(s) Oral at bedtime  sertraline 50 milliGRAM(s) Oral every 24 hours  sodium chloride 0.9%. 1000 milliLiter(s) (50 mL/Hr) IV Continuous <Continuous>    MEDICATIONS  (PRN):  acetaminophen     Tablet .. 650 milliGRAM(s) Oral every 6 hours PRN Temp greater or equal to 38C (100.4F), Mild Pain (1 - 3)

## 2024-03-27 NOTE — PROGRESS NOTE ADULT - PROBLEM SELECTOR PLAN 5
Patient p/w CHERRI on CKD (baseline reportedly 1.7-2.0 outpatient). Labs on admission: BUN/Cr 48/3.00 Reports poor PO intake over the past week due to feeling constipated. Likely CHERRI 2/2 prerenal causes.  -Patient voiding independently  -Urine studies w/ FeNa reflective of intrinsic etiology    Plan:  - Encourage PO fluid intake, consider bolus v. mIVF if Cr worsening  - Avoid nephrotoxic agents

## 2024-03-27 NOTE — PROGRESS NOTE ADULT - PROBLEM SELECTOR PLAN 9
#HTN   #HLD  History of CAD s/p CABG, bioAVR/ mitral annuloplasty in 2002, PCI (unknown date), BiV-PPM placement. EKG w/ paced rhythm. Troponins negative.   -Home meds: Crestor 40mg qhs, lopressor 50mg bid, losartan 25mg qd.    Plan:  - Continue atorvastatin 80mg qhs (TI for Crestor)  - Hold losartan i/s/o CHERRI

## 2024-03-27 NOTE — PROGRESS NOTE ADULT - ASSESSMENT
69 yo female, former smoker, with PMHx of HTN, hyperlipidemia, CAD s/p prior PCI and CABG, bioAVR/mitral annuloplasty in 2002, PAD s/p LSFA  and L TIFFANY (history of occluded bilateral SFA), AAA (last repaired in 2020), chronic anemia (receives Iron infusions, last 8/23), hypothyroid, CKD Stage III, Atach and Afib (non anticoagulated),  +BI-V PPM implant on 8/24. for 3 days has been experiencing exertional sob. CT A/P non con shows: Post surgical changes from prior open thoracoabdominal aortic bypass graft with new focal dilatation of the distalmost aspect of the graft measuring 4.4 x 6.1 cm just above the aortic bifurcation and previously measuring 3.2 x 4.1 cm on 6/11/2021    Recommendations  Will need repair of aneurysm/pseudoaneurysm once medically optimized  Appreciate epilepsy recs for seizures yesterday  Appreciate GI recs for melena  Trend Hgb  Vascular will continue to follow. Please call with any clinical changes or questions.

## 2024-03-27 NOTE — CONSULT NOTE ADULT - ASSESSMENT
Patient is 68 year old female, former smoker, with PMH of hypothyroidism, seizure disorder, Stage 3 CKD (baseline CR ~1.7-2.0), severe renal artery stenosis, chronic anemia (baseline Hgb ~9-10, followed by Heme: Dr. Izquierdo, gets weekly Fe infusion), AT, Afib s/p AVN ablation and CRT-P ([facility], 8/2022; on Eliquis), tortuous esophagus and Schatzi ring at [facility] [place] (per EDG in 2020), extensive vascular history (see below), HTN, HLD, CAD s/p CABG (LIMA to RI, ANTONIO to PDA) with SAVR (#19 Felder porcine bio-prosthetic valve) and mitral annuloplasty (Physio ring #26) at [facility] in 2002, HFpEF, and mixed valve disease: severe bioprosthetic AS (echo 3/27/24 MG 26 COY 0.82), severe MS (MG 16), moderate to severe MR, severe TR. She presents with intermittent chest pain and KRAFT x 3 days, admitted for symptomatic anemia and r/o PE. RR called on 3/26AM for AMS w/ witnessed seizure, s/p 40mg/kg keppra load and 2g Ativan push stepped up to telemetry for further monitoring. Cardiology was consulted for pre-EGD risk assessment.     Review of systems:  EKG: a-sensed, v-paced, rate 75 bpm  TTE (4/21/23): borderline LVEF; Mid and apical inferior septum, apical lateral segment, and apex are hypokinetic. Normal RV; Severely dilated LA; Bioprosthetic valve is seen in the aortic position with findings suggestive of significant prosthetic aortic stenosis. The peak   transvalvular velocity is 3.37 m/s, the mean transvalvular gradient is 27.00 mmHg, and the LVOT/AV velocity ratio is 0.31. The peak transaortic gradient is 45.43 mmHg. The aortic valve area (estimated via the continuity method) is 0.78 cm². The calculated aortic valve area indexed   to body surface area is 0.51 cm²/m². Acceleration time 105 msMild AR; Mitral valve is moderately thickened and calcified with annuloplasty ring in the mitral position. Elevated gradients and pressure half time of 146 ms are consistent with severe mitral stenosis. Moderate-to-severe MR. Severe TR. PH with PASP 63 mmHg. Trivial pericardial effusion.  (Compared to the previous TTE performed on 6/2/2022, there have been interval progression of valvular disease and pulmonary hypertension.)  TTE ( 3/27/24): Normal LV function. Normal RV.  Severely dilated LA. A bioprosthetic valve noted in the aortic position. Leaflet of the bioprosthesis appear thickened. The peak transvalvular velocity is 3.37 m/s, the mean transvalvular gradient is 26.00 mmHg, and the LVOT/AV   velocity ratio is 0.32. Velocity and gradients are elevated, suggestive of bioprosthetic stenosis. There is mild AI. The mitral valve is moderately thickened and calcified. An annuloplasty ring is noted in the mitral position. The mean transvalvular gradient is 16.00 mmHg at a heart rate of 75 bpm. Transmitral gradients are elevated, suggestive of mitral stenosis. At-least moderate MR. Severe TR. PH PASP 55 mmHg. Trivial pericardial effusion. (Compared to the previous TTE performed on 4/21/2023, previously noted wall motion abnormalities are not noted on this study)  CCTA (8/21/20): Patent LIMA to First Diagonal branch.  Patent ANTONIO to RPDA. Severe stenosis of the large first diagonal branch. Distal vessel fills via LIMA graft. Severe stenosis of proximal RCA. Distal vessel fills via ANTONIO graft. Nonobstructive disease throughout the LAD. Patent stent in mid LCX extending to the OM2. MV annuloplasty ring and Bioprosthetic AVR noted. Calcification throughout the aorta noted.    Vascular history:   - AAA s/p open repair in 2015 followed saccular AAA and contained rupture adjacent to previous stent graft s/p open thoracoabdominal aneurysm repair with 20mm tube graft and 6mm bypasses to the celiac, SMA, left renal, right renal (end to end) in 8/2020  - PAD s/p L pSFA stent/L-TIFFANY stent with occluded b/l SFA followed by L-internal iliac artery stent in 5/2021 c/b L-RP hematoma requiring 2 units PRBC with ongoing LLE claudication - Dr. Sandra recommending patient to undergo left fem-pop bypass with PTFE. Saw Dr. Soriano in June 2023 - per note: "Aorto-iliac duplex performed to evaluate patency reveals widely patent aortic graft, patent L TIFFANY stent, L EIA 50% stenosis noted, patent R pSFA stent noted. Carotid duplex performed to evaluate for progression of stenosis reveals b/l <50% stenosis due to fibrocalcific plaque, both vertebral arteries patent w/antegrade flow. Surgical bypass is currently the only reasonable option for this pt's LLE symptoms but encouraged pt to continue regular exercise to promote LE perfusion, thereby postponing the need for a surgery. Pt will continue medication and an exercise regimen."     Home medications: Crestor 40mg qhs, lopressor 50mg bid, losartan 25mg qd, Lasix 40 mg PO daily; amlodipine 5 mg Po daily, cilostazol, eliquis 2.5 mg PO BID      #pre EGD risk assessment   D-dimer 2831, p-BNP elevated to 3727. CT showing bibasilar atelectasis.    # Severe bioprosthetic AS (echo 3/27/24 MG 26 COY 0.82)  Patient with SAVR (#19 Felder porcine bio-prosthetic valve) in 2002, now with severe bioprosthetic AS  Patient was following with Dr. Gregory and was pending GI clearance for a ALBER to evaluate the valves and plan for intervention   Recommend structural heart consult     # severe MS (MG 16)  Patient is s/p mitral annuloplasty (Physio ring #26) now with severe MS   as above     #Afib s/p AVN ablation and CRT-P ([facility], 8/2022; on Eliquis)  restart AC once ok per primary team, can challange with heparin gtt  Home meds: eliquis 2.5mg bid, lopressor 50mg bid  c/w lopressor 50 mg PO BID  EP consult for PPM interrogation       #Enlarging aortic aneurysm  CT A/P non con shows: Post surgical changes from prior open thoracoabdominal aortic bypass graft with new focal dilatation of the distalmost aspect of the graft measuring 4.4 x 6.1 cm just above the aortic bifurcation and previously measuring 3.2 x 4.1 cm on 6/11/2021  Vascular is following  Needs strict BP control, keep BP <120/80    #Chronic heart failure with preserved ejection fraction    #CAD s/p CABG (LIMA to RI, ANTONIO to PDA)  Followed by Dr. Preston  Home meds: Crestor 40mg qhs, lopressor 50mg bid, losartan 25mg qd  c/w lopressir 50 mg PO BID  Ok to hold Losartan in setting of CHERRI  c/w crestor       Patient is 68 year old female, former smoker, with PMH of hypothyroidism, seizure disorder, Stage 3 CKD (baseline CR ~1.7-2.0), severe renal artery stenosis, chronic anemia (baseline Hgb ~9-10, followed by Heme: Dr. Izquierdo, gets weekly Fe infusion), AT, Afib s/p AVN ablation and CRT-P ([facility], 8/2022; on Eliquis), tortuous esophagus and Schatzi ring at [facility] [place] (per EDG in 2020), extensive vascular history (see below), HTN, HLD, CAD s/p CABG (LIMA to RI, ANTONIO to PDA) with SAVR (#19 Felder porcine bio-prosthetic valve) and mitral annuloplasty (Physio ring #26) at [facility] in 2002, HFpEF, and mixed valve disease: severe bioprosthetic AS (echo 3/27/24 MG 26 COY 0.82), severe MS (MG 16), moderate to severe MR, severe TR. She presents with intermittent chest pain and KRAFT x 3 days, admitted for symptomatic anemia and r/o PE. RR called on 3/26AM for AMS w/ witnessed seizure, s/p 40mg/kg keppra load and 2g Ativan push stepped up to telemetry for further monitoring. Cardiology was consulted for pre-EGD risk assessment.     Review of systems:  EKG: a-sensed, v-paced, rate 75 bpm  TTE (4/21/23): borderline LVEF; Mid and apical inferior septum, apical lateral segment, and apex are hypokinetic. Normal RV; Severely dilated LA; Bioprosthetic valve is seen in the aortic position with findings suggestive of significant prosthetic aortic stenosis. The peak   transvalvular velocity is 3.37 m/s, the mean transvalvular gradient is 27.00 mmHg, and the LVOT/AV velocity ratio is 0.31. The peak transaortic gradient is 45.43 mmHg. The aortic valve area (estimated via the continuity method) is 0.78 cm². The calculated aortic valve area indexed   to body surface area is 0.51 cm²/m². Acceleration time 105 msMild AR; Mitral valve is moderately thickened and calcified with annuloplasty ring in the mitral position. Elevated gradients and pressure half time of 146 ms are consistent with severe mitral stenosis. Moderate-to-severe MR. Severe TR. PH with PASP 63 mmHg. Trivial pericardial effusion.  (Compared to the previous TTE performed on 6/2/2022, there have been interval progression of valvular disease and pulmonary hypertension.)  TTE ( 3/27/24): Normal LV function. Normal RV.  Severely dilated LA. A bioprosthetic valve noted in the aortic position. Leaflet of the bioprosthesis appear thickened. The peak transvalvular velocity is 3.37 m/s, the mean transvalvular gradient is 26.00 mmHg, and the LVOT/AV   velocity ratio is 0.32. Velocity and gradients are elevated, suggestive of bioprosthetic stenosis. There is mild AI. The mitral valve is moderately thickened and calcified. An annuloplasty ring is noted in the mitral position. The mean transvalvular gradient is 16.00 mmHg at a heart rate of 75 bpm. Transmitral gradients are elevated, suggestive of mitral stenosis. At-least moderate MR. Severe TR. PH PASP 55 mmHg. Trivial pericardial effusion. (Compared to the previous TTE performed on 4/21/2023, previously noted wall motion abnormalities are not noted on this study)  CCTA (8/21/20): Patent LIMA to First Diagonal branch.  Patent ANTONIO to RPDA. Severe stenosis of the large first diagonal branch. Distal vessel fills via LIMA graft. Severe stenosis of proximal RCA. Distal vessel fills via ANTONIO graft. Nonobstructive disease throughout the LAD. Patent stent in mid LCX extending to the OM2. MV annuloplasty ring and Bioprosthetic AVR noted. Calcification throughout the aorta noted.    Vascular history:   - AAA s/p open repair in 2015 followed saccular AAA and contained rupture adjacent to previous stent graft s/p open thoracoabdominal aneurysm repair with 20mm tube graft and 6mm bypasses to the celiac, SMA, left renal, right renal (end to end) in 8/2020  - PAD s/p L pSFA stent/L-TIFFANY stent with occluded b/l SFA followed by L-internal iliac artery stent in 5/2021 c/b L-RP hematoma requiring 2 units PRBC with ongoing LLE claudication - Dr. Sandra recommending patient to undergo left fem-pop bypass with PTFE. Saw Dr. Soriano in June 2023 - per note: "Aorto-iliac duplex performed to evaluate patency reveals widely patent aortic graft, patent L TIFFANY stent, L EIA 50% stenosis noted, patent R pSFA stent noted. Carotid duplex performed to evaluate for progression of stenosis reveals b/l <50% stenosis due to fibrocalcific plaque, both vertebral arteries patent w/antegrade flow. Surgical bypass is currently the only reasonable option for this pt's LLE symptoms but encouraged pt to continue regular exercise to promote LE perfusion, thereby postponing the need for a surgery. Pt will continue medication and an exercise regimen."     EGD (outpatient, 1/18/24): small HH, diffuse severe inflammation characterized by erythema and erosions at the incisura and antrum, duodenitis in second portion of duodenum.     Home medications: Crestor 40mg qhs, lopressor 50mg bid, losartan 25mg qd, Lasix 40 mg PO twice a week; amlodipine 5 mg Po daily, cilostazol, eliquis 2.5 mg PO BID      #pre EGD risk assessment   Patient reports she noted worsening SOB and that's what brought her to the hospital. Worsening SOB started in December 2023, but worsened in the past week. She also reports exertional chest pain for a long time per patient, but she also felt this on admission. She reports that CP resolved after receiving blood and SOB improved but it is still present. She also reports abdominal and mid back pain. Patient also reports she is only able to walk 12 steps, after which she develops worsening SOB and leg pains and sometimes CP.   D-dimer 2831, p-BNP elevated to 3727 (1564 in Feb 2024). CT showing bibasilar atelectasis.  On physical exam, patient is evoluemic, no LE edema, CTCL, and no visible JVD, she is also warm and well perfused, she is on room air   METS<4  RCRI: 3 points, class IV 15% of 30-day risk of death, MI or cardiac arrest, moreover the patient is with significant stenotic valvular disease, which elevates her risk even more   Patient is high risk for a low risk procedure (EGD)   Recommend cardiac anesthesia for the procedure  Recommend structural heart consult prior as well, patient is known to Dr. Gregory   Keep the patient euvolemic and normotensive for the procedure, if blood needed recommend slow infusion over 4 hours     # Severe bioprosthetic AS (echo 3/27/24 MG 26 COY 0.82)  Patient with SAVR (#19 Felder porcine bio-prosthetic valve) in 2002, now with severe bioprosthetic AS  Patient was following with Dr. Gregory and was pending GI clearance for a ALBER to evaluate the valves and plan for intervention   Recommend structural heart consult     # severe MS (MG 16)  Patient is s/p mitral annuloplasty (Physio ring #26) now with severe MS   as above     #Afib s/p AVN ablation and CRT-P ([facility], 8/2022; on Eliquis)  restart AC once ok per primary team, can challange with heparin gtt  Home meds: eliquis 2.5mg bid, lopressor 50mg bid  can start lopressor 25 mg PO BID in leiu of 50 mg PO bid in a setting of GI bleed   EP consult for PPM interrogation     #Enlarging aortic aneurysm/pseudoaneurysm   CTA ( 3/26/24): 6.5 x 5 cm lobulated sac of extravasation which arises from the distal graft at the site of bifurcation 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  Vascular is following  Needs strict BP control, keep BP <120/80    #Chronic heart failure with preserved ejection fraction  Evoluemic on physical exam   Takes Lasix 40 mg twice a week, ok to hold for now  Frequent volume reassessments  Daily CXR    #CAD s/p CABG (LIMA to RI, ANTONIO to PDA)  Followed by Dr. Preston  Home meds: Crestor 40mg qhs, lopressor 50mg bid, losartan 25mg qd  BB as above   Ok to hold Losartan in setting of CHERRI  c/w crestor (on atorvastatin here)      Patient is 68 year old female, former smoker, with PMH of hypothyroidism, seizure disorder, Stage 3 CKD (baseline CR ~1.7-2.0), severe renal artery stenosis, chronic anemia (baseline Hgb ~9-10, followed by Heme: Dr. Izquierdo, gets weekly Fe infusion), AT, Afib s/p AVN ablation and CRT-P ([facility], 8/2022; on Eliquis), tortuous esophagus and Schatzi ring at [facility] [place] (per EDG in 2020), extensive vascular history (see below), HTN, HLD, CAD s/p CABG (LIMA to RI, ANTONIO to PDA) with SAVR (#19 Felder porcine bio-prosthetic valve) and mitral annuloplasty (Physio ring #26) at [facility] in 2002, HFpEF, and mixed valve disease: severe bioprosthetic AS (echo 3/27/24 MG 26 COY 0.82), severe MS (MG 16), moderate to severe MR, severe TR. She presents with intermittent chest pain and KRAFT x 3 days, admitted for symptomatic anemia and r/o PE. RR called on 3/26AM for AMS w/ witnessed seizure, s/p 40mg/kg keppra load and 2g Ativan push stepped up to telemetry for further monitoring. Cardiology was consulted for pre-EGD risk assessment.     Review of systems:  EKG: a-sensed, v-paced, rate 75 bpm  TTE (4/21/23): borderline LVEF; Mid and apical inferior septum, apical lateral segment, and apex are hypokinetic. Normal RV; Severely dilated LA; Bioprosthetic valve is seen in the aortic position with findings suggestive of significant prosthetic aortic stenosis. The peak   transvalvular velocity is 3.37 m/s, the mean transvalvular gradient is 27.00 mmHg, and the LVOT/AV velocity ratio is 0.31. The peak transaortic gradient is 45.43 mmHg. The aortic valve area (estimated via the continuity method) is 0.78 cm². The calculated aortic valve area indexed   to body surface area is 0.51 cm²/m². Acceleration time 105 msMild AR; Mitral valve is moderately thickened and calcified with annuloplasty ring in the mitral position. Elevated gradients and pressure half time of 146 ms are consistent with severe mitral stenosis. Moderate-to-severe MR. Severe TR. PH with PASP 63 mmHg. Trivial pericardial effusion.  (Compared to the previous TTE performed on 6/2/2022, there have been interval progression of valvular disease and pulmonary hypertension.)  TTE ( 3/27/24): Normal LV function. Normal RV.  Severely dilated LA. A bioprosthetic valve noted in the aortic position. Leaflet of the bioprosthesis appear thickened. The peak transvalvular velocity is 3.37 m/s, the mean transvalvular gradient is 26.00 mmHg, and the LVOT/AV   velocity ratio is 0.32. Velocity and gradients are elevated, suggestive of bioprosthetic stenosis. There is mild AI. The mitral valve is moderately thickened and calcified. An annuloplasty ring is noted in the mitral position. The mean transvalvular gradient is 16.00 mmHg at a heart rate of 75 bpm. Transmitral gradients are elevated, suggestive of mitral stenosis. At-least moderate MR. Severe TR. PH PASP 55 mmHg. Trivial pericardial effusion. (Compared to the previous TTE performed on 4/21/2023, previously noted wall motion abnormalities are not noted on this study)  CCTA (8/21/20): Patent LIMA to First Diagonal branch.  Patent ANTONIO to RPDA. Severe stenosis of the large first diagonal branch. Distal vessel fills via LIMA graft. Severe stenosis of proximal RCA. Distal vessel fills via ANTONIO graft. Nonobstructive disease throughout the LAD. Patent stent in mid LCX extending to the OM2. MV annuloplasty ring and Bioprosthetic AVR noted. Calcification throughout the aorta noted.    Vascular history:   - AAA s/p open repair in 2015 followed saccular AAA and contained rupture adjacent to previous stent graft s/p open thoracoabdominal aneurysm repair with 20mm tube graft and 6mm bypasses to the celiac, SMA, left renal, right renal (end to end) in 8/2020  - PAD s/p L pSFA stent/L-TIFFANY stent with occluded b/l SFA followed by L-internal iliac artery stent in 5/2021 c/b L-RP hematoma requiring 2 units PRBC with ongoing LLE claudication - Dr. Sandra recommending patient to undergo left fem-pop bypass with PTFE. Saw Dr. Soriano in June 2023 - per note: "Aorto-iliac duplex performed to evaluate patency reveals widely patent aortic graft, patent L TIFFANY stent, L EIA 50% stenosis noted, patent R pSFA stent noted. Carotid duplex performed to evaluate for progression of stenosis reveals b/l <50% stenosis due to fibrocalcific plaque, both vertebral arteries patent w/antegrade flow. Surgical bypass is currently the only reasonable option for this pt's LLE symptoms but encouraged pt to continue regular exercise to promote LE perfusion, thereby postponing the need for a surgery. Pt will continue medication and an exercise regimen."     EGD (outpatient, 1/18/24): small HH, diffuse severe inflammation characterized by erythema and erosions at the incisura and antrum, duodenitis in second portion of duodenum.     Home medications: Crestor 40mg qhs, lopressor 50mg bid, losartan 25mg qd, Lasix 40 mg PO twice a week; amlodipine 5 mg Po daily, cilostazol, eliquis 2.5 mg PO BID      #pre EGD risk assessment   Patient reports she noted worsening SOB and that's what brought her to the hospital. Worsening SOB started in December 2023, but worsened in the past week. She also reports exertional chest pain for a long time per patient, but she also felt this on admission. She reports that CP resolved after receiving blood and SOB improved but it is still present. She also reports abdominal and mid back pain. Patient also reports she is only able to walk 12 steps, after which she develops worsening SOB and leg pains and sometimes CP.   D-dimer 2831, p-BNP elevated to 3727 (1564 in Feb 2024). CT showing bibasilar atelectasis.  On physical exam, patient is evoluemic, no LE edema, CTCL, and no visible JVD, she is also warm and well perfused, she is on room air   METS<4  RCRI: 3 points, class IV 15% of 30-day risk of death, MI or cardiac arrest, moreover the patient is with significant stenotic valvular disease, which elevates her risk even more   Patient is high risk for a low risk procedure (EGD)   Recommend cardiac anesthesia for the procedure  Recommend structural heart consult prior as well, patient is known to Dr. Gregory   Keep the patient euvolemic and normotensive for the procedure, if blood needed recommend slow infusion over 4 hours     # Severe bioprosthetic AS (echo 3/27/24 MG 26 COY 0.82)  Patient with SAVR (#19 Felder porcine bio-prosthetic valve) in 2002, now with severe bioprosthetic AS  Patient was following with Dr. Gregory and was pending GI clearance for a ALBER to evaluate the valves and plan for intervention   Recommend structural heart consult     # severe MS (MG 16)  Patient is s/p mitral annuloplasty (Physio ring #26) now with severe MS   as above     #Afib s/p AVN ablation and CRT-P ([facility], 8/2022; on Eliquis)  restart AC once ok per primary team, can challange with heparin gtt  Home meds: eliquis 2.5mg bid, lopressor 50mg bid  can start lopressor 25 mg PO BID in leiu of 50 mg PO bid in a setting of GI bleed   EP consult for PPM interrogation     #Enlarging aortic aneurysm/pseudoaneurysm   CTA ( 3/26/24): 6.5 x 5 cm lobulated sac of extravasation which arises from the distal graft at the site of bifurcation 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  Vascular is following  Needs strict BP control, keep BP around 120-130/80 mmHg    #Chronic heart failure with preserved ejection fraction  Evoluemic on physical exam   Takes Lasix 40 mg twice a week, ok to hold for now  Frequent volume reassessments  Daily CXR    #CAD s/p CABG (LIMA to RI, ANTONIO to PDA)  Followed by Dr. Preston  Home meds: Crestor 40mg qhs, lopressor 50mg bid, losartan 25mg qd  BB as above   Ok to hold Losartan in setting of CHERRI  c/w crestor (on atorvastatin here)      Patient is 68 year old female, former smoker, with PMH of hypothyroidism, seizure disorder, Stage 3 CKD (baseline CR ~1.7-2.0), severe renal artery stenosis, chronic anemia (baseline Hgb ~9-10, followed by Heme: Dr. Izquierdo, gets weekly Fe infusion), AT, Afib s/p AVN ablation and CRT-P ([facility], 8/2022; on Eliquis), tortuous esophagus and Schatzi ring at [facility] [place] (per EDG in 2020), extensive vascular history (see below), HTN, HLD, CAD s/p CABG (LIMA to RI, ANTONIO to PDA) with SAVR (#19 Felder porcine bio-prosthetic valve) and mitral annuloplasty (Physio ring #26) at [facility] in 2002, HFpEF, and mixed valve disease: severe bioprosthetic AS (echo 3/27/24 MG 26 COY 0.82), severe MS (MG 16), moderate to severe MR, severe TR. She presents with intermittent chest pain and KRAFT x 3 days, admitted for symptomatic anemia and r/o PE. RR called on 3/26AM for AMS w/ witnessed seizure, s/p 40mg/kg keppra load and 2g Ativan push stepped up to telemetry for further monitoring. Cardiology was consulted for pre-EGD risk assessment.     Review of systems:  EKG: a-sensed, v-paced, rate 75 bpm  TTE (4/21/23): borderline LVEF; Mid and apical inferior septum, apical lateral segment, and apex are hypokinetic. Normal RV; Severely dilated LA; Bioprosthetic valve is seen in the aortic position with findings suggestive of significant prosthetic aortic stenosis. The peak   transvalvular velocity is 3.37 m/s, the mean transvalvular gradient is 27.00 mmHg, and the LVOT/AV velocity ratio is 0.31. The peak transaortic gradient is 45.43 mmHg. The aortic valve area (estimated via the continuity method) is 0.78 cm². The calculated aortic valve area indexed   to body surface area is 0.51 cm²/m². Acceleration time 105 msMild AR; Mitral valve is moderately thickened and calcified with annuloplasty ring in the mitral position. Elevated gradients and pressure half time of 146 ms are consistent with severe mitral stenosis. Moderate-to-severe MR. Severe TR. PH with PASP 63 mmHg. Trivial pericardial effusion.  (Compared to the previous TTE performed on 6/2/2022, there have been interval progression of valvular disease and pulmonary hypertension.)  TTE ( 3/27/24): Normal LV function. Normal RV.  Severely dilated LA. A bioprosthetic valve noted in the aortic position. Leaflet of the bioprosthesis appear thickened. The peak transvalvular velocity is 3.37 m/s, the mean transvalvular gradient is 26.00 mmHg, and the LVOT/AV   velocity ratio is 0.32. Velocity and gradients are elevated, suggestive of bioprosthetic stenosis. There is mild AI. The mitral valve is moderately thickened and calcified. An annuloplasty ring is noted in the mitral position. The mean transvalvular gradient is 16.00 mmHg at a heart rate of 75 bpm. Transmitral gradients are elevated, suggestive of mitral stenosis. At-least moderate MR. Severe TR. PH PASP 55 mmHg. Trivial pericardial effusion. (Compared to the previous TTE performed on 4/21/2023, previously noted wall motion abnormalities are not noted on this study)  CCTA (8/21/20): Patent LIMA to First Diagonal branch.  Patent ANTONIO to RPDA. Severe stenosis of the large first diagonal branch. Distal vessel fills via LIMA graft. Severe stenosis of proximal RCA. Distal vessel fills via ANTONIO graft. Nonobstructive disease throughout the LAD. Patent stent in mid LCX extending to the OM2. MV annuloplasty ring and Bioprosthetic AVR noted. Calcification throughout the aorta noted.    Vascular history:   - AAA s/p open repair in 2015 followed saccular AAA and contained rupture adjacent to previous stent graft s/p open thoracoabdominal aneurysm repair with 20mm tube graft and 6mm bypasses to the celiac, SMA, left renal, right renal (end to end) in 8/2020  - PAD s/p L pSFA stent/L-TIFFANY stent with occluded b/l SFA followed by L-internal iliac artery stent in 5/2021 c/b L-RP hematoma requiring 2 units PRBC with ongoing LLE claudication - Dr. Sandra recommending patient to undergo left fem-pop bypass with PTFE. Saw Dr. Soriano in June 2023 - per note: "Aorto-iliac duplex performed to evaluate patency reveals widely patent aortic graft, patent L TIFFANY stent, L EIA 50% stenosis noted, patent R pSFA stent noted. Carotid duplex performed to evaluate for progression of stenosis reveals b/l <50% stenosis due to fibrocalcific plaque, both vertebral arteries patent w/antegrade flow. Surgical bypass is currently the only reasonable option for this pt's LLE symptoms but encouraged pt to continue regular exercise to promote LE perfusion, thereby postponing the need for a surgery. Pt will continue medication and an exercise regimen."     EGD (outpatient, 1/18/24): small HH, diffuse severe inflammation characterized by erythema and erosions at the incisura and antrum, duodenitis in second portion of duodenum.     Home medications: Crestor 40mg qhs, lopressor 50mg bid, losartan 25mg qd, Lasix 40 mg PO twice a week; amlodipine 5 mg Po daily, cilostazol, eliquis 2.5 mg PO BID      #pre EGD risk assessment   Patient reports she noted worsening SOB and that's what brought her to the hospital. Worsening SOB started in December 2023, but worsened in the past week. She also reports exertional chest pain for a long time per patient, but she also felt this on admission. She reports that CP resolved after receiving blood and SOB improved but it is still present. She also reports abdominal and mid back pain. Patient also reports she is only able to walk 12 steps, after which she develops worsening SOB and leg pains and sometimes CP.   D-dimer 2831, p-BNP elevated to 3727 (1564 in Feb 2024). CT showing bibasilar atelectasis.  On physical exam, patient is evoluemic, no LE edema, CTCL, and no visible JVD, she is also warm and well perfused, she is on room air   METS<4  RCRI: 3 points, class IV 15% of 30-day risk of death, MI or cardiac arrest, moreover the patient is with significant stenotic valvular disease, which elevates her risk even more   Patient is high risk for a low risk procedure (EGD)   Recommend cardiac anesthesia for the procedure  Recommend structural heart consult prior as well, patient is known to Dr. Gregory   Keep the patient euvolemic and normotensive for the procedure, if blood needed recommend slow infusion over 4 hours     # Severe bioprosthetic AS (echo 3/27/24 MG 26 COY 0.82)  Patient with SAVR (#19 Felder porcine bio-prosthetic valve) in 2002, now with severe bioprosthetic AS  Patient was following with Dr. Gregory and was pending GI clearance for a ALBER to evaluate the valves and plan for intervention   Recommend structural heart consult     # severe MS (MG 16)  Patient is s/p mitral annuloplasty (Physio ring #26) now with severe MS   as above     #Afib s/p AVN ablation and CRT-P ([facility], 8/2022; on Eliquis)  restart AC once ok per primary team, can challange with heparin gtt  Home meds: eliquis 2.5mg bid, lopressor 50mg bid  can start lopressor 25 mg PO BID in leiu of 50 mg PO bid in a setting of GI bleed   EP consult for PPM interrogation     #Enlarging aortic aneurysm/pseudoaneurysm   AAA s/p open repair in 2015 with complications  CTA ( 3/26/24): 6.5 x 5 cm lobulated sac of extravasation which arises from the distal graft at the site of bifurcation 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  Vascular is following  Needs strict BP control, keep BP around 120-130/80 mmHg    #Chronic heart failure with preserved ejection fraction  Evoluemic on physical exam   Takes Lasix 40 mg twice a week, ok to hold for now  Frequent volume reassessments  Daily CXR    #CAD s/p CABG (LIMA to RI, ANTONIO to PDA)  Followed by Dr. Preston  Home meds: Crestor 40mg qhs, lopressor 50mg bid, losartan 25mg qd  BB as above   Ok to hold Losartan in setting of CHERRI  c/w crestor (on atorvastatin here)      Patient is 68 year old female, former smoker, with PMH of hypothyroidism, seizure disorder, Stage 3 CKD (baseline CR ~1.7-2.0), severe renal artery stenosis, chronic anemia (baseline Hgb ~9-10, followed by Heme: Dr. Izquierdo, gets weekly Fe infusion), AT, Afib s/p AVN ablation and CRT-P ([facility], 8/2022; on Eliquis), tortuous esophagus and Schatzi ring [place] [facility] [place] ([place] EDG in 2020), extensive vascular history: AAA s/p open repair in 2015 followed saccular AAA and contained rupture (see below), HTN, HLD, CAD s/p CABG (LIMA to RI, ANTONIO to PDA) with SAVR (#19 Felder porcine bio-prosthetic valve) and mitral annuloplasty (Physio ring #26) at [facility] in 2002, HFpEF, and mixed valve disease: severe bioprosthetic AS (echo 3/27/24 MG 26 COY 0.82), severe MS (MG 16), moderate to severe MR, severe TR. She presents with intermittent chest pain and KRAFT x 3 days, admitted for symptomatic anemia and r/o PE. RR called on 3/26AM for AMS w/ witnessed seizure, s/p 40mg/kg keppra load and 2g Ativan push stepped up to telemetry for further monitoring. Cardiology was consulted for pre-EGD risk assessment.     Review of systems:  EKG: a-sensed, v-paced, rate 75 bpm  TTE (4/21/23): borderline LVEF; Mid and apical inferior septum, apical lateral segment, and apex are hypokinetic. Normal RV; Severely dilated LA; Bioprosthetic valve is seen in the aortic position with findings suggestive of significant prosthetic aortic stenosis. The peak   transvalvular velocity is 3.37 m/s, the mean transvalvular gradient is 27.00 mmHg, and the LVOT/AV velocity ratio is 0.31. The peak transaortic gradient is 45.43 mmHg. The aortic valve area (estimated via the continuity method) is 0.78 cm². The calculated aortic valve area indexed   to body surface area is 0.51 cm²/m². Acceleration time 105 msMild AR; Mitral valve is moderately thickened and calcified with annuloplasty ring in the mitral position. Elevated gradients and pressure half time of 146 ms are consistent with severe mitral stenosis. Moderate-to-severe MR. Severe TR. PH with PASP 63 mmHg. Trivial pericardial effusion.  (Compared to the previous TTE performed on 6/2/2022, there have been interval progression of valvular disease and pulmonary hypertension.)  TTE ( 3/27/24): Normal LV function. Normal RV.  Severely dilated LA. A bioprosthetic valve noted in the aortic position. Leaflet of the bioprosthesis appear thickened. The peak transvalvular velocity is 3.37 m/s, the mean transvalvular gradient is 26.00 mmHg, and the LVOT/AV   velocity ratio is 0.32. Velocity and gradients are elevated, suggestive of bioprosthetic stenosis. There is mild AI. The mitral valve is moderately thickened and calcified. An annuloplasty ring is noted in the mitral position. The mean transvalvular gradient is 16.00 mmHg at a heart rate of 75 bpm. Transmitral gradients are elevated, suggestive of mitral stenosis. At-least moderate MR. Severe TR. PH PASP 55 mmHg. Trivial pericardial effusion. (Compared to the previous TTE performed on 4/21/2023, previously noted wall motion abnormalities are not noted on this study)  CCTA (8/21/20): Patent LIMA to First Diagonal branch.  Patent ANTONIO to RPDA. Severe stenosis of the large first diagonal branch. Distal vessel fills via LIMA graft. Severe stenosis of proximal RCA. Distal vessel fills via ANTONIO graft. Nonobstructive disease throughout the LAD. Patent stent in mid LCX extending to the OM2. MV annuloplasty ring and Bioprosthetic AVR noted. Calcification throughout the aorta noted.    Vascular history:   - AAA s/p open repair in 2015 followed saccular AAA and contained rupture adjacent to previous stent graft s/p open thoracoabdominal aneurysm repair with 20mm tube graft and 6mm bypasses to the celiac, SMA, left renal, right renal (end to end) in 8/2020  - PAD s/p L pSFA stent/L-TIFFANY stent with occluded b/l SFA followed by L-internal iliac artery stent in 5/2021 c/b L-RP hematoma requiring 2 units PRBC with ongoing LLE claudication - Dr. Sandra recommending patient to undergo left fem-pop bypass with PTFE. Saw Dr. Soriano in June 2023 - per note: "Aorto-iliac duplex performed to evaluate patency reveals widely patent aortic graft, patent L TIFFANY stent, L EIA 50% stenosis noted, patent R pSFA stent noted. Carotid duplex performed to evaluate for progression of stenosis reveals b/l <50% stenosis due to fibrocalcific plaque, both vertebral arteries patent w/antegrade flow. Surgical bypass is currently the only reasonable option for this pt's LLE symptoms but encouraged pt to continue regular exercise to promote LE perfusion, thereby postponing the need for a surgery. Pt will continue medication and an exercise regimen."     EGD (outpatient, 1/18/24): small HH, diffuse severe inflammation characterized by erythema and erosions at the incisura and antrum, duodenitis in second portion of duodenum.     Home medications: Crestor 40mg qhs, lopressor 50mg bid, losartan 25mg qd, Lasix 40 mg PO twice a week; amlodipine 5 mg Po daily, cilostazol, eliquis 2.5 mg PO BID      #pre EGD risk assessment   Patient reports she noted worsening SOB and that's what brought her to the hospital. Worsening SOB started in December 2023, but worsened in the past week. She also reports exertional chest pain for a long time per patient, but she also felt this on admission. She reports that CP resolved after receiving blood and SOB improved but it is still present. She also reports abdominal and mid back pain. Patient also reports she is only able to walk 12 steps, after which she develops worsening SOB and leg pains and sometimes CP.   D-dimer 2831, p-BNP elevated to 3727 (1564 in Feb 2024). CT showing bibasilar atelectasis.  On physical exam, patient is evoluemic, no LE edema, CTCL, and no visible JVD, she is also warm and well perfused, she is on room air   METS<4  RCRI: 3 points, class IV 15% of 30-day risk of death, MI or cardiac arrest, moreover the patient is with significant stenotic valvular disease, which elevates her risk even more   Patient is high risk for a low risk procedure (EGD)   Recommend cardiac anesthesia for the procedure  Recommend structural heart consult prior as well, patient is known to Dr. Gregory   Keep the patient euvolemic and normotensive for the procedure, if blood needed recommend slow infusion over 4 hours     # Severe bioprosthetic AS (echo 3/27/24 MG 26 COY 0.82)  Patient with SAVR (#19 Felder porcine bio-prosthetic valve) in 2002, now with severe bioprosthetic AS  Patient was following with Dr. Gregory and was pending GI clearance for a ALBER to evaluate the valves and plan for intervention   Recommend structural heart consult     # severe MS (MG 16)  Patient is s/p mitral annuloplasty (Physio ring #26) now with severe MS   as above     #Afib s/p AVN ablation and CRT-P ([facility], 8/2022; on Eliquis)  restart AC once ok per primary team, can challange with heparin gtt  Home meds: eliquis 2.5mg bid, lopressor 50mg bid  can start lopressor 25 mg PO BID in leiu of 50 mg PO bid in a setting of GI bleed   EP consult for PPM interrogation     #Enlarging aortic aneurysm/pseudoaneurysm   AAA s/p open repair in 2015 with complications  CTA ( 3/26/24): 6.5 x 5 cm lobulated sac of extravasation which arises from the distal graft at the site of bifurcation 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  Vascular is following  Needs strict BP control, keep BP around 120-130/80 mmHg    #Chronic heart failure with preserved ejection fraction  Evoluemic on physical exam   Takes Lasix 40 mg twice a week, ok to hold for now  Frequent volume reassessments  Daily CXR    #CAD s/p CABG (LIMA to RI, ANTONIO to PDA)  Followed by Dr. Preston  Home meds: Crestor 40mg qhs, lopressor 50mg bid, losartan 25mg qd  BB as above   Ok to hold Losartan in setting of CHERRI  c/w crestor (on atorvastatin here)      Patient is 68 year old female, former smoker, with PMH of hypothyroidism, seizure disorder, Stage 3 CKD (baseline CR ~1.7-2.0), severe renal artery stenosis, chronic anemia (baseline Hgb ~9-10, followed by Heme: Dr. Izquierdo, gets weekly Fe infusion), AT, Afib s/p AVN ablation and CRT-P ([facility], 8/2022; on Eliquis), tortuous esophagus and Schatzi ring at [facility] [place] (per EDG in 2020), extensive vascular history: AAA s/p open repair in 2015 followed saccular AAA and contained rupture (see below), HTN, HLD, CAD s/p CABG (LIMA to RI, ANTONIO to PDA) with SAVR (#19 Felder porcine bio-prosthetic valve) and mitral annuloplasty (Physio ring #26) at [facility] in 2002, HFpEF, and mixed valve disease: severe bioprosthetic AS (echo 3/27/24 MG 26 COY 0.82), severe MS (MG 16), moderate to severe MR, severe TR. She presents with intermittent chest pain and KRAFT x 3 days, admitted for symptomatic anemia and r/o PE. RR called on 3/26AM for AMS w/ witnessed seizure, s/p 40mg/kg keppra load and 2g Ativan push stepped up to telemetry for further monitoring. Cardiology was consulted for pre-EGD risk assessment.     Review of systems:  EKG: a-sensed, v-paced, rate 75 bpm  TTE ( 3/27/24): Normal LV function. Normal RV.  Severely dilated LA. A bioprosthetic valve noted in the aortic position. Leaflet of the bioprosthesis appear thickened. The peak transvalvular velocity is 3.37 m/s, the mean transvalvular gradient is 26.00 mmHg, and the LVOT/AV   velocity ratio is 0.32. Velocity and gradients are elevated, suggestive of bioprosthetic stenosis. There is mild AI. The mitral valve is moderately thickened and calcified. An annuloplasty ring is noted in the mitral position. The mean transvalvular gradient is 16.00 mmHg at a heart rate of 75 bpm. Transmitral gradients are elevated, suggestive of mitral stenosis. At-least moderate MR. Severe TR. PH PASP 55 mmHg. Trivial pericardial effusion. (Compared to the previous TTE performed on 4/21/2023, previously noted wall motion abnormalities are not noted on this study)  TTE (4/21/23): borderline LVEF; Mid and apical inferior septum, apical lateral segment, and apex are hypokinetic. Normal RV; Severely dilated LA; Bioprosthetic valve is seen in the aortic position with findings suggestive of significant prosthetic aortic stenosis. The peak   transvalvular velocity is 3.37 m/s, the mean transvalvular gradient is 27.00 mmHg, and the LVOT/AV velocity ratio is 0.31. The peak transaortic gradient is 45.43 mmHg. The aortic valve area (estimated via the continuity method) is 0.78 cm². The calculated aortic valve area indexed   to body surface area is 0.51 cm²/m². Acceleration time 105 msMild AR; Mitral valve is moderately thickened and calcified with annuloplasty ring in the mitral position. Elevated gradients and pressure half time of 146 ms are consistent with severe mitral stenosis. Moderate-to-severe MR. Severe TR. PH with PASP 63 mmHg. Trivial pericardial effusion.  (Compared to the previous TTE performed on 6/2/2022, there have been interval progression of valvular disease and pulmonary hypertension.)  CCTA (8/21/20): Patent LIMA to First Diagonal branch.  Patent ANTONIO to RPDA. Severe stenosis of the large first diagonal branch. Distal vessel fills via LIMA graft. Severe stenosis of proximal RCA. Distal vessel fills via ANTONIO graft. Nonobstructive disease throughout the LAD. Patent stent in mid LCX extending to the OM2. MV annuloplasty ring and Bioprosthetic AVR noted. Calcification throughout the aorta noted.    Vascular history:   - AAA s/p open repair in 2015 followed saccular AAA and contained rupture adjacent to previous stent graft s/p open thoracoabdominal aneurysm repair with 20mm tube graft and 6mm bypasses to the celiac, SMA, left renal, right renal (end to end) in 8/2020  - PAD s/p L pSFA stent/L-TIFFANY stent with occluded b/l SFA followed by L-internal iliac artery stent in 5/2021 c/b L-RP hematoma requiring 2 units PRBC with ongoing LLE claudication - Dr. Sandra recommending patient to undergo left fem-pop bypass with PTFE. Saw Dr. Soriano in June 2023 - per note: "Aorto-iliac duplex performed to evaluate patency reveals widely patent aortic graft, patent L TIFFANY stent, L EIA 50% stenosis noted, patent R pSFA stent noted. Carotid duplex performed to evaluate for progression of stenosis reveals b/l <50% stenosis due to fibrocalcific plaque, both vertebral arteries patent w/antegrade flow. Surgical bypass is currently the only reasonable option for this pt's LLE symptoms but encouraged pt to continue regular exercise to promote LE perfusion, thereby postponing the need for a surgery. Pt will continue medication and an exercise regimen."     EGD (outpatient, 1/18/24): small HH, diffuse severe inflammation characterized by erythema and erosions at the incisura and antrum, duodenitis in second portion of duodenum.     Home medications: Crestor 40mg qhs, lopressor 50mg bid, losartan 25mg qd, Lasix 40 mg PO twice a week; amlodipine 5 mg Po daily, cilostazol, eliquis 2.5 mg PO BID      #pre EGD risk assessment   Patient reports she noted worsening SOB and that's what brought her to the hospital. Worsening SOB started in December 2023, but worsened in the past week. She also reports exertional chest pain for a long time per patient, but she also felt this on admission. She reports that CP resolved after receiving blood and SOB improved but it is still present. She also reports abdominal and mid back pain. Patient also reports she is only able to walk 12 steps, after which she develops worsening SOB and leg pains and sometimes CP.   D-dimer 2831, p-BNP elevated to 3727 (1564 in Feb 2024). CT showing bibasilar atelectasis.  On physical exam, patient is evoluemic, no LE edema, CTCL, and no visible JVD, she is also warm and well perfused, she is on room air   METS<4  RCRI: 3 points, class IV 15% of 30-day risk of death, MI or cardiac arrest, moreover the patient is with significant stenotic valvular disease, which elevates her risk even more   Patient is high risk for a low risk procedure (EGD)   Recommend cardiac anesthesia for the procedure  Recommend structural heart consult prior as well, patient is known to Dr. Gregory   Keep the patient euvolemic and normotensive for the procedure, if blood needed recommend slow infusion over 4 hours     # Severe bioprosthetic AS (echo 3/27/24 MG 26 COY 0.82)  Patient with SAVR (#19 Felder porcine bio-prosthetic valve) in 2002, now with severe bioprosthetic AS  Patient was following with Dr. Gregory and was pending GI clearance for a ALBER to evaluate the valves and plan for intervention   Recommend structural heart consult     # severe MS (MG 16)  Patient is s/p mitral annuloplasty (Physio ring #26) now with severe MS   as above     #Afib s/p AVN ablation and CRT-P ([facility], 8/2022; on Eliquis)  restart AC once ok per primary team, can challange with heparin gtt  Home meds: eliquis 2.5mg bid, lopressor 50mg bid  can start lopressor 25 mg PO BID in leiu of 50 mg PO bid in a setting of GI bleed   EP consult for PPM interrogation     #Enlarging aortic aneurysm/pseudoaneurysm   AAA s/p open repair in 2015 with complications  CTA ( 3/26/24): 6.5 x 5 cm lobulated sac of extravasation which arises from the distal graft at the site of bifurcation 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  Vascular is following  Needs strict BP control, keep BP around 120-130/80 mmHg    #Chronic heart failure with preserved ejection fraction  Evoluemic on physical exam   Takes Lasix 40 mg twice a week, ok to hold for now  Frequent volume reassessments  Daily CXR    #CAD s/p CABG (LIMA to RI, ANTONIO to PDA)  Followed by Dr. Preston  Home meds: Crestor 40mg qhs, lopressor 50mg bid, losartan 25mg qd  BB as above   Ok to hold Losartan in setting of CHERRI  c/w crestor (on atorvastatin here)      Patient is 68 year old female, former smoker, with PMH of hypothyroidism, seizure disorder, Stage 3 CKD (baseline CR ~1.7-2.0), severe renal artery stenosis, chronic anemia (baseline Hgb ~9-10, followed by Heme: Dr. Izquierdo, gets weekly Fe infusion), AT, Afib s/p AVN ablation and CRT-P ([facility], 8/2022; on Eliquis), tortuous esophagus and Schatzi ring at [facility] [place] (per EDG in 2020), extensive vascular history: AAA s/p open repair in 2015 followed saccular AAA and contained rupture (see below), HTN, HLD, CAD s/p CABG (LIMA to RI, ANTONIO to PDA) with SAVR (#19 Felder porcine bio-prosthetic valve) and mitral annuloplasty (Physio ring #26) at [facility] in 2002, HFpEF, and mixed valve disease: severe bioprosthetic AS (echo 3/27/24 MG 26 COY 0.82), severe MS (MG 16), moderate to severe MR, severe TR. She presents with intermittent chest pain and KRAFT x 3 days, admitted for symptomatic anemia and r/o PE. RR called on 3/26AM for AMS w/ witnessed seizure, s/p 40mg/kg keppra load and 2g Ativan push stepped up to telemetry for further monitoring. Cardiology was consulted for pre-EGD risk assessment.     Review of systems:  EKG: a-sensed, v-paced, rate 75 bpm  TTE ( 3/27/24): Normal LV function. Normal RV.  Severely dilated LA. A bioprosthetic valve noted in the aortic position. Leaflet of the bioprosthesis appear thickened. The peak transvalvular velocity is 3.37 m/s, the mean transvalvular gradient is 26.00 mmHg, and the LVOT/AV   velocity ratio is 0.32. Velocity and gradients are elevated, suggestive of bioprosthetic stenosis. There is mild AI. The mitral valve is moderately thickened and calcified. An annuloplasty ring is noted in the mitral position. The mean transvalvular gradient is 16.00 mmHg at a heart rate of 75 bpm. Transmitral gradients are elevated, suggestive of mitral stenosis. At-least moderate MR. Severe TR. PH PASP 55 mmHg. Trivial pericardial effusion. (Compared to the previous TTE performed on 4/21/2023, previously noted wall motion abnormalities are not noted on this study)  TTE (4/21/23): borderline LVEF; Mid and apical inferior septum, apical lateral segment, and apex are hypokinetic. Normal RV; Severely dilated LA; Bioprosthetic valve is seen in the aortic position with findings suggestive of significant prosthetic aortic stenosis. The peak   transvalvular velocity is 3.37 m/s, the mean transvalvular gradient is 27.00 mmHg, and the LVOT/AV velocity ratio is 0.31. The peak transaortic gradient is 45.43 mmHg. The aortic valve area (estimated via the continuity method) is 0.78 cm². The calculated aortic valve area indexed   to body surface area is 0.51 cm²/m². Acceleration time 105 msMild AR; Mitral valve is moderately thickened and calcified with annuloplasty ring in the mitral position. Elevated gradients and pressure half time of 146 ms are consistent with severe mitral stenosis. Moderate-to-severe MR. Severe TR. PH with PASP 63 mmHg. Trivial pericardial effusion.  (Compared to the previous TTE performed on 6/2/2022, there have been interval progression of valvular disease and pulmonary hypertension.)  CCTA (8/21/20): Patent LIMA to First Diagonal branch.  Patent ANTONIO to RPDA. Severe stenosis of the large first diagonal branch. Distal vessel fills via LIMA graft. Severe stenosis of proximal RCA. Distal vessel fills via ANTONIO graft. Nonobstructive disease throughout the LAD. Patent stent in mid LCX extending to the OM2. MV annuloplasty ring and Bioprosthetic AVR noted. Calcification throughout the aorta noted.    Vascular history:   - AAA s/p open repair in 2015 followed saccular AAA and contained rupture adjacent to previous stent graft s/p open thoracoabdominal aneurysm repair with 20mm tube graft and 6mm bypasses to the celiac, SMA, left renal, right renal (end to end) in 8/2020  - PAD s/p L pSFA stent/L-TIFFANY stent with occluded b/l SFA followed by L-internal iliac artery stent in 5/2021 c/b L-RP hematoma requiring 2 units PRBC with ongoing LLE claudication - Dr. Sandra recommending patient to undergo left fem-pop bypass with PTFE. Saw Dr. Soriano in June 2023 - per note: "Aorto-iliac duplex performed to evaluate patency reveals widely patent aortic graft, patent L TIFFANY stent, L EIA 50% stenosis noted, patent R pSFA stent noted. Carotid duplex performed to evaluate for progression of stenosis reveals b/l <50% stenosis due to fibrocalcific plaque, both vertebral arteries patent w/antegrade flow. Surgical bypass is currently the only reasonable option for this pt's LLE symptoms but encouraged pt to continue regular exercise to promote LE perfusion, thereby postponing the need for a surgery. Pt will continue medication and an exercise regimen."     EGD (outpatient, 1/18/24): small HH, diffuse severe inflammation characterized by erythema and erosions at the incisura and antrum, duodenitis in second portion of duodenum.     Home medications: Crestor 40mg qhs, lopressor 50mg bid, losartan 25mg qd, Lasix 40 mg PO twice a week; amlodipine 5 mg Po daily, cilostazol, eliquis 2.5 mg PO BID      #pre EGD risk assessment   Patient reports she noted worsening SOB and that's what brought her to the hospital. Worsening SOB started in December 2023, but worsened in the past week. She also reports exertional chest pain for a long time per patient, but she also felt this on admission. She reports that CP resolved after receiving blood and SOB improved but it is still present. She also reports abdominal and mid back pain. Patient also reports she is only able to walk 12 steps, after which she develops worsening SOB and leg pains and sometimes CP.   D-dimer 2831, p-BNP elevated to 3727 (1564 in Feb 2024). CT showing bibasilar atelectasis.  On physical exam, patient is evoluemic, no LE edema, CTCL, and no visible JVD, she is also warm and well perfused, she is on room air   METS<4  RCRI: 3 points, class IV 15% of 30-day risk of death, MI or cardiac arrest, moreover the patient is with significant stenotic valvular disease, which elevates her risk even more   Patient is high risk for a low risk procedure (EGD)   Recommend cardiac anesthesia for the procedure  Recommend structural heart consult prior as well, patient is known to Dr. Gregory   Keep the patient euvolemic and normotensive for the procedure, if blood needed recommend slow infusion over 4 hours and give Lasix 40 mg IVP once after the blood is given  Will transfer the patient to CCU for observation and recommend to perform an EGD in the unit tomorrow     # Severe bioprosthetic AS (echo 3/27/24 MG 26 COY 0.82)  Patient with SAVR (#19 Felder porcine bio-prosthetic valve) in 2002, now with severe bioprosthetic AS  Patient was following with Dr. Gregory and was pending GI clearance for a ALBER to evaluate the valves and plan for intervention   Recommend structural heart consult     # severe MS (MG 16)  Patient is s/p mitral annuloplasty (Physio ring #26) now with severe MS   as above     #Afib s/p AVN ablation and CRT-P ([facility], 8/2022; on Eliquis)  restart AC once ok per primary team, can challange with heparin gtt  Home meds: eliquis 2.5mg bid, lopressor 50mg bid  can start lopressor 25 mg PO BID in leiu of 50 mg PO bid in a setting of GI bleed   EP consult for PPM interrogation     #Enlarging aortic aneurysm/pseudoaneurysm   AAA s/p open repair in 2015 with complications  CTA ( 3/26/24): 6.5 x 5 cm lobulated sac of extravasation which arises from the distal graft at the site of bifurcation 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  Vascular is following  Needs strict BP control, keep BP around 120-130/80 mmHg    #Chronic heart failure with preserved ejection fraction  Evoluemic on physical exam   Takes Lasix 40 mg twice a week, ok to hold for now  Frequent volume reassessments  Daily CXR    #CAD s/p CABG (LIMA to RI, ANTONIO to PDA)  Followed by Dr. Preston  Home meds: Crestor 40mg qhs, lopressor 50mg bid, losartan 25mg qd  BB as above   Ok to hold Losartan in setting of CHERRI  c/w crestor (on atorvastatin here)      Patient is 68 year old female, former smoker, with PMH of hypothyroidism, seizure disorder, Stage 3 CKD (baseline CR ~1.7-2.0), severe renal artery stenosis, chronic anemia (baseline Hgb ~9-10, followed by Heme: Dr. Izquierdo, gets weekly Fe infusion), AT, Afib s/p AVN ablation and CRT-P ([facility], 8/2022; on Eliquis), tortuous esophagus and Schatzi ring at [facility] (per EDG in 2020), extensive vascular history: AAA s/p open repair in 2015 followed saccular AAA and contained rupture (see below), HTN, HLD, CAD s/p CABG (LIMA to RI, ANTONIO to PDA) with SAVR (#19 Felder porcine bio-prosthetic valve) and mitral annuloplasty (Physio ring #26) at [facility] in 2002, HFpEF, and mixed valve disease: severe bioprosthetic AS (echo 3/27/24 MG 26 COY 0.82), severe MS (MG 16), moderate to severe MR, severe TR. She presents with intermittent chest pain and KRAFT x 3 days, admitted for symptomatic anemia and r/o PE. RR called on 3/26AM for AMS w/ witnessed seizure, s/p 40mg/kg keppra load and 2g Ativan push stepped up to telemetry for further monitoring. Cardiology was consulted for pre-EGD risk assessment.     Review of systems:  EKG: a-sensed, v-paced, rate 75 bpm  TTE ( 3/27/24): Normal LV function. Normal RV.  Severely dilated LA. A bioprosthetic valve noted in the aortic position. Leaflet of the bioprosthesis appear thickened. The peak transvalvular velocity is 3.37 m/s, the mean transvalvular gradient is 26.00 mmHg, and the LVOT/AV   velocity ratio is 0.32. Velocity and gradients are elevated, suggestive of bioprosthetic stenosis. There is mild AI. The mitral valve is moderately thickened and calcified. An annuloplasty ring is noted in the mitral position. The mean transvalvular gradient is 16.00 mmHg at a heart rate of 75 bpm. Transmitral gradients are elevated, suggestive of mitral stenosis. At-least moderate MR. Severe TR. PH PASP 55 mmHg. Trivial pericardial effusion. (Compared to the previous TTE performed on 4/21/2023, previously noted wall motion abnormalities are not noted on this study)  TTE (4/21/23): borderline LVEF; Mid and apical inferior septum, apical lateral segment, and apex are hypokinetic. Normal RV; Severely dilated LA; Bioprosthetic valve is seen in the aortic position with findings suggestive of significant prosthetic aortic stenosis. The peak   transvalvular velocity is 3.37 m/s, the mean transvalvular gradient is 27.00 mmHg, and the LVOT/AV velocity ratio is 0.31. The peak transaortic gradient is 45.43 mmHg. The aortic valve area (estimated via the continuity method) is 0.78 cm². The calculated aortic valve area indexed   to body surface area is 0.51 cm²/m². Acceleration time 105 msMild AR; Mitral valve is moderately thickened and calcified with annuloplasty ring in the mitral position. Elevated gradients and pressure half time of 146 ms are consistent with severe mitral stenosis. Moderate-to-severe MR. Severe TR. PH with PASP 63 mmHg. Trivial pericardial effusion.  (Compared to the previous TTE performed on 6/2/2022, there have been interval progression of valvular disease and pulmonary hypertension.)  CCTA (8/21/20): Patent LIMA to First Diagonal branch.  Patent ANTONIO to RPDA. Severe stenosis of the large first diagonal branch. Distal vessel fills via LIMA graft. Severe stenosis of proximal RCA. Distal vessel fills via ANTONIO graft. Nonobstructive disease throughout the LAD. Patent stent in mid LCX extending to the OM2. MV annuloplasty ring and Bioprosthetic AVR noted. Calcification throughout the aorta noted.    Vascular history:   - AAA s/p open repair in 2015 followed saccular AAA and contained rupture adjacent to previous stent graft s/p open thoracoabdominal aneurysm repair with 20mm tube graft and 6mm bypasses to the celiac, SMA, left renal, right renal (end to end) in 8/2020  - PAD s/p L pSFA stent/L-TIFFANY stent with occluded b/l SFA followed by L-internal iliac artery stent in 5/2021 c/b L-RP hematoma requiring 2 units PRBC with ongoing LLE claudication - Dr. Sandra recommending patient to undergo left fem-pop bypass with PTFE. Saw Dr. Soriano in June 2023 - per note: "Aorto-iliac duplex performed to evaluate patency reveals widely patent aortic graft, patent L TIFFANY stent, L EIA 50% stenosis noted, patent R pSFA stent noted. Carotid duplex performed to evaluate for progression of stenosis reveals b/l <50% stenosis due to fibrocalcific plaque, both vertebral arteries patent w/antegrade flow. Surgical bypass is currently the only reasonable option for this pt's LLE symptoms but encouraged pt to continue regular exercise to promote LE perfusion, thereby postponing the need for a surgery. Pt will continue medication and an exercise regimen."     EGD (outpatient, 1/18/24): small HH, diffuse severe inflammation characterized by erythema and erosions at the incisura and antrum, duodenitis in second portion of duodenum.     Home medications: Crestor 40mg qhs, lopressor 50mg bid, losartan 25mg qd, Lasix 40 mg PO twice a week; amlodipine 5 mg Po daily, cilostazol, eliquis 2.5 mg PO BID      #pre EGD risk assessment   Patient reports she noted worsening SOB and that's what brought her to the hospital. Worsening SOB started in December 2023, but worsened in the past week. She also reports exertional chest pain for a long time per patient, but she also felt this on admission. She reports that CP resolved after receiving blood and SOB improved but it is still present. She also reports abdominal and mid back pain. Patient also reports she is only able to walk 12 steps, after which she develops worsening SOB and leg pains and sometimes CP.   D-dimer 2831, p-BNP elevated to 3727 (1564 in Feb 2024). CT showing bibasilar atelectasis.  On physical exam, patient is evoluemic, no LE edema, CTCL, and no visible JVD, she is also warm and well perfused, she is on room air   METS<4  RCRI: 3 points, class IV 15% of 30-day risk of death, MI or cardiac arrest, moreover the patient is with significant stenotic valvular disease, which elevates her risk even more   Patient is high risk for a low risk procedure (EGD)   Recommend cardiac anesthesia for the procedure  Recommend structural heart consult prior as well, patient is known to Dr. Gregory   Keep the patient euvolemic and normotensive for the procedure, if blood needed recommend slow infusion over 4 hours and give Lasix 40 mg IVP once after the blood is given  Will transfer the patient to CCU for observation and recommend to perform an EGD in the unit tomorrow     # Severe bioprosthetic AS (echo 3/27/24 MG 26 COY 0.82)  Patient with SAVR (#19 Felder porcine bio-prosthetic valve) in 2002, now with severe bioprosthetic AS  Patient was following with Dr. Gregory and was pending GI clearance for a ALBER to evaluate the valves and plan for intervention   Recommend structural heart consult     # Mitral stenosis (MG 16)  Patient is s/p mitral annuloplasty (Physio ring #26) now with severe MS  Gradient increased form prior echo, from 10 to 16 now at HR 75 bpm   as above     #Afib s/p AVN ablation and CRT-P ([facility], 8/2022; on Eliquis)  restart AC once ok per primary team, can challange with heparin gtt  Home meds: eliquis 2.5mg bid, lopressor 50mg bid  can start lopressor 25 mg PO BID in leiu of 50 mg PO bid in a setting of GI bleed   EP consult for PPM interrogation     #Enlarging aortic aneurysm/pseudoaneurysm   AAA s/p open repair in 2015 with complications  CTA ( 3/26/24): 6.5 x 5 cm lobulated sac of extravasation which arises from the distal graft at the site of bifurcation 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  Vascular is following  Needs strict BP control, keep BP around 120-130/80 mmHg    #Chronic heart failure with preserved ejection fraction  Evoluemic on physical exam   Takes Lasix 40 mg twice a week, ok to hold for now, give Lasix 40 mg IVP after the transfusion  Frequent volume reassessments  Daily CXR    #CAD s/p CABG (LIMA to RI, ANTONIO to PDA)  Followed by Dr. Preston  Home meds: Crestor 40mg qhs, lopressor 50mg bid, losartan 25mg qd  BB as above   Ok to hold Losartan in setting of CHERRI  c/w crestor (on atorvastatin here)

## 2024-03-27 NOTE — DIETITIAN INITIAL EVALUATION ADULT - PROBLEM SELECTOR PLAN 3
Patient p/w CHERRI on CKD (baseline reportedly 1.7-2.0 outpatient).   Labs on admission: BUN/Cr 48/3.00  Reports poor PO intake over the past week due to feeling constipated.   Likely CHERRI 2/2 prerenal causes.    - urine lytes  - bladder scan x 1  - monitor BMP  - encourage PO fluid intake, consider bolus v. mIVF if Cr worsening  - avoid nephrotoxic agents

## 2024-03-27 NOTE — CONSULT NOTE ADULT - ATTENDING COMMENTS
Patient is a 68 year old Female, Former smoker, Retired [employer] , with Extensive known ASCVD, including CAD s/p CABG (LIMA to RI, ANTONIO to PDA), PAD with AAA s/p open repair in 2015 followed saccular AAA and contained rupture, L pSFA stent/L-TIFFANY stent with occluded b/l SFA followed by L-internal iliac artery stent in 5/2021 Severe renal artery stenosis, Multivalvular Heart disease including AS s/p SAVR (#19 Felder porcine bio-prosthetic valve) and MS s/p Physio ring #26 (2002), AF s/p COY and CRT-P ([facility], 8/2022; on Eliquis),  Stage 3 CKD, Chronic Anemia, seizure as a child, who originally presented with worsening KRAFT over the last 3 months with escalating chest discomfort in the last week prompting ER visit, found to have Anemia to 6.9 with Melanic stools, with hospital course complicated by Seizure. Cardiology consulted for Co-Management    Review of systems:  - TTE  3/27/24: Normal LV function. Normal RV. Severely dilated LA. A bioprosthetic valve noted in the aortic position. Leaflet of the bioprosthesis appear thickened. The peak transvalvular velocity is 3.37 m/s, the mean transvalvular gradient is 26.00 mmHg, and the LVOT/AV   velocity ratio is 0.32. Velocity and gradients are elevated, suggestive of bioprosthetic stenosis. There is mild AI. The mitral valve is moderately thickened and calcified. An annuloplasty ring is noted in the mitral position. The mean transvalvular gradient is 16.00 mmHg at a heart rate of 75 bpm. Transmitral gradients are elevated, suggestive of mitral stenosis. At-least moderate MR. Severe TR. PH PASP 55 mmHg. Trivial pericardial effusion.   - TTE 4/21/23: borderline LVEF; RWMA  Normal RV; Severely dilated LA; Bioprosthetic valve is seen in the aortic position with findings suggestive of significant prosthetic aortic stenosis. The peak transvalvular velocity is 3.37 m/s, the mean transvalvular gradient is 27.00 mmHg, and the LVOT/AV velocity ratio is 0.31. The peak transaortic gradient is 45.43 mmHg. The aortic valve area (estimated via the continuity method) is 0.78 cm². The calculated aortic valve area indexed to body surface area is 0.51 cm²/m². Acceleration time 105 msMild AR; Mitral valve is moderately thickened and calcified with annuloplasty ring in the mitral position. Elevated gradients and pressure half time of 146 ms are consistent with severe mitral stenosis. Moderate-to-severe MR. Severe TR. PH with PASP 63 mmHg. Trivial pericardial effusion.    -CCTA (8/21/20): Patent LIMA to First Diagonal branch.  Patent ANTONIO to RPDA. Severe stenosis of the large first diagonal branch. Distal vessel fills via LIMA graft. Severe stenosis of proximal RCA. Distal vessel fills via ANTONIO graft. Nonobstructive disease throughout the LAD. Patent stent in mid LCX extending to the OM2. MV annuloplasty ring and Bioprosthetic AVR noted. Calcification throughout the aorta noted.    - EGD (outpatient, 1/18/24): small HH, diffuse severe inflammation characterized by erythema and erosions at the incisura and antrum, duodenitis in second portion of duodenum.     Home medications: Crestor 40mg qhs, lopressor 50mg bid, losartan 25mg qd, Lasix 40 mg PO twice a week; amlodipine 5 mg Po daily, cilostazol, eliquis 2.5 mg PO BID      # Pre-Operative Cardiovascular Risk Assessment and Optimization  - Patient with Extensive ASCVD and Multivalvular Heart disease as summarized above  - She reports worsening KRAFT in the last few months which significantly limited her ADLS. During that time she mainly stayed home (lives alone in a second floor walk up apartment), as even walking 1 city Block was too difficult. In the past week she started experiencing escalating chest discomfort for which she sought care.  - Echo this hospitalization reviewed with severe Multivalvular disease with severe bioprosthetic AS (MG 27, COY 0.78, LVEF 50%), Severe MS (MG 16), Moderate to severe MR, and Severe TR. In comparison to Echo from 2023 gradient across the MV has worsened  - Patient was recently evaluated by Structural Heart Team (Dr Gregory) and CTS ( Dr Menard) in February for Multidisciplinary approach of valvular disease  - She was deemed a high surgical risk due to her frailty and extensive ASCVD comorbidities and transcatheter intervention was recommended. Patient was pending GI evaluation prior to ALBER due to documented Hx of Tortuous Oesophagus  - Clinically she is warm, well perfused and well compensated with mildly elevated JVP, clear lungs and no lower extremity edema  - Patient is considered at Very high risk for low risk procedure. Recommend in Unit EGD with Cardiac Anesthesia Support given significant valvular comorbidities  - Appreciate Structural Heart team evaluation    # Severe bioprosthetic AS and Severe MS  - Patient with SAVR (#19 Felder porcine bio-prosthetic valve) from 2002, now with severe bioprosthetic AS (MG of 26 mmHg; PG of 45.43 largely unchanged from 4/2023)  - Patient S/p Annuloplasty Ring now with transvalvular gradient of16.00 mmHg ( From 10) at a heart rate of 75 bpm with Moderate to Severe MR  - As above patient referred for transcatheter valvular evaluation pending ALBER and GI clearance  - Prior EGD did not show any AVMS to suggest Heydes syndrome   - Goal is to maintain patient as euvolemic as possible. She is PPM dependant and paced at 75. Given elevated gradient across MV, would consider Decreasing pacing to 60.  - Please be very judicious with volume resuscitation. Patient currently receiving 1uPRBC. Would transfuse slowly over 4 hours and evaluation for Diuresis consideration post  - Would aim to keep Hg at or above 8    #Afib s/p AVN ablation and CRT-P ([facility], 8/2022; on Eliquis)  - Continue to hold off Eliquis pending EGD. Patient had large melanotic stool prior to my assessment today     #Enlarging aortic aneurysm/pseudoaneurysm   - AAA s/p open repair in 2015 with complications  - CTA ( 3/26/24): 6.5 x 5 cm lobulated sac of extravasation which arises from the distal graft at the site of bifurcation 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  - Patient evaluated by vascular this hospitalization. She will need repair of aneurysm/pseudoaneurysm once medically optimized    #CAD s/p CABG (LIMA to RI, ANTONIO to PDA)  - Followed by Dr. Preston  - on Home Crestor 40mg qhs-->Lipitor 40     Patient will be transferred to the CCU today for in-UNIT EGD with Cardiac Anesthesia support on 3/28. Dr Jerome to assume Cardiac care thereafter

## 2024-03-27 NOTE — DIETITIAN INITIAL EVALUATION ADULT - PROBLEM SELECTOR PLAN 2
Patient p/w KRAFT and chest pain, VSS found to be anemic with Hgb 6.9.  Patient with hx of TASNEEM, gets once or twice weekly iron infusions outpatient, hematologist is Dr. Izquierdo.  No s/s of bleeding, baseline hgb reportedly 9-10. On eliquis 2.5mg bid, does not take any antiplatelet agents.   Given 1u pRBC in the ED.  Ddx includes GIB (although pt constipated, no other signs of bleed), AoCD, aortic aneurysm leak, abdominal bleeding.  - monitor post-transfusion CBC --> improved to Hgb 8.4  - f/u hemolysis labs (hx of valve replacement)  - maintain active T&S  - maintain 2 large-bore IVs  - transfuse if Hgb < 7  - GI consult

## 2024-03-27 NOTE — PROGRESS NOTE ADULT - PROBLEM SELECTOR PLAN 4
History of AAA s/p open repair in 2015 followed by saccular AAA and contained rupture adjacent to previous stent graft, s/p open thoracoabdominal aneurysm repair with graft and bypasses to the celiac, SMA, left renal, right renal in 8/2020.  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    Plan:  - Vascular consulted, appreciate recommendations (repair of aneurysm/pseudoaneurysm once medically optimized)

## 2024-03-27 NOTE — PHYSICAL THERAPY INITIAL EVALUATION ADULT - ADDITIONAL COMMENTS
Pt. reports 2 flights to negotiate, ambulates with SC, reports increased difficulty with ADL/ambulation immediately PTA i/s/o  increased KRAFT.   Pt. has no current home services, no adaptive equipment, no alert button.   Pt. reports 1 fall in past 6 months.

## 2024-03-27 NOTE — DIETITIAN INITIAL EVALUATION ADULT - PROBLEM SELECTOR PLAN 4
Pt p/w chest and abdominal pain with ?radiation to the back.  Hx of AAA s/p open repair in 2015 followed saccular AAA and contained rupture adjacent to previous stent graft s/p open thoracoabdominal aneurysm repair with graft and bypasses to the celiac, SMA, left renal, right renal in 8/2020.  CT A/P showing post surgical changes from prior open thoracoabdominal aortic bypass graft with new focal dilatation of the distalmost aspect of the graft measuring 4.4 x 6.1 cm just above the aortic bifurcation and previously measuring 3.2 x 4.1 cm on 6/11/2021.   Vascular consulted in the ED, no acute interventions.   - monitor BP  - f/u vascular recs  - plan for CTA C/A/P to better evaluate the AAA when CHERRI improves vs abd US

## 2024-03-28 NOTE — PROGRESS NOTE ADULT - PROBLEM SELECTOR PLAN 7
baseline sCr 1.7-2   -Cr today still elevated   -avoid nephrotic agents   -hold home losartan   -cont to monitor

## 2024-03-28 NOTE — PROGRESS NOTE ADULT - PROBLEM SELECTOR PLAN 5
- hx of Afib, s/p AVN ablation and Bi-V PPM placement  - holding home Eliquis  - continue lopressor 25mg PO BID   - Cleared from GI perspective to resume systemic AC-- will need to discuss initiation of systemic AC with interdisciplinary teams further.

## 2024-03-28 NOTE — PROGRESS NOTE ADULT - ASSESSMENT
68y F, former smoker, with PMH of hypothyroidism, seizure disorder, Stage 3 CKD (baseline CR ~1.7-2.0), severe renal artery stenosis, chronic anemia (baseline Hgb ~9-10, followed by Heme: Dr. Izquierdo, gets weekly Fe infusion), AT, Afib s/p AVN ablation and CRT-P ([facility], 8/2022; on Eliquis), tortuous esophagus and Schatzi ring at [facility] [place] (per EDG in 2020), extensive vascular history: AAA s/p open repair in 2015 followed saccular AAA and contained rupture (see below), HTN, HLD, CAD s/p CABG (LIMA to RI, ANTONIO to PDA) with SAVR (#19 Fleder porcine bio-prosthetic valve) and mitral annuloplasty (Physio ring #26) at [facility] in 2002, HFpEF, and mixed valve disease: severe bioprosthetic AS (echo 3/27/24 MG 26 COY 0.82), severe MS (MG 16), moderate to severe MR, severe TR. Admitted for symptomatic anemia w/ course complicated by status epilepticus (2 seizure events) 3/26 w/ RR called, requiring step-up to telemetry for management of seizures and symptomatic anemia 2/2 GIB vs. vascular dissection. On 3/27, pt noted to have episode of melena, KASH positive. Cardiology consulted for pre-op clearance for possible EGD, patient transferred to CCU for in-unit scope if pursuing EGD.     Neuro   #Status epilepticus  #h/o seizure disorder   Episode of AMS 3/26, RRT called, pt found to be unresponsive, drooling. s/p ativan 2mg, keppra 3mg load w/ subsequent post-ictal state. Second witnessed tonic-clonic seizure (rhythmic jerking, frothing at mouth) during transfer to telemetry. Now full recovery to baseline mentation. Unclear of any home seizure meds, or seizure hx. CT head shows no evidence of intracranial hemorrhage, midline shift. Lactate cleared. Placed on vEEG, vEEG negative for seizures, d/c'ed.     Plan:   -c/w keppra 250mg i/s/o CKD  -obtain keppra level before next dose   -Appreciate Epilepsy recs     Cardiac  #HTN  #HLD   #CAD  #Chronic Afib   #HFpEF   #PAD   #AAA s/p repair   #Enlarging aortic aneurysm     Cardiac history:   -EKG- a-sensed, v-paced, rate 75   -TTE on 3/27 shows normal LV function, normal RV, severely dilated LA, bioprosthetic valve noted in aortic position, leaflet thickened, mild AI, mitral annuloplasty ring noted, MS, mod MR, PASP 63.   -CCTA on 8/20 shows patent LIMA to First Diagonal branch.  Patent ANTONIO to RPDA. Severe stenosis of the large first diagonal branch. Distal vessel fills via LIMA graft. Severe stenosis of proximal RCA. Distal vessel fills via ANTONIO graft. Nonobstructive disease throughout the LAD. Patent stent in mid LCX extending to the OM2. MV annuloplasty ring and Bioprosthetic AVR noted. Calcification throughout the aorta noted.  -Home medications: Crestor 40mg qhs, lopressor 50mg bid, losartan 25mg qd, Lasix 40 mg PO twice a week; amlodipine 5 mg Po daily, cilostazol, eliquis 2.5 mg PO BID    -Labs: D-dimer 2831, p-BNP elevated to 3727 (1564 in Feb 2024).   -Exam: euvolemic, no LE edema, cta b/l, -JVD, WWP, no RA     #Severe bioprosthetic AS (echo 3/27/24 MG 26 COY 0.82)  Patient with SAVR (#19 Fleder porcine bio-prosthetic valve) in 2002, now with severe bioprosthetic AS  Patient was following with Dr. Gregory and was pending GI clearance for a ALBER to evaluate the valves and plan for intervention   #Mitral stenosis (MG 16)  Patient is s/p mitral annuloplasty (Physio ring #26) now with severe MS  Gradient increased form prior echo, from 10 to 16 now at HR 75 bpm   as above     Plan:   -Recommend structural heart consult     #Afib s/p AVN ablation and CRT-P ([facility], 8/2022; on Eliquis)  Home meds: eliquis 2.5mg bid, lopressor 50mg bid    Plan:   -EP consult for PPM interrogation  -can start lopressor 25 mg PO BID in lieu of 50 mg PO BID i/s/o GI bleed   -restart AC once ok per primary team, can challenge with heparin gtt    #Chronic heart failure with preserved ejection fraction  Euvolemic on physical exam   Home med: Lasix 40 mg twice a week    Plan:  -give Lasix 40 mg IVP after the transfusion  -Frequent volume reassessments  -Daily CXR    #CAD s/p CABG (LIMA to RI, ANTONIO to PDA)  Followed by Dr. Preston  Home meds: Crestor 40mg qhs, lopressor 50mg bid, losartan 25mg qd  BB as above     Plan:   -c/w atorvastatin   -hold losartan i/s/o CHERRI      Vascular history:   - AAA s/p open repair in 2015 followed saccular AAA and contained rupture adjacent to previous stent graft s/p open thoracoabdominal aneurysm repair with 20mm tube graft and 6mm bypasses to the celiac, SMA, left renal, right renal (end to end) in 8/2020  - PAD s/p L pSFA stent/L-TIFFANY stent with occluded b/l SFA followed by L-internal iliac artery stent in 5/2021 c/b L-RP hematoma requiring 2 units PRBC with ongoing LLE claudication - Dr. Sandra recommending patient to undergo left fem-pop bypass with PTFE. Saw Dr. Soriano in June 2023 - per note: "Aorto-iliac duplex performed to evaluate patency reveals widely patent aortic graft, patent L TIFFANY stent, L EIA 50% stenosis noted, patent R pSFA stent noted. Carotid duplex performed to evaluate for progression of stenosis reveals b/l <50% stenosis due to fibrocalcific plaque, both vertebral arteries patent w/antegrade flow. 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  -CT A/P non-con (3/25/24) showing post surgical changes from prior open thoracoabdominal aortic bypass graft w/ new focal dilatation of the distal most aspect of the graft measuring 4.4 x 6.1 just above the aortic bifurcation, previously measuring 3.2x4.1 cm on 6/11/21    Plan:   -vascular following, will need repair of aneurysm/pseudoaneurysm once medically optimized   -urgent vascular consult if hypotensive, tachycardic, c/f aneurysm rupture   -strict BP control, keep BP around 120-130/80 mmHg     Pulm   No respiratory distress. O2 sat 100% on RA.   CT showing bibasilar atelectasis, emphysema     Plan:   -DONN    GI   #Anemia   Hgb 6.9 on admission decrease from 11.7 from 2/24 w/ reported melena. KASH w/ dark stool. Likely multifactorial. Iron panel normal however confounded by recent transfusion. Recent EGD on 1/24 showing diffuse severe inflammation characterized by erythema and erosions at incisura, antrum, duodenitis.   If pt requires EGD, likely will need in-unit scope d/t extensive cardiac, vascular dx     Plan:   -c/w protonix 40 mg IVP BID   -NPO MN for tentative EGD 3/28   -Hold AC  -monitor Hgb, transfuse Hgb < 8   -maintain active T&S, 2 large bore IVs   -Appreciate GI recs     Renal   #CHERRI on CKD  Baseline Cr reportedly 1.7-2.0. Poor PO intake d/t constipation. Likely pre-renal. No signs of urinary obstruction.   Urine studies w/ FeNa- intrinsic etiology   Cr trending downwards, 3/27- Cr 2.49     Plan:   -monitor Cr   -lasix IV 40 s/p 1 unit pRBC     Endo   #Hypothyroidism     Plan:  -c/w home med synthroid 75mcg    Heme   #Anemia   -See plan above    ID   -DONN       F: None  E: replete K < 4, Mg <2  DVT ppx- hold i/s/o possible GIB  Diet- NPO @MN  Activity- ambulate as tolerated  Code- Full. 68y F, former smoker, with PMH of hypothyroidism, seizure disorder, Stage 3 CKD (baseline CR ~1.7-2.0), severe renal artery stenosis, chronic anemia (baseline Hgb ~9-10, followed by Heme: Dr. Izquierdo, gets weekly Fe infusion), AT, Afib s/p AVN ablation and CRT-P ([facility], 8/2022; on Eliquis), tortuous esophagus and Schatzi ring at [facility] [place] (per EDG in 2020), extensive vascular history: AAA s/p open repair in 2015 followed saccular AAA and contained rupture (see below), HTN, HLD, CAD s/p CABG (LIMA to RI, ANTONIO to PDA) with SAVR (#19 Felder porcine bio-prosthetic valve) and mitral annuloplasty (Physio ring #26) at [facility] in 2002, HFpEF, and mixed valve disease: severe bioprosthetic AS (echo 3/27/24 MG 26 COY 0.82), severe MS (MG 16), moderate to severe MR, severe TR. Admitted for symptomatic anemia w/ course complicated by status epilepticus (2 seizure events) 3/26 w/ RR called, requiring step-up to telemetry for management of seizures and symptomatic anemia 2/2 GIB vs. vascular dissection. On 3/27, pt noted to have episode of melena, KASH positive. Cardiology consulted for pre-op clearance for possible EGD, patient transferred to CCU for in-unit scope if pursuing EGD.     Neuro   #Status epilepticus  #h/o seizure disorder   Episode of AMS 3/26, RRT called, pt found to be unresponsive, drooling. s/p ativan 2mg, keppra 3mg load w/ subsequent post-ictal state. Second witnessed tonic-clonic seizure (rhythmic jerking, frothing at mouth) during transfer to telemetry. Now full recovery to baseline mentation. Unclear of any home seizure meds, or seizure hx. CT head shows no evidence of intracranial hemorrhage, midline shift. Lactate cleared. Placed on vEEG, vEEG negative for seizures, d/c'ed.     Plan:   -c/w keppra 250mg i/s/o CKD  -obtain keppra level before next dose   -Appreciate Epilepsy recs     Cardiac  #HTN  #HLD   #CAD  #Chronic Afib   #HFpEF   #PAD   #AAA s/p repair   #Enlarging aortic aneurysm     Cardiac history:   -EKG- a-sensed, v-paced, rate 75   -TTE on 3/27 shows normal LV function, normal RV, severely dilated LA, bioprosthetic valve noted in aortic position, leaflet thickened, mild AI, mitral annuloplasty ring noted, MS, mod MR, PASP 63.   -CCTA on 8/20 shows patent LIMA to First Diagonal branch.  Patent ANTONIO to RPDA. Severe stenosis of the large first diagonal branch. Distal vessel fills via LIMA graft. Severe stenosis of proximal RCA. Distal vessel fills via ANTONIO graft. Nonobstructive disease throughout the LAD. Patent stent in mid LCX extending to the OM2. MV annuloplasty ring and Bioprosthetic AVR noted. Calcification throughout the aorta noted.  -Home medications: Crestor 40mg qhs, lopressor 50mg bid, losartan 25mg qd, Lasix 40 mg PO twice a week; amlodipine 5 mg Po daily, cilostazol, eliquis 2.5 mg PO BID    -Labs: D-dimer 2831, p-BNP elevated to 3727 (1564 in Feb 2024).   -Exam: euvolemic, no LE edema, cta b/l, -JVD, WWP, no RA     #Severe bioprosthetic AS (echo 3/27/24 MG 26 COY 0.82)  Patient with SAVR (#19 Felder porcine bio-prosthetic valve) in 2002, now with severe bioprosthetic AS  Patient was following with Dr. Gregory and was pending GI clearance for a ALBER to evaluate the valves and plan for intervention   #Mitral stenosis (MG 16)  Patient is s/p mitral annuloplasty (Physio ring #26) now with severe MS  Gradient increased form prior echo, from 10 to 16 now at HR 75 bpm   as above     Plan:   -ALBER once medically optimized     #Afib s/p AVN ablation and CRT-P ([facility], 8/2022; on Eliquis)  Home meds: eliquis 2.5mg bid, lopressor 50mg bid    Plan:   -EP consult for PPM interrogation --> normal function   -can start lopressor 25 mg PO BID in lieu of 50 mg PO BID i/s/o GI bleed   -restart AC once ok per primary team, can challenge with heparin gtt    #Chronic heart failure with preserved ejection fraction  Euvolemic on physical exam   Home med: Lasix 40 mg twice a week  Given Lasix 40 mg IVP after the transfusion, net negative 2L    Plan:  -Frequent volume reassessments  -Daily CXR    #CAD s/p CABG (LIMA to RI, ANTONIO to PDA)  Followed by Dr. Preston  Home meds: Crestor 40mg qhs, lopressor 50mg bid, losartan 25mg qd  BB as above     Plan:   -c/w atorvastatin   -hold losartan i/s/o CHERRI      Vascular history:   - AAA s/p open repair in 2015 followed saccular AAA and contained rupture adjacent to previous stent graft s/p open thoracoabdominal aneurysm repair with 20mm tube graft and 6mm bypasses to the celiac, SMA, left renal, right renal (end to end) in 8/2020  - PAD s/p L pSFA stent/L-TIFFANY stent with occluded b/l SFA followed by L-internal iliac artery stent in 5/2021 c/b L-RP hematoma requiring 2 units PRBC with ongoing LLE claudication - Dr. Sandra recommending patient to undergo left fem-pop bypass with PTFE. Saw Dr. Soriano in June 2023 - per note: "Aorto-iliac duplex performed to evaluate patency reveals widely patent aortic graft, patent L TIFFANY stent, L EIA 50% stenosis noted, patent R pSFA stent noted. Carotid duplex performed to evaluate for progression of stenosis reveals b/l <50% stenosis due to fibrocalcific plaque, both vertebral arteries patent w/antegrade flow. 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  -CT A/P non-con (3/25/24) showing post surgical changes from prior open thoracoabdominal aortic bypass graft w/ new focal dilatation of the distal most aspect of the graft measuring 4.4 x 6.1 just above the aortic bifurcation, previously measuring 3.2x4.1 cm on 6/11/21    Plan:   -vascular following, will need repair of aneurysm/pseudoaneurysm once medically optimized (post-EGD)  -urgent vascular consult if hypotensive, tachycardic, c/f aneurysm rupture   -strict BP control, keep BP around 120-130/80 mmHg     Pulm   No respiratory distress. O2 sat 100% on RA.   CT showing bibasilar atelectasis, emphysema     Plan:   -DONN    GI   #Anemia   Hgb 6.9 on admission decrease from 11.7 from 2/24 w/ reported melena. KASH w/ dark stool. Likely multifactorial. Iron panel normal however confounded by recent transfusion. Recent EGD on 1/24 showing diffuse severe inflammation characterized by erythema and erosions at incisura, antrum, duodenitis.   Hgb appropriate response s/p 1 unit pRBC 9.4, normal brown BM 3/28 AM    Plan:   -EGD today in OR  -c/w protonix 40 mg IVP BID   -Hold AC  -monitor Hgb, transfuse Hgb < 8   -maintain active T&S, 2 large bore IVs   -Appreciate GI recs     Renal   #CHERRI on CKD  Baseline Cr reportedly 1.7-2.0. Poor PO intake d/t constipation. Likely pre-renal. No signs of urinary obstruction.   Urine studies w/ FeNa- intrinsic etiology   Cr trending downwards, 3/27- Cr 2.49     Plan:   -monitor Cr     Endo   #Hypothyroidism     Plan:  -c/w home med synthroid 75mcg    Heme   #Anemia   -See plan above    ID   -DONN       F: None  E: replete K < 4, Mg <2  DVT ppx- hold i/s/o possible GIB  Diet- NPO @MN  Activity- ambulate as tolerated  Code- Full. 68y F, former smoker, with PMH of hypothyroidism, seizure disorder, Stage 3 CKD (baseline CR ~1.7-2.0), severe renal artery stenosis, chronic anemia (baseline Hgb ~9-10, followed by Heme: Dr. Izquierdo, gets weekly Fe infusion), AT, Afib s/p AVN ablation and CRT-P ([facility], 8/2022; on Eliquis), tortuous esophagus and Schatzi ring at [facility] [place] (per EDG in 2020), extensive vascular history: AAA s/p open repair in 2015 followed saccular AAA and contained rupture (see below), HTN, HLD, CAD s/p CABG (LIMA to RI, ANTONIO to PDA) with SAVR (#19 Felder porcine bio-prosthetic valve) and mitral annuloplasty (Physio ring #26) at [facility] in 2002, HFpEF, and mixed valve disease: severe bioprosthetic AS (echo 3/27/24 MG 26 COY 0.82), severe MS (MG 16), moderate to severe MR, severe TR. Admitted for symptomatic anemia w/ course complicated by status epilepticus (2 seizure events) 3/26 w/ RR called, requiring step-up to telemetry for management of seizures and symptomatic anemia. Started on Keppra w/o further seizure episodes. Pt noted to have melena, KASH positive. Appropriate Hgb increase s/p 1unit PRBC, Hgb 9.4, 3/28 AM. EGD performed which shows normal esophagus, duodenum, non-erosive gastritis. Followed by vascular for CT findings of aneurysm/pseudoaneurysm, will need repair once medically optimized. Stable for stepdown.       Cardiology consulted for pre-op clearance for possible EGD, patient transferred to CCU for in-unit scope if pursuing EGD.     Neuro   #Status epilepticus  #h/o seizure disorder   Episode of AMS 3/26, RRT called, pt found to be unresponsive, drooling. s/p ativan 2mg, keppra 3mg load w/ subsequent post-ictal state. Second witnessed tonic-clonic seizure (rhythmic jerking, frothing at mouth) during transfer to telemetry. Now full recovery to baseline mentation. Unclear of any home seizure meds, or seizure hx. CT head shows no evidence of intracranial hemorrhage, midline shift. Lactate cleared. Placed on vEEG, vEEG negative for seizures, d/c'ed.     Plan:   -c/w keppra 250mg i/s/o CKD  -obtain keppra level before next dose   -Appreciate Epilepsy recs     Cardiac  #HTN  #HLD   #CAD  #Chronic Afib   #HFpEF   #PAD   #AAA s/p repair   #Enlarging aortic aneurysm     Cardiac history:   -EKG- a-sensed, v-paced, rate 75   -TTE on 3/27 shows normal LV function, normal RV, severely dilated LA, bioprosthetic valve noted in aortic position, leaflet thickened, mild AI, mitral annuloplasty ring noted, MS, mod MR, PASP 63.   -CCTA on 8/20 shows patent LIMA to First Diagonal branch.  Patent ANTONIO to RPDA. Severe stenosis of the large first diagonal branch. Distal vessel fills via LIMA graft. Severe stenosis of proximal RCA. Distal vessel fills via ANTONIO graft. Nonobstructive disease throughout the LAD. Patent stent in mid LCX extending to the OM2. MV annuloplasty ring and Bioprosthetic AVR noted. Calcification throughout the aorta noted.  -Home medications: Crestor 40mg qhs, lopressor 50mg bid, losartan 25mg qd, Lasix 40 mg PO twice a week; amlodipine 5 mg Po daily, cilostazol, eliquis 2.5 mg PO BID    -Labs: D-dimer 2831, p-BNP elevated to 3727 (1564 in Feb 2024).   -Exam: euvolemic, no LE edema, cta b/l, -JVD, WWP, no RA     #Severe bioprosthetic AS (echo 3/27/24 MG 26 COY 0.82)  Patient with SAVR (#19 Felder porcine bio-prosthetic valve) in 2002, now with severe bioprosthetic AS  Patient was following with Dr. Gregory and was pending GI clearance for a ALBER to evaluate the valves and plan for intervention   #Mitral stenosis (MG 16)  Patient is s/p mitral annuloplasty (Physio ring #26) now with severe MS  Gradient increased form prior echo, from 10 to 16 now at HR 75 bpm   as above     Plan:   -ALBER once medically optimized     #Afib s/p AVN ablation and CRT-P ([facility], 8/2022; on Eliquis)  Home meds: eliquis 2.5mg bid, lopressor 50mg bid    Plan:   -EP consult for PPM interrogation --> normal function   -can start lopressor 25 mg PO BID in lieu of 50 mg PO BID i/s/o GI bleed   -restart AC once ok per primary team, can challenge with heparin gtt    #Chronic heart failure with preserved ejection fraction  Euvolemic on physical exam   Home med: Lasix 40 mg twice a week  Given Lasix 40 mg IVP after the transfusion, net negative 2L    Plan:  -Frequent volume reassessments  -Daily CXR    #CAD s/p CABG (LIMA to RI, ANTONIO to PDA)  Followed by Dr. Preston  Home meds: Crestor 40mg qhs, lopressor 50mg bid, losartan 25mg qd  BB as above     Plan:   -c/w atorvastatin   -hold losartan i/s/o CHERRI      Vascular history:   - AAA s/p open repair in 2015 followed saccular AAA and contained rupture adjacent to previous stent graft s/p open thoracoabdominal aneurysm repair with 20mm tube graft and 6mm bypasses to the celiac, SMA, left renal, right renal (end to end) in 8/2020  - PAD s/p L pSFA stent/L-TIFFANY stent with occluded b/l SFA followed by L-internal iliac artery stent in 5/2021 c/b L-RP hematoma requiring 2 units PRBC with ongoing LLE claudication - Dr. Sandra recommending patient to undergo left fem-pop bypass with PTFE. Saw Dr. Soriano in June 2023 - per note: "Aorto-iliac duplex performed to evaluate patency reveals widely patent aortic graft, patent L TIFFANY stent, L EIA 50% stenosis noted, patent R pSFA stent noted. Carotid duplex performed to evaluate for progression of stenosis reveals b/l <50% stenosis due to fibrocalcific plaque, both vertebral arteries patent w/antegrade flow. 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  -CT A/P non-con (3/25/24) showing post surgical changes from prior open thoracoabdominal aortic bypass graft w/ new focal dilatation of the distal most aspect of the graft measuring 4.4 x 6.1 just above the aortic bifurcation, previously measuring 3.2x4.1 cm on 6/11/21    Plan:   -vascular following, will need repair of aneurysm/pseudoaneurysm once medically optimized (post-EGD)  -urgent vascular consult if hypotensive, tachycardic, c/f aneurysm rupture   -strict BP control, keep BP around 120-130/80 mmHg     Pulm   No respiratory distress. O2 sat 100% on RA.   CT showing bibasilar atelectasis, emphysema     Plan:   -DONN    GI   #Anemia   Hgb 6.9 on admission decrease from 11.7 from 2/24 w/ reported melena. KASH w/ dark stool. Likely multifactorial. Iron panel normal however confounded by recent transfusion. Recent EGD on 1/24 showing diffuse severe inflammation characterized by erythema and erosions at incisura, antrum, duodenitis.   Hgb appropriate response s/p 1 unit pRBC 9.4, normal brown BM 3/28 AM    Plan:   -EGD today in OR  -c/w protonix 40 mg IVP BID   -Hold AC  -monitor Hgb, transfuse Hgb < 8   -maintain active T&S, 2 large bore IVs   -Appreciate GI recs     Renal   #CHERRI on CKD  Baseline Cr reportedly 1.7-2.0. Poor PO intake d/t constipation. Likely pre-renal. No signs of urinary obstruction.   Urine studies w/ FeNa- intrinsic etiology   Cr trending downwards, 3/27- Cr 2.49     Plan:   -monitor Cr     Endo   #Hypothyroidism     Plan:  -c/w home med synthroid 75mcg    Heme   #Anemia   -See plan above    ID   -DONN       F: None  E: replete K < 4, Mg <2  DVT ppx- hold i/s/o possible GIB  Diet- NPO @MN  Activity- ambulate as tolerated  Code- Full. 68y F, former smoker, with PMH of hypothyroidism, seizure disorder, Stage 3 CKD (baseline CR ~1.7-2.0), severe renal artery stenosis, chronic anemia (baseline Hgb ~9-10, followed by Heme: Dr. Izquierdo, gets weekly Fe infusion), AT, Afib s/p AVN ablation and CRT-P ([facility], 8/2022; on Eliquis), tortuous esophagus and Schatzi ring at [facility] [place] (per EDG in 2020), extensive vascular history: AAA s/p open repair in 2015 followed saccular AAA and contained rupture w/ subsequent repair in 2020 (see below), HTN, HLD, CAD s/p CABG (LIMA to RI, ANTONIO to PDA) with SAVR (#19 Felder porcine bio-prosthetic valve) and mitral annuloplasty (Physio ring #26) at [facility] in 2002, HFpEF, and mixed valve disease: severe bioprosthetic AS (echo 3/27/24 MG 26 COY 0.82), severe MS (MG 16), moderate to severe MR, severe TR. Admitted for symptomatic anemia w/ course complicated by status epilepticus (2 seizure events) 3/26 w/ RR called, requiring step-up to telemetry for management of seizures and symptomatic anemia. Started on Keppra w/o further seizure episodes. Pt noted to have melena, KASH positive, drop in Hgb. Appropriate Hgb increase s/p 1unit PRBC, Hgb 9.4, 3/28 AM. EGD performed which shows normal esophagus, duodenum, non-erosive gastritis. Followed by vascular for CT findings of aneurysm/pseudoaneurysm, will need repair once medically optimized. Stable for stepdown.     Neuro   #Status epilepticus  #h/o seizure disorder   Episode of AMS 3/26, RRT called, pt found to be unresponsive, drooling. s/p ativan 2mg, keppra 3mg load w/ subsequent post-ictal state. Second witnessed tonic-clonic seizure (rhythmic jerking, frothing at mouth) during transfer to telemetry. Now full recovery to baseline mentation. Unclear of any home seizure meds, or seizure hx. CT head shows no evidence of intracranial hemorrhage, midline shift. Lactate cleared. Placed on vEEG, vEEG negative for seizures, d/c'ed.     Plan:   -c/w keppra 250mg i/s/o CKD  -obtain keppra level before next dose   -Appreciate Epilepsy recs     Cardiac  #HTN  #HLD   #CAD  #Chronic Afib   #HFpEF   #PAD   #AAA s/p repair   #Enlarging aortic aneurysm     Cardiac history:   -EKG- a-sensed, v-paced, rate 75   -TTE on 3/27 shows normal LV function, normal RV, severely dilated LA, bioprosthetic valve noted in aortic position, leaflet thickened, mild AI, mitral annuloplasty ring noted, MS, mod MR, PASP 63.   -CCTA on 8/20 shows patent LIMA to First Diagonal branch.  Patent ANTONIO to RPDA. Severe stenosis of the large first diagonal branch. Distal vessel fills via LIMA graft. Severe stenosis of proximal RCA. Distal vessel fills via ANTONIO graft. Nonobstructive disease throughout the LAD. Patent stent in mid LCX extending to the OM2. MV annuloplasty ring and Bioprosthetic AVR noted. Calcification throughout the aorta noted.  -Home medications: Crestor 40mg qhs, lopressor 50mg bid, losartan 25mg qd, Lasix 40 mg PO twice a week; amlodipine 5 mg Po daily, cilostazol, eliquis 2.5 mg PO BID    -Labs: D-dimer 2831, p-BNP elevated to 3727 (1564 in Feb 2024).   -Exam: euvolemic, no LE edema, cta b/l, -JVD, WWP, no RA     #Severe bioprosthetic AS (echo 3/27/24 MG 26 COY 0.82)  Patient with SAVR (#19 Felder porcine bio-prosthetic valve) in 2002, now with severe bioprosthetic AS  Patient was following with Dr. Gregory and was pending GI clearance for a ALBER to evaluate the valves and plan for intervention   #Mitral stenosis (MG 16)  Patient is s/p mitral annuloplasty (Physio ring #26) now with severe MS  Gradient increased form prior echo, from 10 to 16 now at HR 75 bpm   as above     Plan:   -ALBER once medically optimized     #Afib s/p AVN ablation and CRT-P ([facility], 8/2022; on Eliquis)  Home meds: eliquis 2.5mg bid, lopressor 50mg bid    Plan:   -EP consult for PPM interrogation --> normal function   -can start lopressor 25 mg PO BID in lieu of 50 mg PO BID i/s/o GI bleed   -restart AC once ok per primary team, can challenge with heparin gtt    #Chronic heart failure with preserved ejection fraction  Euvolemic on physical exam   Home med: Lasix 40 mg twice a week  Given Lasix 40 mg IVP after the transfusion, net negative 2L    Plan:  -Frequent volume reassessments  -Daily CXR    #CAD s/p CABG (LIMA to RI, ANTONIO to PDA)  Followed by Dr. Preston  Home meds: Crestor 40mg qhs, lopressor 50mg bid, losartan 25mg qd  BB as above     Plan:   -c/w atorvastatin   -hold losartan i/s/o CHERRI      Vascular history:   - AAA s/p open repair in 2015 followed saccular AAA and contained rupture adjacent to previous stent graft s/p open thoracoabdominal aneurysm repair with 20mm tube graft and 6mm bypasses to the celiac, SMA, left renal, right renal (end to end) in 8/2020  - PAD s/p L pSFA stent/L-TIFFANY stent with occluded b/l SFA followed by L-internal iliac artery stent in 5/2021 c/b L-RP hematoma requiring 2 units PRBC with ongoing LLE claudication - Dr. Sandra recommending patient to undergo left fem-pop bypass with PTFE. Saw Dr. Soriano in June 2023 - per note: "Aorto-iliac duplex performed to evaluate patency reveals widely patent aortic graft, patent L TIFFANY stent, L EIA 50% stenosis noted, patent R pSFA stent noted. Carotid duplex performed to evaluate for progression of stenosis reveals b/l <50% stenosis due to fibrocalcific plaque, both vertebral arteries patent w/antegrade flow. 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  -CT A/P non-con (3/25/24) showing post surgical changes from prior open thoracoabdominal aortic bypass graft w/ new focal dilatation of the distal most aspect of the graft measuring 4.4 x 6.1 just above the aortic bifurcation, previously measuring 3.2x4.1 cm on 6/11/21    Plan:   -vascular following, will need repair of aneurysm/pseudoaneurysm once medically optimized (post-EGD)  -urgent vascular consult if hypotensive, tachycardic, c/f aneurysm rupture   -strict BP control, keep BP around 120-130/80 mmHg     Pulm   No respiratory distress. O2 sat 100% on RA.   CT showing bibasilar atelectasis, emphysema     Plan:   -DONN    GI   #Anemia   Hgb 6.9 on admission decrease from 11.7 from 2/24 w/ reported melena. KASH w/ dark stool. Likely multifactorial. Iron panel normal however confounded by recent transfusion. Recent EGD on 1/24 showing diffuse severe inflammation characterized by erythema and erosions at incisura, antrum, duodenitis.   Hgb appropriate response s/p 1 unit pRBC 9.4, normal brown BM 3/28 AM    Plan:   -EGD today in OR  -c/w protonix 40 mg IVP BID   -Hold AC  -monitor Hgb, transfuse Hgb < 8   -maintain active T&S, 2 large bore IVs   -Appreciate GI recs     Renal   #CHERRI on CKD  Baseline Cr reportedly 1.7-2.0. Poor PO intake d/t constipation. Likely pre-renal. No signs of urinary obstruction.   Urine studies w/ FeNa- intrinsic etiology   Cr trending downwards, 3/27- Cr 2.49     Plan:   -monitor Cr     Endo   #Hypothyroidism     Plan:  -c/w home med synthroid 75mcg    Heme   #Anemia   -See plan above    ID   -DONN       F: None  E: replete K < 4, Mg <2  DVT ppx- hold i/s/o possible GIB  Diet- NPO @MN  Activity- ambulate as tolerated  Code- Full.

## 2024-03-28 NOTE — PROGRESS NOTE ADULT - PROBLEM SELECTOR PLAN 3
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

## 2024-03-28 NOTE — PROGRESS NOTE ADULT - PROBLEM SELECTOR PLAN 2
hx of TASNEEM - gets weekly out-patient Iron infusions  - follows with hematologist Dr. Izquierdo   - s/p EGD with GI in OR with cardiac anesthesia on 3/28, no active bleed per GI  team   - continue to hold home AC (GI cleared to resume systemic AC)       #MELENA ON ADMISSION   -s/p EGD done today that was unremarkable   -GI cleared for systemic AC  -c/w PO Protonix

## 2024-03-28 NOTE — CHART NOTE - NSCHARTNOTEFT_GEN_A_CORE
EGD performed in the OR with Dr Munoz and myself, findings as noted below:    Impression:  Normal esophagus.  Normal duodenum.  Erythema in the incisura of the stomach & antrum compatible with non-erosive gastritis.    Plan:  -Monitor H/H     -Protonix 40 mg po daily     -Diet as per primary team      -Remainder of management as per primary team

## 2024-03-28 NOTE — PROGRESS NOTE ADULT - SUBJECTIVE AND OBJECTIVE BOX
Cardiology PA Adult Progress Note        Hospital Course: 68y F, former smoker, with PMH of hypothyroidism, seizure disorder, Stage 3 CKD (baseline CR ~1.7-2.0), severe renal artery stenosis, chronic anemia (baseline Hgb ~9-10, followed by Heme: Dr. Izquierdo, gets weekly Fe infusion), AT, Afib s/p AVN ablation and CRT-P ([facility], 8/2022; on Eliquis), tortuous esophagus and Schatzi ring at [facility] (per EDG in 2020), extensive vascular history: AAA s/p open repair in 2015 followed saccular AAA and contained rupture w/ subsequent repair in 2020 (see below), HTN, HLD, CAD s/p CABG (LIMA to RI, ANTONIO to PDA) with SAVR (#19 Felder porcine bio-prosthetic valve) and mitral annuloplasty (Physio ring #26) at [facility] in 2002, HFpEF, and mixed valve disease: severe bioprosthetic AS (echo 3/27/24 MG 26 COY 0.82), severe MS (MG 16), moderate to severe MR, severe TR initially admitted to [facility] for symptomatic anemia w/ course complicated by status epilepticus (2 seizure events) 3/26 w/ RR called, requiring step-up to [facility] for management of seizures and symptomatic anemia. Started on Keppra w/o further seizure episodes. Pt noted to have melena, KASH positive, drop in Hgb. Appropriate Hgb increase s/p 1unit PRBC, Hgb 9.4, 3/28 AM. Patient transferred to CCU for monitoring during EGD performed today which shows normal esophagus, duodenum, non-erosive gastritis. Patient is currently being followed by vascular for CT findings of aneurysm/pseudoaneurysm that will be repaired during this admission as well as intervention to be done this admission with CTSx for her valvulopathy. Patient is now stable for stepdown.     Subjective Assessment: Patient seen and examined at bedside, aaox3. Patient has no complaints stating she is ambulating to the end of the hallway without issues. Patient denies chest pain, SOB, KRAFT, palpitations, dizziness, LOC, N/V/D, fever/chills/sick contact, diaphoresis, orthopnea/PND, and leg swelling.    ROS negative except as noted above.  	  MEDICATIONS:  metoprolol tartrate 25 milliGRAM(s) Oral every 12 hours  acetaminophen     Tablet .. 650 milliGRAM(s) Oral every 6 hours PRN  levETIRAcetam 250 milliGRAM(s) Oral two times a day  melatonin 5 milliGRAM(s) Oral at bedtime  sertraline 50 milliGRAM(s) Oral every 24 hours  pantoprazole    Tablet 40 milliGRAM(s) Oral before breakfast  polyethylene glycol 3350 17 Gram(s) Oral every 24 hours  senna 2 Tablet(s) Oral at bedtime  atorvastatin 40 milliGRAM(s) Oral at bedtime  chlorhexidine 2% Cloths 1 Application(s) Topical <User Schedule>      	    [PHYSICAL EXAM:  TELEMETRY:  T(C): 37 (03-28-24 @ 10:19), Max: 37.8 (03-27-24 @ 22:04)  HR: 75 (03-28-24 @ 16:00) (75 - 76)  BP: 102/57 (03-28-24 @ 16:00) (97/52 - 148/72)  RR: 18 (03-28-24 @ 16:00) (16 - 18)  SpO2: 99% (03-28-24 @ 16:00) (99% - 100%)  Wt(kg): --  I&O's Summary    27 Mar 2024 07:01  -  28 Mar 2024 07:00  --------------------------------------------------------  IN: 300 mL / OUT: 2275 mL / NET: -1975 mL      Height (cm): 167.6 (03-28 @ 10:19)  Weight (kg): 52.2 (03-28 @ 10:19)  BMI (kg/m2): 18.6 (03-28 @ 10:19)  BSA (m2): 1.58 (03-28 @ 10:19)                                        Appearance: Normal	  HEENT:   Normal oral mucosa, PERRLA, EOMI	  Neck: Supple, - JVD;   Cardiovascular: Normal S1 S2, No JVD, +murmur   Respiratory: Lungs clear to auscultation	  Gastrointestinal:  Soft, Non-tender, + BS	  Skin: No rashes, No ecchymoses, No cyanosis  Extremities: Normal range of motion, No clubbing, cyanosis or edema  Vascular: Peripheral pulses palpable 2+ bilaterally  Neurologic: Non-focal  Psychiatry: A & O x 3, Mood & affect appropriate      	    ECG:  	  RADIOLOGY:   DIAGNOSTIC TESTING:  [ ] Echocardiogram:   [ ]  Catheterization:  [ ] Stress Test:    [ ] ALBER  OTHER: 	    LABS:	 	  CARDIAC MARKERS:                            9.4    9.45  )-----------( 166      ( 28 Mar 2024 05:30 )             28.5     03-28    136  |  104  |  38<H>  ----------------------------<  88  4.1   |  19<L>  |  2.58<H>    Ca    10.4      28 Mar 2024 05:30  Phos  3.6     03-28  Mg     2.1     03-28    TPro  7.1  /  Alb  4.1  /  TBili  0.4  /  DBili  x   /  AST  25  /  ALT  10  /  AlkPhos  101  03-28    proBNP:   Lipid Profile:   HgA1c:   TSH:

## 2024-03-28 NOTE — PROGRESS NOTE ADULT - ASSESSMENT
CCU    68 y o F, former smoker, with PMH of hypothyroidism, seizure disorder, Stage 3 CKD (baseline CR ~1.7-2.0), severe renal artery stenosis, chronic anemia (baseline Hgb ~9-10, followed by Heme: Dr. Izquierdo, gets weekly Fe infusion), AT, Afib s/p AVN ablation and CRT-P ([facility], 8/2022; on Eliquis), [place] [facility] [place] in [place]), extensive vascular history: AAA s/p open repair in 2015 followed saccular AAA and contained rupture w/ subsequent repair in 2020 (see below), HTN, HLD, CAD s/p CABG (LIMA to RI, ANTONIO to PDA) with SAVR (#19 Felder porcine bio-prosthetic valve) and mitral annuloplasty (Physio ring #26) at [facility] in 2002, HFpEF, and mixed valve disease: severe bioprosthetic AS (echo 3/27/24 MG 26 COY 0.82), severe MS (MG 16), moderate to severe MR, severe TR. Admitted for symptomatic anemia w/ course complicated by status epilepticus (2 seizure events) 3/26 w/ RR called, requiring step-up to telemetry for management of seizures and symptomatic anemia. Started on Keppra w/o further seizure episodes. Pt noted to have melena, KASH positive, drop in Hgb. Appropriate Hgb increase s/p 1unit PRBC, Hgb 9.4, 3/28 AM. EGD performed which shows normal esophagus, duodenum, non-erosive gastritis. Followed by vascular for CT findings of aneurysm/pseudoaneurysm, will need repair once medically optimized. Stable for stepdown.     Neuro   #Status epilepticus  #h/o seizure disorder   Episode of AMS 3/26, RRT called, pt found to be unresponsive, drooling. s/p ativan 2mg, keppra 3mg load w/ subsequent post-ictal state. Second witnessed tonic-clonic seizure (rhythmic jerking, frothing at mouth) during transfer to telemetry. Now full recovery to baseline mentation. Unclear of any home seizure meds, or seizure hx. CT head shows no evidence of intracranial hemorrhage, midline shift. Lactate cleared. Placed on vEEG, vEEG negative for seizures, d/c'ed.     Plan:   -c/w keppra 250mg i/s/o CKD  -obtain keppra level before next dose   -Appreciate Epilepsy recs     Cardiac  #HTN  #HLD   #CAD  #Chronic Afib   #HFpEF   #PAD   #AAA s/p repair   #Enlarging aortic aneurysm     Cardiac history:   -EKG- a-sensed, v-paced, rate 75   -TTE on 3/27 shows normal LV function, normal RV, severely dilated LA, bioprosthetic valve noted in aortic position, leaflet thickened, mild AI, mitral annuloplasty ring noted, MS, mod MR, PASP 63.   -CCTA on 8/20 shows patent LIMA to First Diagonal branch.  Patent ANTONIO to RPDA. Severe stenosis of the large first diagonal branch. Distal vessel fills via LIMA graft. Severe stenosis of proximal RCA. Distal vessel fills via ANTONIO graft. Nonobstructive disease throughout the LAD. Patent stent in mid LCX extending to the OM2. MV annuloplasty ring and Bioprosthetic AVR noted. Calcification throughout the aorta noted.  -Home medications: Crestor 40mg qhs, lopressor 50mg bid, losartan 25mg qd, Lasix 40 mg PO twice a week; amlodipine 5 mg Po daily, cilostazol, eliquis 2.5 mg PO BID    -Labs: D-dimer 2831, p-BNP elevated to 3727 (1564 in Feb 2024).   -Exam: euvolemic, no LE edema, cta b/l, -JVD, WWP, no RA     #Severe bioprosthetic AS (echo 3/27/24 MG 26 COY 0.82)  Patient with SAVR (#19 Felder porcine bio-prosthetic valve) in 2002, now with severe bioprosthetic AS  Patient was following with Dr. Gregory and was pending GI clearance for a ALBER to evaluate the valves and plan for intervention   #Mitral stenosis (MG 16)  Patient is s/p mitral annuloplasty (Physio ring #26) now with severe MS  Gradient increased form prior echo, from 10 to 16 now at HR 75 bpm   as above     Plan:   -ALBER once medically optimized     #Afib s/p AVN ablation and CRT-P ([facility], 8/2022; on Eliquis)  Home meds: eliquis 2.5mg bid, lopressor 50mg bid    Plan:   -EP consult for PPM interrogation --> normal function   -can start lopressor 25 mg PO BID in lieu of 50 mg PO BID i/s/o GI bleed   -restart AC once ok per primary team, can challenge with heparin gtt    #Chronic heart failure with preserved ejection fraction  Euvolemic on physical exam   Home med: Lasix 40 mg twice a week  Given Lasix 40 mg IVP after the transfusion, net negative 2L    Plan:  -Frequent volume reassessments  -Daily CXR    #CAD s/p CABG (LIMA to RI, ANTONIO to PDA)  Followed by Dr. Preston  Home meds: Crestor 40mg qhs, lopressor 50mg bid, losartan 25mg qd  BB as above     Plan:   -c/w atorvastatin   -hold losartan i/s/o CHERRI      Vascular history:   - AAA s/p open repair in 2015 followed saccular AAA and contained rupture adjacent to previous stent graft s/p open thoracoabdominal aneurysm repair with 20mm tube graft and 6mm bypasses to the celiac, SMA, left renal, right renal (end to end) in 8/2020  - PAD s/p L pSFA stent/L-TIFFANY stent with occluded b/l SFA followed by L-internal iliac artery stent in 5/2021 c/b L-RP hematoma requiring 2 units PRBC with ongoing LLE claudication - Dr. Sandra recommending patient to undergo left fem-pop bypass with PTFE. Saw Dr. Soriano in June 2023 - per note: "Aorto-iliac duplex performed to evaluate patency reveals widely patent aortic graft, patent L TIFFANY stent, L EIA 50% stenosis noted, patent R pSFA stent noted. Carotid duplex performed to evaluate for progression of stenosis reveals b/l <50% stenosis due to fibrocalcific plaque, both vertebral arteries patent w/antegrade flow. 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  -CT A/P non-con (3/25/24) showing post surgical changes from prior open thoracoabdominal aortic bypass graft w/ new focal dilatation of the distal most aspect of the graft measuring 4.4 x 6.1 just above the aortic bifurcation, previously measuring 3.2x4.1 cm on 6/11/21    Plan:   -vascular following, will need repair of aneurysm/pseudoaneurysm once medically optimized (post-EGD)  -urgent vascular consult if hypotensive, tachycardic, c/f aneurysm rupture   -strict BP control, keep BP around 120-130/80 mmHg     Pulm   No respiratory distress. O2 sat 100% on RA.   CT showing bibasilar atelectasis, emphysema     Plan:   -DONN    GI   #Anemia   Hgb 6.9 on admission decrease from 11.7 from 2/24 w/ reported melena. KASH w/ dark stool. Likely multifactorial. Iron panel normal however confounded by recent transfusion. Recent EGD on 1/24 showing diffuse severe inflammation characterized by erythema and erosions at incisura, antrum, duodenitis.   Hgb appropriate response s/p 1 unit pRBC 9.4, normal brown BM 3/28 AM    Plan:   -EGD today in OR  -c/w protonix 40 mg IVP BID   -Hold AC  -monitor Hgb, transfuse Hgb < 8   -maintain active T&S, 2 large bore IVs   -Appreciate GI recs     Renal   #CHERRI on CKD  Baseline Cr reportedly 1.7-2.0. Poor PO intake d/t constipation. Likely pre-renal. No signs of urinary obstruction.   Urine studies w/ FeNa- intrinsic etiology   Cr trending downwards, 3/27- Cr 2.49     Plan:   -monitor Cr     Endo   #Hypothyroidism     Plan:  -c/w home med synthroid 75mcg    Heme   #Anemia   -See plan above    ID   -DONN       F: None  E: replete K < 4, Mg <2  DVT ppx- hold i/s/o possible GIB  Diet- NPO @MN  Activity- ambulate as tolerated  Code- Full.      Nutritional Assessment:  · Nutritional Assessment	This patient has been assessed with a concern for Malnutrition and has been determined to have a diagnosis/diagnoses of Severe protein-calorie malnutrition and Underweight (BMI < 19).    This patient is being managed with:   Diet Regular-  Entered: Mar 27 2024 12:59PM    Diet NPO after Midnight-     NPO Start Date: 27-Mar-2024   NPO Start Time: 23:59  Entered: Mar 27 2024 12:59PM

## 2024-03-28 NOTE — PROGRESS NOTE ADULT - PROBLEM SELECTOR PLAN 1
Severe Bioprosthetic AS with AI, bioprosthetic MR/MS  - Per CTSx, case discussed with Dr. Gregory  - Hx of CABG/AVR/MVr, and AAA repair   - Patient with significant valvular disease, will likely need alternate access transcatheter intervention  -TTE 3/27/24: normal LVSF EF51% severely dilated LA, bioprosthetic valve noted in aortic position w/ leaflet appearing thickened, mild AR, mitral valvue mod thickened and calcified, mod MR, severe TR, pHTN present w/ PASP 55mgHg,   - Plan pending ALBER and discussion with vascular tomorrow regarding which intervention to proceed with first

## 2024-03-28 NOTE — PROGRESS NOTE ADULT - ASSESSMENT
68-year-old F, former smoker (quit 20 year ago) with PMH of hypothyroidism, seizure disorder, stage 3 CKD (baseline CR ~1.7-2.0), severe renal artery stenosis, chronic anemia (baseline Hgb ~ 9-10, gets weekly iron infusion last infusion 3/22/24), AT, Afib s/p AVN ablation and CRT-P (8/2022; on Eliquis), HTN, HLD, CAD s/p CABG, AVR (porcine bio-prosthetic valve) and mitral annuloplasty, HFpEF (EF 51%), AAA s/p open repair in 2015 with complications, PAD s/p L pSFA stent/L-TIFFANY stent/L-IIA stent who presents with intermittent chest pain and KRAFT x 3 days, initially admitted to medicine for symptomatic anemia w/ course complicated by status epilepticus (2 seizure events) 3/26 w/ RR called, requiring step-up to telemetry for management of seizures and symptomatic anemia 2/2 GIB vs. vascular dissection. Patient is now s/p EGD where procedure was tolerated and is cleared for systemic AC if indicated. Patient is now medically stable for stepdown to cardiac tele with vascular and CTSx following for plans of interventions to be done this admission with pre-op ALBER scheduled for tomorrow.

## 2024-03-28 NOTE — PROGRESS NOTE ADULT - ASSESSMENT
69 yo female, former smoker, with PMHx of HTN, hyperlipidemia, CAD s/p prior PCI and CABG, bioAVR/mitral annuloplasty in 2002, PAD s/p LSFA  and L TIFFANY (history of occluded bilateral SFA), AAA (last repaired in 2020), chronic anemia (receives Iron infusions, last 8/23), hypothyroid, CKD Stage III, Atach and Afib (non anticoagulated),  +BI-V PPM implant on 8/24. for 3 days has been experiencing exertional sob. CT A/P non con shows: Post surgical changes from prior open thoracoabdominal aortic bypass graft with new focal dilatation of the distalmost aspect of the graft measuring 4.4 x 6.1 cm just above the aortic bifurcation and previously measuring 3.2 x 4.1 cm on 6/11/2021    Recommendations  Will need repair of aneurysm/pseudoaneurysm once medically optimized  Will f/u EGD today with GI  Trend Hgb  Rest of care per primary  Vascular will continue to follow. Please call with any clinical changes or questions.

## 2024-03-28 NOTE — PROGRESS NOTE ADULT - SUBJECTIVE AND OBJECTIVE BOX
SUBJECTIVE: Pt seen and examined at bedside this am by surgery team. Patient is lying comfortably in bed with no complaints. Patient denies fever, nausea, vomiting, chest pain, and shortness of breath. Patient is passing gas and having bowel movements, last BM was brown.    MEDICATIONS  (STANDING):  atorvastatin 40 milliGRAM(s) Oral at bedtime  cilostazol 50 milliGRAM(s) Oral every 12 hours  levETIRAcetam  IVPB 250 milliGRAM(s) IV Intermittent every 24 hours  levothyroxine Injectable 56 MICROGram(s) IV Push <User Schedule>  melatonin 5 milliGRAM(s) Oral at bedtime  metoprolol tartrate 25 milliGRAM(s) Oral every 12 hours  pantoprazole  Injectable 40 milliGRAM(s) IV Push every 12 hours  polyethylene glycol 3350 17 Gram(s) Oral every 24 hours  senna 2 Tablet(s) Oral at bedtime  sertraline 50 milliGRAM(s) Oral every 24 hours    MEDICATIONS  (PRN):  acetaminophen     Tablet .. 650 milliGRAM(s) Oral every 6 hours PRN Temp greater or equal to 38C (100.4F), Mild Pain (1 - 3)      Vital Signs Last 24 Hrs  T(C): 37 (28 Mar 2024 09:00), Max: 37.8 (27 Mar 2024 22:04)  T(F): 98.6 (28 Mar 2024 09:00), Max: 100 (27 Mar 2024 22:04)  HR: 75 (28 Mar 2024 09:00) (74 - 78)  BP: 135/77 (28 Mar 2024 09:00) (114/57 - 148/72)  BP(mean): 101 (28 Mar 2024 09:00) (78 - 104)  RR: 18 (28 Mar 2024 09:00) (16 - 18)  SpO2: 100% (28 Mar 2024 09:00) (100% - 100%)    Parameters below as of 28 Mar 2024 10:00  Patient On (Oxygen Delivery Method): room air        Physical Exam  General: Patient is doing well and lying in bed comfortably  Constitutional: alert and oriented   Pulm: Nonlabored breathing, no respiratory distress  CV: Regular rate and rhythm, normal sinus rhythm  Abd:  soft, nontender, nondistended. No rebound, no guarding.   Extremities: warm, well perfused, no edema    I&O's Detail    27 Mar 2024 07:01  -  28 Mar 2024 07:00  --------------------------------------------------------  IN:    PRBCs (Packed Red Blood Cells): 100 mL    sodium chloride 0.9%: 200 mL  Total IN: 300 mL    OUT:    Voided (mL): 2275 mL  Total OUT: 2275 mL    Total NET: -1975 mL        LABS:                        9.4    9.45  )-----------( 166      ( 28 Mar 2024 05:30 )             28.5     03-28    136  |  104  |  38<H>  ----------------------------<  88  4.1   |  19<L>  |  2.58<H>    Ca    10.4      28 Mar 2024 05:30  Phos  3.6     03-28  Mg     2.1     03-28    TPro  7.1  /  Alb  4.1  /  TBili  0.4  /  DBili  x   /  AST  25  /  ALT  10  /  AlkPhos  101  03-28    PT/INR - ( 26 Mar 2024 09:44 )   PT: 11.5 sec;   INR: 1.01          PTT - ( 26 Mar 2024 09:44 )  PTT:24.9 sec  Urinalysis Basic - ( 28 Mar 2024 05:30 )    Color: x / Appearance: x / SG: x / pH: x  Gluc: 88 mg/dL / Ketone: x  / Bili: x / Urobili: x   Blood: x / Protein: x / Nitrite: x   Leuk Esterase: x / RBC: x / WBC x   Sq Epi: x / Non Sq Epi: x / Bacteria: x

## 2024-03-28 NOTE — PROGRESS NOTE ADULT - ASSESSMENT
Assesment:  former smoker, with PMH of hypothyroidism, seizure disorder, Stage 3 CKD (baseline CR ~1.7-2.0), severe renal artery stenosis, chronic anemia (baseline Hgb ~9-10, followed by Heme: Dr. Izquierdo, gets weekly Fe infusion), AT, Afib s/p AVN ablation and CRT-P ([facility], 8/2022; on Eliquis), tortuous esophagus and Schatzi ring at [facility] (per EDG in 2020), extensive vascular history), HTN, HLD, CAD s/p CABG (LIMA to RI, ANTONIO to PDA) with SAVR (#19 Felder porcine bio-prosthetic valve) and mitral annuloplasty (Physio ring #26) at [facility] in 2002, HFpEF. Presented to [facility] ED with CP and KRAFT, found to be significantly anemic. SHD consulted for severe bioprosthetic AS/ AI, and MS/MR.     Plan:  Problem 1: Severe Bioprosthetic AS with AI, bioprosthetic MR/MS  - case discussed with Dr. Gregory  - Hx of CABG/AVR/MVr, and AAA repair   - Patient with significant valvular disease, will likely need alternate access transcatheter intervention  - Please obtain ALBER tomorrow to further assess valve disease  - Plan pending ALBER and discussion with vascular re: which intervention to proceed with first     Problem 2: Anemia  - hx of TASNEEM - gets weekly out-patient Iron infusions  - follows with hematologist Dr. 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

## 2024-03-28 NOTE — PROGRESS NOTE ADULT - PROBLEM SELECTOR PLAN 6
-s/p CABG (LIMA to RI, ANTONIO to RDA)   -followed by Dr. Preston   -CCTA on 8/20 shows patent LIMA to First Diagonal branch.  Patent ANTONIO to RPDA. Severe stenosis of the large first diagonal branch. Distal vessel fills via LIMA graft. Severe stenosis of proximal RCA. Distal vessel fills via ANTONIO graft. Nonobstructive disease throughout the LAD. Patent stent in mid LCX extending to the OM2. MV annuloplasty ring and Bioprosthetic AVR noted. Calcification throughout the aorta noted.  -c/w statin and lopressor 25mg BID  -holding losartan 2/2 CHERRI

## 2024-03-28 NOTE — PROGRESS NOTE ADULT - SUBJECTIVE AND OBJECTIVE BOX
Physical Medicine and Rehabilitation Progress Note :       Patient is a 68y old  Female who presents with a chief complaint of anemia, CHERRI (28 Mar 2024 09:13)      HPI:  HPI: Patient is a 69yo F former smoker (quit 20 year ago with PMH of hypothyroidism, seizure disorder, Stage 3 CKD (baseline CR ~1.7-2.0), severe renal artery stenosis, chronic anemia (baseline Hgb ~9-10, gets weekly Fe infusion last infusion 3/22/24), AT, Afib s/p AVN ablation and CRT-P (8/2022; on Eliquis), HTN, HLD, CAD s/p CABG, AVR (porcine bio-prosthetic valve) and mitral annuloplasty, HFpEF, AAA s/p open repair in 2015 with complications, PAD s/p L pSFA stent/L-TIFFANY stent/L-IIA stent who presents with intermittent chest pain and KRAFT x 3 days. States she has been having intermittent chest pain and KRAFT which worsened about 3 days ago. She has not been very mobile for the past week due to pain and KRAFT. At baseline ambulates with cane, however has not been mostly sitting on the couch due to current symptoms. Additionally c/o lower abdominal pain which she attributes to constipation (has not had a BM in 5-6 days). Usually has regular BMs. Has had very poor PO intake over the past few days 2/2 constipation. Denies any s/s of bleeding; denies hematuria, hemoptysis, hematemesis, etc. No recent illness/sick contacts. Denies any recent travel. Denies hx of cancer, bleeding/clotting disorders, lower extremity pain or swelling. Compliant with her medications.  No other complaints. States she feels mildly improved after blood transfusion.     In the ED:  Initial vital signs: T: 98 F, HR: 86, BP: 114/72, R: 17, SpO2: 100% on RA  Labs: significant for Hgb 6.9, D-dimer 2831, Cl 111, HCO3 19, BUN/Cr 48/3.00, Ca 10.7, trop 41, p-BNP 3727  CXR: Persistent linear atelectasis over the left lower lung zone. The remainder the lung zones are clear. No pneumothorax.  CT C/A/P: Bibasilar atelectasis without evidence of consolidation. Borderline hepatomegaly with extensive coronary artery calcifications status post CABG. There is also a prosthetic aortic valve. Post surgical changes from prior open thoracoabdominal aortic bypass graft with new focal dilatation of the distalmost aspect of the graft measuring 4.4 x 6.1 cm just above the aortic bifurcation and previously measuring 3.2 x 4.1 cm on 6/11/2021.   EKG: paced rhythm  Medications: 1u pRBC, maalox 30mL PO x 1, morphine 2mg IV x 1  Consults: vascular (25 Mar 2024 22:30)                            9.4    9.45  )-----------( 166      ( 28 Mar 2024 05:30 )             28.5       03-28    136  |  104  |  38<H>  ----------------------------<  88  4.1   |  19<L>  |  2.58<H>    Ca    10.4      28 Mar 2024 05:30  Phos  3.6     03-28  Mg     2.1     03-28    TPro  7.1  /  Alb  4.1  /  TBili  0.4  /  DBili  x   /  AST  25  /  ALT  10  /  AlkPhos  101  03-28    Vital Signs Last 24 Hrs  T(C): 37 (28 Mar 2024 10:19), Max: 37.8 (27 Mar 2024 22:04)  T(F): 98.6 (28 Mar 2024 09:38), Max: 100 (27 Mar 2024 22:04)  HR: 75 (28 Mar 2024 10:19) (74 - 78)  BP: 143/67 (28 Mar 2024 10:19) (114/57 - 148/72)  BP(mean): 96 (28 Mar 2024 10:19) (78 - 104)  RR: 18 (28 Mar 2024 10:19) (16 - 18)  SpO2: 100% (28 Mar 2024 10:19) (100% - 100%)    Parameters below as of 28 Mar 2024 10:00  Patient On (Oxygen Delivery Method): room air        MEDICATIONS  (STANDING):  atorvastatin 40 milliGRAM(s) Oral at bedtime  melatonin 5 milliGRAM(s) Oral at bedtime  metoprolol tartrate 25 milliGRAM(s) Oral every 12 hours  pantoprazole    Tablet 40 milliGRAM(s) Oral before breakfast  polyethylene glycol 3350 17 Gram(s) Oral every 24 hours  senna 2 Tablet(s) Oral at bedtime  sertraline 50 milliGRAM(s) Oral every 24 hours    MEDICATIONS  (PRN):  acetaminophen     Tablet .. 650 milliGRAM(s) Oral every 6 hours PRN Temp greater or equal to 38C (100.4F), Mild Pain (1 - 3)      T(C): 37 (03-28-24 @ 10:19), Max: 37.8 (03-27-24 @ 22:04)  HR: 75 (03-28-24 @ 10:19) (74 - 78)  BP: 143/67 (03-28-24 @ 10:19) (114/57 - 148/72)  RR: 18 (03-28-24 @ 10:19) (16 - 18)  SpO2: 100% (03-28-24 @ 10:19) (100% - 100%)        Physical Exam:     Constitutional: NAD, comfortable in chair, frail     HEENT: NC/AT, PERRLA, EOMI, MMM    Neck: Supple, no JVD    Respiratory: CTA B/L. No w/r/r     Cardiovascular: systolic murmur     Gastrointestinal: +BS, soft NTND, no guarding or rebound tenderness    Extremities: wwp; no cyanosis, clubbing or edema    Vascular: Pulses equal and strong throughout    Neurological: AAOx3, no CN deficits, strength and sensation intact throughout    Skin: No gross skin abnormalities or rashes    3/27/2024 Functional Status Assessment :       Previous Level of Function:     · Ambulation Skills	independent; needs device; SC  · Transfer Skills	independent  · ADL Skills	independent  · Additional Comments	Pt. reports 2 flights to negotiate, ambulates with SC, reports increased difficulty with ADL/ambulation immediately PTA i/s/o  increased KRAFT.   Pt. has no current home services, no adaptive equipment, no alert button.   Pt. reports 1 fall in past 6 months.    Cognitive Status Examination:   · Orientation	oriented to person, place, time and situation  · Level of Consciousness	alert  · Follows Commands and Answers Questions	100% of the time  · Personal Safety and Judgment	intact  · Short Term Memory	intact    Range of Motion Exam:   · Range of Motion Examination	bilateral lower extremity ROM was WFL (within functional limits); bilateral upper extremity ROM was WFL (within functional limits)    Manual Muscle Testing:   · Manual Muscle Testing Results	>3/5 t/o by functional assessment against gravity    MMT Shoulder:     · Shoulder Flexion	(5) normal, left  (5) normal, right    MMT Elbow:     · Elbow Flexion	(5) normal, left  (5) normal, right  · Elbow Extension	(5) normal, left  (5) normal, right    MMT Hip:     · Hip Flexion	4+ = good plus  (5) normal, right    MMT Knee:     · Knee Flexion	(5) normal, left  (5) normal, right  · Knee Extension	4+ = good plus  4+ = good plus    MMT Ankle:     · Ankle Dorsiflexion	(5) normal, left  (5) normal, right  · Ankle Plantarflexion	(5) normal, left  (5) normal, right    Bed Mobility: Rolling/Turning:     · Level of Turner	independent  · Assistive Device	bed rails    Bed Mobility: Scooting/Bridging:     · Level of Turner	minimum assist (75% patients effort)  · Physical Assist/Nonphysical Assist	1 person assist    Bed Mobility: Sit to Supine:     · Level of Turner	NT - pt was left sitting    Bed Mobility: Supine to Sit:     · Level of Turner	minimum assist (75% patients effort)  · Physical Assist/Nonphysical Assist	1 person assist    Transfer: Sit to Stand:     · Level of Turner	minimum assist (75% patients effort)  · Physical Assist/Nonphysical Assist	1 person assist  · Weight-Bearing Restrictions	weight-bearing as tolerated    Transfer: Stand to Sit:     · Level of Turner	contact guard  · Physical Assist/Nonphysical Assist	verbal cues; nonverbal cues (demo/gestures); 1 person assist  · Weight-Bearing Restrictions	weight-bearing as tolerated    Sit/Stand Transfer Safety Analysis:     · Transfer Safety Concerns Noted	decreased weight-shifting ability  · Impairments Contributing to Impaired Transfers	impaired balance    Gait Skills:     · Level of Turner	close CGA  · Physical Assist/Nonphysical Assist	1 person assist  · Weight-Bearing Restrictions	weight-bearing as tolerated  · Assistive Device	rolling walker; attempted cane initially - however pt. with + LOB x2 posterolaterally  · Gait Distance	40'x2 + 15'x2    Gait Analysis:     · Gait Deviations Noted	decreased peyton; increased time in double stance  · Impairments Contributing to Gait Deviations	impaired balance    Stair Negotiation:     · Level of Turner	TBA    Balance Skills Assessment:     · Sitting Balance: Static	good balance  · Sitting Balance: Dynamic	good balance  · Sit-to-Stand Balance	fair balance  · Standing Balance: Static	fair balance  · Standing Balance: Dynamic	fair balance    Sensory Examination:   Sensory Examination:    Grossly Intact:   · Gross Sensory Examination	Grossly Intact; to light touch      Fine Motor Coordination:   Fine Motor Coordination Examination:    Fine Motor Coordination:   · Left Hand Thumb/Finger Opposition Skills	normal performance  · Right Hand Thumb/Finger Opposition Skills	normal performance  · Left Hand, Manipulation of Objects	normal performance  · Right Hand, Manipulation of Objects	normal performance    Treatment Location:   · Comments	Pt. currently presenting with limited endurance, impaired standing balance , and increased unsteadiness of gait compared to reported baseline.    Clinical Impressions:   · Functional Limitations in Following Categories (describe specific limitations)	self-care; home management; community/leisure  · Risk Reduction/Prevention (Describe Specific Areas of risk reduction/prevention)	risk factors  · Risk Areas	fall        PM&R Impression : as above    Current disposition plan recommendation :    subacute rehab placement

## 2024-03-28 NOTE — PROGRESS NOTE ADULT - SUBJECTIVE AND OBJECTIVE BOX
Patient discussed on morning rounds with Dr. Gregory    REASON FOR CONSULT: AS/AI, MS/MR    SUBJECTIVE ASSESSMENT:  Assessed at bedside this morning before EGD. No acute complaints. States her breathing is OK, she is KRAFT at baseline. Denies chest pain, fever, chills, nausea, vomiting.     VITAL SIGNS:  Vital Signs Last 24 Hrs  T(C): 37 (28 Mar 2024 10:19), Max: 37.8 (27 Mar 2024 22:04)  T(F): 98.6 (28 Mar 2024 09:38), Max: 100 (27 Mar 2024 22:04)  HR: 75 (28 Mar 2024 16:00) (74 - 76)  BP: 102/57 (28 Mar 2024 16:00) (97/52 - 148/72)  BP(mean): 74 (28 Mar 2024 16:00) (69 - 104)  RR: 18 (28 Mar 2024 16:00) (16 - 18)  SpO2: 99% (28 Mar 2024 16:00) (99% - 100%)    Parameters below as of 28 Mar 2024 16:00  Patient On (Oxygen Delivery Method): room air      I&O's Detail    27 Mar 2024 07:01  -  28 Mar 2024 07:00  --------------------------------------------------------  IN:    PRBCs (Packed Red Blood Cells): 100 mL    sodium chloride 0.9%: 200 mL  Total IN: 300 mL    OUT:    Voided (mL): 2275 mL  Total OUT: 2275 mL    Total NET: -1975 mL    Physical Exam  CONSTITUTIONAL: Well appearing in NAD assessed laying comfortably in bed   NEURO: A&OX3. No focal deficits noted, moving bilateral upper and lower extremities                    CV: RRR, +CARO, no rub/s gallops   RESPIRATORY: Clear to auscultation bilateral posterior lung fields, no wheezes, rales, rhonchi   GI: +BS, NT/ND  MUSKULOSKELETAL: No peripheral edema or calf tenderness. Full strength and ROM bilateral upper and lower extremities   VASCULAR: Bilateral distal pulses 2+  INCISIONS: prior MSI    LABS:                        9.4    9.45  )-----------( 166      ( 28 Mar 2024 05:30 )             28.5       03-28    136  |  104  |  38<H>  ----------------------------<  88  4.1   |  19<L>  |  2.58<H>    Ca    10.4      28 Mar 2024 05:30  Phos  3.6     03-28  Mg     2.1     03-28    TPro  7.1  /  Alb  4.1  /  TBili  0.4  /  DBili  x   /  AST  25  /  ALT  10  /  AlkPhos  101  03-28    Urinalysis Basic - ( 28 Mar 2024 05:30 )    Color: x / Appearance: x / SG: x / pH: x  Gluc: 88 mg/dL / Ketone: x  / Bili: x / Urobili: x   Blood: x / Protein: x / Nitrite: x   Leuk Esterase: x / RBC: x / WBC x   Sq Epi: x / Non Sq Epi: x / Bacteria: x      MEDICATIONS  (STANDING):  atorvastatin 40 milliGRAM(s) Oral at bedtime  chlorhexidine 2% Cloths 1 Application(s) Topical <User Schedule>  levETIRAcetam 250 milliGRAM(s) Oral two times a day  melatonin 5 milliGRAM(s) Oral at bedtime  metoprolol tartrate 25 milliGRAM(s) Oral every 12 hours  pantoprazole    Tablet 40 milliGRAM(s) Oral before breakfast  polyethylene glycol 3350 17 Gram(s) Oral every 24 hours  senna 2 Tablet(s) Oral at bedtime  sertraline 50 milliGRAM(s) Oral every 24 hours    MEDICATIONS  (PRN):  acetaminophen     Tablet .. 650 milliGRAM(s) Oral every 6 hours PRN Temp greater or equal to 38C (100.4F), Mild Pain (1 - 3)    RADIOLOGY & ADDITIONAL TESTS:    < from: Xray Chest 1 View- PORTABLE-Routine (Xray Chest 1 View- PORTABLE-Routine in AM.) (03.28.24 @ 07:36) >  IMPRESSION:    Increased inspiratory effort compared to priorexam 3/25/2024. Minimal   left lower lung focal atelectasis. No consolidation pleural effusion or   pneumothorax. Postop change and left-sided implanted cardiac device. No   acute bone abnormality.    < end of copied text >

## 2024-03-28 NOTE — PROGRESS NOTE ADULT - SUBJECTIVE AND OBJECTIVE BOX
**INCOMPLETE NOTE    OVERNIGHT EVENTS:    SUBJECTIVE:  Patient seen and examined at bedside.    Vital Signs Last 12 Hrs  T(F): 99.1 (03-28-24 @ 06:28), Max: 100 (03-27-24 @ 22:04)  HR: 75 (03-28-24 @ 07:00) (75 - 76)  BP: 148/72 (03-28-24 @ 07:00) (114/57 - 148/72)  BP(mean): 104 (03-28-24 @ 07:00) (80 - 104)  RR: 18 (03-28-24 @ 07:00) (16 - 18)  SpO2: 100% (03-28-24 @ 07:00) (100% - 100%)  I&O's Summary    27 Mar 2024 07:01  -  28 Mar 2024 07:00  --------------------------------------------------------  IN: 300 mL / OUT: 2275 mL / NET: -1975 mL        PHYSICAL EXAM:  Constitutional: NAD, comfortable in bed.  HEENT: NC/AT, PERRLA, EOMI, no conjunctival pallor or scleral icterus, MMM  Neck: Supple, no JVD  Respiratory: CTA B/L. No w/r/r.   Cardiovascular: RRR, normal S1 and S2, no m/r/g.   Gastrointestinal: +BS, soft NTND, no guarding or rebound tenderness, no palpable masses   Extremities: wwp; no cyanosis, clubbing or edema.   Vascular: Pulses equal and strong throughout.   Neurological: AAOx3, no CN deficits, strength and sensation intact throughout.   Skin: No gross skin abnormalities or rashes        LABS:                        9.4    9.45  )-----------( 166      ( 28 Mar 2024 05:30 )             28.5     03-28    136  |  104  |  38<H>  ----------------------------<  88  4.1   |  19<L>  |  2.58<H>    Ca    10.4      28 Mar 2024 05:30  Phos  3.6     03-28  Mg     2.1     03-28    TPro  7.1  /  Alb  4.1  /  TBili  0.4  /  DBili  x   /  AST  25  /  ALT  10  /  AlkPhos  101  03-28    PT/INR - ( 26 Mar 2024 09:44 )   PT: 11.5 sec;   INR: 1.01          PTT - ( 26 Mar 2024 09:44 )  PTT:24.9 sec  Urinalysis Basic - ( 28 Mar 2024 05:30 )    Color: x / Appearance: x / SG: x / pH: x  Gluc: 88 mg/dL / Ketone: x  / Bili: x / Urobili: x   Blood: x / Protein: x / Nitrite: x   Leuk Esterase: x / RBC: x / WBC x   Sq Epi: x / Non Sq Epi: x / Bacteria: x          RADIOLOGY & ADDITIONAL TESTS:    MEDICATIONS  (STANDING):  atorvastatin 40 milliGRAM(s) Oral at bedtime  cilostazol 50 milliGRAM(s) Oral every 12 hours  levETIRAcetam  IVPB 250 milliGRAM(s) IV Intermittent every 24 hours  levothyroxine Injectable 56 MICROGram(s) IV Push <User Schedule>  melatonin 5 milliGRAM(s) Oral at bedtime  metoprolol tartrate 25 milliGRAM(s) Oral every 12 hours  pantoprazole  Injectable 40 milliGRAM(s) IV Push every 12 hours  polyethylene glycol 3350 17 Gram(s) Oral every 24 hours  senna 2 Tablet(s) Oral at bedtime  sertraline 50 milliGRAM(s) Oral every 24 hours    MEDICATIONS  (PRN):  acetaminophen     Tablet .. 650 milliGRAM(s) Oral every 6 hours PRN Temp greater or equal to 38C (100.4F), Mild Pain (1 - 3)   OVERNIGHT EVENTS: NAEO    SUBJECTIVE:  Patient seen and examined at bedside. Resting comfortably in chair. Reports abdominal tenderness. Denies CP, SOB.    Vital Signs Last 12 Hrs  T(F): 99.1 (03-28-24 @ 06:28), Max: 100 (03-27-24 @ 22:04)  HR: 75 (03-28-24 @ 07:00) (75 - 76)  BP: 148/72 (03-28-24 @ 07:00) (114/57 - 148/72)  BP(mean): 104 (03-28-24 @ 07:00) (80 - 104)  RR: 18 (03-28-24 @ 07:00) (16 - 18)  SpO2: 100% (03-28-24 @ 07:00) (100% - 100%)  I&O's Summary    27 Mar 2024 07:01  -  28 Mar 2024 07:00  --------------------------------------------------------  IN: 300 mL / OUT: 2275 mL / NET: -1975 mL        PHYSICAL EXAM:  Constitutional: NAD, comfortable in chair, frail   HEENT: NC/AT, PERRLA, EOMI, MMM  Neck: Supple, no JVD  Respiratory: CTA B/L. No w/r/r.   Cardiovascular: systolic murmur   Gastrointestinal: +BS, soft NTND, no guarding or rebound tenderness  Extremities: wwp; no cyanosis, clubbing or edema  Vascular: Pulses equal and strong throughout.   Neurological: AAOx3, no CN deficits, strength and sensation intact throughout.   Skin: No gross skin abnormalities or rashes        LABS:                        9.4    9.45  )-----------( 166      ( 28 Mar 2024 05:30 )             28.5     03-28    136  |  104  |  38<H>  ----------------------------<  88  4.1   |  19<L>  |  2.58<H>    Ca    10.4      28 Mar 2024 05:30  Phos  3.6     03-28  Mg     2.1     03-28    TPro  7.1  /  Alb  4.1  /  TBili  0.4  /  DBili  x   /  AST  25  /  ALT  10  /  AlkPhos  101  03-28    PT/INR - ( 26 Mar 2024 09:44 )   PT: 11.5 sec;   INR: 1.01          PTT - ( 26 Mar 2024 09:44 )  PTT:24.9 sec  Urinalysis Basic - ( 28 Mar 2024 05:30 )    Color: x / Appearance: x / SG: x / pH: x  Gluc: 88 mg/dL / Ketone: x  / Bili: x / Urobili: x   Blood: x / Protein: x / Nitrite: x   Leuk Esterase: x / RBC: x / WBC x   Sq Epi: x / Non Sq Epi: x / Bacteria: x          RADIOLOGY & ADDITIONAL TESTS:    MEDICATIONS  (STANDING):  atorvastatin 40 milliGRAM(s) Oral at bedtime  cilostazol 50 milliGRAM(s) Oral every 12 hours  levETIRAcetam  IVPB 250 milliGRAM(s) IV Intermittent every 24 hours  levothyroxine Injectable 56 MICROGram(s) IV Push <User Schedule>  melatonin 5 milliGRAM(s) Oral at bedtime  metoprolol tartrate 25 milliGRAM(s) Oral every 12 hours  pantoprazole  Injectable 40 milliGRAM(s) IV Push every 12 hours  polyethylene glycol 3350 17 Gram(s) Oral every 24 hours  senna 2 Tablet(s) Oral at bedtime  sertraline 50 milliGRAM(s) Oral every 24 hours    MEDICATIONS  (PRN):  acetaminophen     Tablet .. 650 milliGRAM(s) Oral every 6 hours PRN Temp greater or equal to 38C (100.4F), Mild Pain (1 - 3)   ***Transfer from CCU to [facility]***    Hospital Course:  68-year-old F, former smoker (quit 20 year ago) with PMH of hypothyroidism, seizure disorder, stage 3 CKD (baseline CR ~1.7-2.0), severe renal artery stenosis, chronic anemia (baseline Hgb ~ 9-10, gets weekly iron infusion last infusion 3/22/24), AT, Afib s/p AVN ablation and CRT-P (8/2022; on Eliquis), HTN, HLD, CAD s/p CABG, AVR (porcine bio-prosthetic valve) and mitral annuloplasty, HFpEF, AAA s/p open repair in 2015 with complications, PAD s/p L pSFA stent/L-TIFFANY stent/L-IIA stent who presents with intermittent chest pain and KRAFT x 3 days, admitted for symptomatic anemia w/ course complicated by status epilepticus (2 seizure events) 3/26 w/ RR called, requiring step-up to telemetry for management of seizures and symptomatic anemia 2/2 GIB vs. vascular dissection. On 3/27, pt noted to have episode of melena, KASH positive. Cardiology consulted for pre-op clearance for possible EGD, patient transferred to CCU for in-unit scope if pursuing EGD.       OVERNIGHT EVENTS: NAEO    SUBJECTIVE:  Patient seen and examined at bedside. Resting comfortably in chair. Reports abdominal tenderness. Denies CP, SOB.    Vital Signs Last 12 Hrs  T(F): 99.1 (03-28-24 @ 06:28), Max: 100 (03-27-24 @ 22:04)  HR: 75 (03-28-24 @ 07:00) (75 - 76)  BP: 148/72 (03-28-24 @ 07:00) (114/57 - 148/72)  BP(mean): 104 (03-28-24 @ 07:00) (80 - 104)  RR: 18 (03-28-24 @ 07:00) (16 - 18)  SpO2: 100% (03-28-24 @ 07:00) (100% - 100%)  I&O's Summary    27 Mar 2024 07:01  -  28 Mar 2024 07:00  --------------------------------------------------------  IN: 300 mL / OUT: 2275 mL / NET: -1975 mL        PHYSICAL EXAM:  Constitutional: NAD, comfortable in chair, frail   HEENT: NC/AT, PERRLA, EOMI, MMM  Neck: Supple, no JVD  Respiratory: CTA B/L. No w/r/r.   Cardiovascular: systolic murmur   Gastrointestinal: +BS, soft NTND, no guarding or rebound tenderness  Extremities: wwp; no cyanosis, clubbing or edema  Vascular: Pulses equal and strong throughout.   Neurological: AAOx3, no CN deficits, strength and sensation intact throughout.   Skin: No gross skin abnormalities or rashes        LABS:                        9.4    9.45  )-----------( 166      ( 28 Mar 2024 05:30 )             28.5     03-28    136  |  104  |  38<H>  ----------------------------<  88  4.1   |  19<L>  |  2.58<H>    Ca    10.4      28 Mar 2024 05:30  Phos  3.6     03-28  Mg     2.1     03-28    TPro  7.1  /  Alb  4.1  /  TBili  0.4  /  DBili  x   /  AST  25  /  ALT  10  /  AlkPhos  101  03-28    PT/INR - ( 26 Mar 2024 09:44 )   PT: 11.5 sec;   INR: 1.01          PTT - ( 26 Mar 2024 09:44 )  PTT:24.9 sec  Urinalysis Basic - ( 28 Mar 2024 05:30 )    Color: x / Appearance: x / SG: x / pH: x  Gluc: 88 mg/dL / Ketone: x  / Bili: x / Urobili: x   Blood: x / Protein: x / Nitrite: x   Leuk Esterase: x / RBC: x / WBC x   Sq Epi: x / Non Sq Epi: x / Bacteria: x          RADIOLOGY & ADDITIONAL TESTS:    MEDICATIONS  (STANDING):  atorvastatin 40 milliGRAM(s) Oral at bedtime  cilostazol 50 milliGRAM(s) Oral every 12 hours  levETIRAcetam  IVPB 250 milliGRAM(s) IV Intermittent every 24 hours  levothyroxine Injectable 56 MICROGram(s) IV Push <User Schedule>  melatonin 5 milliGRAM(s) Oral at bedtime  metoprolol tartrate 25 milliGRAM(s) Oral every 12 hours  pantoprazole  Injectable 40 milliGRAM(s) IV Push every 12 hours  polyethylene glycol 3350 17 Gram(s) Oral every 24 hours  senna 2 Tablet(s) Oral at bedtime  sertraline 50 milliGRAM(s) Oral every 24 hours    MEDICATIONS  (PRN):  acetaminophen     Tablet .. 650 milliGRAM(s) Oral every 6 hours PRN Temp greater or equal to 38C (100.4F), Mild Pain (1 - 3)   ***Transfer from CCU to [facility]***    Hospital Course:  68-year-old F, former smoker (quit 20 year ago) with PMH of hypothyroidism, seizure disorder, stage 3 CKD (baseline CR ~1.7-2.0), severe renal artery stenosis, chronic anemia (baseline Hgb ~ 9-10, gets weekly iron infusion last infusion 3/22/24), AT, Afib s/p AVN ablation and CRT-P (8/2022; on Eliquis), HTN, HLD, CAD s/p CABG, AVR (porcine bio-prosthetic valve) and mitral annuloplasty, HFpEF, AAA s/p open repair in 2015 with complications, PAD s/p L pSFA stent/L-TIFFANY stent/L-IIA stent who presents with intermittent chest pain and KRAFT x 3 days, admitted for symptomatic anemia w/ course complicated by status epilepticus (2 seizure events) 3/26 w/ RR called, requiring step-up to telemetry for management of seizures and symptomatic anemia     2/2 GIB vs. vascular dissection. On 3/27, pt noted to have episode of melena, KASH positive. Cardiology consulted for pre-op clearance for possible EGD, patient transferred to CCU for in-unit scope if pursuing EGD.       OVERNIGHT EVENTS: NAEO    SUBJECTIVE:  Patient seen and examined at bedside. Resting comfortably in chair. Reports abdominal tenderness. Denies CP, SOB.    Vital Signs Last 12 Hrs  T(F): 99.1 (03-28-24 @ 06:28), Max: 100 (03-27-24 @ 22:04)  HR: 75 (03-28-24 @ 07:00) (75 - 76)  BP: 148/72 (03-28-24 @ 07:00) (114/57 - 148/72)  BP(mean): 104 (03-28-24 @ 07:00) (80 - 104)  RR: 18 (03-28-24 @ 07:00) (16 - 18)  SpO2: 100% (03-28-24 @ 07:00) (100% - 100%)  I&O's Summary    27 Mar 2024 07:01  -  28 Mar 2024 07:00  --------------------------------------------------------  IN: 300 mL / OUT: 2275 mL / NET: -1975 mL        PHYSICAL EXAM:  Constitutional: NAD, comfortable in chair, frail   HEENT: NC/AT, PERRLA, EOMI, MMM  Neck: Supple, no JVD  Respiratory: CTA B/L. No w/r/r.   Cardiovascular: systolic murmur   Gastrointestinal: +BS, soft NTND, no guarding or rebound tenderness  Extremities: wwp; no cyanosis, clubbing or edema  Vascular: Pulses equal and strong throughout.   Neurological: AAOx3, no CN deficits, strength and sensation intact throughout.   Skin: No gross skin abnormalities or rashes        LABS:                        9.4    9.45  )-----------( 166      ( 28 Mar 2024 05:30 )             28.5     03-28    136  |  104  |  38<H>  ----------------------------<  88  4.1   |  19<L>  |  2.58<H>    Ca    10.4      28 Mar 2024 05:30  Phos  3.6     03-28  Mg     2.1     03-28    TPro  7.1  /  Alb  4.1  /  TBili  0.4  /  DBili  x   /  AST  25  /  ALT  10  /  AlkPhos  101  03-28    PT/INR - ( 26 Mar 2024 09:44 )   PT: 11.5 sec;   INR: 1.01          PTT - ( 26 Mar 2024 09:44 )  PTT:24.9 sec  Urinalysis Basic - ( 28 Mar 2024 05:30 )    Color: x / Appearance: x / SG: x / pH: x  Gluc: 88 mg/dL / Ketone: x  / Bili: x / Urobili: x   Blood: x / Protein: x / Nitrite: x   Leuk Esterase: x / RBC: x / WBC x   Sq Epi: x / Non Sq Epi: x / Bacteria: x          RADIOLOGY & ADDITIONAL TESTS:    MEDICATIONS  (STANDING):  atorvastatin 40 milliGRAM(s) Oral at bedtime  cilostazol 50 milliGRAM(s) Oral every 12 hours  levETIRAcetam  IVPB 250 milliGRAM(s) IV Intermittent every 24 hours  levothyroxine Injectable 56 MICROGram(s) IV Push <User Schedule>  melatonin 5 milliGRAM(s) Oral at bedtime  metoprolol tartrate 25 milliGRAM(s) Oral every 12 hours  pantoprazole  Injectable 40 milliGRAM(s) IV Push every 12 hours  polyethylene glycol 3350 17 Gram(s) Oral every 24 hours  senna 2 Tablet(s) Oral at bedtime  sertraline 50 milliGRAM(s) Oral every 24 hours    MEDICATIONS  (PRN):  acetaminophen     Tablet .. 650 milliGRAM(s) Oral every 6 hours PRN Temp greater or equal to 38C (100.4F), Mild Pain (1 - 3)   ***Transfer from CCU to [facility]***    Hospital Course:  68y F, former smoker, with PMH of hypothyroidism, seizure disorder, Stage 3 CKD (baseline CR ~1.7-2.0), severe renal artery stenosis, chronic anemia (baseline Hgb ~9-10, followed by Heme: Dr. Izquierdo, gets weekly Fe infusion), AT, Afib s/p AVN ablation and CRT-P ([employer], 8/2022; on Eliquis), tortuous esophagus and Schatzi ring at [facility] (per EDG in 2020), extensive vascular history: AAA s/p open repair in 2015 followed saccular AAA and contained rupture w/ subsequent repair in 2020 (see below), HTN, HLD, CAD s/p CABG (LIMA to RI, ANTONIO to PDA) with SAVR (#19 Felder porcine bio-prosthetic valve) and mitral annuloplasty (Physio ring #26) at [facility] in 2002, HFpEF, and mixed valve disease: severe bioprosthetic AS (echo 3/27/24 MG 26 COY 0.82), severe MS (MG 16), moderate to severe MR, severe TR. Admitted for symptomatic anemia w/ course complicated by status epilepticus (2 seizure events) 3/26 w/ RR called, requiring step-up to telemetry for management of seizures and symptomatic anemia. Started on Keppra w/o further seizure episodes. Pt noted to have melena, KASH positive, drop in Hgb. Appropriate Hgb increase s/p 1unit PRBC, Hgb 9.4, 3/28 AM. EGD performed which shows normal esophagus, duodenum, non-erosive gastritis. Followed by vascular for CT findings of aneurysm/pseudoaneurysm, will need repair once medically optimized. Stable for stepdown.       OVERNIGHT EVENTS: NAEO    SUBJECTIVE:  Patient seen and examined at bedside. Resting comfortably in chair. Reports abdominal tenderness. Denies CP, SOB.    Vital Signs Last 12 Hrs  T(F): 99.1 (03-28-24 @ 06:28), Max: 100 (03-27-24 @ 22:04)  HR: 75 (03-28-24 @ 07:00) (75 - 76)  BP: 148/72 (03-28-24 @ 07:00) (114/57 - 148/72)  BP(mean): 104 (03-28-24 @ 07:00) (80 - 104)  RR: 18 (03-28-24 @ 07:00) (16 - 18)  SpO2: 100% (03-28-24 @ 07:00) (100% - 100%)  I&O's Summary    27 Mar 2024 07:01  -  28 Mar 2024 07:00  --------------------------------------------------------  IN: 300 mL / OUT: 2275 mL / NET: -1975 mL        PHYSICAL EXAM:  Constitutional: NAD, comfortable in chair, frail   HEENT: NC/AT, PERRLA, EOMI, MMM  Neck: Supple, no JVD  Respiratory: CTA B/L. No w/r/r.   Cardiovascular: systolic murmur   Gastrointestinal: +BS, soft NTND, no guarding or rebound tenderness  Extremities: wwp; no cyanosis, clubbing or edema  Vascular: Pulses equal and strong throughout.   Neurological: AAOx3, no CN deficits, strength and sensation intact throughout.   Skin: No gross skin abnormalities or rashes        LABS:                        9.4    9.45  )-----------( 166      ( 28 Mar 2024 05:30 )             28.5     03-28    136  |  104  |  38<H>  ----------------------------<  88  4.1   |  19<L>  |  2.58<H>    Ca    10.4      28 Mar 2024 05:30  Phos  3.6     03-28  Mg     2.1     03-28    TPro  7.1  /  Alb  4.1  /  TBili  0.4  /  DBili  x   /  AST  25  /  ALT  10  /  AlkPhos  101  03-28    PT/INR - ( 26 Mar 2024 09:44 )   PT: 11.5 sec;   INR: 1.01          PTT - ( 26 Mar 2024 09:44 )  PTT:24.9 sec  Urinalysis Basic - ( 28 Mar 2024 05:30 )    Color: x / Appearance: x / SG: x / pH: x  Gluc: 88 mg/dL / Ketone: x  / Bili: x / Urobili: x   Blood: x / Protein: x / Nitrite: x   Leuk Esterase: x / RBC: x / WBC x   Sq Epi: x / Non Sq Epi: x / Bacteria: x          RADIOLOGY & ADDITIONAL TESTS:    MEDICATIONS  (STANDING):  atorvastatin 40 milliGRAM(s) Oral at bedtime  cilostazol 50 milliGRAM(s) Oral every 12 hours  levETIRAcetam  IVPB 250 milliGRAM(s) IV Intermittent every 24 hours  levothyroxine Injectable 56 MICROGram(s) IV Push <User Schedule>  melatonin 5 milliGRAM(s) Oral at bedtime  metoprolol tartrate 25 milliGRAM(s) Oral every 12 hours  pantoprazole  Injectable 40 milliGRAM(s) IV Push every 12 hours  polyethylene glycol 3350 17 Gram(s) Oral every 24 hours  senna 2 Tablet(s) Oral at bedtime  sertraline 50 milliGRAM(s) Oral every 24 hours    MEDICATIONS  (PRN):  acetaminophen     Tablet .. 650 milliGRAM(s) Oral every 6 hours PRN Temp greater or equal to 38C (100.4F), Mild Pain (1 - 3)

## 2024-03-28 NOTE — PROGRESS NOTE ADULT - PROBLEM SELECTOR PLAN 8
-s/p SE this admission   -Episode of AMS 3/26, RRT called, pt found to be unresponsive, drooling. s/p ativan 2mg, keppra 3mg load w/ subsequent post-ictal state. -Second witnessed tonic-clonic seizure (rhythmic jerking, frothing at mouth) during transfer to telemetry.   -Now full recovery to baseline mentation.   -CT head shows no evidence of intracranial hemorrhage, midline shift. Lactate cleared. Placed on vEEG, vEEG negative for seizures, d/c'ed.   -c/w Keppra 250mg bid     #HYPOTHYROIDISM   -c/w levothyroxine 75mcg PO QD     F: None  E: Replete if K<4 or Mag<2  N: DASH Diet (NPO AFTER MN FOR ALBER TOMORROW)   GIppx: Protonix PO   VTEppx: HOLDING   Dispo: pending clinical course   PT: ordered

## 2024-03-28 NOTE — PROGRESS NOTE ADULT - PROBLEM SELECTOR PLAN 4
-TTE 3/27/24: normal LVSF EF51% severely dilated LA, bioprosthetic valve noted in aortic position w/ leaflet appearing thickened, mild AR, mitral valvue mod thickened and calcified, mod MR, severe TR, pHTN present w/ PASP 55mgHg,   -home meds: Lasix 40mg twice a week,   -s/p Lasix IVP x1   -strict I/O, core measures, daily weights

## 2024-03-29 NOTE — PROGRESS NOTE ADULT - SUBJECTIVE AND OBJECTIVE BOX
GASTROENTEROLOGY PROGRESS NOTE  Patient seen and examined at bedside.  -EGD (3/28/24): Normal esophagus. Normal duodenum. Erythema in the incisura of the stomach & antrum compatible with non-erosive gastritis.  -AM Hgb stable @ 9.7     PERTINENT REVIEW OF SYSTEMS:  CONSTITUTIONAL: No weakness, fevers or chills  HEENT: No visual changes; No vertigo or throat pain   GASTROINTESTINAL: As above.  NEUROLOGICAL: No numbness or weakness  SKIN: No itching, burning, rashes, or lesions     Allergies    No Known Allergies    Intolerances      MEDICATIONS:  MEDICATIONS  (STANDING):  atorvastatin 40 milliGRAM(s) Oral at bedtime  chlorhexidine 2% Cloths 1 Application(s) Topical <User Schedule>  levETIRAcetam 250 milliGRAM(s) Oral two times a day  levothyroxine 75 MICROGram(s) Oral every 24 hours  melatonin 5 milliGRAM(s) Oral at bedtime  metoprolol tartrate 25 milliGRAM(s) Oral every 12 hours  pantoprazole    Tablet 40 milliGRAM(s) Oral before breakfast  polyethylene glycol 3350 17 Gram(s) Oral every 24 hours  potassium chloride    Tablet ER 40 milliEquivalent(s) Oral once  senna 2 Tablet(s) Oral at bedtime  sertraline 50 milliGRAM(s) Oral every 24 hours    MEDICATIONS  (PRN):  acetaminophen     Tablet .. 650 milliGRAM(s) Oral every 6 hours PRN Temp greater or equal to 38C (100.4F), Mild Pain (1 - 3)    Vital Signs Last 24 Hrs  T(C): 36.6 (29 Mar 2024 08:26), Max: 37.1 (28 Mar 2024 20:36)  T(F): 97.9 (29 Mar 2024 08:26), Max: 98.7 (28 Mar 2024 20:36)  HR: 74 (29 Mar 2024 09:00) (74 - 78)  BP: 91/50 (29 Mar 2024 09:00) (87/54 - 125/61)  BP(mean): 65 (29 Mar 2024 09:00) (65 - 87)  RR: 16 (29 Mar 2024 08:26) (16 - 18)  SpO2: 98% (29 Mar 2024 08:26) (97% - 100%)    Parameters below as of 29 Mar 2024 08:26  Patient On (Oxygen Delivery Method): room air        03-28 @ 07:01 - 03-29 @ 07:00  --------------------------------------------------------  IN: 400 mL / OUT: 320 mL / NET: 80 mL    03-29 @ 07:01  - 03-29 @ 11:07  --------------------------------------------------------  IN: 240 mL / OUT: 0 mL / NET: 240 mL      PHYSICAL EXAM:    General: female, lying in bed; in no acute distress  HEENT: MMM, conjunctiva and sclera clear  Gastrointestinal: Soft non-tender non-distended; No rebound or guarding  Skin: Warm and dry. No obvious rash    LABS:                        9.7    6.71  )-----------( 163      ( 29 Mar 2024 05:30 )             30.1     03-29    135  |  104  |  29<H>  ----------------------------<  98  3.7   |  20<L>  |  2.47<H>    Ca    10.4      29 Mar 2024 05:30  Phos  2.7     03-29  Mg     2.0     03-29    TPro  6.7  /  Alb  3.7  /  TBili  0.4  /  DBili  x   /  AST  22  /  ALT  9<L>  /  AlkPhos  100  03-29    PT/INR - ( 29 Mar 2024 05:30 )   PT: 12.5 sec;   INR: 1.10          PTT - ( 29 Mar 2024 05:30 )  PTT:32.1 sec      Urinalysis Basic - ( 29 Mar 2024 05:30 )    Color: x / Appearance: x / SG: x / pH: x  Gluc: 98 mg/dL / Ketone: x  / Bili: x / Urobili: x   Blood: x / Protein: x / Nitrite: x   Leuk Esterase: x / RBC: x / WBC x   Sq Epi: x / Non Sq Epi: x / Bacteria: x                RADIOLOGY & ADDITIONAL STUDIES:  Reviewed

## 2024-03-29 NOTE — PROGRESS NOTE ADULT - PROBLEM SELECTOR PLAN 1
Severe Bioprosthetic AS with AI, bioprosthetic MR/MS  - Per CTSx, case discussed with Dr. Gregory  - Hx of CABG/AVR/MVr, and AAA repair   - Patient with significant valvular disease, will likely need alternate access transcatheter intervention  -TTE 3/27/24: normal LVSF EF51% severely dilated LA, bioprosthetic valve noted in aortic position w/ leaflet appearing thickened, mild AR, mitral valve mod thickened and calcified, mod MR, severe TR, pHTN present w/ PASP 55mgHg,   -ALBER 3/29/24: Low flow low gradient severe prosthetic valve AS, peak gradient aortic 37.73 mmHg, moderate AR, moderately elevated mitral valve gradients with normal appearing mitral leaflets status post annuloplasty, moderate mitral regurgitation which decreases to trace with reduced blood pressure, moderate TR   - Plan pending discussion with Vascular and CTS

## 2024-03-29 NOTE — PROGRESS NOTE ADULT - PROBLEM SELECTOR PLAN 8
-s/p SE this admission   -Episode of AMS 3/26, RRT called, pt found to be unresponsive, drooling. s/p ativan 2mg, keppra 3mg load w/ subsequent post-ictal state. -Second witnessed tonic-clonic seizure (rhythmic jerking, frothing at mouth) during transfer to telemetry.   -Now full recovery to baseline mentation.   -CT head shows no evidence of intracranial hemorrhage, midline shift. Lactate cleared. Placed on vEEG, vEEG negative for seizures, d/c'ed.   -c/w Keppra 250mg bid     #HYPOTHYROIDISM   -c/w levothyroxine 75mcg PO QD     F: None  E: Replete if K<4 or Mag<2  N: DASH Diet   GIppx: Protonix PO   VTEppx: HOLDING   Dispo: pending clinical course   PT: ordered

## 2024-03-29 NOTE — PROGRESS NOTE ADULT - ASSESSMENT
68F former smoker (quit 20 year ago) PMH of hypothyroidism, seizure disorder, Stage 3 CKD (baseline CR ~1.7-2.0), severe renal artery stenosis, chronic anemia (baseline Hgb ~9-10, gets weekly Fe infusion last infusion 3/22/24), AT, Afib s/p AVN ablation and CRT-P (8/2022; on Eliquis), HTN, HLD, CAD s/p CABG, AVR (porcine bio-prosthetic valve) and mitral annuloplasty, HFpEF, AAA s/p open repair in 2015 with complications, PAD s/p L pSFA stent/L-TIFFANY stent/L-IIA stent presenting to [facility] with intermittent chest pain and KRAFT x 3 days, found to be anemic to 6.9. GI consulted for melena and for consideration of endoscopic evaluation.     Hgb 6.9 on admission, down from 11.7 from Feb 2024 in the setting of reported melena. KASH with dark stool. On record review, previously noted to have anemia with Hgb downtrending down to 5.6, at the time likely 2/2 hematoma. Suspect anemia to be multi-factorial in etiology (i.e. CKD, AC use, etc). LDH/Haptoglobin not c/w hemolysis. Fe studies normal on this admission however in the setting of recent pRBC transfusion, so may be falsely normal.     EGD (outpatient, 1/18/24): small HH, diffuse severe inflammation characterized by erythema and erosions at the incisura and antrum (Bx taken), duodenitis in second portion of duodenum.     With slight downtrend in Hgb from 7.8 to 7.1 on 3/27 AM labs iso one dark colored BM today, KASH revealing black stool. Responded appropriately 1 u pRBC. Hemodynamics remain stable, while on PPI BID. Stepped up to CCU for close monitoring in light of TTE findings with severe valvulopathies on TTE and radiographic findings concerning for enlarging (pseudo)aneurysms, which warrants surgical intervention per Vascular team.     EGD (3/28/24): Normal esophagus. Normal duodenum. Erythema in the incisura of the stomach & antrum compatible with non-erosive gastritis.    Hgb remains stable on AM labs, also with no e/o bleeding on endoscopy.     #Anemia  -Protonix 40 mg PO daily  -Closely monitor H/H  -Maintain Active T&S  -Transfusion goal as per primary team  -Remainder of management as per primary team    Case discussed with svc attending and primary team.     Thank you for allowing us to participate in the care of this patient.  GI will sign off. Please call back with any questions or concerns.     Gale Ceballos DO  Gastroenterology Fellow  Pager: [phone]

## 2024-03-29 NOTE — PROGRESS NOTE ADULT - SUBJECTIVE AND OBJECTIVE BOX
STATUS POST:  EGD    POST PROCEDURE DAY #: 1    SUBJECTIVE: Pt seen and examined at bedside this am by surgery team. Patient is lying comfortably in bed with no complaints. Has been NPO for planned ALBER today, denies pain. Patient denies fever, nausea, vomiting, chest pain, and shortness of breath. Patient is passing gas and having bowel movements, last was brown, no melena.     MEDICATIONS  (STANDING):  atorvastatin 40 milliGRAM(s) Oral at bedtime  chlorhexidine 2% Cloths 1 Application(s) Topical <User Schedule>  levETIRAcetam 250 milliGRAM(s) Oral two times a day  levothyroxine 75 MICROGram(s) Oral every 24 hours  melatonin 5 milliGRAM(s) Oral at bedtime  metoprolol tartrate 25 milliGRAM(s) Oral every 12 hours  pantoprazole    Tablet 40 milliGRAM(s) Oral before breakfast  polyethylene glycol 3350 17 Gram(s) Oral every 24 hours  senna 2 Tablet(s) Oral at bedtime  sertraline 50 milliGRAM(s) Oral every 24 hours    MEDICATIONS  (PRN):  acetaminophen     Tablet .. 650 milliGRAM(s) Oral every 6 hours PRN Temp greater or equal to 38C (100.4F), Mild Pain (1 - 3)      Vital Signs Last 24 Hrs  T(C): 36.7 (29 Mar 2024 05:58), Max: 37.1 (28 Mar 2024 20:36)  T(F): 98 (29 Mar 2024 05:58), Max: 98.7 (28 Mar 2024 20:36)  HR: 78 (29 Mar 2024 06:51) (75 - 78)  BP: 103/67 (29 Mar 2024 06:51) (92/56 - 143/67)  BP(mean): 79 (29 Mar 2024 06:51) (68 - 101)  RR: 18 (29 Mar 2024 06:51) (16 - 18)  SpO2: 97% (29 Mar 2024 06:51) (97% - 100%)    Parameters below as of 29 Mar 2024 06:51  Patient On (Oxygen Delivery Method): room air        Physical Exam  General: Patient is doing well and lying in bed comfortably  Constitutional: alert and oriented   Pulm: Nonlabored breathing, no respiratory distress  CV: Regular rate and rhythm, normal sinus rhythm  Abd:  soft, nontender, nondistended. No rebound, no guarding.   Extremities: warm, well perfused, no edema      I&O's Detail    28 Mar 2024 07:01  -  29 Mar 2024 07:00  --------------------------------------------------------  IN:    Oral Fluid: 400 mL  Total IN: 400 mL    OUT:    Voided (mL): 320 mL  Total OUT: 320 mL    Total NET: 80 mL        LABS:                        9.4    9.45  )-----------( 166      ( 28 Mar 2024 05:30 )             28.5     03-28    136  |  104  |  38<H>  ----------------------------<  88  4.1   |  19<L>  |  2.58<H>    Ca    10.4      28 Mar 2024 05:30  Phos  3.6     03-28  Mg     2.1     03-28    TPro  7.1  /  Alb  4.1  /  TBili  0.4  /  DBili  x   /  AST  25  /  ALT  10  /  AlkPhos  101  03-28      Urinalysis Basic - ( 28 Mar 2024 05:30 )    Color: x / Appearance: x / SG: x / pH: x  Gluc: 88 mg/dL / Ketone: x  / Bili: x / Urobili: x   Blood: x / Protein: x / Nitrite: x   Leuk Esterase: x / RBC: x / WBC x   Sq Epi: x / Non Sq Epi: x / Bacteria: x

## 2024-03-29 NOTE — PROGRESS NOTE ADULT - ASSESSMENT
68-year-old F, former smoker (quit 20 year ago) with PMH of hypothyroidism, seizure disorder, stage 3 CKD (baseline CR ~1.7-2.0), severe renal artery stenosis, chronic anemia (baseline Hgb ~ 9-10, gets weekly iron infusion last infusion 3/22/24), AT, Afib s/p AVN ablation and CRT-P (8/2022; on Eliquis), HTN, HLD, CAD s/p CABG, AVR (porcine bio-prosthetic valve) and mitral annuloplasty, HFpEF (EF 51%), AAA s/p open repair in 2015 with complications, PAD s/p L pSFA stent/L-TIFFANY stent/L-IIA stent who presents with intermittent chest pain and KRAFT x 3 days, initially admitted to medicine for symptomatic anemia w/ course complicated by status epilepticus (2 seizure events) 3/26 w/ RR called, requiring step-up to telemetry for management of seizures and symptomatic anemia 2/2 GIB vs. vascular dissection. Patient is now s/p EGD where procedure was tolerated and is cleared for systemic AC if indicated. Patient stepped down to cardiac tele on 3/28/4  with vascular and CTSx following for plans of interventions to be done this admission s/p pre-op ALBER 3/29/24 revealing low flow low gradient severe AS.

## 2024-03-29 NOTE — PROGRESS NOTE ADULT - PROBLEM SELECTOR PLAN 3
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

## 2024-03-29 NOTE — PROGRESS NOTE ADULT - SUBJECTIVE AND OBJECTIVE BOX
Interventional Cardiology PA Adult Progress Note    CC: Anemia   Subjective Assessment: Patient seen and examined at bedside and feels well. She denies C/P, SOB, palpitations, dizziness, diaphoresis, N/V.    ROS otherwise negative unless otherwise stated except per HPI and Subjective   	  MEDICATIONS:  metoprolol tartrate 25 milliGRAM(s) Oral every 12 hours  acetaminophen     Tablet .. 650 milliGRAM(s) Oral every 6 hours PRN  levETIRAcetam 250 milliGRAM(s) Oral two times a day  melatonin 5 milliGRAM(s) Oral at bedtime  sertraline 50 milliGRAM(s) Oral every 24 hours  pantoprazole    Tablet 40 milliGRAM(s) Oral before breakfast  polyethylene glycol 3350 17 Gram(s) Oral every 24 hours  senna 2 Tablet(s) Oral at bedtime  atorvastatin 40 milliGRAM(s) Oral at bedtime  levothyroxine 75 MICROGram(s) Oral every 24 hours  chlorhexidine 2% Cloths 1 Application(s) Topical <User Schedule>  heparin   Injectable 4000 Unit(s) IV Push every 6 hours PRN  heparin   Injectable 2000 Unit(s) IV Push every 6 hours PRN  heparin  Infusion.  Unit(s)/Hr IV Continuous <Continuous>    [PHYSICAL EXAM:  TELEMETRY:  T(C): 36.7 (03-29-24 @ 14:44), Max: 37.1 (03-28-24 @ 20:36)  HR: 76 (03-29-24 @ 14:44) (74 - 78)  BP: 108/61 (03-29-24 @ 14:44) (87/54 - 123/57)  RR: 16 (03-29-24 @ 14:44) (16 - 18)  SpO2: 99% (03-29-24 @ 14:44) (96% - 100%)  Wt(kg): --  I&O's Summary    28 Mar 2024 07:01  -  29 Mar 2024 07:00  --------------------------------------------------------  IN: 400 mL / OUT: 320 mL / NET: 80 mL    29 Mar 2024 07:01  -  29 Mar 2024 17:19  --------------------------------------------------------  IN: 260 mL / OUT: 0 mL / NET: 260 mL    Gomez: No  Central/PICC/Mid Line:   No                                      Appearance: Elderly female in NAD.   HEENT:   Normal oral mucosa, PERRL, EOMI	  Neck: Supple. No JVD   Cardiovascular: + S1 S2. RRR. +3/6 CARO.   Respiratory: CTA Bilaterally. No rhonchi, wheezes, rales. 	  Gastrointestinal:  Soft, Non-tender, + BS	  Skin: No rashes, No ecchymoses, No cyanosis  Extremities: Normal range of motion, No clubbing, cyanosis or edema BLE.   Vascular: Peripheral pulses palpable bilaterally Faint to 1+  Radial Pulse 2+ Bilaterally  Neurologic: Non-focal  Psychiatry: A & O x 3, Mood & affect appropriate    LABS:	 	  CARDIAC MARKERS:                   9.7    6.71  )-----------( 163      ( 29 Mar 2024 05:30 )             30.1     03-29    135  |  104  |  29<H>  ----------------------------<  98  3.7   |  20<L>  |  2.47<H>    Ca    10.4      29 Mar 2024 05:30  Phos  2.7     03-29  Mg     2.0     03-29    TPro  6.7  /  Alb  3.7  /  TBili  0.4  /  DBili  x   /  AST  22  /  ALT  9<L>  /  AlkPhos  100  03-29  PT/INR - ( 29 Mar 2024 05:30 )   PT: 12.5 sec;   INR: 1.10     PTT - ( 29 Mar 2024 05:30 )  PTT:32.1 sec    ASSESSMENT/PLAN:

## 2024-03-29 NOTE — PROGRESS NOTE ADULT - PROBLEM SELECTOR PLAN 5
- hx of Afib, s/p AVN ablation and Bi-V PPM placement  - AC: Home Eliquis held. Heparin drip no bolus initiated.  -Rate Control: Toprol XL 25 mg daily (changed from Lopressor secondary to soft BPs)

## 2024-03-29 NOTE — PROGRESS NOTE ADULT - PROBLEM SELECTOR PLAN 2
hx of TASNEEM - gets weekly out-patient Iron infusions  - follows with hematologist Dr. Izquierdo   - s/p EGD with GI in OR with cardiac anesthesia on 3/28, no active bleed per GI  team   - (GI cleared to resume systemic AC)   -Hgb stable at 9.7. Hgb 6.9 on admission -s/p 2 u pRBCs during admission (3/25 and 3/27)  -Continue to monitor CBC.  -Maintain active type and screen       #MELENA ON ADMISSION   -GI cleared for systemic AC  -c/w PO Protonix

## 2024-03-29 NOTE — PROGRESS NOTE ADULT - PROBLEM SELECTOR PLAN 4
Euvolemic on exam. Warm.   -home meds: Lasix 40mg twice a week,   -s/p Lasix IVP x1 on 3/27 after blood transfusion.   -strict I/O, core measures, daily weights

## 2024-03-29 NOTE — PROGRESS NOTE ADULT - PROBLEM SELECTOR PLAN 6
Patient C/P free and HD stable   -s/p CABG (LIMA to RI, ANTONIO to RDA)   -followed by Dr. Preston   -CCTA on 8/20 shows patent LIMA to First Diagonal branch.  Patent ANTONIO to RPDA. Severe stenosis of the large first diagonal branch. Distal vessel fills via LIMA graft. Severe stenosis of proximal RCA. Distal vessel fills via ANTONIO graft. Nonobstructive disease throughout the LAD. Patent stent in mid LCX extending to the OM2. MV annuloplasty ring and Bioprosthetic AVR noted. Calcification throughout the aorta noted.  -c/w statin and BB

## 2024-03-29 NOTE — PROGRESS NOTE ADULT - PROBLEM SELECTOR PLAN 7
baseline sCr 1.7-2   -peak Cr 3 (present on admission). Now improving 2.47   -Monitor renal function, urine output, BP, and volume status   -hold home losartan . Avoid Nephrotoxic agents and renally dose meds   -cont to monitor

## 2024-03-30 NOTE — PROGRESS NOTE ADULT - PROBLEM SELECTOR PLAN 2
hx of TASNEEM - gets weekly out-patient Iron infusions  - follows with hematologist Dr. Izquierdo   - s/p EGD with GI in OR with cardiac anesthesia on 3/28, no active bleed per GI  team   - (GI cleared to resume systemic AC)   -Hgb stable at 9.3. Hgb 6.9 on admission -s/p 2 u pRBCs during admission (3/25 and 3/27)  -Continue to monitor CBC.  -Maintain active type and screen       #MELENA ON ADMISSION   -GI cleared for systemic AC  -c/w PO Protonix

## 2024-03-30 NOTE — PROGRESS NOTE ADULT - SUBJECTIVE AND OBJECTIVE BOX
Interventional Cardiology PA Adult Progress Note    CC: Anemia  Subjective Assessment: Patient seen and examined at bedside. Patient reports abdominal pain that she has had prior to admission but denies C/P,SOB, palpitations, dizziness, diaphoresis, N/V.    WILI otherwise negative unless otherwise stated except per HPI and Subjective  	  MEDICATIONS:  metoprolol succinate ER 25 milliGRAM(s) Oral daily  acetaminophen     Tablet .. 650 milliGRAM(s) Oral every 6 hours PRN  acetaminophen     Tablet .. 650 milliGRAM(s) Oral every 6 hours PRN  levETIRAcetam 250 milliGRAM(s) Oral two times a day  melatonin 5 milliGRAM(s) Oral at bedtime  sertraline 50 milliGRAM(s) Oral every 24 hours  bisacodyl 5 milliGRAM(s) Oral every 12 hours PRN  pantoprazole    Tablet 40 milliGRAM(s) Oral before breakfast  polyethylene glycol 3350 17 Gram(s) Oral every 24 hours  senna 2 Tablet(s) Oral at bedtime  atorvastatin 40 milliGRAM(s) Oral at bedtime  levothyroxine 75 MICROGram(s) Oral every 24 hours  chlorhexidine 2% Cloths 1 Application(s) Topical <User Schedule>  heparin   Injectable 4000 Unit(s) IV Push every 6 hours PRN  heparin   Injectable 2000 Unit(s) IV Push every 6 hours PRN  heparin  Infusion.  Unit(s)/Hr IV Continuous <Continuous>  lidocaine   4% Patch 1 Patch Transdermal daily    [PHYSICAL EXAM:  TELEMETRY:  T(C): 36.7 (03-30-24 @ 17:22), Max: 36.9 (03-30-24 @ 08:32)  HR: 79 (03-30-24 @ 17:22) (75 - 98)  BP: 131/71 (03-30-24 @ 17:22) (102/59 - 131/71)  RR: 18 (03-30-24 @ 17:22) (17 - 18)  SpO2: 100% (03-30-24 @ 17:22) (98% - 100%)  Wt(kg): --  I&O's Summary    29 Mar 2024 07:01  -  30 Mar 2024 07:00  --------------------------------------------------------  IN: 530 mL / OUT: 200 mL / NET: 330 mL    30 Mar 2024 07:01  -  30 Mar 2024 20:11  --------------------------------------------------------  IN: 816 mL / OUT: 550 mL / NET: 266 ml    Gomez: No  Central/PICC/Mid Line:   No                                      Appearance: Elderly female in NAD.   HEENT:   Normal oral mucosa, PERRL, EOMI	  Neck: Supple. No JVD   Cardiovascular: + S1 S2. RRR. +3/6 CARO.   Respiratory: CTA Bilaterally. No rhonchi, wheezes, rales. 	  Gastrointestinal:  Soft. Non-distended. Mildly tender to palpation RLQ and LLQ. Well healed abdominal scar.   Skin: No rashes, No ecchymoses, No cyanosis  Extremities: Normal range of motion, No clubbing, cyanosis or edema BLE.   Vascular: Peripheral pulses palpable bilaterally Faint to 1+  Radial Pulse 2+ Bilaterally  Neurologic: Non-focal  Psychiatry: A & O x 3, Mood & affect appropriate    LABS:	 	  CARDIAC MARKERS:                    9.3    7.48  )-----------( 171      ( 30 Mar 2024 05:30 )             29.7     03-30    137  |  106  |  27<H>  ----------------------------<  104<H>  4.5   |  21<L>  |  2.40<H>    Ca    10.1      30 Mar 2024 05:30  Phos  2.2     03-30  Mg     2.0     03-30    TPro  7.3  /  Alb  4.0  /  TBili  0.3  /  DBili  x   /  AST  19  /  ALT  8<L>  /  AlkPhos  97  03-30  PT/INR - ( 29 Mar 2024 05:30 )   PT: 12.5 sec;   INR: 1.10     PTT - ( 30 Mar 2024 18:46 )  PTT:93.7 sec    ASSESSMENT/PLAN:

## 2024-03-30 NOTE — CHART NOTE - NSCHARTNOTEFT_GEN_A_CORE
Called to patient bedside by RN for back pain. Patient reports abdominal pain-on exam NAD, Abdomen. Soft, Non-distended. Mildly tender to palpation diffusely throughout the abdomen, Flank tender to palpation bilaterally. Patient with AAA. VSS. Radial 2+ Bilaterally. Distal Pulses Palpable. Patient reports BM this AM however firm and reports constipation. Lidocaine Patch and Dulcolax PRN ordered. RN instructed to perform bladder scan. Continue to monitor closely

## 2024-03-30 NOTE — PROGRESS NOTE ADULT - PROBLEM SELECTOR PLAN 7
baseline sCr 1.7-2   -peak Cr 3 (present on admission). Now improving 2.40   -Monitor renal function, urine output, BP, and volume status   -hold home losartan . Avoid Nephrotoxic agents and renally dose meds   -cont to monitor

## 2024-03-30 NOTE — PROGRESS NOTE ADULT - SUBJECTIVE AND OBJECTIVE BOX
Physical Medicine and Rehabilitation Progress Note :       Patient is a 68y old  Female who presents with a chief complaint of anemia, CHERRI (29 Mar 2024 13:27)      HPI:  HPI: Patient is a 69yo F former smoker (quit 20 year ago with PMH of hypothyroidism, seizure disorder, Stage 3 CKD (baseline CR ~1.7-2.0), severe renal artery stenosis, chronic anemia (baseline Hgb ~9-10, gets weekly Fe infusion last infusion 3/22/24), AT, Afib s/p AVN ablation and CRT-P (8/2022; on Eliquis), HTN, HLD, CAD s/p CABG, AVR (porcine bio-prosthetic valve) and mitral annuloplasty, HFpEF, AAA s/p open repair in 2015 with complications, PAD s/p L pSFA stent/L-TIFFANY stent/L-IIA stent who presents with intermittent chest pain and KRAFT x 3 days. States she has been having intermittent chest pain and KRAFT which worsened about 3 days ago. She has not been very mobile for the past week due to pain and KRAFT. At baseline ambulates with cane, however has not been mostly sitting on the couch due to current symptoms. Additionally c/o lower abdominal pain which she attributes to constipation (has not had a BM in 5-6 days). Usually has regular BMs. Has had very poor PO intake over the past few days 2/2 constipation. Denies any s/s of bleeding; denies hematuria, hemoptysis, hematemesis, etc. No recent illness/sick contacts. Denies any recent travel. Denies hx of cancer, bleeding/clotting disorders, lower extremity pain or swelling. Compliant with her medications.  No other complaints. States she feels mildly improved after blood transfusion.     In the ED:  Initial vital signs: T: 98 F, HR: 86, BP: 114/72, R: 17, SpO2: 100% on RA  Labs: significant for Hgb 6.9, D-dimer 2831, Cl 111, HCO3 19, BUN/Cr 48/3.00, Ca 10.7, trop 41, p-BNP 3727  CXR: Persistent linear atelectasis over the left lower lung zone. The remainder the lung zones are clear. No pneumothorax.  CT C/A/P: Bibasilar atelectasis without evidence of consolidation. Borderline hepatomegaly with extensive coronary artery calcifications status post CABG. There is also a prosthetic aortic valve. Post surgical changes from prior open thoracoabdominal aortic bypass graft with new focal dilatation of the distalmost aspect of the graft measuring 4.4 x 6.1 cm just above the aortic bifurcation and previously measuring 3.2 x 4.1 cm on 6/11/2021.   EKG: paced rhythm  Medications: 1u pRBC, maalox 30mL PO x 1, morphine 2mg IV x 1  Consults: vascular (25 Mar 2024 22:30)                            9.3    7.48  )-----------( 171      ( 30 Mar 2024 05:30 )             29.7       03-30    137  |  106  |  27<H>  ----------------------------<  104<H>  4.5   |  21<L>  |  2.40<H>    Ca    10.1      30 Mar 2024 05:30  Phos  2.2     03-30  Mg     2.0     03-30    TPro  7.3  /  Alb  4.0  /  TBili  0.3  /  DBili  x   /  AST  19  /  ALT  8<L>  /  AlkPhos  97  03-30    Vital Signs Last 24 Hrs  T(C): 36.9 (30 Mar 2024 12:12), Max: 36.9 (30 Mar 2024 08:32)  T(F): 98.4 (30 Mar 2024 12:12), Max: 98.5 (30 Mar 2024 08:32)  HR: 98 (30 Mar 2024 12:12) (75 - 98)  BP: 110/73 (30 Mar 2024 12:12) (102/59 - 123/57)  BP(mean): 85 (30 Mar 2024 12:12) (73 - 95)  RR: 18 (30 Mar 2024 12:12) (16 - 18)  SpO2: 99% (30 Mar 2024 12:12) (96% - 100%)    Parameters below as of 30 Mar 2024 12:12  Patient On (Oxygen Delivery Method): room air        MEDICATIONS  (STANDING):  atorvastatin 40 milliGRAM(s) Oral at bedtime  chlorhexidine 2% Cloths 1 Application(s) Topical <User Schedule>  heparin  Infusion.  Unit(s)/Hr (10 mL/Hr) IV Continuous <Continuous>  levETIRAcetam 250 milliGRAM(s) Oral two times a day  levothyroxine 75 MICROGram(s) Oral every 24 hours  melatonin 5 milliGRAM(s) Oral at bedtime  metoprolol succinate ER 25 milliGRAM(s) Oral daily  pantoprazole    Tablet 40 milliGRAM(s) Oral before breakfast  polyethylene glycol 3350 17 Gram(s) Oral every 24 hours  senna 2 Tablet(s) Oral at bedtime  sertraline 50 milliGRAM(s) Oral every 24 hours    MEDICATIONS  (PRN):  acetaminophen     Tablet .. 650 milliGRAM(s) Oral every 6 hours PRN Mild Pain (1 - 3)  acetaminophen     Tablet .. 650 milliGRAM(s) Oral every 6 hours PRN Temp greater or equal to 38C (100.4F), Mild Pain (1 - 3)  heparin   Injectable 4000 Unit(s) IV Push every 6 hours PRN For aPTT less than 40  heparin   Injectable 2000 Unit(s) IV Push every 6 hours PRN For aPTT between 40 - 57      T(C): 36.9 (03-30-24 @ 12:12), Max: 36.9 (03-30-24 @ 08:32)  HR: 98 (03-30-24 @ 12:12) (75 - 98)  BP: 110/73 (03-30-24 @ 12:12) (102/59 - 123/57)  RR: 18 (03-30-24 @ 12:12) (16 - 18)  SpO2: 99% (03-30-24 @ 12:12) (96% - 100%)        Physical Exam:     Appearance: Elderly female in NAD.   HEENT:   Normal oral mucosa, PERRL, EOMI	  Neck: Supple. No JVD   Cardiovascular: + S1 S2. RRR. +3/6 CARO.   Respiratory: CTA Bilaterally. No rhonchi, wheezes, rales. 	  Gastrointestinal:  Soft, Non-tender, + BS	  Skin: No rashes, No ecchymoses, No cyanosis  Extremities: Normal range of motion, No clubbing, cyanosis or edema BLE.   Vascular: Peripheral pulses palpable bilaterally Faint to 1+  Radial Pulse 2+ Bilaterally  Neurologic: Non-focal  Psychiatry: A & O x 3, Mood & affect appropriate      3/29/2024 Functional Status Assessment :         Pain Assessment/Number Scale (0-10) Adult  Presence of Pain: denies pain/discomfort (Rating = 0)  Pain Rating (0-10): Rest: 0 (no pain/absence of nonverbal indicators of pain)  Pain Rating (0-10): Activity: 0 (no pain/absence of nonverbal indicators of pain)    Safety      -[facility] Functional Assessment: Basic Mobility  Type of Assessment: Daily assessment  Turning from your back to your side while in a flat bed without using bedrails?: 4 = No assist / stand by assistance  Moving from lying on your back to sitting on the flat side of a flat bed without using bedrails?: 4 = No assist / stand by assistance  Moving to and from a bed to a chair (including a wheelchair)?: 4 = No assist / stand by assistance  Standing up from a chair using your arms (e.g. wheelchair or bedside chair)?: 4 = No assist / stand by assistance  Walking in hospital room?: 3 = A little assistance  Climbing 3-5 steps with a railing?: 3 = A little assistance  Score: 22   Row Comment: Ask the patient "How much help from another person do you currently need? (If the patient hasn't done an activity recently, how much help from another person do you think he/she needs if he/she tried?)    Cognitive/Neuro      Cognitive/Neuro/Behavioral  Cognitive/Neuro/Behavioral [WDL Definition: Alert; opens eyes spontaneously; arouses to voice or touch; oriented x 4; follows commands; speech spontaneous, logical; purposeful motor response; behavior appropriate to situation]: WDL    Language Assistance  Preferred Language to Address Healthcare Preferred Language to Address Healthcare: English    Therapeutic Interventions      Bed Mobility  Bed Mobility Training Rolling/Turning: independent  Bed Mobility Training Scooting: independent  Bed Mobility Training Bridging: independent  Bed Mobility Training Sit-to-Supine: independent  Bed Mobility Training Supine-to-Sit: independent  Bed Mobility Training Limitations: impaired balance;  decreased strength    Sit-Stand Transfer Training  Transfer Training Sit-to-Stand Transfer: independent;  full weight-bearing  Transfer Training Stand-to-Sit Transfer: independent;  full weight-bearing  Sit-to-Stand Transfer Training Transfer Safety Analysis: impaired balance;  decreased strength    Gait Training  Gait Training: supervsion;  full weight-bearing   75 feet;  x3  Gait Analysis: 2-point gait   decreased peyton;  increased time in double stance;  decreased hip/knee flexion;  decreased velocity of limb motion;  decreased step length;  decreased stride length;  decreased swing-to-stance ratio;  impaired balance;  decreased strength    Stair Training  Physical Assist/Nonphysical Assist: supervision  Weight-Bearing Restrictions: full weight-bearing  Assistive Device: right rail up;  left rail down  Number of Stairs: 12           PM&R Impression : as above    Current disposition plan recommendation :    d/c home with home physical therapy

## 2024-03-30 NOTE — PROGRESS NOTE ADULT - PROBLEM SELECTOR PLAN 5
- hx of Afib, s/p AVN ablation and Bi-V PPM placement  - AC: Home Eliquis held. Heparin drip   -Rate Control: Toprol XL 25 mg daily (changed from Lopressor secondary to soft BPs)

## 2024-03-30 NOTE — PROGRESS NOTE ADULT - PROBLEM SELECTOR PLAN 8
-s/p SE this admission   -Episode of AMS 3/26, RRT called, pt found to be unresponsive, drooling. s/p ativan 2mg, keppra 3mg load w/ subsequent post-ictal state. -Second witnessed tonic-clonic seizure (rhythmic jerking, frothing at mouth) during transfer to telemetry.   -Now full recovery to baseline mentation.   -CT head shows no evidence of intracranial hemorrhage, midline shift. Lactate cleared. Placed on vEEG, vEEG negative for seizures, d/c'ed.   -c/w Keppra 250mg bid     #HYPOTHYROIDISM   -c/w levothyroxine 75mcg PO QD     F: None  E: Replete if K<4 or Mag<2  N: DASH Diet   GIppx: Protonix PO   VTEppx: HOLDING   Dispo: pending clinical course   PT: Home PT however patient will have to be reassessed post Surgery

## 2024-03-30 NOTE — PROGRESS NOTE ADULT - ASSESSMENT
Cardiology    68 year old F, former smoker (quit 20 year ago) with PMH of hypothyroidism, seizure disorder, stage 3 CKD (baseline CR ~1.7-2.0), severe renal artery stenosis, chronic anemia (baseline Hgb ~ 9-10, gets weekly iron infusion last infusion 3/22/24), AT, Afib s/p AVN ablation and CRT-P (8/2022; on Eliquis), HTN, HLD, CAD s/p CABG, AVR (porcine bio-prosthetic valve) and mitral annuloplasty, HFpEF (EF 51%), AAA s/p open repair in 2015 with complications, PAD s/p L pSFA stent/L-TIFFANY stent/L-IIA stent who presents with intermittent chest pain and KRAFT x 3 days, initially admitted to medicine for symptomatic anemia w/ course complicated by status epilepticus (2 seizure events) 3/26 w/ RR called, requiring step-up to telemetry for management of seizures and symptomatic anemia 2/2 GIB vs. vascular dissection. Patient is now s/p EGD where procedure was tolerated and is cleared for systemic AC if indicated. Patient stepped down to cardiac tele on 3/28/4  with vascular and CTSx following for plans of interventions to be done this admission s/p pre-op ALBER 3/29/24 revealing low flow low gradient severe AS.       Nutritional Assessment:  · Nutritional Assessment	This patient has been assessed with a concern for Malnutrition and has been determined to have a diagnosis/diagnoses of Severe protein-calorie malnutrition and Underweight (BMI < 19).    This patient is being managed with:   Diet Regular-  Entered: Mar 28 2024 12:44PM    Problem/Plan - 1:  ·  Problem: S/P aortic valve replacement with bioprosthetic valve.   ·  Plan: Severe Bioprosthetic AS with AI, bioprosthetic MR/MS  - Per CTSx, case discussed with Dr. Gregory  - Hx of CABG/AVR/MVr, and AAA repair   - Patient with significant valvular disease, will likely need alternate access transcatheter intervention  -TTE 3/27/24: normal LVSF EF51% severely dilated LA, bioprosthetic valve noted in aortic position w/ leaflet appearing thickened, mild AR, mitral valve mod thickened and calcified, mod MR, severe TR, pHTN present w/ PASP 55mgHg,   -ALBER 3/29/24: Low flow low gradient severe prosthetic valve AS, peak gradient aortic 37.73 mmHg, moderate AR, moderately elevated mitral valve gradients with normal appearing mitral leaflets status post annuloplasty, moderate mitral regurgitation which decreases to trace with reduced blood pressure, moderate TR   - Plan pending discussion with Vascular and CTS.    Problem/Plan - 2:  ·  Problem: Anemia.   ·  Plan: hx of TASNEEM - gets weekly out-patient Iron infusions  - follows with hematologist Dr. Izquierdo   - s/p EGD with GI in OR with cardiac anesthesia on 3/28, no active bleed per GI  team   - (GI cleared to resume systemic AC)   -Hgb stable at 9.7. Hgb 6.9 on admission -s/p 2 u pRBCs during admission (3/25 and 3/27)  -Continue to monitor CBC.  -Maintain active type and screen       #MELENA ON ADMISSION   -GI cleared for systemic AC  -c/w PO Protonix.    Problem/Plan - 3:  ·  Problem: Enlarging aortic aneurysm.   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    Problem/Plan - 4:  ·  Problem: Chronic heart failure with preserved ejection fraction (HFpEF).   ·  Plan: Euvolemic on exam. Warm.   -home meds: Lasix 40mg twice a week,   -s/p Lasix IVP x1 on 3/27 after blood transfusion.   -strict I/O, core measures, daily weights.    Problem/Plan - 5:  ·  Problem: Chronic atrial fibrillation.   ·  Plan: - hx of Afib, s/p AVN ablation and Bi-V PPM placement  - AC: Home Eliquis held. Heparin drip no bolus initiated.  -Rate Control: Toprol XL 25 mg daily (changed from Lopressor secondary to soft BPs).    Problem/Plan - 6:  ·  Problem: CAD (coronary artery disease).   ·  Plan: Patient C/P free and HD stable   -s/p CABG (LIMA to RI, ANTONIO to RDA)   -followed by Dr. Preston   -CCTA on 8/20 shows patent LIMA to First Diagonal branch.  Patent ANTONIO to RPDA. Severe stenosis of the large first diagonal branch. Distal vessel fills via LIMA graft. Severe stenosis of proximal RCA. Distal vessel fills via ANTONIO graft. Nonobstructive disease throughout the LAD. Patent stent in mid LCX extending to the OM2. MV annuloplasty ring and Bioprosthetic AVR noted. Calcification throughout the aorta noted.  -c/w statin and BB.    Problem/Plan - 7:  ·  Problem: Acute kidney injury superimposed on CKD.   ·  Plan: baseline sCr 1.7-2   -peak Cr 3 (present on admission). Now improving 2.47   -Monitor renal function, urine output, BP, and volume status   -hold home losartan . Avoid Nephrotoxic agents and renally dose meds   -cont to monitor.    Problem/Plan - 8:  ·  Problem: Seizure disorder.   ·  Plan: -s/p SE this admission   -Episode of AMS 3/26, RRT called, pt found to be unresponsive, drooling. s/p ativan 2mg, keppra 3mg load w/ subsequent post-ictal state. -Second witnessed tonic-clonic seizure (rhythmic jerking, frothing at mouth) during transfer to telemetry.   -Now full recovery to baseline mentation.   -CT head shows no evidence of intracranial hemorrhage, midline shift. Lactate cleared. Placed on vEEG, vEEG negative for seizures, d/c'ed.   -c/w Keppra 250mg bid     #HYPOTHYROIDISM   -c/w levothyroxine 75mcg PO QD     F: None  E: Replete if K<4 or Mag<2  N: DASH Diet   GIppx: Protonix PO   VTEppx: HOLDING   Dispo: pending clinical course   PT: ordered.

## 2024-03-31 NOTE — DISCHARGE NOTE PROVIDER - NSDCMRMEDTOKEN_GEN_ALL_CORE_FT
cilostazol 50 mg oral tablet: 1 tab(s) orally 2 times a day  Eliquis 2.5 mg oral tablet: 1 tab(s) orally 2 times a day  furosemide 40 mg oral tablet: 1 tab(s) orally once a day  levothyroxine 75 mcg (0.075 mg) oral tablet: 1 tab(s) orally once a day  losartan 25 mg oral tablet: 1 tab(s) orally once a day  metoprolol tartrate 50 mg oral tablet: 1 tab(s) orally 2 times a day   pantoprazole 40 mg oral delayed release tablet: 1 tab(s) orally once a day   rosuvastatin 40 mg oral tablet: 1 tab(s) orally once a day  sertraline 50 mg oral tablet: 1 tab(s) orally once a day (at bedtime)   acetaminophen 325 mg oral tablet: 2 tab(s) orally every 6 hours As needed Mild Pain (1 - 3)  cilostazol 50 mg oral tablet: 1 tab(s) orally 2 times a day  Eliquis 2.5 mg oral tablet: 1 tab(s) orally 2 times a day  furosemide 40 mg oral tablet: 1 tab(s) orally once a day  levothyroxine 75 mcg (0.075 mg) oral tablet: 1 tab(s) orally once a day  losartan 25 mg oral tablet: 1 tab(s) orally once a day  metoprolol tartrate 50 mg oral tablet: 1 tab(s) orally 2 times a day   pantoprazole 40 mg oral delayed release tablet: 1 tab(s) orally once a day   rosuvastatin 40 mg oral tablet: 1 tab(s) orally once a day  sertraline 50 mg oral tablet: 1 tab(s) orally once a day (at bedtime)   acetaminophen 325 mg oral tablet: 2 tab(s) orally every 6 hours As needed Mild Pain (1 - 3)  aspirin 81 mg oral delayed release tablet: 1 tab(s) orally once a day  Eliquis 2.5 mg oral tablet: 1 tab(s) orally 2 times a day  levETIRAcetam 250 mg oral tablet: 1 tab(s) orally 2 times a day  levothyroxine 75 mcg (0.075 mg) oral tablet: 1 tab(s) orally once a day  NIFEdipine 30 mg oral tablet, extended release: 1 tab(s) orally 2 times a day  pantoprazole 40 mg oral delayed release tablet: 1 tab(s) orally once a day   polyethylene glycol 3350 oral powder for reconstitution: 17 gram(s) orally 2 times a day  rosuvastatin 40 mg oral tablet: 1 tab(s) orally once a day  sertraline 50 mg oral tablet: 1 tab(s) orally once a day (at bedtime)   acetaminophen 325 mg oral tablet: 3 tab(s) orally every 6 hours  aspirin 81 mg oral delayed release tablet: 1 tab(s) orally once a day  carvedilol 12.5 mg oral tablet: 1 tab(s) orally every 12 hours  Eliquis 2.5 mg oral tablet: 1 tab(s) orally 2 times a day  HYDROmorphone 2 mg oral tablet: 1 tab(s) orally every 4 hours As needed Severe Pain (7 - 10)  levETIRAcetam 250 mg oral tablet: 1 tab(s) orally 2 times a day  levothyroxine 75 mcg (0.075 mg) oral tablet: 1 tab(s) orally once a day  lidocaine 4% topical film: Apply topically to affected area every 12 hours  NIFEdipine 30 mg oral tablet, extended release: 1 tab(s) orally once a day  pantoprazole 40 mg oral delayed release tablet: 1 tab(s) orally once a day   polyethylene glycol 3350 oral powder for reconstitution: 17 gram(s) orally 2 times a day  rosuvastatin 40 mg oral tablet: 1 tab(s) orally once a day  sertraline 50 mg oral tablet: 1 tab(s) orally once a day (at bedtime)

## 2024-03-31 NOTE — PROGRESS NOTE ADULT - SUBJECTIVE AND OBJECTIVE BOX
Interventional Cardiology PA Adult Progress Note    Subjective Assessment:    ROS negative except as noted above.  	  MEDICATIONS:  metoprolol succinate ER 25 milliGRAM(s) Oral daily        acetaminophen     Tablet .. 650 milliGRAM(s) Oral every 6 hours PRN  acetaminophen     Tablet .. 650 milliGRAM(s) Oral every 6 hours PRN  levETIRAcetam 250 milliGRAM(s) Oral two times a day  melatonin 5 milliGRAM(s) Oral at bedtime  sertraline 50 milliGRAM(s) Oral every 24 hours    bisacodyl 5 milliGRAM(s) Oral every 12 hours PRN  pantoprazole    Tablet 40 milliGRAM(s) Oral before breakfast  polyethylene glycol 3350 17 Gram(s) Oral every 24 hours  senna 2 Tablet(s) Oral at bedtime    atorvastatin 40 milliGRAM(s) Oral at bedtime  levothyroxine 75 MICROGram(s) Oral every 24 hours    chlorhexidine 2% Cloths 1 Application(s) Topical <User Schedule>  heparin   Injectable 4000 Unit(s) IV Push every 6 hours PRN  heparin   Injectable 2000 Unit(s) IV Push every 6 hours PRN  heparin  Infusion.  Unit(s)/Hr IV Continuous <Continuous>  lidocaine   4% Patch 1 Patch Transdermal daily      	    PHYSICAL EXAM:  TELEMETRY:  T(C): 36.7 (03-31-24 @ 08:28), Max: 36.9 (03-30-24 @ 12:12)  HR: 79 (03-31-24 @ 08:28) (75 - 98)  BP: 126/71 (03-31-24 @ 08:28) (110/73 - 159/77)  RR: 18 (03-31-24 @ 08:28) (18 - 18)  SpO2: 97% (03-31-24 @ 08:28) (97% - 100%)  Wt(kg): --  I&O's Summary    30 Mar 2024 07:01  -  31 Mar 2024 07:00  --------------------------------------------------------  IN: 816 mL / OUT: 550 mL / NET: 266 mL    31 Mar 2024 07:01  -  31 Mar 2024 10:46  --------------------------------------------------------  IN: 136 mL / OUT: 0 mL / NET: 136 mL                                              Appearance: Normal	  HEENT:   Normal oral mucosa, PERRLA, EOMI	  Neck: Supple, + JVD/ - JVD; Carotid Bruit   Cardiovascular: Normal S1 S2, No JVD, No murmurs,   Respiratory: Lungs clear to auscultation/Decreased Breath Sounds/No Rales, Rhonchi, Wheezing	  Gastrointestinal:  Soft, Non-tender, + BS	  Skin: No rashes, No ecchymoses, No cyanosis  Extremities: Normal range of motion, No clubbing, cyanosis or edema  Vascular: Peripheral pulses palpable 2+ bilaterally  Neurologic: Non-focal  Psychiatry: A & O x 3, Mood & affect appropriate    LABS:	 	  CARDIAC MARKERS:                                  10.0   7.67  )-----------( 201      ( 31 Mar 2024 05:30 )             31.1     03-31    136  |  105  |  21  ----------------------------<  119<H>  4.5   |  19<L>  |  2.26<H>    Ca    11.1<H>      31 Mar 2024 05:30  Phos  2.5     03-31  Mg     2.2     03-31    TPro  7.1  /  Alb  3.8  /  TBili  0.4  /  DBili  x   /  AST  21  /  ALT  9<L>  /  AlkPhos  106  03-31    proBNP:   Lipid Profile:   HgA1c:   TSH:   PTT - ( 31 Mar 2024 05:30 )  PTT:96.0 sec Interventional Cardiology PA Adult Progress Note    Subjective Assessment: Seen and examined bedside. Denies chest pain, palpitations, SOB, orthopnea/PND, dizziness, LOC, n/v/d, fever/chills/sick contacts, LE edema. States she is doing OK    ROS negative except as noted above.  	  MEDICATIONS:  metoprolol succinate ER 25 milliGRAM(s) Oral daily  acetaminophen     Tablet .. 650 milliGRAM(s) Oral every 6 hours PRN  acetaminophen     Tablet .. 650 milliGRAM(s) Oral every 6 hours PRN  levETIRAcetam 250 milliGRAM(s) Oral two times a day  melatonin 5 milliGRAM(s) Oral at bedtime  sertraline 50 milliGRAM(s) Oral every 24 hours  bisacodyl 5 milliGRAM(s) Oral every 12 hours PRN  pantoprazole    Tablet 40 milliGRAM(s) Oral before breakfast  polyethylene glycol 3350 17 Gram(s) Oral every 24 hours  senna 2 Tablet(s) Oral at bedtime  atorvastatin 40 milliGRAM(s) Oral at bedtime  levothyroxine 75 MICROGram(s) Oral every 24 hours  chlorhexidine 2% Cloths 1 Application(s) Topical <User Schedule>  heparin   Injectable 4000 Unit(s) IV Push every 6 hours PRN  heparin   Injectable 2000 Unit(s) IV Push every 6 hours PRN  heparin  Infusion.  Unit(s)/Hr IV Continuous <Continuous>  lidocaine   4% Patch 1 Patch Transdermal daily      	    PHYSICAL EXAM:  TELEMETRY:  T(C): 36.7 (03-31-24 @ 08:28), Max: 36.9 (03-30-24 @ 12:12)  HR: 79 (03-31-24 @ 08:28) (75 - 98)  BP: 126/71 (03-31-24 @ 08:28) (110/73 - 159/77)  RR: 18 (03-31-24 @ 08:28) (18 - 18)  SpO2: 97% (03-31-24 @ 08:28) (97% - 100%)  Wt(kg): --  I&O's Summary    30 Mar 2024 07:01  -  31 Mar 2024 07:00  --------------------------------------------------------  IN: 816 mL / OUT: 550 mL / NET: 266 mL    31 Mar 2024 07:01  -  31 Mar 2024 10:46  --------------------------------------------------------  IN: 136 mL / OUT: 0 mL / NET: 136 mL                                              Appearance: Normal	  HEENT:   Normal oral mucosa, PERRLA, EOMI	  Neck: Supple,  - JVD; Carotid Bruit   Cardiovascular: Normal S1 S2, No JVD, No murmurs,   Respiratory: Lungs clear to auscultation, No Rales, Rhonchi, Wheezing	  Gastrointestinal:  Soft, Non-tender, + BS	  Skin: No rashes, No ecchymoses, No cyanosis  Extremities: Normal range of motion, No clubbing, cyanosis or edema  Vascular: Peripheral pulses palpable 2+ bilaterally  Neurologic: Non-focal  Psychiatry: A & O x 3, Mood & affect appropriate    LABS:	 	  CARDIAC MARKERS:                                  10.0   7.67  )-----------( 201      ( 31 Mar 2024 05:30 )             31.1     03-31    136  |  105  |  21  ----------------------------<  119<H>  4.5   |  19<L>  |  2.26<H>    Ca    11.1<H>      31 Mar 2024 05:30  Phos  2.5     03-31  Mg     2.2     03-31    TPro  7.1  /  Alb  3.8  /  TBili  0.4  /  DBili  x   /  AST  21  /  ALT  9<L>  /  AlkPhos  106  03-31    PTT - ( 31 Mar 2024 05:30 )  PTT:96.0 sec

## 2024-03-31 NOTE — PROGRESS NOTE ADULT - PROBLEM SELECTOR PLAN 6
s/p CABG (LIMA to RI, ANTONIO to RDA) .  - CCTA 8/2020: patent grafts and stents  - c/w statin and BB

## 2024-03-31 NOTE — DISCHARGE NOTE PROVIDER - NSDCCPCAREPLAN_GEN_ALL_CORE_FT
PRINCIPAL DISCHARGE DIAGNOSIS  Diagnosis: Enlarging aortic aneurysm  Assessment and Plan of Treatment:       SECONDARY DISCHARGE DIAGNOSES  Diagnosis: Chronic heart failure with preserved ejection fraction (HFpEF)  Assessment and Plan of Treatment:     Diagnosis: CAD (coronary artery disease)  Assessment and Plan of Treatment:     Diagnosis: Chronic atrial fibrillation  Assessment and Plan of Treatment:     Diagnosis: PAD (peripheral artery disease)  Assessment and Plan of Treatment:     Diagnosis: S/P aortic valve replacement with bioprosthetic valve  Assessment and Plan of Treatment:     Diagnosis: Hypothyroidism  Assessment and Plan of Treatment:     Diagnosis: Seizure disorder  Assessment and Plan of Treatment:     Diagnosis: Status epilepticus  Assessment and Plan of Treatment:     Diagnosis: Constipation  Assessment and Plan of Treatment:     Diagnosis: Hypercalcemia  Assessment and Plan of Treatment:     Diagnosis: Acute kidney injury superimposed on CKD  Assessment and Plan of Treatment:     Diagnosis: Former smoker  Assessment and Plan of Treatment:     Diagnosis: Hypertension  Assessment and Plan of Treatment:     Diagnosis: Hyperlipidemia  Assessment and Plan of Treatment:     Diagnosis: Renal artery stenosis  Assessment and Plan of Treatment:     Diagnosis: Stage 3 chronic kidney disease  Assessment and Plan of Treatment:     Diagnosis: Iron deficiency anemia  Assessment and Plan of Treatment:     Diagnosis: Gastritis  Assessment and Plan of Treatment:      PRINCIPAL DISCHARGE DIAGNOSIS  Diagnosis: Enlarging aortic aneurysm  Assessment and Plan of Treatment:       SECONDARY DISCHARGE DIAGNOSES  Diagnosis: Chronic heart failure with preserved ejection fraction (HFpEF)  Assessment and Plan of Treatment:     Diagnosis: CAD (coronary artery disease)  Assessment and Plan of Treatment:     Diagnosis: Chronic atrial fibrillation  Assessment and Plan of Treatment:     Diagnosis: PAD (peripheral artery disease)  Assessment and Plan of Treatment:     Diagnosis: S/P aortic valve replacement with bioprosthetic valve  Assessment and Plan of Treatment:     Diagnosis: Hypothyroidism  Assessment and Plan of Treatment:     Diagnosis: Seizure disorder  Assessment and Plan of Treatment:     Diagnosis: Status epilepticus  Assessment and Plan of Treatment:     Diagnosis: Constipation  Assessment and Plan of Treatment:     Diagnosis: Hypercalcemia  Assessment and Plan of Treatment:     Diagnosis: Acute kidney injury superimposed on CKD  Assessment and Plan of Treatment:     Diagnosis: Former smoker  Assessment and Plan of Treatment:     Diagnosis: Hypertension  Assessment and Plan of Treatment:     Diagnosis: Hyperlipidemia  Assessment and Plan of Treatment:     Diagnosis: Renal artery stenosis  Assessment and Plan of Treatment:     Diagnosis: Stage 3 chronic kidney disease  Assessment and Plan of Treatment:     Diagnosis: Iron deficiency anemia  Assessment and Plan of Treatment:     Diagnosis: Gastritis  Assessment and Plan of Treatment:     Diagnosis: Severe protein-calorie malnutrition  Assessment and Plan of Treatment:      PRINCIPAL DISCHARGE DIAGNOSIS  Diagnosis: Enlarging aortic aneurysm  Assessment and Plan of Treatment:       SECONDARY DISCHARGE DIAGNOSES  Diagnosis: Acute kidney injury superimposed on CKD  Assessment and Plan of Treatment:     Diagnosis: Chronic atrial fibrillation  Assessment and Plan of Treatment:     Diagnosis: Chronic heart failure with preserved ejection fraction (HFpEF)  Assessment and Plan of Treatment:     Diagnosis: CAD (coronary artery disease)  Assessment and Plan of Treatment:     Diagnosis: PAD (peripheral artery disease)  Assessment and Plan of Treatment:     Diagnosis: Hypothyroidism  Assessment and Plan of Treatment:     Diagnosis: Seizure disorder  Assessment and Plan of Treatment:     Diagnosis: Constipation  Assessment and Plan of Treatment:     Diagnosis: Status epilepticus  Assessment and Plan of Treatment:     Diagnosis: Hypercalcemia  Assessment and Plan of Treatment:     Diagnosis: S/P aortic valve replacement with bioprosthetic valve  Assessment and Plan of Treatment:     Diagnosis: Former smoker  Assessment and Plan of Treatment:     Diagnosis: Hypertension  Assessment and Plan of Treatment:     Diagnosis: Hyperlipidemia  Assessment and Plan of Treatment:     Diagnosis: Renal artery stenosis  Assessment and Plan of Treatment:     Diagnosis: Stage 3 chronic kidney disease  Assessment and Plan of Treatment:     Diagnosis: Iron deficiency anemia  Assessment and Plan of Treatment:     Diagnosis: Gastritis  Assessment and Plan of Treatment:     Diagnosis: Severe protein-calorie malnutrition  Assessment and Plan of Treatment:     Diagnosis: Leukocytosis  Assessment and Plan of Treatment:     Diagnosis: Thrombocytopenia  Assessment and Plan of Treatment:     Diagnosis: Acute anemia  Assessment and Plan of Treatment:     Diagnosis: Hyponatremia  Assessment and Plan of Treatment:

## 2024-03-31 NOTE — DISCHARGE NOTE PROVIDER - HOSPITAL COURSE
INCOMPLETE -- last updated 3/31    69yo F, former smoker, PMHx hypothyroidism, seizure disorder, Stage 3 CKD (baseline CR ~1.7-2.0), severe renal artery stenosis, chronic anemia (baseline Hgb ~9-10, followed by Heme: Dr. Izquierdo, gets weekly iron infusion), AT, Afib s/p AVN ablation and CRT-P ([employer], 8/2022; on Eliquis), tortuous esophagus and Schatzi ring at [facility] (per EGD in 2020), extensive vascular history: AAA s/p open repair in 2015 followed saccular AAA and contained rupture w/ subsequent repair in 2020 (see below), HTN, HLD, CAD s/p CABG (LIMA to RI, ANTONIO to PDA) with SAVR (#19 Felder porcine bio-prosthetic valve) and mitral annuloplasty (Physio ring #26) at [facility] in 2002, HFpEF, and mixed valve disease: severe bioprosthetic AS (echo 3/27/24 MG 26 COY 0.82), severe MS (MG 16), moderate to severe MR, severe TR initially admitted to [facility] for symptomatic anemia w/ course complicated by status epilepticus (2 seizure events) 3/26 w/ RR called, requiring step-up to [facility] tele for management of seizures and symptomatic anemia. Started on Keppra w/o further seizure episodes. Pt noted to have melena, KASH positive, drop in Hgb. Appropriate Hgb increase s/p 1unit PRBC, Hgb 9.4, 3/28 AM. Patient transferred to CCU for monitoring during EGD 3/28/24 showing normal esophagus, duodenum, non-erosive gastritis. Cleared to resume anticoagulation. Patient is currently being followed by vascular for CT findings of aneurysm/pseudoaneurysm as well as CTSx for her valvulopathy. Pt then stepped down to [facility] cardiac tele.    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 enlarged from 6/11/21  TTE 3/27/24: normal LVSF EF51% severely dilated LA, bioprosthetic valve noted in aortic position w/ leaflet appearing thickened, mild AR, mitral valve mod thickened and calcified, mod MR, severe TR, pHTN present w/ PASP 55mgHg,   ALBER 3/29/24: Low flow low gradient severe prosthetic valve AS, peak gradient aortic 37.73 mmHg, moderate AR, moderately elevated mitral valve gradients with normal appearing mitral leaflets status post annuloplasty, moderate mitral regurgitation which decreases to trace with reduced blood pressure, moderate TR     CTSx determined that the pt has too many comorbidites to currently do an inpatient intervention of valvular disorders. Pt will follow up with CTSx __________________.    Vascular determined _______________    Encouraged pt for increased PO intake, including Ensure supplemental shakes.    Pt is asymptomatic at this time and denies chest pain, SOB, KARFT, palpitations, dizziness, LOC, N/V, diaphoresis, orthopnea/PND, and leg swelling. Pt able to ambulate and void without complication. VSS. Labs and telemetry reviewed. Pt is a candidate for discharge per  ________. Pt given appropriate discharge instructions, pt states they have an appropriate amount of their previous home meds unchanged from this visit at home, and any new medications were sent to their pharmacy. Pt instructed to f/u with ________ in 1-2 weeks.   68-year-old F, former smoker (quit 20 year ago) with PMH HTN, HLD, CAD s/p CABG, AVR (porcine bio-prosthetic valve) and mitral annuloplasty, HFpEF, hypothyroidism, seizure disorder, stage 3 CKD (baseline CR ~1.7-2.0), severe renal artery stenosis, chronic anemia (baseline Hgb ~ 9-10, gets weekly iron infusion last infusion 3/22/24), AT, Afib s/p AVN ablation and CRT-P (8/2022; on Eliquis), AAA (s/p open repair in 2015 with complications, last repaired 2020), PAD s/p L pSFA stent/L-TIFFANY stent/L-IIA stent who presents with intermittent chest pain and KRAFT x 3 days, admitted for symptomatic anemia 03/25/24. Course complicated by status epilepticus (2 seizure events) 3/26/24 w/ RR called and pt started on Keppra. On CT angio 3/26 new focal dilatation of the distalmost aspect of the graft of prior open thoracoabdominal aortic bypass graft and vascular surgery was consulted. Patient had episode of melena on 3/27 w/ concern for GIB vs vascular dissection. Cardiology consulted for pre-op clearance for EGD, patient transferred to CCU for in-unit scope revealing normal esophagus and duodenum and non-erosive gastritis and has since been cleared for anticoagulation for Afib. Stepped down to cardiac tele on 03/28/24 for which discussion between Vascular Sx and CTSx discussed which intervention to proceed with first. ALBER 3/29: Low flow low gradient severe prosthetic valve AS, peak gradient aortic 37.73 mmHg, moderate AR, moderately elevated mitral valve gradients with normal appearing mitral leaflets status post annuloplasty, moderate mitral regurgitation which decreases to trace with reduced blood pressure, moderate TR. Medicine team following for comanagement of constipation, pain management and FTT. Plan for TAVR as outpatient w/ Dr. Gregory. 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 Postoperatively, patient evaluated by physical therapy who recommended _____.             68-year-old F, former smoker (quit 20 year ago) with PMH HTN, HLD, CAD s/p CABG, AVR (porcine bio-prosthetic valve) and mitral annuloplasty, HFpEF, hypothyroidism, seizure disorder, stage 3 CKD (baseline CR ~1.7-2.0), severe renal artery stenosis, chronic anemia (baseline Hgb ~ 9-10, gets weekly iron infusion last infusion 3/22/24), AT, Afib s/p AVN ablation and CRT-P (8/2022; on Eliquis), AAA (s/p open repair in 2015 with complications, last repaired 2020), PAD s/p L pSFA stent/L-TIFFANY stent/L-IIA stent who presents with intermittent chest pain and KRAFT x 3 days, admitted for symptomatic anemia 03/25/24. Course complicated by status epilepticus (2 seizure events) 3/26/24 w/ RR called and pt started on Keppra. On CT angio 3/26 new focal dilatation of the distalmost aspect of the graft of prior open thoracoabdominal aortic bypass graft and vascular surgery was consulted. Patient had episode of melena on 3/27 w/ concern for GIB vs vascular dissection. Cardiology consulted for pre-op clearance for EGD, patient transferred to CCU for in-unit scope revealing normal esophagus and duodenum and non-erosive gastritis and has since been cleared for anticoagulation for Afib. Stepped down to cardiac tele on 03/28/24 for which discussion between Vascular Sx and CTSx discussed which intervention to proceed with first. ALBER 3/29: Low flow low gradient severe prosthetic valve AS, peak gradient aortic 37.73 mmHg, moderate AR, moderately elevated mitral valve gradients with normal appearing mitral leaflets status post annuloplasty, moderate mitral regurgitation which decreases to trace with reduced blood pressure, moderate TR. Medicine team following for comanagement of constipation, pain management and FTT. Plan for TAVR as outpatient w/ Dr. Gregory. 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 Postoperatively, patient evaluated by physical therapy who recommended TREV placement. Patient passed trial of void and tolerated diet without issue post operatively.     On day of discharge, patient's vitals remained stable, she ambulated at baseline and was stable for discharge with  close follow up provided.             68-year-old F, former smoker (quit 20 year ago) with PMH HTN, HLD, CAD s/p CABG, AVR (porcine bio-prosthetic valve) and mitral annuloplasty, HFpEF, hypothyroidism, seizure disorder, stage 3 CKD (baseline CR ~1.7-2.0), severe renal artery stenosis, chronic anemia (baseline Hgb ~ 9-10, gets weekly iron infusion last infusion 3/22/24), AT, Afib s/p AVN ablation and CRT-P (8/2022; on Eliquis), AAA (s/p open repair in 2015 with complications, last repaired 2020), PAD s/p L pSFA stent/L-TIFFANY stent/L-IIA stent who presents with intermittent chest pain and KRAFT x 3 days, admitted for symptomatic anemia 03/25/24. Course complicated by status epilepticus (2 seizure events) 3/26/24 w/ RR called and pt started on Keppra. On CT angio 3/26 new focal dilatation of the distalmost aspect of the graft of prior open thoracoabdominal aortic bypass graft and vascular surgery was consulted. Patient had episode of melena on 3/27 w/ concern for GIB vs vascular dissection. Cardiology consulted for pre-op clearance for EGD, patient transferred to CCU for in-unit scope revealing normal esophagus and duodenum and non-erosive gastritis and has since been cleared for anticoagulation for Afib. Stepped down to cardiac tele on 03/28/24 for which discussion between Vascular Sx and CTSx discussed which intervention to proceed with first. ALBER 3/29: Low flow low gradient severe prosthetic valve AS, peak gradient aortic 37.73 mmHg, moderate AR, moderately elevated mitral valve gradients with normal appearing mitral leaflets status post annuloplasty, moderate mitral regurgitation which decreases to trace with reduced blood pressure, moderate TR. Medicine team following for comanagement of constipation, pain management and FTT. Plan for TAVR as outpatient w/ Dr. Gregory. 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 Postoperatively, patient evaluated by physical therapy who recommended TREV placement. Patient passed trial of void and tolerated diet without issue post operatively.     On day of discharge, patient's vitals remained stable, she ambulated at baseline and was stable for discharge to [facility] with close follow up provided.

## 2024-03-31 NOTE — PROGRESS NOTE ADULT - PROBLEM SELECTOR PLAN 7
baseline sCr 1.7-2; peak Cr 3.0 present on admit  - 3/31 downtrending, Cr 2.26  - holding home losartan

## 2024-03-31 NOTE — DISCHARGE NOTE PROVIDER - CARE PROVIDER_API CALL
Daria Soriano  Vascular Surgery  [address]  [place] [address]  Phone: [phone]  Fax: [phone]  Follow Up Time: 1 week    Fuentes Gregory  Interventional Cardiology  [address]  [place] [address]  Phone: [phone]  Fax: [phone]  Follow Up Time: 2 weeks   Daria Soriano  Vascular Surgery  [address]  [place] [address]  Phone: [phone]  Fax: [phone]  Scheduled Appointment: 04/16/2024 09:15 AM    Fuentes Gregory  Interventional Cardiology  [address]  [place] [address]  Phone: [phone]  Fax: [phone]  Follow Up Time: 2 weeks

## 2024-03-31 NOTE — DISCHARGE NOTE PROVIDER - NSDCACTIVITY_GEN_ALL_CORE
No restrictions No restrictions/No heavy lifting/straining/Follow Instructions Provided by your Surgical Team

## 2024-03-31 NOTE — PROGRESS NOTE ADULT - PROBLEM SELECTOR PLAN 1
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

## 2024-03-31 NOTE — PROGRESS NOTE ADULT - PROBLEM SELECTOR PLAN 8
-s/p SE this admission   -Episode of AMS 3/26, RRT called, pt found to be unresponsive, drooling. s/p ativan 2mg, keppra 3mg load w/ subsequent post-ictal state  -Second witnessed tonic-clonic seizure (rhythmic jerking, frothing at mouth) during transfer to telemetry.   -Now full recovery to baseline mentation.   -CT head shows no evidence of intracranial hemorrhage, midline shift. Lactate cleared; s/p VEEG without events  -c/w Keppra 250mg bid     #HYPOTHYROIDISM   -c/w levothyroxine 75mcg PO QD     F: None  E: Replete if K<4 or Mag<2  N: DASH Diet   GIppx: Protonix PO   VTEppx: HOLDING   Dispo: pending clinical course   PT: Home PT however patient will have to be reassessed post Surgery

## 2024-03-31 NOTE — PROGRESS NOTE ADULT - PROBLEM SELECTOR PLAN 2
Severe Bioprosthetic AS with AI, bioprosthetic MR/MS; Hx of CABG/AVR/MVr, and AAA repair   - Patient with significant valvular disease, will likely need alternate access transcatheter intervention  -TTE 3/27/24: normal LVSF EF51% severely dilated LA, bioprosthetic valve noted in aortic position w/ leaflet appearing thickened, mild AR, mitral valve mod thickened and calcified, mod MR, severe TR, pHTN present w/ PASP 55mgHg,   -ALBER 3/29/24: Low flow low gradient severe prosthetic valve AS, peak gradient aortic 37.73 mmHg, moderate AR, moderately elevated mitral valve gradients with normal appearing mitral leaflets status post annuloplasty, moderate mitral regurgitation which decreases to trace with reduced blood pressure, moderate TR   - per CTSx, likely f/u as OP to pursue workup

## 2024-03-31 NOTE — DISCHARGE NOTE PROVIDER - NSDCFUSCHEDAPPT_GEN_ALL_CORE_FT
Shahriar Jasso  [facility]  HEARTVASC [address]  Scheduled Appointment: 05/07/2024    [facility]  HEARTVASC [address]  Scheduled Appointment: 05/14/2024    Daria Soriano  [facility]  VASCULAR [address]  Scheduled Appointment: 06/04/2024     Daria Soriano  [facility]  VASCULAR [address]  Scheduled Appointment: 04/16/2024    Shahriar Jasso  [facility]  HEARTVASC [address]  Scheduled Appointment: 05/07/2024    [facility]  HEARTVASC [address]  Scheduled Appointment: 05/14/2024    Daria Soriano  [facility]  VASCULAR [address]  Scheduled Appointment: 06/04/2024

## 2024-03-31 NOTE — DISCHARGE NOTE PROVIDER - DETAILS OF MALNUTRITION DIAGNOSIS/DIAGNOSES
This patient has been assessed with a concern for Malnutrition and was treated during this hospitalization for the following Nutrition diagnosis/diagnoses:     -  03/27/2024: Severe protein-calorie malnutrition   -  03/27/2024: Underweight (BMI < 19)

## 2024-03-31 NOTE — PROGRESS NOTE ADULT - PROBLEM SELECTOR PLAN 3
hx of TASNEEM - gets weekly out-patient Iron infusions  - s/p EGD with GI, no active bleed per GI team -- cleared to resume systemic AC  - s/p 2 u pRBCs during admission (3/25 and 3/27, Hgb hetal 6.9)  - Maintain active type and screen (next 4/2)      ## MELENA ON ADMISSION   - GI cleared for systemic AC; c/w PO Protonix

## 2024-03-31 NOTE — DISCHARGE NOTE PROVIDER - CARE PROVIDERS DIRECT ADDRESSES
[email or web address]

## 2024-03-31 NOTE — PROGRESS NOTE ADULT - PROBLEM SELECTOR PLAN 5
hx of Afib, s/p AVN ablation and Bi-V PPM placement  - AC: Home Eliquis held, now on Heparin drip   - c/w Toprol XL 25 mg daily

## 2024-03-31 NOTE — PROGRESS NOTE ADULT - ASSESSMENT
69yo F, former smoker, PMHx CAD (s/p CABG), HFpEF (EF 51%), afib (s/p AVN ablation and CRT-P 8/2022), AVR (procine-bioprosthetic) and mitral annuloplasty, AAA (s/p open repair 2015), PAD (s/p stents), HTN, HLD, hypothyroidism, seizure d/o, CKD3 (b/l Cr 1.7-2.0), severe CARLI, chronic anemia initially admitted to medicine for symptomatic anemia. Course c/b status epilepticus with RRT called, stepped up to tele c/f GIB vs vasc dissection. s/p EGD, cleared for AC. Stepped over to cardiac tele with plans for vascular surgery vs CTSx interventions for AAA / low-flow low gradient severe AS.

## 2024-03-31 NOTE — DISCHARGE NOTE PROVIDER - NSDCCPTREATMENT_GEN_ALL_CORE_FT
PRINCIPAL PROCEDURE  Procedure: Repair, aneurysm, aortoiliac, endovascular  Findings and Treatment:

## 2024-03-31 NOTE — DISCHARGE NOTE PROVIDER - NSDCHHNEEDSERVICE_GEN_ALL_CORE
Rehabilitation services Observation and assessment/Rehabilitation services/Wound care and assessment

## 2024-03-31 NOTE — DISCHARGE NOTE PROVIDER - NSDCFUADDINST_GEN_ALL_CORE_FT
FOLLOW UP: Dr. Soriano in 1 week. Your appointment has been made for _______. Call the office at  with any questions.    WOUND CARE: You may shower; soap and water over incision sites. Do not scrub. Pat dry when done.     ACTIVITY: Ambulate as tolerated, but no heavy lifting (>10lbs) or strenuous exercise.     Call the office if you experience increasing pain, redness, swelling or drainage from incision sites/wounds, or temperature >101.4F.     NEW MEDICATIONS: Metoprolol XL (this is for your heart rate and blood pressure). Keppra (this is for your seizures). Please  the new medication at [facility] located in the [facility] [place].    ADDITIONAL FOLLOW UP AFTER DISCHARGE:   Follow up with your PCP and cardiologist in 1-2 weeks.  Follow up with your cardiothoracic surgeon Dr. Gregory in 2 weeks to discuss repairing your aortic valve. The office is located at [address] [place] [address], [address] [place] [address]. Call the office at [phone] to make an appointment.  Follow up with [facility] to continue working up your hypercalcemia (high levels of calcium). Call [phone] to make an appointment.    DISCHARGE DESTINATION:     Discharge Education provided:    FOLLOW UP: Dr. Soriano in 1 week. Your appointment has been made for _______. Call the office at  with any questions.    WOUND CARE: You may shower; soap and water over incision sites. Do not scrub. Pat dry when done.     ACTIVITY: Ambulate as tolerated, but no heavy lifting (>10lbs) or strenuous exercise.     Call the office if you experience increasing pain, redness, swelling or drainage from incision sites/wounds, or temperature >101.4F.     NEW MEDICATIONS: Aspirin (this is for your blood vessels). Metoprolol XL (this is for your heart rate and blood pressure). Keppra (this is for your seizures). Please  the new medication at [facility].    ADDITIONAL FOLLOW UP AFTER DISCHARGE:   Follow up with your PCP and cardiologist in 1-2 weeks.  Follow up with your cardiothoracic surgeon Dr. Gregory in 2 weeks to discuss repairing your aortic valve. The office is located at [address] [place] [address], [address] [place] [address]. Call the office at [phone] to make an appointment.  Follow up with [facility] to continue working up your hypercalcemia (high levels of calcium). Call [phone] to make an appointment.    DISCHARGE DESTINATION:     Discharge Education provided:    FOLLOW UP: Dr. Soriano in 1 week. Your appointment has been made for _______. Call the office at  with any questions.    WOUND CARE: You may shower; soap and water over incision sites. Do not scrub. Pat dry when done.     ACTIVITY: Ambulate as tolerated, but no heavy lifting (>10lbs) or strenuous exercise.     Call the office if you experience increasing pain, redness, swelling or drainage from incision sites/wounds, or temperature >101.4F.     NEW MEDICATIONS: Aspirin (this is for your blood vessels). Carvedilol (this is for your chest pain, heart rate, and blood pressure). Nifedipine XL (this is for your blood pressure). Keppra (this is for your seizures). Please  the new medication at [facility] located in the lobby of the hospital.    STOP TAKING: Losartan and Lasix. Follow up with your cardiologist to restart these medications.    ADDITIONAL FOLLOW UP AFTER DISCHARGE:   Follow up with your PCP and cardiologist in 1-2 weeks.  Follow up with your cardiothoracic surgeon Dr. Gregory in 2 weeks to discuss repairing your aortic valve. The office is located at [address] [place] [address], [address] [place] [address]. Call the office at [phone] to make an appointment.  Follow up with [facility] to continue working up your hypercalcemia (high levels of calcium). Call [phone] to make an appointment.    DISCHARGE DESTINATION:     Discharge Education provided:    FOLLOW UP: Dr. Soriano in 1 week. Your appointment has been made for 4/16 at 9:15am. Call the office at  with any questions.    WOUND CARE: You may shower; soap and water over incision sites. Do not scrub. Pat dry when done.     ACTIVITY: Ambulate as tolerated, but no heavy lifting (>10lbs) or strenuous exercise.     Call the office if you experience increasing pain, redness, swelling or drainage from incision sites/wounds, or temperature >101.4F.     NEW MEDICATIONS: Aspirin (this is for your blood vessels). Carvedilol (this is for your chest pain, heart rate, and blood pressure). Nifedipine XL (this is for your blood pressure). Keppra (this is for your seizures). Please  the new medication at [facility] located in the lobby of the hospital.    STOP TAKING: Losartan and Lasix. Follow up with your cardiologist to restart these medications.    ADDITIONAL FOLLOW UP AFTER DISCHARGE:   Follow up with your PCP and cardiologist in 1-2 weeks.  Follow up with your cardiothoracic surgeon Dr. Gregory in 2 weeks to discuss repairing your aortic valve. The office is located at [address] [place] [address], [address] [place] [address]. Call the office at [phone] to make an appointment.  Follow up with [facility] to continue working up your hypercalcemia (high levels of calcium). Call [phone] to make an appointment.    DISCHARGE DESTINATION:     Discharge Education provided:    FOLLOW UP: Dr. Soriano in 1 week. Your appointment has been made for 4/16 at 9:15am. Call the office at  with any questions.    WOUND CARE: You may shower; soap and water over incision sites. Do not scrub. Pat dry when done.     ACTIVITY: Ambulate as tolerated, but no heavy lifting (>10lbs) or strenuous exercise.     Call the office if you experience increasing pain, redness, swelling or drainage from incision sites/wounds, or temperature >101.4F.     NEW MEDICATIONS: Aspirin (this is for your blood vessels). Carvedilol (this is for your chest pain, heart rate, and blood pressure). Nifedipine XL (this is for your blood pressure). Keppra (this is for your seizures). Please  the new medication at [facility] located in the lobby of the hospital.    STOP TAKING: Losartan and Lasix. Follow up with your cardiologist to restart these medications.    ADDITIONAL FOLLOW UP AFTER DISCHARGE:   Follow up with your PCP and cardiologist in 1-2 weeks.  Follow up with your cardiothoracic surgeon Dr. Gregory in 2 weeks to discuss repairing your aortic valve. The office is located at [address] [place] [address], [address] [place] [address]. Call the office at [phone] to make an appointment.  Follow up with [facility] to continue working up your hypercalcemia (high levels of calcium). Call [phone] to make an appointment.    DISCHARGE DESTINATION: [facility]    Discharge Education provided: Yes   FOLLOW UP: Dr. Soriano in 1 week. Your appointment has been made for 4/16 at 9:15am. Call the office at  with any questions.    WOUND CARE: You may shower; soap and water over incision sites. Do not scrub. Pat dry when done.     ACTIVITY: Ambulate as tolerated, but no heavy lifting (>10lbs) or strenuous exercise.     Call the office if you experience increasing pain, redness, swelling or drainage from incision sites/wounds, or temperature >101.4F.     NEW MEDICATIONS: Aspirin (this is for your blood vessels). Carvedilol (this is for your chest pain, heart rate, and blood pressure). Nifedipine XL (this is for your blood pressure). Keppra (this is for your seizures).    STOP TAKING: Losartan and Lasix. Follow up with your cardiologist to restart these medications.    ADDITIONAL FOLLOW UP AFTER DISCHARGE:   Follow up with your PCP and cardiologist in 1-2 weeks.  Follow up with your cardiothoracic surgeon Dr. Gregory in 2 weeks to discuss repairing your aortic valve. The office is located at [address] [place] [address]. Call the office at [phone] to make an appointment.  Follow up with [facility] to continue working up your hypercalcemia (high levels of calcium). Call [phone] to make an appointment.    DISCHARGE DESTINATION: [facility]    Discharge Education provided: Yes

## 2024-03-31 NOTE — DISCHARGE NOTE PROVIDER - PROVIDER TOKENS
PROVIDER:[TOKEN:[[id number]],FOLLOWUP:[1 week]],PROVIDER:[TOKEN:[[id number]],FOLLOWUP:[2 weeks]] PROVIDER:[TOKEN:[[id number]],SCHEDULEDAPPT:[04/16/2024],SCHEDULEDAPPTTIME:[09:15 AM]],PROVIDER:[TOKEN:[[id number]],FOLLOWUP:[2 weeks]]

## 2024-04-01 NOTE — PROGRESS NOTE ADULT - SUBJECTIVE AND OBJECTIVE BOX
VASCULAR SURGERY - PROGRESS NOTE    Subjective:   Patient seen and examined by chief resident and team on AM rounds. Patient without any acute complaints this AM. She reports that she has been having new L sided abdominal pain for the last few months. Sometimes worse after eating, sometimes at rest as well.     ROS:   Denies Headache, blurred vision, Chest Pain, SOB, Abdominal pain, nausea or vomiting     Social   heparin   Injectable 4000 PRN  heparin   Injectable 2000 PRN  heparin  Infusion.   metoprolol succinate ER 25      Allergies    No Known Allergies    Intolerances        Vital Signs Last 24 Hrs  T(C): 36.6 (01 Apr 2024 06:40), Max: 36.8 (31 Mar 2024 12:48)  T(F): 97.9 (01 Apr 2024 06:40), Max: 98.2 (31 Mar 2024 12:48)  HR: 75 (01 Apr 2024 06:40) (74 - 79)  BP: 147/81 (01 Apr 2024 06:40) (122/57 - 147/81)  BP(mean): 93 (31 Mar 2024 23:53) (82 - 100)  RR: 16 (01 Apr 2024 06:40) (16 - 18)  SpO2: 97% (01 Apr 2024 06:40) (97% - 100%)    Parameters below as of 01 Apr 2024 06:40  Patient On (Oxygen Delivery Method): room air      I&O's Summary    31 Mar 2024 07:01  -  01 Apr 2024 07:00  --------------------------------------------------------  IN: 456 mL / OUT: 975 mL / NET: -519 mL    Physical Exam:  General: NAD, resting comfortably in bed.  Pulm: Nonlabored breathing, no respiratory distress  CV: RRR  Abd:  soft, nontender, nondistended. No rebound, no guarding.   Extremities: warm, well perfused, no edema    LABS:                        10.0   7.67  )-----------( 201      ( 31 Mar 2024 05:30 )             31.1     03-31    136  |  105  |  21  ----------------------------<  119<H>  4.5   |  19<L>  |  2.26<H>    Ca    11.1<H>      31 Mar 2024 05:30  Phos  2.5     03-31  Mg     2.2     03-31    TPro  7.1  /  Alb  3.8  /  TBili  0.4  /  DBili  x   /  AST  21  /  ALT  9<L>  /  AlkPhos  106  03-31    PTT - ( 31 Mar 2024 05:30 )  PTT:96.0 sec    Radiology and Additional Studies:

## 2024-04-01 NOTE — PROGRESS NOTE ADULT - PROBLEM SELECTOR PLAN 3
Ca ranging 11.1-11.4 3/31-4/1  - medicine consult for comanagement, plan for multiple myeloma workup  - f/u SPEP/UPEP/immunofixation/Vit D 25 and 125 / PTH  - pain control as above  - start NS 50cc x 12 hrs

## 2024-04-01 NOTE — CHART NOTE - NSCHARTNOTEFT_GEN_A_CORE
Vascular Surgery Note:    Will plan for OR on Wednesday 4/3 for an [id number] device placement and femoral-femoral bypass. Plan discussed with chief resident and attending surgeon, Dr. Soriano.     Plan:  - Please preop for Wednesday: NPO past MN on Tuesday (4/2), ensure active T&Sx2, coags  - Hold heparin gtt en route to OR

## 2024-04-01 NOTE — CONSULT NOTE ADULT - SUBJECTIVE AND OBJECTIVE BOX
INTERNAL MEDICINE SERVICE INITIAL CONSULT NOTE    HPI: Patient is a 67yo F former smoker (quit 20 year ago with PMH of hypothyroidism, seizure disorder, Stage 3 CKD (baseline CR ~1.7-2.0), severe renal artery stenosis, chronic anemia (baseline Hgb ~9-10, gets weekly Fe infusion last infusion 3/22/24), AT, Afib s/p AVN ablation and CRT-P (8/2022; on Eliquis), HTN, HLD, CAD s/p CABG, AVR (porcine bio-prosthetic valve) and mitral annuloplasty, HFpEF, AAA s/p open repair in 2015 with complications, PAD s/p L pSFA stent/L-TIFFANY stent/L-IIA stent who presents with intermittent chest pain and KRAFT x 3 days. States she has been having intermittent chest pain and KRAFT which worsened about 3 days ago. She has not been very mobile for the past week due to pain and KRAFT. At baseline ambulates with cane, however has not been mostly sitting on the couch due to current symptoms. Additionally c/o lower abdominal pain which she attributes to constipation (has not had a BM in 5-6 days). Usually has regular BMs. Has had very poor PO intake over the past few days 2/2 constipation. Denies any s/s of bleeding; denies hematuria, hemoptysis, hematemesis, etc. No recent illness/sick contacts. Denies any recent travel. Denies hx of cancer, bleeding/clotting disorders, lower extremity pain or swelling. Compliant with her medications.  No other complaints. States she feels mildly improved after blood transfusion. Medicine is consulted for pain management and hypercalcemia.      REVIEW OF SYSTEMS:   Otherwise negative except as specified in HPI      MEDICATIONS:  MEDICATIONS  (STANDING):  atorvastatin 40 milliGRAM(s) Oral at bedtime  chlorhexidine 2% Cloths 1 Application(s) Topical <User Schedule>  heparin  Infusion.  Unit(s)/Hr (10 mL/Hr) IV Continuous <Continuous>  levETIRAcetam 250 milliGRAM(s) Oral two times a day  levothyroxine 75 MICROGram(s) Oral every 24 hours  lidocaine   4% Patch 1 Patch Transdermal daily  melatonin 5 milliGRAM(s) Oral at bedtime  metoprolol succinate ER 25 milliGRAM(s) Oral daily  NIFEdipine XL 30 milliGRAM(s) Oral two times a day  pantoprazole    Tablet 40 milliGRAM(s) Oral before breakfast  polyethylene glycol 3350 17 Gram(s) Oral every 24 hours  senna 2 Tablet(s) Oral at bedtime  sertraline 50 milliGRAM(s) Oral every 24 hours  sodium chloride 0.9%. 1000 milliLiter(s) (50 mL/Hr) IV Continuous <Continuous>    MEDICATIONS  (PRN):  acetaminophen     Tablet .. 650 milliGRAM(s) Oral every 6 hours PRN Mild Pain (1 - 3)  bisacodyl 5 milliGRAM(s) Oral every 12 hours PRN Constipation  heparin   Injectable 4000 Unit(s) IV Push every 6 hours PRN For aPTT less than 40  heparin   Injectable 2000 Unit(s) IV Push every 6 hours PRN For aPTT between 40 - 57  HYDROmorphone  Injectable 0.2 milliGRAM(s) IV Push daily PRN Severe Pain (7 - 10)      ALLERGIES:  Allergies    No Known Allergies    Intolerances        VITAL SIGNS:  Vital Signs Last 24 Hrs  T(C): 36.4 (01 Apr 2024 09:16), Max: 36.8 (31 Mar 2024 18:28)  T(F): 97.5 (01 Apr 2024 09:16), Max: 98.2 (31 Mar 2024 18:28)  HR: 79 (01 Apr 2024 13:24) (74 - 79)  BP: 135/63 (01 Apr 2024 13:24) (122/57 - 184/84)  BP(mean): 91 (01 Apr 2024 13:24) (82 - 121)  RR: 18 (01 Apr 2024 10:35) (16 - 18)  SpO2: 98% (01 Apr 2024 10:35) (97% - 100%)    Parameters below as of 01 Apr 2024 10:35  Patient On (Oxygen Delivery Method): room air        03-31-24 @ 07:01  -  04-01-24 @ 07:00  --------------------------------------------------------  IN:    Heparin Infusion: 96 mL    Oral Fluid: 360 mL  Total IN: 456 mL    OUT:    Voided (mL): 975 mL  Total OUT: 975 mL    Total NET: -519 mL      04-01-24 @ 07:01  -  04-01-24 @ 13:50  --------------------------------------------------------  IN:  Total IN: 0 mL    OUT:    Oral Fluid: 0 mL  Total OUT: 0 mL    Total NET: 0 mL      PHYSICAL EXAM:  Constitutional: WDWN resting comfortably in bed; NAD  Head: NC/AT  Eyes: PERRL, EOMI, anicteric sclera  ENT: no nasal discharge; uvula midline, no oropharyngeal erythema or exudates; MMM  Neck: supple; no JVD or thyromegaly  Respiratory: CTA B/L; no W/R/R, no retractions  Cardiac: +S1/S2; RRR; no M/R/G; PMI non-displaced  Gastrointestinal: abdomen soft, +LLQ tenderness, ND; no rebound or guarding; +BSx4  Genitourinary: normal external genitalia  Back: spine midline, no bony tenderness or step-offs; +L paraspinal/CVA tenderness  Extremities: WWP, no clubbing or cyanosis; no peripheral edema  Musculoskeletal: NROM x4; no joint swelling, tenderness or erythema  Vascular: 2+ radial, femoral, DP/PT pulses B/L  Dermatologic: skin warm, dry and intact; no rashes, wounds, or scars  Lymphatic: no submandibular or cervical LAD  Neurologic: AAOx3; CNII-XII grossly intact; no focal deficits  Psychiatric: affect and characteristics of appearance, verbalizations, behaviors are appropriate    LABS:                        10.4   7.49  )-----------( 218      ( 01 Apr 2024 05:30 )             33.2     04-01    138  |  105  |  16  ----------------------------<  112<H>  4.5   |  21<L>  |  2.34<H>    Ca    11.4<H>      01 Apr 2024 05:30  Phos  2.5     03-31  Mg     2.3     04-01    TPro  7.1  /  Alb  3.8  /  TBili  0.4  /  DBili  x   /  AST  21  /  ALT  9<L>  /  AlkPhos  106  03-31    PTT - ( 01 Apr 2024 05:30 )  PTT:117.7 sec  Urinalysis Basic - ( 01 Apr 2024 05:30 )    Color: x / Appearance: x / SG: x / pH: x  Gluc: 112 mg/dL / Ketone: x  / Bili: x / Urobili: x   Blood: x / Protein: x / Nitrite: x   Leuk Esterase: x / RBC: x / WBC x   Sq Epi: x / Non Sq Epi: x / Bacteria: x          CAPILLARY BLOOD GLUCOSE              RADIOLOGY & ADDITIONAL TESTS: Reviewed.

## 2024-04-01 NOTE — CHART NOTE - NSCHARTNOTEFT_GEN_A_CORE
Admitting Diagnosis:   Patient is a 68y old  Female who presents with a chief complaint of anemia, CHERRI (01 Apr 2024 13:04)      PAST MEDICAL & SURGICAL HISTORY:  Atherosclerosis of coronary artery  CAD (coronary artery disease)      Peripheral vascular disease  PVD (peripheral vascular disease)      Anemia  Anemia      Hypothyroidism  Hypothyroidism      Gastroesophageal reflux disease  GERD (gastroesophageal reflux disease)      Hyperlipidemia  Hyperlipidemia      Essential hypertension  HTN (hypertension)      Seizure      Anxiety      Depression, unspecified depression type      Chronic kidney disease, unspecified CKD stage      Status post aorto-coronary artery bypass graft  2012      Atherosclerosis of coronary artery bypass graft(s), unspecified, with angina pectoris with documented spasm      H/O aortic valve replacement  Bioprosthetic      Ruptured aortic aneurysm  surgery august 2020      Abdominal aortic aneurysm (AAA) without rupture  2015          Current Nutrition Order:  REGULAR DIET      PO Intake: Good (%) [   ]  Fair (50-75%) [  xx-50% or less ] Poor (<25%) [   ]    GI Issues:   LBM per flow sheets 3/31+   MIRALAX DULCOLAX Senna and Protonix are ordered   3/28 EGD: Normal esophagus. Normal duodenum. Erythema in the incisura of the stomach & antrum compatible with non-erosive gastritis.    Pain: Noted ABD pain per flow sheets - pain medications are ordered     Skin Integrity:  Enzo 16, No Edema, No pressure ulcer - SX site noted       03-31-24 @ 07:01  -  04-01-24 @ 07:00  --------------------------------------------------------  IN: 456 mL / OUT: 975 mL / NET: -519 mL    04-01-24 @ 07:01  -  04-01-24 @ 15:51  --------------------------------------------------------  IN: 0 mL / OUT: 0 mL / NET: 0 mL        Labs:   04-01    138  |  105  |  16  ----------------------------<  112<H>  4.5   |  21<L>  |  2.34<H>    Ca    11.4<H>      01 Apr 2024 05:30  Phos  2.5     03-31  Mg     2.3     04-01    TPro  7.1  /  Alb  3.8  /  TBili  0.4  /  DBili  x   /  AST  21  /  ALT  9<L>  /  AlkPhos  106  03-31    CAPILLARY BLOOD GLUCOSE          Medications:  MEDICATIONS  (STANDING):  atorvastatin 40 milliGRAM(s) Oral at bedtime  chlorhexidine 2% Cloths 1 Application(s) Topical <User Schedule>  heparin  Infusion.  Unit(s)/Hr (10 mL/Hr) IV Continuous <Continuous>  levETIRAcetam 250 milliGRAM(s) Oral two times a day  levothyroxine 75 MICROGram(s) Oral every 24 hours  lidocaine   4% Patch 1 Patch Transdermal daily  melatonin 5 milliGRAM(s) Oral at bedtime  metoprolol succinate ER 25 milliGRAM(s) Oral daily  NIFEdipine XL 30 milliGRAM(s) Oral two times a day  pantoprazole    Tablet 40 milliGRAM(s) Oral before breakfast  polyethylene glycol 3350 17 Gram(s) Oral two times a day  senna 2 Tablet(s) Oral at bedtime  sertraline 50 milliGRAM(s) Oral every 24 hours  sodium chloride 0.9%. 1000 milliLiter(s) (50 mL/Hr) IV Continuous <Continuous>    MEDICATIONS  (PRN):  acetaminophen     Tablet .. 650 milliGRAM(s) Oral every 6 hours PRN Mild Pain (1 - 3)  bisacodyl 5 milliGRAM(s) Oral every 12 hours PRN Constipation  heparin   Injectable 4000 Unit(s) IV Push every 6 hours PRN For aPTT less than 40  heparin   Injectable 2000 Unit(s) IV Push every 6 hours PRN For aPTT between 40 - 57  HYDROmorphone  Injectable 0.2 milliGRAM(s) IV Push daily PRN Severe Pain (7 - 10)      5'6''  pounds +-10%   Wt 115 pounds BMI 18.6 %IBW=88     Weight Change:   4/1 97.6 pounds   3/31 98.1 pounds   3/29 113.9 pounds   3/28 122.1 pounds   -- Varying wts noted during admit, ? if d/t hx of     [PCM]  -- Pt presents with severe wasting of the orbital and interosseous regions. Limited nutrition focused physical examination as pt in bed contracted.  -- Pt profile screen triggered for Malnutrition Screening Tool Score >2; reported wt loss of 14-23 pounds unintentionally.   -- Per progress notes, pt Has had very poor PO intake over the past few days 2/2 constipation. Pt however reported to RD not liking meals in house despite being a regular diet; Pt reports "Being told she is a picky eater." Assume meeting<75% EER consistently.     Estimated energy needs:   IBW for EER D/t varying wts in EMR at this time  Adjust for age, HF/CKD, Malnutrition - fluids per team  25-30kcal/kg: ~1500-1800kcal/day   1.25-1.5gm/kg: ~75-90gm prot/day     Subjective: 67yo F former smoker (quit 20 year ago) with PMH of hypothyroidism, seizure disorder, Stage 3 CKD (baseline CR ~1.7-2.0), severe renal artery stenosis, chronic anemia (baseline Hgb ~9-10, gets weekly Fe infusion last infusion 3/22/24), AT, Afib s/p AVN ablation and CRT-P (8/2022; on Eliquis), HTN, HLD, CAD s/p CABG, AVR (porcine bio-prosthetic valve) and mitral annuloplasty, HFpEF, AAA s/p open repair in 2015 with complications, PAD s/p L pSFA stent/L-TIFFANY stent/L-IIA stent who presents with intermittent chest pain and KRAFT x 3 days, admitted for symptomatic anemia and r/o PE. Course c/b status epilepticus with RRT called, stepped up to tele c/f GIB vs vasc dissection. Is now s/p EGD, cleared for AC. Stepped over to cardiac tele 3/28, with plans for vascular surgery vs CTSx interventions for AAA / low-flow low gradient severe AS. However CTSx plans for OP intervention as mortality rate currently too high 3/31. Medicine is consulted for pain management and hypercalcemia.    Pt on 5UR. Team consulted for pt to be seen. Per team cocern i/s/o low BMI. Malnutrtion note placed during time of RD inital assessment visit. During RD visit today pt with various  related issues; feels she cannot get many items despite being on a regular diet. Pt reports she has been told she is a picky eater in the past. Would be agreeable to taking ensure in house x3/day, reports taking PTA and enjoying.   Please see below for nutritions recommendations. Recommendations Provided to team.     Prior PES: Malnutrition... severe related to pt condition as evidenced by significant muscle/fat wasting.  >>ongoing at this time  GOAL: Pt will meet 75% or more of protein/energy needs via most appropriate route for nutrition and reduce/resolve s/sx protein-calorie malnutrition.    Recommendations:  1. Current Diet: Regular.  - Add oral nutrition supplements: Ensure Enlive x3/day 350kcal/20gm prot per 1 can.  - Monitor %PO intake. Encourage as able during meals.  2. Labs: monitor BMP, CBC, glucose, lytes - Replete PRN, trend renal indices, LFTs.  3. Pain/GI per team.  - Optimize as needed.  4. Monitor Skin, Wt, GOC.  5. MVI.  6. RD to remain available for additional nutrition interventions as needed.     Education: provided today 4/1.     Risk Level: High [  XX ] Moderate [   ] Low [   ]

## 2024-04-01 NOTE — PROGRESS NOTE ADULT - PROBLEM SELECTOR PLAN 1
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 enlarged from 6/11/21  - vascular following, pending repair of aneurysm/pseudoaneurysm once medically optimized  - tentative plan for CTSx intervention before Vasc intervention  - f/u CTSx/vasc planning / timing of each intervention  - Urgent vascular consult if hypotensive, tachycardic, c/f aneurysm rupture    ##chronic pain  - endorsing 6-8/10 back and abdominal pain for last few months, worsened 4/1 AM  - medicine consulted for comanagement  - PAIN CONTROL: APAP 650mg q6hrs standing dose, lidocaine patch q24hrs, Dilaudid 0.2mg IVP q6hrs prn severe breakthrough pain 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 enlarged from 6/11/21  - vascular following, pending repair of aneurysm/pseudoaneurysm once medically optimized  - tentative plan for CTSx intervention before Vasc intervention  - f/u CTSx/vasc planning / timing of each intervention  - Urgent vascular consult if hypotensive, tachycardic, c/f aneurysm rupture  - start nifedipine 30mg BID    ##chronic pain  - endorsing 6-8/10 back and abdominal pain for last few months, worsened 4/1 AM  - medicine consulted for comanagement  - PAIN CONTROL: APAP 650mg q6hrs standing dose, lidocaine patch q24hrs, Dilaudid 0.2mg IVP q6hrs prn severe breakthrough pain 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

## 2024-04-01 NOTE — CONSULT NOTE ADULT - ATTENDING COMMENTS
68F former smoker, PMHx CAD (s/p CABG), HFpEF (EF 51%), afib (s/p AVN ablation and CRT-P 8/2022), AVR (procine-bioprosthetic) and mitral annuloplasty, AAA (s/p open repair 2015), PAD (s/p stents), HTN, HLD, hypothyroidism, seizure d/o, CKD3 (b/l Cr 1.7-2.0), severe CARLI, chronic anemia initially admitted to medicine for symptomatic anemia. Course c/b status epilepticus with RRT called, stepped up to tele c/f GIB vs vasc dissection. s/p EGD, cleared for AC. Stepped over to cardiac tele with plans for vascular surgery vs CTSx interventions for AAA / low-flow low gradient severe AS. Medicine consulted for pain management and hypercalcemia.    - pt seen with Dr. Nichols, she is tearful, pain is mostly in RLQ and back -constipation at home make pain worse. never had seizure.   multiple valvular heart disease and aneurysm -cards and vascular/CTS deciding on procedure, pain making hypertension worse.    - pain- could be related to hypercalcemia/aneurysmal pain -lidoderm helps ( at least she gets some sleep)  -to give Tylenol standing, lidoderm, and for to give low dose dilaudid ( 0.2 mg per dose IV Q 6 hourly prn for breakthrough pain     - hypercalcemia ( mild ) -with anemia and elevated cr.  - would send work up for myeloma work up -including SPEP/UPEP/immunofixation in both urine/serum.  - she took vitamin D over the counter- no supplement now.  - as dw cardiology -would give gentle hydration for hypercalcemia   - constipatin - stool softener with miralax and senna ( hypercalcemia can cause constipation, bone pain and encephalopathy    - seizure - no prior history , currently on Keppra.   - Anemia - not hemolytic, EGD erosive gastritis, no active bleeding. GI clear for AC use.     Thank you for allowing medicine to participate in the care, dw cardiology   high risk with risk of complication. 68F former smoker, PMHx CAD (s/p CABG), HFpEF (EF 51%), afib (s/p AVN ablation and CRT-P 8/2022), AVR (procine-bioprosthetic) and mitral annuloplasty, AAA (s/p open repair 2015), PAD (s/p stents), HTN, HLD, hypothyroidism, seizure d/o, CKD3 (b/l Cr 1.7-2.0), severe CARLI, chronic anemia initially admitted to medicine for symptomatic anemia. Course c/b status epilepticus with RRT called, stepped up to tele c/f GIB vs vasc dissection. s/p EGD, cleared for AC. Stepped over to cardiac tele with plans for vascular surgery vs CTSx interventions for AAA / low-flow low gradient severe AS. Medicine consulted for pain management and hypercalcemia.    - pt seen with Dr. Nichols, she is tearful, pain is mostly in RLQ and back -constipation at home make pain worse. never had seizure.   multiple valvular heart disease and aneurysm -cards and vascular/CTS deciding on procedure, pain making hypertension worse.    - pain- could be related to hypercalcemia/aneurysmal pain -lidoderm helps ( at least she gets some sleep)  -to give Tylenol standing, lidoderm, and for to give low dose dilaudid ( 0.2 mg per dose IV Q 6 hourly prn for breakthrough pain     - hypercalcemia ( mild ) -with anemia and elevated cr.  - would send work up for myeloma work up -including SPEP/UPEP/immunofixation in both urine/serum.   to check PTH, vitamin D as above.  - she took vitamin D over the counter- no supplement now.  - as dw cardiology -would give gentle hydration for hypercalcemia   - constipatin - stool softener with miralax and senna ( hypercalcemia can cause constipation, bone pain and encephalopathy    - seizure - no prior history , currently on Keppra.   - Anemia - not hemolytic, EGD erosive gastritis, no active bleeding. GI clear for AC use.     Thank you for allowing medicine to participate in the care, dw cardiology   high risk with risk of complication.

## 2024-04-01 NOTE — PROGRESS NOTE ADULT - PROBLEM SELECTOR PLAN 2
Severe Bioprosthetic AS with AI, bioprosthetic MR/MS; Hx of CABG/AVR/MVr, and AAA repair   - Patient with significant valvular disease, will likely need alternate access transcatheter intervention  -TTE 3/27/24: normal LVSF EF51% severely dilated LA, bioprosthetic valve noted in aortic position w/ leaflet appearing thickened, mild AR, mitral valve mod thickened and calcified, mod MR, severe TR, pHTN present w/ PASP 55mgHg,   -ALBER 3/29/24: Low flow low gradient severe prosthetic valve AS, peak gradient aortic 37.73 mmHg, moderate AR, moderately elevated mitral valve gradients with normal appearing mitral leaflets status post annuloplasty, moderate mitral regurgitation which decreases to trace with reduced blood pressure, moderate TR   - per CTSx, likely pursue CTSx before Vasc intervention; f/u with both teams Severe Bioprosthetic AS with AI, bioprosthetic MR/MS; Hx of CABG/AVR/MVr, and AAA repair   - Patient with significant valvular disease, will likely need alternate access transcatheter intervention  -TTE 3/27/24: normal LVSF EF51% severely dilated LA, bioprosthetic valve noted in aortic position w/ leaflet appearing thickened, mild AR, mitral valve mod thickened and calcified, mod MR, severe TR, pHTN present w/ PASP 55mgHg,   -ALBER 3/29/24: Low flow low gradient severe prosthetic valve AS, peak gradient aortic 37.73 mmHg, moderate AR, moderately elevated mitral valve gradients with normal appearing mitral leaflets status post annuloplasty, moderate mitral regurgitation which decreases to trace with reduced blood pressure, moderate TR   - plan for TAVR [facility] with Dr Gregory as OP; CTSx signed off

## 2024-04-01 NOTE — PROGRESS NOTE ADULT - ASSESSMENT
67 yo female, former smoker, with PMHx of HTN, hyperlipidemia, CAD s/p prior PCI and CABG, bioAVR/mitral annuloplasty in 2002, PAD s/p LSFA  and L TIFFANY (history of occluded bilateral SFA), AAA (last repaired in 2020), chronic anemia (receives Iron infusions, last 8/23), hypothyroid, CKD Stage III, Atach and Afib (non anticoagulated),  +BI-V PPM implant on 8/24. for 3 days has been experiencing exertional sob. CT A/P non con shows: Post surgical changes from prior open thoracoabdominal aortic bypass graft with new focal dilatation of the distalmost aspect of the graft measuring 4.4 x 6.1 cm just above the aortic bifurcation and previously measuring 3.2 x 4.1 cm on 6/11/2021.    Recommendations:  - Will need repair of aneurysm/pseudoaneurysm once medically optimized. Plan pending discussion with attending.  - Trend Hgb  - Rest of care per primary  Vascular will continue to follow. Please call with any clinical changes or questions.

## 2024-04-01 NOTE — PROGRESS NOTE ADULT - ASSESSMENT
67yo F, former smoker, PMHx CAD (s/p CABG), HFpEF (EF 51%), afib (s/p AVN ablation and CRT-P 8/2022), AVR (procine-bioprosthetic) and mitral annuloplasty, AAA (s/p open repair 2015), PAD (s/p stents), HTN, HLD, hypothyroidism, seizure d/o, CKD3 (b/l Cr 1.7-2.0), severe CARLI, chronic anemia initially admitted to medicine for symptomatic anemia. Course c/b status epilepticus with RRT called, stepped up to tele c/f GIB vs vasc dissection. s/p EGD, cleared for AC. Stepped over to cardiac tele with plans for vascular surgery vs CTSx interventions for AAA / low-flow low gradient severe AS. Med consulted for comanagement.

## 2024-04-01 NOTE — CHART NOTE - NSCHARTNOTEFT_GEN_A_CORE
Spoke with Dr. Gregory. Patient will go for her vascular procedure on Wednesday with Dr. Mena. She will then follow up as an outpatient with Dr. Gregory for further management of her AS and TAVR planning. Please call office to set up appointment with Dr. Gregory upon patient's discharge. [phone]. Structural Team to sign off now.     Marii Yu PA-C

## 2024-04-01 NOTE — PROGRESS NOTE ADULT - PROBLEM SELECTOR PLAN 8
baseline sCr 1.7-2; peak Cr 3.0 present on admit  - 3/31 downtrending, Cr 2.26  - holding home losartan baseline sCr 1.7-2; peak Cr 3.0 present on admit; currently stable  - holding home losartan

## 2024-04-01 NOTE — CONSULT NOTE ADULT - ASSESSMENT
68F former smoker, PMHx CAD (s/p CABG), HFpEF (EF 51%), afib (s/p AVN ablation and CRT-P 8/2022), AVR (procine-bioprosthetic) and mitral annuloplasty, AAA (s/p open repair 2015), PAD (s/p stents), HTN, HLD, hypothyroidism, seizure d/o, CKD3 (b/l Cr 1.7-2.0), severe CARLI, chronic anemia initially admitted to medicine for symptomatic anemia. Course c/b status epilepticus with RRT called, stepped up to tele c/f GIB vs vasc dissection. s/p EGD, cleared for AC. Stepped over to cardiac tele with plans for vascular surgery vs CTSx interventions for AAA / low-flow low gradient severe AS. Medicine consulted for pain management and hypercalcemia.    #Abdominal/Back Pain  #R/o Multiple Myeloma  Patient with LLQ and L flank/paraspinal constant pain x 4 months. No  symptoms. Hx of L nephrectomy. Endorsed constipation with firm stools, last BM this morning. Given history of CKD, worsening anemia, worsening pain with hypercalcemia, would favor work-up for hypercalcemia. 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 Albumin wnl. Likely component of constipation and abdominal/back pain. No changes seen on Tele. Patient reports taking Vitamin D supplements OTC, however has not had any during admission.  - Obtain PTH, Vitamin D, 25 and Vitamin D, 1 25  - Would favor gentle hydration as tolerated per Primary Team  - MM work-up as above    #Anemia  Presented with symptomatic anemia with Hgb 6.9, now 10.4. S/p 2pRBC. S/p EGD revealing non-erosive gastritis. Hemolysis labs and iron panel unremarkable. No current active bleeding  - Currently on Heparin gtt, monitor for bleeding  - maintain active T&S  - transfuse per Primary Team    #HTN  Today, patient had /90s. Given recent finding of graft aneurysm, patient will require tight BP monitoring.  - c/w Nifedipine 30mg XL BID and Toprol 25mg XL  - If pain is adequate controlled as above and BP remains elevated, would favor increasing dose or adding third agent per Primary Team    Medicine will continue to follow along with you. Discussed case with Dr. Lal   68F former smoker, PMHx CAD (s/p CABG), HFpEF (EF 51%), afib (s/p AVN ablation and CRT-P 8/2022), AVR (procine-bioprosthetic) and mitral annuloplasty, AAA (s/p open repair 2015), PAD (s/p stents), HTN, HLD, hypothyroidism, seizure d/o, CKD3 (b/l Cr 1.7-2.0), severe CARLI, chronic anemia initially admitted to medicine for symptomatic anemia. Course c/b status epilepticus with RRT called, stepped up to tele c/f GIB vs vasc dissection. s/p EGD, cleared for AC. Stepped over to cardiac tele with plans for vascular surgery vs CTSx interventions for AAA / low-flow low gradient severe AS. Medicine consulted for pain management and hypercalcemia.    #Abdominal/Back Pain  #R/o Multiple Myeloma  Patient with LLQ and L flank/paraspinal constant pain x 4 months. No  symptoms. Hx of L nephrectomy. Endorsed constipation with firm stools, last BM this morning. Given history of CKD, worsening anemia, worsening pain with hypercalcemia, would favor work-up for multiple myeloma. 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 Albumin wnl. Likely component of constipation and abdominal/back pain. No changes seen on Tele. Patient reports taking Vitamin D supplements OTC, however has not had any during admission.  - Obtain PTH, Vitamin D, 25 and Vitamin D, 1 25  - Would favor gentle hydration as tolerated per Primary Team  - MM work-up as above    #Anemia  Presented with symptomatic anemia with Hgb 6.9, now 10.4. S/p 2pRBC. S/p EGD revealing non-erosive gastritis. Hemolysis labs and iron panel unremarkable. No current active bleeding  - Currently on Heparin gtt, monitor for bleeding  - maintain active T&S  - transfuse per Primary Team    #HTN  Today, patient had /90s. Given recent finding of graft aneurysm, patient will require tight BP monitoring.  - c/w Nifedipine 30mg XL BID and Toprol 25mg XL  - If pain is adequate controlled as above and BP remains elevated, would favor increasing dose or adding third agent per Primary Team    Medicine will continue to follow along with you. Discussed case with Dr. Lal

## 2024-04-01 NOTE — PROGRESS NOTE ADULT - SUBJECTIVE AND OBJECTIVE BOX
Interventional Cardiology PA Adult Progress Note    Subjective Assessment: Seen and examined bedside. Denies chest pain, palpitations, SOB, orthopnea/PND, dizziness, LOC, n/v/d, fever/chills/sick contacts, LE edema. endorsing back pain and left sided abdominal pain.    ROS negative except as noted above.  	  MEDICATIONS:  metoprolol succinate ER 25 milliGRAM(s) Oral daily  NIFEdipine XL 30 milliGRAM(s) Oral two times a day  acetaminophen     Tablet .. 650 milliGRAM(s) Oral every 6 hours PRN  HYDROmorphone  Injectable 0.2 milliGRAM(s) IV Push daily PRN  levETIRAcetam 250 milliGRAM(s) Oral two times a day  melatonin 5 milliGRAM(s) Oral at bedtime  sertraline 50 milliGRAM(s) Oral every 24 hours  bisacodyl 5 milliGRAM(s) Oral every 12 hours PRN  pantoprazole    Tablet 40 milliGRAM(s) Oral before breakfast  polyethylene glycol 3350 17 Gram(s) Oral two times a day  senna 2 Tablet(s) Oral at bedtime  atorvastatin 40 milliGRAM(s) Oral at bedtime  levothyroxine 75 MICROGram(s) Oral every 24 hours  chlorhexidine 2% Cloths 1 Application(s) Topical <User Schedule>  heparin   Injectable 4000 Unit(s) IV Push every 6 hours PRN  heparin   Injectable 2000 Unit(s) IV Push every 6 hours PRN  heparin  Infusion.  Unit(s)/Hr IV Continuous <Continuous>  lidocaine   4% Patch 1 Patch Transdermal daily  sodium chloride 0.9%. 1000 milliLiter(s) IV Continuous <Continuous>      	    PHYSICAL EXAM:  TELEMETRY:  T(C): 36.4 (04-01-24 @ 09:16), Max: 36.8 (03-31-24 @ 18:28)  HR: 79 (04-01-24 @ 13:24) (74 - 79)  BP: 135/63 (04-01-24 @ 13:24) (122/57 - 184/84)  RR: 18 (04-01-24 @ 10:35) (16 - 18)  SpO2: 98% (04-01-24 @ 10:35) (97% - 100%)  Wt(kg): --  I&O's Summary    31 Mar 2024 07:01  -  01 Apr 2024 07:00  --------------------------------------------------------  IN: 456 mL / OUT: 975 mL / NET: -519 mL    01 Apr 2024 07:01  -  01 Apr 2024 15:26  --------------------------------------------------------  IN: 0 mL / OUT: 0 mL / NET: 0 mL                                              Appearance: Normal	  HEENT:   Normal oral mucosa, PERRLA, EOMI	  Neck: Supple,  - JVD; Carotid Bruit   Cardiovascular: Normal S1 S2, No JVD, No murmurs,   Respiratory: Lungs clear to auscultation, No Rales, Rhonchi, Wheezing	  Gastrointestinal:  Soft, Non-tender, + BS	  Skin: No rashes, No ecchymoses, No cyanosis  Extremities: Normal range of motion, No clubbing, cyanosis or edema  Vascular: Peripheral pulses palpable 2+ bilaterally  Neurologic: Non-focal  Psychiatry: A & O x 3, Mood & affect appropriate               10.4   7.49  )-----------( 218      ( 01 Apr 2024 05:30 )             33.2     04-01    138  |  105  |  16  ----------------------------<  112<H>  4.5   |  21<L>  |  2.34<H>    Ca    11.4<H>      01 Apr 2024 05:30  Phos  2.5     03-31  Mg     2.3     04-01    TPro  7.1  /  Alb  3.8  /  TBili  0.4  /  DBili  x   /  AST  21  /  ALT  9<L>  /  AlkPhos  106  03-31  PTT - ( 01 Apr 2024 14:41 )  PTT:99.2 sec Interventional Cardiology PA Adult Progress Note    Subjective Assessment: Seen and examined bedside. Denies chest pain, palpitations, SOB, orthopnea/PND, dizziness, LOC, n/v/d, fever/chills/sick contacts, LE edema. endorsing back pain and left sided abdominal pain.    ROS negative except as noted above.  	  MEDICATIONS:  metoprolol succinate ER 25 milliGRAM(s) Oral daily  NIFEdipine XL 30 milliGRAM(s) Oral two times a day  acetaminophen     Tablet .. 650 milliGRAM(s) Oral every 6 hours PRN  HYDROmorphone  Injectable 0.2 milliGRAM(s) IV Push daily PRN  levETIRAcetam 250 milliGRAM(s) Oral two times a day  melatonin 5 milliGRAM(s) Oral at bedtime  sertraline 50 milliGRAM(s) Oral every 24 hours  bisacodyl 5 milliGRAM(s) Oral every 12 hours PRN  pantoprazole    Tablet 40 milliGRAM(s) Oral before breakfast  polyethylene glycol 3350 17 Gram(s) Oral two times a day  senna 2 Tablet(s) Oral at bedtime  atorvastatin 40 milliGRAM(s) Oral at bedtime  levothyroxine 75 MICROGram(s) Oral every 24 hours  chlorhexidine 2% Cloths 1 Application(s) Topical <User Schedule>  heparin   Injectable 4000 Unit(s) IV Push every 6 hours PRN  heparin   Injectable 2000 Unit(s) IV Push every 6 hours PRN  heparin  Infusion.  Unit(s)/Hr IV Continuous <Continuous>  lidocaine   4% Patch 1 Patch Transdermal daily  sodium chloride 0.9%. 1000 milliLiter(s) IV Continuous <Continuous>      	    PHYSICAL EXAM:  TELEMETRY:  T(C): 36.4 (04-01-24 @ 09:16), Max: 36.8 (03-31-24 @ 18:28)  HR: 79 (04-01-24 @ 13:24) (74 - 79)  BP: 135/63 (04-01-24 @ 13:24) (122/57 - 184/84)  RR: 18 (04-01-24 @ 10:35) (16 - 18)  SpO2: 98% (04-01-24 @ 10:35) (97% - 100%)  Wt(kg): --  I&O's Summary    31 Mar 2024 07:01  -  01 Apr 2024 07:00  --------------------------------------------------------  IN: 456 mL / OUT: 975 mL / NET: -519 mL    01 Apr 2024 07:01  -  01 Apr 2024 15:26  --------------------------------------------------------  IN: 0 mL / OUT: 0 mL / NET: 0 mL                                              Appearance: Normal	  HEENT:   Normal oral mucosa, PERRLA, EOMI	  Neck: Supple,  - JVD; Carotid Bruit   Cardiovascular: Normal S1 S2, No JVD, No murmurs,   Respiratory: Lungs clear to auscultation, No Rales, Rhonchi, Wheezing	  Gastrointestinal: NABS x4;  diffusely tender to palpation; no guarding/  rebound tenderness  Skin: No rashes, No ecchymoses, No cyanosis  Extremities: Normal range of motion, No clubbing, cyanosis or edema  Vascular: Peripheral pulses palpable 2+ bilaterally  Neurologic: Non-focal  Psychiatry: A & O x 3, Mood & affect appropriate               10.4   7.49  )-----------( 218      ( 01 Apr 2024 05:30 )             33.2     04-01    138  |  105  |  16  ----------------------------<  112<H>  4.5   |  21<L>  |  2.34<H>    Ca    11.4<H>      01 Apr 2024 05:30  Phos  2.5     03-31  Mg     2.3     04-01    TPro  7.1  /  Alb  3.8  /  TBili  0.4  /  DBili  x   /  AST  21  /  ALT  9<L>  /  AlkPhos  106  03-31  PTT - ( 01 Apr 2024 14:41 )  PTT:99.2 sec

## 2024-04-02 NOTE — PROGRESS NOTE ADULT - PROBLEM SELECTOR PLAN 8
Started on HD 12/30   Continue with HD per nephrology     baseline sCr 1.7-2; peak Cr 3.0 present on admit; currently stable  - holding home losartan

## 2024-04-02 NOTE — PROGRESS NOTE ADULT - ASSESSMENT
68F former smoker, PMHx CAD (s/p CABG), HFpEF (EF 51%), afib (s/p AVN ablation and CRT-P 8/2022), AVR (procine-bioprosthetic) and mitral annuloplasty, AAA (s/p open repair 2015), PAD (s/p stents), HTN, HLD, hypothyroidism, seizure d/o, CKD3 (b/l Cr 1.7-2.0), severe CARLI, chronic anemia initially admitted to medicine for symptomatic anemia. Course c/b status epilepticus with RRT called, stepped up to tele c/f GIB vs vasc dissection. s/p EGD, cleared for AC. Stepped over to cardiac tele with plans for vascular surgery vs CTSx interventions for AAA / low-flow low gradient severe AS. Medicine consulted for pain management and hypercalcemia.    #Abdominal/Back Pain  #R/o Multiple Myeloma  Patient with LLQ and L flank/paraspinal constant pain x 4 months. No  symptoms. Hx of L nephrectomy. Endorsed constipation with firm stools, last BM this morning. Given history of CKD, worsening anemia, worsening pain with hypercalcemia, would favor work-up for multiple myeloma. 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 Albumin wnl. Likely component of constipation and abdominal/back pain. No changes seen on Tele. Patient reports taking Vitamin D supplements OTC, however has not had any during admission.  - PTH elevated - primary hyperparathyroidism -> CONSULT ENDOCRINOLOGY  - Would favor gentle hydration as tolerated per Primary Team  - MM work-up as above    #Anemia  Presented with symptomatic anemia with Hgb 6.9, now 10.4. S/p 2pRBC. S/p EGD revealing non-erosive gastritis. Hemolysis labs and iron panel unremarkable. No current active bleeding  - Currently on Heparin gtt, monitor for bleeding  - maintain active T&S  - transfuse per Primary Team    #HTN  Today, patient had /90s. Given recent finding of graft aneurysm, patient will require tight BP monitoring.  - c/w Nifedipine 30mg XL BID and Toprol 25mg XL  - If pain is adequate controlled as above and BP remains elevated, would favor increasing dose or adding third agent per Primary Team    Medicine will continue to follow along with you. Discussed case with Dr. Lal

## 2024-04-02 NOTE — CONSULT NOTE ADULT - ATTENDING COMMENTS
This patient has mild hypercalcemia for which we were consulted. Her Intact PTH is elevated with serum calcium of 10.4 mg%.EGFR is 24. Vitamin D level is 57.8 .she has a small renal stone. She has a complicated CV history as well-described by Dr. Roca. However the urgent pressing problem is repair of a AAA abdominal graft. Ultrasound of the parathyroids is recommended and then Sensipar treatment.

## 2024-04-02 NOTE — PROGRESS NOTE ADULT - ASSESSMENT
67yo F, former smoker, PMHx CAD (s/p CABG), HFpEF (EF 51%), afib (s/p AVN ablation and CRT-P 8/2022), AVR (procine-bioprosthetic) and mitral annuloplasty, AAA (s/p open repair 2015), PAD (s/p stents), HTN, HLD, hypothyroidism, seizure d/o, CKD3 (b/l Cr 1.7-2.0), severe CARLI, chronic anemia initially admitted to medicine for symptomatic anemia. Course c/b status epilepticus with RRT called, stepped up to tele c/f GIB vs vasc dissection. s/p EGD, cleared for AC. Stepped over to cardiac tele with plans for vascular surgery vs CTSx interventions for AAA / low-flow low gradient severe AS. Med consulted for comanagement. 67yo F, former smoker, PMHx CAD (s/p CABG), HFpEF (EF 51%), afib (s/p AVN ablation and CRT-P 8/2022), AVR (procine-bioprosthetic) and mitral annuloplasty, AAA (s/p open repair 2015), PAD (s/p stents), HTN, HLD, hypothyroidism, seizure d/o, CKD3 (b/l Cr 1.7-2.0), severe CARLI, chronic anemia initially admitted to medicine for symptomatic anemia. Course c/b status epilepticus, stepped up to tele c/f GIB vs vasc dissection. s/p EGD, cleared for AC. Severe AS/TAVR workup to be done outpt, CTsx signed off. Stepped over to cardiac tele with plans for vascular surgery 04/03/24 for AAA. NPO at [facility].

## 2024-04-02 NOTE — PROGRESS NOTE ADULT - SUBJECTIVE AND OBJECTIVE BOX
Interventional Cardiology PA Adult Progress Note    Overnight Events:  None    Subjective Assessment:  Patient seen and examined at bedside. Patient reports a 5/10 dull     ROS Negative except as per Subjective and HPI  	  MEDICATIONS:  metoprolol succinate ER 25 milliGRAM(s) Oral daily  NIFEdipine XL 30 milliGRAM(s) Oral two times a day  acetaminophen     Tablet .. 650 milliGRAM(s) Oral every 6 hours PRN  HYDROmorphone  Injectable 0.2 milliGRAM(s) IV Push every 4 hours PRN  levETIRAcetam 250 milliGRAM(s) Oral two times a day  melatonin 5 milliGRAM(s) Oral at bedtime  sertraline 50 milliGRAM(s) Oral every 24 hours  bisacodyl 5 milliGRAM(s) Oral every 12 hours PRN  pantoprazole    Tablet 40 milliGRAM(s) Oral before breakfast  polyethylene glycol 3350 17 Gram(s) Oral two times a day  senna 2 Tablet(s) Oral at bedtime  atorvastatin 40 milliGRAM(s) Oral at bedtime  levothyroxine 75 MICROGram(s) Oral every 24 hours  chlorhexidine 2% Cloths 1 Application(s) Topical <User Schedule>  heparin   Injectable 4000 Unit(s) IV Push every 6 hours PRN  heparin   Injectable 2000 Unit(s) IV Push every 6 hours PRN  heparin  Infusion.  Unit(s)/Hr IV Continuous <Continuous>  lidocaine   4% Patch 1 Patch Transdermal daily  sodium chloride 0.9%. 1000 milliLiter(s) IV Continuous <Continuous>      [PHYSICAL EXAM:  TELEMETRY:  T(C): 36.8 (04-02-24 @ 15:12), Max: 36.9 (04-02-24 @ 14:49)  HR: 75 (04-02-24 @ 15:12) (74 - 77)  BP: 124/59 (04-02-24 @ 15:12) (119/59 - 149/74)  RR: 18 (04-02-24 @ 15:12) (17 - 18)  SpO2: 98% (04-02-24 @ 15:12) (95% - 100%)  Wt(kg): --  I&O's Summary    01 Apr 2024 07:01  -  02 Apr 2024 07:00  --------------------------------------------------------  IN: 519 mL / OUT: 0 mL / NET: 519 mL    02 Apr 2024 07:01  -  02 Apr 2024 15:27  --------------------------------------------------------  IN: 180 mL / OUT: 50 mL / NET: 130 mL                                      Appearance: Normal	  HEENT:   Normal oral mucosa, PERRL, EOMI	  Neck: Supple, + JVD/ - JVD; Carotid Bruit   Cardiovascular: Normal S1 S2, No JVD, No murmurs,   Respiratory: Lungs clear to auscultation/Decreased Breath Sounds/No Rales, Rhonchi, Wheezing	  Gastrointestinal:  Soft, Non-tender, + BS	  Skin: No rashes, No ecchymoses, No cyanosis  Extremities: Normal range of motion, No clubbing, cyanosis or edema  Vascular: Peripheral pulses palpable 2+ bilaterally  Neurologic: Non-focal  Psychiatry: A & O x 3, Mood & affect appropriate  	  CARDIAC MARKERS:                                  9.7    6.90  )-----------( 210      ( 02 Apr 2024 05:30 )             30.3     04-02    137  |  106  |  14  ----------------------------<  107<H>  3.8   |  20<L>  |  2.20<H>    Ca    10.9<H>      02 Apr 2024 05:30  Mg     2.0     04-02    TPro  7.5  /  Alb  x   /  TBili  x   /  DBili  x   /  AST  x   /  ALT  x   /  AlkPhos  x   04-01    proBNP:   Lipid Profile:   HgA1c:   TSH:   PTT - ( 02 Apr 2024 05:30 )  PTT:82.8 sec     Interventional Cardiology PA Adult Progress Note    Overnight Events:  None    Subjective Assessment:  Patient seen and examined at bedside. Patient reports a 5/10 dull abdominal pain that has been persistent thoughout admission that is not responsive to Tylenol. Patient denied chest pain, SOB, N/V/D and leg pain/swelling.    ROS Negative except as per Subjective and HPI  	  MEDICATIONS:  metoprolol succinate ER 25 milliGRAM(s) Oral daily  NIFEdipine XL 30 milliGRAM(s) Oral two times a day  acetaminophen     Tablet .. 650 milliGRAM(s) Oral every 6 hours PRN  HYDROmorphone  Injectable 0.2 milliGRAM(s) IV Push every 4 hours PRN  levETIRAcetam 250 milliGRAM(s) Oral two times a day  melatonin 5 milliGRAM(s) Oral at bedtime  sertraline 50 milliGRAM(s) Oral every 24 hours  bisacodyl 5 milliGRAM(s) Oral every 12 hours PRN  pantoprazole    Tablet 40 milliGRAM(s) Oral before breakfast  polyethylene glycol 3350 17 Gram(s) Oral two times a day  senna 2 Tablet(s) Oral at bedtime  atorvastatin 40 milliGRAM(s) Oral at bedtime  levothyroxine 75 MICROGram(s) Oral every 24 hours  chlorhexidine 2% Cloths 1 Application(s) Topical <User Schedule>  heparin   Injectable 4000 Unit(s) IV Push every 6 hours PRN  heparin   Injectable 2000 Unit(s) IV Push every 6 hours PRN  heparin  Infusion.  Unit(s)/Hr IV Continuous <Continuous>  lidocaine   4% Patch 1 Patch Transdermal daily  sodium chloride 0.9%. 1000 milliLiter(s) IV Continuous <Continuous>      [PHYSICAL EXAM:  TELEMETRY:  T(C): 36.8 (04-02-24 @ 15:12), Max: 36.9 (04-02-24 @ 14:49)  HR: 75 (04-02-24 @ 15:12) (74 - 77)  BP: 124/59 (04-02-24 @ 15:12) (119/59 - 149/74)  RR: 18 (04-02-24 @ 15:12) (17 - 18)  SpO2: 98% (04-02-24 @ 15:12) (95% - 100%)  Wt(kg): --  I&O's Summary    01 Apr 2024 07:01  -  02 Apr 2024 07:00  --------------------------------------------------------  IN: 519 mL / OUT: 0 mL / NET: 519 mL    02 Apr 2024 07:01  -  02 Apr 2024 15:27  --------------------------------------------------------  IN: 180 mL / OUT: 50 mL / NET: 130 mL                                      Appearance: Normal	  HEENT:   Normal oral mucosa, PERRL, EOMI	  Neck: Supple,  - JVD  Cardiovascular: Normal S1 S2, No JVD, No murmurs,   Respiratory: Lungs clear to auscultation  Gastrointestinal:  Soft, Non-tender, + BS	  Skin: No rashes, No ecchymoses, No cyanosis  Extremities: Normal range of motion, No clubbing, cyanosis or edema  Vascular: Peripheral pulses palpable 2+ bilaterally  Neurologic: Non-focal  Psychiatry: A & O x 3, Mood & affect appropriate  	  CARDIAC MARKERS:                          9.7    6.90  )-----------( 210      ( 02 Apr 2024 05:30 )             30.3     04-02    137  |  106  |  14  ----------------------------<  107<H>  3.8   |  20<L>  |  2.20<H>    Ca    10.9<H>      02 Apr 2024 05:30  Mg     2.0     04-02    TPro  7.5  /  Alb  x   /  TBili  x   /  DBili  x   /  AST  x   /  ALT  x   /  AlkPhos  x   04-01    PTT - ( 02 Apr 2024 05:30 )  PTT:82.8 sec

## 2024-04-02 NOTE — CONSULT NOTE ADULT - REASON FOR ADMISSION
anemia, CHERRI
Symptomatic anemia 2/2 GIB c/f AAA endograft fistula leak

## 2024-04-02 NOTE — PROGRESS NOTE ADULT - PROBLEM SELECTOR PLAN 1
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

## 2024-04-02 NOTE — PROGRESS NOTE ADULT - PROBLEM SELECTOR PLAN 3
Ca ranging 11.1-11.4 3/31-4/1  - medicine consult for comanagement, plan for multiple myeloma workup  - f/u SPEP/UPEP/immunofixation/Vit D 25 and 125 / PTH  - pain control as above

## 2024-04-02 NOTE — CONSULT NOTE ADULT - CONSULT REASON
Hypercalcemia
Severe Bioprosthetic AS
AAA enlargement on CT scan
Seizure like activity
pain management, hypercalcemia
PM&R evaluation
Michelle
pre op
AMS c/f seizure

## 2024-04-02 NOTE — PROGRESS NOTE ADULT - ASSESSMENT
{\rtf1\aizyvx74298\ansi\nhkpapw2388\ftnbj\uc1\deff0  {\fonttbl{\f0 \fnil Segoe UI;}{\f1 \fnil \fcharset0 Segoe UI;}{\f2 \fnil Times New Denis;}}  {\colortbl ;\uef144\rvjra600\oixi964 ;\red0\green0\blue0 ;\red0\green0\ubak983 ;\red0\green0\blue0 ;}  {\stylesheet{\f0\fs20 Normal;}{\cs1 Default Paragraph Font;}{\cs2\f0\fs16 Line Number;}{\cs3\f2\fs24\ul\cf3 Hyperlink;}}  {\*\revtbl{Unknown;}}  \rrupvr91465\uhskgc06376\hbrii9586\abnsg3982\vmdhg1105\ivadd6074\yvrxxps854\hvchoje796\nogrowautofit\svbdri114\formshade\nofeaturethrottle1\dntblnsbdb\fet4\aendnotes\aftnnrlc\pgbrdrhead\pgbrdrfoot  \sectd\kyhbwz63193\vlrsri88361\guttersxn0\xxsxweiv4684\ocvszczt8477\bahytqqs4846\qgvccgrn5225\cxipyvr421\efobzvk369\sbkpage\pgncont\pgndec  \plain\plain\f0\fs24\ql\plain\f0\fs24\plain\f0\fs20\yyzo3978\hich\f0\dbch\f0\loch\f0\fs20\par  Cardiology\par  \par  68 y o F, former smoker, PMHx CAD (s/p CABG), HFpEF (EF 51%), afib (s/p AVN ablation and CRT-P 8/2022), AVR (procine-bioprosthetic) and mitral annuloplasty, AAA (s/p open repair 2015), PAD (s/p stents), HTN, HLD, hypothyroidism, seizure d/o, CKD3 (b/l   Cr 1.7-2.0), severe CARLI, chronic anemia initially admitted to medicine for symptomatic anemia. Course c/b status epilepticus with RRT called, stepped up to tele c/f GIB vs vasc dissection. s/p EGD, cleared for AC. Stepped over to cardiac tele with plans   for vascular surgery vs CTSx interventions for AAA / low-flow low gradient severe AS. Med consulted for comanagement.\par  \par  \plain\f1\fs20\rtfb7725\hich\f1\dbch\f1\loch\f1\cf2\fs20\ul{\field{\*\fldinst HYPERLINK [id number] }{\fldrslt Nutritional Assessment:}}\plain\f0\fs20\rhgx7808\hich\f0\dbch\f0\loch\f0\fs20\ql\par  \trowd\oscost09\lastrow\lkaiehn57\trpaddfl3\vhdgzjq99\trpaddfr3\trpaddt0\trpaddft3\trpaddb0\trpaddfb3\trleft0  \clvertalt\ltpmdy44\clpadft3\cswppm84\clpadfr3\clpadl0\clpadfl3\clpadb0\clpadfb3\qqldk5700  \clvertalt\wviyiy69\clpadft3\slsbmd36\clpadfr3\clpadl0\clpadfl3\clpadb0\clpadfb3\dgwhu7979  \pard\intbl\ssparaaux0\s0\fi-120\li120\ql\plain\f0\fs24{\*\bkmkstart [id number]}{\*\bkmkend qs129291535172}\plain\f0\fs20\lbug6388\hich\f0\dbch\f0\loch\f0\fs20 \'b7 Nutritional Assessment\cell  \pard\intbl\ssparaaux0\s0\ql\plain\f0\fs24\plain\f0\fs20\tqwj5112\hich\f0\dbch\f0\loch\f0\fs20 This patient has been assessed with a concern for Malnutrition and has been determined to have a diagnosis/diagnoses of Severe protein-calorie malnutrition   and Underweight (BMI < 19).\par  \par  This patient is being managed with: \par  Diet Regular-\par  Entered: Mar 28 2024 12:44PM\cell  \intbl\row  \pard\ssparaaux0\s0\ql\plain\f0\fs24\plain\f0\fs20\jkax5246\hich\f0\dbch\f0\loch\f0\fs20\par  \plain\f1\fs20\gbqa2418\hich\f1\dbch\f1\loch\f1\cf2\fs20\ul{\field{\*\fldinst HYPERLINK [id number] }{\fldrslt Problem/Plan - 1:}}\plain\f0\fs20\jlkd0784\hich\f0\dbch\f0\loch\f0\fs20\ql\par  \'b7  {\*\bkmkstart hn22639056173}{\*\bkmkend hk42135832530}Problem: {\*\bkmkstart td47965413493}{\*\bkmkend hy35366944439}Enlarging aortic aneurysm. 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 enlarged from 6/11/21\par  - plan for chidc-kas-agwvl device placement and femoral-femoral bypass with Dr Soriano with cardiac anesthesia 4/3\par  - NPO @[facility] 4/2; coags and T&S x2 4/3 AM; hold heparin gtt on call to OR\par  - Urgent vascular consult if hypotensive, tachycardic, c/f aneurysm rupture\par  - start nifedipine 30mg BID\par  \par  ##chronic pain\par  - endorsing 6-8/10 back and abdominal pain for last few months, worsened 4/1 AM\par  - medicine consulted for comanagement\par  - PAIN CONTROL: APAP 650mg q6hrs standing dose, lidocaine patch q24hrs, Dilaudid 0.2mg IVP q6hrs prn severe breakthrough pain.\plain\f1\fs20\ypkb1854\hich\f1\dbch\f1\loch\f1\cf2\fs20\strike\plain\f0\fs20\yifw9851\hich\f0\dbch\f0\loch\f0\fs20\par  \par  \plain\f1\fs20\vcup0795\hich\f1\dbch\f1\loch\f1\cf2\fs20\ul{\field{\*\fldinst HYPERLINK [id number] }{\fldrslt Problem/Plan - 2:}}\plain\f0\fs20\zexj2737\hich\f0\dbch\f0\loch\f0\fs20\ql\par  \'b7  {\*\bkmkstart jc36634070257}{\*\bkmkend ae94750138057}Problem: {\*\bkmkstart hv40442901102}{\*\bkmkend lb37367161375}S/P aortic valve replacement with bioprosthetic valve. \par  \'b7  {\*\bkmkstart rs97771584882}{\*\bkmkend ql17345318712}Plan: {\*\bkmkstart hp00568592804}{\*\bkmkend xk19942593514}Severe Bioprosthetic AS with AI, bioprosthetic MR/MS; Hx of CABG/AVR/MVr, and AAA repair \par  - Patient with significant valvular disease, will likely need alternate access transcatheter intervention\par  -TTE 3/27/24: normal LVSF EF51% severely dilated LA, bioprosthetic valve noted in aortic position w/ leaflet appearing thickened, mild AR, mitral valve mod thickened and calcified, mod MR, severe TR, pHTN present w/ PASP 55mgHg, \par  -ALBER 3/29/24: Low flow low gradient severe prosthetic valve AS, peak gradient aortic 37.73 mmHg, moderate AR, moderately elevated mitral valve gradients with normal appearing mitral leaflets status post annuloplasty, moderate mitral regurgitation which   decreases to trace with reduced blood pressure, moderate TR \par  - plan for TAVR [facility] with Dr Gregory as OP; CTSx signed off.\plain\f1\fs20\fwvz3707\hich\f1\dbch\f1\loch\f1\cf2\fs20\strike\plain\f0\fs20\dxqw0266\hich\f0\dbch\f0\loch\f0\fs20\par  \par  \plain\f1\fs20\qwbt7583\hich\f1\dbch\f1\loch\f1\cf2\fs20\ul{\field{\*\fldinst HYPERLINK [id number] }{\fldrslt Problem/Plan - 3:}}\plain\f0\fs20\kljp5003\hich\f0\dbch\f0\loch\f0\fs20\ql\par  \'b7  {\*\bkmkstart lk38432520874}{\*\bkmkend vc39098677990}Problem: {\*\bkmkstart ne84617833218}{\*\bkmkend bf37881627485}Hypercalcemia. \par  \'b7  {\*\bkmkstart zb60746468513}{\*\bkmkend bz93090190895}Plan: {\*\bkmkstart in90044980371}{\*\bkmkend fm84043878840}Ca ranging 11.1-11.4 3/31-4/1\par  - medicine consult for comanagement, plan for multiple myeloma workup\par  - f/u SPEP/UPEP/immunofixation/Vit D 25 and 125 / PTH\par  - pain control as above\par  - start NS 50cc x 12 hrs.\par  \par  \plain\f1\fs20\rjlg6355\hich\f1\dbch\f1\loch\f1\cf2\fs20\ul{\field{\*\fldinst HYPERLINK [id number] }{\fldrslt Problem/Plan - 4:}}\plain\f0\fs20\nlrk3584\hich\f0\dbch\f0\loch\f0\fs20\ql\par  \'b7  {\*\bkmkstart hg43015762894}{\*\bkmkend fm39040844522}Problem: {\*\bkmkstart pg00831634156}{\*\bkmkend fx64197412492}Anemia. \par  \'b7  {\*\bkmkstart kf69987331394}{\*\bkmkend ua90381640461}Plan: {\*\bkmkstart ws86565334220}{\*\bkmkend fu46614844905}hx of TASNEEM - gets weekly out-patient Iron infusions\par  - s/p EGD with GI, no active bleed per GI team -- cleared to resume systemic AC\par  - s/p 2 u pRBCs during admission (3/25 and 3/27, Hgb hetal 6.9)\par  - Maintain active type and screen (next 4/2)\par  \par  \par  ## MELENA ON ADMISSION \par  - GI cleared for systemic AC; c/w PO Protonix.\par  \par  \plain\f1\fs20\plnd8274\hich\f1\dbch\f1\loch\f1\cf2\fs20\ul{\field{\*\fldinst HYPERLINK [id number] }{\fldrslt Problem/Plan - 5:}}\plain\f0\fs20\hlbv1516\hich\f0\dbch\f0\loch\f0\fs20\ql\par  \'b7  {\*\bkmkstart vb37555931793}{\*\bkmkend pw57640043055}Problem: {\*\bkmkstart ud44339672670}{\*\bkmkend wy22737693174}Chronic heart failure with preserved ejection fraction (HFpEF). \par  \'b7  {\*\bkmkstart kd85173352581}{\*\bkmkend bi99921589960}Plan: {\*\bkmkstart lq94509747811}{\*\bkmkend lb31368381666}Euvolemic on exam. Warm. TTE as above\par  -home meds: Lasix 40mg twice a week.\par  \par  \plain\f1\fs20\jtrv6717\hich\f1\dbch\f1\loch\f1\cf2\fs20\ul{\field{\*\fldinst HYPERLINK [id number] }{\fldrslt Problem/Plan - 6:}}\plain\f0\fs20\wluc9116\hich\f0\dbch\f0\loch\f0\fs20\ql\par  \'b7  {\*\bkmkstart vd14212784576}{\*\bkmkend yk38561685991}Problem: {\*\bkmkstart cs14000021781}{\*\bkmkend ma79498191198}Chronic atrial fibrillation. \par  \'b7  {\*\bkmkstart xj02585222255}{\*\bkmkend ep08980227120}Plan: {\*\bkmkstart nd76785331915}{\*\bkmkend nn22368654171}hx of Afib, s/p AVN ablation and Bi-V PPM placement\par  - AC: Home Eliquis held, now on Heparin drip \par  - c/w Toprol XL 25 mg daily.\par  \par  \plain\f1\fs20\mwwz1624\hich\f1\dbch\f1\loch\f1\cf2\fs20\ul{\field{\*\fldinst HYPERLINK [id number] }{\fldrslt Problem/Plan - 7:}}\plain\f0\fs20\oena2077\hich\f0\dbch\f0\loch\f0\fs20\ql\par  \'b7  {\*\bkmkstart kc92920994660}{\*\bkmkend sq78176742642}Problem: {\*\bkmkstart fn25821730776}{\*\bkmkend jl83131971313}CAD (coronary artery disease). \par  \'b7  {\*\bkmkstart cm84570362266}{\*\bkmkend bs41153052979}Plan: {\*\bkmkstart dm00728995061}{\*\bkmkend eu92822304497}s/p CABG (LIMA to RI, ANTONIO to RDA) .\par  - CCTA 8/2020: patent grafts and stents\par  - c/w statin and BB.\par  \par  \plain\f1\fs20\pvrd0023\hich\f1\dbch\f1\loch\f1\cf2\fs20\ul{\field{\*\fldinst HYPERLINK [id number] }{\fldrslt Problem/Plan - 8:}}\plain\f0\fs20\zpue4434\hich\f0\dbch\f0\loch\f0\fs20\ql\par  \'b7  {\*\bkmkstart fo63610196851}{\*\bkmkend hp46601478364}Problem: {\*\bkmkstart bb14306766478}{\*\bkmkend ef01126654552}Acute kidney injury superimposed on CKD. \par  \'b7  {\*\bkmkstart us34947516166}{\*\bkmkend vf86510625630}Plan: {\*\bkmkstart sp23581296783}{\*\bkmkend xz93637838521}baseline sCr 1.7-2; peak Cr 3.0 present on admit; currently stable\par  - holding home losartan.\par  \par  \plain\f1\fs20\otwt8885\hich\f1\dbch\f1\loch\f1\cf2\fs20\ul{\field{\*\fldinst HYPERLINK [id number] }{\fldrslt Problem/Plan - 9:}}\plain\f0\fs20\jfhz5512\hich\f0\dbch\f0\loch\f0\fs20\ql\par  \'b7  {\*\bkmkstart yb18522403758}{\*\bkmkend sd85528986166}Problem: {\*\bkmkstart dq15352865098}{\*\bkmkend qt90460515042}Seizure disorder. \par  \'b7  {\*\bkmkstart bl25500054382}{\*\bkmkend hl06866233056}Plan: {\*\bkmkstart za71106672084}{\*\bkmkend ey74519701139}-s/p SE this admission \par  -Episode of AMS 3/26, RRT called, pt found to be unresponsive, drooling. s/p ativan 2mg, keppra 3mg load w/ subsequent post-ictal state\par  -Second witnessed tonic-clonic seizure (rhythmic jerking, frothing at mouth) during transfer to telemetry. \par  -Now full recovery to baseline mentation. \par  -CT head shows no evidence of intracranial hemorrhage, midline shift. Lactate cleared; s/p VEEG without events\par  -c/w Keppra 250mg bid \par  \par  #HYPOTHYROIDISM \par  -c/w levothyroxine 75mcg PO QD \par  \par  F: None\par  E: Replete if K<4 or Mag<2\par  N: DASH Diet \par  GIppx: Protonix PO \par  VTEppx: HOLDING \par  Dispo: pending clinical course \par  PT: Home PT however patient will have to be reassessed post Surgery.\par  \par  \par  \par  \par  \plain\f1\fs16\teov6651\hich\f1\dbch\f1\loch\f1\cf2\fs16\par  \par  \par  \par  \par  \par  \par  \par  \par  \par  \par  \plain\f0\fs20\pkvs0983\hich\f0\dbch\f0\loch\f0\fs20\par  }

## 2024-04-02 NOTE — PROGRESS NOTE ADULT - PROBLEM SELECTOR PLAN 2
Severe Bioprosthetic AS with AI, bioprosthetic MR/MS; Hx of CABG/AVR/MVr, and AAA repair   - Patient with significant valvular disease, will likely need alternate access transcatheter intervention  -TTE 3/27/24: normal LVSF EF51% severely dilated LA, bioprosthetic valve noted in aortic position w/ leaflet appearing thickened, mild AR, mitral valve mod thickened and calcified, mod MR, severe TR, pHTN present w/ PASP 55mgHg,   -ALBER 3/29/24: Low flow low gradient severe prosthetic valve AS, peak gradient aortic 37.73 mmHg, moderate AR, moderately elevated mitral valve gradients with normal appearing mitral leaflets status post annuloplasty, moderate mitral regurgitation which decreases to trace with reduced blood pressure, moderate TR   - plan for TAVR [facility] with Dr Gregory as OP; [facility] signed off

## 2024-04-02 NOTE — CONSULT NOTE ADULT - CONSULT REQUESTED DATE/TIME
26-Mar-2024 17:48
01-Apr-2024 13:04
25-Mar-2024 21:22
02-Apr-2024 13:04
27-Mar-2024
26-Mar-2024 13:06
27-Mar-2024
26-Mar-2024 08:20
26-Mar-2024 09:33

## 2024-04-02 NOTE — CONSULT NOTE ADULT - SUBJECTIVE AND OBJECTIVE BOX
HISTORY OF PRESENT ILLNESS  SHERRI MEDLEY is a 68y Female with a past medical history of     Endocrinology has been consulted for Diabetes Management.    DIABETES HISTORY  - Age at diagnosis:   - Diabetes managed outpatient by:  - Current Therapy:  - History of other regimens:   - History of hypoglycemia:   - History of DKA/HHS:   - Diabetic Complications:   - Home glucose readings:  - Diet:          > Breakfast:         > Lunch:        > Dinner:        > Snacks:    FAMILY HISTORY  - Diabetes:    SOCIAL HISTORY  - Work:  - Alcohol:  - Smoking:    ALLERGIES  No Known Allergies      CURRENT MEDICATIONS  acetaminophen     Tablet .. 650 milliGRAM(s) Oral every 6 hours PRN  atorvastatin 40 milliGRAM(s) Oral at bedtime  bisacodyl 5 milliGRAM(s) Oral every 12 hours PRN  chlorhexidine 2% Cloths 1 Application(s) Topical <User Schedule>  heparin   Injectable 4000 Unit(s) IV Push every 6 hours PRN  heparin   Injectable 2000 Unit(s) IV Push every 6 hours PRN  heparin  Infusion.  Unit(s)/Hr IV Continuous <Continuous>  HYDROmorphone  Injectable 0.2 milliGRAM(s) IV Push every 4 hours PRN  levETIRAcetam 250 milliGRAM(s) Oral two times a day  levothyroxine 75 MICROGram(s) Oral every 24 hours  lidocaine   4% Patch 1 Patch Transdermal daily  melatonin 5 milliGRAM(s) Oral at bedtime  metoprolol succinate ER 25 milliGRAM(s) Oral daily  NIFEdipine XL 30 milliGRAM(s) Oral two times a day  pantoprazole    Tablet 40 milliGRAM(s) Oral before breakfast  polyethylene glycol 3350 17 Gram(s) Oral two times a day  senna 2 Tablet(s) Oral at bedtime  sertraline 50 milliGRAM(s) Oral every 24 hours  sodium chloride 0.9%. 1000 milliLiter(s) IV Continuous <Continuous>      REVIEW OF SYSTEMS  Constitutional:  Negative fever, chills   Cardiovascular:  Negative for chest pain or palpitations.  Respiratory:  Negative for cough, wheezing, or shortness of breath.   Gastrointestinal:  Negative for nausea, vomiting, diarrhea, constipation, or abdominal pain.  Genitourinary:  Negative frequency, urgency or dysuria.  Neurologic:  No headache, confusion, dizziness    PHYSICAL EXAM  Vital Signs Last 24 Hrs  T(C): 36.6 (02 Apr 2024 12:14), Max: 36.6 (01 Apr 2024 17:01)  T(F): 97.9 (02 Apr 2024 12:14), Max: 97.9 (02 Apr 2024 08:08)  HR: 75 (02 Apr 2024 12:14) (74 - 79)  BP: 149/74 (02 Apr 2024 12:14) (119/62 - 149/74)  BP(mean): 109 (02 Apr 2024 12:14) (84 - 109)  RR: 18 (02 Apr 2024 12:14) (17 - 18)  SpO2: 98% (02 Apr 2024 12:14) (95% - 100%)    Parameters below as of 02 Apr 2024 12:14  Patient On (Oxygen Delivery Method): room air      Constitutional: Awake, alert  HEENT: Normocephalic, atraumatic, VIOLETA  Neck: supple, no acanthosis, no thyromegaly or palpable thyroid nodules.  Respiratory: Lungs clear to ausculation bilaterally.   Cardiovascular: regular rhythm, normal S1 and S2, no audible murmurs.   GI: soft, non-tender, non-distended, bowel sounds present  Extremities: No lower extremity edema, peripheral pulses present.     LABS  CBC - WBC/HGB/HTC/PLT: 6.90/9.7/30.3/210 (04-02-24)  BMP: Na/K/Cl/Bicarb/BUN/Cr/Gluc: 137/3.8/106/20/14/2.20/107 (04-02-24)  Anion Gap: 11 (04-02-24)  eGFR: 24 (04-02-24)  Calcium: 10.9 (04-02-24)  Phosphorus: -- (04-02-24)  Magnesium: 2.0 (04-02-24)  LFT - Alb/Tprot/Tbili/Dbili/AlkPhos/ALT/AST: 3.8/--/0.4/--/106/9/21 (03-31-24)  PT/aPTT/INR: --/82.8/-- (04-02-24)    CBC - WBC/HGB/HTC/PLT: 6.90/9.7/30.3/210 (04-02-24)BMP: Na/K/Cl/Bicarb/BUN/Cr/Gluc: 137/3.8/106/20/14/2.20/107 (04-02-24)  Anion Gap: 11 (04-02-24)  eGFR: 24 (04-02-24)  Calcium: 10.9 (04-02-24)  Phosphorus: -- (04-02-24)  Magnesium: 2.0 (04-02-24)    CAPILLARY BLOOD GLUCOSE & INSULIN RECEIVED        04-01-24 @ 07:01  -  04-02-24 @ 07:00  --------------------------------------------------------  IN: 519 mL / OUT: 0 mL / NET: 519 mL    04-02-24 @ 07:01  -  04-02-24 @ 13:05  --------------------------------------------------------  IN: 0 mL / OUT: 50 mL / NET: -50 mL        ASSESSMENT / RECOMMENDATIONS    A1C: BUN: 14  Creatinine: 2.20  GFR: 24  Weight: 52.2  BMI: 18.6  EF:     # Hypercalcemia  - Patient can follow up at discharge with [facility] by calling [phone] to make an appointment.      Case discussed with Dr. Phoenix. Primary team updated.       Florence Roca  Endocrinology Fellow    Service Pager: [phone]  HISTORY OF PRESENT ILLNESS  SHERRI MEDLEY is a 68y Female with a past medical history of hypothyroidism, seizure disorder, Stage 3 CKD (baseline CR ~1.7-2.0), severe renal artery stenosis, chronic anemia, Afib s/p AVN ablation and CRT-P (8/2022; on Eliquis), HTN, HLD, CAD s/p CABG, AVR and mitral annuloplasty, HFpEF, AAA s/p open repair in 2015 with complications, PAD s/p L pSFA stent/L-TFIFANY stent/L-IIA stent presented on 03/25 with chest pain and dyspnea on exertion.  She was initially admitted to medicine service for symptomatic anemia.  Her hospital course was complicated by status epilepticus where RRT was elevated.   PT was transferred to cardiac tele.  . CT A/P non con shows: Post surgical changes from prior open thoracoabdominal aortic bypass graft with new focal dilatation of the distalmost aspect of the graft measuring 4.4 x 6.1 cm just above the aortic bifurcation and previously measuring 3.2 x 4.1 cm on 6/11/2021.  PT has been pending fo repair of aneurysm/pseudoaneurysm.      Today labs revealed Hgb 9.7, platelets 210, CO2 20, Cr 2.20, BUN 14, Aris 10.9, albumin 3.8, GFR 24.  Further labs showed Vitamin D 25-OH 57.8, .    Endocrinology has been consulted for Hypercalcemia    ALLERGIES  No Known Allergies    Calcium/Albumin  11.4 mg/dL (04-01 @ 05:30)  10.9 mg/dL (04-02 @ 05:30)      CURRENT MEDICATIONS  acetaminophen     Tablet .. 650 milliGRAM(s) Oral every 6 hours PRN  atorvastatin 40 milliGRAM(s) Oral at bedtime  bisacodyl 5 milliGRAM(s) Oral every 12 hours PRN  chlorhexidine 2% Cloths 1 Application(s) Topical <User Schedule>  heparin   Injectable 4000 Unit(s) IV Push every 6 hours PRN  heparin   Injectable 2000 Unit(s) IV Push every 6 hours PRN  heparin  Infusion.  Unit(s)/Hr IV Continuous <Continuous>  HYDROmorphone  Injectable 0.2 milliGRAM(s) IV Push every 4 hours PRN  levETIRAcetam 250 milliGRAM(s) Oral two times a day  levothyroxine 75 MICROGram(s) Oral every 24 hours  lidocaine   4% Patch 1 Patch Transdermal daily  melatonin 5 milliGRAM(s) Oral at bedtime  metoprolol succinate ER 25 milliGRAM(s) Oral daily  NIFEdipine XL 30 milliGRAM(s) Oral two times a day  pantoprazole    Tablet 40 milliGRAM(s) Oral before breakfast  polyethylene glycol 3350 17 Gram(s) Oral two times a day  senna 2 Tablet(s) Oral at bedtime  sertraline 50 milliGRAM(s) Oral every 24 hours  sodium chloride 0.9%. 1000 milliLiter(s) IV Continuous <Continuous>      REVIEW OF SYSTEMS  Constitutional:  Negative fever, chills   Cardiovascular:  Negative for chest pain or palpitations.  Respiratory:  Negative for cough, wheezing, or shortness of breath.   Gastrointestinal:  Negative for nausea, vomiting, diarrhea, constipation, or abdominal pain.  Genitourinary:  Negative frequency, urgency or dysuria.  Neurologic:  No headache, confusion, dizziness    PHYSICAL EXAM  Vital Signs Last 24 Hrs  T(C): 36.6 (02 Apr 2024 12:14), Max: 36.6 (01 Apr 2024 17:01)  T(F): 97.9 (02 Apr 2024 12:14), Max: 97.9 (02 Apr 2024 08:08)  HR: 75 (02 Apr 2024 12:14) (74 - 79)  BP: 149/74 (02 Apr 2024 12:14) (119/62 - 149/74)  BP(mean): 109 (02 Apr 2024 12:14) (84 - 109)  RR: 18 (02 Apr 2024 12:14) (17 - 18)  SpO2: 98% (02 Apr 2024 12:14) (95% - 100%)    Parameters below as of 02 Apr 2024 12:14  Patient On (Oxygen Delivery Method): room air      Constitutional: Awake, alert  HEENT: Normocephalic, atraumatic, VIOLETA  Neck: supple, no acanthosis, no thyromegaly or palpable thyroid nodules.  Respiratory: Lungs clear to ausculation bilaterally.   Cardiovascular: regular rhythm, normal S1 and S2, no audible murmurs.   GI: soft, non-tender, non-distended, bowel sounds present  Extremities: No lower extremity edema, peripheral pulses present.     LABS  CBC - WBC/HGB/HTC/PLT: 6.90/9.7/30.3/210 (04-02-24)  BMP: Na/K/Cl/Bicarb/BUN/Cr/Gluc: 137/3.8/106/20/14/2.20/107 (04-02-24)  Anion Gap: 11 (04-02-24)  eGFR: 24 (04-02-24)  Calcium: 10.9 (04-02-24)  Phosphorus: -- (04-02-24)  Magnesium: 2.0 (04-02-24)  LFT - Alb/Tprot/Tbili/Dbili/AlkPhos/ALT/AST: 3.8/--/0.4/--/106/9/21 (03-31-24)  PT/aPTT/INR: --/82.8/-- (04-02-24)    CBC - WBC/HGB/HTC/PLT: 6.90/9.7/30.3/210 (04-02-24)BMP: Na/K/Cl/Bicarb/BUN/Cr/Gluc: 137/3.8/106/20/14/2.20/107 (04-02-24)  Anion Gap: 11 (04-02-24)  eGFR: 24 (04-02-24)  Calcium: 10.9 (04-02-24)  Phosphorus: -- (04-02-24)  Magnesium: 2.0 (04-02-24)    CAPILLARY BLOOD GLUCOSE & INSULIN RECEIVED        04-01-24 @ 07:01  -  04-02-24 @ 07:00  --------------------------------------------------------  IN: 519 mL / OUT: 0 mL / NET: 519 mL    04-02-24 @ 07:01  -  04-02-24 @ 13:05  --------------------------------------------------------  IN: 0 mL / OUT: 50 mL / NET: -50 mL        ASSESSMENT / RECOMMENDATIONS    SHERRI MEDLEY is a 68y Female with a past medical history of hypothyroidism, seizure disorder, Stage 3 CKD (baseline CR ~1.7-2.0), severe renal artery stenosis, chronic anemia, Afib s/p AVN ablation and CRT-P (8/2022; on Eliquis), HTN, HLD, CAD s/p CABG, AVR and mitral annuloplasty, HFpEF, AAA s/p open repair in 2015 with complications, PAD s/p L pSFA stent/L-TIFFANY stent/L-IIA stent presented on 03/25 with chest pain and dyspnea on exertion.  She was initially admitted to medicine service for symptomatic anemia.  Her hospital course was complicated by status epilepticus where RRT was elevated.   PT was transferred to cardiac tele.  . CT A/P non con shows: Post surgical changes from prior open thoracoabdominal aortic bypass graft with new focal dilatation of the distalmost aspect of the graft measuring 4.4 x 6.1 cm just above the aortic bifurcation and previously measuring 3.2 x 4.1 cm on 6/11/2021.  PT has been pending fo repair of aneurysm/pseudoaneurysm.      Today labs revealed Hgb 9.7, platelets 210, CO2 20, Cr 2.20, BUN 14, Aris 10.9, albumin 3.8, GFR 24.  Further labs showed Vitamin D 25-OH 57.8, .    Endocrinology has been consulted for Hypercalcemia    # Hypercalcemia  # Primary hyperparathyroiidsm  - last DEXA  - vitamin D 25-OH wnl  - denies use of thiazide, lithium, vitamin A  - Patient can follow up at discharge with [facility] by calling [phone] to make an appointment.      Case discussed with Dr. Phoenix. Primary team updated.       Florence Roca  Endocrinology Fellow    Service Pager: [phone]  HISTORY OF PRESENT ILLNESS  SHERRI MEDLEY is a 68y Female with a past medical history of hypothyroidism, seizure disorder, Stage 3 CKD (baseline CR ~1.7-2.0), severe renal artery stenosis, chronic anemia, Afib s/p AVN ablation and CRT-P (8/2022; on Eliquis), HTN, HLD, CAD s/p CABG, AVR and mitral annuloplasty, HFpEF, AAA s/p open repair in 2015 with complications, PAD s/p L pSFA stent/L-TIFFANY stent/L-IIA stent presented on 03/25 with chest pain and dyspnea on exertion.  She was initially admitted to medicine service for symptomatic anemia.  Her hospital course was complicated by status epilepticus where RRT was elevated.   PT was transferred to cardiac tele.  . CT A/P non con shows: Post surgical changes from prior open thoracoabdominal aortic bypass graft with new focal dilatation of the distalmost aspect of the graft measuring 4.4 x 6.1 cm just above the aortic bifurcation and previously measuring 3.2 x 4.1 cm on 6/11/2021.  PT has been pending fo repair of aneurysm/pseudoaneurysm.      Today labs revealed Hgb 9.7, platelets 210, CO2 20, Cr 2.20, BUN 14, Aris 10.9, albumin 3.8, GFR 24.  Further labs showed Vitamin D 25-OH 57.8, .    Endocrinology has been consulted for Hypercalcemia    ALLERGIES  No Known Allergies    Calcium/Albumin  11.4 mg/dL (04-01 @ 05:30)  10.9 mg/dL (04-02 @ 05:30)      CURRENT MEDICATIONS  acetaminophen     Tablet .. 650 milliGRAM(s) Oral every 6 hours PRN  atorvastatin 40 milliGRAM(s) Oral at bedtime  bisacodyl 5 milliGRAM(s) Oral every 12 hours PRN  chlorhexidine 2% Cloths 1 Application(s) Topical <User Schedule>  heparin   Injectable 4000 Unit(s) IV Push every 6 hours PRN  heparin   Injectable 2000 Unit(s) IV Push every 6 hours PRN  heparin  Infusion.  Unit(s)/Hr IV Continuous <Continuous>  HYDROmorphone  Injectable 0.2 milliGRAM(s) IV Push every 4 hours PRN  levETIRAcetam 250 milliGRAM(s) Oral two times a day  levothyroxine 75 MICROGram(s) Oral every 24 hours  lidocaine   4% Patch 1 Patch Transdermal daily  melatonin 5 milliGRAM(s) Oral at bedtime  metoprolol succinate ER 25 milliGRAM(s) Oral daily  NIFEdipine XL 30 milliGRAM(s) Oral two times a day  pantoprazole    Tablet 40 milliGRAM(s) Oral before breakfast  polyethylene glycol 3350 17 Gram(s) Oral two times a day  senna 2 Tablet(s) Oral at bedtime  sertraline 50 milliGRAM(s) Oral every 24 hours  sodium chloride 0.9%. 1000 milliLiter(s) IV Continuous <Continuous>      REVIEW OF SYSTEMS  Constitutional:  Negative fever, chills   Cardiovascular:  Negative for chest pain or palpitations.  Respiratory:  Negative for cough, wheezing, or shortness of breath.   Gastrointestinal:  Negative for nausea, vomiting, diarrhea, constipation, or abdominal pain.  Genitourinary:  Negative frequency, urgency or dysuria.  Neurologic:  No headache, confusion, dizziness    PHYSICAL EXAM  Vital Signs Last 24 Hrs  T(C): 36.6 (02 Apr 2024 12:14), Max: 36.6 (01 Apr 2024 17:01)  T(F): 97.9 (02 Apr 2024 12:14), Max: 97.9 (02 Apr 2024 08:08)  HR: 75 (02 Apr 2024 12:14) (74 - 79)  BP: 149/74 (02 Apr 2024 12:14) (119/62 - 149/74)  BP(mean): 109 (02 Apr 2024 12:14) (84 - 109)  RR: 18 (02 Apr 2024 12:14) (17 - 18)  SpO2: 98% (02 Apr 2024 12:14) (95% - 100%)    Parameters below as of 02 Apr 2024 12:14  Patient On (Oxygen Delivery Method): room air      Constitutional: Awake, alert  HEENT: Normocephalic, atraumatic, VIOLETA  Neck: supple, no acanthosis, no thyromegaly or palpable thyroid nodules.  Respiratory: Lungs clear to ausculation bilaterally.   Cardiovascular: regular rhythm, normal S1 and S2, no audible murmurs.   GI: soft, non-tender, non-distended, bowel sounds present  Extremities: No lower extremity edema, peripheral pulses present.     LABS  CBC - WBC/HGB/HTC/PLT: 6.90/9.7/30.3/210 (04-02-24)  BMP: Na/K/Cl/Bicarb/BUN/Cr/Gluc: 137/3.8/106/20/14/2.20/107 (04-02-24)  Anion Gap: 11 (04-02-24)  eGFR: 24 (04-02-24)  Calcium: 10.9 (04-02-24)  Phosphorus: -- (04-02-24)  Magnesium: 2.0 (04-02-24)  LFT - Alb/Tprot/Tbili/Dbili/AlkPhos/ALT/AST: 3.8/--/0.4/--/106/9/21 (03-31-24)  PT/aPTT/INR: --/82.8/-- (04-02-24)    CBC - WBC/HGB/HTC/PLT: 6.90/9.7/30.3/210 (04-02-24)BMP: Na/K/Cl/Bicarb/BUN/Cr/Gluc: 137/3.8/106/20/14/2.20/107 (04-02-24)  Anion Gap: 11 (04-02-24)  eGFR: 24 (04-02-24)  Calcium: 10.9 (04-02-24)  Phosphorus: -- (04-02-24)  Magnesium: 2.0 (04-02-24)    CAPILLARY BLOOD GLUCOSE & INSULIN RECEIVED        04-01-24 @ 07:01  -  04-02-24 @ 07:00  --------------------------------------------------------  IN: 519 mL / OUT: 0 mL / NET: 519 mL    04-02-24 @ 07:01  -  04-02-24 @ 13:05  --------------------------------------------------------  IN: 0 mL / OUT: 50 mL / NET: -50 mL        ASSESSMENT / RECOMMENDATIONS    SHERRI MEDLEY is a 68y Female with a past medical history of hypothyroidism, seizure disorder, Stage 3 CKD (baseline CR ~1.7-2.0), severe renal artery stenosis, chronic anemia, Afib s/p AVN ablation and CRT-P (8/2022; on Eliquis), HTN, HLD, CAD s/p CABG, AVR and mitral annuloplasty, HFpEF, AAA s/p open repair in 2015 with complications, PAD s/p L pSFA stent/L-TIFFANY stent/L-IIA stent presented on 03/25 with chest pain and dyspnea on exertion.  She was initially admitted to medicine service for symptomatic anemia.  Her hospital course was complicated by status epilepticus where RRT was elevated.   PT was transferred to cardiac tele.  . CT A/P non con shows: Post surgical changes from prior open thoracoabdominal aortic bypass graft with new focal dilatation of the distalmost aspect of the graft measuring 4.4 x 6.1 cm just above the aortic bifurcation and previously measuring 3.2 x 4.1 cm on 6/11/2021.  PT has been pending fo repair of aneurysm/pseudoaneurysm.      Today labs revealed Hgb 9.7, platelets 210, CO2 20, Cr 2.20, BUN 14, Aris 10.9, albumin 3.8, GFR 24.  Further labs showed Vitamin D 25-OH 57.8, , , P 2.5    Endocrinology has been consulted for Hypercalcemia    # Hypercalcemia  # Primary hyperparathyroiidsm  - last DEXA  - vitamin D 25-OH wnl  - denies use of thiazide, lithium, vitamin A, tums  - CT abd/pelvis showed 4 mm nonobstructing calculus in the lower third of the right kidney  - will need outpatient evaluation for parthyroid surgery as pt met the following criteria of eGFR <60 + presence of kidney stone - however will need evaluation with BMD and 24 hour urinary calcium first  - agree with work-up for MM as well  - Patient can follow up at discharge with [facility] by calling [phone] to make an appointment.      Case discussed with Dr. Reyna. Primary team updated.       Florence Roca  Endocrinology Fellow    Service Pager: [phone]  HISTORY OF PRESENT ILLNESS  SHERRI MEDLEY is a 68y Female with a past medical history of hypothyroidism, seizure disorder, Stage 3 CKD (baseline CR ~1.7-2.0), severe renal artery stenosis, chronic anemia, Afib s/p AVN ablation and CRT-P (8/2022; on Eliquis), HTN, HLD, CAD s/p CABG, AVR and mitral annuloplasty, HFpEF, AAA s/p open repair in 2015 with complications, PAD s/p L pSFA stent/L-TIFFANY stent/L-IIA stent presented on 03/25 with chest pain and dyspnea on exertion.  She was initially admitted to medicine service for symptomatic anemia.  Her hospital course was complicated by status epilepticus where RRT was elevated.   PT was transferred to cardiac tele.  . CT A/P non con shows: Post surgical changes from prior open thoracoabdominal aortic bypass graft with new focal dilatation of the distalmost aspect of the graft measuring 4.4 x 6.1 cm just above the aortic bifurcation and previously measuring 3.2 x 4.1 cm on 6/11/2021.  PT has been pending fo repair of aneurysm/pseudoaneurysm.      Today labs revealed Hgb 9.7, platelets 210, CO2 20, Cr 2.20, BUN 14, Aris 10.9, albumin 3.8, GFR 24.  Further labs showed Vitamin D 25-OH 57.8, .    Endocrinology has been consulted for Hypercalcemia.    Pt states she has no known knowledge of her previous hypercalcemia before.  Denies prolonger immbolization and appetite changes.  Denies prior fractures.  Denies prior kidney stones.  States she may have had dexa scan long time ago but she cannot recall the results.  Denies use of antacids and exposure to HCTZ and lithium.  No known history of Multiple myeloma.    ALLERGIES  No Known Allergies    Calcium/Albumin  11.4 mg/dL (04-01 @ 05:30)  10.9 mg/dL (04-02 @ 05:30)      CURRENT MEDICATIONS  acetaminophen     Tablet .. 650 milliGRAM(s) Oral every 6 hours PRN  atorvastatin 40 milliGRAM(s) Oral at bedtime  bisacodyl 5 milliGRAM(s) Oral every 12 hours PRN  chlorhexidine 2% Cloths 1 Application(s) Topical <User Schedule>  heparin   Injectable 4000 Unit(s) IV Push every 6 hours PRN  heparin   Injectable 2000 Unit(s) IV Push every 6 hours PRN  heparin  Infusion.  Unit(s)/Hr IV Continuous <Continuous>  HYDROmorphone  Injectable 0.2 milliGRAM(s) IV Push every 4 hours PRN  levETIRAcetam 250 milliGRAM(s) Oral two times a day  levothyroxine 75 MICROGram(s) Oral every 24 hours  lidocaine   4% Patch 1 Patch Transdermal daily  melatonin 5 milliGRAM(s) Oral at bedtime  metoprolol succinate ER 25 milliGRAM(s) Oral daily  NIFEdipine XL 30 milliGRAM(s) Oral two times a day  pantoprazole    Tablet 40 milliGRAM(s) Oral before breakfast  polyethylene glycol 3350 17 Gram(s) Oral two times a day  senna 2 Tablet(s) Oral at bedtime  sertraline 50 milliGRAM(s) Oral every 24 hours  sodium chloride 0.9%. 1000 milliLiter(s) IV Continuous <Continuous>      REVIEW OF SYSTEMS  Constitutional:  Negative fever, chills   Cardiovascular:  Negative for chest pain or palpitations.  Respiratory:  Negative for cough, wheezing, or shortness of breath.   Gastrointestinal:  Negative for nausea, vomiting, diarrhea, constipation, or abdominal pain.  Genitourinary:  Negative frequency, urgency or dysuria.  Neurologic:  No headache, confusion, dizziness    PHYSICAL EXAM  Vital Signs Last 24 Hrs  T(C): 36.6 (02 Apr 2024 12:14), Max: 36.6 (01 Apr 2024 17:01)  T(F): 97.9 (02 Apr 2024 12:14), Max: 97.9 (02 Apr 2024 08:08)  HR: 75 (02 Apr 2024 12:14) (74 - 79)  BP: 149/74 (02 Apr 2024 12:14) (119/62 - 149/74)  BP(mean): 109 (02 Apr 2024 12:14) (84 - 109)  RR: 18 (02 Apr 2024 12:14) (17 - 18)  SpO2: 98% (02 Apr 2024 12:14) (95% - 100%)    Parameters below as of 02 Apr 2024 12:14  Patient On (Oxygen Delivery Method): room air      Constitutional: Awake, alert  HEENT: Normocephalic, atraumatic, VIOLETA  Neck: supple, no acanthosis, no thyromegaly or palpable thyroid nodules.  Respiratory: Lungs clear to ausculation bilaterally.   Cardiovascular: regular rhythm, normal S1 and S2, no audible murmurs.   GI: soft, non-tender, non-distended, bowel sounds present  Extremities: No lower extremity edema, peripheral pulses present.     LABS  CBC - WBC/HGB/HTC/PLT: 6.90/9.7/30.3/210 (04-02-24)  BMP: Na/K/Cl/Bicarb/BUN/Cr/Gluc: 137/3.8/106/20/14/2.20/107 (04-02-24)  Anion Gap: 11 (04-02-24)  eGFR: 24 (04-02-24)  Calcium: 10.9 (04-02-24)  Phosphorus: -- (04-02-24)  Magnesium: 2.0 (04-02-24)  LFT - Alb/Tprot/Tbili/Dbili/AlkPhos/ALT/AST: 3.8/--/0.4/--/106/9/21 (03-31-24)  PT/aPTT/INR: --/82.8/-- (04-02-24)    CBC - WBC/HGB/HTC/PLT: 6.90/9.7/30.3/210 (04-02-24)BMP: Na/K/Cl/Bicarb/BUN/Cr/Gluc: 137/3.8/106/20/14/2.20/107 (04-02-24)  Anion Gap: 11 (04-02-24)  eGFR: 24 (04-02-24)  Calcium: 10.9 (04-02-24)  Phosphorus: -- (04-02-24)  Magnesium: 2.0 (04-02-24)    CAPILLARY BLOOD GLUCOSE & INSULIN RECEIVED        04-01-24 @ 07:01  -  04-02-24 @ 07:00  --------------------------------------------------------  IN: 519 mL / OUT: 0 mL / NET: 519 mL    04-02-24 @ 07:01  -  04-02-24 @ 13:05  --------------------------------------------------------  IN: 0 mL / OUT: 50 mL / NET: -50 mL        ASSESSMENT / RECOMMENDATIONS    SHERRI MEDLEY is a 68y Female with a past medical history of hypothyroidism, seizure disorder, Stage 3 CKD (baseline CR ~1.7-2.0), severe renal artery stenosis, chronic anemia, Afib s/p AVN ablation and CRT-P (8/2022; on Eliquis), HTN, HLD, CAD s/p CABG, AVR and mitral annuloplasty, HFpEF, AAA s/p open repair in 2015 with complications, PAD s/p L pSFA stent/L-TIFFANY stent/L-IIA stent presented on 03/25 with chest pain and dyspnea on exertion.  She was initially admitted to medicine service for symptomatic anemia.  Her hospital course was complicated by status epilepticus where RRT was elevated.   PT was transferred to cardiac tele.  . CT A/P non con shows: Post surgical changes from prior open thoracoabdominal aortic bypass graft with new focal dilatation of the distalmost aspect of the graft measuring 4.4 x 6.1 cm just above the aortic bifurcation and previously measuring 3.2 x 4.1 cm on 6/11/2021.  PT has been pending fo repair of aneurysm/pseudoaneurysm.      Today labs revealed Hgb 9.7, platelets 210, CO2 20, Cr 2.20, BUN 14, Aris 10.9, albumin 3.8, GFR 24.  Further labs showed Vitamin D 25-OH 57.8, , , P 2.5    Endocrinology has been consulted for Hypercalcemia    # Hypercalcemia  # Primary hyperparathyroiidsm  - last DEXA not known, pt needs to obtain dexa scan outpatient  - vitamin D 25-OH wnl  - denies use of thiazide, lithium, vitamin A, tums  - CT abd/pelvis showed 4 mm nonobstructing calculus in the lower third of the right kidney  - will need outpatient evaluation for parthyroid surgery as pt met the following criteria of eGFR <60 + presence of kidney stone - however will need evaluation with BMD and 24 hour urinary calcium first  - if pt not a good surgical candidate, down the line, can consider cinacalcet vs bisphosphonates vs denosumab  - agree with work-up for MM as well  - c/w gentle fluids currently as tolerated  - Patient can follow up at discharge with [facility] by calling [phone] to make an appointment.      Case discussed with Dr. Reyna. Primary team updated.       Florence Roca  Endocrinology Fellow    Service Pager: [phone]

## 2024-04-02 NOTE — PROGRESS NOTE ADULT - SUBJECTIVE AND OBJECTIVE BOX
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 Soriano    Consent: obtained, in chart                          9.7    6.90  )-----------( 210      ( 02 Apr 2024 05:30 )             30.3     04-02    137  |  106  |  14  ----------------------------<  107<H>  3.8   |  20<L>  |  2.20<H>    Ca    10.9<H>      02 Apr 2024 05:30  Mg     2.0     04-02    TPro  7.5  /  Alb  x   /  TBili  x   /  DBili  x   /  AST  x   /  ALT  x   /  AlkPhos  x   04-01    PTT - ( 02 Apr 2024 05:30 )  PTT:82.8 sec  Urinalysis Basic - ( 02 Apr 2024 05:30 )    Color: x / Appearance: x / SG: x / pH: x  Gluc: 107 mg/dL / Ketone: x  / Bili: x / Urobili: x   Blood: x / Protein: x / Nitrite: x   Leuk Esterase: x / RBC: x / WBC x   Sq Epi: x / Non Sq Epi: x / Bacteria: x        Type & Screen: x2, most recent 4/2/2024         (*With most recent within 72hrs of OR)  CXR: performed 3/28/2024  EKG: performed 3/25/2024  UA: performed 3/27/2024      Is patient on ACE/ARB? [x ]No [ ]Yes   *If yes, please hold any ACE/ARB the day of surgery    Is patient on Lantus at bedtime?  [ x]No [ ]Yes   *If yes, please half the dose the night before OR since patient will be NPO    Does patient have a contrast allergy? [x ]No [ ]Yes  *If yes, please pre-medicate per protocol    Is patient on anticoagulation? [ ]No [x ] Yes  *If yes, please discuss with team when to hold it - hold on call to OR    Is the patient Female and <54yo [ x]No [ ] Yes  If yes, pregnancy test must be documented in the chart    Is patient on dialysis? [x ]No [ ]Yes  *If yes, please obtain all labs including K level EARLY the day of surgery   *Also, will NOT require IVF past midnight    A/P: 68yFemale pre-op for above procedure  1. NPO past midnight, except medications  2. IVF at midnight: NS @50cc/hr  3. [ ] Blood on hold, Units:     Please hold heparin gtt on call to OR  Please obtain CBC, BMP, Mg, Phos, coags 4/3 am  Appreciate cardiac risk stratification

## 2024-04-02 NOTE — CONSULT NOTE ADULT - CONSULT REQUESTED BY NAME
Cards
Dr Lizarraga
house staff
Dr. Saldana
Medicine
[facility]
Otilio Ramirez
Medicine
Rapid response

## 2024-04-02 NOTE — CONSULT NOTE ADULT - PROVIDER SPECIALTY LIST ADULT
Cardiology
Epilepsy
Rehab Medicine
Structural Heart
Gastroenterology
Internal Medicine
Critical Care
Vascular Surgery
Endocrinology

## 2024-04-02 NOTE — PROGRESS NOTE ADULT - SUBJECTIVE AND OBJECTIVE BOX
O/N Events:    Subjective/ROS: Patient seen and examined at bedside.     Denies Fever/Chills, HA, CP, SOB, n/v, changes in bowel/urinary habits.  12pt ROS otherwise negative.    VITALS  Vital Signs Last 24 Hrs  T(C): 36.8 (02 Apr 2024 15:12), Max: 36.9 (02 Apr 2024 14:49)  T(F): 98.3 (02 Apr 2024 15:12), Max: 98.5 (02 Apr 2024 14:49)  HR: 75 (02 Apr 2024 15:12) (74 - 77)  BP: 124/59 (02 Apr 2024 15:12) (119/59 - 149/74)  BP(mean): 109 (02 Apr 2024 12:14) (84 - 109)  RR: 18 (02 Apr 2024 15:12) (17 - 18)  SpO2: 98% (02 Apr 2024 15:12) (95% - 100%)    Parameters below as of 02 Apr 2024 15:12  Patient On (Oxygen Delivery Method): room air        CAPILLARY BLOOD GLUCOSE          PHYSICAL EXAM  General: NAD  Head: NC/AT; MMM; PERRL; EOMI;  Neck: Supple; no JVD  Respiratory: CTAB; no wheezes/rales/rhonchi  Cardiovascular: systolic murmur  Gastrointestinal: Soft; NTND; bowel sounds normal and present  Extremities: WWP; no edema/cyanosis  Neurological: A&Ox3, CNII-XII grossly intact; no obvious focal deficits  MSK: point tenderness at lumbar spine    MEDICATIONS  (STANDING):  atorvastatin 40 milliGRAM(s) Oral at bedtime  chlorhexidine 2% Cloths 1 Application(s) Topical <User Schedule>  heparin  Infusion.  Unit(s)/Hr (10 mL/Hr) IV Continuous <Continuous>  levETIRAcetam 250 milliGRAM(s) Oral two times a day  levothyroxine 75 MICROGram(s) Oral every 24 hours  lidocaine   4% Patch 1 Patch Transdermal daily  melatonin 5 milliGRAM(s) Oral at bedtime  metoprolol succinate ER 25 milliGRAM(s) Oral daily  NIFEdipine XL 30 milliGRAM(s) Oral two times a day  pantoprazole    Tablet 40 milliGRAM(s) Oral before breakfast  polyethylene glycol 3350 17 Gram(s) Oral two times a day  senna 2 Tablet(s) Oral at bedtime  sertraline 50 milliGRAM(s) Oral every 24 hours  sodium chloride 0.9%. 1000 milliLiter(s) (50 mL/Hr) IV Continuous <Continuous>    MEDICATIONS  (PRN):  acetaminophen     Tablet .. 650 milliGRAM(s) Oral every 6 hours PRN Mild Pain (1 - 3)  bisacodyl 5 milliGRAM(s) Oral every 12 hours PRN Constipation  heparin   Injectable 4000 Unit(s) IV Push every 6 hours PRN For aPTT less than 40  heparin   Injectable 2000 Unit(s) IV Push every 6 hours PRN For aPTT between 40 - 57  HYDROmorphone  Injectable 0.2 milliGRAM(s) IV Push every 4 hours PRN Severe Pain (7 - 10)      No Known Allergies      LABS                        9.7    6.90  )-----------( 210      ( 02 Apr 2024 05:30 )             30.3     04-02    137  |  106  |  14  ----------------------------<  107<H>  3.8   |  20<L>  |  2.20<H>    Ca    10.9<H>      02 Apr 2024 05:30  Mg     2.0     04-02    TPro  7.5  /  Alb  x   /  TBili  x   /  DBili  x   /  AST  x   /  ALT  x   /  AlkPhos  x   04-01    PTT - ( 02 Apr 2024 05:30 )  PTT:82.8 sec  Urinalysis Basic - ( 02 Apr 2024 05:30 )    Color: x / Appearance: x / SG: x / pH: x  Gluc: 107 mg/dL / Ketone: x  / Bili: x / Urobili: x   Blood: x / Protein: x / Nitrite: x   Leuk Esterase: x / RBC: x / WBC x   Sq Epi: x / Non Sq Epi: x / Bacteria: x              IMAGING/EKG/ETC

## 2024-04-02 NOTE — PROGRESS NOTE ADULT - SUBJECTIVE AND OBJECTIVE BOX
Physical Medicine and Rehabilitation Progress Note :       Patient is a 68y old  Female who presents with a chief complaint of anemia, CHERRI (02 Apr 2024 13:01)      HPI:  HPI: Patient is a 67yo F former smoker (quit 20 year ago with PMH of hypothyroidism, seizure disorder, Stage 3 CKD (baseline CR ~1.7-2.0), severe renal artery stenosis, chronic anemia (baseline Hgb ~9-10, gets weekly Fe infusion last infusion 3/22/24), AT, Afib s/p AVN ablation and CRT-P (8/2022; on Eliquis), HTN, HLD, CAD s/p CABG, AVR (porcine bio-prosthetic valve) and mitral annuloplasty, HFpEF, AAA s/p open repair in 2015 with complications, PAD s/p L pSFA stent/L-TIFFANY stent/L-IIA stent who presents with intermittent chest pain and KRAFT x 3 days. States she has been having intermittent chest pain and KRAFT which worsened about 3 days ago. She has not been very mobile for the past week due to pain and KRAFT. At baseline ambulates with cane, however has not been mostly sitting on the couch due to current symptoms. Additionally c/o lower abdominal pain which she attributes to constipation (has not had a BM in 5-6 days). Usually has regular BMs. Has had very poor PO intake over the past few days 2/2 constipation. Denies any s/s of bleeding; denies hematuria, hemoptysis, hematemesis, etc. No recent illness/sick contacts. Denies any recent travel. Denies hx of cancer, bleeding/clotting disorders, lower extremity pain or swelling. Compliant with her medications.  No other complaints. States she feels mildly improved after blood transfusion.     In the ED:  Initial vital signs: T: 98 F, HR: 86, BP: 114/72, R: 17, SpO2: 100% on RA  Labs: significant for Hgb 6.9, D-dimer 2831, Cl 111, HCO3 19, BUN/Cr 48/3.00, Ca 10.7, trop 41, p-BNP 3727  CXR: Persistent linear atelectasis over the left lower lung zone. The remainder the lung zones are clear. No pneumothorax.  CT C/A/P: Bibasilar atelectasis without evidence of consolidation. Borderline hepatomegaly with extensive coronary artery calcifications status post CABG. There is also a prosthetic aortic valve. Post surgical changes from prior open thoracoabdominal aortic bypass graft with new focal dilatation of the distalmost aspect of the graft measuring 4.4 x 6.1 cm just above the aortic bifurcation and previously measuring 3.2 x 4.1 cm on 6/11/2021.   EKG: paced rhythm  Medications: 1u pRBC, maalox 30mL PO x 1, morphine 2mg IV x 1  Consults: vascular (25 Mar 2024 22:30)                            9.7    6.90  )-----------( 210      ( 02 Apr 2024 05:30 )             30.3       04-02    137  |  106  |  14  ----------------------------<  107<H>  3.8   |  20<L>  |  2.20<H>    Ca    10.9<H>      02 Apr 2024 05:30  Mg     2.0     04-02    TPro  7.5  /  Alb  x   /  TBili  x   /  DBili  x   /  AST  x   /  ALT  x   /  AlkPhos  x   04-01    Vital Signs Last 24 Hrs  T(C): 36.6 (02 Apr 2024 12:14), Max: 36.6 (01 Apr 2024 17:01)  T(F): 97.9 (02 Apr 2024 12:14), Max: 97.9 (02 Apr 2024 08:08)  HR: 75 (02 Apr 2024 12:14) (74 - 79)  BP: 149/74 (02 Apr 2024 12:14) (119/62 - 149/74)  BP(mean): 109 (02 Apr 2024 12:14) (84 - 109)  RR: 18 (02 Apr 2024 12:14) (17 - 18)  SpO2: 98% (02 Apr 2024 12:14) (95% - 100%)    Parameters below as of 02 Apr 2024 12:14  Patient On (Oxygen Delivery Method): room air        MEDICATIONS  (STANDING):  atorvastatin 40 milliGRAM(s) Oral at bedtime  chlorhexidine 2% Cloths 1 Application(s) Topical <User Schedule>  heparin  Infusion.  Unit(s)/Hr (10 mL/Hr) IV Continuous <Continuous>  levETIRAcetam 250 milliGRAM(s) Oral two times a day  levothyroxine 75 MICROGram(s) Oral every 24 hours  lidocaine   4% Patch 1 Patch Transdermal daily  melatonin 5 milliGRAM(s) Oral at bedtime  metoprolol succinate ER 25 milliGRAM(s) Oral daily  NIFEdipine XL 30 milliGRAM(s) Oral two times a day  pantoprazole    Tablet 40 milliGRAM(s) Oral before breakfast  polyethylene glycol 3350 17 Gram(s) Oral two times a day  senna 2 Tablet(s) Oral at bedtime  sertraline 50 milliGRAM(s) Oral every 24 hours  sodium chloride 0.9%. 1000 milliLiter(s) (50 mL/Hr) IV Continuous <Continuous>    MEDICATIONS  (PRN):  acetaminophen     Tablet .. 650 milliGRAM(s) Oral every 6 hours PRN Mild Pain (1 - 3)  bisacodyl 5 milliGRAM(s) Oral every 12 hours PRN Constipation  heparin   Injectable 4000 Unit(s) IV Push every 6 hours PRN For aPTT less than 40  heparin   Injectable 2000 Unit(s) IV Push every 6 hours PRN For aPTT between 40 - 57  HYDROmorphone  Injectable 0.2 milliGRAM(s) IV Push every 4 hours PRN Severe Pain (7 - 10)      T(C): 36.6 (04-02-24 @ 12:14), Max: 36.6 (04-01-24 @ 17:01)  HR: 75 (04-02-24 @ 12:14) (74 - 79)  BP: 149/74 (04-02-24 @ 12:14) (119/62 - 149/74)  RR: 18 (04-02-24 @ 12:14) (17 - 18)  SpO2: 98% (04-02-24 @ 12:14) (95% - 100%)        Physical Exam:       HEENT:   Normal oral mucosa, PERRLA, EOMI	    Neck: Supple,  - JVD; Carotid Bruit     Cardiovascular: Normal S1 S2, No JVD, No murmurs    Respiratory: Lungs clear to auscultation, No Rales, Rhonchi, Wheezing  	  Gastrointestinal: NABS x4;  diffusely tender to palpation; no guarding/  rebound tenderness    Skin: No rashes, No ecchymoses, No cyanosis    Extremities: Normal range of motion, No clubbing, cyanosis or edema    Vascular: Peripheral pulses palpable 2+ bilaterally    Neurologic: Non-focal    Psychiatry: A & O x 3, Mood & affect appropriate      4/1/2024 Functional Status Assessment :         Pain Assessment/Number Scale (0-10) Adult  Presence of Pain: complains of pain/discomfort;  Pt reports pain is tolerable at this time for physical therapy, declines pain medication.   Body Location: back discomfort from bed - no numeric pain rating provided     Pain Interventions-Number Scale  Interventions: Pain Interventions-Number Scale: positioned to decrease pressure;  prescribed exercises    Therapeutic Interventions      Bed Mobility  Bed Mobility Training Rolling/Turning: independent  Bed Mobility Training Scooting: independent  Bed Mobility Training Bridging: independent  Bed Mobility Training Sit-to-Supine: independent  Bed Mobility Training Supine-to-Sit: independent    Sit-Stand Transfer Training  Transfer Training Sit-to-Stand Transfer: contact guard;  Verbal cues for safe hand placement and sequencing. ;  straight cane  Transfer Training Stand-to-Sit Transfer: contact guard;  Verbal cues for safe hand placement and sequencing. ;  straight cane  Sit-to-Stand Transfer Training Transfer Safety Analysis: decreased sequencing ability;  decreased balance;  decreased weight-shifting ability;  decreased flexibility;  decreased strength;  impaired balance    Gait Training  Gait Training: contact guard;  minimum assist (75% patient effort);  1 person assist;  straight cane;  ~80ft   Gait Analysis: swing-through gait   decreased peyton;  increased time in double stance;  decreased hip/knee flexion;  decreased step length;  decreased stride length;  increased stride width;  decreased weight-shifting ability;  decreased flexibility;  decreased strength;  impaired balance    Therapeutic Exercise  Therapeutic Exercise Detail: Ankle pumps, 2x10 each LE, to improve ankle DF ROM for midstance phase of gait and help prevent DVTs;Straight leg raises, 1x5 each LE, to improve knee ext strength;Supine bridges, 1x5, to improve glute strength for transfers and stance phase of gait;           PM&R Impression : as above    Current disposition plan recommendation :    home with home PT

## 2024-04-03 NOTE — BRIEF OPERATIVE NOTE - OPERATION/FINDINGS
Procedure:   - Endovascular repair of aortic pseudoaneurysm / contained rupture  - Hypogastric artery (left) coil embolization  - Bilateral TIFFANY to CFA stenting  - R CFA patch angioplasty    Operative Details:  - Medtronic Endurant 2s main body deployed below renal arteries (28x14)  - Contralateral limb with 16x10 Medtronic Endurant 2s deployed  - Ipsilateral limb extended with 16x12 Orick limb, VBX 8x79 x2 deployed across EIA  - Contra limb extension with 16x12 Orick limb, 2 Viabahn 13x10, and Zilver PTX 8x100

## 2024-04-03 NOTE — PROGRESS NOTE ADULT - SUBJECTIVE AND OBJECTIVE BOX
*** CARDIAC TELE to VASCULAR SURGERY  TRANSFER ***  Hospital Course:  68-year-old F, former smoker (quit 20 year ago) with PMH HTN, HLD, CAD s/p CABG, AVR (porcine bio-prosthetic valve) and mitral annuloplasty, HFpEF, hypothyroidism, seizure disorder, stage 3 CKD (baseline CR ~1.7-2.0), severe renal artery stenosis, chronic anemia (baseline Hgb ~ 9-10, gets weekly iron infusion last infusion 3/22/24), AT, Afib s/p AVN ablation and CRT-P (8/2022; on Eliquis), AAA (s/p open repair in 2015 with complications, last repaired 2020), PAD s/p L pSFA stent/L-TIFFANY stent/L-IIA stent who presents with intermittent chest pain and KRAFT x 3 days, admitted for symptomatic anemia 03/25/24. Course complicated by status epilepticus (2 seizure events) 3/26/24 w/ RR called and pt started on Keppra as well as new focal dilatation of the distalmost aspect of the graft of prior open thoracoabdominal aortic bypass graft found on CT chest w/ Vascular surgery consulted. Patient w/ episode of melena on 3/27 w/ concern for GIB vs vascular dissection. Cardiology consulted for pre-op clearance for EGD, patient transferred to CCU for in-unit scope revealing normal esophagus and duodenum and non-erosive gastritis and has since been cleared for AC for Afib. Stepped down to cardiac tele on 03/28/24 for which discussion between Vascular Sx and CTSx discussed which intervention to proceed with first. Medicine team following for comanagement of constipation, pain management and FTT. Plan for TAVR as outpatient w/ Dr. Gregory. Patient went for [id number] device placement and femoral-femoral bypass w/ vascular surgery on 04/03/24 and to be transferred to vascular service post intervention.     Overnight Events:  None    Subjective Assessment:  Patient denied chest pain but endorses 8/10 abdominal and back pain worse with inspiration that has been consistent throughout hospital course. Patient denied dysuria and pain in extremities.     ROS Negative except as per Subjective and HPI  	  MEDICATIONS:  metoprolol succinate ER 25 milliGRAM(s) Oral daily  NIFEdipine XL 30 milliGRAM(s) Oral two times a day  acetaminophen     Tablet .. 650 milliGRAM(s) Oral every 6 hours PRN  HYDROmorphone  Injectable 0.2 milliGRAM(s) IV Push every 4 hours PRN  levETIRAcetam 250 milliGRAM(s) Oral two times a day  melatonin 5 milliGRAM(s) Oral at bedtime  sertraline 50 milliGRAM(s) Oral every 24 hours  bisacodyl 5 milliGRAM(s) Oral every 12 hours PRN  pantoprazole    Tablet 40 milliGRAM(s) Oral before breakfast  polyethylene glycol 3350 17 Gram(s) Oral two times a day  senna 2 Tablet(s) Oral at bedtime  atorvastatin 40 milliGRAM(s) Oral at bedtime  levothyroxine 75 MICROGram(s) Oral every 24 hours  chlorhexidine 2% Cloths 1 Application(s) Topical <User Schedule>  heparin   Injectable 4000 Unit(s) IV Push every 6 hours PRN  heparin   Injectable 2000 Unit(s) IV Push every 6 hours PRN  heparin  Infusion.  Unit(s)/Hr IV Continuous <Continuous>  lidocaine   4% Patch 1 Patch Transdermal daily  sodium chloride 0.9%. 1000 milliLiter(s) IV Continuous <Continuous>      [PHYSICAL EXAM:  TELEMETRY:  T(C): 36.4 (04-03-24 @ 12:37), Max: 37.2 (04-02-24 @ 17:29)  HR: 74 (04-03-24 @ 12:37) (74 - 75)  BP: 140/70 (04-03-24 @ 12:37) (121/60 - 149/75)  RR: 19 (04-03-24 @ 12:37) (18 - 19)  SpO2: 99% (04-03-24 @ 08:38) (99% - 100%)  Wt(kg): --  I&O's Summary    02 Apr 2024 07:01  -  03 Apr 2024 07:00  --------------------------------------------------------  IN: 180 mL / OUT: 50 mL / NET: 130 mL    03 Apr 2024 07:01  -  03 Apr 2024 15:43  --------------------------------------------------------  IN: 0 mL / OUT: 550 mL / NET: -550 mL      Height (cm): 167.6 (04-03 @ 06:13)  Weight (kg): 52.2 (04-03 @ 06:13)  BMI (kg/m2): 18.6 (04-03 @ 06:13)  BSA (m2): 1.58 (04-03 @ 06:13)                                     Appearance: Normal	  HEENT:   Normal oral mucosa, PERRL, EOMI	  Neck: Supple, - JVD  Cardiovascular: Normal S1 S2, No JVD, No murmurs,   Respiratory: Lungs clear to auscultation  Gastrointestinal:  Soft, Non-tender, + BS	  Skin: No rashes, No ecchymoses, No cyanosis  Extremities: Normal range of motion, No clubbing, cyanosis or edema  Vascular: Peripheral pulses palpable 2+ bilaterally  Neurologic: Non-focal  Psychiatry: A & O x 3, Mood & affect appropriate  	  CARDIAC MARKERS:                       10.8   6.33  )-----------( 207      ( 03 Apr 2024 05:30 )             33.9     04-03    134<L>  |  108  |  14  ----------------------------<  80  3.8   |  17<L>  |  1.91<H>    Ca    10.4      03 Apr 2024 05:30  Phos  3.1     04-03  Mg     2.0     04-03    PT/INR - ( 03 Apr 2024 05:30 )   PT: 11.4 sec;   INR: 1.00       PTT - ( 03 Apr 2024 05:30 )  PTT:83.1 sec

## 2024-04-03 NOTE — PROGRESS NOTE ADULT - PROBLEM/PLAN-4
DISPLAY PLAN FREE TEXT
48M smoker w/ hx of vertigo, diverticulitis s/p LAR, ETOH abuse (sober x5 years), DM (patient states has been on Insulin x10 years was never on oral agents), CKD IV - baseline creatinine 2.4, HTN, AMBER, who presented to [facility] on 10/2 w/ dizziness, b/l diplopia, headache and chest tightness, admitted for c/f NSTEMI +/- possible stroke. 
DISPLAY PLAN FREE TEXT
48M smoker w/ hx of vertigo, diverticulitis s/p LAR, ETOH abuse (sober x5 years), DM (patient states has been on Insulin x10 years was never on oral agents), CKD IV - baseline creatinine 2.4, HTN, AMBER, who presented to [facility] on 10/2 w/ /133, dizziness, diplopia, headache and chest tightness, admitted for c/f NSTEMI +/- possible stroke.
DISPLAY PLAN FREE TEXT

## 2024-04-03 NOTE — PRE-ANESTHESIA EVALUATION ADULT - NSANTHSNORERD_ENT_A_CORE
Visit Information Date & Time Provider Department Dept. Phone Encounter #  
 10/20/2017  1:00 PM Zac Bass MD [place] [id number] [facility] [phone] [id number] Follow-up Instructions Return in about 6 months (around 4/20/2018) for F/u HLD. Upcoming Health Maintenance Date Due DTaP/Tdap/Td series (1 - Tdap) 4/4/1976 COLONOSCOPY 1/1/2015 ZOSTER VACCINE AGE 60> 2/4/2015 PAP AKA CERVICAL CYTOLOGY 9/16/2018 BREAST CANCER SCRN MAMMOGRAM 1/24/2019 Allergies as of 10/20/2017  Review Complete On: 10/20/2017 By: Zac Bass MD  
  
 Severity Noted Reaction Type Reactions Ciprofloxacin  05/04/2012    Rash Current Immunizations  Never Reviewed No immunizations on file. Not reviewed this visit You Were Diagnosed With   
  
 Codes Comments RUQ abdominal pain    -  Primary ICD-10-CM: R10.11 ICD-9-CM: 789.01 Vitals BP Pulse Temp Resp Height(growth percentile) Weight(growth percentile) 120/84 (BP 1 Location: Left arm, BP Patient Position: Sitting) 85 98.5 °F (36.9 °C) (Oral) 18 5' (1.524 m) 170 lb (77.1 kg) SpO2 BMI OB Status Smoking Status 95% 33.2 kg/m2 Postmenopausal Never Smoker Vitals History BMI and BSA Data Body Mass Index Body Surface Area  
 33.2 kg/m 2 1.81 m 2 Preferred Pharmacy Pharmacy Name Phone [id number] [phone] Your Updated Medication List  
  
   
This list is accurate as of: 10/20/17  1:28 PM.  Always use your most recent med list.  
  
  
  
  
 aspirin delayed-release 81 mg tablet Take 81 mg by mouth daily. atorvastatin 40 mg tablet Commonly known as:  LIPITOR Take 1 Tab by mouth daily. FISH OIL Cap Generic drug:  omega-3 fatty acids Take 1 Cap by mouth daily. losartan 50 mg tablet Commonly known as:  COZAAR  
TAKE 1 TAB BY MOUTH DAILY. PROBIOTIC PO Take  by mouth. VITAMIN D3 1,000 unit tablet Generic drug:  cholecalciferol Take 1,000 Units by mouth daily. Follow-up Instructions Return in about 6 months (around 4/20/2018) for F/u HLD. To-Do List   
 10/23/2017 Imaging:  US ABD COMP Patient Instructions Continue to follow a Mediterranean diet. Continue your current medications. Have a colonoscopy done., Have an ultrasound of your abdomen. Send the results of your blood tests. Learning About the [address] Diet What is the Mediterranean diet? The Mediterranean diet is a style of eating rather than a diet plan. It features foods eaten in [place], [place], [place] and [place], and other countries along the [facility]. It emphasizes eating foods like fish, fruits, vegetables, beans, high-fiber breads and whole grains, nuts, and olive oil. This style of eating includes limited red meat, cheese, and sweets. Why choose the Mediterranean diet? A Mediterranean-style diet may improve heart health. It contains more fat than other heart-healthy diets. But the fats are mainly from nuts, unsaturated oils (such as fish oils and olive oil), and certain nut or seed oils (such as canola, soybean, or flaxseed oil). These fats may help protect the heart and blood vessels. How can you get started on the Mediterranean diet? Here are some things you can do to switch to a more Mediterranean way of eating. What to eat · Eat a variety of fruits and vegetables each day, such as grapes, blueberries, tomatoes, broccoli, peppers, figs, olives, spinach, eggplant, beans, lentils, and chickpeas. · Eat a variety of whole-grain foods each day, such as oats, brown rice, and whole wheat bread, pasta, and couscous. · Eat fish at least 2 times a week. Try tuna, salmon, mackerel, lake trout, herring, or sardines. · Eat moderate amounts of low-fat dairy products, such as milk, cheese, or yogurt. · Eat moderate amounts of poultry and eggs. · Choose healthy (unsaturated) fats, such as nuts, olive oil, and certain nut or seed oils like canola, soybean, and flaxseed. · Limit unhealthy (saturated) fats, such as butter, palm oil, and coconut oil. And limit fats found in animal products, such as meat and dairy products made with whole milk. Try to eat red meat only a few times a month in very small amounts. · Limit sweets and desserts to only a few times a week. This includes sugar-sweetened drinks like soda. The Mediterranean diet may also include red wine with your meal1 glass each day for women and up to 2 glasses a day for men. Tips for eating at home · Use herbs, spices, garlic, lemon zest, and citrus juice instead of salt to add flavor to foods. · Add avocado slices to your sandwich instead of rizo. · Have fish for lunch or dinner instead of red meat. Brush the fish with olive oil, and broil or grill it. · Sprinkle your salad with seeds or nuts instead of cheese. · Cook with olive or canola oil instead of butter or oils that are high in saturated fat. · Switch from 2% milk or whole milk to 1% or fat-free milk. · Dip raw vegetables in a vinaigrette dressing or hummus instead of dips made from mayonnaise or sour cream. 
· Have a piece of fruit for dessert instead of a piece of cake. Try baked apples, or have some dried fruit. Tips for eating out · Try broiled, grilled, baked, or poached fish instead of having it fried or breaded. · Ask your  to have your meals prepared with olive oil instead of butter. · Order dishes made with marinara sauce or sauces made from olive oil. Avoid sauces made from cream or mayonnaise. · Choose whole-grain breads, whole wheat pasta and pizza crust, brown rice, beans, and lentils. · Cut back on butter or margarine on bread. Instead, you can dip your bread in a small amount of olive oil. · Ask for a side salad or grilled vegetables instead of french fries or chips. Where can you learn more? Go to http://jorge l-freddy.info/. Enter 442-153-1508 in the search box to learn more about \"Learning About the Mediterranean Diet. \" Current as of: December 29, 2016 Content Version: 11.3 © 0471-6138 [employer]. Care instructions adapted under license by [employer] (which disclaims liability or warranty for this information). If you have questions about a medical condition or this instruction, always ask your healthcare professional. [address] any warranty or liability for your use of this information. Introducing [facility]! Dear Roxane Nance: 
Thank you for requesting a [employer] account. Our records indicate that you already have an active [employer] account. You can access your account anytime at [email or web address]. Try [employer] Did you know that you can access your hospital and ER discharge instructions at any time in [employer]? You can also review all of your test results from your hospital stay or ER visit. Additional Information If you have questions, please visit the Frequently Asked Questions section of the [employer] website at [email or web address]. [employer]/. Remember, [employer] is NOT to be used for urgent needs. For medical emergencies, dial 911. Now available from your iPhone and Android! Please provide this summary of care documentation to your next provider. Your primary care clinician is listed as Graciela Singh. If you have any questions after today's visit, please call [phone]. No

## 2024-04-03 NOTE — PROGRESS NOTE ADULT - ASSESSMENT
69yo F, former smoker, PMHx CAD (s/p CABG), HFpEF (EF 51%), afib (s/p AVN ablation and CRT-P 8/2022), AVR (procine-bioprosthetic) and mitral annuloplasty, AAA (s/p open repair 2015), PAD (s/p stents), HTN, HLD, hypothyroidism, seizure d/o, CKD3 (b/l Cr 1.7-2.0), severe CARLI, chronic anemia initially admitted to medicine for symptomatic anemia. Course c/b status epilepticus, stepped up to tele c/f GIB vs vasc dissection. s/p EGD, cleared for AC. Severe AS/TAVR workup to be done outpt, CTsx signed off. Stepped over to cardiac tele with plans for vascular surgery 04/03/24 for AAA. NPO at [facility].

## 2024-04-03 NOTE — PROGRESS NOTE ADULT - SUBJECTIVE AND OBJECTIVE BOX
Vascular Surgery Post-Op Note    Procedure: Repair, aneurysm, aortoiliac, endovascular    Diagnosis/Indication: pseudoaneurysm of aorta    Surgeon: Dr. Soriano    S: Pt resting comfortably in PACU, complaining of mild b/l LE pain controlled with medication. Denies abdominal pain, back pain, CP, SOB, KRAFT, calf tenderness, headaches, dizziness.    O:  T(C): 36.7 (04-03-24 @ 19:28), Max: 36.7 (04-03-24 @ 19:28)  T(F): 98 (04-03-24 @ 19:28), Max: 98 (04-03-24 @ 19:28)  HR: 75 (04-03-24 @ 20:15) (75 - 75)  BP: 148/80 (04-03-24 @ 20:15) (148/80 - 177/92)  RR: 20 (04-03-24 @ 20:15) (20 - 23)  SpO2: 95% (04-03-24 @ 20:15) (93% - 95%)  Wt(kg): --                        9.5    10.93 )-----------( 137      ( 03 Apr 2024 20:09 )             28.2     04-03    138  |  108  |  12  ----------------------------<  100<H>  3.7   |  20<L>  |  1.94<H>    Ca    9.5      03 Apr 2024 20:09  Phos  4.0     04-03  Mg     1.6     04-03        Gen: NAD, resting comfortably in bed  C/V: S1S2  Pulm: On room air, Nonlabored breathing, no respiratory distress  Abd: soft, NT/ND  Extrem: bilateral groins closed with dermabond c/d/i, soft, no palpable hematoma. motor and sensory grossly intact in b/l LE. WWP, no calf edema  Pulses: RLE: biphasic PT/DP. LLE: biphasic PT, palpable popliteal      A/P: 68yFemale s/p above procedure  Diet: CLD  IVF: none  flat x4h  dc tony at midnight  Pain/nausea control  DVT ppx: holding  Dispo plan: 5 [facility]

## 2024-04-03 NOTE — PROGRESS NOTE ADULT - PROBLEM SELECTOR PLAN 1
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

## 2024-04-03 NOTE — PRE-ANESTHESIA EVALUATION ADULT - HEIGHT IN INCHES
6
6
Laps, needles and instruments count was reported as correct.
Azathioprine Counseling:  I discussed with the patient the risks of azathioprine including but not limited to myelosuppression, immunosuppression, hepatotoxicity, lymphoma, and infections.  The patient understands that monitoring is required including baseline LFTs, Creatinine, possible TPMP genotyping and weekly CBCs for the first month and then every 2 weeks thereafter.  The patient verbalized understanding of the proper use and possible adverse effects of azathioprine.  All of the patient's questions and concerns were addressed.

## 2024-04-03 NOTE — PROGRESS NOTE ADULT - NS ATTEND AMEND GEN_ALL_CORE FT
68F, former smoker, hypothyroidism, seizure disorder, Stage 3 CKD (baseline CR ~1.7-2.0), severe renal artery stenosis, chronic anemia, Afib s/p AVN ablation and CRT-P ([employer], 8/2022; on Eliquis), tortuous esophagus and Schatzi ring at [facility] (per EDG in 2020), AAA s/p open repair in 2015 followed saccular AAA and contained rupture, HTN, HLD, CAD s/p CABG (LIMA to RI, ANTONIO to PDA) with SAVR (#19 Felder porcine bio-prosthetic valve) and mitral annuloplasty (Physio ring #26) at [facility] in 2002, Chronic HFpEF, and severe AS (echo 3/27/24 MG 26 COY 0.82), severe MS  severe TR. She presents with intermittent chest pain and KRAFT x 3 days, admitted for symptomatic anemia and r/o PE. 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 Vascular consulted, will need urgent aortic surgery and will be heparinized. RRRT 3/26 on regional med floor for AMS w/ witnessed seizure, s/p 40mg/kg keppra load and 2g Ativan push stepped up to telemetry for further monitoring. Noted to have melanotic stool, GI consulted. Cards c/s service during pre op assessment rec xfer to CCU for EGD and further optimization to be done in CCU. Now S/p EGD in OR with non erosive gastritis. CCU stepped down to cardiac telemetry on 3.28.24.  Patient currently c/o acute on chronic abdominal and back pain, worsening constipation, and chronic poor po intake.  Accelerating HTN iso pain   Plan  Acute on chronic anemia - receives outpatient Iron transfusions. Hgb 12 2/24. On presentation 6.9, s/p 2 U PRBC.  Anemia may be related to aortic aneurysm extrav/?possible contained  rupture, Hgb stable. c/w daily PPI.   *GI HAS CLEARED PATIENT FOR ANTICOAGULATION AND TO PROCEED WITH ANY NECESSARY CARDIAC PROCEDURES/INTERVENTIONS*  s/p ALBER 3.29.24 c/w severe bioprosthetic AV stenosis, severe MS  Aortic aneurysm/pseudoaneurysm - vascular following - will need repair this admission  CTSx coordinating timing of valvular repair bioprosthetic AS and moderate MS with vascular surgical plan. Maintain BP 120s.   Afib s/p AVN ablation and CRT-P- eliquis held d/t anemia, currently on heparin gtt aron op  Seizures - h/o childhood seizure but not on medication. Had witnessed seizure while on RMF, CTH neg, neuro recommended c/w Keppra  Initiate Nifedipine 30XL BID for additional BP control, also need pain control regimen   Medicine consulted for evaluation of acute on chronic pain, constipation, hypercalcemia, and FTT with poor po intake and underweight status (BMI 18.6)  Giovanna Vazquez M.D.  Cardiology Attending  During non face-to-face time, I reviewed relevant portions of the patient’s medical record; including vitals, labs, medications, cardiac studies, remaining additional imaging and consultant recommendations. During face-to-face time, I took a relevant history and examined the patient. I also explained to the patient their diagnoses, and specific cardiopulmonary management plan, which required a high level of medical decision making.  I answered all questions related to the patient's medical conditions. I spent 50 minutes on total encounter; more than 50% of the visit was spent counseling and/or coordinating care by the attending physician, with plan of care discussed with the patient, medicine attending and cardiac care team.
68F, former smoker, hypothyroidism, seizure disorder, Stage 3 CKD (baseline CR ~1.7-2.0), severe renal artery stenosis, chronic anemia, Afib s/p AVN ablation and CRT-P ([employer], 8/2022; on Eliquis), tortuous esophagus and Schatzi ring at [facility] (per EDG in 2020), AAA s/p open repair in 2015 followed saccular AAA and contained rupture, HTN, HLD, CAD s/p CABG (LIMA to RI, ANTONIO to PDA) with SAVR (#19 Felder porcine bio-prosthetic valve) and mitral annuloplasty (Physio ring #26) at [facility] in 2002, Chronic HFpEF, and severe AS (echo 3/27/24 MG 26 COY 0.82), severe MS  severe TR. She presents with intermittent chest pain and KRAFT x 3 days, admitted for symptomatic anemia and r/o PE. CTA neg for PE. Post surgical changes from prior open thoracoabdominal aortic bypass graft with new focal dilatation of the distalmost aspect of the graft measuring 4.4 x 6.1 cm just above the aortic bifurcation and previously measuring 3.2 x 4.1 cm on 6/11/2021. RRRT 3/26 on regional med floor for AMS w/ witnessed seizure, s/p 40mg/kg keppra load and 2g Ativan push stepped up to telemetry for further monitoring. Noted to have melanotic stool, GI consulted. Cards c/s service during pre op assessment rec xfer to CCU for EGD and further optimization to be done in CCU. Now S/p EGD in OR with non erosive gastritis. CCU stepped down to cardiac telemetry on 3.28.24.  s/p ALBER 3.29.24 c/w severe bioprosthetic AV stenosis, severe MS Patient currently c/o acute on chronic abdominal and back pain, worsening constipation, and chronic poor po intake.   Plan  Aortic aneurysm/pseudoaneurysm - vascular following -  NPO pMN for OR on Wednesday 4/3/24 with Dr Soriano for an tmlrr-zqs-bhyih device placement and femoral-femoral bypass.   *Patient is at elevated cardiac risk for high risk vascular surgery. Patient with known fixed outflow obstruction of severe bioprosthetic aortic valve stenosis. Patient is medically optimized from HTN and HFpEF standpoint. Endocrinology and Medicine colleagues assisting with management of multiple medical problems.  3 points Class IV Risk 15.0 % 30-day risk of death, MI, or cardiac arrest. ACS NSQIP risk estimated to be 6.5% (above average) for cardiac arrest: The absence of cardiac rhythm or presence of a chaotic cardiac rhythm requiring the initiation of CPR, which includes chest compressions or for myocardial infarction: ECG changes, new elevation in troponin, or physician diagnosis.  CTSx Structural plan for outpatient management of her AS and TAVR planning  Acute on chronic anemia - plan for 1U PRBC transfusion today with goal Hbg >10 per d/w Dr Gregory  *GI HAS CLEARED PATIENT FOR ANTICOAGULATION AND TO PROCEED WITH ANY NECESSARY CARDIAC PROCEDURES/INTERVENTIONS*  Afib s/p AVN ablation and CRT-P- continue  heparin gtt aron op  Seizures -c/w Keppra per neuro recommendations  HTN management: Nifedipine 30XL BID, Toprol 25XL daily and pain control regimen per medicine recs  Medicine following for co mgmt of acute on chronic pain, constipation, and FTT with poor po intake and underweight status (BMI 18.6)  Endocrinology consulted for evaluation of potential primary hyperparathyroidism given hyperCa, elevated PTH   R-M.James Vazquez M.D.  Cardiology Attending  During non face-to-face time, I reviewed relevant portions of the patient’s medical record; including vitals, labs, medications, cardiac studies, remaining additional imaging and consultant recommendations. During face-to-face time, I took a relevant history and examined the patient. I also explained to the patient their diagnoses, and specific cardiopulmonary management plan, which required a high level of medical decision making.  I answered all questions related to the patient's medical conditions. I spent 65 minutes on total encounter; more than 50% of the visit was spent counseling and/or coordinating care by the attending physician, with plan of care discussed with the patient, Dr Lal, Dr Gregory, Dr Soriano, and cardiac care team.
68F, former smoker, hypothyroidism, seizure disorder, Stage 3 CKD (baseline CR ~1.7-2.0), severe renal artery stenosis, chronic anemia, Afib s/p AVN ablation and CRT-P ([employer], 8/2022; on Eliquis), tortuous esophagus and Schatzi ring at [facility] (per EDG in 2020), AAA s/p open repair in 2015 followed saccular AAA and contained rupture, HTN, HLD, CAD s/p CABG (LIMA to RI, ANTONIO to PDA) with SAVR (#19 Felder porcine bio-prosthetic valve) and mitral annuloplasty (Physio ring #26) at [facility] in 2002, Chronic HFpEF, and severe AS (echo 3/27/24 MG 26 COY 0.82), severe MS  severe TR. She presents with intermittent chest pain and KRAFT x 3 days, admitted for symptomatic anemia and r/o PE. CTA neg for PE. Post surgical changes from prior open thoracoabdominal aortic bypass graft with new focal dilatation of the distalmost aspect of the graft measuring 4.4 x 6.1 cm just above the aortic bifurcation and previously measuring 3.2 x 4.1 cm on 6/11/2021. RRRT 3/26 on regional med floor for AMS w/ witnessed seizure, s/p 40mg/kg keppra load and 2g Ativan push stepped up to telemetry for further monitoring. Noted to have melanotic stool, GI consulted. Cards c/s service during pre op assessment rec xfer to CCU for EGD and further optimization to be done in CCU. Now S/p EGD in OR with non erosive gastritis. CCU stepped down to cardiac telemetry on 3.28.24.  s/p ALBER 3.29.24 c/w severe bioprosthetic AV stenosis, severe MS   Plan  Aortic aneurysm/pseudoaneurysm - vascular following -  NPO pMN for OR TODAY 4/3/24 with Dr Soriano for an tshpr-exj-zhseu device placement and femoral-femoral bypass.   *Patient has elevated cardiac risk for high risk vascular surgery. Patient with known fixed outflow obstruction of severe bioprosthetic aortic valve stenosis. Patient is medically optimized from HTN and HFpEF standpoint. Endocrinology and Medicine colleagues assisting with management of multiple medical problems.  3 points Class IV Risk 15.0 % 30-day risk of death, MI, or cardiac arrest. ACS NSQIP risk estimated to be 6.5% (above average) for cardiac arrest: The absence of cardiac rhythm or presence of a chaotic cardiac rhythm requiring the initiation of CPR, which includes chest compressions or for myocardial infarction: ECG changes, new elevation in troponin, or physician diagnosis.  CTSx Structural plan for outpatient management of her AS and TAVR planning (see extensive chart note by Dr Gregory)  Acute on chronic anemia - s/p 1U PRBC transfusion 4.2.24 with aron op goal Hbg >10  *GI HAS CLEARED PATIENT FOR ANTICOAGULATION AND TO PROCEED WITH ANY NECESSARY CARDIAC PROCEDURES/INTERVENTIONS*  Afib s/p AVN ablation and CRT-P- continue  heparin gtt aron op  Seizures -c/w Keppra per neuro recommendations  HTN management: Nifedipine 30XL BID, Toprol 25XL daily and pain control regimen per medicine recs  Medicine following for co mgmt of acute on chronic abdominal  pain, constipation, and FTT with poor po intake and underweight status (BMI 18.6)  Endocrinology evaluating potential primary hyperparathyroidism given hyperCa, elevated PTH   Case endorsed to cardiac consult attending Dr Mcfarlane, who will follow patient post operatively for cardiology continuity of care  Giovanna Vazquez M.D.  Cardiology Attending  During non face-to-face time, I reviewed relevant portions of the patient’s medical record; including vitals, labs, medications, cardiac studies, remaining additional imaging and consultant recommendations. During face-to-face time, I took a relevant history and examined the patient. I also explained to the patient their diagnoses, and specific cardiopulmonary management plan, which required a high level of medical decision making.  I answered all questions related to the patient's medical conditions. I spent 65 minutes on total encounter; more than 50% of the visit was spent counseling and/or coordinating care by the attending physician, with plan of care discussed with the patient, consultants, and cardiac care team.

## 2024-04-03 NOTE — PACU DISCHARGE NOTE - COMMENTS
Patient fully recovered, labs stable, pain well controlled, hemodynamics at baseline.  Appropriate for cardiac tele

## 2024-04-04 NOTE — PROGRESS NOTE ADULT - ASSESSMENT
Patient is a 68 year old Female, Former smoker, Retired Former [employer] , with Extensive known ASCVD, including CAD s/p CABG (LIMA to RI, ANTONIO to PDA), PAD with AAA s/p open repair in 2015 followed saccular AAA and contained rupture, L pSFA stent/L-TIFFANY stent with occluded b/l SFA followed by L-internal iliac artery stent in 5/2021 Severe renal artery stenosis, Multivalvular Heart disease including AS s/p SAVR (#19 Felder porcine bio-prosthetic valve) and MS s/p Physio ring #26 (2002), AF s/p COY and CRT-P ([facility], 8/2022; on Eliquis),  Stage 3 CKD, Chronic Anemia, seizure as a child, who originally presented with worsening KRAFT over the last 3 months with escalating chest discomfort in the last week prompting ER visit, found to have Anemia to 6.9 with Melanic stools, with hospital course complicated by Seizure, now s/p endovascular repair of aortic pseudoaneurysm/contained rupture and b/l TIFFANY to CFA stenting, R CFA patch angioplasty, and left hypogastric artery coil embolization. Cardiology consulted for Co-Management after transfer to Vascular service    Review of systems:  TTE  3/27/24: Normal LV function. Normal RV. Severely dilated LA. A bioprosthetic valve noted in the aortic position. Leaflet of the bioprosthesis appear thickened. The peak transvalvular velocity is 3.37 m/s, the mean transvalvular gradient is 26.00 mmHg, and the LVOT/AV   velocity ratio is 0.32. Velocity and gradients are elevated, suggestive of bioprosthetic stenosis. There is mild AI. The mitral valve is moderately thickened and calcified. An annuloplasty ring is noted in the mitral position. The mean transvalvular gradient is 16.00 mmHg at a heart rate of 75 bpm. Transmitral gradients are elevated, suggestive of mitral stenosis. At-least moderate MR. Severe TR. PH PASP 55 mmHg. Trivial pericardial effusion.   TTE 4/21/23: borderline LVEF; RWMA  Normal RV; Severely dilated LA; Bioprosthetic valve is seen in the aortic position with findings suggestive of significant prosthetic aortic stenosis. The peak transvalvular velocity is 3.37 m/s, the mean transvalvular gradient is 27.00 mmHg, and the LVOT/AV velocity ratio is 0.31. The peak transaortic gradient is 45.43 mmHg. The aortic valve area (estimated via the continuity method) is 0.78 cm². The calculated aortic valve area indexed to body surface area is 0.51 cm²/m². Acceleration time 105 msMild AR; Mitral valve is moderately thickened and calcified with annuloplasty ring in the mitral position. Elevated gradients and pressure half time of 146 ms are consistent with severe mitral stenosis. Moderate-to-severe MR. Severe TR. PH with PASP 63 mmHg. Trivial pericardial effusion.  CCTA (8/21/20): Patent LIMA to First Diagonal branch.  Patent ANTONIO to RPDA. Severe stenosis of the large first diagonal branch. Distal vessel fills via LIMA graft. Severe stenosis of proximal RCA. Distal vessel fills via ANTONIO graft. Nonobstructive disease throughout the LAD. Patent stent in mid LCX extending to the OM2. MV annuloplasty ring and Bioprosthetic AVR noted. Calcification throughout the aorta noted.    Home medications: Crestor 40mg qhs, lopressor 50mg bid, losartan 25mg qd, Lasix 40 mg PO twice a week; amlodipine 5 mg Po daily, cilostazol, eliquis 2.5 mg PO BID      # Severe bioprosthetic AS; and MS  She reports worsening KRAFT in the last few months which significantly limited her ADLS. During that time she mainly stayed home (lives alone in a second floor walk up apartment), as even walking 1 city Block was too difficult. In the past week she started experiencing escalating chest discomfort for which she sought care.  SAVR (#19 Felder porcine bio-prosthetic valve) from 2002, now with severe bioprosthetic AS (MG of 26 mmHg; PG of 45.43 largely unchanged from 4/2023). Patient S/p Annuloplasty Ring now with transvalvular gradient of16.00 mmHg ( From 10) at a heart rate of 75 bpm with Moderate to Severe MR  Seen by Structural Heart Team (Dr Gregory) and will need to follow up as an outpatient.   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 Patient s/p endovascular repair of aortic pseudoaneurysm/contained rupture and b/l TIFFANY to CFA stenting, R CFA patch angioplasty, and left hypogastric artery coil embolization.    #CAD s/p CABG (LIMA to RI, ANTONIO to PDA)  - Followed by Dr. Preston  - on Home Crestor 40mg qhs-->Lipitor 40     Upon discharge patient will follow up with Structural Heart team for planned valvular intervention

## 2024-04-04 NOTE — PROGRESS NOTE ADULT - ASSESSMENT
68F former smoker, PMHx CAD (s/p CABG), HFpEF (EF 51%), afib (s/p AVN ablation and CRT-P 8/2022), AVR (procine-bioprosthetic) and mitral annuloplasty, AAA (s/p open repair 2015), PAD (s/p stents), HTN, HLD, hypothyroidism, seizure d/o, CKD3 (b/l Cr 1.7-2.0), severe CARLI, chronic anemia initially admitted to medicine for symptomatic anemia. Course c/b status epilepticus with RRT called, stepped up to tele c/f GIB vs vasc dissection. s/p EGD, cleared for AC. Stepped over to cardiac tele with plans for vascular surgery vs CTSx interventions for AAA / low-flow low gradient severe AS. Medicine consulted for pain management and hypercalcemia.    #Abdominal/Back Pain  #R/o Multiple Myeloma  Patient with LLQ and L flank/paraspinal constant pain x 4 months. No  symptoms. Hx of L nephrectomy. Endorsed constipation with firm stools, last BM this morning. Given history of CKD, worsening anemia, worsening pain with hypercalcemia, would favor work-up for multiple myeloma. 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 on April 2 back pain localized in lumbar spine, now on 4/4 back pain in thoracic spine. could be referred pain  - For Pain Management: Lidocaine Patch, Standing Tylenol 650mg q6 for moderate pain, IV Dilaudid 0.2mg q6 for severe breakthru pain   - Increase Miralax to BID given constipation  - f/u SPEP/UPEP, Immunofixation, free light chain ratio  - Plan for aneurysm repair per Vascular and Primary Team    #Hypercalcemia  Ca 11.4 (range 10-11 during hospitalization). Albumin wnl. Likely component of constipation and abdominal/back pain. No changes seen on Tele. Patient reports taking Vitamin D supplements OTC, however has not had any during admission.  - PTH elevated - primary hyperparathyroidism -> CONSULT ENDOCRINOLOGY  - Would favor gentle hydration as tolerated per Primary Team  - MM work-up as above    #Anemia  Presented with symptomatic anemia with Hgb 6.9, now 10.4. S/p 2pRBC. S/p EGD revealing non-erosive gastritis. Hemolysis labs and iron panel unremarkable. No current active bleeding  - Currently on Heparin gtt, monitor for bleeding  - maintain active T&S  - transfuse per Primary Team    #HTN  Today, patient had /90s. Given recent finding of graft aneurysm, patient will require tight BP monitoring.  - c/w Nifedipine 30mg XL BID and Toprol 25mg XL  - If pain is adequate controlled as above and BP remains elevated, would favor increasing dose or adding third agent per Primary Team    Medicine will continue to follow along with you. Discussed case with Dr. Lal -> TOMORROW CASE WILL BE COVERED BY DR. Torres

## 2024-04-04 NOTE — PROGRESS NOTE ADULT - SUBJECTIVE AND OBJECTIVE BOX
Cardiology Consult    O/N:  Interval History:  Telemetry:    OBJECTIVE  Vitals:  T(C): 36.6 (04-04-24 @ 10:35), Max: 36.7 (04-03-24 @ 19:28)  HR: 75 (04-04-24 @ 14:16) (74 - 79)  BP: 148/70 (04-04-24 @ 14:16) (139/76 - 181/84)  RR: 18 (04-04-24 @ 14:16) (17 - 23)  SpO2: 100% (04-04-24 @ 14:16) (93% - 100%)  Wt(kg): --    I/O:  I&O's Summary    03 Apr 2024 07:01  -  04 Apr 2024 07:00  --------------------------------------------------------  IN: 50 mL / OUT: 2030 mL / NET: -1980 mL    04 Apr 2024 07:01  -  04 Apr 2024 17:06  --------------------------------------------------------  IN: 0 mL / OUT: 500 mL / NET: -500 mL        PHYSICAL EXAM:  Appearance: NAD. Speaking in full sentences.   HEENT: No pallor noted.    Cardiovascular: systolic murmur   Respiratory: Lungs CTAB.   Gastrointestinal:  Soft, nontender.  Extremities: No edema b/l.       	  LABS:                        10.0   13.76 )-----------( 139      ( 04 Apr 2024 05:30 )             29.7     04-04    138  |  103  |  14  ----------------------------<  107<H>  3.8   |  20<L>  |  1.89<H>    Ca    10.4      04 Apr 2024 05:30  Phos  4.1     04-04  Mg     1.6     04-04      PT/INR - ( 04 Apr 2024 05:30 )   PT: 12.2 sec;   INR: 1.07          PTT - ( 04 Apr 2024 05:30 )  PTT:30.0 sec  Urinalysis Basic - ( 04 Apr 2024 05:30 )    Color: x / Appearance: x / SG: x / pH: x  Gluc: 107 mg/dL / Ketone: x  / Bili: x / Urobili: x   Blood: x / Protein: x / Nitrite: x   Leuk Esterase: x / RBC: x / WBC x   Sq Epi: x / Non Sq Epi: x / Bacteria: x        RADIOLOGY & ADDITIONAL TESTS:  Reviewed .    MEDICATIONS  (STANDING):  aspirin enteric coated 81 milliGRAM(s) Oral daily  atorvastatin 40 milliGRAM(s) Oral at bedtime  carvedilol 6.25 milliGRAM(s) Oral every 12 hours  levETIRAcetam 250 milliGRAM(s) Oral two times a day  levothyroxine 75 MICROGram(s) Oral every 24 hours  lidocaine   4% Patch 1 Patch Transdermal daily  melatonin 5 milliGRAM(s) Oral at bedtime  NIFEdipine XL 30 milliGRAM(s) Oral two times a day  pantoprazole    Tablet 40 milliGRAM(s) Oral before breakfast  polyethylene glycol 3350 17 Gram(s) Oral two times a day  potassium phosphate / sodium phosphate Tablet (K-PHOS No. 2) 1 Tablet(s) Oral once  senna 2 Tablet(s) Oral at bedtime  sertraline 50 milliGRAM(s) Oral every 24 hours    MEDICATIONS  (PRN):  acetaminophen     Tablet .. 650 milliGRAM(s) Oral every 6 hours PRN Mild Pain (1 - 3)  oxyCODONE    IR 5 milliGRAM(s) Oral every 4 hours PRN Severe Pain (7 - 10)  oxyCODONE    IR 2.5 milliGRAM(s) Oral every 4 hours PRN Moderate Pain (4 - 6)

## 2024-04-04 NOTE — PROGRESS NOTE ADULT - SUBJECTIVE AND OBJECTIVE BOX
O/N Events:    Subjective/ROS: Patient seen and examined at bedside.     Denies Fever/Chills, HA, CP, SOB, n/v, changes in bowel/urinary habits.  12pt ROS otherwise negative.    VITALS  Vital Signs Last 24 Hrs  T(C): 36.7 (03 Apr 2024 19:28), Max: 36.7 (03 Apr 2024 19:28)  T(F): 98 (03 Apr 2024 19:28), Max: 98 (03 Apr 2024 19:28)  HR: 75 (04 Apr 2024 08:50) (74 - 79)  BP: 181/84 (04 Apr 2024 08:50) (139/76 - 181/84)  BP(mean): 117 (04 Apr 2024 07:21) (85 - 126)  RR: 18 (04 Apr 2024 08:50) (18 - 23)  SpO2: 99% (04 Apr 2024 08:50) (93% - 99%)    Parameters below as of 04 Apr 2024 08:50  Patient On (Oxygen Delivery Method): room air        CAPILLARY BLOOD GLUCOSE          PHYSICAL EXAM  General: NAD  Head: NC/AT; MMM; PERRL; EOMI;  Neck: Supple; no JVD  Respiratory: CTAB; no wheezes/rales/rhonchi  Cardiovascular: Regular rhythm/rate; S1/S2+, no murmurs, rubs gallops   Gastrointestinal: Soft; NTND; bowel sounds normal and present  Extremities: WWP; no edema/cyanosis  Neurological: A&Ox3, CNII-XII grossly intact; no obvious focal deficits    MEDICATIONS  (STANDING):  aspirin enteric coated 81 milliGRAM(s) Oral daily  atorvastatin 40 milliGRAM(s) Oral at bedtime  ceFAZolin   IVPB 1000 milliGRAM(s) IV Intermittent every 8 hours  levETIRAcetam 250 milliGRAM(s) Oral two times a day  levothyroxine 75 MICROGram(s) Oral every 24 hours  lidocaine   4% Patch 1 Patch Transdermal daily  magnesium sulfate  IVPB 2 Gram(s) IV Intermittent every 6 hours  melatonin 5 milliGRAM(s) Oral at bedtime  metoprolol tartrate 25 milliGRAM(s) Oral every 12 hours  NIFEdipine XL 30 milliGRAM(s) Oral two times a day  pantoprazole    Tablet 40 milliGRAM(s) Oral before breakfast  polyethylene glycol 3350 17 Gram(s) Oral two times a day  potassium phosphate / sodium phosphate Tablet (K-PHOS No. 2) 1 Tablet(s) Oral once  senna 2 Tablet(s) Oral at bedtime  sertraline 50 milliGRAM(s) Oral every 24 hours    MEDICATIONS  (PRN):  acetaminophen     Tablet .. 650 milliGRAM(s) Oral every 6 hours PRN Mild Pain (1 - 3)  oxyCODONE    IR 5 milliGRAM(s) Oral every 4 hours PRN Severe Pain (7 - 10)  oxyCODONE    IR 2.5 milliGRAM(s) Oral every 4 hours PRN Moderate Pain (4 - 6)      No Known Allergies      LABS                        10.0   13.76 )-----------( 139      ( 04 Apr 2024 05:30 )             29.7     04-04    138  |  103  |  14  ----------------------------<  107<H>  3.8   |  20<L>  |  1.89<H>    Ca    10.4      04 Apr 2024 05:30  Phos  4.1     04-04  Mg     1.6     04-04      PT/INR - ( 04 Apr 2024 05:30 )   PT: 12.2 sec;   INR: 1.07          PTT - ( 04 Apr 2024 05:30 )  PTT:30.0 sec  Urinalysis Basic - ( 04 Apr 2024 05:30 )    Color: x / Appearance: x / SG: x / pH: x  Gluc: 107 mg/dL / Ketone: x  / Bili: x / Urobili: x   Blood: x / Protein: x / Nitrite: x   Leuk Esterase: x / RBC: x / WBC x   Sq Epi: x / Non Sq Epi: x / Bacteria: x              IMAGING/EKG/ETC   O/N Events: completed surgery day prior    Subjective/ROS: Patient seen and examined at bedside. patient still having back pain    Denies Fever/Chills, HA, CP, SOB, n/v, changes in bowel/urinary habits.  12pt ROS otherwise negative.    VITALS  Vital Signs Last 24 Hrs  T(C): 36.7 (03 Apr 2024 19:28), Max: 36.7 (03 Apr 2024 19:28)  T(F): 98 (03 Apr 2024 19:28), Max: 98 (03 Apr 2024 19:28)  HR: 75 (04 Apr 2024 08:50) (74 - 79)  BP: 181/84 (04 Apr 2024 08:50) (139/76 - 181/84)  BP(mean): 117 (04 Apr 2024 07:21) (85 - 126)  RR: 18 (04 Apr 2024 08:50) (18 - 23)  SpO2: 99% (04 Apr 2024 08:50) (93% - 99%)    Parameters below as of 04 Apr 2024 08:50  Patient On (Oxygen Delivery Method): room air        CAPILLARY BLOOD GLUCOSE          PHYSICAL EXAM  General: NAD  Head: NC/AT; MMM; PERRL; EOMI;  Neck: Supple; no JVD  Respiratory: CTAB; no wheezes/rales/rhonchi  Cardiovascular: Regular rhythm/rate; S1/S2+, no murmurs, rubs gallops   Gastrointestinal: Soft; NTND; bowel sounds normal and present  Extremities: WWP; no edema/cyanosis  Neurological: A&Ox3, CNII-XII grossly intact; no obvious focal deficits    MEDICATIONS  (STANDING):  aspirin enteric coated 81 milliGRAM(s) Oral daily  atorvastatin 40 milliGRAM(s) Oral at bedtime  ceFAZolin   IVPB 1000 milliGRAM(s) IV Intermittent every 8 hours  levETIRAcetam 250 milliGRAM(s) Oral two times a day  levothyroxine 75 MICROGram(s) Oral every 24 hours  lidocaine   4% Patch 1 Patch Transdermal daily  magnesium sulfate  IVPB 2 Gram(s) IV Intermittent every 6 hours  melatonin 5 milliGRAM(s) Oral at bedtime  metoprolol tartrate 25 milliGRAM(s) Oral every 12 hours  NIFEdipine XL 30 milliGRAM(s) Oral two times a day  pantoprazole    Tablet 40 milliGRAM(s) Oral before breakfast  polyethylene glycol 3350 17 Gram(s) Oral two times a day  potassium phosphate / sodium phosphate Tablet (K-PHOS No. 2) 1 Tablet(s) Oral once  senna 2 Tablet(s) Oral at bedtime  sertraline 50 milliGRAM(s) Oral every 24 hours    MEDICATIONS  (PRN):  acetaminophen     Tablet .. 650 milliGRAM(s) Oral every 6 hours PRN Mild Pain (1 - 3)  oxyCODONE    IR 5 milliGRAM(s) Oral every 4 hours PRN Severe Pain (7 - 10)  oxyCODONE    IR 2.5 milliGRAM(s) Oral every 4 hours PRN Moderate Pain (4 - 6)      No Known Allergies      LABS                        10.0   13.76 )-----------( 139      ( 04 Apr 2024 05:30 )             29.7     04-04    138  |  103  |  14  ----------------------------<  107<H>  3.8   |  20<L>  |  1.89<H>    Ca    10.4      04 Apr 2024 05:30  Phos  4.1     04-04  Mg     1.6     04-04      PT/INR - ( 04 Apr 2024 05:30 )   PT: 12.2 sec;   INR: 1.07          PTT - ( 04 Apr 2024 05:30 )  PTT:30.0 sec  Urinalysis Basic - ( 04 Apr 2024 05:30 )    Color: x / Appearance: x / SG: x / pH: x  Gluc: 107 mg/dL / Ketone: x  / Bili: x / Urobili: x   Blood: x / Protein: x / Nitrite: x   Leuk Esterase: x / RBC: x / WBC x   Sq Epi: x / Non Sq Epi: x / Bacteria: x              IMAGING/EKG/ETC

## 2024-04-04 NOTE — PROGRESS NOTE ADULT - SUBJECTIVE AND OBJECTIVE BOX
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 holding AC, episode of nausea, zofran 4mg IVP. myra MATTHEW        ---------------------------------------------------------------------------  PLEASE CHECK WHEN PRESENT:     [  ]Heart Failure     [  ] Acute     [  ] Acute on Chronic     [  ] Chronic  -------------------------------------------------------------------     [x  ]Diastolic [HFpEF]     [  ]Systolic [HFrEF]     [  ]Combined [HFpEF & HFrEF]  .................................................................................     [  ]Other:     [ ] Pulmonary Hypertension     [x ] Chronic A-fib     [ ] Persistet A-fib     [ ] Permanent A-fib     [ ] Paroxysmal A-fib     [x ] Hypertensive Heart Disease  -------------------------------------------------------------------  [ ] Respiratory failure  [ ] Acute cor pulmonale  [ ] Asthma/COPD Exacerbation  [ ]COPD on home O2 (Chronic renal Failure)   [ ] Pleural effusion  [ ] Aspiration pneumonia  [ ] Obstructive Sleep Apnea  [ ]Atelectasis   [ ] Acute PE   -------------------------------------------------------------------  [  ]Acute Kidney Injury      [  ]Acute Tubular Necrosis      [  ]Reneal Medullary Necrosis     [  ]Renal Cortical Necrosis     [  ]Other Pathological Lesions:    [  ]CKD 1  [  ]CKD 2  [x  ]CKD 3  [  ]CKD 4  [  ]CKD 5 (ESRD)  [  ]Other  -------------------------------------------------------------------  [  ]Diabetes  [  ] Diabetic PVD Ulcer  [  ] Neuropathic ulcer to DM  [  ] Diabetes with Nephropathy  [  ] Osteomyelitis due to diabetes  [  ] Hyperglycemia   [  ]hypoglycemia   --------------------------------------------------------------------  [  ]Malnutrition: See Nutrition Note  [  ]Cachexia  [  ]Other:   [  ]Supplement Ordered:  [  ]Morbid Obesity (BMI >=40]  [ ] Ileus  ---------------------------------------------------------------------  [ ] Sepsis/severe sepsis/septic shock  [ ] Noninfectious SIRS  [ ] UTI  [ ] Pneumonia  [ ] Thrombophlebitis     -----------------------------------------------------------------------  [ ] Acidosis/alkalosis  [ ] Fluid overload  [ ] Hypokalemia  [ ] Hyperkalemia  [ ] Hypomagnesemia  [ ] Hypophosphatemia  [ ] Hyperphosphatemia  ------------------------------------------------------------------------  [ ] Acute blood loss anemia  [ ] Post op blood loss anemia  [ ] Iron deficiency anemia  [ ] Anemia due to chronic disease  [ ] Hypercoagulable state  [ ] Thrombocytopenia  ----------------------------------------------------------------------  [ ] Cerebral infarction  [ ] Transient ischemia attack  [ ] Encephalopathy - Toxic or Metabolic    A/P: 68-year-old F, former smoker (quit 20 year ago) with PMH HTN, HLD, CAD s/p CABG, AVR (porcine bio-prosthetic valve) and mitral annuloplasty, HFpEF, hypothyroidism, seizure disorder, stage 3 CKD (baseline CR ~1.7-2.0), severe renal artery stenosis, chronic anemia (baseline Hgb ~ 9-10, gets weekly iron infusion last infusion 3/22/24), AT, Afib s/p AVN ablation and CRT-P (8/2022; on [facility]), AAA (s/p open repair in 2015 with complications, last repaired 2020), PAD s/p L pSFA stent/L-TIFFANY stent/L-IIA stent who presents with intermittent chest pain and KRAFT x 3 days, admitted for symptomatic anemia 03/25/24. Course complicated by status epilepticus (2 seizure events) 3/26/24 w/ RR called and pt started on Keppra as well as new focal dilatation of the distalmost aspect of the graft of prior open thoracoabdominal aortic bypass graft found on CT chest w/ Vascular surgery consulted. Patient w/ episode of melena on 3/27 w/ concern for GIB vs vascular dissection. Cardiology consulted for pre-op clearance for EGD, patient transferred to CCU for in-unit scope revealing normal esophagus and duodenum and non-erosive gastritis and has since been cleared for AC for Afib. Stepped down to cardiac tele on 03/28/24 for which discussion between Vascular Sx and CTSx discussed which intervention to proceed with first. Medicine team following for comanagement of constipation, pain management and FTT. Plan for TAVR as outpatient w/ Dr. Gregory. Patient went for [id number] device placement and femoral-femoral bypass w/ vascular surgery on 04/03/24 and was transferred to vascular service post intervention.     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, left hypogastric artery coil embolization, bilateral TIFFANY to CFA stenting, R CFA patch angioplasty 4/3  - [place] [facility], [place] [facility]OV  - holding AC  - PT eval today    Cardiology:  #HFpEF  - appreciate cardiology recs  - c/w lasix 40mg twice weekly  #chronic afib  - c/w toprol xl 25mg qd  - holding AC in setting of surgery  #CAD  - c/w statin and metoprolol XL  #HTN/HLD  - c/w statin, nifedipine, metoprolol XL    Chronic pain:  - c/w APAP 650mg q6hrs standing dose, lidocaine patch q24hrs, Dilaudid 0.2mg IVP q4hrs prn severe breakthrough pain.    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 holding AC, episode of nausea, zofran 4mg IVP. myra MATTHEW        ---------------------------------------------------------------------------  PLEASE CHECK WHEN PRESENT:     [  ]Heart Failure     [  ] Acute     [  ] Acute on Chronic     [  ] Chronic  -------------------------------------------------------------------     [x  ]Diastolic [HFpEF]     [  ]Systolic [HFrEF]     [  ]Combined [HFpEF & HFrEF]  .................................................................................     [  ]Other:     [ ] Pulmonary Hypertension     [x ] Chronic A-fib     [ ] Persistet A-fib     [ ] Permanent A-fib     [ ] Paroxysmal A-fib     [x ] Hypertensive Heart Disease  -------------------------------------------------------------------  [ ] Respiratory failure  [ ] Acute cor pulmonale  [ ] Asthma/COPD Exacerbation  [ ]COPD on home O2 (Chronic renal Failure)   [ ] Pleural effusion  [ ] Aspiration pneumonia  [ ] Obstructive Sleep Apnea  [ ]Atelectasis   [ ] Acute PE   -------------------------------------------------------------------  [  ]Acute Kidney Injury      [  ]Acute Tubular Necrosis      [  ]Reneal Medullary Necrosis     [  ]Renal Cortical Necrosis     [  ]Other Pathological Lesions:    [  ]CKD 1  [  ]CKD 2  [x  ]CKD 3  [  ]CKD 4  [  ]CKD 5 (ESRD)  [  ]Other  -------------------------------------------------------------------  [  ]Diabetes  [  ] Diabetic PVD Ulcer  [  ] Neuropathic ulcer to DM  [  ] Diabetes with Nephropathy  [  ] Osteomyelitis due to diabetes  [  ] Hyperglycemia   [  ]hypoglycemia   --------------------------------------------------------------------  [  ]Malnutrition: See Nutrition Note  [  ]Cachexia  [  ]Other:   [  ]Supplement Ordered:  [  ]Morbid Obesity (BMI >=40]  [ ] Ileus  ---------------------------------------------------------------------  [ ] Sepsis/severe sepsis/septic shock  [ ] Noninfectious SIRS  [ ] UTI  [ ] Pneumonia  [ ] Thrombophlebitis     -----------------------------------------------------------------------  [ ] Acidosis/alkalosis  [ ] Fluid overload  [ ] Hypokalemia  [ ] Hyperkalemia  [ ] Hypomagnesemia  [ ] Hypophosphatemia  [ ] Hyperphosphatemia  ------------------------------------------------------------------------  [ ] Acute blood loss anemia  [ ] Post op blood loss anemia  [ ] Iron deficiency anemia  [ ] Anemia due to chronic disease  [ ] Hypercoagulable state  [ ] Thrombocytopenia  ----------------------------------------------------------------------  [ ] Cerebral infarction  [ ] Transient ischemia attack  [ ] Encephalopathy - Toxic or Metabolic    A/P: 68-year-old F, former smoker (quit 20 year ago) with PMH HTN, HLD, CAD s/p CABG, AVR (porcine bio-prosthetic valve) and mitral annuloplasty, HFpEF, hypothyroidism, seizure disorder, stage 3 CKD (baseline CR ~1.7-2.0), severe renal artery stenosis, chronic anemia (baseline Hgb ~ 9-10, gets weekly iron infusion last infusion 3/22/24), AT, Afib s/p AVN ablation and CRT-P (8/2022; on [facility]), AAA (s/p open repair in 2015 with complications, last repaired 2020), PAD s/p L pSFA stent/L-TIFFANY stent/L-IIA stent who presents with intermittent chest pain and KRAFT x 3 days, admitted for symptomatic anemia 03/25/24. Course complicated by status epilepticus (2 seizure events) 3/26/24 w/ RR called and pt started on Keppra as well as new focal dilatation of the distalmost aspect of the graft of prior open thoracoabdominal aortic bypass graft found on CT chest w/ Vascular surgery consulted. Patient w/ episode of melena on 3/27 w/ concern for GIB vs vascular dissection. Cardiology consulted for pre-op clearance for EGD, patient transferred to CCU for in-unit scope revealing normal esophagus and duodenum and non-erosive gastritis and has since been cleared for AC for Afib. Stepped down to cardiac tele on 03/28/24 for which discussion between Vascular Sx and CTSx discussed which intervention to proceed with first. Medicine team following for comanagement of constipation, pain management and FTT. Plan for TAVR as outpatient w/ Dr. Gregory. Patient went for [id number] device placement and femoral-femoral bypass w/ vascular surgery on 04/03/24 and was transferred to vascular service post intervention.     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, left hypogastric artery coil embolization, bilateral TIFFANY to CFA stenting, R CFA patch angioplasty 4/3  - [place] [facility], [place] [facility]OV  - holding AC  - pending PT eval today    #HFpEF  - appreciate cardiology recs  - Home regimen: lasix 40mg twice weekly  - holding lasix periop    #chronic afib  - c/w toprol xl 25mg qd  - holding home eliquis periop    #CAD  - c/w statin and metoprolol XL    #HTN/HLD  - c/w statin, nifedipine, metoprolol XL    Chronic pain:  - c/w APAP 650mg q6hrs standing dose, lidocaine patch q24hrs, oxy 2.5 q4 PRN moderate pain, oxy 5 q4 PRN severe pain    Seizure disorder:  - c/w keppra 250mg bid    CHERRI on CKD:  - baseline Cr 1.7-2  - holding home losartan  - f/u AM BMP    Hypothyroidism:  - c/w levothyroxine 75 mcg qd    Iron deficiency anemia/melena:  - s/p 2u PRBC this admission  - c/w protonix   - appreciate GI recs    Hypercalcemia:  - Appreciate endocrine recs  - continue multiple myeloma w/u  - f/u SPEP/UPEP/immunofixation/Vit D 25 and 125 / PTH    Diet: CLD  Activity: ambulate as tolerated  DVT PPX: holding periop  PT eval: pending  Dispo: 5 [facility] O/N: s/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 holding AC, episode of nausea, zofran 4mg IVP. myra MATTHEW      Subjective: Patient seen and examined at bedside. Patient states she has some mild b/l groin pain at surgical incisions and mild abdominal pain.    ROS:   Denies Headache, blurred vision, Chest Pain, SOB, nausea or vomiting     Social   ceFAZolin   IVPB 1000  ceFAZolin   IVPB 1000  metoprolol tartrate 25  NIFEdipine XL 30      Allergies    No Known Allergies    Intolerances        Vital Signs Last 24 Hrs  T(C): 36.7 (03 Apr 2024 19:28), Max: 36.7 (03 Apr 2024 19:28)  T(F): 98 (03 Apr 2024 19:28), Max: 98 (03 Apr 2024 19:28)  HR: 79 (04 Apr 2024 06:01) (74 - 79)  BP: 175/81 (04 Apr 2024 06:01) (139/76 - 177/92)  BP(mean): 117 (04 Apr 2024 06:01) (85 - 126)  RR: 18 (04 Apr 2024 06:01) (18 - 23)  SpO2: 98% (04 Apr 2024 06:01) (93% - 99%)    Parameters below as of 04 Apr 2024 06:01  Patient On (Oxygen Delivery Method): room air      I&O's Summary    03 Apr 2024 07:01  -  04 Apr 2024 07:00  --------------------------------------------------------  IN: 50 mL / OUT: 2030 mL / NET: -1980 mL        PHYSICAL EXAM:  General: NAD, pt resting comfortably in bed  Pulm: No respiratory distress, nonlabored breathing, on room air  CVS: NSR, HDS  Abd: Soft, NT, ND  Extremities: b/l groins with dermabond c/d/i/ b/l groins soft, no palpable hematoma. Motor and sensory grossly intact b/l          LABS:                        9.5    10.93 )-----------( 137      ( 03 Apr 2024 20:09 )             28.2     04-03    138  |  108  |  12  ----------------------------<  100<H>  3.7   |  20<L>  |  1.94<H>    Ca    9.5      03 Apr 2024 20:09  Phos  4.0     04-03  Mg     1.6     04-03      PT/INR - ( 03 Apr 2024 20:09 )   PT: 13.2 sec;   INR: 1.16          PTT - ( 03 Apr 2024 20:09 )  PTT:30.6 sec          ---------------------------------------------------------------------------  PLEASE CHECK WHEN PRESENT:     [ x ]Heart Failure     [  ] Acute     [  ] Acute on Chronic     [ x ] Chronic  -------------------------------------------------------------------     [x  ]Diastolic [HFpEF]     [  ]Systolic [HFrEF]     [  ]Combined [HFpEF & HFrEF]  .................................................................................     [  ]Other:     [ ] Pulmonary Hypertension     [x ] Chronic A-fib     [ ] Persistet A-fib     [ ] Permanent A-fib     [ ] Paroxysmal A-fib     [x ] Hypertensive Heart Disease  -------------------------------------------------------------------  [ ] Respiratory failure  [ ] Acute cor pulmonale  [ ] Asthma/COPD Exacerbation  [ ]COPD on home O2 (Chronic renal Failure)   [ ] Pleural effusion  [ ] Aspiration pneumonia  [ ] Obstructive Sleep Apnea  [ ]Atelectasis   [ ] Acute PE   -------------------------------------------------------------------  [  ]Acute Kidney Injury      [  ]Acute Tubular Necrosis      [  ]Reneal Medullary Necrosis     [  ]Renal Cortical Necrosis     [  ]Other Pathological Lesions:    [  ]CKD 1  [  ]CKD 2  [x  ]CKD 3  [  ]CKD 4  [  ]CKD 5 (ESRD)  [  ]Other  -------------------------------------------------------------------  [  ]Diabetes  [  ] Diabetic PVD Ulcer  [  ] Neuropathic ulcer to DM  [  ] Diabetes with Nephropathy  [  ] Osteomyelitis due to diabetes  [  ] Hyperglycemia   [  ]hypoglycemia   --------------------------------------------------------------------  [  ]Malnutrition: See Nutrition Note  [  ]Cachexia  [  ]Other:   [  ]Supplement Ordered:  [  ]Morbid Obesity (BMI >=40]  [ ] Ileus  ---------------------------------------------------------------------  [ ] Sepsis/severe sepsis/septic shock  [ ] Noninfectious SIRS  [ ] UTI  [ ] Pneumonia  [ ] Thrombophlebitis     -----------------------------------------------------------------------  [ ] Acidosis/alkalosis  [ ] Fluid overload  [ ] Hypokalemia  [ ] Hyperkalemia  [ ] Hypomagnesemia  [ ] Hypophosphatemia  [ ] Hyperphosphatemia  ------------------------------------------------------------------------  [ ] Acute blood loss anemia  [ ] Post op blood loss anemia  [x ] Iron deficiency anemia  [ ] Anemia due to chronic disease  [ ] Hypercoagulable state  [ ] Thrombocytopenia  ----------------------------------------------------------------------  [ ] Cerebral infarction  [ ] Transient ischemia attack  [ ] Encephalopathy - Toxic or Metabolic    A/P: 68-year-old F, former smoker (quit 20 year ago) with PMH HTN, HLD, CAD s/p CABG, AVR (porcine bio-prosthetic valve) and mitral annuloplasty, HFpEF, hypothyroidism, seizure disorder, stage 3 CKD (baseline CR ~1.7-2.0), severe renal artery stenosis, chronic anemia (baseline Hgb ~ 9-10, gets weekly iron infusion last infusion 3/22/24), AT, Afib s/p AVN ablation and CRT-P (8/2022; on Eliquis), AAA (s/p open repair in 2015 with complications, last repaired 2020), PAD s/p L pSFA stent/L-TIFFANY stent/L-IIA stent who presents with intermittent chest pain and KRAFT x 3 days, admitted for symptomatic anemia 03/25/24. Course complicated by status epilepticus (2 seizure events) 3/26/24 w/ RR called and pt started on Keppra as well as new focal dilatation of the distalmost aspect of the graft of prior open thoracoabdominal aortic bypass graft found on CT chest w/ Vascular surgery consulted. Patient w/ episode of melena on 3/27 w/ concern for GIB vs vascular dissection. Cardiology consulted for pre-op clearance for EGD, patient transferred to CCU for in-unit scope revealing normal esophagus and duodenum and non-erosive gastritis and has since been cleared for AC for Afib. Stepped down to cardiac tele on 03/28/24 for which discussion between Vascular Sx and CTSx discussed which intervention to proceed with first. Medicine team following for comanagement of constipation, pain management and FTT. Plan for TAVR as outpatient w/ Dr. Gregory. 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    Vascular/Enlarging AAA  - s/p endovascular repair of aortic pseudoaneurysm/contained rupture, left hypogastric artery coil embolization, bilateral TIFFANY to CFA stenting, R CFA patch angioplasty 4/3  - [place] [facility], pending TOV  - holding anticoagulation  - pending PT eval today    HFpEF  - appreciate cardiology recs  - Home regimen: Lasix 40mg twice weekly  - Holding Lasix periop    Chronic afib  - c/w Toprol XL  - Holding home eliquis periop    #CAD  - c/w atorvastatin  - c/w Toprol XL    #HTN/HLD  - c/w atorvastatin  - c/w Nifedipine, Toprol XL    Chronic pain:  - c/w APAP 650mg q6hrs standing dose  - Lidocaine patch q24hrs  - Oxy 2.5 q4 PRN moderate pain, oxy 5 q4 PRN severe pain    Seizure disorder:  - appreciate epilepsy recs  - c/w keppra 250mg bid    CHERRI on CKD:  - baseline Cr 1.7-2  - Monitor Cr with daily BMP  - holding home losartan    Hypothyroidism:  - c/w Levothyroxine 75 mcg qd    Hypercalcemia/Primary Hyperparathyroidism:  - Appreciate endocrine recs, patient to follow up outpatient  - Continue multiple myeloma w/u  - f/u SPEP/UPEP/immunofixation/Vit D 25 and 125 / PTH    Iron deficiency anemia/Melena:  - Appreciate GI recs  - s/p endoscopy with GI: normal esophagus and duodenum and non-erosive gastritis  - s/p 2u PRBC this admission  - c/w Protonix     Diet: DASH  Activity: ambulate as tolerated  DVT PPX: holding periop  PT eval: pending  Dispo: 5 [facility] O/N: s/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 holding AC, episode of nausea, zofran 4mg IVP. myra MATTHEW      Subjective: Patient seen and examined at bedside. Patient states she has some mild b/l groin pain at surgical incisions and mild abdominal pain.    ROS:   Denies Headache, blurred vision, Chest Pain, SOB, nausea or vomiting     Social   ceFAZolin   IVPB 1000  ceFAZolin   IVPB 1000  metoprolol tartrate 25  NIFEdipine XL 30      Allergies    No Known Allergies    Intolerances        Vital Signs Last 24 Hrs  T(C): 36.7 (03 Apr 2024 19:28), Max: 36.7 (03 Apr 2024 19:28)  T(F): 98 (03 Apr 2024 19:28), Max: 98 (03 Apr 2024 19:28)  HR: 79 (04 Apr 2024 06:01) (74 - 79)  BP: 175/81 (04 Apr 2024 06:01) (139/76 - 177/92)  BP(mean): 117 (04 Apr 2024 06:01) (85 - 126)  RR: 18 (04 Apr 2024 06:01) (18 - 23)  SpO2: 98% (04 Apr 2024 06:01) (93% - 99%)    Parameters below as of 04 Apr 2024 06:01  Patient On (Oxygen Delivery Method): room air      I&O's Summary    03 Apr 2024 07:01  -  04 Apr 2024 07:00  --------------------------------------------------------  IN: 50 mL / OUT: 2030 mL / NET: -1980 mL        PHYSICAL EXAM:  General: NAD, pt resting comfortably in bed  Pulm: No respiratory distress, nonlabored breathing, on room air  CVS: NSR, HDS  Abd: Soft, NT, ND  Extremities: b/l groins with dermabond c/d/i/ b/l groins soft, no palpable hematoma. Motor and sensory grossly intact b/l          LABS:                        9.5    10.93 )-----------( 137      ( 03 Apr 2024 20:09 )             28.2     04-03    138  |  108  |  12  ----------------------------<  100<H>  3.7   |  20<L>  |  1.94<H>    Ca    9.5      03 Apr 2024 20:09  Phos  4.0     04-03  Mg     1.6     04-03      PT/INR - ( 03 Apr 2024 20:09 )   PT: 13.2 sec;   INR: 1.16          PTT - ( 03 Apr 2024 20:09 )  PTT:30.6 sec          ---------------------------------------------------------------------------  PLEASE CHECK WHEN PRESENT:     [ x ]Heart Failure     [  ] Acute     [  ] Acute on Chronic     [ x ] Chronic  -------------------------------------------------------------------     [x  ]Diastolic [HFpEF]     [  ]Systolic [HFrEF]     [  ]Combined [HFpEF & HFrEF]  .................................................................................     [  ]Other:     [ ] Pulmonary Hypertension     [x ] Chronic A-fib     [ ] Persistet A-fib     [ ] Permanent A-fib     [ ] Paroxysmal A-fib     [x ] Hypertensive Heart Disease  -------------------------------------------------------------------  [ ] Respiratory failure  [ ] Acute cor pulmonale  [ ] Asthma/COPD Exacerbation  [ ]COPD on home O2 (Chronic renal Failure)   [ ] Pleural effusion  [ ] Aspiration pneumonia  [ ] Obstructive Sleep Apnea  [ ]Atelectasis   [ ] Acute PE   -------------------------------------------------------------------  [  ]Acute Kidney Injury      [  ]Acute Tubular Necrosis      [  ]Reneal Medullary Necrosis     [  ]Renal Cortical Necrosis     [  ]Other Pathological Lesions:    [  ]CKD 1  [  ]CKD 2  [x  ]CKD 3  [  ]CKD 4  [  ]CKD 5 (ESRD)  [  ]Other  -------------------------------------------------------------------  [  ]Diabetes  [  ] Diabetic PVD Ulcer  [  ] Neuropathic ulcer to DM  [  ] Diabetes with Nephropathy  [  ] Osteomyelitis due to diabetes  [  ] Hyperglycemia   [  ]hypoglycemia   --------------------------------------------------------------------  [  ]Malnutrition: See Nutrition Note  [  ]Cachexia  [  ]Other:   [  ]Supplement Ordered:  [  ]Morbid Obesity (BMI >=40]  [ ] Ileus  ---------------------------------------------------------------------  [ ] Sepsis/severe sepsis/septic shock  [ ] Noninfectious SIRS  [ ] UTI  [ ] Pneumonia  [ ] Thrombophlebitis     -----------------------------------------------------------------------  [ ] Acidosis/alkalosis  [ ] Fluid overload  [ ] Hypokalemia  [ ] Hyperkalemia  [ ] Hypomagnesemia  [ ] Hypophosphatemia  [ ] Hyperphosphatemia  ------------------------------------------------------------------------  [ ] Acute blood loss anemia  [ ] Post op blood loss anemia  [x ] Iron deficiency anemia  [ ] Anemia due to chronic disease  [ ] Hypercoagulable state  [ ] Thrombocytopenia  ----------------------------------------------------------------------  [ ] Cerebral infarction  [ ] Transient ischemia attack  [ ] Encephalopathy - Toxic or Metabolic    A/P: 68-year-old F, former smoker (quit 20 year ago) with PMH HTN, HLD, CAD s/p CABG, AVR (porcine bio-prosthetic valve) and mitral annuloplasty, HFpEF, hypothyroidism, seizure disorder, stage 3 CKD (baseline CR ~1.7-2.0), severe renal artery stenosis, chronic anemia (baseline Hgb ~ 9-10, gets weekly iron infusion last infusion 3/22/24), AT, Afib s/p AVN ablation and CRT-P (8/2022; on Eliquis), AAA (s/p open repair in 2015 with complications, last repaired 2020), PAD s/p L pSFA stent/L-TIFFANY stent/L-IIA stent who presents with intermittent chest pain and KRAFT x 3 days, admitted for symptomatic anemia 03/25/24. Course complicated by status epilepticus (2 seizure events) 3/26/24 w/ RR called and pt started on Keppra as well as new focal dilatation of the distalmost aspect of the graft of prior open thoracoabdominal aortic bypass graft found on CT chest w/ Vascular surgery consulted. Patient w/ episode of melena on 3/27 w/ concern for GIB vs vascular dissection. Cardiology consulted for pre-op clearance for EGD, patient transferred to CCU for in-unit scope revealing normal esophagus and duodenum and non-erosive gastritis and has since been cleared for AC for Afib. Stepped down to cardiac tele on 03/28/24 for which discussion between Vascular Sx and CTSx discussed which intervention to proceed with first. Medicine team following for comanagement of constipation, pain management and FTT. Plan for TAVR as outpatient w/ Dr. Gregory. 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    Vascular/Enlarging AAA  - s/p endovascular repair of aortic pseudoaneurysm/contained rupture, left hypogastric artery coil embolization, bilateral TIFFANY to CFA stenting, R CFA patch angioplasty 4/3  - [facility] dc [facility], pending TOV  - holding anticoagulation  - pending PT eval today    HFpEF  - appreciate cardiology recs  - Home regimen: Lasix 40mg twice weekly  - Holding Lasix periop    Chronic afib  - c/w Toprol XL  - Holding home eliquis periop    #CAD  - c/w atorvastatin  - c/w Toprol XL    #HTN/HLD  - c/w atorvastatin  - c/w Nifedipine, Toprol XL    Chronic pain:  - c/w APAP 650mg q6hrs standing dose  - Lidocaine patch q24hrs  - Oxy 2.5 q4 PRN moderate pain, oxy 5 q4 PRN severe pain    Seizure disorder:  - appreciate epilepsy recs  - c/w keppra 250mg bid    CHERRI on CKD:  - baseline Cr 1.7-2  - Monitor Cr with daily BMP  - holding home losartan    Hypothyroidism:  - c/w Levothyroxine 75 mcg qd    Hypercalcemia/Primary Hyperparathyroidism:  - Appreciate endocrine recs, patient to follow up outpatient  - Continue multiple myeloma w/u  - f/u SPEP/UPEP/immunofixation/Vit D 25 and 125 / PTH    Iron deficiency anemia/Melena:  - Appreciate GI recs  - s/p endoscopy with GI: normal esophagus and duodenum and non-erosive gastritis  - s/p 2u PRBC this admission  - c/w Protonix     Diet: DASH  Activity: ambulate as tolerated  DVT PPX: holding periop  PT eval: Home PT and RW  Dispo: 5 [facility]ris      The patient has a mobility limitation that significantly impairs their ability to safely participate in mobility-related activities of daily living in the home. Patient is able to use the rolling walker in a safe manner and the functional mobility deficit can be sufficiently resolved by use of the rolling walker.

## 2024-04-05 NOTE — PROGRESS NOTE ADULT - ASSESSMENT
68F former smoker, PMHx CAD (s/p CABG), HFpEF (EF 51%), afib (s/p AVN ablation and CRT-P 8/2022), AVR (procine-bioprosthetic) and mitral annuloplasty, AAA (s/p open repair 2015), PAD (s/p stents), HTN, HLD, hypothyroidism, seizure d/o, CKD3 (b/l Cr 1.7-2.0), severe CARLI, chronic anemia initially admitted to medicine for symptomatic anemia. Course c/b status epilepticus with RRT called, stepped up to tele c/f GIB vs vasc dissection. s/p EGD. Stepped over to cardiac tele s/p aneurysm repain     Abdominal/Back Pain  Chronic pain  Patient with LLQ and L flank/paraspinal constant pain x 4 months. No  symptoms. Hx of L nephrectomy. Patient denies obstipation, no diarrhea,  no urinary/stool incontinence or retention  Continue pain management, patient on Hydromorphone po and lidocaine patch, bowel regimen. Imaging reviewed, bones wnl, patient s/p aneurysm repair  Will continue to monitor and manage as needed    Leucocytosis  Thrombocytopenia  Patient with leucocytosis post OR, denies new symptoms, afebrile. Possibly reactive, would continue to monitor  Patient noted with drop in PLT, received heparin this admission. Get blue top to r/o pseudothrombocytopenia, monitor PLT. Will manage as needed    Anemia  Presented with symptomatic anemia with Hgb 6.9, 10.4. S/p 2pRBC. S/p EGD revealing non-erosive gastritis.  - maintain active T&S  - target Hb 7-8    Afib s/p ablation  CAD s/p CABG  AS s/p prosthetic valve   Appreciate Cardiology, plan to follow-up with structural team as outpatient     Hypercalcemia  r/o MM   Appreciate Endocrinology  Follow-up as outpatient     Discussed with primary team

## 2024-04-05 NOTE — PROGRESS NOTE ADULT - SUBJECTIVE AND OBJECTIVE BOX
Subjective: Patient seen and examined at bedside. No acute complaints.    ROS:   Denies Headache, blurred vision, Chest Pain, SOB, Abdominal pain, nausea or vomiting     Social   aspirin enteric coated 81  carvedilol 6.25  NIFEdipine XL 30      Allergies    No Known Allergies    Intolerances        Vital Signs Last 24 Hrs  T(C): 36.7 (04 Apr 2024 20:22), Max: 36.7 (04 Apr 2024 20:22)  T(F): 98 (04 Apr 2024 20:22), Max: 98 (04 Apr 2024 20:22)  HR: 75 (05 Apr 2024 05:40) (74 - 79)  BP: 156/69 (05 Apr 2024 05:40) (137/73 - 181/84)  BP(mean): 99 (05 Apr 2024 05:40) (97 - 99)  RR: 18 (05 Apr 2024 05:40) (17 - 18)  SpO2: 99% (05 Apr 2024 05:40) (95% - 100%)    Parameters below as of 05 Apr 2024 05:40  Patient On (Oxygen Delivery Method): room air      I&O's Summary    04 Apr 2024 07:01  -  05 Apr 2024 07:00  --------------------------------------------------------  IN: 0 mL / OUT: 950 mL / NET: -950 mL        PHYSICAL EXAM:  General: NAD, pt resting comfortably in bed  Pulm: No respiratory distress, nonlabored breathing, on room air  CVS: NSR, HDS  Abd: Soft, NT, ND  Extremities: b/l groins with dermabond c/d/i/ b/l groins soft, no palpable hematoma. Motor and sensory grossly intact b/l  Pulses: biphasic DP/PT b/l        LABS:                        10.0   13.76 )-----------( 139      ( 04 Apr 2024 05:30 )             29.7     04-04    138  |  103  |  14  ----------------------------<  107<H>  3.8   |  20<L>  |  1.89<H>    Ca    10.4      04 Apr 2024 05:30  Phos  4.1     04-04  Mg     1.6     04-04      PT/INR - ( 04 Apr 2024 05:30 )   PT: 12.2 sec;   INR: 1.07          PTT - ( 04 Apr 2024 05:30 )  PTT:30.0 sec      --------------------------------------------------------------------------  PLEASE CHECK WHEN PRESENT:     [ x ]Heart Failure     [  ] Acute     [  ] Acute on Chronic     [ x ] Chronic  -------------------------------------------------------------------     [x  ]Diastolic [HFpEF]     [  ]Systolic [HFrEF]     [  ]Combined [HFpEF & HFrEF]  .................................................................................     [  ]Other:     [ ] Pulmonary Hypertension     [x ] Chronic A-fib     [ ] Persistet A-fib     [ ] Permanent A-fib     [ ] Paroxysmal A-fib     [x ] Hypertensive Heart Disease  -------------------------------------------------------------------  [ ] Respiratory failure  [ ] Acute cor pulmonale  [ ] Asthma/COPD Exacerbation  [ ]COPD on home O2 (Chronic renal Failure)   [ ] Pleural effusion  [ ] Aspiration pneumonia  [ ] Obstructive Sleep Apnea  [ ]Atelectasis   [ ] Acute PE   -------------------------------------------------------------------  [  ]Acute Kidney Injury      [  ]Acute Tubular Necrosis      [  ]Reneal Medullary Necrosis     [  ]Renal Cortical Necrosis     [  ]Other Pathological Lesions:    [  ]CKD 1  [  ]CKD 2  [x  ]CKD 3  [  ]CKD 4  [  ]CKD 5 (ESRD)  [  ]Other  -------------------------------------------------------------------  [  ]Diabetes  [  ] Diabetic PVD Ulcer  [  ] Neuropathic ulcer to DM  [  ] Diabetes with Nephropathy  [  ] Osteomyelitis due to diabetes  [  ] Hyperglycemia   [  ]hypoglycemia   --------------------------------------------------------------------  [  ]Malnutrition: See Nutrition Note  [  ]Cachexia  [  ]Other:   [  ]Supplement Ordered:  [  ]Morbid Obesity (BMI >=40]  [ ] Ileus  ---------------------------------------------------------------------  [ ] Sepsis/severe sepsis/septic shock  [ ] Noninfectious SIRS  [ ] UTI  [ ] Pneumonia  [ ] Thrombophlebitis     -----------------------------------------------------------------------  [ ] Acidosis/alkalosis  [ ] Fluid overload  [ ] Hypokalemia  [ ] Hyperkalemia  [ ] Hypomagnesemia  [ ] Hypophosphatemia  [ ] Hyperphosphatemia  ------------------------------------------------------------------------  [ ] Acute blood loss anemia  [ ] Post op blood loss anemia  [x ] Iron deficiency anemia  [ ] Anemia due to chronic disease  [ ] Hypercoagulable state  [ ] Thrombocytopenia  ----------------------------------------------------------------------  [ ] Cerebral infarction  [ ] Transient ischemia attack  [ ] Encephalopathy - Toxic or Metabolic    A/P: 68-year-old F, former smoker (quit 20 year ago) with PMH HTN, HLD, CAD s/p CABG, AVR (porcine bio-prosthetic valve) and mitral annuloplasty, HFpEF, hypothyroidism, seizure disorder, stage 3 CKD (baseline CR ~1.7-2.0), severe renal artery stenosis, chronic anemia (baseline Hgb ~ 9-10, gets weekly iron infusion last infusion 3/22/24), AT, Afib s/p AVN ablation and CRT-P (8/2022; on Eliquis), AAA (s/p open repair in 2015 with complications, last repaired 2020), PAD s/p L pSFA stent/L-TIFFANY stent/L-IIA stent who presents with intermittent chest pain and KRAFT x 3 days, admitted for symptomatic anemia 03/25/24. Course complicated by status epilepticus (2 seizure events) 3/26/24 w/ RR called and pt started on Keppra as well as new focal dilatation of the distalmost aspect of the graft of prior open thoracoabdominal aortic bypass graft found on CT chest w/ Vascular surgery consulted. Patient w/ episode of melena on 3/27 w/ concern for GIB vs vascular dissection. Cardiology consulted for pre-op clearance for EGD, patient transferred to CCU for in-unit scope revealing normal esophagus and duodenum and non-erosive gastritis and has since been cleared for AC for Afib. Stepped down to cardiac tele on 03/28/24 for which discussion between Vascular Sx and CTSx discussed which intervention to proceed with first. Medicine team following for comanagement of constipation, pain management and FTT. Plan for TAVR as outpatient w/ Dr. Gregory. 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    Vascular/Enlarging AAA  - s/p endovascular repair of aortic pseudoaneurysm/contained rupture, left hypogastric artery coil embolization, bilateral TIFFANY to CFA stenting, R CFA patch angioplasty 4/3  - [facility] dc [facility], pending TOV  - holding anticoagulation  - pending PT eval today    HFpEF  - Home regimen: Lasix 40mg twice weekly  - Holding Lasix periop  - appreciate cardiology recs    Chronic afib  - c/w carvedilol  - Holding home eliquis periop, will restart today  - appreciate cardiology recs    CAD  - c/w atorvastatin  - c/w carvedilol  - appreciate cardiology recs    HTN/HLD  - c/w atorvastatin  - c/w Nifedipine, carvedilol    Chronic pain:  - c/w APAP 650mg q6hrs standing dose  - Lidocaine patch q24hrs  - Oxy 2.5 q4 PRN moderate pain, oxy 5 q4 PRN severe pain    Seizure disorder:  - appreciate epilepsy recs  - c/w keppra 250mg bid    CHERRI on CKD:  - baseline Cr 1.7-2  - Monitor Cr with daily BMP  - holding home losartan and lasix    Hypothyroidism:  - c/w Levothyroxine 75 mcg qd    Hypercalcemia/Primary Hyperparathyroidism:  - Appreciate endocrine recs, patient to follow up outpatient  - Continue multiple myeloma w/u  - f/u SPEP/UPEP/immunofixation/Vit D 25 and 125 / PTH    Iron deficiency anemia/Melena:  - Appreciate GI recs  - s/p endoscopy with GI: normal esophagus and duodenum and non-erosive gastritis  - s/p 2u PRBC this admission  - c/w Protonix     Diet: DASH  Activity: ambulate as tolerated  DVT PPX: holding periop  PT eval: Home PT and RW  Dispo: discharge home once performs stairs with PT

## 2024-04-05 NOTE — DISCHARGE NOTE NURSING/CASE MANAGEMENT/SOCIAL WORK - NSDCPEFALRISK_GEN_ALL_CORE
For information on Fall & Injury Prevention, visit: [email or web address] OR  [email or web address] OR  https://www.cdc.gov/steadi/patient.html

## 2024-04-05 NOTE — DISCHARGE NOTE NURSING/CASE MANAGEMENT/SOCIAL WORK - PATIENT PORTAL LINK FT
You can access the FollowMyHealth Patient Portal offered by [facility] by registering at the following website: [email or web address]. By joining [facility]’s FollowMyHealth portal, you will also be able to view your health information using other applications (apps) compatible with our system.

## 2024-04-05 NOTE — PROGRESS NOTE ADULT - SUBJECTIVE AND OBJECTIVE BOX
Patient is a 68y old  Female who presents with a chief complaint of anemia, CHERRI (05 Apr 2024 12:44)    INTERVAL EVENTS:  Patient transferred to Vascular service, hospital course reviewed.     SUBJECTIVE:  Patient was seen and examined at bedside. Denies N.V, reports chronic abdominal pain and back pain, no obstipation, reports decreased ET at baseline, no focal weakness or numbness. No other complaints or events reported    Review of systems: No fever, chills, dizziness, HA, Changes in vision, CP, dyspnea, nausea or vomiting, dysuria, changes in bowel movements, LE edema. Rest of 12 point Review of systems negative unless otherwise documented elsewhere in note.     Diet, Regular:   DASH/TLC Sodium & Cholesterol Restricted (DASH)  Supplement Feeding Modality:  Oral  Ensure Max Cans or Servings Per Day:  3       Frequency:  Three Times a day (04-04-24 @ 15:29) [Active]      MEDICATIONS:  MEDICATIONS  (STANDING):  acetaminophen     Tablet .. 650 milliGRAM(s) Oral every 6 hours  apixaban 2.5 milliGRAM(s) Oral every 12 hours  aspirin enteric coated 81 milliGRAM(s) Oral daily  atorvastatin 40 milliGRAM(s) Oral at bedtime  carvedilol 12.5 milliGRAM(s) Oral every 12 hours  levETIRAcetam 250 milliGRAM(s) Oral two times a day  levothyroxine 75 MICROGram(s) Oral every 24 hours  lidocaine   4% Patch 1 Patch Transdermal daily  melatonin 5 milliGRAM(s) Oral at bedtime  NIFEdipine XL 30 milliGRAM(s) Oral two times a day  pantoprazole    Tablet 40 milliGRAM(s) Oral before breakfast  polyethylene glycol 3350 17 Gram(s) Oral two times a day  potassium phosphate / sodium phosphate Tablet (K-PHOS No. 2) 1 Tablet(s) Oral once  senna 2 Tablet(s) Oral at bedtime  sertraline 50 milliGRAM(s) Oral every 24 hours    MEDICATIONS  (PRN):  HYDROmorphone   Tablet 2 milliGRAM(s) Oral every 4 hours PRN Severe Pain (7 - 10)      Allergies    No Known Allergies    Intolerances        OBJECTIVE:  Vital Signs Last 24 Hrs  T(C): 36.7 (05 Apr 2024 08:22), Max: 36.7 (04 Apr 2024 20:22)  T(F): 98 (05 Apr 2024 08:22), Max: 98 (04 Apr 2024 20:22)  HR: 75 (05 Apr 2024 12:40) (75 - 79)  BP: 153/74 (05 Apr 2024 12:40) (133/61 - 156/69)  BP(mean): 99 (05 Apr 2024 05:40) (97 - 99)  RR: 18 (05 Apr 2024 10:35) (18 - 18)  SpO2: 98% (05 Apr 2024 12:40) (95% - 100%)    Parameters below as of 05 Apr 2024 12:40  Patient On (Oxygen Delivery Method): room air      I&O's Summary    04 Apr 2024 07:01  -  05 Apr 2024 07:00  --------------------------------------------------------  IN: 0 mL / OUT: 950 mL / NET: -950 mL    05 Apr 2024 07:01  -  05 Apr 2024 13:12  --------------------------------------------------------  IN: 377 mL / OUT: 0 mL / NET: 377 mL        PHYSICAL EXAM:  General: AOX3, NAD, no labored breathing on RA, speaking in full sentences  HEENT: AT/NC, no facial asymmetry   Lungs:  Heart: + murmur   Abdomen: allowing limited exam, soft, + tenderness, not allowing to assess BS  Extremities:  warm, no edema, no tenderness, no calf tenderness, no focal deficit     LABS:                        9.2    12.88 )-----------( 109      ( 05 Apr 2024 05:30 )             28.3     04-05    134<L>  |  103  |  17  ----------------------------<  89  4.7   |  18<L>  |  1.79<H>    Ca    10.1      05 Apr 2024 05:30  Phos  3.6     04-05  Mg     2.4     04-05        PT/INR - ( 04 Apr 2024 05:30 )   PT: 12.2 sec;   INR: 1.07          PTT - ( 04 Apr 2024 05:30 )  PTT:30.0 sec  CAPILLARY BLOOD GLUCOSE        Urinalysis Basic - ( 05 Apr 2024 05:30 )    Color: x / Appearance: x / SG: x / pH: x  Gluc: 89 mg/dL / Ketone: x  / Bili: x / Urobili: x   Blood: x / Protein: x / Nitrite: x   Leuk Esterase: x / RBC: x / WBC x   Sq Epi: x / Non Sq Epi: x / Bacteria: x        MICRODATA:      RADIOLOGY/OTHER STUDIES:

## 2024-04-05 NOTE — PROGRESS NOTE ADULT - SUBJECTIVE AND OBJECTIVE BOX
Cardiology Consult    O/N:  Interval History: patient was seen and examined in am. Reports feeling severe abdominal and back pain.     OBJECTIVE  Vitals:  T(C): 36.7 (04-05-24 @ 08:22), Max: 36.7 (04-04-24 @ 20:22)  HR: 75 (04-05-24 @ 10:35) (75 - 79)  BP: 150/59 (04-05-24 @ 10:35) (133/61 - 156/69)  RR: 18 (04-05-24 @ 10:35) (18 - 18)  SpO2: 99% (04-05-24 @ 10:35) (95% - 100%)  Wt(kg): --    I/O:  I&O's Summary    04 Apr 2024 07:01  -  05 Apr 2024 07:00  --------------------------------------------------------  IN: 0 mL / OUT: 950 mL / NET: -950 mL    05 Apr 2024 07:01  -  05 Apr 2024 12:45  --------------------------------------------------------  IN: 140 mL / OUT: 0 mL / NET: 140 mL        :  PHYSICAL EXAM:  Appearance: NAD. Speaking in full sentences.   HEENT: No pallor noted.    Cardiovascular: systolic murmur   Respiratory: Lungs CTAB.   Gastrointestinal:  Soft, nontender.  Extremities: No edema b/l.   	  LABS:                        9.2    12.88 )-----------( 109      ( 05 Apr 2024 05:30 )             28.3     04-05    134<L>  |  103  |  17  ----------------------------<  89  4.7   |  18<L>  |  1.79<H>    Ca    10.1      05 Apr 2024 05:30  Phos  3.6     04-05  Mg     2.4     04-05      PT/INR - ( 04 Apr 2024 05:30 )   PT: 12.2 sec;   INR: 1.07          PTT - ( 04 Apr 2024 05:30 )  PTT:30.0 sec  Urinalysis Basic - ( 05 Apr 2024 05:30 )    Color: x / Appearance: x / SG: x / pH: x  Gluc: 89 mg/dL / Ketone: x  / Bili: x / Urobili: x   Blood: x / Protein: x / Nitrite: x   Leuk Esterase: x / RBC: x / WBC x   Sq Epi: x / Non Sq Epi: x / Bacteria: x        RADIOLOGY & ADDITIONAL TESTS:  Reviewed .    MEDICATIONS  (STANDING):  acetaminophen     Tablet .. 650 milliGRAM(s) Oral every 6 hours  apixaban 2.5 milliGRAM(s) Oral every 12 hours  aspirin enteric coated 81 milliGRAM(s) Oral daily  atorvastatin 40 milliGRAM(s) Oral at bedtime  carvedilol 6.25 milliGRAM(s) Oral every 12 hours  levETIRAcetam 250 milliGRAM(s) Oral two times a day  levothyroxine 75 MICROGram(s) Oral every 24 hours  lidocaine   4% Patch 1 Patch Transdermal daily  melatonin 5 milliGRAM(s) Oral at bedtime  NIFEdipine XL 30 milliGRAM(s) Oral two times a day  pantoprazole    Tablet 40 milliGRAM(s) Oral before breakfast  polyethylene glycol 3350 17 Gram(s) Oral two times a day  potassium phosphate / sodium phosphate Tablet (K-PHOS No. 2) 1 Tablet(s) Oral once  senna 2 Tablet(s) Oral at bedtime  sertraline 50 milliGRAM(s) Oral every 24 hours    MEDICATIONS  (PRN):  HYDROmorphone   Tablet 2 milliGRAM(s) Oral every 4 hours PRN Severe Pain (7 - 10)

## 2024-04-05 NOTE — PROGRESS NOTE ADULT - ASSESSMENT
Patient is a 68 year old Female, Former smoker, Retired Former [employer] , with Extensive known ASCVD, including CAD s/p CABG (LIMA to RI, ANTONIO to PDA), PAD with AAA s/p open repair in 2015 followed saccular AAA and contained rupture, L pSFA stent/L-TIFFANY stent with occluded b/l SFA followed by L-internal iliac artery stent in 5/2021 Severe renal artery stenosis, Multivalvular Heart disease including AS s/p SAVR (#19 Feldre porcine bio-prosthetic valve) and MS s/p Physio ring #26 (2002), AF s/p COY and CRT-P ([facility], 8/2022; on Eliquis),  Stage 3 CKD, Chronic Anemia, seizure as a child, who originally presented with worsening KRAFT over the last 3 months with escalating chest discomfort in the last week prompting ER visit, found to have Anemia to 6.9 with Melanic stools, with hospital course complicated by Seizure, now s/p endovascular repair of aortic pseudoaneurysm/contained rupture and b/l TIFFANY to CFA stenting, R CFA patch angioplasty, and left hypogastric artery coil embolization. Cardiology consulted for Co-Management after transfer to Vascular service    Review of systems:  TTE  3/27/24: Normal LV function. Normal RV. Severely dilated LA. A bioprosthetic valve noted in the aortic position. Leaflet of the bioprosthesis appear thickened. The peak transvalvular velocity is 3.37 m/s, the mean transvalvular gradient is 26.00 mmHg, and the LVOT/AV   velocity ratio is 0.32. Velocity and gradients are elevated, suggestive of bioprosthetic stenosis. There is mild AI. The mitral valve is moderately thickened and calcified. An annuloplasty ring is noted in the mitral position. The mean transvalvular gradient is 16.00 mmHg at a heart rate of 75 bpm. Transmitral gradients are elevated, suggestive of mitral stenosis. At-least moderate MR. Severe TR. PH PASP 55 mmHg. Trivial pericardial effusion.   TTE 4/21/23: borderline LVEF; RWMA  Normal RV; Severely dilated LA; Bioprosthetic valve is seen in the aortic position with findings suggestive of significant prosthetic aortic stenosis. The peak transvalvular velocity is 3.37 m/s, the mean transvalvular gradient is 27.00 mmHg, and the LVOT/AV velocity ratio is 0.31. The peak transaortic gradient is 45.43 mmHg. The aortic valve area (estimated via the continuity method) is 0.78 cm². The calculated aortic valve area indexed to body surface area is 0.51 cm²/m². Acceleration time 105 msMild AR; Mitral valve is moderately thickened and calcified with annuloplasty ring in the mitral position. Elevated gradients and pressure half time of 146 ms are consistent with severe mitral stenosis. Moderate-to-severe MR. Severe TR. PH with PASP 63 mmHg. Trivial pericardial effusion.  CCTA (8/21/20): Patent LIMA to First Diagonal branch.  Patent ANTONIO to RPDA. Severe stenosis of the large first diagonal branch. Distal vessel fills via LIMA graft. Severe stenosis of proximal RCA. Distal vessel fills via ANTONIO graft. Nonobstructive disease throughout the LAD. Patent stent in mid LCX extending to the OM2. MV annuloplasty ring and Bioprosthetic AVR noted. Calcification throughout the aorta noted.    Home medications: Crestor 40mg qhs, lopressor 50mg bid, losartan 25mg qd, Lasix 40 mg PO twice a week; amlodipine 5 mg Po daily, cilostazol, eliquis 2.5 mg PO BID      # Severe bioprosthetic AS; and MS  She reports worsening KRAFT in the last few months which significantly limited her ADLS. During that time she mainly stayed home (lives alone in a second floor walk up apartment), as even walking 1 city Block was too difficult. In the past week she started experiencing escalating chest discomfort for which she sought care.  SAVR (#19 Felder porcine bio-prosthetic valve) from 2002, now with severe bioprosthetic AS (MG of 26 mmHg; PG of 45.43 largely unchanged from 4/2023). Patient S/p Annuloplasty Ring now with transvalvular gradient of16.00 mmHg ( From 10) at a heart rate of 75 bpm with Moderate to Severe MR  Seen by Structural Heart Team (Dr Gregory) and will need to follow up as an outpatient.   increase to Coreg 12.5 mg po BID for better BP control  c/w Nifedipine 30 mg PO BID, patient should measure her BP at home, and if low SBP <110, she can hold it.  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 Patient s/p endovascular repair of aortic pseudoaneurysm/contained rupture and b/l TIFFANY to CFA stenting, R CFA patch angioplasty, and left hypogastric artery coil embolization.    #CAD s/p CABG (LIMA to RI, ANTONIO to PDA)  Followed by Dr. Preston  on Home Crestor 40mg qhs-->Lipitor 40     #post surgical abdominal pain  Patient noted with abdominal pain, recommend lidocaine and fentanyl patches   pain management per primary team     Upon discharge patient will follow up with Structural Heart team for planned valvular intervention

## 2024-04-05 NOTE — PROGRESS NOTE ADULT - ATTENDING COMMENTS
Patient is a 68 year old Female, Former smoker, Retired [employer] , with Extensive known ASCVD, including CAD s/p CABG (LIMA to RI, ANTONIO to PDA), PAD with AAA s/p open repair in 2015 followed saccular AAA and contained rupture, L pSFA stent/L-TIFFANY stent with occluded b/l SFA followed by L-internal iliac artery stent in 5/2021 Severe renal artery stenosis, Multivalvular Heart disease including AS s/p SAVR (#19 Felder porcine bio-prosthetic valve) and MS s/p Physio ring #26 (2002), AF s/p COY and CRT-P ([facility], 8/2022; on Eliquis),  Stage 3 CKD, Chronic Anemia, seizure as a child, who originally presented with worsening KRAFT over the last 3 months with escalating chest discomfort in the last week prompting ER visit, found to have Anemia to 6.9 with Melanic stools, with hospital course complicated by Seizure, now s/p endovascular repair of aortic pseudoaneurysm/contained rupture and b/l TIFFANY to CFA stenting, R CFA patch angioplasty, and left hypogastric artery coil embolization. Cardiology consulted for Co-Management after transfer to Vascular service    Review of systems:  - TTE  3/27/24: Normal LV function. Normal RV. Severely dilated LA. A bioprosthetic valve noted in the aortic position. Leaflet of the bioprosthesis appear thickened. The peak transvalvular velocity is 3.37 m/s, the mean transvalvular gradient is 26.00 mmHg, and the LVOT/AV   velocity ratio is 0.32. Velocity and gradients are elevated, suggestive of bioprosthetic stenosis. There is mild AI. The mitral valve is moderately thickened and calcified. An annuloplasty ring is noted in the mitral position. The mean transvalvular gradient is 16.00 mmHg at a heart rate of 75 bpm. Transmitral gradients are elevated, suggestive of mitral stenosis. At-least moderate MR. Severe TR. PH PASP 55 mmHg. Trivial pericardial effusion.   - TTE 4/21/23: borderline LVEF; RWMA  Normal RV; Severely dilated LA; Bioprosthetic valve is seen in the aortic position with findings suggestive of significant prosthetic aortic stenosis. The peak transvalvular velocity is 3.37 m/s, the mean transvalvular gradient is 27.00 mmHg, and the LVOT/AV velocity ratio is 0.31. The peak transaortic gradient is 45.43 mmHg. The aortic valve area (estimated via the continuity method) is 0.78 cm². The calculated aortic valve area indexed to body surface area is 0.51 cm²/m². Acceleration time 105 msMild AR; Mitral valve is moderately thickened and calcified with annuloplasty ring in the mitral position. Elevated gradients and pressure half time of 146 ms are consistent with severe mitral stenosis. Moderate-to-severe MR. Severe TR. PH with PASP 63 mmHg. Trivial pericardial effusion.    -CCTA (8/21/20): Patent LIMA to First Diagonal branch.  Patent ANTONIO to RPDA. Severe stenosis of the large first diagonal branch. Distal vessel fills via LIMA graft. Severe stenosis of proximal RCA. Distal vessel fills via ANTONIO graft. Nonobstructive disease throughout the LAD. Patent stent in mid LCX extending to the OM2. MV annuloplasty ring and Bioprosthetic AVR noted. Calcification throughout the aorta noted.    Home medications: Crestor 40mg qhs, lopressor 50mg bid, losartan 25mg qd, Lasix 40 mg PO twice a week; amlodipine 5 mg Po daily, cilostazol, eliquis 2.5 mg PO BID      # Severe bioprosthetic AS and Severe MS  - Patient with Extensive ASCVD and Multivalvular Heart disease as summarized above  - She reports worsening KRAFT in the last few months which significantly limited her ADLS. During that time she mainly stayed home (lives alone in a second floor walk up apartment), as even walking 1 city Block was too difficult. In the past week she started experiencing escalating chest discomfort for which she sought care.  - Patient was evaluated by Structural Heart Team (Dr Gregory) and CTS ( Dr Menard) in February for Multidisciplinary approach of valvular disease  - She was deemed a high surgical risk due to her frailty and extensive ASCVD comorbidities and transcatheter intervention was recommended.  - Patient with SAVR (#19 Felder porcine bio-prosthetic valve) from 2002, now with severe bioprosthetic AS (MG of 26 mmHg; PG of 45.43 largely unchanged from 4/2023)  - Patient S/p Annuloplasty Ring now with transvalvular gradient of16.00 mmHg ( From 10) at a heart rate of 75 bpm with Moderate to Severe MR  - She is now post op for Endovascular leak repair with back discomfort, elevated BP and chest discomfort  - Clinically she is warm, well perfused and well compensated with mildly elevated JVP, clear lungs and no lower extremity edema  - At this time to help manage patient's BP, pain control is of essence. Patient intolerant to dilaudid and not seing improvement with IV tylenol. Would consider Lidocaine patch in the back or Fentanyl patch if lidocaine patch not helpdul as well as low dose morphine (Careful given renal dysfunction)  - Would also swicth Lopressor to Coreg 6.25 mg po BID for better antianginal benefits and BP control  - Continue Nifedipine 60 mg PO BID for now  - Would defer resuming Losartan for now given improving CHERRI on CKD    #Afib s/p AVN ablation and CRT-P ([facility], 8/2022; on Eliquis)  - Plan to resume Eliquis 2.5 mg po BID soon as safe from Vascular standpoint    #Enlarging aortic aneurysm/pseudoaneurysm   - AAA s/p open repair in 2015 with complications  - CTA ( 3/26/24): 6.5 x 5 cm lobulated sac of extravasation which arises from the distal graft at the site of bifurcation 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  - Patient s/p endovascular repair of aortic pseudoaneurysm/contained rupture and b/l TIFFANY to CFA stenting, R CFA patch angioplasty, and left hypogastric artery coil embolization.  - She tolerated the procedure well  - Comt pain control and BP control as above    #CAD s/p CABG (LIMA to RI, ANTONIO to PDA)  - Followed by Dr. Preston  - on Home Crestor 40mg qhs-->Lipitor 40     Upon DC patient will follow up with Structural Heart team for planned valvular intervention  Cardiology will cont to follow with you, please call with any questions
#Anemia  -Protonix 40 mg PO daily  -Closely monitor H/H  -Maintain Active T&S  -Transfusion goal as per primary team  -Remainder of management as per primary team    Case discussed with svc attending and primary team.
68F former smoker, PMHx CAD (s/p CABG), HFpEF (EF 51%), afib (s/p AVN ablation and CRT-P 8/2022), AVR (procine-bioprosthetic) and mitral annuloplasty, AAA (s/p open repair 2015), PAD (s/p stents), HTN, HLD, hypothyroidism, seizure d/o, CKD3 (b/l Cr 1.7-2.0), severe CARLI, chronic anemia initially admitted to medicine for symptomatic anemia. Course c/b status epilepticus with RRT called, stepped up to tele c/f GIB vs vasc dissection. s/p EGD, cleared for AC. Stepped over to cardiac tele with plans for vascular surgery vs CTSx interventions for AAA / low-flow low gradient severe AS. Medicine consulted for pain management and hypercalcemia.    - pt seen with Dr. Felder around 10:30 am  npo waiting for procedure.  had bowel movement.   -prn dilaudid is helping with pain for about 3 hours , so far tolerating.   RRR- systolic/diastolic murmur, ppm , moving good air bilaterally ( lungs clear )- abdomen bs present, no edema noted on bilateral lower ext. neuro aao x 3 non-focal.    - abdominal pain with radiation to back-- could be related to hypercalcemia/aneurysmal pain   -lidoderm helps ( at least she gets some sleep)  - tolerating dilaudid 0.2 mg per dose- can give Q 4 hourly prn for severe pain.  - cr is 2.2 ( to avoid morphine )  - keep tylenol for mild to moderate pain.    # hypercalcemia ( mild ) with Elevated - >  -Primary Hyperparathyroidism   - recommend endocrine consult , currently is not candidate for surgical option -may consider when cardiac and vascular issue addressed.   -with anemia and elevated cr.- would send work up for myeloma work up -including SPEP/UPEP/immunofixation in both urine/serum.  - gentle hydration as her cardiac condition allows- calcium level improve   - she took vitamin D over the counter- no supplement now.- vitamin D level >50.     # CKD - cr 2.2 - egfr in 20s, renally dose all meds.  CT abdomen -3/25- The left kidney is severely atrophic and contains a   probable 1.2 cm cyst. There are a few low-attenuation right renal lesions   that are incompletely characterized but likely representing cysts,   measuring up to 2.6 cm within the lateral aspect of the right kidney and   previously measuring up to 1.9 cm on 6/11/2021. There is a 4 mm   nonobstructing calculus in the lower third of the right kidney. No   hydronephrosis.    - constipatin - stool softener with miralax bid  and senna bid ( hypercalcemia can cause constipation, bone pain and encephalopathy)    - seizure - no prior history , currently on Keppra.   - Anemia - not hemolytic, EGD erosive gastritis, no active bleeding. GI clear for AC use.     Thank you for allowing medicine to participate in the care, dw cardiology,        high risk with risk of complication.
68F former smoker, PMHx CAD (s/p CABG), HFpEF (EF 51%), afib (s/p AVN ablation and CRT-P 8/2022), AVR (procine-bioprosthetic) and mitral annuloplasty, AAA (s/p open repair 2015), PAD (s/p stents), HTN, HLD, hypothyroidism, seizure d/o, CKD3 (b/l Cr 1.7-2.0), severe CARLI, chronic anemia initially admitted to medicine for symptomatic anemia. Course c/b status epilepticus with RRT called, stepped up to tele c/f GIB vs vasc dissection. s/p EGD, cleared for AC. Stepped over to cardiac tele with plans for vascular surgery vs CTSx interventions for AAA / low-flow low gradient severe AS. Medicine consulted for pain management and hypercalcemia.    - pt seen with Dr. Felder.   reported abdominal pain and back pain, still constipated ( need to push) -prn dilaudid is helping with pain for about 3 hours , so far tolerating.   RRR, moving good air bilaterally ( lungs clear )- abdomen bs present, no edema noted on bilateral lower ext. neuro aao x 3 non-focal.    - abdominal pain with radiation to back-- could be related to hypercalcemia/aneurysmal pain -lidoderm helps ( at least she gets some sleep)  - tolerating dilaudid 0.2 mg per dose- can give Q 4 hourly prn for severe pain.  - cr is 2.2 ( to avoid morphine )  - keep tylenol for mild to moderate pain.    # hypercalcemia ( mild ) with Elevated - >  -Primary Hyperparathyroidism   - recommend endocrine consult , currently is not candidate for surgical option -may consider when cardiac and vascular issue addressed.   -with anemia and elevated cr.- would send work up for myeloma work up -including SPEP/UPEP/immunofixation in both urine/serum.  - gentle hydration as her cardiac condition allows- calcium level improve   - she took vitamin D over the counter- no supplement now.- vitamin D level >50.     # CKD - cr 2.2 - egfr in 20s, renally dose all meds.  CT abdomen -3/25- The left kidney is severely atrophic and contains a   probable 1.2 cm cyst. There are a few low-attenuation right renal lesions   that are incompletely characterized but likely representing cysts,   measuring up to 2.6 cm within the lateral aspect of the right kidney and   previously measuring up to 1.9 cm on 6/11/2021. There is a 4 mm   nonobstructing calculus in the lower third of the right kidney. No   hydronephrosis.    - constipatin - stool softener with miralax bid  and senna bid ( hypercalcemia can cause constipation, bone pain and encephalopathy)    - seizure - no prior history , currently on Keppra.   - Anemia - not hemolytic, EGD erosive gastritis, no active bleeding. GI clear for AC use.     Thank you for allowing medicine to participate in the care, dw cardiology   high risk with risk of complication.
68F former smoker, PMHx CAD (s/p CABG), HFpEF (EF 51%), afib (s/p AVN ablation and CRT-P 8/2022), AVR (procine-bioprosthetic) and mitral annuloplasty, AAA (s/p open repair 2015), PAD (s/p stents), HTN, HLD, hypothyroidism, seizure d/o, CKD3 (b/l Cr 1.7-2.0), severe CARLI, chronic anemia initially admitted to medicine for symptomatic anemia. Course c/b status epilepticus with RRT called, stepped up to tele c/f GIB vs vasc dissection. s/p EGD, cleared for AC. Stepped over to cardiac tele with plans for vascular surgery vs CTSx interventions for AAA / low-flow low gradient severe AS. Medicine consulted for pain management and hypercalcemia.    - pt seen with Dr. Felder , procedure done  still with pain in abdomen and upper back around T7 area  RRR- systolic/diastolic murmur, ppm , moving good air bilaterally ( lungs clear )- abdomen bs present, no edema noted on bilateral lower ext. neuro aao x 3 non-focal.    - abdominal pain with radiation to back-- concern aneurysmal pain sp"- Endovascular repair of aortic pseudoaneurysm / contained rupture  - Hypogastric artery (left) coil embolization]- Bilateral TIFFANY to CFA stenting- R CFA patch angioplasty" 4/3/24  on oxycodone prn   -lidoderm   - cr is 2.2--> 1.8 ( to avoid morphine )  - keep tylenol for mild to moderate pain.    # hypercalcemia ( mild ) with Elevated - >  -Primary Hyperparathyroidism   - appreciate endocrine consult , currently is not candidate for surgical option -may consider when cardiac and vascular issue addressed vs medical management as outlined by endo.   -with anemia and elevated cr.- would send work up for myeloma work up -including SPEP/UPEP/immunofixation in both urine/serum.  - gentle hydration as her cardiac condition allows- calcium level normalizing.  - she took vitamin D over the counter- no supplement now.- vitamin D level >50.     # CKD - cr 2.2 - egfr in 20s, renally dose all meds.--> 1.89 with hydration  CT abdomen -3/25- The left kidney is severely atrophic and contains a   probable 1.2 cm cyst. There are a few low-attenuation right renal lesions   that are incompletely characterized but likely representing cysts,   measuring up to 2.6 cm within the lateral aspect of the right kidney and   previously measuring up to 1.9 cm on 6/11/2021. There is a 4 mm   nonobstructing calculus in the lower third of the right kidney. No   hydronephrosis.    - constipatin - stool softener with miralax bid  and senna bid ( hypercalcemia can cause constipation, bone pain and encephalopathy)-  can change to senna daily once she has regualr bm.    - seizure - no prior history , currently on Keppra 250 bid as per epilepsy jaci ( EEG negative )  - Anemia - not hemolytic, EGD erosive gastritis, no active bleeding. GI clear for AC use.     Thank you for allowing medicine to participate in the care, pt under vascular service  [facility] medicine comanagment for vascular to follow tomorrow.    high risk with risk of complication.
EGD (outpatient, 1/18/24): small HH, diffuse severe inflammation characterized by erythema and erosions at the incisura and antrum (Bx taken), duodenitis in second portion of duodenum.     #Anemia  -Protonix 40 mg IVP BID  -High risk for low risk procedure per cardiology, needs cardiac anesthesia, please consult anesthesia to coordinate for tentative bedside EGD, 3/28   -NPO after MN  -Hold home AC   -Closely monitor H/H  -Maintain Active T&S  -Transfusion goal as per primary team  -Remainder of management as per primary team    Case discussed with svc attending and primary team.
Patient is a 68 year old Female, Former smoker, Retired Former [employer] , with Extensive known ASCVD, including CAD s/p CABG (LIMA to RI, ANTONIO to PDA), PAD with AAA s/p open repair in 2015 followed saccular AAA and contained rupture, L pSFA stent/L-TIFFANY stent with occluded b/l SFA followed by L-internal iliac artery stent in 5/2021 Severe renal artery stenosis, Multivalvular Heart disease including AS s/p SAVR (#19 Felder porcine bio-prosthetic valve) and MS s/p Physio ring #26 (2002), AF s/p COY and CRT-P ([facility], 8/2022; on Eliquis),  Stage 3 CKD, Chronic Anemia, seizure as a child, who originally presented with worsening KRAFT over the last 3 months with escalating chest discomfort in the last week prompting ER visit, found to have Anemia to 6.9 with Melanic stools, with hospital course complicated by Seizure, now s/p endovascular repair of aortic pseudoaneurysm/contained rupture and b/l TIFFANY to CFA stenting, R CFA patch angioplasty, and left hypogastric artery coil embolization. Cardiology consulted for Co-Management after transfer to Vascular service    Review of systems:  - TTE  3/27/24: Normal LV function. Normal RV. Severely dilated LA. A bioprosthetic valve noted in the aortic position. Leaflet of the bioprosthesis appear thickened. The peak transvalvular velocity is 3.37 m/s, the mean transvalvular gradient is 26.00 mmHg, and the LVOT/AV   velocity ratio is 0.32. Velocity and gradients are elevated, suggestive of bioprosthetic stenosis. There is mild AI. The mitral valve is moderately thickened and calcified. An annuloplasty ring is noted in the mitral position. The mean transvalvular gradient is 16.00 mmHg at a heart rate of 75 bpm. Transmitral gradients are elevated, suggestive of mitral stenosis. At-least moderate MR. Severe TR. PH PASP 55 mmHg. Trivial pericardial effusion.   - TTE 4/21/23: borderline LVEF; RWMA  Normal RV; Severely dilated LA; Bioprosthetic valve is seen in the aortic position with findings suggestive of significant prosthetic aortic stenosis. The peak transvalvular velocity is 3.37 m/s, the mean transvalvular gradient is 27.00 mmHg, and the LVOT/AV velocity ratio is 0.31. The peak transaortic gradient is 45.43 mmHg. The aortic valve area (estimated via the continuity method) is 0.78 cm². The calculated aortic valve area indexed to body surface area is 0.51 cm²/m². Acceleration time 105 msMild AR; Mitral valve is moderately thickened and calcified with annuloplasty ring in the mitral position. Elevated gradients and pressure half time of 146 ms are consistent with severe mitral stenosis. Moderate-to-severe MR. Severe TR. PH with PASP 63 mmHg. Trivial pericardial effusion.    -CCTA (8/21/20): Patent LIMA to First Diagonal branch.  Patent ATNONIO to RPDA. Severe stenosis of the large first diagonal branch. Distal vessel fills via LIMA graft. Severe stenosis of proximal RCA. Distal vessel fills via ANTONIO graft. Nonobstructive disease throughout the LAD. Patent stent in mid LCX extending to the OM2. MV annuloplasty ring and Bioprosthetic AVR noted. Calcification throughout the aorta noted.    Home medications: Crestor 40mg qhs, lopressor 50mg bid, losartan 25mg qd, Lasix 40 mg PO twice a week; amlodipine 5 mg Po daily, cilostazol, eliquis 2.5 mg PO BID      # Severe bioprosthetic AS and Severe MS  - Patient with Extensive ASCVD and Multivalvular Heart disease as summarized above  - She reports worsening KRAFT in the last few months which significantly limited her ADLS. During that time she mainly stayed home (lives alone in a second floor walk up apartment), as even walking 1 city Block was too difficult. In the past week she started experiencing escalating chest discomfort for which she sought care.  - Patient was evaluated by Structural Heart Team (Dr Gregory) and CTS ( Dr Menard) in February for Multidisciplinary approach of valvular disease  - She was deemed a high surgical risk due to her frailty and extensive ASCVD comorbidities and transcatheter intervention was recommended.   - Patient with SAVR (#19 Felder porcine bio-prosthetic valve) from 2002, now with severe bioprosthetic AS (MG of 26 mmHg; PG of 45.43 largely unchanged from 4/2023)  - Patient S/p Annuloplasty Ring now with transvalvular gradient of16.00 mmHg ( From 10) at a heart rate of 75 bpm with Moderate to Severe MR  - She is now post op for Endovascular leak repair with back discomfort, elevated BP and chest discomfort  - Clinically she is warm, well perfused and well compensated with , clear lungs and no lower extremity edema  - BP's are better controlled on Coreg and Pain control. Would increase coreg to 12.5 mg po BID  - IF BP borderline can hold BP dose of Nifedipine. 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  - Patient s/p endovascular repair of aortic pseudoaneurysm/contained rupture and b/l TIFFANY to CFA stenting, R CFA patch angioplasty, and left hypogastric artery coil embolization.  - She tolerated the procedure well  - Comt pain control and BP control as above. Can consider Fentanyl patches if lidocaine patches not helpful    #CAD s/p CABG (LIMA to RI, ANTONIO to PDA)  - Followed by Dr. Preston  - on Home Crestor 40mg qhs-->Lipitor 40     Upon DC patient will follow up with Structural Heart team for planned valvular intervention  Cardiology will cont to follow with you, please call with any questions.
67 yo woman with remote history of MVr and BioAVR being followed by [facility] as outpatient for AVR stenosis and MR. She has needed a ALBER but has a history of esophageal stricture (?) so this was deferred. She also has EVAR with prior endoleak and repair.  Now was admitted with unexplained progressive dyspnea.   Found to have severe anemia and melanotic stools.   CTA performed showed enlargement of prior EVAR site   Transferred to CCU for monitoring since she needs a EGD with cardiac anesthesia.  Received 1 uprbc with improvement in dyspnea and adequate response    - For EGD today  - Vascular surgery consult  - [facility] following   - If stable after EGD will downgrade to telemetry for ongoing care    Feliciano Sanchez MD
Severe PAD with AF, AVN ablation, cardiac resynchronization therapy, CAD, AVR, mitral annuloplasty, CKD acute blood loss anemia with likely UGI bleed and now seizure  physical as above  creatinine has increased in last year but stable since February  continue keppra  vascular f/u re aneurysm  transfuse  cardiology f/u appreciated with plan for transfer to CCU for further w/u
Rapid response was called for decreased level of consciousness ,  ,  Vitals - hemodynamically stable , patient unresponsive initially but later mental status improved , last known normal 10 mins before Rapid Response - Suspect Seizures , Start Keppra Load , ICU Consult , Neurology/Epilepsy Consult

## 2024-04-05 NOTE — PROGRESS NOTE ADULT - ASSESSMENT
Cardiology     68 year old Female, Former smoker, Retired [employer] , with Extensive known ASCVD, including CAD s/p CABG (LIMA to RI, ANTONIO to PDA), PAD with AAA s/p open repair in 2015 followed saccular AAA and contained rupture, L pSFA stent/L-TIFFANY stent with occluded b/l SFA followed by L-internal iliac artery stent in 5/2021 Severe renal artery stenosis, Multivalvular Heart disease including AS s/p SAVR (#19 Felder porcine bio-prosthetic valve) and MS s/p Physio ring #26 (2002), AF s/p COY and CRT-P ([facility], 8/2022; on Eliquis),  Stage 3 CKD, Chronic Anemia, seizure as a child, who originally presented with worsening KRAFT over the last 3 months with escalating chest discomfort in the last week prompting ER visit, found to have Anemia to 6.9 with Melanic stools, with hospital course complicated by Seizure, now s/p endovascular repair of aortic pseudoaneurysm/contained rupture and b/l TIFFANY to CFA stenting, R CFA patch angioplasty, and left hypogastric artery coil embolization. Cardiology consulted for Co-Management after transfer to Vascular service    Review of systems:  TTE  3/27/24: Normal LV function. Normal RV. Severely dilated LA. A bioprosthetic valve noted in the aortic position. Leaflet of the bioprosthesis appear thickened. The peak transvalvular velocity is 3.37 m/s, the mean transvalvular gradient is 26.00 mmHg, and the LVOT/AV   velocity ratio is 0.32. Velocity and gradients are elevated, suggestive of bioprosthetic stenosis. There is mild AI. The mitral valve is moderately thickened and calcified. An annuloplasty ring is noted in the mitral position. The mean transvalvular gradient is 16.00 mmHg at a heart rate of 75 bpm. Transmitral gradients are elevated, suggestive of mitral stenosis. At-least moderate MR. Severe TR. PH PASP 55 mmHg. Trivial pericardial effusion.   TTE 4/21/23: borderline LVEF; RWMA  Normal RV; Severely dilated LA; Bioprosthetic valve is seen in the aortic position with findings suggestive of significant prosthetic aortic stenosis. The peak transvalvular velocity is 3.37 m/s, the mean transvalvular gradient is 27.00 mmHg, and the LVOT/AV velocity ratio is 0.31. The peak transaortic gradient is 45.43 mmHg. The aortic valve area (estimated via the continuity method) is 0.78 cm². The calculated aortic valve area indexed to body surface area is 0.51 cm²/m². Acceleration time 105 msMild AR; Mitral valve is moderately thickened and calcified with annuloplasty ring in the mitral position. Elevated gradients and pressure half time of 146 ms are consistent with severe mitral stenosis. Moderate-to-severe MR. Severe TR. PH with PASP 63 mmHg. Trivial pericardial effusion.  CCTA (8/21/20): Patent LIMA to First Diagonal branch.  Patent ANTONIO to RPDA. Severe stenosis of the large first diagonal branch. Distal vessel fills via LIMA graft. Severe stenosis of proximal RCA. Distal vessel fills via ANTONIO graft. Nonobstructive disease throughout the LAD. Patent stent in mid LCX extending to the OM2. MV annuloplasty ring and Bioprosthetic AVR noted. Calcification throughout the aorta noted.    Home medications: Crestor 40mg qhs, lopressor 50mg bid, losartan 25mg qd, Lasix 40 mg PO twice a week; amlodipine 5 mg Po daily, cilostazol, eliquis 2.5 mg PO BID      # Severe bioprosthetic AS; and MS  She reports worsening KRAFT in the last few months which significantly limited her ADLS. During that time she mainly stayed home (lives alone in a second floor walk up apartment), as even walking 1 city Block was too difficult. In the past week she started experiencing escalating chest discomfort for which she sought care.  SAVR (#19 Felder porcine bio-prosthetic valve) from 2002, now with severe bioprosthetic AS (MG of 26 mmHg; PG of 45.43 largely unchanged from 4/2023). Patient S/p Annuloplasty Ring now with transvalvular gradient of16.00 mmHg ( From 10) at a heart rate of 75 bpm with Moderate to Severe MR  Seen by Structural Heart Team (Dr Gregory) and will need to follow up as an outpatient.   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 Patient s/p endovascular repair of aortic pseudoaneurysm/contained rupture and b/l TIFFANY to CFA stenting, R CFA patch angioplasty, and left hypogastric artery coil embolization.    #CAD s/p CABG (LIMA to RI, ANTONIO to PDA)  - Followed by Dr. Preston  - on Home Crestor 40mg qhs-->Lipitor 40

## 2024-04-05 NOTE — PROGRESS NOTE ADULT - SUBJECTIVE AND OBJECTIVE BOX
Physical Medicine and Rehabilitation Progress Note :       Patient is a 68y old  Female who presents with a chief complaint of anemia, CHERRI (2024 07:47)      HPI:  HPI: Patient is a 69yo F former smoker (quit 20 year ago with PMH of hypothyroidism, seizure disorder, Stage 3 CKD (baseline CR ~1.7-2.0), severe renal artery stenosis, chronic anemia (baseline Hgb ~9-10, gets weekly Fe infusion last infusion 3/22/24), AT, Afib s/p AVN ablation and CRT-P (2022; on Eliquis), HTN, HLD, CAD s/p CABG, AVR (porcine bio-prosthetic valve) and mitral annuloplasty, HFpEF, AAA s/p open repair in  with complications, PAD s/p L pSFA stent/L-TIFFANY stent/L-IIA stent who presents with intermittent chest pain and KRAFT x 3 days. States she has been having intermittent chest pain and KRAFT which worsened about 3 days ago. She has not been very mobile for the past week due to pain and KRAFT. At baseline ambulates with cane, however has not been mostly sitting on the couch due to current symptoms. Additionally c/o lower abdominal pain which she attributes to constipation (has not had a BM in 5-6 days). Usually has regular BMs. Has had very poor PO intake over the past few days 2/2 constipation. Denies any s/s of bleeding; denies hematuria, hemoptysis, hematemesis, etc. No recent illness/sick contacts. Denies any recent travel. Denies hx of cancer, bleeding/clotting disorders, lower extremity pain or swelling. Compliant with her medications.  No other complaints. States she feels mildly improved after blood transfusion.     In the ED:  Initial vital signs: T: 98 F, HR: 86, BP: 114/72, R: 17, SpO2: 100% on RA  Labs: significant for Hgb 6.9, D-dimer 2831, Cl 111, HCO3 19, BUN/Cr 48/3.00, Ca 10.7, trop 41, p-BNP 3727  CXR: Persistent linear atelectasis over the left lower lung zone. The remainder the lung zones are clear. No pneumothorax.  CT C/A/P: Bibasilar atelectasis without evidence of consolidation. Borderline hepatomegaly with extensive coronary artery calcifications status post CABG. There is also a prosthetic aortic valve. Post surgical changes from prior open thoracoabdominal aortic bypass graft with new focal dilatation of the distalmost aspect of the graft measuring 4.4 x 6.1 cm just above the aortic bifurcation and previously measuring 3.2 x 4.1 cm on 2021.   EKG: paced rhythm  Medications: 1u pRBC, maalox 30mL PO x 1, morphine 2mg IV x 1  Consults: vascular (25 Mar 2024 22:30)                            9.2    12.88 )-----------( 109      ( 2024 05:30 )             28.3       04-05    134<L>  |  103  |  17  ----------------------------<  89  4.7   |  18<L>  |  1.79<H>    Ca    10.1      2024 05:30  Phos  3.6     04-05  Mg     2.4     04-05      Vital Signs Last 24 Hrs  T(C): 36.7 (2024 08:22), Max: 36.7 (2024 20:22)  T(F): 98 (2024 08:22), Max: 98 (2024 20:22)  HR: 75 (2024 10:35) (75 - 79)  BP: 150/59 (2024 10:35) (133/61 - 156/69)  BP(mean): 99 (2024 05:40) (97 - 99)  RR: 18 (2024 10:35) (18 - 18)  SpO2: 99% (2024 10:35) (95% - 100%)    Parameters below as of 2024 10:35  Patient On (Oxygen Delivery Method): room air        MEDICATIONS  (STANDING):  acetaminophen     Tablet .. 650 milliGRAM(s) Oral every 6 hours  apixaban 2.5 milliGRAM(s) Oral every 12 hours  aspirin enteric coated 81 milliGRAM(s) Oral daily  atorvastatin 40 milliGRAM(s) Oral at bedtime  carvedilol 6.25 milliGRAM(s) Oral every 12 hours  levETIRAcetam 250 milliGRAM(s) Oral two times a day  levothyroxine 75 MICROGram(s) Oral every 24 hours  lidocaine   4% Patch 1 Patch Transdermal daily  melatonin 5 milliGRAM(s) Oral at bedtime  NIFEdipine XL 30 milliGRAM(s) Oral two times a day  pantoprazole    Tablet 40 milliGRAM(s) Oral before breakfast  polyethylene glycol 3350 17 Gram(s) Oral two times a day  potassium phosphate / sodium phosphate Tablet (K-PHOS No. 2) 1 Tablet(s) Oral once  senna 2 Tablet(s) Oral at bedtime  sertraline 50 milliGRAM(s) Oral every 24 hours    MEDICATIONS  (PRN):  HYDROmorphone   Tablet 2 milliGRAM(s) Oral every 4 hours PRN Severe Pain (7 - 10)      T(C): 36.7 (24 @ 08:22), Max: 36.7 (24 @ 20:22)  HR: 75 (24 @ 10:35) (75 - 79)  BP: 150/59 (24 @ 10:35) (133/61 - 156/69)  RR: 18 (24 @ 10:35) (18 - 18)  SpO2: 99% (24 @ 10:35) (95% - 100%)        Physical Exam:       General: NAD    Head: NC/AT; MMM; PERRL; EOMI    Neck: Supple; no JVD    Respiratory: CTAB; no wheezes/rales/rhonchi    Cardiovascular: Regular rhythm/rate; S1/S2+, II/VI systolic murmur    Gastrointestinal: Soft; NTND; bowel sounds normal and present    Extremities: WWP; no edema/cyanosis    Neurological: A&Ox3, CNII-XII grossly intact; no obvious focal deficits      2024 Functional Status Assessment :         Pain Assessment/Number Scale (0-10) Adult  Presence of Pain: complains of pain/discomfort  Body Location: back  Pain Rating (0-10): Rest: 8 (severe pain)  Pain Rating (0-10): Activity: 7 (severe pain)    Safety      Safety  Safety [WDL Definition: Bed in low position, wheels locked; call light in reach; upper side rails up x 2; ID band on]: WDL    AM-PAC Functional Assessment: Basic Mobility  Type of Assessment: Daily assessment  Turning from your back to your side while in a flat bed without using bedrails?: 3 = A little assistance  Moving from lying on your back to sitting on the flat side of a flat bed without using bedrails?: 3 = A little assistance  Moving to and from a bed to a chair (including a wheelchair)?: 3 = A little assistance  Standing up from a chair using your arms (e.g. wheelchair or bedside chair)?: 3 = A little assistance  Walking in hospital room?: 3 = A little assistance  Climbing 3-5 steps with a railing?: 3 = A little assistance  Score: 18   Row Comment: Ask the patient "How much help from another person do you currently need? (If the patient hasn't done an activity recently, how much help from another person do you think he/she needs if he/she tried?)    Cognitive/Neuro      Cognitive/Neuro/Behavioral  Cognitive/Neuro/Behavioral [WDL Definition: Alert; opens eyes spontaneously; arouses to voice or touch; oriented x 4; follows commands; speech spontaneous, logical; purposeful motor response; behavior appropriate to situation]: WDL  Level of Consciousness: alert    Language Assistance  Preferred Language to Address Healthcare Preferred Language to Address Healthcare: English    Therapeutic Interventions      Bed Mobility  Bed Mobility Training Sit-to-Supine: supervsion;  1 person assist;  verbal cues;  cues for sequencing   Bed Mobility Training Supine-to-Sit: supervsion;  1 person assist;  verbal cues;  VC for sequencing   Bed Mobility Training Limitations: decreased endurance, generalized weakness;  decreased strength;  impaired balance    Sit-Stand Transfer Training  Transfer Training Sit-to-Stand Transfer: contact guard;  close supervision;  1 person assist;  No AD   Transfer Training Stand-to-Sit Transfer: contact guard;  close supervision ;  1 person assist;  No AD   Sit-to-Stand Transfer Training Transfer Safety Analysis: decreased weight-shifting ability;  decreased strength;  impaired balance;  slightly unsteady with no ep of LOB, generalized weakness    Gait Training  Gait Traininx30ft with 2 standing rest breaks;  contact guard;  1 person assist;  patient c/o SOB req rest breaks. Patient noted chest pain that intiated when amb back to room @ 7-8/10-- PA Srna informed;  rolling walker;  VC for improved posture   Gait Analysis: decreased weight-shifting ability;  decreased stride length;  decreased step length;  decreased pey[place];  pain;  patient c/o back pain, chest pain ; generalized weakness, slightly unsteady with no ep of LOB;  decreased strength;  impaired balance;  impaired motor control          PM&R Impression : as above    Current disposition plan recommendation :d/c home with home physical therapy

## 2024-04-05 NOTE — CHART NOTE - NSCHARTNOTEFT_GEN_A_CORE
Admitting Diagnosis:   Patient is a 68y old  Female who presents with a chief complaint of anemia, CHERRI (05 Apr 2024 07:47)      PAST MEDICAL & SURGICAL HISTORY:  Atherosclerosis of coronary artery  CAD (coronary artery disease)      Peripheral vascular disease  PVD (peripheral vascular disease)      Anemia  Anemia      Hypothyroidism  Hypothyroidism      Gastroesophageal reflux disease  GERD (gastroesophageal reflux disease)      Hyperlipidemia  Hyperlipidemia      Essential hypertension  HTN (hypertension)      Seizure      Anxiety      Depression, unspecified depression type      Chronic kidney disease, unspecified CKD stage      Status post aorto-coronary artery bypass graft  2012      Atherosclerosis of coronary artery bypass graft(s), unspecified, with angina pectoris with documented spasm      H/O aortic valve replacement  Bioprosthetic      Ruptured aortic aneurysm  surgery august 2020      Abdominal aortic aneurysm (AAA) without rupture  2015        Current Nutrition Order:  Regular/ DASH TLC   Ensure MAX 3cans x3/day 150kcal/30gmprot per 1 can     PO Intake: Good (%) [   ]  Fair (50-75%) [  ] Poor (<25%) [   ]--Please See below     GI Issues:   LBM per flow sheets 4/3+   Reports some nasuea and vomiting   MIRALAX Senna and Protonix are ordered   3/28 EGD: Normal esophagus. Normal duodenum. Erythema in the incisura of the stomach & antrum compatible with non-erosive gastritis.    Pain:  +Back Pain, pain medications are ordered     Skin Integrity:  Enzo 19, No Edema, No pressure ulcer - SX site noted           04-04-24 @ 07:01  -  04-05-24 @ 07:00  --------------------------------------------------------  IN: 0 mL / OUT: 950 mL / NET: -950 mL    04-05-24 @ 07:01  -  04-05-24 @ 11:56  --------------------------------------------------------  IN: 140 mL / OUT: 0 mL / NET: 140 mL        Labs:   04-05    134<L>  |  103  |  17  ----------------------------<  89  4.7   |  18<L>  |  1.79<H>    Ca    10.1      05 Apr 2024 05:30  Phos  3.6     04-05  Mg     2.4     04-05      CAPILLARY BLOOD GLUCOSE          Medications:  MEDICATIONS  (STANDING):  acetaminophen     Tablet .. 650 milliGRAM(s) Oral every 6 hours  apixaban 2.5 milliGRAM(s) Oral every 12 hours  aspirin enteric coated 81 milliGRAM(s) Oral daily  atorvastatin 40 milliGRAM(s) Oral at bedtime  carvedilol 6.25 milliGRAM(s) Oral every 12 hours  levETIRAcetam 250 milliGRAM(s) Oral two times a day  levothyroxine 75 MICROGram(s) Oral every 24 hours  lidocaine   4% Patch 1 Patch Transdermal daily  melatonin 5 milliGRAM(s) Oral at bedtime  NIFEdipine XL 30 milliGRAM(s) Oral two times a day  pantoprazole    Tablet 40 milliGRAM(s) Oral before breakfast  polyethylene glycol 3350 17 Gram(s) Oral two times a day  potassium phosphate / sodium phosphate Tablet (K-PHOS No. 2) 1 Tablet(s) Oral once  senna 2 Tablet(s) Oral at bedtime  sertraline 50 milliGRAM(s) Oral every 24 hours    MEDICATIONS  (PRN):  HYDROmorphone   Tablet 2 milliGRAM(s) Oral every 4 hours PRN Severe Pain (7 - 10)          5'6''  pounds +-10%   Wt 115 pounds BMI 18.6 %IBW=88     Weight Change:   4/5 100.3 pounds  4/3 99.2 pounds   4/1 97.6 pounds   3/31 98.1 pounds   3/29 113.9 pounds   3/28 122.1 pounds   -- Varying wts noted during admit, lasix use noted during admit, ? if i/s/o fluid shifts / in part d/t poor PO intake     [PCM]  -- Pt presents with severe wasting of the orbital and interosseous regions. Limited nutrition focused physical examination as pt in bed contracted.  -- Pt profile screen triggered for Malnutrition Screening Tool Score >2; reported wt loss of 14-23 pounds unintentionally.   -- Per progress notes, pt Has had very poor PO intake over the past few days 2/2 constipation. Pt however reported to RD not liking meals in house despite being a regular diet; Pt reports "Being told she is a picky eater." Assume meeting<75% EER consistently.     Estimated energy needs:   IBW for EER D/t varying wts in EMR at this time  Adjust for age, HF/CKD, Malnutrition - fluids per team  25-30kcal/kg: ~1500-1800kcal/day   1.25-1.5gm/kg: ~75-90gm prot/day     Subjective: 67yo F former smoker (quit 20 year ago) with PMH of hypothyroidism, seizure disorder, Stage 3 CKD (baseline CR ~1.7-2.0), severe renal artery stenosis, chronic anemia (baseline Hgb ~9-10, gets weekly Fe infusion last infusion 3/22/24), AT, Afib s/p AVN ablation and CRT-P (8/2022; on Eliquis), HTN, HLD, CAD s/p CABG, AVR (porcine bio-prosthetic valve) and mitral annuloplasty, HFpEF, AAA s/p open repair in 2015 with complications, PAD s/p L pSFA stent/L-TIFFANY stent/L-IIA stent who presents with intermittent chest pain and KRAFT x 3 days, admitted for symptomatic anemia and r/o PE. Course c/b status epilepticus with RRT called, stepped up to tele c/f GIB vs vasc dissection. Is now s/p EGD, cleared for AC. Stepped over to cardiac tele 3/28, with plans for vascular surgery vs CTSx interventions for AAA / low-flow low gradient severe AS. However CTSx plans for OP intervention as mortality rate currently too high 3/31. Cardiology consulted for Co-Management after transfer to Vascular service. Now s/p endovascular repair of aortic pseudoaneurysm/contained rupture, left hypogastric artery coil embolization, bilateral TIFFANY to CFA stenting, R CFA patch angioplasty 4/3. Pending D/C.     Pt on 5UR. Na low @ 134. Ordered for K-Phos Na-Phos. Reports PO intake remains limited i/s/o GI distress, reports N/V. Ordered for protonix. Current diet: Regular/DASH, ensure max 3 cans x3/day. Pt reports she has not gotten ensures as of yet - came up for the first time today, reports plan to try.   Please see below for nutritions recommendations. Paged team.     Prior PES: Malnutrition... severe related to pt condition as evidenced by significant muscle/fat wasting.  >>ongoing at this time  GOAL: Pt will meet 75% or more of protein/energy needs via most appropriate route for nutrition and reduce/resolve s/sx protein-calorie malnutrition.    Recommendations:  1. Change to Regular + Ensure Enlive x3/day 350kcal/20gm prot per 1 can.  - Monitor %PO intake. Encourage as able during meals.  2. Labs: monitor BMP, CBC, glucose, lytes - Replete PRN, trend renal indices, LFTs.  3. Pain/GI per team.  - Optimize as needed.  4. Monitor Skin, Wt, GOC.  5. MVI.  6. RD to remain available for additional nutrition interventions as needed.     Education: provided today 4/15: Tips for GI distress provided.     Risk Level: High [  XX ] Moderate [   ] Low [   ].

## 2024-04-06 NOTE — PROGRESS NOTE ADULT - SUBJECTIVE AND OBJECTIVE BOX
ON: YESENIA, VSS  Subjective:  Patient examined bedside, complaining mild belly pain that has been persistent for the last 5-6 months. Abdominal exam benign. Otherwise NAC.    ROS:   Denies Headache, blurred vision, Chest Pain, SOB, Abdominal pain, nausea or vomiting     Social   apixaban 2.5  aspirin enteric coated 81  carvedilol 12.5  NIFEdipine XL 30      Allergies    No Known Allergies    Intolerances        Vital Signs Last 24 Hrs  T(C): 36.5 (06 Apr 2024 08:21), Max: 36.7 (06 Apr 2024 05:01)  T(F): 97.7 (06 Apr 2024 08:21), Max: 98.1 (06 Apr 2024 05:01)  HR: 79 (06 Apr 2024 08:21) (74 - 80)  BP: 114/59 (06 Apr 2024 08:21) (114/59 - 164/74)  BP(mean): 91 (06 Apr 2024 05:01) (88 - 91)  RR: 18 (06 Apr 2024 08:21) (15 - 18)  SpO2: 100% (06 Apr 2024 08:21) (95% - 100%)    Parameters below as of 06 Apr 2024 08:21  Patient On (Oxygen Delivery Method): room air      I&O's Summary    05 Apr 2024 07:01  -  06 Apr 2024 07:00  --------------------------------------------------------  IN: 377 mL / OUT: 750 mL / NET: -373 mL          PHYSICAL EXAM:  General: NAD, pt resting comfortably in bed  Pulm: No respiratory distress, nonlabored breathing, on room air  CVS: NSR, HDS  Abd: Soft, NT, ND  Extremities: b/l groins with dermabond c/d/i/ b/l groins soft, no palpable hematoma. Motor and sensory grossly intact b/l  Pulses: biphasic DP/PT b/l      LABS:                        8.7    10.39 )-----------( 121      ( 06 Apr 2024 05:30 )             26.4     04-06    134<L>  |  102  |  24<H>  ----------------------------<  78  4.3   |  17<L>  |  1.68<H>    Ca    10.2      06 Apr 2024 05:30  Phos  3.3     04-06  Mg     2.4     04-06          Radiology and Additional Studies:    A/P:68-year-old F, former smoker (quit 20 year ago) with PMH HTN, HLD, CAD s/p CABG, AVR (porcine bio-prosthetic valve) and mitral annuloplasty, HFpEF, hypothyroidism, seizure disorder, stage 3 CKD (baseline CR ~1.7-2.0), severe renal artery stenosis, chronic anemia (baseline Hgb ~ 9-10, gets weekly iron infusion last infusion 3/22/24), AT, Afib s/p AVN ablation and CRT-P (8/2022; on Eliquis), AAA (s/p open repair in 2015 with complications, last repaired 2020), PAD s/p L pSFA stent/L-TIFFANY stent/L-IIA stent who presents with intermittent chest pain and KRAFT x 3 days, admitted for symptomatic anemia 03/25/24. Course complicated by status epilepticus (2 seizure events) 3/26/24 w/ RR called and pt started on Keppra as well as new focal dilatation of the distalmost aspect of the graft of prior open thoracoabdominal aortic bypass graft found on CT chest w/ Vascular surgery consulted. Patient w/ episode of melena on 3/27 w/ concern for GIB vs vascular dissection. Cardiology consulted for pre-op clearance for EGD, patient transferred to CCU for in-unit scope revealing normal esophagus and duodenum and non-erosive gastritis and has since been cleared for AC for Afib. Stepped down to cardiac tele on 03/28/24 for which discussion between Vascular Sx and CTSx discussed which intervention to proceed with first. Medicine team following for comanagement of constipation, pain management and FTT. Plan for TAVR as outpatient w/ Dr. Gregory. 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    Vascular/Enlarging AAA  - s/p endovascular repair of aortic pseudoaneurysm/contained rupture, left hypogastric artery coil embolization, bilateral TIFFANY to CFA stenting, R CFA patch angioplasty 4/3  - tony dc at [facility], pending TOV  - holding anticoagulation  - pending PT eval today    HFpEF  - Home regimen: Lasix 40mg twice weekly  - Holding Lasix periop  - appreciate cardiology recs    Chronic afib  - c/w carvedilol  - Holding home eliquis periop, will restart today  - appreciate cardiology recs    CAD  - c/w atorvastatin  - c/w carvedilol  - appreciate cardiology recs    HTN/HLD  - c/w atorvastatin  - c/w Nifedipine, carvedilol    Chronic pain:  - c/w APAP 650mg q6hrs standing dose  - Lidocaine patch q24hrs  - Oxy 2.5 q4 PRN moderate pain, oxy 5 q4 PRN severe pain    Seizure disorder:  - appreciate epilepsy recs  - c/w keppra 250mg bid    CHERRI on CKD:  - baseline Cr 1.7-2  - Monitor Cr with daily BMP  - holding home losartan and lasix    Hypothyroidism:  - c/w Levothyroxine 75 mcg qd    Hypercalcemia/Primary Hyperparathyroidism:  - Appreciate endocrine recs, patient to follow up outpatient  - Continue multiple myeloma w/u  - f/u SPEP/UPEP/immunofixation/Vit D 25 and 125 / PTH    Iron deficiency anemia/Melena:  - Appreciate GI recs  - s/p endoscopy with GI: normal esophagus and duodenum and non-erosive gastritis  - s/p 2u PRBC this admission  - c/w Protonix     Diet: DASH  Activity: ambulate as tolerated  DVT PPX: holding periop  Dispo:TREV

## 2024-04-06 NOTE — PROGRESS NOTE ADULT - SUBJECTIVE AND OBJECTIVE BOX
SUBJECTIVE:  Patient was seen and examined at bedside. Reports chronic abdominal pain, passing gas, denies SOB, no chest pain, denies other complaints. Telemetry reviewed     Review of systems: No fever, chills, dizziness, HA, Changes in vision, CP, dyspnea, nausea or vomiting, dysuria, changes in bowel movements, LE edema. Rest of 12 point Review of systems negative unless otherwise documented elsewhere in note.     Diet, Regular:   DASH/TLC Sodium & Cholesterol Restricted (DASH)  Supplement Feeding Modality:  Oral  Ensure Enlive Cans or Servings Per Day:  1       Frequency:  Three Times a day (04-05-24 @ 15:57) [Active]      MEDICATIONS:  MEDICATIONS  (STANDING):  acetaminophen     Tablet .. 650 milliGRAM(s) Oral every 6 hours  apixaban 2.5 milliGRAM(s) Oral every 12 hours  aspirin enteric coated 81 milliGRAM(s) Oral daily  atorvastatin 40 milliGRAM(s) Oral at bedtime  bisacodyl Suppository 10 milliGRAM(s) Rectal once  carvedilol 12.5 milliGRAM(s) Oral every 12 hours  levETIRAcetam 250 milliGRAM(s) Oral two times a day  levothyroxine 75 MICROGram(s) Oral every 24 hours  lidocaine   4% Patch 1 Patch Transdermal daily  melatonin 5 milliGRAM(s) Oral at bedtime  multivitamin 1 Tablet(s) Oral every 24 hours  NIFEdipine XL 30 milliGRAM(s) Oral two times a day  pantoprazole    Tablet 40 milliGRAM(s) Oral before breakfast  polyethylene glycol 3350 17 Gram(s) Oral two times a day  potassium phosphate / sodium phosphate Tablet (K-PHOS No. 2) 1 Tablet(s) Oral once  senna 2 Tablet(s) Oral at bedtime  sertraline 50 milliGRAM(s) Oral every 24 hours    MEDICATIONS  (PRN):  HYDROmorphone   Tablet 2 milliGRAM(s) Oral every 4 hours PRN Severe Pain (7 - 10)      Allergies    No Known Allergies    Intolerances        OBJECTIVE:  Vital Signs Last 24 Hrs  T(C): 36.5 (06 Apr 2024 08:21), Max: 36.7 (06 Apr 2024 05:01)  T(F): 97.7 (06 Apr 2024 08:21), Max: 98.1 (06 Apr 2024 05:01)  HR: 79 (06 Apr 2024 11:07) (74 - 80)  BP: 109/63 (06 Apr 2024 11:07) (109/63 - 164/74)  BP(mean): 91 (06 Apr 2024 05:01) (88 - 91)  RR: 18 (06 Apr 2024 11:07) (15 - 18)  SpO2: 97% (06 Apr 2024 11:07) (95% - 100%)    Parameters below as of 06 Apr 2024 11:07  Patient On (Oxygen Delivery Method): room air      I&O's Summary    05 Apr 2024 07:01  -  06 Apr 2024 07:00  --------------------------------------------------------  IN: 377 mL / OUT: 750 mL / NET: -373 mL        PHYSICAL EXAM:  General: AOX3, NAD, no labored breathing on RA, speaking in full sentences  HEENT: AT/NC, no facial asymmetry   Lungs: limited by patient's positioning, no crackles, no wheezes   Heart: + murmur   Abdomen: soft, no tenderness, difficult to assess BS  Extremities:  warm, no edema, no tenderness, no calf tenderness, no focal deficit       LABS:                        8.7    10.39 )-----------( 121      ( 06 Apr 2024 05:30 )             26.4     04-06    134<L>  |  102  |  24<H>  ----------------------------<  78  4.3   |  17<L>  |  1.68<H>    Ca    10.2      06 Apr 2024 05:30  Phos  3.3     04-06  Mg     2.4     04-06          CAPILLARY BLOOD GLUCOSE        Urinalysis Basic - ( 06 Apr 2024 05:30 )    Color: x / Appearance: x / SG: x / pH: x  Gluc: 78 mg/dL / Ketone: x  / Bili: x / Urobili: x   Blood: x / Protein: x / Nitrite: x   Leuk Esterase: x / RBC: x / WBC x   Sq Epi: x / Non Sq Epi: x / Bacteria: x        MICRODATA:      RADIOLOGY/OTHER STUDIES:

## 2024-04-06 NOTE — PROGRESS NOTE ADULT - ASSESSMENT
68F former smoker, PMHx CAD (s/p CABG), HFpEF (EF 51%), afib (s/p AVN ablation and CRT-P 8/2022), AVR (procine-bioprosthetic) and mitral annuloplasty, AAA (s/p open repair 2015), PAD (s/p stents), HTN, HLD, hypothyroidism, seizure d/o, CKD3 (b/l Cr 1.7-2.0), severe CARLI, chronic anemia initially admitted to medicine for symptomatic anemia. Course c/b status epilepticus with RRT called, stepped up to tele c/f GIB vs vasc dissection. s/p EGD. Stepped over to cardiac tele s/p aneurysm repain     Abdominal/Back Pain  Chronic pain  Patient with LLQ and L flank/paraspinal constant pain x 4 months. No  symptoms. Hx of L nephrectomy. Patient denies obstipation, no diarrhea,  no urinary/stool incontinence or retention  Continue pain management, patient on Hydromorphone po and lidocaine patch, bowel regimen. Imaging reviewed, bones wnl, patient s/p aneurysm repair  Will continue to monitor and manage as needed    Leucocytosis RESOLVED  Thrombocytopenia IMPROVING   Patient with leucocytosis post OR, denies new symptoms, afebrile. Possibly reactive, would continue to monitor  Patient noted with drop in PLT, improving this morning      Acute anemia   Presented with symptomatic anemia with Hgb 6.9, 10.4. S/p 2pRBC. S/p EGD revealing non-erosive gastritis.  Hb drop this am, Eliquis was started yesterday, no reported sign of bleeding. Continue to monitor   Active type and screen, target Hb 7-8    Afib s/p ablation  CAD s/p CABG  AS s/p prosthetic valve   Appreciate Cardiology, plan to follow-up with structural team as outpatient     Hypercalcemia  r/o MM   Appreciate Endocrinology  Follow-up as outpatient     Discussed with primary team

## 2024-04-07 NOTE — PROGRESS NOTE ADULT - ASSESSMENT
68F former smoker, PMHx CAD (s/p CABG), HFpEF (EF 51%), afib (s/p AVN ablation and CRT-P 8/2022), AVR (procine-bioprosthetic) and mitral annuloplasty, AAA (s/p open repair 2015), PAD (s/p stents), HTN, HLD, hypothyroidism, seizure d/o, CKD3 (b/l Cr 1.7-2.0), severe CARLI, chronic anemia initially admitted to medicine for symptomatic anemia. Course c/b status epilepticus with RRT called, stepped up to tele c/f GIB vs vasc dissection. s/p EGD. Stepped over to cardiac tele s/p aneurysm repain     Abdominal/Back Pain  Chronic pain  Patient with LLQ and L flank/paraspinal constant pain x 4 months. No  symptoms. Hx of L nephrectomy. Patient denies obstipation, no diarrhea,  no urinary/stool incontinence or retention  Continue pain management, patient on Hydromorphone po and lidocaine patch, bowel regimen. Imaging reviewed, bones wnl, patient s/p aneurysm repair  Will continue to monitor and manage as needed    Leucocytosis RESOLVED  Thrombocytopenia IMPROVING   Patient with leucocytosis post OR, denies new symptoms, afebrile. Possibly reactive, would continue to monitor  Patient noted with drop in PLT, improving this morning    Acute anemia   Presented with symptomatic anemia with Hgb 6.9, 10.4. S/p 2pRBC. S/p EGD revealing non-erosive gastritis.  Hb stable, continue to monitor     Afib s/p ablation  CAD s/p CABG  AS s/p prosthetic valve   Appreciate Cardiology, plan to follow-up with structural team as outpatient     Hypercalcemia  r/o MM   Appreciate Endocrinology  Follow-up as outpatient     Discussed with primary team          68F former smoker, PMHx CAD (s/p CABG), HFpEF (EF 51%), afib (s/p AVN ablation and CRT-P 8/2022), AVR (procine-bioprosthetic) and mitral annuloplasty, AAA (s/p open repair 2015), PAD (s/p stents), HTN, HLD, hypothyroidism, seizure d/o, CKD3 (b/l Cr 1.7-2.0), severe CARLI, chronic anemia initially admitted to medicine for symptomatic anemia. Course c/b status epilepticus with RRT called, stepped up to tele c/f GIB vs vasc dissection. s/p EGD. Stepped over to cardiac tele s/p aneurysm repain     Abdominal/Back Pain  Chronic pain  Patient with LLQ and L flank/paraspinal constant pain x 4 months. No  symptoms. Hx of L nephrectomy. Patient denies obstipation, no diarrhea,  no urinary/stool incontinence or retention  Continue pain management, patient on Hydromorphone po and lidocaine patch, bowel regimen. Imaging reviewed, bones wnl, patient s/p aneurysm repair  Will continue to monitor and manage as needed    Leucocytosis RESOLVED  Thrombocytopenia IMPROVING   Patient with leucocytosis post OR, denies new symptoms, afebrile. Possibly reactive, would continue to monitor  Patient noted with drop in PLT, improving this morning    Acute anemia   Presented with symptomatic anemia with Hgb 6.9, 10.4. S/p 2pRBC. S/p EGD revealing non-erosive gastritis.  Hb stable, continue to monitor     Afib s/p ablation  CAD s/p CABG  AS s/p prosthetic valve   Appreciate Cardiology, plan to follow-up with structural team as outpatient     Hypercalcemia  r/o MM   Appreciate Endocrinology  Follow-up as outpatient     Hyponatremia  some degree of hypovolemia, however patient also with report of pain.  Get urine lytes, trend Na. Will manage based on results    DVT ppx    Discussed with primary team

## 2024-04-07 NOTE — PROGRESS NOTE ADULT - ASSESSMENT
68-year-old F, former smoker (quit 20 year ago) with PMH HTN, HLD, CAD s/p CABG, AVR (porcine bio-prosthetic valve) and mitral annuloplasty, HFpEF, hypothyroidism, seizure disorder, stage 3 CKD (baseline CR ~1.7-2.0), severe renal artery stenosis, chronic anemia (baseline Hgb ~ 9-10, gets weekly iron infusion last infusion 3/22/24), AT, Afib s/p AVN ablation and CRT-P (8/2022; on Eliquis), AAA (s/p open repair in 2015 with complications, last repaired 2020), PAD s/p L pSFA stent/L-TIFFANY stent/L-IIA stent who presents with intermittent chest pain and KRAFT x 3 days, admitted for symptomatic anemia 03/25/24. Course complicated by status epilepticus (2 seizure events) 3/26/24 w/ RR called and pt started on Keppra as well as new focal dilatation of the distalmost aspect of the graft of prior open thoracoabdominal aortic bypass graft found on CT chest w/ Vascular surgery consulted. Patient w/ episode of melena on 3/27 w/ concern for GIB vs vascular dissection. Cardiology consulted for pre-op clearance for EGD, patient transferred to CCU for in-unit scope revealing normal esophagus and duodenum and non-erosive gastritis and has since been cleared for AC for Afib. Stepped down to cardiac tele on 03/28/24 for which discussion between Vascular Sx and CTSx discussed which intervention to proceed with first. Medicine team following for comanagement of constipation, pain management and FTT. Plan for TAVR as outpatient w/ Dr. Gregory. 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    Vascular/Enlarging AAA  - s/p endovascular repair of aortic pseudoaneurysm/contained rupture, left hypogastric artery coil embolization, bilateral TIFFANY to CFA stenting, R CFA patch angioplasty 4/3  - holding anticoagulation    HFpEF  - Home regimen: Lasix 40mg twice weekly  - Holding Lasix periop  - appreciate cardiology recs    Chronic afib  - c/w carvedilol  - Holding home eliquis periop, will restart today  - appreciate cardiology recs    CAD  - c/w atorvastatin  - c/w carvedilol  - appreciate cardiology recs    HTN/HLD  - c/w atorvastatin  - c/w Nifedipine, carvedilol    Chronic pain:  - c/w APAP 650mg q6hrs standing dose, increased ot 975mg Q6hrs  - Lidocaine patch q24hrs  - Oxy 2.5 q4 PRN moderate pain, oxy 5 q4 PRN severe pain    Seizure disorder:  - appreciate epilepsy recs  - c/w keppra 250mg bid    CHERRI on CKD:  - baseline Cr 1.7-2  - Monitor Cr with daily BMP  - holding home losartan and lasix    Hypothyroidism:  - c/w Levothyroxine 75 mcg qd    Hypercalcemia/Primary Hyperparathyroidism:  - Appreciate endocrine recs, patient to follow up outpatient  - Continue multiple myeloma w/u  - f/u SPEP/UPEP/immunofixation/Vit D 25 and 125 / PTH    Iron deficiency anemia/Melena:  - Appreciate GI recs  - s/p endoscopy with GI: normal esophagus and duodenum and non-erosive gastritis  - s/p 2u PRBC this admission  - c/w Protonix     Diet: DASH  Activity: ambulate as tolerated  DVT PPX: holding periop  Dispo:TREV

## 2024-04-07 NOTE — PROGRESS NOTE ADULT - SUBJECTIVE AND OBJECTIVE BOX
SUBJECTIVE:  Patient was seen and examined at bedside. Reports feeling at  baseline, still with some abdominal discomfort, having BMs. Denies other complaints, no events reported. Telemetry reviewed     Review of systems: No fever, chills, dizziness, HA, Changes in vision, CP, dyspnea, nausea or vomiting, dysuria, changes in bowel movements, LE edema. Rest of 12 point Review of systems negative unless otherwise documented elsewhere in note.     Diet, Regular:   DASH/TLC Sodium & Cholesterol Restricted (DASH)  Supplement Feeding Modality:  Oral  Ensure Enlive Cans or Servings Per Day:  1       Frequency:  Three Times a day (04-05-24 @ 15:57) [Active]      MEDICATIONS:  MEDICATIONS  (STANDING):  acetaminophen     Tablet .. 650 milliGRAM(s) Oral every 6 hours  apixaban 2.5 milliGRAM(s) Oral every 12 hours  aspirin enteric coated 81 milliGRAM(s) Oral daily  atorvastatin 40 milliGRAM(s) Oral at bedtime  carvedilol 12.5 milliGRAM(s) Oral every 12 hours  levETIRAcetam 250 milliGRAM(s) Oral two times a day  levothyroxine 75 MICROGram(s) Oral every 24 hours  lidocaine   4% Patch 1 Patch Transdermal daily  melatonin 5 milliGRAM(s) Oral at bedtime  multivitamin 1 Tablet(s) Oral every 24 hours  NIFEdipine XL 30 milliGRAM(s) Oral two times a day  pantoprazole    Tablet 40 milliGRAM(s) Oral before breakfast  polyethylene glycol 3350 17 Gram(s) Oral two times a day  potassium phosphate / sodium phosphate Tablet (K-PHOS No. 2) 1 Tablet(s) Oral once  senna 2 Tablet(s) Oral at bedtime  sertraline 50 milliGRAM(s) Oral every 24 hours    MEDICATIONS  (PRN):  HYDROmorphone   Tablet 2 milliGRAM(s) Oral every 4 hours PRN Severe Pain (7 - 10)      Allergies    No Known Allergies    Intolerances        OBJECTIVE:  Vital Signs Last 24 Hrs  T(C): 36.2 (07 Apr 2024 06:26), Max: 36.6 (06 Apr 2024 21:15)  T(F): 97.2 (07 Apr 2024 06:26), Max: 97.8 (06 Apr 2024 21:15)  HR: 75 (07 Apr 2024 08:27) (74 - 80)  BP: 140/87 (07 Apr 2024 08:27) (109/63 - 140/87)  BP(mean): 93 (07 Apr 2024 06:26) (81 - 93)  RR: 18 (07 Apr 2024 08:27) (18 - 18)  SpO2: 98% (07 Apr 2024 08:27) (97% - 100%)    Parameters below as of 07 Apr 2024 08:27  Patient On (Oxygen Delivery Method): room air      I&O's Summary    06 Apr 2024 07:01  -  07 Apr 2024 07:00  --------------------------------------------------------  IN: 0 mL / OUT: 530 mL / NET: -530 mL    07 Apr 2024 07:01  -  07 Apr 2024 10:05  --------------------------------------------------------  IN: 120 mL / OUT: 0 mL / NET: 120 mL        PHYSICAL EXAM:  General: AOX3, NAD, no labored breathing on RA, speaking in full sentences  HEENT: AT/NC, no facial asymmetry   Lungs: no crackles, no wheezes   Heart: regular + murmur   Abdomen: soft, no tenderness, difficult to assess BS  Extremities:  warm, no edema, no tenderness, no calf tenderness, no focal deficit       LABS:                        9.2    8.65  )-----------( 146      ( 07 Apr 2024 05:30 )             28.8     04-07    133<L>  |  100  |  25<H>  ----------------------------<  124<H>  4.1   |  20<L>  |  1.48<H>    Ca    10.7<H>      07 Apr 2024 05:30  Phos  2.1     04-07  Mg     2.4     04-07          CAPILLARY BLOOD GLUCOSE        Urinalysis Basic - ( 07 Apr 2024 05:30 )    Color: x / Appearance: x / SG: x / pH: x  Gluc: 124 mg/dL / Ketone: x  / Bili: x / Urobili: x   Blood: x / Protein: x / Nitrite: x   Leuk Esterase: x / RBC: x / WBC x   Sq Epi: x / Non Sq Epi: x / Bacteria: x        MICRODATA:      RADIOLOGY/OTHER STUDIES:

## 2024-04-07 NOTE — PROGRESS NOTE ADULT - SUBJECTIVE AND OBJECTIVE BOX
Patient is a 68y old  Female who presents with a chief complaint of anemia, CHERRI (07 Apr 2024 10:04)    S: patient still complaining of pain from incision.  BM today.      MEDICATIONS  (STANDING):  acetaminophen     Tablet .. 650 milliGRAM(s) Oral every 6 hours  apixaban 2.5 milliGRAM(s) Oral every 12 hours  aspirin enteric coated 81 milliGRAM(s) Oral daily  atorvastatin 40 milliGRAM(s) Oral at bedtime  carvedilol 12.5 milliGRAM(s) Oral every 12 hours  levETIRAcetam 250 milliGRAM(s) Oral two times a day  levothyroxine 75 MICROGram(s) Oral every 24 hours  lidocaine   4% Patch 1 Patch Transdermal daily  melatonin 5 milliGRAM(s) Oral at bedtime  multivitamin 1 Tablet(s) Oral every 24 hours  NIFEdipine XL 30 milliGRAM(s) Oral two times a day  pantoprazole    Tablet 40 milliGRAM(s) Oral before breakfast  polyethylene glycol 3350 17 Gram(s) Oral two times a day  potassium phosphate / sodium phosphate Tablet (K-PHOS No. 2) 1 Tablet(s) Oral once  senna 2 Tablet(s) Oral at bedtime  sertraline 50 milliGRAM(s) Oral every 24 hours    MEDICATIONS  (PRN):  HYDROmorphone   Tablet 2 milliGRAM(s) Oral every 4 hours PRN Severe Pain (7 - 10)      Allergies    No Known Allergies    Intolerances          Vital Signs Last 24 Hrs  T(C): 36.6 (07 Apr 2024 11:23), Max: 36.6 (06 Apr 2024 21:15)  T(F): 97.8 (07 Apr 2024 11:23), Max: 97.8 (06 Apr 2024 21:15)  HR: 75 (07 Apr 2024 11:23) (74 - 80)  BP: 102/59 (07 Apr 2024 11:23) (102/59 - 140/87)  BP(mean): 93 (07 Apr 2024 06:26) (81 - 93)  RR: 18 (07 Apr 2024 11:23) (18 - 18)  SpO2: 97% (07 Apr 2024 11:23) (97% - 100%)    Parameters below as of 07 Apr 2024 11:23  Patient On (Oxygen Delivery Method): room air      CAPILLARY BLOOD GLUCOSE          04-06 @ 07:01  -  04-07 @ 07:00  --------------------------------------------------------  IN: 0 mL / OUT: 530 mL / NET: -530 mL    04-07 @ 07:01  - 04-07 @ 14:53  --------------------------------------------------------  IN: 300 mL / OUT: 200 mL / NET: 100 mL        Physical Exam:    General: NAD, pt resting comfortably in bed  Pulm: No respiratory distress, nonlabored breathing, on room air  CVS: NSR, HDS  Abd: Soft, NT, ND  Extremities: b/l groins with dermabond c/d/i/ b/l groins soft, no palpable hematoma. Motor and sensory grossly intact b/l  Pulses: biphasic DP/PT b/l      LABS:                        9.2    8.65  )-----------( 146      ( 07 Apr 2024 05:30 )             28.8     04-07    133<L>  |  100  |  25<H>  ----------------------------<  124<H>  4.1   |  20<L>  |  1.48<H>    Ca    10.7<H>      07 Apr 2024 05:30  Phos  2.1     04-07  Mg     2.4     04-07        Urinalysis Basic - ( 07 Apr 2024 05:30 )    Color: x / Appearance: x / SG: x / pH: x  Gluc: 124 mg/dL / Ketone: x  / Bili: x / Urobili: x   Blood: x / Protein: x / Nitrite: x   Leuk Esterase: x / RBC: x / WBC x   Sq Epi: x / Non Sq Epi: x / Bacteria: x          ---------------------------------------------------------------------------  PLEASE CHECK WHEN PRESENT:     [  ] Heart Failure     [  ] Acute     [  ] Acute on Chronic     [  ] Chronic  -------------------------------------------------------------------     [  ]Diastolic [HFpEF]     [  ]Systolic [HFrEF]     [  ]Combined [HFpEF & HFrEF]     [  ] afib     [  ] hypertensive heart disease     [  ]Other:  -------------------------------------------------------------------  [ ] Respiratory failure  [ ] Acute cor pulmonale  [ ] Asthma/COPD Exacerbation  [ ] Pleural effusion  [ ] Aspiration pneumonia  -------------------------------------------------------------------  [  ]CHERRI     [  ]ATN     [  ]Reneal Medullary Necrosis     [  ]Renal Cortical Necrosis     [  ]Other Pathological Lesions:    [  ]CKD 1  [  ]CKD 2  [  ]CKD 3  [  ]CKD 4  [  ]CKD 5  [  ]Other  -------------------------------------------------------------------  [  ]Diabetes  [  ] Diabetic PVD Ulcer  [  ] Neuropathic ulcer to DM  [  ] Diabetes with Nephropathy  [  ] Osteomyelitis due to diabetes  [ ] Hyperglycemia  [ ] Hypoglycemia  --------------------------------------------------------------------  [  ]Malnutrition: See Nutrition Note  [  ]Cachexia  [  ]Other:   [  ]Supplement Ordered:  [  ]Morbid Obesity (BMI >=40]  ---------------------------------------------------------------------  [ ] Sepsis/severe sepsis/septic shock  [ ] UTI  [ ] Pneumonia  -----------------------------------------------------------------------  [ ] Acidosis/alkalosis  [ ] Fluid overload  [ ] Hypokalemia  [ ] Hyperkalemia  [ ] Hypomagnesemia  [ ] Hypophosphatemia  [ ] Hyperphosphatemia  [ ] Hyponatremia  [ ] Hypernatremia  ------------------------------------------------------------------------  [ ] Acute blood loss anemia  [ ] Post op blood loss anemia  [ ] Iron deficiency anemia  [ ] Anemia due to chronic disease  [ ] Hypercoagulable state  [ ] Leukocytosis  ----------------------------------------------------------------------  [ ] Cerebral infarction  [ ] Transient ischemia attack  [ ] Encephalopathy

## 2024-04-08 NOTE — PROGRESS NOTE ADULT - TIME BILLING
Bedside exam and interview   Reviewed vitals, labs   Discussed patient's plan of care with primary team   Documentation of encounter
As above
As above
Review of hospital course, labs, vitals, medical records.   Bedside exam and interview   Discussed plan of care with primary team ACP and housestaff   Documenting the encounter
med rec performed  labs reviewed  2 sets of rounds  d/w cardiology
Bedside exam and interview    Review of hospital course, labs, vitals, imaging ,  medical records.   Reviewing outside records   Discharge planning on Interdisciplinary rounds   Discussion with consultants and Primary team   Documenting the encounter.

## 2024-04-08 NOTE — PROGRESS NOTE ADULT - ASSESSMENT
I M     68 y o F former smoker, PMHx CAD (s/p CABG), HFpEF (EF 51%), afib (s/p AVN ablation and CRT-P 8/2022), AVR (procine-bioprosthetic) and mitral annuloplasty, AAA (s/p open repair 2015), PAD (s/p stents), HTN, HLD, hypothyroidism, seizure d/o, CKD3 (b/l Cr 1.7-2.0), severe CARLI, chronic anemia initially admitted to medicine for symptomatic anemia. Course c/b status epilepticus with RRT called, stepped up to tele c/f GIB vs vasc dissection. s/p EGD. Stepped over to cardiac tele s/p aneurysm repain     Abdominal/Back Pain  Chronic pain  Patient with LLQ and L flank/paraspinal constant pain x 4 months. No  symptoms. Hx of L nephrectomy. Patient denies obstipation, no diarrhea,  no urinary/stool incontinence or retention  Continue pain management, patient on Hydromorphone po and lidocaine patch, bowel regimen. Imaging reviewed, bones wnl, patient s/p aneurysm repair  Will continue to monitor and manage as needed    Leucocytosis RESOLVED  Thrombocytopenia IMPROVING   Patient with leucocytosis post OR, denies new symptoms, afebrile. Possibly reactive, would continue to monitor  Patient noted with drop in PLT, improving this morning    Acute anemia   Presented with symptomatic anemia with Hgb 6.9, 10.4. S/p 2pRBC. S/p EGD revealing non-erosive gastritis.  Hb stable, continue to monitor     Afib s/p ablation  CAD s/p CABG  AS s/p prosthetic valve   Appreciate Cardiology, plan to follow-up with structural team as outpatient     Hypercalcemia  r/o MM   Appreciate Endocrinology  Follow-up as outpatient     Hyponatremia  some degree of hypovolemia, however patient also with report of pain.  Get urine lytes, trend Na. Will manage based on results    DVT ppx    Discussed with primary team

## 2024-04-08 NOTE — CHART NOTE - NSCHARTNOTEFT_GEN_A_CORE
Chart reviewed patient. Given relative hypotension in the morning, recommend decreasing nifedipine from 30mg BID to 30mg QD. Would continue remainder of cardiac medications as is.   Patient to be discharged today and will follow up with cardiology and structural.       Recommendations conveyed to primary team  Deb Rodarte Cardiology Fellow

## 2024-04-08 NOTE — PROGRESS NOTE ADULT - SUBJECTIVE AND OBJECTIVE BOX
INTERVAL EVENTS:    PAST MEDICAL & SURGICAL HISTORY:  Atherosclerosis of coronary artery  CAD (coronary artery disease)    Peripheral vascular disease  PVD (peripheral vascular disease)    Anemia  Anemia    Hypothyroidism  Hypothyroidism    Gastroesophageal reflux disease  GERD (gastroesophageal reflux disease)    Hyperlipidemia  Hyperlipidemia    Essential hypertension  HTN (hypertension)    Seizure    Anxiety    Depression, unspecified depression type    Chronic kidney disease, unspecified CKD stage    Status post aorto-coronary artery bypass graft  2012    Status post percutaneous transluminal coronary angioplasty  X 2    Atherosclerosis of coronary artery bypass graft(s), unspecified, with angina pectoris with documented spasm    H/O aortic valve replacement  Bioprosthetic    Ruptured aortic aneurysm  surgery august 2020    Abdominal aortic aneurysm (AAA) without rupture  2015        MEDICATIONS  (STANDING):  acetaminophen     Tablet .. 975 milliGRAM(s) Oral every 6 hours  apixaban 2.5 milliGRAM(s) Oral every 12 hours  aspirin enteric coated 81 milliGRAM(s) Oral daily  atorvastatin 40 milliGRAM(s) Oral at bedtime  carvedilol 12.5 milliGRAM(s) Oral every 12 hours  levETIRAcetam 250 milliGRAM(s) Oral two times a day  levothyroxine 75 MICROGram(s) Oral every 24 hours  lidocaine   4% Patch 1 Patch Transdermal daily  melatonin 5 milliGRAM(s) Oral at bedtime  multivitamin 1 Tablet(s) Oral every 24 hours  NIFEdipine XL 30 milliGRAM(s) Oral two times a day  pantoprazole    Tablet 40 milliGRAM(s) Oral before breakfast  polyethylene glycol 3350 17 Gram(s) Oral two times a day  potassium phosphate / sodium phosphate Tablet (K-PHOS No. 2) 1 Tablet(s) Oral once  senna 2 Tablet(s) Oral at bedtime  sertraline 50 milliGRAM(s) Oral every 24 hours    MEDICATIONS  (PRN):  HYDROmorphone   Tablet 2 milliGRAM(s) Oral every 4 hours PRN Severe Pain (7 - 10)    T(F): 97.4 (04-08-24 @ 05:15), Max: 98.5 (04-07-24 @ 20:55)  HR: 75 (04-08-24 @ 05:15) (75 - 75)  BP: 123/69 (04-08-24 @ 05:15) (102/59 - 130/66)  BP(mean): 91 (04-08-24 @ 05:15) (81 - 92)  ABP: --  ABP(mean): --  RR: 20 (04-08-24 @ 05:15) (15 - 20)  SpO2: 98% (04-08-24 @ 05:15) (97% - 99%)    I/O Detail 24H 07 Apr 2024 07:01  -  08 Apr 2024 07:00  --------------------------------------------------------  IN:    Oral Fluid: 300 mL  Total IN: 300 mL    OUT:    Voided (mL): 550 mL  Total OUT: 550 mL    Total NET: -250 mL          PHYSICAL EXAM:  GEN: NAD  HEENT: EOMI   RESP: CTA b/l  CV: RRR. Normal S1/S2. No m/r/g.  ABD: soft, non-distended  EXT: No edema   NEURO: alert and attentive    LABS:  CBC 04-07-24 @ 05:30                        9.2    8.65  )-----------( 146                   28.8       Hgb trend: 9.2 <-- , 8.7 <--   WBC trend: 8.65 <-- , 10.39 <--       CMP 04-07-24 @ 05:30    133<L>  |  100  |  25<H>  ----------------------------<  124<H>  4.1   |  20<L>  |  1.48<H>    Phos  2.1     04-07  Mg     2.4     04-07        Serum Cr trend: 1.48 <-- , 1.68 <--         Cardiac Markers           STUDIES:

## 2024-04-08 NOTE — CHART NOTE - NSCHARTNOTESELECT_GEN_ALL_CORE
Event Note
Gastroenterology - EGD
Nutrition Services
Nutrition Services
Rapid Response/Event Note
witnessed seizure/Event Note
Gastroenterology - ERCP
Off Service Note
Vascular Surgery/Event Note

## 2024-04-08 NOTE — PROGRESS NOTE ADULT - ASSESSMENT
Assessment and Plan:   	  68-year-old F, former smoker (quit 20 year ago) with PMH HTN, HLD, CAD s/p CABG, AVR (porcine bio-prosthetic valve) and mitral annuloplasty, HFpEF, hypothyroidism, seizure disorder, stage 3 CKD (baseline CR ~1.7-2.0), severe renal artery stenosis, chronic anemia (baseline Hgb ~ 9-10, gets weekly iron infusion last infusion 3/22/24), AT, Afib s/p AVN ablation and CRT-P (8/2022; on Eliquis), AAA (s/p open repair in 2015 with complications, last repaired 2020), PAD s/p L pSFA stent/L-TIFFANY stent/L-IIA stent who presents with intermittent chest pain and KRAFT x 3 days, admitted for symptomatic anemia 03/25/24. Course complicated by status epilepticus (2 seizure events) 3/26/24 w/ RR called and pt started on Keppra as well as new focal dilatation of the distalmost aspect of the graft of prior open thoracoabdominal aortic bypass graft found on CT chest w/ Vascular surgery consulted. Patient w/ episode of melena on 3/27 w/ concern for GIB vs vascular dissection. Cardiology consulted for pre-op clearance for EGD, patient transferred to CCU for in-unit scope revealing normal esophagus and duodenum and non-erosive gastritis and has since been cleared for AC for Afib. Stepped down to cardiac tele on 03/28/24 for which discussion between Vascular Sx and CTSx discussed which intervention to proceed with first. Medicine team following for comanagement of constipation, pain management and FTT. Plan for TAVR as outpatient w/ Dr. Gregory. 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    Vascular/Enlarging AAA  - s/p endovascular repair of aortic pseudoaneurysm/contained rupture, left hypogastric artery coil embolization, bilateral TIFFANY to CFA stenting, R CFA patch angioplasty 4/3  - holding anticoagulation    HFpEF  - Home regimen: Lasix 40mg twice weekly  - Holding Lasix periop  - appreciate cardiology recs    Chronic afib  - c/w carvedilol  - Holding home eliquis periop, will restart today  - appreciate cardiology recs    CAD  - c/w atorvastatin  - c/w carvedilol  - appreciate cardiology recs    HTN/HLD  - c/w atorvastatin  - c/w Nifedipine, carvedilol    Chronic pain:  - c/w APAP 650mg q6hrs standing dose, increased ot 975mg Q6hrs  - Lidocaine patch q24hrs  - Oxy 2.5 q4 PRN moderate pain, oxy 5 q4 PRN severe pain    Seizure disorder:  - appreciate epilepsy recs  - c/w keppra 250mg bid    CHERRI on CKD:  - baseline Cr 1.7-2  - Monitor Cr with daily BMP  - holding home losartan and lasix    Hypothyroidism:  - c/w Levothyroxine 75 mcg qd    Hypercalcemia/Primary Hyperparathyroidism:  - Appreciate endocrine recs, patient to follow up outpatient  - Continue multiple myeloma w/u  - f/u SPEP/UPEP/immunofixation/Vit D 25 and 125 / PTH    Iron deficiency anemia/Melena:  - Appreciate GI recs  - s/p endoscopy with GI: normal esophagus and duodenum and non-erosive gastritis  - s/p 2u PRBC this admission  - c/w Protonix     Diet: DASH  Activity: ambulate as tolerated  DVT PPX: holding periop  Dispo:TREV

## 2024-04-08 NOTE — PROGRESS NOTE ADULT - SUBJECTIVE AND OBJECTIVE BOX
Patient is a 68y old  Female who presents with a chief complaint of anemia, CHERRI (08 Apr 2024 12:01)    INTERVAL EVENTS:  tolerating diet   no nausea   no dysuria   not constipated     SUBJECTIVE:  Patient was seen and examined at bedside.  Review of systems: No CP, dyspnea, nausea or vomiting, dysuria, LE edema.     Diet, Regular:   DASH/TLC Sodium & Cholesterol Restricted (DASH)  Supplement Feeding Modality:  Oral  Ensure Enlive Cans or Servings Per Day:  1       Frequency:  Three Times a day (04-05-24 @ 15:57) [Active]      MEDICATIONS:  MEDICATIONS  (STANDING):  acetaminophen     Tablet .. 975 milliGRAM(s) Oral every 6 hours  apixaban 2.5 milliGRAM(s) Oral every 12 hours  aspirin enteric coated 81 milliGRAM(s) Oral daily  atorvastatin 40 milliGRAM(s) Oral at bedtime  carvedilol 12.5 milliGRAM(s) Oral every 12 hours  levETIRAcetam 250 milliGRAM(s) Oral two times a day  levothyroxine 75 MICROGram(s) Oral every 24 hours  lidocaine   4% Patch 1 Patch Transdermal daily  melatonin 5 milliGRAM(s) Oral at bedtime  multivitamin 1 Tablet(s) Oral every 24 hours  NIFEdipine XL 30 milliGRAM(s) Oral daily  pantoprazole    Tablet 40 milliGRAM(s) Oral before breakfast  polyethylene glycol 3350 17 Gram(s) Oral two times a day  potassium phosphate / sodium phosphate Tablet (K-PHOS No. 2) 1 Tablet(s) Oral once  senna 2 Tablet(s) Oral at bedtime  sertraline 50 milliGRAM(s) Oral every 24 hours    MEDICATIONS  (PRN):  HYDROmorphone   Tablet 2 milliGRAM(s) Oral every 4 hours PRN Severe Pain (7 - 10)      Allergies    No Known Allergies    Intolerances    OBJECTIVE:  Vital Signs Last 24 Hrs  T(C): 36.7 (08 Apr 2024 20:09), Max: 36.7 (08 Apr 2024 20:09)  T(F): 98 (08 Apr 2024 20:09), Max: 98 (08 Apr 2024 20:09)  HR: 74 (08 Apr 2024 20:09) (74 - 75)  BP: 122/67 (08 Apr 2024 20:09) (98/58 - 125/70)  BP(mean): 89 (08 Apr 2024 20:09) (73 - 92)  RR: 18 (08 Apr 2024 20:09) (15 - 20)  SpO2: 100% (08 Apr 2024 20:09) (98% - 100%)    Parameters below as of 08 Apr 2024 20:09  Patient On (Oxygen Delivery Method): room air      I&O's Summary    07 Apr 2024 07:01  -  08 Apr 2024 07:00  --------------------------------------------------------  IN: 300 mL / OUT: 550 mL / NET: -250 mL    08 Apr 2024 07:01  -  08 Apr 2024 23:44  --------------------------------------------------------  IN: 540 mL / OUT: 250 mL / NET: 290 mL    PHYSICAL EXAM:  Gen: Reclining in bed at time of exam, appears stated age  HEENT:  MMM, clear OP  Neck: supple, trachea at midline  CV: RRR, +S1/S2  Pulm: adequate respiratory effort, no increase in work of breathing  Abd: soft, ND  Skin: warm and dry,   Neuro: AOx3, speaking in full sentences  Psych: affect and behavior appropriate    LABS:                        9.2    8.65  )-----------( 146      ( 07 Apr 2024 05:30 )             28.8     04-07    133<L>  |  100  |  25<H>  ----------------------------<  124<H>  4.1   |  20<L>  |  1.48<H>    Ca    10.7<H>      07 Apr 2024 05:30  Phos  2.1     04-07  Mg     2.4     04-07          CAPILLARY BLOOD GLUCOSE        Urinalysis Basic - ( 07 Apr 2024 05:30 )    Color: x / Appearance: x / SG: x / pH: x  Gluc: 124 mg/dL / Ketone: x  / Bili: x / Urobili: x   Blood: x / Protein: x / Nitrite: x   Leuk Esterase: x / RBC: x / WBC x   Sq Epi: x / Non Sq Epi: x / Bacteria: x        MICRODATA:      RADIOLOGY/OTHER STUDIES:

## 2024-04-08 NOTE — PROGRESS NOTE ADULT - ASSESSMENT
68F former smoker, PMHx CAD (s/p CABG), HFpEF (EF 51%), afib (s/p AVN ablation and CRT-P 8/2022), AVR (procine-bioprosthetic) and mitral annuloplasty, AAA (s/p open repair 2015), PAD (s/p stents), HTN, HLD, hypothyroidism, seizure d/o, CKD3 (b/l Cr 1.7-2.0), severe CARLI, chronic anemia initially admitted to medicine for symptomatic anemia. Course c/b status epilepticus with RRT called, stepped up to tele c/f GIB vs vasc dissection. s/p EGD. Stepped over to cardiac tele s/p aneurysm repair    # Abdominal/Back Pain  # Chronic pain  Patient with LLQ and L flank/paraspinal constant pain x 4 months. No  symptoms. Hx of L nephrectomy. Patient denies obstipation, no diarrhea,  no urinary/stool incontinence or retention  Continue pain management, patient on Hydromorphone po and lidocaine patch, bowel regimen. Imaging reviewed, bones wnl, patient s/p aneurysm repair  Will continue to monitor and manage as needed    #Leucocytosis RESOLVED  #Thrombocytopenia IMPROVING   Patient with leucocytosis post OR, denies new symptoms, afebrile. Possibly reactive, would continue to monitor  Patient noted with decrease in Platelets post op, platelet counts improving     # Acute anemia   Presented with symptomatic anemia with Hgb 6.9, 10.4. S/p 2pRBC. S/p EGD revealing non-erosive gastritis.  Hb now stable, continue to monitor     # Afib s/p ablation  # CAD s/p CABG  AS s/p prosthetic valve   Appreciate Cardiology, plan to follow-up with structural team as outpatient     # Hypercalcemia  r/o MM   Appreciate Endocrinology recs  Follow-up as outpatient for ultrasound of parathyroid glands - counseled patient on recommendation for outpatient US of parathyroids, endocrine f/u     Hyponatremia  some degree of hypovolemia, however patient also with report of pain.  Get urine lytes, trend Na. Will manage based on results    DVT ppx - already on apixaban 2.5mg q12              68F former smoker, PMHx CAD (s/p CABG), HFpEF (EF 51%), afib (s/p AVN ablation and CRT-P 8/2022), AVR (procine-bioprosthetic) and mitral annuloplasty, AAA (s/p open repair 2015), PAD (s/p stents), HTN, HLD, hypothyroidism, seizure d/o, CKD3 (b/l Cr 1.7-2.0), severe CARLI, chronic anemia initially admitted to medicine for symptomatic anemia. Course c/b status epilepticus with RRT called, stepped up to tele c/f GIB vs vasc dissection. s/p EGD. Stepped over to cardiac tele s/p aneurysm repair    # Abdominal/Back Pain  # Chronic pain  Patient with LLQ and L flank/paraspinal constant pain x 4 months. No  symptoms. Hx of L nephrectomy. Patient denies obstipation, no diarrhea,  no urinary/stool incontinence or retention  Continue pain management, patient on Hydromorphone po and lidocaine patch, bowel regimen. Imaging reviewed, bones wnl, patient s/p aneurysm repair  Will continue to monitor and manage as needed    #Leucocytosis RESOLVED  #Thrombocytopenia IMPROVING   Patient with leucocytosis post OR, denies new symptoms, afebrile. Possibly reactive, would continue to monitor  Patient noted with decrease in Platelets post op, platelet counts improving     # Acute anemia   Presented with symptomatic anemia with Hgb 6.9, 10.4. S/p 2pRBC. S/p EGD revealing non-erosive gastritis.  Hb now stable, continue to monitor     # Afib s/p ablation  # CAD s/p CABG  AS s/p prosthetic valve   Appreciate Cardiology, plan to follow-up with structural team as outpatient     # Hypercalcemia  . consistent with primary hyperparathyroidism  Appreciate Endocrinology recs  Follow-up as outpatient for ultrasound of parathyroid glands - counseled patient on recommendation for outpatient US of parathyroids, endocrine f/u   Kappa lambda ratio 2.38 . weak gamma-migrating parapotein on SPEP; can continue MGUS/MM workup as outpatient, UPEP as outpatient     Hyponatremia  some degree of hypovolemia, however patient also with report of pain. Serum Na stable.   Get urine lytes, trend Na. Will manage based on results    DVT ppx - already on apixaban 2.5mg q12              68F former smoker, PMHx CAD (s/p CABG), HFpEF (EF 51%), afib (s/p AVN ablation and CRT-P 8/2022), AVR (procine-bioprosthetic) and mitral annuloplasty, AAA (s/p open repair 2015), PAD (s/p stents), HTN, HLD, hypothyroidism, seizure d/o, CKD3 (b/l Cr 1.7-2.0), severe CARLI, chronic anemia initially admitted to medicine for symptomatic anemia. Course c/b status epilepticus with RRT called, stepped up to tele c/f GIB vs vasc dissection. s/p EGD. Stepped over to cardiac tele s/p aneurysm repair    # Abdominal/Back Pain  # Chronic pain  Patient with LLQ and L flank/paraspinal constant pain x 4 months. No  symptoms. Hx of L nephrectomy. Patient denies obstipation, no diarrhea,  no urinary/stool incontinence or retention  Continue pain management, patient on Hydromorphone po and lidocaine patch, bowel regimen. Imaging reviewed, bones wnl, patient s/p aneurysm repair  Will continue to monitor and manage as needed    #Leucocytosis RESOLVED  #Thrombocytopenia IMPROVING   Patient with leucocytosis post OR, denies new symptoms, afebrile. Possibly reactive, would continue to monitor  Patient noted with decrease in Platelets post op, platelet counts improving     # Acute anemia   Presented with symptomatic anemia with Hgb 6.9, 10.4. S/p 2pRBC. S/p EGD revealing non-erosive gastritis.  Hb now stable, continue to monitor     # Afib s/p ablation  # CAD s/p CABG  AS s/p prosthetic valve   Appreciate Cardiology, plan to follow-up with structural team as outpatient     # Hypercalcemia  . consistent with primary hyperparathyroidism  Appreciate Endocrinology recs  Follow-up as outpatient for ultrasound of parathyroid glands - counseled patient on recommendation for outpatient US of parathyroids, endocrine f/u     MM workup started earlier in admission for anemia, CKD, mild proteinuria, mild hypercalcemia  Kappa lambda ratio 2.38 . 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 can continue MGUS/MM workup as outpatient, UPEP as outpatient , repeat SPEP    Hyponatremia  some degree of hypovolemia, however patient also with report of pain. Serum Na stable.   Check urine lytes, trend Na.     DVT ppx - already on apixaban 2.5mg q12

## 2024-04-08 NOTE — PROGRESS NOTE ADULT - SUBJECTIVE AND OBJECTIVE BOX
Physical Medicine and Rehabilitation Progress Note :       Patient is a 68y old  Female who presents with a chief complaint of anemia, CHERRI (08 Apr 2024 08:58)      HPI:  HPI: Patient is a 69yo F former smoker (quit 20 year ago with PMH of hypothyroidism, seizure disorder, Stage 3 CKD (baseline CR ~1.7-2.0), severe renal artery stenosis, chronic anemia (baseline Hgb ~9-10, gets weekly Fe infusion last infusion 3/22/24), AT, Afib s/p AVN ablation and CRT-P (8/2022; on Eliquis), HTN, HLD, CAD s/p CABG, AVR (porcine bio-prosthetic valve) and mitral annuloplasty, HFpEF, AAA s/p open repair in 2015 with complications, PAD s/p L pSFA stent/L-TIFFANY stent/L-IIA stent who presents with intermittent chest pain and KRAFT x 3 days. States she has been having intermittent chest pain and KRAFT which worsened about 3 days ago. She has not been very mobile for the past week due to pain and KRAFT. At baseline ambulates with cane, however has not been mostly sitting on the couch due to current symptoms. Additionally c/o lower abdominal pain which she attributes to constipation (has not had a BM in 5-6 days). Usually has regular BMs. Has had very poor PO intake over the past few days 2/2 constipation. Denies any s/s of bleeding; denies hematuria, hemoptysis, hematemesis, etc. No recent illness/sick contacts. Denies any recent travel. Denies hx of cancer, bleeding/clotting disorders, lower extremity pain or swelling. Compliant with her medications.  No other complaints. States she feels mildly improved after blood transfusion.     In the ED:  Initial vital signs: T: 98 F, HR: 86, BP: 114/72, R: 17, SpO2: 100% on RA  Labs: significant for Hgb 6.9, D-dimer 2831, Cl 111, HCO3 19, BUN/Cr 48/3.00, Ca 10.7, trop 41, p-BNP 3727  CXR: Persistent linear atelectasis over the left lower lung zone. The remainder the lung zones are clear. No pneumothorax.  CT C/A/P: Bibasilar atelectasis without evidence of consolidation. Borderline hepatomegaly with extensive coronary artery calcifications status post CABG. There is also a prosthetic aortic valve. Post surgical changes from prior open thoracoabdominal aortic bypass graft with new focal dilatation of the distalmost aspect of the graft measuring 4.4 x 6.1 cm just above the aortic bifurcation and previously measuring 3.2 x 4.1 cm on 6/11/2021.   EKG: paced rhythm  Medications: 1u pRBC, maalox 30mL PO x 1, morphine 2mg IV x 1  Consults: vascular (25 Mar 2024 22:30)                            9.2    8.65  )-----------( 146      ( 07 Apr 2024 05:30 )             28.8       04-07    133<L>  |  100  |  25<H>  ----------------------------<  124<H>  4.1   |  20<L>  |  1.48<H>    Ca    10.7<H>      07 Apr 2024 05:30  Phos  2.1     04-07  Mg     2.4     04-07      Vital Signs Last 24 Hrs  T(C): 36.4 (08 Apr 2024 11:06), Max: 36.9 (07 Apr 2024 20:55)  T(F): 97.6 (08 Apr 2024 11:06), Max: 98.5 (07 Apr 2024 20:55)  HR: 75 (08 Apr 2024 11:06) (75 - 75)  BP: 116/63 (08 Apr 2024 11:06) (98/58 - 130/66)  BP(mean): 84 (08 Apr 2024 11:06) (73 - 92)  RR: 18 (08 Apr 2024 11:06) (15 - 20)  SpO2: 100% (08 Apr 2024 11:06) (98% - 100%)    Parameters below as of 08 Apr 2024 11:06  Patient On (Oxygen Delivery Method): room air        MEDICATIONS  (STANDING):  acetaminophen     Tablet .. 975 milliGRAM(s) Oral every 6 hours  apixaban 2.5 milliGRAM(s) Oral every 12 hours  aspirin enteric coated 81 milliGRAM(s) Oral daily  atorvastatin 40 milliGRAM(s) Oral at bedtime  carvedilol 12.5 milliGRAM(s) Oral every 12 hours  levETIRAcetam 250 milliGRAM(s) Oral two times a day  levothyroxine 75 MICROGram(s) Oral every 24 hours  lidocaine   4% Patch 1 Patch Transdermal daily  melatonin 5 milliGRAM(s) Oral at bedtime  multivitamin 1 Tablet(s) Oral every 24 hours  NIFEdipine XL 30 milliGRAM(s) Oral two times a day  pantoprazole    Tablet 40 milliGRAM(s) Oral before breakfast  polyethylene glycol 3350 17 Gram(s) Oral two times a day  potassium phosphate / sodium phosphate Tablet (K-PHOS No. 2) 1 Tablet(s) Oral once  senna 2 Tablet(s) Oral at bedtime  sertraline 50 milliGRAM(s) Oral every 24 hours    MEDICATIONS  (PRN):  HYDROmorphone   Tablet 2 milliGRAM(s) Oral every 4 hours PRN Severe Pain (7 - 10)      T(C): 36.4 (04-08-24 @ 11:06), Max: 36.9 (04-07-24 @ 20:55)  HR: 75 (04-08-24 @ 11:06) (75 - 75)  BP: 116/63 (04-08-24 @ 11:06) (98/58 - 130/66)  RR: 18 (04-08-24 @ 11:06) (15 - 20)  SpO2: 100% (04-08-24 @ 11:06) (98% - 100%)        Physical Exam:       Head: NC/AT; MMM; PERRL; EOMI    Neck: Supple; no JVD    Respiratory: CTAB; no wheezes/rales/rhonchi    Cardiovascular: Regular rhythm/rate; S1/S2+, II/VI systolic murmur    Gastrointestinal: Soft; NTND; bowel sounds normal and present    Extremities: WWP; no edema/cyanosis    Neurological: A&Ox3, CNII-XII grossly intact; no obvious focal deficits      Functional Status Assessment :       Pain Assessment/Number Scale (0-10) Adult  Presence of Pain: complains of pain/discomfort  Body Location: generalized   abdomen   back   team aware    Safety      AM-PAC Functional Assessment: Basic Mobility  Type of Assessment: Daily assessment  Turning from your back to your side while in a flat bed without using bedrails?: 4 = No assist / stand by assistance  Moving from lying on your back to sitting on the flat side of a flat bed without using bedrails?: 3 = A little assistance  Moving to and from a bed to a chair (including a wheelchair)?: 3 = A little assistance  Standing up from a chair using your arms (e.g. wheelchair or bedside chair)?: 3 = A little assistance  Walking in hospital room?: 3 = A little assistance  Climbing 3-5 steps with a railing?: 3-calculated by average   Score: 19   Row Comment: Ask the patient "How much help from another person do you currently need? (If the patient hasn't done an activity recently, how much help from another person do you think he/she needs if he/she tried?)    Cognitive/Neuro      Cognitive/Neuro/Behavioral  Cognitive/Neuro/Behavioral [WDL Definition: Alert; opens eyes spontaneously; arouses to voice or touch; oriented x 4; follows commands; speech spontaneous, logical; purposeful motor response; behavior appropriate to situation]: WDL    Language Assistance  Preferred Language to Address Healthcare Preferred Language to Address Healthcare: English    Therapeutic Interventions      Bed Mobility  Bed Mobility Training Rolling/Turning: supervsion;  supervision  Bed Mobility Training Scooting: supervsion;  supervision  Bed Mobility Training Sit-to-Supine: left in chair   Bed Mobility Training Supine-to-Sit: contact guard;  supervision;  verbal cues;  1 person assist;  minimum assist (75% patient effort)  Bed Mobility Training Limitations: impaired ability to control trunk for mobility;  pain;  decreased strength    Sit-Stand Transfer Training  Transfer Training Sit-to-Stand Transfer: contact guard;  supervision;  verbal cues;  1 person assist;  full weight-bearing   Hand Held Assist   Transfer Training Stand-to-Sit Transfer: contact guard;  supervision;  verbal cues;  1 person assist;  full weight-bearing   Hand Held Assist   Sit-to-Stand Transfer Training Transfer Safety Analysis: decreased weight-shifting ability;  impaired balance;  decreased strength    Gait Training  Gait Training: contact guard;  supervision;  verbal cues;  1 person assist;  full weight-bearing   Hand Held Assist ;  15 feet;  x 2  Gait Analysis: swing-to gait   decreased pey[place];  impaired balance;  decreased strength            PM&R Impression : as above    Current disposition plan recommendation :    subacute rehab placement

## 2024-04-08 NOTE — PROGRESS NOTE ADULT - SUBJECTIVE AND OBJECTIVE BOX
O/N: YESENIA, VSS                                      Assessment and Plan:   	  68-year-old F, former smoker (quit 20 year ago) with PMH HTN, HLD, CAD s/p CABG, AVR (porcine bio-prosthetic valve) and mitral annuloplasty, HFpEF, hypothyroidism, seizure disorder, stage 3 CKD (baseline CR ~1.7-2.0), severe renal artery stenosis, chronic anemia (baseline Hgb ~ 9-10, gets weekly iron infusion last infusion 3/22/24), AT, Afib s/p AVN ablation and CRT-P (8/2022; on Eliquis), AAA (s/p open repair in 2015 with complications, last repaired 2020), PAD s/p L pSFA stent/L-TIFFANY stent/L-IIA stent who presents with intermittent chest pain and KRAFT x 3 days, admitted for symptomatic anemia 03/25/24. Course complicated by status epilepticus (2 seizure events) 3/26/24 w/ RR called and pt started on Keppra as well as new focal dilatation of the distalmost aspect of the graft of prior open thoracoabdominal aortic bypass graft found on CT chest w/ Vascular surgery consulted. Patient w/ episode of melena on 3/27 w/ concern for GIB vs vascular dissection. Cardiology consulted for pre-op clearance for EGD, patient transferred to CCU for in-unit scope revealing normal esophagus and duodenum and non-erosive gastritis and has since been cleared for AC for Afib. Stepped down to cardiac tele on 03/28/24 for which discussion between Vascular Sx and CTSx discussed which intervention to proceed with first. Medicine team following for comanagement of constipation, pain management and FTT. Plan for TAVR as outpatient w/ Dr. Gregory. 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    Vascular/Enlarging AAA  - s/p endovascular repair of aortic pseudoaneurysm/contained rupture, left hypogastric artery coil embolization, bilateral TIFFANY to CFA stenting, R CFA patch angioplasty 4/3  - holding anticoagulation    HFpEF  - Home regimen: Lasix 40mg twice weekly  - Holding Lasix periop  - appreciate cardiology recs    Chronic afib  - c/w carvedilol  - Holding home eliquis periop, will restart today  - appreciate cardiology recs    CAD  - c/w atorvastatin  - c/w carvedilol  - appreciate cardiology recs    HTN/HLD  - c/w atorvastatin  - c/w Nifedipine, carvedilol    Chronic pain:  - c/w APAP 650mg q6hrs standing dose, increased ot 975mg Q6hrs  - Lidocaine patch q24hrs  - Oxy 2.5 q4 PRN moderate pain, oxy 5 q4 PRN severe pain    Seizure disorder:  - appreciate epilepsy recs  - c/w keppra 250mg bid    CHERRI on CKD:  - baseline Cr 1.7-2  - Monitor Cr with daily BMP  - holding home losartan and lasix    Hypothyroidism:  - c/w Levothyroxine 75 mcg qd    Hypercalcemia/Primary Hyperparathyroidism:  - Appreciate endocrine recs, patient to follow up outpatient  - Continue multiple myeloma w/u  - f/u SPEP/UPEP/immunofixation/Vit D 25 and 125 / PTH    Iron deficiency anemia/Melena:  - Appreciate GI recs  - s/p endoscopy with GI: normal esophagus and duodenum and non-erosive gastritis  - s/p 2u PRBC this admission  - c/w Protonix     Diet: DASH  Activity: ambulate as tolerated  DVT PPX: holding periop  Dispo:TREV Subjective:     ROS:   Denies Headache, blurred vision, Chest Pain, SOB, Abdominal pain, nausea or vomiting     Social   apixaban 2.5  aspirin enteric coated 81  carvedilol 12.5  NIFEdipine XL 30      Allergies    No Known Allergies    Intolerances        Vital Signs Last 24 Hrs  T(C): 36.3 (08 Apr 2024 05:15), Max: 36.9 (07 Apr 2024 20:55)  T(F): 97.4 (08 Apr 2024 05:15), Max: 98.5 (07 Apr 2024 20:55)  HR: 75 (08 Apr 2024 05:15) (75 - 75)  BP: 123/69 (08 Apr 2024 05:15) (102/59 - 140/87)  BP(mean): 91 (08 Apr 2024 05:15) (81 - 92)  RR: 20 (08 Apr 2024 05:15) (15 - 20)  SpO2: 98% (08 Apr 2024 05:15) (97% - 99%)    Parameters below as of 08 Apr 2024 05:15  Patient On (Oxygen Delivery Method): room air      I&O's Summary    07 Apr 2024 07:01  -  08 Apr 2024 07:00  --------------------------------------------------------  IN: 300 mL / OUT: 450 mL / NET: -150 mL        Physical Exam:  General:  Pulmonary:  Cardiovascular:  Abdominal:  Extremities:  Pulses:   Right:                                                                          Left:  FEM [ ]2+ [ ]1+ [ ]doppler                                             FEM [ ]2+ [ ]1+ [ ]doppler    POP [ ]2+ [ ]1+ [ ]doppler                                             POP [ ]2+ [ ]1+ [ ]doppler    DP [ ]2+ [ ]1+ [ ]doppler                                                DP [ ]2+ [ ]1+ [ ]doppler  PT[ ]2+ [ ]1+ [ ]doppler                                                  PT [ ]2+ [ ]1+ [ ]doppler      LABS:                        9.2    8.65  )-----------( 146      ( 07 Apr 2024 05:30 )             28.8     04-07    133<L>  |  100  |  25<H>  ----------------------------<  124<H>  4.1   |  20<L>  |  1.48<H>    Ca    10.7<H>      07 Apr 2024 05:30  Phos  2.1     04-07  Mg     2.4     04-07    ---------------------------------------------------------------------------  PLEASE CHECK WHEN PRESENT:     [X  ] Heart Failure     [  ] Acute     [  ] Acute on Chronic     [ X ] Chronic  -------------------------------------------------------------------     [  ]Diastolic [HFpEF]     [ X ]Systolic [HFrEF]     [  ]Combined [HFpEF & HFrEF]     [ X ] afib     [X  ] hypertensive heart disease     [  ]Other:  -------------------------------------------------------------------  [ ] Respiratory failure  [ ] Acute cor pulmonale  [ ] Asthma/COPD Exacerbation  [ ] Pleural effusion  [ ] Aspiration pneumonia  -------------------------------------------------------------------  [  ]CHERRI     [  ]ATN     [  ]Reneal Medullary Necrosis     [  ]Renal Cortical Necrosis     [  ]Other Pathological Lesions:    [  ]CKD 1  [  ]CKD 2  [X  ]CKD 3  [  ]CKD 4  [  ]CKD 5  [  ]Other  -------------------------------------------------------------------  [  ]Diabetes  [  ] Diabetic PVD Ulcer  [  ] Neuropathic ulcer to DM  [  ] Diabetes with Nephropathy  [  ] Osteomyelitis due to diabetes  [ ] Hyperglycemia  [ ] Hypoglycemia  --------------------------------------------------------------------  [  ]Malnutrition: See Nutrition Note  [  ]Cachexia  [  ]Other:   [  ]Supplement Ordered:  [  ]Morbid Obesity (BMI >=40]  ---------------------------------------------------------------------  [ ] Sepsis/severe sepsis/septic shock  [ ] UTI  [ ] Pneumonia  -----------------------------------------------------------------------  [ ] Acidosis/alkalosis  [ ] Fluid overload  [ ] Hypokalemia  [ ] Hyperkalemia  [ ] Hypomagnesemia  [ ] Hypophosphatemia  [ ] Hyperphosphatemia  [ ] Hyponatremia  [ ] Hypernatremia  ------------------------------------------------------------------------  [ ] Acute blood loss anemia  [ ] Post op blood loss anemia  [X ] Iron deficiency anemia  [X ] Anemia due to chronic disease  [ ] Hypercoagulable state  [ ] Leukocytosis  ----------------------------------------------------------------------  [ ] Cerebral infarction  [ ] Transient ischemia attack  [ ] Encephalopathy

## 2024-04-09 NOTE — PROGRESS NOTE ADULT - SUBJECTIVE AND OBJECTIVE BOX
O/N: YANDY PATTERSON            ---------------------------------------------------------------------------  PLEASE CHECK WHEN PRESENT:     [X  ] Heart Failure     [  ] Acute     [  ] Acute on Chronic     [ X ] Chronic  -------------------------------------------------------------------     [  ]Diastolic [HFpEF]     [ X ]Systolic [HFrEF]     [  ]Combined [HFpEF & HFrEF]     [ X ] afib     [X  ] hypertensive heart disease     [  ]Other:  -------------------------------------------------------------------  [ ] Respiratory failure  [ ] Acute cor pulmonale  [ ] Asthma/COPD Exacerbation  [ ] Pleural effusion  [ ] Aspiration pneumonia  -------------------------------------------------------------------  [  ]CHERRI     [  ]ATN     [  ]Reneal Medullary Necrosis     [  ]Renal Cortical Necrosis     [  ]Other Pathological Lesions:    [  ]CKD 1  [  ]CKD 2  [X  ]CKD 3  [  ]CKD 4  [  ]CKD 5  [  ]Other  -------------------------------------------------------------------  [  ]Diabetes  [  ] Diabetic PVD Ulcer  [  ] Neuropathic ulcer to DM  [  ] Diabetes with Nephropathy  [  ] Osteomyelitis due to diabetes  [ ] Hyperglycemia  [ ] Hypoglycemia  --------------------------------------------------------------------  [  ]Malnutrition: See Nutrition Note  [  ]Cachexia  [  ]Other:   [  ]Supplement Ordered:  [  ]Morbid Obesity (BMI >=40]  ---------------------------------------------------------------------  [ ] Sepsis/severe sepsis/septic shock  [ ] UTI  [ ] Pneumonia  -----------------------------------------------------------------------  [ ] Acidosis/alkalosis  [ ] Fluid overload  [ ] Hypokalemia  [ ] Hyperkalemia  [ ] Hypomagnesemia  [ ] Hypophosphatemia  [ ] Hyperphosphatemia  [ ] Hyponatremia  [ ] Hypernatremia  ------------------------------------------------------------------------  [ ] Acute blood loss anemia  [ ] Post op blood loss anemia  [X ] Iron deficiency anemia  [X ] Anemia due to chronic disease  [ ] Hypercoagulable state  [ ] Leukocytosis  ----------------------------------------------------------------------  [ ] Cerebral infarction  [ ] Transient ischemia attack  [ ] Encephalopathy        Assessment and Plan:   · Assessment	    Assessment and Plan:   	  68-year-old F, former smoker (quit 20 year ago) with PMH HTN, HLD, CAD s/p CABG, AVR (porcine bio-prosthetic valve) and mitral annuloplasty, HFpEF, hypothyroidism, seizure disorder, stage 3 CKD (baseline CR ~1.7-2.0), severe renal artery stenosis, chronic anemia (baseline Hgb ~ 9-10, gets weekly iron infusion last infusion 3/22/24), AT, Afib s/p AVN ablation and CRT-P (8/2022; on Eliquis), AAA (s/p open repair in 2015 with complications, last repaired 2020), PAD s/p L pSFA stent/L-TIFFANY stent/L-IIA stent who presents with intermittent chest pain and KRAFT x 3 days, admitted for symptomatic anemia 03/25/24. Course complicated by status epilepticus (2 seizure events) 3/26/24 w/ RR called and pt started on Keppra as well as new focal dilatation of the distalmost aspect of the graft of prior open thoracoabdominal aortic bypass graft found on CT chest w/ Vascular surgery consulted. Patient w/ episode of melena on 3/27 w/ concern for GIB vs vascular dissection. Cardiology consulted for pre-op clearance for EGD, patient transferred to CCU for in-unit scope revealing normal esophagus and duodenum and non-erosive gastritis and has since been cleared for AC for Afib. Stepped down to cardiac tele on 03/28/24 for which discussion between Vascular Sx and CTSx discussed which intervention to proceed with first. Medicine team following for comanagement of constipation, pain management and FTT. Plan for TAVR as outpatient w/ Dr. Gregory. 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    Vascular/Enlarging AAA  - s/p endovascular repair of aortic pseudoaneurysm/contained rupture, left hypogastric artery coil embolization, bilateral TIFFANY to CFA stenting, R CFA patch angioplasty 4/3  - holding anticoagulation    HFpEF  - Home regimen: Lasix 40mg twice weekly  - Holding Lasix periop  - appreciate cardiology recs    Chronic afib  - c/w carvedilol  - Holding home eliquis periop, will restart today  - appreciate cardiology recs    CAD  - c/w atorvastatin  - c/w carvedilol  - appreciate cardiology recs    HTN/HLD  - c/w atorvastatin  - c/w Nifedipine, carvedilol    Chronic pain:  - c/w APAP 650mg q6hrs standing dose, increased ot 975mg Q6hrs  - Lidocaine patch q24hrs  - Oxy 2.5 q4 PRN moderate pain, oxy 5 q4 PRN severe pain    Seizure disorder:  - appreciate epilepsy recs  - c/w keppra 250mg bid    CHERRI on CKD:  - baseline Cr 1.7-2  - Monitor Cr with daily BMP  - holding home losartan and lasix    Hypothyroidism:  - c/w Levothyroxine 75 mcg qd    Hypercalcemia/Primary Hyperparathyroidism:  - Appreciate endocrine recs, patient to follow up outpatient  - Continue multiple myeloma w/u  - f/u SPEP/UPEP/immunofixation/Vit D 25 and 125 / PTH    Iron deficiency anemia/Melena:  - Appreciate GI recs  - s/p endoscopy with GI: normal esophagus and duodenum and non-erosive gastritis  - s/p 2u PRBC this admission  - c/w Protonix     Diet: DASH  Activity: ambulate as tolerated  DVT PPX: holding periop  Dispo:TREV       O/N: YESENIA, VSS    Subjective: Patient seen and examined at bedside this morning, resting comfortably. Denies acute complaints this morning, pending transportation to [facility].    ROS:   Denies Headache, blurred vision, Chest Pain, SOB, Abdominal pain, nausea or vomiting     Social   apixaban 2.5  aspirin enteric coated 81  carvedilol 12.5  NIFEdipine XL 30      Allergies    No Known Allergies    Intolerances        Vital Signs Last 24 Hrs  T(C): 36.6 (09 Apr 2024 04:07), Max: 36.7 (08 Apr 2024 20:09)  T(F): 97.9 (09 Apr 2024 04:07), Max: 98 (08 Apr 2024 20:09)  HR: 75 (09 Apr 2024 04:07) (74 - 75)  BP: 111/66 (09 Apr 2024 04:07) (98/58 - 129/68)  BP(mean): 86 (09 Apr 2024 04:07) (73 - 92)  RR: 18 (09 Apr 2024 04:07) (18 - 18)  SpO2: 100% (09 Apr 2024 04:07) (98% - 100%)    Parameters below as of 09 Apr 2024 04:07  Patient On (Oxygen Delivery Method): room air      I&O's Summary    08 Apr 2024 07:01  -  09 Apr 2024 07:00  --------------------------------------------------------  IN: 840 mL / OUT: 550 mL / NET: 290 mL        Physical Exam:  General: NAD, resting comfortably  Pulmonary: On room air, nonlabored breathing, no respiratory distress  Cardiovascular: NSR  Abdominal: soft, nondistended   Extremities: WWP, motor and sensory intact b/l      LABS:      ---------------------------------------------------------------------------  PLEASE CHECK WHEN PRESENT:     [X  ] Heart Failure     [  ] Acute     [  ] Acute on Chronic     [ X ] Chronic  -------------------------------------------------------------------     [  ]Diastolic [HFpEF]     [ X ]Systolic [HFrEF]     [  ]Combined [HFpEF & HFrEF]     [ X ] afib     [X  ] hypertensive heart disease     [  ]Other:  -------------------------------------------------------------------  [ ] Respiratory failure  [ ] Acute cor pulmonale  [ ] Asthma/COPD Exacerbation  [ ] Pleural effusion  [ ] Aspiration pneumonia  -------------------------------------------------------------------  [  ]CHERRI     [  ]ATN     [  ]Reneal Medullary Necrosis     [  ]Renal Cortical Necrosis     [  ]Other Pathological Lesions:    [  ]CKD 1  [  ]CKD 2  [X  ]CKD 3  [  ]CKD 4  [  ]CKD 5  [  ]Other  -------------------------------------------------------------------  [  ]Diabetes  [  ] Diabetic PVD Ulcer  [  ] Neuropathic ulcer to DM  [  ] Diabetes with Nephropathy  [  ] Osteomyelitis due to diabetes  [ ] Hyperglycemia  [ ] Hypoglycemia  --------------------------------------------------------------------  [  ]Malnutrition: See Nutrition Note  [  ]Cachexia  [  ]Other:   [  ]Supplement Ordered:  [  ]Morbid Obesity (BMI >=40]  ---------------------------------------------------------------------  [ ] Sepsis/severe sepsis/septic shock  [ ] UTI  [ ] Pneumonia  -----------------------------------------------------------------------  [ ] Acidosis/alkalosis  [ ] Fluid overload  [ ] Hypokalemia  [ ] Hyperkalemia  [ ] Hypomagnesemia  [ ] Hypophosphatemia  [ ] Hyperphosphatemia  [ ] Hyponatremia  [ ] Hypernatremia  ------------------------------------------------------------------------  [ ] Acute blood loss anemia  [ ] Post op blood loss anemia  [X ] Iron deficiency anemia  [X ] Anemia due to chronic disease  [ ] Hypercoagulable state  [ ] Leukocytosis  ----------------------------------------------------------------------  [ ] Cerebral infarction  [ ] Transient ischemia attack  [ ] Encephalopathy        Assessment and Plan:   · Assessment	    	  68-year-old F, former smoker (quit 20 year ago) with PMH HTN, HLD, CAD s/p CABG, AVR (porcine bio-prosthetic valve) and mitral annuloplasty, HFpEF, hypothyroidism, seizure disorder, stage 3 CKD (baseline CR ~1.7-2.0), severe renal artery stenosis, chronic anemia (baseline Hgb ~ 9-10, gets weekly iron infusion last infusion 3/22/24), AT, Afib s/p AVN ablation and CRT-P (8/2022; on Eliquis), AAA (s/p open repair in 2015 with complications, last repaired 2020), PAD s/p L pSFA stent/L-TIFFANY stent/L-IIA stent who presents with intermittent chest pain and KRAFT x 3 days, admitted for symptomatic anemia 03/25/24. Course complicated by status epilepticus (2 seizure events) 3/26/24 w/ RR called and pt started on Keppra as well as new focal dilatation of the distalmost aspect of the graft of prior open thoracoabdominal aortic bypass graft found on CT chest w/ Vascular surgery consulted. Patient w/ episode of melena on 3/27 w/ concern for GIB vs vascular dissection. Cardiology consulted for pre-op clearance for EGD, patient transferred to CCU for in-unit scope revealing normal esophagus and duodenum and non-erosive gastritis and has since been cleared for AC for Afib. Stepped down to cardiac tele on 03/28/24 for which discussion between Vascular Sx and CTSx discussed which intervention to proceed with first. Medicine team following for comanagement of constipation, pain management and FTT. Plan for TAVR as outpatient w/ Dr. Gregory. 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    Vascular/Enlarging AAA  - s/p endovascular repair of aortic pseudoaneurysm/contained rupture, left hypogastric artery coil embolization, bilateral TIFFANY to CFA stenting, R CFA patch angioplasty 4/3  - holding anticoagulation    HFpEF  - Home regimen: Lasix 40mg twice weekly  - Holding Lasix periop  - appreciate cardiology recs    Chronic afib  - c/w carvedilol  - Holding home eliquis periop, will restart today  - appreciate cardiology recs    CAD  - c/w atorvastatin  - c/w carvedilol  - appreciate cardiology recs    HTN/HLD  - c/w atorvastatin  - c/w Nifedipine, carvedilol    Chronic pain:  - c/w APAP 650mg q6hrs standing dose, increased ot 975mg Q6hrs  - Lidocaine patch q24hrs  - Oxy 2.5 q4 PRN moderate pain, oxy 5 q4 PRN severe pain    Seizure disorder:  - appreciate epilepsy recs  - c/w keppra 250mg bid    CHERRI on CKD:  - baseline Cr 1.7-2  - Monitor Cr with daily BMP  - holding home losartan and lasix    Hypothyroidism:  - c/w Levothyroxine 75 mcg qd    Hypercalcemia/Primary Hyperparathyroidism:  - Appreciate endocrine recs, patient to follow up outpatient  - Continue multiple myeloma w/u  - f/u SPEP/UPEP/immunofixation/Vit D 25 and 125 / PTH    Iron deficiency anemia/Melena:  - Appreciate GI recs  - s/p endoscopy with GI: normal esophagus and duodenum and non-erosive gastritis  - s/p 2u PRBC this admission  - c/w Protonix     Diet: DASH  Activity: ambulate as tolerated  DVT PPX: holding periop  Dispo:TREV

## 2024-04-09 NOTE — PROGRESS NOTE ADULT - REASON FOR ADMISSION
anemia, CHERRI

## 2024-04-09 NOTE — PROGRESS NOTE ADULT - SUBJECTIVE AND OBJECTIVE BOX
SUBJECTIVE:  Patient was seen and examined at bedside. without complaints. plan for DC today.     Review of systems: No fever, chills, dizziness, HA, Changes in vision, CP, dyspnea, nausea or vomiting, dysuria, changes in bowel movements, LE edema. Rest of 12 point Review of systems negative unless otherwise documented elsewhere in note.     Diet, Regular:   DASH/TLC Sodium & Cholesterol Restricted (DASH)  Supplement Feeding Modality:  Oral  Ensure Enlive Cans or Servings Per Day:  1       Frequency:  Three Times a day (04-05-24 @ 15:57) [Active]      MEDICATIONS:  MEDICATIONS  (STANDING):  acetaminophen     Tablet .. 975 milliGRAM(s) Oral every 6 hours  apixaban 2.5 milliGRAM(s) Oral every 12 hours  aspirin enteric coated 81 milliGRAM(s) Oral daily  atorvastatin 40 milliGRAM(s) Oral at bedtime  carvedilol 12.5 milliGRAM(s) Oral every 12 hours  levETIRAcetam 250 milliGRAM(s) Oral two times a day  levothyroxine 75 MICROGram(s) Oral every 24 hours  lidocaine   4% Patch 1 Patch Transdermal daily  melatonin 5 milliGRAM(s) Oral at bedtime  multivitamin 1 Tablet(s) Oral every 24 hours  NIFEdipine XL 30 milliGRAM(s) Oral daily  pantoprazole    Tablet 40 milliGRAM(s) Oral before breakfast  polyethylene glycol 3350 17 Gram(s) Oral two times a day  potassium phosphate / sodium phosphate Tablet (K-PHOS No. 2) 1 Tablet(s) Oral once  senna 2 Tablet(s) Oral at bedtime  sertraline 50 milliGRAM(s) Oral every 24 hours    MEDICATIONS  (PRN):  HYDROmorphone   Tablet 2 milliGRAM(s) Oral every 4 hours PRN Severe Pain (7 - 10)      Allergies    No Known Allergies    Intolerances        OBJECTIVE:  Vital Signs Last 24 Hrs  T(C): 36.4 (09 Apr 2024 08:45), Max: 36.7 (08 Apr 2024 20:09)  T(F): 97.6 (09 Apr 2024 08:45), Max: 98 (08 Apr 2024 20:09)  HR: 75 (09 Apr 2024 08:45) (74 - 75)  BP: 114/68 (09 Apr 2024 08:45) (111/66 - 129/68)  BP(mean): 86 (09 Apr 2024 04:07) (83 - 92)  RR: 16 (09 Apr 2024 08:45) (16 - 18)  SpO2: 100% (09 Apr 2024 08:45) (99% - 100%)    Parameters below as of 09 Apr 2024 08:45  Patient On (Oxygen Delivery Method): room air      I&O's Summary    08 Apr 2024 07:01  -  09 Apr 2024 07:00  --------------------------------------------------------  IN: 840 mL / OUT: 550 mL / NET: 290 mL    09 Apr 2024 07:01  -  09 Apr 2024 13:59  --------------------------------------------------------  IN: 0 mL / OUT: 0 mL / NET: 0 mL        PHYSICAL EXAM:  General: AOX3, NAD, sitting in bed, no labored breathing on RA  HEENT: AT/NC, no facial asymmetry   Lungs:  Heart:  Abdomen:  Extremities:     LABS:              CAPILLARY BLOOD GLUCOSE            MICRODATA:      RADIOLOGY/OTHER STUDIES:

## 2024-04-09 NOTE — PROGRESS NOTE ADULT - ASSESSMENT
68F former smoker, PMHx CAD (s/p CABG), HFpEF (EF 51%), afib (s/p AVN ablation and CRT-P 8/2022), AVR (procine-bioprosthetic) and mitral annuloplasty, AAA (s/p open repair 2015), PAD (s/p stents), HTN, HLD, hypothyroidism, seizure d/o, CKD3 (b/l Cr 1.7-2.0), severe CARLI, chronic anemia initially admitted to medicine for symptomatic anemia. Course c/b status epilepticus with RRT called, stepped up to tele c/f GIB vs vasc dissection. s/p EGD. Stepped over to cardiac tele s/p aneurysm repair    # Abdominal/Back Pain  # Chronic pain  Patient with LLQ and L flank/paraspinal constant pain x 4 months. No  symptoms. Hx of L nephrectomy. Patient denies obstipation, no diarrhea,  no urinary/stool incontinence or retention  Continue pain management, patient on Hydromorphone po and lidocaine patch, bowel regimen. Imaging reviewed, bones wnl, patient s/p aneurysm repair  Will continue to monitor and manage as needed    #Leucocytosis RESOLVED  #Thrombocytopenia IMPROVING   Patient with leucocytosis post OR, denies new symptoms, afebrile. Possibly reactive, would continue to monitor  Patient noted with decrease in Platelets post op, platelet counts improving     # Acute anemia   Presented with symptomatic anemia with Hgb 6.9, 10.4. S/p 2pRBC. S/p EGD revealing non-erosive gastritis.  Hb now stable, continue to monitor     # Afib s/p ablation  # CAD s/p CABG  AS s/p prosthetic valve   Appreciate Cardiology, plan to follow-up with structural team as outpatient     # Hypercalcemia  . consistent with primary hyperparathyroidism  Appreciate Endocrinology recs  Follow-up as outpatient for ultrasound of parathyroid glands - counseled patient on recommendation for outpatient US of parathyroids, endocrine f/u     MM workup started earlier in admission for anemia, CKD, mild proteinuria, mild hypercalcemia  Kappa lambda ratio 2.38 . 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 can continue MGUS/MM workup as outpatient, UPEP as outpatient , repeat SPEP    Hyponatremia  some degree of hypovolemia, however patient also with report of pain. Serum Na stable.   Check urine lytes, trend Na. \    DVT ppx: AC  Discussed with primary team

## 2024-04-09 NOTE — PROGRESS NOTE ADULT - NUTRITIONAL ASSESSMENT
This patient has been assessed with a concern for Malnutrition and has been determined to have a diagnosis/diagnoses of Severe protein-calorie malnutrition and Underweight (BMI < 19).    This patient is being managed with:   Diet DASH/TLC-  Sodium & Cholesterol Restricted  Entered: Apr 4 2024  7:26AM  
This patient has been assessed with a concern for Malnutrition and has been determined to have a diagnosis/diagnoses of Severe protein-calorie malnutrition and Underweight (BMI < 19).    This patient is being managed with:   Diet NPO after Midnight-     NPO Start Date: 02-Apr-2024   NPO Start Time: 23:59  Entered: Apr 2 2024  8:19AM    Diet Regular-  Supplement Feeding Modality:  Oral  Ensure Enlive Cans or Servings Per Day:  3       Frequency:  Daily  Entered: Apr 1 2024  4:05PM  
This patient has been assessed with a concern for Malnutrition and has been determined to have a diagnosis/diagnoses of Severe protein-calorie malnutrition and Underweight (BMI < 19).    This patient is being managed with:   Diet Regular-  Entered: Mar 28 2024 12:44PM  
This patient has been assessed with a concern for Malnutrition and has been determined to have a diagnosis/diagnoses of Severe protein-calorie malnutrition and Underweight (BMI < 19).    This patient is being managed with:   Diet NPO after Midnight-     NPO Start Date: 02-Apr-2024   NPO Start Time: 23:59  Entered: Apr 2 2024  8:19AM    Diet Regular-  Supplement Feeding Modality:  Oral  Ensure Enlive Cans or Servings Per Day:  3       Frequency:  Daily  Entered: Apr 1 2024  4:05PM  
This patient has been assessed with a concern for Malnutrition and has been determined to have a diagnosis/diagnoses of Severe protein-calorie malnutrition and Underweight (BMI < 19).    This patient is being managed with:   Diet Regular-  DASH/TLC {Sodium & Cholesterol Restricted} (DASH)  Supplement Feeding Modality:  Oral  Ensure Enlive Cans or Servings Per Day:  1       Frequency:  Three Times a day  Entered: Apr 5 2024  3:57PM  
This patient has been assessed with a concern for Malnutrition and has been determined to have a diagnosis/diagnoses of Severe protein-calorie malnutrition and Underweight (BMI < 19).    This patient is being managed with:   Diet Regular-  Entered: Mar 27 2024 12:59PM    Diet NPO after Midnight-     NPO Start Date: 27-Mar-2024   NPO Start Time: 23:59  Entered: Mar 27 2024 12:59PM  
This patient has been assessed with a concern for Malnutrition and has been determined to have a diagnosis/diagnoses of Severe protein-calorie malnutrition and Underweight (BMI < 19).    This patient is being managed with:   Diet Regular-  Entered: Mar 27 2024 12:59PM    Diet NPO after Midnight-     NPO Start Date: 27-Mar-2024   NPO Start Time: 23:59  Entered: Mar 27 2024 12:59PM  
This patient has been assessed with a concern for Malnutrition and has been determined to have a diagnosis/diagnoses of Severe protein-calorie malnutrition and Underweight (BMI < 19).    This patient is being managed with:   Diet Regular-  Entered: Mar 28 2024 12:44PM  
This patient has been assessed with a concern for Malnutrition and has been determined to have a diagnosis/diagnoses of Severe protein-calorie malnutrition and Underweight (BMI < 19).    This patient is being managed with:   Diet Regular-  DASH/TLC {Sodium & Cholesterol Restricted} (DASH)  Supplement Feeding Modality:  Oral  Ensure Enlive Cans or Servings Per Day:  1       Frequency:  Three Times a day  Entered: Apr 5 2024  3:57PM  
This patient has been assessed with a concern for Malnutrition and has been determined to have a diagnosis/diagnoses of Severe protein-calorie malnutrition and Underweight (BMI < 19).    This patient is being managed with:   Diet Regular-  Entered: Mar 28 2024 12:44PM    This patient has been assessed with a concern for Malnutrition and has been determined to have a diagnosis/diagnoses of Severe protein-calorie malnutrition and Underweight (BMI < 19).    This patient is being managed with:   Diet NPO after Midnight-     NPO Start Date: 28-Mar-2024   NPO Start Time: 23:59  Entered: Mar 28 2024  4:58PM    Diet Regular-  Entered: Mar 28 2024 12:44PM  
This patient has been assessed with a concern for Malnutrition and has been determined to have a diagnosis/diagnoses of Severe protein-calorie malnutrition and Underweight (BMI < 19).    This patient is being managed with:   Diet Regular-  DASH/TLC {Sodium & Cholesterol Restricted} (DASH)  Supplement Feeding Modality:  Oral  Ensure Enlive Cans or Servings Per Day:  1       Frequency:  Three Times a day  Entered: Apr 5 2024  3:57PM

## 2024-04-09 NOTE — PROGRESS NOTE ADULT - PROVIDER SPECIALTY LIST ADULT
Cardiology
Gastroenterology
Hospitalist
Internal Medicine
Rehab Medicine
Vascular Surgery
Cardiology
Cardiology
Electrophysiology
Gastroenterology
Internal Medicine
Rehab Medicine
Vascular Surgery
CCU
CCU
Cardiology
Epilepsy
Internal Medicine
Rehab Medicine
Structural Heart
Vascular Surgery
Cardiology
Internal Medicine
Rehab Medicine
Rehab Medicine
Vascular Surgery
Internal Medicine

## 2024-04-15 PROBLEM — I71.9 PSEUDOANEURYSM OF AORTA: Status: ACTIVE | Noted: 2024-01-01

## 2024-04-17 NOTE — REASON FOR VISIT
[de-identified] : EVAR/AAA w/b/l ilioconduit, IIA embolization, R FEA w/patch [de-identified] : 04/03/2024 [de-identified] : 12 [de-identified] : 2 [de-identified] : 68yoF former smoker w/PMHx of HTN, HLD, CAD s/p CABG, HFpEF, hypothyroidism, seizure DO, CARLI, s/p oAAA repair by ANJUM Sandra in 2014/2020, who presented to the [facility] ED w/abdominal pain and contrast extravasation noted at the distal aortic suture line, not a good surgical candidate due to severe AS, AI, mitral valve dz, anemia, and severe debilitation.  Pt now s/p eVAR/AAA w/b/l ilioconduit, IIA embolization, R FEA w/patch.

## 2024-04-17 NOTE — PHYSICAL EXAM
[Normal Breath Sounds] : Normal breath sounds [Respiratory Effort] : normal respiratory effort [Normal Rate and Rhythm] : normal rate and rhythm [Murmur] : murmur was appreciated  [2+] : left 2+ [0] : right 0 [1+] : left 1+ [No Rash or Lesion] : No rash or lesion [Alert] : alert [Oriented to Person] : oriented to person [Oriented to Place] : oriented to place [Oriented to Time] : oriented to time [Calm] : calm [JVD] : no jugular venous distention  [Carotid Bruits] : no carotid bruits [Ankle Swelling (On Exam)] : not present [Varicose Veins Of Lower Extremities] : not present [] : not present [Abdomen Tenderness] : ~T ~M No abdominal tenderness [Purpura] : no purpura  [Petechiae] : no petechiae [Skin Ulcer] : no ulcer [Skin Induration] : no induration [de-identified] : NAD, ambulates with a walker [de-identified] : NC/AT [de-identified] : Supple, transmitted murmur appreciated in both carotid arteries [de-identified] : No respiratory effort. New incision over left chest for PPM.  [de-identified] : FROM throughout, strength 5/5x4 [de-identified] : b/l groin cutdown sites well-healed [de-identified] : grossly intact

## 2024-04-17 NOTE — DISCUSSION/SUMMARY
[FreeTextEntry1] : 68yoF former smoker w/PMHx of HTN, HLD, CAD s/p CABG, HFpEF, hypothyroidism, seizure DO, CARLI, s/p oAAA repair by ANJUM Sandra in 2014/2020, who presented to the [facility] ED w/abdominal pain and contrast extravasation noted at the distal aortic suture line, not a good surgical candidate due to severe AS, AI, mitral valve dz, anemia, and severe debilitation.  Pt now s/p eVAR/AAA w/b/l ilioconduit, IIA embolization, R FEA w/patch.  Normal abd exam noted today and aortic duplex performed to evaluate endograft patency demonstrates widely patent EVG w/o endoleak noted, b/l patent CFA/CEA sites, >50% focal EIA stenosis noted.  Instructed pt to RTO in 6mos for surveillance of the endorepair or sooner if symptoms recur.

## 2024-04-17 NOTE — ADDENDUM
[FreeTextEntry1] : This note was written by Trever Retana, acting as a scribe for Dr. Daria Soriano.  I, Dr. Daria Soriano, have read and attest that all the information, medical decision-making, and discharge instructions within are true and accurate.  I, Dr. Daria Soriano, personally performed the evaluation and management (E/M) services for this established patient who presents today with (a) new problem(s)/exacerbation of (an) existing condition(s).  That E/M includes conducting the examination, assessing all new/exacerbated conditions, and establishing a new plan of care.  Today, my ACP, Trever Retana, was here to observe my evaluation and management services for this new problem/exacerbated condition to be followed going forward.

## 2024-04-26 NOTE — REVIEW OF SYSTEMS
[Fever] : no fever [Headache] : no headache [Chills] : no chills [Feeling Fatigued] : feeling fatigued [Dyspnea on exertion] : dyspnea during exertion [Chest Discomfort] : chest discomfort [Lower Ext Edema] : no extremity edema [Leg Claudication] : intermittent leg claudication [Palpitations] : palpitations [Orthopnea] : orthopnea [PND] : no PND [Syncope] : no syncope [Cough] : no cough [Abdominal Pain] : no abdominal pain [Nausea] : no nausea [Vomiting] : no vomiting [Dysuria] : no dysuria [Rash] : no rash [Itching] : no itching [Dizziness] : dizziness [Tremor] : no tremor was seen [Numbness (Hypoesthesia)] : no numbness [Convulsions] : no convulsions [Tingling (Paresthesia)] : no tingling [Limb Weakness (Paresis)] : no limb weakness (Paresis) [Speech Disturbance] : no speech disturbance [Negative] : Heme/Lymph

## 2024-04-26 NOTE — HISTORY OF PRESENT ILLNESS
[FreeTextEntry1] : Referred by Dr. Hoang Presotn.  68F, former smoker, with complex PMH including hypothyroidism, seizure disorder, Stage 3 CKD (baseline CR ~1.7-2.0), severe renal artery stenosis, chronic anemia (baseline Hgb ~9-10, followed by Heme: Dr. Izquierdo, gets weekly Fe infusion), AT, Afib s/p AVN ablation and CRT-P ([employer], 8/2022; on Eliquis), [place] [facility] [place] extensive vascular history (see below), HTN, HLD, CAD s/p CABG (LIMA to RI, ANTONIO to PDA) with SAVR (#19 Felder porcine bio-prosthetic valve) and mitral annuloplasty (Physio ring #26) at [facility] in 2002, HFpEF, and mixed valve disease: severe bioprosthetic AS (MG 27, OCY 0.78, LVEF 50%), severe MS (MG 10), moderate to severe MR, severe TR who presents for follow up after discharge.    Vascular history:  - AAA s/p open repair in 2015 followed saccular AAA and contained rupture adjacent to previous stent graft s/p open thoracoabdominal aneurysm repair with 20mm tube graft and 6mm bypasses to the celiac, SMA, left renal, right renal (end to end) in 8/2020  - PAD s/p L pSFA stent/L-TIFFANY stent with occluded b/l SFA followed by L-internal iliac artery stent in 5/2021 c/b L-RP hematoma requiring 2 units PRBC with ongoing LLE claudication - Dr. Sadnra recommending patient to undergo left fem-pop bypass with PTFE. Saw Dr. Soriano in June 2023 - per note: "Aorto-iliac duplex performed to evaluate patency reveals widely patent aortic graft, patent L TIFFANY stent, L EIA 50% stenosis noted, patent R pSFA stent noted. Carotid duplex performed to evaluate for progression of stenosis reveals b/l <50% stenosis due to fibrocalcific plaque, both vertebral arteries patent w/antegrade flow. Surgical bypass is currently the only reasonable option for this pt's LLE symptoms but encouraged pt to continue regular exercise to promote LE perfusion, thereby postponing the need for a surgery. 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 Willoughby)  Pulmonary function testing done 11/20/2023 shows mild obstruction, FEV1 75% predicted, 10% improvement after bronchodilator. Lung volumes are probably normal. Diffusion capacity reduced. IMP: pulmonary emphysema; Pulmonary function testing and long smoking history suggest that much of her dyspnea could be COPD as well as her underlying coronary disease and history of heart failure. I have begun her on Anoro (LAMA LABA) for at least a 1-month trial.  GI eval by Dr. Yadav. Patient underwent upper GI endoscopy on 1/18/24 that showed:  - esophageal mucosal changes suspicious for short-segment Ro's esophagus. Biopsied - small hiatal hernia - gastritis  Since her last visit, the patient presented to the ED on 3/25/2024 with intermittent chest pain and dyspnea on exertion for 3 days and the patient was admitted for symptomatic anemia.  Course complicated by status epilepticus (2 seizure events) 3/26/24 w/ RR called and pt started on Keppra. On CT angio 3/26 new focal dilatation of the distalmost aspect of the graft of prior open thoracoabdominal aortic bypass graft and vascular surgery was consulted. Patient had episode of melena on 3/27 w/ concern for GIB vs vascular dissection. The patient had an EGD revealing normal esophagus and duodenum and non-erosive gastritis and has since been cleared for anticoagulation for Afib.  ALBER done on 3/29 showed Low flow low gradient severe prosthetic valve AS, peak gradient aortic 37.73 mmHg, moderate AR, moderately elevated mitral valve gradients with normal appearing mitral leaflets status post annuloplasty, moderate mitral regurgitation which decreases to trace with reduced blood pressure, moderate TR.  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  The patient was discharged to [facility] on 4/9/2024.  Since discharge, the patient states...  She lives alone in a 2nd floor walk-up apartment, independent with basic ADLs, but has family helping with higher level ADLs such as grocery shopping and cleaning her apartment. Patient has full dentures. Patient is a retired [employer] .   Care Team:  Cardiologist: Dr. Hoang Preston Renal: Dr. Chaitanya Davis ([address] [place] [address], [phone]) Heme: Dr. Izquierdo ([address] [place] [address], [id number], [place]; [phone])

## 2024-04-27 NOTE — ED ADULT TRIAGE NOTE - CHIEF COMPLAINT QUOTE
Pt presents from [facility] for fever of ~ 102 F and WBC of ~ 14 taken earlier today. Pt was discharged from [facility] s/p abdominal aortic aneurysm/psuedoaneurysm repair. Pt reports two groin surgical incisions with pain to the R side and redness to the L. Pt reports intermittent chills x 5 days. Denies dizziness, weakness, or syncope. Pt alert, oriented, and able to follow directions at time of triage. Denies N/V/D. Denies chest pain or abdominal pain. BEFAST (-).

## 2024-04-27 NOTE — ED ADULT NURSE REASSESSMENT NOTE - NS ED NURSE REASSESS COMMENT FT1
2.5 mcg levo 2226  2 mcg levo 2227    pt on 10mcg propofol for sedation    1 unit prbc 2228 pressure bagged  50 mcg fentanyl given 2230 Patient went to the commode to attempt to have a BM and was having some nausea HR was 45-55. Once patient went back to bed she started vomiting and patient HR dropped and became unresponsive during bradycardia episode possible vasovagal. Patient began responding and HR increased to 60s. MD castillo, Xiao, and Corrina all notified. Plan for pacemaker for tomorrow morning.

## 2024-04-27 NOTE — ED ADULT NURSE NOTE - SEPSIS REFERENCE DATA CRITERIA 1
Airway patent, Nasal mucosa clear. Mouth with normal mucosa.
Abormal VS: Temp > 100F or < 96.8F; SBP < 90 mmHG; HR > 120bpm; Resp > 24/min

## 2024-04-27 NOTE — ED PROVIDER NOTE - PHYSICAL EXAMINATION
GEN: Well appearing, well developed, awake, alert, oriented to person, place, time/situation and in no apparent distress. NTAF  ENT: Airway patent, Nasal mucosa clear. Mouth with normal mucosa.  EYES: Clear bilaterally. PERRL, EOMI  RESPIRATORY: Breathing comfortably with normal RR. No W/C/R, no hypoxia or resp distress.  CARDIAC: Regular rate and rhythm, no M/R/G  ABDOMEN: Soft, nontender, +bowel sounds, no rebound, rigidity, or guarding.  MSK: Range of motion is not limited, no deformities noted.  NEURO: Alert and oriented, no focal deficits.  SKIN: Skin normal color for race, warm, dry and intact. No evidence of rash.  PSYCH: Alert and oriented to person, place, time/situation. normal mood and affect. no apparent risk to self or others. GEN: Well appearing, well developed, awake, alert, oriented to person, place, time/situation and in no apparent distress. NTAF  ENT: Airway patent, Nasal mucosa clear. Mouth with normal mucosa.  EYES: Clear bilaterally. PERRL, EOMI  RESPIRATORY: Breathing comfortably with normal RR. No W/C/R, no hypoxia or resp distress.  CARDIAC: Regular rate and rhythm, no M/R/G  ABDOMEN: Soft, nontender, +bowel sounds, no rebound, rigidity, or guarding.  MSK: Range of motion is not limited, no deformities noted.  NEURO: Alert and oriented x 3. Cn 2-12 intact. Strength 5/5 and sensation intact in all 4 extremities.    SKIN: Skin normal color for race, warm, dry and intact. Surgical wound to b/l groin appear to be healing well, small area of wound dehiscence to left groin wound, no cellulitis, purulence, crepitus, drainage, bleeding, or gangrene.     PSYCH: Alert and oriented to person, place, time/situation. normal mood and affect. no apparent risk to self or others.

## 2024-04-27 NOTE — CONSULT NOTE ADULT - SUBJECTIVE AND OBJECTIVE BOX
**STROKE CODE CONSULT NOTE**    Last known well time/Time of onset of symptoms: 4/27 7pm    HPI: 68y Female with PMHx of HTN, HLD, CAD s/p CABG, AVR (porcine bio-prosthetic valve) and mitral annuloplasty with persistent valve severe AS with plan for valve in valve TAVR with Dr. 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 Per ED, at [facility] patient was febrile 102 with leukocytosis on labs which prompted ED visit.     ED: /67, HR 75, T 97.9. Labs with mild leukocytosis 11.75, Hgb/Hct 6.8/21.6, INR 1.72, Na 134, BUN/Cre 24/1.78, haptoglobin 371, . Vascular surgery consulted, determined wound did not appear infected, no acute surgical intervention needed. S/p 1U PRBC. Patient was admitted to [facility] for further fever w/u. While in [facility]HO, around 7:30pm patient noted to be diaphoretic, hypotensive SBP 70/50 with b/l LE weakness. Labs significant for Hgb/Hct 4.8/17.9, lactate 5.1. S/p 1L fluid bolus with improvement to systolic 100s. CTA chest and abd w/w/o ulcerated plaque along mid descending thoracic aorta, gas/fluid/air collection around aortic graft consistent with post-procedural vs infection/abscess, endoleak from right iliac segment of graft with contrast extending beyond aneurysmal sac and fluid/air collection consistent with pseudoanyerysm, severe flattening of distal right external iliac graft near femoral artery junction, near occlusion with reconstitution of right common femoral artery, stenosis along right renal artery, compelte occlusion of proximal right superifical femopral artery with reconstitution and complete occlusion of diustal segment, complete occlusion of L superficial femoral artery graft. Vascular review of imaging differential of aortic rupture (less likely), aortoenteric fistula and/or graft infection. Patient poor candidate for abdominal/thoracoabdominal explantation. Stroke code called c/f spinal infarct. Exam significant for loss of motor and sensation below T10/umbilicus and b/l UE weakness (able to wiggle fingers only). Stroke code cancelled as exam suggestive of spine involvement, not brain. Patient complaining of chills/ feeling cold.            T(C): 34.6 (04-27-24 @ 20:10), Max: 36.8 (04-27-24 @ 17:27)  HR: 75 (04-27-24 @ 21:47) (66 - 75)  BP: 103/54 (04-27-24 @ 21:47) (86/42 - 110/54)  RR: 18 (04-27-24 @ 21:47) (17 - 22)  SpO2: 100% (04-27-24 @ 21:47) (100% - 100%)    PAST MEDICAL & SURGICAL HISTORY:  Atherosclerosis of coronary artery  CAD (coronary artery disease)      Peripheral vascular disease  PVD (peripheral vascular disease)      Anemia  Anemia      Hypothyroidism  Hypothyroidism      Gastroesophageal reflux disease  GERD (gastroesophageal reflux disease)      Hyperlipidemia  Hyperlipidemia      Essential hypertension  HTN (hypertension)      Seizure      Anxiety      Depression, unspecified depression type      Chronic kidney disease, unspecified CKD stage      Status post aorto-coronary artery bypass graft  2012      Atherosclerosis of coronary artery bypass graft(s), unspecified, with angina pectoris with documented spasm      H/O aortic valve replacement  Bioprosthetic      Ruptured aortic aneurysm  surgery august 2020      Abdominal aortic aneurysm (AAA) without rupture  2015          FAMILY HISTORY:  FH: myocardial infarction  in Dad (at age 50s)        SOCIAL HISTORY:     ROS:   Neurological: As per HPI      MEDICATIONS  (STANDING):  norepinephrine Infusion 0.05 MICROgram(s)/kG/Min (4.63 mL/Hr) IV Continuous <Continuous>  piperacillin/tazobactam IVPB.- 3.375 Gram(s) IV Intermittent once    MEDICATIONS  (PRN):    Allergies    No Known Allergies    Intolerances      Vital Signs Last 24 Hrs  T(C): 34.6 (27 Apr 2024 20:10), Max: 36.8 (27 Apr 2024 17:27)  T(F): 94.3 (27 Apr 2024 20:10), Max: 98.3 (27 Apr 2024 17:27)  HR: 75 (27 Apr 2024 21:47) (66 - 75)  BP: 103/54 (27 Apr 2024 21:47) (86/42 - 110/54)  BP(mean): 74 (27 Apr 2024 21:47) (61 - 74)  RR: 18 (27 Apr 2024 21:47) (17 - 22)  SpO2: 100% (27 Apr 2024 21:47) (100% - 100%)    Parameters below as of 27 Apr 2024 21:47  Patient On (Oxygen Delivery Method): room air        Physical exam:  Constitutional: Conversant   Eyes: Anicteric sclerae, moist conjunctivae, see below for CNs  Neck: trachea midline, FROM, supple, no thyromegaly or lymphadenopathy  GI: distended, no pain on palpation     Neurologic:  -Mental status: Awake, alert, oriented to person, place, and time. Speech is fluent (able to make needs/complaints known), no dysarthria. Follows simple commands (opens eyes, thumbs up, squeeze fingers). Attention/concentration intact. .  -Cranial nerves:   II: Visual fields are full to confrontation.  III, IV, VI: Extraocular movements are intact without nystagmus. Pupils equally round and reactive to light  V:  Facial sensation V1-V3 equal and intact   VII: Face is symmetric with normal eye closure and smile  VIII: Hearing is bilaterally intact   Motor: Normal bulk. B/l UE with no movement against gravity, only able to wiggle fingers and squeeze,  strength 2+/5. No movement b/l LE.   Sensation: Intact to light touch bilaterally UE. No neglect or extinction on double simultaneous testing on b/l UE. No sensation b/l LE from level of umbilicus down.   Coordination: unable to perform due to weakness    NIHSS: 16    Fingerstick Blood Glucose: CAPILLARY BLOOD GLUCOSE      POCT Blood Glucose.: 208 mg/dL (27 Apr 2024 19:14)    LABS:                        5.5    2.56  )-----------( 133      ( 27 Apr 2024 20:22 )             17.9     04-27    132<L>  |  107  |  24<H>  ----------------------------<  187<H>  4.9   |  13<L>  |  1.80<H>    Ca    8.0<L>      27 Apr 2024 20:22    TPro  4.0<L>  /  Alb  1.9<L>  /  TBili  0.2  /  DBili  x   /  AST  21  /  ALT  9<L>  /  AlkPhos  75  04-27    PT/INR - ( 27 Apr 2024 20:22 )   PT: 20.4 sec;   INR: 1.82          PTT - ( 27 Apr 2024 20:22 )  PTT:30.8 sec      Urinalysis Basic - ( 27 Apr 2024 20:22 )    Color: x / Appearance: x / SG: x / pH: x  Gluc: 187 mg/dL / Ketone: x  / Bili: x / Urobili: x   Blood: x / Protein: x / Nitrite: x   Leuk Esterase: x / RBC: x / WBC x   Sq Epi: x / Non Sq Epi: x / Bacteria: x        RADIOLOGY & ADDITIONAL STUDIES:  < from: CT Angio Chest Aorta w/wo IV Cont (04.27.24 @ 20:32) >  IMPRESSION:  1.   Descending thoracic aorta appears diffusely irregular . Ulcerating   plaque  measuring 15 x 12 mm is seen in the anteromedial wall of mid descending  thoracic aorta. No significant stenosis.  No dissection.  2.   Small infiltrates in dependent portions of bilateral lower lobes and  lingula.    < end of copied text >  < from: CT Angio Abd Aorta w/run-off w/ IV Cont (04.27.24 @ 20:43) >  IMPRESSION:  1.   Numerous pockets of air and soft tissue density measuring up to 2.3   cm in  thickness surrounding the aortic graft consistent with infected graft   with a  3.0 x 3.0 cm fluid and air collection on left side of the graft in   proximal  iliac segment consistent with early abscess.  Prior images are not   available at  this time for comparison.  2.   Type 3 endoleak is seen arising from the right iliac segment of the   graft  with contrast extending beyond the aneurysmal sac to the 3 x 3 cm fluid   and air  collection described above consistent with pseudoaneurysm.  Prior images   are  not available at this time for comparison.  3.   Severe flattening of distal right external iliac graft near the   common  femoral artery junction with severe narrowing of the lumen. There is near  complete occlusion with reconstitution of flow in the right common femoral  artery.  4.   Inferior mesenteric artery not seen.  5.   8 x 7 mm ulcerating plaque is seen on the lateral aspect of the   proximal  bypass graft seen at series 20, image 280.  6.   Multiple air distended loops of small bowel measuring up to 3.7 cm   with  slightly decompressed loops of distal small bowel. No transition point  identified. No pneumatosis. No portal venous gas. Fluid-filled ascending   colon.  Partial small bowel obstruction versus ileus should be considered.  No   findings  seen at this time to suggest mesenteric ischemia.  7.   Areas of severe stenosis are seen in mid and distal right renal   artery.  8.   Complete occlusion of proximal right superficial femoral artery with   mild  reconstitution of flow in the mid segment and complete occlusion in the   distal  segment. Mild reconstitution of flow is seen in right popliteal artery   with  diminished flow in arteries in right calf. Complete occlusion of right  posterior tibialis artery.  9.   Complete occlusion of left superficial femoral artery graft. Mild  reconstitution of flow is seen in the left popliteal artery with   diminished  flow and arteries and left calf. There is complete occlusion of left   posterior  tibialis artery.  10.   Large amount of stool in the colon. Finding is consistent with  constipation.  11.   Enlarged heterogeneous liver likely fatty changes and cirrhosis.    < end of copied text >      **STROKE CODE CONSULT NOTE**    Last known well time/Time of onset of symptoms: 4/27 7pm    HPI: 68y Female with PMHx of HTN, HLD, CAD s/p CABG, AVR (porcine bio-prosthetic valve) and mitral annuloplasty with persistent valve severe AS with plan for valve in valve TAVR with Dr. 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 Per ED, at [facility] patient was febrile 102 with leukocytosis on labs which prompted ED visit.     ED: /67, HR 75, T 97.9. Labs with mild leukocytosis 11.75, Hgb/Hct 6.8/21.6, INR 1.72, Na 134, BUN/Cre 24/1.78, haptoglobin 371, . Utox +THC, +opiates. Vascular surgery consulted, determined wound did not appear infected, no acute surgical intervention needed. S/p 1U PRBC. Patient was admitted to [facility] for further fever w/u. While in [facility], around 7:30pm patient noted to be diaphoretic, hypotensive SBP 70/50 with b/l LE weakness. Labs significant for Hgb/Hct 4.8/17.9, lactate 5.1. S/p 1L fluid bolus with improvement to systolic 100s. CTA chest and abd w/w/o ulcerated plaque along mid descending thoracic aorta, gas/fluid/air collection around aortic graft consistent with post-procedural vs infection/abscess, endoleak from right iliac segment of graft with contrast extending beyond aneurysmal sac and fluid/air collection consistent with pseudoanyerysm, severe flattening of distal right external iliac graft near femoral artery junction, near occlusion with reconstitution of right common femoral artery, stenosis along right renal artery, compelte occlusion of proximal right superifical femopral artery with reconstitution and complete occlusion of diustal segment, complete occlusion of L superficial femoral artery graft. Vascular review of imaging differential of aortic rupture (less likely), aortoenteric fistula and/or graft infection. Patient poor candidate for abdominal/thoracoabdominal explantation. Stroke code called c/f spinal infarct. Exam significant for loss of motor and sensation below T10/umbilicus and b/l UE weakness (able to wiggle fingers only). Stroke code cancelled as exam suggestive of spine involvement, not brain. Patient complaining of chills/ feeling cold.            T(C): 34.6 (04-27-24 @ 20:10), Max: 36.8 (04-27-24 @ 17:27)  HR: 75 (04-27-24 @ 21:47) (66 - 75)  BP: 103/54 (04-27-24 @ 21:47) (86/42 - 110/54)  RR: 18 (04-27-24 @ 21:47) (17 - 22)  SpO2: 100% (04-27-24 @ 21:47) (100% - 100%)    PAST MEDICAL & SURGICAL HISTORY:  Atherosclerosis of coronary artery  CAD (coronary artery disease)      Peripheral vascular disease  PVD (peripheral vascular disease)      Anemia  Anemia      Hypothyroidism  Hypothyroidism      Gastroesophageal reflux disease  GERD (gastroesophageal reflux disease)      Hyperlipidemia  Hyperlipidemia      Essential hypertension  HTN (hypertension)      Seizure      Anxiety      Depression, unspecified depression type      Chronic kidney disease, unspecified CKD stage      Status post aorto-coronary artery bypass graft  2012      Atherosclerosis of coronary artery bypass graft(s), unspecified, with angina pectoris with documented spasm      H/O aortic valve replacement  Bioprosthetic      Ruptured aortic aneurysm  surgery august 2020      Abdominal aortic aneurysm (AAA) without rupture  2015          FAMILY HISTORY:  FH: myocardial infarction  in Dad (at age 50s)        SOCIAL HISTORY:     ROS:   Neurological: As per HPI      MEDICATIONS  (STANDING):  norepinephrine Infusion 0.05 MICROgram(s)/kG/Min (4.63 mL/Hr) IV Continuous <Continuous>  piperacillin/tazobactam IVPB.- 3.375 Gram(s) IV Intermittent once    MEDICATIONS  (PRN):    Allergies    No Known Allergies    Intolerances      Vital Signs Last 24 Hrs  T(C): 34.6 (27 Apr 2024 20:10), Max: 36.8 (27 Apr 2024 17:27)  T(F): 94.3 (27 Apr 2024 20:10), Max: 98.3 (27 Apr 2024 17:27)  HR: 75 (27 Apr 2024 21:47) (66 - 75)  BP: 103/54 (27 Apr 2024 21:47) (86/42 - 110/54)  BP(mean): 74 (27 Apr 2024 21:47) (61 - 74)  RR: 18 (27 Apr 2024 21:47) (17 - 22)  SpO2: 100% (27 Apr 2024 21:47) (100% - 100%)    Parameters below as of 27 Apr 2024 21:47  Patient On (Oxygen Delivery Method): room air        Physical exam:  Constitutional: Conversant   Eyes: Anicteric sclerae, moist conjunctivae, see below for CNs  Neck: trachea midline, FROM, supple, no thyromegaly or lymphadenopathy  GI: distended, no pain on palpation     Neurologic:  -Mental status: Awake, alert, oriented to person, place, and time. Speech is fluent (able to make needs/complaints known), no dysarthria. Follows simple commands (opens eyes, thumbs up, squeeze fingers). Attention/concentration intact. .  -Cranial nerves:   II: Visual fields are full to confrontation.  III, IV, VI: Extraocular movements are intact without nystagmus. Pupils equally round and reactive to light  V:  Facial sensation V1-V3 equal and intact   VII: Face is symmetric with normal eye closure and smile  VIII: Hearing is bilaterally intact   Motor: Normal bulk. B/l UE with no movement against gravity, only able to wiggle fingers and squeeze,  strength 2+/5. No movement b/l LE.   Sensation: Intact to light touch bilaterally UE. No neglect or extinction on double simultaneous testing on b/l UE. No sensation b/l LE from level of umbilicus down.   Coordination: unable to perform due to weakness    NIHSS: 16    Fingerstick Blood Glucose: CAPILLARY BLOOD GLUCOSE      POCT Blood Glucose.: 208 mg/dL (27 Apr 2024 19:14)    LABS:                        5.5    2.56  )-----------( 133      ( 27 Apr 2024 20:22 )             17.9     04-27    132<L>  |  107  |  24<H>  ----------------------------<  187<H>  4.9   |  13<L>  |  1.80<H>    Ca    8.0<L>      27 Apr 2024 20:22    TPro  4.0<L>  /  Alb  1.9<L>  /  TBili  0.2  /  DBili  x   /  AST  21  /  ALT  9<L>  /  AlkPhos  75  04-27    PT/INR - ( 27 Apr 2024 20:22 )   PT: 20.4 sec;   INR: 1.82          PTT - ( 27 Apr 2024 20:22 )  PTT:30.8 sec      Urinalysis Basic - ( 27 Apr 2024 20:22 )    Color: x / Appearance: x / SG: x / pH: x  Gluc: 187 mg/dL / Ketone: x  / Bili: x / Urobili: x   Blood: x / Protein: x / Nitrite: x   Leuk Esterase: x / RBC: x / WBC x   Sq Epi: x / Non Sq Epi: x / Bacteria: x        RADIOLOGY & ADDITIONAL STUDIES:  < from: CT Angio Chest Aorta w/wo IV Cont (04.27.24 @ 20:32) >  IMPRESSION:  1.   Descending thoracic aorta appears diffusely irregular . Ulcerating   plaque  measuring 15 x 12 mm is seen in the anteromedial wall of mid descending  thoracic aorta. No significant stenosis.  No dissection.  2.   Small infiltrates in dependent portions of bilateral lower lobes and  lingula.    < end of copied text >  < from: CT Angio Abd Aorta w/run-off w/ IV Cont (04.27.24 @ 20:43) >  IMPRESSION:  1.   Numerous pockets of air and soft tissue density measuring up to 2.3   cm in  thickness surrounding the aortic graft consistent with infected graft   with a  3.0 x 3.0 cm fluid and air collection on left side of the graft in   proximal  iliac segment consistent with early abscess.  Prior images are not   available at  this time for comparison.  2.   Type 3 endoleak is seen arising from the right iliac segment of the   graft  with contrast extending beyond the aneurysmal sac to the 3 x 3 cm fluid   and air  collection described above consistent with pseudoaneurysm.  Prior images   are  not available at this time for comparison.  3.   Severe flattening of distal right external iliac graft near the   common  femoral artery junction with severe narrowing of the lumen. There is near  complete occlusion with reconstitution of flow in the right common femoral  artery.  4.   Inferior mesenteric artery not seen.  5.   8 x 7 mm ulcerating plaque is seen on the lateral aspect of the   proximal  bypass graft seen at series 20, image 280.  6.   Multiple air distended loops of small bowel measuring up to 3.7 cm   with  slightly decompressed loops of distal small bowel. No transition point  identified. No pneumatosis. No portal venous gas. Fluid-filled ascending   colon.  Partial small bowel obstruction versus ileus should be considered.  No   findings  seen at this time to suggest mesenteric ischemia.  7.   Areas of severe stenosis are seen in mid and distal right renal   artery.  8.   Complete occlusion of proximal right superficial femoral artery with   mild  reconstitution of flow in the mid segment and complete occlusion in the   distal  segment. Mild reconstitution of flow is seen in right popliteal artery   with  diminished flow in arteries in right calf. Complete occlusion of right  posterior tibialis artery.  9.   Complete occlusion of left superficial femoral artery graft. Mild  reconstitution of flow is seen in the left popliteal artery with   diminished  flow and arteries and left calf. There is complete occlusion of left   posterior  tibialis artery.  10.   Large amount of stool in the colon. Finding is consistent with  constipation.  11.   Enlarged heterogeneous liver likely fatty changes and cirrhosis.    < end of copied text >

## 2024-04-27 NOTE — ED ADULT NURSE REASSESSMENT NOTE - NS ED NURSE REASSESS COMMENT FT1
levo at . icu attending at bedside to intubate. verbal orders for 100mcg fentanyl, 50 rocuronium, 15 etomidate

## 2024-04-27 NOTE — ED PROVIDER NOTE - CCCP TRG CHIEF CMPLNT
fever If you are a smoker, it is important for your health to stop smoking. Please be aware that second hand smoke is also harmful.

## 2024-04-27 NOTE — ED PROVIDER NOTE - OBJECTIVE STATEMENT
68-year-old F, former smoker (quit 20 year ago) with PMH HTN, HLD, CAD s/p CABG, AVR (porcine bio-prosthetic valve) and mitral annuloplasty, HFpEF, hypothyroidism, seizure disorder, stage 3 CKD (baseline CR ~1.7-2.0), severe renal artery stenosis, chronic anemia (baseline Hgb ~ 9-10, gets weekly iron infusion last infusion 3/22/24), AT, Afib s/p AVN ablation and CRT-P (8/2022; on Eliquis), AAA (s/p open repair in 2015 with complications, last repaired 2020), PAD s/p L pSFA stent/L-TIFFANY stent/L-IIA stent who presents with intermittent chest pain and KRAFT x 3 days, admitted for symptomatic anemia 03/25/24. Course complicated by status epilepticus (2 seizure events) 3/26/24 w/ RR called and pt started on Keppra. On CT angio 3/26 new focal dilatation of the distalmost aspect of the graft of prior open thoracoabdominal aortic bypass graft and vascular surgery was consulted. Patient had episode of melena on 3/27 w/ concern for GIB vs vascular dissection. Cardiology consulted for pre-op clearance for EGD, patient transferred to CCU for in-unit scope revealing normal esophagus and duodenum and non-erosive gastritis and has since been cleared for anticoagulation for Afib. Stepped down to cardiac tele on 03/28/24 for which discussion between Vascular Sx and CTSx discussed which intervention to proceed with first. ALBER 3/29: Low flow low gradient severe prosthetic valve AS, peak gradient aortic 37.73 mmHg, moderate AR, moderately elevated mitral valve gradients with normal appearing mitral leaflets status post annuloplasty, moderate mitral regurgitation which decreases to trace with reduced blood pressure, moderate TR. Medicine team following for comanagement of constipation, pain management and FTT. Plan for TAVR as outpatient w/ Dr. Gregory. 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 Postoperatively, patient evaluated by physical therapy who recommended TREV placement. Patient passed trial of void and tolerated diet without issue post operatively. 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 Pt also noted to have leukocytosis on labs at rehab. Pt denies cough or URI sx, no dizziness or syncope, denies urianry sx. 68F w/ complex PMHx including chronic TASNEEM receiving iron infusions and severe cardiovascular disease s/p CABG/AVR w/ persistent prosthetic valve severe AS w/ plan for valve in valve TAVR and AAA s/p open repair x 2 (abdominal aortic repair 2015, thoracoabdominal aortic repair 2020, recent EVAR for distal aortic suture line pseudoaneurysmal degeneration / contained rupture w/ bilateral TIFFANY - CFA stenting and R CFA patch angioplasty) w/ persistent aneurysmal degeneration of the native aortic stump, PAD s/p SFA stent on left, who presents from rehab ([facility]) for c/o fever, chills, and fatigue/weakness, back pain and bilateral groin pain (R>L) associated with some would dehiscence of Left groin wound. Pt also noted to have leukocytosis on labs at rehab. Pt denies cough or URI sx, no dizziness or syncope, denies urinary sx.

## 2024-04-27 NOTE — ED PROVIDER NOTE - PROGRESS NOTE DETAILS
received call from vrad with critical findings/over-read of CTA - pt with infection in endograft with air and early abscess formation, pt also with type 3 endoleak with contrast extending to abscess.   MICU team informed (MICU and SICU at bedside currently, called code fusion and placing line and intubating patient).

## 2024-04-27 NOTE — ED PROVIDER NOTE - RATE
Patient became febrile to 101.2 on 11/17AM w/ associated leukocytosis 11.23.  - Self resolved  - Unclear source of infection  - possible UTI, UA: Moderate LE, small blood, bacteria present.  F/u Urine Cx  - CT Chest done 11/6:  no signs of infiltrates  - CXR w/o infiltrates  - Blood Cx NGTD  - No Abx for now, monitor fevers and WBC. 75 - Euvolemic on exam; satting well on RA  - s/p Lasix 20mg IV x1 in the ED. No need for further Lasix.   - Strict I/Os, daily weights, continue ACE.

## 2024-04-27 NOTE — ED ADULT NURSE NOTE - NSFALLHARMRISKINTERV_ED_ALL_ED
Assistance OOB with selected safe patient handling equipment if applicable/Assistance with ambulation/Communicate risk of Fall with Harm to all staff, patient, and family/Monitor gait and stability/Provide visual cue: red socks, yellow wristband, yellow gown, etc/Reinforce activity limits and safety measures with patient and family/Bed in lowest position, wheels locked, appropriate side rails in place/Call bell, personal items and telephone in reach/Instruct patient to call for assistance before getting out of bed/chair/stretcher/Non-slip footwear applied when patient is off stretcher/[place] to call system/Physically safe environment - no spills, clutter or unnecessary equipment/Purposeful Proactive Rounding/Room/bathroom lighting operational, light cord in reach

## 2024-04-27 NOTE — ED ADULT NURSE NOTE - OBJECTIVE STATEMENT
Patient alert and orientedx4, fever at [facility] 89f and elevated wbc. Patient afebrile on arrival, c/o b/l groin pain from abdominal aortic aneurysm repair. Groin incisions are red and pt reports swollen, denies chills, nausea, vomiting, sob or chest pain. Patient following commands appropriately.

## 2024-04-27 NOTE — ED PROVIDER NOTE - CRITICAL CARE ATTENDING CONTRIBUTION TO CARE
Upon my evaluation, this patient had a high probability of imminent or life-threatening deterioration due to hypotension, anemia requiring blood transfusions.   which required my direct attention, intervention, and personal management.    I have personally provided the above minutes of critical care time exclusive of time spent on separately billable procedures. Time includes review of laboratory data, radiology results, discussion with consultants, and monitoring for potential decompensation. Interventions were performed as documented above.

## 2024-04-27 NOTE — CONSULT NOTE ADULT - SUBJECTIVE AND OBJECTIVE BOX
HPI: 68-year-old F, former smoker (quit 20 year ago) with PMH HTN, HLD, CAD s/p CABG, AVR (porcine bio-prosthetic valve) and mitral annuloplasty, HFpEF, hypothyroidism, seizure disorder, stage 3 CKD (baseline CR ~1.7-2.0), severe renal artery stenosis, chronic anemia (baseline Hgb ~ 9-10, gets weekly iron infusion last infusion 3/22/24), AT, Afib s/p AVN ablation and CRT-P (8/2022; on Eliquis), AAA (s/p open repair in 2015 with complications, last repaired 2020), PAD s/p L pSFA stent/L-TIFFANY stent/L-IIA stent who presents with intermittent chest pain and KRAFT x 3 days, admitted for symptomatic anemia 03/25/24.     That hospital course was noteworthy for the following events: Initial admission complicated by status epilepticus (2 seizure events) 3/26/24 w/ RR called and pt started on Keppra. On CT angio 3/26 new focal dilatation of the distalmost aspect of the open thoracoabdominal aortic bypass graft and vascular surgery was consulted. Patient had episode of melena on 3/27 w/ concern for GIB vs vascular involvement. Cards and GI involved; patient transferred to CCU for in-unit scope revealing normal esophagus and duodenum and non-erosive gastritis and has since been cleared for anticoagulation for Afib. Stepped down to cardiac tele on 03/28/24. ALBER 3/29: Low flow low gradient severe prosthetic valve AS, peak gradient aortic 37.73 mmHg, moderate AR, moderately elevated mitral valve gradients with normal appearing mitral leaflets status post annuloplasty, moderate mitral regurgitation which decreases to trace with reduced blood pressure, moderate TR. Extensive discussion between CT Surgery and Vascular surgery about plan of care; patient required repair of pseudoaneurysm of distal suture line of prior abdomial aortic repair and also needed TAVR, but given difficult access for TAVR from iliac artery stenoses, plan was ultimately made for TAVR as outpatient w/ Dr. Gregory after repair of aorto-iliac disease. Patient was then transferred to Vascular surgery for endovascular repair of aortic pseudoaneurysm/contained rupture, left hypogastric artery coil embolization, bilateral TIFFANY to CFA stenting, R CFA patch angioplasty on 4/3. She did well post-operatively and was discharged to [facility] on 4/9.     Patient states that over the last five days she has been struck with chills and weakness. She says over the last two days she has developed fevers and her blood pressure has been soft, ~100/45, for which she was given IV fluids and labs were obtained. Those showed a white blood cell count of 14k. She was referred to the ED. Her primary complaint is fatigue and chills/rigors. She denies other specific symptoms, including dysuria, redness of her incisions, drainage from her incisions. She does state that she has had a few episodes of productive cough the last couple of days but it has not been tremendously significant. Per ED report, fever measured at [facility] today was 102F.     ED course: Afebrile here. HR 75, /67. Urinalysis, chest xray, noteworthy for WBC 11.75, HGB 6.8. Mildly elevated PT/INR 19/1.72. Lactate 1.9. BMP w/ SCr 1.78, BUN 24. HFP normal.     PAST MEDICAL HISTORY:  Chronic Anemia, anxiety, CAD s/p CABG/AVR (porcine bio-prosthetic valve) and mitral annuloplsty w/ severe prosthetic valve AS, HFpEF, hypothyroidism, seizure disorder, CKD III (B/L SCr 1.7 - 2.0), severe CARLI, Afib s/p AVN ablation and CRT-P (8/2022) on eliquis, AAA s/p open thoraco repair 2015, 2020, PAD s/p L SFA stent/bilateral L TIFFANY to CFA stenting, Depression, HTN, GERD, HLD, hypothyroidism, PVD, seizure    PAST SURGICAL HISTORY:  Abdominal aortic aneurysm (AAA) without rupture 2015  Atherosclerosis of coronary artery bypass graft(s), unspecified, with angina pectoris with documented spasm   H/O aortic valve replacement Bioprosthetic  Ruptured aortic aneurysm surgery august 2020  Status post aorto-coronary artery bypass graft 2012. 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    · Substance use	No  · Social History (marital status, living situation, occupation, and sexual history)	Former smoker, quit 20 years ago. Denies drinking or other drug use.   Lives alone, ambulates with a cane. Does not have help at home. States she has two sisters who are nearby that she talks to regularly.    Medications:     · 	cilostazol 50 mg oral tablet: 1 tab(s) orally 2 times a day  · 	metoprolol tartrate 50 mg oral tablet: 1 tab(s) orally 2 times a day   · 	pantoprazole 40 mg oral delayed release tablet: 1 tab(s) orally once a day   · 	furosemide 40 mg oral tablet: 1 tab(s) orally once a day  · 	folic acid 1 mg oral tablet: 1 tab(s) orally once a day  · 	levothyroxine 50 mcg (0.05 mg) oral tablet: 1 tab(s) orally once a day  · 	amLODIPine 5 mg oral tablet: 1 tab(s) orally once a day  · 	sertraline 50 mg oral tablet: 1 tab(s) orally once a day (at bedtime)  · 	Vitamin D2 50,000 intl units (1.25 mg) oral capsule: 1 cap(s) orally once a week  · 	losartan 25 mg oral tablet: 1 tab(s) orally once a day  · 	rosuvastatin 40 mg oral tablet: 1 tab(s) orally once a day    Vital Signs Last 24 Hrs  T(C): 36.6 (27 Apr 2024 15:28), Max: 36.6 (27 Apr 2024 15:28)  T(F): 97.9 (27 Apr 2024 15:28), Max: 97.9 (27 Apr 2024 15:28)  HR: 75 (27 Apr 2024 15:28) (75 - 75)  BP: 101/67 (27 Apr 2024 15:28) (101/67 - 101/67)  BP(mean): --  RR: 22 (27 Apr 2024 15:28) (22 - 22)  SpO2: 100% (27 Apr 2024 15:28) (100% - 100%)    Parameters below as of 27 Apr 2024 15:28  Patient On (Oxygen Delivery Method): room air    PHYSICAL EXAM  General: Frail appearing elderly female, appears fatigued but awake and alert.   CV: HDS, warm and perfused. Systolic murmur.   Pulm: Lungs clear to auscultation anterior and posterior in all lung fields w/ only small fine crackles in the bases.   Abd: Tender pulsatile abdominal mass in area of epigastrum. Tender groins bilaterally.   Extremity: Bilateral legs thin. No edema or wounds.   .vexam     LABS:    RADIOLOGY & ADDITIONAL STUDIES:   HPI: 68-year-old F, former smoker (quit 20 year ago) with PMH HTN, HLD, CAD s/p CABG, AVR (porcine bio-prosthetic valve) and mitral annuloplasty, HFpEF, hypothyroidism, seizure disorder, stage 3 CKD (baseline CR ~1.7-2.0), severe renal artery stenosis, chronic anemia (baseline Hgb ~ 9-10, gets weekly iron infusion last infusion 3/22/24), AT, Afib s/p AVN ablation and CRT-P (8/2022; on Eliquis), AAA (s/p open repair in 2015 with complications, last repaired 2020), PAD s/p L pSFA stent/L-TIFFANY stent/L-IIA stent who presents with intermittent chest pain and KRAFT x 3 days, admitted for symptomatic anemia 03/25/24.     That hospital course was noteworthy for the following events: Initial admission complicated by status epilepticus (2 seizure events) 3/26/24 w/ RR called and pt started on Keppra. On CT angio 3/26 new focal dilatation of the distalmost aspect of the open thoracoabdominal aortic bypass graft and vascular surgery was consulted. Patient had episode of melena on 3/27 w/ concern for GIB vs vascular involvement. Cards and GI involved; patient transferred to CCU for in-unit scope revealing normal esophagus and duodenum and non-erosive gastritis and has since been cleared for anticoagulation for Afib. Stepped down to cardiac tele on 03/28/24. ALBER 3/29: Low flow low gradient severe prosthetic valve AS, peak gradient aortic 37.73 mmHg, moderate AR, moderately elevated mitral valve gradients with normal appearing mitral leaflets status post annuloplasty, moderate mitral regurgitation which decreases to trace with reduced blood pressure, moderate TR. Extensive discussion between CT Surgery and Vascular surgery about plan of care; patient required repair of pseudoaneurysm of distal suture line of prior abdomial aortic repair and also needed TAVR, but given difficult access for TAVR from iliac artery stenoses, plan was ultimately made for TAVR as outpatient w/ Dr. Gregory after repair of aorto-iliac disease. Patient was then transferred to Vascular surgery for endovascular repair of aortic pseudoaneurysm/contained rupture, left hypogastric artery coil embolization, bilateral TIFFANY to CFA stenting, R CFA patch angioplasty on 4/3. She did well post-operatively and was discharged to [facility] on 4/9.     Patient states that over the last five days she has been struck with chills and weakness. She says over the last two days she has developed fevers and her blood pressure has been soft, ~100/45, for which she was given IV fluids and labs were obtained. Those showed a white blood cell count of 14k. She was referred to the ED. Her primary complaint is fatigue and chills/rigors. She denies other specific symptoms, including dysuria, redness of her incisions, drainage from her incisions. She does state that she has had a few episodes of productive cough the last couple of days but it has not been tremendously significant. Per ED report, fever measured at [facility] today was 102F.     ED course: Afebrile here. HR 75, /67. Urinalysis, chest xray, noteworthy for WBC 11.75, HGB 6.8. Mildly elevated PT/INR 19/1.72. Lactate 1.9. BMP w/ SCr 1.78, BUN 24. HFP normal.     PAST MEDICAL HISTORY:  Chronic Anemia, anxiety, CAD s/p CABG/AVR (porcine bio-prosthetic valve) and mitral annuloplsty w/ severe prosthetic valve AS, HFpEF, hypothyroidism, seizure disorder, CKD III (B/L SCr 1.7 - 2.0), severe CARLI, Afib s/p AVN ablation and CRT-P (8/2022) on eliquis, AAA s/p open thoraco repair 2015, 2020, PAD s/p L SFA stent/bilateral L TIFFANY to CFA stenting, Depression, HTN, GERD, HLD, hypothyroidism, PVD, seizure    PAST SURGICAL HISTORY:  Abdominal aortic aneurysm (AAA) without rupture 2015  Atherosclerosis of coronary artery bypass graft(s), unspecified, with angina pectoris with documented spasm   H/O aortic valve replacement Bioprosthetic  Ruptured aortic aneurysm surgery august 2020  Status post aorto-coronary artery bypass graft 2012. 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    · Substance use	No  · Social History (marital status, living situation, occupation, and sexual history)	Former smoker, quit 20 years ago. Denies drinking or other drug use.   Lives alone, ambulates with a cane. Does not have help at home. States she has two sisters who are nearby that she talks to regularly.    Medications:     · 	cilostazol 50 mg oral tablet: 1 tab(s) orally 2 times a day  · 	metoprolol tartrate 50 mg oral tablet: 1 tab(s) orally 2 times a day   · 	pantoprazole 40 mg oral delayed release tablet: 1 tab(s) orally once a day   · 	furosemide 40 mg oral tablet: 1 tab(s) orally once a day  · 	folic acid 1 mg oral tablet: 1 tab(s) orally once a day  · 	levothyroxine 50 mcg (0.05 mg) oral tablet: 1 tab(s) orally once a day  · 	amLODIPine 5 mg oral tablet: 1 tab(s) orally once a day  · 	sertraline 50 mg oral tablet: 1 tab(s) orally once a day (at bedtime)  · 	Vitamin D2 50,000 intl units (1.25 mg) oral capsule: 1 cap(s) orally once a week  · 	losartan 25 mg oral tablet: 1 tab(s) orally once a day  · 	rosuvastatin 40 mg oral tablet: 1 tab(s) orally once a day    Vital Signs Last 24 Hrs  T(C): 36.6 (27 Apr 2024 15:28), Max: 36.6 (27 Apr 2024 15:28)  T(F): 97.9 (27 Apr 2024 15:28), Max: 97.9 (27 Apr 2024 15:28)  HR: 75 (27 Apr 2024 15:28) (75 - 75)  BP: 101/67 (27 Apr 2024 15:28) (101/67 - 101/67)  BP(mean): --  RR: 22 (27 Apr 2024 15:28) (22 - 22)  SpO2: 100% (27 Apr 2024 15:28) (100% - 100%)    Parameters below as of 27 Apr 2024 15:28  Patient On (Oxygen Delivery Method): room air    PHYSICAL EXAM  General: Frail appearing elderly female, appears fatigued but awake and alert.   CV: HDS, warm and perfused. Systolic murmur.   Pulm: Lungs clear to auscultation anterior and posterior in all lung fields w/ only small fine crackles in the bases.   Abd: Tender pulsatile abdominal mass in area of epigastrum. Tender groins bilaterally.   Extremity: Bilateral legs thin. No edema or wounds.                 R            L   Fem        3+          2+          Pop                              DP          bi           mono             PT           bi          mono              .     LABS:                        6.8    11.75 )-----------( 255      ( 27 Apr 2024 15:54 )             21.6     04-27    134<L>  |  103  |  24<H>  ----------------------------<  121<H>  4.3   |  19<L>  |  1.78<H>    Ca    9.4      27 Apr 2024 15:54    TPro  6.4  /  Alb  2.9<L>  /  TBili  0.2  /  DBili  x   /  AST  30  /  ALT  16  /  AlkPhos  118  04-27    RADIOLOGY & ADDITIONAL STUDIES:    CXR read pending. Possible LLL infiltrate vs. atelectasis.     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, PAD s/p SFA stent on right,  HPI: 68-year-old F, former smoker (quit 20 year ago) with PMH HTN, HLD, CAD s/p CABG, AVR (porcine bio-prosthetic valve) and mitral annuloplasty, HFpEF, hypothyroidism, seizure disorder, stage 3 CKD (baseline CR ~1.7-2.0), severe renal artery stenosis, chronic anemia (baseline Hgb ~ 9-10, gets weekly iron infusion last infusion 3/22/24), AT, Afib s/p AVN ablation and CRT-P (8/2022; on Eliquis), AAA (s/p open repair in 2015 with complications, last repaired 2020), PAD s/p L pSFA stent/L-TIFFANY stent/L-IIA stent who presents with intermittent chest pain and KRAFT x 3 days, admitted for symptomatic anemia 03/25/24.     That hospital course was noteworthy for the following events: Initial admission complicated by status epilepticus (2 seizure events) 3/26/24 w/ RR called and pt started on Keppra. On CT angio 3/26 new focal dilatation of the distalmost aspect of the open thoracoabdominal aortic bypass graft and vascular surgery was consulted. Patient had episode of melena on 3/27 w/ concern for GIB vs vascular involvement. Cards and GI involved; patient transferred to CCU for in-unit scope revealing normal esophagus and duodenum and non-erosive gastritis and has since been cleared for anticoagulation for Afib. Stepped down to cardiac tele on 03/28/24. ALBER 3/29: Low flow low gradient severe prosthetic valve AS, peak gradient aortic 37.73 mmHg, moderate AR, moderately elevated mitral valve gradients with normal appearing mitral leaflets status post annuloplasty, moderate mitral regurgitation which decreases to trace with reduced blood pressure, moderate TR. Extensive discussion between CT Surgery and Vascular surgery about plan of care; patient required repair of pseudoaneurysm of distal suture line of prior abdomial aortic repair and also needed TAVR, but given difficult access for TAVR from iliac artery stenoses, plan was ultimately made for TAVR as outpatient w/ Dr. Gregory after repair of aorto-iliac disease. Patient was then transferred to Vascular surgery for endovascular repair of aortic pseudoaneurysm/contained rupture, left hypogastric artery coil embolization, bilateral TIFFANY to CFA stenting, R CFA patch angioplasty on 4/3. She did well post-operatively and was discharged to [facility] on 4/9.     Patient states that over the last five days she has been struck with chills and weakness. She says over the last two days she has developed fevers and her blood pressure has been soft, ~100/45, for which she was given IV fluids and labs were obtained. Those showed a white blood cell count of 14k. She was referred to the ED. Her primary complaint is fatigue and chills/rigors. She denies other specific symptoms, including dysuria, redness of her incisions, drainage from her incisions. She does state that she has had a few episodes of productive cough the last couple of days but it has not been tremendously significant. Per ED report, fever measured at [facility] today was 102F.     ED course: Afebrile here. HR 75, /67. Urinalysis, chest xray, noteworthy for WBC 11.75, HGB 6.8. Mildly elevated PT/INR 19/1.72. Lactate 1.9. BMP w/ SCr 1.78, BUN 24. HFP normal.     PAST MEDICAL HISTORY:  Chronic Anemia, anxiety, CAD s/p CABG/AVR (porcine bio-prosthetic valve) and mitral annuloplsty w/ severe prosthetic valve AS, HFpEF, hypothyroidism, seizure disorder, CKD III (B/L SCr 1.7 - 2.0), severe CARLI, Afib s/p AVN ablation and CRT-P (8/2022) on eliquis, AAA s/p open thoraco repair 2015, 2020, PAD s/p L SFA stent/bilateral L TIFFANY to CFA stenting, Depression, HTN, GERD, HLD, hypothyroidism, PVD, seizure    PAST SURGICAL HISTORY:  Abdominal aortic aneurysm (AAA) without rupture 2015  Atherosclerosis of coronary artery bypass graft(s), unspecified, with angina pectoris with documented spasm   H/O aortic valve replacement Bioprosthetic  Ruptured aortic aneurysm surgery august 2020  Status post aorto-coronary artery bypass graft 2012. 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    · Substance use	No  · Social History (marital status, living situation, occupation, and sexual history)	Former smoker, quit 20 years ago. Denies drinking or other drug use.   Lives alone, ambulates with a cane. Does not have help at home. States she has two sisters who are nearby that she talks to regularly.    Medications:     · 	cilostazol 50 mg oral tablet: 1 tab(s) orally 2 times a day  · 	metoprolol tartrate 50 mg oral tablet: 1 tab(s) orally 2 times a day   · 	pantoprazole 40 mg oral delayed release tablet: 1 tab(s) orally once a day   · 	furosemide 40 mg oral tablet: 1 tab(s) orally once a day  · 	folic acid 1 mg oral tablet: 1 tab(s) orally once a day  · 	levothyroxine 50 mcg (0.05 mg) oral tablet: 1 tab(s) orally once a day  · 	amLODIPine 5 mg oral tablet: 1 tab(s) orally once a day  · 	sertraline 50 mg oral tablet: 1 tab(s) orally once a day (at bedtime)  · 	Vitamin D2 50,000 intl units (1.25 mg) oral capsule: 1 cap(s) orally once a week  · 	losartan 25 mg oral tablet: 1 tab(s) orally once a day  · 	rosuvastatin 40 mg oral tablet: 1 tab(s) orally once a day    Vital Signs Last 24 Hrs  T(C): 36.6 (27 Apr 2024 15:28), Max: 36.6 (27 Apr 2024 15:28)  T(F): 97.9 (27 Apr 2024 15:28), Max: 97.9 (27 Apr 2024 15:28)  HR: 75 (27 Apr 2024 15:28) (75 - 75)  BP: 101/67 (27 Apr 2024 15:28) (101/67 - 101/67)  BP(mean): --  RR: 22 (27 Apr 2024 15:28) (22 - 22)  SpO2: 100% (27 Apr 2024 15:28) (100% - 100%)    Parameters below as of 27 Apr 2024 15:28  Patient On (Oxygen Delivery Method): room air    PHYSICAL EXAM  General: Frail appearing elderly female, appears fatigued but awake and alert.   CV: HDS, warm and perfused. Systolic murmur.   Pulm: Lungs clear to auscultation anterior and posterior in all lung fields w/ only small fine crackles in the bases.   Abd: Tender pulsatile abdominal mass in area of epigastrum. Tender groins bilaterally.   Extremity: Bilateral legs thin. No edema or wounds. L groin with superficial dehiscence of L groin cutdown at most proximal portion but no purulence, warmth, cellulitis is present. Small amount of fibrinous exudate covers the dehiscence but when gently probed no sinus tract is visible.                 R            L   Fem        3+          2+          Pop                              DP          bi           mono             PT           bi          mono              .     LABS:                        6.8    11.75 )-----------( 255      ( 27 Apr 2024 15:54 )             21.6     04-27    134<L>  |  103  |  24<H>  ----------------------------<  121<H>  4.3   |  19<L>  |  1.78<H>    Ca    9.4      27 Apr 2024 15:54    TPro  6.4  /  Alb  2.9<L>  /  TBili  0.2  /  DBili  x   /  AST  30  /  ALT  16  /  AlkPhos  118  04-27    RADIOLOGY & ADDITIONAL STUDIES:    CXR read pending. Possible LLL infiltrate vs. atelectasis.     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 Here, vitals are normal. Labs are most noteworthy for leukocytosis and anemia. Vascular surgery is consulted for concern for surgical site infection given recent procedure and appearance of left groin with a small superficial dehiscence. Overall reassuring exam with respect to surgical site or prosthetic graft infection, with no warmth, redness or purulence to suggest an issue requiring further investigation at this time.     - No acute intervention.   - Vascular surgery to follow.     Tentative pending staffing. HPI: 68-year-old F, former smoker (quit 20 year ago) with PMH HTN, HLD, CAD s/p CABG, AVR (porcine bio-prosthetic valve) and mitral annuloplasty, HFpEF, hypothyroidism, seizure disorder, stage 3 CKD (baseline CR ~1.7-2.0), severe renal artery stenosis, chronic anemia (baseline Hgb ~ 9-10, gets weekly iron infusion last infusion 3/22/24), AT, Afib s/p AVN ablation and CRT-P (8/2022; on Eliquis), AAA (s/p open repair in 2015 with complications, last repaired 2020), PAD s/p L pSFA stent/L-TIFFANY stent/L-IIA stent who presents with intermittent chest pain and KRAFT x 3 days, admitted for symptomatic anemia 03/25/24.     That hospital course was noteworthy for the following events: Initial admission complicated by status epilepticus (2 seizure events) 3/26/24 w/ RR called and pt started on Keppra. On CT angio 3/26 new focal dilatation of the distalmost aspect of the open thoracoabdominal aortic bypass graft and vascular surgery was consulted. Patient had episode of melena on 3/27 w/ concern for GIB vs vascular involvement. Cards and GI involved; patient transferred to CCU for in-unit scope revealing normal esophagus and duodenum and non-erosive gastritis and has since been cleared for anticoagulation for Afib. Stepped down to cardiac tele on 03/28/24. ALBER 3/29: Low flow low gradient severe prosthetic valve AS, peak gradient aortic 37.73 mmHg, moderate AR, moderately elevated mitral valve gradients with normal appearing mitral leaflets status post annuloplasty, moderate mitral regurgitation which decreases to trace with reduced blood pressure, moderate TR. Extensive discussion between CT Surgery and Vascular surgery about plan of care; patient required repair of pseudoaneurysm of distal suture line of prior abdomial aortic repair and also needed TAVR, but given difficult access for TAVR from iliac artery stenoses, plan was ultimately made for TAVR as outpatient w/ Dr. Gregory after repair of aorto-iliac disease. Patient was then transferred to Vascular surgery for endovascular repair of aortic pseudoaneurysm/contained rupture, left hypogastric artery coil embolization, bilateral TIFFANY to CFA stenting, R CFA patch angioplasty on 4/3. She did well post-operatively and was discharged to [facility] ([place] on 4/9.     Patient states that over the last five days she has been struck with chills and weakness. She says over the last two days she has developed fevers and her blood pressure has been soft, ~100/45, for which she was given IV fluids and labs were obtained. Those showed a white blood cell count of 14k. She was referred to the ED. Her primary complaint is fatigue and chills/rigors. She denies other specific symptoms, including dysuria, redness of her incisions, drainage from her incisions. She does state that she has had a few episodes of productive cough the last couple of days but it has not been tremendously significant. Per ED report, fever measured at [facility] ([place] today was 102F.     ED course: Afebrile here. HR 75, /67. Urinalysis, chest xray, noteworthy for WBC 11.75, HGB 6.8. Mildly elevated PT/INR 19/1.72. Lactate 1.9. BMP w/ SCr 1.78, BUN 24. HFP normal.     PAST MEDICAL HISTORY:  Chronic Anemia, anxiety, CAD s/p CABG/AVR (porcine bio-prosthetic valve) and mitral annuloplsty w/ severe prosthetic valve AS, HFpEF, hypothyroidism, seizure disorder, CKD III (B/L SCr 1.7 - 2.0), severe CARLI, Afib s/p AVN ablation and CRT-P (8/2022) on eliquis, AAA s/p open thoraco repair 2015, 2020, PAD s/p L SFA stent/bilateral L TIFFANY to CFA stenting, Depression, HTN, GERD, HLD, hypothyroidism, PVD, seizure    PAST SURGICAL HISTORY:  Abdominal aortic aneurysm (AAA) without rupture 2015  Atherosclerosis of coronary artery bypass graft(s), unspecified, with angina pectoris with documented spasm   H/O aortic valve replacement Bioprosthetic  Ruptured aortic aneurysm surgery august 2020  Status post aorto-coronary artery bypass graft 2012. 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    · Substance use	No  · Social History (marital status, living situation, occupation, and sexual history)	Former smoker, quit 20 years ago. Denies drinking or other drug use.   Lives alone, ambulates with a cane. Does not have help at home. States she has two sisters who are nearby that she talks to regularly.    Medications:     · 	cilostazol 50 mg oral tablet: 1 tab(s) orally 2 times a day  · 	metoprolol tartrate 50 mg oral tablet: 1 tab(s) orally 2 times a day   · 	pantoprazole 40 mg oral delayed release tablet: 1 tab(s) orally once a day   · 	furosemide 40 mg oral tablet: 1 tab(s) orally once a day  · 	folic acid 1 mg oral tablet: 1 tab(s) orally once a day  · 	levothyroxine 50 mcg (0.05 mg) oral tablet: 1 tab(s) orally once a day  · 	amLODIPine 5 mg oral tablet: 1 tab(s) orally once a day  · 	sertraline 50 mg oral tablet: 1 tab(s) orally once a day (at bedtime)  · 	Vitamin D2 50,000 intl units (1.25 mg) oral capsule: 1 cap(s) orally once a week  · 	losartan 25 mg oral tablet: 1 tab(s) orally once a day  · 	rosuvastatin 40 mg oral tablet: 1 tab(s) orally once a day    Vital Signs Last 24 Hrs  T(C): 36.6 (27 Apr 2024 15:28), Max: 36.6 (27 Apr 2024 15:28)  T(F): 97.9 (27 Apr 2024 15:28), Max: 97.9 (27 Apr 2024 15:28)  HR: 75 (27 Apr 2024 15:28) (75 - 75)  BP: 101/67 (27 Apr 2024 15:28) (101/67 - 101/67)  BP(mean): --  RR: 22 (27 Apr 2024 15:28) (22 - 22)  SpO2: 100% (27 Apr 2024 15:28) (100% - 100%)    Parameters below as of 27 Apr 2024 15:28  Patient On (Oxygen Delivery Method): room air    PHYSICAL EXAM  General: Frail appearing elderly female, appears fatigued but awake and alert.   CV: HDS, warm and perfused. Systolic murmur.   Pulm: Lungs clear to auscultation anterior and posterior in all lung fields w/ only small fine crackles in the bases.   Abd: Tender pulsatile abdominal mass in area of epigastrum. Tender groins bilaterally.   Extremity: Bilateral legs thin. No edema or wounds. L groin with superficial dehiscence of L groin cutdown at most proximal portion but no purulence, warmth, cellulitis is present. Small amount of fibrinous exudate covers the dehiscence but when gently probed no sinus tract is visible.                 R            L   Fem        3+          2+          Pop                              DP          bi           mono             PT           bi          mono              .     LABS:                        6.8    11.75 )-----------( 255      ( 27 Apr 2024 15:54 )             21.6     04-27    134<L>  |  103  |  24<H>  ----------------------------<  121<H>  4.3   |  19<L>  |  1.78<H>    Ca    9.4      27 Apr 2024 15:54    TPro  6.4  /  Alb  2.9<L>  /  TBili  0.2  /  DBili  x   /  AST  30  /  ALT  16  /  AlkPhos  118  04-27    RADIOLOGY & ADDITIONAL STUDIES:    CXR read pending. Possible LLL infiltrate vs. atelectasis.     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 Here, vitals are normal. Labs are most noteworthy for leukocytosis and anemia. Vascular surgery is consulted for concern for surgical site infection given recent procedure and appearance of left groin with a small superficial dehiscence. Overall reassuring exam with respect to surgical site or prosthetic graft infection, with no warmth, redness or purulence to suggest an issue requiring further investigation at this time.     - Please prehydrate and obtain CT-Angiogram of the chest abdomen and pelvis through bilateral groins.   - Vascular surgery to follow.     Discussed w/ chief resident and Dr. Soriano.

## 2024-04-27 NOTE — ED ADULT NURSE REASSESSMENT NOTE - NS ED NURSE REASSESS COMMENT FT1
pt on .5 levo 2302  1 unit ffp started 2300, done at 2304  2nd unit ffp started 2305, done at 2308  bp 114/65 map 81  levo at 1 mcg systolic bps 70s/50s on art line  2 mcg levo 2308, 3rd unit ffp started

## 2024-04-27 NOTE — ED PROVIDER NOTE - CLINICAL SUMMARY MEDICAL DECISION MAKING FREE TEXT BOX
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 Pt also noted to have leukocytosis on labs at rehab. Pt denies cough or URI sx, no dizziness or syncope, denies urinary sx.  On arrival pt is in NAD, afebrile, VSS, wounds appear to be healing well, small amount of wound dehiscence to left groin wound without erythema, redness, fluctuance, swelling, or discharge.  Plan for labs, BCx and vascular surgery consult. Vascular down to see the patient and do not feels wounds appear infected, no acute surgical intervention or imaging recommended at this time by vascular.   labs notable for WBC 11 and hgb 6.8. pt consented for 1U PRBCs and will be admitted to [facility] for further fever workup with vasc surgery following on consult. 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 Pt also noted to have leukocytosis on labs at rehab. Pt denies cough or URI sx, no dizziness or syncope, denies urinary sx.  On arrival pt is in NAD, afebrile, VSS, wounds appear to be healing well, small amount of wound dehiscence to left groin wound without erythema, redness, fluctuance, swelling, or discharge.  Plan for labs, BCx and vascular surgery consult. Vascular down to see the patient and do not feels wounds appear infected, no acute surgical intervention or imaging recommended at this time by vascular.   labs notable for WBC 11 and hgb 6.8. pt consented for 1U PRBCs and will be admitted to [facility] for further fever workup (swab, CXR read and UA pending) with vasc surgery following on consult. 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 Pt also noted to have leukocytosis on labs at rehab. Pt denies cough or URI sx, no dizziness or syncope, denies urinary sx.  On arrival pt is in NAD, afebrile, VSS, wounds appear to be healing well, small amount of wound dehiscence to left groin wound without erythema, redness, fluctuance, swelling, or discharge.  Plan for labs, BCx and vascular surgery consult. Vascular down to see the patient and do not feels wounds appear infected, no acute surgical intervention or imaging recommended at this time by vascular.   labs notable for WBC 11 and hgb 6.8. pt consented for 1U PRBCs and will be admitted to [facility] for further fever workup (swab, CXR read and UA pending) with vasc surgery following on consult.    Approx 30min after admission to medicine, pt was in holding area and started to c/o bilateral LE numbness as well as upper extremity tingling, she was noted to be pale and diaphoretic with hypotension to 70s/50s.   PRBCs were started as well as NS and blood pressure with some but not sustained improvement so peripheral levophed was ordered. Pt was covered for possible sepsis with vanc and zosyn.   Repeat labs sent and show elevated lactate to 5 and hgb downtrending to 5. Additional PRBCs ordered. Pt was seen by vasc again and SICU/MICU attending. CT dissection protocol ordered for eval for graft and read pending, no obvious rupture on prelim.   MICU attg requesting stroke code, which was called however stroke PA down to see patient and did not feel presentation c/w stroke (more likely sx due to hypotension or hypoperfusion of spinal cord thus code stroke was cancelled.   VAsc d/w MICU and decision made to admit to MICU with vasc on consult for further mgmt of hypotension, anemia, r/o sepsis, r/o issue with graft.

## 2024-04-27 NOTE — ED ADULT NURSE REASSESSMENT NOTE - NS ED NURSE REASSESS COMMENT FT1
on 5 mcg of levo 2221    15 etomidate given 2223  50 gelacio 2223    on 3.5 mcg levo 2224  pt intubated 2224     50 fentanyl given 2225

## 2024-04-27 NOTE — CONSULT NOTE ADULT - SUBJECTIVE AND OBJECTIVE BOX
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 Pt also noted to have leukocytosis on labs at rehab. Pt denies cough or URI sx, no dizziness or syncope, denies urinary sx. In ED pt became suddenly hypotensive to systolics of 70 w/ acute concern for paralysis of lower extremities. Cord ischemia amongst the differentials and neurosurgery consulted for lumbar drain placement.     PAST MEDICAL & SURGICAL HISTORY:  Atherosclerosis of coronary artery  CAD (coronary artery disease)      Peripheral vascular disease  PVD (peripheral vascular disease)      Anemia  Anemia      Hypothyroidism  Hypothyroidism      Gastroesophageal reflux disease  GERD (gastroesophageal reflux disease)      Hyperlipidemia  Hyperlipidemia      Essential hypertension  HTN (hypertension)      Seizure      Anxiety      Depression, unspecified depression type      Chronic kidney disease, unspecified CKD stage      Status post aorto-coronary artery bypass graft  2012      Atherosclerosis of coronary artery bypass graft(s), unspecified, with angina pectoris with documented spasm      H/O aortic valve replacement  Bioprosthetic      Ruptured aortic aneurysm  surgery august 2020      Abdominal aortic aneurysm (AAA) without rupture  2015        FAMILY HISTORY:  FH: myocardial infarction  in Dad (at age 50s)        SOCIAL HISTORY:  Tobacco Use:  EtOH use:   Substance:    Allergies    No Known Allergies    Intolerances        REVIEW OF SYSTEMS:  General:	no recent illnesses, no recent wt gain/loss, no chills  Skin/Breast:  no rash, lumps, new moles, erythema, tenderness  Ophthalmologic:  no change in vision, diplopia, pain, redness, tearing, dry eyes	  ENMT:	no hearing loss, tinnitus, ear pain, vertigo, nasal congestion, epistaxis, sore throat  Respiratory and Thorax: no coughing, wheezing, recent URI, shortness of breath	  Cardiovascular: no chest pain, KRAFT, leg swelling, irregular rhythm   Gastrointestinal:	no abd pain, nausea, vomiting, diarrhea, constipation, bloody stool, heartburn  Genitourinary: no frequency, dysuria, hematuria  Musculoskeletal:	no joint pain, no joint swelling, no tenderness  Neurological:	 see HPI  Psychiatric:	no confusion, no anxiousness, no depression   Hematology/Lymphatics:	no brusing, easy bleeding, LAD  Endocrine:  	no excess urination/thirst, heat/cold intolerance  Allergic/Immunologic:  no urticaria, sneezing, recurrent infections      MEDICATIONS:  Antibiotics:  piperacillin/tazobactam IVPB.- 3.375 Gram(s) IV Intermittent once    Neuro:  fentaNYL   Infusion. 0.5 MICROgram(s)/kG/Hr IV Continuous <Continuous>  propofol Infusion 20 MICROgram(s)/kG/Min IV Continuous <Continuous>    Anticoagulation:    OTHER:  chlorhexidine 0.12% Liquid 15 milliLiter(s) Oral Mucosa every 12 hours  norepinephrine Infusion 0.05 MICROgram(s)/kG/Min IV Continuous <Continuous>  vasopressin Infusion 0.04 Unit(s)/Min IV Continuous <Continuous>    IVF:      Vital Signs Last 24 Hrs  T(C): 33.7 (27 Apr 2024 22:44), Max: 36.8 (27 Apr 2024 17:27)  T(F): 92.6 (27 Apr 2024 22:44), Max: 98.3 (27 Apr 2024 17:27)  HR: 75 (27 Apr 2024 22:06) (66 - 75)  BP: 101/51 (27 Apr 2024 22:06) (86/42 - 110/54)  BP(mean): 73 (27 Apr 2024 22:06) (61 - 74)  RR: 18 (27 Apr 2024 22:06) (17 - 22)  SpO2: 100% (27 Apr 2024 22:06) (100% - 100%)    Parameters below as of 27 Apr 2024 22:06  Patient On (Oxygen Delivery Method): room air        PHYSICAL EXAM: Pt unstable requiring intubation, pressors, exam per Stroke/neurology s/p hypotensive episode   General: Conversant  NEURO: AOx3, CN2-12 grossly intact   Motor: b/l UE with no movement against gravity, only able to wiggle fingers and squeeze,  strength 2+/5. No movement b/l LE.   Sensation: Intact to light touch bilaterally UE. No neglect or extinction on double simultaneous testing on b/l UE. No sensation b/l LE from level of umbilicus down.     LABS:                        8.9    6.18  )-----------( 101      ( 27 Apr 2024 23:15 )             27.7     04-27    132<L>  |  107  |  24<H>  ----------------------------<  187<H>  4.9   |  13<L>  |  1.80<H>    Ca    8.0<L>      27 Apr 2024 20:22    TPro  4.0<L>  /  Alb  1.9<L>  /  TBili  0.2  /  DBili  x   /  AST  21  /  ALT  9<L>  /  AlkPhos  75  04-27    PT/INR - ( 27 Apr 2024 20:22 )   PT: 20.4 sec;   INR: 1.82          PTT - ( 27 Apr 2024 20:22 )  PTT:30.8 sec  Urinalysis Basic - ( 27 Apr 2024 20:22 )    Color: x / Appearance: x / SG: x / pH: x  Gluc: 187 mg/dL / Ketone: x  / Bili: x / Urobili: x   Blood: x / Protein: x / Nitrite: x   Leuk Esterase: x / RBC: x / WBC x   Sq Epi: x / Non Sq Epi: x / Bacteria: x      CULTURES:      RADIOLOGY & ADDITIONAL STUDIES:  < from: CT Angio Chest Aorta w/wo IV Cont (04.27.24 @ 20:32) >  IMPRESSION:  1.   Numerous pockets of air and soft tissue density measuring up to 2.3   cm in  thickness surrounding the aortic graft consistent with infected graft   with a  3.0 x 3.0 cm fluid and air collection on left side of the graft in   proximal  iliac segment consistent with early abscess.  Prior images are not   available at  this time for comparison.  2.   Type 3 endoleak is seen arising from the right iliac segment of the   graft  with contrast extending beyond the aneurysmal sac to the 3 x 3 cm fluid   and air  collection described above consistent with pseudoaneurysm.  Prior images   are  not available at this time for comparison.  3.   Severe flattening of distal right external iliac graft near the   common  femoral artery junction with severe narrowing of the lumen. There is near  complete occlusion with reconstitution of flow in the right common femoral  artery.  4.   Inferior mesenteric artery not seen.  5.   8 x 7 mmulcerating plaque is seen on the lateral aspect of the   proximal  bypass graft seen at series 20, image 280.  6.   Multiple air distended loops of small bowel measuring up to 3.7 cm   with  slightly decompressed loops of distal small bowel. No transition point  identified. No pneumatosis. No portal venous gas. Fluid-filled ascending   colon.  Partial small bowel obstruction versus ileus should be considered.  No   findings  seen at this time to suggest mesenteric ischemia.  7.   Areas of severe stenosis are seen in mid and distal right renal   artery.  8.   Complete occlusion of proximal right superficial femoral artery with   mild  reconstitution of flow in the mid segment and complete occlusion in the   distal  segment. Mild reconstitutionof flow is seen in right popliteal artery   with  diminished flow in arteries in right calf. Complete occlusion of right  posterior tibialis artery.  9.   Complete occlusion of left superficial femoral artery graft. Mild  reconstitution of flow is seen in the left popliteal artery with   diminished  flow and arteries and left calf. There is complete occlusion of left   posterior  tibialis artery.  10.   Large amount of stool in the colon. Finding is consistent with  constipation.  11.   Enlarged heterogeneous liver likely fatty changes and cirrhosis.        ******PRELIMINARY REPORT******        < end of copied text >  < from: Xray Chest 1 View-PORTABLE IMMEDIATE (Xray Chest 1 View-PORTABLE IMMEDIATE .) (04.27.24 @ 23:13) >  IMPRESSION: Endotracheal tube with tip approximately 4 cm above the   shikha. Right-sided central venous catheter with tip at the level of the   superior vena cava. Enteric tube with tip approximately 10 cm distal to   the gastric esophageal junction.        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 Pt also noted to have leukocytosis on labs at rehab. Pt denies cough or URI sx, no dizziness or syncope, denies urinary sx. In ED pt became suddenly hypotensive to systolics of 70 w/ acute concern for paralysis of lower extremities. Cord ischemia amongst the differentials and neurosurgery consulted for lumbar drain placement.       Plan:  - Recommend MRI non-con cervical/thoracic/lumbar spine when stable to evaluate for cord ischemia   - Consider lumbar drain placement when stable   - Please contact neurosurgery team for further questions     D/w Dr. D'Amico

## 2024-04-27 NOTE — ED ADULT NURSE NOTE - CHIEF COMPLAINT QUOTE
Pt presents from [facility] for fever of ~ 102 F and WBC of ~ 14 taken earlier today. Pt was discharged from [facility] s/p abdominal aortic aneurysm/psuedoaneurysm repair. Pt reports two groin surgical incisions with pain to the R side and redness to the L. Pt reports intermittent chills x 5 days. Denies dizziness, weakness, or syncope. Pt alert, oriented, and able to follow directions at time of triage. Denies N/V/D. Denies chest pain or abdominal pain. BEFAST (-).

## 2024-04-27 NOTE — ED ADULT NURSE REASSESSMENT NOTE - NS ED NURSE REASSESS COMMENT FT1
propofol at 20 mcg 2234  calcium chloride given 2233  pressure bagging 2nd unit prbc  .05 levo 2228  pt on 5th bag prbc  kcentra running 2241  6th bag prbc warmed being infused

## 2024-04-27 NOTE — CHART NOTE - NSCHARTNOTEFT_GEN_A_CORE
At around 7:25 PM informed by ED attending that patient had just become suddenly hypotensive to systolics of 70 w/ concern for paralysis of lower extremities. Assessed patient urgently. Indeed she is hypotensive and pale complaining of back pain with no appreciable motor function of either lower extremity. Abdomen somewhat distended. KASH w/ brown stool in vault and no blood. 1L fluid and 1u PRBC given. w/ recovery of pressures to 100s systolic. Patient taken urgently to CT scan which revealed on initial review gas around the aneurysm sac and possible opacification of enteric contents. Differential includes aortic rupture (less likely given initial impression of scan), aortoenteric fistula, and graft infection. In discussion w/ attending, given patient's overall tenuous medical status and severe cardiac valvular disease, she is unfortunately a very poor candidate for any abdominal or thoracoabdominal explantation. Stroke code called with plan to engage neurosurgery for consideration of possible interventions for management of spinal infarct either secondary to embolism or hypotension from hemorrhage or sepsis. Initial labs, with sudden significant drop in hemoglobin from 6.8 to 4.8 which was rechecked given leukopenia and returned 5.5 and lactate 5.1 support catastrophic hemorrhage, such as rupture or aortoenteric fistula.

## 2024-04-28 NOTE — PATIENT PROFILE ADULT - MST SCORE
2

## 2024-04-28 NOTE — H&P ADULT - HISTORY OF PRESENT ILLNESS
HPI: 68-year-old F, former smoker (quit 20 year ago) with PMH HTN, HLD, CAD s/p CABG, AVR (porcine bio-prosthetic valve) and mitral annuloplasty, HFpEF, hypothyroidism, seizure disorder, stage 3 CKD (baseline CR ~1.7-2.0), severe renal artery stenosis, chronic anemia (baseline Hgb ~ 9-10, gets weekly iron infusion last infusion 3/22/24), AT, Afib s/p AVN ablation and CRT-P (8/2022; on Eliquis), AAA (s/p open repair in 2015 with complications, last repaired 2020), PAD s/p L pSFA stent/L-TIFFANY stent/L-IIA stent who presents with intermittent chest pain and KRAFT x 3 days, admitted for symptomatic anemia 03/25/24.     That hospital course was noteworthy for the following events: Initial admission complicated by status epilepticus (2 seizure events) 3/26/24 w/ RR called and pt started on Keppra. On CT angio 3/26 new focal dilatation of the distalmost aspect of the open thoracoabdominal aortic bypass graft and vascular surgery was consulted. Patient had episode of melena on 3/27 w/ concern for GIB vs vascular involvement. Cards and GI involved; patient transferred to CCU for in-unit scope revealing normal esophagus and duodenum and non-erosive gastritis and has since been cleared for anticoagulation for Afib. Stepped down to cardiac tele on 03/28/24. ALBER 3/29: Low flow low gradient severe prosthetic valve AS, peak gradient aortic 37.73 mmHg, moderate AR, moderately elevated mitral valve gradients with normal appearing mitral leaflets status post annuloplasty, moderate mitral regurgitation which decreases to trace with reduced blood pressure, moderate TR. Extensive discussion between CT Surgery and Vascular surgery about plan of care; patient required repair of pseudoaneurysm of distal suture line of prior abdomial aortic repair and also needed TAVR, but given difficult access for TAVR from iliac artery stenoses, plan was ultimately made for TAVR as outpatient w/ Dr. Gregory after repair of aorto-iliac disease. Patient was then transferred to Vascular surgery for endovascular repair of aortic pseudoaneurysm/contained rupture, left hypogastric artery coil embolization, bilateral TIFFANY to CFA stenting, R CFA patch angioplasty on 4/3. She did well post-operatively and was discharged to [facility] on 4/9.     ED course: Afebrile here. HR 75, /67. Urinalysis, chest xray, noteworthy for WBC 11.75, HGB 6.8. Mildly elevated PT/INR 19/1.72. Lactate 1.9. BMP w/ SCr 1.78, BUN 24. HFP normal.     On initial assessment, patient states that over the last five days she has been struck with chills and weakness. She says over the last two days she has developed fevers and her blood pressure has been soft, ~100/45, for which she was given IV fluids and labs were obtained. Those showed a white blood cell count of 14k. She was referred to the ED. Her primary complaint is fatigue and chills/rigors. She denies other specific symptoms, including dysuria, redness of her incisions, drainage from her incisions. She does state that she has had a few episodes of productive cough the last couple of days but it has not been tremendously significant. Per ED report, fever measured at [facility] today was 102F.     Following initial assessment after about an hour, patient had a sudden change in her status noteworthy for hypotension and bilateral lower extremity paralysis. Initial labs concerning for acute hemorrhage. 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 SICU, Neurology, neurosurgery consulted. Neurosurgery PA assessed patient and deferred lumbar drain given overall clinical picture and instability for MRI required to prove spinal cord infarct. Hemoptysis and melena developed and patient required high pressors which improved with blood transfusion and reversal of coagulopathy. Patient admitted to SICU pending goals of care discussion with family and operative plan.     PAST MEDICAL HISTORY:  Chronic Anemia, anxiety, CAD s/p CABG/AVR (porcine bio-prosthetic valve) and mitral annuloplsty w/ severe prosthetic valve AS, HFpEF, hypothyroidism, seizure disorder, CKD III (B/L SCr 1.7 - 2.0), severe CARLI, Afib s/p AVN ablation and CRT-P (8/2022) on eliquis, AAA s/p open thoraco repair 2015, 2020, PAD s/p L SFA stent/bilateral L TIFFANY to CFA stenting, Depression, HTN, GERD, HLD, hypothyroidism, PVD, seizure    PAST SURGICAL HISTORY:  Abdominal aortic aneurysm (AAA) without rupture 2015  Atherosclerosis of coronary artery bypass graft(s), unspecified, with angina pectoris with documented spasm   H/O aortic valve replacement Bioprosthetic  Ruptured aortic aneurysm surgery august 2020  Status post aorto-coronary artery bypass graft 2012. 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    · Substance use	No  · Social History (marital status, living situation, occupation, and sexual history)	Former smoker, quit 20 years ago. Denies drinking or other drug use.   Lives alone, ambulates with a cane. Does not have help at home. States she has two sisters who are nearby that she talks to regularly.    Medications:     · 	cilostazol 50 mg oral tablet: 1 tab(s) orally 2 times a day  · 	metoprolol tartrate 50 mg oral tablet: 1 tab(s) orally 2 times a day   · 	pantoprazole 40 mg oral delayed release tablet: 1 tab(s) orally once a day   · 	furosemide 40 mg oral tablet: 1 tab(s) orally once a day  · 	folic acid 1 mg oral tablet: 1 tab(s) orally once a day  · 	levothyroxine 50 mcg (0.05 mg) oral tablet: 1 tab(s) orally once a day  · 	amLODIPine 5 mg oral tablet: 1 tab(s) orally once a day  · 	sertraline 50 mg oral tablet: 1 tab(s) orally once a day (at bedtime)  · 	Vitamin D2 50,000 intl units (1.25 mg) oral capsule: 1 cap(s) orally once a week  · 	losartan 25 mg oral tablet: 1 tab(s) orally once a day  · 	rosuvastatin 40 mg oral tablet: 1 tab(s) orally once a day    Vital Signs Last 24 Hrs  T(C): 36.6 (27 Apr 2024 15:28), Max: 36.6 (27 Apr 2024 15:28)  T(F): 97.9 (27 Apr 2024 15:28), Max: 97.9 (27 Apr 2024 15:28)  HR: 75 (27 Apr 2024 15:28) (75 - 75)  BP: 101/67 (27 Apr 2024 15:28) (101/67 - 101/67)  BP(mean): --  RR: 22 (27 Apr 2024 15:28) (22 - 22)  SpO2: 100% (27 Apr 2024 15:28) (100% - 100%)    Parameters below as of 27 Apr 2024 15:28  Patient On (Oxygen Delivery Method): room air    PHYSICAL EXAM  General: Frail appearing elderly female, appears fatigued but awake and alert.   CV: HDS, warm and perfused. Systolic murmur.   Pulm: Lungs clear to auscultation anterior and posterior in all lung fields w/ only small fine crackles in the bases.   Abd: Tender pulsatile abdominal mass in area of epigastrum. Tender groins bilaterally.   Extremity: Bilateral legs thin. No edema or wounds. L groin with superficial dehiscence of L groin cutdown at most proximal portion but no purulence, warmth, cellulitis is present. Small amount of fibrinous exudate covers the dehiscence but when gently probed no sinus tract is visible.                 R            L   Fem        3+          2+          Pop                              DP          bi           mono             PT           bi          mono              .     LABS:                        6.8    11.75 )-----------( 255      ( 27 Apr 2024 15:54 )             21.6     04-27    134<L>  |  103  |  24<H>  ----------------------------<  121<H>  4.3   |  19<L>  |  1.78<H>    Ca    9.4      27 Apr 2024 15:54    TPro  6.4  /  Alb  2.9<L>  /  TBili  0.2  /  DBili  x   /  AST  30  /  ALT  16  /  AlkPhos  118  04-27    RADIOLOGY & ADDITIONAL STUDIES:    CXR read pending. Possible LLL infiltrate vs. atelectasis.     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 Here, vitals are normal. 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 Curative treatment for this would be a thoracoabdominal explantation of all graft material with fistula takedown and extraanatomical bypass, but given her valvular disease and frailty she is felt to be at high operative risk for this procedure at this time.     #Melana, hematemesis  #Possible likely graft infection vs. aorto-enteric fistula vs. post-surgical air on CT.   - MTP initiated.   - Plan for SICU admission for resuscitation and optimization pending possible operative intervention.   - Appreciate SICU care    #B/L LE paralysis  - Felt to be secondary to hypotension in the setting of poor spinal cord collateralization given extensive prior aortic surgery.   - Appreciate neurosurgical, neurology input    Discussed w/ chief resident and staffed w/ Dr. Summers, covering for Dr. Soriano

## 2024-04-28 NOTE — PROGRESS NOTE ADULT - SUBJECTIVE AND OBJECTIVE BOX
HPI:  68-year-old F, former smoker (quit 20 year ago) with PMH HTN, HLD, CAD s/p CABG, AVR (porcine bio-prosthetic valve) and mitral annuloplasty, HFpEF, hypothyroidism, seizure disorder, stage 3 CKD (baseline CR ~1.7-2.0), severe renal artery stenosis, chronic anemia (baseline Hgb ~ 9-10, gets weekly iron infusion last infusion 3/22/24), AT, Afib s/p AVN ablation and CRT-P (8/2022; on Eliquis), AAA (s/p open repair in 2015 with complications, last repaired 2020), PAD s/p L pSFA stent/L-TIFFANY stent/L-IIA stent who presents with intermittent chest pain and KRAFT x 3 days, admitted for symptomatic anemia 03/25/24.     That hospital course was noteworthy for the following events: Initial admission complicated by status epilepticus (2 seizure events) 3/26/24 w/ RR called and pt started on Keppra. On CT angio 3/26 new focal dilatation of the distalmost aspect of the open thoracoabdominal aortic bypass graft and vascular surgery was consulted. Patient had episode of melena on 3/27 w/ concern for GIB vs vascular involvement. Cards and GI involved; patient transferred to CCU for in-unit scope revealing normal esophagus and duodenum and non-erosive gastritis and has since been cleared for anticoagulation for Afib. Stepped down to cardiac tele on 03/28/24. ALBER 3/29: Low flow low gradient severe prosthetic valve AS, peak gradient aortic 37.73 mmHg, moderate AR, moderately elevated mitral valve gradients with normal appearing mitral leaflets status post annuloplasty, moderate mitral regurgitation which decreases to trace with reduced blood pressure, moderate TR. Extensive discussion between CT Surgery and Vascular surgery about plan of care; patient required repair of pseudoaneurysm of distal suture line of prior abdomial aortic repair and also needed TAVR, but given difficult access for TAVR from iliac artery stenoses, plan was ultimately made for TAVR as outpatient w/ Dr. Gregory after repair of aorto-iliac disease. Patient was then transferred to Vascular surgery for endovascular repair of aortic pseudoaneurysm/contained rupture, left hypogastric artery coil embolization, bilateral TIFFANY to CFA stenting, R CFA patch angioplasty on 4/3. She did well post-operatively and was discharged to [facility] on 4/9.     Patient states that over the last five days she has been struck with chills and weakness. She says over the last two days she has developed fevers and her blood pressure has been soft, ~100/45, for which she was given IV fluids and labs were obtained. Those showed a white blood cell count of 14k. She was referred to the ED. Her primary complaint is fatigue and chills/rigors. She denies other specific symptoms, including dysuria, redness of her incisions, drainage from her incisions. She does state that she has had a few episodes of productive cough the last couple of days but it has not been tremendously significant. Per ED report, fever measured at [facility] today was 102F.     ED course: Afebrile here. HR 75, /67. Urinalysis, chest xray, noteworthy for WBC 11.75, HGB 6.8. Mildly elevated PT/INR 19/1.72. Lactate 1.9. BMP w/ SCr 1.78, BUN 24. HFP normal.     PAST MEDICAL HISTORY:  Chronic Anemia, anxiety, CAD s/p CABG/AVR (porcine bio-prosthetic valve) and mitral annuloplsty w/ severe prosthetic valve AS, HFpEF, hypothyroidism, seizure disorder, CKD III (B/L SCr 1.7 - 2.0), severe CARLI, Afib s/p AVN ablation and CRT-P (8/2022) on eliquis, AAA s/p open thoraco repair 2015, 2020, PAD s/p L SFA stent/bilateral L TIFFANY to CFA stenting, Depression, HTN, GERD, HLD, hypothyroidism, PVD, seizure    PAST SURGICAL HISTORY:  Abdominal aortic aneurysm (AAA) without rupture 2015  Atherosclerosis of coronary artery bypass graft(s), unspecified, with angina pectoris with documented spasm   H/O aortic valve replacement Bioprosthetic  Ruptured aortic aneurysm surgery august 2020  Status post aorto-coronary artery bypass graft 2012. 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    · Substance use	No  · Social History (marital status, living situation, occupation, and sexual history)	Former smoker, quit 20 years ago. Denies drinking or other drug use.   Lives alone, ambulates with a cane. Does not have help at home. States she has two sisters who are nearby that she talks to regularly.      SICU ADDENDUM and ED COURSE:  PMHx and PSHx as above. Per primary team, patient presented from [facility] with several day history of chills and weakness and fever of 102, ROS negative at the time. On initial assessment by primary team, patient was afebrile, nontachycardic, normotensive, and comfortable on RA. Labs showed WBC 11.75, hgb 6.8, lactate 1.9, Cr 1.78; UA neg. At that time, decision had been made to admit to medicine for further workup. However, patient suddenly lost sensory and motor function of b/l LE which slowly extended to b/l UE and decision. Labs with WBC 2.5, Hgb 5.5, platelets 133, INR 1.8, lactate 5.1. Decision was made to obtain CT angio abdomen/pelvis which at the time was suspicious for infected graft vs. aortoenteric fistula; R IJ cordis and R radial A line placed by vascular surgery. SICU consulted. During evaluation by SICU team, patient had large volume frankly bloody emesis and became rapidly hypotensive to SBP 70s and rectal temperature of 92.5. Levophed was started and rapidly uptitrated to a max of 5, and MTP initiated. Patient was intubated by Dr. Barnard for airway protection, received a total of 5U PRBC,4U FFP, 1U platelets, and Kcentra. OG tube placed with immediate return of 150cc frankly bloody output, advanced further with return of additional 250cc of frankly bloody output. Shortly prior to transport to SICU patient also developed large volume melena.    ICU Vital Signs Last 24 Hrs  T(F): 92.6 (04-27-24 @ 22:44), Max: 98.3 (04-27-24 @ 17:27)  HR: 75 (04-27-24 @ 23:28) (66 - 75)  BP: 101/51 (04-27-24 @ 22:06) (86/42 - 110/54)  BP(mean): 73 (04-27-24 @ 22:06) (61 - 74)  ABP: --  RR: 12 (04-27-24 @ 23:28) (12 - 22)  SpO2: 100% (04-27-24 @ 23:28) (100% - 100%)    PHYSICAL  General: intubated and sedated, comfortably  NEURO: sedated  HEENT: PERRL, mucous membranes dry  CV: RRR, no MRG  PULM: intubated on VC/AC, CTAB  ABD: soft, nondistended, unable to assess tenderness 2/2 sedation; midline vertical scar appreciated, b/l groin incisions c/d/i; copious melena; KASH with good sphincter tone, no masses palpated  : Gomez in place draining clear urine  EXTREM: cold in all extremities  SKIN: No rashes noted    LABS:    04-27    133<L>  |  109<H>  |  24<H>  ----------------------------<  221<H>  4.0   |  13<L>  |  1.61<H>    Ca    10.7<H>      27 Apr 2024 23:15    TPro  4.1<L>  /  Alb  1.9<L>  /  TBili  0.3  /  DBili  x   /  AST  20  /  ALT  10  /  AlkPhos  103  04-27  LIVER FUNCTIONS - ( 27 Apr 2024 23:15 )  Alb: 1.9 g/dL / Pro: 4.1 g/dL / ALK PHOS: 103 U/L / ALT: 10 U/L / AST: 20 U/L / GGT: x                               8.9    6.18  )-----------( 101      ( 27 Apr 2024 23:15 )             27.7   PT/INR - ( 27 Apr 2024 23:15 )   PT: 16.2 sec;   INR: 1.44          PTT - ( 27 Apr 2024 23:15 )  PTT:28.8 secCARDIAC MARKERS ( 27 Apr 2024 23:15 )  x     / x     / 52 U/L / x     / 1.2 ng/mL    ABG - ( 27 Apr 2024 23:45 )  pH, Arterial: 7.35  pH, Blood: x     /  pCO2: 26    /  pO2: 183   / HCO3: 14    / Base Excess: -9.5  /  SaO2: 99.9            Urinalysis Basic - ( 27 Apr 2024 23:15 )    Color: x / Appearance: x / SG: x / pH: x  Gluc: 221 mg/dL / Ketone: x  / Bili: x / Urobili: x   Blood: x / Protein: x / Nitrite: x   Leuk Esterase: x / RBC: x / WBC x   Sq Epi: x / Non Sq Epi: x / Bacteria: x    CAPILLARY BLOOD GLUCOSE      POCT Blood Glucose.: 208 mg/dL (27 Apr 2024 19:14)          IMAGING  ACC: [id number] EXAM:  CT ANGIO ABD AOR W RUN(W)AW IC   ORDERED BY: RADHA VALDEZ     PROCEDURE DATE:  04/27/2024    ******PRELIMINARY REPORT******      ******PRELIMINARY REPORT******           INTERPRETATION:  [facility] RADIOLOGIST PRELIMINARY REPORT      Initial report created on 4/27/2024 10:01:39 PM EDT  PROCEDURE INFORMATION:  Exam: CTA Chest With Contrast  Exam date and time: 4/27/2024 8:28 PM  Age: 68 years old  Clinical indication: Recent evar for distal aortic suture line   pseudoaneurysmal  degeneration/containe    TECHNIQUE:  Imaging protocol: Computed tomographic angiography of the chest with   contrast,  including non-contrast images if performed. Exam focused on the arteries.  3D rendering (Not supervised by radiologist): MIP and/or 3D reconstructed  images were created by the technologist.    COMPARISON:  CT ANGIO ABDOMINAL AORTA RUNOFF WITHOUT AND OR WITH IV CONTRAST 8/20/2020   6:23  PM    FINDINGS:  Tubes, catheters and devices: Left-sided pacemaker in place with leads in   good  position.    Pulmonary arteries: No evidence of pulmonary embolus.  Aorta: Atherosclerotic disease and soft plaque throughout the descending  thoracic aorta. Descending thoracic aorta appears diffusely irregular .  Ulcerating plaque measuring 15 x 12 mm is seen in the anteromedial wall   of mid  descending thoracic aorta.    Lungs: Small infiltrates in dependent portions of bilateral lower lobes   and  lingula.  Pleural spaces: Unremarkable. No pneumothorax. No pleural effusion.  Heart: Unremarkable. No cardiomegaly. No pericardial effusion.  Lymph nodes: Unremarkable. No enlarged lymph nodes.    Bones/joints: Unremarkable. No acute fracture.  Soft tissues: Unremarkable.    IMPRESSION:  1.   Descending thoracic aorta appears diffusely irregular . Ulcerating   plaque  measuring 15 x 12 mm is seen in the anteromedial wall of mid descending  thoracic aorta. No significant stenosis.  No dissection.  2.   Small infiltrates in dependent portions of bilateral lower lobes and  lingula.      =========================  PROCEDURE INFORMATION:  Exam: CTA Abdominal Aorta and Bilateral Lower Extremities (Run-off) With  Contrast  Exam date and time: 4/27/2024 8:28 PM  Age: 68 years old  Clinical indication: Recent evar for distal aortic suture line   pseudoaneurysmal  degeneration/containe    TECHNIQUE:  Imaging protocol: Computed tomographic angiography of the abdominal aorta,  pelvis and bilateral lower extremities with contrast.  3D rendering (Not supervised by radiologist): MIP and/or 3D reconstructed  images were created by the technologist.    COMPARISON:  CT ANGIO ABDOMEN AND PELVIS 3/26/2024 5:10 PM    FINDINGS:  Aorta: There is complete occlusion of native abdominal aorta with upper  abdominal aortic to bilateral iliac graft in place and patent. Numerous   pockets  of air and soft tissue density measuring up to 2.3 cm seen surrounding the  aortic graft with a 3.0 x 3.0 cm fluid and fluid and air component on the   left  side of the proximal iliac component of the graft. Findings consistent   with  early abscess collection.  Celiac trunk and mesenteric arteries: Moderate stenosis in origin of the   celiac  artery. No evidence of occlusion. Moderate stenosis due to kinking in   origin of  the superior mesenteric artery. No evidence of occlusion. Inferior   mesenteric  artery not seen.  Renal arteries: Areas of severe stenosis are seen in mid and distal right   renal  artery.  Right iliac arteries: No occlusion or significant stenosis.  Right femoral/popliteal arteries: Severe flattening of distal right   external  iliac graft near the common femoral artery junction with severe narrowing   of  the lumen. There is near complete occlusion with reconstitution of flow   in the  right common femoral artery. Complete occlusion of proximal right   superficial  femoral artery with mild reconstitution of flow in the mid segment and   complete  occlusion in the distal segment. Mild reconstitution of flow is seen in   right  popliteal artery with diminished flow in arteries in right calf. Complete  occlusion of right posterior tibialis artery.  Right infrapopliteal arteries: No occlusion or significant stenosis.  Left iliac arteries: No occlusion or significant stenosis.  Left femoral/popliteal arteries: Complete occlusion of left superficial   femoral  artery graft. Mild reconstitution of flow is seen in the left popliteal   artery  with diminished flow and arteries and left calf. There is complete   occlusion of  left posterior tibialis artery.  Left infrapopliteal arteries: No occlusion or significant stenosis.  Other arteries: 8 x 7 mm ulcerating plaque is seen on the lateral aspect   of the  proximal bypass graft seen at series 20, image 280.    Liver: Diffusely enlarged and heterogeneous liver. Liver measures 22.1 x   9.6  cm. Findings are consistent with fatty and likely cirrhotic changes.  Gallbladder and bile ducts: Unremarkable. No calcified stones. No ductal  dilation.  Pancreas: Unremarkable. No mass. No ductal dilation.  Spleen: Normal. No splenomegaly.  Adrenal glands: Normal. No mass.  Kidneys and ureters: Left nephrectomy.  Stomach and bowel: Multiple distended loops of small bowel measuring up   to 3.7  cm. Fluid-filled ascending colon. No bowel wall thickening. No   pneumatosis. No  portal venous gas. Loops of distal small bowel are smaller in caliber than  distended loops of mid and proximal small bowel. Large amount of stool in   the  colon.  Appendix: No evidence of appendicitis.  Urinary bladder: Unremarkable. No mass.  Reproductive: Multiple calcified leiomyomas in the uterus.  Intraperitoneal space: Unremarkable. No free air. No significant fluid  collection.  Lymph nodes: No lymphadenopathy.  Bones/joints: Persistent blood flow is seen within the aneurysmal sac   arising  from the right side of the graft in mid right iliac component of the   graft on  series 20, image 369 extending to left side of the aneurysmal sac seen on  images 365-400. Finding is consistent with type 3 endoleak. There appears   to be  extension of the extravasated contrast anterior to the graft extending to   the  right side of the abscess collection seen on series 20, images 399-404.  Bilateral external iliac segments of the graft are patent.  Soft tissues: Unremarkable.    IMPRESSION:  1.   Numerous pockets of air and soft tissue density measuring up to 2.3   cm in  thickness surrounding the aortic graft consistent with infected graft   with a  3.0 x 3.0 cm fluid and air collection on left side of the graft in   proximal  iliac segment consistent with early abscess.  Prior images are not   available at  this time for comparison.  2.   Type 3 endoleak is seen arising from the right iliac segment of the   graft  with contrast extending beyond the aneurysmal sac to the 3 x 3 cm fluid   and air  collection described above consistent with pseudoaneurysm.  Prior images   are  not available at this time for comparison.  3.   Severe flattening of distal right external iliac graft near the   common  femoral artery junction with severe narrowing of the lumen. There is near  complete occlusion with reconstitution of flow in the right common femoral  artery.  4.   Inferior mesenteric artery not seen.  5.   8 x 7 mm ulcerating plaque is seen on the lateral aspect of the   proximal  bypass graft seen at series 20, image 280.  6.   Multiple air distended loops of small bowel measuring up to 3.7 cm   with  slightly decompressed loops of distal small bowel. No transition point  identified. No pneumatosis. No portal venous gas. Fluid-filled ascending   colon.  Partial small bowel obstruction versus ileus should be considered.  No   findings  seen at this time to suggest mesenteric ischemia.  7.   Areas of severe stenosis are seen in mid and distal right renal   artery.  8.   Complete occlusion of proximal right superficial femoral artery with   mild  reconstitution of flow in the mid segment and complete occlusion in the   distal  segment. Mild reconstitution of flow is seen in right popliteal artery   with  diminished flow in arteries in right calf. Complete occlusion of right  posterior tibialis artery.  9.   Complete occlusion of left superficial femoral artery graft. Mild  reconstitution of flow is seen in the left popliteal artery with   diminished  flow and arteries and left calf. There is complete occlusion of left   posterior  tibialis artery.  10.   Large amount of stool in the colon. Finding is consistent with  constipation.  11.   Enlarged heterogeneous liver likely fatty changes and cirrhosis.        ******PRELIMINARY REPORT******      ******PRELIMINARY REPORT******

## 2024-04-28 NOTE — PROGRESS NOTE ADULT - ASSESSMENT
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 Admitted to SICU for hemodynamic monitoring now s/p bilateral groin cutdown, Endoleak repair via L CFA with stent placement, Right groin w/ abscess culture, R CFA endarterectomy, wound vac placement to right groin EBL 2L, IVF 1L , 8u pRBC, 2u FFP, 2u Plt, 1u Cryo, 500cc UOP.     Neuro: Sedation Propofol/Fentanyl. RASS goal -2 to -3. Sudden loss of sensation in the bilateral lower extremities. L. hand weakness in ED. Stroke code called/cancelled. Obtain MRI per neurosurg when stable.  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 Hgb 6.8 --> 5.5 --> 8.9. Lactate 2.8 --> 4.1. S/p 6 units PRBCs, 4FFP, 1 units PRBC. Continue Levo/Vaso to maintain a MAP greater than 65. Hx: CAD s/p CABG: holding Aspirin, Coreg. Hx: A.fib.: Holding Eliquis, Kcentra given. Hx: AVR/mitral annuloplasty. Severe AS pending future TAVR. Hx: HFpEF. Hx: HLD: Rosuvastatin on hold   Pulm: Intubated for airway protections. Hematoemesis. AC 12|400|30|5   GI: OGT with omkar blood output. 500mls on initial insertion with continuous bloody output. Melena. Rectal tube in place. PPI. NGT   : Gomez. Strict I and O Hx: CKD3: Baseline creatinine 1.7  ID: Septic shock on admission 2/2 likely aortic graft infection. Fluid resuscitated in the ER. Michael (4/28--) Vanc (4/27--) dc:// Zosyn. Continue to trend lactate.  Vasc: Concern for aortoenteric fistual/Infection of graft. Antibiotics. Transfuse. Hx: AAA repair 2015, TEVAR, bilateral TIFFANY to CFA stenting 4/24  [facility] Hx: Hypothyroidism: Continue IVp 60mcg daily (home dose 75mcg PO)    Heme: Trend CBC/Coags q4 hours. Transfuse as needed. Hx: Anemia: holding iron TOTAL PRODUCT: 17u pRBCs, 6u FFP, 3u Plt,  Dispo: SICU  68-year-old F, former smoker with past medical history of HTN, HLD, CAD s/p CABG, AVR and mitral annuloplasty, HFpEF, hypothyroidism, seizure disorder, stage 3 CKD, severe renal artery stenosis, chronic anemia AT, Afib s/p AVN ablation and CRT-P (8/2022; on Eliquis), AAA repair, recent endovascular repair of aortic pseudoaneurysm/contained rupture, left hypogastric artery coil embolization, bilateral TIFFANY to CFA stenting, R CFA patch angioplasty on 4/3 who presented with dyspnea on exertion and intermittent chest pain, with 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 Admitted to SICU for hemodynamic monitoring now s/p bilateral groin cutdown, Endoleak repair via L CFA with stent placement, Right groin w/ abscess culture, R CFA endarterectomy, wound vac placement to right groin EBL 2L, IVF 1L , 8u pRBC, 2u FFP, 2u Plt, 1u Cryo, 500cc UOP.     Neuro: Sedation Propofol/Fentanyl. RASS goal -2 to -3. In ED, Sudden loss of sensation in the bilateral lower extremities. L. hand weakness in ED. Stroke code called/cancelled. Obtain MRI per neurosurg when stable.  Hx: Seizure disorder: Continue Keppra   Cardiovascular: Hemorrhagic shock 2/2 likely aorto-enteric fistula, now s/p repair. Continue Levo/Vaso to maintain a MAP greater than 65. Hx: CAD s/p CABG: holding Aspirin, Coreg. Hx: A.fib.: Holding Eliquis, Kcentra given. Hx: AVR/mitral annuloplasty. Severe AS pending future TAVR vs BAV. Hx: HFpEF. Hx: HLD: Rosuvastatin on hold   Pulm: Intubated for airway protections. Hematemesis. AC 12|400|30|5   GI: NPO/IVF, OGT with now serosanguinous output, previously frankly sanguinous wit total > 1L; continued melena, rectal tube in place. PPI  : Gomez. Strict Is and Os; Hx: CKD3: Baseline creatinine 1.7  ID: Septic shock on admission 2/2 likely aortic graft infection - confirmed in OR, f/u OR Cx 4/28. Fluid resuscitated in the ER. Michael (4/28--) Vanc (4/27--) dc:// Zosyn. Continue to trend lactate. ID recs appreciated  Vascular: Concern for aortoenteric fistula with concurrent graft infection - now s/p bilateral groin cutdown, Endoleak repair via L CFA with stent placement, Right groin w/ abscess culture, R CFA endarterectomy, wound vac placement to right groin EBL 2L, IVF 1L , 8u pRBC, 2u FFP, 2u Plt, 1u Cryo, 500cc UOP on 4/28. Hx: AAA repair 2015, TEVAR & bilateral TIFFANY to CFA stenting 4/3/24  Endo:  Hx: Hypothyroidism: Continue Synthroid IVp 60mcg daily (home dose 75mcg PO)    Heme: Trend CBC/Coags with fibrinogen q4 hours. Hgb > 8, Plt > 100, INR ~ 1.2. Hx: Anemia: holding iron; TOTAL PRODUCT: 17u pRBCs, 8u FFP, 4u Plt  Lines/Wounds: R IJ Cordis, R Radial A line, s/p L axillary A Line, 3 PIVs; R Groin w/ wound vac, L groin w/ pressure dressing  Dispo: SICU

## 2024-04-28 NOTE — CONSULT NOTE ADULT - SUBJECTIVE AND OBJECTIVE BOX
68-year-old F, former smoker (quit 20 year ago) with PMH HTN, HLD, CAD s/p CABG, AVR (porcine bio-prosthetic valve) and mitral annuloplasty, HFpEF, hypothyroidism, seizure disorder, stage 3 CKD (baseline CR ~1.7-2.0), severe renal artery stenosis, chronic anemia (baseline Hgb ~ 9-10, gets weekly iron infusion last infusion 3/22/24), AT, Afib s/p AVN ablation and CRT-P (8/2022; on Eliquis), AAA (s/p open repair in 2015 with complications, last repaired 2020), PAD s/p L pSFA stent/L-TIFFANY stent/L-IIA stent who presents with intermittent chest pain and KRAFT x 3 days, admitted for symptomatic anemia 03/25/24.     That hospital course was noteworthy for the following events: Initial admission complicated by status epilepticus (2 seizure events) 3/26/24 w/ RR called and pt started on Keppra. On CT angio 3/26 new focal dilatation of the distalmost aspect of the open thoracoabdominal aortic bypass graft and vascular surgery was consulted. Patient had episode of melena on 3/27 w/ concern for GIB vs vascular involvement. Cards and GI involved; patient transferred to CCU for in-unit scope revealing normal esophagus and duodenum and non-erosive gastritis and has since been cleared for anticoagulation for Afib. Stepped down to cardiac tele on 03/28/24. ALBER 3/29: Low flow low gradient severe prosthetic valve AS, peak gradient aortic 37.73 mmHg, moderate AR, moderately elevated mitral valve gradients with normal appearing mitral leaflets status post annuloplasty, moderate mitral regurgitation which decreases to trace with reduced blood pressure, moderate TR. Extensive discussion between CT Surgery and Vascular surgery about plan of care; patient required repair of pseudoaneurysm of distal suture line of prior abdomial aortic repair and also needed TAVR, but given difficult access for TAVR from iliac artery stenoses, plan was ultimately made for TAVR as outpatient w/ Dr. Gregory after repair of aorto-iliac disease. Patient was then transferred to Vascular surgery for endovascular repair of aortic pseudoaneurysm/contained rupture, left hypogastric artery coil embolization, bilateral TIFFANY to CFA stenting, R CFA patch angioplasty on 4/3. She did well post-operatively and was discharged to [facility] on 4/9.     Patient states that over the last five days she has been struck with chills and weakness. She says over the last two days she has developed fevers and her blood pressure has been soft, ~100/45, for which she was given IV fluids and labs were obtained. Those showed a white blood cell count of 14k. She was referred to the ED. Her primary complaint is fatigue and chills/rigors. She denies other specific symptoms, including dysuria, redness of her incisions, drainage from her incisions. She does state that she has had a few episodes of productive cough the last couple of days but it has not been tremendously significant. Per ED report, fever measured at [facility] today was 102F.     ED course: Afebrile here. HR 75, /67. Urinalysis, chest xray, noteworthy for WBC 11.75, HGB 6.8. Mildly elevated PT/INR 19/1.72. Lactate 1.9. BMP w/ SCr 1.78, BUN 24. HFP normal.     PAST MEDICAL HISTORY:  Chronic Anemia, anxiety, CAD s/p CABG/AVR (porcine bio-prosthetic valve) and mitral annuloplsty w/ severe prosthetic valve AS, HFpEF, hypothyroidism, seizure disorder, CKD III (B/L SCr 1.7 - 2.0), severe CARLI, Afib s/p AVN ablation and CRT-P (8/2022) on eliquis, AAA s/p open thoraco repair 2015, 2020, PAD s/p L SFA stent/bilateral L TIFFANY to CFA stenting, Depression, HTN, GERD, HLD, hypothyroidism, PVD, seizure    PAST SURGICAL HISTORY:  Abdominal aortic aneurysm (AAA) without rupture 2015  Atherosclerosis of coronary artery bypass graft(s), unspecified, with angina pectoris with documented spasm   H/O aortic valve replacement Bioprosthetic  Ruptured aortic aneurysm surgery august 2020  Status post aorto-coronary artery bypass graft 2012. 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    · Substance use	No  · Social History (marital status, living situation, occupation, and sexual history)	Former smoker, quit 20 years ago. Denies drinking or other drug use.   Lives alone, ambulates with a cane. Does not have help at home. States she has two sisters who are nearby that she talks to regularly.      SICU ADDENDUM and ED COURSE:  PMHx and PSHx as above. Per primary team, patient presented from [facility] with several day history of chills and weakness and fever of 102, ROS negative at the time. On initial assessment by primary team, patient was afebrile, nontachycardic, normotensive, and comfortable on RA. Labs showed WBC 11.75, hgb 6.8, lactate 1.9, Cr 1.78; UA neg. At that time, decision had been made to admit to medicine for further workup. However, patient suddenly lost sensory and motor function of b/l LE which slowly extended to b/l UE and decision. Labs with WBC 2.5, Hgb 5.5, platelets 133, INR 1.8, lactate 5.1. Decision was made to obtain CT angio abdomen/pelvis which at the time was suspicious for infected graft vs. aortoenteric fistula; R IJ cordis and R radial A line placed by vascular surgery. SICU consulted. During evaluation by SICU team, patient had large volume frankly bloody emesis and became rapidly hypotensive to SBP 70s and rectal temperature of 92.5. Levophed was started and rapidly uptitrated to a max of 5, and MTP initiated. Patient was intubated by Dr. Barnard for airway protection, received a total of 5U PRBC,4U FFP, 1U platelets, and Kcentra. OG tube placed with immediate return of 150cc frankly bloody output, advanced further with return of additional 250cc of frankly bloody output. Shortly prior to transport to SICU patient also developed large volume melena.    ICU Vital Signs Last 24 Hrs  T(F): 92.6 (04-27-24 @ 22:44), Max: 98.3 (04-27-24 @ 17:27)  HR: 75 (04-27-24 @ 23:28) (66 - 75)  BP: 101/51 (04-27-24 @ 22:06) (86/42 - 110/54)  BP(mean): 73 (04-27-24 @ 22:06) (61 - 74)  ABP: --  RR: 12 (04-27-24 @ 23:28) (12 - 22)  SpO2: 100% (04-27-24 @ 23:28) (100% - 100%)    PHYSICAL  General: intubated and sedated, comfortably  NEURO: sedated  HEENT: PERRLA, mucous membranes dry  CV: RRR, no MRG  PULM: intubated on VC/AC, CTAB  ABD: soft, nondistended, unable to assess tenderness 2/2 sedation; midline vertical scar appreciated, b/l groin incisions c/d/i; copious melena; KASH with good sphincter tone, no masses palpated  : Gomez in place draining clear urine  EXTREM: cold in all extremities  SKIN: No rashes noted    LABS:    04-27    133<L>  |  109<H>  |  24<H>  ----------------------------<  221<H>  4.0   |  13<L>  |  1.61<H>    Ca    10.7<H>      27 Apr 2024 23:15    TPro  4.1<L>  /  Alb  1.9<L>  /  TBili  0.3  /  DBili  x   /  AST  20  /  ALT  10  /  AlkPhos  103  04-27  LIVER FUNCTIONS - ( 27 Apr 2024 23:15 )  Alb: 1.9 g/dL / Pro: 4.1 g/dL / ALK PHOS: 103 U/L / ALT: 10 U/L / AST: 20 U/L / GGT: x                               8.9    6.18  )-----------( 101      ( 27 Apr 2024 23:15 )             27.7   PT/INR - ( 27 Apr 2024 23:15 )   PT: 16.2 sec;   INR: 1.44          PTT - ( 27 Apr 2024 23:15 )  PTT:28.8 secCARDIAC MARKERS ( 27 Apr 2024 23:15 )  x     / x     / 52 U/L / x     / 1.2 ng/mL    ABG - ( 27 Apr 2024 23:45 )  pH, Arterial: 7.35  pH, Blood: x     /  pCO2: 26    /  pO2: 183   / HCO3: 14    / Base Excess: -9.5  /  SaO2: 99.9            Urinalysis Basic - ( 27 Apr 2024 23:15 )    Color: x / Appearance: x / SG: x / pH: x  Gluc: 221 mg/dL / Ketone: x  / Bili: x / Urobili: x   Blood: x / Protein: x / Nitrite: x   Leuk Esterase: x / RBC: x / WBC x   Sq Epi: x / Non Sq Epi: x / Bacteria: x    CAPILLARY BLOOD GLUCOSE      POCT Blood Glucose.: 208 mg/dL (27 Apr 2024 19:14)          IMAGING  ACC: [id number] EXAM:  CT ANGIO ABD AOR W RUN(W)AW IC   ORDERED BY: RADHA VALDEZ     PROCEDURE DATE:  04/27/2024    ******PRELIMINARY REPORT******      ******PRELIMINARY REPORT******           INTERPRETATION:  [facility] RADIOLOGIST PRELIMINARY REPORT      Initial report created on 4/27/2024 10:01:39 PM EDT  PROCEDURE INFORMATION:  Exam: CTA Chest With Contrast  Exam date and time: 4/27/2024 8:28 PM  Age: 68 years old  Clinical indication: Recent evar for distal aortic suture line   pseudoaneurysmal  degeneration/containe    TECHNIQUE:  Imaging protocol: Computed tomographic angiography of the chest with   contrast,  including non-contrast images if performed. Exam focused on the arteries.  3D rendering (Not supervised by radiologist): MIP and/or 3D reconstructed  images were created by the technologist.    COMPARISON:  CT ANGIO ABDOMINAL AORTA RUNOFF WITHOUT AND OR WITH IV CONTRAST 8/20/2020   6:23  PM    FINDINGS:  Tubes, catheters and devices: Left-sided pacemaker in place with leads in   good  position.    Pulmonary arteries: No evidence of pulmonary embolus.  Aorta: Atherosclerotic disease and soft plaque throughout the descending  thoracic aorta. Descending thoracic aorta appears diffusely irregular .  Ulcerating plaque measuring 15 x 12 mm is seen in the anteromedial wall   of mid  descending thoracic aorta.    Lungs: Small infiltrates in dependent portions of bilateral lower lobes   and  lingula.  Pleural spaces: Unremarkable. No pneumothorax. No pleural effusion.  Heart: Unremarkable. No cardiomegaly. No pericardial effusion.  Lymph nodes: Unremarkable. No enlarged lymph nodes.    Bones/joints: Unremarkable. No acute fracture.  Soft tissues: Unremarkable.    IMPRESSION:  1.   Descending thoracic aorta appears diffusely irregular . Ulcerating   plaque  measuring 15 x 12 mm is seen in the anteromedial wall of mid descending  thoracic aorta. No significant stenosis.  No dissection.  2.   Small infiltrates in dependent portions of bilateral lower lobes and  lingula.      =========================  PROCEDURE INFORMATION:  Exam: CTA Abdominal Aorta and Bilateral Lower Extremities (Run-off) With  Contrast  Exam date and time: 4/27/2024 8:28 PM  Age: 68 years old  Clinical indication: Recent evar for distal aortic suture line   pseudoaneurysmal  degeneration/containe    TECHNIQUE:  Imaging protocol: Computed tomographic angiography of the abdominal aorta,  pelvis and bilateral lower extremities with contrast.  3D rendering (Not supervised by radiologist): MIP and/or 3D reconstructed  images were created by the technologist.    COMPARISON:  CT ANGIO ABDOMEN AND PELVIS 3/26/2024 5:10 PM    FINDINGS:  Aorta: There is complete occlusion of native abdominal aorta with upper  abdominal aortic to bilateral iliac graft in place and patent. Numerous   pockets  of air and soft tissue density measuring up to 2.3 cm seen surrounding the  aortic graft with a 3.0 x 3.0 cm fluid and fluid and air component on the   left  side of the proximal iliac component of the graft. Findings consistent   with  early abscess collection.  Celiac trunk and mesenteric arteries: Moderate stenosis in origin of the   celiac  artery. No evidence of occlusion. Moderate stenosis due to kinking in   origin of  the superior mesenteric artery. No evidence of occlusion. Inferior   mesenteric  artery not seen.  Renal arteries: Areas of severe stenosis are seen in mid and distal right   renal  artery.  Right iliac arteries: No occlusion or significant stenosis.  Right femoral/popliteal arteries: Severe flattening of distal right   external  iliac graft near the common femoral artery junction with severe narrowing   of  the lumen. There is near complete occlusion with reconstitution of flow   in the  right common femoral artery. Complete occlusion of proximal right   superficial  femoral artery with mild reconstitution of flow in the mid segment and   complete  occlusion in the distal segment. Mild reconstitution of flow is seen in   right  popliteal artery with diminished flow in arteries in right calf. Complete  occlusion of right posterior tibialis artery.  Right infrapopliteal arteries: No occlusion or significant stenosis.  Left iliac arteries: No occlusion or significant stenosis.  Left femoral/popliteal arteries: Complete occlusion of left superficial   femoral  artery graft. Mild reconstitution of flow is seen in the left popliteal   artery  with diminished flow and arteries and left calf. There is complete   occlusion of  left posterior tibialis artery.  Left infrapopliteal arteries: No occlusion or significant stenosis.  Other arteries: 8 x 7 mm ulcerating plaque is seen on the lateral aspect   of the  proximal bypass graft seen at series 20, image 280.    Liver: Diffusely enlarged and heterogeneous liver. Liver measures 22.1 x   9.6  cm. Findings are consistent with fatty and likely cirrhotic changes.  Gallbladder and bile ducts: Unremarkable. No calcified stones. No ductal  dilation.  Pancreas: Unremarkable. No mass. No ductal dilation.  Spleen: Normal. No splenomegaly.  Adrenal glands: Normal. No mass.  Kidneys and ureters: Left nephrectomy.  Stomach and bowel: Multiple distended loops of small bowel measuring up   to 3.7  cm. Fluid-filled ascending colon. No bowel wall thickening. No   pneumatosis. No  portal venous gas. Loops of distal small bowel are smaller in caliber than  distended loops of mid and proximal small bowel. Large amount of stool in   the  colon.  Appendix: No evidence of appendicitis.  Urinary bladder: Unremarkable. No mass.  Reproductive: Multiple calcified leiomyomas in the uterus.  Intraperitoneal space: Unremarkable. No free air. No significant fluid  collection.  Lymph nodes: No lymphadenopathy.  Bones/joints: Persistent blood flow is seen within the aneurysmal sac   arising  from the right side of the graft in mid right iliac component of the   graft on  series 20, image 369 extending to left side of the aneurysmal sac seen on  images 365-400. Finding is consistent with type 3 endoleak. There appears   to be  extension of the extravasated contrast anterior to the graft extending to   the  right side of the abscess collection seen on series 20, images 399-404.  Bilateral external iliac segments of the graft are patent.  Soft tissues: Unremarkable.    IMPRESSION:  1.   Numerous pockets of air and soft tissue density measuring up to 2.3   cm in  thickness surrounding the aortic graft consistent with infected graft   with a  3.0 x 3.0 cm fluid and air collection on left side of the graft in   proximal  iliac segment consistent with early abscess.  Prior images are not   available at  this time for comparison.  2.   Type 3 endoleak is seen arising from the right iliac segment of the   graft  with contrast extending beyond the aneurysmal sac to the 3 x 3 cm fluid   and air  collection described above consistent with pseudoaneurysm.  Prior images   are  not available at this time for comparison.  3.   Severe flattening of distal right external iliac graft near the   common  femoral artery junction with severe narrowing of the lumen. There is near  complete occlusion with reconstitution of flow in the right common femoral  artery.  4.   Inferior mesenteric artery not seen.  5.   8 x 7 mm ulcerating plaque is seen on the lateral aspect of the   proximal  bypass graft seen at series 20, image 280.  6.   Multiple air distended loops of small bowel measuring up to 3.7 cm   with  slightly decompressed loops of distal small bowel. No transition point  identified. No pneumatosis. No portal venous gas. Fluid-filled ascending   colon.  Partial small bowel obstruction versus ileus should be considered.  No   findings  seen at this time to suggest mesenteric ischemia.  7.   Areas of severe stenosis are seen in mid and distal right renal   artery.  8.   Complete occlusion of proximal right superficial femoral artery with   mild  reconstitution of flow in the mid segment and complete occlusion in the   distal  segment. Mild reconstitution of flow is seen in right popliteal artery   with  diminished flow in arteries in right calf. Complete occlusion of right  posterior tibialis artery.  9.   Complete occlusion of left superficial femoral artery graft. Mild  reconstitution of flow is seen in the left popliteal artery with   diminished  flow and arteries and left calf. There is complete occlusion of left   posterior  tibialis artery.  10.   Large amount of stool in the colon. Finding is consistent with  constipation.  11.   Enlarged heterogeneous liver likely fatty changes and cirrhosis.        ******PRELIMINARY REPORT******      ******PRELIMINARY REPORT******

## 2024-04-28 NOTE — PROGRESS NOTE ADULT - SUBJECTIVE AND OBJECTIVE BOX
ON: evening approx 2100: Hypotensive, SBP in the 70's. Lactate 2.8. Hgb 5.5. 2 units PRBCs given. Vascular at bedside. RIJ cordis placed. R. radial a-line placed. Began vomiting omkar blood. MTP called. Levo at 5mcg. SICU consulted. Intubated in the ED. NGT placed. Return of 150ccs of bloody drainage. 3 more units of PRBCs given (total of 5 PRBCs, 4FFP, 1platelets in the ED), KCENTRA given. 1g calcium gluconate given. levo down to .05 prior to transfer to [facility]. Repeat Lactate 4.1. Patient began having large amounts of melena. Levo requirements increasing. Additional 1 unit of PRBCs given. Repeat Hgb 8.9. Again pressor requirements increasing. Levo at 0.2mcg. Vaso added. Additinal 1 unit of PRBCs given. Repeat Hgb 9.3 (drawn too early). Levo requirements continuing to upward trend in the setting of large amount of melena. Additional 1 unit PRBCs ordered, 2FFP. Zosyn broadened to meropenum. Stress dose steroids added. At 0250 Levo requirements continuing to trend upwards in the setting of large amount of melena, 1L omkar blood from NGT. Additional 1 unit PRBCs, 2FFP ordered (total count 8-6-1). Zosyn broadened to meropenum. Stress dose steroids added. At approximately 0450 patient became acutely hypotensive, SBP in the 70's, UOP marginal. MTP called. 1 AMP bicarb given. Additional 1 unit PRBCs given. Lactate 7.9. ABG 7.54|22|181. Creatinine 1.57 from 1.61.    SUBJECTIVE: Patient received from OR intubated and sedated. Now s/p b/l groin cutdown, L endoleak repair via CFA, R patch replacement, abscess culture. Received 8u pRBC, 2u FFP, 2u Plt, 1 cryo, 1L IVF, , EBL 2000.   Patient appears comfortable on propofol, levo and vaso. Rectal tube in place with melanotic output, OGT in place with minimal blood tinged output. Wound vac to right groin with serosanguinous output.      MEDICATIONS  (STANDING):  chlorhexidine 0.12% Liquid 15 milliLiter(s) Oral Mucosa every 12 hours  chlorhexidine 2% Cloths 1 Application(s) Topical <User Schedule>  dextrose 10% Bolus 125 milliLiter(s) IV Bolus once  dextrose 5%. 1000 milliLiter(s) (50 mL/Hr) IV Continuous <Continuous>  dextrose 50% Injectable 25 Gram(s) IV Push once  fentaNYL   Infusion. 0.5 MICROgram(s)/kG/Hr (2.47 mL/Hr) IV Continuous <Continuous>  glucagon  Injectable 1 milliGRAM(s) IntraMuscular once  insulin lispro (ADMELOG) corrective regimen sliding scale   SubCutaneous every 6 hours  levETIRAcetam  IVPB 250 milliGRAM(s) IV Intermittent two times a day  levothyroxine Injectable 60 MICROGram(s) IV Push <User Schedule>  norepinephrine Infusion 0.05 MICROgram(s)/kG/Min (4.63 mL/Hr) IV Continuous <Continuous>  pantoprazole  Injectable 40 milliGRAM(s) IV Push daily  propofol Infusion 20 MICROgram(s)/kG/Min (5.93 mL/Hr) IV Continuous <Continuous>  vasopressin Infusion 0.04 Unit(s)/Min (6 mL/Hr) IV Continuous <Continuous>    MEDICATIONS  (PRN):  dextrose Oral Gel 15 Gram(s) Oral once PRN Blood Glucose LESS THAN 70 milliGRAM(s)/deciliter      Drips:     ICU Vital Signs Last 24 Hrs  T(C): 35 (28 Apr 2024 06:33), Max: 36.8 (27 Apr 2024 17:27)  T(F): 95.2 (28 Apr 2024 06:01), Max: 98.3 (27 Apr 2024 17:27)  HR: 75 (28 Apr 2024 07:00) (54 - 75)  BP: 101/51 (28 Apr 2024 06:33) (86/42 - 110/54)  BP(mean): 73 (28 Apr 2024 06:33) (61 - 74)  ABP: 99/64 (28 Apr 2024 06:00) (82/57 - 121/70)  ABP(mean): 78 (28 Apr 2024 06:00) (64 - 91)  RR: 23 (28 Apr 2024 07:00) (12 - 31)  SpO2: 100% (28 Apr 2024 07:00) (95% - 100%)    O2 Parameters below as of 28 Apr 2024 06:33      O2 Concentration (%): 30        Physical Exam:  HEENT: NC/AT, moderate facial edema, MMM neck supple w/o LAD  Pulmonary: on full vent, no respiratory distress  Cardiovascular: NSR, no murmurs  Abdominal: firm, moderately distended, unable to assess tenderness due to sedation  Extremities: cool to touch, palpable politeal pulses b/l. Unable to doppler PT/DP bilaterally; no edema  Neuro: sedated    Lines/tubes/drains: OGT with serosang output, Rectal tube melanotic output, wound vac to right groin with serosang output  Gomez: with light yellow urine    Vent settings:  Mode: AC/ CMV (Assist Control/ Continuous Mandatory Ventilation), RR (machine): 12, TV (machine): 400, FiO2: 30, PEEP: 5, ITime: 1, MAP: 13, PIP: 23    I&O's Summary    27 Apr 2024 07:01  -  28 Apr 2024 07:00  --------------------------------------------------------  IN: 214 mL / OUT: 175 mL / NET: 39 mL        LABS:                        9.3    6.55  )-----------( 104      ( 28 Apr 2024 04:52 )             27.2     04-28    148<H>  |  109<H>  |  24<H>  ----------------------------<  119<H>  4.7   |  20<L>  |  1.57<H>    Ca    8.2<L>      28 Apr 2024 04:52  Phos  4.6     04-28  Mg     1.8     04-28    TPro  4.1<L>  /  Alb  2.3<L>  /  TBili  0.5  /  DBili  x   /  AST  24  /  ALT  11  /  AlkPhos  63  04-28    PT/INR - ( 28 Apr 2024 04:52 )   PT: 14.7 sec;   INR: 1.30          PTT - ( 28 Apr 2024 04:52 )  PTT:32.3 sec  Urinalysis Basic - ( 28 Apr 2024 04:52 )    Color: x / Appearance: x / SG: x / pH: x  Gluc: 119 mg/dL / Ketone: x  / Bili: x / Urobili: x   Blood: x / Protein: x / Nitrite: x   Leuk Esterase: x / RBC: x / WBC x   Sq Epi: x / Non Sq Epi: x / Bacteria: x      CAPILLARY BLOOD GLUCOSE      POCT Blood Glucose.: 77 mg/dL (28 Apr 2024 07:01)  POCT Blood Glucose.: 68 mg/dL (28 Apr 2024 06:22)  POCT Blood Glucose.: 127 mg/dL (28 Apr 2024 03:01)  POCT Blood Glucose.: 208 mg/dL (27 Apr 2024 19:14)    LIVER FUNCTIONS - ( 28 Apr 2024 04:52 )  Alb: 2.3 g/dL / Pro: 4.1 g/dL / ALK PHOS: 63 U/L / ALT: 11 U/L / AST: 24 U/L / GGT: x             Cultures:    RADIOLOGY & ADDITIONAL STUDIES:     ON: evening approx 2100: Hypotensive, SBP in the 70's. Lactate 2.8. Hgb 5.5. 2 units PRBCs given. Vascular at bedside. RIJ cordis placed. R. radial a-line placed. Began vomiting omkar blood. MTP called. Levo at 5mcg. SICU consulted. Intubated in the ED. NGT placed. Return of 150ccs of bloody drainage. 3 more units of PRBCs given (total of 5 PRBCs, 4FFP, 1platelets in the ED), KCENTRA given. 1g calcium gluconate given. levo down to .05 prior to transfer to [facility]. Repeat Lactate 4.1. Patient began having large amounts of melena. Levo requirements increasing. Additional 1 unit of PRBCs given. Repeat Hgb 8.9. Again pressor requirements increasing. Levo at 0.2mcg. Vaso added. Additinal 1 unit of PRBCs given. Repeat Hgb 9.3 (drawn too early). Levo requirements continuing to upward trend in the setting of large amount of melena. Additional 1 unit PRBCs ordered, 2FFP. Zosyn broadened to meropenum. Stress dose steroids added. At 0250 Levo requirements continuing to trend upwards in the setting of large amount of melena, 1L omkar blood from NGT. Additional 1 unit PRBCs, 2FFP ordered (total count 8-6-1). Zosyn broadened to meropenum. Stress dose steroids added. At approximately 0450 patient became acutely hypotensive, SBP in the 70's, UOP marginal. MTP called. 1 AMP bicarb given. Additional 1 unit PRBCs given. Lactate 7.9. ABG 7.54|22|181. Creatinine 1.57 from 1.61.    SUBJECTIVE: Patient received from OR intubated and sedated. Now s/p b/l groin cutdown, L endoleak repair via CFA, R patch replacement, abscess culture. Received 8u pRBC, 2u FFP, 2u Plt, 1 cryo, 1L IVF, , EBL 2000.   Patient appears comfortable on propofol, levo and vaso. Rectal tube in place with melanotic output, OGT in place with minimal blood tinged output. Wound vac to right groin with serosanguinous output.      MEDICATIONS  (STANDING):  chlorhexidine 0.12% Liquid 15 milliLiter(s) Oral Mucosa every 12 hours  chlorhexidine 2% Cloths 1 Application(s) Topical <User Schedule>  dextrose 10% Bolus 125 milliLiter(s) IV Bolus once  dextrose 5%. 1000 milliLiter(s) (50 mL/Hr) IV Continuous <Continuous>  dextrose 50% Injectable 25 Gram(s) IV Push once  fentaNYL   Infusion. 0.5 MICROgram(s)/kG/Hr (2.47 mL/Hr) IV Continuous <Continuous>  glucagon  Injectable 1 milliGRAM(s) IntraMuscular once  insulin lispro (ADMELOG) corrective regimen sliding scale   SubCutaneous every 6 hours  levETIRAcetam  IVPB 250 milliGRAM(s) IV Intermittent two times a day  levothyroxine Injectable 60 MICROGram(s) IV Push <User Schedule>  norepinephrine Infusion 0.05 MICROgram(s)/kG/Min (4.63 mL/Hr) IV Continuous <Continuous>  pantoprazole  Injectable 40 milliGRAM(s) IV Push daily  propofol Infusion 20 MICROgram(s)/kG/Min (5.93 mL/Hr) IV Continuous <Continuous>  vasopressin Infusion 0.04 Unit(s)/Min (6 mL/Hr) IV Continuous <Continuous>    MEDICATIONS  (PRN):  dextrose Oral Gel 15 Gram(s) Oral once PRN Blood Glucose LESS THAN 70 milliGRAM(s)/deciliter      Drips:     ICU Vital Signs Last 24 Hrs  T(C): 35 (28 Apr 2024 06:33), Max: 36.8 (27 Apr 2024 17:27)  T(F): 95.2 (28 Apr 2024 06:01), Max: 98.3 (27 Apr 2024 17:27)  HR: 75 (28 Apr 2024 07:00) (54 - 75)  BP: 101/51 (28 Apr 2024 06:33) (86/42 - 110/54)  BP(mean): 73 (28 Apr 2024 06:33) (61 - 74)  ABP: 99/64 (28 Apr 2024 06:00) (82/57 - 121/70)  ABP(mean): 78 (28 Apr 2024 06:00) (64 - 91)  RR: 23 (28 Apr 2024 07:00) (12 - 31)  SpO2: 100% (28 Apr 2024 07:00) (95% - 100%)    O2 Parameters below as of 28 Apr 2024 06:33      O2 Concentration (%): 30        Physical Exam:  HEENT: NC/AT, moderate facial edema, MMM neck supple w/o LAD  Pulmonary: on full vent, no respiratory distress  Cardiovascular: NSR, no murmurs  Abdominal: moderately distended, unable to assess tenderness due to sedation  Extremities: cool to touch, palpable popliteal pulses b/l. Unable to doppler PT/DP bilaterally; no edema  Neuro: sedated    Lines/tubes/drains: OGT with serosang output, Rectal tube melanotic output, wound vac to right groin with serosang output  Gomez: with light yellow urine    Vent settings:  Mode: AC/ CMV (Assist Control/ Continuous Mandatory Ventilation), RR (machine): 12, TV (machine): 400, FiO2: 30, PEEP: 5, ITime: 1, MAP: 13, PIP: 23    I&O's Summary    27 Apr 2024 07:01  -  28 Apr 2024 07:00  --------------------------------------------------------  IN: 214 mL / OUT: 175 mL / NET: 39 mL        LABS:                        9.3    6.55  )-----------( 104      ( 28 Apr 2024 04:52 )             27.2     04-28    148<H>  |  109<H>  |  24<H>  ----------------------------<  119<H>  4.7   |  20<L>  |  1.57<H>    Ca    8.2<L>      28 Apr 2024 04:52  Phos  4.6     04-28  Mg     1.8     04-28    TPro  4.1<L>  /  Alb  2.3<L>  /  TBili  0.5  /  DBili  x   /  AST  24  /  ALT  11  /  AlkPhos  63  04-28    PT/INR - ( 28 Apr 2024 04:52 )   PT: 14.7 sec;   INR: 1.30          PTT - ( 28 Apr 2024 04:52 )  PTT:32.3 sec  Urinalysis Basic - ( 28 Apr 2024 04:52 )    Color: x / Appearance: x / SG: x / pH: x  Gluc: 119 mg/dL / Ketone: x  / Bili: x / Urobili: x   Blood: x / Protein: x / Nitrite: x   Leuk Esterase: x / RBC: x / WBC x   Sq Epi: x / Non Sq Epi: x / Bacteria: x      CAPILLARY BLOOD GLUCOSE      POCT Blood Glucose.: 77 mg/dL (28 Apr 2024 07:01)  POCT Blood Glucose.: 68 mg/dL (28 Apr 2024 06:22)  POCT Blood Glucose.: 127 mg/dL (28 Apr 2024 03:01)  POCT Blood Glucose.: 208 mg/dL (27 Apr 2024 19:14)    LIVER FUNCTIONS - ( 28 Apr 2024 04:52 )  Alb: 2.3 g/dL / Pro: 4.1 g/dL / ALK PHOS: 63 U/L / ALT: 11 U/L / AST: 24 U/L / GGT: x             Cultures:    RADIOLOGY & ADDITIONAL STUDIES:

## 2024-04-28 NOTE — PATIENT PROFILE ADULT - FALL HARM RISK - HARM RISK INTERVENTIONS

## 2024-04-28 NOTE — CONSULT NOTE ADULT - ATTENDING COMMENTS
68 F with depression, anxiety, seizure d-o, HTN, CAD s/p CABG, AVR (porcine bio-prosthetic valve) w/ severe prosthetic valve AS, mitral annuloplasty, HFpEF, Afib s/p AVN ablation and CRT-P (8/2022) on apixaban, AAA (open repair 2015, 2020), PAD s/p L SFA stent/bilateral L TIFFANY to CFA stenting, hypothyroidism, HLD, CKD III, GERD, erosive gastritis, chronic anemia, and recent endovascular repair of aortic pseudoaneurysm/contained rupture, left hypogastric artery coil embolization, bilateral TIFFANY to CFA stenting, R CFA patch angioplasty (4/3/24)  presents from rehab with fever, chills, rigors, and generalized weakness. She developed hypotension in ED and labs were significant for anemia. CTA abdomen showed A-E fistula with bleeding in the bowel and gas in the pseudoaneurysm. Physical exam as above.   1. 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 Septic shock due to A-E fistula  3. Anemia due to blood loss   4. Hypothermia  5. Seizure d-o  6. Severe aortic stenosis  7. Mod MR  8. AA pseudoaneurysm/contained rupture  - massive transfusion protocol  - Zosyn-vanco  - active rewarming  - blood cultures  - cont Keppra  - Kcentra

## 2024-04-28 NOTE — CONSULT NOTE ADULT - SUBJECTIVE AND OBJECTIVE BOX
HPI: 68-year-old F, former smoker (quit 20 year ago) with PMH HTN, HLD, CAD s/p CABG, AVR (porcine bio-prosthetic valve) and mitral annuloplasty, HFpEF, hypothyroidism, seizure disorder, stage 3 CKD (baseline CR ~1.7-2.0), severe renal artery stenosis, chronic anemia (baseline Hgb ~ 9-10, gets weekly iron infusion last infusion 3/22/24), AT, Afib s/p AVN ablation and CRT-P (8/2022; on Eliquis), AAA (s/p open repair in 2015 with complications, last repaired 2020), PAD s/p L pSFA stent/L-TIFFANY stent/L-IIA stent who presents with intermittent chest pain and KRAFT x 3 days, admitted for symptomatic anemia 03/25/24.     That hospital course was noteworthy for the following events: Initial admission complicated by status epilepticus (2 seizure events) 3/26/24 w/ RR called and pt started on Keppra. On CT angio 3/26 new focal dilatation of the distalmost aspect of the open thoracoabdominal aortic bypass graft and vascular surgery was consulted. Patient had episode of melena on 3/27 w/ concern for GIB vs vascular involvement. Cards and GI involved; patient transferred to CCU for in-unit scope revealing normal esophagus and duodenum and non-erosive gastritis and has since been cleared for anticoagulation for Afib. Stepped down to cardiac tele on 03/28/24. ALBER 3/29: Low flow low gradient severe prosthetic valve AS, peak gradient aortic 37.73 mmHg, moderate AR, moderately elevated mitral valve gradients with normal appearing mitral leaflets status post annuloplasty, moderate mitral regurgitation which decreases to trace with reduced blood pressure, moderate TR. Extensive discussion between CT Surgery and Vascular surgery about plan of care; patient required repair of pseudoaneurysm of distal suture line of prior abdomial aortic repair and also needed TAVR, but given difficult access for TAVR from iliac artery stenoses, plan was ultimately made for TAVR as outpatient w/ Dr. Gregory after repair of aorto-iliac disease. Patient was then transferred to Vascular surgery for endovascular repair of aortic pseudoaneurysm/contained rupture, left hypogastric artery coil embolization, bilateral TIFFANY to CFA stenting, R CFA patch angioplasty on 4/3. She did well post-operatively and was discharged to [facility] on 4/9.     ED course: Afebrile here. HR 75, /67. Urinalysis, chest xray, noteworthy for WBC 11.75, HGB 6.8. Mildly elevated PT/INR 19/1.72. Lactate 1.9. BMP w/ SCr 1.78, BUN 24. HFP normal.     On initial assessment, patient states that over the last five days she has been struck with chills and weakness. She says over the last two days she has developed fevers and her blood pressure has been soft, ~100/45, for which she was given IV fluids and labs were obtained. Those showed a white blood cell count of 14k. She was referred to the ED. Her primary complaint is fatigue and chills/rigors. She denies other specific symptoms, including dysuria, redness of her incisions, drainage from her incisions. She does state that she has had a few episodes of productive cough the last couple of days but it has not been tremendously significant. Per ED report, fever measured at [facility] today was 102F.     Following initial assessment after about an hour, patient had a sudden change in her status noteworthy for hypotension and bilateral lower extremity paralysis. Initial labs concerning for acute hemorrhage. 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 SICU, Neurology, neurosurgery consulted. Neurosurgery PA assessed patient and deferred lumbar drain given overall clinical picture and instability for MRI required to prove spinal cord infarct. Hemoptysis and melena developed and patient required high pressors which improved with blood transfusion and reversal of coagulopathy. Patient admitted to SICU pending goals of care discussion with family and operative plan.       PAST MEDICAL & SURGICAL HISTORY:  Atherosclerosis of coronary artery  CAD (coronary artery disease)      Peripheral vascular disease  PVD (peripheral vascular disease)      Anemia  Anemia      Hypothyroidism  Hypothyroidism      Gastroesophageal reflux disease  GERD (gastroesophageal reflux disease)      Hyperlipidemia  Hyperlipidemia      Essential hypertension  HTN (hypertension)      Seizure      Anxiety      Depression, unspecified depression type      Chronic kidney disease, unspecified CKD stage      Status post aorto-coronary artery bypass graft  2012      Atherosclerosis of coronary artery bypass graft(s), unspecified, with angina pectoris with documented spasm      H/O aortic valve replacement  Bioprosthetic      Ruptured aortic aneurysm  surgery august 2020      Abdominal aortic aneurysm (AAA) without rupture  2015            REVIEW OF SYSTEMS:    unable to obtain       ANTIBIOTICS:  MEDICATIONS  (STANDING):  chlorhexidine 0.12% Liquid 15 milliLiter(s) Oral Mucosa every 12 hours  chlorhexidine 2% Cloths 1 Application(s) Topical <User Schedule>  dextrose 10% Bolus 125 milliLiter(s) IV Bolus once  dextrose 5%. 1000 milliLiter(s) (50 mL/Hr) IV Continuous <Continuous>  dextrose 50% Injectable 25 Gram(s) IV Push once  fentaNYL   Infusion. 0.5 MICROgram(s)/kG/Hr (2.47 mL/Hr) IV Continuous <Continuous>  glucagon  Injectable 1 milliGRAM(s) IntraMuscular once  insulin lispro (ADMELOG) corrective regimen sliding scale   SubCutaneous every 6 hours  lactated ringers. 1000 milliLiter(s) (100 mL/Hr) IV Continuous <Continuous>  levETIRAcetam  IVPB 250 milliGRAM(s) IV Intermittent two times a day  levothyroxine Injectable 60 MICROGram(s) IV Push <User Schedule>  magnesium sulfate  IVPB 1 Gram(s) IV Intermittent once  norepinephrine Infusion 0.05 MICROgram(s)/kG/Min (4.63 mL/Hr) IV Continuous <Continuous>  propofol Infusion 20 MICROgram(s)/kG/Min (5.93 mL/Hr) IV Continuous <Continuous>  vasopressin Infusion 0.04 Unit(s)/Min (6 mL/Hr) IV Continuous <Continuous>    MEDICATIONS  (PRN):  dextrose Oral Gel 15 Gram(s) Oral once PRN Blood Glucose LESS THAN 70 milliGRAM(s)/deciliter      Allergies    No Known Allergies    Intolerances        SOCIAL HISTORY:    FAMILY HISTORY:  FH: myocardial infarction  in Dad (at age 50s)        Vital Signs Last 24 Hrs  T(C): 33.8 (28 Apr 2024 13:30), Max: 36.8 (27 Apr 2024 17:27)  T(F): 92.8 (28 Apr 2024 13:30), Max: 98.3 (27 Apr 2024 17:27)  HR: 75 (28 Apr 2024 15:00) (54 - 75)  BP: 101/51 (28 Apr 2024 06:33) (86/42 - 110/54)  BP(mean): 73 (28 Apr 2024 06:33) (61 - 74)  RR: 16 (28 Apr 2024 15:00) (12 - 31)  SpO2: 96% (28 Apr 2024 15:00) (95% - 100%)    Parameters below as of 28 Apr 2024 15:00  Patient On (Oxygen Delivery Method): ventilator    O2 Concentration (%): 40    PHYSICAL EXAM:  Constitutional:  ill appearing, intubated   Eyes:VIOLETA, EOMI  Ear/Nose/Throat: no oral lesion, no sinus tenderness on percussion	  Neck: supple  Respiratory: CTA jaya  Cardiovascular: S1S2 RRR, no murmurs  Gastrointestinal:soft, (+) BS, no HSM  Extremities: left groin with dressing, covered in blood; right groin with wound vac  Vascular: DP Pulse:	right normal; left normal            LABS:                        13.1   6.61  )-----------( 83       ( 28 Apr 2024 15:35 )             39.1     04-28    154<H>  |  109<H>  |  23  ----------------------------<  151<H>  4.4   |  16<L>  |  1.57<H>    Ca    9.3      28 Apr 2024 15:35  Phos  8.5     04-28  Mg     1.9     04-28    TPro  4.0<L>  /  Alb  2.2<L>  /  TBili  0.8  /  DBili  0.6<H>  /  AST  1862<H>  /  ALT  545<H>  /  AlkPhos  86  04-28    PT/INR - ( 28 Apr 2024 15:35 )   PT: 20.8 sec;   INR: 1.86          PTT - ( 28 Apr 2024 15:35 )  PTT:41.2 sec  Urinalysis Basic - ( 28 Apr 2024 15:35 )    Color: x / Appearance: x / SG: x / pH: x  Gluc: 151 mg/dL / Ketone: x  / Bili: x / Urobili: x   Blood: x / Protein: x / Nitrite: x   Leuk Esterase: x / RBC: x / WBC x   Sq Epi: x / Non Sq Epi: x / Bacteria: x        MICROBIOLOGY:  RADIOLOGY & ADDITIONAL STUDIES:    < from: CT Angio Abd Aorta w/run-off w/ IV Cont (04.27.24 @ 20:43) >  IMPRESSION:  1.   Descending thoracic aorta appears diffusely irregular . Ulcerating   plaque  measuring 15 x 12 mm is seen in the anteromedial wall of mid descending  thoracic aorta. No significant stenosis.  No dissection.  2.   Small infiltrates in dependent portions of bilateral lower lobes and  lingula.    < from: CT Angio Chest Aorta w/wo IV Cont (04.27.24 @ 20:32) >    IMPRESSION:  1.   Descending thoracic aorta appears diffusely irregular . Ulcerating   plaque  measuring 15 x 12 mm is seen in the anteromedial wall of mid descending  thoracic aorta. No significant stenosis.  No dissection.  2.   Small infiltrates in dependent portions of bilateral lower lobes and  lingula.    < end of copied text >

## 2024-04-28 NOTE — BRIEF OPERATIVE NOTE - NSICDXBRIEFPROCEDURE_GEN_ALL_CORE_FT
PROCEDURES:  Repair, aneurysm, aortoiliac, endovascular 28-Apr-2024 17:42:53  Volodymyr Marley  Endarterectomy and angioplasty of right femoral artery 28-Apr-2024 17:43:18  Volodymyr Marley  Application of wound vacuum assisted closure device to both groins 28-Apr-2024 17:44:08  Volodymyr Marley

## 2024-04-28 NOTE — CHART NOTE - NSCHARTNOTEFT_GEN_A_CORE
Nephrology paged for pte:   68F with PMH of HTN, HLD, CAD, PAD,  CHF, CKD3, severe CARLI, AAA repair. Recent endovascular repair of aortic pseudoaneurysm/contained rupture, now aortoenteric fistula and aortic graft infection c/b hemorrhagic and septic shock. Currently s/p OR(IVF 1L , 8u pRBC, 2u FFP, 2u Plt, 1u Cryo), worsening renal function, with oliguric CHERRI now(iATN).     Labs/Radiology reviewed  Will start CVVHD with the following parameters:   NxStage CAR-505, Target Blood Flow: 300 mL/Min  Target Fluid Balance: No fluid removal (04-28-24 @ 23:19) [Active]  Phoxillum Filtration BK 4 / 2.5: Solution, 5000 milliLiter(s) infuse at 2000 mL/Hr; infuse through CRRT Circuit    Plan:  CVVHD as above.   Strict I&O  Renally dosed abx (Michael/Vanco/Caspo)  Maintain SBP>110 as possible for renal perfusion.   Full consult in AM

## 2024-04-28 NOTE — BRIEF OPERATIVE NOTE - OPERATION/FINDINGS
Patient brought to OR intubated. Bilateral groins prepped and draped.  Prior groin incisions opened up.     L groin: CFA, SFA, profunda dissected out on controlled with vessel loops. Micropuncture access directly into CFA. Micro sheath exchanged for 5Fr sheath. Aortogram performed showing contrast extravation from L iliac limb of prior EVAR. 5Fr sheath exchanged for 8 Fr sheath. [place] Viabahn 8mm x 5cm covered stent deployed in L iliac limb. Completion aortogram showed resolution of extrav from limb. 8Fr sheath removed and CFA closed primarily with 5-0 prolene. Hemostasis achieved and incision closed in layers with 2-0 vicryl and skin with staples.    R groin: During dissection down to CFA, 10cc pocket of pus identified and sent for culture. CFA with prior patch angioplasty, SFA, and profuda dissected out and controlled with vessel loops. Prior bovine pericardial patch removed and completion endarterectomy performed of artery performed with removal of large plaque/calcium near previoussly placed Viabahn stent. Patch angioplasty performed with bovine pericardium. Fascia closed over artery and silver sponge wound vac placed over wound.

## 2024-04-28 NOTE — CONSULT NOTE ADULT - ASSESSMENT
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 Admitted to SICU for hemodynamic monitoring    Neuro: Sudden loss of sensation in the bilateral lower extremities, Bilateral upper extremity weakness in ED. Stroke code called/cancelled. Obtain MRI per neurosurg when stable. Sedation Propofol/Fentanyl. RASS goal -2 to -3. Hx: Seizure disorder: Continue Keppra   Cardiovascular: Hemorrhagic shock 2/2 likley aortoenteric fistula. Hgb 6.8 --> 5.5 --> 8.9. Lactate 2.8 --> 4.1. S/p 6 units PRBCs, 4FFP, 1 units PRBC. Continue Levo to maintain a MAP greater than 65. Hx: CAD s/p CABG: holding Aspirin, Coreg. Hx: A.fib.: Holding Eliquis, Kcentra given. Hx: AVR/mitral annuloplasty. Severe AS pending future TAVR. Hx: HFpEF. Hx: HLD: Rosuvastatin on hold   Pulm: Intubated for airway protections. Hematoemesis. AC 12|450|40|5   GI: NGT with omkar blood output. 150mls on initial insertion with continuous bloody output (total of 600mls) Melena. Rectal tube in place. PPI. NGT   : Gomez. Strict I and O Hx: CKD3: Baseline creatinine 1.7  ID: Septic shock on admission 2/2 likely aortic graft infection. Fluid resuscitated in the ER. Continue zosyn 4.5 (4/27--) Vanc (4/27--). Continue to trend lactate.  Vasc: Concern for aortoenteric fistual/Infection of graft. Antibiotics. Transfuse. Hx: AAA repair 2015, TEVAR, bilateral TIFFANY to CFA stenting 4/24'  Endo: mISSc. Hx: Hypothyroidism: Continue IVp 60mcg daily (home dose 75mcg PO)    Heme: Trend CBC/Coags q4 hours. Transfuse as needed. Hx: Anemia: holding iron  Dispo: SICU 
IMPRESSION:  Concern for aortic/vascular graft infection.  Cultures pending.  Graft was not removed according to SICU team; therefore will need chronic suppression    Recommend:  1.  Start Meropenem 1 gram IV q12  2.  Start Vancomycin 750 mg IV daily   3.  Can start Caspofungin 70 mg IV x 1, then 50 mg IV daily  4. Follow up cultures from OR; will deescalate accordingly    ID team 1 will follow 
 68y Female with PMHx of HTN, HLD, CAD s/p CABG, AVR (porcine bio-prosthetic valve) and mitral annuloplasty with persistent valve severe AS with plan for valve in valve TAVR with Dr. 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 Per ED, at [facility] patient was febrile 102 with leukocytosis on labs which prompted ED visit. Patient admitted to F for fever w/u.     Course complicated by acute hypotension with diaphoresis (BP 70/50) with new b/l LE plegia and loss of sensation. Per vascular, CTA aorta and abdominal aorta wwo with concern for aortic rupture, aortoenteric fistula, graft infection. Stroke code called for weakness; cancelled as exam suggestive of spine infarct.     - nsgy consult for lumbar drain  - patient will eventually need MRI spine (C/T/L-spine) once stable       Case discussed with Neurology Attending Dr. Falcon

## 2024-04-28 NOTE — CHART NOTE - NSCHARTNOTEFT_GEN_A_CORE
Clinical Course:    4/27 evening approx 2100: Hypotensive, SBP in the 70's. Lactate 5.1. Hgb 5.5. 2 units PRBCs given. Vascular at bedside. RIJ cordis placed. R. radial a-line placed. SICU consulted. Began vomiting omkar blood. MTP called. Levo at 5mcg. Intubated in the ED. NGT placed. Return of 150ccs of bloody drainage. 3 more units of PRBCs given (total of 5 PRBCs, 4FFP, 1platelets in the ED), KCENTRA given. 1g calcium gluconate given. levo down to .05 prior to transfer to [facility]. Repeat Lactate 4.1. Patient began having large amount of melena. Levo requirements increasing. Additional unit of PRBCs given. Repeat Hgb 8.9. Again pressor requirements increasing. Levo at 0.2mcg. Vaso added. Additional 1 unit of PRBCs given. Repeat Hgb 9.3 (drawn too early). Repeat lactate 3.3. Clinical Course:    4/27 evening approx 2100: Hypotensive, SBP in the 70's. Lactate 5.1. Hgb 5.5. 2 units PRBCs given. Vascular at bedside. RIJ cordis placed. R. radial a-line placed. SICU consulted. Began vomiting omkar blood. MTP called. Levo at 5mcg. Intubated in the ED. NGT placed. Return of 150ccs of bloody drainage. 3 more units of PRBCs given (total of 5 PRBCs, 4FFP, 1platelets in the ED), KCENTRA given. 1g calcium gluconate given. levo down to .05 prior to transfer to [facility]. Repeat Lactate 4.1. Patient began having large amount of melena. Levo requirements increasing. Additional unit of PRBCs given. Repeat Hgb 8.9. Again pressor requirements increasing. Levo at 0.2mcg. Vaso added. Additional 1 unit of PRBCs given. Repeat Hgb 9.3 (drawn too early). Repeat lactate 3.3.    0230: Levo requirements continuing to trend upwards in the setting of large amount of melena, 1L omkar blood from NGT. Additional 1 unit PRBCs, 2FFP ordered (total count 8-6-1). Zosyn broadened to meropenum. Stress dose steroids added. Clinical Course:    4/27: approx 2100: Hypotensive, SBP in the 70's. Lactate 5.1. Hgb 5.5. 2 units PRBCs given. Vascular at bedside. RIJ cordis placed. R. radial a-line placed. SICU consulted. Began vomiting omkar blood. MTP called. Levo at 5mcg. Intubated in the ED. NGT placed. Return of 150ccs of bloody drainage. 3 more units of PRBCs given (total of 5 PRBCs, 4FFP, 1platelets in the ED), KCENTRA given. 1g calcium gluconate given. levo down to .05 prior to transfer to [facility]. Repeat Lactate 4.1. Patient began having large amount of melena. Levo requirements increasing again. Additional unit of PRBCs given. Repeat Hgb 8.9. Again pressor requirements increasing. Levo at 0.2mcg. Vaso added. Additional 1 unit of PRBCs given. Repeat Hgb 9.3 (drawn too early). Repeat lactate 3.3.    At 0250 Levo requirements continuing to trend upwards in the setting of large amount of melena, 1L omakr blood from NGT. Additional 1 unit PRBCs, 2FFP ordered (total count 8-6-1). Zosyn broadened to meropenum. Stress dose steroids added.    At approximately 0450 patient became acutely hypotensive, SBP in the 70's. MTP called. 1 AMP bicarb given. Additional 1 unit PRBCs given. STAT labs sent. Lactate 7.9. Clinical Course:    4/27: approx 2100: Hypotensive, SBP in the 70's. Lactate 5.1. Hgb 5.5. 2 units PRBCs given. Vascular at bedside. RIJ cordis placed. R. radial a-line placed. SICU consulted. Began vomiting omkar blood. MTP called. Levo at 5mcg. Intubated in the ED. NGT placed. Return of 150ccs of bloody drainage. 3 more units of PRBCs given (total of 5 PRBCs, 4FFP, 1platelets in the ED), KCENTRA given. 1g calcium gluconate given. levo down to .05 prior to transfer to [facility]. Repeat Lactate 4.1. Patient began having large amount of melena. Levo requirements increasing again. Additional 1 unit of PRBCs given. Repeat Hgb 8.9. Again pressor requirements increasing. Levo at 0.2mcg. Vaso added. Additional 1 unit of PRBCs given. Repeat Hgb 9.3 (drawn too early). Repeat lactate 3.3.    At 0250 Levo requirements continuing to trend upwards in the setting of large amount of melena, 1L omkar blood from NGT. Additional 1 unit PRBCs, 2FFP ordered (total count 8-6-1). Zosyn broadened to meropenum. Stress dose steroids added.    At approximately 0450 patient became acutely hypotensive, SBP in the 70's. MTP called. 1 AMP bicarb given. Additional 1 unit PRBCs given. STAT labs sent. Lactate 7.9. Clinical Course:    4/27: approx 2100: Hypotensive, SBP in the 70's. Lactate 5.1. Hgb 5.5. 2 units PRBCs given. Vascular at bedside. RIJ cordis placed. R. radial a-line placed. SICU consulted. Began vomiting omkar blood. MTP called. Levo at 5mcg. Intubated in the ED. NGT placed. Return of 150ccs of bloody drainage. 3 more units of PRBCs given (total of 5 PRBCs, 4FFP, 1platelets in the ED), KCENTRA given. 1g calcium gluconate given. levo down to .05 prior to transfer to [facility]. Repeat Lactate 4.1. Patient began having large amount of melena. Levo requirements increasing again. Additional 1 unit of PRBCs given. Repeat Hgb 8.9. Again pressor requirements increasing. Levo at 0.2mcg. Vaso added. Additional 1 unit of PRBCs given. Repeat Hgb 9.3 (drawn too early). Repeat lactate 3.3.    At 0250 Levo requirements continuing to trend upwards in the setting of large amount of melena, 1L omkar blood from NGT. Additional 1 unit PRBCs, 2FFP ordered (total count 8-6-1). Zosyn broadened to meropenum. Stress dose steroids added.    At approximately 0450 patient became acutely hypotensive, SBP in the 70's. MTP called. 1 AMP bicarb given. Additional 1 unit PRBCs given. STAT labs sent. Lactate 7.9. ABG 7.54|22|181. Creatinine 1.57 from 1.61.

## 2024-04-28 NOTE — ED ADULT NURSE REASSESSMENT NOTE - NS ED NURSE REASSESS COMMENT FT1
3rd unit ffp done 2311, 4th unit ffp started. complete 2315    code fusion total blood products given:   3 units PRBC, 1 unit PLT, 4 units FFP  remaining blood products taken up to 5east in cooler

## 2024-04-29 NOTE — PROVIDER CONTACT NOTE (CRITICAL VALUE NOTIFICATION) - SITUATION
lactate 4.1
provider and primary rn aware
Pt. intubated/sedated currently on CVVHD.
provider and primary rn aware

## 2024-04-29 NOTE — PROGRESS NOTE ADULT - SUBJECTIVE AND OBJECTIVE BOX
INTERVAL HPI/OVERNIGHT EVENTS:  vanc trough 7.5. Per ID, vanc 750 QD and Meropenem 1g QD. UOP downtrending however CVP 17, VTI earlier 14 with mildly reduced EF. POCUS with B lines at apices, mixed AB otherwise, dialted IVC however with respiratory variation, bladder pressure 14. Labs @ 2000 Hgb 11.4, K 5.6, glucose 42 - given insulin 10, dex 100, ca gluconate 2g. EKG without peaked T waves. Renal consulted - agree with urgent CVVHD. L IJ HD cath placed, CXR confirmed placement, pulled back 3cm. Acute hypotensive episode 2 0130 with SBP 50s MAP<30, levo max to 5, vaso 0.04, phenylepherine on standby. Given 3amps of bicarb, 10U insulin, 2g Ca gluc - BP steadily increasing with downtrending pressor requirements. STAT ABG sent, D5+bicarb gtt started. STAT ABG K 6.5 drawn at same time as insulin administration. CVV HD started @ 0200. BCx 4/27 GNR - surveillance sent. Hypotensive @ 0300, phenylepherine added @2mcg. 0400 labs: INR 2.2, lactate >17, ABG 7.25|32|55 O2 sat 88 - FiO2 to 100%, PEEP incr to 10. Severely hyperkalemic and hypoglycemic this AM - given 1 bicarb, 20 insulin, 3 dex. Updated both family members who will  try to drive/fly in asap. d10 ordered but not started.    STATUS POST:  s/p bilateral groin cutdown, Endoleak repair via L CFA with stent placement, Right groin w/ abscess culture, R CFA endarterectomy, wound vac placement to right groin     POST OPERATIVE DAY #: 4/28    SUBJECTIVE:  Patient seen and examined by attending and team on AM rounds. Patient remains intubated and sedated. This morning, repeat lactate > 17. K+ 6.9, continues to rise despite CVVHD. 10 units of insulin given d50 100, 2g Ca gluconate. HD bath changed to 2K. AM CK 5000s. Patient with poor prognosis at this time. Will continue to trend labs and replete as necessary. Will formally discuss code status with HCP shortly.     MEDICATIONS  (STANDING):  chlorhexidine 0.12% Liquid 15 milliLiter(s) Oral Mucosa every 12 hours  chlorhexidine 2% Cloths 1 Application(s) Topical <User Schedule>  CRRT Treatment    <Continuous>  dextrose 10% Bolus 125 milliLiter(s) IV Bolus once  dextrose 5% 1000 milliLiter(s) (100 mL/Hr) IV Continuous <Continuous>  dextrose 5%. 1000 milliLiter(s) (50 mL/Hr) IV Continuous <Continuous>  dextrose 50% Injectable 25 Gram(s) IV Push once  fentaNYL   Infusion. 0.5 MICROgram(s)/kG/Hr (2.47 mL/Hr) IV Continuous <Continuous>  glucagon  Injectable 1 milliGRAM(s) IntraMuscular once  hydrocortisone sodium succinate Injectable 50 milliGRAM(s) IV Push every 6 hours  insulin lispro (ADMELOG) corrective regimen sliding scale   SubCutaneous every 6 hours  levETIRAcetam  IVPB 250 milliGRAM(s) IV Intermittent two times a day  levothyroxine Injectable 60 MICROGram(s) IV Push <User Schedule>  meropenem  IVPB 1000 milliGRAM(s) IV Intermittent every 12 hours  norepinephrine Infusion 0.05 MICROgram(s)/kG/Min (4.63 mL/Hr) IV Continuous <Continuous>  pantoprazole  Injectable 40 milliGRAM(s) IV Push every 12 hours  phenylephrine    Infusion 2 MICROgram(s)/kG/Min (37.1 mL/Hr) IV Continuous <Continuous>  propofol Infusion 20 MICROgram(s)/kG/Min (5.93 mL/Hr) IV Continuous <Continuous>  PureFlow Dialysate RFP-400 (K 2 / Ca 3) 5000 milliLiter(s) (4000 mL/Hr) CRRT <Continuous>  vancomycin  IVPB 750 milliGRAM(s) IV Intermittent every 24 hours  vasopressin Infusion 0.04 Unit(s)/Min (6 mL/Hr) IV Continuous <Continuous>    MEDICATIONS  (PRN):  dextrose Oral Gel 15 Gram(s) Oral once PRN Blood Glucose LESS THAN 70 milliGRAM(s)/deciliter      Vital Signs Last 24 Hrs  T(C): 34.6 (29 Apr 2024 08:13), Max: 37.2 (29 Apr 2024 00:01)  T(F): 94.2 (29 Apr 2024 08:13), Max: 99 (29 Apr 2024 00:01)  HR: 75 (29 Apr 2024 10:00) (74 - 129)  BP: --  BP(mean): --  RR: 16 (29 Apr 2024 10:00) (15 - 26)  SpO2: 57% (29 Apr 2024 01:30) (57% - 99%)    Parameters below as of 29 Apr 2024 10:00  Patient On (Oxygen Delivery Method): ventilator    O2 Concentration (%): 100    PHYSICAL EXAM:  Constitutional: Intubated and sedated.   HEENT: Severe conjunctival edema present b/l. OGT in place with maroon colored, bilious output.   Respiratory: Intubated on VC/AC mode, 400/16/10/100%, unable to accurately assess Sp02 on monitor.  Cardiovascular: Paced rate and rhythm in 70s. Requiring levo @1, phenyl 0.5, and vasopressin 0.04.   Gastrointestinal: severely distended abdomen. Tympanic to percussion. Mottling of skin noted overlying entire abdomen down to legs. POCUS without any ascites.   Genitourinary: Gomez with scant yellow urine. CVVHD ongoing via L IJ dialysis catheter.   Groin: R groin vac intact. L groin dressing c/d/i.   Extremities: (-) edema in BLE. Distal extremities are all cool to touch.  Neuro: Sedated, unresponsive to light stimulation.  Lines: R IJ cordis, L IJ dialysis catheter, R radial A line, PIVs.       I&O's Detail    28 Apr 2024 07:01  -  29 Apr 2024 07:00  --------------------------------------------------------  IN:    Cryoprecipitate: 121 mL    dextrose 5% w/ Additives: 550 mL    FentaNYL: 42.2 mL    IV PiggyBack: 200 mL    IV PiggyBack: 350 mL    IV PiggyBack: 275 mL    IV PiggyBack: 200 mL    IV PiggyBack: 250 mL    Lactated Ringers: 1600 mL    Norepinephrine: 540.7 mL    Phenylephrine: 36 mL    Plasma: 661 mL    Platelets - Single Donor: 390 mL    Propofol: 35.7 mL    Vasopressin: 96 mL  Total IN: 5347.6 mL    OUT:    Indwelling Catheter - Urethral (mL): 503 mL    Nasogastric/Oral tube (mL): 340 mL    VAC (Vacuum Assisted Closure) System (mL): 50 mL  Total OUT: 893 mL    Total NET: 4454.6 mL      29 Apr 2024 07:01  -  29 Apr 2024 12:32  --------------------------------------------------------  IN:    dextrose 5% w/ Additives: 300 mL    FentaNYL: 7.5 mL    IV PiggyBack: 100 mL    IV PiggyBack: 100 mL    Norepinephrine: 269.4 mL    Vasopressin: 18 mL  Total IN: 794.9 mL    OUT:    Indwelling Catheter - Urethral (mL): 5 mL    Phenylephrine: 0 mL    Propofol: 0 mL  Total OUT: 5 mL    Total NET: 789.9 mL          LABS:                        9.5    9.83  )-----------( 54       ( 29 Apr 2024 07:35 )             30.1     04-29    145  |  106  |  15  ----------------------------<  151<H>  7.1<HH>   |  13<L>  |  1.05    Ca    7.0<L>      29 Apr 2024 07:35  Phos  9.6     04-29  Mg     2.6     04-29    TPro  3.5<L>  /  Alb  2.1<L>  /  TBili  0.7  /  DBili  x   /  AST  6234<H>  /  ALT  1139<H>  /  AlkPhos  139<H>  04-29    PT/INR - ( 29 Apr 2024 07:35 )   PT: 32.5 sec;   INR: 2.94          PTT - ( 29 Apr 2024 07:35 )  PTT:45.3 sec  Urinalysis Basic - ( 29 Apr 2024 07:35 )    Color: x / Appearance: x / SG: x / pH: x  Gluc: 151 mg/dL / Ketone: x  / Bili: x / Urobili: x   Blood: x / Protein: x / Nitrite: x   Leuk Esterase: x / RBC: x / WBC x   Sq Epi: x / Non Sq Epi: x / Bacteria: x        RADIOLOGY & ADDITIONAL STUDIES:

## 2024-04-29 NOTE — PROCEDURE NOTE - NSPROCDETAILS_GEN_ALL_CORE
patient pre-oxygenated, tube inserted, placement confirmed
guidewire recovered
location identified, draped/prepped, sterile technique used/blood seen on insertion/dressing applied/flushes easily/secured in place/sterile technique, catheter placed

## 2024-04-29 NOTE — PROGRESS NOTE ADULT - ATTENDING COMMENTS
HTN, CAD, tissue AVR mitral annulopalsty, CKD, seizures, AF, AAA repair now with aortoenteric fistula with posthemorrhagic anemia, now s/p endoleak repair, R groin abscess culture, RCFA endarterectomy with shock and lactic acidosis  physical as above  continue NE and vaso to keep MAP over 65  continue MV  follow lactate   UO is adequate for now, to follow renal function  continue vanco/zosyn  to try to normalize coaguloopathy with vitamin K, FFP, platelets
HTN, CAD, AVR, CKD, AF s/p EVAR with endoleak, graft infection, Klebsiella bacteremia, septic shock, ATN, lactic acidosis  physical as above  continued vasopressin and NE  bicarbonate infusions  meropenem pending sensitivities; vancomycin given as well  CVVH with 2K bath  family is aware that prognosis is poor; Dr. Summers also following progress

## 2024-04-29 NOTE — PROVIDER CONTACT NOTE (CRITICAL VALUE NOTIFICATION) - BACKGROUND
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 Admitted to SICU for hemodynamic monitoring now s/p bilateral groin cutdown, Endoleak repair via L CFA with stent placement, Right groin w/ abscess culture, R CFA endarterectomy, wound vac placement to right groin

## 2024-04-29 NOTE — DISCHARGE NOTE FOR THE EXPIRED PATIENT - HOSPITAL COURSE
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 On admission patient became acutely hypotensive, SBP in the 70's. Lactate 5.1. Hgb 5.5. 2 units PRBCs given. Vascular at bedside. RIJ cordis placed. R. radial a-line placed. SICU consulted. Began vomiting omkar blood. MTP called. Levo at 5mcg. Intubated in the ED. NGT placed. Return of 150ccs of bloody drainage. 3 more units of PRBCs given (total of 5 PRBCs, 4FFP, 1platelets in the ED), KCENTRA given. 1g calcium gluconate given. levo down to .05 prior to transfer to [facility]. Repeat Lactate 4.1. Patient began having large amount of melena. Levo requirements increasing again. Additional 1 unit of PRBCs given. Repeat Hgb 8.9. Again pressor requirements increasing. Levo at 0.2mcg. Vaso added. Additional 1 unit of PRBCs given. Repeat Hgb 9.3 (drawn too early). Repeat lactate 3.3. Levo requirements contined to trend upwards in the setting of large amount of melena, 1L omkar blood from NGT. Additional 1 unit PRBCs, 2FFP ordered (total count 8-6-1). Zosyn broadened to meropenum. Stress dose steroids added. At approximately 0450 patient became acutely hypotensive, SBP in the 70's. MTP called. 1 AMP bicarb given. Additional 1 unit PRBCs given. STAT labs sent. Lactate 7.9. ABG 7.54|22|181. Creatinine 1.57 from 1.61. Decision made to take patient to the OR as class 1 for bilateral groin cutdown, Endoleak repair via L CFA with stent placement, Right groin w/ abscess culture, R CFA endarterectomy, wound vac placement to right groin EBL 2L, IVF 1L , 8u pRBC, 2u FFP, 2u Plt, 1u Cryo, 500cc UOP. Overnight lactic acidosis worsened, lactate greater than 14.8, Bicarb 15, hyperkalemic 5.6. Insulin/Dextrose/Calcium given. Nephro emergently consulted. LIJ dialysis catheter inserted. Lactic acidosis worsening, lactate greater than 17, shock liver, AST 2695, . Pressor requirements increasing. Levophed at 5mcg, phenylephrine added. Persistent hyperkalemia overnight and lactic acidosis despite CVVHD. Ph this am 7.15, Bicarb 12 Base Excess -15.5. Episode of acute hypotension. 3 AMPs of Bicarb given. Insulin/D50/Calcium given. Abdomen distended, mottled. Extremities cool. Sister Florence called and update on clinical condition and likely need for CPR and resuscitative measures in the near future. Decision made health care agent Florence to make patient a DNR and to not pursue CPR in the event her heart stopped. MOLST filled out. At 1134 am patient again became acutely hypotensive SBP in the 50's., then no pulsatile pressure observed. No heart sounds auscultated no pulse felt. Patient pronounced dead at 1135 am.

## 2024-04-29 NOTE — PROVIDER CONTACT NOTE (CRITICAL VALUE NOTIFICATION) - NAME OF MD/NP/PA/DO NOTIFIED:
ERIKA Cifuentes
Paola
EMILEE Padilla
demetrice
NP Zeynep
SICU
demetrice
Dr. Vallejo
Zeynep TEE
demetrice

## 2024-04-29 NOTE — PROVIDER CONTACT NOTE (OTHER) - ASSESSMENT
Unable to get SpO2 on monitor, team made aware since 0830. Nonresponsive. Paced on monitor. On CVVHD.

## 2024-04-29 NOTE — PROVIDER CONTACT NOTE (CRITICAL VALUE NOTIFICATION) - TEST AND RESULT REPORTED:
hemoglobin 5.5  hematocrit 17.9
hemoglobin 6.8
K 7.1
lactic 2.9  hemoglobin 8.9
lactic 5.1
lactate 16.9
K 6.9 PH 7.15
Lactate 16
Lactate > 17.0
lactate 4.1

## 2024-04-29 NOTE — PROGRESS NOTE ADULT - ASSESSMENT
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 Admitted to SICU for hemodynamic monitoring now s/p bilateral groin cutdown, Endoleak repair via L CFA with stent placement, Right groin w/ abscess culture, R CFA endarterectomy, wound vac placement to right groin EBL 2L, IVF 1L , 8u pRBC, 2u FFP, 2u Plt, 1u Cryo, 500cc UOP.     Neuro: Sedation Propofol/Fentanyl. RASS goal -2 to -3. In ED, Sudden loss of sensation in the bilateral lower extremities. L. hand weakness in ED. Stroke code called/cancelled. Obtain MRI per neurosurg when stable.  Hx: Seizure disorder: Continue Keppra   Cardiovascular: Hemorrhagic shock on arrival 2/2 likely aorto-enteric fistula, now s/p repair. Continue Levo/Vaso to maintain a MAP greater than 65. Cont stress dose steroids. Hx: CAD s/p CABG: holding Aspirin, Coreg. Hx: A.fib.: Holding Eliquis, Kcentra given. Hx: AVR/mitral annuloplasty. Severe AS pending future TAVR vs BAV. Hx: HFpEF. Hx: HLD: Rosuvastatin on hold   Pulm: Intubated for airway protections. Hematemesis. AC 16|400|40|5   GI: NPO/IVF, OGT with now serosanguinous output, previously frankly sanguinous wit total > 1L; continued melena, rectal tube in place. PPI  : Gomez. Strict Is and Os; Hx: CKD3: Baseline creatinine 1.7. C/w CVVHD, lactate >17, K continues to uptrend likely 2/2 continued cell death, will alter bath to 2K.   ID: Septic shock on admission 2/2 likely aortic graft infection - confirmed in OR, f/u OR Cx 4/28. Fluid resuscitated in the ER. Michael (4/28--) Vanc (4/27--) dc:// Zosyn. Continue to trend lactate. ID recs appreciated  Vascular: Concern for aortoenteric fistula with concurrent graft infection - now s/p bilateral groin cutdown, Endoleak repair via L CFA with stent placement, Right groin w/ abscess culture, R CFA endarterectomy, wound vac placement to right groin EBL 2L, IVF 1L , 8u pRBC, 2u FFP, 2u Plt, 1u Cryo, 500cc UOP on 4/28. Hx: AAA repair 2015, TEVAR & bilateral TIFFANY to CFA stenting 4/3/24  Endo: . Hx: Hypothyroidism: Continue Synthroid IVp 60mcg daily (home dose 75mcg PO)    Heme: Trend CBC/Coags with fibrinogen q4 hours. Hgb > 8, Plt > 100, INR ~ 1.2. Hx: Anemia: holding iron; TOTAL PRODUCT: 17u pRBCs, 8u FFP, 4u Plt  Lines/Wounds: R IJ Cordis, R Radial A line, s/p L axillary A Line, 3 PIVs; R Groin w/ wound vac, L groin w/ pressure dressing  Dispo: SICU

## 2024-04-30 LAB
-  AMOXICILLIN/CLAVULANIC ACID: SIGNIFICANT CHANGE UP
-  AMPICILLIN/SULBACTAM: SIGNIFICANT CHANGE UP
-  AMPICILLIN/SULBACTAM: SIGNIFICANT CHANGE UP
-  AMPICILLIN: SIGNIFICANT CHANGE UP
-  AMPICILLIN: SIGNIFICANT CHANGE UP
-  CEFAZOLIN: SIGNIFICANT CHANGE UP
-  CEFAZOLIN: SIGNIFICANT CHANGE UP
-  CEFEPIME: SIGNIFICANT CHANGE UP
-  CEFEPIME: SIGNIFICANT CHANGE UP
-  CEFOXITIN: SIGNIFICANT CHANGE UP
-  CEFOXITIN: SIGNIFICANT CHANGE UP
-  CEFTRIAXONE: SIGNIFICANT CHANGE UP
-  CEFTRIAXONE: SIGNIFICANT CHANGE UP
-  CIPROFLOXACIN: SIGNIFICANT CHANGE UP
-  CIPROFLOXACIN: SIGNIFICANT CHANGE UP
-  GENTAMICIN: SIGNIFICANT CHANGE UP
-  GENTAMICIN: SIGNIFICANT CHANGE UP
-  LEVOFLOXACIN: SIGNIFICANT CHANGE UP
-  LEVOFLOXACIN: SIGNIFICANT CHANGE UP
-  PIPERACILLIN/TAZOBACTAM: SIGNIFICANT CHANGE UP
-  PIPERACILLIN/TAZOBACTAM: SIGNIFICANT CHANGE UP
-  TOBRAMYCIN: SIGNIFICANT CHANGE UP
-  TOBRAMYCIN: SIGNIFICANT CHANGE UP
-  TRIMETHOPRIM/SULFAMETHOXAZOLE: SIGNIFICANT CHANGE UP
-  TRIMETHOPRIM/SULFAMETHOXAZOLE: SIGNIFICANT CHANGE UP
CULTURE RESULTS: ABNORMAL
METHOD TYPE: SIGNIFICANT CHANGE UP
METHOD TYPE: SIGNIFICANT CHANGE UP
ORGANISM # SPEC MICROSCOPIC CNT: ABNORMAL
ORGANISM # SPEC MICROSCOPIC CNT: ABNORMAL
ORGANISM # SPEC MICROSCOPIC CNT: SIGNIFICANT CHANGE UP
SPECIMEN SOURCE: SIGNIFICANT CHANGE UP

## 2024-05-01 LAB — GRAM STN FLD: ABNORMAL

## 2024-05-02 LAB
CULTURE RESULTS: ABNORMAL
SPECIMEN SOURCE: SIGNIFICANT CHANGE UP

## 2024-05-03 LAB
CULTURE RESULTS: ABNORMAL
CULTURE RESULTS: SIGNIFICANT CHANGE UP
ORGANISM # SPEC MICROSCOPIC CNT: ABNORMAL
ORGANISM # SPEC MICROSCOPIC CNT: SIGNIFICANT CHANGE UP
SPECIMEN SOURCE: SIGNIFICANT CHANGE UP
SPECIMEN SOURCE: SIGNIFICANT CHANGE UP

## 2024-05-06 LAB — SURGICAL PATHOLOGY STUDY: SIGNIFICANT CHANGE UP

## 2024-05-07 ENCOUNTER — APPOINTMENT (OUTPATIENT)
Dept: HEART AND VASCULAR | Facility: CLINIC | Age: 69
End: 2024-05-07

## 2024-05-14 ENCOUNTER — APPOINTMENT (OUTPATIENT)
Dept: HEART AND VASCULAR | Facility: CLINIC | Age: 69
End: 2024-05-14

## 2024-05-20 ENCOUNTER — APPOINTMENT (OUTPATIENT)
Dept: CARDIOTHORACIC SURGERY | Facility: CLINIC | Age: 69
End: 2024-05-20

## 2024-05-24 ENCOUNTER — APPOINTMENT (OUTPATIENT)
Dept: ENDOCRINOLOGY | Facility: CLINIC | Age: 69
End: 2024-05-24

## 2024-06-04 ENCOUNTER — APPOINTMENT (OUTPATIENT)
Dept: VASCULAR SURGERY | Facility: CLINIC | Age: 69
End: 2024-06-04

## 2024-08-12 NOTE — ED ADULT NURSE NOTE - IN THE PAST 12 MONTHS HAVE YOU USED DRUGS OTHER THAN THOSE REQUIRED FOR MEDICAL REASON?
[FreeTextEntry1] : no smoking, RBAD . Discussed the risks of DVT and blood clots,strokes  good and bad side effects of the pill discussed and instructions on how to take pills and when to use back up. Encouraged exercise , good diet filled with,plant based foods, calcium and vit.D.rtn 12 months. Discussed SBE, Discussed the NIH suggests minimum of 2.5 hours of exercise a week  Answered any questions she may have.
No

## 2024-09-26 NOTE — ED PROVIDER NOTE - OBJECTIVE STATEMENT
Imaging and workup reviewed from two ER visits  Based on mechanism of injury and subsequent symptoms, diagnosis of concussion is present  We discussed this at length as well as its implications  We discussed appropriate return to activity, brain rest, activities that may or may not exacerbate the symptoms  Recommend staying off work and resting, slow return to activity  Continue Baclofen and Naproxen  Can use Tramadol but try to limit to severe pain with other medications that she has in place  We discussed red flag symptoms which would warrant reevaluation and voiced understanding  We addressed expectations and possible time line, patient and partner voiced understanding  
62 y/o F patient with history of MI and HTN complaining of abdominal pain and seizure today. associated symptoms include nausea multiple episodes of vomiting. former smoker. NKDA.

## 2024-10-15 ENCOUNTER — APPOINTMENT (OUTPATIENT)
Dept: VASCULAR SURGERY | Facility: CLINIC | Age: 69
End: 2024-10-15

## 2025-04-03 NOTE — PATIENT PROFILE ADULT - HOW MUCH WEIGHT HAVE YOU LOST?
Detail Level: Detailed Depth Of Biopsy: dermis Was A Bandage Applied: Yes Size Of Lesion In Cm: 0 Biopsy Type: H and E Biopsy Method: 15 blade Anesthesia Type: 1% lidocaine with epinephrine Anesthesia Volume In Cc: 0.5 Hemostasis: Drysol Wound Care: Vaseline Dressing: Band-Aid Destruction After The Procedure: No Type Of Destruction Used: Curettage Curettage Text: The wound bed was treated with curettage after the biopsy was performed. Cryotherapy Text: The wound bed was treated with cryotherapy after the biopsy was performed. Electrodesiccation Text: The wound bed was treated with electrodesiccation after the biopsy was performed. Electrodesiccation And Curettage Text: The wound bed was treated with electrodesiccation and curettage after the biopsy was performed. Silver Nitrate Text: The wound bed was treated with silver nitrate after the biopsy was performed. Lab: 6 Lab Facility: 3 Medical Necessity Information: It is in your best interest to select a reason for this procedure from the list below. All of these items fulfill various CMS LCD requirements except the new and changing color options. Consent: Written consent was obtained and risks were reviewed including but not limited to scarring, infection, bleeding, scabbing, incomplete removal, nerve damage and allergy to anesthesia. Post-Care Instructions: 1. Keep the wound dry and covered with a bandage for the first 24 hours after procedure. Thereafter, change the bandage twice a day. Gently clean the wound with water and then gently pat the wound dry. Gently apply a thick layer of Vaseline to the wound and cover with a bandage, 2 days after your biopsy. If the bandage gets wet, replace it with a dry bandage. For facial biopsies, the area may remain uncovered after 3-4 days. For other areas of the body, we recommend covering the wound for 7 days. After 7 days, the wound can remain uncovered.\\n\\n2. We do not recommend Neosporin in our practice due to the percentage of the population that can develop an allergy to it.\\n\\n3. Showering is fine; if the bandage gets wet, just replace it with a dry bandage (no soaking in a bathtub or a pool for 5 days).\\n \\n4. If bruising is a concern, Arnica is a supplement that minimizes bruising. To prevent bruising, the supplement can be taken once daily for 1 week prior to the procedure. This can also be taken starting on the same day as the procedure to minimize bruising and quicken the healing process if bruises do appear. If starting on the same day as the procedure, please follow instructions on the package. This supplement is available at local vitamin shops.\\n \\n5. Your results may take up to 14 days to come in. Our office will call you with results at this time. In some cases, the biopsy will indicate that more skin must be removed in order to remove abnormal cells. If this is the case, you will then be asked to return to the office for additional information and/or treatment.\\n  \\nOnce the wound has healed, to help with minimizing the scar, a product called Scar Recovery Gel can be applied twice a day to the area. This product will decrease the likelihood of the formation of a hypertrophic scar, and decrease the appearance of red or pink pigment changes in the scar. The gel has also been shown to significantly decrease itching while the wound heals. Your provider will discuss this product with you after your biopsy results have come in.\\n** Call us if you experience any problems or have any questions. Notification Instructions: Patient will be notified of biopsy results. However, patient instructed to call the office if not contacted within 2 weeks. Billing Type: Third-Party Bill Information: Selecting Yes will display possible errors in your note based on the variables you have selected. This validation is only offered as a suggestion for you. PLEASE NOTE THAT THE VALIDATION TEXT WILL BE REMOVED WHEN YOU FINALIZE YOUR NOTE. IF YOU WANT TO FAX A PRELIMINARY NOTE YOU WILL NEED TO TOGGLE THIS TO 'NO' IF YOU DO NOT WANT IT IN YOUR FAXED NOTE. 14-23 lbs (2)

## 2025-05-27 NOTE — ED PROVIDER NOTE - CARDIAC RATE
Received request via: Pharmacy    Was the patient seen in the last year in this department? Yes    Does the patient have an active prescription (recently filled or refills available) for medication(s) requested? No    Pharmacy Name: [facility]     Does the patient have [facility] and need 100-day supply? (This applies to ALL medications) Yes, quantity updated to 100 days  
normal

## (undated) DEVICE — ELCTR BOVIE PENCIL SMOKE EVACUATION

## (undated) DEVICE — SUT PROLENE 6-0 18" RB-2

## (undated) DEVICE — FLOW SWITCH

## (undated) DEVICE — SUT PROLENE 6-0 18" C-1

## (undated) DEVICE — RESERVOIR XTRA B BLD COLLECT

## (undated) DEVICE — SUT PROLENE 5-0 36" RB-1

## (undated) DEVICE — SUT ETHIBOND 4-0 12-18"

## (undated) DEVICE — SUT SILK 2-0 18" SH (POP-OFF)

## (undated) DEVICE — DRAPE BACK TABLE COVER 80X90"

## (undated) DEVICE — DRAPE IOBAN 33" X 23"

## (undated) DEVICE — MARKING PEN W RULER

## (undated) DEVICE — DRAPE PROBE COVER LATEX FREE 3X96"

## (undated) DEVICE — PACK ENDO VASCULAR LENOX HILL

## (undated) DEVICE — WARMING BLANKET FULL UNDERBODY

## (undated) DEVICE — SET BOWL XTRA 225

## (undated) DEVICE — SUT ETHIBOND 2-0 18" TIES

## (undated) DEVICE — TORQUE DEVICE FOR GUIDEWIRE 0.0100.038"

## (undated) DEVICE — GEL ULTRASOUND 0.25L

## (undated) DEVICE — BAG DECANTER IV STERILE

## (undated) DEVICE — TUBING EXTENSION HI PRESSURE FLEX 48"

## (undated) DEVICE — Device

## (undated) DEVICE — TUBING XTRA AAL ASPIRATION AND ANTICOAGULATION LINE 1/4"

## (undated) DEVICE — SUT VICRYL 2-0 27" CT-1

## (undated) DEVICE — VESSEL LOOP MAXI-BLUE 0.120" X 16"

## (undated) DEVICE — DRAPE MAYO STAND 30"

## (undated) DEVICE — GLV 7.5 PROTEXIS (WHITE)

## (undated) DEVICE — DRAPE MAYO STAND 23"

## (undated) DEVICE — VESSEL LOOP MINI-BLUE 0.075" X 16"

## (undated) DEVICE — SYR CONTROL LUER LOK 10CC

## (undated) DEVICE — SUT PROLENE 3-0 36" MH

## (undated) DEVICE — SUT PROLENE 6-0 30" RB-2

## (undated) DEVICE — INFLATOR ENCORE 26

## (undated) DEVICE — SUT PROLENE 6-0 30" BV

## (undated) DEVICE — SUT SILK 4-0 18" TIES

## (undated) DEVICE — GLV 8 PROTEXIS (WHITE)

## (undated) DEVICE — SUT SILK 2-0 12-18"

## (undated) DEVICE — SUT MONOCRYL 4-0 27" PS-2 UNDYED

## (undated) DEVICE — ELCTR BOVIE PENCIL BLADE 10FT

## (undated) DEVICE — DRSG DERMABOND 0.7ML